# Patient Record
Sex: MALE | Race: WHITE | NOT HISPANIC OR LATINO | Employment: OTHER | URBAN - NONMETROPOLITAN AREA
[De-identification: names, ages, dates, MRNs, and addresses within clinical notes are randomized per-mention and may not be internally consistent; named-entity substitution may affect disease eponyms.]

---

## 2017-01-05 ENCOUNTER — OFFICE VISIT (OUTPATIENT)
Dept: GASTROENTEROLOGY | Facility: CLINIC | Age: 62
End: 2017-01-05

## 2017-01-05 VITALS
WEIGHT: 153.6 LBS | BODY MASS INDEX: 23.28 KG/M2 | HEART RATE: 71 BPM | HEIGHT: 68 IN | DIASTOLIC BLOOD PRESSURE: 74 MMHG | SYSTOLIC BLOOD PRESSURE: 111 MMHG

## 2017-01-05 DIAGNOSIS — K74.00 HEPATIC FIBROSIS: ICD-10-CM

## 2017-01-05 DIAGNOSIS — B18.2 CHRONIC HEPATITIS C WITHOUT HEPATIC COMA (HCC): Primary | ICD-10-CM

## 2017-01-05 DIAGNOSIS — K21.00 GASTROESOPHAGEAL REFLUX DISEASE WITH ESOPHAGITIS: ICD-10-CM

## 2017-01-05 PROCEDURE — 99213 OFFICE O/P EST LOW 20 MIN: CPT | Performed by: PHYSICIAN ASSISTANT

## 2017-01-05 RX ORDER — LEDIPASVIR AND SOFOSBUVIR 90; 400 MG/1; MG/1
1 TABLET, FILM COATED ORAL DAILY
Qty: 28 TABLET | Refills: 1 | OUTPATIENT
Start: 2017-01-05 | End: 2017-03-24

## 2017-01-05 NOTE — PROGRESS NOTES
Chief Complaint   Patient presents with   • Hepatitis C   • Heartburn   • Abdominal Pain   • Diarrhea       Subjective    Favio Casillas is a 61 y.o. male. he is here today for follow-up.    History of Present Illness    Patient seen on a recheck of his GERD, chronic hepatitis C, diarrhea, abdominal pain.  Last seen 9/29/16.  Genotype 1A.  F0-F1.  He is treatment naïve.  GERD is well-controlled on Prilosec as long as he takes it twice a day.  Otherwise he states he gets burning in his stomach.  No nausea vomiting, no dysphagia.  Bowels are moving without blood or mucus.  He's been eating a lot better and his weight is up 20.6 pounds since last visit.  Last colonoscopy showed hemorrhoids on 7/24/15.    Laboratory on 1/3/17 showed normal LFTs, INR, TSH, PSA.  HCV genotype is pending.  HCV RNA by PCR quantitative was 2,320,000 international units per mL, equivalent to 6.365 log IUs.  A1c was 7.7%.    A/P: Chronic active hepatitis C.  Recommend 8 weeks treatment on Harvoni.  He will be due for hepatoma screening in August.  We'll continue on the Prilosec but discussed with him the need to decrease that if we're able to start him on Harvoni.  We'll plan follow-up in one month, further pending clinical course and the results of the above.     The following portions of the patient's history were reviewed and updated as appropriate:   Past Medical History   Diagnosis Date   • Abnormal weight loss    • Acute bronchiolitis    • Acute bronchitis    • Acute exacerbation of chronic obstructive airways disease    • Adenomatous polyp of colon    • Aptyalism    • Artificial lens present    • Artificial lens present      Artificial lens in position     • Astigmatism    • Backache      chronic, rt flank likely not gallbladder   • Borderline glaucoma    • Chest discomfort    • Chest pain    • Chronic hepatitis C      1a. Fibrosure .40/F1-F2, necroinflam .12/A0. repeat .29/F0, .09/A0      • Chronic hepatitis C      1a. Fibrosure  .40/F1-F2, necroinflam .12/A0. repeat .29/F0, .09/A0   • Chronic obstructive lung disease    • Common cold    • Constipation    • Coronary arteriosclerosis    • Diarrhea    • Disorder of duodenum      abnormal on CT   • Disorder of duodenum      abnormal on CT    • Disorder of gallbladder      s/p lap radhames and normal ioc for wound check    • Diverticula of colon    • Diverticular disease of colon    • Drug abuse      used [facility]     • Elevated levels of transaminase & lactic acid dehydrogenase    • Esophagitis      Grade II    • Essential hypertension    • Fatigue    • Gastritis    • Gastroesophageal reflux disease    • Generalized abdominal pain    • Hand pain, right    • Headache    • Heel pain      Plantar   • Hemorrhoids    • History of colon polyps    • History of colonoscopy 08/19/2013     Colon endoscopy 17848 (4) - Diverticulum in sigmoid colon. 1 polyp in ascending colon; removed by cold biopsy polyepctomy. Internal & external hemorrhoids found   • History of esophagogastroduodenoscopy 07/24/2015     (1) - Normal esophagus.Gastritis found in the stomach. Biopsy taken. Normal duodenum. Biopsy taken.       • History of neoplasm of bladder    • History of pneumococcal vaccination    • History of seizure    • Left lower quadrant pain    • Male erectile disorder    • Malignant tumor of prostate    • Myopia    • Nausea and vomiting    • Need for immunization against influenza    • Need for prophylactic vaccination and inoculation against influenza    • Pain      Plantar heel pain     • Pain in right hand    • Patient's noncompliance with other medical treatment and regimen    • Periumbilical pain    • Primary malignant neoplasm of bladder      T1,Grade 3, transitional cell cancer   • Rash    • Rash      C/O: a rash   • Rheumatoid arthritis    • Right upper quadrant pain    • Sebaceous cyst    • Seizure    • Transient cerebral ischemia    • Type 2 diabetes mellitus    • Viral hepatitis C      Past Surgical  History   Procedure Laterality Date   • Bladder tumor excision       transurethral resection of the tumor & then undergone intravesica BCG.He had this done elsewhere   • Bladder surgery  01/27/2005   • Cholecystectomy  08/25/2011     Laparoscopic   • Endoscopy  07/24/2015     With biopsy   • Endoscopy  02/23/2009     with tube   • Other surgical history  03/22/2012     EXC TR-EXT Benign+Brittany 1.1-2 CM    • Injection of medication  05/23/2016     Kenalog   • Wound closure  03/22/2012     Layer Closure of Wound TR-EXT 2.5 < CM   • Back surgery  01/01/2000     Low back disc surgery   • Cataract extraction w/  intraocular lens implant Right 05/21/2009   • Bladder surgery  01/27/2005     (2) - Radical cystoprostatectomy, orthopic continent urinary diversion, placement of a double lumen right subclavian catheter. Recurrent T1, grade 3 transitional cell cancer of the bladder plus diffuse carcinoma in situ   • Cholecystectomy  08/25/2011     (1) - with operative cholangiogram. Cholecystitis   • Cystoscopy  12/17/2004     (1) - transurethral resection of bladder tumor medium & random bladder biopsies x 5. History of T1 Grade3 transitional cancer of the bladder   • Lumbar disc surgery  2000     Low back disk surgery (1)   • Cataract extraction Right 05/21/2009     Remove cataract, insert lens (2) - right   • Other surgical history       Anesth, bladder tumor surg (1) - transurethral resection of the tumor & then undergone intravesica BCG.He had this done elsewhere   • Injection of medication  05/12/2016     Kenalog (2)  - SEAN Robert   • Other surgical history  3/22/3012     Layer Closure of Wound TR-EXT 2.5 < CM 52281 (1) Thad Villanueva   • Other surgical history  03/22/2012     EXC TR-EXT Benign+Brittany 1.1-2 CM 96758 (1)   Thad Villanueva   • Colonoscopy  08/19/2013   • Colonoscopy  07/24/2015   • Upper gastrointestinal endoscopy  07/24/2015     Family History   Problem Relation Age of Onset   • Diabetes Mother    • Liver cancer  Father    • Coronary artery disease Other    • Hypertension Other    • Tuberculosis Other    • Cholelithiasis Other    • Gallbladder disease Other      Gallstones   • Hepatitis Other      Hep C       No Known Allergies  Social History     Social History   • Marital status:      Spouse name: N/A   • Number of children: N/A   • Years of education: N/A     Social History Main Topics   • Smoking status: Former Smoker     Quit date: 10/27/2016   • Smokeless tobacco: Never Used      Comment: Quit 2 months ago   • Alcohol use No   • Drug use: No   • Sexual activity: Not Asked     Other Topics Concern   • None     Social History Narrative    ** Merged History Encounter **            Current Outpatient Prescriptions:   •  albuterol (PROVENTIL HFA;VENTOLIN HFA) 108 (90 BASE) MCG/ACT inhaler, Inhale 2 puffs Every 4 (Four) Hours As Needed., Disp: , Rfl:   •  aspirin 81 MG chewable tablet, Chew 81 mg daily., Disp: , Rfl:   •  carvedilol (COREG) 3.125 MG tablet, Take 3.125 mg by mouth 2 (Two) Times a Day With Meals., Disp: , Rfl:   •  diclofenac (VOLTAREN) 50 MG EC tablet, Take 1 tablet by mouth 2 (Two) Times a Day., Disp: 60 tablet, Rfl: 1  •  insulin detemir (LEVEMIR) 100 UNIT/ML injection, Inject 30 Units under the skin every night., Disp: , Rfl:   •  isosorbide mononitrate (IMDUR) 30 MG 24 hr tablet, Take 45 mg by mouth 2 (two) times a day., Disp: , Rfl:   •  metFORMIN (GLUCOPHAGE) 1000 MG tablet, Take 1,000 mg by mouth 2 (Two) Times a Day., Disp: , Rfl:   •  mirtazapine (REMERON) 15 MG tablet, Take 1 tablet by mouth Every Night., Disp: 30 tablet, Rfl: 11  •  NITROSTAT 0.4 MG SL tablet, PLACE 1 TABLET UNDER THE TONGUE AS NEEDED FOR CHEST PAIN. MAY REPEAT EVERY 5 MINUTES UP TO 3 DOSES, THEN GO TO EMERGENCY ROOM, Disp: 25 tablet, Rfl: 11  •  omeprazole (priLOSEC) 40 MG capsule, Take 1 capsule by mouth 2 (Two) Times a Day., Disp: 60 capsule, Rfl: 3  •  pravastatin (PRAVACHOL) 40 MG tablet, Take 40 mg by mouth Every  "Night., Disp: , Rfl:   •  tiZANidine (ZANAFLEX) 4 MG tablet, Take 1-2 tablets by mouth At Night As Needed for muscle spasms., Disp: 60 tablet, Rfl: 11  •  Ledipasvir-Sofosbuvir  MG tablet, Take 1 tablet by mouth Daily., Disp: 28 tablet, Rfl: 1  Review of Systems  Review of Systems       Objective      Visit Vitals   • /74 (BP Location: Left arm)   • Pulse 71   • Ht 68\" (172.7 cm)   • Wt 153 lb 9.6 oz (69.7 kg)   • BMI 23.35 kg/m2     Physical Exam   Constitutional: He is oriented to person, place, and time. He appears well-developed and well-nourished. No distress.   Chronically ill   HENT:   Head: Normocephalic and atraumatic.   Eyes: EOM are normal. Pupils are equal, round, and reactive to light.   Neck: Normal range of motion.   Cardiovascular: Normal rate, regular rhythm and normal heart sounds.    Pulmonary/Chest: Effort normal and breath sounds normal.   Abdominal: Soft. Bowel sounds are normal. He exhibits no shifting dullness, no distension, no abdominal bruit, no ascites and no mass. There is no hepatosplenomegaly. There is no tenderness. There is no rigidity, no rebound, no guarding and no CVA tenderness. No hernia. Hernia confirmed negative in the ventral area.   Musculoskeletal: Normal range of motion.   Neurological: He is alert and oriented to person, place, and time.   Skin: Skin is warm and dry.   Psychiatric: He has a normal mood and affect. His behavior is normal. Judgment and thought content normal.   Nursing note and vitals reviewed.    Hospital Outpatient Visit on 01/03/2017   Component Date Value Ref Range Status   • Total Protein 01/03/2017 7.3  6.3 - 8.6 gm/dl Final   • Albumin 01/03/2017 4.4  3.4 - 4.8 gm/dl Final   • Bilirubin, Direct 01/03/2017 0.0  0.0 - 0.3 mg/dl Final   • Total Bilirubin 01/03/2017 0.7  0.2 - 1.3 mg/dl Final   • Alkaline Phosphatase 01/03/2017 50  38 - 126 U/L Final   • AST (SGOT) 01/03/2017 32  17 - 59 U/L Final   • ALT (SGPT) 01/03/2017 48  21 - 72 U/L " Final   • Protime 01/03/2017 12.8  11.1 - 15.3 seconds Final   • INR 01/03/2017 1.0  0.8 - 1.2 Final    Comment: Therapeutic range for most indications is 2.0 - 3.0 INR or 2.5 - 3.5 for  mechanical   heart valves.     • Hepatitis C Quantitation 01/03/2017 [id number]  IU/mL Final   • HCV log10 01/03/2017 6.365  log10 IU/mL Final   • Test Information 01/03/2017 Comment   Final    Comment: The quantitative range of this assay is 15 IU/mL to 100 million IU/mL.  Performed at:  [facility]  [id number]  : Favio Fuentes MD, Phone:  [phone]       Assessment/Plan      1. Chronic hepatitis C without hepatic coma    2. Gastroesophageal reflux disease with esophagitis    3. Hepatic fibrosis    .   Favio was seen today for hepatitis c, heartburn, abdominal pain and diarrhea.    Diagnoses and all orders for this visit:    Chronic hepatitis C without hepatic coma  -     Ledipasvir-Sofosbuvir  MG tablet; Take 1 tablet by mouth Daily.    Gastroesophageal reflux disease with esophagitis    Hepatic fibrosis        Orders placed during this encounter include:  No orders of the defined types were placed in this encounter.      Medications prescribed:  New Medications Ordered This Visit   Medications   • Ledipasvir-Sofosbuvir  MG tablet     Sig: Take 1 tablet by mouth Daily.     Dispense:  28 tablet     Refill:  1       Requested Prescriptions     Signed Prescriptions Disp Refills   • Ledipasvir-Sofosbuvir  MG tablet 28 tablet 1     Sig: Take 1 tablet by mouth Daily.       Review and/or summary of lab tests, radiology, procedures, medications. Review and summary of old records and obtaining of history. The risks and benefits of my recommendations, as well as other treatment options were discussed with the patient today. Questions were answered.    Follow-up: Return in about 4 weeks (around 2/2/2017), or if symptoms worsen or fail to improve.           This  document has been electronically signed by Kevin Robbins PA-C on January 5, 2017 5:55 PM      Results for orders placed or performed during the hospital encounter of 01/03/17   Hepatitis C RNA, Quantitative, PCR (graph)   Result Value Ref Range    Hepatitis C Quantitation [id number] IU/mL    HCV log10 6.365 log10 IU/mL    Test Information Comment    Protime-INR   Result Value Ref Range    Protime 12.8 11.1 - 15.3 seconds    INR 1.0 0.8 - 1.2   Hepatic Function Panel   Result Value Ref Range    Total Protein 7.3 6.3 - 8.6 gm/dl    Albumin 4.4 3.4 - 4.8 gm/dl    Bilirubin, Direct 0.0 0.0 - 0.3 mg/dl    Total Bilirubin 0.7 0.2 - 1.3 mg/dl    Alkaline Phosphatase 50 38 - 126 U/L    AST (SGOT) 32 17 - 59 U/L    ALT (SGPT) 48 21 - 72 U/L   Results for orders placed or performed during the hospital encounter of 12/27/16   PSA   Result Value Ref Range    PSA <0.06 0.00 - 4.00 ng/ml   Results for orders placed or performed in visit on 12/27/16   TSH   Result Value Ref Range    TSH 1.34 0.46 - 4.68 uIU/ml   Hemoglobin A1c   Result Value Ref Range    Hemoglobin A1C 7.7 (H) 4.0 - 5.6 %TotHgb   Results for orders placed or performed in visit on 10/25/16    COLONOSCOPY   Result Value Ref Range     Colonoscopy Complete.  Referring Physician - Kevin Robbins    Results for orders placed or performed during the hospital encounter of 08/04/16   HCV FibroSURE   Result Value Ref Range    Fibrosis Score 0.26 (H) 0.00 - 0.21    Fibrosis Stage F0-F1     Necroinflammat Activity Score 0.06 0.00 - 0.17    Necroinflammat Activity Grade A0-No activity     Alpha 2-Macroglobulins, Qn 265 110 - 276 mg/dL    Haptoglobin 152 34 - 200 mg/dL    Apolipoprotein A-1 123 101 - 178 mg/dL    Total Bilirubin 0.2 0.0 - 1.2 mg/dL    GGT 23 0 - 65 IU/L    ALT (SGPT) 18 0 - 55 IU/L    HCV Qual Interp Comment     Fibrosis Scoring: Comment     Necroinflamm Activity Scoring: Comment     Limitations: Comment     Comment Comment    AFP tumor marker   Result  Value Ref Range    AFP Tumor Marker 2.5 0.0 - 8.3 ng/mL   Protime-INR   Result Value Ref Range    Protime 12.2 11.1 - 15.3 seconds    INR 0.9 0.0 - 3.5   Results for orders placed or performed during the hospital encounter of 06/10/16   CBC and Differential   Result Value Ref Range    WBC 7.3 3.2 - 9.8 x1000/uL    RBC 4.65 4.37 - 5.74 alanna/mm3    Hemoglobin 14.3 13.7 - 17.3 gm/dl    Hematocrit 42.9 39.0 - 49.0 %    MCV 92.3 80.0 - 98.0 fl    MCH 30.8 26.0 - 34.0 pg    MCHC 33.3 31.5 - 36.3 gm/dl    RDW 13.6 11.5 - 14.5 %    Platelets 154 150 - 450 x1000/mm3    MPV 10.4 8.0 - 12.0 fl    Neutrophil Rel % 63.1 37.0 - 80.0 %    Lymphocyte Rel % 27.6 10.0 - 50.0 %    Monocyte Rel % 7.3 0.0 - 12.0 %    Eosinophil Rel % 1.4 0.0 - 7.0 %    Basophil Rel % 0.3 0.0 - 2.0 %    Immature Granulocyte Rel % 0.30 0.00 - 0.50 %    Neutrophils Absolute 4.60 2.00 - 8.60 x1000/uL    Lymphocytes Absolute 2.01 0.60 - 4.20 x1000/uL    Monocytes Absolute 0.53 0.00 - 0.90 x1000/uL    Eosinophils Absolute 0.10 0.00 - 0.70 x1000/uL    Basophils Absolute 0.02 0.00 - 0.20 x1000/uL    Immature Granulocytes Absolute 0.020 0.005 - 0.022 x1000/uL    nRBC 0.1 0.0 - 0.2 %    nRBC 0.050 x1000/uL   Uric acid   Result Value Ref Range    Uric Acid 2.6 2.5 - 8.5 mg/dl   Comprehensive metabolic panel   Result Value Ref Range    Sodium 143 137 - 145 mmol/L    Potassium 4.6 3.5 - 5.1 mmol/L    Chloride 104 95 - 110 mmol/L    CO2 24 22 - 31 mmol/L    Anion Gap 15.0 5.0 - 15.0 mmol/L    Glucose 181 (H) 60 - 100 mg/dl    BUN 21 7 - 21 mg/dl    Creatinine 0.8 0.7 - 1.3 mg/dl    GFR MDRD Non  99 56 - 130 mL/min/1.73 sq.M    GFR MDRD  120 56 - 130 mL/min/1.73 sq.M    Calcium 9.9 8.4 - 10.2 mg/dl    Total Protein 6.1 (L) 6.3 - 8.6 gm/dl    Albumin 3.7 3.4 - 4.8 gm/dl    Total Bilirubin 0.3 0.2 - 1.3 mg/dl    Alkaline Phosphatase 59 38 - 126 U/L    AST (SGOT) 13 (L) 17 - 59 U/L    ALT (SGPT) 30 21 - 72 U/L   Results for orders placed  or performed during the hospital encounter of 03/29/16   Hemoglobin A1c   Result Value Ref Range    Hemoglobin A1C 6.6 (H) 4.0 - 5.6 %TotHgb   Results for orders placed or performed during the hospital encounter of 02/10/16   Protime-INR   Result Value Ref Range    Protime 13.6 11.1 - 15.3 seconds    INR 1.0 0.0 - 3.5   Gamma GT   Result Value Ref Range    GGT 23 15 - 73 U/L   Hepatic function panel   Result Value Ref Range    Total Protein 7.1 6.3 - 8.6 gm/dl    Albumin 4.5 3.4 - 4.8 gm/dl    Bilirubin, Direct 0.0 0.0 - 0.3 mg/dl    Total Bilirubin 0.8 0.2 - 1.3 mg/dl    Alkaline Phosphatase 45 38 - 126 U/L    AST (SGOT) 15 (L) 17 - 59 U/L    ALT (SGPT) 22 21 - 72 U/L   Results for orders placed or performed during the hospital encounter of 11/30/15   Uric acid   Result Value Ref Range    Uric Acid 2.8 2.5 - 8.5 mg/dl   Results for orders placed or performed during the hospital encounter of 11/04/15   HCV FibroSURE   Result Value Ref Range    Fibrosis Score 0.29 (H) 0.00 - 0.21    Fibrosis Stage Comment     Necroinflammat Activity Score 0.09 0.00 - 0.17    Necroinflammat Activity Grade A0-No activity     Alpha 2-Macroglobulins, Qn 259 110 - 276 mg/dL    Haptoglobin 142 34 - 200 mg/dL    Apolipoprotein A-1 137 110 - 180 mg/dL    Total Bilirubin 0.3 0.0 - 1.2 mg/dL    GGT 25 0 - 65 IU/L    ALT (SGPT) 22 0 - 55 IU/L    HCV Qual Interp Comment     Fibrosis Scoring: Comment     Necroinflamm Activity Scoring: Comment     Limitations: Comment     Comment Comment      *Note: Due to a large number of results and/or encounters for the requested time period, some results have not been displayed. A complete set of results can be found in Results Review.

## 2017-01-05 NOTE — MR AVS SNAPSHOT
Favio Wing Vinny   1/5/2017 10:30 AM   Office Visit    Dept Phone:  [phone]   Encounter #:  [id number]    Provider:  Kevin Robbins PA-C   Department:  [facility] GASTROENTEROLOGY                Your Full Care Plan              Today's Medication Changes          These changes are accurate as of: 1/5/17 11:04 AM.  If you have any questions, ask your nurse or doctor.               New Medication(s)Ordered:     Ledipasvir-Sofosbuvir  MG tablet   Take 1 tablet by mouth Daily.   Started by:  Kevin Robbins PA-C            Where to Get Your Medications      These medications were sent to [facility] - [place], [place] - [address] - [phone]  - [phone] FX  [address] [facility] [address]     Phone:  [phone]     Ledipasvir-Sofosbuvir  MG tablet                  Your Updated Medication List          This list is accurate as of: 1/5/17 11:04 AM.  Always use your most recent med list.                albuterol 108 (90 BASE) MCG/ACT inhaler   Commonly known as:  PROVENTIL HFA;VENTOLIN HFA       aspirin 81 MG chewable tablet       carvedilol 3.125 MG tablet   Commonly known as:  COREG       diclofenac 50 MG EC tablet   Commonly known as:  VOLTAREN   Take 1 tablet by mouth 2 (Two) Times a Day.       insulin detemir 100 UNIT/ML injection   Commonly known as:  LEVEMIR       isosorbide mononitrate 30 MG 24 hr tablet   Commonly known as:  IMDUR       Ledipasvir-Sofosbuvir  MG tablet   Take 1 tablet by mouth Daily.       metFORMIN 1000 MG tablet   Commonly known as:  GLUCOPHAGE       mirtazapine 15 MG tablet   Commonly known as:  REMERON   Take 1 tablet by mouth Every Night.       NITROSTAT 0.4 MG SL tablet   Generic drug:  nitroglycerin   PLACE 1 TABLET UNDER THE TONGUE AS NEEDED FOR CHEST PAIN. MAY REPEAT EVERY 5 MINUTES UP TO 3 DOSES, THEN GO TO EMERGENCY ROOM       omeprazole 40 MG capsule   Commonly known  "as:  priLOSEC   Take 1 capsule by mouth 2 (Two) Times a Day.       pravastatin 40 MG tablet   Commonly known as:  PRAVACHOL       tiZANidine 4 MG tablet   Commonly known as:  ZANAFLEX   Take 1-2 tablets by mouth At Night As Needed for muscle spasms.               You Were Diagnosed With        Codes Comments    Chronic hepatitis C without hepatic coma    -  Primary ICD-10-CM: B18.2  ICD-9-CM: 070.54     Gastroesophageal reflux disease with esophagitis     ICD-10-CM: K21.0  ICD-9-CM: 530.11     Hepatic fibrosis     ICD-10-CM: K74.0  ICD-9-CM: 571.5       Instructions     None    Patient Instructions History      Upcoming Appointments     Visit Type Date Time Department    OFFICE VISIT 1/5/2017 10:30 AM [facility]    OFFICE VISIT 2/2/2017 10:45 AM [facility]  [facility]    OFFICE VISIT 3/28/2017  8:30 AM [facility]      [employer] Signup     Our records indicate that you have declined [employer] signup. If you would like to sign up for [employer], please email [email or web address] or call [phone] to obtain an activation code.             Other Info from Your Visit           Your Appointments     Feb 02, 2017 10:45 AM CST   Office Visit with Kevin Robbins PA-C   [facility] GASTROENTEROLOGY (--)    [address] [place] [address] [address]  [facility] [address] [place] [address] [phone]           Arrive 15 minutes prior to appointment.            Mar 28, 2017  8:30 AM CDT   Office Visit with Harvinder Robert MD   [facility] FAMILY MEDICINE (--)    [address] [facility] [address] [phone]           Arrive 15 minutes prior to appointment.              Allergies     No Known Allergies      Reason for Visit     Hepatitis C     Heartburn     Abdominal Pain     Diarrhea           Vital Signs     Blood Pressure Pulse Height Weight Body Mass Index Smoking Status    111/74 (BP Location: Left arm) 71 68\" (172.7 cm) 153 lb 9.6 oz (69.7 kg) 23.35 " kg/m2 Former Smoker      Problems and Diagnoses Noted     Chronic hepatitis C without hepatic coma    -  Primary    Inflammation of the esophagus        Hepatic fibrosis

## 2017-01-13 RX ORDER — ALBUTEROL SULFATE 90 UG/1
2 AEROSOL, METERED RESPIRATORY (INHALATION) EVERY 4 HOURS PRN
Qty: 1 INHALER | Refills: 0 | Status: SHIPPED | OUTPATIENT
Start: 2017-01-13 | End: 2017-03-24 | Stop reason: SDUPTHER

## 2017-01-23 ENCOUNTER — OFFICE VISIT (OUTPATIENT)
Dept: GASTROENTEROLOGY | Facility: CLINIC | Age: 62
End: 2017-01-23

## 2017-01-23 VITALS
DIASTOLIC BLOOD PRESSURE: 79 MMHG | HEART RATE: 71 BPM | BODY MASS INDEX: 23.01 KG/M2 | HEIGHT: 68 IN | SYSTOLIC BLOOD PRESSURE: 125 MMHG | WEIGHT: 151.8 LBS

## 2017-01-23 DIAGNOSIS — K74.00 HEPATIC FIBROSIS: ICD-10-CM

## 2017-01-23 DIAGNOSIS — K21.00 GASTROESOPHAGEAL REFLUX DISEASE WITH ESOPHAGITIS: ICD-10-CM

## 2017-01-23 DIAGNOSIS — R10.13 EPIGASTRIC PAIN: Primary | ICD-10-CM

## 2017-01-23 DIAGNOSIS — B18.2 CHRONIC HEPATITIS C WITHOUT HEPATIC COMA (HCC): ICD-10-CM

## 2017-01-23 DIAGNOSIS — K59.09 OTHER CONSTIPATION: ICD-10-CM

## 2017-01-23 PROCEDURE — 99213 OFFICE O/P EST LOW 20 MIN: CPT | Performed by: PHYSICIAN ASSISTANT

## 2017-01-23 RX ORDER — OMEPRAZOLE 20 MG/1
20 CAPSULE, DELAYED RELEASE ORAL DAILY
Qty: 30 CAPSULE | Refills: 1 | Status: SHIPPED | OUTPATIENT
Start: 2017-01-23 | End: 2017-03-24 | Stop reason: DRUGHIGH

## 2017-02-07 ENCOUNTER — LAB (OUTPATIENT)
Dept: LAB | Facility: CLINIC | Age: 62
End: 2017-02-07

## 2017-02-07 DIAGNOSIS — B18.2 CHRONIC HEPATITIS C WITHOUT HEPATIC COMA (HCC): ICD-10-CM

## 2017-02-07 PROCEDURE — 85025 COMPLETE CBC W/AUTO DIFF WBC: CPT | Performed by: PHYSICIAN ASSISTANT

## 2017-02-07 PROCEDURE — 80053 COMPREHEN METABOLIC PANEL: CPT | Performed by: PHYSICIAN ASSISTANT

## 2017-02-08 LAB
ALBUMIN SERPL-MCNC: 4.3 G/DL (ref 3.4–4.8)
ALBUMIN/GLOB SERPL: 1.7 G/DL (ref 1.1–1.8)
ALP SERPL-CCNC: 58 U/L (ref 38–126)
ALT SERPL W P-5'-P-CCNC: 25 U/L (ref 21–72)
ANION GAP SERPL CALCULATED.3IONS-SCNC: 10 MMOL/L (ref 5–15)
AST SERPL-CCNC: 11 U/L (ref 17–59)
BASOPHILS # BLD AUTO: 0.03 10*3/MM3 (ref 0–0.2)
BASOPHILS NFR BLD AUTO: 0.5 % (ref 0–2)
BILIRUB SERPL-MCNC: 0.4 MG/DL (ref 0.2–1.3)
BUN BLD-MCNC: 22 MG/DL (ref 7–21)
BUN/CREAT SERPL: 19.3 (ref 7–25)
CALCIUM SPEC-SCNC: 10.1 MG/DL (ref 8.4–10.2)
CHLORIDE SERPL-SCNC: 107 MMOL/L (ref 95–110)
CO2 SERPL-SCNC: 26 MMOL/L (ref 22–31)
CREAT BLD-MCNC: 1.14 MG/DL (ref 0.7–1.3)
DEPRECATED RDW RBC AUTO: 43.3 FL (ref 35.1–43.9)
EOSINOPHIL # BLD AUTO: 0.13 10*3/MM3 (ref 0–0.7)
EOSINOPHIL NFR BLD AUTO: 2 % (ref 0–7)
ERYTHROCYTE [DISTWIDTH] IN BLOOD BY AUTOMATED COUNT: 12.9 % (ref 11.5–14.5)
GFR SERPL CREATININE-BSD FRML MDRD: 66 ML/MIN/1.73 (ref 49–113)
GLOBULIN UR ELPH-MCNC: 2.5 GM/DL (ref 2.3–3.5)
GLUCOSE BLD-MCNC: 125 MG/DL (ref 60–100)
HCT VFR BLD AUTO: 40.7 % (ref 39–49)
HGB BLD-MCNC: 13.6 G/DL (ref 13.7–17.3)
IMM GRANULOCYTES # BLD: 0 10*3/MM3 (ref 0–0.02)
IMM GRANULOCYTES NFR BLD: 0 % (ref 0–0.5)
LYMPHOCYTES # BLD AUTO: 1.76 10*3/MM3 (ref 0.6–4.2)
LYMPHOCYTES NFR BLD AUTO: 27.6 % (ref 10–50)
MCH RBC QN AUTO: 30.8 PG (ref 26.5–34)
MCHC RBC AUTO-ENTMCNC: 33.4 G/DL (ref 31.5–36.3)
MCV RBC AUTO: 92.1 FL (ref 80–98)
MONOCYTES # BLD AUTO: 0.53 10*3/MM3 (ref 0–0.9)
MONOCYTES NFR BLD AUTO: 8.3 % (ref 0–12)
NEUTROPHILS # BLD AUTO: 3.93 10*3/MM3 (ref 2–8.6)
NEUTROPHILS NFR BLD AUTO: 61.6 % (ref 37–80)
PLATELET # BLD AUTO: 156 10*3/MM3 (ref 150–450)
PMV BLD AUTO: 11.5 FL (ref 8–12)
POTASSIUM BLD-SCNC: 4.9 MMOL/L (ref 3.5–5.1)
PROT SERPL-MCNC: 6.8 G/DL (ref 6.3–8.6)
RBC # BLD AUTO: 4.42 10*6/MM3 (ref 4.37–5.74)
SODIUM BLD-SCNC: 143 MMOL/L (ref 137–145)
WBC NRBC COR # BLD: 6.38 10*3/MM3 (ref 3.2–9.8)

## 2017-02-21 ENCOUNTER — LAB (OUTPATIENT)
Dept: LAB | Facility: CLINIC | Age: 62
End: 2017-02-21

## 2017-02-21 DIAGNOSIS — K59.09 OTHER CONSTIPATION: ICD-10-CM

## 2017-02-21 DIAGNOSIS — R10.13 EPIGASTRIC PAIN: ICD-10-CM

## 2017-02-21 DIAGNOSIS — B18.2 CHRONIC HEPATITIS C WITHOUT HEPATIC COMA (HCC): ICD-10-CM

## 2017-02-21 DIAGNOSIS — K74.00 HEPATIC FIBROSIS: ICD-10-CM

## 2017-02-21 LAB
ALBUMIN SERPL-MCNC: 3.9 G/DL (ref 3.4–4.8)
ALBUMIN/GLOB SERPL: 1.3 G/DL (ref 1.1–1.8)
ALP SERPL-CCNC: 58 U/L (ref 38–126)
ALT SERPL W P-5'-P-CCNC: 22 U/L (ref 21–72)
ANION GAP SERPL CALCULATED.3IONS-SCNC: 13 MMOL/L (ref 5–15)
AST SERPL-CCNC: 10 U/L (ref 17–59)
BASOPHILS # BLD AUTO: 0.03 10*3/MM3 (ref 0–0.2)
BASOPHILS NFR BLD AUTO: 0.5 % (ref 0–2)
BILIRUB SERPL-MCNC: 0.3 MG/DL (ref 0.2–1.3)
BUN BLD-MCNC: 19 MG/DL (ref 7–21)
BUN/CREAT SERPL: 17.6 (ref 7–25)
CALCIUM SPEC-SCNC: 10 MG/DL (ref 8.4–10.2)
CHLORIDE SERPL-SCNC: 104 MMOL/L (ref 95–110)
CO2 SERPL-SCNC: 26 MMOL/L (ref 22–31)
CREAT BLD-MCNC: 1.08 MG/DL (ref 0.7–1.3)
DEPRECATED RDW RBC AUTO: 42.9 FL (ref 35.1–43.9)
EOSINOPHIL # BLD AUTO: 0.13 10*3/MM3 (ref 0–0.7)
EOSINOPHIL NFR BLD AUTO: 2.1 % (ref 0–7)
ERYTHROCYTE [DISTWIDTH] IN BLOOD BY AUTOMATED COUNT: 12.8 % (ref 11.5–14.5)
GFR SERPL CREATININE-BSD FRML MDRD: 70 ML/MIN/1.73 (ref 60–113)
GLOBULIN UR ELPH-MCNC: 3 GM/DL (ref 2.3–3.5)
GLUCOSE BLD-MCNC: 180 MG/DL (ref 60–100)
HCT VFR BLD AUTO: 38.2 % (ref 39–49)
HGB BLD-MCNC: 12.8 G/DL (ref 13.7–17.3)
IMM GRANULOCYTES # BLD: 0.02 10*3/MM3 (ref 0–0.02)
IMM GRANULOCYTES NFR BLD: 0.3 % (ref 0–0.5)
LYMPHOCYTES # BLD AUTO: 1.57 10*3/MM3 (ref 0.6–4.2)
LYMPHOCYTES NFR BLD AUTO: 25.9 % (ref 10–50)
MCH RBC QN AUTO: 30.5 PG (ref 26.5–34)
MCHC RBC AUTO-ENTMCNC: 33.5 G/DL (ref 31.5–36.3)
MCV RBC AUTO: 91 FL (ref 80–98)
MONOCYTES # BLD AUTO: 0.54 10*3/MM3 (ref 0–0.9)
MONOCYTES NFR BLD AUTO: 8.9 % (ref 0–12)
NEUTROPHILS # BLD AUTO: 3.78 10*3/MM3 (ref 2–8.6)
NEUTROPHILS NFR BLD AUTO: 62.3 % (ref 37–80)
PLATELET # BLD AUTO: 257 10*3/MM3 (ref 150–450)
PMV BLD AUTO: 9.9 FL (ref 8–12)
POTASSIUM BLD-SCNC: 4.8 MMOL/L (ref 3.5–5.1)
PROT SERPL-MCNC: 6.9 G/DL (ref 6.3–8.6)
RBC # BLD AUTO: 4.2 10*6/MM3 (ref 4.37–5.74)
SODIUM BLD-SCNC: 143 MMOL/L (ref 137–145)
TSH SERPL DL<=0.05 MIU/L-ACNC: 0.92 MIU/ML (ref 0.46–4.68)
WBC NRBC COR # BLD: 6.07 10*3/MM3 (ref 3.2–9.8)

## 2017-02-21 PROCEDURE — 80053 COMPREHEN METABOLIC PANEL: CPT | Performed by: PHYSICIAN ASSISTANT

## 2017-02-21 PROCEDURE — 85025 COMPLETE CBC W/AUTO DIFF WBC: CPT | Performed by: PHYSICIAN ASSISTANT

## 2017-02-21 PROCEDURE — 84443 ASSAY THYROID STIM HORMONE: CPT | Performed by: PHYSICIAN ASSISTANT

## 2017-02-21 PROCEDURE — 87522 HEPATITIS C REVRS TRNSCRPJ: CPT | Performed by: PHYSICIAN ASSISTANT

## 2017-02-23 LAB
HCV RNA SERPL NAA+PROBE-ACNC: NORMAL IU/ML
TEST INFORMATION: NORMAL

## 2017-03-01 ENCOUNTER — OFFICE VISIT (OUTPATIENT)
Dept: GASTROENTEROLOGY | Facility: CLINIC | Age: 62
End: 2017-03-01

## 2017-03-01 VITALS
DIASTOLIC BLOOD PRESSURE: 70 MMHG | WEIGHT: 152.5 LBS | HEIGHT: 68 IN | SYSTOLIC BLOOD PRESSURE: 120 MMHG | HEART RATE: 70 BPM | BODY MASS INDEX: 23.11 KG/M2

## 2017-03-01 DIAGNOSIS — K59.09 OTHER CONSTIPATION: ICD-10-CM

## 2017-03-01 DIAGNOSIS — B18.2 CHRONIC HEPATITIS C WITHOUT HEPATIC COMA (HCC): ICD-10-CM

## 2017-03-01 DIAGNOSIS — D50.0 ANEMIA, BLOOD LOSS: ICD-10-CM

## 2017-03-01 DIAGNOSIS — L29.9 PRURITUS: ICD-10-CM

## 2017-03-01 DIAGNOSIS — R10.13 EPIGASTRIC PAIN: Primary | ICD-10-CM

## 2017-03-01 DIAGNOSIS — K74.00 HEPATIC FIBROSIS: ICD-10-CM

## 2017-03-01 DIAGNOSIS — K21.00 GASTROESOPHAGEAL REFLUX DISEASE WITH ESOPHAGITIS: ICD-10-CM

## 2017-03-01 PROCEDURE — 99214 OFFICE O/P EST MOD 30 MIN: CPT | Performed by: PHYSICIAN ASSISTANT

## 2017-03-01 RX ORDER — DEXTROSE AND SODIUM CHLORIDE 5; .45 G/100ML; G/100ML
30 INJECTION, SOLUTION INTRAVENOUS CONTINUOUS PRN
Status: CANCELLED | OUTPATIENT
Start: 2017-03-03

## 2017-03-01 RX ORDER — SODIUM CHLORIDE 0.9 % (FLUSH) 0.9 %
1-10 SYRINGE (ML) INJECTION AS NEEDED
Status: CANCELLED | OUTPATIENT
Start: 2017-03-03

## 2017-03-01 NOTE — PROGRESS NOTES
Chief Complaint   Patient presents with   • Hepatitis C     Started 8 weeks of Harvoni on 1-24-17   • Constipation   • Hepatic Fibrosis       ENDO PROCEDURE ORDERED: EGD poss UGI bleed!!!    Subjective    Favio Casillas is a 62 y.o. male. he is here today for follow-up.    History of Present Illness    Patient seen on a recheck of his chronic hepatitis C, hepatic fibrosis, constipation, GERD.  Last seen 1/23/17.  Genotype 1A.  F1, treatment naïve.  Laboratory on 2/7/17 CBC showed hemoglobin 13.6, otherwise normal.  CMP showed glucose 125, BUN 22, otherwise normal.  Repeat laboratory on 2/21/17 CMP showed glucose 180, otherwise normal, normal TSH, HCV RNA by PCR quantitative was not detected.  CBC showed hemoglobin 12.8, hematocrit 38.2.  Patient had a previous EGD on 7/24/15 showed gastritis.  He presents today with 8 out of 10 mid-upper abdominal pain.  He is on Prilosec 20 mg daily.  He admits he drinks a lot of coffee and sodas.  His bowel movements are loose, he does not look at them and does not know if they have been black.  Weight is up 1 pound since last visit.    A/P: Chronic active hepatitis C, patient will continue current regimen for his hepatitis C.  We'll continue get laboratories as ordered.  Given his abdominal pain, elevated BUN, drop in hemoglobin, I'm concerned about possible upper GI bleeding, he has a previous history of peptic ulcer disease, I suspect he has upper GI blood loss in the recommended urgent EGD to evaluate.  I'm concerned about giving him other medications and interfering with his Harvoni.  Consider hepatoma screening in August.  He will follow-up after the above, further pending clinical course and the results of the above.     The following portions of the patient's history were reviewed and updated as appropriate:   Past Medical History   Diagnosis Date   • Abnormal weight loss    • Acute bronchiolitis    • Acute bronchitis    • Acute exacerbation of chronic obstructive  airways disease    • Adenomatous polyp of colon    • Aptyalism    • Artificial lens present    • Artificial lens present      Artificial lens in position     • Astigmatism    • Backache      chronic, rt flank likely not gallbladder   • Borderline glaucoma    • Chest discomfort    • Chest pain    • Chronic hepatitis C      1a. Fibrosure .40/F1-F2, necroinflam .12/A0. repeat .29/F0, .09/A0      • Chronic hepatitis C      1a. Fibrosure .40/F1-F2, necroinflam .12/A0. repeat .29/F0, .09/A0   • Chronic obstructive lung disease    • Common cold    • Constipation    • Coronary arteriosclerosis    • Diarrhea    • Disorder of duodenum      abnormal on CT   • Disorder of duodenum      abnormal on CT    • Disorder of gallbladder      s/p lap radhames and normal ioc for wound check    • Diverticula of colon    • Diverticular disease of colon    • Drug abuse      used [facility]     • Elevated levels of transaminase & lactic acid dehydrogenase    • Esophagitis      Grade II    • Essential hypertension    • Fatigue    • Gastritis    • Gastroesophageal reflux disease    • Generalized abdominal pain    • Hand pain, right    • Headache    • Heel pain      Plantar   • Hemorrhoids    • History of colon polyps    • History of colonoscopy 08/19/2013     Colon endoscopy 25083 (4) - Diverticulum in sigmoid colon. 1 polyp in ascending colon; removed by cold biopsy polyepctomy. Internal & external hemorrhoids found   • History of esophagogastroduodenoscopy 07/24/2015     (1) - Normal esophagus.Gastritis found in the stomach. Biopsy taken. Normal duodenum. Biopsy taken.       • History of neoplasm of bladder    • History of pneumococcal vaccination    • History of seizure    • Left lower quadrant pain    • Male erectile disorder    • Malignant tumor of prostate    • Myopia    • Nausea and vomiting    • Need for immunization against influenza    • Need for prophylactic vaccination and inoculation against influenza    • Pain      Plantar heel pain      • Pain in right hand    • Patient's noncompliance with other medical treatment and regimen    • Periumbilical pain    • Primary malignant neoplasm of bladder      T1,Grade 3, transitional cell cancer   • Rash    • Rash      C/O: a rash   • Rheumatoid arthritis    • Right upper quadrant pain    • Sebaceous cyst    • Seizure    • Transient cerebral ischemia    • Type 2 diabetes mellitus    • Viral hepatitis C      Past Surgical History   Procedure Laterality Date   • Bladder tumor excision       transurethral resection of the tumor & then undergone intravesica BCG.He had this done elsewhere   • Bladder surgery  01/27/2005   • Cholecystectomy  08/25/2011     Laparoscopic   • Endoscopy  07/24/2015     With biopsy   • Endoscopy  02/23/2009     with tube   • Other surgical history  03/22/2012     EXC TR-EXT Benign+Brittany 1.1-2 CM    • Injection of medication  05/23/2016     Kenalog   • Wound closure  03/22/2012     Layer Closure of Wound TR-EXT 2.5 < CM   • Back surgery  01/01/2000     Low back disc surgery   • Cataract extraction w/  intraocular lens implant Right 05/21/2009   • Bladder surgery  01/27/2005     (2) - Radical cystoprostatectomy, orthopic continent urinary diversion, placement of a double lumen right subclavian catheter. Recurrent T1, grade 3 transitional cell cancer of the bladder plus diffuse carcinoma in situ   • Cholecystectomy  08/25/2011     (1) - with operative cholangiogram. Cholecystitis   • Cystoscopy  12/17/2004     (1) - transurethral resection of bladder tumor medium & random bladder biopsies x 5. History of T1 Grade3 transitional cancer of the bladder   • Lumbar disc surgery  2000     Low back disk surgery (1)   • Cataract extraction Right 05/21/2009     Remove cataract, insert lens (2) - right   • Other surgical history       Anesth, bladder tumor surg (1) - transurethral resection of the tumor & then undergone intravesica BCG.He had this done elsewhere   • Injection of medication   05/12/2016     Artemio (2)  - SEAN Robert   • Other surgical history  3/22/3012     Layer Closure of Wound TR-EXT 2.5 < CM 13059 (1) - LAVERN Villanueva   • Other surgical history  03/22/2012     EXC TR-EXT Benign+Brittany 1.1-2 CM 89448 (1)   -  LAVERN Villanueva   • Colonoscopy  08/19/2013   • Colonoscopy  07/24/2015   • Upper gastrointestinal endoscopy  07/24/2015     Family History   Problem Relation Age of Onset   • Diabetes Mother    • Liver cancer Father    • Coronary artery disease Other    • Hypertension Other    • Tuberculosis Other    • Cholelithiasis Other    • Gallbladder disease Other      Gallstones   • Hepatitis Other      Hep C       No Known Allergies  Social History     Social History   • Marital status:      Spouse name: N/A   • Number of children: N/A   • Years of education: N/A     Social History Main Topics   • Smoking status: Former Smoker     Quit date: 10/27/2016   • Smokeless tobacco: Never Used      Comment: Quit 2 months ago   • Alcohol use No   • Drug use: No   • Sexual activity: Not Asked     Other Topics Concern   • None     Social History Narrative    ** Merged History Encounter **            Current Outpatient Prescriptions:   •  albuterol (PROVENTIL HFA;VENTOLIN HFA) 108 (90 BASE) MCG/ACT inhaler, Inhale 2 puffs Every 4 (Four) Hours As Needed (1)., Disp: 1 inhaler, Rfl: 0  •  aspirin 81 MG chewable tablet, Chew 81 mg daily., Disp: , Rfl:   •  carvedilol (COREG) 3.125 MG tablet, Take 3.125 mg by mouth 2 (Two) Times a Day With Meals., Disp: , Rfl:   •  diclofenac (VOLTAREN) 50 MG EC tablet, Take 1 tablet by mouth 2 (Two) Times a Day., Disp: 60 tablet, Rfl: 1  •  insulin detemir (LEVEMIR) 100 UNIT/ML injection, Inject 30 Units under the skin every night., Disp: , Rfl:   •  isosorbide mononitrate (IMDUR) 30 MG 24 hr tablet, Take 45 mg by mouth 2 (two) times a day., Disp: , Rfl:   •  Ledipasvir-Sofosbuvir  MG tablet, Take 1 tablet by mouth Daily., Disp: 28 tablet, Rfl: 1  •  metFORMIN  "(GLUCOPHAGE) 1000 MG tablet, Take 1,000 mg by mouth 2 (Two) Times a Day., Disp: , Rfl:   •  mirtazapine (REMERON) 15 MG tablet, Take 1 tablet by mouth Every Night., Disp: 30 tablet, Rfl: 11  •  NITROSTAT 0.4 MG SL tablet, PLACE 1 TABLET UNDER THE TONGUE AS NEEDED FOR CHEST PAIN. MAY REPEAT EVERY 5 MINUTES UP TO 3 DOSES, THEN GO TO EMERGENCY ROOM, Disp: 25 tablet, Rfl: 11  •  omeprazole (priLOSEC) 20 MG capsule, Take 1 capsule by mouth Daily. Take with Harvoni, Disp: 30 capsule, Rfl: 1  •  pravastatin (PRAVACHOL) 40 MG tablet, Take 40 mg by mouth Every Night., Disp: , Rfl:   •  tiZANidine (ZANAFLEX) 4 MG tablet, Take 1-2 tablets by mouth At Night As Needed for muscle spasms., Disp: 60 tablet, Rfl: 11  Review of Systems  Review of Systems       Objective      Visit Vitals   • /70 (BP Location: Left arm)   • Pulse 70   • Ht 68\" (172.7 cm)   • Wt 152 lb 8 oz (69.2 kg)   • BMI 23.19 kg/m2     Physical Exam   Constitutional: He is oriented to person, place, and time. He appears well-developed and well-nourished. No distress.   Chronically ill   HENT:   Head: Normocephalic and atraumatic.   Eyes: EOM are normal. Pupils are equal, round, and reactive to light.   Neck: Normal range of motion.   Cardiovascular: Normal rate, regular rhythm and normal heart sounds.    Pulmonary/Chest: Effort normal and breath sounds normal.   Abdominal: Soft. Bowel sounds are normal. He exhibits no shifting dullness, no distension, no abdominal bruit, no ascites and no mass. There is no hepatosplenomegaly. There is tenderness. There is no rigidity, no rebound, no guarding and no CVA tenderness. No hernia. Hernia confirmed negative in the ventral area.   epigastric   Musculoskeletal: Normal range of motion.   Neurological: He is alert and oriented to person, place, and time.   Skin: Skin is warm and dry.   Psychiatric: He has a normal mood and affect. His behavior is normal. Judgment and thought content normal.   Nursing note and vitals " reviewed.    Lab on 02/21/2017   Component Date Value Ref Range Status   • Glucose 02/21/2017 180* 60 - 100 mg/dL Final   • BUN 02/21/2017 19  7 - 21 mg/dL Final   • Creatinine 02/21/2017 1.08  0.70 - 1.30 mg/dL Final   • Sodium 02/21/2017 143  137 - 145 mmol/L Final   • Potassium 02/21/2017 4.8  3.5 - 5.1 mmol/L Final   • Chloride 02/21/2017 104  95 - 110 mmol/L Final   • CO2 02/21/2017 26.0  22.0 - 31.0 mmol/L Final   • Calcium 02/21/2017 10.0  8.4 - 10.2 mg/dL Final   • Total Protein 02/21/2017 6.9  6.3 - 8.6 g/dL Final   • Albumin 02/21/2017 3.90  3.40 - 4.80 g/dL Final   • ALT (SGPT) 02/21/2017 22  21 - 72 U/L Final   • AST (SGOT) 02/21/2017 10* 17 - 59 U/L Final   • Alkaline Phosphatase 02/21/2017 58  38 - 126 U/L Final   • Total Bilirubin 02/21/2017 0.3  0.2 - 1.3 mg/dL Final   • eGFR Non African Amer 02/21/2017 70  >60 mL/min/1.73 Final   • Globulin 02/21/2017 3.0  2.3 - 3.5 gm/dL Final   • A/G Ratio 02/21/2017 1.3  1.1 - 1.8 g/dL Final   • BUN/Creatinine Ratio 02/21/2017 17.6  7.0 - 25.0 Final   • Anion Gap 02/21/2017 13.0  5.0 - 15.0 mmol/L Final   • WBC 02/21/2017 6.07  3.20 - 9.80 10*3/mm3 Final   • RBC 02/21/2017 4.20* 4.37 - 5.74 10*6/mm3 Final   • Hemoglobin 02/21/2017 12.8* 13.7 - 17.3 g/dL Final   • Hematocrit 02/21/2017 38.2* 39.0 - 49.0 % Final   • MCV 02/21/2017 91.0  80.0 - 98.0 fL Final   • MCH 02/21/2017 30.5  26.5 - 34.0 pg Final   • MCHC 02/21/2017 33.5  31.5 - 36.3 g/dL Final   • RDW 02/21/2017 12.8  11.5 - 14.5 % Final   • RDW-SD 02/21/2017 42.9  35.1 - 43.9 fl Final   • MPV 02/21/2017 9.9  8.0 - 12.0 fL Final   • Platelets 02/21/2017 257  150 - 450 10*3/mm3 Final   • Neutrophil % 02/21/2017 62.3  37.0 - 80.0 % Final   • Lymphocyte % 02/21/2017 25.9  10.0 - 50.0 % Final   • Monocyte % 02/21/2017 8.9  0.0 - 12.0 % Final   • Eosinophil % 02/21/2017 2.1  0.0 - 7.0 % Final   • Basophil % 02/21/2017 0.5  0.0 - 2.0 % Final   • Immature Grans % 02/21/2017 0.3  0.0 - 0.5 % Final   • Neutrophils,  Absolute 02/21/2017 3.78  2.00 - 8.60 10*3/mm3 Final   • Lymphocytes, Absolute 02/21/2017 1.57  0.60 - 4.20 10*3/mm3 Final   • Monocytes, Absolute 02/21/2017 0.54  0.00 - 0.90 10*3/mm3 Final   • Eosinophils, Absolute 02/21/2017 0.13  0.00 - 0.70 10*3/mm3 Final   • Basophils, Absolute 02/21/2017 0.03  0.00 - 0.20 10*3/mm3 Final   • Immature Grans, Absolute 02/21/2017 0.02  0.00 - 0.02 10*3/mm3 Final   • TSH 02/21/2017 0.920  0.460 - 4.680 mIU/mL Final   • Hepatitis C Quantitation 02/21/2017 HCV Not Detected  IU/mL Final   • Test Information 02/21/2017 Comment   Final    The quantitative range of this assay is 15 IU/mL to 100 million IU/mL.     Assessment/Plan      1. Epigastric pain    2. Gastroesophageal reflux disease with esophagitis    3. Hepatic fibrosis    4. Chronic hepatitis C without hepatic coma    5. Other constipation     6. Anemia, blood loss    7. Pruritus     .   Favio was seen today for hepatitis c, constipation and hepatic fibrosis.    Diagnoses and all orders for this visit:    Epigastric pain  -     Comprehensive Metabolic Panel; Future  -     Case Request; Standing  -     sodium chloride 0.9 % flush 1-10 mL; Infuse 1-10 mL into a venous catheter As Needed for line care.  -     dextrose 5 % and sodium chloride 0.45 % infusion; Infuse 30 mL/hr into a venous catheter Continuous As Needed (Start Prior to Procedure).  -     Case Request    Gastroesophageal reflux disease with esophagitis    Hepatic fibrosis  -     Cancel: Comprehensive Metabolic Panel  -     Comprehensive Metabolic Panel; Future  -     Protime-INR; Future  -     Comprehensive Metabolic Panel; Future  -     Case Request; Standing  -     sodium chloride 0.9 % flush 1-10 mL; Infuse 1-10 mL into a venous catheter As Needed for line care.  -     dextrose 5 % and sodium chloride 0.45 % infusion; Infuse 30 mL/hr into a venous catheter Continuous As Needed (Start Prior to Procedure).  -     Case Request    Chronic hepatitis C without hepatic  coma  -     Cancel: CBC Auto Differential  -     Cancel: Comprehensive Metabolic Panel  -     Cancel: Iron and TIBC  -     Cancel: Ferritin  -     Comprehensive Metabolic Panel; Future  -     CBC Auto Differential; Future  -     TSH; Future  -     HCV RT-PCR,Quant(Non-Graph); Future  -     Protime-INR; Future  -     CBC Auto Differential; Future  -     Comprehensive Metabolic Panel; Future  -     Iron and TIBC; Future  -     Ferritin; Future    Other constipation     Anemia, blood loss  -     Cancel: CBC Auto Differential  -     Cancel: Comprehensive Metabolic Panel  -     Cancel: Iron and TIBC  -     Cancel: Ferritin  -     CBC Auto Differential; Future  -     CBC Auto Differential; Future  -     Iron and TIBC; Future  -     Ferritin; Future  -     Case Request; Standing  -     sodium chloride 0.9 % flush 1-10 mL; Infuse 1-10 mL into a venous catheter As Needed for line care.  -     dextrose 5 % and sodium chloride 0.45 % infusion; Infuse 30 mL/hr into a venous catheter Continuous As Needed (Start Prior to Procedure).  -     Case Request    Pruritus   -     TSH; Future    Other orders  -     Obtain Informed Consent; Standing  -     Verify Informed Consent; Standing  -     POC Glucose Fingerstick; Standing  -     Insert Peripheral IV; Standing  -     Saline Lock & Maintain IV Access; Standing        Orders placed during this encounter include:  Orders Placed This Encounter   Procedures   • Comprehensive Metabolic Panel     Due 3/28     Standing Status:   Future     Standing Expiration Date:   4/3/2017   • CBC Auto Differential     Due 3/28     Standing Status:   Future     Standing Expiration Date:   4/3/2017   • TSH     Due 3/28     Standing Status:   Future     Standing Expiration Date:   4/3/2017   • Protime-INR     Due 3/28     Standing Status:   Future     Standing Expiration Date:   4/3/2017   • CBC Auto Differential     Due 3/7/17     Standing Status:   Future     Standing Expiration Date:   3/10/2017   •  Comprehensive Metabolic Panel     Due 3/7/17     Standing Status:   Future     Standing Expiration Date:   3/10/2017   • Iron and TIBC     Due 3/7/17     Standing Status:   Future     Standing Expiration Date:   3/10/2017   • Ferritin     Due 3/7/17     Standing Status:   Future     Standing Expiration Date:   3/10/2017       Medications prescribed:  No orders of the defined types were placed in this encounter.      Requested Prescriptions      No prescriptions requested or ordered in this encounter       Review and/or summary of lab tests, radiology, procedures, medications. Review and summary of old records and obtaining of history. The risks and benefits of my recommendations, as well as other treatment options were discussed with the patient today. Questions were answered.    Follow-up: Return in about 5 weeks (around 4/5/2017), or if symptoms worsen or fail to improve.     ESOPHAGOGASTRODUODENOSCOPY (N/A)      This document has been electronically signed by Kevin Robbins PA-C on March 1, 2017 5:15 PM      Results for orders placed or performed in visit on 02/21/17   CBC Auto Differential   Result Value Ref Range    WBC 6.07 3.20 - 9.80 10*3/mm3    RBC 4.20 (L) 4.37 - 5.74 10*6/mm3    Hemoglobin 12.8 (L) 13.7 - 17.3 g/dL    Hematocrit 38.2 (L) 39.0 - 49.0 %    MCV 91.0 80.0 - 98.0 fL    MCH 30.5 26.5 - 34.0 pg    MCHC 33.5 31.5 - 36.3 g/dL    RDW 12.8 11.5 - 14.5 %    RDW-SD 42.9 35.1 - 43.9 fl    MPV 9.9 8.0 - 12.0 fL    Platelets 257 150 - 450 10*3/mm3    Neutrophil % 62.3 37.0 - 80.0 %    Lymphocyte % 25.9 10.0 - 50.0 %    Monocyte % 8.9 0.0 - 12.0 %    Eosinophil % 2.1 0.0 - 7.0 %    Basophil % 0.5 0.0 - 2.0 %    Immature Grans % 0.3 0.0 - 0.5 %    Neutrophils, Absolute 3.78 2.00 - 8.60 10*3/mm3    Lymphocytes, Absolute 1.57 0.60 - 4.20 10*3/mm3    Monocytes, Absolute 0.54 0.00 - 0.90 10*3/mm3    Eosinophils, Absolute 0.13 0.00 - 0.70 10*3/mm3    Basophils, Absolute 0.03 0.00 - 0.20 10*3/mm3    Immature  Grans, Absolute 0.02 0.00 - 0.02 10*3/mm3   Hepatitis C RNA, Quantitative, PCR (graph)   Result Value Ref Range    Hepatitis C Quantitation HCV Not Detected IU/mL    Test Information Comment    TSH   Result Value Ref Range    TSH 0.920 0.460 - 4.680 mIU/mL   Comprehensive Metabolic Panel   Result Value Ref Range    Glucose 180 (H) 60 - 100 mg/dL    BUN 19 7 - 21 mg/dL    Creatinine 1.08 0.70 - 1.30 mg/dL    Sodium 143 137 - 145 mmol/L    Potassium 4.8 3.5 - 5.1 mmol/L    Chloride 104 95 - 110 mmol/L    CO2 26.0 22.0 - 31.0 mmol/L    Calcium 10.0 8.4 - 10.2 mg/dL    Total Protein 6.9 6.3 - 8.6 g/dL    Albumin 3.90 3.40 - 4.80 g/dL    ALT (SGPT) 22 21 - 72 U/L    AST (SGOT) 10 (L) 17 - 59 U/L    Alkaline Phosphatase 58 38 - 126 U/L    Total Bilirubin 0.3 0.2 - 1.3 mg/dL    eGFR Non African Amer 70 >60 mL/min/1.73    Globulin 3.0 2.3 - 3.5 gm/dL    A/G Ratio 1.3 1.1 - 1.8 g/dL    BUN/Creatinine Ratio 17.6 7.0 - 25.0    Anion Gap 13.0 5.0 - 15.0 mmol/L   Results for orders placed or performed in visit on 02/07/17   CBC Auto Differential   Result Value Ref Range    WBC 6.38 3.20 - 9.80 10*3/mm3    RBC 4.42 4.37 - 5.74 10*6/mm3    Hemoglobin 13.6 (L) 13.7 - 17.3 g/dL    Hematocrit 40.7 39.0 - 49.0 %    MCV 92.1 80.0 - 98.0 fL    MCH 30.8 26.5 - 34.0 pg    MCHC 33.4 31.5 - 36.3 g/dL    RDW 12.9 11.5 - 14.5 %    RDW-SD 43.3 35.1 - 43.9 fl    MPV 11.5 8.0 - 12.0 fL    Platelets 156 150 - 450 10*3/mm3    Neutrophil % 61.6 37.0 - 80.0 %    Lymphocyte % 27.6 10.0 - 50.0 %    Monocyte % 8.3 0.0 - 12.0 %    Eosinophil % 2.0 0.0 - 7.0 %    Basophil % 0.5 0.0 - 2.0 %    Immature Grans % 0.0 0.0 - 0.5 %    Neutrophils, Absolute 3.93 2.00 - 8.60 10*3/mm3    Lymphocytes, Absolute 1.76 0.60 - 4.20 10*3/mm3    Monocytes, Absolute 0.53 0.00 - 0.90 10*3/mm3    Eosinophils, Absolute 0.13 0.00 - 0.70 10*3/mm3    Basophils, Absolute 0.03 0.00 - 0.20 10*3/mm3    Immature Grans, Absolute 0.00 0.00 - 0.02 10*3/mm3   Comprehensive Metabolic  Panel   Result Value Ref Range    Glucose 125 (H) 60 - 100 mg/dL    BUN 22 (H) 7 - 21 mg/dL    Creatinine 1.14 0.70 - 1.30 mg/dL    Sodium 143 137 - 145 mmol/L    Potassium 4.9 3.5 - 5.1 mmol/L    Chloride 107 95 - 110 mmol/L    CO2 26.0 22.0 - 31.0 mmol/L    Calcium 10.1 8.4 - 10.2 mg/dL    Total Protein 6.8 6.3 - 8.6 g/dL    Albumin 4.30 3.40 - 4.80 g/dL    ALT (SGPT) 25 21 - 72 U/L    AST (SGOT) 11 (L) 17 - 59 U/L    Alkaline Phosphatase 58 38 - 126 U/L    Total Bilirubin 0.4 0.2 - 1.3 mg/dL    eGFR Non  Amer 66 49 - 113 mL/min/1.73    Globulin 2.5 2.3 - 3.5 gm/dL    A/G Ratio 1.7 1.1 - 1.8 g/dL    BUN/Creatinine Ratio 19.3 7.0 - 25.0    Anion Gap 10.0 5.0 - 15.0 mmol/L   Results for orders placed or performed during the hospital encounter of 01/03/17   Hepatitis C Genotype   Result Value Ref Range    Note: Comment     Hepatitis C Genotype 1a    Hepatitis C RNA, Quantitative, PCR (graph)   Result Value Ref Range    Hepatitis C Quantitation [id number] IU/mL    HCV log10 6.365 log10 IU/mL    Test Information Comment    Protime-INR   Result Value Ref Range    Protime 12.8 11.1 - 15.3 seconds    INR 1.0 0.8 - 1.2   Hepatic Function Panel   Result Value Ref Range    Total Protein 7.3 6.3 - 8.6 gm/dl    Albumin 4.4 3.4 - 4.8 gm/dl    Bilirubin, Direct 0.0 0.0 - 0.3 mg/dl    Total Bilirubin 0.7 0.2 - 1.3 mg/dl    Alkaline Phosphatase 50 38 - 126 U/L    AST (SGOT) 32 17 - 59 U/L    ALT (SGPT) 48 21 - 72 U/L     *Note: Due to a large number of results and/or encounters for the requested time period, some results have not been displayed. A complete set of results can be found in Results Review.       Some portions of this note have been dictated using voice recognition software and may contain errors and/or omissions.

## 2017-03-03 ENCOUNTER — ANESTHESIA (OUTPATIENT)
Dept: GASTROENTEROLOGY | Facility: HOSPITAL | Age: 62
End: 2017-03-03

## 2017-03-03 ENCOUNTER — HOSPITAL ENCOUNTER (OUTPATIENT)
Facility: HOSPITAL | Age: 62
Setting detail: HOSPITAL OUTPATIENT SURGERY
Discharge: HOME | End: 2017-03-03
Attending: INTERNAL MEDICINE | Admitting: INTERNAL MEDICINE

## 2017-03-03 ENCOUNTER — ANESTHESIA EVENT (OUTPATIENT)
Dept: GASTROENTEROLOGY | Facility: HOSPITAL | Age: 62
End: 2017-03-03

## 2017-03-03 VITALS
TEMPERATURE: 97.9 F | BODY MASS INDEX: 22.58 KG/M2 | HEIGHT: 68 IN | SYSTOLIC BLOOD PRESSURE: 113 MMHG | WEIGHT: 149 LBS | HEART RATE: 89 BPM | OXYGEN SATURATION: 94 % | RESPIRATION RATE: 18 BRPM | DIASTOLIC BLOOD PRESSURE: 80 MMHG

## 2017-03-03 DIAGNOSIS — D50.0 ANEMIA, BLOOD LOSS: ICD-10-CM

## 2017-03-03 DIAGNOSIS — K74.00 HEPATIC FIBROSIS: ICD-10-CM

## 2017-03-03 DIAGNOSIS — R10.13 EPIGASTRIC PAIN: ICD-10-CM

## 2017-03-03 LAB — GLUCOSE BLDC GLUCOMTR-MCNC: 120 MG/DL (ref 70–130)

## 2017-03-03 PROCEDURE — 25010000002 PROPOFOL 10 MG/ML EMULSION: Performed by: NURSE ANESTHETIST, CERTIFIED REGISTERED

## 2017-03-03 PROCEDURE — 43239 EGD BIOPSY SINGLE/MULTIPLE: CPT | Performed by: INTERNAL MEDICINE

## 2017-03-03 PROCEDURE — 88305 TISSUE EXAM BY PATHOLOGIST: CPT | Performed by: PATHOLOGY

## 2017-03-03 PROCEDURE — 88305 TISSUE EXAM BY PATHOLOGIST: CPT | Performed by: INTERNAL MEDICINE

## 2017-03-03 PROCEDURE — 25010000002 MIDAZOLAM PER 1 MG: Performed by: NURSE ANESTHETIST, CERTIFIED REGISTERED

## 2017-03-03 PROCEDURE — 82962 GLUCOSE BLOOD TEST: CPT

## 2017-03-03 PROCEDURE — 88342 IMHCHEM/IMCYTCHM 1ST ANTB: CPT | Performed by: INTERNAL MEDICINE

## 2017-03-03 PROCEDURE — 88342 IMHCHEM/IMCYTCHM 1ST ANTB: CPT | Performed by: PATHOLOGY

## 2017-03-03 RX ORDER — MIDAZOLAM HYDROCHLORIDE 1 MG/ML
INJECTION INTRAMUSCULAR; INTRAVENOUS AS NEEDED
Status: DISCONTINUED | OUTPATIENT
Start: 2017-03-03 | End: 2017-03-03 | Stop reason: SURG

## 2017-03-03 RX ORDER — DEXTROSE AND SODIUM CHLORIDE 5; .45 G/100ML; G/100ML
30 INJECTION, SOLUTION INTRAVENOUS CONTINUOUS PRN
Status: DISCONTINUED | OUTPATIENT
Start: 2017-03-03 | End: 2017-03-03 | Stop reason: HOSPADM

## 2017-03-03 RX ORDER — SODIUM CHLORIDE 0.9 % (FLUSH) 0.9 %
1-10 SYRINGE (ML) INJECTION AS NEEDED
Status: DISCONTINUED | OUTPATIENT
Start: 2017-03-03 | End: 2017-03-03 | Stop reason: HOSPADM

## 2017-03-03 RX ORDER — PROPOFOL 10 MG/ML
VIAL (ML) INTRAVENOUS AS NEEDED
Status: DISCONTINUED | OUTPATIENT
Start: 2017-03-03 | End: 2017-03-03 | Stop reason: SURG

## 2017-03-03 RX ADMIN — MIDAZOLAM 2 MG: 1 INJECTION INTRAMUSCULAR; INTRAVENOUS at 11:27

## 2017-03-03 RX ADMIN — PROPOFOL 50 MG: 10 INJECTION, EMULSION INTRAVENOUS at 11:26

## 2017-03-03 RX ADMIN — DEXTROSE AND SODIUM CHLORIDE 30 ML/HR: 5; 450 INJECTION, SOLUTION INTRAVENOUS at 11:00

## 2017-03-03 NOTE — ANESTHESIA PREPROCEDURE EVALUATION
Anesthesia Evaluation     NPO Status: > 4 hours   Airway   Mallampati: II  Dental      Pulmonary - normal exam   (+) COPD,   Cardiovascular - normal exam    (+) hypertension, CAD,       Neuro/Psych  (+) seizures, TIA,    GI/Hepatic/Renal/Endo    (+)  GERD, hepatitis, liver disease, diabetes mellitus,     Musculoskeletal     Abdominal  - normal exam   Substance History      OB/GYN          Other                                Anesthesia Plan    ASA 3     MAC     intravenous induction   Anesthetic plan and risks discussed with patient.

## 2017-03-03 NOTE — PLAN OF CARE
Problem: Patient Care Overview (Adult)  Goal: Plan of Care Review    03/03/17 1132   Coping/Psychosocial Response Interventions   Plan Of Care Reviewed With patient   Patient Care Overview   Progress no change   Outcome Evaluation   Outcome Summary/Follow up Plan vitals stable         Problem: GI Endoscopy (Adult)  Goal: Signs and Symptoms of Listed Potential Problems Will be Absent or Manageable (GI Endoscopy)    03/03/17 1132   GI Endoscopy   Problems Assessed (GI Endoscopy) all   Problems Present (GI Endoscopy) none

## 2017-03-03 NOTE — ANESTHESIA POSTPROCEDURE EVALUATION
Patient: Favio Casillas    Procedure Summary     Date Anesthesia Start Anesthesia Stop Room / Location    03/03/17 1125 1134 [facility] ENDOSCOPY 1 / [facility] ENDOSCOPY       Procedure Diagnosis Surgeon Provider    ESOPHAGOGASTRODUODENOSCOPY (N/A Esophagus) Hepatic fibrosis; Epigastric pain; Anemia, blood loss  (Hepatic fibrosis [K74.0]; Epigastric pain [R10.13]; Anemia, blood loss [D50.0]) MD Keila Mcadams CRNA          Anesthesia Type: MAC  Last vitals  /80 (03/03/17 1055)    Temp 96.9 °F (36.1 °C) (03/03/17 1055)    Pulse 80 (03/03/17 1055)   Resp 20 (03/03/17 1055)    SpO2 98 % (03/03/17 1055)      Post Anesthesia Care and Evaluation    Patient location during evaluation: bedside  Patient participation: complete - patient participated  Level of consciousness: awake and awake and alert  Pain management: adequate  Airway patency: patent  Anesthetic complications: No anesthetic complications    Cardiovascular status: acceptable  Respiratory status: acceptable  Hydration status: acceptable

## 2017-03-03 NOTE — H&P (VIEW-ONLY)
Chief Complaint   Patient presents with   • Hepatitis C     Started 8 weeks of Harvoni on 1-24-17   • Constipation   • Hepatic Fibrosis       ENDO PROCEDURE ORDERED: EGD poss UGI bleed!!!    Subjective    Favio Casillas is a 62 y.o. male. he is here today for follow-up.    History of Present Illness    Patient seen on a recheck of his chronic hepatitis C, hepatic fibrosis, constipation, GERD.  Last seen 1/23/17.  Genotype 1A.  F1, treatment naïve.  Laboratory on 2/7/17 CBC showed hemoglobin 13.6, otherwise normal.  CMP showed glucose 125, BUN 22, otherwise normal.  Repeat laboratory on 2/21/17 CMP showed glucose 180, otherwise normal, normal TSH, HCV RNA by PCR quantitative was not detected.  CBC showed hemoglobin 12.8, hematocrit 38.2.  Patient had a previous EGD on 7/24/15 showed gastritis.  He presents today with 8 out of 10 mid-upper abdominal pain.  He is on Prilosec 20 mg daily.  He admits he drinks a lot of coffee and sodas.  His bowel movements are loose, he does not look at them and does not know if they have been black.  Weight is up 1 pound since last visit.    A/P: Chronic active hepatitis C, patient will continue current regimen for his hepatitis C.  We'll continue get laboratories as ordered.  Given his abdominal pain, elevated BUN, drop in hemoglobin, I'm concerned about possible upper GI bleeding, he has a previous history of peptic ulcer disease, I suspect he has upper GI blood loss in the recommended urgent EGD to evaluate.  I'm concerned about giving him other medications and interfering with his Harvoni.  Consider hepatoma screening in August.  He will follow-up after the above, further pending clinical course and the results of the above.     The following portions of the patient's history were reviewed and updated as appropriate:   Past Medical History   Diagnosis Date   • Abnormal weight loss    • Acute bronchiolitis    • Acute bronchitis    • Acute exacerbation of chronic obstructive  airways disease    • Adenomatous polyp of colon    • Aptyalism    • Artificial lens present    • Artificial lens present      Artificial lens in position     • Astigmatism    • Backache      chronic, rt flank likely not gallbladder   • Borderline glaucoma    • Chest discomfort    • Chest pain    • Chronic hepatitis C      1a. Fibrosure .40/F1-F2, necroinflam .12/A0. repeat .29/F0, .09/A0      • Chronic hepatitis C      1a. Fibrosure .40/F1-F2, necroinflam .12/A0. repeat .29/F0, .09/A0   • Chronic obstructive lung disease    • Common cold    • Constipation    • Coronary arteriosclerosis    • Diarrhea    • Disorder of duodenum      abnormal on CT   • Disorder of duodenum      abnormal on CT    • Disorder of gallbladder      s/p lap radhames and normal ioc for wound check    • Diverticula of colon    • Diverticular disease of colon    • Drug abuse      used [facility]     • Elevated levels of transaminase & lactic acid dehydrogenase    • Esophagitis      Grade II    • Essential hypertension    • Fatigue    • Gastritis    • Gastroesophageal reflux disease    • Generalized abdominal pain    • Hand pain, right    • Headache    • Heel pain      Plantar   • Hemorrhoids    • History of colon polyps    • History of colonoscopy 08/19/2013     Colon endoscopy 62591 (4) - Diverticulum in sigmoid colon. 1 polyp in ascending colon; removed by cold biopsy polyepctomy. Internal & external hemorrhoids found   • History of esophagogastroduodenoscopy 07/24/2015     (1) - Normal esophagus.Gastritis found in the stomach. Biopsy taken. Normal duodenum. Biopsy taken.       • History of neoplasm of bladder    • History of pneumococcal vaccination    • History of seizure    • Left lower quadrant pain    • Male erectile disorder    • Malignant tumor of prostate    • Myopia    • Nausea and vomiting    • Need for immunization against influenza    • Need for prophylactic vaccination and inoculation against influenza    • Pain      Plantar heel pain      • Pain in right hand    • Patient's noncompliance with other medical treatment and regimen    • Periumbilical pain    • Primary malignant neoplasm of bladder      T1,Grade 3, transitional cell cancer   • Rash    • Rash      C/O: a rash   • Rheumatoid arthritis    • Right upper quadrant pain    • Sebaceous cyst    • Seizure    • Transient cerebral ischemia    • Type 2 diabetes mellitus    • Viral hepatitis C      Past Surgical History   Procedure Laterality Date   • Bladder tumor excision       transurethral resection of the tumor & then undergone intravesica BCG.He had this done elsewhere   • Bladder surgery  01/27/2005   • Cholecystectomy  08/25/2011     Laparoscopic   • Endoscopy  07/24/2015     With biopsy   • Endoscopy  02/23/2009     with tube   • Other surgical history  03/22/2012     EXC TR-EXT Benign+Brittany 1.1-2 CM    • Injection of medication  05/23/2016     Kenalog   • Wound closure  03/22/2012     Layer Closure of Wound TR-EXT 2.5 < CM   • Back surgery  01/01/2000     Low back disc surgery   • Cataract extraction w/  intraocular lens implant Right 05/21/2009   • Bladder surgery  01/27/2005     (2) - Radical cystoprostatectomy, orthopic continent urinary diversion, placement of a double lumen right subclavian catheter. Recurrent T1, grade 3 transitional cell cancer of the bladder plus diffuse carcinoma in situ   • Cholecystectomy  08/25/2011     (1) - with operative cholangiogram. Cholecystitis   • Cystoscopy  12/17/2004     (1) - transurethral resection of bladder tumor medium & random bladder biopsies x 5. History of T1 Grade3 transitional cancer of the bladder   • Lumbar disc surgery  2000     Low back disk surgery (1)   • Cataract extraction Right 05/21/2009     Remove cataract, insert lens (2) - right   • Other surgical history       Anesth, bladder tumor surg (1) - transurethral resection of the tumor & then undergone intravesica BCG.He had this done elsewhere   • Injection of medication   05/12/2016     Artemio (2)  - SEAN Robert   • Other surgical history  3/22/3012     Layer Closure of Wound TR-EXT 2.5 < CM 51515 (1) - LAVERN Villanueva   • Other surgical history  03/22/2012     EXC TR-EXT Benign+Brittany 1.1-2 CM 60123 (1)   -  LAVERN Villanueva   • Colonoscopy  08/19/2013   • Colonoscopy  07/24/2015   • Upper gastrointestinal endoscopy  07/24/2015     Family History   Problem Relation Age of Onset   • Diabetes Mother    • Liver cancer Father    • Coronary artery disease Other    • Hypertension Other    • Tuberculosis Other    • Cholelithiasis Other    • Gallbladder disease Other      Gallstones   • Hepatitis Other      Hep C       No Known Allergies  Social History     Social History   • Marital status:      Spouse name: N/A   • Number of children: N/A   • Years of education: N/A     Social History Main Topics   • Smoking status: Former Smoker     Quit date: 10/27/2016   • Smokeless tobacco: Never Used      Comment: Quit 2 months ago   • Alcohol use No   • Drug use: No   • Sexual activity: Not Asked     Other Topics Concern   • None     Social History Narrative    ** Merged History Encounter **            Current Outpatient Prescriptions:   •  albuterol (PROVENTIL HFA;VENTOLIN HFA) 108 (90 BASE) MCG/ACT inhaler, Inhale 2 puffs Every 4 (Four) Hours As Needed (1)., Disp: 1 inhaler, Rfl: 0  •  aspirin 81 MG chewable tablet, Chew 81 mg daily., Disp: , Rfl:   •  carvedilol (COREG) 3.125 MG tablet, Take 3.125 mg by mouth 2 (Two) Times a Day With Meals., Disp: , Rfl:   •  diclofenac (VOLTAREN) 50 MG EC tablet, Take 1 tablet by mouth 2 (Two) Times a Day., Disp: 60 tablet, Rfl: 1  •  insulin detemir (LEVEMIR) 100 UNIT/ML injection, Inject 30 Units under the skin every night., Disp: , Rfl:   •  isosorbide mononitrate (IMDUR) 30 MG 24 hr tablet, Take 45 mg by mouth 2 (two) times a day., Disp: , Rfl:   •  Ledipasvir-Sofosbuvir  MG tablet, Take 1 tablet by mouth Daily., Disp: 28 tablet, Rfl: 1  •  metFORMIN  "(GLUCOPHAGE) 1000 MG tablet, Take 1,000 mg by mouth 2 (Two) Times a Day., Disp: , Rfl:   •  mirtazapine (REMERON) 15 MG tablet, Take 1 tablet by mouth Every Night., Disp: 30 tablet, Rfl: 11  •  NITROSTAT 0.4 MG SL tablet, PLACE 1 TABLET UNDER THE TONGUE AS NEEDED FOR CHEST PAIN. MAY REPEAT EVERY 5 MINUTES UP TO 3 DOSES, THEN GO TO EMERGENCY ROOM, Disp: 25 tablet, Rfl: 11  •  omeprazole (priLOSEC) 20 MG capsule, Take 1 capsule by mouth Daily. Take with Harvoni, Disp: 30 capsule, Rfl: 1  •  pravastatin (PRAVACHOL) 40 MG tablet, Take 40 mg by mouth Every Night., Disp: , Rfl:   •  tiZANidine (ZANAFLEX) 4 MG tablet, Take 1-2 tablets by mouth At Night As Needed for muscle spasms., Disp: 60 tablet, Rfl: 11  Review of Systems  Review of Systems       Objective      Visit Vitals   • /70 (BP Location: Left arm)   • Pulse 70   • Ht 68\" (172.7 cm)   • Wt 152 lb 8 oz (69.2 kg)   • BMI 23.19 kg/m2     Physical Exam   Constitutional: He is oriented to person, place, and time. He appears well-developed and well-nourished. No distress.   Chronically ill   HENT:   Head: Normocephalic and atraumatic.   Eyes: EOM are normal. Pupils are equal, round, and reactive to light.   Neck: Normal range of motion.   Cardiovascular: Normal rate, regular rhythm and normal heart sounds.    Pulmonary/Chest: Effort normal and breath sounds normal.   Abdominal: Soft. Bowel sounds are normal. He exhibits no shifting dullness, no distension, no abdominal bruit, no ascites and no mass. There is no hepatosplenomegaly. There is tenderness. There is no rigidity, no rebound, no guarding and no CVA tenderness. No hernia. Hernia confirmed negative in the ventral area.   epigastric   Musculoskeletal: Normal range of motion.   Neurological: He is alert and oriented to person, place, and time.   Skin: Skin is warm and dry.   Psychiatric: He has a normal mood and affect. His behavior is normal. Judgment and thought content normal.   Nursing note and vitals " reviewed.    Lab on 02/21/2017   Component Date Value Ref Range Status   • Glucose 02/21/2017 180* 60 - 100 mg/dL Final   • BUN 02/21/2017 19  7 - 21 mg/dL Final   • Creatinine 02/21/2017 1.08  0.70 - 1.30 mg/dL Final   • Sodium 02/21/2017 143  137 - 145 mmol/L Final   • Potassium 02/21/2017 4.8  3.5 - 5.1 mmol/L Final   • Chloride 02/21/2017 104  95 - 110 mmol/L Final   • CO2 02/21/2017 26.0  22.0 - 31.0 mmol/L Final   • Calcium 02/21/2017 10.0  8.4 - 10.2 mg/dL Final   • Total Protein 02/21/2017 6.9  6.3 - 8.6 g/dL Final   • Albumin 02/21/2017 3.90  3.40 - 4.80 g/dL Final   • ALT (SGPT) 02/21/2017 22  21 - 72 U/L Final   • AST (SGOT) 02/21/2017 10* 17 - 59 U/L Final   • Alkaline Phosphatase 02/21/2017 58  38 - 126 U/L Final   • Total Bilirubin 02/21/2017 0.3  0.2 - 1.3 mg/dL Final   • eGFR Non African Amer 02/21/2017 70  >60 mL/min/1.73 Final   • Globulin 02/21/2017 3.0  2.3 - 3.5 gm/dL Final   • A/G Ratio 02/21/2017 1.3  1.1 - 1.8 g/dL Final   • BUN/Creatinine Ratio 02/21/2017 17.6  7.0 - 25.0 Final   • Anion Gap 02/21/2017 13.0  5.0 - 15.0 mmol/L Final   • WBC 02/21/2017 6.07  3.20 - 9.80 10*3/mm3 Final   • RBC 02/21/2017 4.20* 4.37 - 5.74 10*6/mm3 Final   • Hemoglobin 02/21/2017 12.8* 13.7 - 17.3 g/dL Final   • Hematocrit 02/21/2017 38.2* 39.0 - 49.0 % Final   • MCV 02/21/2017 91.0  80.0 - 98.0 fL Final   • MCH 02/21/2017 30.5  26.5 - 34.0 pg Final   • MCHC 02/21/2017 33.5  31.5 - 36.3 g/dL Final   • RDW 02/21/2017 12.8  11.5 - 14.5 % Final   • RDW-SD 02/21/2017 42.9  35.1 - 43.9 fl Final   • MPV 02/21/2017 9.9  8.0 - 12.0 fL Final   • Platelets 02/21/2017 257  150 - 450 10*3/mm3 Final   • Neutrophil % 02/21/2017 62.3  37.0 - 80.0 % Final   • Lymphocyte % 02/21/2017 25.9  10.0 - 50.0 % Final   • Monocyte % 02/21/2017 8.9  0.0 - 12.0 % Final   • Eosinophil % 02/21/2017 2.1  0.0 - 7.0 % Final   • Basophil % 02/21/2017 0.5  0.0 - 2.0 % Final   • Immature Grans % 02/21/2017 0.3  0.0 - 0.5 % Final   • Neutrophils,  Absolute 02/21/2017 3.78  2.00 - 8.60 10*3/mm3 Final   • Lymphocytes, Absolute 02/21/2017 1.57  0.60 - 4.20 10*3/mm3 Final   • Monocytes, Absolute 02/21/2017 0.54  0.00 - 0.90 10*3/mm3 Final   • Eosinophils, Absolute 02/21/2017 0.13  0.00 - 0.70 10*3/mm3 Final   • Basophils, Absolute 02/21/2017 0.03  0.00 - 0.20 10*3/mm3 Final   • Immature Grans, Absolute 02/21/2017 0.02  0.00 - 0.02 10*3/mm3 Final   • TSH 02/21/2017 0.920  0.460 - 4.680 mIU/mL Final   • Hepatitis C Quantitation 02/21/2017 HCV Not Detected  IU/mL Final   • Test Information 02/21/2017 Comment   Final    The quantitative range of this assay is 15 IU/mL to 100 million IU/mL.     Assessment/Plan      1. Epigastric pain    2. Gastroesophageal reflux disease with esophagitis    3. Hepatic fibrosis    4. Chronic hepatitis C without hepatic coma    5. Other constipation     6. Anemia, blood loss    7. Pruritus     .   Favio was seen today for hepatitis c, constipation and hepatic fibrosis.    Diagnoses and all orders for this visit:    Epigastric pain  -     Comprehensive Metabolic Panel; Future  -     Case Request; Standing  -     sodium chloride 0.9 % flush 1-10 mL; Infuse 1-10 mL into a venous catheter As Needed for line care.  -     dextrose 5 % and sodium chloride 0.45 % infusion; Infuse 30 mL/hr into a venous catheter Continuous As Needed (Start Prior to Procedure).  -     Case Request    Gastroesophageal reflux disease with esophagitis    Hepatic fibrosis  -     Cancel: Comprehensive Metabolic Panel  -     Comprehensive Metabolic Panel; Future  -     Protime-INR; Future  -     Comprehensive Metabolic Panel; Future  -     Case Request; Standing  -     sodium chloride 0.9 % flush 1-10 mL; Infuse 1-10 mL into a venous catheter As Needed for line care.  -     dextrose 5 % and sodium chloride 0.45 % infusion; Infuse 30 mL/hr into a venous catheter Continuous As Needed (Start Prior to Procedure).  -     Case Request    Chronic hepatitis C without hepatic  coma  -     Cancel: CBC Auto Differential  -     Cancel: Comprehensive Metabolic Panel  -     Cancel: Iron and TIBC  -     Cancel: Ferritin  -     Comprehensive Metabolic Panel; Future  -     CBC Auto Differential; Future  -     TSH; Future  -     HCV RT-PCR,Quant(Non-Graph); Future  -     Protime-INR; Future  -     CBC Auto Differential; Future  -     Comprehensive Metabolic Panel; Future  -     Iron and TIBC; Future  -     Ferritin; Future    Other constipation     Anemia, blood loss  -     Cancel: CBC Auto Differential  -     Cancel: Comprehensive Metabolic Panel  -     Cancel: Iron and TIBC  -     Cancel: Ferritin  -     CBC Auto Differential; Future  -     CBC Auto Differential; Future  -     Iron and TIBC; Future  -     Ferritin; Future  -     Case Request; Standing  -     sodium chloride 0.9 % flush 1-10 mL; Infuse 1-10 mL into a venous catheter As Needed for line care.  -     dextrose 5 % and sodium chloride 0.45 % infusion; Infuse 30 mL/hr into a venous catheter Continuous As Needed (Start Prior to Procedure).  -     Case Request    Pruritus   -     TSH; Future    Other orders  -     Obtain Informed Consent; Standing  -     Verify Informed Consent; Standing  -     POC Glucose Fingerstick; Standing  -     Insert Peripheral IV; Standing  -     Saline Lock & Maintain IV Access; Standing        Orders placed during this encounter include:  Orders Placed This Encounter   Procedures   • Comprehensive Metabolic Panel     Due 3/28     Standing Status:   Future     Standing Expiration Date:   4/3/2017   • CBC Auto Differential     Due 3/28     Standing Status:   Future     Standing Expiration Date:   4/3/2017   • TSH     Due 3/28     Standing Status:   Future     Standing Expiration Date:   4/3/2017   • Protime-INR     Due 3/28     Standing Status:   Future     Standing Expiration Date:   4/3/2017   • CBC Auto Differential     Due 3/7/17     Standing Status:   Future     Standing Expiration Date:   3/10/2017   •  Comprehensive Metabolic Panel     Due 3/7/17     Standing Status:   Future     Standing Expiration Date:   3/10/2017   • Iron and TIBC     Due 3/7/17     Standing Status:   Future     Standing Expiration Date:   3/10/2017   • Ferritin     Due 3/7/17     Standing Status:   Future     Standing Expiration Date:   3/10/2017       Medications prescribed:  No orders of the defined types were placed in this encounter.      Requested Prescriptions      No prescriptions requested or ordered in this encounter       Review and/or summary of lab tests, radiology, procedures, medications. Review and summary of old records and obtaining of history. The risks and benefits of my recommendations, as well as other treatment options were discussed with the patient today. Questions were answered.    Follow-up: Return in about 5 weeks (around 4/5/2017), or if symptoms worsen or fail to improve.     ESOPHAGOGASTRODUODENOSCOPY (N/A)      This document has been electronically signed by Kevin Robbins PA-C on March 1, 2017 5:15 PM      Results for orders placed or performed in visit on 02/21/17   CBC Auto Differential   Result Value Ref Range    WBC 6.07 3.20 - 9.80 10*3/mm3    RBC 4.20 (L) 4.37 - 5.74 10*6/mm3    Hemoglobin 12.8 (L) 13.7 - 17.3 g/dL    Hematocrit 38.2 (L) 39.0 - 49.0 %    MCV 91.0 80.0 - 98.0 fL    MCH 30.5 26.5 - 34.0 pg    MCHC 33.5 31.5 - 36.3 g/dL    RDW 12.8 11.5 - 14.5 %    RDW-SD 42.9 35.1 - 43.9 fl    MPV 9.9 8.0 - 12.0 fL    Platelets 257 150 - 450 10*3/mm3    Neutrophil % 62.3 37.0 - 80.0 %    Lymphocyte % 25.9 10.0 - 50.0 %    Monocyte % 8.9 0.0 - 12.0 %    Eosinophil % 2.1 0.0 - 7.0 %    Basophil % 0.5 0.0 - 2.0 %    Immature Grans % 0.3 0.0 - 0.5 %    Neutrophils, Absolute 3.78 2.00 - 8.60 10*3/mm3    Lymphocytes, Absolute 1.57 0.60 - 4.20 10*3/mm3    Monocytes, Absolute 0.54 0.00 - 0.90 10*3/mm3    Eosinophils, Absolute 0.13 0.00 - 0.70 10*3/mm3    Basophils, Absolute 0.03 0.00 - 0.20 10*3/mm3    Immature  Grans, Absolute 0.02 0.00 - 0.02 10*3/mm3   Hepatitis C RNA, Quantitative, PCR (graph)   Result Value Ref Range    Hepatitis C Quantitation HCV Not Detected IU/mL    Test Information Comment    TSH   Result Value Ref Range    TSH 0.920 0.460 - 4.680 mIU/mL   Comprehensive Metabolic Panel   Result Value Ref Range    Glucose 180 (H) 60 - 100 mg/dL    BUN 19 7 - 21 mg/dL    Creatinine 1.08 0.70 - 1.30 mg/dL    Sodium 143 137 - 145 mmol/L    Potassium 4.8 3.5 - 5.1 mmol/L    Chloride 104 95 - 110 mmol/L    CO2 26.0 22.0 - 31.0 mmol/L    Calcium 10.0 8.4 - 10.2 mg/dL    Total Protein 6.9 6.3 - 8.6 g/dL    Albumin 3.90 3.40 - 4.80 g/dL    ALT (SGPT) 22 21 - 72 U/L    AST (SGOT) 10 (L) 17 - 59 U/L    Alkaline Phosphatase 58 38 - 126 U/L    Total Bilirubin 0.3 0.2 - 1.3 mg/dL    eGFR Non African Amer 70 >60 mL/min/1.73    Globulin 3.0 2.3 - 3.5 gm/dL    A/G Ratio 1.3 1.1 - 1.8 g/dL    BUN/Creatinine Ratio 17.6 7.0 - 25.0    Anion Gap 13.0 5.0 - 15.0 mmol/L   Results for orders placed or performed in visit on 02/07/17   CBC Auto Differential   Result Value Ref Range    WBC 6.38 3.20 - 9.80 10*3/mm3    RBC 4.42 4.37 - 5.74 10*6/mm3    Hemoglobin 13.6 (L) 13.7 - 17.3 g/dL    Hematocrit 40.7 39.0 - 49.0 %    MCV 92.1 80.0 - 98.0 fL    MCH 30.8 26.5 - 34.0 pg    MCHC 33.4 31.5 - 36.3 g/dL    RDW 12.9 11.5 - 14.5 %    RDW-SD 43.3 35.1 - 43.9 fl    MPV 11.5 8.0 - 12.0 fL    Platelets 156 150 - 450 10*3/mm3    Neutrophil % 61.6 37.0 - 80.0 %    Lymphocyte % 27.6 10.0 - 50.0 %    Monocyte % 8.3 0.0 - 12.0 %    Eosinophil % 2.0 0.0 - 7.0 %    Basophil % 0.5 0.0 - 2.0 %    Immature Grans % 0.0 0.0 - 0.5 %    Neutrophils, Absolute 3.93 2.00 - 8.60 10*3/mm3    Lymphocytes, Absolute 1.76 0.60 - 4.20 10*3/mm3    Monocytes, Absolute 0.53 0.00 - 0.90 10*3/mm3    Eosinophils, Absolute 0.13 0.00 - 0.70 10*3/mm3    Basophils, Absolute 0.03 0.00 - 0.20 10*3/mm3    Immature Grans, Absolute 0.00 0.00 - 0.02 10*3/mm3   Comprehensive Metabolic  Panel   Result Value Ref Range    Glucose 125 (H) 60 - 100 mg/dL    BUN 22 (H) 7 - 21 mg/dL    Creatinine 1.14 0.70 - 1.30 mg/dL    Sodium 143 137 - 145 mmol/L    Potassium 4.9 3.5 - 5.1 mmol/L    Chloride 107 95 - 110 mmol/L    CO2 26.0 22.0 - 31.0 mmol/L    Calcium 10.1 8.4 - 10.2 mg/dL    Total Protein 6.8 6.3 - 8.6 g/dL    Albumin 4.30 3.40 - 4.80 g/dL    ALT (SGPT) 25 21 - 72 U/L    AST (SGOT) 11 (L) 17 - 59 U/L    Alkaline Phosphatase 58 38 - 126 U/L    Total Bilirubin 0.4 0.2 - 1.3 mg/dL    eGFR Non  Amer 66 49 - 113 mL/min/1.73    Globulin 2.5 2.3 - 3.5 gm/dL    A/G Ratio 1.7 1.1 - 1.8 g/dL    BUN/Creatinine Ratio 19.3 7.0 - 25.0    Anion Gap 10.0 5.0 - 15.0 mmol/L   Results for orders placed or performed during the hospital encounter of 01/03/17   Hepatitis C Genotype   Result Value Ref Range    Note: Comment     Hepatitis C Genotype 1a    Hepatitis C RNA, Quantitative, PCR (graph)   Result Value Ref Range    Hepatitis C Quantitation [id number] IU/mL    HCV log10 6.365 log10 IU/mL    Test Information Comment    Protime-INR   Result Value Ref Range    Protime 12.8 11.1 - 15.3 seconds    INR 1.0 0.8 - 1.2   Hepatic Function Panel   Result Value Ref Range    Total Protein 7.3 6.3 - 8.6 gm/dl    Albumin 4.4 3.4 - 4.8 gm/dl    Bilirubin, Direct 0.0 0.0 - 0.3 mg/dl    Total Bilirubin 0.7 0.2 - 1.3 mg/dl    Alkaline Phosphatase 50 38 - 126 U/L    AST (SGOT) 32 17 - 59 U/L    ALT (SGPT) 48 21 - 72 U/L     *Note: Due to a large number of results and/or encounters for the requested time period, some results have not been displayed. A complete set of results can be found in Results Review.       Some portions of this note have been dictated using voice recognition software and may contain errors and/or omissions.

## 2017-03-07 LAB
LAB AP CASE REPORT: NORMAL
LAB AP DIAGNOSIS COMMENT: NORMAL
Lab: NORMAL
PATH REPORT.FINAL DX SPEC: NORMAL
PATH REPORT.GROSS SPEC: NORMAL

## 2017-03-23 ENCOUNTER — LAB (OUTPATIENT)
Dept: LAB | Facility: CLINIC | Age: 62
End: 2017-03-23

## 2017-03-23 DIAGNOSIS — E11.8 TYPE 2 DIABETES MELLITUS WITH COMPLICATION, WITHOUT LONG-TERM CURRENT USE OF INSULIN (HCC): Primary | ICD-10-CM

## 2017-03-23 DIAGNOSIS — D50.0 ANEMIA, BLOOD LOSS: ICD-10-CM

## 2017-03-23 DIAGNOSIS — K74.00 HEPATIC FIBROSIS: ICD-10-CM

## 2017-03-23 DIAGNOSIS — L29.9 PRURITUS: ICD-10-CM

## 2017-03-23 DIAGNOSIS — B18.2 CHRONIC HEPATITIS C WITHOUT HEPATIC COMA (HCC): ICD-10-CM

## 2017-03-23 LAB
ALBUMIN SERPL-MCNC: 4.5 G/DL (ref 3.4–4.8)
ALBUMIN/GLOB SERPL: 1.7 G/DL (ref 1.1–1.8)
ALP SERPL-CCNC: 66 U/L (ref 38–126)
ALT SERPL W P-5'-P-CCNC: 29 U/L (ref 21–72)
ANION GAP SERPL CALCULATED.3IONS-SCNC: 13 MMOL/L (ref 5–15)
AST SERPL-CCNC: 16 U/L (ref 17–59)
BASOPHILS # BLD AUTO: 0.02 10*3/MM3 (ref 0–0.2)
BASOPHILS NFR BLD AUTO: 0.3 % (ref 0–2)
BILIRUB SERPL-MCNC: 0.3 MG/DL (ref 0.2–1.3)
BUN BLD-MCNC: 20 MG/DL (ref 7–21)
BUN/CREAT SERPL: 15.3 (ref 7–25)
CALCIUM SPEC-SCNC: 9.8 MG/DL (ref 8.4–10.2)
CHLORIDE SERPL-SCNC: 106 MMOL/L (ref 95–110)
CO2 SERPL-SCNC: 22 MMOL/L (ref 22–31)
CREAT BLD-MCNC: 1.31 MG/DL (ref 0.7–1.3)
DEPRECATED RDW RBC AUTO: 43.2 FL (ref 35.1–43.9)
EOSINOPHIL # BLD AUTO: 0.09 10*3/MM3 (ref 0–0.7)
EOSINOPHIL NFR BLD AUTO: 1.5 % (ref 0–7)
ERYTHROCYTE [DISTWIDTH] IN BLOOD BY AUTOMATED COUNT: 13.1 % (ref 11.5–14.5)
GFR SERPL CREATININE-BSD FRML MDRD: 56 ML/MIN/1.73 (ref 49–113)
GLOBULIN UR ELPH-MCNC: 2.6 GM/DL (ref 2.3–3.5)
GLUCOSE BLD-MCNC: 149 MG/DL (ref 60–100)
HBA1C MFR BLD: 7.26 % (ref 4–5.6)
HCT VFR BLD AUTO: 43 % (ref 39–49)
HGB BLD-MCNC: 14.2 G/DL (ref 13.7–17.3)
IMM GRANULOCYTES # BLD: 0.01 10*3/MM3 (ref 0–0.02)
IMM GRANULOCYTES NFR BLD: 0.2 % (ref 0–0.5)
INR PPP: 0.94 (ref 0.8–1.2)
LYMPHOCYTES # BLD AUTO: 1.34 10*3/MM3 (ref 0.6–4.2)
LYMPHOCYTES NFR BLD AUTO: 22.9 % (ref 10–50)
MCH RBC QN AUTO: 29.8 PG (ref 26.5–34)
MCHC RBC AUTO-ENTMCNC: 33 G/DL (ref 31.5–36.3)
MCV RBC AUTO: 90.3 FL (ref 80–98)
MONOCYTES # BLD AUTO: 0.45 10*3/MM3 (ref 0–0.9)
MONOCYTES NFR BLD AUTO: 7.7 % (ref 0–12)
NEUTROPHILS # BLD AUTO: 3.94 10*3/MM3 (ref 2–8.6)
NEUTROPHILS NFR BLD AUTO: 67.4 % (ref 37–80)
PLATELET # BLD AUTO: 223 10*3/MM3 (ref 150–450)
PMV BLD AUTO: 10.2 FL (ref 8–12)
POTASSIUM BLD-SCNC: 4.9 MMOL/L (ref 3.5–5.1)
PROT SERPL-MCNC: 7.1 G/DL (ref 6.3–8.6)
PROTHROMBIN TIME: 12.5 SECONDS (ref 11.1–15.3)
RBC # BLD AUTO: 4.76 10*6/MM3 (ref 4.37–5.74)
SODIUM BLD-SCNC: 141 MMOL/L (ref 137–145)
TSH SERPL DL<=0.05 MIU/L-ACNC: 1.78 MIU/ML (ref 0.46–4.68)
WBC NRBC COR # BLD: 5.85 10*3/MM3 (ref 3.2–9.8)

## 2017-03-23 PROCEDURE — 85610 PROTHROMBIN TIME: CPT | Performed by: PHYSICIAN ASSISTANT

## 2017-03-23 PROCEDURE — 85025 COMPLETE CBC W/AUTO DIFF WBC: CPT | Performed by: PHYSICIAN ASSISTANT

## 2017-03-23 PROCEDURE — 84443 ASSAY THYROID STIM HORMONE: CPT | Performed by: PHYSICIAN ASSISTANT

## 2017-03-23 PROCEDURE — 80053 COMPREHEN METABOLIC PANEL: CPT | Performed by: PHYSICIAN ASSISTANT

## 2017-03-23 PROCEDURE — 87522 HEPATITIS C REVRS TRNSCRPJ: CPT | Performed by: PHYSICIAN ASSISTANT

## 2017-03-23 PROCEDURE — 83036 HEMOGLOBIN GLYCOSYLATED A1C: CPT | Performed by: FAMILY MEDICINE

## 2017-03-24 LAB
HCV RNA SERPL NAA+PROBE-ACNC: NORMAL IU/ML
TEST INFORMATION: NORMAL

## 2017-03-24 RX ORDER — OMEPRAZOLE 40 MG/1
40 CAPSULE, DELAYED RELEASE ORAL 2 TIMES DAILY
Qty: 60 CAPSULE | Refills: 0 | Status: SHIPPED | OUTPATIENT
Start: 2017-03-24 | End: 2017-04-24 | Stop reason: SDUPTHER

## 2017-04-05 ENCOUNTER — OFFICE VISIT (OUTPATIENT)
Dept: FAMILY MEDICINE CLINIC | Facility: CLINIC | Age: 62
End: 2017-04-05

## 2017-04-05 VITALS
HEIGHT: 68 IN | DIASTOLIC BLOOD PRESSURE: 90 MMHG | TEMPERATURE: 98.1 F | HEART RATE: 90 BPM | OXYGEN SATURATION: 96 % | BODY MASS INDEX: 23.28 KG/M2 | WEIGHT: 153.6 LBS | SYSTOLIC BLOOD PRESSURE: 120 MMHG

## 2017-04-05 DIAGNOSIS — Z79.4 TYPE 2 DIABETES MELLITUS WITH COMPLICATION, WITH LONG-TERM CURRENT USE OF INSULIN (HCC): Primary | ICD-10-CM

## 2017-04-05 DIAGNOSIS — K59.00 CONSTIPATION, UNSPECIFIED CONSTIPATION TYPE: ICD-10-CM

## 2017-04-05 DIAGNOSIS — E11.8 TYPE 2 DIABETES MELLITUS WITH COMPLICATION, WITH LONG-TERM CURRENT USE OF INSULIN (HCC): Primary | ICD-10-CM

## 2017-04-05 DIAGNOSIS — M62.838 MUSCLE SPASM: ICD-10-CM

## 2017-04-05 PROCEDURE — 99214 OFFICE O/P EST MOD 30 MIN: CPT | Performed by: FAMILY MEDICINE

## 2017-04-05 RX ORDER — TIZANIDINE 4 MG/1
4-8 TABLET ORAL NIGHTLY PRN
Qty: 120 TABLET | Refills: 11 | Status: SHIPPED | OUTPATIENT
Start: 2017-04-05 | End: 2018-05-22 | Stop reason: SDUPTHER

## 2017-04-05 RX ORDER — POLYETHYLENE GLYCOL 3350 17 G/17G
17 POWDER, FOR SOLUTION ORAL 2 TIMES DAILY
Qty: 1 EACH | Refills: 11 | Status: SHIPPED | OUTPATIENT
Start: 2017-04-05 | End: 2018-05-10 | Stop reason: SDDI

## 2017-04-05 NOTE — PROGRESS NOTES
Subjective   Favio Casillas is a 62 y.o. male.     History of Present Illness     hga1c little over 7.  Little better than last time.  He says he is nolonger taking insulin but some drink someone told him to drink.    He is taking 2 zanaflex 4mg for his neck.  Needs refill  Also having problems with constipation.  It causes hemorrhoid to flare.   He quit smoking with the lords help he says.    Review of Systems   Constitutional: Negative for chills, fatigue and fever.   HENT: Negative for congestion, ear discharge, ear pain, facial swelling, hearing loss, postnasal drip, rhinorrhea, sinus pressure, sore throat, trouble swallowing and voice change.    Eyes: Negative for discharge, redness and visual disturbance.   Respiratory: Negative for cough, chest tightness, shortness of breath and wheezing.    Cardiovascular: Negative for chest pain and palpitations.   Gastrointestinal: Negative for abdominal pain, blood in stool, constipation, diarrhea, nausea and vomiting.   Endocrine: Negative for polydipsia and polyuria.   Genitourinary: Negative for dysuria, flank pain, hematuria and urgency.   Musculoskeletal: Positive for neck pain. Negative for arthralgias, back pain, joint swelling and myalgias.   Skin: Negative for rash.   Neurological: Negative for dizziness, weakness, numbness and headaches.   Hematological: Negative for adenopathy.   Psychiatric/Behavioral: Negative for confusion and sleep disturbance. The patient is not nervous/anxious.        Objective   Physical Exam   Constitutional: He is oriented to person, place, and time. He appears well-developed and well-nourished.   HENT:   Head: Normocephalic and atraumatic.   Right Ear: External ear normal.   Left Ear: External ear normal.   Nose: Nose normal.   Eyes: Conjunctivae and EOM are normal. Pupils are equal, round, and reactive to light.   Neck: Normal range of motion.   Pulmonary/Chest: Effort normal.   Musculoskeletal: Normal range of motion.    Neurological: He is alert and oriented to person, place, and time.   Psychiatric: He has a normal mood and affect. His behavior is normal. Judgment and thought content normal.   Nursing note and vitals reviewed.      Assessment/Plan   Favio was seen today for follow-up.    Diagnoses and all orders for this visit:    Type 2 diabetes mellitus with complication, with long-term current use of insulin    Muscle spasm  -     tiZANidine (ZANAFLEX) 4 MG tablet; Take 1-2 tablets by mouth At Night As Needed for Muscle Spasms.    Constipation, unspecified constipation type    Other orders  -     SITagliptin (JANUVIA) 100 MG tablet; Take 1 tablet by mouth Daily.  -     metFORMIN (GLUCOPHAGE) 1000 MG tablet; Take 1 tablet by mouth 2 (Two) Times a Day.  -     polyethylene glycol (MIRALAX) powder; Take 17 g by mouth 2 (Two) Times a Day.        Stop insulin.  Added januvia.  Return 6 months  Miralax, went over how to do this for constipation.

## 2017-04-17 ENCOUNTER — TELEPHONE (OUTPATIENT)
Dept: FAMILY MEDICINE CLINIC | Facility: CLINIC | Age: 62
End: 2017-04-17

## 2017-04-17 RX ORDER — GUAIFENESIN/DEXTROMETHORPHAN 100-10MG/5
10 SYRUP ORAL 4 TIMES DAILY PRN
Qty: 240 ML | Refills: 1 | Status: SHIPPED | OUTPATIENT
Start: 2017-04-17 | End: 2017-12-14

## 2017-04-17 RX ORDER — ALBUTEROL SULFATE 2.5 MG/3ML
2.5 SOLUTION RESPIRATORY (INHALATION) EVERY 4 HOURS PRN
Qty: 120 VIAL | Refills: 11 | Status: SHIPPED | OUTPATIENT
Start: 2017-04-17

## 2017-04-17 NOTE — TELEPHONE ENCOUNTER
----- Message from Luann Washington sent at 4/17/2017  1:21 PM CDT -----  Regarding: Refills  Contact: [phone]  Refill of robitussin cough med? Also albuterol for nebulizer qid prn

## 2017-04-24 ENCOUNTER — OFFICE VISIT (OUTPATIENT)
Dept: GASTROENTEROLOGY | Facility: CLINIC | Age: 62
End: 2017-04-24

## 2017-04-24 VITALS
HEART RATE: 80 BPM | BODY MASS INDEX: 22.34 KG/M2 | HEIGHT: 68 IN | SYSTOLIC BLOOD PRESSURE: 113 MMHG | DIASTOLIC BLOOD PRESSURE: 72 MMHG | WEIGHT: 147.4 LBS

## 2017-04-24 DIAGNOSIS — B18.2 CHRONIC HEPATITIS C WITHOUT HEPATIC COMA (HCC): Primary | ICD-10-CM

## 2017-04-24 DIAGNOSIS — K74.00 HEPATIC FIBROSIS: ICD-10-CM

## 2017-04-24 DIAGNOSIS — R10.13 EPIGASTRIC PAIN: ICD-10-CM

## 2017-04-24 DIAGNOSIS — D50.0 ANEMIA, BLOOD LOSS: ICD-10-CM

## 2017-04-24 DIAGNOSIS — K29.30 SUPERFICIAL GASTRITIS WITHOUT HEMORRHAGE, UNSPECIFIED CHRONICITY: ICD-10-CM

## 2017-04-24 DIAGNOSIS — K21.9 GASTROESOPHAGEAL REFLUX DISEASE WITHOUT ESOPHAGITIS: ICD-10-CM

## 2017-04-24 PROCEDURE — 99214 OFFICE O/P EST MOD 30 MIN: CPT | Performed by: PHYSICIAN ASSISTANT

## 2017-04-24 RX ORDER — OMEPRAZOLE 40 MG/1
40 CAPSULE, DELAYED RELEASE ORAL 2 TIMES DAILY
Qty: 60 CAPSULE | Refills: 3 | Status: SHIPPED | OUTPATIENT
Start: 2017-04-24 | End: 2017-08-15 | Stop reason: SDUPTHER

## 2017-06-08 RX ORDER — PRAVASTATIN SODIUM 40 MG
TABLET ORAL
Qty: 90 TABLET | Refills: 3 | Status: SHIPPED | OUTPATIENT
Start: 2017-06-08 | End: 2018-05-02 | Stop reason: SDUPTHER

## 2017-06-26 RX ORDER — ALBUTEROL SULFATE 90 UG/1
AEROSOL, METERED RESPIRATORY (INHALATION)
Qty: 18 G | Refills: 11 | Status: SHIPPED | OUTPATIENT
Start: 2017-06-26 | End: 2018-08-22 | Stop reason: SDUPTHER

## 2017-06-29 ENCOUNTER — OFFICE VISIT (OUTPATIENT)
Dept: GASTROENTEROLOGY | Facility: CLINIC | Age: 62
End: 2017-06-29

## 2017-06-29 ENCOUNTER — LAB (OUTPATIENT)
Dept: LAB | Facility: HOSPITAL | Age: 62
End: 2017-06-29

## 2017-06-29 VITALS
WEIGHT: 142.7 LBS | DIASTOLIC BLOOD PRESSURE: 64 MMHG | SYSTOLIC BLOOD PRESSURE: 95 MMHG | HEART RATE: 93 BPM | HEIGHT: 68 IN | BODY MASS INDEX: 21.63 KG/M2

## 2017-06-29 DIAGNOSIS — D50.0 ANEMIA, BLOOD LOSS: ICD-10-CM

## 2017-06-29 DIAGNOSIS — K74.00 HEPATIC FIBROSIS: ICD-10-CM

## 2017-06-29 DIAGNOSIS — K21.00 GASTROESOPHAGEAL REFLUX DISEASE WITH ESOPHAGITIS: ICD-10-CM

## 2017-06-29 DIAGNOSIS — B18.2 CHRONIC HEPATITIS C WITHOUT HEPATIC COMA (HCC): ICD-10-CM

## 2017-06-29 DIAGNOSIS — B18.2 CHRONIC HEPATITIS C WITHOUT HEPATIC COMA (HCC): Primary | ICD-10-CM

## 2017-06-29 DIAGNOSIS — K29.30 SUPERFICIAL GASTRITIS WITHOUT HEMORRHAGE, UNSPECIFIED CHRONICITY: ICD-10-CM

## 2017-06-29 LAB
ALBUMIN SERPL-MCNC: 4.7 G/DL (ref 3.4–4.8)
ALBUMIN/GLOB SERPL: 1.4 G/DL (ref 1.1–1.8)
ALP SERPL-CCNC: 53 U/L (ref 38–126)
ALT SERPL W P-5'-P-CCNC: 25 U/L (ref 21–72)
ANION GAP SERPL CALCULATED.3IONS-SCNC: 16 MMOL/L (ref 5–15)
AST SERPL-CCNC: 15 U/L (ref 17–59)
BASOPHILS # BLD AUTO: 0.02 10*3/MM3 (ref 0–0.2)
BASOPHILS NFR BLD AUTO: 0.3 % (ref 0–2)
BILIRUB SERPL-MCNC: 0.4 MG/DL (ref 0.2–1.3)
BUN BLD-MCNC: 27 MG/DL (ref 7–21)
BUN/CREAT SERPL: 18.8 (ref 7–25)
CALCIUM SPEC-SCNC: 9.3 MG/DL (ref 8.4–10.2)
CHLORIDE SERPL-SCNC: 106 MMOL/L (ref 95–110)
CO2 SERPL-SCNC: 20 MMOL/L (ref 22–31)
CREAT BLD-MCNC: 1.44 MG/DL (ref 0.7–1.3)
DEPRECATED RDW RBC AUTO: 44.7 FL (ref 35.1–43.9)
EOSINOPHIL # BLD AUTO: 0.1 10*3/MM3 (ref 0–0.7)
EOSINOPHIL NFR BLD AUTO: 1.4 % (ref 0–7)
ERYTHROCYTE [DISTWIDTH] IN BLOOD BY AUTOMATED COUNT: 13.6 % (ref 11.5–14.5)
GFR SERPL CREATININE-BSD FRML MDRD: 50 ML/MIN/1.73 (ref 49–113)
GLOBULIN UR ELPH-MCNC: 3.3 GM/DL (ref 2.3–3.5)
GLUCOSE BLD-MCNC: 112 MG/DL (ref 60–100)
HCT VFR BLD AUTO: 46.4 % (ref 39–49)
HGB BLD-MCNC: 15.3 G/DL (ref 13.7–17.3)
IMM GRANULOCYTES # BLD: 0.01 10*3/MM3 (ref 0–0.02)
IMM GRANULOCYTES NFR BLD: 0.1 % (ref 0–0.5)
INR PPP: 0.93 (ref 0.8–1.2)
LYMPHOCYTES # BLD AUTO: 1.8 10*3/MM3 (ref 0.6–4.2)
LYMPHOCYTES NFR BLD AUTO: 24.6 % (ref 10–50)
MCH RBC QN AUTO: 29.6 PG (ref 26.5–34)
MCHC RBC AUTO-ENTMCNC: 33 G/DL (ref 31.5–36.3)
MCV RBC AUTO: 89.7 FL (ref 80–98)
MONOCYTES # BLD AUTO: 0.68 10*3/MM3 (ref 0–0.9)
MONOCYTES NFR BLD AUTO: 9.3 % (ref 0–12)
NEUTROPHILS # BLD AUTO: 4.71 10*3/MM3 (ref 2–8.6)
NEUTROPHILS NFR BLD AUTO: 64.3 % (ref 37–80)
PLATELET # BLD AUTO: 197 10*3/MM3 (ref 150–450)
PMV BLD AUTO: 10.8 FL (ref 8–12)
POTASSIUM BLD-SCNC: 4.4 MMOL/L (ref 3.5–5.1)
PROT SERPL-MCNC: 8 G/DL (ref 6.3–8.6)
PROTHROMBIN TIME: 12.4 SECONDS (ref 11.1–15.3)
RBC # BLD AUTO: 5.17 10*6/MM3 (ref 4.37–5.74)
SODIUM BLD-SCNC: 142 MMOL/L (ref 137–145)
WBC NRBC COR # BLD: 7.32 10*3/MM3 (ref 3.2–9.8)

## 2017-06-29 PROCEDURE — 36415 COLL VENOUS BLD VENIPUNCTURE: CPT

## 2017-06-29 PROCEDURE — 85025 COMPLETE CBC W/AUTO DIFF WBC: CPT | Performed by: PHYSICIAN ASSISTANT

## 2017-06-29 PROCEDURE — 87522 HEPATITIS C REVRS TRNSCRPJ: CPT | Performed by: PHYSICIAN ASSISTANT

## 2017-06-29 PROCEDURE — 99213 OFFICE O/P EST LOW 20 MIN: CPT | Performed by: PHYSICIAN ASSISTANT

## 2017-06-29 PROCEDURE — 85610 PROTHROMBIN TIME: CPT | Performed by: PHYSICIAN ASSISTANT

## 2017-06-29 PROCEDURE — 80053 COMPREHEN METABOLIC PANEL: CPT | Performed by: PHYSICIAN ASSISTANT

## 2017-06-30 LAB
HCV RNA SERPL NAA+PROBE-ACNC: NORMAL IU/ML
TEST INFORMATION: NORMAL

## 2017-08-01 ENCOUNTER — HOSPITAL ENCOUNTER (OUTPATIENT)
Dept: ULTRASOUND IMAGING | Facility: HOSPITAL | Age: 62
Discharge: HOME | End: 2017-08-01
Admitting: PHYSICIAN ASSISTANT

## 2017-08-01 DIAGNOSIS — K74.00 HEPATIC FIBROSIS: ICD-10-CM

## 2017-08-01 DIAGNOSIS — B18.2 CHRONIC HEPATITIS C WITHOUT HEPATIC COMA (HCC): ICD-10-CM

## 2017-08-01 DIAGNOSIS — K21.00 GASTROESOPHAGEAL REFLUX DISEASE WITH ESOPHAGITIS: ICD-10-CM

## 2017-08-01 PROCEDURE — 76705 ECHO EXAM OF ABDOMEN: CPT

## 2017-08-02 DIAGNOSIS — R74.8 ELEVATED CREATINE KINASE: Primary | ICD-10-CM

## 2017-08-15 RX ORDER — OMEPRAZOLE 40 MG/1
CAPSULE, DELAYED RELEASE ORAL
Qty: 60 CAPSULE | Refills: 0 | Status: SHIPPED | OUTPATIENT
Start: 2017-08-15 | End: 2017-09-08 | Stop reason: SDUPTHER

## 2017-08-17 ENCOUNTER — OFFICE VISIT (OUTPATIENT)
Dept: GASTROENTEROLOGY | Facility: CLINIC | Age: 62
End: 2017-08-17

## 2017-08-17 ENCOUNTER — LAB (OUTPATIENT)
Dept: LAB | Facility: HOSPITAL | Age: 62
End: 2017-08-17

## 2017-08-17 VITALS
SYSTOLIC BLOOD PRESSURE: 107 MMHG | BODY MASS INDEX: 20.95 KG/M2 | DIASTOLIC BLOOD PRESSURE: 71 MMHG | HEIGHT: 68 IN | HEART RATE: 75 BPM | WEIGHT: 138.2 LBS

## 2017-08-17 DIAGNOSIS — B18.2 CHRONIC HEPATITIS C WITHOUT HEPATIC COMA (HCC): Primary | ICD-10-CM

## 2017-08-17 DIAGNOSIS — K21.00 GASTROESOPHAGEAL REFLUX DISEASE WITH ESOPHAGITIS: ICD-10-CM

## 2017-08-17 DIAGNOSIS — R74.8 ELEVATED CREATINE KINASE: ICD-10-CM

## 2017-08-17 DIAGNOSIS — K74.00 HEPATIC FIBROSIS: ICD-10-CM

## 2017-08-17 DIAGNOSIS — E71.30 DISORDER OF FATTY-ACID METABOLISM: ICD-10-CM

## 2017-08-17 LAB
ALBUMIN SERPL-MCNC: 4.8 G/DL (ref 3.4–4.8)
ALP SERPL-CCNC: 56 U/L (ref 38–126)
ALT SERPL W P-5'-P-CCNC: 25 U/L (ref 21–72)
ANION GAP SERPL CALCULATED.3IONS-SCNC: 10 MMOL/L (ref 5–15)
AST SERPL-CCNC: 17 U/L (ref 17–59)
BILIRUB CONJ SERPL-MCNC: 0 MG/DL (ref 0–0.3)
BILIRUB INDIRECT SERPL-MCNC: 0.1 MG/DL (ref 0–1.1)
BILIRUB SERPL-MCNC: 0.5 MG/DL (ref 0.2–1.3)
BUN BLD-MCNC: 25 MG/DL (ref 7–21)
BUN/CREAT SERPL: 17 (ref 7–25)
CALCIUM SPEC-SCNC: 10 MG/DL (ref 8.4–10.2)
CHLORIDE SERPL-SCNC: 109 MMOL/L (ref 95–110)
CO2 SERPL-SCNC: 20 MMOL/L (ref 22–31)
CREAT BLD-MCNC: 1.47 MG/DL (ref 0.7–1.3)
GFR SERPL CREATININE-BSD FRML MDRD: 49 ML/MIN/1.73 (ref 49–113)
GLUCOSE BLD-MCNC: 140 MG/DL (ref 60–100)
POTASSIUM BLD-SCNC: 5 MMOL/L (ref 3.5–5.1)
PROT SERPL-MCNC: 7.7 G/DL (ref 6.3–8.6)
SODIUM BLD-SCNC: 139 MMOL/L (ref 137–145)

## 2017-08-17 PROCEDURE — 99213 OFFICE O/P EST LOW 20 MIN: CPT | Performed by: PHYSICIAN ASSISTANT

## 2017-08-17 PROCEDURE — 36415 COLL VENOUS BLD VENIPUNCTURE: CPT

## 2017-08-17 PROCEDURE — 80048 BASIC METABOLIC PNL TOTAL CA: CPT

## 2017-08-17 PROCEDURE — 80076 HEPATIC FUNCTION PANEL: CPT

## 2017-08-17 PROCEDURE — 82105 ALPHA-FETOPROTEIN SERUM: CPT

## 2017-08-18 LAB — AFP-TM SERPL-MCNC: 2.9 NG/ML (ref 0–8.3)

## 2017-09-08 RX ORDER — OMEPRAZOLE 40 MG/1
CAPSULE, DELAYED RELEASE ORAL
Qty: 60 CAPSULE | Refills: 3 | Status: SHIPPED | OUTPATIENT
Start: 2017-09-08 | End: 2018-01-08 | Stop reason: SDUPTHER

## 2017-10-05 RX ORDER — SITAGLIPTIN 100 MG/1
TABLET, FILM COATED ORAL
Qty: 30 TABLET | Refills: 5 | Status: ON HOLD | OUTPATIENT
Start: 2017-10-05 | End: 2017-12-15

## 2017-10-11 ENCOUNTER — OFFICE VISIT (OUTPATIENT)
Dept: FAMILY MEDICINE CLINIC | Facility: CLINIC | Age: 62
End: 2017-10-11

## 2017-10-11 VITALS
BODY MASS INDEX: 21.53 KG/M2 | HEART RATE: 77 BPM | TEMPERATURE: 98.2 F | SYSTOLIC BLOOD PRESSURE: 110 MMHG | HEIGHT: 67 IN | DIASTOLIC BLOOD PRESSURE: 74 MMHG | OXYGEN SATURATION: 98 % | WEIGHT: 137.2 LBS

## 2017-10-11 DIAGNOSIS — M25.511 RIGHT SHOULDER PAIN, UNSPECIFIED CHRONICITY: Primary | ICD-10-CM

## 2017-10-11 DIAGNOSIS — E11.8 TYPE 2 DIABETES MELLITUS WITH COMPLICATION, WITHOUT LONG-TERM CURRENT USE OF INSULIN (HCC): ICD-10-CM

## 2017-10-11 LAB — HBA1C MFR BLD: 6.6 % (ref 4–5.6)

## 2017-10-11 PROCEDURE — 83036 HEMOGLOBIN GLYCOSYLATED A1C: CPT | Performed by: FAMILY MEDICINE

## 2017-10-11 PROCEDURE — G0008 ADMIN INFLUENZA VIRUS VAC: HCPCS | Performed by: FAMILY MEDICINE

## 2017-10-11 PROCEDURE — 99213 OFFICE O/P EST LOW 20 MIN: CPT | Performed by: FAMILY MEDICINE

## 2017-10-11 PROCEDURE — 90686 IIV4 VACC NO PRSV 0.5 ML IM: CPT | Performed by: FAMILY MEDICINE

## 2017-10-11 PROCEDURE — 36415 COLL VENOUS BLD VENIPUNCTURE: CPT | Performed by: FAMILY MEDICINE

## 2017-10-11 NOTE — PROGRESS NOTES
Subjective   Favio Casillas is a 62 y.o. male.     History of Present Illness     harvoni worked, no hep c but to see katarina salinas in December to make sure  Blood sugars doing great he says.  Still smoking, trying to quit, reading his bible and asking help to quit  Chronic right shoulder pain and chronic neck pain.   Remembers no specific trauma, but in past has had some pretty bad falls.  He wants to see dr ramon    Review of Systems   Constitutional: Negative for chills, fatigue and fever.   HENT: Negative for congestion, ear discharge, ear pain, facial swelling, hearing loss, postnasal drip, rhinorrhea, sinus pressure, sore throat, trouble swallowing and voice change.    Eyes: Negative for discharge, redness and visual disturbance.   Respiratory: Negative for cough, chest tightness, shortness of breath and wheezing.    Cardiovascular: Negative for chest pain and palpitations.   Gastrointestinal: Negative for abdominal pain, blood in stool, constipation, diarrhea, nausea and vomiting.   Endocrine: Negative for polydipsia and polyuria.   Genitourinary: Negative for dysuria, flank pain, hematuria and urgency.   Musculoskeletal: Negative for arthralgias, back pain, joint swelling and myalgias.   Skin: Negative for rash.   Neurological: Negative for dizziness, weakness, numbness and headaches.   Hematological: Negative for adenopathy.   Psychiatric/Behavioral: Negative for confusion and sleep disturbance. The patient is not nervous/anxious.        Objective   Physical Exam   Constitutional: He is oriented to person, place, and time. He appears well-developed and well-nourished.   HENT:   Head: Normocephalic and atraumatic.   Right Ear: External ear normal.   Left Ear: External ear normal.   Nose: Nose normal.   Eyes: Conjunctivae and EOM are normal. Pupils are equal, round, and reactive to light.   Neck: Normal range of motion. Neck supple.   Cardiovascular: Normal rate, regular rhythm and normal heart sounds.   Exam reveals no gallop and no friction rub.    No murmur heard.  Pulmonary/Chest: Effort normal and breath sounds normal.   Abdominal: Soft. Bowel sounds are normal. He exhibits no distension. There is no tenderness. There is no rebound and no guarding.   Musculoskeletal: Normal range of motion. He exhibits no edema or deformity.   Neurological: He is alert and oriented to person, place, and time. No cranial nerve deficit.   Skin: Skin is warm and dry. No rash noted. No erythema.   Psychiatric: He has a normal mood and affect. His behavior is normal. Judgment and thought content normal.   Nursing note and vitals reviewed.      Assessment/Plan   Favio was seen today for diabetes.    Diagnoses and all orders for this visit:    Right shoulder pain, unspecified chronicity  -     XR Shoulder 2+ View Right  -     XR Spine Cervical Complete 4 or 5 View  -     Ambulatory Referral to Orthopedic Surgery    Type 2 diabetes mellitus with complication, without long-term current use of insulin  -     Hemoglobin A1c    Other orders  -     Flu Vaccine Quad PF 3YR+      Referral above  Return 6 months.

## 2017-10-20 ENCOUNTER — OFFICE VISIT (OUTPATIENT)
Dept: ORTHOPEDIC SURGERY | Facility: CLINIC | Age: 62
End: 2017-10-20

## 2017-10-20 VITALS — BODY MASS INDEX: 20.57 KG/M2 | WEIGHT: 135.7 LBS | HEIGHT: 68 IN

## 2017-10-20 DIAGNOSIS — M25.511 ACUTE PAIN OF RIGHT SHOULDER: ICD-10-CM

## 2017-10-20 DIAGNOSIS — M75.41 SHOULDER IMPINGEMENT SYNDROME, RIGHT: Primary | ICD-10-CM

## 2017-10-20 PROCEDURE — 99213 OFFICE O/P EST LOW 20 MIN: CPT | Performed by: NURSE PRACTITIONER
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
## 2017-11-01 ENCOUNTER — HOSPITAL ENCOUNTER (OUTPATIENT)
Dept: MRI IMAGING | Facility: HOSPITAL | Age: 62
Discharge: HOME | End: 2017-11-01
Admitting: NURSE PRACTITIONER

## 2017-11-01 DIAGNOSIS — M75.41 SHOULDER IMPINGEMENT SYNDROME, RIGHT: ICD-10-CM

## 2017-11-01 DIAGNOSIS — M25.511 ACUTE PAIN OF RIGHT SHOULDER: ICD-10-CM

## 2017-11-01 PROCEDURE — 73221 MRI JOINT UPR EXTREM W/O DYE: CPT

## 2017-11-03 ENCOUNTER — OFFICE VISIT (OUTPATIENT)
Dept: ORTHOPEDIC SURGERY | Facility: CLINIC | Age: 62
End: 2017-11-03

## 2017-11-03 VITALS — HEIGHT: 68 IN | WEIGHT: 135 LBS | BODY MASS INDEX: 20.46 KG/M2

## 2017-11-03 DIAGNOSIS — M75.41 SHOULDER IMPINGEMENT SYNDROME, RIGHT: ICD-10-CM

## 2017-11-03 DIAGNOSIS — M25.511 ACUTE PAIN OF RIGHT SHOULDER: Primary | ICD-10-CM

## 2017-11-03 PROCEDURE — 20610 DRAIN/INJ JOINT/BURSA W/O US: CPT | Performed by: NURSE PRACTITIONER

## 2017-11-03 PROCEDURE — 99214 OFFICE O/P EST MOD 30 MIN: CPT | Performed by: NURSE PRACTITIONER

## 2017-11-03 RX ORDER — TRIAMCINOLONE ACETONIDE 40 MG/ML
40 INJECTION, SUSPENSION INTRA-ARTICULAR; INTRAMUSCULAR
Status: COMPLETED | OUTPATIENT
Start: 2017-11-03 | End: 2017-11-03

## 2017-11-03 RX ORDER — LIDOCAINE HYDROCHLORIDE 10 MG/ML
2 INJECTION, SOLUTION INFILTRATION; PERINEURAL
Status: COMPLETED | OUTPATIENT
Start: 2017-11-03 | End: 2017-11-03

## 2017-11-03 RX ADMIN — TRIAMCINOLONE ACETONIDE 40 MG: 40 INJECTION, SUSPENSION INTRA-ARTICULAR; INTRAMUSCULAR at 10:31

## 2017-11-03 RX ADMIN — LIDOCAINE HYDROCHLORIDE 2 ML: 10 INJECTION, SOLUTION INFILTRATION; PERINEURAL at 10:31
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
## 2017-11-10 ENCOUNTER — HOSPITAL ENCOUNTER (OUTPATIENT)
Dept: PHYSICAL THERAPY | Facility: HOSPITAL | Age: 62
Setting detail: THERAPIES SERIES
Discharge: HOME | End: 2017-11-10

## 2017-11-10 DIAGNOSIS — M25.511 ACUTE PAIN OF RIGHT SHOULDER: Primary | ICD-10-CM

## 2017-11-10 PROCEDURE — G8984 CARRY CURRENT STATUS: HCPCS

## 2017-11-10 PROCEDURE — 97140 MANUAL THERAPY 1/> REGIONS: CPT

## 2017-11-10 PROCEDURE — G8985 CARRY GOAL STATUS: HCPCS

## 2017-11-10 PROCEDURE — 97161 PT EVAL LOW COMPLEX 20 MIN: CPT

## 2017-11-10 PROCEDURE — 97012 MECHANICAL TRACTION THERAPY: CPT
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
## 2017-12-12 ENCOUNTER — PREP FOR SURGERY (OUTPATIENT)
Dept: OTHER | Facility: HOSPITAL | Age: 62
End: 2017-12-12

## 2017-12-12 DIAGNOSIS — R07.9 CHEST PAIN, UNSPECIFIED TYPE: Primary | ICD-10-CM

## 2017-12-12 RX ORDER — SODIUM CHLORIDE 0.9 % (FLUSH) 0.9 %
1-10 SYRINGE (ML) INJECTION AS NEEDED
Status: CANCELLED | OUTPATIENT
Start: 2017-12-15
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
## 2017-12-13 PROBLEM — R07.9 CHEST PAIN: Status: ACTIVE | Noted: 2017-12-13

## 2017-12-14 ENCOUNTER — APPOINTMENT (OUTPATIENT)
Dept: LAB | Facility: HOSPITAL | Age: 62
End: 2017-12-14

## 2017-12-14 ENCOUNTER — OFFICE VISIT (OUTPATIENT)
Dept: GASTROENTEROLOGY | Facility: CLINIC | Age: 62
End: 2017-12-14

## 2017-12-14 VITALS
HEIGHT: 68 IN | BODY MASS INDEX: 19.94 KG/M2 | WEIGHT: 131.6 LBS | SYSTOLIC BLOOD PRESSURE: 106 MMHG | DIASTOLIC BLOOD PRESSURE: 80 MMHG | HEART RATE: 91 BPM

## 2017-12-14 DIAGNOSIS — B18.2 CHRONIC HEPATITIS C WITHOUT HEPATIC COMA (HCC): Primary | ICD-10-CM

## 2017-12-14 DIAGNOSIS — K74.00 HEPATIC FIBROSIS: ICD-10-CM

## 2017-12-14 DIAGNOSIS — K21.00 GASTROESOPHAGEAL REFLUX DISEASE WITH ESOPHAGITIS: ICD-10-CM

## 2017-12-14 DIAGNOSIS — E71.30 DISORDER OF FATTY-ACID METABOLISM: ICD-10-CM

## 2017-12-14 LAB
ALBUMIN SERPL-MCNC: 5 G/DL (ref 3.4–4.8)
ALBUMIN/GLOB SERPL: 1.4 G/DL (ref 1.1–1.8)
ALP SERPL-CCNC: 53 U/L (ref 38–126)
ALT SERPL W P-5'-P-CCNC: 19 U/L (ref 21–72)
ANION GAP SERPL CALCULATED.3IONS-SCNC: 13 MMOL/L (ref 5–15)
AST SERPL-CCNC: 18 U/L (ref 17–59)
BASOPHILS # BLD AUTO: 0.03 10*3/MM3 (ref 0–0.2)
BASOPHILS NFR BLD AUTO: 0.4 % (ref 0–2)
BILIRUB SERPL-MCNC: 0.6 MG/DL (ref 0.2–1.3)
BUN BLD-MCNC: 23 MG/DL (ref 7–21)
BUN/CREAT SERPL: 15.5 (ref 7–25)
CALCIUM SPEC-SCNC: 10 MG/DL (ref 8.4–10.2)
CHLORIDE SERPL-SCNC: 105 MMOL/L (ref 95–110)
CO2 SERPL-SCNC: 22 MMOL/L (ref 22–31)
CREAT BLD-MCNC: 1.48 MG/DL (ref 0.7–1.3)
DEPRECATED RDW RBC AUTO: 43.9 FL (ref 35.1–43.9)
EOSINOPHIL # BLD AUTO: 0.04 10*3/MM3 (ref 0–0.7)
EOSINOPHIL NFR BLD AUTO: 0.5 % (ref 0–7)
ERYTHROCYTE [DISTWIDTH] IN BLOOD BY AUTOMATED COUNT: 13.3 % (ref 11.5–14.5)
GFR SERPL CREATININE-BSD FRML MDRD: 48 ML/MIN/1.73 (ref 60–113)
GLOBULIN UR ELPH-MCNC: 3.6 GM/DL (ref 2.3–3.5)
GLUCOSE BLD-MCNC: 114 MG/DL (ref 60–100)
HCT VFR BLD AUTO: 49.5 % (ref 39–49)
HGB BLD-MCNC: 16.8 G/DL (ref 13.7–17.3)
IMM GRANULOCYTES # BLD: 0.02 10*3/MM3 (ref 0–0.02)
IMM GRANULOCYTES NFR BLD: 0.3 % (ref 0–0.5)
INR PPP: 0.96 (ref 0.8–1.2)
LYMPHOCYTES # BLD AUTO: 1.93 10*3/MM3 (ref 0.6–4.2)
LYMPHOCYTES NFR BLD AUTO: 25.8 % (ref 10–50)
MCH RBC QN AUTO: 30.4 PG (ref 26.5–34)
MCHC RBC AUTO-ENTMCNC: 33.9 G/DL (ref 31.5–36.3)
MCV RBC AUTO: 89.7 FL (ref 80–98)
MONOCYTES # BLD AUTO: 0.67 10*3/MM3 (ref 0–0.9)
MONOCYTES NFR BLD AUTO: 9 % (ref 0–12)
NEUTROPHILS # BLD AUTO: 4.79 10*3/MM3 (ref 2–8.6)
NEUTROPHILS NFR BLD AUTO: 64 % (ref 37–80)
PLATELET # BLD AUTO: 169 10*3/MM3 (ref 150–450)
PMV BLD AUTO: 10.5 FL (ref 8–12)
POTASSIUM BLD-SCNC: 4.5 MMOL/L (ref 3.5–5.1)
PROT SERPL-MCNC: 8.6 G/DL (ref 6.3–8.6)
PROTHROMBIN TIME: 12.7 SECONDS (ref 11.1–15.3)
RBC # BLD AUTO: 5.52 10*6/MM3 (ref 4.37–5.74)
SODIUM BLD-SCNC: 140 MMOL/L (ref 137–145)
WBC NRBC COR # BLD: 7.48 10*3/MM3 (ref 3.2–9.8)

## 2017-12-14 PROCEDURE — 85025 COMPLETE CBC W/AUTO DIFF WBC: CPT | Performed by: PHYSICIAN ASSISTANT

## 2017-12-14 PROCEDURE — 85610 PROTHROMBIN TIME: CPT | Performed by: PHYSICIAN ASSISTANT

## 2017-12-14 PROCEDURE — 84460 ALANINE AMINO (ALT) (SGPT): CPT | Performed by: PHYSICIAN ASSISTANT

## 2017-12-14 PROCEDURE — 82247 BILIRUBIN TOTAL: CPT | Performed by: PHYSICIAN ASSISTANT

## 2017-12-14 PROCEDURE — 83883 ASSAY NEPHELOMETRY NOT SPEC: CPT | Performed by: PHYSICIAN ASSISTANT

## 2017-12-14 PROCEDURE — 82977 ASSAY OF GGT: CPT | Performed by: PHYSICIAN ASSISTANT

## 2017-12-14 PROCEDURE — 99213 OFFICE O/P EST LOW 20 MIN: CPT | Performed by: PHYSICIAN ASSISTANT

## 2017-12-14 PROCEDURE — 36415 COLL VENOUS BLD VENIPUNCTURE: CPT | Performed by: PHYSICIAN ASSISTANT

## 2017-12-14 PROCEDURE — 80053 COMPREHEN METABOLIC PANEL: CPT | Performed by: PHYSICIAN ASSISTANT

## 2017-12-14 PROCEDURE — 83010 ASSAY OF HAPTOGLOBIN QUANT: CPT | Performed by: PHYSICIAN ASSISTANT

## 2017-12-14 PROCEDURE — 87522 HEPATITIS C REVRS TRNSCRPJ: CPT | Performed by: PHYSICIAN ASSISTANT
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
## 2017-12-15 ENCOUNTER — HOSPITAL ENCOUNTER (OUTPATIENT)
Facility: HOSPITAL | Age: 62
Discharge: HOME | End: 2017-12-16
Attending: INTERNAL MEDICINE | Admitting: INTERNAL MEDICINE

## 2017-12-15 DIAGNOSIS — R07.9 CHEST PAIN, UNSPECIFIED TYPE: ICD-10-CM

## 2017-12-15 LAB
ANION GAP SERPL CALCULATED.3IONS-SCNC: 12 MMOL/L (ref 5–15)
BASOPHILS # BLD AUTO: 0.02 10*3/MM3 (ref 0–0.2)
BASOPHILS NFR BLD AUTO: 0.3 % (ref 0–2)
BUN BLD-MCNC: 27 MG/DL (ref 7–21)
BUN/CREAT SERPL: 18.4 (ref 7–25)
CALCIUM SPEC-SCNC: 9.3 MG/DL (ref 8.4–10.2)
CHLORIDE SERPL-SCNC: 104 MMOL/L (ref 95–110)
CO2 SERPL-SCNC: 19 MMOL/L (ref 22–31)
CREAT BLD-MCNC: 1.47 MG/DL (ref 0.7–1.3)
DEPRECATED RDW RBC AUTO: 44.2 FL (ref 35.1–43.9)
EOSINOPHIL # BLD AUTO: 0.06 10*3/MM3 (ref 0–0.7)
EOSINOPHIL NFR BLD AUTO: 0.8 % (ref 0–7)
ERYTHROCYTE [DISTWIDTH] IN BLOOD BY AUTOMATED COUNT: 13.5 % (ref 11.5–14.5)
GFR SERPL CREATININE-BSD FRML MDRD: 49 ML/MIN/1.73 (ref 60–113)
GLUCOSE BLD-MCNC: 236 MG/DL (ref 60–100)
GLUCOSE BLDC GLUCOMTR-MCNC: 201 MG/DL (ref 70–130)
HCT VFR BLD AUTO: 42.9 % (ref 39–49)
HGB BLD-MCNC: 14.3 G/DL (ref 13.7–17.3)
HOLD SPECIMEN: NORMAL
IMM GRANULOCYTES # BLD: 0.02 10*3/MM3 (ref 0–0.02)
IMM GRANULOCYTES NFR BLD: 0.3 % (ref 0–0.5)
LYMPHOCYTES # BLD AUTO: 1.41 10*3/MM3 (ref 0.6–4.2)
LYMPHOCYTES NFR BLD AUTO: 19.3 % (ref 10–50)
MCH RBC QN AUTO: 29.7 PG (ref 26.5–34)
MCHC RBC AUTO-ENTMCNC: 33.3 G/DL (ref 31.5–36.3)
MCV RBC AUTO: 89 FL (ref 80–98)
MONOCYTES # BLD AUTO: 0.61 10*3/MM3 (ref 0–0.9)
MONOCYTES NFR BLD AUTO: 8.3 % (ref 0–12)
NEUTROPHILS # BLD AUTO: 5.2 10*3/MM3 (ref 2–8.6)
NEUTROPHILS NFR BLD AUTO: 71 % (ref 37–80)
PLATELET # BLD AUTO: 153 10*3/MM3 (ref 150–450)
PMV BLD AUTO: 10.3 FL (ref 8–12)
POTASSIUM BLD-SCNC: 4.1 MMOL/L (ref 3.5–5.1)
RBC # BLD AUTO: 4.82 10*6/MM3 (ref 4.37–5.74)
SODIUM BLD-SCNC: 135 MMOL/L (ref 137–145)
WBC NRBC COR # BLD: 7.32 10*3/MM3 (ref 3.2–9.8)
WHOLE BLOOD HOLD SPECIMEN: NORMAL

## 2017-12-15 PROCEDURE — C1769 GUIDE WIRE: HCPCS | Performed by: INTERNAL MEDICINE

## 2017-12-15 PROCEDURE — 0 IOPAMIDOL PER 1 ML: Performed by: INTERNAL MEDICINE

## 2017-12-15 PROCEDURE — 93005 ELECTROCARDIOGRAM TRACING: CPT | Performed by: INTERNAL MEDICINE

## 2017-12-15 PROCEDURE — C1887 CATHETER, GUIDING: HCPCS | Performed by: INTERNAL MEDICINE

## 2017-12-15 PROCEDURE — G0378 HOSPITAL OBSERVATION PER HR: HCPCS

## 2017-12-15 PROCEDURE — 80048 BASIC METABOLIC PNL TOTAL CA: CPT | Performed by: FAMILY MEDICINE

## 2017-12-15 PROCEDURE — 85025 COMPLETE CBC W/AUTO DIFF WBC: CPT | Performed by: FAMILY MEDICINE

## 2017-12-15 PROCEDURE — 63710000001 INSULIN ASPART PER 5 UNITS: Performed by: FAMILY MEDICINE

## 2017-12-15 PROCEDURE — C1874 STENT, COATED/COV W/DEL SYS: HCPCS | Performed by: INTERNAL MEDICINE

## 2017-12-15 PROCEDURE — C1894 INTRO/SHEATH, NON-LASER: HCPCS | Performed by: INTERNAL MEDICINE

## 2017-12-15 PROCEDURE — 82962 GLUCOSE BLOOD TEST: CPT

## 2017-12-15 PROCEDURE — 25010000002 HYDROMORPHONE PER 4 MG: Performed by: INTERNAL MEDICINE

## 2017-12-15 PROCEDURE — 25010000002 MIDAZOLAM PER 1 MG: Performed by: INTERNAL MEDICINE

## 2017-12-15 PROCEDURE — 93454 CORONARY ARTERY ANGIO S&I: CPT | Performed by: INTERNAL MEDICINE

## 2017-12-15 PROCEDURE — 25010000002 BIVALIRUDIN PER 1 MG: Performed by: INTERNAL MEDICINE

## 2017-12-15 PROCEDURE — C9600 PERC DRUG-EL COR STENT SING: HCPCS | Performed by: INTERNAL MEDICINE

## 2017-12-15 DEVICE — XIENCE ALPINE EVEROLIMUS ELUTING CORONARY STENT SYSTEM 2.75 MM X 08 MM / RAPID-EXCHANGE
Type: IMPLANTABLE DEVICE | Status: FUNCTIONAL
Brand: XIENCE ALPINE

## 2017-12-15 RX ORDER — ONDANSETRON 2 MG/ML
4 INJECTION INTRAMUSCULAR; INTRAVENOUS EVERY 6 HOURS PRN
Status: DISCONTINUED | OUTPATIENT
Start: 2017-12-15 | End: 2017-12-16 | Stop reason: HOSPADM

## 2017-12-15 RX ORDER — SODIUM CHLORIDE 9 MG/ML
50 INJECTION, SOLUTION INTRAVENOUS CONTINUOUS
Status: DISCONTINUED | OUTPATIENT
Start: 2017-12-15 | End: 2017-12-15

## 2017-12-15 RX ORDER — ATORVASTATIN CALCIUM 20 MG/1
20 TABLET, FILM COATED ORAL DAILY
Status: DISCONTINUED | OUTPATIENT
Start: 2017-12-15 | End: 2017-12-16 | Stop reason: HOSPADM

## 2017-12-15 RX ORDER — ACETAMINOPHEN 325 MG/1
650 TABLET ORAL EVERY 6 HOURS PRN
Status: DISCONTINUED | OUTPATIENT
Start: 2017-12-15 | End: 2017-12-16 | Stop reason: HOSPADM

## 2017-12-15 RX ORDER — NICOTINE POLACRILEX 4 MG
15 LOZENGE BUCCAL
Status: DISCONTINUED | OUTPATIENT
Start: 2017-12-15 | End: 2017-12-16 | Stop reason: HOSPADM

## 2017-12-15 RX ORDER — DEXTROSE MONOHYDRATE 25 G/50ML
25 INJECTION, SOLUTION INTRAVENOUS
Status: DISCONTINUED | OUTPATIENT
Start: 2017-12-15 | End: 2017-12-16 | Stop reason: HOSPADM

## 2017-12-15 RX ORDER — IPRATROPIUM BROMIDE AND ALBUTEROL SULFATE 2.5; .5 MG/3ML; MG/3ML
3 SOLUTION RESPIRATORY (INHALATION)
Status: DISCONTINUED | OUTPATIENT
Start: 2017-12-15 | End: 2017-12-16 | Stop reason: HOSPADM

## 2017-12-15 RX ORDER — SODIUM CHLORIDE 450 MG/100ML
75 INJECTION, SOLUTION INTRAVENOUS CONTINUOUS
Status: DISCONTINUED | OUTPATIENT
Start: 2017-12-15 | End: 2017-12-16 | Stop reason: HOSPADM

## 2017-12-15 RX ORDER — SODIUM CHLORIDE 0.9 % (FLUSH) 0.9 %
1-10 SYRINGE (ML) INJECTION AS NEEDED
Status: DISCONTINUED | OUTPATIENT
Start: 2017-12-15 | End: 2017-12-16 | Stop reason: HOSPADM

## 2017-12-15 RX ORDER — ACETAMINOPHEN 325 MG/1
650 TABLET ORAL EVERY 4 HOURS PRN
Status: DISCONTINUED | OUTPATIENT
Start: 2017-12-15 | End: 2017-12-16 | Stop reason: HOSPADM

## 2017-12-15 RX ORDER — CARVEDILOL 3.12 MG/1
3.12 TABLET ORAL 2 TIMES DAILY WITH MEALS
Status: DISCONTINUED | OUTPATIENT
Start: 2017-12-15 | End: 2017-12-16 | Stop reason: HOSPADM

## 2017-12-15 RX ORDER — PANTOPRAZOLE SODIUM 40 MG/1
40 TABLET, DELAYED RELEASE ORAL EVERY MORNING
Status: DISCONTINUED | OUTPATIENT
Start: 2017-12-16 | End: 2017-12-16 | Stop reason: HOSPADM

## 2017-12-15 RX ORDER — MIRTAZAPINE 15 MG/1
15 TABLET, FILM COATED ORAL NIGHTLY
Status: DISCONTINUED | OUTPATIENT
Start: 2017-12-15 | End: 2017-12-16 | Stop reason: HOSPADM

## 2017-12-15 RX ORDER — ASPIRIN 81 MG/1
81 TABLET, CHEWABLE ORAL DAILY
Status: DISCONTINUED | OUTPATIENT
Start: 2017-12-15 | End: 2017-12-16 | Stop reason: HOSPADM

## 2017-12-15 RX ORDER — ISOSORBIDE MONONITRATE 30 MG/1
30 TABLET, EXTENDED RELEASE ORAL
Status: DISCONTINUED | OUTPATIENT
Start: 2017-12-15 | End: 2017-12-16 | Stop reason: HOSPADM

## 2017-12-15 RX ORDER — HYDROCODONE BITARTRATE AND ACETAMINOPHEN 7.5; 325 MG/1; MG/1
1 TABLET ORAL EVERY 6 HOURS PRN
Status: DISCONTINUED | OUTPATIENT
Start: 2017-12-15 | End: 2017-12-16 | Stop reason: HOSPADM

## 2017-12-15 RX ORDER — LIDOCAINE HYDROCHLORIDE 20 MG/ML
INJECTION, SOLUTION INFILTRATION; PERINEURAL AS NEEDED
Status: DISCONTINUED | OUTPATIENT
Start: 2017-12-15 | End: 2017-12-15 | Stop reason: HOSPADM

## 2017-12-15 RX ORDER — MIDAZOLAM HYDROCHLORIDE 1 MG/ML
INJECTION INTRAMUSCULAR; INTRAVENOUS AS NEEDED
Status: DISCONTINUED | OUTPATIENT
Start: 2017-12-15 | End: 2017-12-15 | Stop reason: HOSPADM

## 2017-12-15 RX ORDER — NITROGLYCERIN 0.4 MG/1
0.4 TABLET SUBLINGUAL
Status: DISCONTINUED | OUTPATIENT
Start: 2017-12-15 | End: 2017-12-16 | Stop reason: HOSPADM

## 2017-12-15 RX ORDER — SODIUM CHLORIDE 0.9 % (FLUSH) 0.9 %
1-10 SYRINGE (ML) INJECTION AS NEEDED
Status: DISCONTINUED | OUTPATIENT
Start: 2017-12-15 | End: 2017-12-15 | Stop reason: HOSPADM

## 2017-12-15 RX ADMIN — HYDROCODONE BITARTRATE AND ACETAMINOPHEN 1 TABLET: 7.5; 325 TABLET ORAL at 17:36

## 2017-12-15 RX ADMIN — MIRTAZAPINE 15 MG: 15 TABLET, FILM COATED ORAL at 20:32

## 2017-12-15 RX ADMIN — ATORVASTATIN CALCIUM 20 MG: 20 TABLET, FILM COATED ORAL at 20:32

## 2017-12-15 RX ADMIN — TICAGRELOR 90 MG: 90 TABLET ORAL at 17:36

## 2017-12-15 RX ADMIN — SODIUM CHLORIDE 75 ML/HR: 4.5 INJECTION, SOLUTION INTRAVENOUS at 17:35

## 2017-12-15 RX ADMIN — INSULIN ASPART 3 UNITS: 100 INJECTION, SOLUTION INTRAVENOUS; SUBCUTANEOUS at 20:32

## 2017-12-15 RX ADMIN — CARVEDILOL 3.12 MG: 3.12 TABLET, FILM COATED ORAL at 20:32

## 2017-12-15 RX ADMIN — ACETAMINOPHEN 650 MG: 325 TABLET ORAL at 13:11

## 2017-12-16 VITALS
WEIGHT: 137.13 LBS | SYSTOLIC BLOOD PRESSURE: 113 MMHG | HEIGHT: 68 IN | DIASTOLIC BLOOD PRESSURE: 78 MMHG | OXYGEN SATURATION: 100 % | HEART RATE: 74 BPM | TEMPERATURE: 97.6 F | RESPIRATION RATE: 19 BRPM | BODY MASS INDEX: 20.78 KG/M2

## 2017-12-16 LAB
ANION GAP SERPL CALCULATED.3IONS-SCNC: 10 MMOL/L (ref 5–15)
BASOPHILS # BLD AUTO: 0.02 10*3/MM3 (ref 0–0.2)
BASOPHILS NFR BLD AUTO: 0.3 % (ref 0–2)
BUN BLD-MCNC: 26 MG/DL (ref 7–21)
BUN/CREAT SERPL: 19.1 (ref 7–25)
CALCIUM SPEC-SCNC: 9.4 MG/DL (ref 8.4–10.2)
CHLORIDE SERPL-SCNC: 108 MMOL/L (ref 95–110)
CO2 SERPL-SCNC: 21 MMOL/L (ref 22–31)
CREAT BLD-MCNC: 1.36 MG/DL (ref 0.7–1.3)
DEPRECATED RDW RBC AUTO: 44.2 FL (ref 35.1–43.9)
EOSINOPHIL # BLD AUTO: 0.05 10*3/MM3 (ref 0–0.7)
EOSINOPHIL NFR BLD AUTO: 0.7 % (ref 0–7)
ERYTHROCYTE [DISTWIDTH] IN BLOOD BY AUTOMATED COUNT: 13.5 % (ref 11.5–14.5)
GFR SERPL CREATININE-BSD FRML MDRD: 53 ML/MIN/1.73 (ref 60–113)
GLUCOSE BLD-MCNC: 125 MG/DL (ref 60–100)
GLUCOSE BLDC GLUCOMTR-MCNC: 127 MG/DL (ref 70–130)
HCT VFR BLD AUTO: 45.1 % (ref 39–49)
HCV RNA SERPL NAA+PROBE-ACNC: NORMAL IU/ML
HGB BLD-MCNC: 15 G/DL (ref 13.7–17.3)
IMM GRANULOCYTES # BLD: 0.01 10*3/MM3 (ref 0–0.02)
IMM GRANULOCYTES NFR BLD: 0.1 % (ref 0–0.5)
LYMPHOCYTES # BLD AUTO: 1.57 10*3/MM3 (ref 0.6–4.2)
LYMPHOCYTES NFR BLD AUTO: 23.1 % (ref 10–50)
MCH RBC QN AUTO: 29.8 PG (ref 26.5–34)
MCHC RBC AUTO-ENTMCNC: 33.3 G/DL (ref 31.5–36.3)
MCV RBC AUTO: 89.5 FL (ref 80–98)
MONOCYTES # BLD AUTO: 0.69 10*3/MM3 (ref 0–0.9)
MONOCYTES NFR BLD AUTO: 10.2 % (ref 0–12)
NEUTROPHILS # BLD AUTO: 4.45 10*3/MM3 (ref 2–8.6)
NEUTROPHILS NFR BLD AUTO: 65.6 % (ref 37–80)
PLATELET # BLD AUTO: 150 10*3/MM3 (ref 150–450)
PMV BLD AUTO: 10.6 FL (ref 8–12)
POTASSIUM BLD-SCNC: 4.1 MMOL/L (ref 3.5–5.1)
RBC # BLD AUTO: 5.04 10*6/MM3 (ref 4.37–5.74)
SODIUM BLD-SCNC: 139 MMOL/L (ref 137–145)
TEST INFORMATION: NORMAL
WBC NRBC COR # BLD: 6.79 10*3/MM3 (ref 3.2–9.8)

## 2017-12-16 PROCEDURE — 94640 AIRWAY INHALATION TREATMENT: CPT

## 2017-12-16 PROCEDURE — 82962 GLUCOSE BLOOD TEST: CPT

## 2017-12-16 PROCEDURE — 94799 UNLISTED PULMONARY SVC/PX: CPT

## 2017-12-16 PROCEDURE — 94760 N-INVAS EAR/PLS OXIMETRY 1: CPT

## 2017-12-16 PROCEDURE — 80048 BASIC METABOLIC PNL TOTAL CA: CPT | Performed by: FAMILY MEDICINE

## 2017-12-16 PROCEDURE — 85025 COMPLETE CBC W/AUTO DIFF WBC: CPT | Performed by: FAMILY MEDICINE

## 2017-12-16 PROCEDURE — G0378 HOSPITAL OBSERVATION PER HR: HCPCS

## 2017-12-16 RX ADMIN — IPRATROPIUM BROMIDE AND ALBUTEROL SULFATE 3 ML: .5; 3 SOLUTION RESPIRATORY (INHALATION) at 10:49

## 2017-12-16 RX ADMIN — IPRATROPIUM BROMIDE AND ALBUTEROL SULFATE 3 ML: .5; 3 SOLUTION RESPIRATORY (INHALATION) at 07:43

## 2017-12-16 RX ADMIN — CARVEDILOL 3.12 MG: 3.12 TABLET, FILM COATED ORAL at 08:07

## 2017-12-16 RX ADMIN — SODIUM CHLORIDE 75 ML/HR: 4.5 INJECTION, SOLUTION INTRAVENOUS at 04:07

## 2017-12-16 RX ADMIN — TICAGRELOR 90 MG: 90 TABLET ORAL at 08:07

## 2017-12-16 RX ADMIN — ASPIRIN 81 MG 81 MG: 81 TABLET ORAL at 08:07

## 2017-12-16 RX ADMIN — ATORVASTATIN CALCIUM 20 MG: 20 TABLET, FILM COATED ORAL at 08:07

## 2017-12-16 RX ADMIN — ISOSORBIDE MONONITRATE 30 MG: 30 TABLET, EXTENDED RELEASE ORAL at 08:07

## 2017-12-16 NOTE — DISCHARGE SUMMARY
[facility] Medicine Services  DISCHARGE SUMMARY       Date of Admission: 12/15/2017  Date of Discharge:  12/16/2017  Primary Care Physician: Harvinder Robert MD    Presenting Problem/History of Present Illness:  Chest pain, unspecified type [R07.9]  Chest pain, unspecified type [R07.9]     Final Discharge Diagnoses:  Hospital Problem List     * (Principal)Chest pain    Overview Signed 12/13/2017 11:47 AM by Maxx Garcia MD     Added automatically from request for surgery [id number]               Consults:   Consults     Date and Time Order Name Status Description    12/15/2017 1157 Inpatient Consult to Hospitalist Completed           Procedures Performed: Procedure(s):  Left Heart Cath Please schedule patient for 12/15/2017 @ 11:00 AM                 Pertinent Test Results:    Laboratory Data:     Results from last 7 days  Lab Units 12/16/17  0345 12/15/17  1931 12/14/17  1351   SODIUM mmol/L 139 135* 140   POTASSIUM mmol/L 4.1 4.1 4.5   CHLORIDE mmol/L 108 104 105   CO2 mmol/L 21.0* 19.0* 22.0   BUN mg/dL 26* 27* 23*   CREATININE mg/dL 1.36* 1.47* 1.48*   GLUCOSE mg/dL 125* 236* 114*   CALCIUM mg/dL 9.4 9.3 10.0   BILIRUBIN mg/dL  --   --  0.6   ALK PHOS U/L  --   --  53   ALT (SGPT) U/L  --   --  19*   AST (SGOT) U/L  --   --  18   ANION GAP mmol/L 10.0 12.0 13.0     Estimated Creatinine Clearance: 50.8 mL/min (by C-G formula based on Cr of 1.36).            Results from last 7 days  Lab Units 12/16/17  0345 12/15/17  1931 12/14/17  1351   WBC 10*3/mm3 6.79 7.32 7.48   HEMOGLOBIN g/dL 15.0 14.3 16.8   HEMATOCRIT % 45.1 42.9 49.5*   PLATELETS 10*3/mm3 150 153 169       Results from last 7 days  Lab Units 12/14/17  1351   INR  0.96       Culture Data:   No results found for: BLOODCX  No results found for: URINECX  No results found for: RESPCX  No results found for: WOUNDCX  No results found for: STOOLCX  No components found for: BODYFLD    Micobiology                             Radiology Data:   Imaging Results (all)     None          Chief Complaint on Day of Discharge: none    HPI:Patient is a 62 male was admitted for chest pain , patient had cardiac cath and a stent by Dr Garcia earlier today , patient is currently asymptomatic and has no complains .  Patient denies fever or chills, headache , dizziness, visual changes, change in appetite , chest pain or palpitations, difficulty in breathing or cough, abdominal pain , N/V/D , blood in the stool or urine or urinary symptoms, weakness numbness or tingling in the extremities.  Hospital Course: Patient had LHC :Ostial left anterior descending artery and proximal left anterior descending artery with 90% stenosis, had Successful PTCA and stenting of the proximal and ostial left anterior descending artery was done by Dr Garcia , patient tolerated well. Patient ambulated well. At time of discharge , patient asymptomatic , physical exam unremarkable, patient discharged with new script for brilinta. Patient to continue home meds, and hold metformin for 48 hours with frequent accuchecks at home.    Condition on Discharge:  Improved and Stable    Physical Exam on Discharge:  Temp:  [97.2 °F (36.2 °C)-97.8 °F (36.6 °C)] 97.6 °F (36.4 °C)  Heart Rate:  [66-88] 74  Resp:  [13-21] 19  BP: (113-146)/(77-87) 113/78  Arterial Line BP: (5-147)/(4-86) 5/4    Constitutional: Patient is AAO x 3. Patient appears well-developed and well-nourished.   Cardiovascular: Normal rate, regular rhythm, normal heart sounds and intact distal pulses.  Exam reveals no gallop and no friction rub.  No murmur heard.  Pulmonary/Chest: Effort normal and breath sounds normal. No respiratory distress. No wheezes, rales or ronchi.  Abdominal: Soft. Bowel sounds are normal. No distension, no tenderness. There is no rebound and no guarding. No hernia.   Musculoskeletal: No Cyanosis clubbing or edema.    Discharge Disposition:  Home or Self Care    Discharge Medications:    Vinny Favio Wing   Home Medication Instructions ALEK:[id number]    Printed on:12/16/17 7306   Medication Information                      albuterol (PROVENTIL) (2.5 MG/3ML) 0.083% nebulizer solution  Take 2.5 mg by nebulization Every 4 (Four) Hours As Needed for Wheezing or Shortness of Air.             aspirin 81 MG chewable tablet  Chew 81 mg daily.             carvedilol (COREG) 3.125 MG tablet  Take 3.125 mg by mouth 2 (Two) Times a Day With Meals.             diclofenac (VOLTAREN) 50 MG EC tablet  Take 1 tablet by mouth 2 (Two) Times a Day.             isosorbide mononitrate (IMDUR) 30 MG 24 hr tablet  Take 45 mg by mouth 2 (two) times a day.             metFORMIN (GLUCOPHAGE) 1000 MG tablet  TAKE 1 TABLET BY MOUTH TWO (2) TIMES A DAY             mirtazapine (REMERON) 15 MG tablet  Take 1 tablet by mouth Every Night.             NITROSTAT 0.4 MG SL tablet  PLACE 1 TABLET UNDER THE TONGUE AS NEEDED FOR CHEST PAIN. MAY REPEAT EVERY 5 MINUTES UP TO 3 DOSES, THEN GO TO EMERGENCY ROOM             omeprazole (priLOSEC) 40 MG capsule  TAKE 1 CAPSULE BY MOUTH TWO (2) TIMES A DAY             polyethylene glycol (MIRALAX) powder  Take 17 g by mouth 2 (Two) Times a Day.             pravastatin (PRAVACHOL) 40 MG tablet  TAKE 1 TABLET BY MOUTH EVERY NIGHT AT BEDTIME             ticagrelor (BRILINTA) 90 MG tablet tablet  Take 1 tablet by mouth 2 (Two) Times a Day.             tiZANidine (ZANAFLEX) 4 MG tablet  Take 1-2 tablets by mouth At Night As Needed for Muscle Spasms.             VENTOLIN  (90 BASE) MCG/ACT inhaler  INHALE 2 PUFFS EVERY FOUR HOURS AS NEEDED                 Discharge Diet:   Diet Instructions     Diet: Consistent Carbohydrate, Cardiac       Discharge Diet:   Consistent Carbohydrate  Cardiac                    Activity at Discharge:   Activity Instructions     Activity as Tolerated                     All the abnormal findings were discussed with the patient in details and the patient  verbalized understanding of needing to follow up with PCP and and other specialities of  outpatient follow up for further evaluation and management.    Discharge Care Plan/Instructions: follow up with PCP in 1 week and Dr Thakkar in 4-6 weeks.    Follow-up Appointments:   Future Appointments  Date Time Provider Department Center   1/31/2018 1:30 PM JAGUAR Roberson [facility] None   4/11/2018 9:30 AM MD SAIGE Tillman [facility] None       Test Results Pending at Discharge:      This document has been electronically signed by Stevie Cleveland MD on December 16, 2017 11:07 AM
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
## 2017-12-16 NOTE — PLAN OF CARE
Problem: Patient Care Overview (Adult)  Goal: Plan of Care Review  Outcome: Outcome(s) achieved Date Met:  12/16/17 12/16/17 1135   Coping/Psychosocial Response Interventions   Plan Of Care Reviewed With patient   Patient Care Overview   Progress improving   Outcome Evaluation   Outcome Summary/Follow up Plan Pt educated on the importance of taking medications, Brilinta card given to pt to take home.       Goal: Adult Individualization and Mutuality  Outcome: Outcome(s) achieved Date Met:  12/16/17  Goal: Discharge Needs Assessment  Outcome: Outcome(s) achieved Date Met:  12/16/17    Problem: Perioperative Period (Adult)  Goal: Signs and Symptoms of Listed Potential Problems Will be Absent or Manageable (Perioperative Period)  Outcome: Outcome(s) achieved Date Met:  12/16/17

## 2017-12-17 PROCEDURE — 93005 ELECTROCARDIOGRAM TRACING: CPT | Performed by: INTERNAL MEDICINE

## 2017-12-17 PROCEDURE — 93010 ELECTROCARDIOGRAM REPORT: CPT | Performed by: INTERNAL MEDICINE

## 2017-12-19 LAB
A2 MACROGLOB SERPL-MCNC: 297 MG/DL (ref 110–276)
ALT SERPL W P-5'-P-CCNC: 13 IU/L (ref 0–55)
APO A-I SERPL-MCNC: 121 MG/DL (ref 101–178)
BILIRUB SERPL-MCNC: 0.4 MG/DL (ref 0–1.2)
FIBROSIS SCORING:: ABNORMAL
FIBROSIS STAGE SERPL QL: ABNORMAL
GGT SERPL-CCNC: 22 IU/L (ref 0–65)
HAPTOGLOB SERPL-MCNC: 181 MG/DL (ref 34–200)
HCV AB SER QL: ABNORMAL
LABORATORY COMMENT REPORT: ABNORMAL
LIMITATIONS:: ABNORMAL
LIVER FIBR SCORE SERPL CALC.FIBROSURE: 0.41 (ref 0–0.21)
NECROINFLAMM ACTIVITY SCORING:: ABNORMAL
NECROINFLAMMATORY ACT GRADE SERPL QL: ABNORMAL
NECROINFLAMMATORY ACT SCORE SERPL: 0.05 (ref 0–0.17)
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
## 2018-01-08 RX ORDER — TICAGRELOR 90 MG/1
TABLET ORAL
Qty: 60 TABLET | Refills: 11 | Status: SHIPPED | OUTPATIENT
Start: 2018-01-08 | End: 2019-03-29 | Stop reason: HOSPADM

## 2018-01-08 RX ORDER — OMEPRAZOLE 40 MG/1
CAPSULE, DELAYED RELEASE ORAL
Qty: 60 CAPSULE | Refills: 0 | Status: SHIPPED | OUTPATIENT
Start: 2018-01-08 | End: 2018-02-06 | Stop reason: SDUPTHER

## 2018-01-31 ENCOUNTER — OFFICE VISIT (OUTPATIENT)
Dept: GASTROENTEROLOGY | Facility: CLINIC | Age: 63
End: 2018-01-31

## 2018-01-31 VITALS
OXYGEN SATURATION: 98 % | BODY MASS INDEX: 19.88 KG/M2 | HEIGHT: 68 IN | DIASTOLIC BLOOD PRESSURE: 80 MMHG | SYSTOLIC BLOOD PRESSURE: 120 MMHG | HEART RATE: 86 BPM | WEIGHT: 131.2 LBS

## 2018-01-31 DIAGNOSIS — K21.00 GASTROESOPHAGEAL REFLUX DISEASE WITH ESOPHAGITIS: ICD-10-CM

## 2018-01-31 DIAGNOSIS — Z86.19 HISTORY OF HEPATITIS C: ICD-10-CM

## 2018-01-31 DIAGNOSIS — K74.00 HEPATIC FIBROSIS: Primary | ICD-10-CM

## 2018-01-31 PROCEDURE — 99213 OFFICE O/P EST LOW 20 MIN: CPT | Performed by: PHYSICIAN ASSISTANT

## 2018-02-06 RX ORDER — OMEPRAZOLE 40 MG/1
CAPSULE, DELAYED RELEASE ORAL
Qty: 60 CAPSULE | Refills: 5 | Status: SHIPPED | OUTPATIENT
Start: 2018-02-06 | End: 2018-08-22 | Stop reason: DRUGHIGH

## 2018-04-23 RX ORDER — SITAGLIPTIN 100 MG/1
TABLET, FILM COATED ORAL
Qty: 30 TABLET | Refills: 0 | Status: SHIPPED | OUTPATIENT
Start: 2018-04-23 | End: 2018-05-22 | Stop reason: DRUGHIGH

## 2018-04-27 ENCOUNTER — APPOINTMENT (OUTPATIENT)
Dept: GENERAL RADIOLOGY | Facility: HOSPITAL | Age: 63
End: 2018-04-27

## 2018-04-27 ENCOUNTER — APPOINTMENT (OUTPATIENT)
Dept: CT IMAGING | Facility: HOSPITAL | Age: 63
End: 2018-04-27

## 2018-04-27 ENCOUNTER — HOSPITAL ENCOUNTER (INPATIENT)
Facility: HOSPITAL | Age: 63
LOS: 4 days | Discharge: HOME | End: 2018-05-01
Attending: EMERGENCY MEDICINE | Admitting: FAMILY MEDICINE

## 2018-04-27 DIAGNOSIS — R11.2 NON-INTRACTABLE VOMITING WITH NAUSEA, UNSPECIFIED VOMITING TYPE: ICD-10-CM

## 2018-04-27 DIAGNOSIS — N28.9 ACUTE RENAL INSUFFICIENCY: Primary | ICD-10-CM

## 2018-04-27 PROBLEM — K56.609 SBO (SMALL BOWEL OBSTRUCTION) (HCC): Status: ACTIVE | Noted: 2018-04-27

## 2018-04-27 PROBLEM — I25.10 CAD (CORONARY ARTERY DISEASE): Status: ACTIVE | Noted: 2018-04-27

## 2018-04-27 PROBLEM — K29.70 GASTRITIS: Status: ACTIVE | Noted: 2018-04-27

## 2018-04-27 PROBLEM — B18.2 CHRONIC HEPATITIS C VIRUS INFECTION (HCC): Status: ACTIVE | Noted: 2018-04-27

## 2018-04-27 LAB
ACETONE BLD QL: NEGATIVE
ALBUMIN SERPL-MCNC: 3.4 G/DL (ref 3.4–4.8)
ALBUMIN/GLOB SERPL: 1.2 G/DL (ref 1.1–1.8)
ALP SERPL-CCNC: 74 U/L (ref 38–126)
ALT SERPL W P-5'-P-CCNC: 26 U/L (ref 21–72)
AMMONIA BLD-SCNC: <9 UMOL/L (ref 9–30)
AMYLASE SERPL-CCNC: 101 U/L (ref 50–130)
ANION GAP SERPL CALCULATED.3IONS-SCNC: 15 MMOL/L (ref 5–15)
APTT PPP: 35.4 SECONDS (ref 20–40.3)
AST SERPL-CCNC: 15 U/L (ref 17–59)
BACTERIA UR QL AUTO: ABNORMAL /HPF
BASOPHILS # BLD AUTO: 0.01 10*3/MM3 (ref 0–0.2)
BASOPHILS NFR BLD AUTO: 0.1 % (ref 0–2)
BILIRUB SERPL-MCNC: 0.2 MG/DL (ref 0.2–1.3)
BILIRUB UR QL STRIP: NEGATIVE
BUN BLD-MCNC: 84 MG/DL (ref 7–21)
BUN/CREAT SERPL: 19.7 (ref 7–25)
CALCIUM SPEC-SCNC: 7.7 MG/DL (ref 8.4–10.2)
CHLORIDE SERPL-SCNC: 114 MMOL/L (ref 95–110)
CLARITY UR: ABNORMAL
CO2 SERPL-SCNC: 6 MMOL/L (ref 22–31)
COLOR UR: YELLOW
CREAT BLD-MCNC: 4.26 MG/DL (ref 0.7–1.3)
CREAT UR-MCNC: 57.8 MG/DL
D-LACTATE SERPL-SCNC: <0.5 MMOL/L (ref 0.5–2)
DEPRECATED RDW RBC AUTO: 44.8 FL (ref 35.1–43.9)
EOSINOPHIL # BLD AUTO: 0.06 10*3/MM3 (ref 0–0.7)
EOSINOPHIL NFR BLD AUTO: 0.9 % (ref 0–7)
ERYTHROCYTE [DISTWIDTH] IN BLOOD BY AUTOMATED COUNT: 14.1 % (ref 11.5–14.5)
GFR SERPL CREATININE-BSD FRML MDRD: 14 ML/MIN/1.73 (ref 49–113)
GLOBULIN UR ELPH-MCNC: 2.9 GM/DL (ref 2.3–3.5)
GLUCOSE BLD-MCNC: 125 MG/DL (ref 60–100)
GLUCOSE BLDC GLUCOMTR-MCNC: 125 MG/DL (ref 70–130)
GLUCOSE BLDC GLUCOMTR-MCNC: 127 MG/DL (ref 70–130)
GLUCOSE UR STRIP-MCNC: NEGATIVE MG/DL
HCT VFR BLD AUTO: 34.7 % (ref 39–49)
HGB BLD-MCNC: 12.3 G/DL (ref 13.7–17.3)
HGB UR QL STRIP.AUTO: ABNORMAL
HOLD SPECIMEN: NORMAL
HYALINE CASTS UR QL AUTO: ABNORMAL /LPF
IMM GRANULOCYTES # BLD: 0.02 10*3/MM3 (ref 0–0.02)
IMM GRANULOCYTES NFR BLD: 0.3 % (ref 0–0.5)
INR PPP: 1.14 (ref 0.8–1.2)
KETONES UR QL STRIP: NEGATIVE
LEUKOCYTE ESTERASE UR QL STRIP.AUTO: NEGATIVE
LIPASE SERPL-CCNC: 414 U/L (ref 23–300)
LYMPHOCYTES # BLD AUTO: 0.79 10*3/MM3 (ref 0.6–4.2)
LYMPHOCYTES NFR BLD AUTO: 11.4 % (ref 10–50)
MAGNESIUM SERPL-MCNC: 1.8 MG/DL (ref 1.6–2.3)
MCH RBC QN AUTO: 30.8 PG (ref 26.5–34)
MCHC RBC AUTO-ENTMCNC: 35.4 G/DL (ref 31.5–36.3)
MCV RBC AUTO: 87 FL (ref 80–98)
MONOCYTES # BLD AUTO: 0.48 10*3/MM3 (ref 0–0.9)
MONOCYTES NFR BLD AUTO: 6.9 % (ref 0–12)
NEUTROPHILS # BLD AUTO: 5.56 10*3/MM3 (ref 2–8.6)
NEUTROPHILS NFR BLD AUTO: 80.4 % (ref 37–80)
NITRITE UR QL STRIP: NEGATIVE
NT-PROBNP SERPL-MCNC: 1830 PG/ML (ref 0–900)
PH UR STRIP.AUTO: 6.5 [PH] (ref 5–9)
PHOSPHATE SERPL-MCNC: 4.8 MG/DL (ref 2.4–4.4)
PLATELET # BLD AUTO: 188 10*3/MM3 (ref 150–450)
PMV BLD AUTO: 9.9 FL (ref 8–12)
POTASSIUM BLD-SCNC: 3.6 MMOL/L (ref 3.5–5.1)
PROT SERPL-MCNC: 6.3 G/DL (ref 6.3–8.6)
PROT UR QL STRIP: ABNORMAL
PROTHROMBIN TIME: 14.3 SECONDS (ref 11.1–15.3)
RBC # BLD AUTO: 3.99 10*6/MM3 (ref 4.37–5.74)
RBC # UR: ABNORMAL /HPF
REF LAB TEST METHOD: ABNORMAL
SODIUM BLD-SCNC: 135 MMOL/L (ref 137–145)
SP GR UR STRIP: 1.01 (ref 1–1.03)
SQUAMOUS #/AREA URNS HPF: ABNORMAL /HPF
UROBILINOGEN UR QL STRIP: ABNORMAL
UUN 24H UR-MCNC: 325 MG/DL
WBC NRBC COR # BLD: 6.92 10*3/MM3 (ref 3.2–9.8)
WBC UR QL AUTO: ABNORMAL /HPF
WHOLE BLOOD HOLD SPECIMEN: NORMAL

## 2018-04-27 PROCEDURE — 25010000002 MORPHINE PER 10 MG: Performed by: EMERGENCY MEDICINE

## 2018-04-27 PROCEDURE — 82962 GLUCOSE BLOOD TEST: CPT

## 2018-04-27 PROCEDURE — 83880 ASSAY OF NATRIURETIC PEPTIDE: CPT | Performed by: PHYSICIAN ASSISTANT

## 2018-04-27 PROCEDURE — 85025 COMPLETE CBC W/AUTO DIFF WBC: CPT | Performed by: PHYSICIAN ASSISTANT

## 2018-04-27 PROCEDURE — 84540 ASSAY OF URINE/UREA-N: CPT | Performed by: NURSE PRACTITIONER

## 2018-04-27 PROCEDURE — 85730 THROMBOPLASTIN TIME PARTIAL: CPT | Performed by: FAMILY MEDICINE

## 2018-04-27 PROCEDURE — 74176 CT ABD & PELVIS W/O CONTRAST: CPT

## 2018-04-27 PROCEDURE — 83605 ASSAY OF LACTIC ACID: CPT | Performed by: NURSE PRACTITIONER

## 2018-04-27 PROCEDURE — 82009 KETONE BODYS QUAL: CPT | Performed by: PHYSICIAN ASSISTANT

## 2018-04-27 PROCEDURE — 84100 ASSAY OF PHOSPHORUS: CPT | Performed by: PHYSICIAN ASSISTANT

## 2018-04-27 PROCEDURE — 82140 ASSAY OF AMMONIA: CPT | Performed by: PHYSICIAN ASSISTANT

## 2018-04-27 PROCEDURE — 99284 EMERGENCY DEPT VISIT MOD MDM: CPT

## 2018-04-27 PROCEDURE — 71045 X-RAY EXAM CHEST 1 VIEW: CPT

## 2018-04-27 PROCEDURE — 81001 URINALYSIS AUTO W/SCOPE: CPT | Performed by: PHYSICIAN ASSISTANT

## 2018-04-27 PROCEDURE — 80053 COMPREHEN METABOLIC PANEL: CPT | Performed by: PHYSICIAN ASSISTANT

## 2018-04-27 PROCEDURE — 82570 ASSAY OF URINE CREATININE: CPT | Performed by: NURSE PRACTITIONER

## 2018-04-27 PROCEDURE — 36415 COLL VENOUS BLD VENIPUNCTURE: CPT

## 2018-04-27 PROCEDURE — 83735 ASSAY OF MAGNESIUM: CPT | Performed by: PHYSICIAN ASSISTANT

## 2018-04-27 PROCEDURE — 25010000002 ONDANSETRON PER 1 MG: Performed by: PHYSICIAN ASSISTANT

## 2018-04-27 PROCEDURE — 25010000002 MORPHINE PER 10 MG: Performed by: FAMILY MEDICINE

## 2018-04-27 PROCEDURE — 82150 ASSAY OF AMYLASE: CPT | Performed by: PHYSICIAN ASSISTANT

## 2018-04-27 PROCEDURE — 85610 PROTHROMBIN TIME: CPT | Performed by: FAMILY MEDICINE

## 2018-04-27 PROCEDURE — 83690 ASSAY OF LIPASE: CPT | Performed by: PHYSICIAN ASSISTANT

## 2018-04-27 RX ORDER — MIRTAZAPINE 15 MG/1
15 TABLET, FILM COATED ORAL NIGHTLY
Status: DISCONTINUED | OUTPATIENT
Start: 2018-04-27 | End: 2018-04-27

## 2018-04-27 RX ORDER — ATORVASTATIN CALCIUM 10 MG/1
10 TABLET, FILM COATED ORAL NIGHTLY
Status: DISCONTINUED | OUTPATIENT
Start: 2018-04-27 | End: 2018-04-27

## 2018-04-27 RX ORDER — MORPHINE SULFATE 2 MG/ML
2 INJECTION, SOLUTION INTRAMUSCULAR; INTRAVENOUS ONCE
Status: COMPLETED | OUTPATIENT
Start: 2018-04-27 | End: 2018-04-27

## 2018-04-27 RX ORDER — ALBUTEROL SULFATE 2.5 MG/3ML
2.5 SOLUTION RESPIRATORY (INHALATION)
Status: DISCONTINUED | OUTPATIENT
Start: 2018-04-27 | End: 2018-05-01 | Stop reason: HOSPADM

## 2018-04-27 RX ORDER — ALBUTEROL SULFATE 90 UG/1
2 AEROSOL, METERED RESPIRATORY (INHALATION)
Status: DISCONTINUED | OUTPATIENT
Start: 2018-04-27 | End: 2018-04-27 | Stop reason: ALTCHOICE

## 2018-04-27 RX ORDER — SODIUM CHLORIDE 0.9 % (FLUSH) 0.9 %
1-10 SYRINGE (ML) INJECTION AS NEEDED
Status: DISCONTINUED | OUTPATIENT
Start: 2018-04-27 | End: 2018-05-01 | Stop reason: HOSPADM

## 2018-04-27 RX ORDER — PROMETHAZINE HYDROCHLORIDE 25 MG/ML
12.5 INJECTION, SOLUTION INTRAMUSCULAR; INTRAVENOUS ONCE
Status: DISCONTINUED | OUTPATIENT
Start: 2018-04-27 | End: 2018-04-27

## 2018-04-27 RX ORDER — MORPHINE SULFATE 2 MG/ML
0.5 INJECTION, SOLUTION INTRAMUSCULAR; INTRAVENOUS EVERY 4 HOURS PRN
Status: DISCONTINUED | OUTPATIENT
Start: 2018-04-27 | End: 2018-04-28

## 2018-04-27 RX ORDER — ASPIRIN 81 MG/1
81 TABLET, CHEWABLE ORAL DAILY
Status: DISCONTINUED | OUTPATIENT
Start: 2018-04-28 | End: 2018-04-27

## 2018-04-27 RX ORDER — ONDANSETRON 2 MG/ML
4 INJECTION INTRAMUSCULAR; INTRAVENOUS ONCE
Status: COMPLETED | OUTPATIENT
Start: 2018-04-27 | End: 2018-04-27

## 2018-04-27 RX ORDER — HYDRALAZINE HYDROCHLORIDE 20 MG/ML
10 INJECTION INTRAMUSCULAR; INTRAVENOUS EVERY 6 HOURS PRN
Status: DISCONTINUED | OUTPATIENT
Start: 2018-04-27 | End: 2018-05-01 | Stop reason: HOSPADM

## 2018-04-27 RX ORDER — CARVEDILOL 3.12 MG/1
3.12 TABLET ORAL 2 TIMES DAILY WITH MEALS
Status: DISCONTINUED | OUTPATIENT
Start: 2018-04-27 | End: 2018-04-27

## 2018-04-27 RX ORDER — DEXTROSE MONOHYDRATE 25 G/50ML
25 INJECTION, SOLUTION INTRAVENOUS
Status: DISCONTINUED | OUTPATIENT
Start: 2018-04-27 | End: 2018-05-01 | Stop reason: HOSPADM

## 2018-04-27 RX ORDER — NALOXONE HCL 0.4 MG/ML
0.4 VIAL (ML) INJECTION
Status: DISCONTINUED | OUTPATIENT
Start: 2018-04-27 | End: 2018-04-28

## 2018-04-27 RX ORDER — NITROGLYCERIN 0.4 MG/1
0.4 TABLET SUBLINGUAL
Status: DISCONTINUED | OUTPATIENT
Start: 2018-04-27 | End: 2018-05-01 | Stop reason: HOSPADM

## 2018-04-27 RX ORDER — NICOTINE POLACRILEX 4 MG
15 LOZENGE BUCCAL
Status: DISCONTINUED | OUTPATIENT
Start: 2018-04-27 | End: 2018-05-01 | Stop reason: HOSPADM

## 2018-04-27 RX ORDER — TIZANIDINE 4 MG/1
4 TABLET ORAL NIGHTLY PRN
Status: DISCONTINUED | OUTPATIENT
Start: 2018-04-27 | End: 2018-05-01 | Stop reason: HOSPADM

## 2018-04-27 RX ORDER — ALBUTEROL SULFATE 2.5 MG/3ML
2.5 SOLUTION RESPIRATORY (INHALATION) EVERY 4 HOURS PRN
Status: DISCONTINUED | OUTPATIENT
Start: 2018-04-27 | End: 2018-05-01 | Stop reason: HOSPADM

## 2018-04-27 RX ORDER — SODIUM CHLORIDE 9 MG/ML
100 INJECTION, SOLUTION INTRAVENOUS CONTINUOUS
Status: DISCONTINUED | OUTPATIENT
Start: 2018-04-27 | End: 2018-04-27

## 2018-04-27 RX ADMIN — SODIUM BICARBONATE 150 ML/HR: 84 INJECTION, SOLUTION INTRAVENOUS at 21:12

## 2018-04-27 RX ADMIN — SODIUM CHLORIDE 100 ML/HR: 900 INJECTION, SOLUTION INTRAVENOUS at 17:03

## 2018-04-27 RX ADMIN — CARVEDILOL 3.12 MG: 3.12 TABLET, FILM COATED ORAL at 17:03

## 2018-04-27 RX ADMIN — MORPHINE SULFATE 2 MG: 2 INJECTION, SOLUTION INTRAMUSCULAR; INTRAVENOUS at 11:28

## 2018-04-27 RX ADMIN — ONDANSETRON 4 MG: 2 INJECTION INTRAMUSCULAR; INTRAVENOUS at 11:28

## 2018-04-27 RX ADMIN — MORPHINE SULFATE 0.5 MG: 2 INJECTION, SOLUTION INTRAMUSCULAR; INTRAVENOUS at 19:14

## 2018-04-27 RX ADMIN — SODIUM CHLORIDE 1000 ML: 900 INJECTION, SOLUTION INTRAVENOUS at 19:30

## 2018-04-27 RX ADMIN — SODIUM CHLORIDE 100 ML/HR: 900 INJECTION, SOLUTION INTRAVENOUS at 16:15

## 2018-04-28 ENCOUNTER — APPOINTMENT (OUTPATIENT)
Dept: GENERAL RADIOLOGY | Facility: HOSPITAL | Age: 63
End: 2018-04-28

## 2018-04-28 LAB
ALBUMIN SERPL-MCNC: 3 G/DL (ref 3.4–4.8)
ALBUMIN/GLOB SERPL: 1.1 G/DL (ref 1.1–1.8)
ALP SERPL-CCNC: 64 U/L (ref 38–126)
ALT SERPL W P-5'-P-CCNC: 27 U/L (ref 21–72)
ANION GAP SERPL CALCULATED.3IONS-SCNC: 14 MMOL/L (ref 5–15)
ANION GAP SERPL CALCULATED.3IONS-SCNC: 16 MMOL/L (ref 5–15)
AST SERPL-CCNC: 13 U/L (ref 17–59)
BASOPHILS # BLD AUTO: 0.03 10*3/MM3 (ref 0–0.2)
BASOPHILS NFR BLD AUTO: 0.5 % (ref 0–2)
BILIRUB SERPL-MCNC: 0.1 MG/DL (ref 0.2–1.3)
BUN BLD-MCNC: 81 MG/DL (ref 7–21)
BUN BLD-MCNC: 83 MG/DL (ref 7–21)
BUN/CREAT SERPL: 20.5 (ref 7–25)
BUN/CREAT SERPL: 20.9 (ref 7–25)
CALCIUM SPEC-SCNC: 7.7 MG/DL (ref 8.4–10.2)
CALCIUM SPEC-SCNC: 8 MG/DL (ref 8.4–10.2)
CHLORIDE SERPL-SCNC: 112 MMOL/L (ref 95–110)
CHLORIDE SERPL-SCNC: 113 MMOL/L (ref 95–110)
CO2 SERPL-SCNC: 10 MMOL/L (ref 22–31)
CO2 SERPL-SCNC: 13 MMOL/L (ref 22–31)
CREAT BLD-MCNC: 3.87 MG/DL (ref 0.7–1.3)
CREAT BLD-MCNC: 4.05 MG/DL (ref 0.7–1.3)
DEPRECATED RDW RBC AUTO: 44.1 FL (ref 35.1–43.9)
EOSINOPHIL # BLD AUTO: 0.1 10*3/MM3 (ref 0–0.7)
EOSINOPHIL NFR BLD AUTO: 1.7 % (ref 0–7)
ERYTHROCYTE [DISTWIDTH] IN BLOOD BY AUTOMATED COUNT: 13.8 % (ref 11.5–14.5)
GFR SERPL CREATININE-BSD FRML MDRD: 15 ML/MIN/1.73 (ref 49–113)
GFR SERPL CREATININE-BSD FRML MDRD: 16 ML/MIN/1.73 (ref 60–113)
GLOBULIN UR ELPH-MCNC: 2.7 GM/DL (ref 2.3–3.5)
GLUCOSE BLD-MCNC: 131 MG/DL (ref 60–100)
GLUCOSE BLD-MCNC: 132 MG/DL (ref 60–100)
GLUCOSE BLDC GLUCOMTR-MCNC: 120 MG/DL (ref 70–130)
GLUCOSE BLDC GLUCOMTR-MCNC: 129 MG/DL (ref 70–130)
GLUCOSE BLDC GLUCOMTR-MCNC: 159 MG/DL (ref 70–130)
GLUCOSE BLDC GLUCOMTR-MCNC: 160 MG/DL (ref 70–130)
GLUCOSE BLDC GLUCOMTR-MCNC: 164 MG/DL (ref 70–130)
HCT VFR BLD AUTO: 34.2 % (ref 39–49)
HGB BLD-MCNC: 11.9 G/DL (ref 13.7–17.3)
HOLD SPECIMEN: NORMAL
IMM GRANULOCYTES # BLD: 0.01 10*3/MM3 (ref 0–0.02)
IMM GRANULOCYTES NFR BLD: 0.2 % (ref 0–0.5)
LYMPHOCYTES # BLD AUTO: 1.15 10*3/MM3 (ref 0.6–4.2)
LYMPHOCYTES NFR BLD AUTO: 20 % (ref 10–50)
MCH RBC QN AUTO: 30.1 PG (ref 26.5–34)
MCHC RBC AUTO-ENTMCNC: 34.8 G/DL (ref 31.5–36.3)
MCV RBC AUTO: 86.6 FL (ref 80–98)
MONOCYTES # BLD AUTO: 0.54 10*3/MM3 (ref 0–0.9)
MONOCYTES NFR BLD AUTO: 9.4 % (ref 0–12)
NEUTROPHILS # BLD AUTO: 3.92 10*3/MM3 (ref 2–8.6)
NEUTROPHILS NFR BLD AUTO: 68.2 % (ref 37–80)
PLATELET # BLD AUTO: 199 10*3/MM3 (ref 150–450)
PMV BLD AUTO: 10.4 FL (ref 8–12)
POTASSIUM BLD-SCNC: 3 MMOL/L (ref 3.5–5.1)
POTASSIUM BLD-SCNC: 3.1 MMOL/L (ref 3.5–5.1)
PROT SERPL-MCNC: 5.7 G/DL (ref 6.3–8.6)
RBC # BLD AUTO: 3.95 10*6/MM3 (ref 4.37–5.74)
SODIUM BLD-SCNC: 137 MMOL/L (ref 137–145)
SODIUM BLD-SCNC: 141 MMOL/L (ref 137–145)
WBC NRBC COR # BLD: 5.75 10*3/MM3 (ref 3.2–9.8)
WHOLE BLOOD HOLD SPECIMEN: NORMAL

## 2018-04-28 PROCEDURE — 99222 1ST HOSP IP/OBS MODERATE 55: CPT | Performed by: SURGERY

## 2018-04-28 PROCEDURE — 80053 COMPREHEN METABOLIC PANEL: CPT | Performed by: NURSE PRACTITIONER

## 2018-04-28 PROCEDURE — 94799 UNLISTED PULMONARY SVC/PX: CPT

## 2018-04-28 PROCEDURE — 85025 COMPLETE CBC W/AUTO DIFF WBC: CPT | Performed by: FAMILY MEDICINE

## 2018-04-28 PROCEDURE — 94640 AIRWAY INHALATION TREATMENT: CPT

## 2018-04-28 PROCEDURE — 94760 N-INVAS EAR/PLS OXIMETRY 1: CPT

## 2018-04-28 PROCEDURE — 71046 X-RAY EXAM CHEST 2 VIEWS: CPT

## 2018-04-28 PROCEDURE — 82962 GLUCOSE BLOOD TEST: CPT

## 2018-04-28 PROCEDURE — 25010000002 POTASSIUM CHLORIDE PER 2 MEQ: Performed by: NURSE PRACTITIONER

## 2018-04-28 PROCEDURE — 25010000002 MORPHINE PER 10 MG: Performed by: FAMILY MEDICINE

## 2018-04-28 RX ORDER — POTASSIUM CHLORIDE 14.9 MG/ML
20 INJECTION INTRAVENOUS
Status: COMPLETED | OUTPATIENT
Start: 2018-04-28 | End: 2018-04-28

## 2018-04-28 RX ORDER — NALOXONE HCL 0.4 MG/ML
0.4 VIAL (ML) INJECTION
Status: DISCONTINUED | OUTPATIENT
Start: 2018-04-28 | End: 2018-05-01 | Stop reason: HOSPADM

## 2018-04-28 RX ADMIN — ALBUTEROL SULFATE 2.5 MG: 2.5 SOLUTION RESPIRATORY (INHALATION) at 15:11

## 2018-04-28 RX ADMIN — ALBUTEROL SULFATE 2.5 MG: 2.5 SOLUTION RESPIRATORY (INHALATION) at 08:17

## 2018-04-28 RX ADMIN — MORPHINE SULFATE 0.52 MG: 4 INJECTION, SOLUTION INTRAMUSCULAR; INTRAVENOUS at 21:27

## 2018-04-28 RX ADMIN — SODIUM BICARBONATE 150 ML/HR: 84 INJECTION INTRAVENOUS at 19:10

## 2018-04-28 RX ADMIN — ALBUTEROL SULFATE 2.5 MG: 2.5 SOLUTION RESPIRATORY (INHALATION) at 20:22

## 2018-04-28 RX ADMIN — POTASSIUM CHLORIDE 20 MEQ: 200 INJECTION, SOLUTION INTRAVENOUS at 13:00

## 2018-04-28 RX ADMIN — POTASSIUM CHLORIDE 20 MEQ: 200 INJECTION, SOLUTION INTRAVENOUS at 10:55

## 2018-04-28 RX ADMIN — MORPHINE SULFATE 0.52 MG: 4 INJECTION, SOLUTION INTRAMUSCULAR; INTRAVENOUS at 09:10

## 2018-04-28 RX ADMIN — ALBUTEROL SULFATE 2.5 MG: 2.5 SOLUTION RESPIRATORY (INHALATION) at 11:03

## 2018-04-28 RX ADMIN — POTASSIUM CHLORIDE 20 MEQ: 200 INJECTION, SOLUTION INTRAVENOUS at 14:03

## 2018-04-28 RX ADMIN — SODIUM BICARBONATE 150 ML/HR: 84 INJECTION, SOLUTION INTRAVENOUS at 09:03

## 2018-04-28 RX ADMIN — SODIUM BICARBONATE 100 ML/HR: 84 INJECTION INTRAVENOUS at 10:45

## 2018-04-29 LAB
ALBUMIN SERPL-MCNC: 2.7 G/DL (ref 3.4–4.8)
ALBUMIN/GLOB SERPL: 1.1 G/DL (ref 1.1–1.8)
ALP SERPL-CCNC: 57 U/L (ref 38–126)
ALT SERPL W P-5'-P-CCNC: 25 U/L (ref 21–72)
ANION GAP SERPL CALCULATED.3IONS-SCNC: 14 MMOL/L (ref 5–15)
AST SERPL-CCNC: 31 U/L (ref 17–59)
BILIRUB SERPL-MCNC: 0.2 MG/DL (ref 0.2–1.3)
BUN BLD-MCNC: 78 MG/DL (ref 7–21)
BUN/CREAT SERPL: 22.7 (ref 7–25)
CALCIUM SPEC-SCNC: 7.7 MG/DL (ref 8.4–10.2)
CHLORIDE SERPL-SCNC: 110 MMOL/L (ref 95–110)
CO2 SERPL-SCNC: 20 MMOL/L (ref 22–31)
CREAT BLD-MCNC: 3.44 MG/DL (ref 0.7–1.3)
DEPRECATED RDW RBC AUTO: 43.4 FL (ref 35.1–43.9)
ERYTHROCYTE [DISTWIDTH] IN BLOOD BY AUTOMATED COUNT: 13.9 % (ref 11.5–14.5)
GFR SERPL CREATININE-BSD FRML MDRD: 18 ML/MIN/1.73
GLOBULIN UR ELPH-MCNC: 2.5 GM/DL (ref 2.3–3.5)
GLUCOSE BLD-MCNC: 164 MG/DL (ref 60–100)
GLUCOSE BLDC GLUCOMTR-MCNC: 128 MG/DL (ref 70–130)
GLUCOSE BLDC GLUCOMTR-MCNC: 182 MG/DL (ref 70–130)
GLUCOSE BLDC GLUCOMTR-MCNC: 195 MG/DL (ref 70–130)
GLUCOSE BLDC GLUCOMTR-MCNC: 248 MG/DL (ref 70–130)
HCT VFR BLD AUTO: 31.8 % (ref 39–49)
HGB BLD-MCNC: 11.3 G/DL (ref 13.7–17.3)
MAGNESIUM SERPL-MCNC: 1.8 MG/DL (ref 1.6–2.3)
MCH RBC QN AUTO: 30.4 PG (ref 26.5–34)
MCHC RBC AUTO-ENTMCNC: 35.5 G/DL (ref 31.5–36.3)
MCV RBC AUTO: 85.5 FL (ref 80–98)
PLATELET # BLD AUTO: 192 10*3/MM3 (ref 150–450)
PMV BLD AUTO: 10 FL (ref 8–12)
POTASSIUM BLD-SCNC: 2.8 MMOL/L (ref 3.5–5.1)
PROT SERPL-MCNC: 5.2 G/DL (ref 6.3–8.6)
RBC # BLD AUTO: 3.72 10*6/MM3 (ref 4.37–5.74)
SODIUM BLD-SCNC: 144 MMOL/L (ref 137–145)
WBC NRBC COR # BLD: 5.9 10*3/MM3 (ref 3.2–9.8)

## 2018-04-29 PROCEDURE — 63710000001 INSULIN ASPART PER 5 UNITS: Performed by: FAMILY MEDICINE

## 2018-04-29 PROCEDURE — 80053 COMPREHEN METABOLIC PANEL: CPT | Performed by: NURSE PRACTITIONER

## 2018-04-29 PROCEDURE — 25010000002 MORPHINE PER 10 MG: Performed by: FAMILY MEDICINE

## 2018-04-29 PROCEDURE — 25010000002 POTASSIUM CHLORIDE PER 2 MEQ: Performed by: NURSE PRACTITIONER

## 2018-04-29 PROCEDURE — 83735 ASSAY OF MAGNESIUM: CPT | Performed by: INTERNAL MEDICINE

## 2018-04-29 PROCEDURE — 99231 SBSQ HOSP IP/OBS SF/LOW 25: CPT | Performed by: SURGERY

## 2018-04-29 PROCEDURE — 94760 N-INVAS EAR/PLS OXIMETRY 1: CPT

## 2018-04-29 PROCEDURE — 85027 COMPLETE CBC AUTOMATED: CPT | Performed by: NURSE PRACTITIONER

## 2018-04-29 PROCEDURE — 82962 GLUCOSE BLOOD TEST: CPT

## 2018-04-29 PROCEDURE — 94799 UNLISTED PULMONARY SVC/PX: CPT

## 2018-04-29 RX ORDER — SODIUM CHLORIDE 9 MG/ML
150 INJECTION, SOLUTION INTRAVENOUS CONTINUOUS
Status: CANCELLED | OUTPATIENT
Start: 2018-04-29

## 2018-04-29 RX ORDER — POTASSIUM CHLORIDE 14.9 MG/ML
20 INJECTION INTRAVENOUS
Status: COMPLETED | OUTPATIENT
Start: 2018-04-29 | End: 2018-04-29

## 2018-04-29 RX ORDER — SODIUM CHLORIDE 9 MG/ML
100 INJECTION, SOLUTION INTRAVENOUS CONTINUOUS
Status: DISCONTINUED | OUTPATIENT
Start: 2018-04-29 | End: 2018-04-30

## 2018-04-29 RX ADMIN — SODIUM BICARBONATE 150 ML/HR: 84 INJECTION INTRAVENOUS at 05:53

## 2018-04-29 RX ADMIN — SODIUM CHLORIDE 125 ML/HR: 9 INJECTION, SOLUTION INTRAVENOUS at 13:13

## 2018-04-29 RX ADMIN — ALBUTEROL SULFATE 2.5 MG: 2.5 SOLUTION RESPIRATORY (INHALATION) at 15:43

## 2018-04-29 RX ADMIN — POTASSIUM CHLORIDE 20 MEQ: 200 INJECTION, SOLUTION INTRAVENOUS at 15:27

## 2018-04-29 RX ADMIN — SODIUM CHLORIDE 100 ML/HR: 9 INJECTION, SOLUTION INTRAVENOUS at 21:06

## 2018-04-29 RX ADMIN — ALBUTEROL SULFATE 2.5 MG: 2.5 SOLUTION RESPIRATORY (INHALATION) at 08:24

## 2018-04-29 RX ADMIN — POTASSIUM CHLORIDE 20 MEQ: 200 INJECTION, SOLUTION INTRAVENOUS at 13:13

## 2018-04-29 RX ADMIN — INSULIN ASPART 3 UNITS: 100 INJECTION, SOLUTION INTRAVENOUS; SUBCUTANEOUS at 20:17

## 2018-04-29 RX ADMIN — POTASSIUM CHLORIDE 20 MEQ: 200 INJECTION, SOLUTION INTRAVENOUS at 09:03

## 2018-04-29 RX ADMIN — ALBUTEROL SULFATE 2.5 MG: 2.5 SOLUTION RESPIRATORY (INHALATION) at 12:28

## 2018-04-29 RX ADMIN — POTASSIUM CHLORIDE 20 MEQ: 200 INJECTION, SOLUTION INTRAVENOUS at 11:07

## 2018-04-29 RX ADMIN — MORPHINE SULFATE 0.52 MG: 4 INJECTION, SOLUTION INTRAMUSCULAR; INTRAVENOUS at 13:13

## 2018-04-29 RX ADMIN — ALBUTEROL SULFATE 2.5 MG: 2.5 SOLUTION RESPIRATORY (INHALATION) at 20:21

## 2018-04-29 RX ADMIN — MORPHINE SULFATE 0.52 MG: 4 INJECTION, SOLUTION INTRAMUSCULAR; INTRAVENOUS at 01:36

## 2018-04-30 LAB
ALBUMIN SERPL-MCNC: 2.9 G/DL (ref 3.4–4.8)
ALBUMIN/GLOB SERPL: 1.2 G/DL (ref 1.1–1.8)
ALP SERPL-CCNC: 56 U/L (ref 38–126)
ALT SERPL W P-5'-P-CCNC: 25 U/L (ref 21–72)
ANION GAP SERPL CALCULATED.3IONS-SCNC: 12 MMOL/L (ref 5–15)
AST SERPL-CCNC: 24 U/L (ref 17–59)
BILIRUB SERPL-MCNC: 0.3 MG/DL (ref 0.2–1.3)
BUN BLD-MCNC: 61 MG/DL (ref 7–21)
BUN/CREAT SERPL: 22.2 (ref 7–25)
CALCIUM SPEC-SCNC: 7.6 MG/DL (ref 8.4–10.2)
CHLORIDE SERPL-SCNC: 108 MMOL/L (ref 95–110)
CO2 SERPL-SCNC: 23 MMOL/L (ref 22–31)
CREAT BLD-MCNC: 2.75 MG/DL (ref 0.7–1.3)
DEPRECATED RDW RBC AUTO: 44.7 FL (ref 35.1–43.9)
ERYTHROCYTE [DISTWIDTH] IN BLOOD BY AUTOMATED COUNT: 14.2 % (ref 11.5–14.5)
GFR SERPL CREATININE-BSD FRML MDRD: 24 ML/MIN/1.73 (ref 60–113)
GLOBULIN UR ELPH-MCNC: 2.5 GM/DL (ref 2.3–3.5)
GLUCOSE BLD-MCNC: 124 MG/DL (ref 60–100)
GLUCOSE BLDC GLUCOMTR-MCNC: 108 MG/DL (ref 70–130)
GLUCOSE BLDC GLUCOMTR-MCNC: 147 MG/DL (ref 70–130)
GLUCOSE BLDC GLUCOMTR-MCNC: 150 MG/DL (ref 70–130)
GLUCOSE BLDC GLUCOMTR-MCNC: 186 MG/DL (ref 70–130)
HCT VFR BLD AUTO: 32.5 % (ref 39–49)
HGB BLD-MCNC: 11.3 G/DL (ref 13.7–17.3)
MCH RBC QN AUTO: 30.1 PG (ref 26.5–34)
MCHC RBC AUTO-ENTMCNC: 34.8 G/DL (ref 31.5–36.3)
MCV RBC AUTO: 86.7 FL (ref 80–98)
PLATELET # BLD AUTO: 204 10*3/MM3 (ref 150–450)
PMV BLD AUTO: 10.3 FL (ref 8–12)
POTASSIUM BLD-SCNC: 2.9 MMOL/L (ref 3.5–5.1)
PROT SERPL-MCNC: 5.4 G/DL (ref 6.3–8.6)
RBC # BLD AUTO: 3.75 10*6/MM3 (ref 4.37–5.74)
SODIUM BLD-SCNC: 143 MMOL/L (ref 137–145)
WBC NRBC COR # BLD: 5.82 10*3/MM3 (ref 3.2–9.8)

## 2018-04-30 PROCEDURE — 63710000001 INSULIN ASPART PER 5 UNITS: Performed by: FAMILY MEDICINE

## 2018-04-30 PROCEDURE — 85027 COMPLETE CBC AUTOMATED: CPT | Performed by: NURSE PRACTITIONER

## 2018-04-30 PROCEDURE — 94799 UNLISTED PULMONARY SVC/PX: CPT

## 2018-04-30 PROCEDURE — 25010000002 MORPHINE PER 10 MG: Performed by: FAMILY MEDICINE

## 2018-04-30 PROCEDURE — 94760 N-INVAS EAR/PLS OXIMETRY 1: CPT

## 2018-04-30 PROCEDURE — 99231 SBSQ HOSP IP/OBS SF/LOW 25: CPT | Performed by: SURGERY

## 2018-04-30 PROCEDURE — 82962 GLUCOSE BLOOD TEST: CPT

## 2018-04-30 PROCEDURE — 80053 COMPREHEN METABOLIC PANEL: CPT | Performed by: NURSE PRACTITIONER

## 2018-04-30 PROCEDURE — 25810000003 SODIUM CHLORIDE 0.9 % WITH KCL 40 MEQ/L 40-0.9 MEQ/L-% SOLUTION: Performed by: NURSE PRACTITIONER

## 2018-04-30 RX ORDER — POTASSIUM CHLORIDE 750 MG/1
40 CAPSULE, EXTENDED RELEASE ORAL EVERY 4 HOURS
Status: COMPLETED | OUTPATIENT
Start: 2018-04-30 | End: 2018-04-30

## 2018-04-30 RX ORDER — PANTOPRAZOLE SODIUM 40 MG/1
40 TABLET, DELAYED RELEASE ORAL
Status: DISCONTINUED | OUTPATIENT
Start: 2018-04-30 | End: 2018-05-01 | Stop reason: HOSPADM

## 2018-04-30 RX ORDER — SODIUM CHLORIDE AND POTASSIUM CHLORIDE 300; 900 MG/100ML; MG/100ML
100 INJECTION, SOLUTION INTRAVENOUS CONTINUOUS
Status: DISCONTINUED | OUTPATIENT
Start: 2018-04-30 | End: 2018-05-01 | Stop reason: HOSPADM

## 2018-04-30 RX ADMIN — ALBUTEROL SULFATE 2.5 MG: 2.5 SOLUTION RESPIRATORY (INHALATION) at 10:58

## 2018-04-30 RX ADMIN — POTASSIUM CHLORIDE 40 MEQ: 750 CAPSULE, EXTENDED RELEASE ORAL at 09:19

## 2018-04-30 RX ADMIN — Medication 10 ML: at 09:19

## 2018-04-30 RX ADMIN — INSULIN ASPART 2 UNITS: 100 INJECTION, SOLUTION INTRAVENOUS; SUBCUTANEOUS at 20:25

## 2018-04-30 RX ADMIN — PANTOPRAZOLE SODIUM 40 MG: 40 TABLET, DELAYED RELEASE ORAL at 20:03

## 2018-04-30 RX ADMIN — MAGNESIUM HYDROXIDE 10 ML: 2400 SUSPENSION ORAL at 12:19

## 2018-04-30 RX ADMIN — MORPHINE SULFATE 0.52 MG: 4 INJECTION, SOLUTION INTRAMUSCULAR; INTRAVENOUS at 15:42

## 2018-04-30 RX ADMIN — POTASSIUM CHLORIDE 40 MEQ: 750 CAPSULE, EXTENDED RELEASE ORAL at 12:19

## 2018-04-30 RX ADMIN — MORPHINE SULFATE 0.52 MG: 4 INJECTION, SOLUTION INTRAMUSCULAR; INTRAVENOUS at 22:03

## 2018-04-30 RX ADMIN — ALBUTEROL SULFATE 2.5 MG: 2.5 SOLUTION RESPIRATORY (INHALATION) at 07:41

## 2018-04-30 RX ADMIN — MORPHINE SULFATE 0.52 MG: 4 INJECTION, SOLUTION INTRAMUSCULAR; INTRAVENOUS at 09:18

## 2018-04-30 RX ADMIN — POTASSIUM CHLORIDE AND SODIUM CHLORIDE 100 ML/HR: 900; 300 INJECTION, SOLUTION INTRAVENOUS at 13:35

## 2018-04-30 RX ADMIN — ALBUTEROL SULFATE 2.5 MG: 2.5 SOLUTION RESPIRATORY (INHALATION) at 19:19

## 2018-04-30 RX ADMIN — ALBUTEROL SULFATE 2.5 MG: 2.5 SOLUTION RESPIRATORY (INHALATION) at 14:44

## 2018-04-30 RX ADMIN — Medication 10 ML: at 15:42

## 2018-05-01 VITALS
BODY MASS INDEX: 21.07 KG/M2 | SYSTOLIC BLOOD PRESSURE: 120 MMHG | WEIGHT: 139 LBS | OXYGEN SATURATION: 95 % | DIASTOLIC BLOOD PRESSURE: 72 MMHG | TEMPERATURE: 97.2 F | HEART RATE: 70 BPM | RESPIRATION RATE: 18 BRPM | HEIGHT: 68 IN

## 2018-05-01 LAB
ALBUMIN SERPL-MCNC: 2.9 G/DL (ref 3.4–4.8)
ALBUMIN/GLOB SERPL: 1.1 G/DL (ref 1.1–1.8)
ALP SERPL-CCNC: 57 U/L (ref 38–126)
ALT SERPL W P-5'-P-CCNC: 31 U/L (ref 21–72)
ANION GAP SERPL CALCULATED.3IONS-SCNC: 13 MMOL/L (ref 5–15)
AST SERPL-CCNC: 22 U/L (ref 17–59)
BILIRUB SERPL-MCNC: 0.1 MG/DL (ref 0.2–1.3)
BUN BLD-MCNC: 50 MG/DL (ref 7–21)
BUN/CREAT SERPL: 22.5 (ref 7–25)
CALCIUM SPEC-SCNC: 7.6 MG/DL (ref 8.4–10.2)
CHLORIDE SERPL-SCNC: 113 MMOL/L (ref 95–110)
CO2 SERPL-SCNC: 18 MMOL/L (ref 22–31)
CREAT BLD-MCNC: 2.22 MG/DL (ref 0.7–1.3)
DEPRECATED RDW RBC AUTO: 45.6 FL (ref 35.1–43.9)
ERYTHROCYTE [DISTWIDTH] IN BLOOD BY AUTOMATED COUNT: 14.1 % (ref 11.5–14.5)
GFR SERPL CREATININE-BSD FRML MDRD: 30 ML/MIN/1.73 (ref 49–113)
GLOBULIN UR ELPH-MCNC: 2.6 GM/DL (ref 2.3–3.5)
GLUCOSE BLD-MCNC: 122 MG/DL (ref 60–100)
GLUCOSE BLDC GLUCOMTR-MCNC: 131 MG/DL (ref 70–130)
GLUCOSE BLDC GLUCOMTR-MCNC: 204 MG/DL (ref 70–130)
HCT VFR BLD AUTO: 33 % (ref 39–49)
HGB BLD-MCNC: 11.2 G/DL (ref 13.7–17.3)
MCH RBC QN AUTO: 29.6 PG (ref 26.5–34)
MCHC RBC AUTO-ENTMCNC: 33.9 G/DL (ref 31.5–36.3)
MCV RBC AUTO: 87.3 FL (ref 80–98)
PLATELET # BLD AUTO: 192 10*3/MM3 (ref 150–450)
PMV BLD AUTO: 10.3 FL (ref 8–12)
POTASSIUM BLD-SCNC: 3.8 MMOL/L (ref 3.5–5.1)
PROT SERPL-MCNC: 5.5 G/DL (ref 6.3–8.6)
RBC # BLD AUTO: 3.78 10*6/MM3 (ref 4.37–5.74)
SODIUM BLD-SCNC: 144 MMOL/L (ref 137–145)
WBC NRBC COR # BLD: 5.62 10*3/MM3 (ref 3.2–9.8)

## 2018-05-01 PROCEDURE — 63710000001 INSULIN ASPART PER 5 UNITS: Performed by: FAMILY MEDICINE

## 2018-05-01 PROCEDURE — 99231 SBSQ HOSP IP/OBS SF/LOW 25: CPT | Performed by: SURGERY

## 2018-05-01 PROCEDURE — 85027 COMPLETE CBC AUTOMATED: CPT | Performed by: NURSE PRACTITIONER

## 2018-05-01 PROCEDURE — 82962 GLUCOSE BLOOD TEST: CPT

## 2018-05-01 PROCEDURE — 80053 COMPREHEN METABOLIC PANEL: CPT | Performed by: NURSE PRACTITIONER

## 2018-05-01 PROCEDURE — 94799 UNLISTED PULMONARY SVC/PX: CPT

## 2018-05-01 PROCEDURE — 25810000003 SODIUM CHLORIDE 0.9 % WITH KCL 40 MEQ/L 40-0.9 MEQ/L-% SOLUTION: Performed by: NURSE PRACTITIONER

## 2018-05-01 PROCEDURE — 25010000002 MORPHINE PER 10 MG: Performed by: FAMILY MEDICINE

## 2018-05-01 PROCEDURE — 94760 N-INVAS EAR/PLS OXIMETRY 1: CPT

## 2018-05-01 RX ORDER — POTASSIUM CHLORIDE 750 MG/1
10 TABLET, EXTENDED RELEASE ORAL DAILY
Qty: 14 TABLET | Refills: 0 | Status: SHIPPED | OUTPATIENT
Start: 2018-05-01 | End: 2019-06-03 | Stop reason: HOSPADM

## 2018-05-01 RX ORDER — SODIUM BICARBONATE 650 MG/1
650 TABLET ORAL 2 TIMES DAILY
Qty: 60 TABLET | Refills: 0 | Status: SHIPPED | OUTPATIENT
Start: 2018-05-01 | End: 2018-05-22

## 2018-05-01 RX ORDER — SODIUM BICARBONATE 650 MG/1
650 TABLET ORAL 2 TIMES DAILY
Status: DISCONTINUED | OUTPATIENT
Start: 2018-05-01 | End: 2018-05-01 | Stop reason: HOSPADM

## 2018-05-01 RX ADMIN — POTASSIUM CHLORIDE AND SODIUM CHLORIDE 100 ML/HR: 900; 300 INJECTION, SOLUTION INTRAVENOUS at 05:55

## 2018-05-01 RX ADMIN — Medication 10 ML: at 08:21

## 2018-05-01 RX ADMIN — ALBUTEROL SULFATE 2.5 MG: 2.5 SOLUTION RESPIRATORY (INHALATION) at 14:41

## 2018-05-01 RX ADMIN — INSULIN ASPART 3 UNITS: 100 INJECTION, SOLUTION INTRAVENOUS; SUBCUTANEOUS at 11:28

## 2018-05-01 RX ADMIN — MAGNESIUM HYDROXIDE 10 ML: 2400 SUSPENSION ORAL at 10:18

## 2018-05-01 RX ADMIN — MORPHINE SULFATE 0.52 MG: 4 INJECTION, SOLUTION INTRAMUSCULAR; INTRAVENOUS at 08:20

## 2018-05-01 RX ADMIN — SODIUM BICARBONATE 650 MG TABLET 650 MG: at 08:30

## 2018-05-01 RX ADMIN — ALBUTEROL SULFATE 2.5 MG: 2.5 SOLUTION RESPIRATORY (INHALATION) at 11:01

## 2018-05-01 RX ADMIN — ALBUTEROL SULFATE 2.5 MG: 2.5 SOLUTION RESPIRATORY (INHALATION) at 07:18

## 2018-05-01 RX ADMIN — PANTOPRAZOLE SODIUM 40 MG: 40 TABLET, DELAYED RELEASE ORAL at 05:55

## 2018-05-01 NOTE — CONSULTS
"Discharge Planning Assessment  [facility]     Patient Name: Favio Casillas  MRN: [id number]  Today's Date: 5/1/2018    Admit Date: 4/27/2018          Discharge Needs Assessment     Row Name 05/01/18 0937       Living Environment    Lives With other relative(s);sibling(s)    Current Living Arrangements home/apartment/condo    Primary Care Provided by self    Provides Primary Care For no one    Caregiving Concerns Patient states that he has about 7 siblings but does not want to \"bother\" them or \"ask\" them for help.    Family Caregiver if Needed none    Quality of Family Relationships stressful    Able to Return to Prior Arrangements no    Living Arrangement Comments Patient states that he does not want to return to his mother's home who just recently passed away. His sister, brother-in-law, and nephews are currently residing there. He plans to move out Thursday.       Resource/Environmental Concerns    Transportation Concerns car, none       Transition Planning    Patient/Family Anticipates Transition to shelter    Patient/Family Anticipated Services at Transition community agency    Transportation Anticipated family or friend will provide;public transportation       Discharge Needs Assessment    Readmission Within the Last 30 Days no previous admission in last 30 days    Concerns to be Addressed adjustment to diagnosis/illness;discharge planning;financial/insurance;homelessness    Equipment Currently Used at Home none    Anticipated Changes Related to Illness none    Equipment Needed After Discharge none    Outpatient/Agency/Support Group Needs clinic(s)    Current Discharge Risk lack of support system/caregiver;homeless            Discharge Plan     Row Name 05/01/18 0943       Plan    Plan D/C with homeless shelter list/community resources.    Patient/Family in Agreement with Plan yes    Plan Comments SW spoke with pt today re: nursing concerns with patient stating he will be homeless on Thurday, May 3rd. " "SW assessed current living situation. Pt currently lives with sister, brother-in-law, and nephews. Has 6 other siblings. Patient reports that they are living in the house where he mother just recently passed. He is unsure if house is still in his mother's name or whom it now belongs. Pt reports that he just \"no longer can handle who he lives with\". SW asked if his other siblings are willing to take him in and let him reside with them until he fount a place to live. Pt reports that he does not want to bother them in any way and can take care of himself. Pt reports that he has a plan to buy his self a van and reside out of the van as well as stay at local motels when he needs to bathe. Pt reports that he make $770/Month, has the applications for housing authorities and section 8. SW reinforced importance of completing applications and getting on the section 8 waiting list ASAP. He voiced understanding. Pt is very knowledagle on his community resources. Pt currently utilizes all food corley and assistance centers. [facility], [facility], and [facility]. Pt reports that he does not need anything from  at this time. SW informed him that  will still provide him a list of community resources as well as homeless shelters in the area. Pt receptive. Pt has Medicare/Medicaid, and prx coverage. States he can afford all of his medication at this time.  will provide pt with community resources/homeless shelter list. Pt wants to d/c home by Thursday in order to pack his things, get a storage unit, and purchase his van from a local car lot....[facility]        Destination     No service coordination in this encounter.      Durable Medical Equipment     No service coordination in this encounter.      Dialysis/Infusion     No service coordination in this encounter.      Home Medical Care     No service coordination in this encounter.      Social Care     No service coordination in this encounter.    "     Expected Discharge Date and Time     Expected Discharge Date Expected Discharge Time    May 2, 2018               Demographic Summary     Row Name 05/01/18 0936       General Information    Admission Type inpatient    Arrived From home    Required Notices Provided Important Message from Medicare    Referral Source nursing;high risk screening    Reason for Consult discharge planning;community resources;psychosocial concerns    Preferred Language English       Contact Information    Permission Granted to Share Info With             Functional Status     Row Name 05/01/18 0936       Functional Status    Usual Activity Tolerance excellent    Current Activity Tolerance excellent       Functional Status, IADL    Medications independent    Meal Preparation independent    Housekeeping independent    Laundry independent    Shopping independent       Mental Status    General Appearance WDL WDL       Mental Status Summary    Recent Changes in Mental Status/Cognitive Functioning no changes       Employment/    Employment Status disabled            Psychosocial    No documentation.           Abuse/Neglect    No documentation.           Legal    No documentation.           Substance Abuse    No documentation.           Patient Forms    No documentation.         [facility]

## 2018-05-01 NOTE — DISCHARGE SUMMARY
DISCHARGE SUMMARY    NAME: Favio Owens   PHYSICIAN: Jose G Voss MD  : 1955  MRN: [id number]    ADMITTED: 2018   DISCHARGED: 18    ADMISSION DIAGNOSES:  Present on Admission:  • Acute renal insufficiency  • Type 2 diabetes mellitus  • Chronic hepatitis C virus infection  • SBO (small bowel obstruction)  • CAD (coronary artery disease)    DISCHARGE DIAGNOSES:   Principal Problem:    SBO (small bowel obstruction)  Active Problems:    Type 2 diabetes mellitus    Acute renal insufficiency    Chronic hepatitis C virus infection    Gastritis    CAD (coronary artery disease)    SERVICE: Medicine. Attending Jose G Voss MD    CONSULTS:   Consult Orders (all)     Start     Ordered    18 1735  Inpatient General Surgery Consult  Once     Specialty:  General Surgery  Provider:  Christofer Earl MD    18 1735    18 1538  Inpatient Nephrology Consult  Once     Specialty:  Nephrology  Provider:  Karma Johnson MD    18 1537          PROCEDURES:   Imaging Results (last 7 days)     Procedure Component Value Units Date/Time    XR Chest PA & Lateral [[id number]] Collected:  18 0944     Updated:  18 1153    Narrative:         [facility] [place]    Patient Name: MR. FAVIO OWENS    ORDERING: NAN WIGGINS     ATTENDING: KIMO TALLEY     REFERRING: NAN WIGGINS    -----------------------    PROCEDURE: Two-view chest    COMPARISON: 2018    HISTORY: ? Fluid overload, N28.9 Disorder of kidney and ureter,  unspecified R11.2 Nausea with vomiting, unspecified    FINDINGS: Frontal and lateral views of the chest are obtained.     Devices: None    Lungs/Pleura: The lungs appear hyperinflated and there are coarse  interstitial markings likely due to COPD. No overt pulmonary  vascular congestion, focal pulmonary parenchymal opacity, pleural  effusion or pneumothorax.    Cardiomediastinal structures: Calcified right hilar lymph node  likely due to  old granulomatous disease. Cardiac silhouette  normal in size.         Impression:       CONCLUSION:    No acute cardiopulmonary disease    Electronically signed by:  Favio Landeros MD  4/28/2018 11:52 AM  CDT Workstation: [id number]    CT Abdomen Pelvis Without Contrast [[id number]] Collected:  04/27/18 1143     Updated:  04/27/18 1254    Narrative:         PROCEDURE: Ct abdomen and pelvis without contrast    REASON FOR EXAM: left upper quadrant pain    FINDINGS: Comparison study dated  June 5, 2015. Axial computer  tomography sequential imaging was performed from the diaphragms  through the symphysis pubis without IV contrast administration.  Sagittal and coronal reformates was performed. This exam was  performed according to our departmental dose optimization  program, which includes automated exposure control, adjustment of  the mA and/or KV according to patient size and/or use of  iterative reconstruction technique.     Left lung base small calcified lung parenchymal granuloma  consistent with old granulomatous disease. Otherwise lung bases  unremarkable.    The liver is normal. The gallbladder is surgically absent. The  biliary system appears within normal limits status post  cholecystectomy. The pancreas is normal. The spleen is normal.  Bilateral adrenal glands are normal.. Right kidney and ureter are  normal. Surgical clip is seen adjacent to the mid right ureter  not appreciably changed since prior exam. Left kidney and ureter  are normal. 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 The appearance is not appreciably  changed since prior study. Postsurgical changes consistent with  prior bowel resection. In the region of prior small bowel  resection, there is mild fluid and air distention of the small  bowel with focal caliber change in the region of the distal  ileum. Otherwise hollow viscera is unremarkable. Left para-aortic  enlarged lymph node is seen which measures 1.95 x  1.2 cm.  Adjacent smaller left para-aortic shotty lymph node with short  axis diameter less than 1 cm. No acute osseous abnormality.      Impression:       1. 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  2. Single enlarged left para-aortic lymph node which measures  1.95 x 1.2 cm. This lymph node by strict size criteria would be  of uncertain etiology. This may represent inflammatory  lymphadenopathy versus neoplastic involvement. Consider follow-up  CT in 3-6 months to further evaluate.  3. Postsurgical changes consistent with prior small bowel  resection. In the region of small bowel resection in the pelvis  there is associated small bowel fluid and air distention with  focal caliber change in the region of the distal ileum. These  changes may represent ileus versus gastroenteritis changes versus  early small bowel obstruction possibly secondary to adhesion in  this region. Recommend clinical correlation.  4. Otherwise unremarkable unenhanced CT abdomen pelvis study..    Electronically signed by:  Tyron Cheek MD  4/27/2018 12:53 PM CDT  Workstation: [id number]    XR Chest 1 View [[id number]] Collected:  04/27/18 1008     Updated:  04/27/18 1029    Narrative:         PORTABLE CHEST    HISTORY: Cough. Shortness of air. Hypertension.    Portable AP upright film of the chest was obtained at 10:06 AM.  COMPARISON: Dina 10, 2016    Chronic obstructive pulmonary disease.  No acute infiltrate.  Old granulomatous disease is present.  The heart is not enlarged.  The pulmonary vasculature is not increased.  No pleural effusion.  No pneumothorax.  No acute osseous abnormality.      Impression:       CONCLUSION:  Chronic obstructive pulmonary disease.  No acute infiltrate.    [id number]    Electronically signed by:  Aaron Constantino MD  4/27/2018 10:28 AM  CDT Workstation: [email or web address]          HISTORY OF PRESENT ILLNESS: *Copied from Ricardo Thayer MD's H&P*  Patient is 63 y.o. male presents with Past medical  history of type 2 diabetes mellitus, hepatitis C, COPD, back pain, is presented today with complain of having  elevated blood sugar.  Patient stated that his blood sugar will has been running in 200 to 300s at home.  However state that for past 2 weeks he has been sick to her stomach and has been vomiting.  Patient has not been able to keep anything down.  Tried to reach his primary care physician however was unable to get an appointment.  He stated that he had an appointment but that day he had to attend his mom's  who had  recently. He is complaining of abdominal pain.     DIAGNOSTIC DATA:   Lab Results (last 7 days)     Procedure Component Value Units Date/Time    POC Glucose Once [[id number]]  (Abnormal) Collected:  18 1026    Specimen:  Blood Updated:  18 1040     Glucose 204 (H) mg/dL      Comment: RN NotifiedMeter: [id number]: [id number] [facility]       POC Glucose Once [[id number]]  (Abnormal) Collected:  1834    Specimen:  Blood Updated:  18 0748     Glucose 131 (H) mg/dL      Comment: RN NotifiedMeter: [id number]: [id number] BROWN ARIEL       Comprehensive Metabolic Panel [[id number]]  (Abnormal) Collected:  18    Specimen:  Blood Updated:  18     Glucose 122 (H) mg/dL      BUN 50 (H) mg/dL      Creatinine 2.22 (H) mg/dL      Sodium 144 mmol/L      Potassium 3.8 mmol/L      Chloride 113 (H) mmol/L      CO2 18.0 (L) mmol/L      Calcium 7.6 (L) mg/dL      Total Protein 5.5 (L) g/dL      Albumin 2.90 (L) g/dL      ALT (SGPT) 31 U/L      AST (SGOT) 22 U/L      Alkaline Phosphatase 57 U/L      Total Bilirubin 0.1 (L) mg/dL      eGFR Non African Amer 30 (L) mL/min/1.73      Globulin 2.6 gm/dL      A/G Ratio 1.1 g/dL      BUN/Creatinine Ratio 22.5     Anion Gap 13.0 mmol/L     CBC (No Diff) [[id number]]  (Abnormal) Collected:  18    Specimen:  Blood Updated:  05/01/18 0717     WBC 5.62 10*3/mm3      RBC 3.78 (L)  10*6/mm3      Hemoglobin 11.2 (L) g/dL      Hematocrit 33.0 (L) %      MCV 87.3 fL      MCH 29.6 pg      MCHC 33.9 g/dL      RDW 14.1 %      RDW-SD 45.6 (H) fl      MPV 10.3 fL      Platelets 192 10*3/mm3     POC Glucose Once [[id number]]  (Abnormal) Collected:  04/30/18 1951    Specimen:  Blood Updated:  04/30/18 2016     Glucose 186 (H) mg/dL      Comment: RN NotifiedMeter: [id number]: [id number] JULIÁN MONTALVO       POC Glucose Once [[id number]]  (Normal) Collected:  04/30/18 1639    Specimen:  Blood Updated:  04/30/18 1654     Glucose 108 mg/dL      Comment: RN NotifiedMeter: [id number]: [id number] BROWN ARIEL       POC Glucose Once [[id number]]  (Abnormal) Collected:  04/30/18 1028    Specimen:  Blood Updated:  04/30/18 1045     Glucose 150 (H) mg/dL      Comment: RN NotifiedMeter: [id number]: [id number] BROWN ARIEL       POC Glucose Once [[id number]]  (Abnormal) Collected:  04/30/18 0751    Specimen:  Blood Updated:  04/30/18 0816     Glucose 147 (H) mg/dL      Comment: RN NotifiedMeter: [id number]: [id number] BROWN ARIEL       Comprehensive Metabolic Panel [[id number]]  (Abnormal) Collected:  04/30/18 0533    Specimen:  Blood Updated:  04/30/18 0612     Glucose 124 (H) mg/dL      BUN 61 (H) mg/dL      Creatinine 2.75 (H) mg/dL      Sodium 143 mmol/L      Potassium 2.9 (L) mmol/L      Chloride 108 mmol/L      CO2 23.0 mmol/L      Calcium 7.6 (L) mg/dL      Total Protein 5.4 (L) g/dL      Albumin 2.90 (L) g/dL      ALT (SGPT) 25 U/L      AST (SGOT) 24 U/L      Alkaline Phosphatase 56 U/L      Total Bilirubin 0.3 mg/dL      eGFR Non African Amer 24 (L) mL/min/1.73      Globulin 2.5 gm/dL      A/G Ratio 1.2 g/dL      BUN/Creatinine Ratio 22.2     Anion Gap 12.0 mmol/L     CBC (No Diff) [[id number]]  (Abnormal) Collected:  04/30/18 0533    Specimen:  Blood Updated:  04/30/18 0554     WBC 5.82 10*3/mm3      RBC 3.75 (L) 10*6/mm3      Hemoglobin 11.3 (L) g/dL      Hematocrit  32.5 (L) %      MCV 86.7 fL      MCH 30.1 pg      MCHC 34.8 g/dL      RDW 14.2 %      RDW-SD 44.7 (H) fl      MPV 10.3 fL      Platelets 204 10*3/mm3     POC Glucose Once [[id number]]  (Abnormal) Collected:  04/29/18 1950    Specimen:  Blood Updated:  04/29/18 2012     Glucose 248 (H) mg/dL      Comment: RN NotifiedMeter: [id number]: [id number] JULIÁN MONTALVO       POC Glucose Once [[id number]]  (Abnormal) Collected:  04/29/18 1048    Specimen:  Blood Updated:  04/29/18 1720     Glucose 195 (H) mg/dL      Comment: RN NotifiedMeter: [id number]: [id number] MARS GOSS       POC Glucose Once [[id number]]  (Normal) Collected:  04/29/18 1607    Specimen:  Blood Updated:  04/29/18 1627     Glucose 128 mg/dL      Comment: RN NotifiedMeter: [id number]: [id number] MARS WOLFGANG       POC Glucose Once [[id number]]  (Abnormal) Collected:  04/29/18 0741    Specimen:  Blood Updated:  04/29/18 0815     Glucose 182 (H) mg/dL      Comment: RN NotifiedMeter: [id number]: [id number] OLY KABA       Comprehensive Metabolic Panel [[id number]]  (Abnormal) Collected:  04/29/18 0545    Specimen:  Blood Updated:  04/29/18 0631     Glucose 164 (H) mg/dL      BUN 78 (H) mg/dL      Creatinine 3.44 (H) mg/dL      Sodium 144 mmol/L      Potassium 2.8 (L) mmol/L      Chloride 110 mmol/L      CO2 20.0 (L) mmol/L      Calcium 7.7 (L) mg/dL      Total Protein 5.2 (L) g/dL      Albumin 2.70 (L) g/dL      ALT (SGPT) 25 U/L      AST (SGOT) 31 U/L      Alkaline Phosphatase 57 U/L      Total Bilirubin 0.2 mg/dL      eGFR Non African Amer 18 (L) mL/min/1.73      Globulin 2.5 gm/dL      A/G Ratio 1.1 g/dL      BUN/Creatinine Ratio 22.7     Anion Gap 14.0 mmol/L     Magnesium [[id number]]  (Normal) Collected:  04/29/18 0545    Specimen:  Blood Updated:  04/29/18 0626     Magnesium 1.8 mg/dL     CBC (No Diff) [[id number]]  (Abnormal) Collected:  04/29/18 0545    Specimen:  Blood Updated:  04/29/18 0610     WBC 5.90 10*3/mm3       RBC 3.72 (L) 10*6/mm3      Hemoglobin 11.3 (L) g/dL      Hematocrit 31.8 (L) %      MCV 85.5 fL      MCH 30.4 pg      MCHC 35.5 g/dL      RDW 13.9 %      RDW-SD 43.4 fl      MPV 10.0 fL      Platelets 192 10*3/mm3     POC Glucose Once [[id number]]  (Abnormal) Collected:  04/28/18 1953    Specimen:  Blood Updated:  04/28/18 2032     Glucose 159 (H) mg/dL      Comment: RN NotifiedMeter: [id number]: [id number] MEREDITH ONEILL       Extra Tubes [[id number]] Collected:  04/28/18 1630    Specimen:  Blood from Blood, Venous Line Updated:  04/28/18 1730    Narrative:       The following orders were created for panel order Extra Tubes.  Procedure                               Abnormality         Status                     ---------                               -----------         ------                     Lavender Top[[id number]]                                     Final result               Gold Top - SST[[id number]]                                   Final result                 Please view results for these tests on the individual orders.    Lavender Top [[id number]] Collected:  04/28/18 1630    Specimen:  Blood Updated:  04/28/18 1730     Extra Tube hold for add-on     Comment: Auto resulted       Gold Top - SST [[id number]] Collected:  04/28/18 1630    Specimen:  Blood Updated:  04/28/18 1730     Extra Tube Hold for add-ons.     Comment: Auto resulted.       Basic Metabolic Panel [[id number]]  (Abnormal) Collected:  04/28/18 1630    Specimen:  Blood Updated:  04/28/18 1659     Glucose 131 (H) mg/dL      BUN 81 (H) mg/dL      Creatinine 3.87 (H) mg/dL      Sodium 141 mmol/L      Potassium 3.1 (L) mmol/L      Chloride 112 (H) mmol/L      CO2 13.0 (L) mmol/L      Calcium 8.0 (L) mg/dL      eGFR Non African Amer 16 (L) mL/min/1.73      BUN/Creatinine Ratio 20.9     Anion Gap 16.0 (H) mmol/L     POC Glucose Once [[id number]]  (Normal) Collected:  04/28/18 1623    Specimen:  Blood Updated:  04/28/18 1641     Glucose 120  mg/dL      Comment: RN NotifiedMeter: [id number]: [id number] MARS GOSS       POC Glucose Once [[id number]]  (Abnormal) Collected:  04/28/18 1043    Specimen:  Blood Updated:  04/28/18 1058     Glucose 164 (H) mg/dL      Comment: RN NotifiedMeter: [id number]: [id number] MITESHBELLO KABA       POC Glucose Once [[id number]]  (Normal) Collected:  04/28/18 0731    Specimen:  Blood Updated:  04/28/18 0803     Glucose 129 mg/dL      Comment: RN NotifiedMeter: [id number]: [id number] MITESHBELLO KABA       POC Glucose Once [[id number]]  (Abnormal) Collected:  04/27/18 1934    Specimen:  Blood Updated:  04/28/18 0737     Glucose 160 (H) mg/dL      Comment: RN NotifiedMeter: [id number]: [id number] MEREDITH ONEILL       Comprehensive Metabolic Panel [[id number]]  (Abnormal) Collected:  04/28/18 0650    Specimen:  Blood Updated:  04/28/18 0733     Glucose 132 (H) mg/dL      BUN 83 (H) mg/dL      Creatinine 4.05 (H) mg/dL      Sodium 137 mmol/L      Potassium 3.0 (L) mmol/L      Chloride 113 (H) mmol/L      CO2 10.0 (L) mmol/L      Calcium 7.7 (L) mg/dL      Total Protein 5.7 (L) g/dL      Albumin 3.00 (L) g/dL      ALT (SGPT) 27 U/L      AST (SGOT) 13 (L) U/L      Alkaline Phosphatase 64 U/L      Total Bilirubin 0.1 (L) mg/dL      eGFR Non African Amer 15 (L) mL/min/1.73      Globulin 2.7 gm/dL      A/G Ratio 1.1 g/dL      BUN/Creatinine Ratio 20.5     Anion Gap 14.0 mmol/L     CBC Auto Differential [[id number]]  (Abnormal) Collected:  04/28/18 0650    Specimen:  Blood Updated:  04/28/18 0728     WBC 5.75 10*3/mm3      RBC 3.95 (L) 10*6/mm3      Hemoglobin 11.9 (L) g/dL      Hematocrit 34.2 (L) %      MCV 86.6 fL      MCH 30.1 pg      MCHC 34.8 g/dL      RDW 13.8 %      RDW-SD 44.1 (H) fl      MPV 10.4 fL      Platelets 199 10*3/mm3      Neutrophil % 68.2 %      Lymphocyte % 20.0 %      Monocyte % 9.4 %      Eosinophil % 1.7 %      Basophil % 0.5 %      Immature Grans % 0.2 %      Neutrophils,  Absolute 3.92 10*3/mm3      Lymphocytes, Absolute 1.15 10*3/mm3      Monocytes, Absolute 0.54 10*3/mm3      Eosinophils, Absolute 0.10 10*3/mm3      Basophils, Absolute 0.03 10*3/mm3      Immature Grans, Absolute 0.01 10*3/mm3     Extra Tubes [[id number]] Collected:  04/27/18 1938    Specimen:  Blood from Blood, Venous Line Updated:  04/27/18 2045    Narrative:       The following orders were created for panel order Extra Tubes.  Procedure                               Abnormality         Status                     ---------                               -----------         ------                     Lavender Top[[id number]]                                     Final result               Lavender Top[[id number]]                                     Final result                 Please view results for these tests on the individual orders.    Lavender Top [[id number]] Collected:  04/27/18 1938    Specimen:  Blood Updated:  04/27/18 2045     Extra Tube hold for add-on     Comment: Auto resulted       Lavender Top [[id number]] Collected:  04/27/18 1938    Specimen:  Blood Updated:  04/27/18 2045     Extra Tube hold for add-on     Comment: Auto resulted       Lactic Acid, Plasma [[id number]]  (Abnormal) Collected:  04/27/18 1938    Specimen:  Blood Updated:  04/27/18 2011     Lactate <0.5 (L) mmol/L     Creatinine, Urine, Random - Urine, Clean Catch [[id number]] Collected:  04/27/18 1230    Specimen:  Urine from Urine, Clean Catch Updated:  04/27/18 2009     Creatinine, Urine 57.8 mg/dL     Urea Nitrogen, Urine - Urine, Clean Catch [[id number]] Collected:  04/27/18 1230    Specimen:  Urine from Urine, Clean Catch Updated:  04/27/18 2009     Urea Nitrogen, Urine 325 mg/dL     aPTT [[id number]]  (Normal) Collected:  04/27/18 1541    Specimen:  Blood Updated:  04/27/18 1852     PTT 35.4 seconds     Narrative:       The recommended Heparin therapeutic range is 68-97 seconds.    Protime-INR [[id number]]  (Normal) Collected:  04/27/18  1541    Specimen:  Blood Updated:  04/27/18 1852     Protime 14.3 Seconds      INR 1.14    Narrative:       Therapeutic range for most indications is 2.0-3.0 INR,  or 2.5-3.5 for mechanical heart valves.    POC Glucose Once [[id number]]  (Normal) Collected:  04/27/18 1638    Specimen:  Blood Updated:  04/27/18 1654     Glucose 127 mg/dL      Comment: RN NotifiedMeter: [id number]: [id number] KAELA CRYSTAL       Extra Tubes [[id number]] Collected:  04/27/18 1541    Specimen:  Blood from Blood, Venous Line Updated:  04/27/18 1645    Narrative:       The following orders were created for panel order Extra Tubes.  Procedure                               Abnormality         Status                     ---------                               -----------         ------                     Light Blue Top[[id number]]                                   Final result               Lavender Top[[id number]]                                     Final result               Gold Top - SST[[id number]]                                   Final result                 Please view results for these tests on the individual orders.    Light Blue Top [[id number]] Collected:  04/27/18 1541    Specimen:  Blood Updated:  04/27/18 1645     Extra Tube hold for add-on     Comment: Auto resulted       Lavender Top [[id number]] Collected:  04/27/18 1541    Specimen:  Blood Updated:  04/27/18 1645     Extra Tube hold for add-on     Comment: Auto resulted       Gold Top - SST [[id number]] Collected:  04/27/18 1541    Specimen:  Blood Updated:  04/27/18 1645     Extra Tube Hold for add-ons.     Comment: Auto resulted.       Ammonia [[id number]]  (Abnormal) Collected:  04/27/18 1541    Specimen:  Blood Updated:  04/27/18 1610     Ammonia <9 (L) umol/L     Urinalysis, Microscopic Only - Urine, Clean Catch [[id number]]  (Abnormal) Collected:  04/27/18 1230    Specimen:  Urine from Urine, Clean Catch Updated:  04/27/18 1342     RBC, UA 13-20 (A) /HPF      WBC,  UA None Seen /HPF      Bacteria, UA None Seen /HPF      Squamous Epithelial Cells, UA 3-5 (A) /HPF      Hyaline Casts, UA None Seen /LPF      Methodology Automated Microscopy    Urinalysis With / Microscopic If Indicated - Urine, Clean Catch [[id number]]  (Abnormal) Collected:  04/27/18 1230    Specimen:  Urine from Urine, Clean Catch Updated:  04/27/18 1319     Color, UA Yellow     Appearance, UA Cloudy (A)     pH, UA 6.5     Specific Gravity, UA 1.008     Glucose, UA Negative     Ketones, UA Negative     Bilirubin, UA Negative     Blood, UA Moderate (2+) (A)     Protein,  mg/dL (2+) (A)     Leuk Esterase, UA Negative     Nitrite, UA Negative     Urobilinogen, UA 0.2 E.U./dL    Extra Tubes [[id number]] Collected:  04/27/18 1024    Specimen:  Blood from Blood, Venous Line Updated:  04/27/18 1130    Narrative:       The following orders were created for panel order Extra Tubes.  Procedure                               Abnormality         Status                     ---------                               -----------         ------                     Light Blue Top[[id number]]                                   Final result                 Please view results for these tests on the individual orders.    Light Blue Top [[id number]] Collected:  04/27/18 1024    Specimen:  Blood Updated:  04/27/18 1130     Extra Tube hold for add-on     Comment: Auto resulted       Amylase [[id number]]  (Normal) Collected:  04/27/18 1024    Specimen:  Blood Updated:  04/27/18 1122     Amylase 101 U/L     Ketone Bodies, Serum (Not performed at [place]) [[id number]] Collected:  04/27/18 1024    Specimen:  Blood Updated:  04/27/18 1110    Narrative:       The following orders were created for panel order Ketone Bodies, Serum (Not performed at [facility]).  Procedure                               Abnormality         Status                     ---------                               -----------         ------                      Acetone[[id number]]                      Normal              Final result                 Please view results for these tests on the individual orders.    Acetone [[id number]]  (Normal) Collected:  04/27/18 1024    Specimen:  Blood Updated:  04/27/18 1110     Acetone Negative    BNP [[id number]]  (Abnormal) Collected:  04/27/18 1024    Specimen:  Blood Updated:  04/27/18 1103     proBNP 1,830.0 (H) pg/mL     Comprehensive Metabolic Panel [[id number]]  (Abnormal) Collected:  04/27/18 1024    Specimen:  Blood Updated:  04/27/18 1051     Glucose 125 (H) mg/dL      BUN 84 (H) mg/dL      Creatinine 4.26 (H) mg/dL      Sodium 135 (L) mmol/L      Potassium 3.6 mmol/L      Chloride 114 (H) mmol/L      CO2 6.0 (L) mmol/L      Calcium 7.7 (L) mg/dL      Total Protein 6.3 g/dL      Albumin 3.40 g/dL      ALT (SGPT) 26 U/L      AST (SGOT) 15 (L) U/L      Alkaline Phosphatase 74 U/L      Total Bilirubin 0.2 mg/dL      eGFR Non African Amer 14 (L) mL/min/1.73      Globulin 2.9 gm/dL      A/G Ratio 1.2 g/dL      BUN/Creatinine Ratio 19.7     Anion Gap 15.0 mmol/L     Lipase [[id number]]  (Abnormal) Collected:  04/27/18 1024    Specimen:  Blood Updated:  04/27/18 1051     Lipase 414 (H) U/L     Magnesium [[id number]]  (Normal) Collected:  04/27/18 1024    Specimen:  Blood Updated:  04/27/18 1051     Magnesium 1.8 mg/dL     Phosphorus [[id number]]  (Abnormal) Collected:  04/27/18 1024    Specimen:  Blood Updated:  04/27/18 1051     Phosphorus 4.8 (H) mg/dL     CBC & Differential [[id number]] Collected:  04/27/18 1024    Specimen:  Blood Updated:  04/27/18 1034    Narrative:       The following orders were created for panel order CBC & Differential.  Procedure                               Abnormality         Status                     ---------                               -----------         ------                     CBC Auto Differential[[id number]]        Abnormal            Final result                 Please view results for these  tests on the individual orders.    CBC Auto Differential [[id number]]  (Abnormal) Collected:  04/27/18 1024    Specimen:  Blood Updated:  04/27/18 1034     WBC 6.92 10*3/mm3      RBC 3.99 (L) 10*6/mm3      Hemoglobin 12.3 (L) g/dL      Hematocrit 34.7 (L) %      MCV 87.0 fL      MCH 30.8 pg      MCHC 35.4 g/dL      RDW 14.1 %      RDW-SD 44.8 (H) fl      MPV 9.9 fL      Platelets 188 10*3/mm3      Neutrophil % 80.4 (H) %      Lymphocyte % 11.4 %      Monocyte % 6.9 %      Eosinophil % 0.9 %      Basophil % 0.1 %      Immature Grans % 0.3 %      Neutrophils, Absolute 5.56 10*3/mm3      Lymphocytes, Absolute 0.79 10*3/mm3      Monocytes, Absolute 0.48 10*3/mm3      Eosinophils, Absolute 0.06 10*3/mm3      Basophils, Absolute 0.01 10*3/mm3      Immature Grans, Absolute 0.02 10*3/mm3     POC Glucose Once [[id number]]  (Normal) Collected:  04/27/18 0937    Specimen:  Blood Updated:  04/27/18 1002     Glucose 125 mg/dL      Comment: RN NotifiedMeter: [id number]: [id number] [facility] COURSE:  Active Hospital Problems (** Indicates Principal Problem)    Diagnosis Date Noted   • **SBO (small bowel obstruction) [K56.609] 04/27/2018   • Acute renal insufficiency [N28.9] 04/27/2018   • Chronic hepatitis C virus infection [B18.2] 04/27/2018   • Gastritis [K29.70] 04/27/2018   • CAD (coronary artery disease) [I25.10] 04/27/2018   • Type 2 diabetes mellitus [E11.9] 12/27/2016      Resolved Hospital Problems    Diagnosis Date Noted Date Resolved   No resolved problems to display.     1. SBO.   5/1. Seems to be resolved. On regular diet. Monitor. Having BM's. Ok for discharge per Surgery.  4/30. Resolving. Large BM today. Surgery following. No NGT. Diet advanced to fulls from clears with large BM, flatus, no abdominal pain, and no nausea.  2. ARF. Discussed with nephrology. Okay for discharge on bicarb with close follow up.     Results from last 7 days  Lab Units 05/01/18  0628 04/30/18  0508  04/29/18  0545 04/28/18  1630 04/28/18  0650 04/27/18  1024   CREATININE mg/dL 2.22* 2.75* 3.44* 3.87* 4.05* 4.26*   5/1. IVF's till tomorrow per nephrology. Sodium bicarb added.   4/30. Nephrology following.    3. Hypokalemia. Discharged on 10mEQ daily. Recheck with PCP on 5/8.  Results from last 7 days  Lab Units 05/01/18  0628 04/30/18  0533 04/29/18  0545 04/28/18  1630 04/28/18  0650 04/27/18  1024   POTASSIUM mmol/L 3.8 2.9* 2.8* 3.1* 3.0* 3.6   MAGNESIUM mg/dL  --   --  1.8  --   --  1.8   5/1. At goal. Monitor.  4/30. Replace. Recheck.  4. DM. At goal. SSI.  5. Chronic hepatitis C. Monitor.  6. HTN. Hydralazine PRN.  7. Single enlarged left para-aortic lymphadenopathy. Surveillance with 3-6 month repeat CT recommendation per radiologist.    DISCHARGE CONDITION:   Stable    DISPOSITION:  Home or Self Care    DISCHARGE MEDICATIONS   Favio Casillas   Home Medication Instructions ALEK:[id number]    Printed on:05/01/18 1612   Medication Information                      albuterol (PROVENTIL) (2.5 MG/3ML) 0.083% nebulizer solution  Take 2.5 mg by nebulization Every 4 (Four) Hours As Needed for Wheezing or Shortness of Air.             aspirin 81 MG chewable tablet  Chew 81 mg daily.             BRILINTA 90 MG tablet tablet  TAKE 1 TABLET BY MOUTH 2 (TWO) TIMES A DAY.             carvedilol (COREG) 3.125 MG tablet  Take 3.125 mg by mouth 2 (Two) Times a Day With Meals.             isosorbide mononitrate (IMDUR) 30 MG 24 hr tablet  Take 45 mg by mouth 2 (two) times a day.             JANUVIA 100 MG tablet  TAKE 1 TABLET BY MOUTH DAILY.             mirtazapine (REMERON) 15 MG tablet  Take 1 tablet by mouth Every Night.             NITROSTAT 0.4 MG SL tablet  PLACE 1 TABLET UNDER THE TONGUE AS NEEDED FOR CHEST PAIN. MAY REPEAT EVERY 5 MINUTES UP TO 3 DOSES, THEN GO TO EMERGENCY ROOM             omeprazole (priLOSEC) 40 MG capsule  TAKE 1 CAPSULE BY MOUTH TWO (2) TIMES A DAY             polyethylene glycol  (MIRALAX) powder  Take 17 g by mouth 2 (Two) Times a Day.             pravastatin (PRAVACHOL) 40 MG tablet  TAKE 1 TABLET BY MOUTH EVERY NIGHT AT BEDTIME             sodium bicarbonate 650 MG tablet  Take 1 tablet by mouth 2 (Two) Times a Day.             tiZANidine (ZANAFLEX) 4 MG tablet  Take 1-2 tablets by mouth At Night As Needed for Muscle Spasms.             VENTOLIN  (90 BASE) MCG/ACT inhaler  INHALE 2 PUFFS EVERY FOUR HOURS AS NEEDED                 INSTRUCTIONS:  Activity:   Activity Instructions     Activity as Tolerated             Diet:   Diet Instructions     Diet: Consistent Carbohydrate, Regular, Cardiac       Discharge Diet:   Consistent Carbohydrate  Regular  Cardiac             Special instructions: Patient instructed to call MD or return to ED with worsening shortness of breath, chest pain, fever greater than 100.4 degrees F or any other medical concerns..    FOLLOW UP:   Follow-up Information     Harvinder Robert MD. Go on 5/8/2018.    Specialties:  Family Medicine, Emergency Medicine  Contact information:  [address] [id number] [phone]             Kevin Robbins PA-C. Schedule an appointment as soon as possible for a visit in 2 week(s).    Specialty:  Gastroenterology  Contact information:  [address] [place] [address] [address]  [facility] [address] [phone]             Rodríguez Brody MD. Schedule an appointment as soon as possible for a visit in 2 week(s).    Specialty:  Nephrology  Contact information:  [facility] [id number] [phone]                   PENDING TEST RESULTS AT DISCHARGE      Time: Discharge 35 min          This document has been electronically signed by Jose G Voss MD on May 1, 2018 4:12 PM

## 2018-05-01 NOTE — PROGRESS NOTES
MEDICINE DAILY PROGRESS NOTE  NAME: Favio Casillas  : 1955  MRN: [id number]     LOS: 4 days     PROVIDER OF SERVICE: Jose G Voss MD    Chief Complaint: SBO (small bowel obstruction)    Subjective:     Interval History:  History taken from: patient chart RN  . Patient tolerating a diet. Doing well. BM yesterday x2. Flatus. Active bowel sounds.  . Patient doing well today. No nausea or vomiting.  Abdominal pain resolved. Patient with large BM prior to exam.     Review of Systems:   Review of Systems   Constitutional: Positive for fatigue. Negative for activity change, appetite change and fever.   HENT: Negative for ear pain and sore throat.    Eyes: Negative for pain and visual disturbance.   Respiratory: Negative for cough and shortness of breath.    Cardiovascular: Negative for chest pain and palpitations.   Gastrointestinal: Negative for abdominal pain and nausea.   Endocrine: Negative for cold intolerance and heat intolerance.   Genitourinary: Negative for difficulty urinating and dysuria.   Musculoskeletal: Negative for arthralgias and gait problem.   Skin: Negative for color change and rash.   Neurological: Negative for dizziness, weakness and headaches.   Hematological: Negative for adenopathy. Does not bruise/bleed easily.   Psychiatric/Behavioral: Negative for agitation, confusion and sleep disturbance.       Objective:     Vital Signs  Vitals:    18 0719 18 0725 18 0739 18 0816   BP:    120/80   BP Location:    Left arm   Patient Position:    Lying   Pulse: 76 78 70 72   Resp: 18 18     Temp:    98.6 °F (37 °C)   TempSrc:    Oral   SpO2: 97%   96%   Weight:       Height:           Physical Exam  Physical Exam   Constitutional: He is oriented to person, place, and time. He appears well-developed and well-nourished. No distress.   HENT:   Head: Normocephalic and atraumatic.   Right Ear: External ear normal.   Left Ear: External ear normal.   Nose: Nose normal.    Eyes: Conjunctivae and EOM are normal. Pupils are equal, round, and reactive to light.   Neck: Normal range of motion. Neck supple.   Cardiovascular: Normal rate, regular rhythm, normal heart sounds and intact distal pulses.  Exam reveals no gallop and no friction rub.    No murmur heard.  Pulmonary/Chest: Effort normal and breath sounds normal. No respiratory distress. He has no wheezes. He has no rales. He exhibits no tenderness.   Abdominal: He exhibits no distension and no mass. There is no tenderness. There is no rebound and no guarding.   Soft abdomen. Active bowel sounds.   Musculoskeletal: Normal range of motion.   Neurological: He is alert and oriented to person, place, and time.   Skin: Skin is warm and dry. No rash noted. He is not diaphoretic. No erythema. No pallor.   Psychiatric: He has a normal mood and affect. His behavior is normal.   Nursing note and vitals reviewed.      Medication Review    Current Facility-Administered Medications:   •  albuterol (PROVENTIL) nebulizer solution 0.083% 2.5 mg/3mL, 2.5 mg, Nebulization, Q4H PRN, Ricardo Thayer MD  •  albuterol (PROVENTIL) nebulizer solution 0.083% 2.5 mg/3mL, 2.5 mg, Nebulization, 4x Daily - RT, Ricardo Thayer MD, 2.5 mg at 05/01/18 0718  •  dextrose (D50W) solution 25 g, 25 g, Intravenous, Q15 Min PRN, Ricardo Thayer MD  •  dextrose (GLUTOSE) oral gel 15 g, 15 g, Oral, Q15 Min PRN, Ricardo Thayer MD  •  glucagon (human recombinant) (GLUCAGEN DIAGNOSTIC) injection 1 mg, 1 mg, Subcutaneous, PRN, Ricardo Thayer MD  •  hydrALAZINE (APRESOLINE) injection 10 mg, 10 mg, Intravenous, Q6H PRN, Ricardo Thayer MD  •  insulin aspart (novoLOG) injection 0-7 Units, 0-7 Units, Subcutaneous, 4x Daily AC & at Bedtime, Ricardo Thayer MD, 2 Units at 04/30/18 2025  •  magnesium hydroxide (MILK OF MAGNESIA) suspension 2400 mg/10mL 10 mL, 10 mL, Oral, Daily PRN, Jose G Voss MD, 10 mL at 04/30/18 1219  •  morphine injection 0.52 mg, 0.52 mg, Intravenous, Q4H PRN, 0.52 mg  at 05/01/18 0820 **AND** naloxone (NARCAN) injection 0.4 mg, 0.4 mg, Intravenous, Q5 Min PRN, Ricardo Thayer MD  •  nitroglycerin (NITROSTAT) SL tablet 0.4 mg, 0.4 mg, Sublingual, Q5 Min PRN, Ricardo Thayer MD  •  pantoprazole (PROTONIX) EC tablet 40 mg, 40 mg, Oral, Q AM, Jose G Voss MD, 40 mg at 05/01/18 0555  •  sodium bicarbonate tablet 650 mg, 650 mg, Oral, BID, TIFFANIE Franco, 650 mg at 05/01/18 0830  •  sodium chloride 0.9 % flush 1-10 mL, 1-10 mL, Intravenous, PRN, Ricardo Thayer MD, 10 mL at 05/01/18 0821  •  sodium chloride 0.9 % with KCl 40 mEq/L infusion, 100 mL/hr, Intravenous, Continuous, TIFFANIE Franco, Last Rate: 100 mL/hr at 05/01/18 0555, 100 mL/hr at 05/01/18 0555  •  tiZANidine (ZANAFLEX) tablet 4 mg, 4 mg, Oral, Nightly PRN, Ricardo Thayer MD     Diagnostic Data    Lab Results (last 24 hours)     Procedure Component Value Units Date/Time    POC Glucose Once [[id number]]  (Abnormal) Collected:  05/01/18 0734    Specimen:  Blood Updated:  05/01/18 0748     Glucose 131 (H) mg/dL      Comment: RN NotifiedMeter: [id number]: [id number] BROWN ARIEL       Comprehensive Metabolic Panel [[id number]]  (Abnormal) Collected:  05/01/18 0628    Specimen:  Blood Updated:  05/01/18 0741     Glucose 122 (H) mg/dL      BUN 50 (H) mg/dL      Creatinine 2.22 (H) mg/dL      Sodium 144 mmol/L      Potassium 3.8 mmol/L      Chloride 113 (H) mmol/L      CO2 18.0 (L) mmol/L      Calcium 7.6 (L) mg/dL      Total Protein 5.5 (L) g/dL      Albumin 2.90 (L) g/dL      ALT (SGPT) 31 U/L      AST (SGOT) 22 U/L      Alkaline Phosphatase 57 U/L      Total Bilirubin 0.1 (L) mg/dL      eGFR Non African Amer 30 (L) mL/min/1.73      Globulin 2.6 gm/dL      A/G Ratio 1.1 g/dL      BUN/Creatinine Ratio 22.5     Anion Gap 13.0 mmol/L     CBC (No Diff) [[id number]]  (Abnormal) Collected:  05/01/18 0628    Specimen:  Blood Updated:  05/01/18 0717     WBC 5.62 10*3/mm3      RBC 3.78 (L) 10*6/mm3      Hemoglobin 11.2 (L)  g/dL      Hematocrit 33.0 (L) %      MCV 87.3 fL      MCH 29.6 pg      MCHC 33.9 g/dL      RDW 14.1 %      RDW-SD 45.6 (H) fl      MPV 10.3 fL      Platelets 192 10*3/mm3     POC Glucose Once [[id number]]  (Abnormal) Collected:  04/30/18 1951    Specimen:  Blood Updated:  04/30/18 2016     Glucose 186 (H) mg/dL      Comment: RN NotifiedMeter: [id number]: [id number] JULIÁN MONTALVO       POC Glucose Once [[id number]]  (Normal) Collected:  04/30/18 1639    Specimen:  Blood Updated:  04/30/18 1654     Glucose 108 mg/dL      Comment: RN NotifiedMeter: [id number]: [id number] BROWN ARIEL       POC Glucose Once [[id number]]  (Abnormal) Collected:  04/30/18 1028    Specimen:  Blood Updated:  04/30/18 1045     Glucose 150 (H) mg/dL      Comment: RN NotifiedMeter: [id number]: [id number] DALY GEORGE             I reviewed the patient's new clinical results.    Assessment/Plan:     Active Hospital Problems (** Indicates Principal Problem)    Diagnosis Date Noted   • **SBO (small bowel obstruction) [K56.609] 04/27/2018   • Acute renal insufficiency [N28.9] 04/27/2018   • Chronic hepatitis C virus infection [B18.2] 04/27/2018   • Gastritis [K29.70] 04/27/2018   • CAD (coronary artery disease) [I25.10] 04/27/2018   • Type 2 diabetes mellitus [E11.9] 12/27/2016      Resolved Hospital Problems    Diagnosis Date Noted Date Resolved   No resolved problems to display.     1. SBO.   5/1. Seems to be resolved. On regular diet. Monitor.  4/30. Resolving. Large BM today. Surgery following. No NGT. Diet advanced to fulls from clears with large BM, flatus, no abdominal pain, and no nausea.  2. ARF.     Results from last 7 days  Lab Units 05/01/18  0628 04/30/18  0533 04/29/18  0545 04/28/18  1630 04/28/18  0650 04/27/18  1024   CREATININE mg/dL 2.22* 2.75* 3.44* 3.87* 4.05* 4.26*   5/1. IVF's till tomorrow per nephrology. Sodium bicarb added.   4/30. Nephrology following.    3. Hypokalemia.     Results from last 7  days  Lab Units 05/01/18  0628 04/30/18  0533 04/29/18  0545 04/28/18  1630 04/28/18  0650 04/27/18  1024   POTASSIUM mmol/L 3.8 2.9* 2.8* 3.1* 3.0* 3.6   MAGNESIUM mg/dL  --   --  1.8  --   --  1.8   5/1. At goal. Monitor.  4/30. Replace. Recheck.  4. DM. At goal. SSI.  5. Chronic hepatitis C. Monitor.  6. HTN. Hydralazine PRN.    Will monitor patient's hospital course and adjust treatment as hospital course dictates.    DVT prophylaxis: SCD/TEDs  Code status is Full Code    Plan for disposition:Where: home and home health and When:  tomorrow      Time:           This document has been electronically signed by Jose G Voss MD on May 1, 2018 8:55 AM

## 2018-05-01 NOTE — PROGRESS NOTES
GENERAL SURGERY PROGRESS NOTE  Chief Complaint:  Surgery Follow up   LOS: 4 days       Subjective     Interval History:     Tolerated regular diet. Having BMs    Objective     Vital Signs  Temp:  [98.6 °F (37 °C)-99.3 °F (37.4 °C)] 98.6 °F (37 °C)  Heart Rate:  [70-90] 74  Resp:  [18] 18  BP: (120-134)/(76-83) 120/80    Physical Exam:   Abdomen soft, nontender  Labs:  Lab Results (last 24 hours)     Procedure Component Value Units Date/Time    POC Glucose Once [[id number]]  (Abnormal) Collected:  05/01/18 1026    Specimen:  Blood Updated:  05/01/18 1040     Glucose 204 (H) mg/dL      Comment: RN NotifiedMeter: [id number]: [id number] BROWN ARIEL       POC Glucose Once [[id number]]  (Abnormal) Collected:  05/01/18 0734    Specimen:  Blood Updated:  05/01/18 0748     Glucose 131 (H) mg/dL      Comment: RN NotifiedMeter: [id number]: [id number] BROWN ARIEL       Comprehensive Metabolic Panel [[id number]]  (Abnormal) Collected:  05/01/18 0628    Specimen:  Blood Updated:  05/01/18 0741     Glucose 122 (H) mg/dL      BUN 50 (H) mg/dL      Creatinine 2.22 (H) mg/dL      Sodium 144 mmol/L      Potassium 3.8 mmol/L      Chloride 113 (H) mmol/L      CO2 18.0 (L) mmol/L      Calcium 7.6 (L) mg/dL      Total Protein 5.5 (L) g/dL      Albumin 2.90 (L) g/dL      ALT (SGPT) 31 U/L      AST (SGOT) 22 U/L      Alkaline Phosphatase 57 U/L      Total Bilirubin 0.1 (L) mg/dL      eGFR Non African Amer 30 (L) mL/min/1.73      Globulin 2.6 gm/dL      A/G Ratio 1.1 g/dL      BUN/Creatinine Ratio 22.5     Anion Gap 13.0 mmol/L     CBC (No Diff) [[id number]]  (Abnormal) Collected:  05/01/18 0628    Specimen:  Blood Updated:  05/01/18 0717     WBC 5.62 10*3/mm3      RBC 3.78 (L) 10*6/mm3      Hemoglobin 11.2 (L) g/dL      Hematocrit 33.0 (L) %      MCV 87.3 fL      MCH 29.6 pg      MCHC 33.9 g/dL      RDW 14.1 %      RDW-SD 45.6 (H) fl      MPV 10.3 fL      Platelets 192 10*3/mm3     POC Glucose Once [[id number]]   (Abnormal) Collected:  04/30/18 1951    Specimen:  Blood Updated:  04/30/18 2016     Glucose 186 (H) mg/dL      Comment: RN NotifiedMeter: [id number]: [id number] JULIÁN MONTALVO       POC Glucose Once [[id number]]  (Normal) Collected:  04/30/18 1639    Specimen:  Blood Updated:  04/30/18 1654     Glucose 108 mg/dL      Comment: RN NotifiedMeter: [id number]: [id number] DALY GEORGE              Results Review:     Labs and imaging for today were reviewed.    Assessment/Plan     Favio Casillas is a 63 y.o. male who has a resolved SBO.      Overall well from my standpoint. Ok for DC home once medically ok.          This document has been electronically signed by Christofer Earl MD on May 1, 2018 12:44 PM        Christofer Earl MD  05/01/18  12:44 PM

## 2018-05-01 NOTE — PLAN OF CARE
Problem: Patient Care Overview  Goal: Plan of Care Review  Outcome: Ongoing (interventions implemented as appropriate)   05/01/18 0108   Coping/Psychosocial   Plan of Care Reviewed With patient   Plan of Care Review   Progress no change   OTHER   Outcome Summary Patient tolerating regular diet well. Patient currently resting with no complaints. Will continue to monitor.      Goal: Individualization and Mutuality  Outcome: Ongoing (interventions implemented as appropriate)    Goal: Discharge Needs Assessment  Outcome: Ongoing (interventions implemented as appropriate)    Goal: Interprofessional Rounds/Family Conf  Outcome: Ongoing (interventions implemented as appropriate)      Problem: Skin Injury Risk (Adult)  Goal: Identify Related Risk Factors and Signs and Symptoms  Outcome: Ongoing (interventions implemented as appropriate)    Goal: Skin Health and Integrity  Outcome: Ongoing (interventions implemented as appropriate)      Problem: Pain, Acute (Adult)  Goal: Identify Related Risk Factors and Signs and Symptoms  Outcome: Ongoing (interventions implemented as appropriate)    Goal: Acceptable Pain Control/Comfort Level  Outcome: Ongoing (interventions implemented as appropriate)      Problem: Fluid Volume Deficit (Adult)  Goal: Identify Related Risk Factors and Signs and Symptoms  Outcome: Ongoing (interventions implemented as appropriate)    Goal: Optimal Fluid Balance  Outcome: Ongoing (interventions implemented as appropriate)      Problem: Activity Intolerance (Adult)  Goal: Identify Related Risk Factors and Signs and Symptoms  Outcome: Ongoing (interventions implemented as appropriate)    Goal: Activity Tolerance  Outcome: Ongoing (interventions implemented as appropriate)    Goal: Effective Energy Conservation Techniques  Outcome: Ongoing (interventions implemented as appropriate)

## 2018-05-01 NOTE — PROGRESS NOTES
"[facility]  [address] [place] [address]. [address]  T - [phone]  F - [phone]     Progress Note          PATIENT  DEMOGRAPHICS   PATIENT NAME: Favio Casillas                      PHYSICIAN: Anastacoi ReillyTIFFANIE  : 1955  MRN: [id number]   LOS: 4 days    Patient Care Team:  Harvinder Robert MD as PCP - General  Kevin Robbins PA-C as PCP - Claims Attributed  Harvinder Robert MD  Subjective   SUBJECTIVE   Feeling somewhat better today.         Objective   OBJECTIVE   Vital Signs  Temp:  [98.4 °F (36.9 °C)-99.3 °F (37.4 °C)] 99.3 °F (37.4 °C)  Heart Rate:  [70-90] 70  Resp:  [18] 18  BP: (120-134)/(76-83) 123/76    Flowsheet Rows    Flowsheet Row First Filed Value   Admission Height 170.2 cm (67\") Documented at 2018 0951   Admission Weight 59 kg (130 lb) Documented at 2018 0951           I/O last 3 completed shifts:  In: 1790 [P.O.:840; I.V.:950]  Out: -     PHYSICAL EXAM    Physical Exam   Constitutional: He is oriented to person, place, and time. He appears well-developed.   Cachectic   HENT:   Head: Normocephalic and atraumatic.   Eyes: Conjunctivae and EOM are normal. Pupils are equal, round, and reactive to light.   Cardiovascular: Normal rate and regular rhythm.    Pulmonary/Chest: Effort normal. No respiratory distress. He has no wheezes. He has rales.   Abdominal: He exhibits distension. There is tenderness.   Musculoskeletal: He exhibits no edema.   Neurological: He is alert and oriented to person, place, and time.   Skin: Skin is warm and dry. Capillary refill takes less than 2 seconds.   Psychiatric: He has a normal mood and affect. His behavior is normal. Judgment and thought content normal.       RESULTS   Results Review:      Results from last 7 days  Lab Units 18  0628 18  0533 18  0545   SODIUM mmol/L 144 143 144   POTASSIUM mmol/L 3.8 2.9* 2.8*   CHLORIDE mmol/L 113* 108 110   CO2 mmol/L 18.0* 23.0 20.0*   BUN mg/dL 50* 61* 78* "   CREATININE mg/dL 2.22* 2.75* 3.44*   CALCIUM mg/dL 7.6* 7.6* 7.7*   BILIRUBIN mg/dL 0.1* 0.3 0.2   ALK PHOS U/L 57 56 57   ALT (SGPT) U/L 31 25 25   AST (SGOT) U/L 22 24 31   GLUCOSE mg/dL 122* 124* 164*       Estimated Creatinine Clearance: 31.2 mL/min (by C-G formula based on SCr of 2.22 mg/dL (H)).      Results from last 7 days  Lab Units 04/29/18  0545 04/27/18  1024   MAGNESIUM mg/dL 1.8 1.8   PHOSPHORUS mg/dL  --  4.8*               Results from last 7 days  Lab Units 05/01/18  0628 04/30/18  0533 04/29/18  0545 04/28/18  0650 04/27/18  1024   WBC 10*3/mm3 5.62 5.82 5.90 5.75 6.92   HEMOGLOBIN g/dL 11.2* 11.3* 11.3* 11.9* 12.3*   PLATELETS 10*3/mm3 192 204 192 199 188         Results from last 7 days  Lab Units 04/27/18  1541   INR  1.14         Imaging Results (last 24 hours)     ** No results found for the last 24 hours. **           MEDICATIONS      albuterol 2.5 mg Nebulization 4x Daily - RT   insulin aspart 0-7 Units Subcutaneous 4x Daily AC & at Bedtime   pantoprazole 40 mg Oral Q AM       sodium chloride 0.9 % with KCl 40 mEq/L 100 mL/hr Last Rate: 100 mL/hr (05/01/18 0555)       Assessment/Plan   ASSESSMENT / PLAN    Principal Problem:    SBO (small bowel obstruction)  Active Problems:    Type 2 diabetes mellitus    Acute renal insufficiency    Chronic hepatitis C virus infection    Gastritis    CAD (coronary artery disease)    1.  Acute kidney injury- This is likely prerenal on the setting of nausea and vomiting, hyperglycemia, and decreased oral intake. Creatinine now 2.2. Baseline creatinine 1.3-1.4.     -Cr improving more quickly now.    -No indication for renal ultrasound as the patient has already had CT abdomen and pelvis     -FEUrea 28.5%, suggestive of prerenal SUJATHA    -Continue fluids     2.  Severe metabolic acidosis- Patient's bicarbonate is noted to be 6, this is of unclear etiology, now improved to 23      -Lactic acid negative     -Agree with holding metformin and Januvia for  now    -CXR clear    -Will add PO bicarb     3.  Small bowel obstruction- Possible small bowel obstruction versus ileus versus gastroenteritis     -Patient is on regular diet     -Fluids as above     -NG attempts unsuccessful     -Dr. Earl following    -Appears resolved     4.  Diabetes mellitus type 2- Patient's home oral medications are held due to nothing by mouth status, acidosis, and acute kidney injury. Currently controlled on sliding scale insulin.      5.  Chronic hepatitis C- The patient states that this has been treated with medication and he is no longer viral carrier     6.  CAD- The patient's oral medications are currently on hold with nothing by mouth status     7.  Hypertension- We will use when necessary hydralazine to control blood pressure for now.    8. Hypokalemia-  Resolved, continue current fluids         This document has been electronically signed by TIFFANEI Franco on May 1, 2018 7:52 AM

## 2018-05-02 ENCOUNTER — EPISODE CHANGES (OUTPATIENT)
Dept: CASE MANAGEMENT | Facility: OTHER | Age: 63
End: 2018-05-02

## 2018-05-03 RX ORDER — PRAVASTATIN SODIUM 40 MG
TABLET ORAL
Qty: 90 TABLET | Refills: 3 | Status: SHIPPED | OUTPATIENT
Start: 2018-05-03 | End: 2019-04-17

## 2018-05-04 ENCOUNTER — EPISODE CHANGES (OUTPATIENT)
Dept: CASE MANAGEMENT | Facility: OTHER | Age: 63
End: 2018-05-04

## 2018-05-07 ENCOUNTER — OFFICE VISIT (OUTPATIENT)
Dept: FAMILY MEDICINE CLINIC | Facility: CLINIC | Age: 63
End: 2018-05-07

## 2018-05-07 VITALS
SYSTOLIC BLOOD PRESSURE: 122 MMHG | WEIGHT: 129.4 LBS | HEIGHT: 68 IN | TEMPERATURE: 98.5 F | OXYGEN SATURATION: 98 % | BODY MASS INDEX: 19.61 KG/M2 | DIASTOLIC BLOOD PRESSURE: 82 MMHG | HEART RATE: 76 BPM

## 2018-05-07 DIAGNOSIS — D64.9 ANEMIA, UNSPECIFIED TYPE: ICD-10-CM

## 2018-05-07 DIAGNOSIS — N28.9 ACUTE RENAL INSUFFICIENCY: Primary | ICD-10-CM

## 2018-05-07 PROCEDURE — 99213 OFFICE O/P EST LOW 20 MIN: CPT | Performed by: FAMILY MEDICINE

## 2018-05-07 PROCEDURE — 82607 VITAMIN B-12: CPT | Performed by: FAMILY MEDICINE

## 2018-05-07 PROCEDURE — 85027 COMPLETE CBC AUTOMATED: CPT | Performed by: FAMILY MEDICINE

## 2018-05-07 PROCEDURE — 83036 HEMOGLOBIN GLYCOSYLATED A1C: CPT | Performed by: FAMILY MEDICINE

## 2018-05-07 PROCEDURE — 82728 ASSAY OF FERRITIN: CPT | Performed by: FAMILY MEDICINE

## 2018-05-07 PROCEDURE — 80048 BASIC METABOLIC PNL TOTAL CA: CPT | Performed by: FAMILY MEDICINE

## 2018-05-07 NOTE — PROGRESS NOTES
" Subjective   Favio Casillas is a 63 y.o. male.     History of Present Illness   /82 (BP Location: Left arm, Patient Position: Sitting, Cuff Size: Adult)   Pulse 76   Temp 98.5 °F (36.9 °C) (Temporal Artery )   Ht 171.5 cm (67.52\")   Wt 58.7 kg (129 lb 6.4 oz)   SpO2 98%   BMI 19.96 kg/m²     Recent hospitalization acute renal failure, type 2 diabetes, chronic hep c, small bowel obstruction, cad.  He reports he was given morphine for his legs while in the hospital and the doctor told him to see his regular doctor to give him something that begins with a \"g.\"    He is anemic  He is sleeping at the [facility], his mom recently .    He says due to his legs he is not sleeping    Review of Systems   Constitutional: Negative for chills, fatigue and fever.   HENT: Negative for congestion, ear discharge, ear pain, facial swelling, hearing loss, postnasal drip, rhinorrhea, sinus pressure, sore throat, trouble swallowing and voice change.    Eyes: Negative for discharge, redness and visual disturbance.   Respiratory: Negative for cough, chest tightness, shortness of breath and wheezing.    Cardiovascular: Negative for chest pain and palpitations.   Gastrointestinal: Negative for abdominal pain, blood in stool, constipation, diarrhea, nausea and vomiting.   Endocrine: Negative for polydipsia and polyuria.   Genitourinary: Negative for dysuria, flank pain, hematuria and urgency.   Musculoskeletal: Negative for arthralgias, back pain, joint swelling and myalgias.   Skin: Negative for rash.   Neurological: Negative for dizziness, weakness, numbness and headaches.   Hematological: Negative for adenopathy.   Psychiatric/Behavioral: Negative for confusion and sleep disturbance. The patient is not nervous/anxious.        Objective   Physical Exam   Constitutional: He is oriented to person, place, and time. He appears well-developed and well-nourished.   HENT:   Head: Normocephalic and atraumatic.   Right Ear: " External ear normal.   Left Ear: External ear normal.   Nose: Nose normal.   Eyes: Conjunctivae and EOM are normal. Pupils are equal, round, and reactive to light.   Neck: Normal range of motion. Neck supple.   Cardiovascular: Normal rate, regular rhythm and normal heart sounds.  Exam reveals no gallop and no friction rub.    No murmur heard.  Pulmonary/Chest: Effort normal and breath sounds normal.   Abdominal: Soft. Bowel sounds are normal. He exhibits no distension. There is no tenderness. There is no rebound and no guarding.   Musculoskeletal: Normal range of motion. He exhibits no edema or deformity.   Neurological: He is alert and oriented to person, place, and time. No cranial nerve deficit.   Skin: Skin is warm and dry. No rash noted. No erythema.   Psychiatric: He has a normal mood and affect. His behavior is normal. Judgment and thought content normal.   Nursing note and vitals reviewed.      Assessment/Plan   Favio was seen today for follow-up.    Diagnoses and all orders for this visit:    Acute renal insufficiency  -     Basic Metabolic Panel    Anemia, unspecified type  -     CBC (No Diff)  -     Ferritin  -     Vitamin B12    will check ferritin, if less than 100 may be contributing to his legs.    Recommended he see resident in [place] to establish since moving to [place].    He is to see nephrologist.

## 2018-05-08 LAB
ANION GAP SERPL CALCULATED.3IONS-SCNC: 13 MMOL/L (ref 5–15)
BUN BLD-MCNC: 39 MG/DL (ref 7–21)
BUN/CREAT SERPL: 21.3 (ref 7–25)
CALCIUM SPEC-SCNC: 8.3 MG/DL (ref 8.4–10.2)
CHLORIDE SERPL-SCNC: 110 MMOL/L (ref 95–110)
CO2 SERPL-SCNC: 15 MMOL/L (ref 22–31)
CREAT BLD-MCNC: 1.83 MG/DL (ref 0.7–1.3)
DEPRECATED RDW RBC AUTO: 48.3 FL (ref 35.1–43.9)
ERYTHROCYTE [DISTWIDTH] IN BLOOD BY AUTOMATED COUNT: 14.1 % (ref 11.5–14.5)
FERRITIN SERPL-MCNC: 155 NG/ML (ref 17.9–464)
GFR SERPL CREATININE-BSD FRML MDRD: 38 ML/MIN/1.73 (ref 49–113)
GLUCOSE BLD-MCNC: 171 MG/DL (ref 60–100)
HCT VFR BLD AUTO: 39.8 % (ref 39–49)
HGB BLD-MCNC: 12.8 G/DL (ref 13.7–17.3)
MCH RBC QN AUTO: 29.9 PG (ref 26.5–34)
MCHC RBC AUTO-ENTMCNC: 32.2 G/DL (ref 31.5–36.3)
MCV RBC AUTO: 93 FL (ref 80–98)
PLATELET # BLD AUTO: 233 10*3/MM3 (ref 150–450)
PMV BLD AUTO: 10.6 FL (ref 8–12)
POTASSIUM BLD-SCNC: 4.7 MMOL/L (ref 3.5–5.1)
RBC # BLD AUTO: 4.28 10*6/MM3 (ref 4.37–5.74)
SODIUM BLD-SCNC: 138 MMOL/L (ref 137–145)
VIT B12 BLD-MCNC: 286 PG/ML (ref 239–931)
WBC NRBC COR # BLD: 5.51 10*3/MM3 (ref 3.2–9.8)

## 2018-05-10 ENCOUNTER — OFFICE VISIT (OUTPATIENT)
Dept: GASTROENTEROLOGY | Facility: CLINIC | Age: 63
End: 2018-05-10

## 2018-05-10 VITALS
SYSTOLIC BLOOD PRESSURE: 120 MMHG | WEIGHT: 128.4 LBS | HEART RATE: 94 BPM | HEIGHT: 67 IN | BODY MASS INDEX: 20.15 KG/M2 | DIASTOLIC BLOOD PRESSURE: 74 MMHG

## 2018-05-10 DIAGNOSIS — Z86.19 HISTORY OF HEPATITIS C: ICD-10-CM

## 2018-05-10 DIAGNOSIS — K21.00 GASTROESOPHAGEAL REFLUX DISEASE WITH ESOPHAGITIS: ICD-10-CM

## 2018-05-10 DIAGNOSIS — R10.13 EPIGASTRIC PAIN: Primary | ICD-10-CM

## 2018-05-10 PROCEDURE — 99213 OFFICE O/P EST LOW 20 MIN: CPT | Performed by: PHYSICIAN ASSISTANT

## 2018-05-10 RX ORDER — ISOSORBIDE MONONITRATE 30 MG/1
60 TABLET, EXTENDED RELEASE ORAL 2 TIMES DAILY
COMMUNITY
End: 2019-03-29 | Stop reason: HOSPADM
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
## 2018-05-10 NOTE — PATIENT INSTRUCTIONS
MyPlate from [employer]  The general, healthful diet is based on the 2010 Dietary Guidelines for Americans. The amount of food you need to eat from each food group depends on your age, sex, and level of physical activity and can be individualized by a dietitian. Go to ChooseMyPlate.gov for more information.  What do I need to know about the MyPlate plan?  · Enjoy your food, but eat less.  · Avoid oversized portions.  ¨ ½ of your plate should include fruits and vegetables.  ¨ ¼ of your plate should be grains.  ¨ ¼ of your plate should be protein.  Grains   · Make at least half of your grains whole grains.  · For a 2,000 calorie daily food plan, eat 6 oz every day.  · 1 oz is about 1 slice bread, 1 cup cereal, or ½ cup cooked rice, cereal, or pasta.  Vegetables   · Make half your plate fruits and vegetables.  · For a 2,000 calorie daily food plan, eat 2½ cups every day.  · 1 cup is about 1 cup raw or cooked vegetables or vegetable juice or 2 cups raw leafy greens.  Fruits   · Make half your plate fruits and vegetables.  · For a 2,000 calorie daily food plan, eat 2 cups every day.  · 1 cup is about 1 cup fruit or 100% fruit juice or ½ cup dried fruit.  Protein   · For a 2,000 calorie daily food plan, eat 5½ oz every day.  · 1 oz is about 1 oz meat, poultry, or fish, ¼ cup cooked beans, 1 egg, 1 Tbsp peanut butter, or ½ oz nuts or seeds.  Dairy   · Switch to fat-free or low-fat (1%) milk.  · For a 2,000 calorie daily food plan, eat 3 cups every day.  · 1 cup is about 1 cup milk or yogurt or soy milk (soy beverage), 1½ oz natural cheese, or 2 oz processed cheese.  Fats, Oils, and Empty Calories   · Only small amounts of oils are recommended.  · Empty calories are calories from solid fats or added sugars.  · Compare sodium in foods like soup, bread, and frozen meals. Choose the foods with lower numbers.  · Drink water instead of sugary drinks.  What foods can I eat?  Grains   Whole grains such as whole wheat, quinoa, millet,  and bulgur. Bread, rolls, and pasta made from whole grains. Brown or wild rice. Hot or cold cereals made from whole grains and without added sugar.  Vegetables   All fresh vegetables, especially fresh red, dark green, or orange vegetables. Peas and beans. Low-sodium frozen or canned vegetables prepared without added salt. Low-sodium vegetable juices.  Fruits   All fresh, frozen, and dried fruits. Canned fruit packed in water or fruit juice without added sugar. Fruit juices without added sugar.  Meats and Other Protein Sources   Boiled, baked, or grilled lean meat trimmed of fat. Skinless poultry. Fresh seafood and shellfish. Canned seafood packed in water. Unsalted nuts and unsalted nut butters. Tofu. Dried beans and pea. Eggs.  Dairy   Low-fat or fat-free milk, yogurt, and cheeses.  Sweets and Desserts   Frozen desserts made from low-fat milk.  Fats and Oils   Olive, peanut, and canola oils and margarine. Salad dressing and mayonnaise made from these oils.  Other   Soups and casseroles made from allowed ingredients and without added fat or salt.  The items listed above may not be a complete list of recommended foods or beverages. Contact your dietitian for more options.   What foods are not recommended?  Grains   Sweetened, low-fiber cereals. Packaged baked goods. Snack crackers and chips. Cheese crackers, butter crackers, and biscuits. Frozen waffles, sweet breads, doughnuts, pastries, packaged baking mixes, pancakes, cakes, and cookies.  Vegetables   Regular canned or frozen vegetables or vegetables prepared with salt. Canned tomatoes. Canned tomato sauce. Fried vegetables. Vegetables in cream sauce or cheese sauce.  Fruits   Fruits packed in syrup or made with added sugar.  Meats and Other Protein Sources   Marbled or fatty meats such as ribs. Poultry with skin. Fried meats, poultry, eggs, or fish. Sausages, hot dogs, and deli meats such as pastrami, bologna, or salami.  Dairy   Whole milk, cream, cheeses made  from whole milk, sour cream. Ice cream or yogurt made from whole milk or with added sugar.  Beverages   For adults, no more than one alcoholic drink per day. Regular soft drinks or other sugary beverages. Juice drinks.  Sweets and Desserts   Sugary or fatty desserts, candy, and other sweets.  Fats and Oils   Solid shortening or partially hydrogenated oils. Solid margarine. Margarine that contains trans fats. Butter.  The items listed above may not be a complete list of foods and beverages to avoid. Contact your dietitian for more information.   This information is not intended to replace advice given to you by your health care provider. Make sure you discuss any questions you have with your health care provider.  Document Released: 01/06/2009 Document Revised: 05/25/2017 Document Reviewed: 11/26/2014  [employer] Interactive Patient Education © 2017 [employer]  BMI for Adults  Body mass index (BMI) is a number that is calculated from a person's weight and height. In most adults, the number is used to find how much of an adult's weight is made up of fat. BMI is not as accurate as a direct measure of body fat.  How is BMI calculated?  BMI is calculated by dividing weight in kilograms by height in meters squared. It can also be calculated by dividing weight in pounds by height in inches squared, then multiplying the resulting number by 703. Charts are available to help you find your BMI quickly and easily without doing this calculation.  How is BMI interpreted?  Health care professionals use BMI charts to identify whether an adult is underweight, at a normal weight, or overweight based on the following guidelines:  · Underweight: BMI less than 18.5.  · Normal weight: BMI between 18.5 and 24.9.  · Overweight: BMI between 25 and 29.9.  · Obese: BMI of 30 and above.  BMI is usually interpreted the same for males and females.  Weight includes both fat and muscle, so someone with a muscular build, such as an athlete, may  have a BMI that is higher than 24.9. In cases like these, BMI may not accurately depict body fat. To determine if excess body fat is the cause of a BMI of 25 or higher, further assessments may need to be done by a health care provider.  Why is BMI a useful tool?  BMI is used to identify a possible weight problem that may be related to a medical problem or may increase the risk for medical problems. BMI can also be used to promote changes to reach a healthy weight.  This information is not intended to replace advice given to you by your health care provider. Make sure you discuss any questions you have with your health care provider.  Document Released: 08/29/2005 Document Revised: 04/27/2017 Document Reviewed: 05/15/2015  [employer] Interactive Patient Education © 2017 [employer]

## 2018-05-14 ENCOUNTER — TELEPHONE (OUTPATIENT)
Dept: FAMILY MEDICINE CLINIC | Facility: CLINIC | Age: 63
End: 2018-05-14

## 2018-05-14 DIAGNOSIS — N28.9 ACUTE RENAL INSUFFICIENCY: Primary | ICD-10-CM

## 2018-05-14 NOTE — TELEPHONE ENCOUNTER
HECTOR FROM DR. LINDSEY'S OFFICE CALLED BECAUSE PATIENT HAD CALLED THEM.  HE SAID HE IS SUPPOSED TO HAVE A REFERRAL TO HIM.  DO YOU WANT TO PUT ONE IN?

## 2018-05-22 ENCOUNTER — OFFICE VISIT (OUTPATIENT)
Dept: FAMILY MEDICINE CLINIC | Facility: CLINIC | Age: 63
End: 2018-05-22

## 2018-05-22 VITALS
HEIGHT: 67 IN | HEART RATE: 91 BPM | TEMPERATURE: 99.2 F | WEIGHT: 124.8 LBS | DIASTOLIC BLOOD PRESSURE: 72 MMHG | OXYGEN SATURATION: 98 % | BODY MASS INDEX: 19.59 KG/M2 | SYSTOLIC BLOOD PRESSURE: 100 MMHG

## 2018-05-22 DIAGNOSIS — J30.2 ACUTE SEASONAL ALLERGIC RHINITIS, UNSPECIFIED TRIGGER: ICD-10-CM

## 2018-05-22 DIAGNOSIS — N28.9 RENAL INSUFFICIENCY: Primary | ICD-10-CM

## 2018-05-22 DIAGNOSIS — M62.838 MUSCLE SPASM: ICD-10-CM

## 2018-05-22 DIAGNOSIS — R25.2 LEG CRAMPS: ICD-10-CM

## 2018-05-22 DIAGNOSIS — J44.9 CHRONIC OBSTRUCTIVE PULMONARY DISEASE, UNSPECIFIED COPD TYPE (HCC): ICD-10-CM

## 2018-05-22 DIAGNOSIS — E11.8 TYPE 2 DIABETES MELLITUS WITH COMPLICATION, WITH LONG-TERM CURRENT USE OF INSULIN (HCC): ICD-10-CM

## 2018-05-22 DIAGNOSIS — Z79.4 TYPE 2 DIABETES MELLITUS WITH COMPLICATION, WITH LONG-TERM CURRENT USE OF INSULIN (HCC): ICD-10-CM

## 2018-05-22 PROCEDURE — 99214 OFFICE O/P EST MOD 30 MIN: CPT | Performed by: FAMILY MEDICINE

## 2018-05-22 PROCEDURE — 36415 COLL VENOUS BLD VENIPUNCTURE: CPT | Performed by: FAMILY MEDICINE

## 2018-05-22 PROCEDURE — 96372 THER/PROPH/DIAG INJ SC/IM: CPT | Performed by: FAMILY MEDICINE

## 2018-05-22 PROCEDURE — 80048 BASIC METABOLIC PNL TOTAL CA: CPT | Performed by: FAMILY MEDICINE

## 2018-05-22 RX ORDER — PIOGLITAZONEHYDROCHLORIDE 15 MG/1
15 TABLET ORAL DAILY
Qty: 30 TABLET | Refills: 2 | Status: SHIPPED | OUTPATIENT
Start: 2018-05-22 | End: 2018-08-23 | Stop reason: SDUPTHER

## 2018-05-22 RX ORDER — GUAIFENESIN/DEXTROMETHORPHAN 100-10MG/5
10 SYRUP ORAL 4 TIMES DAILY PRN
Qty: 240 ML | Refills: 1 | Status: SHIPPED | OUTPATIENT
Start: 2018-05-22 | End: 2019-02-28

## 2018-05-22 RX ORDER — GLIMEPIRIDE 1 MG/1
1 TABLET ORAL
Qty: 30 TABLET | Refills: 2 | Status: SHIPPED | OUTPATIENT
Start: 2018-05-22 | End: 2018-08-23 | Stop reason: SDUPTHER

## 2018-05-22 RX ORDER — TRIAMCINOLONE ACETONIDE 40 MG/ML
80 INJECTION, SUSPENSION INTRA-ARTICULAR; INTRAMUSCULAR ONCE
Status: COMPLETED | OUTPATIENT
Start: 2018-05-22 | End: 2018-05-22

## 2018-05-22 RX ORDER — FLUTICASONE PROPIONATE 50 MCG
2 SPRAY, SUSPENSION (ML) NASAL DAILY
Qty: 1 BOTTLE | Refills: 11 | Status: SHIPPED | OUTPATIENT
Start: 2018-05-22 | End: 2021-08-04

## 2018-05-22 RX ORDER — LORATADINE 10 MG/1
10 TABLET ORAL DAILY
Qty: 30 TABLET | Refills: 11 | Status: SHIPPED | OUTPATIENT
Start: 2018-05-22 | End: 2019-05-02

## 2018-05-22 RX ORDER — TIZANIDINE 4 MG/1
4-8 TABLET ORAL NIGHTLY PRN
Qty: 120 TABLET | Refills: 11 | Status: SHIPPED | OUTPATIENT
Start: 2018-05-22 | End: 2019-02-28

## 2018-05-22 RX ADMIN — TRIAMCINOLONE ACETONIDE 80 MG: 40 INJECTION, SUSPENSION INTRA-ARTICULAR; INTRAMUSCULAR at 10:34

## 2018-05-22 NOTE — PROGRESS NOTES
" Subjective   Favio Casillas is a 63 y.o. male.     History of Present Illness   /72 (BP Location: Left arm, Patient Position: Sitting, Cuff Size: Adult)   Pulse 91   Temp 99.2 °F (37.3 °C) (Temporal Artery )   Ht 170.2 cm (67.01\")   Wt 56.6 kg (124 lb 12.8 oz)   SpO2 98%   BMI 19.54 kg/m²     hga1c 9.3, here for follow up.  Legs still cramping.  Taking otc potassium qod  Has renal insufficiency, cant find nephrologist to see.  He thought fellows was nephrologist.   Depressed,  Did not find place to live [place].  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  Also having cough  Denies suicidal    Review of Systems   Constitutional: Negative for chills, fatigue and fever.   HENT: Negative for congestion, ear discharge, ear pain, facial swelling, hearing loss, postnasal drip, rhinorrhea, sinus pressure, sore throat, trouble swallowing and voice change.    Eyes: Negative for discharge, redness and visual disturbance.   Respiratory: Positive for cough. Negative for chest tightness, shortness of breath and wheezing.    Cardiovascular: Negative for chest pain and palpitations.   Gastrointestinal: Negative for abdominal pain, blood in stool, constipation, diarrhea, nausea and vomiting.   Endocrine: Negative for polydipsia and polyuria.   Genitourinary: Negative for dysuria, flank pain, hematuria and urgency.   Musculoskeletal: Negative for arthralgias, back pain, joint swelling and myalgias.   Skin: Negative for rash.   Neurological: Negative for dizziness, weakness, numbness and headaches.   Hematological: Negative for adenopathy.   Psychiatric/Behavioral: Positive for dysphoric mood. Negative for confusion and sleep disturbance. The patient is not nervous/anxious.        Objective   Physical Exam   Constitutional: He is oriented to person, place, and time. He appears well-developed and well-nourished.   HENT:   Head: Normocephalic and atraumatic.   Right Ear: External ear normal. "   Left Ear: External ear normal.   Nose: Nose normal.   Mouth/Throat: Oropharynx is clear and moist.   Eyes: Conjunctivae and EOM are normal. Pupils are equal, round, and reactive to light.   Neck: Normal range of motion. Neck supple.   Cardiovascular: Normal rate, regular rhythm and normal heart sounds.  Exam reveals no gallop and no friction rub.    No murmur heard.  Pulmonary/Chest: Effort normal. He has wheezes.   Abdominal: Soft. Bowel sounds are normal. He exhibits no distension. There is no tenderness. There is no rebound and no guarding.   Musculoskeletal: Normal range of motion. He exhibits no edema or deformity.   Neurological: He is alert and oriented to person, place, and time. No cranial nerve deficit.   Skin: Skin is warm and dry. No rash noted. No erythema.   Psychiatric: He has a normal mood and affect. His behavior is normal. Judgment and thought content normal.   Nursing note and vitals reviewed.      Assessment/Plan   Favio was seen today for diabetes.    Diagnoses and all orders for this visit:    Renal insufficiency  -     Basic Metabolic Panel    Type 2 diabetes mellitus with complication, with long-term current use of insulin    Leg cramps    Muscle spasm  -     tiZANidine (ZANAFLEX) 4 MG tablet; Take 1-2 tablets by mouth At Night As Needed for Muscle Spasms.    Chronic obstructive pulmonary disease, unspecified COPD type  -     triamcinolone acetonide (KENALOG-40) injection 80 mg; Inject 2 mL into the shoulder, thigh, or buttocks 1 (One) Time.    Acute seasonal allergic rhinitis, unspecified trigger    Other orders  -     pioglitazone (ACTOS) 15 MG tablet; Take 1 tablet by mouth Daily.  -     sertraline (ZOLOFT) 50 MG tablet; 1/2 tablet hs 1 week then 1 tablet . (depression)  -     glimepiride (AMARYL) 1 MG tablet; Take 1 tablet by mouth Every Morning Before Breakfast.  -     loratadine (CLARITIN) 10 MG tablet; Take 1 tablet by mouth Daily.  -     fluticasone (FLONASE) 50 MCG/ACT nasal  spray; 2 sprays into each nostril Daily.  -     guaifenesin-dextromethorphan (ROBITUSSIN DM) 100-10 MG/5ML syrup; Take 10 mL by mouth 4 (Four) Times a Day As Needed for Cough.  -     SITagliptin (JANUVIA) 50 MG tablet; Take 1 tablet by mouth Daily.      Due to kidneys, cut januvia to 50mg qd, added actos and amaryl  Return 3 months  zoloft for depression.  Any problems or suicidal thinking stop  If renal function not improving will ref to urologist

## 2018-05-23 LAB
ANION GAP SERPL CALCULATED.3IONS-SCNC: 12 MMOL/L (ref 5–15)
BUN BLD-MCNC: 32 MG/DL (ref 7–21)
BUN/CREAT SERPL: 17.8 (ref 7–25)
CALCIUM SPEC-SCNC: 8.9 MG/DL (ref 8.4–10.2)
CHLORIDE SERPL-SCNC: 113 MMOL/L (ref 95–110)
CO2 SERPL-SCNC: 15 MMOL/L (ref 22–31)
CREAT BLD-MCNC: 1.8 MG/DL (ref 0.7–1.3)
GFR SERPL CREATININE-BSD FRML MDRD: 39 ML/MIN/1.73 (ref 49–113)
GLUCOSE BLD-MCNC: 133 MG/DL (ref 60–100)
POTASSIUM BLD-SCNC: 4.1 MMOL/L (ref 3.5–5.1)
SODIUM BLD-SCNC: 140 MMOL/L (ref 137–145)

## 2018-05-30 RX ORDER — SITAGLIPTIN 100 MG/1
TABLET, FILM COATED ORAL
Qty: 30 TABLET | Refills: 5 | Status: SHIPPED | OUTPATIENT
Start: 2018-05-30 | End: 2018-06-15 | Stop reason: HOSPADM

## 2018-06-11 ENCOUNTER — APPOINTMENT (OUTPATIENT)
Dept: CT IMAGING | Facility: HOSPITAL | Age: 63
End: 2018-06-11

## 2018-06-11 ENCOUNTER — HOSPITAL ENCOUNTER (INPATIENT)
Facility: HOSPITAL | Age: 63
LOS: 4 days | Discharge: HOME | End: 2018-06-15
Attending: EMERGENCY MEDICINE | Admitting: HOSPITALIST

## 2018-06-11 DIAGNOSIS — N28.9 ACUTE RENAL INSUFFICIENCY: ICD-10-CM

## 2018-06-11 DIAGNOSIS — N17.9 ACUTE KIDNEY INJURY (NONTRAUMATIC) (HCC): Primary | ICD-10-CM

## 2018-06-11 DIAGNOSIS — E11.8 TYPE 2 DIABETES MELLITUS WITH COMPLICATION, WITH LONG-TERM CURRENT USE OF INSULIN (HCC): ICD-10-CM

## 2018-06-11 DIAGNOSIS — R11.2 INTRACTABLE VOMITING WITH NAUSEA, UNSPECIFIED VOMITING TYPE: ICD-10-CM

## 2018-06-11 DIAGNOSIS — Z79.4 TYPE 2 DIABETES MELLITUS WITH COMPLICATION, WITH LONG-TERM CURRENT USE OF INSULIN (HCC): ICD-10-CM

## 2018-06-11 DIAGNOSIS — Z74.09 IMPAIRED FUNCTIONAL MOBILITY, BALANCE, GAIT, AND ENDURANCE: ICD-10-CM

## 2018-06-11 DIAGNOSIS — K56.7 ILEUS (HCC): ICD-10-CM

## 2018-06-11 LAB
ALBUMIN SERPL-MCNC: 4.2 G/DL (ref 3.4–4.8)
ALBUMIN/GLOB SERPL: 1.3 G/DL (ref 1.1–1.8)
ALP SERPL-CCNC: 88 U/L (ref 38–126)
ALT SERPL W P-5'-P-CCNC: 36 U/L (ref 21–72)
ANION GAP SERPL CALCULATED.3IONS-SCNC: 18 MMOL/L (ref 5–15)
AST SERPL-CCNC: 26 U/L (ref 17–59)
BACTERIA UR QL AUTO: ABNORMAL /HPF
BASOPHILS # BLD AUTO: 0.01 10*3/MM3 (ref 0–0.2)
BASOPHILS NFR BLD AUTO: 0.1 % (ref 0–2)
BILIRUB SERPL-MCNC: 0.3 MG/DL (ref 0.2–1.3)
BILIRUB UR QL STRIP: NEGATIVE
BUN BLD-MCNC: 77 MG/DL (ref 7–21)
BUN/CREAT SERPL: 19.6 (ref 7–25)
CALCIUM SPEC-SCNC: 8.6 MG/DL (ref 8.4–10.2)
CHLORIDE SERPL-SCNC: 114 MMOL/L (ref 95–110)
CLARITY UR: CLEAR
CO2 SERPL-SCNC: 7 MMOL/L (ref 22–31)
COLOR UR: YELLOW
CREAT BLD-MCNC: 3.93 MG/DL (ref 0.7–1.3)
D-LACTATE SERPL-SCNC: <0.5 MMOL/L (ref 0.5–2)
DEPRECATED RDW RBC AUTO: 47.9 FL (ref 35.1–43.9)
EOSINOPHIL # BLD AUTO: 0.04 10*3/MM3 (ref 0–0.7)
EOSINOPHIL NFR BLD AUTO: 0.4 % (ref 0–7)
ERYTHROCYTE [DISTWIDTH] IN BLOOD BY AUTOMATED COUNT: 14.8 % (ref 11.5–14.5)
GFR SERPL CREATININE-BSD FRML MDRD: 16 ML/MIN/1.73 (ref 49–113)
GLOBULIN UR ELPH-MCNC: 3.2 GM/DL (ref 2.3–3.5)
GLUCOSE BLD-MCNC: 116 MG/DL (ref 60–100)
GLUCOSE BLDC GLUCOMTR-MCNC: 109 MG/DL (ref 70–130)
GLUCOSE BLDC GLUCOMTR-MCNC: 111 MG/DL (ref 70–130)
GLUCOSE BLDC GLUCOMTR-MCNC: 127 MG/DL (ref 70–130)
GLUCOSE BLDC GLUCOMTR-MCNC: 275 MG/DL (ref 70–130)
GLUCOSE UR STRIP-MCNC: NEGATIVE MG/DL
HCT VFR BLD AUTO: 40 % (ref 39–49)
HGB BLD-MCNC: 13.5 G/DL (ref 13.7–17.3)
HGB UR QL STRIP.AUTO: ABNORMAL
HOLD SPECIMEN: NORMAL
HOLD SPECIMEN: NORMAL
HYALINE CASTS UR QL AUTO: ABNORMAL /LPF
IMM GRANULOCYTES # BLD: 0.03 10*3/MM3 (ref 0–0.02)
IMM GRANULOCYTES NFR BLD: 0.3 % (ref 0–0.5)
KETONES UR QL STRIP: NEGATIVE
LEUKOCYTE ESTERASE UR QL STRIP.AUTO: NEGATIVE
LIPASE SERPL-CCNC: 314 U/L (ref 23–300)
LYMPHOCYTES # BLD AUTO: 1 10*3/MM3 (ref 0.6–4.2)
LYMPHOCYTES NFR BLD AUTO: 10.1 % (ref 10–50)
MCH RBC QN AUTO: 30.1 PG (ref 26.5–34)
MCHC RBC AUTO-ENTMCNC: 33.8 G/DL (ref 31.5–36.3)
MCV RBC AUTO: 89.1 FL (ref 80–98)
MONOCYTES # BLD AUTO: 0.57 10*3/MM3 (ref 0–0.9)
MONOCYTES NFR BLD AUTO: 5.7 % (ref 0–12)
MUCOUS THREADS URNS QL MICRO: ABNORMAL /HPF
NEUTROPHILS # BLD AUTO: 8.27 10*3/MM3 (ref 2–8.6)
NEUTROPHILS NFR BLD AUTO: 83.4 % (ref 37–80)
NITRITE UR QL STRIP: NEGATIVE
PH UR STRIP.AUTO: 7 [PH] (ref 5–9)
PLATELET # BLD AUTO: 203 10*3/MM3 (ref 150–450)
PMV BLD AUTO: 9.8 FL (ref 8–12)
POTASSIUM BLD-SCNC: 4 MMOL/L (ref 3.5–5.1)
PROT SERPL-MCNC: 7.4 G/DL (ref 6.3–8.6)
PROT UR QL STRIP: ABNORMAL
RBC # BLD AUTO: 4.49 10*6/MM3 (ref 4.37–5.74)
RBC # UR: ABNORMAL /HPF
REF LAB TEST METHOD: ABNORMAL
SODIUM BLD-SCNC: 139 MMOL/L (ref 137–145)
SP GR UR STRIP: 1.01 (ref 1–1.03)
SQUAMOUS #/AREA URNS HPF: ABNORMAL /HPF
UROBILINOGEN UR QL STRIP: ABNORMAL
WBC NRBC COR # BLD: 9.92 10*3/MM3 (ref 3.2–9.8)
WBC UR QL AUTO: ABNORMAL /HPF
WHOLE BLOOD HOLD SPECIMEN: NORMAL
WHOLE BLOOD HOLD SPECIMEN: NORMAL

## 2018-06-11 PROCEDURE — 0 DIATRIZOATE MEGLUMINE & SODIUM PER 1 ML: Performed by: EMERGENCY MEDICINE

## 2018-06-11 PROCEDURE — 81001 URINALYSIS AUTO W/SCOPE: CPT | Performed by: EMERGENCY MEDICINE

## 2018-06-11 PROCEDURE — 74176 CT ABD & PELVIS W/O CONTRAST: CPT

## 2018-06-11 PROCEDURE — 25010000002 HYDROMORPHONE PER 4 MG: Performed by: EMERGENCY MEDICINE

## 2018-06-11 PROCEDURE — 85025 COMPLETE CBC W/AUTO DIFF WBC: CPT | Performed by: EMERGENCY MEDICINE

## 2018-06-11 PROCEDURE — 25010000002 ONDANSETRON PER 1 MG

## 2018-06-11 PROCEDURE — 25010000002 ONDANSETRON PER 1 MG: Performed by: EMERGENCY MEDICINE

## 2018-06-11 PROCEDURE — 63710000001 INSULIN ASPART PER 5 UNITS: Performed by: NURSE PRACTITIONER

## 2018-06-11 PROCEDURE — 82962 GLUCOSE BLOOD TEST: CPT

## 2018-06-11 PROCEDURE — 80053 COMPREHEN METABOLIC PANEL: CPT | Performed by: EMERGENCY MEDICINE

## 2018-06-11 PROCEDURE — 83690 ASSAY OF LIPASE: CPT | Performed by: EMERGENCY MEDICINE

## 2018-06-11 PROCEDURE — 99284 EMERGENCY DEPT VISIT MOD MDM: CPT

## 2018-06-11 PROCEDURE — 94799 UNLISTED PULMONARY SVC/PX: CPT

## 2018-06-11 PROCEDURE — 83605 ASSAY OF LACTIC ACID: CPT | Performed by: EMERGENCY MEDICINE

## 2018-06-11 RX ORDER — CETIRIZINE HYDROCHLORIDE 5 MG/1
5 TABLET ORAL DAILY
Status: DISCONTINUED | OUTPATIENT
Start: 2018-06-11 | End: 2018-06-15 | Stop reason: HOSPADM

## 2018-06-11 RX ORDER — PANTOPRAZOLE SODIUM 40 MG/1
40 TABLET, DELAYED RELEASE ORAL EVERY MORNING
Status: DISCONTINUED | OUTPATIENT
Start: 2018-06-12 | End: 2018-06-15 | Stop reason: HOSPADM

## 2018-06-11 RX ORDER — CARVEDILOL 3.12 MG/1
3.12 TABLET ORAL 2 TIMES DAILY WITH MEALS
Status: DISCONTINUED | OUTPATIENT
Start: 2018-06-11 | End: 2018-06-15 | Stop reason: HOSPADM

## 2018-06-11 RX ORDER — DEXTROSE MONOHYDRATE 25 G/50ML
25 INJECTION, SOLUTION INTRAVENOUS
Status: DISCONTINUED | OUTPATIENT
Start: 2018-06-11 | End: 2018-06-15 | Stop reason: HOSPADM

## 2018-06-11 RX ORDER — DOCUSATE SODIUM 100 MG/1
100 CAPSULE, LIQUID FILLED ORAL 2 TIMES DAILY
Status: DISCONTINUED | OUTPATIENT
Start: 2018-06-11 | End: 2018-06-15 | Stop reason: HOSPADM

## 2018-06-11 RX ORDER — BISACODYL 5 MG/1
10 TABLET, DELAYED RELEASE ORAL DAILY PRN
Status: DISCONTINUED | OUTPATIENT
Start: 2018-06-11 | End: 2018-06-15 | Stop reason: HOSPADM

## 2018-06-11 RX ORDER — POTASSIUM CHLORIDE 750 MG/1
10 CAPSULE, EXTENDED RELEASE ORAL DAILY
Status: DISCONTINUED | OUTPATIENT
Start: 2018-06-11 | End: 2018-06-14

## 2018-06-11 RX ORDER — ONDANSETRON 2 MG/ML
4 INJECTION INTRAMUSCULAR; INTRAVENOUS ONCE
Status: COMPLETED | OUTPATIENT
Start: 2018-06-11 | End: 2018-06-11

## 2018-06-11 RX ORDER — PIOGLITAZONEHYDROCHLORIDE 15 MG/1
15 TABLET ORAL DAILY
Status: DISCONTINUED | OUTPATIENT
Start: 2018-06-11 | End: 2018-06-15 | Stop reason: HOSPADM

## 2018-06-11 RX ORDER — SODIUM CHLORIDE 0.9 % (FLUSH) 0.9 %
10 SYRINGE (ML) INJECTION AS NEEDED
Status: DISCONTINUED | OUTPATIENT
Start: 2018-06-11 | End: 2018-06-15 | Stop reason: HOSPADM

## 2018-06-11 RX ORDER — GLIPIZIDE 5 MG/1
2.5 TABLET ORAL
Status: DISCONTINUED | OUTPATIENT
Start: 2018-06-12 | End: 2018-06-15 | Stop reason: HOSPADM

## 2018-06-11 RX ORDER — NICOTINE POLACRILEX 4 MG
15 LOZENGE BUCCAL
Status: DISCONTINUED | OUTPATIENT
Start: 2018-06-11 | End: 2018-06-15 | Stop reason: HOSPADM

## 2018-06-11 RX ORDER — ATORVASTATIN CALCIUM 10 MG/1
10 TABLET, FILM COATED ORAL DAILY
Status: DISCONTINUED | OUTPATIENT
Start: 2018-06-11 | End: 2018-06-15 | Stop reason: HOSPADM

## 2018-06-11 RX ORDER — ONDANSETRON 2 MG/ML
INJECTION INTRAMUSCULAR; INTRAVENOUS
Status: COMPLETED
Start: 2018-06-11 | End: 2018-06-11

## 2018-06-11 RX ORDER — ONDANSETRON 2 MG/ML
4 INJECTION INTRAMUSCULAR; INTRAVENOUS EVERY 6 HOURS PRN
Status: DISCONTINUED | OUTPATIENT
Start: 2018-06-11 | End: 2018-06-15 | Stop reason: HOSPADM

## 2018-06-11 RX ORDER — METOCLOPRAMIDE 5 MG/1
5 TABLET ORAL
Status: DISCONTINUED | OUTPATIENT
Start: 2018-06-11 | End: 2018-06-15 | Stop reason: HOSPADM

## 2018-06-11 RX ORDER — SODIUM CHLORIDE 0.9 % (FLUSH) 0.9 %
1-10 SYRINGE (ML) INJECTION AS NEEDED
Status: DISCONTINUED | OUTPATIENT
Start: 2018-06-11 | End: 2018-06-15 | Stop reason: HOSPADM

## 2018-06-11 RX ORDER — SODIUM CHLORIDE 9 MG/ML
125 INJECTION, SOLUTION INTRAVENOUS CONTINUOUS
Status: DISCONTINUED | OUTPATIENT
Start: 2018-06-11 | End: 2018-06-11

## 2018-06-11 RX ORDER — ALUMINA, MAGNESIA, AND SIMETHICONE 2400; 2400; 240 MG/30ML; MG/30ML; MG/30ML
15 SUSPENSION ORAL ONCE
Status: COMPLETED | OUTPATIENT
Start: 2018-06-11 | End: 2018-06-11

## 2018-06-11 RX ORDER — ISOSORBIDE MONONITRATE 60 MG/1
60 TABLET, EXTENDED RELEASE ORAL 2 TIMES DAILY
Status: DISCONTINUED | OUTPATIENT
Start: 2018-06-11 | End: 2018-06-15 | Stop reason: HOSPADM

## 2018-06-11 RX ORDER — ACETAMINOPHEN 325 MG/1
650 TABLET ORAL EVERY 4 HOURS PRN
Status: DISCONTINUED | OUTPATIENT
Start: 2018-06-11 | End: 2018-06-15 | Stop reason: HOSPADM

## 2018-06-11 RX ORDER — ASPIRIN 81 MG/1
81 TABLET, CHEWABLE ORAL DAILY
Status: DISCONTINUED | OUTPATIENT
Start: 2018-06-11 | End: 2018-06-15 | Stop reason: HOSPADM

## 2018-06-11 RX ADMIN — ASPIRIN 81 MG 81 MG: 81 TABLET ORAL at 15:22

## 2018-06-11 RX ADMIN — TICAGRELOR 90 MG: 90 TABLET ORAL at 21:03

## 2018-06-11 RX ADMIN — POLYETHYLENE GLYCOL (3350) 17 G: 17 POWDER, FOR SOLUTION ORAL at 15:21

## 2018-06-11 RX ADMIN — ACETAMINOPHEN 650 MG: 325 TABLET ORAL at 15:27

## 2018-06-11 RX ADMIN — HYDROMORPHONE HYDROCHLORIDE 1 MG: 1 INJECTION, SOLUTION INTRAMUSCULAR; INTRAVENOUS; SUBCUTANEOUS at 08:20

## 2018-06-11 RX ADMIN — METOCLOPRAMIDE HYDROCHLORIDE 5 MG: 5 TABLET ORAL at 17:17

## 2018-06-11 RX ADMIN — ONDANSETRON HYDROCHLORIDE: 2 INJECTION, SOLUTION INTRAMUSCULAR; INTRAVENOUS at 11:22

## 2018-06-11 RX ADMIN — CARVEDILOL 3.12 MG: 3.12 TABLET, FILM COATED ORAL at 17:17

## 2018-06-11 RX ADMIN — LINAGLIPTIN 5 MG: 5 TABLET, FILM COATED ORAL at 15:21

## 2018-06-11 RX ADMIN — ALUMINUM HYDROXIDE, MAGNESIUM HYDROXIDE, AND DIMETHICONE 15 ML: 400; 400; 40 SUSPENSION ORAL at 11:03

## 2018-06-11 RX ADMIN — SODIUM CHLORIDE 1000 ML: 900 INJECTION, SOLUTION INTRAVENOUS at 08:06

## 2018-06-11 RX ADMIN — SODIUM BICARBONATE 150 ML/HR: 84 INJECTION, SOLUTION INTRAVENOUS at 23:30

## 2018-06-11 RX ADMIN — PIOGLITAZONE 15 MG: 15 TABLET ORAL at 15:22

## 2018-06-11 RX ADMIN — POTASSIUM CHLORIDE 10 MEQ: 750 CAPSULE, EXTENDED RELEASE ORAL at 15:22

## 2018-06-11 RX ADMIN — CETIRIZINE HYDROCHLORIDE 5 MG: 5 TABLET, FILM COATED ORAL at 15:21

## 2018-06-11 RX ADMIN — SODIUM BICARBONATE 50 MEQ: 84 INJECTION INTRAVENOUS at 12:59

## 2018-06-11 RX ADMIN — SERTRALINE HYDROCHLORIDE 50 MG: 50 TABLET ORAL at 15:22

## 2018-06-11 RX ADMIN — ATORVASTATIN CALCIUM 10 MG: 10 TABLET, FILM COATED ORAL at 15:21

## 2018-06-11 RX ADMIN — SODIUM BICARBONATE 150 ML/HR: 84 INJECTION, SOLUTION INTRAVENOUS at 14:42

## 2018-06-11 RX ADMIN — SODIUM CHLORIDE 125 ML/HR: 900 INJECTION, SOLUTION INTRAVENOUS at 11:03

## 2018-06-11 RX ADMIN — SODIUM CHLORIDE 125 ML/HR: 900 INJECTION, SOLUTION INTRAVENOUS at 07:48

## 2018-06-11 RX ADMIN — LIDOCAINE HYDROCHLORIDE 15 ML: 20 SOLUTION ORAL; TOPICAL at 11:03

## 2018-06-11 RX ADMIN — ONDANSETRON 4 MG: 2 INJECTION INTRAMUSCULAR; INTRAVENOUS at 08:20

## 2018-06-11 RX ADMIN — DIATRIZOATE MEGLUMINE AND DIATRIZOATE SODIUM 30 ML: 660; 100 LIQUID ORAL; RECTAL at 08:15

## 2018-06-11 RX ADMIN — INSULIN ASPART 6 UNITS: 100 INJECTION, SOLUTION INTRAVENOUS; SUBCUTANEOUS at 21:05

## 2018-06-11 RX ADMIN — ISOSORBIDE MONONITRATE 60 MG: 60 TABLET, EXTENDED RELEASE ORAL at 21:03

## 2018-06-11 RX ADMIN — DOCUSATE SODIUM 100 MG: 100 CAPSULE, LIQUID FILLED ORAL at 21:04

## 2018-06-11 RX ADMIN — ACETAMINOPHEN 650 MG: 325 TABLET ORAL at 21:14

## 2018-06-11 NOTE — ED TRIAGE NOTES
"Patient presents to the ED with complaints of abdominal pain and generalized weakness. Patient states, \"I think my metabolism is messing up again.\" When this RN asked patient to explain what he means by this he states he has been able to have a bowel movement in a few days, has not been able to keep any liquids down and that his sugar has been all over the place. Patient also states that his legs are hurting.     "
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
## 2018-06-11 NOTE — ED NOTES
Called Karen in [place] to notify her that patient has already finished drinking oral contrast. She states that is fine and that we will wait a little longer before scanning him.        Svetlana Schumacher RN  06/11/18 1706

## 2018-06-11 NOTE — PLAN OF CARE
Problem: Patient Care Overview  Goal: Plan of Care Review  Outcome: Ongoing (interventions implemented as appropriate)   06/11/18 1152   Coping/Psychosocial   Plan of Care Reviewed With patient   Plan of Care Review   Progress no change   OTHER   Outcome Summary Patient just arrived to unit from ER. Patient states pain in his stomach and legs is better after medication. Will continue to monitor.       Problem: Diabetes, Type 2 (Adult)  Goal: Signs and Symptoms of Listed Potential Problems Will be Absent, Minimized or Managed (Diabetes, Type 2)  Outcome: Ongoing (interventions implemented as appropriate)      Problem: Renal Failure/Kidney Injury, Acute (Adult)  Goal: Signs and Symptoms of Listed Potential Problems Will be Absent, Minimized or Managed (Renal Failure/Kidney Injury, Acute)  Outcome: Ongoing (interventions implemented as appropriate)      Problem: Pain, Acute (Adult)  Goal: Identify Related Risk Factors and Signs and Symptoms  Outcome: Outcome(s) achieved Date Met: 06/11/18    Goal: Acceptable Pain Control/Comfort Level  Outcome: Ongoing (interventions implemented as appropriate)
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
## 2018-06-11 NOTE — H&P
[facility] Medicine Admission      Date of Admission: 6/11/2018      Primary Care Physician: Harvinder Robert MD      Chief Complaint: Abdominal pain.     HPI: This is a 63-year-old male with past medical medical history of type 2 diabetes, hepatitis C, COPD, and CKD 3 who presents to [facility] with complaints of abdominal discomfort and uncontrolled blood sugars for the past 24-48 hours with some nausea and vomiting.  The patient was recently admitted last month for a small bowel obstruction that resolved without intervention.    Concurrent Medical History:  has a past medical history of Abnormal weight loss; Acute bronchiolitis; Acute bronchitis; Acute exacerbation of chronic obstructive airways disease; Adenomatous polyp of colon; Aptyalism; Artificial lens present; Artificial lens present; Astigmatism; Backache; Borderline glaucoma; Chest discomfort; Chest pain; Chronic hepatitis C; Chronic hepatitis C; Chronic obstructive lung disease; Common cold; Constipation; Coronary arteriosclerosis; Diarrhea; Disorder of duodenum; Disorder of duodenum; Disorder of gallbladder; Diverticula of colon; Diverticular disease of colon; Drug abuse; Elevated levels of transaminase & lactic acid dehydrogenase; Esophagitis; Essential hypertension; Fatigue; Gastritis; Gastroesophageal reflux disease; Generalized abdominal pain; Hand pain, right; Headache; Heel pain; Hemorrhoids; History of colon polyps; History of colonoscopy (08/19/2013); History of esophagogastroduodenoscopy (07/24/2015); History of neoplasm of bladder; History of pneumococcal vaccination; History of seizure; Left lower quadrant pain; Male erectile disorder; Malignant tumor of prostate; Myopia; Nausea and vomiting; Need for immunization against influenza; Need for prophylactic vaccination and inoculation against influenza; Pain; Pain in right hand; Patient's noncompliance with other medical treatment and regimen;  Periumbilical pain; Primary malignant neoplasm of bladder; Rash; Rash; Rheumatoid arthritis; Right upper quadrant pain; Sebaceous cyst; Seizure; Transient cerebral ischemia; Type 2 diabetes mellitus; and Viral hepatitis C.    Past Surgical History:  has a past surgical history that includes Bladder tumor excision; Bladder surgery (01/27/2005); Cholecystectomy (08/25/2011); Esophagogastroduodenoscopy (07/24/2015); Esophagogastroduodenoscopy (02/23/2009); Other surgical history (03/22/2012); Injection of Medication (05/23/2016); Wound Closure (03/22/2012); Back surgery (01/01/2000); Cataract extraction w/  intraocular lens implant (Right, 05/21/2009); Bladder surgery (01/27/2005); Cholecystectomy (08/25/2011); Cystoscopy (12/17/2004); Lumbar disc surgery (2000); Cataract Extraction (Right, 05/21/2009); Other surgical history; Injection of Medication (05/12/2016); Other surgical history (3/22/3012); Other surgical history (03/22/2012); Colonoscopy (08/19/2013); Colonoscopy (07/24/2015); Fracture surgery; Esophagogastroduodenoscopy (N/A, 3/3/2017); Upper gastrointestinal endoscopy (07/24/2015); Upper gastrointestinal endoscopy (03/03/2017); Wrist surgery (Left); and Cardiac catheterization (N/A, 12/15/2017).    Family History: family history includes Cholelithiasis in his other; Coronary artery disease in his other; Diabetes in his mother; Gallbladder disease in his other; Hepatitis in his other; Hypertension in his other; Liver cancer in his father; Tuberculosis in his other.    Social History:  reports that he has quit smoking. His smoking use included Cigarettes. He has a 159.00 pack-year smoking history. He quit smokeless tobacco use about 38 years ago. His smokeless tobacco use included Chew. He reports that he does not drink alcohol or use drugs.    Allergies: No Known Allergies    Medications:   Prior to Admission medications    Medication Sig Start Date End Date Taking? Authorizing Provider   albuterol  (PROVENTIL) (2.5 MG/3ML) 0.083% nebulizer solution Take 2.5 mg by nebulization Every 4 (Four) Hours As Needed for Wheezing or Shortness of Air. 4/17/17  Yes Harvinder Robert MD   aspirin 81 MG chewable tablet Chew 81 mg daily.   Yes Historical Provider, MD   BRILINTA 90 MG tablet tablet TAKE 1 TABLET BY MOUTH 2 (TWO) TIMES A DAY. 1/8/18  Yes Harvinder Robert MD   carvedilol (COREG) 3.125 MG tablet Take 3.125 mg by mouth 2 (Two) Times a Day With Meals.   Yes Historical Provider, MD   fluticasone (FLONASE) 50 MCG/ACT nasal spray 2 sprays into each nostril Daily. 5/22/18  Yes Harvinder Robert MD   glimepiride (AMARYL) 1 MG tablet Take 1 tablet by mouth Every Morning Before Breakfast. 5/22/18  Yes Harvinder Robert MD   guaifenesin-dextromethorphan (ROBITUSSIN DM) 100-10 MG/5ML syrup Take 10 mL by mouth 4 (Four) Times a Day As Needed for Cough. 5/22/18  Yes Harvinder Robert MD   isosorbide mononitrate (IMDUR) 30 MG 24 hr tablet Take 60 mg by mouth 2 (Two) Times a Day. 2 tablets by mouth bid   Yes Historical Provider, MD   JANUVIA 100 MG tablet TAKE 1 TABLET BY MOUTH DAILY. 5/30/18  Yes Harvinder Robert MD   loratadine (CLARITIN) 10 MG tablet Take 1 tablet by mouth Daily. 5/22/18  Yes Harvinder Robert MD   omeprazole (priLOSEC) 40 MG capsule TAKE 1 CAPSULE BY MOUTH TWO (2) TIMES A DAY 2/6/18  Yes Kevin Robbins PA-C   pioglitazone (ACTOS) 15 MG tablet Take 1 tablet by mouth Daily. 5/22/18  Yes Harvinder Robert MD   potassium chloride (K-DUR,KLOR-CON) 10 MEQ CR tablet Take 1 tablet by mouth Daily. 5/1/18  Yes Jose G Voss MD   pravastatin (PRAVACHOL) 40 MG tablet TAKE 1 TABLET BY MOUTH EVERY NIGHT AT BEDTIME 5/3/18  Yes Harvinder Robert MD   sertraline (ZOLOFT) 50 MG tablet 1/2 tablet hs 1 week then 1 tablet . (depression) 5/22/18  Yes Harvinder Robert MD   SITagliptin (JANUVIA) 50 MG tablet Take 1 tablet by mouth Daily. 5/22/18  Yes Harvinder Robert MD   SODIUM BICARBONATE PO Take 10 mg by mouth 2 (Two) Times a Day.   Yes  Historical Provider, MD   NITROSTAT 0.4 MG SL tablet PLACE 1 TABLET UNDER THE TONGUE AS NEEDED FOR CHEST PAIN. MAY REPEAT EVERY 5 MINUTES UP TO 3 DOSES, THEN GO TO EMERGENCY ROOM 12/22/16   Harvinder Robert MD   tiZANidine (ZANAFLEX) 4 MG tablet Take 1-2 tablets by mouth At Night As Needed for Muscle Spasms. 5/22/18   Harvinder Robert MD   VENTOLIN  (90 BASE) MCG/ACT inhaler INHALE 2 PUFFS EVERY FOUR HOURS AS NEEDED 6/26/17   Harvinder Robert MD       Review of Systems:  Review of Systems   Constitutional: Positive for appetite change and fatigue.   Gastrointestinal: Positive for abdominal pain, constipation, nausea and vomiting.      Otherwise complete ROS is negative except as mentioned above.    Physical Exam:   Temp:  [98.3 °F (36.8 °C)-98.5 °F (36.9 °C)] 98.5 °F (36.9 °C)  Heart Rate:  [77-96] 90  Resp:  [18] 18  BP: (125-148)/(67-85) 141/67  Physical Exam   Constitutional: He is oriented to person, place, and time. He appears well-developed and well-nourished.   HENT:   Head: Normocephalic and atraumatic.   Eyes: EOM are normal. Pupils are equal, round, and reactive to light.   Neck: Normal range of motion. Neck supple.   Cardiovascular: Normal rate and regular rhythm.    Pulmonary/Chest: Effort normal and breath sounds normal.   Abdominal: Soft. Bowel sounds are normal.   Musculoskeletal: Normal range of motion.   Neurological: He is alert and oriented to person, place, and time.   Skin: Skin is warm and dry.   Psychiatric: He has a normal mood and affect.       Results Reviewed:  I have personally reviewed current lab, radiology, and data and agree with results.  Lab Results (last 24 hours)     Procedure Component Value Units Date/Time    POC Glucose Once [[id number]]  (Normal) Collected:  06/11/18 1231    Specimen:  Blood Updated:  06/11/18 1246     Glucose 109 mg/dL      Comment: RN NotifiedMeter: [id number]: [id number] AIDA KABA       [place] Draw [[id number]] Collected:  06/11/18 0804     Specimen:  Blood Updated:  06/11/18 0915    Narrative:       The following orders were created for panel order [place] Draw.  Procedure                               Abnormality         Status                     ---------                               -----------         ------                     Light Blue Top[[id number]]                                   Final result               Green Top (Gel)[[id number]]                                  Final result               Lavender Top[[id number]]                                     Final result               Gold Top - SST[[id number]]                                   Final result                 Please view results for these tests on the individual orders.    Light Blue Top [[id number]] Collected:  06/11/18 0804    Specimen:  Blood Updated:  06/11/18 0915     Extra Tube hold for add-on     Comment: Auto resulted       Green Top (Gel) [[id number]] Collected:  06/11/18 0804    Specimen:  Blood Updated:  06/11/18 0915     Extra Tube Hold for add-ons.     Comment: Auto resulted.       Lavender Top [[id number]] Collected:  06/11/18 0804    Specimen:  Blood Updated:  06/11/18 0915     Extra Tube hold for add-on     Comment: Auto resulted       Gold Top - SST [[id number]] Collected:  06/11/18 0804    Specimen:  Blood Updated:  06/11/18 0915     Extra Tube Hold for add-ons.     Comment: Auto resulted.       Urinalysis, Microscopic Only - Urine, Clean Catch [[id number]]  (Abnormal) Collected:  06/11/18 0800    Specimen:  Urine from Urine, Clean Catch Updated:  06/11/18 0835     RBC, UA 3-5 (A) /HPF      WBC, UA 13-20 (A) /HPF      Bacteria, UA Trace (A) /HPF      Squamous Epithelial Cells, UA 3-5 (A) /HPF      Hyaline Casts, UA None Seen /LPF      Mucus, UA Moderate/2+ (A) /HPF      Methodology Manual Light Microscopy    Lactic Acid, Plasma [[id number]]  (Abnormal) Collected:  06/11/18 0804    Specimen:  Blood Updated:  06/11/18 0829     Lactate <0.5 (L) mmol/L     Comprehensive  Metabolic Panel [[id number]]  (Abnormal) Collected:  06/11/18 0804    Specimen:  Blood Updated:  06/11/18 0829     Glucose 116 (H) mg/dL      BUN 77 (H) mg/dL      Creatinine 3.93 (H) mg/dL      Sodium 139 mmol/L      Potassium 4.0 mmol/L      Chloride 114 (H) mmol/L      CO2 7.0 (L) mmol/L      Calcium 8.6 mg/dL      Total Protein 7.4 g/dL      Albumin 4.20 g/dL      ALT (SGPT) 36 U/L      AST (SGOT) 26 U/L      Alkaline Phosphatase 88 U/L      Total Bilirubin 0.3 mg/dL      eGFR Non African Amer 16 (L) mL/min/1.73      Globulin 3.2 gm/dL      A/G Ratio 1.3 g/dL      BUN/Creatinine Ratio 19.6     Anion Gap 18.0 (H) mmol/L     Lipase [[id number]]  (Abnormal) Collected:  06/11/18 0804    Specimen:  Blood Updated:  06/11/18 0828     Lipase 314 (H) U/L     POC Glucose Once [[id number]]  (Normal) Collected:  06/11/18 0815    Specimen:  Blood Updated:  06/11/18 0826     Glucose 111 mg/dL      Comment: RN NotifiedMeter: [id number]: [id number] [facility]       Urinalysis With / Microscopic If Indicated (No Culture) - Urine, Clean Catch [[id number]]  (Abnormal) Collected:  06/11/18 0800    Specimen:  Urine from Urine, Clean Catch Updated:  06/11/18 0821     Color, UA Yellow     Appearance, UA Clear     pH, UA 7.0     Specific Gravity, UA 1.015     Glucose, UA Negative     Ketones, UA Negative     Bilirubin, UA Negative     Blood, UA Small (1+) (A)     Protein,  mg/dL (2+) (A)     Leuk Esterase, UA Negative     Nitrite, UA Negative     Urobilinogen, UA 0.2 E.U./dL    CBC & Differential [[id number]] Collected:  06/11/18 0804    Specimen:  Blood Updated:  06/11/18 0816    Narrative:       The following orders were created for panel order CBC & Differential.  Procedure                               Abnormality         Status                     ---------                               -----------         ------                     CBC Auto Differential[[id number]]        Abnormal            Final result                  Please view results for these tests on the individual orders.    CBC Auto Differential [[id number]]  (Abnormal) Collected:  06/11/18 0804    Specimen:  Blood Updated:  06/11/18 0816     WBC 9.92 (H) 10*3/mm3      RBC 4.49 10*6/mm3      Hemoglobin 13.5 (L) g/dL      Hematocrit 40.0 %      MCV 89.1 fL      MCH 30.1 pg      MCHC 33.8 g/dL      RDW 14.8 (H) %      RDW-SD 47.9 (H) fl      MPV 9.8 fL      Platelets 203 10*3/mm3      Neutrophil % 83.4 (H) %      Lymphocyte % 10.1 %      Monocyte % 5.7 %      Eosinophil % 0.4 %      Basophil % 0.1 %      Immature Grans % 0.3 %      Neutrophils, Absolute 8.27 10*3/mm3      Lymphocytes, Absolute 1.00 10*3/mm3      Monocytes, Absolute 0.57 10*3/mm3      Eosinophils, Absolute 0.04 10*3/mm3      Basophils, Absolute 0.01 10*3/mm3      Immature Grans, Absolute 0.03 (H) 10*3/mm3         Imaging Results (last 24 hours)     Procedure Component Value Units Date/Time    CT Abdomen Pelvis Without Contrast [[id number]] Collected:  06/11/18 0935     Updated:  06/11/18 1021    Narrative:       PROCEDURE: CT abdomen and pelvis without contrast.    INDICATION: Left lower quadrant pain. History of bladder cancer  and prostate cancer.    COMPARISON: CT abdomen and pelvis 4/27/2018 and 6/5/2015.     Total .5 milliGray-cm.    TECHNIQUE: This exam was performed according to our departmental  dose-optimization program, which includes automated exposure  control, adjustment of the mA and/or kV according to patient size  and/or use of iterative reconstruction technique. CT abdomen and  pelvis performed with oral contrast only with axial and  reconstructed sagittal and coronal images from above the  diaphragms to below the lesser trochanters.    Lung bases are clear with no pleural effusions.    No acute osseous abnormalities in the lumbar spine or bony  pelvis.    The liver, pancreas and spleen normal. Gallbladder surgically  absent.    The abdominal aorta is normal in caliber and the  IVC normal  caliber. There is a stable 1.9 x 1.2 cm left periaortic lymph  node at the level of left renal vein unchanged from prior study.  There are smaller subcentimeter lymph nodes along the left  periaortic region also unchanged. The adrenal glands are normal.  Kidneys are normal in size. There are no renal or ureteral  calculi or hydronephrosis. No perirenal fat stranding.    Surgical changes again noted in the pelvis consistent with a  neobladder midline and to the right of the midline. Unchanged in  appearance . The prostate gland about the 3.2 cm diameter.    Most of the oral contrast is still within the stomach with  moderate gastric distention with oral contrast even though there  is no oral contrast in the small bowel there is no significant  small bowel distention or air-fluid levels or obstruction. The  retained oral contrast in the stomach could indicate gastric  outlet obstruction, however no apparent etiology noted on CT.  Other possibility week decreased gastric motility with delayed  gastric emptying.    There is a area of prior  small bowel resection in the right  lower mid abdomen with surgical clips with a mildly localized  area of distention and fluid decreased from prior study. May be  related to the surgery rather than obstructive change.    There is a moderate amount of fecal material present throughout  the colon. No pericolonic inflammatory changes or diverticular  disease. Appendix not definitely visualized on current or prior  exam.    There is no free fluid or air in the abdomen or pelvis.    No ventral or inguinal hernia.      Impression:       Postsurgical changes in the pelvis consistent with  neobladder unchanged from prior study.    Postsurgical changes apparently from partial small bowel  resection right lower mid abdomen with localized mild area of  small bowel distention and fluid decreased from prior study. No  small bowel obstruction or evident.    Moderate fecal material  throughout the colon. No diverticular  disease or pericolonic inflammatory change.    Stable 1.9 x 1.2 cm left periaortic lymph node with smaller left  periaortic lymph nodes a centimeter last not changed.    Oral contrast in the stomach with moderate gastric distention. No  evidence of small bowel obstruction. The delayed emptying of the  contrast from the stomach could indicate gastric outlet  obstruction with no apparent etiology versus a gastric atony with  decreased gastric emptying.    [id number]    Electronically signed by:  Robin Carrasco MD  6/11/2018 10:20  AM CDT Workstation: [employer]        Active Hospital Problems (** Indicates Principal Problem)    Diagnosis Date Noted   • Acute kidney injury (nontraumatic) [N17.9] 06/11/2018      Resolved Hospital Problems    Diagnosis Date Noted Date Resolved   No resolved problems to display.       Plan:  1.  Acute on chronic kidney injury stage III:  Patient has a baseline of 1.3-1.4.  Creatinine today is at 3.7.  Dr. Brody following.  Aggressive IV fluid resuscitation.  CT of the abdomen and pelvis shows no hydronephrosis.   2.  Chronic nausea and vomiting:  Gastric emptying study ordered.  Most likely gastroparesis related to diabetes.  Reglan started.  3.  Diabetes mellitus, type II:  SSI and oral home meds ordered.   4.  Constipation: Colace, MiraLAX and Dulcolax as needed.  5.  Hepatitis C:  Follows with Kevin GALINDO.  Treatment is currently completed.    I discussed the patients findings and my recommendations with: Patient          This document has been electronically signed by TIFFANIE Wolf on June 11, 2018 2:42 PM

## 2018-06-12 ENCOUNTER — APPOINTMENT (OUTPATIENT)
Dept: NUCLEAR MEDICINE | Facility: HOSPITAL | Age: 63
End: 2018-06-12

## 2018-06-12 PROBLEM — R11.2 INTRACTABLE VOMITING WITH NAUSEA: Status: ACTIVE | Noted: 2018-06-12

## 2018-06-12 LAB
ANION GAP SERPL CALCULATED.3IONS-SCNC: 8 MMOL/L (ref 5–15)
BASOPHILS # BLD AUTO: 0.01 10*3/MM3 (ref 0–0.2)
BASOPHILS NFR BLD AUTO: 0.2 % (ref 0–2)
BUN BLD-MCNC: 81 MG/DL (ref 7–21)
BUN/CREAT SERPL: 24.7 (ref 7–25)
CALCIUM SPEC-SCNC: 6.7 MG/DL (ref 8.4–10.2)
CHLORIDE SERPL-SCNC: 111 MMOL/L (ref 95–110)
CO2 SERPL-SCNC: 16 MMOL/L (ref 22–31)
CREAT BLD-MCNC: 3.28 MG/DL (ref 0.7–1.3)
DEPRECATED RDW RBC AUTO: 48.3 FL (ref 35.1–43.9)
EOSINOPHIL # BLD AUTO: 0.05 10*3/MM3 (ref 0–0.7)
EOSINOPHIL NFR BLD AUTO: 1 % (ref 0–7)
ERYTHROCYTE [DISTWIDTH] IN BLOOD BY AUTOMATED COUNT: 15 % (ref 11.5–14.5)
GFR SERPL CREATININE-BSD FRML MDRD: 19 ML/MIN/1.73 (ref 49–113)
GLUCOSE BLD-MCNC: 115 MG/DL (ref 60–100)
GLUCOSE BLDC GLUCOMTR-MCNC: 201 MG/DL (ref 70–130)
GLUCOSE BLDC GLUCOMTR-MCNC: 205 MG/DL (ref 70–130)
GLUCOSE BLDC GLUCOMTR-MCNC: 216 MG/DL (ref 70–130)
GLUCOSE BLDC GLUCOMTR-MCNC: 73 MG/DL (ref 70–130)
HCT VFR BLD AUTO: 30 % (ref 39–49)
HGB BLD-MCNC: 10.4 G/DL (ref 13.7–17.3)
IMM GRANULOCYTES # BLD: 0.04 10*3/MM3 (ref 0–0.02)
IMM GRANULOCYTES NFR BLD: 0.8 % (ref 0–0.5)
LYMPHOCYTES # BLD AUTO: 0.66 10*3/MM3 (ref 0.6–4.2)
LYMPHOCYTES NFR BLD AUTO: 13 % (ref 10–50)
MCH RBC QN AUTO: 30.5 PG (ref 26.5–34)
MCHC RBC AUTO-ENTMCNC: 34.7 G/DL (ref 31.5–36.3)
MCV RBC AUTO: 88 FL (ref 80–98)
MONOCYTES # BLD AUTO: 0.57 10*3/MM3 (ref 0–0.9)
MONOCYTES NFR BLD AUTO: 11.3 % (ref 0–12)
NEUTROPHILS # BLD AUTO: 3.73 10*3/MM3 (ref 2–8.6)
NEUTROPHILS NFR BLD AUTO: 73.7 % (ref 37–80)
PLATELET # BLD AUTO: 171 10*3/MM3 (ref 150–450)
PMV BLD AUTO: 9.5 FL (ref 8–12)
POTASSIUM BLD-SCNC: 2.8 MMOL/L (ref 3.5–5.1)
RBC # BLD AUTO: 3.41 10*6/MM3 (ref 4.37–5.74)
SODIUM BLD-SCNC: 135 MMOL/L (ref 137–145)
WBC NRBC COR # BLD: 5.06 10*3/MM3 (ref 3.2–9.8)

## 2018-06-12 PROCEDURE — 85025 COMPLETE CBC W/AUTO DIFF WBC: CPT | Performed by: NURSE PRACTITIONER

## 2018-06-12 PROCEDURE — 78264 GASTRIC EMPTYING IMG STUDY: CPT

## 2018-06-12 PROCEDURE — 82962 GLUCOSE BLOOD TEST: CPT

## 2018-06-12 PROCEDURE — 63710000001 INSULIN ASPART PER 5 UNITS: Performed by: NURSE PRACTITIONER

## 2018-06-12 PROCEDURE — 80048 BASIC METABOLIC PNL TOTAL CA: CPT | Performed by: NURSE PRACTITIONER

## 2018-06-12 PROCEDURE — 25010000002 POTASSIUM CHLORIDE PER 2 MEQ OF POTASSIUM: Performed by: INTERNAL MEDICINE

## 2018-06-12 PROCEDURE — A9541 TC99M SULFUR COLLOID: HCPCS | Performed by: HOSPITALIST

## 2018-06-12 PROCEDURE — 0 TECHNETIUM SULFUR COLLOID: Performed by: HOSPITALIST

## 2018-06-12 RX ORDER — POTASSIUM CHLORIDE 750 MG/1
20 CAPSULE, EXTENDED RELEASE ORAL ONCE
Status: DISCONTINUED | OUTPATIENT
Start: 2018-06-12 | End: 2018-06-12

## 2018-06-12 RX ORDER — POTASSIUM CHLORIDE 750 MG/1
40 CAPSULE, EXTENDED RELEASE ORAL ONCE
Status: COMPLETED | OUTPATIENT
Start: 2018-06-12 | End: 2018-06-12

## 2018-06-12 RX ADMIN — TECHNETIUM TC 99M SULFUR COLLOID 1 DOSE: KIT at 07:03

## 2018-06-12 RX ADMIN — INSULIN ASPART 4 UNITS: 100 INJECTION, SOLUTION INTRAVENOUS; SUBCUTANEOUS at 12:26

## 2018-06-12 RX ADMIN — DOCUSATE SODIUM 100 MG: 100 CAPSULE, LIQUID FILLED ORAL at 20:41

## 2018-06-12 RX ADMIN — PIOGLITAZONE 15 MG: 15 TABLET ORAL at 10:13

## 2018-06-12 RX ADMIN — PANTOPRAZOLE SODIUM 40 MG: 40 TABLET, DELAYED RELEASE ORAL at 10:12

## 2018-06-12 RX ADMIN — CARVEDILOL 3.12 MG: 3.12 TABLET, FILM COATED ORAL at 10:13

## 2018-06-12 RX ADMIN — POTASSIUM CHLORIDE 10 MEQ: 750 CAPSULE, EXTENDED RELEASE ORAL at 10:12

## 2018-06-12 RX ADMIN — CARVEDILOL 3.12 MG: 3.12 TABLET, FILM COATED ORAL at 17:09

## 2018-06-12 RX ADMIN — DOCUSATE SODIUM 100 MG: 100 CAPSULE, LIQUID FILLED ORAL at 10:13

## 2018-06-12 RX ADMIN — INSULIN ASPART 4 UNITS: 100 INJECTION, SOLUTION INTRAVENOUS; SUBCUTANEOUS at 20:42

## 2018-06-12 RX ADMIN — TICAGRELOR 90 MG: 90 TABLET ORAL at 10:13

## 2018-06-12 RX ADMIN — LINAGLIPTIN 5 MG: 5 TABLET, FILM COATED ORAL at 10:12

## 2018-06-12 RX ADMIN — ACETAMINOPHEN 650 MG: 325 TABLET ORAL at 20:42

## 2018-06-12 RX ADMIN — METOCLOPRAMIDE HYDROCHLORIDE 5 MG: 5 TABLET ORAL at 10:13

## 2018-06-12 RX ADMIN — ISOSORBIDE MONONITRATE 60 MG: 60 TABLET, EXTENDED RELEASE ORAL at 10:13

## 2018-06-12 RX ADMIN — SERTRALINE HYDROCHLORIDE 50 MG: 50 TABLET ORAL at 10:12

## 2018-06-12 RX ADMIN — TICAGRELOR 90 MG: 90 TABLET ORAL at 20:42

## 2018-06-12 RX ADMIN — SODIUM BICARBONATE 100 ML/HR: 84 INJECTION INTRAVENOUS at 12:26

## 2018-06-12 RX ADMIN — ASPIRIN 81 MG 81 MG: 81 TABLET ORAL at 10:13

## 2018-06-12 RX ADMIN — POTASSIUM CHLORIDE 40 MEQ: 750 CAPSULE, EXTENDED RELEASE ORAL at 10:12

## 2018-06-12 RX ADMIN — METOCLOPRAMIDE HYDROCHLORIDE 5 MG: 5 TABLET ORAL at 17:09

## 2018-06-12 RX ADMIN — POLYETHYLENE GLYCOL (3350) 17 G: 17 POWDER, FOR SOLUTION ORAL at 10:11

## 2018-06-12 RX ADMIN — ATORVASTATIN CALCIUM 10 MG: 10 TABLET, FILM COATED ORAL at 10:13

## 2018-06-12 RX ADMIN — CETIRIZINE HYDROCHLORIDE 5 MG: 5 TABLET, FILM COATED ORAL at 10:13

## 2018-06-12 RX ADMIN — Medication 2.5 MG: at 10:13

## 2018-06-12 RX ADMIN — INSULIN ASPART 4 UNITS: 100 INJECTION, SOLUTION INTRAVENOUS; SUBCUTANEOUS at 10:06

## 2018-06-12 RX ADMIN — ISOSORBIDE MONONITRATE 60 MG: 60 TABLET, EXTENDED RELEASE ORAL at 20:42

## 2018-06-12 NOTE — CONSULTS
Adult Nutrition  Assessment    Patient Name:  Favio Casillas  YOB: 1955  MRN: [id number]  Admit Date:  6/11/2018    Assessment Date:  6/12/2018    Comments:  Pt is a 63 year old male admitted for intractable N/V who was hospitalized in past for SBO which did not require surgical intervention.  Pt states he was eating normally at home until N/V began a few days ago, and has not had BM in several days. Wt is down 7-8lb per pt and is down 10lb over past 6 months. Gastric emptying study normal and symptoms have now improved.  Reglan ordered for suspected gastroparesis, as well as Colace and Miralax.  Pt ate 100% of breakfast provided after gastric emptying study and reports he drank Ensure when he had stomach cancer.  RD will send glucerna on trays for pt to try.  Will monitor.          Reason for Assessment     Row Name 06/12/18 1128          Reason for Assessment    Reason For Assessment identified at risk by screening criteria     Diagnosis gastrointestinal disease     Identified At Risk by Screening Criteria reduced oral intake over the last month               Nutrition/Diet History     Row Name 06/12/18 1129          Nutrition/Diet History    Typical Food/Fluid Intake Pt was able to eat all of his breakfast after gastric emptying study.  Reports he was eating normally at home prior to N/V which began several days ago.             Anthropometrics     Row Name 06/12/18 0500          Anthropometrics    Weight 55.3 kg (121 lb 14.6 oz)             Labs/Tests/Procedures/Meds     Row Name 06/12/18 1130          Labs/Procedures/Meds    Lab Results Reviewed reviewed, pertinent        Medications    Pertinent Medications Reviewed reviewed, pertinent     Pertinent Medications Comments colace/reglan               Estimated/Assessed Needs     Row Name 06/12/18 1130          Calculation Measurements    Weight Used For Calculations 55.3 kg (121 lb 14.6 oz)        Estimated/Assessed Needs    Additional  Documentation KCAL/KG (Group);Fluid Requirements (Group);Protein Requirements (Group)        KCAL/KG    14 Kcal/Kg (kcal) 774.2     15 Kcal/Kg (kcal) 829.5     18 Kcal/Kg (kcal) 995.4     20 Kcal/Kg (kcal) 1106     25 Kcal/Kg (kcal) 1382.5     30 Kcal/Kg (kcal) 1659     35 Kcal/Kg (kcal) 1935.5     40 Kcal/Kg (kcal) 2212     45 Kcal/Kg (kcal) 2488.5     50 Kcal/Kg (kcal) 2765     kcal/kg (Specify) 35        Cambria-St. Jeor Equation    RMR (Cambria-St. Jeor Equation) 1316.63        Protein Requirements    Est Protein Requirement Amount (gms/kg) 1.2 gm protein     Estimated Protein Requirements (gms/day) 66.36        Fluid Requirements    Estimated Fluid Requirements (mL/day) 1935     Estimated Fluid Requirement Method RDA Method     RDA Method (mL) 1935     Urban-Kendy Method (over 20 kg) 2606             Nutrition Prescription Ordered     Row Name 06/12/18 1131          Nutrition Prescription PO    Current PO Diet Regular     Common Modifiers Consistent Carbohydrate             Evaluation of Received Nutrient/Fluid Intake     Row Name 06/12/18 1131 06/12/18 1130       Calculation Measurements    Weight Used For Calculations  -- 55.3 kg (121 lb 14.6 oz)       PO Evaluation    Number of Meals 1  --    % PO Intake 100  --            Evaluation of Prescribed Nutrient/Fluid Intake     Row Name 06/12/18 1130          Calculation Measurements    Weight Used For Calculations 55.3 kg (121 lb 14.6 oz)           Electronically signed by:  Tania Young RD  06/12/18 11:31 AM

## 2018-06-12 NOTE — PLAN OF CARE
Problem: Patient Care Overview  Goal: Plan of Care Review  Outcome: Ongoing (interventions implemented as appropriate)    Goal: Individualization and Mutuality  Outcome: Ongoing (interventions implemented as appropriate)    Goal: Discharge Needs Assessment  Outcome: Ongoing (interventions implemented as appropriate)    Goal: Interprofessional Rounds/Family Conf  Outcome: Ongoing (interventions implemented as appropriate)      Problem: Diabetes, Type 2 (Adult)  Goal: Signs and Symptoms of Listed Potential Problems Will be Absent, Minimized or Managed (Diabetes, Type 2)  Outcome: Ongoing (interventions implemented as appropriate)      Problem: Renal Failure/Kidney Injury, Acute (Adult)  Goal: Signs and Symptoms of Listed Potential Problems Will be Absent, Minimized or Managed (Renal Failure/Kidney Injury, Acute)  Outcome: Ongoing (interventions implemented as appropriate)      Problem: Pain, Acute (Adult)  Goal: Acceptable Pain Control/Comfort Level  Outcome: Outcome(s) achieved Date Met: 06/12/18

## 2018-06-12 NOTE — PROGRESS NOTES
"DAILY PROGRESS NOTE  [facility]    Patient Identification:  Name: Favio Casillas  Age: 63 y.o.  Sex: male  :  1955  MRN: [id number]         Primary Care Physician: Harvinder Robert MD    Subjective:  Interval History:He feels better today. Had normal gastric emptying study    Objective:    Scheduled Meds:  aspirin 81 mg Oral Daily   atorvastatin 10 mg Oral Daily   carvedilol 3.125 mg Oral BID With Meals   cetirizine 5 mg Oral Daily   docusate sodium 100 mg Oral BID   glipiZIDE 2.5 mg Oral QAM AC   insulin aspart 0-9 Units Subcutaneous 4x Daily AC & at Bedtime   isosorbide mononitrate 60 mg Oral BID   linagliptin 5 mg Oral Daily   metoclopramide 5 mg Oral TID AC   pantoprazole 40 mg Oral QAM   pioglitazone 15 mg Oral Daily   polyethylene glycol 17 g Oral Daily   potassium chloride 10 mEq Oral Daily   sertraline 50 mg Oral Daily   ticagrelor 90 mg Oral BID     Continuous Infusions:  sodium bicarbonate drip (greater than 75 mEq/bag) 150 mL/hr Last Rate: 150 mL/hr (18 2330)       Vital signs in last 24 hours:  Temp:  [97.8 °F (36.6 °C)-99.9 °F (37.7 °C)] 99.9 °F (37.7 °C)  Heart Rate:  [75-96] 79  Resp:  [18] 18  BP: ()/(61-83) 97/61    Intake/Output:    Intake/Output Summary (Last 24 hours) at 18 0933  Last data filed at 18 2330   Gross per 24 hour   Intake             1430 ml   Output                0 ml   Net             1430 ml       Exam:  BP 97/61 (BP Location: Right arm, Patient Position: Lying)   Pulse 79   Temp 99.9 °F (37.7 °C) (Temporal Artery )   Resp 18   Ht 170.2 cm (67\")   Wt 55.3 kg (121 lb 14.6 oz)   SpO2 97%   BMI 19.09 kg/m²     General Appearance:    Alert, cooperative, no distress   Head:    Normocephalic, without obvious abnormality, atraumatic   Eyes:       Throat:   Lips, tongue, gums normal   Neck:   Supple, symmetrical, trachea midline, no JVD   Lungs:     Clear to auscultation bilaterally, respirations unlabored   Chest Wall:    No " tenderness or deformity    Heart:    Regular rate and rhythm, S1 and S2 normal, no murmur,no  Rub or gallop   Abdomen:     Soft, non-tender, bowel sounds active, no masses, no organomegaly    Extremities:   Extremities normal, atraumatic, no cyanosis or edema   Pulses:      Skin:   Skin is warm and dry,  no rashes or palpable lesions   Neurologic:   no focal deficits noted      [unfilled]  Data Review:    Results from last 7 days  Lab Units 06/12/18  0600 06/11/18  0804   SODIUM mmol/L 135* 139   POTASSIUM mmol/L 2.8* 4.0   CHLORIDE mmol/L 111* 114*   CO2 mmol/L 16.0* 7.0*   BUN mg/dL 81* 77*   CREATININE mg/dL 3.28* 3.93*   GLUCOSE mg/dL 115* 116*   CALCIUM mg/dL 6.7* 8.6       Results from last 7 days  Lab Units 06/12/18  0644 06/11/18  0804   WBC 10*3/mm3 5.06 9.92*   HEMOGLOBIN g/dL 10.4* 13.5*   HEMATOCRIT % 30.0* 40.0   PLATELETS 10*3/mm3 171 203             No results found for: TROPONINT        Results from last 7 days  Lab Units 06/11/18  0804   ALK PHOS U/L 88   BILIRUBIN mg/dL 0.3   ALT (SGPT) U/L 36   AST (SGOT) U/L 26             Glucose   Date/Time Value Ref Range Status   06/12/2018 0915 205 (H) 70 - 130 mg/dL Final     Comment:     RN NotifiedMeter: [id number]: [id number] WANDER FREEMAN   06/11/2018 2010 275 (H) 70 - 130 mg/dL Final     Comment:     RN NotifiedMeter: [id number]: [id number] DIXON CORBIN   06/11/2018 1645 127 70 - 130 mg/dL Final     Comment:     RN NotifiedMeter: [id number]: [id number] AIDA KABA   06/11/2018 1231 109 70 - 130 mg/dL Final     Comment:     RN NotifiedMeter: [id number]: [id number] AIDA KABA   06/11/2018 0815 111 70 - 130 mg/dL Final     Comment:     RN NotifiedMeter: [id number]: [id number] [place] LEXY           Patient Active Problem List   Diagnosis Code   • Type 2 diabetes mellitus E11.9   • Acute pain of right shoulder M25.511   • Shoulder impingement syndrome, right M75.41   • Chest pain R07.9    • Acute renal insufficiency N28.9   • Chronic hepatitis C virus infection B18.2   • Gastritis K29.70   • SBO (small bowel obstruction) K56.609   • CAD (coronary artery disease) I25.10   • Acute kidney injury (nontraumatic) N17.9   • Intractable vomiting with nausea R11.2       Assessment:  Active Hospital Problems (** Indicates Principal Problem)    Diagnosis Date Noted   • **Acute kidney injury (nontraumatic) [N17.9] 06/11/2018   • Intractable vomiting with nausea [R11.2] 06/12/2018   • Chronic hepatitis C virus infection [B18.2] 04/27/2018   • CAD (coronary artery disease) [I25.10] 04/27/2018   • Type 2 diabetes mellitus [E11.9] 12/27/2016      Resolved Hospital Problems    Diagnosis Date Noted Date Resolved   No resolved problems to display.       Plan:  Continue IV fluids, try diet and follow labs. Replace EMMA Simeon MD  6/12/2018  9:33 AM

## 2018-06-12 NOTE — PLAN OF CARE
Problem: Patient Care Overview  Goal: Plan of Care Review  Outcome: Ongoing (interventions implemented as appropriate)   06/12/18 1131   Coping/Psychosocial   Plan of Care Reviewed With patient   Plan of Care Review   Progress no change   OTHER   Outcome Summary Pt ate 100% of breakfast after gastric emptying study and reports N/V improved. Reglan ordered for suspected gastroparesis. Will add glucerna with meals to optimize nutrition.

## 2018-06-12 NOTE — PLAN OF CARE
Problem: Patient Care Overview  Goal: Plan of Care Review  Outcome: Ongoing (interventions implemented as appropriate)   06/12/18 3531   Coping/Psychosocial   Plan of Care Reviewed With patient   Plan of Care Review   Progress no change   OTHER   Outcome Summary Patient has been tolerating food better today. Some heartburn, Protonix given. Vitals stable. Will continue to monitor.       Problem: Diabetes, Type 2 (Adult)  Goal: Signs and Symptoms of Listed Potential Problems Will be Absent, Minimized or Managed (Diabetes, Type 2)  Outcome: Ongoing (interventions implemented as appropriate)      Problem: Renal Failure/Kidney Injury, Acute (Adult)  Goal: Signs and Symptoms of Listed Potential Problems Will be Absent, Minimized or Managed (Renal Failure/Kidney Injury, Acute)  Outcome: Ongoing (interventions implemented as appropriate)

## 2018-06-12 NOTE — PROGRESS NOTES
"[facility]  [address] [place] [address]. [address]  T - [phone]  F - [phone]     Progress Note          PATIENT  DEMOGRAPHICS   PATIENT NAME: Favio Casillas                      PHYSICIAN: Rodríguez Brody MD  : 1955  MRN: [id number]   LOS: 1 day    Patient Care Team:  Harvinder Robert MD as PCP - General  Kevin Robbins PA-C as PCP - Claims Attributed  MD Pati Tillman, RN as Care Coordinator (Population Health)  Subjective   SUBJECTIVE   Has few BM and feeling better able to keep food down         Objective   OBJECTIVE   Vital Signs  Temp:  [97.8 °F (36.6 °C)-99.9 °F (37.7 °C)] 98.6 °F (37 °C)  Heart Rate:  [75-96] 80  Resp:  [18-20] 20  BP: ()/(61-83) 103/69    Flowsheet Rows      First Filed Value   Admission Height  170.2 cm (67\") Documented at 2018 0739   Admission Weight  55.3 kg (121 lb 14.4 oz) Documented at 2018 1205           I/O last 3 completed shifts:  In: 2420 [P.O.:480; I.V.:950; IV Piggyback:990]  Out: -     PHYSICAL EXAM    Physical Exam   Constitutional: He is oriented to person, place, and time. He appears well-developed.   HENT:   Head: Normocephalic.   Eyes: Pupils are equal, round, and reactive to light.   Cardiovascular: Normal rate, regular rhythm and normal heart sounds.    Pulmonary/Chest: Effort normal and breath sounds normal.   Abdominal: Soft. Bowel sounds are normal. There is tenderness.   Musculoskeletal: He exhibits no edema.   Neurological: He is alert and oriented to person, place, and time.       RESULTS   Results Review:      Results from last 7 days  Lab Units 18  0600 18  0804   SODIUM mmol/L 135* 139   POTASSIUM mmol/L 2.8* 4.0   CHLORIDE mmol/L 111* 114*   CO2 mmol/L 16.0* 7.0*   BUN mg/dL 81* 77*   CREATININE mg/dL 3.28* 3.93*   CALCIUM mg/dL 6.7* 8.6   BILIRUBIN mg/dL  --  0.3   ALK PHOS U/L  --  88   ALT (SGPT) U/L  --  36   AST (SGOT) U/L  --  26   GLUCOSE mg/dL 115* 116* "       Estimated Creatinine Clearance: 18.5 mL/min (A) (by C-G formula based on SCr of 3.28 mg/dL (H)).                  Results from last 7 days  Lab Units 06/12/18  0644 06/11/18  0804   WBC 10*3/mm3 5.06 9.92*   HEMOGLOBIN g/dL 10.4* 13.5*   PLATELETS 10*3/mm3 171 203               Imaging Results (last 24 hours)     Procedure Component Value Units Date/Time    NM Gastric Emptying [[id number]] Collected:  06/12/18 0658     Updated:  06/12/18 0919    Narrative:         Nuclear medicine gastric emptying study    History: Nausea and vomiting. Abdominal pain.    Correlation: CT abdomen 6/11/2018.    Following the oral administration of 1.0 millicuries of  technetium 99m sulfur colloid in oatmeal, a gastric emptying  study was performed.    Normal gastric emptying time of 31 minutes.  No gastroesophageal reflux demonstrated.      Impression:       Conclusion:  Normal gastric emptying time of 31 minutes.    [id number]    Electronically signed by:  Aaron Constantino MD  6/12/2018 9:18 AM [email or web address]  Workstation: [email or web address] [id number]           MEDICATIONS      aspirin 81 mg Oral Daily   atorvastatin 10 mg Oral Daily   carvedilol 3.125 mg Oral BID With Meals   cetirizine 5 mg Oral Daily   docusate sodium 100 mg Oral BID   glipiZIDE 2.5 mg Oral QAM AC   insulin aspart 0-9 Units Subcutaneous 4x Daily AC & at Bedtime   isosorbide mononitrate 60 mg Oral BID   linagliptin 5 mg Oral Daily   metoclopramide 5 mg Oral TID AC   pantoprazole 40 mg Oral QAM   pioglitazone 15 mg Oral Daily   polyethylene glycol 17 g Oral Daily   potassium chloride 10 mEq Oral Daily   sertraline 50 mg Oral Daily   ticagrelor 90 mg Oral BID       sodium bicarbonate drip (greater than 75 mEq/bag) 150 mL/hr Last Rate: 150 mL/hr (06/11/18 2330)       Assessment/Plan   ASSESSMENT / PLAN    Principal Problem:    Acute kidney injury (nontraumatic)  Active Problems:    Type 2 diabetes mellitus    Chronic hepatitis C virus infection    CAD (coronary artery disease)    Intractable  vomiting with nausea    1.acute kidney injury.  Patient has chronic kidney disease stage III with baseline creatinine of 1.3-1.4.  He has a recent acute kidney injury back in April.  And now again has acute kidney injury with creatinine up to 3.7 with bicarbonate of only 7.  Possible pre renal azotemia. Cr is better with ivf. Keep bicarb drip and add kcl with it. Give 20 meq po as well      His CT scan of the abdomen and pelvis is reviewed and there is no hydronephrosis     2.history of small bowel obstruction now again with nausea vomiting and abdominal pain along with hiccups.  He has fecal material throughout the colon without any diverticular disease.  he has a gastric distention with delayed emptying of the food. Good bowel movements with laxative. Gastric emptying study is normal     3.history of hypertension patient will remain on home antihypertensive     4.high anion gap metabolic acidosis.  patient lactic acid is normal.  he is off of metformin now. Bicarb is better                This document has been electronically signed by Rodríguez Brody MD on June 12, 2018 10:48 AM

## 2018-06-13 LAB
ANION GAP SERPL CALCULATED.3IONS-SCNC: 8 MMOL/L (ref 5–15)
BASOPHILS # BLD AUTO: 0.01 10*3/MM3 (ref 0–0.2)
BASOPHILS NFR BLD AUTO: 0.2 % (ref 0–2)
BUN BLD-MCNC: 75 MG/DL (ref 7–21)
BUN/CREAT SERPL: 26.2 (ref 7–25)
CALCIUM SPEC-SCNC: 6.9 MG/DL (ref 8.4–10.2)
CHLORIDE SERPL-SCNC: 114 MMOL/L (ref 95–110)
CO2 SERPL-SCNC: 17 MMOL/L (ref 22–31)
CREAT BLD-MCNC: 2.86 MG/DL (ref 0.7–1.3)
DEPRECATED RDW RBC AUTO: 47.6 FL (ref 35.1–43.9)
EOSINOPHIL # BLD AUTO: 0.08 10*3/MM3 (ref 0–0.7)
EOSINOPHIL NFR BLD AUTO: 1.2 % (ref 0–7)
ERYTHROCYTE [DISTWIDTH] IN BLOOD BY AUTOMATED COUNT: 15.1 % (ref 11.5–14.5)
GFR SERPL CREATININE-BSD FRML MDRD: 23 ML/MIN/1.73 (ref 49–113)
GLUCOSE BLD-MCNC: 98 MG/DL (ref 60–100)
GLUCOSE BLDC GLUCOMTR-MCNC: 131 MG/DL (ref 70–130)
GLUCOSE BLDC GLUCOMTR-MCNC: 164 MG/DL (ref 70–130)
GLUCOSE BLDC GLUCOMTR-MCNC: 167 MG/DL (ref 70–130)
GLUCOSE BLDC GLUCOMTR-MCNC: 210 MG/DL (ref 70–130)
HCT VFR BLD AUTO: 27 % (ref 39–49)
HGB BLD-MCNC: 9.1 G/DL (ref 13.7–17.3)
IMM GRANULOCYTES # BLD: 0.02 10*3/MM3 (ref 0–0.02)
IMM GRANULOCYTES NFR BLD: 0.3 % (ref 0–0.5)
LYMPHOCYTES # BLD AUTO: 1.24 10*3/MM3 (ref 0.6–4.2)
LYMPHOCYTES NFR BLD AUTO: 18.9 % (ref 10–50)
MCH RBC QN AUTO: 29.4 PG (ref 26.5–34)
MCHC RBC AUTO-ENTMCNC: 33.7 G/DL (ref 31.5–36.3)
MCV RBC AUTO: 87.1 FL (ref 80–98)
MONOCYTES # BLD AUTO: 0.61 10*3/MM3 (ref 0–0.9)
MONOCYTES NFR BLD AUTO: 9.3 % (ref 0–12)
NEUTROPHILS # BLD AUTO: 4.61 10*3/MM3 (ref 2–8.6)
NEUTROPHILS NFR BLD AUTO: 70.1 % (ref 37–80)
NRBC BLD MANUAL-RTO: 0 /100 WBC (ref 0–0)
PLATELET # BLD AUTO: 168 10*3/MM3 (ref 150–450)
PMV BLD AUTO: 10.4 FL (ref 8–12)
POTASSIUM BLD-SCNC: 3.4 MMOL/L (ref 3.5–5.1)
RBC # BLD AUTO: 3.1 10*6/MM3 (ref 4.37–5.74)
SODIUM BLD-SCNC: 139 MMOL/L (ref 137–145)
WBC NRBC COR # BLD: 6.57 10*3/MM3 (ref 3.2–9.8)

## 2018-06-13 PROCEDURE — 63710000001 INSULIN ASPART PER 5 UNITS: Performed by: NURSE PRACTITIONER

## 2018-06-13 PROCEDURE — 85025 COMPLETE CBC W/AUTO DIFF WBC: CPT | Performed by: NURSE PRACTITIONER

## 2018-06-13 PROCEDURE — 97161 PT EVAL LOW COMPLEX 20 MIN: CPT

## 2018-06-13 PROCEDURE — 82962 GLUCOSE BLOOD TEST: CPT

## 2018-06-13 PROCEDURE — G8978 MOBILITY CURRENT STATUS: HCPCS

## 2018-06-13 PROCEDURE — 25010000002 POTASSIUM CHLORIDE PER 2 MEQ OF POTASSIUM: Performed by: INTERNAL MEDICINE

## 2018-06-13 PROCEDURE — 94799 UNLISTED PULMONARY SVC/PX: CPT

## 2018-06-13 PROCEDURE — G8979 MOBILITY GOAL STATUS: HCPCS

## 2018-06-13 PROCEDURE — 80048 BASIC METABOLIC PNL TOTAL CA: CPT | Performed by: NURSE PRACTITIONER

## 2018-06-13 RX ORDER — POTASSIUM CHLORIDE 750 MG/1
20 CAPSULE, EXTENDED RELEASE ORAL ONCE
Status: COMPLETED | OUTPATIENT
Start: 2018-06-13 | End: 2018-06-13

## 2018-06-13 RX ORDER — HYDROCODONE BITARTRATE AND ACETAMINOPHEN 5; 325 MG/1; MG/1
1 TABLET ORAL EVERY 6 HOURS PRN
Status: DISCONTINUED | OUTPATIENT
Start: 2018-06-13 | End: 2018-06-15 | Stop reason: HOSPADM

## 2018-06-13 RX ADMIN — PANTOPRAZOLE SODIUM 40 MG: 40 TABLET, DELAYED RELEASE ORAL at 06:26

## 2018-06-13 RX ADMIN — ISOSORBIDE MONONITRATE 60 MG: 60 TABLET, EXTENDED RELEASE ORAL at 20:30

## 2018-06-13 RX ADMIN — METOCLOPRAMIDE HYDROCHLORIDE 5 MG: 5 TABLET ORAL at 08:28

## 2018-06-13 RX ADMIN — ACETAMINOPHEN 650 MG: 325 TABLET ORAL at 09:00

## 2018-06-13 RX ADMIN — METOCLOPRAMIDE HYDROCHLORIDE 5 MG: 5 TABLET ORAL at 16:33

## 2018-06-13 RX ADMIN — INSULIN ASPART 2 UNITS: 100 INJECTION, SOLUTION INTRAVENOUS; SUBCUTANEOUS at 16:33

## 2018-06-13 RX ADMIN — DOCUSATE SODIUM 100 MG: 100 CAPSULE, LIQUID FILLED ORAL at 09:01

## 2018-06-13 RX ADMIN — POTASSIUM CHLORIDE 20 MEQ: 750 CAPSULE, EXTENDED RELEASE ORAL at 10:57

## 2018-06-13 RX ADMIN — CARVEDILOL 3.12 MG: 3.12 TABLET, FILM COATED ORAL at 08:28

## 2018-06-13 RX ADMIN — DOCUSATE SODIUM 100 MG: 100 CAPSULE, LIQUID FILLED ORAL at 20:30

## 2018-06-13 RX ADMIN — SERTRALINE HYDROCHLORIDE 50 MG: 50 TABLET ORAL at 08:34

## 2018-06-13 RX ADMIN — INSULIN ASPART 4 UNITS: 100 INJECTION, SOLUTION INTRAVENOUS; SUBCUTANEOUS at 20:30

## 2018-06-13 RX ADMIN — POLYETHYLENE GLYCOL (3350) 17 G: 17 POWDER, FOR SOLUTION ORAL at 08:29

## 2018-06-13 RX ADMIN — ISOSORBIDE MONONITRATE 60 MG: 60 TABLET, EXTENDED RELEASE ORAL at 08:28

## 2018-06-13 RX ADMIN — METOCLOPRAMIDE HYDROCHLORIDE 5 MG: 5 TABLET ORAL at 10:58

## 2018-06-13 RX ADMIN — LINAGLIPTIN 5 MG: 5 TABLET, FILM COATED ORAL at 08:28

## 2018-06-13 RX ADMIN — POTASSIUM CHLORIDE 10 MEQ: 750 CAPSULE, EXTENDED RELEASE ORAL at 08:28

## 2018-06-13 RX ADMIN — TICAGRELOR 90 MG: 90 TABLET ORAL at 20:30

## 2018-06-13 RX ADMIN — ATORVASTATIN CALCIUM 10 MG: 10 TABLET, FILM COATED ORAL at 08:28

## 2018-06-13 RX ADMIN — INSULIN ASPART 2 UNITS: 100 INJECTION, SOLUTION INTRAVENOUS; SUBCUTANEOUS at 11:34

## 2018-06-13 RX ADMIN — PIOGLITAZONE 15 MG: 15 TABLET ORAL at 08:28

## 2018-06-13 RX ADMIN — SODIUM BICARBONATE 100 ML/HR: 84 INJECTION INTRAVENOUS at 01:23

## 2018-06-13 RX ADMIN — Medication 2.5 MG: at 08:28

## 2018-06-13 RX ADMIN — TICAGRELOR 90 MG: 90 TABLET ORAL at 08:28

## 2018-06-13 RX ADMIN — CETIRIZINE HYDROCHLORIDE 5 MG: 5 TABLET, FILM COATED ORAL at 08:33

## 2018-06-13 RX ADMIN — ASPIRIN 81 MG 81 MG: 81 TABLET ORAL at 08:28

## 2018-06-13 NOTE — PLAN OF CARE
Problem: Pain, Acute (Adult)  Intervention: Support/Optimize Psychosocial Response to Acute Pain   06/13/18 1206   Coping/Psychosocial Interventions   Supportive Measures active listening utilized   Psychosocial Support   Diversional Activities television   Interventions   Trust Relationship/Rapport care explained

## 2018-06-13 NOTE — SIGNIFICANT NOTE
06/13/18 1453   Rehab Time/Intention   Evaluation Not Performed patient/family declined evaluation  (Pt reports independence with ADLs, no concerns for return home. Pt does not feel he needs OT services at this time. Encouraged pt to notify MD if concerns arise prior to D/C & notified RN. Will sign off at this time.)   Rehab Treatment   Discipline occupational therapist

## 2018-06-13 NOTE — PROGRESS NOTES
"DAILY PROGRESS NOTE  [facility]    Patient Identification:  Name: Favio Casillas  Age: 63 y.o.  Sex: male  :  1955  MRN: [id number]         Primary Care Physician: Harvinder Robert MD    Subjective:  Interval History:He feels better today. No more vomiting.    Objective:    Scheduled Meds:    aspirin 81 mg Oral Daily   atorvastatin 10 mg Oral Daily   carvedilol 3.125 mg Oral BID With Meals   cetirizine 5 mg Oral Daily   docusate sodium 100 mg Oral BID   glipiZIDE 2.5 mg Oral QAM AC   insulin aspart 0-9 Units Subcutaneous 4x Daily AC & at Bedtime   isosorbide mononitrate 60 mg Oral BID   linagliptin 5 mg Oral Daily   metoclopramide 5 mg Oral TID AC   pantoprazole 40 mg Oral QAM   pioglitazone 15 mg Oral Daily   polyethylene glycol 17 g Oral Daily   potassium chloride 10 mEq Oral Daily   potassium chloride 20 mEq Oral Once   sertraline 50 mg Oral Daily   ticagrelor 90 mg Oral BID     Continuous Infusions:    IV Fluids 1000 mL + additives 100 mL/hr Last Rate: 100 mL/hr (18 0123)       Vital signs in last 24 hours:  Temp:  [98.4 °F (36.9 °C)-99.6 °F (37.6 °C)] 99 °F (37.2 °C)  Heart Rate:  [72-88] 82  Resp:  [18-20] 18  BP: ()/(50-69) 100/60    Intake/Output:    Intake/Output Summary (Last 24 hours) at 18 0923  Last data filed at 18 0609   Gross per 24 hour   Intake             2320 ml   Output                0 ml   Net             2320 ml       Exam:  /60 (BP Location: Right arm, Patient Position: Lying)   Pulse 82   Temp 99 °F (37.2 °C) (Temporal Artery )   Resp 18   Ht 170.2 cm (67\")   Wt 58.1 kg (128 lb)   SpO2 96%   BMI 20.05 kg/m²     General Appearance:    Alert, cooperative, no distress   Head:    Normocephalic, without obvious abnormality, atraumatic   Eyes:       Throat:   Lips, tongue, gums normal   Neck:   Supple, symmetrical, trachea midline, no JVD   Lungs:     Clear to auscultation bilaterally, respirations unlabored   Chest Wall:    No " tenderness or deformity    Heart:    Regular rate and rhythm, S1 and S2 normal, no murmur,no  Rub or gallop   Abdomen:     Soft, non-tender, bowel sounds active, no masses, no organomegaly    Extremities:   Extremities normal, atraumatic, no cyanosis or edema   Pulses:      Skin:   Skin is warm and dry,  no rashes or palpable lesions   Neurologic:   no focal deficits noted      [unfilled]  Data Review:    Results from last 7 days  Lab Units 06/13/18  0512 06/12/18  0600 06/11/18  0804   SODIUM mmol/L 139 135* 139   POTASSIUM mmol/L 3.4* 2.8* 4.0   CHLORIDE mmol/L 114* 111* 114*   CO2 mmol/L 17.0* 16.0* 7.0*   BUN mg/dL 75* 81* 77*   CREATININE mg/dL 2.86* 3.28* 3.93*   GLUCOSE mg/dL 98 115* 116*   CALCIUM mg/dL 6.9* 6.7* 8.6       Results from last 7 days  Lab Units 06/13/18  0512 06/12/18  0644 06/11/18  0804   WBC 10*3/mm3 6.57 5.06 9.92*   HEMOGLOBIN g/dL 9.1* 10.4* 13.5*   HEMATOCRIT % 27.0* 30.0* 40.0   PLATELETS 10*3/mm3 168 171 203             No results found for: TROPONINT        Results from last 7 days  Lab Units 06/11/18  0804   ALK PHOS U/L 88   BILIRUBIN mg/dL 0.3   ALT (SGPT) U/L 36   AST (SGOT) U/L 26             Glucose   Date/Time Value Ref Range Status   06/13/2018 0719 131 (H) 70 - 130 mg/dL Final     Comment:     RN NotifiedMeter: [id number]: [id number] BALDOMERO CALLEN   06/12/2018 2012 201 (H) 70 - 130 mg/dL Final     Comment:     RN NotifiedMeter: [id number]: [id number] COOPER DOUGLAS   06/12/2018 1632 73 70 - 130 mg/dL Final     Comment:     Meter: [id number]: [id number] WANDER FREEMAN   06/12/2018 1117 216 (H) 70 - 130 mg/dL Final     Comment:     RN NotifiedMeter: [id number]: [id number] WANDER MARIEIRIS   06/12/2018 0915 205 (H) 70 - 130 mg/dL Final     Comment:     RN NotifiedMeter: [id number]: [id number] WANDER TAMJA   06/11/2018 2010 275 (H) 70 - 130 mg/dL Final     Comment:     RN NotifiedMeter: [id number]: [id number]  DIXON CORBIN   06/11/2018 1645 127 70 - 130 mg/dL Final     Comment:     RN NotifiedMeter: [id number]: [id number] AIDA KABA   06/11/2018 1231 109 70 - 130 mg/dL Final     Comment:     RN NotifiedMeter: [id number]: [id number] AIDA KABA           Patient Active Problem List   Diagnosis Code   • Type 2 diabetes mellitus E11.9   • Acute pain of right shoulder M25.511   • Shoulder impingement syndrome, right M75.41   • Chest pain R07.9   • Acute renal insufficiency N28.9   • Chronic hepatitis C virus infection B18.2   • Gastritis K29.70   • SBO (small bowel obstruction) K56.609   • CAD (coronary artery disease) I25.10   • Acute kidney injury (nontraumatic) N17.9   • Intractable vomiting with nausea R11.2       Assessment:  Active Hospital Problems (** Indicates Principal Problem)    Diagnosis Date Noted   • **Acute kidney injury (nontraumatic) [N17.9] 06/11/2018   • Intractable vomiting with nausea [R11.2] 06/12/2018   • Chronic hepatitis C virus infection [B18.2] 04/27/2018   • CAD (coronary artery disease) [I25.10] 04/27/2018   • Type 2 diabetes mellitus [E11.9] 12/27/2016      Resolved Hospital Problems    Diagnosis Date Noted Date Resolved   No resolved problems to display.       Plan:  Continue IV fluids, continue diet and follow labs. Replace K. Get PT and OT eval.    Ben Simeon MD  6/13/2018  9:23 AM
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
## 2018-06-13 NOTE — PLAN OF CARE
Problem: Diabetes, Type 2 (Adult)  Intervention: Optimize Glycemic Control   06/13/18 1208   Nutrition Interventions   Glycemic Management blood glucose monitoring;oral hydration promoted         Problem: Renal Failure/Kidney Injury, Acute (Adult)  Intervention: Evaluate/Maintain Nutrition Support   06/13/18 1208   Coping/Psychosocial Interventions   Environmental Support calm environment promoted     Intervention: Provide Oxygenation/Ventilation/Perfusion Support   06/13/18 1208   Activity   Activity Management activity adjusted per tolerance

## 2018-06-13 NOTE — PLAN OF CARE
Problem: Patient Care Overview  Goal: Plan of Care Review  Outcome: Ongoing (interventions implemented as appropriate)   06/13/18 0609   Coping/Psychosocial   Plan of Care Reviewed With patient   Plan of Care Review   Progress no change       Problem: Diabetes, Type 2 (Adult)  Goal: Signs and Symptoms of Listed Potential Problems Will be Absent, Minimized or Managed (Diabetes, Type 2)  Outcome: Ongoing (interventions implemented as appropriate)   06/13/18 0609   Goal/Outcome Evaluation   Problems Assessed (Type 2 Diabetes) all   Problems Present (Type 2 Diabetes) hyperglycemia       Problem: Renal Failure/Kidney Injury, Acute (Adult)  Goal: Signs and Symptoms of Listed Potential Problems Will be Absent, Minimized or Managed (Renal Failure/Kidney Injury, Acute)  Outcome: Ongoing (interventions implemented as appropriate)   06/13/18 0609   Goal/Outcome Evaluation   Problems Assessed (Acute Renal Failure/Kidney Injury) all   Problems Present (ARF/Kidney Injury) situational response

## 2018-06-13 NOTE — PROGRESS NOTES
"[facility]  [address] [place] [address]. [address]  T - [phone]   - [phone]     Progress Note          PATIENT  DEMOGRAPHICS   PATIENT NAME: Favio Casillas                      PHYSICIAN: Anastacio RiellyTIFFANIE  : 1955  MRN: [id number]   LOS: 2 days    Patient Care Team:  Harvinder Robert MD as PCP - General  Kevin Robbins PA-C as PCP - Claims Attributed  MD Pati Tillman, RN as Care Coordinator (Population Health)  Subjective   SUBJECTIVE   Able to tolerate PO intake, positive BM, had some dizziness this morning         Objective   OBJECTIVE   Vital Signs  Temp:  [98.4 °F (36.9 °C)-99.6 °F (37.6 °C)] 99 °F (37.2 °C)  Heart Rate:  [72-88] 82  Resp:  [18-20] 18  BP: ()/(50-69) 100/60    Flowsheet Rows      First Filed Value   Admission Height  170.2 cm (67\") Documented at 2018 0739   Admission Weight  55.3 kg (121 lb 14.4 oz) Documented at 2018 1205           I/O last 3 completed shifts:  In: 3750 [P.O.:1800; I.V.:1950]  Out: -     PHYSICAL EXAM    Physical Exam   Constitutional: He is oriented to person, place, and time. He appears well-developed.   HENT:   Head: Normocephalic.   Eyes: Pupils are equal, round, and reactive to light.   Cardiovascular: Normal rate, regular rhythm and normal heart sounds.    Pulmonary/Chest: Effort normal and breath sounds normal.   Abdominal: Soft. Bowel sounds are normal. There is tenderness.   Musculoskeletal: He exhibits no edema.   Neurological: He is alert and oriented to person, place, and time.       RESULTS   Results Review:      Results from last 7 days  Lab Units 18  0512 18  0600 18  0804   SODIUM mmol/L 139 135* 139   POTASSIUM mmol/L 3.4* 2.8* 4.0   CHLORIDE mmol/L 114* 111* 114*   CO2 mmol/L 17.0* 16.0* 7.0*   BUN mg/dL 75* 81* 77*   CREATININE mg/dL 2.86* 3.28* 3.93*   CALCIUM mg/dL 6.9* 6.7* 8.6   BILIRUBIN mg/dL  --   --  0.3   ALK PHOS U/L  --   --  88   ALT (SGPT) " U/L  --   --  36   AST (SGOT) U/L  --   --  26   GLUCOSE mg/dL 98 115* 116*       Estimated Creatinine Clearance: 22.3 mL/min (A) (by C-G formula based on SCr of 2.86 mg/dL (H)).                  Results from last 7 days  Lab Units 06/13/18  0512 06/12/18  0644 06/11/18  0804   WBC 10*3/mm3 6.57 5.06 9.92*   HEMOGLOBIN g/dL 9.1* 10.4* 13.5*   PLATELETS 10*3/mm3 168 171 203               Imaging Results (last 24 hours)     Procedure Component Value Units Date/Time    NM Gastric Emptying [[id number]] Collected:  06/12/18 0658     Updated:  06/12/18 0919    Narrative:         Nuclear medicine gastric emptying study    History: Nausea and vomiting. Abdominal pain.    Correlation: CT abdomen 6/11/2018.    Following the oral administration of 1.0 millicuries of  technetium 99m sulfur colloid in oatmeal, a gastric emptying  study was performed.    Normal gastric emptying time of 31 minutes.  No gastroesophageal reflux demonstrated.      Impression:       Conclusion:  Normal gastric emptying time of 31 minutes.    [id number]    Electronically signed by:  Aaron Constantino MD  6/12/2018 9:18 AM CDT  Workstation: [id number]           MEDICATIONS      aspirin 81 mg Oral Daily   atorvastatin 10 mg Oral Daily   carvedilol 3.125 mg Oral BID With Meals   cetirizine 5 mg Oral Daily   docusate sodium 100 mg Oral BID   glipiZIDE 2.5 mg Oral QAM AC   insulin aspart 0-9 Units Subcutaneous 4x Daily AC & at Bedtime   isosorbide mononitrate 60 mg Oral BID   linagliptin 5 mg Oral Daily   metoclopramide 5 mg Oral TID AC   pantoprazole 40 mg Oral QAM   pioglitazone 15 mg Oral Daily   polyethylene glycol 17 g Oral Daily   potassium chloride 10 mEq Oral Daily   sertraline 50 mg Oral Daily   ticagrelor 90 mg Oral BID       IV Fluids 1000 mL + additives 100 mL/hr Last Rate: 100 mL/hr (06/13/18 0123)       Assessment/Plan   ASSESSMENT / PLAN    Principal Problem:    Acute kidney injury (nontraumatic)  Active Problems:    Type 2 diabetes mellitus     Chronic hepatitis C virus infection    CAD (coronary artery disease)    Intractable vomiting with nausea    1. SUJATHA- Patient has chronic kidney disease stage III with baseline creatinine of 1.3-1.4.  He has a recent acute kidney injury back in April.  And now again has acute kidney injury with creatinine up to 3.7 with bicarbonate of only 7.  Possible pre renal azotemia.     -Cr continuing to improve, continue bicarb gtt w/ KCl    -Potassium is improved but still low, will replace.    -His CT scan of the abdomen and pelvis is reviewed and there is no hydronephrosis     2. Hx of SBO- now again with nausea vomiting and abdominal pain along with hiccups.  He has fecal material throughout the colon without any diverticular disease.  He has a gastric distention with delayed emptying of the food. Good bowel movements with laxative. Gastric emptying study is normal     3. HTN- patient will remain on home antihypertensive     4. High anion gap metabolic acidosis-  patient lactic acid is normal.  he is off of metformin now. Bicarb is better           This document has been electronically signed by TIFFANIE Franco on June 13, 2018 8:29 AM

## 2018-06-14 LAB
ANION GAP SERPL CALCULATED.3IONS-SCNC: 10 MMOL/L (ref 5–15)
BASOPHILS # BLD AUTO: 0.01 10*3/MM3 (ref 0–0.2)
BASOPHILS NFR BLD AUTO: 0.2 % (ref 0–2)
BUN BLD-MCNC: 64 MG/DL (ref 7–21)
BUN/CREAT SERPL: 31.5 (ref 7–25)
CALCIUM SPEC-SCNC: 7 MG/DL (ref 8.4–10.2)
CHLORIDE SERPL-SCNC: 114 MMOL/L (ref 95–110)
CO2 SERPL-SCNC: 17 MMOL/L (ref 22–31)
CREAT BLD-MCNC: 2.03 MG/DL (ref 0.7–1.3)
DEPRECATED RDW RBC AUTO: 48.4 FL (ref 35.1–43.9)
EOSINOPHIL # BLD AUTO: 0.08 10*3/MM3 (ref 0–0.7)
EOSINOPHIL NFR BLD AUTO: 1.2 % (ref 0–7)
ERYTHROCYTE [DISTWIDTH] IN BLOOD BY AUTOMATED COUNT: 15.1 % (ref 11.5–14.5)
FERRITIN SERPL-MCNC: 173 NG/ML (ref 17.9–464)
FOLATE SERPL-MCNC: 8.37 NG/ML (ref 2.76–21)
GFR SERPL CREATININE-BSD FRML MDRD: 34 ML/MIN/1.73 (ref 49–113)
GLUCOSE BLD-MCNC: 106 MG/DL (ref 60–100)
GLUCOSE BLDC GLUCOMTR-MCNC: 113 MG/DL (ref 70–130)
GLUCOSE BLDC GLUCOMTR-MCNC: 126 MG/DL (ref 70–130)
GLUCOSE BLDC GLUCOMTR-MCNC: 164 MG/DL (ref 70–130)
GLUCOSE BLDC GLUCOMTR-MCNC: 179 MG/DL (ref 70–130)
HCT VFR BLD AUTO: 28.5 % (ref 39–49)
HGB BLD-MCNC: 9.7 G/DL (ref 13.7–17.3)
IMM GRANULOCYTES # BLD: 0.03 10*3/MM3 (ref 0–0.02)
IMM GRANULOCYTES NFR BLD: 0.5 % (ref 0–0.5)
IRON 24H UR-MRATE: 84 MCG/DL (ref 49–181)
IRON SATN MFR SERPL: 35 % (ref 20–55)
LYMPHOCYTES # BLD AUTO: 1.27 10*3/MM3 (ref 0.6–4.2)
LYMPHOCYTES NFR BLD AUTO: 19.2 % (ref 10–50)
MCH RBC QN AUTO: 29.8 PG (ref 26.5–34)
MCHC RBC AUTO-ENTMCNC: 34 G/DL (ref 31.5–36.3)
MCV RBC AUTO: 87.4 FL (ref 80–98)
MONOCYTES # BLD AUTO: 0.62 10*3/MM3 (ref 0–0.9)
MONOCYTES NFR BLD AUTO: 9.4 % (ref 0–12)
NEUTROPHILS # BLD AUTO: 4.62 10*3/MM3 (ref 2–8.6)
NEUTROPHILS NFR BLD AUTO: 69.5 % (ref 37–80)
PLATELET # BLD AUTO: 179 10*3/MM3 (ref 150–450)
PMV BLD AUTO: 10.3 FL (ref 8–12)
POTASSIUM BLD-SCNC: 3.8 MMOL/L (ref 3.5–5.1)
RBC # BLD AUTO: 3.26 10*6/MM3 (ref 4.37–5.74)
SODIUM BLD-SCNC: 141 MMOL/L (ref 137–145)
TIBC SERPL-MCNC: 240 MCG/DL (ref 261–462)
VIT B12 BLD-MCNC: 273 PG/ML (ref 239–931)
WBC NRBC COR # BLD: 6.63 10*3/MM3 (ref 3.2–9.8)

## 2018-06-14 PROCEDURE — 63710000001 INSULIN ASPART PER 5 UNITS: Performed by: NURSE PRACTITIONER

## 2018-06-14 PROCEDURE — 83550 IRON BINDING TEST: CPT | Performed by: INTERNAL MEDICINE

## 2018-06-14 PROCEDURE — 83540 ASSAY OF IRON: CPT | Performed by: INTERNAL MEDICINE

## 2018-06-14 PROCEDURE — 82962 GLUCOSE BLOOD TEST: CPT

## 2018-06-14 PROCEDURE — 82746 ASSAY OF FOLIC ACID SERUM: CPT | Performed by: INTERNAL MEDICINE

## 2018-06-14 PROCEDURE — 25010000002 POTASSIUM CHLORIDE PER 2 MEQ OF POTASSIUM: Performed by: INTERNAL MEDICINE

## 2018-06-14 PROCEDURE — 80048 BASIC METABOLIC PNL TOTAL CA: CPT | Performed by: NURSE PRACTITIONER

## 2018-06-14 PROCEDURE — 85025 COMPLETE CBC W/AUTO DIFF WBC: CPT | Performed by: NURSE PRACTITIONER

## 2018-06-14 PROCEDURE — 82607 VITAMIN B-12: CPT | Performed by: INTERNAL MEDICINE

## 2018-06-14 PROCEDURE — 82728 ASSAY OF FERRITIN: CPT | Performed by: INTERNAL MEDICINE

## 2018-06-14 RX ORDER — POTASSIUM CHLORIDE 750 MG/1
20 CAPSULE, EXTENDED RELEASE ORAL DAILY
Status: DISCONTINUED | OUTPATIENT
Start: 2018-06-15 | End: 2018-06-15 | Stop reason: HOSPADM

## 2018-06-14 RX ORDER — SODIUM BICARBONATE 650 MG/1
650 TABLET ORAL 4 TIMES DAILY
Status: DISCONTINUED | OUTPATIENT
Start: 2018-06-14 | End: 2018-06-15 | Stop reason: HOSPADM

## 2018-06-14 RX ADMIN — CARVEDILOL 3.12 MG: 3.12 TABLET, FILM COATED ORAL at 17:51

## 2018-06-14 RX ADMIN — METOCLOPRAMIDE HYDROCHLORIDE 5 MG: 5 TABLET ORAL at 17:51

## 2018-06-14 RX ADMIN — SODIUM BICARBONATE 650 MG TABLET 650 MG: at 17:51

## 2018-06-14 RX ADMIN — TICAGRELOR 90 MG: 90 TABLET ORAL at 20:14

## 2018-06-14 RX ADMIN — Medication 10 ML: at 16:33

## 2018-06-14 RX ADMIN — HYDROCODONE BITARTRATE AND ACETAMINOPHEN 1 TABLET: 5; 325 TABLET ORAL at 22:31

## 2018-06-14 RX ADMIN — POTASSIUM CHLORIDE 10 MEQ: 750 CAPSULE, EXTENDED RELEASE ORAL at 08:53

## 2018-06-14 RX ADMIN — ATORVASTATIN CALCIUM 10 MG: 10 TABLET, FILM COATED ORAL at 08:53

## 2018-06-14 RX ADMIN — METOCLOPRAMIDE HYDROCHLORIDE 5 MG: 5 TABLET ORAL at 13:57

## 2018-06-14 RX ADMIN — LINAGLIPTIN 5 MG: 5 TABLET, FILM COATED ORAL at 08:53

## 2018-06-14 RX ADMIN — DOCUSATE SODIUM 100 MG: 100 CAPSULE, LIQUID FILLED ORAL at 20:14

## 2018-06-14 RX ADMIN — CARVEDILOL 3.12 MG: 3.12 TABLET, FILM COATED ORAL at 08:53

## 2018-06-14 RX ADMIN — ISOSORBIDE MONONITRATE 60 MG: 60 TABLET, EXTENDED RELEASE ORAL at 20:14

## 2018-06-14 RX ADMIN — SODIUM BICARBONATE 75 ML/HR: 84 INJECTION INTRAVENOUS at 05:43

## 2018-06-14 RX ADMIN — TICAGRELOR 90 MG: 90 TABLET ORAL at 08:53

## 2018-06-14 RX ADMIN — ASPIRIN 81 MG 81 MG: 81 TABLET ORAL at 08:53

## 2018-06-14 RX ADMIN — INSULIN ASPART 2 UNITS: 100 INJECTION, SOLUTION INTRAVENOUS; SUBCUTANEOUS at 13:57

## 2018-06-14 RX ADMIN — Medication 2.5 MG: at 08:53

## 2018-06-14 RX ADMIN — HYDROCODONE BITARTRATE AND ACETAMINOPHEN 1 TABLET: 5; 325 TABLET ORAL at 03:42

## 2018-06-14 RX ADMIN — CETIRIZINE HYDROCHLORIDE 5 MG: 5 TABLET, FILM COATED ORAL at 08:53

## 2018-06-14 RX ADMIN — SODIUM BICARBONATE 650 MG TABLET 650 MG: at 20:13

## 2018-06-14 RX ADMIN — PANTOPRAZOLE SODIUM 40 MG: 40 TABLET, DELAYED RELEASE ORAL at 06:05

## 2018-06-14 RX ADMIN — SERTRALINE HYDROCHLORIDE 50 MG: 50 TABLET ORAL at 08:53

## 2018-06-14 RX ADMIN — DOCUSATE SODIUM 100 MG: 100 CAPSULE, LIQUID FILLED ORAL at 08:54

## 2018-06-14 RX ADMIN — INSULIN ASPART 2 UNITS: 100 INJECTION, SOLUTION INTRAVENOUS; SUBCUTANEOUS at 20:14

## 2018-06-14 RX ADMIN — PIOGLITAZONE 15 MG: 15 TABLET ORAL at 08:54

## 2018-06-14 RX ADMIN — METOCLOPRAMIDE HYDROCHLORIDE 5 MG: 5 TABLET ORAL at 08:54

## 2018-06-14 RX ADMIN — ISOSORBIDE MONONITRATE 60 MG: 60 TABLET, EXTENDED RELEASE ORAL at 08:53

## 2018-06-14 RX ADMIN — HYDROCODONE BITARTRATE AND ACETAMINOPHEN 1 TABLET: 5; 325 TABLET ORAL at 10:22

## 2018-06-14 RX ADMIN — HYDROCODONE BITARTRATE AND ACETAMINOPHEN 1 TABLET: 5; 325 TABLET ORAL at 16:32

## 2018-06-14 RX ADMIN — POLYETHYLENE GLYCOL (3350) 17 G: 17 POWDER, FOR SOLUTION ORAL at 08:54

## 2018-06-14 NOTE — PLAN OF CARE
Problem: Patient Care Overview  Goal: Plan of Care Review  Outcome: Ongoing (interventions implemented as appropriate)   06/14/18 0308   Coping/Psychosocial   Plan of Care Reviewed With patient   Plan of Care Review   Progress no change   OTHER   Outcome Summary Pt has rested most of this shift. VSS. No new concerns.

## 2018-06-14 NOTE — PROGRESS NOTES
"[facility]  [address] [place] [address]. [address]  T - [phone]  F - [phone]     Progress Note          PATIENT  DEMOGRAPHICS   PATIENT NAME: Favio Casillas                      PHYSICIAN: Rodríguez Brody MD  : 1955  MRN: [id number]   LOS: 3 days    Patient Care Team:  Harvinder Robert MD as PCP - General  Kevin Robbins PA-C as PCP - Claims Attributed  MD Pati Tillman, DORIE as Care Coordinator (Population Health)  Subjective   SUBJECTIVE   BM x 2 walked to the elevator and no dizziness noted         Objective   OBJECTIVE   Vital Signs  Temp:  [97.6 °F (36.4 °C)-98 °F (36.7 °C)] 97.6 °F (36.4 °C)  Heart Rate:  [72-86] 72  Resp:  [18] 18  BP: (108-130)/(77-88) 123/88    Flowsheet Rows      First Filed Value   Admission Height  170.2 cm (67\") Documented at 2018 0739   Admission Weight  55.3 kg (121 lb 14.4 oz) Documented at 2018 1205           I/O last 3 completed shifts:  In: 1480 [P.O.:480; I.V.:1000]  Out: -     PHYSICAL EXAM    Physical Exam   Constitutional: He is oriented to person, place, and time. He appears well-developed.   HENT:   Head: Normocephalic.   Eyes: Pupils are equal, round, and reactive to light.   Cardiovascular: Normal rate, regular rhythm and normal heart sounds.    Pulmonary/Chest: Effort normal and breath sounds normal.   Abdominal: Soft. Bowel sounds are normal. There is tenderness.   Musculoskeletal: He exhibits no edema.   Neurological: He is alert and oriented to person, place, and time.       RESULTS   Results Review:      Results from last 7 days  Lab Units 18  0531 18  0512 18  0600 18  0804   SODIUM mmol/L 141 139 135* 139   POTASSIUM mmol/L 3.8 3.4* 2.8* 4.0   CHLORIDE mmol/L 114* 114* 111* 114*   CO2 mmol/L 17.0* 17.0* 16.0* 7.0*   BUN mg/dL 64* 75* 81* 77*   CREATININE mg/dL 2.03* 2.86* 3.28* 3.93*   CALCIUM mg/dL 7.0* 6.9* 6.7* 8.6   BILIRUBIN mg/dL  --   --   --  0.3   ALK PHOS U/L "  --   --   --  88   ALT (SGPT) U/L  --   --   --  36   AST (SGOT) U/L  --   --   --  26   GLUCOSE mg/dL 106* 98 115* 116*       Estimated Creatinine Clearance: 31.6 mL/min (A) (by C-G formula based on SCr of 2.03 mg/dL (H)).                  Results from last 7 days  Lab Units 06/14/18  0531 06/13/18  0512 06/12/18  0644 06/11/18  0804   WBC 10*3/mm3 6.63 6.57 5.06 9.92*   HEMOGLOBIN g/dL 9.7* 9.1* 10.4* 13.5*   PLATELETS 10*3/mm3 179 168 171 203               Imaging Results (last 24 hours)     ** No results found for the last 24 hours. **           MEDICATIONS      aspirin 81 mg Oral Daily   atorvastatin 10 mg Oral Daily   carvedilol 3.125 mg Oral BID With Meals   cetirizine 5 mg Oral Daily   docusate sodium 100 mg Oral BID   glipiZIDE 2.5 mg Oral QAM AC   insulin aspart 0-9 Units Subcutaneous 4x Daily AC & at Bedtime   isosorbide mononitrate 60 mg Oral BID   linagliptin 5 mg Oral Daily   metoclopramide 5 mg Oral TID AC   pantoprazole 40 mg Oral QAM   pioglitazone 15 mg Oral Daily   polyethylene glycol 17 g Oral Daily   potassium chloride 10 mEq Oral Daily   sertraline 50 mg Oral Daily   ticagrelor 90 mg Oral BID       IV Fluids 1000 mL + additives 75 mL/hr Last Rate: 75 mL/hr (06/14/18 0543)       Assessment/Plan   ASSESSMENT / PLAN    Principal Problem:    Acute kidney injury (nontraumatic)  Active Problems:    Type 2 diabetes mellitus    Chronic hepatitis C virus infection    CAD (coronary artery disease)    Intractable vomiting with nausea    1. SUJATHA- Patient has chronic kidney disease stage III with baseline creatinine of 1.3-1.4.  He has a recent acute kidney injury back in April.  And now again has acute kidney injury with creatinine up to 3.7 with bicarbonate of only 7.  Possible pre renal azotemia.     -Cr improved significantly. Stop ivf. Add po sodium bicarbonate. Keep kcl 20meq daily. Will sign off here and f/u in office in 2-3 weeks    -His CT scan of the abdomen and pelvis is reviewed and there is no  hydronephrosis     2. Hx of SBO- now again with nausea vomiting and abdominal pain along with hiccups.  He has fecal material throughout the colon without any diverticular disease.  He has a gastric distention with delayed emptying of the food. Good bowel movements with laxative. Gastric emptying study is normal. Tolerating regular diet     3. HTN- patient will remain on home antihypertensive     4. High anion gap metabolic acidosis-  patient lactic acid is normal.  he is off of metformin now. Bicarb is better           This document has been electronically signed by Rodríguez Brody MD on June 14, 2018 2:32 PM

## 2018-06-14 NOTE — PLAN OF CARE
Problem: Patient Care Overview  Goal: Plan of Care Review  Outcome: Ongoing (interventions implemented as appropriate)   06/14/18 1702   Coping/Psychosocial   Plan of Care Reviewed With patient   Plan of Care Review   Progress improving   OTHER   Outcome Summary pt is feeling much better and has been ambulating in halls and eating well. will continue to monitor.      Goal: Individualization and Mutuality  Outcome: Ongoing (interventions implemented as appropriate)    Goal: Discharge Needs Assessment  Outcome: Ongoing (interventions implemented as appropriate)    Goal: Interprofessional Rounds/Family Conf  Outcome: Ongoing (interventions implemented as appropriate)      Problem: Diabetes, Type 2 (Adult)  Goal: Signs and Symptoms of Listed Potential Problems Will be Absent, Minimized or Managed (Diabetes, Type 2)  Outcome: Ongoing (interventions implemented as appropriate)      Problem: Renal Failure/Kidney Injury, Acute (Adult)  Goal: Signs and Symptoms of Listed Potential Problems Will be Absent, Minimized or Managed (Renal Failure/Kidney Injury, Acute)  Outcome: Ongoing (interventions implemented as appropriate)      Problem: Pain, Acute (Adult)  Goal: Acceptable Pain Control/Comfort Level  Outcome: Ongoing (interventions implemented as appropriate)

## 2018-06-14 NOTE — CONSULTS
Adult Nutrition  Assessment    Patient Name:  Favio Casillas  YOB: 1955  MRN: [id number]  Admit Date:  6/11/2018    Assessment Date:  6/14/2018    Comments:  Pt has good appetite with PO intakes %.  GI symptoms have improved and pt reports he will likely be discharged tomorrow.  Likes glucerna.  Pt requests sandwich for snack, which RD provided.  Will monitor as needed.          Reason for Assessment     Row Name 06/14/18 1554          Reason for Assessment    Reason For Assessment follow-up protocol               Nutrition/Diet History     Row Name 06/14/18 155          Nutrition/Diet History    Typical Food/Fluid Intake Pt continues to have good appetite with no GI complaints.  Likes glucerna.               Labs/Tests/Procedures/Meds     Row Name 06/14/18 1550          Labs/Procedures/Meds    Lab Results Reviewed reviewed, pertinent        Medications    Pertinent Medications Reviewed reviewed, pertinent                 Nutrition Prescription Ordered     Row Name 06/14/18 1557          Nutrition Prescription PO    Current PO Diet Regular     Common Modifiers Consistent Carbohydrate             Evaluation of Received Nutrient/Fluid Intake     Row Name 06/14/18 1555          PO Evaluation    Number of Days PO Intake Evaluated 2 days     % PO Intake              Electronically signed by:  Tania Young RD  06/14/18 3:58 PM

## 2018-06-14 NOTE — PLAN OF CARE
Problem: Patient Care Overview  Goal: Plan of Care Review  Outcome: Ongoing (interventions implemented as appropriate)   06/14/18 1605   Coping/Psychosocial   Plan of Care Reviewed With patient   Plan of Care Review   Progress improving   OTHER   Outcome Summary Pt has good appetite. GI symptoms improved. PO intake %. Likes glucerna.

## 2018-06-14 NOTE — PROGRESS NOTES
"DAILY PROGRESS NOTE  [facility]    Patient Identification:  Name: Favio Casillas  Age: 63 y.o.  Sex: male  :  1955  MRN: [id number]         Primary Care Physician: Harvinder Robert MD    Subjective:  Interval History:He feels better today. No more vomiting. Tolerating diet.    Objective:    Scheduled Meds:    aspirin 81 mg Oral Daily   atorvastatin 10 mg Oral Daily   carvedilol 3.125 mg Oral BID With Meals   cetirizine 5 mg Oral Daily   docusate sodium 100 mg Oral BID   glipiZIDE 2.5 mg Oral QAM AC   insulin aspart 0-9 Units Subcutaneous 4x Daily AC & at Bedtime   isosorbide mononitrate 60 mg Oral BID   linagliptin 5 mg Oral Daily   metoclopramide 5 mg Oral TID AC   pantoprazole 40 mg Oral QAM   pioglitazone 15 mg Oral Daily   polyethylene glycol 17 g Oral Daily   potassium chloride 10 mEq Oral Daily   sertraline 50 mg Oral Daily   ticagrelor 90 mg Oral BID     Continuous Infusions:    IV Fluids 1000 mL + additives 75 mL/hr Last Rate: 75 mL/hr (18 0543)       Vital signs in last 24 hours:  Temp:  [97.6 °F (36.4 °C)-98 °F (36.7 °C)] 97.6 °F (36.4 °C)  Heart Rate:  [72-86] 72  Resp:  [18] 18  BP: (108-130)/(77-88) 123/88    Intake/Output:  No intake or output data in the 24 hours ending 18 0933    Exam:  /88 (BP Location: Right arm, Patient Position: Sitting)   Pulse 72   Temp 97.6 °F (36.4 °C) (Oral)   Resp 18   Ht 170.2 cm (67\")   Wt 58.5 kg (128 lb 14.4 oz)   SpO2 99%   BMI 20.19 kg/m²     General Appearance:    Alert, cooperative, no distress   Head:    Normocephalic, without obvious abnormality, atraumatic   Eyes:       Throat:   Lips, tongue, gums normal   Neck:   Supple, symmetrical, trachea midline, no JVD   Lungs:     Clear to auscultation bilaterally, respirations unlabored   Chest Wall:    No tenderness or deformity    Heart:    Regular rate and rhythm, S1 and S2 normal, no murmur,no  Rub or gallop   Abdomen:     Soft, non-tender, bowel sounds active, " no masses, no organomegaly    Extremities:   Extremities normal, atraumatic, no cyanosis or edema   Pulses:      Skin:   Skin is warm and dry,  no rashes or palpable lesions   Neurologic:   no focal deficits noted      [unfilled]  Data Review:    Results from last 7 days  Lab Units 06/14/18  0531 06/13/18  0512 06/12/18  0600   SODIUM mmol/L 141 139 135*   POTASSIUM mmol/L 3.8 3.4* 2.8*   CHLORIDE mmol/L 114* 114* 111*   CO2 mmol/L 17.0* 17.0* 16.0*   BUN mg/dL 64* 75* 81*   CREATININE mg/dL 2.03* 2.86* 3.28*   GLUCOSE mg/dL 106* 98 115*   CALCIUM mg/dL 7.0* 6.9* 6.7*       Results from last 7 days  Lab Units 06/14/18  0531 06/13/18  0512 06/12/18  0644   WBC 10*3/mm3 6.63 6.57 5.06   HEMOGLOBIN g/dL 9.7* 9.1* 10.4*   HEMATOCRIT % 28.5* 27.0* 30.0*   PLATELETS 10*3/mm3 179 168 171             No results found for: TROPONINT        Results from last 7 days  Lab Units 06/11/18  0804   ALK PHOS U/L 88   BILIRUBIN mg/dL 0.3   ALT (SGPT) U/L 36   AST (SGOT) U/L 26             Glucose   Date/Time Value Ref Range Status   06/14/2018 0721 126 70 - 130 mg/dL Final     Comment:     RN NotifiedMeter: [id number]: [id number] AIDA KABA   06/13/2018 2013 210 (H) 70 - 130 mg/dL Final     Comment:     RN NotifiedMeter: [id number]: [id number] COOPER STELLA   06/13/2018 1632 164 (H) 70 - 130 mg/dL Final     Comment:     RN NotifiedMeter: [id number]: [id number] MARIAH MOE   06/13/2018 1105 167 (H) 70 - 130 mg/dL Final     Comment:     RN NotifiedMeter: [id number]: [id number] BALDOMERO RUELAS   06/13/2018 0719 131 (H) 70 - 130 mg/dL Final     Comment:     RN NotifiedMeter: [id number]: [id number] BALDOMERO BAINHON   06/12/2018 2012 201 (H) 70 - 130 mg/dL Final     Comment:     RN NotifiedMeter: [id number]: [id number] COOPER DOUGLAS   06/12/2018 1632 73 70 - 130 mg/dL Final     Comment:     Meter: [id number]: [id number] WANDER FREEMAN   06/12/2018 1117 216 (H)  70 - 130 mg/dL Final     Comment:     RN NotifiedMeter: [id number]: [id number] WANDER FREEMAN           Patient Active Problem List   Diagnosis Code   • Type 2 diabetes mellitus E11.9   • Acute pain of right shoulder M25.511   • Shoulder impingement syndrome, right M75.41   • Chest pain R07.9   • Acute renal insufficiency N28.9   • Chronic hepatitis C virus infection B18.2   • Gastritis K29.70   • SBO (small bowel obstruction) K56.609   • CAD (coronary artery disease) I25.10   • Acute kidney injury (nontraumatic) N17.9   • Intractable vomiting with nausea R11.2       Assessment:  Active Hospital Problems (** Indicates Principal Problem)    Diagnosis Date Noted   • **Acute kidney injury (nontraumatic) [N17.9] 06/11/2018   • Intractable vomiting with nausea [R11.2] 06/12/2018   • Chronic hepatitis C virus infection [B18.2] 04/27/2018   • CAD (coronary artery disease) [I25.10] 04/27/2018   • Type 2 diabetes mellitus [E11.9] 12/27/2016      Resolved Hospital Problems    Diagnosis Date Noted Date Resolved   No resolved problems to display.       Plan:  Continue IV fluids, continue diet and follow labs. Replace K. Get PT and OT eval.  Follow labs.    Ben Simeon MD  6/14/2018  9:33 AM

## 2018-06-14 NOTE — SIGNIFICANT NOTE
06/14/18 1447   Rehab Treatment   Discipline physical therapy assistant   Reason Treatment Not Performed patient/family declined treatment  (pt declined stating he has already walked 4x today and didnt want to do anymore PT )

## 2018-06-15 VITALS
BODY MASS INDEX: 19.62 KG/M2 | RESPIRATION RATE: 18 BRPM | WEIGHT: 125 LBS | HEART RATE: 71 BPM | HEIGHT: 67 IN | TEMPERATURE: 98.3 F | DIASTOLIC BLOOD PRESSURE: 77 MMHG | OXYGEN SATURATION: 97 % | SYSTOLIC BLOOD PRESSURE: 111 MMHG

## 2018-06-15 LAB
ANION GAP SERPL CALCULATED.3IONS-SCNC: 8 MMOL/L (ref 5–15)
BASOPHILS # BLD AUTO: 0.02 10*3/MM3 (ref 0–0.2)
BASOPHILS NFR BLD AUTO: 0.3 % (ref 0–2)
BUN BLD-MCNC: 55 MG/DL (ref 7–21)
BUN/CREAT SERPL: 28.5 (ref 7–25)
CALCIUM SPEC-SCNC: 6.9 MG/DL (ref 8.4–10.2)
CHLORIDE SERPL-SCNC: 110 MMOL/L (ref 95–110)
CO2 SERPL-SCNC: 20 MMOL/L (ref 22–31)
CREAT BLD-MCNC: 1.93 MG/DL (ref 0.7–1.3)
DEPRECATED RDW RBC AUTO: 49.2 FL (ref 35.1–43.9)
EOSINOPHIL # BLD AUTO: 0.08 10*3/MM3 (ref 0–0.7)
EOSINOPHIL NFR BLD AUTO: 1.3 % (ref 0–7)
ERYTHROCYTE [DISTWIDTH] IN BLOOD BY AUTOMATED COUNT: 15.2 % (ref 11.5–14.5)
GFR SERPL CREATININE-BSD FRML MDRD: 36 ML/MIN/1.73 (ref 49–113)
GLUCOSE BLD-MCNC: 172 MG/DL (ref 60–100)
GLUCOSE BLDC GLUCOMTR-MCNC: 107 MG/DL (ref 70–130)
GLUCOSE BLDC GLUCOMTR-MCNC: 124 MG/DL (ref 70–130)
HCT VFR BLD AUTO: 27.5 % (ref 39–49)
HGB BLD-MCNC: 9.3 G/DL (ref 13.7–17.3)
IMM GRANULOCYTES # BLD: 0.02 10*3/MM3 (ref 0–0.02)
IMM GRANULOCYTES NFR BLD: 0.3 % (ref 0–0.5)
LYMPHOCYTES # BLD AUTO: 1.36 10*3/MM3 (ref 0.6–4.2)
LYMPHOCYTES NFR BLD AUTO: 21.5 % (ref 10–50)
MCH RBC QN AUTO: 29.9 PG (ref 26.5–34)
MCHC RBC AUTO-ENTMCNC: 33.8 G/DL (ref 31.5–36.3)
MCV RBC AUTO: 88.4 FL (ref 80–98)
MONOCYTES # BLD AUTO: 0.57 10*3/MM3 (ref 0–0.9)
MONOCYTES NFR BLD AUTO: 9 % (ref 0–12)
NEUTROPHILS # BLD AUTO: 4.29 10*3/MM3 (ref 2–8.6)
NEUTROPHILS NFR BLD AUTO: 67.6 % (ref 37–80)
PLATELET # BLD AUTO: 170 10*3/MM3 (ref 150–450)
PMV BLD AUTO: 10.3 FL (ref 8–12)
POTASSIUM BLD-SCNC: 3.7 MMOL/L (ref 3.5–5.1)
RBC # BLD AUTO: 3.11 10*6/MM3 (ref 4.37–5.74)
SODIUM BLD-SCNC: 138 MMOL/L (ref 137–145)
WBC NRBC COR # BLD: 6.34 10*3/MM3 (ref 3.2–9.8)

## 2018-06-15 PROCEDURE — 97116 GAIT TRAINING THERAPY: CPT

## 2018-06-15 PROCEDURE — 85025 COMPLETE CBC W/AUTO DIFF WBC: CPT | Performed by: NURSE PRACTITIONER

## 2018-06-15 PROCEDURE — 82962 GLUCOSE BLOOD TEST: CPT

## 2018-06-15 PROCEDURE — 80048 BASIC METABOLIC PNL TOTAL CA: CPT | Performed by: NURSE PRACTITIONER

## 2018-06-15 RX ORDER — SODIUM BICARBONATE 650 MG/1
650 TABLET ORAL 4 TIMES DAILY
Qty: 120 TABLET | Refills: 0 | Status: SHIPPED | OUTPATIENT
Start: 2018-06-15 | End: 2018-07-15

## 2018-06-15 RX ADMIN — CARVEDILOL 3.12 MG: 3.12 TABLET, FILM COATED ORAL at 08:54

## 2018-06-15 RX ADMIN — TICAGRELOR 90 MG: 90 TABLET ORAL at 08:55

## 2018-06-15 RX ADMIN — POTASSIUM CHLORIDE 20 MEQ: 750 CAPSULE, EXTENDED RELEASE ORAL at 08:54

## 2018-06-15 RX ADMIN — LINAGLIPTIN 5 MG: 5 TABLET, FILM COATED ORAL at 08:55

## 2018-06-15 RX ADMIN — ASPIRIN 81 MG 81 MG: 81 TABLET ORAL at 08:54

## 2018-06-15 RX ADMIN — HYDROCODONE BITARTRATE AND ACETAMINOPHEN 1 TABLET: 5; 325 TABLET ORAL at 04:40

## 2018-06-15 RX ADMIN — ISOSORBIDE MONONITRATE 60 MG: 60 TABLET, EXTENDED RELEASE ORAL at 08:54

## 2018-06-15 RX ADMIN — CETIRIZINE HYDROCHLORIDE 5 MG: 5 TABLET, FILM COATED ORAL at 08:54

## 2018-06-15 RX ADMIN — POLYETHYLENE GLYCOL (3350) 17 G: 17 POWDER, FOR SOLUTION ORAL at 08:53

## 2018-06-15 RX ADMIN — PIOGLITAZONE 15 MG: 15 TABLET ORAL at 08:54

## 2018-06-15 RX ADMIN — Medication 2.5 MG: at 08:54

## 2018-06-15 RX ADMIN — METOCLOPRAMIDE HYDROCHLORIDE 5 MG: 5 TABLET ORAL at 08:54

## 2018-06-15 RX ADMIN — DOCUSATE SODIUM 100 MG: 100 CAPSULE, LIQUID FILLED ORAL at 08:54

## 2018-06-15 RX ADMIN — SERTRALINE HYDROCHLORIDE 50 MG: 50 TABLET ORAL at 08:54

## 2018-06-15 RX ADMIN — SODIUM BICARBONATE 650 MG TABLET 650 MG: at 08:54

## 2018-06-15 RX ADMIN — ATORVASTATIN CALCIUM 10 MG: 10 TABLET, FILM COATED ORAL at 08:54

## 2018-06-15 RX ADMIN — PANTOPRAZOLE SODIUM 40 MG: 40 TABLET, DELAYED RELEASE ORAL at 06:00

## 2018-06-15 NOTE — PLAN OF CARE
Problem: Patient Care Overview  Goal: Plan of Care Review  Outcome: Ongoing (interventions implemented as appropriate)   06/14/18 3396   Coping/Psychosocial   Plan of Care Reviewed With patient   Plan of Care Review   Progress improving   OTHER   Outcome Summary Pt resting well. C/o pain, received prn pain meds as ordered. POC glucose checks, received coverage as ordered. Continue to monitor.      Goal: Individualization and Mutuality  Outcome: Ongoing (interventions implemented as appropriate)    Goal: Discharge Needs Assessment  Outcome: Ongoing (interventions implemented as appropriate)      Problem: Diabetes, Type 2 (Adult)  Goal: Signs and Symptoms of Listed Potential Problems Will be Absent, Minimized or Managed (Diabetes, Type 2)  Outcome: Ongoing (interventions implemented as appropriate)      Problem: Renal Failure/Kidney Injury, Acute (Adult)  Goal: Signs and Symptoms of Listed Potential Problems Will be Absent, Minimized or Managed (Renal Failure/Kidney Injury, Acute)  Outcome: Ongoing (interventions implemented as appropriate)      Problem: Pain, Acute (Adult)  Goal: Acceptable Pain Control/Comfort Level  Outcome: Ongoing (interventions implemented as appropriate)

## 2018-06-15 NOTE — THERAPY DISCHARGE NOTE
"Acute Care - Physical Therapy Discharge Summary  [facility]       Patient Name: Favio Casillas  : 1955  MRN: [id number]    Today's Date: 6/15/2018  Onset of Illness/Injury or Date of Surgery: 18    Date of Referral to PT: 18  Referring Physician: Dr. Simeon      Admit Date: 2018      PT Recommendation and Plan    Visit Dx:    ICD-10-CM ICD-9-CM   1. Acute kidney injury (nontraumatic) N17.9 584.9   2. Intractable vomiting with nausea, unspecified vomiting type R11.2 536.2   3. Ileus K56.7 560.1   4. Impaired functional mobility, balance, gait, and endurance Z74.09 V49.89   5. Type 2 diabetes mellitus with complication, with long-term current use of insulin E11.8 250.90    Z79.4 V58.67   6. Acute renal insufficiency N28.9 593.9             Outcome Measures     Row Name 06/15/18 0848 18 1006          How much help from another person do you currently need...    Turning from your back to your side while in flat bed without using bedrails? 4  -JONATHAN 4  -MN     Moving from lying on back to sitting on the side of a flat bed without bedrails? 4  -JONATHAN 4  -MN     Moving to and from a bed to a chair (including a wheelchair)? 4  -JONATHAN 4  -MN     Standing up from a chair using your arms (e.g., wheelchair, bedside chair)? 4  -JONATHAN 4  -MN     Climbing 3-5 steps with a railing? 4  -JONATHAN 4  -MN     To walk in hospital room? 4  -JONATHAN 4  -MN     AM-PAC 6 Clicks Score 24  -JONATHAN 24  -MN        Tinetti Assessment    Sitting Balance  -- 1  -MN     Arises  -- 2  -MN     Attempts to Rise  -- 2  -MN     Immediate Standing Balance (first 5 sec)  -- 2  -MN     Standing Balance  -- 2  -MN     Sternal Nudge (feet close together)  -- 2  -MN     Eyes Closed (feet close together)  -- 1  -MN     Turning 360 Degrees- Steps  -- 1  -MN     Turning 360 Degrees- Steadiness  -- 1  -MN     Sitting Down  -- 2  -MN     Tinetti Balance Score  -- 16  -MN     Gait Initiation (immediate after told \"go\")  -- 1  -MN     Step Length- Right " Swing  -- 1  -MN     Step Length- Left Swing  -- 1  -MN     Foot Clearance- Right Foot  -- 1  -MN     Foot Clearance- Left Foot  -- 1  -MN     Step Symmetry  -- 1  -MN     Step Continuity  -- 1  -MN     Path (excursion)  -- 1  -MN     Trunk  -- 2  -MN     Base of Support  -- 0  -MN     Gait Score  -- 10  -MN     Tinetti Total Score  -- 26  -MN        Functional Assessment    Outcome Measure Options  -- AM-PAC 6 Clicks Basic Mobility (PT);Tinetti  -MN       User Key  (r) = Recorded By, (t) = Taken By, (c) = Cosigned By    Initials Name Provider Type    [place] Farideh Bullock, PT Physical Therapist    JONATHAN Calzada PTA Physical Therapy Assistant                PT Charges     Row Name 06/15/18 0939             Time Calculation    Start Time 0848  -JONATHAN      Stop Time 0912  -JONATHAN      Time Calculation (min) 24 min  -JONATHAN         Time Calculation- PT    Total Timed Code Minutes- PT 24 minute(s)  -JONATHAN        User Key  (r) = Recorded By, (t) = Taken By, (c) = Cosigned By    Initials Name Provider Type    JONATHAN Calzada PTA Physical Therapy Assistant        Therapy Suggested Charges     Code   Minutes Charges    None                   PT Rehab Goals     Row Name 06/15/18 0848             Gait Training Goal 1 (PT)    Activity/Assistive Device (Gait Training Goal 1, PT) gait (walking locomotion)  -JONATHAN      [place] Level (Gait Training Goal 1, PT) independent  -JONATHAN      Distance (Gait Goal 1, PT) 1000 ft with vert/horiz head turns without LOB/path deviation  -JONATHAN      Time Frame (Gait Training Goal 1, PT) 2 days  -JONATHAN      Progress/Outcome (Gait Training Goal 1, PT) goal not met  -JONATHAN         Stairs Goal 1 (PT)    Activity/Assistive Device (Stairs Goal 1, PT) ascending stairs;descending stairs  -JONATHAN      [place] Level/Cues Needed (Stairs Goal 1, PT) independent  -JONATHAN      Number of Stairs (Stairs Goal 1, PT) 2  -JONATHAN      Time Frame (Stairs Goal 1, PT) 2 days  -JONATHAN      Progress/Outcome (Stairs Goal 1, PT) goal not met  -JONATHAN          Problem Specific Goal 1 (PT)    Problem Specific Goal 1 (PT) Pt will score >/=45/56 on Núñez Balance Scale  -JONATHAN      Time Frame (Problem Specific Goal 1, PT) 4 days  -JONATHAN      Progress/Outcome (Problem Specific Goal 1, PT) goal not met  -JONATHAN        User Key  (r) = Recorded By, (t) = Taken By, (c) = Cosigned By    Initials Name Provider Type Discipline    JONATHAN Calzada, PTA Physical Therapy Assistant PT              PT Discharge Summary  Anticipated Discharge Disposition (PT): home  Reason for Discharge: Discharge from facility, Per MD order  Outcomes Achieved: Unable to make functional progress toward goals at this time  Discharge Destination: Home      Farideh Bullock, PT   6/15/2018

## 2018-06-15 NOTE — PLAN OF CARE
Problem: Patient Care Overview  Goal: Plan of Care Review  Outcome: Ongoing (interventions implemented as appropriate)   06/15/18 1881   Coping/Psychosocial   Plan of Care Reviewed With patient   Plan of Care Review   Progress improving   OTHER   Outcome Summary pt required encouargement to participate due to him feeling like he doesnt need pt . Pt has been observed ambulating in halls independently throughout day. Pt did agree to ambulate 400' no AD and demonstrated good safety awareness and balance. Pt will be returning home at time of D/C and may benefit from HH.

## 2018-06-15 NOTE — THERAPY TREATMENT NOTE
Acute Care - Physical Therapy Treatment Note  [facility]     Patient Name: Favio Casillas  : 1955  MRN: [id number]  Today's Date: 6/15/2018  Onset of Illness/Injury or Date of Surgery: 18  Date of Referral to PT: 18  Referring Physician: Dr. Simeon    Admit Date: 2018    Visit Dx:    ICD-10-CM ICD-9-CM   1. Acute kidney injury (nontraumatic) N17.9 584.9   2. Intractable vomiting with nausea, unspecified vomiting type R11.2 536.2   3. Ileus K56.7 560.1   4. Impaired functional mobility, balance, gait, and endurance Z74.09 V49.89   5. Type 2 diabetes mellitus with complication, with long-term current use of insulin E11.8 250.90    Z79.4 V58.67   6. Acute renal insufficiency N28.9 593.9     Patient Active Problem List   Diagnosis   • Type 2 diabetes mellitus   • Acute pain of right shoulder   • Shoulder impingement syndrome, right   • Chest pain   • Acute renal insufficiency   • Chronic hepatitis C virus infection   • Gastritis   • SBO (small bowel obstruction)   • CAD (coronary artery disease)   • Acute kidney injury (nontraumatic)   • Intractable vomiting with nausea       Therapy Treatment          Rehabilitation Treatment Summary     Row Name 06/15/18 0848             Treatment Time/Intention    Discipline physical therapy assistant  -JONATHAN      Document Type therapy note (daily note)  -JONATHAN      Subjective Information no complaints  -JONATHAN      Mode of Treatment physical therapy  -JONATHAN      Patient Effort good  -JONATHAN      Comment pt was reluctant to PT stating he walks on his own and doesnt need PT.  With encouargement pt agreed to walk  -KC2      Existing Precautions/Restrictions no known precautions/restrictions  -JONATHAN      Recorded by [JONATHAN] Rula Calzada, PTA 06/15/18 0933  [KC2] Rula Calzada, PTA 06/15/18 0936      Row Name 06/15/18 0848             Vital Signs    Pre Systolic BP Rehab 118  -JONATHAN      Pre Treatment Diastolic BP 85  -JONATHAN      Pretreatment Heart Rate (beats/min) 87  -JONATHAN      Pre  SpO2 (%) 98  -JONATHAN      O2 Delivery Pre Treatment room air  -JONATHAN      Post SpO2 (%) 97  -JONATHAN      O2 Delivery Post Treatment room air  -JONATHAN      Recorded by [JONATHAN] Rula Calzada, [facility] 06/15/18 0933      Row Name 06/15/18 0848             Cognitive Assessment/Intervention- PT/OT    Affect/Mental Status (Cognitive) WNL;WFL  -JONATHAN      Orientation Status (Cognition) oriented x 3  -JONATHAN      Follows Commands (Cognition) WNL;WFL  -JONATHAN      Personal Safety Interventions supervised activity;nonskid shoes/slippers when out of bed  -JONATHAN      Recorded by [JONATHAN] Rula Calzada, [facility] 06/15/18 0933      Row Name 06/15/18 0848             Bed Mobility Assessment/Treatment    Bed Mobility Assessment/Treatment scooting/bridging;supine-sit-supine  -JONATHAN      Scooting/Bridging [place] (Bed Mobility) independent  -JONATHAN      Supine-Sit-Supine [place] (Bed Mobility) independent  -JONATHAN      Bed Mobility, Safety Issues cognitive deficits limit understanding  -JONATHAN      Recorded by [JONATHAN] Rula Calzada, [facility] 06/15/18 0933      Row Name 06/15/18 0848             Functional Mobility    Functional Mobility- Ind. Level independent  -JONATHAN      Recorded by [JONATHAN] Rula Calzada, [facility] 06/15/18 0933      Row Name 06/15/18 0848             Transfer Assessment/Treatment    Transfer Assessment/Treatment sit-stand transfer;stand-sit transfer  -JONATHAN      Recorded by [JONATHAN] Rula Calzada, [facility] 06/15/18 0933      Row Name 06/15/18 0848             Gait/Stairs Assessment/Training    Gait/Stairs Assessment/Training gait/ambulation independence  -JONATHAN      [place] Level (Gait) independent  -JONATHAN      Distance in Feet (Gait) 400'  -JONATHAN      Recorded by [JONATHAN] Rula Calzada, [facility] 06/15/18 0933      Row Name 06/15/18 0848             Positioning and Restraints    Pre-Treatment Position in bed  -JONATHAN      Post Treatment Position bed  -JONATHAN      In Bed sitting EOB  -JONATHAN      Recorded by [JONATHAN] Rula Calzada, [facility] 06/15/18 0933      Row Name 06/15/18 0848             Pain Scale: Numbers  Pre/Post-Treatment    Pain Scale: Numbers, Pretreatment 0/10 - no pain  -JONATHAN      Pain Scale: Numbers, Post-Treatment 0/10 - no pain  -JONATHAN      Recorded by [JONATHAN] Rula ALEXANDRE Elsi, \[facility]\"" 06/15/18 0933      Row Name 06/15/18 0848             Outcome Summary/Treatment Plan (PT)    Daily Summary of Progress (PT) progress toward functional goals as expected  -JONATHAN      Recorded by [JONATHAN] Rula Dominiquejanine, PTA 06/15/18 0933        User Key  (r) = Recorded By, (t) = Taken By, (c) = Cosigned By    Initials Name Effective Dates Discipline    JONATHAN ALEXANDRE Elsi, PTA 03/07/18 -  PT                       PT Rehab Goals     Row Name 06/15/18 0848             Gait Training Goal 1 (PT)    Activity/Assistive Device (Gait Training Goal 1, PT) gait (walking locomotion)  -JONATHAN      [place] Level (Gait Training Goal 1, PT) independent  -JONATHAN      Distance (Gait Goal 1, PT) 1000 ft with vert/horiz head turns without LOB/path deviation  -JONATHAN      Time Frame (Gait Training Goal 1, PT) 2 days  -JONATHAN      Progress/Outcome (Gait Training Goal 1, PT) goal not met  -JONATHAN         Stairs Goal 1 (PT)    Activity/Assistive Device (Stairs Goal 1, PT) ascending stairs;descending stairs  -JONATHAN      [place] Level/Cues Needed (Stairs Goal 1, PT) independent  -JONATHAN      Number of Stairs (Stairs Goal 1, PT) 2  -JONATHAN      Time Frame (Stairs Goal 1, PT) 2 days  -JONATHAN      Progress/Outcome (Stairs Goal 1, PT) goal not met  -JONATHAN         Problem Specific Goal 1 (PT)    Problem Specific Goal 1 (PT) Pt will score >/=45/56 on Núñez Balance Scale  -JONATHAN      Time Frame (Problem Specific Goal 1, PT) 4 days  -JONATHAN      Progress/Outcome (Problem Specific Goal 1, PT) goal not met  -JONATHAN        User Key  (r) = Recorded By, (t) = Taken By, (c) = Cosigned By    Initials Name Provider Type Discipline    JONATHAN ALEXANDRE Elsi, \[facility]\"" Physical Therapy Assistant PT          Physical Therapy Education     Title: PT OT SLP Therapies (Active)     Topic: Physical Therapy (Active)     Point: Mobility training  (Done)    Learning Progress Summary     Learner Status Readiness Method Response Comment Documented by    Patient Done Acceptance E VU role of PT in hospital MN 06/13/18 2179                      User Key     Initials Effective Dates Name Provider Type Discipline    MN 04/03/18 -  Farideh Bullock, PT Physical Therapist PT                    PT Recommendation and Plan     Outcome Summary/Treatment Plan (PT)  Daily Summary of Progress (PT): progress toward functional goals as expected  Plan of Care Reviewed With: patient  Progress: improving  Outcome Summary: pt required encouargement to participate due to him feeling like he doesnt need pt .  Pt has been observed ambulating in halls independently throughout PTAs shift.  Pt did ambulate 400' no AD and demonstrated good safety awareness and balance.   Pt will be returning home at time of D/C and may benefit from .            Outcome Measures     Row Name 06/15/18 0848 06/13/18 1006          How much help from another person do you currently need...    Turning from your back to your side while in flat bed without using bedrails? 4  -JONATHAN 4  -MN     Moving from lying on back to sitting on the side of a flat bed without bedrails? 4  -JONATHAN 4  -MN     Moving to and from a bed to a chair (including a wheelchair)? 4  -JONATHAN 4  -MN     Standing up from a chair using your arms (e.g., wheelchair, bedside chair)? 4  -JONATHAN 4  -MN     Climbing 3-5 steps with a railing? 4  -JONATHAN 4  -MN     To walk in hospital room? 4  -JONATHAN 4  -MN     AM-PAC 6 Clicks Score 24  -JONATHAN 24  -MN        Tinetti Assessment    Sitting Balance  -- 1  -MN     Arises  -- 2  -MN     Attempts to Rise  -- 2  -MN     Immediate Standing Balance (first 5 sec)  -- 2  -MN     Standing Balance  -- 2  -MN     Sternal Nudge (feet close together)  -- 2  -MN     Eyes Closed (feet close together)  -- 1  -MN     Turning 360 Degrees- Steps  -- 1  -MN     Turning 360 Degrees- Steadiness  -- 1  -MN     Sitting Down  -- 2  -MN     [place]  "Balance Score  -- 16  -MN     Gait Initiation (immediate after told \"go\")  -- 1  -MN     Step Length- Right Swing  -- 1  -MN     Step Length- Left Swing  -- 1  -MN     Foot Clearance- Right Foot  -- 1  -MN     Foot Clearance- Left Foot  -- 1  -MN     Step Symmetry  -- 1  -MN     Step Continuity  -- 1  -MN     Path (excursion)  -- 1  -MN     Trunk  -- 2  -MN     Base of Support  -- 0  -MN     Gait Score  -- 10  -MN     Tinetti Total Score  -- 26  -MN        Functional Assessment    Outcome Measure Options  -- AM-PAC 6 Clicks Basic Mobility (PT);Tinetti  -MN       User Key  (r) = Recorded By, (t) = Taken By, (c) = Cosigned By    Initials Name Provider Type    CONI Bullock, PT Physical Therapist    JONATHAN Calzada PTA Physical Therapy Assistant           Time Calculation:         PT Charges     Row Name 06/15/18 0939             Time Calculation    Start Time 0848  -JONATHAN      Stop Time 0912  -JONATHAN      Time Calculation (min) 24 min  -JONATHAN         Time Calculation- PT    Total Timed Code Minutes- PT 24 minute(s)  -JONATHAN        User Key  (r) = Recorded By, (t) = Taken By, (c) = Cosigned By    Initials Name Provider Type    JONATHAN Calzada PTA Physical Therapy Assistant        Therapy Suggested Charges     Code   Minutes Charges    None           Therapy Charges for Today     Code Description Service Date Service Provider Modifiers Qty    [id number]  PT THER SUPP EA 15 MIN 6/14/2018 Rula Calzada PTA GP 1    [id number] HC GAIT TRAINING EA 15 MIN 6/15/2018 Rula Calzada PTA GP 2          PT G-Codes  PT Professional Judgement Used?: Yes  Outcome Measure Options: AM-PAC 6 Clicks Basic Mobility (PT), Tinetti  Score: 24, 26  Functional Limitation: Mobility: Walking and moving around  Mobility: Walking and Moving Around Current Status (): At least 1 percent but less than 20 percent impaired, limited or restricted  Mobility: Walking and Moving Around Goal Status (): 0 percent impaired, limited or " restricted    Rula Calzada, PTA  6/15/2018

## 2018-06-15 NOTE — DISCHARGE PLACEMENT REQUEST
"Favio Owens (63 y.o. Male)     Date of Birth Social Security Number Address Home Phone MRN    1955  [address]  [facility] [address] [phone] [id number]    Adventism Marital Status          Episcopalian        Admission Date Admission Type Admitting Provider Attending Provider Department, Room/Bed    6/11/18 Emergency Art Colin MD Williams, Kevin L, MD [address], 419/1    Discharge Date Discharge Disposition Discharge Destination                       Attending Provider:  Art Colin MD    Allergies:  No Known Allergies    Isolation:  None   Infection:  None   Code Status:  FULL    Ht:  170.2 cm (67\")   Wt:  56.7 kg (125 lb)    Admission Cmt:  None   Principal Problem:  Acute kidney injury (nontraumatic) [N17.9]                 Active Insurance as of 6/11/2018     Primary Coverage     Payor Plan Insurance Group Employer/Plan Group    MEDICARE MEDICARE A & B      Payor Plan Address Payor Plan Phone Number Effective From Effective To    [address] [phone] 3/1/2007     [facility] [id number]       Subscriber Name Subscriber Birth Date Member ID       FAVIO OWENS 1955 [id number]           Secondary Coverage     Payor Plan Insurance Group Employer/Plan Group    [place]      Payor Plan Address Payor Plan Phone Number Effective From Effective To    [address] [phone] 8/1/2017     [facility] [id number]       Subscriber Name Subscriber Birth Date Member ID       FAVIO OWENS 1955 [id number]                 Emergency Contacts      (Rel.) Home Phone Work Phone Mobile Phone    Yadira Hackett (Sister) [phone] -- [phone]    Luann Camargo (Relative) -- -- [phone]               History & Physical      TIFFANIE Wolf at 6/11/2018  2:42 PM     Attestation signed by Art Colin MD at 6/11/2018  9:27 PM    I personally evaluated and examined the patient in " conjunction with TIFFANIE Taylor and agree with the assessment, treatment plan, and disposition of the patient as recorded.  Lungs clear                        [facility] Medicine Admission      Date of Admission: 6/11/2018      Primary Care Physician: Harvinder Robert MD      Chief Complaint: Abdominal pain.     HPI: This is a 63-year-old male with past medical medical history of type 2 diabetes, hepatitis C, COPD, and CKD 3 who presents to [facility] with complaints of abdominal discomfort and uncontrolled blood sugars for the past 24-48 hours with some nausea and vomiting.  The patient was recently admitted last month for a small bowel obstruction that resolved without intervention.    Concurrent Medical History:  has a past medical history of Abnormal weight loss; Acute bronchiolitis; Acute bronchitis; Acute exacerbation of chronic obstructive airways disease; Adenomatous polyp of colon; Aptyalism; Artificial lens present; Artificial lens present; Astigmatism; Backache; Borderline glaucoma; Chest discomfort; Chest pain; Chronic hepatitis C; Chronic hepatitis C; Chronic obstructive lung disease; Common cold; Constipation; Coronary arteriosclerosis; Diarrhea; Disorder of duodenum; Disorder of duodenum; Disorder of gallbladder; Diverticula of colon; Diverticular disease of colon; Drug abuse; Elevated levels of transaminase & lactic acid dehydrogenase; Esophagitis; Essential hypertension; Fatigue; Gastritis; Gastroesophageal reflux disease; Generalized abdominal pain; Hand pain, right; Headache; Heel pain; Hemorrhoids; History of colon polyps; History of colonoscopy (08/19/2013); History of esophagogastroduodenoscopy (07/24/2015); History of neoplasm of bladder; History of pneumococcal vaccination; History of seizure; Left lower quadrant pain; Male erectile disorder; Malignant tumor of prostate; Myopia; Nausea and vomiting; Need for immunization against influenza; Need  for prophylactic vaccination and inoculation against influenza; Pain; Pain in right hand; Patient's noncompliance with other medical treatment and regimen; Periumbilical pain; Primary malignant neoplasm of bladder; Rash; Rash; Rheumatoid arthritis; Right upper quadrant pain; Sebaceous cyst; Seizure; Transient cerebral ischemia; Type 2 diabetes mellitus; and Viral hepatitis C.    Past Surgical History:  has a past surgical history that includes Bladder tumor excision; Bladder surgery (01/27/2005); Cholecystectomy (08/25/2011); Esophagogastroduodenoscopy (07/24/2015); Esophagogastroduodenoscopy (02/23/2009); Other surgical history (03/22/2012); Injection of Medication (05/23/2016); Wound Closure (03/22/2012); Back surgery (01/01/2000); Cataract extraction w/  intraocular lens implant (Right, 05/21/2009); Bladder surgery (01/27/2005); Cholecystectomy (08/25/2011); Cystoscopy (12/17/2004); Lumbar disc surgery (2000); Cataract Extraction (Right, 05/21/2009); Other surgical history; Injection of Medication (05/12/2016); Other surgical history (3/22/3012); Other surgical history (03/22/2012); Colonoscopy (08/19/2013); Colonoscopy (07/24/2015); Fracture surgery; Esophagogastroduodenoscopy (N/A, 3/3/2017); Upper gastrointestinal endoscopy (07/24/2015); Upper gastrointestinal endoscopy (03/03/2017); Wrist surgery (Left); and Cardiac catheterization (N/A, 12/15/2017).    Family History: family history includes Cholelithiasis in his other; Coronary artery disease in his other; Diabetes in his mother; Gallbladder disease in his other; Hepatitis in his other; Hypertension in his other; Liver cancer in his father; Tuberculosis in his other.    Social History:  reports that he has quit smoking. His smoking use included Cigarettes. He has a 159.00 pack-year smoking history. He quit smokeless tobacco use about 38 years ago. His smokeless tobacco use included Chew. He reports that he does not drink alcohol or use drugs.    Allergies:  No Known Allergies    Medications:   Prior to Admission medications    Medication Sig Start Date End Date Taking? Authorizing Provider   albuterol (PROVENTIL) (2.5 MG/3ML) 0.083% nebulizer solution Take 2.5 mg by nebulization Every 4 (Four) Hours As Needed for Wheezing or Shortness of Air. 4/17/17  Yes Harvinder Robert MD   aspirin 81 MG chewable tablet Chew 81 mg daily.   Yes Historical Provider, MD   BRILINTA 90 MG tablet tablet TAKE 1 TABLET BY MOUTH 2 (TWO) TIMES A DAY. 1/8/18  Yes Harvinder Robert MD   carvedilol (COREG) 3.125 MG tablet Take 3.125 mg by mouth 2 (Two) Times a Day With Meals.   Yes Historical Provider, MD   fluticasone (FLONASE) 50 MCG/ACT nasal spray 2 sprays into each nostril Daily. 5/22/18  Yes Harvinder Robert MD   glimepiride (AMARYL) 1 MG tablet Take 1 tablet by mouth Every Morning Before Breakfast. 5/22/18  Yes Harvinder Robert MD   guaifenesin-dextromethorphan (ROBITUSSIN DM) 100-10 MG/5ML syrup Take 10 mL by mouth 4 (Four) Times a Day As Needed for Cough. 5/22/18  Yes Harvinder Robert MD   isosorbide mononitrate (IMDUR) 30 MG 24 hr tablet Take 60 mg by mouth 2 (Two) Times a Day. 2 tablets by mouth bid   Yes Historical Provider, MD   JANUVIA 100 MG tablet TAKE 1 TABLET BY MOUTH DAILY. 5/30/18  Yes Harvinder Robert MD   loratadine (CLARITIN) 10 MG tablet Take 1 tablet by mouth Daily. 5/22/18  Yes Harvinder Robert MD   omeprazole (priLOSEC) 40 MG capsule TAKE 1 CAPSULE BY MOUTH TWO (2) TIMES A DAY 2/6/18  Yes Kevin Robbins PA-C   pioglitazone (ACTOS) 15 MG tablet Take 1 tablet by mouth Daily. 5/22/18  Yes Harvinder Robert MD   potassium chloride (K-DUR,KLOR-CON) 10 MEQ CR tablet Take 1 tablet by mouth Daily. 5/1/18  Yes Jose G Voss MD   pravastatin (PRAVACHOL) 40 MG tablet TAKE 1 TABLET BY MOUTH EVERY NIGHT AT BEDTIME 5/3/18  Yes Harvinder Robert MD   sertraline (ZOLOFT) 50 MG tablet 1/2 tablet hs 1 week then 1 tablet . (depression) 5/22/18  Yes Harvinder Robert MD   SITagliptin (JANUVIA) 50 MG  tablet Take 1 tablet by mouth Daily. 5/22/18  Yes Harvinder Robert MD   SODIUM BICARBONATE PO Take 10 mg by mouth 2 (Two) Times a Day.   Yes Historical Provider, MD   NITROSTAT 0.4 MG SL tablet PLACE 1 TABLET UNDER THE TONGUE AS NEEDED FOR CHEST PAIN. MAY REPEAT EVERY 5 MINUTES UP TO 3 DOSES, THEN GO TO EMERGENCY ROOM 12/22/16   Harvinder Robert MD   tiZANidine (ZANAFLEX) 4 MG tablet Take 1-2 tablets by mouth At Night As Needed for Muscle Spasms. 5/22/18   Harvinder Robert MD   VENTOLIN  (90 BASE) MCG/ACT inhaler INHALE 2 PUFFS EVERY FOUR HOURS AS NEEDED 6/26/17   Harvinder Robert MD       Review of Systems:  Review of Systems   Constitutional: Positive for appetite change and fatigue.   Gastrointestinal: Positive for abdominal pain, constipation, nausea and vomiting.      Otherwise complete ROS is negative except as mentioned above.    Physical Exam:   Temp:  [98.3 °F (36.8 °C)-98.5 °F (36.9 °C)] 98.5 °F (36.9 °C)  Heart Rate:  [77-96] 90  Resp:  [18] 18  BP: (125-148)/(67-85) 141/67  Physical Exam   Constitutional: He is oriented to person, place, and time. He appears well-developed and well-nourished.   HENT:   Head: Normocephalic and atraumatic.   Eyes: EOM are normal. Pupils are equal, round, and reactive to light.   Neck: Normal range of motion. Neck supple.   Cardiovascular: Normal rate and regular rhythm.    Pulmonary/Chest: Effort normal and breath sounds normal.   Abdominal: Soft. Bowel sounds are normal.   Musculoskeletal: Normal range of motion.   Neurological: He is alert and oriented to person, place, and time.   Skin: Skin is warm and dry.   Psychiatric: He has a normal mood and affect.       Results Reviewed:  I have personally reviewed current lab, radiology, and data and agree with results.  Lab Results (last 24 hours)     Procedure Component Value Units Date/Time    POC Glucose Once [[id number]]  (Normal) Collected:  06/11/18 1231    Specimen:  Blood Updated:  06/11/18 1246     Glucose 109 mg/dL       Comment: RN NotifiedMeter: [id number]: [id number] AIDA KABA       [place] Draw [[id number]] Collected:  06/11/18 0804    Specimen:  Blood Updated:  06/11/18 0915    Narrative:       The following orders were created for panel order [place] Draw.  Procedure                               Abnormality         Status                     ---------                               -----------         ------                     Light Blue Top[[id number]]                                   Final result               Green Top (Gel)[[id number]]                                  Final result               Lavender Top[[id number]]                                     Final result               Gold Top - SST[[id number]]                                   Final result                 Please view results for these tests on the individual orders.    Light Blue Top [[id number]] Collected:  06/11/18 0804    Specimen:  Blood Updated:  06/11/18 0915     Extra Tube hold for add-on     Comment: Auto resulted       Green Top (Gel) [[id number]] Collected:  06/11/18 0804    Specimen:  Blood Updated:  06/11/18 0915     Extra Tube Hold for add-ons.     Comment: Auto resulted.       Lavender Top [[id number]] Collected:  06/11/18 0804    Specimen:  Blood Updated:  06/11/18 0915     Extra Tube hold for add-on     Comment: Auto resulted       Gold Top - SST [[id number]] Collected:  06/11/18 0804    Specimen:  Blood Updated:  06/11/18 0915     Extra Tube Hold for add-ons.     Comment: Auto resulted.       Urinalysis, Microscopic Only - Urine, Clean Catch [[id number]]  (Abnormal) Collected:  06/11/18 0800    Specimen:  Urine from Urine, Clean Catch Updated:  06/11/18 0835     RBC, UA 3-5 (A) /HPF      WBC, UA 13-20 (A) /HPF      Bacteria, UA Trace (A) /HPF      Squamous Epithelial Cells, UA 3-5 (A) /HPF      Hyaline Casts, UA None Seen /LPF      Mucus, UA Moderate/2+ (A) /HPF      Methodology Manual Light Microscopy    Lactic Acid, Plasma  [[id number]]  (Abnormal) Collected:  06/11/18 0804    Specimen:  Blood Updated:  06/11/18 0829     Lactate <0.5 (L) mmol/L     Comprehensive Metabolic Panel [[id number]]  (Abnormal) Collected:  06/11/18 0804    Specimen:  Blood Updated:  06/11/18 0829     Glucose 116 (H) mg/dL      BUN 77 (H) mg/dL      Creatinine 3.93 (H) mg/dL      Sodium 139 mmol/L      Potassium 4.0 mmol/L      Chloride 114 (H) mmol/L      CO2 7.0 (L) mmol/L      Calcium 8.6 mg/dL      Total Protein 7.4 g/dL      Albumin 4.20 g/dL      ALT (SGPT) 36 U/L      AST (SGOT) 26 U/L      Alkaline Phosphatase 88 U/L      Total Bilirubin 0.3 mg/dL      eGFR Non African Amer 16 (L) mL/min/1.73      Globulin 3.2 gm/dL      A/G Ratio 1.3 g/dL      BUN/Creatinine Ratio 19.6     Anion Gap 18.0 (H) mmol/L     Lipase [[id number]]  (Abnormal) Collected:  06/11/18 0804    Specimen:  Blood Updated:  06/11/18 0828     Lipase 314 (H) U/L     POC Glucose Once [[id number]]  (Normal) Collected:  06/11/18 0815    Specimen:  Blood Updated:  06/11/18 0826     Glucose 111 mg/dL      Comment: RN NotifiedMeter: [id number]: [id number] [facility]       Urinalysis With / Microscopic If Indicated (No Culture) - Urine, Clean Catch [[id number]]  (Abnormal) Collected:  06/11/18 0800    Specimen:  Urine from Urine, Clean Catch Updated:  06/11/18 0821     Color, UA Yellow     Appearance, UA Clear     pH, UA 7.0     Specific Gravity, UA 1.015     Glucose, UA Negative     Ketones, UA Negative     Bilirubin, UA Negative     Blood, UA Small (1+) (A)     Protein,  mg/dL (2+) (A)     Leuk Esterase, UA Negative     Nitrite, UA Negative     Urobilinogen, UA 0.2 E.U./dL    CBC & Differential [[id number]] Collected:  06/11/18 0804    Specimen:  Blood Updated:  06/11/18 0816    Narrative:       The following orders were created for panel order CBC & Differential.  Procedure                               Abnormality         Status                     ---------                                -----------         ------                     CBC Auto Differential[[id number]]        Abnormal            Final result                 Please view results for these tests on the individual orders.    CBC Auto Differential [[id number]]  (Abnormal) Collected:  06/11/18 0804    Specimen:  Blood Updated:  06/11/18 0816     WBC 9.92 (H) 10*3/mm3      RBC 4.49 10*6/mm3      Hemoglobin 13.5 (L) g/dL      Hematocrit 40.0 %      MCV 89.1 fL      MCH 30.1 pg      MCHC 33.8 g/dL      RDW 14.8 (H) %      RDW-SD 47.9 (H) fl      MPV 9.8 fL      Platelets 203 10*3/mm3      Neutrophil % 83.4 (H) %      Lymphocyte % 10.1 %      Monocyte % 5.7 %      Eosinophil % 0.4 %      Basophil % 0.1 %      Immature Grans % 0.3 %      Neutrophils, Absolute 8.27 10*3/mm3      Lymphocytes, Absolute 1.00 10*3/mm3      Monocytes, Absolute 0.57 10*3/mm3      Eosinophils, Absolute 0.04 10*3/mm3      Basophils, Absolute 0.01 10*3/mm3      Immature Grans, Absolute 0.03 (H) 10*3/mm3         Imaging Results (last 24 hours)     Procedure Component Value Units Date/Time    CT Abdomen Pelvis Without Contrast [[id number]] Collected:  06/11/18 0935     Updated:  06/11/18 1021    Narrative:       PROCEDURE: CT abdomen and pelvis without contrast.    INDICATION: Left lower quadrant pain. History of bladder cancer  and prostate cancer.    COMPARISON: CT abdomen and pelvis 4/27/2018 and 6/5/2015.     Total .5 milliGray-cm.    TECHNIQUE: This exam was performed according to our departmental  dose-optimization program, which includes automated exposure  control, adjustment of the mA and/or kV according to patient size  and/or use of iterative reconstruction technique. CT abdomen and  pelvis performed with oral contrast only with axial and  reconstructed sagittal and coronal images from above the  diaphragms to below the lesser trochanters.    Lung bases are clear with no pleural effusions.    No acute osseous abnormalities in the lumbar spine or  bony  pelvis.    The liver, pancreas and spleen normal. Gallbladder surgically  absent.    The abdominal aorta is normal in caliber and the IVC normal  caliber. There is a stable 1.9 x 1.2 cm left periaortic lymph  node at the level of left renal vein unchanged from prior study.  There are smaller subcentimeter lymph nodes along the left  periaortic region also unchanged. The adrenal glands are normal.  Kidneys are normal in size. There are no renal or ureteral  calculi or hydronephrosis. No perirenal fat stranding.    Surgical changes again noted in the pelvis consistent with a  neobladder midline and to the right of the midline. Unchanged in  appearance . The prostate gland about the 3.2 cm diameter.    Most of the oral contrast is still within the stomach with  moderate gastric distention with oral contrast even though there  is no oral contrast in the small bowel there is no significant  small bowel distention or air-fluid levels or obstruction. The  retained oral contrast in the stomach could indicate gastric  outlet obstruction, however no apparent etiology noted on CT.  Other possibility week decreased gastric motility with delayed  gastric emptying.    There is a area of prior  small bowel resection in the right  lower mid abdomen with surgical clips with a mildly localized  area of distention and fluid decreased from prior study. May be  related to the surgery rather than obstructive change.    There is a moderate amount of fecal material present throughout  the colon. No pericolonic inflammatory changes or diverticular  disease. Appendix not definitely visualized on current or prior  exam.    There is no free fluid or air in the abdomen or pelvis.    No ventral or inguinal hernia.      Impression:       Postsurgical changes in the pelvis consistent with  neobladder unchanged from prior study.    Postsurgical changes apparently from partial small bowel  resection right lower mid abdomen with localized mild  area of  small bowel distention and fluid decreased from prior study. No  small bowel obstruction or evident.    Moderate fecal material throughout the colon. No diverticular  disease or pericolonic inflammatory change.    Stable 1.9 x 1.2 cm left periaortic lymph node with smaller left  periaortic lymph nodes a centimeter last not changed.    Oral contrast in the stomach with moderate gastric distention. No  evidence of small bowel obstruction. The delayed emptying of the  contrast from the stomach could indicate gastric outlet  obstruction with no apparent etiology versus a gastric atony with  decreased gastric emptying.    [id number]    Electronically signed by:  Robin Carrasco MD  6/11/2018 10:20  AM CDT Workstation: Find That File        Active Hospital Problems (** Indicates Principal Problem)    Diagnosis Date Noted   • Acute kidney injury (nontraumatic) [N17.9] 06/11/2018      Resolved Hospital Problems    Diagnosis Date Noted Date Resolved   No resolved problems to display.       Plan:  1.  Acute on chronic kidney injury stage III:  Patient has a baseline of 1.3-1.4.  Creatinine today is at 3.7.  Dr. Brody following.  Aggressive IV fluid resuscitation.  CT of the abdomen and pelvis shows no hydronephrosis.   2.  Chronic nausea and vomiting:  Gastric emptying study ordered.  Most likely gastroparesis related to diabetes.  Reglan started.  3.  Diabetes mellitus, type II:  SSI and oral home meds ordered.   4.  Constipation: Colace, MiraLAX and Dulcolax as needed.  5.  Hepatitis C:  Follows with Kevin GALINDO.  Treatment is currently completed.    I discussed the patients findings and my recommendations with: Patient          This document has been electronically signed by TIFFANIE Wolf on June 11, 2018 2:42 PM                      Electronically signed by Art Colin MD at 6/11/2018  9:27 PM

## 2018-06-21 ENCOUNTER — OFFICE VISIT (OUTPATIENT)
Dept: FAMILY MEDICINE CLINIC | Facility: CLINIC | Age: 63
End: 2018-06-21

## 2018-06-21 VITALS
HEIGHT: 67 IN | TEMPERATURE: 99.1 F | WEIGHT: 130.6 LBS | DIASTOLIC BLOOD PRESSURE: 72 MMHG | SYSTOLIC BLOOD PRESSURE: 110 MMHG | HEART RATE: 79 BPM | OXYGEN SATURATION: 97 % | BODY MASS INDEX: 20.5 KG/M2

## 2018-06-21 DIAGNOSIS — N17.9 ACUTE KIDNEY INJURY (NONTRAUMATIC) (HCC): Primary | ICD-10-CM

## 2018-06-21 DIAGNOSIS — D64.9 ANEMIA, UNSPECIFIED TYPE: ICD-10-CM

## 2018-06-21 LAB
ANION GAP SERPL CALCULATED.3IONS-SCNC: 8 MMOL/L (ref 5–15)
BUN BLD-MCNC: 34 MG/DL (ref 7–21)
BUN/CREAT SERPL: 24.5 (ref 7–25)
CALCIUM SPEC-SCNC: 8.2 MG/DL (ref 8.4–10.2)
CHLORIDE SERPL-SCNC: 109 MMOL/L (ref 95–110)
CO2 SERPL-SCNC: 27 MMOL/L (ref 22–31)
CREAT BLD-MCNC: 1.39 MG/DL (ref 0.7–1.3)
DEPRECATED RDW RBC AUTO: 52.6 FL (ref 35.1–43.9)
ERYTHROCYTE [DISTWIDTH] IN BLOOD BY AUTOMATED COUNT: 15.1 % (ref 11.5–14.5)
GFR SERPL CREATININE-BSD FRML MDRD: 52 ML/MIN/1.73 (ref 49–113)
GLUCOSE BLD-MCNC: 104 MG/DL (ref 60–100)
HCT VFR BLD AUTO: 32.3 % (ref 39–49)
HGB BLD-MCNC: 10.1 G/DL (ref 13.7–17.3)
MCH RBC QN AUTO: 29.9 PG (ref 26.5–34)
MCHC RBC AUTO-ENTMCNC: 31.3 G/DL (ref 31.5–36.3)
MCV RBC AUTO: 95.6 FL (ref 80–98)
PLATELET # BLD AUTO: 216 10*3/MM3 (ref 150–450)
PMV BLD AUTO: 10.6 FL (ref 8–12)
POTASSIUM BLD-SCNC: 4.8 MMOL/L (ref 3.5–5.1)
RBC # BLD AUTO: 3.38 10*6/MM3 (ref 4.37–5.74)
SODIUM BLD-SCNC: 144 MMOL/L (ref 137–145)
WBC NRBC COR # BLD: 4.63 10*3/MM3 (ref 3.2–9.8)

## 2018-06-21 PROCEDURE — 36415 COLL VENOUS BLD VENIPUNCTURE: CPT | Performed by: FAMILY MEDICINE

## 2018-06-21 PROCEDURE — 80048 BASIC METABOLIC PNL TOTAL CA: CPT | Performed by: FAMILY MEDICINE

## 2018-06-21 PROCEDURE — 85027 COMPLETE CBC AUTOMATED: CPT | Performed by: FAMILY MEDICINE

## 2018-06-21 PROCEDURE — 99213 OFFICE O/P EST LOW 20 MIN: CPT | Performed by: FAMILY MEDICINE

## 2018-06-21 RX ORDER — ASPIRIN 81 MG/1
81 TABLET, CHEWABLE ORAL DAILY
Qty: 30 TABLET | Refills: 11 | Status: SHIPPED | OUTPATIENT
Start: 2018-06-21 | End: 2019-02-28
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
## 2018-06-28 ENCOUNTER — LAB (OUTPATIENT)
Dept: LAB | Facility: CLINIC | Age: 63
End: 2018-06-28

## 2018-06-28 ENCOUNTER — TELEPHONE (OUTPATIENT)
Dept: FAMILY MEDICINE CLINIC | Facility: CLINIC | Age: 63
End: 2018-06-28

## 2018-06-28 DIAGNOSIS — N28.9 ACUTE RENAL INSUFFICIENCY: ICD-10-CM

## 2018-06-28 DIAGNOSIS — N28.9 ACUTE RENAL INSUFFICIENCY: Primary | ICD-10-CM

## 2018-06-28 PROCEDURE — 80069 RENAL FUNCTION PANEL: CPT | Performed by: FAMILY MEDICINE

## 2018-06-28 PROCEDURE — 36415 COLL VENOUS BLD VENIPUNCTURE: CPT | Performed by: FAMILY MEDICINE

## 2018-06-28 NOTE — TELEPHONE ENCOUNTER
PATIENT HAS APPOINTMENT WITH NEPHROLOGY ON Tuesday.  THEY NEED A CURRENT RENAL PANEL BEFORE THE VISIT.

## 2018-06-29 LAB
ALBUMIN SERPL-MCNC: 4.3 G/DL (ref 3.4–4.8)
ANION GAP SERPL CALCULATED.3IONS-SCNC: 12 MMOL/L (ref 5–15)
BUN BLD-MCNC: 30 MG/DL (ref 7–21)
BUN/CREAT SERPL: 21.6 (ref 7–25)
CALCIUM SPEC-SCNC: 8.2 MG/DL (ref 8.4–10.2)
CHLORIDE SERPL-SCNC: 107 MMOL/L (ref 95–110)
CO2 SERPL-SCNC: 24 MMOL/L (ref 22–31)
CREAT BLD-MCNC: 1.39 MG/DL (ref 0.7–1.3)
GFR SERPL CREATININE-BSD FRML MDRD: 52 ML/MIN/1.73 (ref 49–113)
GLUCOSE BLD-MCNC: 67 MG/DL (ref 60–100)
PHOSPHATE SERPL-MCNC: 3.9 MG/DL (ref 2.4–4.4)
POTASSIUM BLD-SCNC: 4.5 MMOL/L (ref 3.5–5.1)
SODIUM BLD-SCNC: 143 MMOL/L (ref 137–145)

## 2018-07-24 ENCOUNTER — HOSPITAL ENCOUNTER (EMERGENCY)
Facility: HOSPITAL | Age: 63
Discharge: HOME | End: 2018-07-24
Attending: EMERGENCY MEDICINE | Admitting: EMERGENCY MEDICINE

## 2018-07-24 ENCOUNTER — APPOINTMENT (OUTPATIENT)
Dept: GENERAL RADIOLOGY | Facility: HOSPITAL | Age: 63
End: 2018-07-24

## 2018-07-24 VITALS
WEIGHT: 149 LBS | OXYGEN SATURATION: 98 % | HEIGHT: 67 IN | BODY MASS INDEX: 23.39 KG/M2 | DIASTOLIC BLOOD PRESSURE: 86 MMHG | HEART RATE: 88 BPM | RESPIRATION RATE: 16 BRPM | SYSTOLIC BLOOD PRESSURE: 142 MMHG | TEMPERATURE: 98.2 F

## 2018-07-24 DIAGNOSIS — R55 NEAR SYNCOPE: Primary | ICD-10-CM

## 2018-07-24 DIAGNOSIS — R42 DIZZINESS: ICD-10-CM

## 2018-07-24 LAB
ALBUMIN SERPL-MCNC: 4.7 G/DL (ref 3.4–4.8)
ALBUMIN/GLOB SERPL: 1.5 G/DL (ref 1.1–1.8)
ALP SERPL-CCNC: 56 U/L (ref 38–126)
ALT SERPL W P-5'-P-CCNC: 23 U/L (ref 21–72)
ANION GAP SERPL CALCULATED.3IONS-SCNC: 9 MMOL/L (ref 5–15)
AST SERPL-CCNC: 18 U/L (ref 17–59)
BACTERIA UR QL AUTO: ABNORMAL /HPF
BASOPHILS # BLD AUTO: 0.01 10*3/MM3 (ref 0–0.2)
BASOPHILS NFR BLD AUTO: 0.2 % (ref 0–2)
BILIRUB SERPL-MCNC: 0.4 MG/DL (ref 0.2–1.3)
BILIRUB UR QL STRIP: NEGATIVE
BUN BLD-MCNC: 34 MG/DL (ref 7–21)
BUN/CREAT SERPL: 25.6 (ref 7–25)
CALCIUM SPEC-SCNC: 9.2 MG/DL (ref 8.4–10.2)
CHLORIDE SERPL-SCNC: 109 MMOL/L (ref 95–110)
CLARITY UR: CLEAR
CO2 SERPL-SCNC: 21 MMOL/L (ref 22–31)
COLOR UR: YELLOW
CREAT BLD-MCNC: 1.33 MG/DL (ref 0.7–1.3)
DEPRECATED RDW RBC AUTO: 49.6 FL (ref 35.1–43.9)
EOSINOPHIL # BLD AUTO: 0.05 10*3/MM3 (ref 0–0.7)
EOSINOPHIL NFR BLD AUTO: 0.8 % (ref 0–7)
ERYTHROCYTE [DISTWIDTH] IN BLOOD BY AUTOMATED COUNT: 14.2 % (ref 11.5–14.5)
GFR SERPL CREATININE-BSD FRML MDRD: 55 ML/MIN/1.73 (ref 49–113)
GLOBULIN UR ELPH-MCNC: 3.1 GM/DL (ref 2.3–3.5)
GLUCOSE BLD-MCNC: 147 MG/DL (ref 60–100)
GLUCOSE BLDC GLUCOMTR-MCNC: 150 MG/DL (ref 70–130)
GLUCOSE UR STRIP-MCNC: NEGATIVE MG/DL
HCT VFR BLD AUTO: 38.5 % (ref 39–49)
HGB BLD-MCNC: 12.5 G/DL (ref 13.7–17.3)
HGB UR QL STRIP.AUTO: ABNORMAL
HOLD SPECIMEN: NORMAL
HOLD SPECIMEN: NORMAL
HYALINE CASTS UR QL AUTO: ABNORMAL /LPF
IMM GRANULOCYTES # BLD: 0.02 10*3/MM3 (ref 0–0.02)
IMM GRANULOCYTES NFR BLD: 0.3 % (ref 0–0.5)
INR PPP: 0.98 (ref 0.8–1.2)
KETONES UR QL STRIP: NEGATIVE
LEUKOCYTE ESTERASE UR QL STRIP.AUTO: NEGATIVE
LYMPHOCYTES # BLD AUTO: 0.92 10*3/MM3 (ref 0.6–4.2)
LYMPHOCYTES NFR BLD AUTO: 15.5 % (ref 10–50)
MCH RBC QN AUTO: 31 PG (ref 26.5–34)
MCHC RBC AUTO-ENTMCNC: 32.5 G/DL (ref 31.5–36.3)
MCV RBC AUTO: 95.5 FL (ref 80–98)
MONOCYTES # BLD AUTO: 0.46 10*3/MM3 (ref 0–0.9)
MONOCYTES NFR BLD AUTO: 7.7 % (ref 0–12)
NEUTROPHILS # BLD AUTO: 4.49 10*3/MM3 (ref 2–8.6)
NEUTROPHILS NFR BLD AUTO: 75.5 % (ref 37–80)
NITRITE UR QL STRIP: NEGATIVE
NT-PROBNP SERPL-MCNC: 348 PG/ML (ref 0–900)
PH UR STRIP.AUTO: 7 [PH] (ref 5–9)
PLATELET # BLD AUTO: 188 10*3/MM3 (ref 150–450)
PMV BLD AUTO: 10.2 FL (ref 8–12)
POTASSIUM BLD-SCNC: 4 MMOL/L (ref 3.5–5.1)
PROT SERPL-MCNC: 7.8 G/DL (ref 6.3–8.6)
PROT UR QL STRIP: NEGATIVE
PROTHROMBIN TIME: 12.8 SECONDS (ref 11.1–15.3)
RBC # BLD AUTO: 4.03 10*6/MM3 (ref 4.37–5.74)
RBC # UR: ABNORMAL /HPF
REF LAB TEST METHOD: ABNORMAL
SODIUM BLD-SCNC: 139 MMOL/L (ref 137–145)
SP GR UR STRIP: 1.01 (ref 1–1.03)
SQUAMOUS #/AREA URNS HPF: ABNORMAL /HPF
TROPONIN I SERPL-MCNC: <0.012 NG/ML
UROBILINOGEN UR QL STRIP: ABNORMAL
WBC NRBC COR # BLD: 5.95 10*3/MM3 (ref 3.2–9.8)
WBC UR QL AUTO: ABNORMAL /HPF
WHOLE BLOOD HOLD SPECIMEN: NORMAL
WHOLE BLOOD HOLD SPECIMEN: NORMAL

## 2018-07-24 PROCEDURE — 80053 COMPREHEN METABOLIC PANEL: CPT | Performed by: EMERGENCY MEDICINE

## 2018-07-24 PROCEDURE — 81001 URINALYSIS AUTO W/SCOPE: CPT | Performed by: EMERGENCY MEDICINE

## 2018-07-24 PROCEDURE — 99284 EMERGENCY DEPT VISIT MOD MDM: CPT

## 2018-07-24 PROCEDURE — 93005 ELECTROCARDIOGRAM TRACING: CPT | Performed by: EMERGENCY MEDICINE

## 2018-07-24 PROCEDURE — 85610 PROTHROMBIN TIME: CPT | Performed by: EMERGENCY MEDICINE

## 2018-07-24 PROCEDURE — 83880 ASSAY OF NATRIURETIC PEPTIDE: CPT | Performed by: EMERGENCY MEDICINE

## 2018-07-24 PROCEDURE — 84484 ASSAY OF TROPONIN QUANT: CPT | Performed by: EMERGENCY MEDICINE

## 2018-07-24 PROCEDURE — 85025 COMPLETE CBC W/AUTO DIFF WBC: CPT | Performed by: EMERGENCY MEDICINE

## 2018-07-24 PROCEDURE — 82962 GLUCOSE BLOOD TEST: CPT

## 2018-07-24 PROCEDURE — 71045 X-RAY EXAM CHEST 1 VIEW: CPT

## 2018-07-24 PROCEDURE — 93010 ELECTROCARDIOGRAM REPORT: CPT | Performed by: INTERNAL MEDICINE

## 2018-07-24 RX ORDER — SODIUM CHLORIDE 0.9 % (FLUSH) 0.9 %
10 SYRINGE (ML) INJECTION AS NEEDED
Status: DISCONTINUED | OUTPATIENT
Start: 2018-07-24 | End: 2018-07-24 | Stop reason: HOSPADM

## 2018-07-24 RX ORDER — SODIUM CHLORIDE 9 MG/ML
125 INJECTION, SOLUTION INTRAVENOUS CONTINUOUS
Status: DISCONTINUED | OUTPATIENT
Start: 2018-07-24 | End: 2018-07-24 | Stop reason: HOSPADM

## 2018-07-24 RX ADMIN — Medication 10 ML: at 16:44

## 2018-07-24 RX ADMIN — SODIUM CHLORIDE 500 ML: 9 INJECTION, SOLUTION INTRAVENOUS at 16:50

## 2018-07-26 ENCOUNTER — OFFICE VISIT (OUTPATIENT)
Dept: FAMILY MEDICINE CLINIC | Facility: CLINIC | Age: 63
End: 2018-07-26

## 2018-07-26 VITALS
SYSTOLIC BLOOD PRESSURE: 110 MMHG | OXYGEN SATURATION: 96 % | TEMPERATURE: 99.2 F | DIASTOLIC BLOOD PRESSURE: 80 MMHG | BODY MASS INDEX: 21.94 KG/M2 | HEART RATE: 89 BPM | WEIGHT: 139.8 LBS | HEIGHT: 67 IN

## 2018-07-26 DIAGNOSIS — R42 POSTURAL DIZZINESS WITH PRESYNCOPE: Primary | ICD-10-CM

## 2018-07-26 DIAGNOSIS — R55 POSTURAL DIZZINESS WITH PRESYNCOPE: Primary | ICD-10-CM

## 2018-07-26 PROCEDURE — 99213 OFFICE O/P EST LOW 20 MIN: CPT | Performed by: FAMILY MEDICINE
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
## 2018-08-22 ENCOUNTER — OFFICE VISIT (OUTPATIENT)
Dept: FAMILY MEDICINE CLINIC | Facility: CLINIC | Age: 63
End: 2018-08-22

## 2018-08-22 VITALS
BODY MASS INDEX: 21.88 KG/M2 | WEIGHT: 139.4 LBS | HEART RATE: 79 BPM | OXYGEN SATURATION: 97 % | HEIGHT: 67 IN | DIASTOLIC BLOOD PRESSURE: 80 MMHG | SYSTOLIC BLOOD PRESSURE: 118 MMHG | TEMPERATURE: 99 F

## 2018-08-22 DIAGNOSIS — Z79.4 TYPE 2 DIABETES MELLITUS WITH COMPLICATION, WITH LONG-TERM CURRENT USE OF INSULIN (HCC): Primary | ICD-10-CM

## 2018-08-22 DIAGNOSIS — K29.70 GASTRITIS, PRESENCE OF BLEEDING UNSPECIFIED, UNSPECIFIED CHRONICITY, UNSPECIFIED GASTRITIS TYPE: ICD-10-CM

## 2018-08-22 DIAGNOSIS — E11.8 TYPE 2 DIABETES MELLITUS WITH COMPLICATION, WITH LONG-TERM CURRENT USE OF INSULIN (HCC): Primary | ICD-10-CM

## 2018-08-22 DIAGNOSIS — R63.0 ANOREXIA: ICD-10-CM

## 2018-08-22 LAB — HBA1C MFR BLD: 6.1 % (ref 4–5.6)

## 2018-08-22 PROCEDURE — 36415 COLL VENOUS BLD VENIPUNCTURE: CPT | Performed by: FAMILY MEDICINE

## 2018-08-22 PROCEDURE — 83036 HEMOGLOBIN GLYCOSYLATED A1C: CPT | Performed by: FAMILY MEDICINE

## 2018-08-22 PROCEDURE — 99214 OFFICE O/P EST MOD 30 MIN: CPT | Performed by: FAMILY MEDICINE

## 2018-08-22 RX ORDER — OMEPRAZOLE 20 MG/1
20 CAPSULE, DELAYED RELEASE ORAL 2 TIMES DAILY
Qty: 60 CAPSULE | Refills: 11 | Status: SHIPPED | OUTPATIENT
Start: 2018-08-22 | End: 2018-08-22 | Stop reason: DRUGHIGH

## 2018-08-22 RX ORDER — OMEPRAZOLE 20 MG/1
20 CAPSULE, DELAYED RELEASE ORAL DAILY
Qty: 30 CAPSULE | Refills: 11 | Status: SHIPPED | OUTPATIENT
Start: 2018-08-22 | End: 2021-01-11

## 2018-08-22 RX ORDER — ALBUTEROL SULFATE 90 UG/1
2 AEROSOL, METERED RESPIRATORY (INHALATION) EVERY 6 HOURS PRN
Qty: 18 G | Refills: 11 | Status: ON HOLD | OUTPATIENT
Start: 2018-08-22 | End: 2021-01-17 | Stop reason: SDUPTHER

## 2018-08-22 RX ORDER — MIRTAZAPINE 15 MG/1
15 TABLET, FILM COATED ORAL NIGHTLY
Qty: 30 TABLET | Refills: 11 | Status: SHIPPED | OUTPATIENT
Start: 2018-08-22 | End: 2018-10-17 | Stop reason: SINTOL
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
## 2018-08-23 ENCOUNTER — TELEPHONE (OUTPATIENT)
Dept: FAMILY MEDICINE CLINIC | Facility: CLINIC | Age: 63
End: 2018-08-23

## 2018-08-23 RX ORDER — PIOGLITAZONEHYDROCHLORIDE 15 MG/1
15 TABLET ORAL DAILY
Qty: 90 TABLET | Refills: 1 | Status: SHIPPED | OUTPATIENT
Start: 2018-08-23 | End: 2019-03-20

## 2018-08-23 RX ORDER — GLIMEPIRIDE 1 MG/1
1 TABLET ORAL
Qty: 90 TABLET | Refills: 1 | Status: SHIPPED | OUTPATIENT
Start: 2018-08-23 | End: 2019-03-29 | Stop reason: HOSPADM

## 2018-08-23 NOTE — TELEPHONE ENCOUNTER
----- Message from Gerard Jackson sent at 8/23/2018  3:58 PM CDT -----  PATIENT NEEDS NEW METER AND TEST STRIPS SENT TO [facility].

## 2018-09-10 ENCOUNTER — TELEPHONE (OUTPATIENT)
Dept: FAMILY MEDICINE CLINIC | Facility: CLINIC | Age: 63
End: 2018-09-10

## 2018-10-11 ENCOUNTER — EPISODE CHANGES (OUTPATIENT)
Dept: CASE MANAGEMENT | Facility: OTHER | Age: 63
End: 2018-10-11

## 2018-10-17 ENCOUNTER — OFFICE VISIT (OUTPATIENT)
Dept: FAMILY MEDICINE CLINIC | Facility: CLINIC | Age: 63
End: 2018-10-17

## 2018-10-17 VITALS
DIASTOLIC BLOOD PRESSURE: 80 MMHG | BODY MASS INDEX: 22.29 KG/M2 | OXYGEN SATURATION: 98 % | SYSTOLIC BLOOD PRESSURE: 110 MMHG | HEIGHT: 67 IN | HEART RATE: 89 BPM | TEMPERATURE: 98.4 F | WEIGHT: 142 LBS

## 2018-10-17 DIAGNOSIS — G89.29 CHRONIC LOW BACK PAIN, UNSPECIFIED BACK PAIN LATERALITY, WITH SCIATICA PRESENCE UNSPECIFIED: Primary | ICD-10-CM

## 2018-10-17 DIAGNOSIS — E11.42 DIABETIC POLYNEUROPATHY ASSOCIATED WITH TYPE 2 DIABETES MELLITUS (HCC): ICD-10-CM

## 2018-10-17 DIAGNOSIS — N52.9 ERECTILE DYSFUNCTION, UNSPECIFIED ERECTILE DYSFUNCTION TYPE: ICD-10-CM

## 2018-10-17 DIAGNOSIS — G25.81 RESTLESS LEGS: ICD-10-CM

## 2018-10-17 DIAGNOSIS — M54.5 CHRONIC LOW BACK PAIN, UNSPECIFIED BACK PAIN LATERALITY, WITH SCIATICA PRESENCE UNSPECIFIED: Primary | ICD-10-CM

## 2018-10-17 PROCEDURE — 99214 OFFICE O/P EST MOD 30 MIN: CPT | Performed by: FAMILY MEDICINE

## 2018-10-17 PROCEDURE — 96372 THER/PROPH/DIAG INJ SC/IM: CPT | Performed by: FAMILY MEDICINE

## 2018-10-17 RX ORDER — GABAPENTIN 100 MG/1
100-300 CAPSULE ORAL NIGHTLY
Qty: 90 CAPSULE | Refills: 0 | Status: SHIPPED | OUTPATIENT
Start: 2018-10-17 | End: 2018-11-20 | Stop reason: DRUGHIGH

## 2018-10-17 RX ORDER — TRIAMCINOLONE ACETONIDE 40 MG/ML
80 INJECTION, SUSPENSION INTRA-ARTICULAR; INTRAMUSCULAR ONCE
Status: COMPLETED | OUTPATIENT
Start: 2018-10-17 | End: 2018-10-17

## 2018-10-17 RX ORDER — PROMETHAZINE HYDROCHLORIDE AND CODEINE PHOSPHATE 6.25; 1 MG/5ML; MG/5ML
5 SYRUP ORAL EVERY 6 HOURS PRN
Qty: 240 ML | Refills: 0 | Status: SHIPPED | OUTPATIENT
Start: 2018-10-17 | End: 2019-02-28

## 2018-10-17 RX ADMIN — TRIAMCINOLONE ACETONIDE 80 MG: 40 INJECTION, SUSPENSION INTRA-ARTICULAR; INTRAMUSCULAR at 14:30
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
## 2018-11-13 ENCOUNTER — HOSPITAL ENCOUNTER (EMERGENCY)
Facility: HOSPITAL | Age: 63
Discharge: HOME | End: 2018-11-13
Attending: EMERGENCY MEDICINE | Admitting: EMERGENCY MEDICINE

## 2018-11-13 ENCOUNTER — APPOINTMENT (OUTPATIENT)
Dept: CT IMAGING | Facility: HOSPITAL | Age: 63
End: 2018-11-13

## 2018-11-13 VITALS
HEART RATE: 90 BPM | BODY MASS INDEX: 21.96 KG/M2 | DIASTOLIC BLOOD PRESSURE: 80 MMHG | HEIGHT: 67 IN | OXYGEN SATURATION: 94 % | SYSTOLIC BLOOD PRESSURE: 135 MMHG | RESPIRATION RATE: 18 BRPM | WEIGHT: 139.9 LBS | TEMPERATURE: 99 F

## 2018-11-13 DIAGNOSIS — K52.9 COLITIS: Primary | ICD-10-CM

## 2018-11-13 LAB
ALBUMIN SERPL-MCNC: 4.4 G/DL (ref 3.4–4.8)
ALBUMIN/GLOB SERPL: 1.6 G/DL (ref 1.1–1.8)
ALP SERPL-CCNC: 53 U/L (ref 38–126)
ALT SERPL W P-5'-P-CCNC: 16 U/L (ref 21–72)
ANION GAP SERPL CALCULATED.3IONS-SCNC: 13 MMOL/L (ref 5–15)
AST SERPL-CCNC: 17 U/L (ref 17–59)
BACTERIA UR QL AUTO: ABNORMAL /HPF
BASOPHILS # BLD AUTO: 0.01 10*3/MM3 (ref 0–0.2)
BASOPHILS NFR BLD AUTO: 0.2 % (ref 0–2)
BILIRUB SERPL-MCNC: 0.6 MG/DL (ref 0.2–1.3)
BILIRUB UR QL STRIP: NEGATIVE
BUN BLD-MCNC: 35 MG/DL (ref 7–21)
BUN/CREAT SERPL: 25.2 (ref 7–25)
CALCIUM SPEC-SCNC: 9.6 MG/DL (ref 8.4–10.2)
CHLORIDE SERPL-SCNC: 107 MMOL/L (ref 95–110)
CLARITY UR: ABNORMAL
CO2 SERPL-SCNC: 18 MMOL/L (ref 22–31)
COLOR UR: YELLOW
CREAT BLD-MCNC: 1.39 MG/DL (ref 0.7–1.3)
DEPRECATED RDW RBC AUTO: 46.3 FL (ref 35.1–43.9)
EOSINOPHIL # BLD AUTO: 0.04 10*3/MM3 (ref 0–0.7)
EOSINOPHIL NFR BLD AUTO: 0.6 % (ref 0–7)
ERYTHROCYTE [DISTWIDTH] IN BLOOD BY AUTOMATED COUNT: 13.6 % (ref 11.5–14.5)
GFR SERPL CREATININE-BSD FRML MDRD: 52 ML/MIN/1.73 (ref 49–113)
GLOBULIN UR ELPH-MCNC: 2.8 GM/DL (ref 2.3–3.5)
GLUCOSE BLD-MCNC: 171 MG/DL (ref 60–100)
GLUCOSE UR STRIP-MCNC: NEGATIVE MG/DL
GRAN CASTS URNS QL MICRO: ABNORMAL /LPF
HCT VFR BLD AUTO: 46.2 % (ref 39–49)
HGB BLD-MCNC: 15.8 G/DL (ref 13.7–17.3)
HGB UR QL STRIP.AUTO: ABNORMAL
HOLD SPECIMEN: NORMAL
HOLD SPECIMEN: NORMAL
HYALINE CASTS UR QL AUTO: ABNORMAL /LPF
IMM GRANULOCYTES # BLD: 0.01 10*3/MM3 (ref 0–0.02)
IMM GRANULOCYTES NFR BLD: 0.2 % (ref 0–0.5)
KETONES UR QL STRIP: NEGATIVE
LEUKOCYTE ESTERASE UR QL STRIP.AUTO: NEGATIVE
LIPASE SERPL-CCNC: 73 U/L (ref 23–300)
LYMPHOCYTES # BLD AUTO: 1.33 10*3/MM3 (ref 0.6–4.2)
LYMPHOCYTES NFR BLD AUTO: 20.2 % (ref 10–50)
MCH RBC QN AUTO: 31.7 PG (ref 26.5–34)
MCHC RBC AUTO-ENTMCNC: 34.2 G/DL (ref 31.5–36.3)
MCV RBC AUTO: 92.8 FL (ref 80–98)
MONOCYTES # BLD AUTO: 0.59 10*3/MM3 (ref 0–0.9)
MONOCYTES NFR BLD AUTO: 9 % (ref 0–12)
NEUTROPHILS # BLD AUTO: 4.61 10*3/MM3 (ref 2–8.6)
NEUTROPHILS NFR BLD AUTO: 69.8 % (ref 37–80)
NITRITE UR QL STRIP: NEGATIVE
PH UR STRIP.AUTO: 7 [PH] (ref 5–9)
PLATELET # BLD AUTO: 153 10*3/MM3 (ref 150–450)
PMV BLD AUTO: 9.9 FL (ref 8–12)
POTASSIUM BLD-SCNC: 3.6 MMOL/L (ref 3.5–5.1)
PROT SERPL-MCNC: 7.2 G/DL (ref 6.3–8.6)
PROT UR QL STRIP: ABNORMAL
RBC # BLD AUTO: 4.98 10*6/MM3 (ref 4.37–5.74)
RBC # UR: ABNORMAL /HPF
REF LAB TEST METHOD: ABNORMAL
SODIUM BLD-SCNC: 138 MMOL/L (ref 137–145)
SP GR UR STRIP: 1.01 (ref 1–1.03)
SQUAMOUS #/AREA URNS HPF: ABNORMAL /HPF
UROBILINOGEN UR QL STRIP: ABNORMAL
WBC NRBC COR # BLD: 6.59 10*3/MM3 (ref 3.2–9.8)
WBC UR QL AUTO: ABNORMAL /HPF
WHOLE BLOOD HOLD SPECIMEN: NORMAL
WHOLE BLOOD HOLD SPECIMEN: NORMAL

## 2018-11-13 PROCEDURE — 87086 URINE CULTURE/COLONY COUNT: CPT | Performed by: PHYSICIAN ASSISTANT

## 2018-11-13 PROCEDURE — 83690 ASSAY OF LIPASE: CPT | Performed by: PHYSICIAN ASSISTANT

## 2018-11-13 PROCEDURE — 81001 URINALYSIS AUTO W/SCOPE: CPT | Performed by: PHYSICIAN ASSISTANT

## 2018-11-13 PROCEDURE — 85025 COMPLETE CBC W/AUTO DIFF WBC: CPT | Performed by: PHYSICIAN ASSISTANT

## 2018-11-13 PROCEDURE — 74176 CT ABD & PELVIS W/O CONTRAST: CPT

## 2018-11-13 PROCEDURE — 99284 EMERGENCY DEPT VISIT MOD MDM: CPT

## 2018-11-13 PROCEDURE — 0 DIATRIZOATE MEGLUMINE & SODIUM PER 1 ML: Performed by: EMERGENCY MEDICINE

## 2018-11-13 PROCEDURE — 80053 COMPREHEN METABOLIC PANEL: CPT | Performed by: PHYSICIAN ASSISTANT

## 2018-11-13 RX ORDER — ACETAMINOPHEN 325 MG/1
325 TABLET ORAL ONCE
Status: COMPLETED | OUTPATIENT
Start: 2018-11-13 | End: 2018-11-13

## 2018-11-13 RX ORDER — SODIUM CHLORIDE 0.9 % (FLUSH) 0.9 %
10 SYRINGE (ML) INJECTION AS NEEDED
Status: DISCONTINUED | OUTPATIENT
Start: 2018-11-13 | End: 2018-11-13 | Stop reason: HOSPADM

## 2018-11-13 RX ORDER — METRONIDAZOLE 500 MG/1
500 TABLET ORAL 3 TIMES DAILY
Qty: 21 TABLET | Refills: 0 | Status: SHIPPED | OUTPATIENT
Start: 2018-11-13 | End: 2018-11-20

## 2018-11-13 RX ORDER — ACETAMINOPHEN 325 MG/1
650 TABLET ORAL ONCE
Status: COMPLETED | OUTPATIENT
Start: 2018-11-13 | End: 2018-11-13

## 2018-11-13 RX ADMIN — ACETAMINOPHEN 325 MG: 325 TABLET ORAL at 14:58

## 2018-11-13 RX ADMIN — ACETAMINOPHEN 650 MG: 325 TABLET ORAL at 12:28

## 2018-11-13 RX ADMIN — DIATRIZOATE MEGLUMINE AND DIATRIZOATE SODIUM 30 ML: 660; 100 LIQUID ORAL; RECTAL at 13:36

## 2018-11-13 NOTE — ED TRIAGE NOTES
Pt c/o LLQ pain for 3-4 days along with a headache. Pt states he thinks his kidneys are shutting down again.
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
## 2018-11-14 ENCOUNTER — EPISODE CHANGES (OUTPATIENT)
Dept: SOCIAL WORK | Facility: HOSPITAL | Age: 63
End: 2018-11-14

## 2018-11-15 LAB — BACTERIA SPEC AEROBE CULT: NORMAL

## 2018-11-20 ENCOUNTER — APPOINTMENT (OUTPATIENT)
Dept: LAB | Facility: HOSPITAL | Age: 63
End: 2018-11-20

## 2018-11-20 ENCOUNTER — OFFICE VISIT (OUTPATIENT)
Dept: FAMILY MEDICINE CLINIC | Facility: CLINIC | Age: 63
End: 2018-11-20

## 2018-11-20 VITALS
HEART RATE: 76 BPM | OXYGEN SATURATION: 96 % | DIASTOLIC BLOOD PRESSURE: 62 MMHG | WEIGHT: 140.2 LBS | TEMPERATURE: 99.3 F | HEIGHT: 67 IN | BODY MASS INDEX: 22 KG/M2 | SYSTOLIC BLOOD PRESSURE: 98 MMHG

## 2018-11-20 DIAGNOSIS — H53.9 VISION CHANGES: ICD-10-CM

## 2018-11-20 DIAGNOSIS — E11.8 TYPE 2 DIABETES MELLITUS WITH COMPLICATION, WITHOUT LONG-TERM CURRENT USE OF INSULIN (HCC): ICD-10-CM

## 2018-11-20 DIAGNOSIS — I25.119 CORONARY ARTERY DISEASE INVOLVING NATIVE HEART WITH ANGINA PECTORIS, UNSPECIFIED VESSEL OR LESION TYPE (HCC): ICD-10-CM

## 2018-11-20 DIAGNOSIS — M54.5 CHRONIC LOW BACK PAIN, UNSPECIFIED BACK PAIN LATERALITY, WITH SCIATICA PRESENCE UNSPECIFIED: Primary | ICD-10-CM

## 2018-11-20 DIAGNOSIS — M79.605 PAIN IN BOTH LOWER EXTREMITIES: ICD-10-CM

## 2018-11-20 DIAGNOSIS — G89.29 CHRONIC LOW BACK PAIN, UNSPECIFIED BACK PAIN LATERALITY, WITH SCIATICA PRESENCE UNSPECIFIED: Primary | ICD-10-CM

## 2018-11-20 DIAGNOSIS — M79.604 PAIN IN BOTH LOWER EXTREMITIES: ICD-10-CM

## 2018-11-20 DIAGNOSIS — I95.2 HYPOTENSION DUE TO DRUGS: ICD-10-CM

## 2018-11-20 PROCEDURE — G0008 ADMIN INFLUENZA VIRUS VAC: HCPCS | Performed by: FAMILY MEDICINE

## 2018-11-20 PROCEDURE — 99214 OFFICE O/P EST MOD 30 MIN: CPT | Performed by: FAMILY MEDICINE

## 2018-11-20 PROCEDURE — 90674 CCIIV4 VAC NO PRSV 0.5 ML IM: CPT | Performed by: FAMILY MEDICINE

## 2018-11-20 PROCEDURE — 80171 DRUG SCREEN QUANT GABAPENTIN: CPT | Performed by: FAMILY MEDICINE

## 2018-11-20 RX ORDER — GABAPENTIN 600 MG/1
600 TABLET ORAL 3 TIMES DAILY
Qty: 90 TABLET | Refills: 0 | Status: SHIPPED | OUTPATIENT
Start: 2018-11-20 | End: 2019-02-28 | Stop reason: ALTCHOICE
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
## 2018-11-21 LAB — GABAPENTIN SERPLBLD-MCNC: 1 UG/ML (ref 4–16)

## 2018-11-26 ENCOUNTER — APPOINTMENT (OUTPATIENT)
Dept: ULTRASOUND IMAGING | Facility: HOSPITAL | Age: 63
End: 2018-11-26

## 2018-11-26 ENCOUNTER — HOSPITAL ENCOUNTER (EMERGENCY)
Facility: HOSPITAL | Age: 63
Discharge: HOME | End: 2018-11-26
Attending: FAMILY MEDICINE | Admitting: FAMILY MEDICINE

## 2018-11-26 ENCOUNTER — APPOINTMENT (OUTPATIENT)
Dept: GENERAL RADIOLOGY | Facility: HOSPITAL | Age: 63
End: 2018-11-26

## 2018-11-26 VITALS
HEIGHT: 67 IN | OXYGEN SATURATION: 98 % | TEMPERATURE: 98.5 F | WEIGHT: 137 LBS | RESPIRATION RATE: 18 BRPM | HEART RATE: 74 BPM | SYSTOLIC BLOOD PRESSURE: 135 MMHG | BODY MASS INDEX: 21.5 KG/M2 | DIASTOLIC BLOOD PRESSURE: 81 MMHG

## 2018-11-26 DIAGNOSIS — I80.3 THROMBOPHLEBITIS LEG: Primary | ICD-10-CM

## 2018-11-26 DIAGNOSIS — R07.89 OTHER CHEST PAIN: ICD-10-CM

## 2018-11-26 LAB
ALBUMIN SERPL-MCNC: 4.4 G/DL (ref 3.4–4.8)
ALBUMIN/GLOB SERPL: 1.6 G/DL (ref 1.1–1.8)
ALP SERPL-CCNC: 54 U/L (ref 38–126)
ALT SERPL W P-5'-P-CCNC: 20 U/L (ref 21–72)
ANION GAP SERPL CALCULATED.3IONS-SCNC: 12 MMOL/L (ref 5–15)
AST SERPL-CCNC: 18 U/L (ref 17–59)
BASOPHILS # BLD AUTO: 0.01 10*3/MM3 (ref 0–0.2)
BASOPHILS NFR BLD AUTO: 0.2 % (ref 0–2)
BILIRUB SERPL-MCNC: 0.5 MG/DL (ref 0.2–1.3)
BUN BLD-MCNC: 37 MG/DL (ref 7–21)
BUN/CREAT SERPL: 26.8 (ref 7–25)
CALCIUM SPEC-SCNC: 9.1 MG/DL (ref 8.4–10.2)
CHLORIDE SERPL-SCNC: 107 MMOL/L (ref 95–110)
CO2 SERPL-SCNC: 20 MMOL/L (ref 22–31)
CREAT BLD-MCNC: 1.38 MG/DL (ref 0.7–1.3)
D-DIMER, QUANTITATIVE (MAD,POW, STR): 547 NG/ML (FEU) (ref 0–470)
DEPRECATED RDW RBC AUTO: 46.1 FL (ref 35.1–43.9)
EOSINOPHIL # BLD AUTO: 0.05 10*3/MM3 (ref 0–0.7)
EOSINOPHIL NFR BLD AUTO: 0.8 % (ref 0–7)
ERYTHROCYTE [DISTWIDTH] IN BLOOD BY AUTOMATED COUNT: 13.4 % (ref 11.5–14.5)
GFR SERPL CREATININE-BSD FRML MDRD: 52 ML/MIN/1.73 (ref 49–113)
GLOBULIN UR ELPH-MCNC: 2.8 GM/DL (ref 2.3–3.5)
GLUCOSE BLD-MCNC: 93 MG/DL (ref 60–100)
HCT VFR BLD AUTO: 47.9 % (ref 39–49)
HGB BLD-MCNC: 15.9 G/DL (ref 13.7–17.3)
IMM GRANULOCYTES # BLD: 0.02 10*3/MM3 (ref 0–0.02)
IMM GRANULOCYTES NFR BLD: 0.3 % (ref 0–0.5)
LIPASE SERPL-CCNC: 88 U/L (ref 23–300)
LYMPHOCYTES # BLD AUTO: 1.56 10*3/MM3 (ref 0.6–4.2)
LYMPHOCYTES NFR BLD AUTO: 25.4 % (ref 10–50)
MAGNESIUM SERPL-MCNC: 2 MG/DL (ref 1.6–2.3)
MCH RBC QN AUTO: 31.4 PG (ref 26.5–34)
MCHC RBC AUTO-ENTMCNC: 33.2 G/DL (ref 31.5–36.3)
MCV RBC AUTO: 94.5 FL (ref 80–98)
MONOCYTES # BLD AUTO: 0.55 10*3/MM3 (ref 0–0.9)
MONOCYTES NFR BLD AUTO: 9 % (ref 0–12)
NEUTROPHILS # BLD AUTO: 3.94 10*3/MM3 (ref 2–8.6)
NEUTROPHILS NFR BLD AUTO: 64.3 % (ref 37–80)
NT-PROBNP SERPL-MCNC: 171 PG/ML (ref 0–900)
PLATELET # BLD AUTO: 181 10*3/MM3 (ref 150–450)
PMV BLD AUTO: 10.3 FL (ref 8–12)
POTASSIUM BLD-SCNC: 4.5 MMOL/L (ref 3.5–5.1)
PROT SERPL-MCNC: 7.2 G/DL (ref 6.3–8.6)
RBC # BLD AUTO: 5.07 10*6/MM3 (ref 4.37–5.74)
SODIUM BLD-SCNC: 139 MMOL/L (ref 137–145)
TROPONIN I SERPL-MCNC: <0.012 NG/ML
TROPONIN I SERPL-MCNC: <0.012 NG/ML
WBC NRBC COR # BLD: 6.13 10*3/MM3 (ref 3.2–9.8)

## 2018-11-26 PROCEDURE — 93005 ELECTROCARDIOGRAM TRACING: CPT | Performed by: FAMILY MEDICINE

## 2018-11-26 PROCEDURE — 83735 ASSAY OF MAGNESIUM: CPT | Performed by: FAMILY MEDICINE

## 2018-11-26 PROCEDURE — 84484 ASSAY OF TROPONIN QUANT: CPT | Performed by: FAMILY MEDICINE

## 2018-11-26 PROCEDURE — 93010 ELECTROCARDIOGRAM REPORT: CPT | Performed by: INTERNAL MEDICINE

## 2018-11-26 PROCEDURE — 71045 X-RAY EXAM CHEST 1 VIEW: CPT

## 2018-11-26 PROCEDURE — 85025 COMPLETE CBC W/AUTO DIFF WBC: CPT | Performed by: FAMILY MEDICINE

## 2018-11-26 PROCEDURE — 85379 FIBRIN DEGRADATION QUANT: CPT | Performed by: FAMILY MEDICINE

## 2018-11-26 PROCEDURE — 80053 COMPREHEN METABOLIC PANEL: CPT | Performed by: FAMILY MEDICINE

## 2018-11-26 PROCEDURE — 83690 ASSAY OF LIPASE: CPT | Performed by: FAMILY MEDICINE

## 2018-11-26 PROCEDURE — 83880 ASSAY OF NATRIURETIC PEPTIDE: CPT | Performed by: FAMILY MEDICINE

## 2018-11-26 PROCEDURE — 99284 EMERGENCY DEPT VISIT MOD MDM: CPT

## 2018-11-26 PROCEDURE — 93971 EXTREMITY STUDY: CPT

## 2018-11-26 PROCEDURE — 36415 COLL VENOUS BLD VENIPUNCTURE: CPT | Performed by: FAMILY MEDICINE

## 2018-11-26 RX ORDER — NITROGLYCERIN 0.4 MG/1
0.4 TABLET SUBLINGUAL
Status: DISCONTINUED | OUTPATIENT
Start: 2018-11-26 | End: 2018-11-26 | Stop reason: HOSPADM

## 2018-11-26 RX ORDER — ASPIRIN 81 MG/1
324 TABLET, CHEWABLE ORAL ONCE
Status: COMPLETED | OUTPATIENT
Start: 2018-11-26 | End: 2018-11-26

## 2018-11-26 RX ORDER — SODIUM CHLORIDE 0.9 % (FLUSH) 0.9 %
10 SYRINGE (ML) INJECTION AS NEEDED
Status: DISCONTINUED | OUTPATIENT
Start: 2018-11-26 | End: 2018-11-26 | Stop reason: HOSPADM

## 2018-11-26 RX ORDER — MELOXICAM 15 MG/1
15 TABLET ORAL DAILY
Qty: 14 TABLET | Refills: 0 | Status: SHIPPED | OUTPATIENT
Start: 2018-11-26 | End: 2019-03-29 | Stop reason: HOSPADM

## 2018-11-26 RX ORDER — SITAGLIPTIN 100 MG/1
TABLET, FILM COATED ORAL
Qty: 90 TABLET | Refills: 1 | Status: SHIPPED | OUTPATIENT
Start: 2018-11-26 | End: 2019-03-29 | Stop reason: HOSPADM

## 2018-11-26 RX ADMIN — NITROGLYCERIN 0.4 MG: 0.4 TABLET SUBLINGUAL at 11:03

## 2018-11-26 RX ADMIN — ASPIRIN 81 MG CHEWABLE TABLET 324 MG: 81 TABLET CHEWABLE at 10:42

## 2018-11-26 RX ADMIN — NITROGLYCERIN 0.4 MG: 0.4 TABLET SUBLINGUAL at 10:45

## 2018-11-26 NOTE — ED NOTES
Pt asked for help paying for medication.  Medication prescribed is meloxicam.  I Told him it should be covered completely with [employer] but if not it is only $4.00.  Verbalized understanding .

## 2018-11-26 NOTE — ED NOTES
Pt requested to speak with case management. Pt states he can not afford medication. Tammi notified and is at bedside at this time     Rebecca Mustafa RN  11/26/18 [id number]
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
## 2018-11-27 ENCOUNTER — EPISODE CHANGES (OUTPATIENT)
Dept: SOCIAL WORK | Facility: HOSPITAL | Age: 63
End: 2018-11-27

## 2018-12-03 ENCOUNTER — TELEPHONE (OUTPATIENT)
Dept: FAMILY MEDICINE CLINIC | Facility: CLINIC | Age: 63
End: 2018-12-03

## 2018-12-15 ENCOUNTER — EPISODE CHANGES (OUTPATIENT)
Dept: CASE MANAGEMENT | Facility: OTHER | Age: 63
End: 2018-12-15

## 2019-01-22 ENCOUNTER — PATIENT OUTREACH (OUTPATIENT)
Dept: CASE MANAGEMENT | Facility: OTHER | Age: 64
End: 2019-01-22

## 2019-01-22 NOTE — OUTREACH NOTE
Documenting multiple UTR attempts by RN-CA.  Number not accepting calls at this time returned each attempt.  Notes indicate Dr. Robert's staff have not been able to contact patient either with attempts starting in November 2018.  Patient was no show for Speciality appointment in January.  No further outreach scheduled, RN-CA will monitor for future acute activity.

## 2019-01-29 ENCOUNTER — EPISODE CHANGES (OUTPATIENT)
Dept: CASE MANAGEMENT | Facility: OTHER | Age: 64
End: 2019-01-29

## 2019-02-28 ENCOUNTER — OFFICE VISIT (OUTPATIENT)
Dept: GASTROENTEROLOGY | Facility: CLINIC | Age: 64
End: 2019-02-28

## 2019-02-28 VITALS
DIASTOLIC BLOOD PRESSURE: 68 MMHG | WEIGHT: 147.6 LBS | HEART RATE: 79 BPM | SYSTOLIC BLOOD PRESSURE: 106 MMHG | BODY MASS INDEX: 23.17 KG/M2 | HEIGHT: 67 IN

## 2019-02-28 DIAGNOSIS — K21.00 GASTROESOPHAGEAL REFLUX DISEASE WITH ESOPHAGITIS: Primary | ICD-10-CM

## 2019-02-28 DIAGNOSIS — R19.7 DIARRHEA, UNSPECIFIED TYPE: ICD-10-CM

## 2019-02-28 DIAGNOSIS — Z87.19 HISTORY OF COLITIS: ICD-10-CM

## 2019-02-28 DIAGNOSIS — R10.84 GENERALIZED ABDOMINAL PAIN: ICD-10-CM

## 2019-02-28 DIAGNOSIS — Z86.010 HISTORY OF COLON POLYPS: ICD-10-CM

## 2019-02-28 PROCEDURE — 99214 OFFICE O/P EST MOD 30 MIN: CPT | Performed by: PHYSICIAN ASSISTANT

## 2019-02-28 RX ORDER — PEG-3350, SODIUM SULFATE, SODIUM CHLORIDE, POTASSIUM CHLORIDE, SODIUM ASCORBATE AND ASCORBIC ACID 7.5-2.691G
1000 KIT ORAL ONCE
Qty: 1 EACH | Refills: 0 | Status: SHIPPED | OUTPATIENT
Start: 2019-02-28 | End: 2019-02-28

## 2019-02-28 RX ORDER — DEXTROSE AND SODIUM CHLORIDE 5; .45 G/100ML; G/100ML
30 INJECTION, SOLUTION INTRAVENOUS CONTINUOUS PRN
Status: CANCELLED | OUTPATIENT
Start: 2019-04-05

## 2019-02-28 RX ORDER — CLOPIDOGREL BISULFATE 75 MG/1
75 TABLET ORAL DAILY
COMMUNITY

## 2019-02-28 RX ORDER — NORTRIPTYLINE HYDROCHLORIDE 25 MG/1
25 CAPSULE ORAL NIGHTLY
COMMUNITY
End: 2019-03-29 | Stop reason: HOSPADM

## 2019-03-20 ENCOUNTER — APPOINTMENT (OUTPATIENT)
Dept: GENERAL RADIOLOGY | Facility: HOSPITAL | Age: 64
End: 2019-03-20

## 2019-03-20 ENCOUNTER — HOSPITAL ENCOUNTER (INPATIENT)
Facility: HOSPITAL | Age: 64
LOS: 9 days | Discharge: HOME HEALTH | End: 2019-03-29
Attending: EMERGENCY MEDICINE | Admitting: FAMILY MEDICINE

## 2019-03-20 ENCOUNTER — APPOINTMENT (OUTPATIENT)
Dept: CT IMAGING | Facility: HOSPITAL | Age: 64
End: 2019-03-20

## 2019-03-20 DIAGNOSIS — E11.8 TYPE 2 DIABETES MELLITUS WITH COMPLICATION, WITH LONG-TERM CURRENT USE OF INSULIN (HCC): ICD-10-CM

## 2019-03-20 DIAGNOSIS — N17.9 ACUTE KIDNEY INJURY (NONTRAUMATIC) (HCC): ICD-10-CM

## 2019-03-20 DIAGNOSIS — Z79.4 TYPE 2 DIABETES MELLITUS WITH COMPLICATION, WITH LONG-TERM CURRENT USE OF INSULIN (HCC): ICD-10-CM

## 2019-03-20 DIAGNOSIS — R33.9 URINARY RETENTION: ICD-10-CM

## 2019-03-20 DIAGNOSIS — E87.20 METABOLIC ACIDOSIS: ICD-10-CM

## 2019-03-20 DIAGNOSIS — Z74.09 IMPAIRED PHYSICAL MOBILITY: ICD-10-CM

## 2019-03-20 DIAGNOSIS — K29.70 GASTRITIS, PRESENCE OF BLEEDING UNSPECIFIED, UNSPECIFIED CHRONICITY, UNSPECIFIED GASTRITIS TYPE: ICD-10-CM

## 2019-03-20 DIAGNOSIS — I21.4 NSTEMI (NON-ST ELEVATED MYOCARDIAL INFARCTION) (HCC): ICD-10-CM

## 2019-03-20 DIAGNOSIS — N28.9 ACUTE RENAL INSUFFICIENCY: ICD-10-CM

## 2019-03-20 DIAGNOSIS — N17.9 AKI (ACUTE KIDNEY INJURY) (HCC): ICD-10-CM

## 2019-03-20 DIAGNOSIS — N32.0 BLADDER OUTLET OBSTRUCTION: ICD-10-CM

## 2019-03-20 DIAGNOSIS — G93.41 ACUTE METABOLIC ENCEPHALOPATHY: Primary | ICD-10-CM

## 2019-03-20 LAB
ACETONE BLD QL: NEGATIVE
ALBUMIN SERPL-MCNC: 4.7 G/DL (ref 3.4–4.8)
ALBUMIN/GLOB SERPL: 1.5 G/DL (ref 1.1–1.8)
ALP SERPL-CCNC: 71 U/L (ref 38–126)
ALT SERPL W P-5'-P-CCNC: 16 U/L (ref 21–72)
AMMONIA BLD-SCNC: <9 UMOL/L (ref 9–30)
AMPHET+METHAMPHET UR QL: NEGATIVE
ANION GAP SERPL CALCULATED.3IONS-SCNC: 18 MMOL/L (ref 5–15)
APTT PPP: 28.6 SECONDS (ref 20–40.3)
ARTERIAL PATENCY WRIST A: ABNORMAL
AST SERPL-CCNC: 17 U/L (ref 17–59)
ATMOSPHERIC PRESS: 749 MMHG
BACTERIA UR QL AUTO: ABNORMAL /HPF
BACTERIA UR QL AUTO: ABNORMAL /HPF
BARBITURATES UR QL SCN: NEGATIVE
BASE EXCESS BLDA CALC-SCNC: -25.2 MMOL/L (ref 0–2)
BASOPHILS # BLD AUTO: 0.03 10*3/MM3 (ref 0–0.2)
BASOPHILS NFR BLD AUTO: 0.2 % (ref 0–1.5)
BDY SITE: ABNORMAL
BENZODIAZ UR QL SCN: NEGATIVE
BILIRUB SERPL-MCNC: 0.4 MG/DL (ref 0.2–1.3)
BILIRUB UR QL STRIP: NEGATIVE
BILIRUB UR QL STRIP: NEGATIVE
BUN BLD-MCNC: 141 MG/DL (ref 7–21)
BUN/CREAT SERPL: 16.7 (ref 7–25)
CALCIUM SPEC-SCNC: 8.7 MG/DL (ref 8.4–10.2)
CANNABINOIDS SERPL QL: NEGATIVE
CHLORIDE SERPL-SCNC: 111 MMOL/L (ref 95–110)
CLARITY UR: ABNORMAL
CLARITY UR: CLEAR
CO2 SERPL-SCNC: 6 MMOL/L (ref 22–31)
COCAINE UR QL: NEGATIVE
COLOR UR: YELLOW
COLOR UR: YELLOW
CREAT BLD-MCNC: 8.43 MG/DL (ref 0.7–1.3)
D-LACTATE SERPL-SCNC: 0.7 MMOL/L (ref 0.5–2)
DEPRECATED RDW RBC AUTO: 51 FL (ref 37–54)
EOSINOPHIL # BLD AUTO: 0 10*3/MM3 (ref 0–0.4)
EOSINOPHIL NFR BLD AUTO: 0 % (ref 0.3–6.2)
ERYTHROCYTE [DISTWIDTH] IN BLOOD BY AUTOMATED COUNT: 14.6 % (ref 12.3–15.4)
ETHANOL BLD-MCNC: <10 MG/DL (ref 0–10)
ETHANOL UR QL: <0.01 %
GFR SERPL CREATININE-BSD FRML MDRD: 6 ML/MIN/1.73 (ref 49–113)
GFR SERPL CREATININE-BSD FRML MDRD: ABNORMAL ML/MIN/1.73 (ref 49–113)
GLOBULIN UR ELPH-MCNC: 3.2 GM/DL (ref 2.3–3.5)
GLUCOSE BLD-MCNC: 159 MG/DL (ref 60–100)
GLUCOSE BLDC GLUCOMTR-MCNC: 117 MG/DL (ref 70–130)
GLUCOSE UR STRIP-MCNC: NEGATIVE MG/DL
GLUCOSE UR STRIP-MCNC: NEGATIVE MG/DL
HCO3 BLDA-SCNC: 5.3 MMOL/L (ref 20–26)
HCT VFR BLD AUTO: 40.5 % (ref 37.5–51)
HGB BLD-MCNC: 13.1 G/DL (ref 13–17.7)
HGB UR QL STRIP.AUTO: ABNORMAL
HGB UR QL STRIP.AUTO: ABNORMAL
HOLD SPECIMEN: NORMAL
HOLD SPECIMEN: NORMAL
HYALINE CASTS UR QL AUTO: ABNORMAL /LPF
HYALINE CASTS UR QL AUTO: ABNORMAL /LPF
IMM GRANULOCYTES # BLD AUTO: 0.13 10*3/MM3 (ref 0–0.05)
IMM GRANULOCYTES NFR BLD AUTO: 0.9 % (ref 0–0.5)
INR PPP: 1.2 (ref 0.8–1.2)
KETONES UR QL STRIP: NEGATIVE
KETONES UR QL STRIP: NEGATIVE
LEUKOCYTE ESTERASE UR QL STRIP.AUTO: NEGATIVE
LEUKOCYTE ESTERASE UR QL STRIP.AUTO: NEGATIVE
LYMPHOCYTES # BLD AUTO: 0.45 10*3/MM3 (ref 0.7–3.1)
LYMPHOCYTES NFR BLD AUTO: 3.2 % (ref 19.6–45.3)
Lab: ABNORMAL
MAGNESIUM SERPL-MCNC: 1.5 MG/DL (ref 1.6–2.3)
MCH RBC QN AUTO: 30.4 PG (ref 26.6–33)
MCHC RBC AUTO-ENTMCNC: 32.3 G/DL (ref 31.5–35.7)
MCV RBC AUTO: 94 FL (ref 79–97)
METHADONE UR QL SCN: NEGATIVE
MODALITY: ABNORMAL
MONOCYTES # BLD AUTO: 0.7 10*3/MM3 (ref 0.1–0.9)
MONOCYTES NFR BLD AUTO: 5 % (ref 5–12)
MUCOUS THREADS URNS QL MICRO: ABNORMAL /HPF
NEUTROPHILS # BLD AUTO: 12.83 10*3/MM3 (ref 1.4–7)
NEUTROPHILS NFR BLD AUTO: 90.7 % (ref 42.7–76)
NITRITE UR QL STRIP: NEGATIVE
NITRITE UR QL STRIP: NEGATIVE
NRBC BLD AUTO-RTO: 0.1 /100 WBC (ref 0–0)
NT-PROBNP SERPL-MCNC: ABNORMAL PG/ML (ref 0–900)
OPIATES UR QL: NEGATIVE
OXYCODONE UR QL SCN: NEGATIVE
PCO2 BLDA: 23 MM HG (ref 35–45)
PH BLDA: 6.97 PH UNITS (ref 7.35–7.45)
PH UR STRIP.AUTO: 6.5 [PH] (ref 5–9)
PH UR STRIP.AUTO: 7 [PH] (ref 5–9)
PLATELET # BLD AUTO: 207 10*3/MM3 (ref 140–450)
PMV BLD AUTO: 10.7 FL (ref 6–12)
PO2 BLDA: 108 MM HG (ref 83–108)
POTASSIUM BLD-SCNC: 5.3 MMOL/L (ref 3.5–5.1)
PROT SERPL-MCNC: 7.9 G/DL (ref 6.3–8.6)
PROT UR QL STRIP: ABNORMAL
PROT UR QL STRIP: ABNORMAL
PROTHROMBIN TIME: 14.9 SECONDS (ref 11.1–15.3)
RBC # BLD AUTO: 4.31 10*6/MM3 (ref 4.14–5.8)
RBC # UR: ABNORMAL /HPF
RBC # UR: ABNORMAL /HPF
REF LAB TEST METHOD: ABNORMAL
REF LAB TEST METHOD: ABNORMAL
SAO2 % BLDCOA: 97.6 % (ref 94–99)
SODIUM BLD-SCNC: 135 MMOL/L (ref 137–145)
SP GR UR STRIP: 1.01 (ref 1–1.03)
SP GR UR STRIP: 1.01 (ref 1–1.03)
SQUAMOUS #/AREA URNS HPF: ABNORMAL /HPF
SQUAMOUS #/AREA URNS HPF: ABNORMAL /HPF
TROPONIN I SERPL-MCNC: 1.4 NG/ML
TROPONIN I SERPL-MCNC: 1.61 NG/ML
UROBILINOGEN UR QL STRIP: ABNORMAL
UROBILINOGEN UR QL STRIP: ABNORMAL
VENTILATOR MODE: ABNORMAL
WBC NRBC COR # BLD: 14.14 10*3/MM3 (ref 3.4–10.8)
WBC UR QL AUTO: ABNORMAL /HPF
WBC UR QL AUTO: ABNORMAL /HPF
WHOLE BLOOD HOLD SPECIMEN: NORMAL
WHOLE BLOOD HOLD SPECIMEN: NORMAL

## 2019-03-20 PROCEDURE — 87086 URINE CULTURE/COLONY COUNT: CPT | Performed by: NURSE PRACTITIONER

## 2019-03-20 PROCEDURE — 80307 DRUG TEST PRSMV CHEM ANLYZR: CPT | Performed by: EMERGENCY MEDICINE

## 2019-03-20 PROCEDURE — 81001 URINALYSIS AUTO W/SCOPE: CPT | Performed by: NURSE PRACTITIONER

## 2019-03-20 PROCEDURE — 25010000002 CEFEPIME PER 500 MG: Performed by: HOSPITALIST

## 2019-03-20 PROCEDURE — 87040 BLOOD CULTURE FOR BACTERIA: CPT | Performed by: EMERGENCY MEDICINE

## 2019-03-20 PROCEDURE — 36600 WITHDRAWAL OF ARTERIAL BLOOD: CPT

## 2019-03-20 PROCEDURE — 82009 KETONE BODYS QUAL: CPT | Performed by: EMERGENCY MEDICINE

## 2019-03-20 PROCEDURE — 70450 CT HEAD/BRAIN W/O DYE: CPT

## 2019-03-20 PROCEDURE — 36415 COLL VENOUS BLD VENIPUNCTURE: CPT | Performed by: EMERGENCY MEDICINE

## 2019-03-20 PROCEDURE — 25010000002 HEPARIN (PORCINE) PER 1000 UNITS: Performed by: EMERGENCY MEDICINE

## 2019-03-20 PROCEDURE — 85610 PROTHROMBIN TIME: CPT | Performed by: EMERGENCY MEDICINE

## 2019-03-20 PROCEDURE — 81001 URINALYSIS AUTO W/SCOPE: CPT | Performed by: EMERGENCY MEDICINE

## 2019-03-20 PROCEDURE — 93005 ELECTROCARDIOGRAM TRACING: CPT | Performed by: EMERGENCY MEDICINE

## 2019-03-20 PROCEDURE — 82140 ASSAY OF AMMONIA: CPT | Performed by: EMERGENCY MEDICINE

## 2019-03-20 PROCEDURE — 84484 ASSAY OF TROPONIN QUANT: CPT | Performed by: EMERGENCY MEDICINE

## 2019-03-20 PROCEDURE — 85025 COMPLETE CBC W/AUTO DIFF WBC: CPT

## 2019-03-20 PROCEDURE — 84484 ASSAY OF TROPONIN QUANT: CPT | Performed by: HOSPITALIST

## 2019-03-20 PROCEDURE — 83880 ASSAY OF NATRIURETIC PEPTIDE: CPT | Performed by: EMERGENCY MEDICINE

## 2019-03-20 PROCEDURE — 82962 GLUCOSE BLOOD TEST: CPT

## 2019-03-20 PROCEDURE — 80053 COMPREHEN METABOLIC PANEL: CPT | Performed by: EMERGENCY MEDICINE

## 2019-03-20 PROCEDURE — 74176 CT ABD & PELVIS W/O CONTRAST: CPT

## 2019-03-20 PROCEDURE — 83735 ASSAY OF MAGNESIUM: CPT | Performed by: EMERGENCY MEDICINE

## 2019-03-20 PROCEDURE — 83605 ASSAY OF LACTIC ACID: CPT | Performed by: EMERGENCY MEDICINE

## 2019-03-20 PROCEDURE — 93010 ELECTROCARDIOGRAM REPORT: CPT | Performed by: INTERNAL MEDICINE

## 2019-03-20 PROCEDURE — 87040 BLOOD CULTURE FOR BACTERIA: CPT

## 2019-03-20 PROCEDURE — 82803 BLOOD GASES ANY COMBINATION: CPT

## 2019-03-20 PROCEDURE — 85730 THROMBOPLASTIN TIME PARTIAL: CPT | Performed by: EMERGENCY MEDICINE

## 2019-03-20 PROCEDURE — 99285 EMERGENCY DEPT VISIT HI MDM: CPT

## 2019-03-20 PROCEDURE — 71046 X-RAY EXAM CHEST 2 VIEWS: CPT

## 2019-03-20 RX ORDER — SODIUM CHLORIDE 9 MG/ML
125 INJECTION, SOLUTION INTRAVENOUS CONTINUOUS
Status: DISCONTINUED | OUTPATIENT
Start: 2019-03-20 | End: 2019-03-20

## 2019-03-20 RX ORDER — SODIUM CHLORIDE 0.9 % (FLUSH) 0.9 %
3-10 SYRINGE (ML) INJECTION AS NEEDED
Status: DISCONTINUED | OUTPATIENT
Start: 2019-03-20 | End: 2019-03-29 | Stop reason: HOSPADM

## 2019-03-20 RX ORDER — SODIUM CHLORIDE 0.9 % (FLUSH) 0.9 %
10 SYRINGE (ML) INJECTION AS NEEDED
Status: CANCELLED | OUTPATIENT
Start: 2019-03-20

## 2019-03-20 RX ORDER — IPRATROPIUM BROMIDE AND ALBUTEROL SULFATE 2.5; .5 MG/3ML; MG/3ML
3 SOLUTION RESPIRATORY (INHALATION)
Status: DISCONTINUED | OUTPATIENT
Start: 2019-03-20 | End: 2019-03-29 | Stop reason: HOSPADM

## 2019-03-20 RX ORDER — HEPARIN SODIUM 5000 [USP'U]/ML
80 INJECTION, SOLUTION INTRAVENOUS; SUBCUTANEOUS AS NEEDED
Status: DISCONTINUED | OUTPATIENT
Start: 2019-03-20 | End: 2019-03-20

## 2019-03-20 RX ORDER — ACETAMINOPHEN 325 MG/1
650 TABLET ORAL EVERY 4 HOURS PRN
Status: DISCONTINUED | OUTPATIENT
Start: 2019-03-20 | End: 2019-03-29 | Stop reason: HOSPADM

## 2019-03-20 RX ORDER — ONDANSETRON 2 MG/ML
4 INJECTION INTRAMUSCULAR; INTRAVENOUS EVERY 6 HOURS PRN
Status: DISCONTINUED | OUTPATIENT
Start: 2019-03-20 | End: 2019-03-23

## 2019-03-20 RX ORDER — DEXTROSE MONOHYDRATE 25 G/50ML
25 INJECTION, SOLUTION INTRAVENOUS
Status: DISCONTINUED | OUTPATIENT
Start: 2019-03-20 | End: 2019-03-27 | Stop reason: SDUPTHER

## 2019-03-20 RX ORDER — CARVEDILOL 3.12 MG/1
3.12 TABLET ORAL EVERY 12 HOURS SCHEDULED
Status: DISCONTINUED | OUTPATIENT
Start: 2019-03-20 | End: 2019-03-26

## 2019-03-20 RX ORDER — SODIUM CHLORIDE 0.9 % (FLUSH) 0.9 %
3 SYRINGE (ML) INJECTION EVERY 12 HOURS SCHEDULED
Status: DISCONTINUED | OUTPATIENT
Start: 2019-03-20 | End: 2019-03-29 | Stop reason: HOSPADM

## 2019-03-20 RX ORDER — HEPARIN SODIUM 10000 [USP'U]/100ML
22 INJECTION, SOLUTION INTRAVENOUS
Status: DISCONTINUED | OUTPATIENT
Start: 2019-03-20 | End: 2019-03-25

## 2019-03-20 RX ORDER — ATORVASTATIN CALCIUM 10 MG/1
10 TABLET, FILM COATED ORAL DAILY
Status: DISCONTINUED | OUTPATIENT
Start: 2019-03-20 | End: 2019-03-29 | Stop reason: HOSPADM

## 2019-03-20 RX ORDER — HEPARIN SODIUM 5000 [USP'U]/ML
60 INJECTION, SOLUTION INTRAVENOUS; SUBCUTANEOUS ONCE
Status: DISCONTINUED | OUTPATIENT
Start: 2019-03-20 | End: 2019-03-20

## 2019-03-20 RX ORDER — HEPARIN SODIUM 5000 [USP'U]/ML
40 INJECTION, SOLUTION INTRAVENOUS; SUBCUTANEOUS AS NEEDED
Status: DISCONTINUED | OUTPATIENT
Start: 2019-03-20 | End: 2019-03-20

## 2019-03-20 RX ORDER — HEPARIN SODIUM 10000 [USP'U]/100ML
18 INJECTION, SOLUTION INTRAVENOUS
Status: DISCONTINUED | OUTPATIENT
Start: 2019-03-20 | End: 2019-03-20

## 2019-03-20 RX ORDER — HEPARIN SODIUM 5000 [USP'U]/ML
60 INJECTION, SOLUTION INTRAVENOUS; SUBCUTANEOUS ONCE
Status: COMPLETED | OUTPATIENT
Start: 2019-03-20 | End: 2019-03-20

## 2019-03-20 RX ORDER — SODIUM CHLORIDE 0.9 % (FLUSH) 0.9 %
10 SYRINGE (ML) INJECTION AS NEEDED
Status: DISCONTINUED | OUTPATIENT
Start: 2019-03-20 | End: 2019-03-29 | Stop reason: HOSPADM

## 2019-03-20 RX ORDER — PANTOPRAZOLE SODIUM 40 MG/1
40 TABLET, DELAYED RELEASE ORAL EVERY MORNING
Status: DISCONTINUED | OUTPATIENT
Start: 2019-03-21 | End: 2019-03-23 | Stop reason: ALTCHOICE

## 2019-03-20 RX ORDER — HEPARIN SODIUM 5000 [USP'U]/ML
80 INJECTION, SOLUTION INTRAVENOUS; SUBCUTANEOUS AS NEEDED
Status: DISCONTINUED | OUTPATIENT
Start: 2019-03-20 | End: 2019-03-25

## 2019-03-20 RX ORDER — HEPARIN SODIUM 5000 [USP'U]/ML
40 INJECTION, SOLUTION INTRAVENOUS; SUBCUTANEOUS AS NEEDED
Status: DISCONTINUED | OUTPATIENT
Start: 2019-03-20 | End: 2019-03-25

## 2019-03-20 RX ORDER — NICOTINE POLACRILEX 4 MG
15 LOZENGE BUCCAL
Status: DISCONTINUED | OUTPATIENT
Start: 2019-03-20 | End: 2019-03-27 | Stop reason: SDUPTHER

## 2019-03-20 RX ADMIN — ATORVASTATIN CALCIUM 10 MG: 10 TABLET, FILM COATED ORAL at 20:36

## 2019-03-20 RX ADMIN — CARVEDILOL 3.12 MG: 3.12 TABLET, FILM COATED ORAL at 20:37

## 2019-03-20 RX ADMIN — SODIUM CHLORIDE 1000 ML: 9 INJECTION, SOLUTION INTRAVENOUS at 15:44

## 2019-03-20 RX ADMIN — SODIUM BICARBONATE 100 ML/HR: 84 INJECTION, SOLUTION INTRAVENOUS at 20:24

## 2019-03-20 RX ADMIN — CEFEPIME HYDROCHLORIDE 2 G: 2 INJECTION, POWDER, FOR SOLUTION INTRAVENOUS at 20:24

## 2019-03-20 RX ADMIN — SODIUM CHLORIDE 125 ML/HR: 900 INJECTION, SOLUTION INTRAVENOUS at 15:44

## 2019-03-20 RX ADMIN — HEPARIN SODIUM 18 UNITS/KG/HR: 10000 INJECTION, SOLUTION INTRAVENOUS at 17:53

## 2019-03-20 RX ADMIN — HEPARIN SODIUM 3700 UNITS: 5000 INJECTION INTRAVENOUS; SUBCUTANEOUS at 17:52

## 2019-03-20 RX ADMIN — SODIUM BICARBONATE 50 MEQ: 84 INJECTION, SOLUTION INTRAVENOUS at 15:44

## 2019-03-21 ENCOUNTER — APPOINTMENT (OUTPATIENT)
Dept: CT IMAGING | Facility: HOSPITAL | Age: 64
End: 2019-03-21

## 2019-03-21 LAB
ALBUMIN SERPL-MCNC: 3.5 G/DL (ref 3.4–4.8)
ALBUMIN SERPL-MCNC: 3.6 G/DL (ref 3.4–4.8)
ALBUMIN SERPL-MCNC: 3.8 G/DL (ref 3.4–4.8)
ALBUMIN/GLOB SERPL: 1.1 G/DL (ref 1.1–1.8)
ALBUMIN/GLOB SERPL: 1.3 G/DL (ref 1.1–1.8)
ALBUMIN/GLOB SERPL: 1.3 G/DL (ref 1.1–1.8)
ALP SERPL-CCNC: 47 U/L (ref 38–126)
ALP SERPL-CCNC: 53 U/L (ref 38–126)
ALP SERPL-CCNC: 55 U/L (ref 38–126)
ALT SERPL W P-5'-P-CCNC: 16 U/L (ref 21–72)
ALT SERPL W P-5'-P-CCNC: 20 U/L (ref 21–72)
ALT SERPL W P-5'-P-CCNC: <6 U/L (ref 21–72)
ANION GAP SERPL CALCULATED.3IONS-SCNC: 13 MMOL/L (ref 5–15)
ANION GAP SERPL CALCULATED.3IONS-SCNC: 14 MMOL/L (ref 5–15)
ANION GAP SERPL CALCULATED.3IONS-SCNC: 17 MMOL/L (ref 5–15)
APTT PPP: 37.5 SECONDS (ref 20–40.3)
APTT PPP: 50.9 SECONDS (ref 20–40.3)
APTT PPP: 51.4 SECONDS (ref 20–40.3)
APTT PPP: 51.7 SECONDS (ref 20–40.3)
AST SERPL-CCNC: 17 U/L (ref 17–59)
AST SERPL-CCNC: 35 U/L (ref 17–59)
AST SERPL-CCNC: 55 U/L (ref 17–59)
BASOPHILS # BLD AUTO: 0.02 10*3/MM3 (ref 0–0.2)
BASOPHILS # BLD AUTO: 0.02 10*3/MM3 (ref 0–0.2)
BASOPHILS NFR BLD AUTO: 0.2 % (ref 0–1.5)
BASOPHILS NFR BLD AUTO: 0.2 % (ref 0–1.5)
BILIRUB SERPL-MCNC: 0.3 MG/DL (ref 0.2–1.3)
BILIRUB SERPL-MCNC: 0.4 MG/DL (ref 0.2–1.3)
BILIRUB SERPL-MCNC: 0.5 MG/DL (ref 0.2–1.3)
BUN BLD-MCNC: 120 MG/DL (ref 7–21)
BUN BLD-MCNC: 140 MG/DL (ref 7–21)
BUN BLD-MCNC: 142 MG/DL (ref 7–21)
BUN/CREAT SERPL: 14 (ref 7–25)
BUN/CREAT SERPL: 16.7 (ref 7–25)
BUN/CREAT SERPL: 18.6 (ref 7–25)
CALCIUM SPEC-SCNC: 7.6 MG/DL (ref 8.4–10.2)
CALCIUM SPEC-SCNC: 7.7 MG/DL (ref 8.4–10.2)
CALCIUM SPEC-SCNC: 7.8 MG/DL (ref 8.4–10.2)
CHLORIDE SERPL-SCNC: 118 MMOL/L (ref 95–110)
CO2 SERPL-SCNC: 12 MMOL/L (ref 22–31)
CO2 SERPL-SCNC: 7 MMOL/L (ref 22–31)
CO2 SERPL-SCNC: 8 MMOL/L (ref 22–31)
CREAT BLD-MCNC: 7.62 MG/DL (ref 0.7–1.3)
CREAT BLD-MCNC: 8.38 MG/DL (ref 0.7–1.3)
CREAT BLD-MCNC: 8.6 MG/DL (ref 0.7–1.3)
CREAT UR-MCNC: 41.3 MG/DL
DEPRECATED RDW RBC AUTO: 46.9 FL (ref 37–54)
DEPRECATED RDW RBC AUTO: 49.5 FL (ref 37–54)
EOSINOPHIL # BLD AUTO: 0.01 10*3/MM3 (ref 0–0.4)
EOSINOPHIL # BLD AUTO: 0.01 10*3/MM3 (ref 0–0.4)
EOSINOPHIL NFR BLD AUTO: 0.1 % (ref 0.3–6.2)
EOSINOPHIL NFR BLD AUTO: 0.1 % (ref 0.3–6.2)
ERYTHROCYTE [DISTWIDTH] IN BLOOD BY AUTOMATED COUNT: 13.9 % (ref 12.3–15.4)
ERYTHROCYTE [DISTWIDTH] IN BLOOD BY AUTOMATED COUNT: 14.5 % (ref 12.3–15.4)
GFR SERPL CREATININE-BSD FRML MDRD: 6 ML/MIN/1.73 (ref 49–113)
GFR SERPL CREATININE-BSD FRML MDRD: 7 ML/MIN/1.73 (ref 49–113)
GFR SERPL CREATININE-BSD FRML MDRD: 7 ML/MIN/1.73 (ref 49–113)
GFR SERPL CREATININE-BSD FRML MDRD: ABNORMAL ML/MIN/1.73 (ref 49–113)
GLOBULIN UR ELPH-MCNC: 2.7 GM/DL (ref 2.3–3.5)
GLOBULIN UR ELPH-MCNC: 2.9 GM/DL (ref 2.3–3.5)
GLOBULIN UR ELPH-MCNC: 3.2 GM/DL (ref 2.3–3.5)
GLUCOSE BLD-MCNC: 103 MG/DL (ref 60–100)
GLUCOSE BLD-MCNC: 116 MG/DL (ref 60–100)
GLUCOSE BLD-MCNC: 124 MG/DL (ref 60–100)
GLUCOSE BLDC GLUCOMTR-MCNC: 106 MG/DL (ref 70–130)
GLUCOSE BLDC GLUCOMTR-MCNC: 114 MG/DL (ref 70–130)
GLUCOSE BLDC GLUCOMTR-MCNC: 121 MG/DL (ref 70–130)
GLUCOSE BLDC GLUCOMTR-MCNC: 124 MG/DL (ref 70–130)
HCT VFR BLD AUTO: 34.7 % (ref 37.5–51)
HCT VFR BLD AUTO: 36.7 % (ref 37.5–51)
HGB BLD-MCNC: 11.6 G/DL (ref 13–17.7)
HGB BLD-MCNC: 12.2 G/DL (ref 13–17.7)
HOLD SPECIMEN: NORMAL
HOLD SPECIMEN: NORMAL
IMM GRANULOCYTES # BLD AUTO: 0.15 10*3/MM3 (ref 0–0.05)
IMM GRANULOCYTES # BLD AUTO: 0.19 10*3/MM3 (ref 0–0.05)
IMM GRANULOCYTES NFR BLD AUTO: 1.6 % (ref 0–0.5)
IMM GRANULOCYTES NFR BLD AUTO: 1.6 % (ref 0–0.5)
LYMPHOCYTES # BLD AUTO: 0.66 10*3/MM3 (ref 0.7–3.1)
LYMPHOCYTES # BLD AUTO: 0.8 10*3/MM3 (ref 0.7–3.1)
LYMPHOCYTES NFR BLD AUTO: 5.7 % (ref 19.6–45.3)
LYMPHOCYTES NFR BLD AUTO: 8.7 % (ref 19.6–45.3)
MCH RBC QN AUTO: 30.3 PG (ref 26.6–33)
MCH RBC QN AUTO: 31 PG (ref 26.6–33)
MCHC RBC AUTO-ENTMCNC: 33.2 G/DL (ref 31.5–35.7)
MCHC RBC AUTO-ENTMCNC: 33.4 G/DL (ref 31.5–35.7)
MCV RBC AUTO: 91.3 FL (ref 79–97)
MCV RBC AUTO: 92.8 FL (ref 79–97)
MONOCYTES # BLD AUTO: 0.78 10*3/MM3 (ref 0.1–0.9)
MONOCYTES # BLD AUTO: 0.81 10*3/MM3 (ref 0.1–0.9)
MONOCYTES NFR BLD AUTO: 6.7 % (ref 5–12)
MONOCYTES NFR BLD AUTO: 8.8 % (ref 5–12)
NEUTROPHILS # BLD AUTO: 7.38 10*3/MM3 (ref 1.4–7)
NEUTROPHILS # BLD AUTO: 9.97 10*3/MM3 (ref 1.4–7)
NEUTROPHILS NFR BLD AUTO: 80.6 % (ref 42.7–76)
NEUTROPHILS NFR BLD AUTO: 85.7 % (ref 42.7–76)
NRBC BLD AUTO-RTO: 0.5 /100 WBC (ref 0–0)
NRBC BLD AUTO-RTO: 0.5 /100 WBC (ref 0–0)
PLATELET # BLD AUTO: 177 10*3/MM3 (ref 140–450)
PLATELET # BLD AUTO: 181 10*3/MM3 (ref 140–450)
PMV BLD AUTO: 10.3 FL (ref 6–12)
PMV BLD AUTO: 10.4 FL (ref 6–12)
POTASSIUM BLD-SCNC: 3.8 MMOL/L (ref 3.5–5.1)
POTASSIUM BLD-SCNC: 4.5 MMOL/L (ref 3.5–5.1)
POTASSIUM BLD-SCNC: 4.6 MMOL/L (ref 3.5–5.1)
PROT SERPL-MCNC: 6.2 G/DL (ref 6.3–8.6)
PROT SERPL-MCNC: 6.7 G/DL (ref 6.3–8.6)
PROT SERPL-MCNC: 6.8 G/DL (ref 6.3–8.6)
RBC # BLD AUTO: 3.74 10*6/MM3 (ref 4.14–5.8)
RBC # BLD AUTO: 4.02 10*6/MM3 (ref 4.14–5.8)
SODIUM BLD-SCNC: 139 MMOL/L (ref 137–145)
SODIUM BLD-SCNC: 139 MMOL/L (ref 137–145)
SODIUM BLD-SCNC: 147 MMOL/L (ref 137–145)
SODIUM UR-SCNC: 78 MMOL/L (ref 30–90)
TROPONIN I SERPL-MCNC: 0.75 NG/ML
TROPONIN I SERPL-MCNC: 3.21 NG/ML
WBC NRBC COR # BLD: 11.63 10*3/MM3 (ref 3.4–10.8)
WBC NRBC COR # BLD: 9.17 10*3/MM3 (ref 3.4–10.8)
WHOLE BLOOD HOLD SPECIMEN: NORMAL

## 2019-03-21 PROCEDURE — 80053 COMPREHEN METABOLIC PANEL: CPT | Performed by: INTERNAL MEDICINE

## 2019-03-21 PROCEDURE — 94799 UNLISTED PULMONARY SVC/PX: CPT

## 2019-03-21 PROCEDURE — 85730 THROMBOPLASTIN TIME PARTIAL: CPT | Performed by: FAMILY MEDICINE

## 2019-03-21 PROCEDURE — 94640 AIRWAY INHALATION TREATMENT: CPT

## 2019-03-21 PROCEDURE — 82570 ASSAY OF URINE CREATININE: CPT | Performed by: INTERNAL MEDICINE

## 2019-03-21 PROCEDURE — 85025 COMPLETE CBC W/AUTO DIFF WBC: CPT | Performed by: EMERGENCY MEDICINE

## 2019-03-21 PROCEDURE — 82962 GLUCOSE BLOOD TEST: CPT

## 2019-03-21 PROCEDURE — 84484 ASSAY OF TROPONIN QUANT: CPT | Performed by: HOSPITALIST

## 2019-03-21 PROCEDURE — 25010000002 HEPARIN (PORCINE) PER 1000 UNITS: Performed by: EMERGENCY MEDICINE

## 2019-03-21 PROCEDURE — 85730 THROMBOPLASTIN TIME PARTIAL: CPT | Performed by: HOSPITALIST

## 2019-03-21 PROCEDURE — 80053 COMPREHEN METABOLIC PANEL: CPT | Performed by: HOSPITALIST

## 2019-03-21 PROCEDURE — 85025 COMPLETE CBC W/AUTO DIFF WBC: CPT | Performed by: HOSPITALIST

## 2019-03-21 PROCEDURE — 71250 CT THORAX DX C-: CPT

## 2019-03-21 PROCEDURE — 25010000002 CEFEPIME PER 500 MG: Performed by: HOSPITALIST

## 2019-03-21 PROCEDURE — 84300 ASSAY OF URINE SODIUM: CPT | Performed by: INTERNAL MEDICINE

## 2019-03-21 RX ADMIN — ATORVASTATIN CALCIUM 10 MG: 10 TABLET, FILM COATED ORAL at 09:01

## 2019-03-21 RX ADMIN — IPRATROPIUM BROMIDE AND ALBUTEROL SULFATE 3 ML: 2.5; .5 SOLUTION RESPIRATORY (INHALATION) at 11:10

## 2019-03-21 RX ADMIN — IPRATROPIUM BROMIDE AND ALBUTEROL SULFATE 3 ML: 2.5; .5 SOLUTION RESPIRATORY (INHALATION) at 14:55

## 2019-03-21 RX ADMIN — SODIUM BICARBONATE 100 MEQ: 84 INJECTION INTRAVENOUS at 11:43

## 2019-03-21 RX ADMIN — CEFEPIME HYDROCHLORIDE 1 G: 1 INJECTION, POWDER, FOR SOLUTION INTRAMUSCULAR; INTRAVENOUS at 19:39

## 2019-03-21 RX ADMIN — CARVEDILOL 3.12 MG: 3.12 TABLET, FILM COATED ORAL at 09:01

## 2019-03-21 RX ADMIN — IPRATROPIUM BROMIDE AND ALBUTEROL SULFATE 3 ML: 2.5; .5 SOLUTION RESPIRATORY (INHALATION) at 20:44

## 2019-03-21 RX ADMIN — IPRATROPIUM BROMIDE AND ALBUTEROL SULFATE 3 ML: 2.5; .5 SOLUTION RESPIRATORY (INHALATION) at 07:52

## 2019-03-21 RX ADMIN — HEPARIN SODIUM 2500 UNITS: 5000 INJECTION INTRAVENOUS; SUBCUTANEOUS at 19:58

## 2019-03-21 RX ADMIN — SODIUM BICARBONATE 100 ML/HR: 84 INJECTION, SOLUTION INTRAVENOUS at 06:40

## 2019-03-21 RX ADMIN — CARVEDILOL 3.12 MG: 3.12 TABLET, FILM COATED ORAL at 20:41

## 2019-03-21 RX ADMIN — PANTOPRAZOLE SODIUM 40 MG: 40 TABLET, DELAYED RELEASE ORAL at 06:20

## 2019-03-21 RX ADMIN — SODIUM CHLORIDE, PRESERVATIVE FREE 3 ML: 5 INJECTION INTRAVENOUS at 09:05

## 2019-03-21 RX ADMIN — SODIUM BICARBONATE: 84 INJECTION, SOLUTION INTRAVENOUS at 12:50

## 2019-03-22 LAB
ALBUMIN SERPL-MCNC: 3.2 G/DL (ref 3.4–4.8)
ALBUMIN/GLOB SERPL: 1.3 G/DL (ref 1.1–1.8)
ALP SERPL-CCNC: 47 U/L (ref 38–126)
ALT SERPL W P-5'-P-CCNC: 23 U/L (ref 21–72)
ANION GAP SERPL CALCULATED.3IONS-SCNC: 13 MMOL/L (ref 5–15)
APTT PPP: 56.4 SECONDS (ref 20–40.3)
APTT PPP: 56.7 SECONDS (ref 20–40.3)
APTT PPP: 69.5 SECONDS (ref 20–40.3)
AST SERPL-CCNC: 49 U/L (ref 17–59)
BACTERIA SPEC AEROBE CULT: NORMAL
BASOPHILS # BLD AUTO: 0.02 10*3/MM3 (ref 0–0.2)
BASOPHILS NFR BLD AUTO: 0.3 % (ref 0–1.5)
BILIRUB SERPL-MCNC: 0.5 MG/DL (ref 0.2–1.3)
BUN BLD-MCNC: 134 MG/DL (ref 7–21)
BUN/CREAT SERPL: 19.7 (ref 7–25)
CALCIUM SPEC-SCNC: 7 MG/DL (ref 8.4–10.2)
CHLORIDE SERPL-SCNC: 113 MMOL/L (ref 95–110)
CO2 SERPL-SCNC: 16 MMOL/L (ref 22–31)
CREAT BLD-MCNC: 6.81 MG/DL (ref 0.7–1.3)
DEPRECATED RDW RBC AUTO: 45.1 FL (ref 37–54)
EOSINOPHIL # BLD AUTO: 0.02 10*3/MM3 (ref 0–0.4)
EOSINOPHIL NFR BLD AUTO: 0.3 % (ref 0.3–6.2)
ERYTHROCYTE [DISTWIDTH] IN BLOOD BY AUTOMATED COUNT: 13.9 % (ref 12.3–15.4)
GFR SERPL CREATININE-BSD FRML MDRD: 8 ML/MIN/1.73 (ref 49–113)
GFR SERPL CREATININE-BSD FRML MDRD: ABNORMAL ML/MIN/1.73 (ref 49–113)
GLOBULIN UR ELPH-MCNC: 2.5 GM/DL (ref 2.3–3.5)
GLUCOSE BLD-MCNC: 104 MG/DL (ref 60–100)
GLUCOSE BLDC GLUCOMTR-MCNC: 182 MG/DL (ref 70–130)
GLUCOSE BLDC GLUCOMTR-MCNC: 187 MG/DL (ref 70–130)
GLUCOSE BLDC GLUCOMTR-MCNC: 213 MG/DL (ref 70–130)
GLUCOSE BLDC GLUCOMTR-MCNC: 93 MG/DL (ref 70–130)
HCT VFR BLD AUTO: 29.3 % (ref 37.5–51)
HGB BLD-MCNC: 10.2 G/DL (ref 13–17.7)
IMM GRANULOCYTES # BLD AUTO: 0.04 10*3/MM3 (ref 0–0.05)
IMM GRANULOCYTES NFR BLD AUTO: 0.6 % (ref 0–0.5)
LYMPHOCYTES # BLD AUTO: 0.62 10*3/MM3 (ref 0.7–3.1)
LYMPHOCYTES NFR BLD AUTO: 10 % (ref 19.6–45.3)
MCH RBC QN AUTO: 30.8 PG (ref 26.6–33)
MCHC RBC AUTO-ENTMCNC: 34.8 G/DL (ref 31.5–35.7)
MCV RBC AUTO: 88.5 FL (ref 79–97)
MONOCYTES # BLD AUTO: 0.65 10*3/MM3 (ref 0.1–0.9)
MONOCYTES NFR BLD AUTO: 10.5 % (ref 5–12)
NEUTROPHILS # BLD AUTO: 4.87 10*3/MM3 (ref 1.4–7)
NEUTROPHILS NFR BLD AUTO: 78.3 % (ref 42.7–76)
NRBC BLD AUTO-RTO: 0.3 /100 WBC (ref 0–0)
PLATELET # BLD AUTO: 179 10*3/MM3 (ref 140–450)
PMV BLD AUTO: 10.4 FL (ref 6–12)
POTASSIUM BLD-SCNC: 2.8 MMOL/L (ref 3.5–5.1)
POTASSIUM BLD-SCNC: 2.8 MMOL/L (ref 3.5–5.1)
PROT SERPL-MCNC: 5.7 G/DL (ref 6.3–8.6)
RBC # BLD AUTO: 3.31 10*6/MM3 (ref 4.14–5.8)
SODIUM BLD-SCNC: 142 MMOL/L (ref 137–145)
TROPONIN I SERPL-MCNC: 14.6 NG/ML
TROPONIN I SERPL-MCNC: 7.57 NG/ML
WBC NRBC COR # BLD: 6.22 10*3/MM3 (ref 3.4–10.8)

## 2019-03-22 PROCEDURE — 25010000002 ONDANSETRON PER 1 MG: Performed by: HOSPITALIST

## 2019-03-22 PROCEDURE — 85025 COMPLETE CBC W/AUTO DIFF WBC: CPT | Performed by: HOSPITALIST

## 2019-03-22 PROCEDURE — 93010 ELECTROCARDIOGRAM REPORT: CPT | Performed by: INTERNAL MEDICINE

## 2019-03-22 PROCEDURE — 94799 UNLISTED PULMONARY SVC/PX: CPT

## 2019-03-22 PROCEDURE — 63710000001 INSULIN ASPART PER 5 UNITS: Performed by: HOSPITALIST

## 2019-03-22 PROCEDURE — 84484 ASSAY OF TROPONIN QUANT: CPT | Performed by: INTERNAL MEDICINE

## 2019-03-22 PROCEDURE — 85730 THROMBOPLASTIN TIME PARTIAL: CPT | Performed by: INTERNAL MEDICINE

## 2019-03-22 PROCEDURE — 93005 ELECTROCARDIOGRAM TRACING: CPT | Performed by: FAMILY MEDICINE

## 2019-03-22 PROCEDURE — 25010000002 CEFEPIME PER 500 MG: Performed by: HOSPITALIST

## 2019-03-22 PROCEDURE — 85730 THROMBOPLASTIN TIME PARTIAL: CPT | Performed by: HOSPITALIST

## 2019-03-22 PROCEDURE — 82962 GLUCOSE BLOOD TEST: CPT

## 2019-03-22 PROCEDURE — 80053 COMPREHEN METABOLIC PANEL: CPT | Performed by: HOSPITALIST

## 2019-03-22 PROCEDURE — 25010000002 HEPARIN (PORCINE) PER 1000 UNITS: Performed by: EMERGENCY MEDICINE

## 2019-03-22 PROCEDURE — 84132 ASSAY OF SERUM POTASSIUM: CPT | Performed by: HOSPITALIST

## 2019-03-22 RX ORDER — POTASSIUM CHLORIDE 1.5 G/1.77G
40 POWDER, FOR SOLUTION ORAL AS NEEDED
Status: DISCONTINUED | OUTPATIENT
Start: 2019-03-22 | End: 2019-03-29 | Stop reason: HOSPADM

## 2019-03-22 RX ORDER — POTASSIUM CHLORIDE 7.45 MG/ML
10 INJECTION INTRAVENOUS
Status: DISCONTINUED | OUTPATIENT
Start: 2019-03-22 | End: 2019-03-29 | Stop reason: HOSPADM

## 2019-03-22 RX ORDER — POTASSIUM CHLORIDE 750 MG/1
40 CAPSULE, EXTENDED RELEASE ORAL AS NEEDED
Status: DISCONTINUED | OUTPATIENT
Start: 2019-03-22 | End: 2019-03-29 | Stop reason: HOSPADM

## 2019-03-22 RX ORDER — GABAPENTIN 100 MG/1
100 CAPSULE ORAL 3 TIMES DAILY
Status: DISCONTINUED | OUTPATIENT
Start: 2019-03-22 | End: 2019-03-29 | Stop reason: HOSPADM

## 2019-03-22 RX ADMIN — IPRATROPIUM BROMIDE AND ALBUTEROL SULFATE 3 ML: 2.5; .5 SOLUTION RESPIRATORY (INHALATION) at 14:06

## 2019-03-22 RX ADMIN — ATORVASTATIN CALCIUM 10 MG: 10 TABLET, FILM COATED ORAL at 08:52

## 2019-03-22 RX ADMIN — POTASSIUM CHLORIDE 40 MEQ: 750 CAPSULE, EXTENDED RELEASE ORAL at 21:16

## 2019-03-22 RX ADMIN — INSULIN ASPART 3 UNITS: 100 INJECTION, SOLUTION INTRAVENOUS; SUBCUTANEOUS at 20:23

## 2019-03-22 RX ADMIN — CARVEDILOL 3.12 MG: 3.12 TABLET, FILM COATED ORAL at 20:14

## 2019-03-22 RX ADMIN — SODIUM BICARBONATE: 84 INJECTION, SOLUTION INTRAVENOUS at 09:10

## 2019-03-22 RX ADMIN — INSULIN ASPART 2 UNITS: 100 INJECTION, SOLUTION INTRAVENOUS; SUBCUTANEOUS at 17:24

## 2019-03-22 RX ADMIN — SODIUM BICARBONATE: 84 INJECTION, SOLUTION INTRAVENOUS at 00:38

## 2019-03-22 RX ADMIN — SODIUM CHLORIDE, PRESERVATIVE FREE 3 ML: 5 INJECTION INTRAVENOUS at 20:15

## 2019-03-22 RX ADMIN — CARVEDILOL 3.12 MG: 3.12 TABLET, FILM COATED ORAL at 08:52

## 2019-03-22 RX ADMIN — GABAPENTIN 100 MG: 100 CAPSULE ORAL at 20:14

## 2019-03-22 RX ADMIN — POTASSIUM CHLORIDE 40 MEQ: 750 CAPSULE, EXTENDED RELEASE ORAL at 15:20

## 2019-03-22 RX ADMIN — PANTOPRAZOLE SODIUM 40 MG: 40 TABLET, DELAYED RELEASE ORAL at 06:06

## 2019-03-22 RX ADMIN — SODIUM CHLORIDE, PRESERVATIVE FREE 3 ML: 5 INJECTION INTRAVENOUS at 08:53

## 2019-03-22 RX ADMIN — HEPARIN SODIUM 19.97 UNITS/KG/HR: 10000 INJECTION, SOLUTION INTRAVENOUS at 14:19

## 2019-03-22 RX ADMIN — CEFEPIME HYDROCHLORIDE 1 G: 1 INJECTION, POWDER, FOR SOLUTION INTRAMUSCULAR; INTRAVENOUS at 20:10

## 2019-03-22 RX ADMIN — INSULIN ASPART 2 UNITS: 100 INJECTION, SOLUTION INTRAVENOUS; SUBCUTANEOUS at 11:06

## 2019-03-22 RX ADMIN — POTASSIUM CHLORIDE 40 MEQ: 750 CAPSULE, EXTENDED RELEASE ORAL at 04:56

## 2019-03-22 RX ADMIN — POTASSIUM CHLORIDE 40 MEQ: 750 CAPSULE, EXTENDED RELEASE ORAL at 08:51

## 2019-03-22 RX ADMIN — GABAPENTIN 100 MG: 100 CAPSULE ORAL at 15:20

## 2019-03-22 RX ADMIN — ONDANSETRON 4 MG: 2 INJECTION INTRAMUSCULAR; INTRAVENOUS at 18:02

## 2019-03-22 RX ADMIN — ONDANSETRON 4 MG: 2 INJECTION INTRAMUSCULAR; INTRAVENOUS at 08:51

## 2019-03-22 RX ADMIN — IPRATROPIUM BROMIDE AND ALBUTEROL SULFATE 3 ML: 2.5; .5 SOLUTION RESPIRATORY (INHALATION) at 09:34

## 2019-03-22 RX ADMIN — SODIUM BICARBONATE: 84 INJECTION, SOLUTION INTRAVENOUS at 17:15

## 2019-03-23 LAB
ALBUMIN SERPL-MCNC: 2.9 G/DL (ref 3.4–4.8)
ALBUMIN/GLOB SERPL: 1.2 G/DL (ref 1.1–1.8)
ALP SERPL-CCNC: 38 U/L (ref 38–126)
ALT SERPL W P-5'-P-CCNC: 23 U/L (ref 21–72)
ANION GAP SERPL CALCULATED.3IONS-SCNC: 10 MMOL/L (ref 5–15)
APTT PPP: 54.4 SECONDS (ref 20–40.3)
APTT PPP: 55.3 SECONDS (ref 20–40.3)
AST SERPL-CCNC: 29 U/L (ref 17–59)
BASOPHILS # BLD AUTO: 0.01 10*3/MM3 (ref 0–0.2)
BASOPHILS # BLD AUTO: 0.03 10*3/MM3 (ref 0–0.2)
BASOPHILS NFR BLD AUTO: 0.1 % (ref 0–1.5)
BASOPHILS NFR BLD AUTO: 0.4 % (ref 0–1.5)
BILIRUB SERPL-MCNC: 0.4 MG/DL (ref 0.2–1.3)
BUN BLD-MCNC: 116 MG/DL (ref 7–21)
BUN/CREAT SERPL: 23.6 (ref 7–25)
CALCIUM SPEC-SCNC: 6 MG/DL (ref 8.4–10.2)
CHLORIDE SERPL-SCNC: 102 MMOL/L (ref 95–110)
CO2 SERPL-SCNC: 27 MMOL/L (ref 22–31)
CREAT BLD-MCNC: 4.92 MG/DL (ref 0.7–1.3)
DEPRECATED RDW RBC AUTO: 42.4 FL (ref 37–54)
DEPRECATED RDW RBC AUTO: 42.7 FL (ref 37–54)
EOSINOPHIL # BLD AUTO: 0 10*3/MM3 (ref 0–0.4)
EOSINOPHIL # BLD AUTO: 0.11 10*3/MM3 (ref 0–0.4)
EOSINOPHIL NFR BLD AUTO: 0 % (ref 0.3–6.2)
EOSINOPHIL NFR BLD AUTO: 1.6 % (ref 0.3–6.2)
ERYTHROCYTE [DISTWIDTH] IN BLOOD BY AUTOMATED COUNT: 13.2 % (ref 12.3–15.4)
ERYTHROCYTE [DISTWIDTH] IN BLOOD BY AUTOMATED COUNT: 13.5 % (ref 12.3–15.4)
GFR SERPL CREATININE-BSD FRML MDRD: 12 ML/MIN/1.73 (ref 49–113)
GFR SERPL CREATININE-BSD FRML MDRD: ABNORMAL ML/MIN/1.73 (ref 49–113)
GLOBULIN UR ELPH-MCNC: 2.4 GM/DL (ref 2.3–3.5)
GLUCOSE BLD-MCNC: 121 MG/DL (ref 60–100)
GLUCOSE BLDC GLUCOMTR-MCNC: 151 MG/DL (ref 70–130)
GLUCOSE BLDC GLUCOMTR-MCNC: 154 MG/DL (ref 70–130)
GLUCOSE BLDC GLUCOMTR-MCNC: 184 MG/DL (ref 70–130)
GLUCOSE BLDC GLUCOMTR-MCNC: 196 MG/DL (ref 70–130)
HCT VFR BLD AUTO: 24.5 % (ref 37.5–51)
HCT VFR BLD AUTO: 26.8 % (ref 37.5–51)
HGB BLD-MCNC: 8.7 G/DL (ref 13–17.7)
HGB BLD-MCNC: 9.4 G/DL (ref 13–17.7)
IMM GRANULOCYTES # BLD AUTO: 0.03 10*3/MM3 (ref 0–0.05)
IMM GRANULOCYTES # BLD AUTO: 0.03 10*3/MM3 (ref 0–0.05)
IMM GRANULOCYTES NFR BLD AUTO: 0.4 % (ref 0–0.5)
IMM GRANULOCYTES NFR BLD AUTO: 0.4 % (ref 0–0.5)
LYMPHOCYTES # BLD AUTO: 0.46 10*3/MM3 (ref 0.7–3.1)
LYMPHOCYTES # BLD AUTO: 1.16 10*3/MM3 (ref 0.7–3.1)
LYMPHOCYTES NFR BLD AUTO: 17.2 % (ref 19.6–45.3)
LYMPHOCYTES NFR BLD AUTO: 6.8 % (ref 19.6–45.3)
MCH RBC QN AUTO: 30.6 PG (ref 26.6–33)
MCH RBC QN AUTO: 31.1 PG (ref 26.6–33)
MCHC RBC AUTO-ENTMCNC: 35.1 G/DL (ref 31.5–35.7)
MCHC RBC AUTO-ENTMCNC: 35.5 G/DL (ref 31.5–35.7)
MCV RBC AUTO: 87.3 FL (ref 79–97)
MCV RBC AUTO: 87.5 FL (ref 79–97)
MONOCYTES # BLD AUTO: 0.54 10*3/MM3 (ref 0.1–0.9)
MONOCYTES # BLD AUTO: 0.8 10*3/MM3 (ref 0.1–0.9)
MONOCYTES NFR BLD AUTO: 11.9 % (ref 5–12)
MONOCYTES NFR BLD AUTO: 8 % (ref 5–12)
NEUTROPHILS # BLD AUTO: 4.6 10*3/MM3 (ref 1.4–7)
NEUTROPHILS # BLD AUTO: 5.69 10*3/MM3 (ref 1.4–7)
NEUTROPHILS NFR BLD AUTO: 68.5 % (ref 42.7–76)
NEUTROPHILS NFR BLD AUTO: 84.7 % (ref 42.7–76)
NRBC BLD AUTO-RTO: 0 /100 WBC (ref 0–0)
NRBC BLD AUTO-RTO: 0 /100 WBC (ref 0–0)
PLATELET # BLD AUTO: 163 10*3/MM3 (ref 140–450)
PLATELET # BLD AUTO: 163 10*3/MM3 (ref 140–450)
PMV BLD AUTO: 10.2 FL (ref 6–12)
PMV BLD AUTO: 10.5 FL (ref 6–12)
POTASSIUM BLD-SCNC: 3.1 MMOL/L (ref 3.5–5.1)
POTASSIUM BLD-SCNC: 3.3 MMOL/L (ref 3.5–5.1)
PROT SERPL-MCNC: 5.3 G/DL (ref 6.3–8.6)
RBC # BLD AUTO: 2.8 10*6/MM3 (ref 4.14–5.8)
RBC # BLD AUTO: 3.07 10*6/MM3 (ref 4.14–5.8)
SODIUM BLD-SCNC: 139 MMOL/L (ref 137–145)
TROPONIN I SERPL-MCNC: 7.8 NG/ML
WBC NRBC COR # BLD: 6.73 10*3/MM3 (ref 3.4–10.8)
WBC NRBC COR # BLD: 6.73 10*3/MM3 (ref 3.4–10.8)

## 2019-03-23 PROCEDURE — 85025 COMPLETE CBC W/AUTO DIFF WBC: CPT | Performed by: HOSPITALIST

## 2019-03-23 PROCEDURE — 25010000002 HEPARIN (PORCINE) PER 1000 UNITS: Performed by: EMERGENCY MEDICINE

## 2019-03-23 PROCEDURE — 25010000003 POTASSIUM CHLORIDE 10 MEQ/100ML SOLUTION: Performed by: FAMILY MEDICINE

## 2019-03-23 PROCEDURE — 25010000002 ONDANSETRON PER 1 MG: Performed by: HOSPITALIST

## 2019-03-23 PROCEDURE — 85025 COMPLETE CBC W/AUTO DIFF WBC: CPT | Performed by: EMERGENCY MEDICINE

## 2019-03-23 PROCEDURE — 82962 GLUCOSE BLOOD TEST: CPT

## 2019-03-23 PROCEDURE — 63710000001 INSULIN ASPART PER 5 UNITS: Performed by: HOSPITALIST

## 2019-03-23 PROCEDURE — 85730 THROMBOPLASTIN TIME PARTIAL: CPT | Performed by: FAMILY MEDICINE

## 2019-03-23 PROCEDURE — 25010000002 CALCIUM GLUCONATE PER 10 ML: Performed by: FAMILY MEDICINE

## 2019-03-23 PROCEDURE — 25010000002 CEFEPIME PER 500 MG: Performed by: HOSPITALIST

## 2019-03-23 PROCEDURE — 25010000002 ONDANSETRON PER 1 MG: Performed by: FAMILY MEDICINE

## 2019-03-23 PROCEDURE — 94799 UNLISTED PULMONARY SVC/PX: CPT

## 2019-03-23 PROCEDURE — 84484 ASSAY OF TROPONIN QUANT: CPT | Performed by: INTERNAL MEDICINE

## 2019-03-23 PROCEDURE — 80053 COMPREHEN METABOLIC PANEL: CPT | Performed by: HOSPITALIST

## 2019-03-23 PROCEDURE — 94760 N-INVAS EAR/PLS OXIMETRY 1: CPT

## 2019-03-23 PROCEDURE — 85730 THROMBOPLASTIN TIME PARTIAL: CPT | Performed by: INTERNAL MEDICINE

## 2019-03-23 PROCEDURE — 84132 ASSAY OF SERUM POTASSIUM: CPT | Performed by: FAMILY MEDICINE

## 2019-03-23 RX ORDER — PANTOPRAZOLE SODIUM 40 MG/10ML
40 INJECTION, POWDER, LYOPHILIZED, FOR SOLUTION INTRAVENOUS EVERY 12 HOURS SCHEDULED
Status: DISCONTINUED | OUTPATIENT
Start: 2019-03-23 | End: 2019-03-29 | Stop reason: HOSPADM

## 2019-03-23 RX ORDER — ONDANSETRON 2 MG/ML
4 INJECTION INTRAMUSCULAR; INTRAVENOUS EVERY 4 HOURS
Status: DISCONTINUED | OUTPATIENT
Start: 2019-03-23 | End: 2019-03-23

## 2019-03-23 RX ORDER — CALCIUM CHLORIDE 100 MG/ML
1 INJECTION INTRAVENOUS; INTRAVENTRICULAR ONCE
Status: COMPLETED | OUTPATIENT
Start: 2019-03-23 | End: 2019-03-23

## 2019-03-23 RX ORDER — ONDANSETRON 2 MG/ML
4 INJECTION INTRAMUSCULAR; INTRAVENOUS EVERY 4 HOURS PRN
Status: DISCONTINUED | OUTPATIENT
Start: 2019-03-23 | End: 2019-03-29 | Stop reason: HOSPADM

## 2019-03-23 RX ADMIN — IPRATROPIUM BROMIDE AND ALBUTEROL SULFATE 3 ML: 2.5; .5 SOLUTION RESPIRATORY (INHALATION) at 06:24

## 2019-03-23 RX ADMIN — INSULIN ASPART 2 UNITS: 100 INJECTION, SOLUTION INTRAVENOUS; SUBCUTANEOUS at 18:05

## 2019-03-23 RX ADMIN — CALCIUM GLUCONATE 1 G: 98 INJECTION, SOLUTION INTRAVENOUS at 23:14

## 2019-03-23 RX ADMIN — ATORVASTATIN CALCIUM 10 MG: 10 TABLET, FILM COATED ORAL at 08:30

## 2019-03-23 RX ADMIN — ONDANSETRON 4 MG: 2 INJECTION INTRAMUSCULAR; INTRAVENOUS at 10:37

## 2019-03-23 RX ADMIN — SODIUM BICARBONATE: 84 INJECTION, SOLUTION INTRAVENOUS at 04:47

## 2019-03-23 RX ADMIN — POTASSIUM CHLORIDE 40 MEQ: 750 CAPSULE, EXTENDED RELEASE ORAL at 06:52

## 2019-03-23 RX ADMIN — CARVEDILOL 3.12 MG: 3.12 TABLET, FILM COATED ORAL at 08:30

## 2019-03-23 RX ADMIN — ONDANSETRON 4 MG: 2 INJECTION INTRAMUSCULAR; INTRAVENOUS at 22:44

## 2019-03-23 RX ADMIN — IPRATROPIUM BROMIDE AND ALBUTEROL SULFATE 3 ML: 2.5; .5 SOLUTION RESPIRATORY (INHALATION) at 10:49

## 2019-03-23 RX ADMIN — ONDANSETRON 4 MG: 2 INJECTION INTRAMUSCULAR; INTRAVENOUS at 18:09

## 2019-03-23 RX ADMIN — SODIUM CHLORIDE, PRESERVATIVE FREE 3 ML: 5 INJECTION INTRAVENOUS at 08:31

## 2019-03-23 RX ADMIN — IPRATROPIUM BROMIDE AND ALBUTEROL SULFATE 3 ML: 2.5; .5 SOLUTION RESPIRATORY (INHALATION) at 14:44

## 2019-03-23 RX ADMIN — ONDANSETRON 4 MG: 2 INJECTION INTRAMUSCULAR; INTRAVENOUS at 05:26

## 2019-03-23 RX ADMIN — PANTOPRAZOLE SODIUM 40 MG: 40 TABLET, DELAYED RELEASE ORAL at 06:52

## 2019-03-23 RX ADMIN — POTASSIUM CHLORIDE 10 MEQ: 7.46 INJECTION, SOLUTION INTRAVENOUS at 13:50

## 2019-03-23 RX ADMIN — ONDANSETRON 4 MG: 2 INJECTION INTRAMUSCULAR; INTRAVENOUS at 14:30

## 2019-03-23 RX ADMIN — PANTOPRAZOLE SODIUM 40 MG: 40 INJECTION, POWDER, FOR SOLUTION INTRAVENOUS at 20:13

## 2019-03-23 RX ADMIN — POTASSIUM CHLORIDE 10 MEQ: 7.46 INJECTION, SOLUTION INTRAVENOUS at 15:10

## 2019-03-23 RX ADMIN — GABAPENTIN 100 MG: 100 CAPSULE ORAL at 08:30

## 2019-03-23 RX ADMIN — POTASSIUM CHLORIDE 10 MEQ: 7.46 INJECTION, SOLUTION INTRAVENOUS at 16:12

## 2019-03-23 RX ADMIN — CEFEPIME HYDROCHLORIDE 1 G: 1 INJECTION, POWDER, FOR SOLUTION INTRAMUSCULAR; INTRAVENOUS at 19:51

## 2019-03-23 RX ADMIN — INSULIN ASPART 2 UNITS: 100 INJECTION, SOLUTION INTRAVENOUS; SUBCUTANEOUS at 08:34

## 2019-03-23 RX ADMIN — SODIUM CHLORIDE, PRESERVATIVE FREE 3 ML: 5 INJECTION INTRAVENOUS at 20:11

## 2019-03-23 RX ADMIN — CARVEDILOL 3.12 MG: 3.12 TABLET, FILM COATED ORAL at 20:10

## 2019-03-23 RX ADMIN — SODIUM BICARBONATE: 84 INJECTION, SOLUTION INTRAVENOUS at 10:41

## 2019-03-23 RX ADMIN — POTASSIUM CHLORIDE 10 MEQ: 7.46 INJECTION, SOLUTION INTRAVENOUS at 12:52

## 2019-03-23 RX ADMIN — HEPARIN SODIUM 19.97 UNITS/KG/HR: 10000 INJECTION, SOLUTION INTRAVENOUS at 10:39

## 2019-03-23 RX ADMIN — GABAPENTIN 100 MG: 100 CAPSULE ORAL at 20:10

## 2019-03-23 RX ADMIN — POTASSIUM CHLORIDE 40 MEQ: 750 CAPSULE, EXTENDED RELEASE ORAL at 01:13

## 2019-03-23 RX ADMIN — CALCIUM CHLORIDE 1 G: 100 INJECTION INTRAVENOUS; INTRAVENTRICULAR at 08:30

## 2019-03-24 LAB
ALBUMIN SERPL-MCNC: 3.5 G/DL (ref 3.4–4.8)
ALBUMIN/GLOB SERPL: 1.3 G/DL (ref 1.1–1.8)
ALP SERPL-CCNC: 48 U/L (ref 38–126)
ALT SERPL W P-5'-P-CCNC: 23 U/L (ref 21–72)
ANION GAP SERPL CALCULATED.3IONS-SCNC: 11 MMOL/L (ref 5–15)
APTT PPP: 47.9 SECONDS (ref 20–40.3)
APTT PPP: 54.3 SECONDS (ref 20–40.3)
AST SERPL-CCNC: 27 U/L (ref 17–59)
BASOPHILS # BLD AUTO: 0.02 10*3/MM3 (ref 0–0.2)
BASOPHILS NFR BLD AUTO: 0.3 % (ref 0–1.5)
BILIRUB SERPL-MCNC: 0.4 MG/DL (ref 0.2–1.3)
BUN BLD-MCNC: 93 MG/DL (ref 7–21)
BUN/CREAT SERPL: 24.3 (ref 7–25)
CALCIUM SPEC-SCNC: 8 MG/DL (ref 8.4–10.2)
CHLORIDE SERPL-SCNC: 101 MMOL/L (ref 95–110)
CO2 SERPL-SCNC: 28 MMOL/L (ref 22–31)
CREAT BLD-MCNC: 3.83 MG/DL (ref 0.7–1.3)
DEPRECATED RDW RBC AUTO: 47 FL (ref 37–54)
EOSINOPHIL # BLD AUTO: 0.01 10*3/MM3 (ref 0–0.4)
EOSINOPHIL NFR BLD AUTO: 0.1 % (ref 0.3–6.2)
ERYTHROCYTE [DISTWIDTH] IN BLOOD BY AUTOMATED COUNT: 13.9 % (ref 12.3–15.4)
GFR SERPL CREATININE-BSD FRML MDRD: 16 ML/MIN/1.73 (ref 49–113)
GLOBULIN UR ELPH-MCNC: 2.7 GM/DL (ref 2.3–3.5)
GLUCOSE BLD-MCNC: 135 MG/DL (ref 60–100)
GLUCOSE BLDC GLUCOMTR-MCNC: 144 MG/DL (ref 70–130)
GLUCOSE BLDC GLUCOMTR-MCNC: 167 MG/DL (ref 70–130)
GLUCOSE BLDC GLUCOMTR-MCNC: 180 MG/DL (ref 70–130)
HCT VFR BLD AUTO: 28.9 % (ref 37.5–51)
HGB BLD-MCNC: 9.7 G/DL (ref 13–17.7)
HOLD SPECIMEN: NORMAL
HOLD SPECIMEN: NORMAL
IMM GRANULOCYTES # BLD AUTO: 0.02 10*3/MM3 (ref 0–0.05)
IMM GRANULOCYTES NFR BLD AUTO: 0.3 % (ref 0–0.5)
LYMPHOCYTES # BLD AUTO: 0.52 10*3/MM3 (ref 0.7–3.1)
LYMPHOCYTES NFR BLD AUTO: 6.6 % (ref 19.6–45.3)
MCH RBC QN AUTO: 30.7 PG (ref 26.6–33)
MCHC RBC AUTO-ENTMCNC: 33.6 G/DL (ref 31.5–35.7)
MCV RBC AUTO: 91.5 FL (ref 79–97)
MONOCYTES # BLD AUTO: 0.74 10*3/MM3 (ref 0.1–0.9)
MONOCYTES NFR BLD AUTO: 9.4 % (ref 5–12)
NEUTROPHILS # BLD AUTO: 6.57 10*3/MM3 (ref 1.4–7)
NEUTROPHILS NFR BLD AUTO: 83.3 % (ref 42.7–76)
NRBC BLD AUTO-RTO: 0 /100 WBC (ref 0–0)
PLATELET # BLD AUTO: 152 10*3/MM3 (ref 140–450)
PMV BLD AUTO: 11.5 FL (ref 6–12)
POTASSIUM BLD-SCNC: 3.3 MMOL/L (ref 3.5–5.1)
POTASSIUM BLD-SCNC: 3.4 MMOL/L (ref 3.5–5.1)
PROT SERPL-MCNC: 6.2 G/DL (ref 6.3–8.6)
RBC # BLD AUTO: 3.16 10*6/MM3 (ref 4.14–5.8)
SODIUM BLD-SCNC: 140 MMOL/L (ref 137–145)
WBC NRBC COR # BLD: 7.88 10*3/MM3 (ref 3.4–10.8)

## 2019-03-24 PROCEDURE — 25010000002 HEPARIN (PORCINE) PER 1000 UNITS: Performed by: EMERGENCY MEDICINE

## 2019-03-24 PROCEDURE — 82962 GLUCOSE BLOOD TEST: CPT

## 2019-03-24 PROCEDURE — 85025 COMPLETE CBC W/AUTO DIFF WBC: CPT | Performed by: HOSPITALIST

## 2019-03-24 PROCEDURE — 85730 THROMBOPLASTIN TIME PARTIAL: CPT | Performed by: INTERNAL MEDICINE

## 2019-03-24 PROCEDURE — 25010000003 POTASSIUM CHLORIDE 10 MEQ/100ML SOLUTION: Performed by: FAMILY MEDICINE

## 2019-03-24 PROCEDURE — 84132 ASSAY OF SERUM POTASSIUM: CPT | Performed by: FAMILY MEDICINE

## 2019-03-24 PROCEDURE — 25010000002 ONDANSETRON PER 1 MG: Performed by: FAMILY MEDICINE

## 2019-03-24 PROCEDURE — 94799 UNLISTED PULMONARY SVC/PX: CPT

## 2019-03-24 PROCEDURE — 94760 N-INVAS EAR/PLS OXIMETRY 1: CPT

## 2019-03-24 PROCEDURE — 25010000002 CALCIUM GLUCONATE PER 10 ML: Performed by: FAMILY MEDICINE

## 2019-03-24 PROCEDURE — 63710000001 INSULIN ASPART PER 5 UNITS: Performed by: HOSPITALIST

## 2019-03-24 PROCEDURE — 25010000002 CEFEPIME PER 500 MG: Performed by: HOSPITALIST

## 2019-03-24 PROCEDURE — 80053 COMPREHEN METABOLIC PANEL: CPT | Performed by: HOSPITALIST

## 2019-03-24 RX ORDER — PROMETHAZINE HYDROCHLORIDE 12.5 MG/1
12.5 TABLET ORAL EVERY 6 HOURS PRN
Status: DISCONTINUED | OUTPATIENT
Start: 2019-03-24 | End: 2019-03-29 | Stop reason: HOSPADM

## 2019-03-24 RX ADMIN — POTASSIUM CHLORIDE 10 MEQ: 7.46 INJECTION, SOLUTION INTRAVENOUS at 11:55

## 2019-03-24 RX ADMIN — PANTOPRAZOLE SODIUM 40 MG: 40 INJECTION, POWDER, FOR SOLUTION INTRAVENOUS at 08:38

## 2019-03-24 RX ADMIN — HEPARIN SODIUM 12.4 UNITS/KG/HR: 10000 INJECTION, SOLUTION INTRAVENOUS at 06:16

## 2019-03-24 RX ADMIN — ATORVASTATIN CALCIUM 10 MG: 10 TABLET, FILM COATED ORAL at 08:41

## 2019-03-24 RX ADMIN — INSULIN ASPART 2 UNITS: 100 INJECTION, SOLUTION INTRAVENOUS; SUBCUTANEOUS at 22:14

## 2019-03-24 RX ADMIN — IPRATROPIUM BROMIDE AND ALBUTEROL SULFATE 3 ML: 2.5; .5 SOLUTION RESPIRATORY (INHALATION) at 07:27

## 2019-03-24 RX ADMIN — CEFEPIME HYDROCHLORIDE 1 G: 1 INJECTION, POWDER, FOR SOLUTION INTRAMUSCULAR; INTRAVENOUS at 22:09

## 2019-03-24 RX ADMIN — POTASSIUM CHLORIDE 10 MEQ: 7.46 INJECTION, SOLUTION INTRAVENOUS at 06:50

## 2019-03-24 RX ADMIN — IPRATROPIUM BROMIDE AND ALBUTEROL SULFATE 3 ML: 2.5; .5 SOLUTION RESPIRATORY (INHALATION) at 12:02

## 2019-03-24 RX ADMIN — SODIUM CHLORIDE, PRESERVATIVE FREE 3 ML: 5 INJECTION INTRAVENOUS at 08:29

## 2019-03-24 RX ADMIN — CARVEDILOL 3.12 MG: 3.12 TABLET, FILM COATED ORAL at 08:41

## 2019-03-24 RX ADMIN — ONDANSETRON 4 MG: 2 INJECTION INTRAMUSCULAR; INTRAVENOUS at 02:40

## 2019-03-24 RX ADMIN — GABAPENTIN 100 MG: 100 CAPSULE ORAL at 17:08

## 2019-03-24 RX ADMIN — INSULIN ASPART 2 UNITS: 100 INJECTION, SOLUTION INTRAVENOUS; SUBCUTANEOUS at 11:54

## 2019-03-24 RX ADMIN — PANTOPRAZOLE SODIUM 40 MG: 40 INJECTION, POWDER, FOR SOLUTION INTRAVENOUS at 22:08

## 2019-03-24 RX ADMIN — GABAPENTIN 100 MG: 100 CAPSULE ORAL at 08:42

## 2019-03-24 RX ADMIN — GABAPENTIN 100 MG: 100 CAPSULE ORAL at 22:06

## 2019-03-24 RX ADMIN — CARVEDILOL 3.12 MG: 3.12 TABLET, FILM COATED ORAL at 22:06

## 2019-03-24 RX ADMIN — POTASSIUM CHLORIDE 10 MEQ: 7.46 INJECTION, SOLUTION INTRAVENOUS at 10:01

## 2019-03-24 RX ADMIN — IPRATROPIUM BROMIDE AND ALBUTEROL SULFATE 3 ML: 2.5; .5 SOLUTION RESPIRATORY (INHALATION) at 21:00

## 2019-03-24 RX ADMIN — CALCIUM GLUCONATE 6 G: 98 INJECTION, SOLUTION INTRAVENOUS at 00:22

## 2019-03-24 RX ADMIN — POTASSIUM CHLORIDE 10 MEQ: 7.46 INJECTION, SOLUTION INTRAVENOUS at 08:28

## 2019-03-25 ENCOUNTER — APPOINTMENT (OUTPATIENT)
Dept: CT IMAGING | Facility: HOSPITAL | Age: 64
End: 2019-03-25

## 2019-03-25 LAB
ALBUMIN SERPL-MCNC: 3.5 G/DL (ref 3.4–4.8)
ALBUMIN/GLOB SERPL: 1.4 G/DL (ref 1.1–1.8)
ALP SERPL-CCNC: 44 U/L (ref 38–126)
ALT SERPL W P-5'-P-CCNC: 23 U/L (ref 21–72)
ANION GAP SERPL CALCULATED.3IONS-SCNC: 10 MMOL/L (ref 5–15)
APTT PPP: 33.7 SECONDS (ref 20–40.3)
APTT PPP: 42.7 SECONDS (ref 20–40.3)
AST SERPL-CCNC: 35 U/L (ref 17–59)
BACTERIA SPEC AEROBE CULT: NORMAL
BACTERIA SPEC AEROBE CULT: NORMAL
BASOPHILS # BLD AUTO: 0.02 10*3/MM3 (ref 0–0.2)
BASOPHILS NFR BLD AUTO: 0.3 % (ref 0–1.5)
BILIRUB SERPL-MCNC: 0.3 MG/DL (ref 0.2–1.3)
BUN BLD-MCNC: 66 MG/DL (ref 7–21)
BUN/CREAT SERPL: 19.9 (ref 7–25)
CALCIUM SPEC-SCNC: 7.3 MG/DL (ref 8.4–10.2)
CHLORIDE SERPL-SCNC: 97 MMOL/L (ref 95–110)
CO2 SERPL-SCNC: 26 MMOL/L (ref 22–31)
CREAT BLD-MCNC: 3.32 MG/DL (ref 0.7–1.3)
DEPRECATED RDW RBC AUTO: 44.3 FL (ref 37–54)
EOSINOPHIL # BLD AUTO: 0.04 10*3/MM3 (ref 0–0.4)
EOSINOPHIL NFR BLD AUTO: 0.5 % (ref 0.3–6.2)
ERYTHROCYTE [DISTWIDTH] IN BLOOD BY AUTOMATED COUNT: 13.4 % (ref 12.3–15.4)
GFR SERPL CREATININE-BSD FRML MDRD: 19 ML/MIN/1.73 (ref 49–113)
GLOBULIN UR ELPH-MCNC: 2.5 GM/DL (ref 2.3–3.5)
GLUCOSE BLD-MCNC: 126 MG/DL (ref 60–100)
GLUCOSE BLDC GLUCOMTR-MCNC: 127 MG/DL (ref 70–130)
GLUCOSE BLDC GLUCOMTR-MCNC: 129 MG/DL (ref 70–130)
GLUCOSE BLDC GLUCOMTR-MCNC: 194 MG/DL (ref 70–130)
GLUCOSE BLDC GLUCOMTR-MCNC: 199 MG/DL (ref 70–130)
GLUCOSE BLDC GLUCOMTR-MCNC: 291 MG/DL (ref 70–130)
HCT VFR BLD AUTO: 26.3 % (ref 37.5–51)
HGB BLD-MCNC: 8.9 G/DL (ref 13–17.7)
IMM GRANULOCYTES # BLD AUTO: 0.04 10*3/MM3 (ref 0–0.05)
IMM GRANULOCYTES NFR BLD AUTO: 0.5 % (ref 0–0.5)
LYMPHOCYTES # BLD AUTO: 0.51 10*3/MM3 (ref 0.7–3.1)
LYMPHOCYTES NFR BLD AUTO: 6.9 % (ref 19.6–45.3)
MCH RBC QN AUTO: 30.5 PG (ref 26.6–33)
MCHC RBC AUTO-ENTMCNC: 33.8 G/DL (ref 31.5–35.7)
MCV RBC AUTO: 90.1 FL (ref 79–97)
MONOCYTES # BLD AUTO: 0.57 10*3/MM3 (ref 0.1–0.9)
MONOCYTES NFR BLD AUTO: 7.7 % (ref 5–12)
NEUTROPHILS # BLD AUTO: 6.22 10*3/MM3 (ref 1.4–7)
NEUTROPHILS NFR BLD AUTO: 84.1 % (ref 42.7–76)
NRBC BLD AUTO-RTO: 0 /100 WBC (ref 0–0)
PLATELET # BLD AUTO: 150 10*3/MM3 (ref 140–450)
PMV BLD AUTO: 11.3 FL (ref 6–12)
POTASSIUM BLD-SCNC: 3.1 MMOL/L (ref 3.5–5.1)
POTASSIUM BLD-SCNC: 4.5 MMOL/L (ref 3.5–5.2)
PROT SERPL-MCNC: 6 G/DL (ref 6.3–8.6)
RBC # BLD AUTO: 2.92 10*6/MM3 (ref 4.14–5.8)
SODIUM BLD-SCNC: 133 MMOL/L (ref 137–145)
TROPONIN T SERPL-MCNC: 2.41 NG/ML (ref 0–0.03)
WBC NRBC COR # BLD: 7.4 10*3/MM3 (ref 3.4–10.8)

## 2019-03-25 PROCEDURE — 25010000002 HEPARIN (PORCINE) PER 1000 UNITS: Performed by: EMERGENCY MEDICINE

## 2019-03-25 PROCEDURE — 97162 PT EVAL MOD COMPLEX 30 MIN: CPT

## 2019-03-25 PROCEDURE — 25010000002 HEPARIN (PORCINE) PER 1000 UNITS: Performed by: FAMILY MEDICINE

## 2019-03-25 PROCEDURE — 25010000002 CEFEPIME PER 500 MG: Performed by: HOSPITALIST

## 2019-03-25 PROCEDURE — 84484 ASSAY OF TROPONIN QUANT: CPT | Performed by: INTERNAL MEDICINE

## 2019-03-25 PROCEDURE — 25010000002 METHYLPREDNISOLONE PER 40 MG: Performed by: INTERNAL MEDICINE

## 2019-03-25 PROCEDURE — 85730 THROMBOPLASTIN TIME PARTIAL: CPT | Performed by: FAMILY MEDICINE

## 2019-03-25 PROCEDURE — 63710000001 INSULIN ASPART PER 5 UNITS: Performed by: HOSPITALIST

## 2019-03-25 PROCEDURE — 82962 GLUCOSE BLOOD TEST: CPT

## 2019-03-25 PROCEDURE — 80053 COMPREHEN METABOLIC PANEL: CPT | Performed by: HOSPITALIST

## 2019-03-25 PROCEDURE — 94799 UNLISTED PULMONARY SVC/PX: CPT

## 2019-03-25 PROCEDURE — 85025 COMPLETE CBC W/AUTO DIFF WBC: CPT | Performed by: HOSPITALIST

## 2019-03-25 PROCEDURE — 94760 N-INVAS EAR/PLS OXIMETRY 1: CPT

## 2019-03-25 PROCEDURE — 84132 ASSAY OF SERUM POTASSIUM: CPT | Performed by: FAMILY MEDICINE

## 2019-03-25 RX ORDER — METHYLPREDNISOLONE SODIUM SUCCINATE 40 MG/ML
40 INJECTION, POWDER, LYOPHILIZED, FOR SOLUTION INTRAMUSCULAR; INTRAVENOUS EVERY 8 HOURS
Status: DISCONTINUED | OUTPATIENT
Start: 2019-03-25 | End: 2019-03-28

## 2019-03-25 RX ADMIN — POTASSIUM CHLORIDE 40 MEQ: 750 CAPSULE, EXTENDED RELEASE ORAL at 08:41

## 2019-03-25 RX ADMIN — ATORVASTATIN CALCIUM 10 MG: 10 TABLET, FILM COATED ORAL at 08:42

## 2019-03-25 RX ADMIN — CALCIUM CHLORIDE 1 G: 100 INJECTION INTRAVENOUS; INTRAVENTRICULAR at 14:28

## 2019-03-25 RX ADMIN — METHYLPREDNISOLONE SODIUM SUCCINATE 40 MG: 40 INJECTION, POWDER, FOR SOLUTION INTRAMUSCULAR; INTRAVENOUS at 11:59

## 2019-03-25 RX ADMIN — POTASSIUM CHLORIDE 40 MEQ: 750 CAPSULE, EXTENDED RELEASE ORAL at 11:58

## 2019-03-25 RX ADMIN — HEPARIN SODIUM 18 UNITS/KG/HR: 10000 INJECTION, SOLUTION INTRAVENOUS at 01:44

## 2019-03-25 RX ADMIN — INSULIN ASPART 2 UNITS: 100 INJECTION, SOLUTION INTRAVENOUS; SUBCUTANEOUS at 10:37

## 2019-03-25 RX ADMIN — PANTOPRAZOLE SODIUM 40 MG: 40 INJECTION, POWDER, FOR SOLUTION INTRAVENOUS at 20:32

## 2019-03-25 RX ADMIN — SODIUM CHLORIDE, PRESERVATIVE FREE 3 ML: 5 INJECTION INTRAVENOUS at 08:42

## 2019-03-25 RX ADMIN — METHYLPREDNISOLONE SODIUM SUCCINATE 40 MG: 40 INJECTION, POWDER, FOR SOLUTION INTRAMUSCULAR; INTRAVENOUS at 20:32

## 2019-03-25 RX ADMIN — ACETAMINOPHEN 650 MG: 325 TABLET, FILM COATED ORAL at 15:55

## 2019-03-25 RX ADMIN — POTASSIUM CHLORIDE 40 MEQ: 750 CAPSULE, EXTENDED RELEASE ORAL at 16:41

## 2019-03-25 RX ADMIN — IPRATROPIUM BROMIDE AND ALBUTEROL SULFATE 3 ML: 2.5; .5 SOLUTION RESPIRATORY (INHALATION) at 18:36

## 2019-03-25 RX ADMIN — GABAPENTIN 100 MG: 100 CAPSULE ORAL at 15:05

## 2019-03-25 RX ADMIN — SODIUM CHLORIDE, PRESERVATIVE FREE 3 ML: 5 INJECTION INTRAVENOUS at 20:32

## 2019-03-25 RX ADMIN — GABAPENTIN 100 MG: 100 CAPSULE ORAL at 20:32

## 2019-03-25 RX ADMIN — HEPARIN SODIUM 5000 UNITS: 5000 INJECTION INTRAVENOUS; SUBCUTANEOUS at 11:53

## 2019-03-25 RX ADMIN — CARVEDILOL 3.12 MG: 3.12 TABLET, FILM COATED ORAL at 08:42

## 2019-03-25 RX ADMIN — INSULIN ASPART 4 UNITS: 100 INJECTION, SOLUTION INTRAVENOUS; SUBCUTANEOUS at 20:32

## 2019-03-25 RX ADMIN — IPRATROPIUM BROMIDE AND ALBUTEROL SULFATE 3 ML: 2.5; .5 SOLUTION RESPIRATORY (INHALATION) at 07:39

## 2019-03-25 RX ADMIN — IPRATROPIUM BROMIDE AND ALBUTEROL SULFATE 3 ML: 2.5; .5 SOLUTION RESPIRATORY (INHALATION) at 15:08

## 2019-03-25 RX ADMIN — ACETAMINOPHEN 650 MG: 325 TABLET, FILM COATED ORAL at 03:12

## 2019-03-25 RX ADMIN — GABAPENTIN 100 MG: 100 CAPSULE ORAL at 08:42

## 2019-03-25 RX ADMIN — CEFEPIME HYDROCHLORIDE 1 G: 1 INJECTION, POWDER, FOR SOLUTION INTRAMUSCULAR; INTRAVENOUS at 20:32

## 2019-03-25 RX ADMIN — IPRATROPIUM BROMIDE AND ALBUTEROL SULFATE 3 ML: 2.5; .5 SOLUTION RESPIRATORY (INHALATION) at 11:50

## 2019-03-25 RX ADMIN — INSULIN ASPART 2 UNITS: 100 INJECTION, SOLUTION INTRAVENOUS; SUBCUTANEOUS at 16:41

## 2019-03-25 RX ADMIN — CARVEDILOL 3.12 MG: 3.12 TABLET, FILM COATED ORAL at 20:32

## 2019-03-25 RX ADMIN — PANTOPRAZOLE SODIUM 40 MG: 40 INJECTION, POWDER, FOR SOLUTION INTRAVENOUS at 08:42

## 2019-03-26 ENCOUNTER — APPOINTMENT (OUTPATIENT)
Dept: ULTRASOUND IMAGING | Facility: HOSPITAL | Age: 64
End: 2019-03-26

## 2019-03-26 LAB
ALBUMIN SERPL-MCNC: 3.3 G/DL (ref 3.5–5.2)
ALBUMIN/GLOB SERPL: 0.9 G/DL
ALP SERPL-CCNC: 39 U/L (ref 39–117)
ALT SERPL W P-5'-P-CCNC: 20 U/L (ref 1–41)
ANION GAP SERPL CALCULATED.3IONS-SCNC: 19 MMOL/L
AST SERPL-CCNC: 27 U/L (ref 1–40)
B PERT DNA SPEC QL NAA+PROBE: NOT DETECTED
BACTERIA SPEC RESP CULT: NORMAL
BASOPHILS # BLD AUTO: 0.01 10*3/MM3 (ref 0–0.2)
BASOPHILS NFR BLD AUTO: 0.1 % (ref 0–1.5)
BILIRUB SERPL-MCNC: 0.2 MG/DL (ref 0.2–1.2)
BUN BLD-MCNC: 59 MG/DL (ref 8–23)
BUN/CREAT SERPL: 19.3 (ref 7–25)
C PNEUM DNA NPH QL NAA+NON-PROBE: NOT DETECTED
CALCIUM SPEC-SCNC: 7.5 MG/DL (ref 8.6–10.5)
CHLORIDE SERPL-SCNC: 102 MMOL/L (ref 98–107)
CO2 SERPL-SCNC: 17 MMOL/L (ref 22–29)
CREAT BLD-MCNC: 3.06 MG/DL (ref 0.76–1.27)
DEPRECATED RDW RBC AUTO: 46.1 FL (ref 37–54)
EOSINOPHIL # BLD AUTO: 0 10*3/MM3 (ref 0–0.4)
EOSINOPHIL NFR BLD AUTO: 0 % (ref 0.3–6.2)
ERYTHROCYTE [DISTWIDTH] IN BLOOD BY AUTOMATED COUNT: 13.5 % (ref 12.3–15.4)
FLUAV H1 2009 PAND RNA NPH QL NAA+PROBE: DETECTED
FLUAV H1 HA GENE NPH QL NAA+PROBE: NOT DETECTED
FLUAV H3 RNA NPH QL NAA+PROBE: NOT DETECTED
FLUAV SUBTYP SPEC NAA+PROBE: NOT DETECTED
FLUBV RNA ISLT QL NAA+PROBE: NOT DETECTED
GFR SERPL CREATININE-BSD FRML MDRD: 21 ML/MIN/1.73
GLOBULIN UR ELPH-MCNC: 3.6 GM/DL
GLUCOSE BLD-MCNC: 282 MG/DL (ref 65–99)
GLUCOSE BLDC GLUCOMTR-MCNC: 276 MG/DL (ref 70–130)
GLUCOSE BLDC GLUCOMTR-MCNC: 342 MG/DL (ref 70–130)
GLUCOSE BLDC GLUCOMTR-MCNC: 360 MG/DL (ref 70–130)
GLUCOSE BLDC GLUCOMTR-MCNC: 467 MG/DL (ref 70–130)
GLUCOSE BLDC GLUCOMTR-MCNC: 480 MG/DL (ref 70–130)
GRAM STN SPEC: NORMAL
GRAM STN SPEC: NORMAL
HADV DNA SPEC NAA+PROBE: NOT DETECTED
HCOV 229E RNA SPEC QL NAA+PROBE: NOT DETECTED
HCOV HKU1 RNA SPEC QL NAA+PROBE: NOT DETECTED
HCOV NL63 RNA SPEC QL NAA+PROBE: NOT DETECTED
HCOV OC43 RNA SPEC QL NAA+PROBE: NOT DETECTED
HCT VFR BLD AUTO: 28.7 % (ref 37.5–51)
HGB BLD-MCNC: 9.3 G/DL (ref 13–17.7)
HMPV RNA NPH QL NAA+NON-PROBE: NOT DETECTED
HPIV1 RNA SPEC QL NAA+PROBE: NOT DETECTED
HPIV2 RNA SPEC QL NAA+PROBE: NOT DETECTED
HPIV3 RNA NPH QL NAA+PROBE: NOT DETECTED
HPIV4 P GENE NPH QL NAA+PROBE: NOT DETECTED
IMM GRANULOCYTES # BLD AUTO: 0.04 10*3/MM3 (ref 0–0.05)
IMM GRANULOCYTES NFR BLD AUTO: 0.5 % (ref 0–0.5)
LYMPHOCYTES # BLD AUTO: 0.38 10*3/MM3 (ref 0.7–3.1)
LYMPHOCYTES NFR BLD AUTO: 4.6 % (ref 19.6–45.3)
M PNEUMO IGG SER IA-ACNC: NOT DETECTED
MAGNESIUM SERPL-MCNC: 1 MG/DL (ref 1.6–2.4)
MCH RBC QN AUTO: 30.3 PG (ref 26.6–33)
MCHC RBC AUTO-ENTMCNC: 32.4 G/DL (ref 31.5–35.7)
MCV RBC AUTO: 93.5 FL (ref 79–97)
MONOCYTES # BLD AUTO: 0.47 10*3/MM3 (ref 0.1–0.9)
MONOCYTES NFR BLD AUTO: 5.7 % (ref 5–12)
NEUTROPHILS # BLD AUTO: 7.36 10*3/MM3 (ref 1.4–7)
NEUTROPHILS NFR BLD AUTO: 89.1 % (ref 42.7–76)
NRBC BLD AUTO-RTO: 0 /100 WBC (ref 0–0)
PLATELET # BLD AUTO: 161 10*3/MM3 (ref 140–450)
PMV BLD AUTO: 11.3 FL (ref 6–12)
POTASSIUM BLD-SCNC: 4.5 MMOL/L (ref 3.5–5.2)
PROT SERPL-MCNC: 6.9 G/DL (ref 6–8.5)
RBC # BLD AUTO: 3.07 10*6/MM3 (ref 4.14–5.8)
RHINOVIRUS RNA SPEC NAA+PROBE: NOT DETECTED
RSV RNA NPH QL NAA+NON-PROBE: NOT DETECTED
SODIUM BLD-SCNC: 138 MMOL/L (ref 136–145)
WBC NRBC COR # BLD: 8.26 10*3/MM3 (ref 3.4–10.8)

## 2019-03-26 PROCEDURE — 80053 COMPREHEN METABOLIC PANEL: CPT | Performed by: HOSPITALIST

## 2019-03-26 PROCEDURE — 87205 SMEAR GRAM STAIN: CPT | Performed by: INTERNAL MEDICINE

## 2019-03-26 PROCEDURE — 76775 US EXAM ABDO BACK WALL LIM: CPT

## 2019-03-26 PROCEDURE — 87798 DETECT AGENT NOS DNA AMP: CPT | Performed by: INTERNAL MEDICINE

## 2019-03-26 PROCEDURE — 25010000002 MAGNESIUM SULFATE 2 GM/50ML SOLUTION: Performed by: NURSE PRACTITIONER

## 2019-03-26 PROCEDURE — 25010000002 PIPERACILLIN SOD-TAZOBACTAM PER 1 G: Performed by: INTERNAL MEDICINE

## 2019-03-26 PROCEDURE — 87040 BLOOD CULTURE FOR BACTERIA: CPT | Performed by: INTERNAL MEDICINE

## 2019-03-26 PROCEDURE — 63710000001 INSULIN ASPART PER 5 UNITS: Performed by: INTERNAL MEDICINE

## 2019-03-26 PROCEDURE — 85025 COMPLETE CBC W/AUTO DIFF WBC: CPT | Performed by: HOSPITALIST

## 2019-03-26 PROCEDURE — 63710000001 INSULIN DETEMIR PER 5 UNITS: Performed by: INTERNAL MEDICINE

## 2019-03-26 PROCEDURE — 82962 GLUCOSE BLOOD TEST: CPT

## 2019-03-26 PROCEDURE — 94799 UNLISTED PULMONARY SVC/PX: CPT

## 2019-03-26 PROCEDURE — 94760 N-INVAS EAR/PLS OXIMETRY 1: CPT

## 2019-03-26 PROCEDURE — 83735 ASSAY OF MAGNESIUM: CPT | Performed by: INTERNAL MEDICINE

## 2019-03-26 PROCEDURE — 87486 CHLMYD PNEUM DNA AMP PROBE: CPT | Performed by: INTERNAL MEDICINE

## 2019-03-26 PROCEDURE — 97110 THERAPEUTIC EXERCISES: CPT

## 2019-03-26 PROCEDURE — 63710000001 INSULIN ASPART PER 5 UNITS: Performed by: HOSPITALIST

## 2019-03-26 PROCEDURE — 87631 RESP VIRUS 3-5 TARGETS: CPT | Performed by: INTERNAL MEDICINE

## 2019-03-26 PROCEDURE — 97116 GAIT TRAINING THERAPY: CPT

## 2019-03-26 PROCEDURE — 99232 SBSQ HOSP IP/OBS MODERATE 35: CPT | Performed by: INTERNAL MEDICINE

## 2019-03-26 PROCEDURE — 25010000002 METHYLPREDNISOLONE PER 40 MG: Performed by: INTERNAL MEDICINE

## 2019-03-26 PROCEDURE — 87581 M.PNEUMON DNA AMP PROBE: CPT | Performed by: INTERNAL MEDICINE

## 2019-03-26 RX ORDER — MAGNESIUM SULFATE HEPTAHYDRATE 40 MG/ML
2 INJECTION, SOLUTION INTRAVENOUS ONCE
Status: COMPLETED | OUTPATIENT
Start: 2019-03-26 | End: 2019-03-26

## 2019-03-26 RX ORDER — SODIUM BICARBONATE 650 MG/1
650 TABLET ORAL 3 TIMES DAILY
Status: DISCONTINUED | OUTPATIENT
Start: 2019-03-26 | End: 2019-03-28

## 2019-03-26 RX ORDER — CARVEDILOL 6.25 MG/1
6.25 TABLET ORAL EVERY 12 HOURS SCHEDULED
Status: DISCONTINUED | OUTPATIENT
Start: 2019-03-26 | End: 2019-03-29 | Stop reason: HOSPADM

## 2019-03-26 RX ORDER — OSELTAMIVIR PHOSPHATE 30 MG/1
30 CAPSULE ORAL
Status: DISCONTINUED | OUTPATIENT
Start: 2019-03-26 | End: 2019-03-29 | Stop reason: HOSPADM

## 2019-03-26 RX ORDER — DEXTROSE AND SODIUM CHLORIDE 5; .45 G/100ML; G/100ML
30 INJECTION, SOLUTION INTRAVENOUS CONTINUOUS PRN
Status: DISCONTINUED | OUTPATIENT
Start: 2019-03-26 | End: 2019-03-29 | Stop reason: HOSPADM

## 2019-03-26 RX ORDER — IPRATROPIUM BROMIDE AND ALBUTEROL SULFATE 2.5; .5 MG/3ML; MG/3ML
3 SOLUTION RESPIRATORY (INHALATION) EVERY 4 HOURS PRN
Status: DISCONTINUED | OUTPATIENT
Start: 2019-03-26 | End: 2019-03-29 | Stop reason: HOSPADM

## 2019-03-26 RX ADMIN — METHYLPREDNISOLONE SODIUM SUCCINATE 40 MG: 40 INJECTION, POWDER, FOR SOLUTION INTRAMUSCULAR; INTRAVENOUS at 04:52

## 2019-03-26 RX ADMIN — INSULIN ASPART 6 UNITS: 100 INJECTION, SOLUTION INTRAVENOUS; SUBCUTANEOUS at 17:16

## 2019-03-26 RX ADMIN — IPRATROPIUM BROMIDE AND ALBUTEROL SULFATE 3 ML: 2.5; .5 SOLUTION RESPIRATORY (INHALATION) at 08:15

## 2019-03-26 RX ADMIN — SODIUM CHLORIDE, PRESERVATIVE FREE 3 ML: 5 INJECTION INTRAVENOUS at 21:37

## 2019-03-26 RX ADMIN — PIPERACILLIN SODIUM,TAZOBACTAM SODIUM 3.38 G: 3; .375 INJECTION, POWDER, FOR SOLUTION INTRAVENOUS at 12:26

## 2019-03-26 RX ADMIN — GABAPENTIN 100 MG: 100 CAPSULE ORAL at 08:43

## 2019-03-26 RX ADMIN — SODIUM BICARBONATE 650 MG TABLET 650 MG: at 18:39

## 2019-03-26 RX ADMIN — SODIUM BICARBONATE 650 MG TABLET 650 MG: at 21:35

## 2019-03-26 RX ADMIN — GABAPENTIN 100 MG: 100 CAPSULE ORAL at 15:45

## 2019-03-26 RX ADMIN — IPRATROPIUM BROMIDE AND ALBUTEROL SULFATE 3 ML: 2.5; .5 SOLUTION RESPIRATORY (INHALATION) at 15:29

## 2019-03-26 RX ADMIN — CARVEDILOL 3.12 MG: 3.12 TABLET, FILM COATED ORAL at 08:43

## 2019-03-26 RX ADMIN — MAGNESIUM SULFATE HEPTAHYDRATE 2 G: 40 INJECTION, SOLUTION INTRAVENOUS at 17:15

## 2019-03-26 RX ADMIN — DOXYCYCLINE 100 MG: 100 INJECTION, POWDER, LYOPHILIZED, FOR SOLUTION INTRAVENOUS at 12:25

## 2019-03-26 RX ADMIN — INSULIN ASPART 4 UNITS: 100 INJECTION, SOLUTION INTRAVENOUS; SUBCUTANEOUS at 07:21

## 2019-03-26 RX ADMIN — PANTOPRAZOLE SODIUM 40 MG: 40 INJECTION, POWDER, FOR SOLUTION INTRAVENOUS at 08:43

## 2019-03-26 RX ADMIN — OSELTAMIVIR PHOSPHATE 30 MG: 30 CAPSULE ORAL at 17:46

## 2019-03-26 RX ADMIN — PIPERACILLIN SODIUM,TAZOBACTAM SODIUM 3.38 G: 3; .375 INJECTION, POWDER, FOR SOLUTION INTRAVENOUS at 17:46

## 2019-03-26 RX ADMIN — ATORVASTATIN CALCIUM 10 MG: 10 TABLET, FILM COATED ORAL at 08:43

## 2019-03-26 RX ADMIN — GABAPENTIN 100 MG: 100 CAPSULE ORAL at 21:35

## 2019-03-26 RX ADMIN — IPRATROPIUM BROMIDE AND ALBUTEROL SULFATE 3 ML: 2.5; .5 SOLUTION RESPIRATORY (INHALATION) at 04:20

## 2019-03-26 RX ADMIN — GUAIFENESIN 200 MG: 200 SOLUTION ORAL at 17:46

## 2019-03-26 RX ADMIN — INSULIN DETEMIR 15 UNITS: 100 INJECTION, SOLUTION SUBCUTANEOUS at 21:35

## 2019-03-26 RX ADMIN — DOXYCYCLINE 100 MG: 100 INJECTION, POWDER, LYOPHILIZED, FOR SOLUTION INTRAVENOUS at 23:31

## 2019-03-26 RX ADMIN — IPRATROPIUM BROMIDE AND ALBUTEROL SULFATE 3 ML: 2.5; .5 SOLUTION RESPIRATORY (INHALATION) at 20:28

## 2019-03-26 RX ADMIN — INSULIN ASPART 7 UNITS: 100 INJECTION, SOLUTION INTRAVENOUS; SUBCUTANEOUS at 11:51

## 2019-03-26 RX ADMIN — CARVEDILOL 6.25 MG: 6.25 TABLET, FILM COATED ORAL at 21:36

## 2019-03-26 RX ADMIN — SODIUM CHLORIDE, PRESERVATIVE FREE 3 ML: 5 INJECTION INTRAVENOUS at 08:44

## 2019-03-26 RX ADMIN — PANTOPRAZOLE SODIUM 40 MG: 40 INJECTION, POWDER, FOR SOLUTION INTRAVENOUS at 21:35

## 2019-03-26 RX ADMIN — IPRATROPIUM BROMIDE AND ALBUTEROL SULFATE 3 ML: 2.5; .5 SOLUTION RESPIRATORY (INHALATION) at 10:59

## 2019-03-26 RX ADMIN — METHYLPREDNISOLONE SODIUM SUCCINATE 40 MG: 40 INJECTION, POWDER, FOR SOLUTION INTRAMUSCULAR; INTRAVENOUS at 11:57

## 2019-03-26 RX ADMIN — INSULIN DETEMIR 10 UNITS: 100 INJECTION, SOLUTION SUBCUTANEOUS at 09:18

## 2019-03-26 RX ADMIN — INSULIN ASPART 10 UNITS: 100 INJECTION, SOLUTION INTRAVENOUS; SUBCUTANEOUS at 11:51

## 2019-03-26 RX ADMIN — METHYLPREDNISOLONE SODIUM SUCCINATE 40 MG: 40 INJECTION, POWDER, FOR SOLUTION INTRAMUSCULAR; INTRAVENOUS at 21:36

## 2019-03-26 RX ADMIN — INSULIN ASPART 5 UNITS: 100 INJECTION, SOLUTION INTRAVENOUS; SUBCUTANEOUS at 21:36

## 2019-03-27 LAB
25(OH)D3 SERPL-MCNC: 14.4 NG/ML (ref 30–100)
ALBUMIN SERPL-MCNC: 3.4 G/DL (ref 3.5–5.2)
ALBUMIN/GLOB SERPL: 1.1 G/DL
ALP SERPL-CCNC: 36 U/L (ref 39–117)
ALT SERPL W P-5'-P-CCNC: 22 U/L (ref 1–41)
ANION GAP SERPL CALCULATED.3IONS-SCNC: 17 MMOL/L
AST SERPL-CCNC: 24 U/L (ref 1–40)
BASOPHILS # BLD AUTO: 0 10*3/MM3 (ref 0–0.2)
BASOPHILS NFR BLD AUTO: 0 % (ref 0–1.5)
BILIRUB SERPL-MCNC: 0.2 MG/DL (ref 0.2–1.2)
BUN BLD-MCNC: 60 MG/DL (ref 8–23)
BUN/CREAT SERPL: 22.6 (ref 7–25)
CA-I BLD-MCNC: 3.77 MG/DL (ref 4.6–5.6)
CALCIUM SPEC-SCNC: 6.8 MG/DL (ref 8.6–10.5)
CHLORIDE SERPL-SCNC: 104 MMOL/L (ref 98–107)
CO2 SERPL-SCNC: 18 MMOL/L (ref 22–29)
CREAT BLD-MCNC: 2.65 MG/DL (ref 0.76–1.27)
DEPRECATED RDW RBC AUTO: 46.9 FL (ref 37–54)
EOSINOPHIL # BLD AUTO: 0 10*3/MM3 (ref 0–0.4)
EOSINOPHIL NFR BLD AUTO: 0 % (ref 0.3–6.2)
ERYTHROCYTE [DISTWIDTH] IN BLOOD BY AUTOMATED COUNT: 13.6 % (ref 12.3–15.4)
GFR SERPL CREATININE-BSD FRML MDRD: 25 ML/MIN/1.73
GLOBULIN UR ELPH-MCNC: 3.1 GM/DL
GLUCOSE BLD-MCNC: 248 MG/DL (ref 65–99)
GLUCOSE BLD-MCNC: 472 MG/DL (ref 65–99)
GLUCOSE BLDC GLUCOMTR-MCNC: 249 MG/DL (ref 70–130)
GLUCOSE BLDC GLUCOMTR-MCNC: 251 MG/DL (ref 70–130)
GLUCOSE BLDC GLUCOMTR-MCNC: 278 MG/DL (ref 70–130)
GLUCOSE BLDC GLUCOMTR-MCNC: 364 MG/DL (ref 70–130)
HCT VFR BLD AUTO: 27.1 % (ref 37.5–51)
HGB BLD-MCNC: 8.7 G/DL (ref 13–17.7)
IMM GRANULOCYTES # BLD AUTO: 0.04 10*3/MM3 (ref 0–0.05)
IMM GRANULOCYTES NFR BLD AUTO: 0.3 % (ref 0–0.5)
LYMPHOCYTES # BLD AUTO: 0.54 10*3/MM3 (ref 0.7–3.1)
LYMPHOCYTES NFR BLD AUTO: 4.6 % (ref 19.6–45.3)
MCH RBC QN AUTO: 30.3 PG (ref 26.6–33)
MCHC RBC AUTO-ENTMCNC: 32.1 G/DL (ref 31.5–35.7)
MCV RBC AUTO: 94.4 FL (ref 79–97)
MONOCYTES # BLD AUTO: 0.57 10*3/MM3 (ref 0.1–0.9)
MONOCYTES NFR BLD AUTO: 4.8 % (ref 5–12)
NEUTROPHILS # BLD AUTO: 10.7 10*3/MM3 (ref 1.4–7)
NEUTROPHILS NFR BLD AUTO: 90.3 % (ref 42.7–76)
NRBC BLD AUTO-RTO: 0.2 /100 WBC (ref 0–0)
PLATELET # BLD AUTO: 174 10*3/MM3 (ref 140–450)
PMV BLD AUTO: 11.4 FL (ref 6–12)
POTASSIUM BLD-SCNC: 4.3 MMOL/L (ref 3.5–5.2)
PROT SERPL-MCNC: 6.5 G/DL (ref 6–8.5)
RBC # BLD AUTO: 2.87 10*6/MM3 (ref 4.14–5.8)
SODIUM BLD-SCNC: 139 MMOL/L (ref 136–145)
WBC NRBC COR # BLD: 11.85 10*3/MM3 (ref 3.4–10.8)

## 2019-03-27 PROCEDURE — 63710000001 INSULIN DETEMIR PER 5 UNITS: Performed by: INTERNAL MEDICINE

## 2019-03-27 PROCEDURE — 63710000001 INSULIN ASPART PER 5 UNITS: Performed by: INTERNAL MEDICINE

## 2019-03-27 PROCEDURE — 25010000002 CALCIUM GLUCONATE PER 10 ML: Performed by: INTERNAL MEDICINE

## 2019-03-27 PROCEDURE — 97116 GAIT TRAINING THERAPY: CPT

## 2019-03-27 PROCEDURE — 63710000001 INSULIN ASPART PER 5 UNITS: Performed by: HOSPITALIST

## 2019-03-27 PROCEDURE — 94760 N-INVAS EAR/PLS OXIMETRY 1: CPT

## 2019-03-27 PROCEDURE — 82962 GLUCOSE BLOOD TEST: CPT

## 2019-03-27 PROCEDURE — 97110 THERAPEUTIC EXERCISES: CPT

## 2019-03-27 PROCEDURE — 94799 UNLISTED PULMONARY SVC/PX: CPT

## 2019-03-27 PROCEDURE — 82330 ASSAY OF CALCIUM: CPT | Performed by: INTERNAL MEDICINE

## 2019-03-27 PROCEDURE — 25010000002 PIPERACILLIN SOD-TAZOBACTAM PER 1 G: Performed by: INTERNAL MEDICINE

## 2019-03-27 PROCEDURE — 85025 COMPLETE CBC W/AUTO DIFF WBC: CPT | Performed by: HOSPITALIST

## 2019-03-27 PROCEDURE — 80053 COMPREHEN METABOLIC PANEL: CPT | Performed by: HOSPITALIST

## 2019-03-27 PROCEDURE — 82306 VITAMIN D 25 HYDROXY: CPT | Performed by: INTERNAL MEDICINE

## 2019-03-27 PROCEDURE — 25010000002 METHYLPREDNISOLONE PER 40 MG: Performed by: INTERNAL MEDICINE

## 2019-03-27 PROCEDURE — 82947 ASSAY GLUCOSE BLOOD QUANT: CPT | Performed by: FAMILY MEDICINE

## 2019-03-27 PROCEDURE — 99231 SBSQ HOSP IP/OBS SF/LOW 25: CPT | Performed by: INTERNAL MEDICINE

## 2019-03-27 RX ORDER — NICOTINE POLACRILEX 4 MG
15 LOZENGE BUCCAL
Status: DISCONTINUED | OUTPATIENT
Start: 2019-03-27 | End: 2019-03-29 | Stop reason: HOSPADM

## 2019-03-27 RX ORDER — CALCIUM GLUCONATE 94 MG/ML
2 INJECTION, SOLUTION INTRAVENOUS ONCE
Status: DISCONTINUED | OUTPATIENT
Start: 2019-03-27 | End: 2019-03-27

## 2019-03-27 RX ORDER — DEXTROSE MONOHYDRATE 25 G/50ML
25 INJECTION, SOLUTION INTRAVENOUS
Status: DISCONTINUED | OUTPATIENT
Start: 2019-03-27 | End: 2019-03-29 | Stop reason: HOSPADM

## 2019-03-27 RX ORDER — GUAIFENESIN 600 MG/1
600 TABLET, EXTENDED RELEASE ORAL EVERY 12 HOURS SCHEDULED
Status: DISCONTINUED | OUTPATIENT
Start: 2019-03-27 | End: 2019-03-29 | Stop reason: HOSPADM

## 2019-03-27 RX ADMIN — SODIUM BICARBONATE 650 MG TABLET 650 MG: at 08:55

## 2019-03-27 RX ADMIN — CALCIUM GLUCONATE 2 G: 98 INJECTION, SOLUTION INTRAVENOUS at 15:29

## 2019-03-27 RX ADMIN — METHYLPREDNISOLONE SODIUM SUCCINATE 40 MG: 40 INJECTION, POWDER, FOR SOLUTION INTRAMUSCULAR; INTRAVENOUS at 21:47

## 2019-03-27 RX ADMIN — PANTOPRAZOLE SODIUM 40 MG: 40 INJECTION, POWDER, FOR SOLUTION INTRAVENOUS at 08:54

## 2019-03-27 RX ADMIN — IPRATROPIUM BROMIDE AND ALBUTEROL SULFATE 3 ML: 2.5; .5 SOLUTION RESPIRATORY (INHALATION) at 03:08

## 2019-03-27 RX ADMIN — PANTOPRAZOLE SODIUM 40 MG: 40 INJECTION, POWDER, FOR SOLUTION INTRAVENOUS at 21:47

## 2019-03-27 RX ADMIN — INSULIN ASPART 9 UNITS: 100 INJECTION, SOLUTION INTRAVENOUS; SUBCUTANEOUS at 21:47

## 2019-03-27 RX ADMIN — SODIUM CHLORIDE, PRESERVATIVE FREE 3 ML: 5 INJECTION INTRAVENOUS at 08:55

## 2019-03-27 RX ADMIN — INSULIN DETEMIR 25 UNITS: 100 INJECTION, SOLUTION SUBCUTANEOUS at 11:24

## 2019-03-27 RX ADMIN — SODIUM CHLORIDE, PRESERVATIVE FREE 3 ML: 5 INJECTION INTRAVENOUS at 21:48

## 2019-03-27 RX ADMIN — SODIUM BICARBONATE 650 MG TABLET 650 MG: at 21:47

## 2019-03-27 RX ADMIN — SODIUM BICARBONATE 650 MG TABLET 650 MG: at 15:29

## 2019-03-27 RX ADMIN — CARVEDILOL 6.25 MG: 6.25 TABLET, FILM COATED ORAL at 08:55

## 2019-03-27 RX ADMIN — OSELTAMIVIR PHOSPHATE 30 MG: 30 CAPSULE ORAL at 08:55

## 2019-03-27 RX ADMIN — GABAPENTIN 100 MG: 100 CAPSULE ORAL at 21:47

## 2019-03-27 RX ADMIN — IPRATROPIUM BROMIDE AND ALBUTEROL SULFATE 3 ML: 2.5; .5 SOLUTION RESPIRATORY (INHALATION) at 15:54

## 2019-03-27 RX ADMIN — INSULIN ASPART 7 UNITS: 100 INJECTION, SOLUTION INTRAVENOUS; SUBCUTANEOUS at 17:39

## 2019-03-27 RX ADMIN — INSULIN ASPART 6 UNITS: 100 INJECTION, SOLUTION INTRAVENOUS; SUBCUTANEOUS at 11:22

## 2019-03-27 RX ADMIN — METHYLPREDNISOLONE SODIUM SUCCINATE 40 MG: 40 INJECTION, POWDER, FOR SOLUTION INTRAMUSCULAR; INTRAVENOUS at 11:23

## 2019-03-27 RX ADMIN — PIPERACILLIN SODIUM,TAZOBACTAM SODIUM 3.38 G: 3; .375 INJECTION, POWDER, FOR SOLUTION INTRAVENOUS at 01:36

## 2019-03-27 RX ADMIN — CARVEDILOL 6.25 MG: 6.25 TABLET, FILM COATED ORAL at 21:47

## 2019-03-27 RX ADMIN — GUAIFENESIN 600 MG: 600 TABLET, EXTENDED RELEASE ORAL at 11:22

## 2019-03-27 RX ADMIN — ATORVASTATIN CALCIUM 10 MG: 10 TABLET, FILM COATED ORAL at 08:54

## 2019-03-27 RX ADMIN — INSULIN ASPART 10 UNITS: 100 INJECTION, SOLUTION INTRAVENOUS; SUBCUTANEOUS at 17:39

## 2019-03-27 RX ADMIN — GABAPENTIN 100 MG: 100 CAPSULE ORAL at 08:54

## 2019-03-27 RX ADMIN — GUAIFENESIN 600 MG: 600 TABLET, EXTENDED RELEASE ORAL at 21:47

## 2019-03-27 RX ADMIN — METHYLPREDNISOLONE SODIUM SUCCINATE 40 MG: 40 INJECTION, POWDER, FOR SOLUTION INTRAMUSCULAR; INTRAVENOUS at 04:56

## 2019-03-27 RX ADMIN — IPRATROPIUM BROMIDE AND ALBUTEROL SULFATE 3 ML: 2.5; .5 SOLUTION RESPIRATORY (INHALATION) at 19:34

## 2019-03-27 RX ADMIN — INSULIN DETEMIR 25 UNITS: 100 INJECTION, SOLUTION SUBCUTANEOUS at 21:47

## 2019-03-27 RX ADMIN — GABAPENTIN 100 MG: 100 CAPSULE ORAL at 15:29

## 2019-03-27 RX ADMIN — IPRATROPIUM BROMIDE AND ALBUTEROL SULFATE 3 ML: 2.5; .5 SOLUTION RESPIRATORY (INHALATION) at 12:02

## 2019-03-27 RX ADMIN — ACETAMINOPHEN 650 MG: 325 TABLET, FILM COATED ORAL at 06:38

## 2019-03-27 RX ADMIN — INSULIN ASPART 4 UNITS: 100 INJECTION, SOLUTION INTRAVENOUS; SUBCUTANEOUS at 08:53

## 2019-03-28 LAB
ALBUMIN SERPL-MCNC: 3.7 G/DL (ref 3.5–5.2)
ALBUMIN/GLOB SERPL: 1.1 G/DL
ALP SERPL-CCNC: 41 U/L (ref 39–117)
ALT SERPL W P-5'-P-CCNC: 31 U/L (ref 1–41)
ANION GAP SERPL CALCULATED.3IONS-SCNC: 16 MMOL/L
AST SERPL-CCNC: 22 U/L (ref 1–40)
BASOPHILS # BLD AUTO: 0 10*3/MM3 (ref 0–0.2)
BASOPHILS NFR BLD AUTO: 0 % (ref 0–1.5)
BILIRUB SERPL-MCNC: 0.2 MG/DL (ref 0.2–1.2)
BUN BLD-MCNC: 66 MG/DL (ref 8–23)
BUN/CREAT SERPL: 26.9 (ref 7–25)
CALCIUM SPEC-SCNC: 6.9 MG/DL (ref 8.6–10.5)
CHLORIDE SERPL-SCNC: 102 MMOL/L (ref 98–107)
CO2 SERPL-SCNC: 18 MMOL/L (ref 22–29)
CREAT BLD-MCNC: 2.45 MG/DL (ref 0.76–1.27)
DEPRECATED RDW RBC AUTO: 48.8 FL (ref 37–54)
EOSINOPHIL # BLD AUTO: 0 10*3/MM3 (ref 0–0.4)
EOSINOPHIL NFR BLD AUTO: 0 % (ref 0.3–6.2)
ERYTHROCYTE [DISTWIDTH] IN BLOOD BY AUTOMATED COUNT: 13.7 % (ref 12.3–15.4)
GFR SERPL CREATININE-BSD FRML MDRD: 27 ML/MIN/1.73
GLOBULIN UR ELPH-MCNC: 3.5 GM/DL
GLUCOSE BLD-MCNC: 300 MG/DL (ref 65–99)
GLUCOSE BLD-MCNC: 494 MG/DL (ref 65–99)
GLUCOSE BLDC GLUCOMTR-MCNC: 343 MG/DL (ref 70–130)
GLUCOSE BLDC GLUCOMTR-MCNC: 361 MG/DL (ref 70–130)
GLUCOSE BLDC GLUCOMTR-MCNC: 394 MG/DL (ref 70–130)
GLUCOSE BLDC GLUCOMTR-MCNC: 406 MG/DL (ref 70–130)
GLUCOSE BLDC GLUCOMTR-MCNC: 466 MG/DL (ref 70–130)
HCT VFR BLD AUTO: 30.9 % (ref 37.5–51)
HGB BLD-MCNC: 9.7 G/DL (ref 13–17.7)
IMM GRANULOCYTES # BLD AUTO: 0.06 10*3/MM3 (ref 0–0.05)
IMM GRANULOCYTES NFR BLD AUTO: 0.6 % (ref 0–0.5)
LYMPHOCYTES # BLD AUTO: 0.61 10*3/MM3 (ref 0.7–3.1)
LYMPHOCYTES NFR BLD AUTO: 5.6 % (ref 19.6–45.3)
MCH RBC QN AUTO: 30.1 PG (ref 26.6–33)
MCHC RBC AUTO-ENTMCNC: 31.4 G/DL (ref 31.5–35.7)
MCV RBC AUTO: 96 FL (ref 79–97)
MONOCYTES # BLD AUTO: 0.5 10*3/MM3 (ref 0.1–0.9)
MONOCYTES NFR BLD AUTO: 4.6 % (ref 5–12)
NEUTROPHILS # BLD AUTO: 9.63 10*3/MM3 (ref 1.4–7)
NEUTROPHILS NFR BLD AUTO: 89.2 % (ref 42.7–76)
NRBC BLD AUTO-RTO: 0 /100 WBC (ref 0–0)
PLATELET # BLD AUTO: 212 10*3/MM3 (ref 140–450)
PMV BLD AUTO: 12 FL (ref 6–12)
POTASSIUM BLD-SCNC: 4.7 MMOL/L (ref 3.5–5.2)
PROT SERPL-MCNC: 7.2 G/DL (ref 6–8.5)
RBC # BLD AUTO: 3.22 10*6/MM3 (ref 4.14–5.8)
SODIUM BLD-SCNC: 136 MMOL/L (ref 136–145)
WBC NRBC COR # BLD: 10.8 10*3/MM3 (ref 3.4–10.8)

## 2019-03-28 PROCEDURE — 25010000002 METHYLPREDNISOLONE PER 40 MG: Performed by: INTERNAL MEDICINE

## 2019-03-28 PROCEDURE — 63710000001 INSULIN ASPART PER 5 UNITS: Performed by: INTERNAL MEDICINE

## 2019-03-28 PROCEDURE — 97110 THERAPEUTIC EXERCISES: CPT

## 2019-03-28 PROCEDURE — 25010000002 CALCIUM GLUCONATE PER 10 ML: Performed by: INTERNAL MEDICINE

## 2019-03-28 PROCEDURE — 94799 UNLISTED PULMONARY SVC/PX: CPT

## 2019-03-28 PROCEDURE — 63710000001 INSULIN DETEMIR PER 5 UNITS: Performed by: INTERNAL MEDICINE

## 2019-03-28 PROCEDURE — 94760 N-INVAS EAR/PLS OXIMETRY 1: CPT

## 2019-03-28 PROCEDURE — 97116 GAIT TRAINING THERAPY: CPT

## 2019-03-28 PROCEDURE — 85025 COMPLETE CBC W/AUTO DIFF WBC: CPT | Performed by: HOSPITALIST

## 2019-03-28 PROCEDURE — 82962 GLUCOSE BLOOD TEST: CPT

## 2019-03-28 PROCEDURE — 63710000001 PREDNISONE PER 1 MG: Performed by: INTERNAL MEDICINE

## 2019-03-28 PROCEDURE — 80053 COMPREHEN METABOLIC PANEL: CPT | Performed by: HOSPITALIST

## 2019-03-28 PROCEDURE — 82947 ASSAY GLUCOSE BLOOD QUANT: CPT | Performed by: FAMILY MEDICINE

## 2019-03-28 RX ORDER — CALCIUM GLUCONATE 94 MG/ML
2 INJECTION, SOLUTION INTRAVENOUS ONCE
Status: DISCONTINUED | OUTPATIENT
Start: 2019-03-28 | End: 2019-03-28

## 2019-03-28 RX ORDER — PREDNISONE 1 MG/1
5 TABLET ORAL DAILY
Status: DISCONTINUED | OUTPATIENT
Start: 2019-04-06 | End: 2019-03-29 | Stop reason: HOSPADM

## 2019-03-28 RX ORDER — PREDNISONE 10 MG/1
10 TABLET ORAL DAILY
Status: DISCONTINUED | OUTPATIENT
Start: 2019-04-03 | End: 2019-03-29 | Stop reason: HOSPADM

## 2019-03-28 RX ORDER — PREDNISONE 20 MG/1
20 TABLET ORAL DAILY
Status: DISCONTINUED | OUTPATIENT
Start: 2019-03-28 | End: 2019-03-29 | Stop reason: HOSPADM

## 2019-03-28 RX ORDER — SODIUM BICARBONATE 650 MG/1
1300 TABLET ORAL 2 TIMES DAILY
Status: DISCONTINUED | OUTPATIENT
Start: 2019-03-28 | End: 2019-03-29 | Stop reason: HOSPADM

## 2019-03-28 RX ADMIN — ATORVASTATIN CALCIUM 10 MG: 10 TABLET, FILM COATED ORAL at 08:09

## 2019-03-28 RX ADMIN — INSULIN ASPART 10 UNITS: 100 INJECTION, SOLUTION INTRAVENOUS; SUBCUTANEOUS at 17:40

## 2019-03-28 RX ADMIN — IPRATROPIUM BROMIDE AND ALBUTEROL SULFATE 3 ML: 2.5; .5 SOLUTION RESPIRATORY (INHALATION) at 05:09

## 2019-03-28 RX ADMIN — IPRATROPIUM BROMIDE AND ALBUTEROL SULFATE 3 ML: 2.5; .5 SOLUTION RESPIRATORY (INHALATION) at 19:56

## 2019-03-28 RX ADMIN — CARVEDILOL 6.25 MG: 6.25 TABLET, FILM COATED ORAL at 21:39

## 2019-03-28 RX ADMIN — GUAIFENESIN 600 MG: 600 TABLET, EXTENDED RELEASE ORAL at 21:39

## 2019-03-28 RX ADMIN — IPRATROPIUM BROMIDE AND ALBUTEROL SULFATE 3 ML: 2.5; .5 SOLUTION RESPIRATORY (INHALATION) at 11:50

## 2019-03-28 RX ADMIN — CARVEDILOL 6.25 MG: 6.25 TABLET, FILM COATED ORAL at 08:09

## 2019-03-28 RX ADMIN — PANTOPRAZOLE SODIUM 40 MG: 40 INJECTION, POWDER, FOR SOLUTION INTRAVENOUS at 21:39

## 2019-03-28 RX ADMIN — SODIUM CHLORIDE, PRESERVATIVE FREE 3 ML: 5 INJECTION INTRAVENOUS at 21:38

## 2019-03-28 RX ADMIN — INSULIN DETEMIR 35 UNITS: 100 INJECTION, SOLUTION SUBCUTANEOUS at 21:38

## 2019-03-28 RX ADMIN — PREDNISONE 20 MG: 20 TABLET ORAL at 11:18

## 2019-03-28 RX ADMIN — METHYLPREDNISOLONE SODIUM SUCCINATE 40 MG: 40 INJECTION, POWDER, FOR SOLUTION INTRAMUSCULAR; INTRAVENOUS at 04:26

## 2019-03-28 RX ADMIN — INSULIN ASPART 8 UNITS: 100 INJECTION, SOLUTION INTRAVENOUS; SUBCUTANEOUS at 12:13

## 2019-03-28 RX ADMIN — INSULIN DETEMIR 25 UNITS: 100 INJECTION, SOLUTION SUBCUTANEOUS at 08:09

## 2019-03-28 RX ADMIN — SODIUM CHLORIDE, PRESERVATIVE FREE 3 ML: 5 INJECTION INTRAVENOUS at 08:08

## 2019-03-28 RX ADMIN — SODIUM BICARBONATE 650 MG TABLET 650 MG: at 08:09

## 2019-03-28 RX ADMIN — IPRATROPIUM BROMIDE AND ALBUTEROL SULFATE 3 ML: 2.5; .5 SOLUTION RESPIRATORY (INHALATION) at 16:01

## 2019-03-28 RX ADMIN — INSULIN ASPART 10 UNITS: 100 INJECTION, SOLUTION INTRAVENOUS; SUBCUTANEOUS at 12:12

## 2019-03-28 RX ADMIN — GABAPENTIN 100 MG: 100 CAPSULE ORAL at 15:39

## 2019-03-28 RX ADMIN — GUAIFENESIN 600 MG: 600 TABLET, EXTENDED RELEASE ORAL at 08:09

## 2019-03-28 RX ADMIN — GABAPENTIN 100 MG: 100 CAPSULE ORAL at 08:09

## 2019-03-28 RX ADMIN — PANTOPRAZOLE SODIUM 40 MG: 40 INJECTION, POWDER, FOR SOLUTION INTRAVENOUS at 08:08

## 2019-03-28 RX ADMIN — INSULIN ASPART 10 UNITS: 100 INJECTION, SOLUTION INTRAVENOUS; SUBCUTANEOUS at 09:24

## 2019-03-28 RX ADMIN — GABAPENTIN 100 MG: 100 CAPSULE ORAL at 21:39

## 2019-03-28 RX ADMIN — INSULIN ASPART 8 UNITS: 100 INJECTION, SOLUTION INTRAVENOUS; SUBCUTANEOUS at 08:08

## 2019-03-28 RX ADMIN — OSELTAMIVIR PHOSPHATE 30 MG: 30 CAPSULE ORAL at 08:09

## 2019-03-28 RX ADMIN — INSULIN ASPART 9 UNITS: 100 INJECTION, SOLUTION INTRAVENOUS; SUBCUTANEOUS at 21:39

## 2019-03-28 RX ADMIN — CALCIUM GLUCONATE 2 G: 98 INJECTION, SOLUTION INTRAVENOUS at 11:17

## 2019-03-28 RX ADMIN — SODIUM BICARBONATE 650 MG TABLET 1300 MG: at 21:39

## 2019-03-28 RX ADMIN — ACETAMINOPHEN 650 MG: 325 TABLET, FILM COATED ORAL at 04:26

## 2019-03-28 RX ADMIN — INSULIN ASPART 7 UNITS: 100 INJECTION, SOLUTION INTRAVENOUS; SUBCUTANEOUS at 17:39

## 2019-03-29 VITALS
RESPIRATION RATE: 16 BRPM | WEIGHT: 131.06 LBS | DIASTOLIC BLOOD PRESSURE: 77 MMHG | OXYGEN SATURATION: 95 % | HEART RATE: 84 BPM | BODY MASS INDEX: 20.57 KG/M2 | TEMPERATURE: 98.4 F | SYSTOLIC BLOOD PRESSURE: 110 MMHG | HEIGHT: 67 IN

## 2019-03-29 PROBLEM — N17.9 ACUTE RENAL FAILURE SUPERIMPOSED ON STAGE 3 CHRONIC KIDNEY DISEASE: Status: ACTIVE | Noted: 2019-03-29

## 2019-03-29 PROBLEM — I21.4 NSTEMI (NON-ST ELEVATED MYOCARDIAL INFARCTION): Status: ACTIVE | Noted: 2019-03-29

## 2019-03-29 PROBLEM — N32.0 BLADDER OUTFLOW OBSTRUCTION: Status: ACTIVE | Noted: 2019-03-29

## 2019-03-29 PROBLEM — D63.8 ANEMIA, CHRONIC DISEASE: Status: ACTIVE | Noted: 2019-03-29

## 2019-03-29 PROBLEM — J10.1 INFLUENZA A: Status: ACTIVE | Noted: 2019-03-29

## 2019-03-29 PROBLEM — N18.30 ACUTE RENAL FAILURE SUPERIMPOSED ON STAGE 3 CHRONIC KIDNEY DISEASE: Status: ACTIVE | Noted: 2019-03-29

## 2019-03-29 LAB
ALBUMIN SERPL-MCNC: 3.6 G/DL (ref 3.5–5.2)
ALBUMIN/GLOB SERPL: 1.1 G/DL
ALP SERPL-CCNC: 38 U/L (ref 39–117)
ALT SERPL W P-5'-P-CCNC: 45 U/L (ref 1–41)
ANION GAP SERPL CALCULATED.3IONS-SCNC: 15 MMOL/L
AST SERPL-CCNC: 23 U/L (ref 1–40)
BASOPHILS # BLD AUTO: 0 10*3/MM3 (ref 0–0.2)
BASOPHILS NFR BLD AUTO: 0 % (ref 0–1.5)
BILIRUB SERPL-MCNC: 0.2 MG/DL (ref 0.2–1.2)
BUN BLD-MCNC: 71 MG/DL (ref 8–23)
BUN/CREAT SERPL: 34.1 (ref 7–25)
CALCIUM SPEC-SCNC: 6.8 MG/DL (ref 8.6–10.5)
CHLORIDE SERPL-SCNC: 110 MMOL/L (ref 98–107)
CO2 SERPL-SCNC: 15 MMOL/L (ref 22–29)
CREAT BLD-MCNC: 2.08 MG/DL (ref 0.76–1.27)
DEPRECATED RDW RBC AUTO: 46.8 FL (ref 37–54)
EOSINOPHIL # BLD AUTO: 0.02 10*3/MM3 (ref 0–0.4)
EOSINOPHIL NFR BLD AUTO: 0.2 % (ref 0.3–6.2)
ERYTHROCYTE [DISTWIDTH] IN BLOOD BY AUTOMATED COUNT: 13.6 % (ref 12.3–15.4)
GFR SERPL CREATININE-BSD FRML MDRD: 33 ML/MIN/1.73
GLOBULIN UR ELPH-MCNC: 3.2 GM/DL
GLUCOSE BLD-MCNC: 178 MG/DL (ref 65–99)
GLUCOSE BLDC GLUCOMTR-MCNC: 153 MG/DL (ref 70–130)
GLUCOSE BLDC GLUCOMTR-MCNC: 224 MG/DL (ref 70–130)
GLUCOSE BLDC GLUCOMTR-MCNC: 503 MG/DL (ref 70–130)
HCT VFR BLD AUTO: 30.5 % (ref 37.5–51)
HGB BLD-MCNC: 9.9 G/DL (ref 13–17.7)
IMM GRANULOCYTES # BLD AUTO: 0.04 10*3/MM3 (ref 0–0.05)
IMM GRANULOCYTES NFR BLD AUTO: 0.4 % (ref 0–0.5)
LYMPHOCYTES # BLD AUTO: 1.59 10*3/MM3 (ref 0.7–3.1)
LYMPHOCYTES NFR BLD AUTO: 17.7 % (ref 19.6–45.3)
MCH RBC QN AUTO: 30.3 PG (ref 26.6–33)
MCHC RBC AUTO-ENTMCNC: 32.5 G/DL (ref 31.5–35.7)
MCV RBC AUTO: 93.3 FL (ref 79–97)
MONOCYTES # BLD AUTO: 0.74 10*3/MM3 (ref 0.1–0.9)
MONOCYTES NFR BLD AUTO: 8.3 % (ref 5–12)
NEUTROPHILS # BLD AUTO: 6.57 10*3/MM3 (ref 1.4–7)
NEUTROPHILS NFR BLD AUTO: 73.4 % (ref 42.7–76)
NRBC BLD AUTO-RTO: 0 /100 WBC (ref 0–0)
PLATELET # BLD AUTO: 220 10*3/MM3 (ref 140–450)
PMV BLD AUTO: 11 FL (ref 6–12)
POTASSIUM BLD-SCNC: 3.9 MMOL/L (ref 3.5–5.2)
PROT SERPL-MCNC: 6.8 G/DL (ref 6–8.5)
RBC # BLD AUTO: 3.27 10*6/MM3 (ref 4.14–5.8)
SODIUM BLD-SCNC: 140 MMOL/L (ref 136–145)
WBC NRBC COR # BLD: 8.96 10*3/MM3 (ref 3.4–10.8)

## 2019-03-29 PROCEDURE — 63710000001 INSULIN DETEMIR PER 5 UNITS: Performed by: INTERNAL MEDICINE

## 2019-03-29 PROCEDURE — 85025 COMPLETE CBC W/AUTO DIFF WBC: CPT | Performed by: HOSPITALIST

## 2019-03-29 PROCEDURE — 82962 GLUCOSE BLOOD TEST: CPT

## 2019-03-29 PROCEDURE — 63710000001 INSULIN ASPART PER 5 UNITS: Performed by: INTERNAL MEDICINE

## 2019-03-29 PROCEDURE — 80053 COMPREHEN METABOLIC PANEL: CPT | Performed by: HOSPITALIST

## 2019-03-29 PROCEDURE — 63710000001 PREDNISONE PER 1 MG: Performed by: INTERNAL MEDICINE

## 2019-03-29 PROCEDURE — 94799 UNLISTED PULMONARY SVC/PX: CPT

## 2019-03-29 RX ORDER — PEN NEEDLE, DIABETIC 31 GX5/16"
1 NEEDLE, DISPOSABLE MISCELLANEOUS
Qty: 150 EACH | Refills: 1 | Status: SHIPPED | OUTPATIENT
Start: 2019-03-29

## 2019-03-29 RX ORDER — PREDNISONE 1 MG/1
5 TABLET ORAL DAILY
Qty: 63 TABLET | Refills: 0 | Status: SHIPPED | OUTPATIENT
Start: 2019-03-29 | End: 2019-06-03 | Stop reason: HOSPADM

## 2019-03-29 RX ORDER — CARVEDILOL 6.25 MG/1
6.25 TABLET ORAL EVERY 12 HOURS SCHEDULED
Qty: 60 TABLET | Refills: 0 | Status: SHIPPED | OUTPATIENT
Start: 2019-03-29 | End: 2022-12-25 | Stop reason: HOSPADM

## 2019-03-29 RX ORDER — OSELTAMIVIR PHOSPHATE 30 MG/1
30 CAPSULE ORAL
Qty: 1 CAPSULE | Refills: 0 | Status: SHIPPED | OUTPATIENT
Start: 2019-03-30 | End: 2019-03-31

## 2019-03-29 RX ORDER — GABAPENTIN 100 MG/1
100 CAPSULE ORAL 3 TIMES DAILY
Qty: 90 CAPSULE | Refills: 0 | Status: SHIPPED | OUTPATIENT
Start: 2019-03-29 | End: 2019-04-28

## 2019-03-29 RX ORDER — GUAIFENESIN 600 MG/1
600 TABLET, EXTENDED RELEASE ORAL EVERY 12 HOURS SCHEDULED
Qty: 60 TABLET | Refills: 0 | Status: SHIPPED | OUTPATIENT
Start: 2019-03-29 | End: 2019-04-28

## 2019-03-29 RX ORDER — LANCETS 28 GAUGE
EACH MISCELLANEOUS
Qty: 90 EACH | Refills: 1 | Status: SHIPPED | OUTPATIENT
Start: 2019-03-29 | End: 2021-12-17 | Stop reason: SDUPTHER

## 2019-03-29 RX ORDER — BLOOD-GLUCOSE METER
1 KIT MISCELLANEOUS 3 TIMES DAILY
Qty: 1 EACH | Refills: 1 | Status: SHIPPED | OUTPATIENT
Start: 2019-03-29 | End: 2021-12-17 | Stop reason: SDUPTHER

## 2019-03-29 RX ORDER — SODIUM BICARBONATE 650 MG/1
1300 TABLET ORAL 2 TIMES DAILY
Qty: 120 TABLET | Refills: 0 | Status: SHIPPED | OUTPATIENT
Start: 2019-03-29 | End: 2019-04-28

## 2019-03-29 RX ADMIN — PREDNISONE 20 MG: 20 TABLET ORAL at 09:11

## 2019-03-29 RX ADMIN — GUAIFENESIN 600 MG: 600 TABLET, EXTENDED RELEASE ORAL at 09:11

## 2019-03-29 RX ADMIN — ATORVASTATIN CALCIUM 10 MG: 10 TABLET, FILM COATED ORAL at 09:11

## 2019-03-29 RX ADMIN — OSELTAMIVIR PHOSPHATE 30 MG: 30 CAPSULE ORAL at 09:11

## 2019-03-29 RX ADMIN — INSULIN DETEMIR 35 UNITS: 100 INJECTION, SOLUTION SUBCUTANEOUS at 09:10

## 2019-03-29 RX ADMIN — GABAPENTIN 100 MG: 100 CAPSULE ORAL at 09:10

## 2019-03-29 RX ADMIN — INSULIN ASPART 4 UNITS: 100 INJECTION, SOLUTION INTRAVENOUS; SUBCUTANEOUS at 09:10

## 2019-03-29 RX ADMIN — PANTOPRAZOLE SODIUM 40 MG: 40 INJECTION, POWDER, FOR SOLUTION INTRAVENOUS at 09:10

## 2019-03-29 RX ADMIN — SODIUM BICARBONATE 650 MG TABLET 1300 MG: at 09:11

## 2019-03-29 RX ADMIN — CARVEDILOL 6.25 MG: 6.25 TABLET, FILM COATED ORAL at 09:11

## 2019-03-29 RX ADMIN — IPRATROPIUM BROMIDE AND ALBUTEROL SULFATE 3 ML: 2.5; .5 SOLUTION RESPIRATORY (INHALATION) at 07:24

## 2019-03-29 RX ADMIN — IPRATROPIUM BROMIDE AND ALBUTEROL SULFATE 3 ML: 2.5; .5 SOLUTION RESPIRATORY (INHALATION) at 11:10

## 2019-03-30 ENCOUNTER — READMISSION MANAGEMENT (OUTPATIENT)
Dept: CALL CENTER | Facility: HOSPITAL | Age: 64
End: 2019-03-30

## 2019-03-31 LAB
BACTERIA SPEC AEROBE CULT: NORMAL
BACTERIA SPEC AEROBE CULT: NORMAL

## 2019-03-31 NOTE — OUTREACH NOTE
Prep Survey      Responses   Facility patient discharged from?  [facility]   Is patient eligible?  Yes   Discharge diagnosis  Acute metabolic encephalopathy, NSTEMI, ARF superimposed on stage 3 CKD, anemia, bladder outflow obstruction   Does the patient have one of the following disease processes/diagnoses(primary or secondary)?  Other   Does the patient have Home health ordered?  Yes   What is the Home health agency?   [facility]   Is there a DME ordered?  No   Comments regarding appointments  See AVS   Prep survey completed?  Yes          Mayelin Humphreys RN

## 2019-04-01 ENCOUNTER — READMISSION MANAGEMENT (OUTPATIENT)
Dept: CALL CENTER | Facility: HOSPITAL | Age: 64
End: 2019-04-01

## 2019-04-01 ENCOUNTER — EPISODE CHANGES (OUTPATIENT)
Dept: CASE MANAGEMENT | Facility: OTHER | Age: 64
End: 2019-04-01

## 2019-04-01 ENCOUNTER — PATIENT OUTREACH (OUTPATIENT)
Dept: CASE MANAGEMENT | Facility: OTHER | Age: 64
End: 2019-04-01

## 2019-04-01 NOTE — OUTREACH NOTE
Medical Week 1 Survey      Responses   Facility patient discharged from?  [place]   Does the patient have one of the following disease processes/diagnoses(primary or secondary)?  Other   Is there a successful TCM telephone encounter documented?  No   Week 1 attempt successful?  No   Unsuccessful attempts  Attempt 1          Felicity Al RN

## 2019-04-03 ENCOUNTER — READMISSION MANAGEMENT (OUTPATIENT)
Dept: CALL CENTER | Facility: HOSPITAL | Age: 64
End: 2019-04-03

## 2019-04-03 NOTE — OUTREACH NOTE
Medical Week 1 Survey      Responses   Facility patient discharged from?  [place]   Does the patient have one of the following disease processes/diagnoses(primary or secondary)?  Other   Is there a successful TCM telephone encounter documented?  No   Week 1 attempt successful?  No   Unsuccessful attempts  Attempt 2          Yadira Oro RN

## 2019-04-08 ENCOUNTER — READMISSION MANAGEMENT (OUTPATIENT)
Dept: CALL CENTER | Facility: HOSPITAL | Age: 64
End: 2019-04-08

## 2019-04-08 NOTE — OUTREACH NOTE
Medical Week 2 Survey      Responses   Facility patient discharged from?  [place]   Does the patient have one of the following disease processes/diagnoses(primary or secondary)?  Other   Week 2 attempt successful?  No   Unsuccessful attempts  Attempt 1          Dagmar Whiting RN

## 2019-04-09 ENCOUNTER — READMISSION MANAGEMENT (OUTPATIENT)
Dept: CALL CENTER | Facility: HOSPITAL | Age: 64
End: 2019-04-09

## 2019-04-09 NOTE — OUTREACH NOTE
Medical Week 2 Survey      Responses   Facility patient discharged from?  [place]   Does the patient have one of the following disease processes/diagnoses(primary or secondary)?  Other   Week 2 attempt successful?  No   Unsuccessful attempts  Attempt 2          Rebecca Mustafa RN

## 2019-04-10 ENCOUNTER — EPISODE CHANGES (OUTPATIENT)
Dept: CASE MANAGEMENT | Facility: OTHER | Age: 64
End: 2019-04-10

## 2019-04-11 ENCOUNTER — PATIENT OUTREACH (OUTPATIENT)
Dept: CASE MANAGEMENT | Facility: OTHER | Age: 64
End: 2019-04-11

## 2019-04-12 ENCOUNTER — LAB (OUTPATIENT)
Dept: LAB | Facility: HOSPITAL | Age: 64
End: 2019-04-12

## 2019-04-12 ENCOUNTER — TRANSCRIBE ORDERS (OUTPATIENT)
Dept: LAB | Facility: HOSPITAL | Age: 64
End: 2019-04-12

## 2019-04-12 DIAGNOSIS — N18.30 CHRONIC RENAL DISEASE, STAGE III (HCC): ICD-10-CM

## 2019-04-12 DIAGNOSIS — N17.9 ACUTE NONTRAUMATIC KIDNEY INJURY (HCC): ICD-10-CM

## 2019-04-12 DIAGNOSIS — N17.9 ACUTE NONTRAUMATIC KIDNEY INJURY (HCC): Primary | ICD-10-CM

## 2019-04-12 LAB
25(OH)D3 SERPL-MCNC: 16.4 NG/ML (ref 30–100)
ALBUMIN SERPL-MCNC: 4.2 G/DL (ref 3.5–5.2)
ANION GAP SERPL CALCULATED.3IONS-SCNC: 10.5 MMOL/L
BILIRUB UR QL STRIP: NEGATIVE
BUN BLD-MCNC: 24 MG/DL (ref 8–23)
BUN/CREAT SERPL: 16.2 (ref 7–25)
CALCIUM SPEC-SCNC: 8.6 MG/DL (ref 8.6–10.5)
CHLORIDE SERPL-SCNC: 106 MMOL/L (ref 98–107)
CLARITY UR: CLEAR
CO2 SERPL-SCNC: 27.5 MMOL/L (ref 22–29)
COLOR UR: YELLOW
CREAT BLD-MCNC: 1.48 MG/DL (ref 0.76–1.27)
CREAT UR-MCNC: 38.4 MG/DL
GFR SERPL CREATININE-BSD FRML MDRD: 48 ML/MIN/1.73
GLUCOSE BLD-MCNC: 146 MG/DL (ref 65–99)
GLUCOSE UR STRIP-MCNC: NEGATIVE MG/DL
HCT VFR BLD AUTO: 34.8 % (ref 37.5–51)
HGB BLD-MCNC: 11 G/DL (ref 13–17.7)
HGB UR QL STRIP.AUTO: ABNORMAL
KETONES UR QL STRIP: NEGATIVE
LEUKOCYTE ESTERASE UR QL STRIP.AUTO: ABNORMAL
NITRITE UR QL STRIP: NEGATIVE
PH UR STRIP.AUTO: 7 [PH] (ref 5–8)
PHOSPHATE SERPL-MCNC: 3.8 MG/DL (ref 2.5–4.5)
POTASSIUM BLD-SCNC: 5.1 MMOL/L (ref 3.5–5.2)
PROT UR QL STRIP: ABNORMAL
PROT UR-MCNC: 22 MG/DL
PROT/CREAT UR: 572.9 MG/G CREA (ref 0–200)
PTH-INTACT SERPL-MCNC: 140 PG/ML (ref 15–65)
SODIUM BLD-SCNC: 144 MMOL/L (ref 136–145)
SP GR UR STRIP: 1.01 (ref 1–1.03)
UROBILINOGEN UR QL STRIP: ABNORMAL

## 2019-04-12 PROCEDURE — 82570 ASSAY OF URINE CREATININE: CPT

## 2019-04-12 PROCEDURE — 83970 ASSAY OF PARATHORMONE: CPT

## 2019-04-12 PROCEDURE — 81003 URINALYSIS AUTO W/O SCOPE: CPT

## 2019-04-12 PROCEDURE — 82306 VITAMIN D 25 HYDROXY: CPT

## 2019-04-12 PROCEDURE — 85018 HEMOGLOBIN: CPT

## 2019-04-12 PROCEDURE — 80069 RENAL FUNCTION PANEL: CPT

## 2019-04-12 PROCEDURE — 36415 COLL VENOUS BLD VENIPUNCTURE: CPT

## 2019-04-12 PROCEDURE — 84156 ASSAY OF PROTEIN URINE: CPT

## 2019-04-12 PROCEDURE — 85014 HEMATOCRIT: CPT

## 2019-04-17 RX ORDER — PRAVASTATIN SODIUM 40 MG
40 TABLET ORAL NIGHTLY
COMMUNITY
End: 2021-01-11

## 2019-04-19 ENCOUNTER — HOSPITAL ENCOUNTER (EMERGENCY)
Facility: HOSPITAL | Age: 64
Discharge: HOME | End: 2019-04-20
Attending: EMERGENCY MEDICINE | Admitting: EMERGENCY MEDICINE

## 2019-04-19 ENCOUNTER — APPOINTMENT (OUTPATIENT)
Dept: CT IMAGING | Facility: HOSPITAL | Age: 64
End: 2019-04-19

## 2019-04-19 VITALS
HEIGHT: 67 IN | BODY MASS INDEX: 20.88 KG/M2 | SYSTOLIC BLOOD PRESSURE: 125 MMHG | WEIGHT: 133 LBS | DIASTOLIC BLOOD PRESSURE: 75 MMHG | HEART RATE: 92 BPM | TEMPERATURE: 98 F | RESPIRATION RATE: 20 BRPM | OXYGEN SATURATION: 94 %

## 2019-04-19 DIAGNOSIS — R10.32 LEFT LOWER QUADRANT PAIN: Primary | ICD-10-CM

## 2019-04-19 PROCEDURE — 74176 CT ABD & PELVIS W/O CONTRAST: CPT

## 2019-04-19 PROCEDURE — 81003 URINALYSIS AUTO W/O SCOPE: CPT | Performed by: EMERGENCY MEDICINE

## 2019-04-19 PROCEDURE — 85025 COMPLETE CBC W/AUTO DIFF WBC: CPT | Performed by: EMERGENCY MEDICINE

## 2019-04-19 PROCEDURE — 25010000002 ONDANSETRON PER 1 MG: Performed by: EMERGENCY MEDICINE

## 2019-04-19 PROCEDURE — 80053 COMPREHEN METABOLIC PANEL: CPT | Performed by: EMERGENCY MEDICINE

## 2019-04-19 PROCEDURE — 96375 TX/PRO/DX INJ NEW DRUG ADDON: CPT

## 2019-04-19 PROCEDURE — 96374 THER/PROPH/DIAG INJ IV PUSH: CPT

## 2019-04-19 PROCEDURE — 83690 ASSAY OF LIPASE: CPT | Performed by: EMERGENCY MEDICINE

## 2019-04-19 PROCEDURE — 25010000002 MORPHINE PER 10 MG: Performed by: EMERGENCY MEDICINE

## 2019-04-19 PROCEDURE — 99283 EMERGENCY DEPT VISIT LOW MDM: CPT

## 2019-04-19 RX ORDER — ONDANSETRON 2 MG/ML
4 INJECTION INTRAMUSCULAR; INTRAVENOUS ONCE
Status: COMPLETED | OUTPATIENT
Start: 2019-04-19 | End: 2019-04-19

## 2019-04-19 RX ORDER — SODIUM CHLORIDE 0.9 % (FLUSH) 0.9 %
10 SYRINGE (ML) INJECTION AS NEEDED
Status: DISCONTINUED | OUTPATIENT
Start: 2019-04-19 | End: 2019-04-20 | Stop reason: HOSPADM

## 2019-04-19 RX ADMIN — SODIUM CHLORIDE 1000 ML: 9 INJECTION, SOLUTION INTRAVENOUS at 23:48

## 2019-04-19 RX ADMIN — ONDANSETRON 4 MG: 2 INJECTION INTRAMUSCULAR; INTRAVENOUS at 23:47

## 2019-04-19 RX ADMIN — MORPHINE SULFATE 4 MG: 4 INJECTION INTRAVENOUS at 23:47

## 2019-04-20 LAB
ALBUMIN SERPL-MCNC: 3.8 G/DL (ref 3.5–5.2)
ALBUMIN/GLOB SERPL: 1.3 G/DL
ALP SERPL-CCNC: 51 U/L (ref 39–117)
ALT SERPL W P-5'-P-CCNC: 16 U/L (ref 1–41)
ANION GAP SERPL CALCULATED.3IONS-SCNC: 8 MMOL/L
AST SERPL-CCNC: 13 U/L (ref 1–40)
BASOPHILS # BLD AUTO: 0.02 10*3/MM3 (ref 0–0.2)
BASOPHILS NFR BLD AUTO: 0.3 % (ref 0–1.5)
BILIRUB SERPL-MCNC: 0.2 MG/DL (ref 0.2–1.2)
BILIRUB UR QL STRIP: NEGATIVE
BUN BLD-MCNC: 33 MG/DL (ref 8–23)
BUN/CREAT SERPL: 19.9 (ref 7–25)
CALCIUM SPEC-SCNC: 8.9 MG/DL (ref 8.6–10.5)
CHLORIDE SERPL-SCNC: 103 MMOL/L (ref 98–107)
CLARITY UR: CLEAR
CO2 SERPL-SCNC: 24 MMOL/L (ref 22–29)
COLOR UR: YELLOW
CREAT BLD-MCNC: 1.66 MG/DL (ref 0.76–1.27)
DEPRECATED RDW RBC AUTO: 51.4 FL (ref 37–54)
EOSINOPHIL # BLD AUTO: 0.1 10*3/MM3 (ref 0–0.4)
EOSINOPHIL NFR BLD AUTO: 1.6 % (ref 0.3–6.2)
ERYTHROCYTE [DISTWIDTH] IN BLOOD BY AUTOMATED COUNT: 14.1 % (ref 12.3–15.4)
GFR SERPL CREATININE-BSD FRML MDRD: 42 ML/MIN/1.73
GLOBULIN UR ELPH-MCNC: 2.9 GM/DL
GLUCOSE BLD-MCNC: 282 MG/DL (ref 65–99)
GLUCOSE UR STRIP-MCNC: ABNORMAL MG/DL
HCT VFR BLD AUTO: 33.4 % (ref 37.5–51)
HGB BLD-MCNC: 10.4 G/DL (ref 13–17.7)
HGB UR QL STRIP.AUTO: NEGATIVE
IMM GRANULOCYTES # BLD AUTO: 0.02 10*3/MM3 (ref 0–0.05)
IMM GRANULOCYTES NFR BLD AUTO: 0.3 % (ref 0–0.5)
KETONES UR QL STRIP: NEGATIVE
LEUKOCYTE ESTERASE UR QL STRIP.AUTO: NEGATIVE
LIPASE SERPL-CCNC: 31 U/L (ref 13–60)
LYMPHOCYTES # BLD AUTO: 1.63 10*3/MM3 (ref 0.7–3.1)
LYMPHOCYTES NFR BLD AUTO: 25.8 % (ref 19.6–45.3)
MCH RBC QN AUTO: 30.9 PG (ref 26.6–33)
MCHC RBC AUTO-ENTMCNC: 31.1 G/DL (ref 31.5–35.7)
MCV RBC AUTO: 99.1 FL (ref 79–97)
MONOCYTES # BLD AUTO: 0.59 10*3/MM3 (ref 0.1–0.9)
MONOCYTES NFR BLD AUTO: 9.3 % (ref 5–12)
NEUTROPHILS # BLD AUTO: 3.97 10*3/MM3 (ref 1.7–7)
NEUTROPHILS NFR BLD AUTO: 62.7 % (ref 42.7–76)
NITRITE UR QL STRIP: NEGATIVE
NRBC BLD AUTO-RTO: 0 /100 WBC (ref 0–0.2)
PH UR STRIP.AUTO: 7 [PH] (ref 5–9)
PLATELET # BLD AUTO: 185 10*3/MM3 (ref 140–450)
PMV BLD AUTO: 9.6 FL (ref 6–12)
POTASSIUM BLD-SCNC: 4.3 MMOL/L (ref 3.5–5.2)
PROT SERPL-MCNC: 6.7 G/DL (ref 6–8.5)
PROT UR QL STRIP: NEGATIVE
RBC # BLD AUTO: 3.37 10*6/MM3 (ref 4.14–5.8)
SODIUM BLD-SCNC: 135 MMOL/L (ref 136–145)
SP GR UR STRIP: 1.01 (ref 1–1.03)
UROBILINOGEN UR QL STRIP: ABNORMAL
WBC NRBC COR # BLD: 6.33 10*3/MM3 (ref 3.4–10.8)

## 2019-04-22 ENCOUNTER — ANESTHESIA EVENT (OUTPATIENT)
Dept: GASTROENTEROLOGY | Facility: HOSPITAL | Age: 64
End: 2019-04-22

## 2019-04-22 ENCOUNTER — HOSPITAL ENCOUNTER (OUTPATIENT)
Facility: HOSPITAL | Age: 64
Setting detail: HOSPITAL OUTPATIENT SURGERY
Discharge: HOME | End: 2019-04-22
Attending: INTERNAL MEDICINE | Admitting: INTERNAL MEDICINE

## 2019-04-22 ENCOUNTER — ANESTHESIA (OUTPATIENT)
Dept: GASTROENTEROLOGY | Facility: HOSPITAL | Age: 64
End: 2019-04-22

## 2019-04-22 VITALS
RESPIRATION RATE: 16 BRPM | DIASTOLIC BLOOD PRESSURE: 76 MMHG | HEIGHT: 67 IN | TEMPERATURE: 97 F | HEART RATE: 86 BPM | WEIGHT: 136.47 LBS | BODY MASS INDEX: 21.42 KG/M2 | OXYGEN SATURATION: 96 % | SYSTOLIC BLOOD PRESSURE: 110 MMHG

## 2019-04-22 DIAGNOSIS — R10.84 GENERALIZED ABDOMINAL PAIN: ICD-10-CM

## 2019-04-22 DIAGNOSIS — Z87.19 HISTORY OF COLITIS: ICD-10-CM

## 2019-04-22 DIAGNOSIS — R19.7 DIARRHEA, UNSPECIFIED TYPE: ICD-10-CM

## 2019-04-22 DIAGNOSIS — K21.00 GASTROESOPHAGEAL REFLUX DISEASE WITH ESOPHAGITIS: ICD-10-CM

## 2019-04-22 DIAGNOSIS — Z86.010 HISTORY OF COLON POLYPS: ICD-10-CM

## 2019-04-22 LAB — GLUCOSE BLDC GLUCOMTR-MCNC: 134 MG/DL (ref 70–130)

## 2019-04-22 PROCEDURE — 82962 GLUCOSE BLOOD TEST: CPT

## 2019-04-22 PROCEDURE — 43239 EGD BIOPSY SINGLE/MULTIPLE: CPT | Performed by: INTERNAL MEDICINE

## 2019-04-22 PROCEDURE — 25010000002 PROPOFOL 10 MG/ML EMULSION: Performed by: NURSE ANESTHETIST, CERTIFIED REGISTERED

## 2019-04-22 PROCEDURE — 88305 TISSUE EXAM BY PATHOLOGIST: CPT | Performed by: PATHOLOGY

## 2019-04-22 PROCEDURE — 45378 DIAGNOSTIC COLONOSCOPY: CPT | Performed by: INTERNAL MEDICINE

## 2019-04-22 PROCEDURE — 88305 TISSUE EXAM BY PATHOLOGIST: CPT | Performed by: INTERNAL MEDICINE

## 2019-04-22 PROCEDURE — 25010000002 GLUCAGON (HUMAN RECOMBINANT) 1 MG RECONSTITUTED SOLUTION: Performed by: NURSE ANESTHETIST, CERTIFIED REGISTERED

## 2019-04-22 RX ORDER — DEXTROSE AND SODIUM CHLORIDE 5; .45 G/100ML; G/100ML
30 INJECTION, SOLUTION INTRAVENOUS CONTINUOUS PRN
Status: DISCONTINUED | OUTPATIENT
Start: 2019-04-22 | End: 2019-04-22 | Stop reason: HOSPADM

## 2019-04-22 RX ORDER — LIDOCAINE HYDROCHLORIDE 10 MG/ML
INJECTION, SOLUTION INFILTRATION; PERINEURAL AS NEEDED
Status: DISCONTINUED | OUTPATIENT
Start: 2019-04-22 | End: 2019-04-22 | Stop reason: SURG

## 2019-04-22 RX ORDER — PROPOFOL 10 MG/ML
VIAL (ML) INTRAVENOUS AS NEEDED
Status: DISCONTINUED | OUTPATIENT
Start: 2019-04-22 | End: 2019-04-22 | Stop reason: SURG

## 2019-04-22 RX ADMIN — PROPOFOL 90 MG: 10 INJECTION, EMULSION INTRAVENOUS at 09:46

## 2019-04-22 RX ADMIN — GLUCAGON HYDROCHLORIDE 0.5 MG: KIT at 09:58

## 2019-04-22 RX ADMIN — PROPOFOL 20 MG: 10 INJECTION, EMULSION INTRAVENOUS at 09:52

## 2019-04-22 RX ADMIN — PROPOFOL 10 MG: 10 INJECTION, EMULSION INTRAVENOUS at 09:57

## 2019-04-22 RX ADMIN — LIDOCAINE HYDROCHLORIDE 100 MG: 10 INJECTION, SOLUTION INFILTRATION; PERINEURAL at 09:46

## 2019-04-22 RX ADMIN — PROPOFOL 20 MG: 10 INJECTION, EMULSION INTRAVENOUS at 09:56

## 2019-04-22 RX ADMIN — DEXTROSE AND SODIUM CHLORIDE 30 ML/HR: 5; 450 INJECTION, SOLUTION INTRAVENOUS at 09:25

## 2019-04-22 RX ADMIN — PROPOFOL 20 MG: 10 INJECTION, EMULSION INTRAVENOUS at 09:54

## 2019-04-22 RX ADMIN — PROPOFOL 20 MG: 10 INJECTION, EMULSION INTRAVENOUS at 09:50

## 2019-04-22 RX ADMIN — PROPOFOL 20 MG: 10 INJECTION, EMULSION INTRAVENOUS at 09:48

## 2019-04-22 NOTE — ANESTHESIA POSTPROCEDURE EVALUATION
Patient: Favio Casillas    Procedure Summary     Date:  04/22/19 Room / Location:  [facility] ENDOSCOPY 3 / [facility] ENDOSCOPY    Anesthesia Start:  0935 Anesthesia Stop:  1007    Procedures:       ESOPHAGOGASTRODUODENOSCOPY (N/A )      COLONOSCOPY (N/A ) Diagnosis:       Gastroesophageal reflux disease with esophagitis      Diarrhea, unspecified type      Generalized abdominal pain      History of colon polyps      History of colitis      (Gastroesophageal reflux disease with esophagitis [K21.0])      (Diarrhea, unspecified type [R19.7])      (Generalized abdominal pain [R10.84])      (History of colon polyps [Z86.010])      (History of colitis [Z87.19])    Surgeon:  Alfnoso Torres MD Provider:  Ines Moreno CRNA    Anesthesia Type:  MAC ASA Status:  4          Anesthesia Type: MAC  Last vitals  BP   150/78 (04/22/19 0914)   Temp   98.5 °F (36.9 °C) (04/22/19 0914)   Pulse   78 (04/22/19 0914)   Resp   16 (04/22/19 0914)     SpO2   98 % (04/22/19 0914)     Post Anesthesia Care and Evaluation    Patient location during evaluation: bedside  Patient participation: waiting for patient participation  Level of consciousness: sleepy but conscious  Pain score: 0  Pain management: adequate  Airway patency: patent  Anesthetic complications: No anesthetic complications  PONV Status: none  Cardiovascular status: acceptable  Respiratory status: acceptable  Hydration status: acceptable

## 2019-04-22 NOTE — ANESTHESIA PREPROCEDURE EVALUATION
Anesthesia Evaluation     Patient summary reviewed and Nursing notes reviewed   NPO Solid Status: > 8 hours  NPO Liquid Status: > 2 hours           Airway   Mallampati: I  TM distance: >3 FB  Neck ROM: full  No difficulty expected  Dental    (+) edentulous    Pulmonary - normal exam   (+) a smoker Current Smoked day of surgery, COPD moderate, recent URI resolved,   Cardiovascular - normal exam    PT is on anticoagulation therapy  Patient on routine beta blocker    (+) hypertension well controlled less than 2 medications, past MI  >12 months, CAD, cardiac stents more than 12 months ago       Neuro/Psych  (+) seizures well controlled, TIA, headaches, psychiatric history Anxiety and Depression,     GI/Hepatic/Renal/Endo    (+)  GERD poorly controlled,  hepatitis C, liver disease, renal disease CRI, diabetes mellitus type 2 poorly controlled using insulin,     Musculoskeletal     Abdominal  - normal exam   Substance History - negative use     OB/GYN negative ob/gyn ROS         Other   (+) arthritis   history of cancer                    Anesthesia Plan    ASA 4     MAC     intravenous induction   Anesthetic plan, all risks, benefits, and alternatives have been provided, discussed and informed consent has been obtained with: patient.

## 2019-04-23 ENCOUNTER — PATIENT OUTREACH (OUTPATIENT)
Dept: CASE MANAGEMENT | Facility: OTHER | Age: 64
End: 2019-04-23

## 2019-04-23 LAB
LAB AP CASE REPORT: NORMAL
PATH REPORT.FINAL DX SPEC: NORMAL
PATH REPORT.GROSS SPEC: NORMAL

## 2019-05-02 ENCOUNTER — OFFICE VISIT (OUTPATIENT)
Dept: GASTROENTEROLOGY | Facility: CLINIC | Age: 64
End: 2019-05-02

## 2019-05-02 VITALS
HEIGHT: 67 IN | BODY MASS INDEX: 21.53 KG/M2 | HEART RATE: 79 BPM | SYSTOLIC BLOOD PRESSURE: 128 MMHG | DIASTOLIC BLOOD PRESSURE: 86 MMHG | WEIGHT: 137.2 LBS

## 2019-05-02 DIAGNOSIS — R10.84 GENERALIZED ABDOMINAL PAIN: ICD-10-CM

## 2019-05-02 DIAGNOSIS — K57.30 DIVERTICULOSIS OF LARGE INTESTINE WITHOUT HEMORRHAGE: ICD-10-CM

## 2019-05-02 DIAGNOSIS — K21.00 GASTROESOPHAGEAL REFLUX DISEASE WITH ESOPHAGITIS: Primary | ICD-10-CM

## 2019-05-02 PROCEDURE — 99213 OFFICE O/P EST LOW 20 MIN: CPT | Performed by: PHYSICIAN ASSISTANT

## 2019-05-02 RX ORDER — BUDESONIDE AND FORMOTEROL FUMARATE DIHYDRATE 160; 4.5 UG/1; UG/1
2 AEROSOL RESPIRATORY (INHALATION)
Status: ON HOLD | COMMUNITY
End: 2021-07-21

## 2019-05-02 RX ORDER — ASPIRIN 81 MG/1
81 TABLET ORAL DAILY
Status: ON HOLD | COMMUNITY
End: 2021-07-21

## 2019-05-02 RX ORDER — SODIUM BICARBONATE 650 MG/1
650 TABLET ORAL 4 TIMES DAILY
COMMUNITY
End: 2019-06-03 | Stop reason: HOSPADM

## 2019-05-02 RX ORDER — NITROGLYCERIN 0.4 MG/1
0.4 TABLET SUBLINGUAL
COMMUNITY
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
## 2019-05-02 NOTE — PATIENT INSTRUCTIONS

## 2019-05-07 ENCOUNTER — PATIENT OUTREACH (OUTPATIENT)
Dept: CASE MANAGEMENT | Facility: OTHER | Age: 64
End: 2019-05-07

## 2019-05-07 NOTE — OUTREACH NOTE
"Care Plan Note    Care Management Plan 5/7/2019   Lifestyle Goals Routine follow-up with doctor(s)   Self Management Medication Adherence;Home Glucose Monitoring;Home BP Monitoring   Care Gaps Addressed Colonoscopy;Diabetic A1C;Diabetic Eye Exam;Flu Shot;Pneumonia Vaccine   Specific Disease Process Teaching Diabetes;Hypertension   Does patient have depression diagnosis? No   Advanced Directives: Not Interested At This Time   Ed Visits past 12 months: 3 or more   Hospitalizations past 12 months 3 or more   Medication Adherence Medications understood   Health Literacy Moderate     The main concerns and/or symptoms the patient would like to address are: Seen in ED at [facility] 4-19-19 with LLQ pain. He has since had an endoscopy and colonoscopy that according to him \"were all normal\".    Education/instruction provided by Care Coordinator: Patient very pleasant and had just returned from his walk. He exercises regularly and checks his blood sugar routinely. He is running low on test strips and will let me know if he needs assistance with refills. His Hgb A1C on 3-19-19 was 5.7 and patient follows his diabetic diet. Encouraged him to keep up the good work at home. He also self monitors his BP and indicated \"it runs normal\". He has no difficulty obtaining medications or transportation issues. He declines MyChart and Advance Directive. He has a cane but does not use it routinely and reports no falls. He needs his diabetic eye and foot exams. He has had the flu vaccine 11-20-18, colonoscopy 4-22-19 at [facility],  and pneumonia vaccine 3-21-19. Provided patient my contact information for future needs.    Follow Up Outreach Due: None at this time.    Kisha Agudelo RN    5/7/2019, 11:31 AM    "

## 2019-05-07 NOTE — OUTREACH NOTE
Care Coordination Note    Spoke with Soraya at Dr. Merritt's office and she will add his AWV to the already scheduled appointment on 5-16-19. They will call the patient the day before and inform him to come in at 9:00 AM.    Kisha Agudelo RN    5/7/2019, 11:47 AM

## 2019-05-14 ENCOUNTER — EPISODE CHANGES (OUTPATIENT)
Dept: CASE MANAGEMENT | Facility: OTHER | Age: 64
End: 2019-05-14

## 2019-05-29 ENCOUNTER — EPISODE CHANGES (OUTPATIENT)
Dept: CASE MANAGEMENT | Facility: OTHER | Age: 64
End: 2019-05-29

## 2019-05-29 ENCOUNTER — HOSPITAL ENCOUNTER (INPATIENT)
Facility: HOSPITAL | Age: 64
LOS: 5 days | Discharge: HOME | End: 2019-06-03
Attending: EMERGENCY MEDICINE | Admitting: INTERNAL MEDICINE

## 2019-05-29 DIAGNOSIS — N39.0 ACUTE URINARY TRACT INFECTION: ICD-10-CM

## 2019-05-29 DIAGNOSIS — N17.9 ACUTE KIDNEY INJURY (HCC): Primary | ICD-10-CM

## 2019-05-29 DIAGNOSIS — Z74.09 IMPAIRED MOBILITY AND ADLS: ICD-10-CM

## 2019-05-29 DIAGNOSIS — Z78.9 IMPAIRED MOBILITY AND ADLS: ICD-10-CM

## 2019-05-29 DIAGNOSIS — Z74.09 IMPAIRED FUNCTIONAL MOBILITY, BALANCE, GAIT, AND ENDURANCE: ICD-10-CM

## 2019-05-29 LAB
ACETONE BLD QL: NEGATIVE
ALBUMIN SERPL-MCNC: 4.3 G/DL (ref 3.5–5.2)
ALBUMIN/GLOB SERPL: 1.4 G/DL
ALP SERPL-CCNC: 89 U/L (ref 39–117)
ALT SERPL W P-5'-P-CCNC: 74 U/L (ref 1–41)
ANION GAP SERPL CALCULATED.3IONS-SCNC: 16 MMOL/L
AST SERPL-CCNC: 27 U/L (ref 1–40)
BACTERIA UR QL AUTO: ABNORMAL /HPF
BASOPHILS # BLD AUTO: 0.02 10*3/MM3 (ref 0–0.2)
BASOPHILS NFR BLD AUTO: 0.2 % (ref 0–1.5)
BILIRUB SERPL-MCNC: 0.3 MG/DL (ref 0.2–1.2)
BILIRUB UR QL STRIP: NEGATIVE
BUN BLD-MCNC: 94 MG/DL (ref 8–23)
BUN/CREAT SERPL: 23.3 (ref 7–25)
CALCIUM SPEC-SCNC: 9.7 MG/DL (ref 8.6–10.5)
CHLORIDE SERPL-SCNC: 102 MMOL/L (ref 98–107)
CLARITY UR: ABNORMAL
CO2 SERPL-SCNC: 10 MMOL/L (ref 22–29)
COLOR UR: YELLOW
CREAT BLD-MCNC: 4.04 MG/DL (ref 0.76–1.27)
DEPRECATED RDW RBC AUTO: 44.9 FL (ref 37–54)
EOSINOPHIL # BLD AUTO: 0.02 10*3/MM3 (ref 0–0.4)
EOSINOPHIL NFR BLD AUTO: 0.2 % (ref 0.3–6.2)
ERYTHROCYTE [DISTWIDTH] IN BLOOD BY AUTOMATED COUNT: 13.2 % (ref 12.3–15.4)
GFR SERPL CREATININE-BSD FRML MDRD: 15 ML/MIN/1.73
GLOBULIN UR ELPH-MCNC: 3 GM/DL
GLUCOSE BLD-MCNC: 231 MG/DL (ref 65–99)
GLUCOSE BLDC GLUCOMTR-MCNC: 105 MG/DL (ref 70–130)
GLUCOSE BLDC GLUCOMTR-MCNC: 246 MG/DL (ref 70–130)
GLUCOSE UR STRIP-MCNC: NEGATIVE MG/DL
HCT VFR BLD AUTO: 44.8 % (ref 37.5–51)
HGB BLD-MCNC: 15.5 G/DL (ref 13–17.7)
HGB UR QL STRIP.AUTO: ABNORMAL
HYALINE CASTS UR QL AUTO: ABNORMAL /LPF
IMM GRANULOCYTES # BLD AUTO: 0.08 10*3/MM3 (ref 0–0.05)
IMM GRANULOCYTES NFR BLD AUTO: 0.7 % (ref 0–0.5)
KETONES UR QL STRIP: NEGATIVE
LEUKOCYTE ESTERASE UR QL STRIP.AUTO: ABNORMAL
LYMPHOCYTES # BLD AUTO: 0.79 10*3/MM3 (ref 0.7–3.1)
LYMPHOCYTES NFR BLD AUTO: 7 % (ref 19.6–45.3)
MAGNESIUM SERPL-MCNC: 2.2 MG/DL (ref 1.6–2.4)
MCH RBC QN AUTO: 32 PG (ref 26.6–33)
MCHC RBC AUTO-ENTMCNC: 34.6 G/DL (ref 31.5–35.7)
MCV RBC AUTO: 92.4 FL (ref 79–97)
MONOCYTES # BLD AUTO: 0.72 10*3/MM3 (ref 0.1–0.9)
MONOCYTES NFR BLD AUTO: 6.4 % (ref 5–12)
MUCOUS THREADS URNS QL MICRO: ABNORMAL /HPF
NEUTROPHILS # BLD AUTO: 9.58 10*3/MM3 (ref 1.7–7)
NEUTROPHILS NFR BLD AUTO: 85.5 % (ref 42.7–76)
NITRITE UR QL STRIP: NEGATIVE
NRBC BLD AUTO-RTO: 0 /100 WBC (ref 0–0.2)
PH UR STRIP.AUTO: 7 [PH] (ref 5–9)
PLATELET # BLD AUTO: 155 10*3/MM3 (ref 140–450)
PMV BLD AUTO: 10.3 FL (ref 6–12)
POTASSIUM BLD-SCNC: 4.4 MMOL/L (ref 3.5–5.2)
PROT SERPL-MCNC: 7.3 G/DL (ref 6–8.5)
PROT UR QL STRIP: ABNORMAL
RBC # BLD AUTO: 4.85 10*6/MM3 (ref 4.14–5.8)
RBC # UR: ABNORMAL /HPF
REF LAB TEST METHOD: ABNORMAL
SODIUM BLD-SCNC: 128 MMOL/L (ref 136–145)
SP GR UR STRIP: 1.02 (ref 1–1.03)
SQUAMOUS #/AREA URNS HPF: ABNORMAL /HPF
UROBILINOGEN UR QL STRIP: ABNORMAL
WBC NRBC COR # BLD: 11.21 10*3/MM3 (ref 3.4–10.8)
WBC UR QL AUTO: ABNORMAL /HPF

## 2019-05-29 PROCEDURE — 94760 N-INVAS EAR/PLS OXIMETRY 1: CPT

## 2019-05-29 PROCEDURE — 94799 UNLISTED PULMONARY SVC/PX: CPT

## 2019-05-29 PROCEDURE — 82009 KETONE BODYS QUAL: CPT | Performed by: PHYSICIAN ASSISTANT

## 2019-05-29 PROCEDURE — 87086 URINE CULTURE/COLONY COUNT: CPT | Performed by: PHYSICIAN ASSISTANT

## 2019-05-29 PROCEDURE — 99284 EMERGENCY DEPT VISIT MOD MDM: CPT

## 2019-05-29 PROCEDURE — 25010000002 HEPARIN (PORCINE) PER 1000 UNITS: Performed by: INTERNAL MEDICINE

## 2019-05-29 PROCEDURE — G0378 HOSPITAL OBSERVATION PER HR: HCPCS

## 2019-05-29 PROCEDURE — 87077 CULTURE AEROBIC IDENTIFY: CPT | Performed by: PHYSICIAN ASSISTANT

## 2019-05-29 PROCEDURE — 97162 PT EVAL MOD COMPLEX 30 MIN: CPT

## 2019-05-29 PROCEDURE — 80053 COMPREHEN METABOLIC PANEL: CPT | Performed by: PHYSICIAN ASSISTANT

## 2019-05-29 PROCEDURE — 83735 ASSAY OF MAGNESIUM: CPT | Performed by: PHYSICIAN ASSISTANT

## 2019-05-29 PROCEDURE — 82962 GLUCOSE BLOOD TEST: CPT

## 2019-05-29 PROCEDURE — 87186 SC STD MICRODIL/AGAR DIL: CPT | Performed by: PHYSICIAN ASSISTANT

## 2019-05-29 PROCEDURE — 63710000001 INSULIN ASPART PER 5 UNITS: Performed by: INTERNAL MEDICINE

## 2019-05-29 PROCEDURE — 97166 OT EVAL MOD COMPLEX 45 MIN: CPT

## 2019-05-29 PROCEDURE — 85025 COMPLETE CBC W/AUTO DIFF WBC: CPT | Performed by: PHYSICIAN ASSISTANT

## 2019-05-29 PROCEDURE — 81001 URINALYSIS AUTO W/SCOPE: CPT | Performed by: PHYSICIAN ASSISTANT

## 2019-05-29 RX ORDER — HEPARIN SODIUM 5000 [USP'U]/ML
5000 INJECTION, SOLUTION INTRAVENOUS; SUBCUTANEOUS EVERY 12 HOURS SCHEDULED
Status: DISCONTINUED | OUTPATIENT
Start: 2019-05-29 | End: 2019-06-03 | Stop reason: HOSPADM

## 2019-05-29 RX ORDER — DEXTROSE MONOHYDRATE 25 G/50ML
25 INJECTION, SOLUTION INTRAVENOUS
Status: DISCONTINUED | OUTPATIENT
Start: 2019-05-29 | End: 2019-06-03 | Stop reason: HOSPADM

## 2019-05-29 RX ORDER — SODIUM CHLORIDE 0.9 % (FLUSH) 0.9 %
3 SYRINGE (ML) INJECTION EVERY 12 HOURS SCHEDULED
Status: DISCONTINUED | OUTPATIENT
Start: 2019-05-29 | End: 2019-06-03 | Stop reason: HOSPADM

## 2019-05-29 RX ORDER — NICOTINE POLACRILEX 4 MG
15 LOZENGE BUCCAL
Status: DISCONTINUED | OUTPATIENT
Start: 2019-05-29 | End: 2019-06-03 | Stop reason: HOSPADM

## 2019-05-29 RX ORDER — SODIUM CHLORIDE 0.9 % (FLUSH) 0.9 %
10 SYRINGE (ML) INJECTION AS NEEDED
Status: DISCONTINUED | OUTPATIENT
Start: 2019-05-29 | End: 2019-06-03 | Stop reason: HOSPADM

## 2019-05-29 RX ORDER — SODIUM CHLORIDE 9 MG/ML
50 INJECTION, SOLUTION INTRAVENOUS CONTINUOUS
Status: DISCONTINUED | OUTPATIENT
Start: 2019-05-29 | End: 2019-05-29 | Stop reason: SDUPTHER

## 2019-05-29 RX ORDER — PANTOPRAZOLE SODIUM 40 MG/1
40 TABLET, DELAYED RELEASE ORAL
Status: DISCONTINUED | OUTPATIENT
Start: 2019-05-30 | End: 2019-06-03 | Stop reason: HOSPADM

## 2019-05-29 RX ORDER — SODIUM CHLORIDE 9 MG/ML
100 INJECTION, SOLUTION INTRAVENOUS CONTINUOUS
Status: DISCONTINUED | OUTPATIENT
Start: 2019-05-29 | End: 2019-05-30

## 2019-05-29 RX ORDER — NICOTINE POLACRILEX 4 MG
15 LOZENGE BUCCAL
Status: DISCONTINUED | OUTPATIENT
Start: 2019-05-29 | End: 2019-05-29 | Stop reason: SDUPTHER

## 2019-05-29 RX ORDER — SODIUM CHLORIDE 0.9 % (FLUSH) 0.9 %
3-10 SYRINGE (ML) INJECTION AS NEEDED
Status: DISCONTINUED | OUTPATIENT
Start: 2019-05-29 | End: 2019-06-03 | Stop reason: HOSPADM

## 2019-05-29 RX ORDER — DEXTROSE MONOHYDRATE 25 G/50ML
25 INJECTION, SOLUTION INTRAVENOUS
Status: DISCONTINUED | OUTPATIENT
Start: 2019-05-29 | End: 2019-05-29 | Stop reason: SDUPTHER

## 2019-05-29 RX ADMIN — HEPARIN SODIUM 5000 UNITS: 5000 INJECTION INTRAVENOUS; SUBCUTANEOUS at 21:12

## 2019-05-29 RX ADMIN — SODIUM CHLORIDE, PRESERVATIVE FREE 3 ML: 5 INJECTION INTRAVENOUS at 21:13

## 2019-05-29 RX ADMIN — SODIUM CHLORIDE 50 ML/HR: 9 INJECTION, SOLUTION INTRAVENOUS at 13:31

## 2019-05-29 RX ADMIN — HEPARIN SODIUM 5000 UNITS: 5000 INJECTION INTRAVENOUS; SUBCUTANEOUS at 17:04

## 2019-05-29 RX ADMIN — SODIUM CHLORIDE 1000 ML: 900 INJECTION, SOLUTION INTRAVENOUS at 10:23

## 2019-05-29 RX ADMIN — INSULIN ASPART 2 UNITS: 100 INJECTION, SOLUTION INTRAVENOUS; SUBCUTANEOUS at 21:13

## 2019-05-30 ENCOUNTER — APPOINTMENT (OUTPATIENT)
Dept: CT IMAGING | Facility: HOSPITAL | Age: 64
End: 2019-05-30

## 2019-05-30 PROBLEM — E87.20 METABOLIC ACIDOSIS: Status: ACTIVE | Noted: 2019-05-30

## 2019-05-30 LAB
ANION GAP SERPL CALCULATED.3IONS-SCNC: 17 MMOL/L
BASOPHILS # BLD AUTO: 0.03 10*3/MM3 (ref 0–0.2)
BASOPHILS NFR BLD AUTO: 0.3 % (ref 0–1.5)
BUN BLD-MCNC: 101 MG/DL (ref 8–23)
BUN/CREAT SERPL: 24.1 (ref 7–25)
CALCIUM SPEC-SCNC: 8.8 MG/DL (ref 8.6–10.5)
CHLORIDE SERPL-SCNC: 107 MMOL/L (ref 98–107)
CO2 SERPL-SCNC: 7 MMOL/L (ref 22–29)
CREAT BLD-MCNC: 4.19 MG/DL (ref 0.76–1.27)
DEPRECATED RDW RBC AUTO: 45.1 FL (ref 37–54)
EOSINOPHIL # BLD AUTO: 0.02 10*3/MM3 (ref 0–0.4)
EOSINOPHIL NFR BLD AUTO: 0.2 % (ref 0.3–6.2)
ERYTHROCYTE [DISTWIDTH] IN BLOOD BY AUTOMATED COUNT: 13.2 % (ref 12.3–15.4)
GFR SERPL CREATININE-BSD FRML MDRD: 14 ML/MIN/1.73
GFR SERPL CREATININE-BSD FRML MDRD: ABNORMAL ML/MIN/1.73
GLUCOSE BLD-MCNC: 220 MG/DL (ref 65–99)
GLUCOSE BLDC GLUCOMTR-MCNC: 171 MG/DL (ref 70–130)
GLUCOSE BLDC GLUCOMTR-MCNC: 213 MG/DL (ref 70–130)
GLUCOSE BLDC GLUCOMTR-MCNC: 220 MG/DL (ref 70–130)
GLUCOSE BLDC GLUCOMTR-MCNC: 334 MG/DL (ref 70–130)
GLUCOSE BLDC GLUCOMTR-MCNC: 388 MG/DL (ref 70–130)
HCT VFR BLD AUTO: 41.4 % (ref 37.5–51)
HGB BLD-MCNC: 13.9 G/DL (ref 13–17.7)
IMM GRANULOCYTES # BLD AUTO: 0.08 10*3/MM3 (ref 0–0.05)
IMM GRANULOCYTES NFR BLD AUTO: 0.8 % (ref 0–0.5)
LYMPHOCYTES # BLD AUTO: 0.96 10*3/MM3 (ref 0.7–3.1)
LYMPHOCYTES NFR BLD AUTO: 10 % (ref 19.6–45.3)
MCH RBC QN AUTO: 31.2 PG (ref 26.6–33)
MCHC RBC AUTO-ENTMCNC: 33.6 G/DL (ref 31.5–35.7)
MCV RBC AUTO: 93 FL (ref 79–97)
MONOCYTES # BLD AUTO: 0.75 10*3/MM3 (ref 0.1–0.9)
MONOCYTES NFR BLD AUTO: 7.8 % (ref 5–12)
NEUTROPHILS # BLD AUTO: 7.72 10*3/MM3 (ref 1.7–7)
NEUTROPHILS NFR BLD AUTO: 80.9 % (ref 42.7–76)
NRBC BLD AUTO-RTO: 0 /100 WBC (ref 0–0.2)
PLATELET # BLD AUTO: 150 10*3/MM3 (ref 140–450)
PMV BLD AUTO: 10.6 FL (ref 6–12)
POTASSIUM BLD-SCNC: 4.5 MMOL/L (ref 3.5–5.2)
RBC # BLD AUTO: 4.45 10*6/MM3 (ref 4.14–5.8)
SODIUM BLD-SCNC: 131 MMOL/L (ref 136–145)
WBC NRBC COR # BLD: 9.56 10*3/MM3 (ref 3.4–10.8)

## 2019-05-30 PROCEDURE — 80048 BASIC METABOLIC PNL TOTAL CA: CPT | Performed by: INTERNAL MEDICINE

## 2019-05-30 PROCEDURE — 25010000002 PROMETHAZINE PER 50 MG: Performed by: NURSE PRACTITIONER

## 2019-05-30 PROCEDURE — 25010000002 HEPARIN (PORCINE) PER 1000 UNITS: Performed by: INTERNAL MEDICINE

## 2019-05-30 PROCEDURE — 25010000002 ONDANSETRON PER 1 MG: Performed by: FAMILY MEDICINE

## 2019-05-30 PROCEDURE — 82962 GLUCOSE BLOOD TEST: CPT

## 2019-05-30 PROCEDURE — 85025 COMPLETE CBC W/AUTO DIFF WBC: CPT | Performed by: INTERNAL MEDICINE

## 2019-05-30 PROCEDURE — 74176 CT ABD & PELVIS W/O CONTRAST: CPT

## 2019-05-30 PROCEDURE — 25010000002 CEFTRIAXONE PER 250 MG: Performed by: NURSE PRACTITIONER

## 2019-05-30 PROCEDURE — 63710000001 INSULIN ASPART PER 5 UNITS: Performed by: INTERNAL MEDICINE

## 2019-05-30 RX ORDER — PROMETHAZINE HYDROCHLORIDE 25 MG/ML
12.5 INJECTION, SOLUTION INTRAMUSCULAR; INTRAVENOUS EVERY 6 HOURS PRN
Status: DISCONTINUED | OUTPATIENT
Start: 2019-05-30 | End: 2019-06-03 | Stop reason: HOSPADM

## 2019-05-30 RX ORDER — ACETAMINOPHEN 325 MG/1
650 TABLET ORAL EVERY 6 HOURS PRN
Status: DISCONTINUED | OUTPATIENT
Start: 2019-05-30 | End: 2019-06-03 | Stop reason: HOSPADM

## 2019-05-30 RX ORDER — ONDANSETRON 2 MG/ML
4 INJECTION INTRAMUSCULAR; INTRAVENOUS EVERY 6 HOURS PRN
Status: DISCONTINUED | OUTPATIENT
Start: 2019-05-30 | End: 2019-06-03 | Stop reason: HOSPADM

## 2019-05-30 RX ADMIN — INSULIN ASPART 7 UNITS: 100 INJECTION, SOLUTION INTRAVENOUS; SUBCUTANEOUS at 20:38

## 2019-05-30 RX ADMIN — SODIUM CHLORIDE, PRESERVATIVE FREE 10 ML: 5 INJECTION INTRAVENOUS at 13:48

## 2019-05-30 RX ADMIN — SODIUM CHLORIDE, PRESERVATIVE FREE 10 ML: 5 INJECTION INTRAVENOUS at 15:25

## 2019-05-30 RX ADMIN — SODIUM CHLORIDE, PRESERVATIVE FREE 10 ML: 5 INJECTION INTRAVENOUS at 11:55

## 2019-05-30 RX ADMIN — PANTOPRAZOLE SODIUM 40 MG: 40 TABLET, DELAYED RELEASE ORAL at 05:26

## 2019-05-30 RX ADMIN — SODIUM CHLORIDE, PRESERVATIVE FREE 3 ML: 5 INJECTION INTRAVENOUS at 08:10

## 2019-05-30 RX ADMIN — CEFTRIAXONE SODIUM 1 G: 1 INJECTION, POWDER, FOR SOLUTION INTRAMUSCULAR; INTRAVENOUS at 10:25

## 2019-05-30 RX ADMIN — INSULIN ASPART 4 UNITS: 100 INJECTION, SOLUTION INTRAVENOUS; SUBCUTANEOUS at 11:55

## 2019-05-30 RX ADMIN — ONDANSETRON 4 MG: 2 INJECTION INTRAMUSCULAR; INTRAVENOUS at 08:10

## 2019-05-30 RX ADMIN — SODIUM CHLORIDE 100 ML/HR: 9 INJECTION, SOLUTION INTRAVENOUS at 03:46

## 2019-05-30 RX ADMIN — INSULIN ASPART 8 UNITS: 100 INJECTION, SOLUTION INTRAVENOUS; SUBCUTANEOUS at 17:38

## 2019-05-30 RX ADMIN — SODIUM BICARBONATE 150 ML/HR: 84 INJECTION, SOLUTION INTRAVENOUS at 20:38

## 2019-05-30 RX ADMIN — PROMETHAZINE HYDROCHLORIDE 12.5 MG: 25 INJECTION INTRAMUSCULAR; INTRAVENOUS at 11:54

## 2019-05-30 RX ADMIN — HEPARIN SODIUM 5000 UNITS: 5000 INJECTION INTRAVENOUS; SUBCUTANEOUS at 08:10

## 2019-05-30 RX ADMIN — SODIUM BICARBONATE 150 ML/HR: 84 INJECTION, SOLUTION INTRAVENOUS at 11:54

## 2019-05-30 RX ADMIN — INSULIN ASPART 4 UNITS: 100 INJECTION, SOLUTION INTRAVENOUS; SUBCUTANEOUS at 08:09

## 2019-05-30 RX ADMIN — SODIUM BICARBONATE 50 MEQ: 84 INJECTION, SOLUTION INTRAVENOUS at 15:24

## 2019-05-30 RX ADMIN — SODIUM CHLORIDE, PRESERVATIVE FREE 3 ML: 5 INJECTION INTRAVENOUS at 20:41

## 2019-05-30 RX ADMIN — SODIUM BICARBONATE 50 MEQ: 84 INJECTION, SOLUTION INTRAVENOUS at 13:48

## 2019-05-30 RX ADMIN — SODIUM BICARBONATE 50 MEQ: 84 INJECTION, SOLUTION INTRAVENOUS at 11:54

## 2019-05-30 RX ADMIN — HEPARIN SODIUM 5000 UNITS: 5000 INJECTION INTRAVENOUS; SUBCUTANEOUS at 20:38

## 2019-05-31 PROBLEM — E87.6 HYPOKALEMIA: Status: ACTIVE | Noted: 2019-05-31

## 2019-05-31 LAB
ANION GAP SERPL CALCULATED.3IONS-SCNC: 13 MMOL/L
BACTERIA SPEC AEROBE CULT: ABNORMAL
BASOPHILS # BLD AUTO: 0.03 10*3/MM3 (ref 0–0.2)
BASOPHILS NFR BLD AUTO: 0.5 % (ref 0–1.5)
BUN BLD-MCNC: 94 MG/DL (ref 8–23)
BUN/CREAT SERPL: 26 (ref 7–25)
CALCIUM SPEC-SCNC: 8.1 MG/DL (ref 8.6–10.5)
CHLORIDE SERPL-SCNC: 98 MMOL/L (ref 98–107)
CO2 SERPL-SCNC: 23 MMOL/L (ref 22–29)
CREAT BLD-MCNC: 3.62 MG/DL (ref 0.76–1.27)
DEPRECATED RDW RBC AUTO: 42.4 FL (ref 37–54)
EOSINOPHIL # BLD AUTO: 0.06 10*3/MM3 (ref 0–0.4)
EOSINOPHIL NFR BLD AUTO: 1 % (ref 0.3–6.2)
ERYTHROCYTE [DISTWIDTH] IN BLOOD BY AUTOMATED COUNT: 12.7 % (ref 12.3–15.4)
GFR SERPL CREATININE-BSD FRML MDRD: 17 ML/MIN/1.73
GLUCOSE BLD-MCNC: 339 MG/DL (ref 65–99)
GLUCOSE BLDC GLUCOMTR-MCNC: 276 MG/DL (ref 70–130)
GLUCOSE BLDC GLUCOMTR-MCNC: 313 MG/DL (ref 70–130)
GLUCOSE BLDC GLUCOMTR-MCNC: 380 MG/DL (ref 70–130)
HCT VFR BLD AUTO: 32.6 % (ref 37.5–51)
HGB BLD-MCNC: 11.3 G/DL (ref 13–17.7)
IMM GRANULOCYTES # BLD AUTO: 0.07 10*3/MM3 (ref 0–0.05)
IMM GRANULOCYTES NFR BLD AUTO: 1.1 % (ref 0–0.5)
LYMPHOCYTES # BLD AUTO: 1.27 10*3/MM3 (ref 0.7–3.1)
LYMPHOCYTES NFR BLD AUTO: 20.5 % (ref 19.6–45.3)
MAGNESIUM SERPL-MCNC: 1.6 MG/DL (ref 1.6–2.4)
MCH RBC QN AUTO: 31.2 PG (ref 26.6–33)
MCHC RBC AUTO-ENTMCNC: 34.7 G/DL (ref 31.5–35.7)
MCV RBC AUTO: 90.1 FL (ref 79–97)
MONOCYTES # BLD AUTO: 0.75 10*3/MM3 (ref 0.1–0.9)
MONOCYTES NFR BLD AUTO: 12.1 % (ref 5–12)
NEUTROPHILS # BLD AUTO: 4.02 10*3/MM3 (ref 1.7–7)
NEUTROPHILS NFR BLD AUTO: 64.8 % (ref 42.7–76)
NRBC BLD AUTO-RTO: 0.3 /100 WBC (ref 0–0.2)
PLATELET # BLD AUTO: 131 10*3/MM3 (ref 140–450)
PMV BLD AUTO: 10.6 FL (ref 6–12)
POTASSIUM BLD-SCNC: 2.9 MMOL/L (ref 3.5–5.2)
POTASSIUM BLD-SCNC: 3.6 MMOL/L (ref 3.5–5.2)
RBC # BLD AUTO: 3.62 10*6/MM3 (ref 4.14–5.8)
SODIUM BLD-SCNC: 134 MMOL/L (ref 136–145)
SODIUM UR-SCNC: 89 MMOL/L
WBC NRBC COR # BLD: 6.2 10*3/MM3 (ref 3.4–10.8)

## 2019-05-31 PROCEDURE — 97530 THERAPEUTIC ACTIVITIES: CPT

## 2019-05-31 PROCEDURE — 97110 THERAPEUTIC EXERCISES: CPT

## 2019-05-31 PROCEDURE — 82962 GLUCOSE BLOOD TEST: CPT

## 2019-05-31 PROCEDURE — 94760 N-INVAS EAR/PLS OXIMETRY 1: CPT

## 2019-05-31 PROCEDURE — 97535 SELF CARE MNGMENT TRAINING: CPT

## 2019-05-31 PROCEDURE — 85025 COMPLETE CBC W/AUTO DIFF WBC: CPT | Performed by: INTERNAL MEDICINE

## 2019-05-31 PROCEDURE — 94799 UNLISTED PULMONARY SVC/PX: CPT

## 2019-05-31 PROCEDURE — 84132 ASSAY OF SERUM POTASSIUM: CPT | Performed by: NURSE PRACTITIONER

## 2019-05-31 PROCEDURE — 25010000002 HEPARIN (PORCINE) PER 1000 UNITS: Performed by: INTERNAL MEDICINE

## 2019-05-31 PROCEDURE — 63710000001 INSULIN DETEMIR PER 5 UNITS: Performed by: NURSE PRACTITIONER

## 2019-05-31 PROCEDURE — 83735 ASSAY OF MAGNESIUM: CPT | Performed by: NURSE PRACTITIONER

## 2019-05-31 PROCEDURE — 97116 GAIT TRAINING THERAPY: CPT

## 2019-05-31 PROCEDURE — 84300 ASSAY OF URINE SODIUM: CPT | Performed by: INTERNAL MEDICINE

## 2019-05-31 PROCEDURE — 63710000001 INSULIN ASPART PER 5 UNITS: Performed by: INTERNAL MEDICINE

## 2019-05-31 PROCEDURE — 82570 ASSAY OF URINE CREATININE: CPT | Performed by: INTERNAL MEDICINE

## 2019-05-31 PROCEDURE — 80048 BASIC METABOLIC PNL TOTAL CA: CPT | Performed by: INTERNAL MEDICINE

## 2019-05-31 PROCEDURE — 94640 AIRWAY INHALATION TREATMENT: CPT

## 2019-05-31 PROCEDURE — 63710000001 INSULIN ASPART PER 5 UNITS: Performed by: NURSE PRACTITIONER

## 2019-05-31 PROCEDURE — 25010000002 CEFTRIAXONE PER 250 MG: Performed by: NURSE PRACTITIONER

## 2019-05-31 RX ORDER — BUDESONIDE AND FORMOTEROL FUMARATE DIHYDRATE 160; 4.5 UG/1; UG/1
2 AEROSOL RESPIRATORY (INHALATION)
Status: DISCONTINUED | OUTPATIENT
Start: 2019-05-31 | End: 2019-06-03 | Stop reason: HOSPADM

## 2019-05-31 RX ORDER — MAGNESIUM SULFATE 1 G/100ML
1 INJECTION INTRAVENOUS AS NEEDED
Status: DISCONTINUED | OUTPATIENT
Start: 2019-05-31 | End: 2019-06-03 | Stop reason: HOSPADM

## 2019-05-31 RX ORDER — MAGNESIUM SULFATE HEPTAHYDRATE 40 MG/ML
2 INJECTION, SOLUTION INTRAVENOUS AS NEEDED
Status: DISCONTINUED | OUTPATIENT
Start: 2019-05-31 | End: 2019-06-03 | Stop reason: HOSPADM

## 2019-05-31 RX ORDER — POTASSIUM CHLORIDE 750 MG/1
40 CAPSULE, EXTENDED RELEASE ORAL AS NEEDED
Status: DISCONTINUED | OUTPATIENT
Start: 2019-05-31 | End: 2019-06-03 | Stop reason: HOSPADM

## 2019-05-31 RX ORDER — MAGNESIUM SULFATE HEPTAHYDRATE 40 MG/ML
4 INJECTION, SOLUTION INTRAVENOUS AS NEEDED
Status: DISCONTINUED | OUTPATIENT
Start: 2019-05-31 | End: 2019-06-03 | Stop reason: HOSPADM

## 2019-05-31 RX ORDER — POTASSIUM CHLORIDE 1.5 G/1.77G
40 POWDER, FOR SOLUTION ORAL AS NEEDED
Status: DISCONTINUED | OUTPATIENT
Start: 2019-05-31 | End: 2019-06-03 | Stop reason: HOSPADM

## 2019-05-31 RX ORDER — POTASSIUM CHLORIDE 7.45 MG/ML
10 INJECTION INTRAVENOUS
Status: DISCONTINUED | OUTPATIENT
Start: 2019-05-31 | End: 2019-06-03 | Stop reason: HOSPADM

## 2019-05-31 RX ORDER — CLOPIDOGREL BISULFATE 75 MG/1
75 TABLET ORAL DAILY
Status: DISCONTINUED | OUTPATIENT
Start: 2019-05-31 | End: 2019-06-03 | Stop reason: HOSPADM

## 2019-05-31 RX ORDER — SODIUM CHLORIDE, SODIUM LACTATE, POTASSIUM CHLORIDE, CALCIUM CHLORIDE 600; 310; 30; 20 MG/100ML; MG/100ML; MG/100ML; MG/100ML
125 INJECTION, SOLUTION INTRAVENOUS CONTINUOUS
Status: DISCONTINUED | OUTPATIENT
Start: 2019-05-31 | End: 2019-06-02

## 2019-05-31 RX ORDER — ASPIRIN 81 MG/1
81 TABLET ORAL DAILY
Status: DISCONTINUED | OUTPATIENT
Start: 2019-05-31 | End: 2019-06-03 | Stop reason: HOSPADM

## 2019-05-31 RX ADMIN — ASPIRIN 81 MG: 81 TABLET, COATED ORAL at 11:51

## 2019-05-31 RX ADMIN — POTASSIUM CHLORIDE 40 MEQ: 750 CAPSULE, EXTENDED RELEASE ORAL at 18:07

## 2019-05-31 RX ADMIN — ACETAMINOPHEN 650 MG: 325 TABLET, FILM COATED ORAL at 08:37

## 2019-05-31 RX ADMIN — HEPARIN SODIUM 5000 UNITS: 5000 INJECTION INTRAVENOUS; SUBCUTANEOUS at 20:43

## 2019-05-31 RX ADMIN — INSULIN ASPART 6 UNITS: 100 INJECTION, SOLUTION INTRAVENOUS; SUBCUTANEOUS at 11:52

## 2019-05-31 RX ADMIN — POTASSIUM CHLORIDE 40 MEQ: 750 CAPSULE, EXTENDED RELEASE ORAL at 22:52

## 2019-05-31 RX ADMIN — INSULIN ASPART 6 UNITS: 100 INJECTION, SOLUTION INTRAVENOUS; SUBCUTANEOUS at 17:35

## 2019-05-31 RX ADMIN — SODIUM CHLORIDE, PRESERVATIVE FREE 3 ML: 5 INJECTION INTRAVENOUS at 20:43

## 2019-05-31 RX ADMIN — INSULIN ASPART 7 UNITS: 100 INJECTION, SOLUTION INTRAVENOUS; SUBCUTANEOUS at 08:37

## 2019-05-31 RX ADMIN — SODIUM CHLORIDE, PRESERVATIVE FREE 3 ML: 5 INJECTION INTRAVENOUS at 08:37

## 2019-05-31 RX ADMIN — CEFTRIAXONE SODIUM 1 G: 1 INJECTION, POWDER, FOR SOLUTION INTRAMUSCULAR; INTRAVENOUS at 08:37

## 2019-05-31 RX ADMIN — SODIUM BICARBONATE 150 ML/HR: 84 INJECTION, SOLUTION INTRAVENOUS at 02:45

## 2019-05-31 RX ADMIN — SODIUM CHLORIDE, POTASSIUM CHLORIDE, SODIUM LACTATE AND CALCIUM CHLORIDE 125 ML/HR: 600; 310; 30; 20 INJECTION, SOLUTION INTRAVENOUS at 10:43

## 2019-05-31 RX ADMIN — INSULIN DETEMIR 25 UNITS: 100 INJECTION, SOLUTION SUBCUTANEOUS at 12:48

## 2019-05-31 RX ADMIN — PANTOPRAZOLE SODIUM 40 MG: 40 TABLET, DELAYED RELEASE ORAL at 06:08

## 2019-05-31 RX ADMIN — CLOPIDOGREL BISULFATE 75 MG: 75 TABLET ORAL at 11:51

## 2019-05-31 RX ADMIN — BUDESONIDE AND FORMOTEROL FUMARATE DIHYDRATE 2 PUFF: 160; 4.5 AEROSOL RESPIRATORY (INHALATION) at 19:56

## 2019-05-31 RX ADMIN — POTASSIUM CHLORIDE 40 MEQ: 750 CAPSULE, EXTENDED RELEASE ORAL at 09:17

## 2019-05-31 RX ADMIN — INSULIN ASPART 8 UNITS: 100 INJECTION, SOLUTION INTRAVENOUS; SUBCUTANEOUS at 20:43

## 2019-05-31 RX ADMIN — HEPARIN SODIUM 5000 UNITS: 5000 INJECTION INTRAVENOUS; SUBCUTANEOUS at 08:37

## 2019-05-31 RX ADMIN — INSULIN ASPART 9 UNITS: 100 INJECTION, SOLUTION INTRAVENOUS; SUBCUTANEOUS at 10:48

## 2019-05-31 RX ADMIN — POTASSIUM CHLORIDE 40 MEQ: 750 CAPSULE, EXTENDED RELEASE ORAL at 14:14

## 2019-06-01 LAB
ANION GAP SERPL CALCULATED.3IONS-SCNC: 10 MMOL/L
BASOPHILS # BLD AUTO: 0.02 10*3/MM3 (ref 0–0.2)
BASOPHILS NFR BLD AUTO: 0.4 % (ref 0–1.5)
BUN BLD-MCNC: 79 MG/DL (ref 8–23)
BUN/CREAT SERPL: 30.2 (ref 7–25)
CALCIUM SPEC-SCNC: 9.1 MG/DL (ref 8.6–10.5)
CHLORIDE SERPL-SCNC: 106 MMOL/L (ref 98–107)
CO2 SERPL-SCNC: 21 MMOL/L (ref 22–29)
CREAT BLD-MCNC: 2.62 MG/DL (ref 0.76–1.27)
CREAT UR-MCNC: 29.3 MG/DL
DEPRECATED RDW RBC AUTO: 42.9 FL (ref 37–54)
EOSINOPHIL # BLD AUTO: 0.06 10*3/MM3 (ref 0–0.4)
EOSINOPHIL NFR BLD AUTO: 1.1 % (ref 0.3–6.2)
ERYTHROCYTE [DISTWIDTH] IN BLOOD BY AUTOMATED COUNT: 12.7 % (ref 12.3–15.4)
GFR SERPL CREATININE-BSD FRML MDRD: 25 ML/MIN/1.73
GLUCOSE BLD-MCNC: 218 MG/DL (ref 65–99)
GLUCOSE BLDC GLUCOMTR-MCNC: 130 MG/DL (ref 70–130)
GLUCOSE BLDC GLUCOMTR-MCNC: 283 MG/DL (ref 70–130)
HCT VFR BLD AUTO: 32.4 % (ref 37.5–51)
HGB BLD-MCNC: 11 G/DL (ref 13–17.7)
IMM GRANULOCYTES # BLD AUTO: 0.02 10*3/MM3 (ref 0–0.05)
IMM GRANULOCYTES NFR BLD AUTO: 0.4 % (ref 0–0.5)
LYMPHOCYTES # BLD AUTO: 1.38 10*3/MM3 (ref 0.7–3.1)
LYMPHOCYTES NFR BLD AUTO: 25.8 % (ref 19.6–45.3)
MCH RBC QN AUTO: 31.1 PG (ref 26.6–33)
MCHC RBC AUTO-ENTMCNC: 34 G/DL (ref 31.5–35.7)
MCV RBC AUTO: 91.5 FL (ref 79–97)
MONOCYTES # BLD AUTO: 0.69 10*3/MM3 (ref 0.1–0.9)
MONOCYTES NFR BLD AUTO: 12.9 % (ref 5–12)
NEUTROPHILS # BLD AUTO: 3.17 10*3/MM3 (ref 1.7–7)
NEUTROPHILS NFR BLD AUTO: 59.4 % (ref 42.7–76)
NRBC BLD AUTO-RTO: 0 /100 WBC (ref 0–0.2)
PLATELET # BLD AUTO: 150 10*3/MM3 (ref 140–450)
PMV BLD AUTO: 10.8 FL (ref 6–12)
POTASSIUM BLD-SCNC: 4.6 MMOL/L (ref 3.5–5.2)
RBC # BLD AUTO: 3.54 10*6/MM3 (ref 4.14–5.8)
SODIUM BLD-SCNC: 137 MMOL/L (ref 136–145)
WBC NRBC COR # BLD: 5.34 10*3/MM3 (ref 3.4–10.8)

## 2019-06-01 PROCEDURE — 25010000002 HEPARIN (PORCINE) PER 1000 UNITS: Performed by: INTERNAL MEDICINE

## 2019-06-01 PROCEDURE — 25010000002 PROMETHAZINE PER 50 MG: Performed by: NURSE PRACTITIONER

## 2019-06-01 PROCEDURE — 63710000001 INSULIN ASPART PER 5 UNITS: Performed by: INTERNAL MEDICINE

## 2019-06-01 PROCEDURE — 63710000001 INSULIN DETEMIR PER 5 UNITS: Performed by: NURSE PRACTITIONER

## 2019-06-01 PROCEDURE — 25010000002 ONDANSETRON PER 1 MG: Performed by: FAMILY MEDICINE

## 2019-06-01 PROCEDURE — 25010000002 CEFTRIAXONE PER 250 MG: Performed by: NURSE PRACTITIONER

## 2019-06-01 PROCEDURE — 25010000002 MAGNESIUM SULFATE 2 GM/50ML SOLUTION: Performed by: NURSE PRACTITIONER

## 2019-06-01 PROCEDURE — 94799 UNLISTED PULMONARY SVC/PX: CPT

## 2019-06-01 PROCEDURE — 82962 GLUCOSE BLOOD TEST: CPT

## 2019-06-01 PROCEDURE — 85025 COMPLETE CBC W/AUTO DIFF WBC: CPT | Performed by: INTERNAL MEDICINE

## 2019-06-01 PROCEDURE — 94760 N-INVAS EAR/PLS OXIMETRY 1: CPT

## 2019-06-01 PROCEDURE — 80048 BASIC METABOLIC PNL TOTAL CA: CPT | Performed by: INTERNAL MEDICINE

## 2019-06-01 PROCEDURE — 63710000001 INSULIN ASPART PER 5 UNITS: Performed by: NURSE PRACTITIONER

## 2019-06-01 RX ADMIN — ASPIRIN 81 MG: 81 TABLET, COATED ORAL at 08:42

## 2019-06-01 RX ADMIN — PROMETHAZINE HYDROCHLORIDE 12.5 MG: 25 INJECTION INTRAMUSCULAR; INTRAVENOUS at 12:03

## 2019-06-01 RX ADMIN — ONDANSETRON 4 MG: 2 INJECTION INTRAMUSCULAR; INTRAVENOUS at 17:23

## 2019-06-01 RX ADMIN — CEFTRIAXONE SODIUM 1 G: 1 INJECTION, POWDER, FOR SOLUTION INTRAMUSCULAR; INTRAVENOUS at 08:41

## 2019-06-01 RX ADMIN — INSULIN ASPART 7 UNITS: 100 INJECTION, SOLUTION INTRAVENOUS; SUBCUTANEOUS at 11:59

## 2019-06-01 RX ADMIN — INSULIN ASPART 6 UNITS: 100 INJECTION, SOLUTION INTRAVENOUS; SUBCUTANEOUS at 08:43

## 2019-06-01 RX ADMIN — SODIUM CHLORIDE, POTASSIUM CHLORIDE, SODIUM LACTATE AND CALCIUM CHLORIDE 125 ML/HR: 600; 310; 30; 20 INJECTION, SOLUTION INTRAVENOUS at 17:23

## 2019-06-01 RX ADMIN — MAGNESIUM SULFATE HEPTAHYDRATE 2 G: 40 INJECTION, SOLUTION INTRAVENOUS at 00:06

## 2019-06-01 RX ADMIN — INSULIN DETEMIR 25 UNITS: 100 INJECTION, SOLUTION SUBCUTANEOUS at 08:42

## 2019-06-01 RX ADMIN — SODIUM CHLORIDE, PRESERVATIVE FREE 3 ML: 5 INJECTION INTRAVENOUS at 21:34

## 2019-06-01 RX ADMIN — INSULIN ASPART 6 UNITS: 100 INJECTION, SOLUTION INTRAVENOUS; SUBCUTANEOUS at 08:42

## 2019-06-01 RX ADMIN — INSULIN ASPART 2 UNITS: 100 INJECTION, SOLUTION INTRAVENOUS; SUBCUTANEOUS at 21:33

## 2019-06-01 RX ADMIN — SODIUM CHLORIDE, PRESERVATIVE FREE 3 ML: 5 INJECTION INTRAVENOUS at 08:43

## 2019-06-01 RX ADMIN — ONDANSETRON 4 MG: 2 INJECTION INTRAMUSCULAR; INTRAVENOUS at 10:48

## 2019-06-01 RX ADMIN — PROMETHAZINE HYDROCHLORIDE 12.5 MG: 25 INJECTION INTRAMUSCULAR; INTRAVENOUS at 21:32

## 2019-06-01 RX ADMIN — SODIUM CHLORIDE, POTASSIUM CHLORIDE, SODIUM LACTATE AND CALCIUM CHLORIDE 125 ML/HR: 600; 310; 30; 20 INJECTION, SOLUTION INTRAVENOUS at 07:05

## 2019-06-01 RX ADMIN — ACETAMINOPHEN 650 MG: 325 TABLET, FILM COATED ORAL at 01:08

## 2019-06-01 RX ADMIN — HEPARIN SODIUM 5000 UNITS: 5000 INJECTION INTRAVENOUS; SUBCUTANEOUS at 08:42

## 2019-06-01 RX ADMIN — BUDESONIDE AND FORMOTEROL FUMARATE DIHYDRATE 2 PUFF: 160; 4.5 AEROSOL RESPIRATORY (INHALATION) at 20:12

## 2019-06-01 RX ADMIN — CLOPIDOGREL BISULFATE 75 MG: 75 TABLET ORAL at 08:42

## 2019-06-01 RX ADMIN — INSULIN ASPART 6 UNITS: 100 INJECTION, SOLUTION INTRAVENOUS; SUBCUTANEOUS at 11:59

## 2019-06-01 RX ADMIN — HEPARIN SODIUM 5000 UNITS: 5000 INJECTION INTRAVENOUS; SUBCUTANEOUS at 21:33

## 2019-06-01 RX ADMIN — POTASSIUM CHLORIDE 40 MEQ: 750 CAPSULE, EXTENDED RELEASE ORAL at 03:14

## 2019-06-01 RX ADMIN — BUDESONIDE AND FORMOTEROL FUMARATE DIHYDRATE 2 PUFF: 160; 4.5 AEROSOL RESPIRATORY (INHALATION) at 07:43

## 2019-06-01 NOTE — SIGNIFICANT NOTE
06/01/19 1659   Rehab Treatment   Discipline occupational therapy assistant   Reason Treatment Not Performed patient/family declined treatment, not feeling well   pt stated been vomiting all day

## 2019-06-01 NOTE — PROGRESS NOTES
[facility]  INPATIENT PROGRESS NOTE    Length of Stay: 2  Date of Admission: 5/29/2019  Primary Care Physician: Shayne Merritt MD    Subjective   Chief Complaint: Weakness  HPI:  64 year old male with a history of COPD, CKD, DMII, HTN, bladder cancer, CAD, and Hepatitis C who presented to the hospital with a complaint of generalized weakness, nausea, vomiting, and uncontrolled blood sugar.  Creatinine was found to be elevated to 4.0.  He had a similar problem in the past secondary to obstruction.  He is acidotic with a bicarb of 7.  He received IV fluid overnight and did not improve.  Nephrology was consulted and he was initiated on sodium bicarbonate in D5W infusion.  Acidosis has resolved and he transitioned to lactated ringers.  Creatinine is improving.  Urine cultures are growing Pantoea agglomerans which is sensitive to Rocephin.  Nausea has greatly improved.     Review of Systems   Constitutional: Negative for chills, fatigue and fever.   Respiratory: Negative for shortness of breath.    Cardiovascular: Negative for chest pain.   Gastrointestinal: Negative for abdominal pain, nausea and vomiting.   Genitourinary: Negative for difficulty urinating and flank pain.      All pertinent negatives and positives are as above. All other systems have been reviewed and are negative unless otherwise stated.     Objective    Temp:  [96.8 °F (36 °C)-99.1 °F (37.3 °C)] 97.6 °F (36.4 °C)  Heart Rate:  [65-77] 71  Resp:  [16-20] 18  BP: (115-149)/(76-80) 149/76    Physical Exam   Constitutional: He is oriented to person, place, and time. He appears well-developed and well-nourished. No distress.   HENT:   Head: Normocephalic.   Eyes: Conjunctivae are normal.   Cardiovascular: Normal rate, regular rhythm, normal heart sounds and intact distal pulses.   Pulmonary/Chest: Effort normal and breath sounds normal. No respiratory distress.   Abdominal: Soft. Bowel  sounds are normal. He exhibits no distension. There is no tenderness.   Musculoskeletal: Normal range of motion. He exhibits no edema.   Neurological: He is alert and oriented to person, place, and time.   Skin: Skin is warm and dry. He is not diaphoretic. No erythema.   Vitals reviewed.      Results Review:  I have reviewed the labs, radiology results, and diagnostic studies.    Laboratory Data:   Results from last 7 days   Lab Units 06/01/19 0700 05/31/19 2201 05/31/19 0532 05/30/19  0550 05/29/19  1012   SODIUM mmol/L 137  --  134* 131* 128*   POTASSIUM mmol/L 4.6 3.6 2.9* 4.5 4.4   CHLORIDE mmol/L 106  --  98 107 102   CO2 mmol/L 21.0*  --  23.0 7.0* 10.0*   BUN mg/dL 79*  --  94* 101* 94*   CREATININE mg/dL 2.62*  --  3.62* 4.19* 4.04*   GLUCOSE mg/dL 218*  --  339* 220* 231*   CALCIUM mg/dL 9.1  --  8.1* 8.8 9.7   BILIRUBIN mg/dL  --   --   --   --  0.3   ALK PHOS U/L  --   --   --   --  89   ALT (SGPT) U/L  --   --   --   --  74*   AST (SGOT) U/L  --   --   --   --  27   ANION GAP mmol/L 10.0  --  13.0 17.0 16.0     Estimated Creatinine Clearance: 26.3 mL/min (A) (by C-G formula based on SCr of 2.62 mg/dL (H)).  Results from last 7 days   Lab Units 05/31/19 2201 05/29/19  1012   MAGNESIUM mg/dL 1.6 2.2         Results from last 7 days   Lab Units 06/01/19 0700 05/31/19 0532 05/30/19  0550 05/29/19  1012   WBC 10*3/mm3 5.34 6.20 9.56 11.21*   HEMOGLOBIN g/dL 11.0* 11.3* 13.9 15.5   HEMATOCRIT % 32.4* 32.6* 41.4 44.8   PLATELETS 10*3/mm3 150 131* 150 155           Culture Data:   No results found for: BLOODCX  No results found for: URINECX  No results found for: RESPCX  No results found for: WOUNDCX  No results found for: STOOLCX  No components found for: BODYFLD    Radiology Data:   Imaging Results (last 24 hours)     ** No results found for the last 24 hours. **          I have reviewed the patient's current medications.     Assessment/Plan     Active Hospital Problems    Diagnosis   • **Acute kidney  injury (CMS/HCC)   • Hypokalemia   • Metabolic acidosis   • Acute urinary tract infection   • Acute renal failure superimposed on stage 3 chronic kidney disease (CMS/HCC)   • Chronic hepatitis C virus infection (CMS/HCC)   • Type 2 diabetes mellitus (CMS/HCC)       Plan:   Rocephin day 3/10  Cultures growing Pantoea agglomerans  IV Fluid: LR @ 125 ml/hr  Follow creatinine, improving  Acidosis resolved  Nephrology consultation appreciated  CT reveals similar neobladder dilation from previus exams with resolution of hydronephrosis  Urology consultation appreciated  John in place with good urine output, anticipate can remove tomorrow        This document has been electronically signed by TIFFANIE Silva on June 1, 2019 11:38 AM

## 2019-06-01 NOTE — PROGRESS NOTES
"[facility]  [address] [place] [address]. [address]  T - [phone]  F - [phone]     Progress Note          PATIENT  DEMOGRAPHICS   PATIENT NAME: Favio Casillas                      PHYSICIAN: Gianna Meza MD  : 1955  MRN: [id number]   LOS: 2 days    Patient Care Team:  Shayne Merritt MD as PCP - General (Family Medicine)  Harvinder Robert MD as PCP - Claims Attributed  Harvinder Robert MD Sexton, Tammy S, RN as Care Coordinator (Population Health)  Subjective   SUBJECTIVE   Had some nausea and vomiting this morning. Denied pain, SOB.          Objective   OBJECTIVE   Vital Signs  Temp:  [96.8 °F (36 °C)-99.1 °F (37.3 °C)] 97.6 °F (36.4 °C)  Heart Rate:  [65-77] 71  Resp:  [16-20] 18  BP: (115-149)/(76-80) 149/76    Flowsheet Rows      First Filed Value   Admission Height  167.6 cm (66\") Documented at 2019 0922   Admission Weight  58.5 kg (129 lb) Documented at 2019 0922           I/O last 3 completed shifts:  In: 236 [P.O.:236]  Out: 4100 [Urine:4100]    PHYSICAL EXAM    Physical Exam   Constitutional: He is oriented to person, place, and time. He appears well-developed.   HENT:   Head: Normocephalic.   Eyes: Pupils are equal, round, and reactive to light.   Cardiovascular: Normal rate, regular rhythm and normal heart sounds. Exam reveals no gallop and no friction rub.   No murmur heard.  Pulmonary/Chest: Effort normal and breath sounds normal. No stridor. No respiratory distress. He has no wheezes. He has no rales.   Abdominal: Soft. Bowel sounds are normal. He exhibits no distension and no mass. There is no tenderness. There is no guarding.   Musculoskeletal: He exhibits no edema or tenderness.   Neurological: He is alert and oriented to person, place, and time.       RESULTS   Results Review:    Results from last 7 days   Lab Units 19  0700 19  2201 19  0532 19  0550 19  1012   SODIUM mmol/L 137  --  134* 131* " 128*   POTASSIUM mmol/L 4.6 3.6 2.9* 4.5 4.4   CHLORIDE mmol/L 106  --  98 107 102   CO2 mmol/L 21.0*  --  23.0 7.0* 10.0*   BUN mg/dL 79*  --  94* 101* 94*   CREATININE mg/dL 2.62*  --  3.62* 4.19* 4.04*   CALCIUM mg/dL 9.1  --  8.1* 8.8 9.7   BILIRUBIN mg/dL  --   --   --   --  0.3   ALK PHOS U/L  --   --   --   --  89   ALT (SGPT) U/L  --   --   --   --  74*   AST (SGOT) U/L  --   --   --   --  27   GLUCOSE mg/dL 218*  --  339* 220* 231*       Estimated Creatinine Clearance: 26.3 mL/min (A) (by C-G formula based on SCr of 2.62 mg/dL (H)).    Results from last 7 days   Lab Units 05/31/19  2201 05/29/19  1012   MAGNESIUM mg/dL 1.6 2.2             Results from last 7 days   Lab Units 06/01/19  0700 05/31/19  0532 05/30/19  0550 05/29/19  1012   WBC 10*3/mm3 5.34 6.20 9.56 11.21*   HEMOGLOBIN g/dL 11.0* 11.3* 13.9 15.5   PLATELETS 10*3/mm3 150 131* 150 155               Imaging Results (last 24 hours)     ** No results found for the last 24 hours. **           MEDICATIONS      aspirin 81 mg Oral Daily   budesonide-formoterol 2 puff Inhalation BID - RT   ceftriaxone 1 g Intravenous Q24H   clopidogrel 75 mg Oral Daily   heparin (porcine) 5,000 Units Subcutaneous Q12H   insulin aspart 0-9 Units Subcutaneous 4x Daily AC & at Bedtime   insulin aspart 6 Units Subcutaneous TID With Meals   insulin detemir 25 Units Subcutaneous Daily   pantoprazole 40 mg Oral Q AM   sodium chloride 3 mL Intravenous Q12H       lactated ringers 125 mL/hr Last Rate: 125 mL/hr (06/01/19 0705)       Assessment/Plan   ASSESSMENT / PLAN      Acute kidney injury (CMS/HCC)    Type 2 diabetes mellitus (CMS/HCC)    Chronic hepatitis C virus infection (CMS/HCC)    Acute renal failure superimposed on stage 3 chronic kidney disease (CMS/HCC)    Acute urinary tract infection    Metabolic acidosis    Hypokalemia    1. Acute kidney injury on CKD 3:   - Creatinine in December last year was 1.4.  it recently came down to 1.66.  Now another episode of acute  kidney injury with creatinine up to 4. Could be pre renal due to n/v but also ct showed again neobladder markedly dilated therefore suggesting chronic outlet obstruction.  s/p liu and good UO with cr improving. Hydro is now resolved  - Creatinine improving. Good urine output. Continue IVFs. Decrease rate to 100 ml/hr.      2. Gram-negative rods UTI (Pantoea):  - On ceftriaxone     3. History of bladder cancer:  - With neobladder formation 10 years ago.  s/p liu     4. Diabetes mellitus type 2           This document has been electronically signed by Gianna Meza MD on June 1, 2019 1:57 PM

## 2019-06-01 NOTE — PLAN OF CARE
Problem: Patient Care Overview  Goal: Plan of Care Review  Outcome: Ongoing (interventions implemented as appropriate)   05/31/19 6982   Coping/Psychosocial   Plan of Care Reviewed With patient   Plan of Care Review   Progress no change       Problem: Urinary Tract Infection (Adult)  Goal: Signs and Symptoms of Listed Potential Problems Will be Absent, Minimized or Managed (Urinary Tract Infection)  Outcome: Ongoing (interventions implemented as appropriate)      Problem: Renal Failure/Kidney Injury, Acute (Adult)  Goal: Signs and Symptoms of Listed Potential Problems Will be Absent, Minimized or Managed (Renal Failure/Kidney Injury, Acute)  Outcome: Ongoing (interventions implemented as appropriate)      Problem: Diabetes, Type 2 (Adult)  Goal: Signs and Symptoms of Listed Potential Problems Will be Absent, Minimized or Managed (Diabetes, Type 2)  Outcome: Ongoing (interventions implemented as appropriate)      Problem: Urine Elimination Impaired (Adult)  Goal: Effective or Improved Urinary Elimination  Outcome: Ongoing (interventions implemented as appropriate)    Goal: Effective Containment of Urine  Outcome: Ongoing (interventions implemented as appropriate)    Goal: Reduced Incontinence Episodes  Outcome: Ongoing (interventions implemented as appropriate)      Problem: Skin Injury Risk (Adult)  Goal: Identify Related Risk Factors and Signs and Symptoms  Outcome: Ongoing (interventions implemented as appropriate)    Goal: Skin Health and Integrity  Outcome: Ongoing (interventions implemented as appropriate)

## 2019-06-02 LAB
ANION GAP SERPL CALCULATED.3IONS-SCNC: 10 MMOL/L
BASOPHILS # BLD AUTO: 0.02 10*3/MM3 (ref 0–0.2)
BASOPHILS NFR BLD AUTO: 0.3 % (ref 0–1.5)
BUN BLD-MCNC: 59 MG/DL (ref 8–23)
BUN/CREAT SERPL: 26.3 (ref 7–25)
CALCIUM SPEC-SCNC: 8.8 MG/DL (ref 8.6–10.5)
CHLORIDE SERPL-SCNC: 108 MMOL/L (ref 98–107)
CO2 SERPL-SCNC: 21 MMOL/L (ref 22–29)
CREAT BLD-MCNC: 2.24 MG/DL (ref 0.76–1.27)
DEPRECATED RDW RBC AUTO: 43.1 FL (ref 37–54)
EOSINOPHIL # BLD AUTO: 0.08 10*3/MM3 (ref 0–0.4)
EOSINOPHIL NFR BLD AUTO: 1.3 % (ref 0.3–6.2)
ERYTHROCYTE [DISTWIDTH] IN BLOOD BY AUTOMATED COUNT: 12.8 % (ref 12.3–15.4)
GFR SERPL CREATININE-BSD FRML MDRD: 30 ML/MIN/1.73
GLUCOSE BLD-MCNC: 200 MG/DL (ref 65–99)
GLUCOSE BLDC GLUCOMTR-MCNC: 157 MG/DL (ref 70–130)
GLUCOSE BLDC GLUCOMTR-MCNC: 169 MG/DL (ref 70–130)
GLUCOSE BLDC GLUCOMTR-MCNC: 200 MG/DL (ref 70–130)
GLUCOSE BLDC GLUCOMTR-MCNC: 288 MG/DL (ref 70–130)
GLUCOSE BLDC GLUCOMTR-MCNC: 302 MG/DL (ref 70–130)
GLUCOSE BLDC GLUCOMTR-MCNC: 344 MG/DL (ref 70–130)
HCT VFR BLD AUTO: 31.6 % (ref 37.5–51)
HGB BLD-MCNC: 10.8 G/DL (ref 13–17.7)
IMM GRANULOCYTES # BLD AUTO: 0.03 10*3/MM3 (ref 0–0.05)
IMM GRANULOCYTES NFR BLD AUTO: 0.5 % (ref 0–0.5)
LYMPHOCYTES # BLD AUTO: 1.43 10*3/MM3 (ref 0.7–3.1)
LYMPHOCYTES NFR BLD AUTO: 22.6 % (ref 19.6–45.3)
MCH RBC QN AUTO: 31.6 PG (ref 26.6–33)
MCHC RBC AUTO-ENTMCNC: 34.2 G/DL (ref 31.5–35.7)
MCV RBC AUTO: 92.4 FL (ref 79–97)
MONOCYTES # BLD AUTO: 0.63 10*3/MM3 (ref 0.1–0.9)
MONOCYTES NFR BLD AUTO: 10 % (ref 5–12)
NEUTROPHILS # BLD AUTO: 4.14 10*3/MM3 (ref 1.7–7)
NEUTROPHILS NFR BLD AUTO: 65.3 % (ref 42.7–76)
NRBC BLD AUTO-RTO: 0 /100 WBC (ref 0–0.2)
PLATELET # BLD AUTO: 159 10*3/MM3 (ref 140–450)
PMV BLD AUTO: 10.4 FL (ref 6–12)
POTASSIUM BLD-SCNC: 4.3 MMOL/L (ref 3.5–5.2)
RBC # BLD AUTO: 3.42 10*6/MM3 (ref 4.14–5.8)
SODIUM BLD-SCNC: 139 MMOL/L (ref 136–145)
WBC NRBC COR # BLD: 6.33 10*3/MM3 (ref 3.4–10.8)

## 2019-06-02 PROCEDURE — 63710000001 INSULIN ASPART PER 5 UNITS: Performed by: NURSE PRACTITIONER

## 2019-06-02 PROCEDURE — 63710000001 INSULIN ASPART PER 5 UNITS: Performed by: INTERNAL MEDICINE

## 2019-06-02 PROCEDURE — 25010000002 CEFTRIAXONE PER 250 MG: Performed by: NURSE PRACTITIONER

## 2019-06-02 PROCEDURE — 97530 THERAPEUTIC ACTIVITIES: CPT

## 2019-06-02 PROCEDURE — 80048 BASIC METABOLIC PNL TOTAL CA: CPT | Performed by: INTERNAL MEDICINE

## 2019-06-02 PROCEDURE — 82962 GLUCOSE BLOOD TEST: CPT

## 2019-06-02 PROCEDURE — 94799 UNLISTED PULMONARY SVC/PX: CPT

## 2019-06-02 PROCEDURE — 63710000001 INSULIN DETEMIR PER 5 UNITS: Performed by: NURSE PRACTITIONER

## 2019-06-02 PROCEDURE — 85025 COMPLETE CBC W/AUTO DIFF WBC: CPT | Performed by: INTERNAL MEDICINE

## 2019-06-02 PROCEDURE — 94760 N-INVAS EAR/PLS OXIMETRY 1: CPT

## 2019-06-02 PROCEDURE — 25010000002 ONDANSETRON PER 1 MG: Performed by: FAMILY MEDICINE

## 2019-06-02 PROCEDURE — 25010000002 HEPARIN (PORCINE) PER 1000 UNITS: Performed by: INTERNAL MEDICINE

## 2019-06-02 PROCEDURE — 97110 THERAPEUTIC EXERCISES: CPT

## 2019-06-02 RX ADMIN — BUDESONIDE AND FORMOTEROL FUMARATE DIHYDRATE 2 PUFF: 160; 4.5 AEROSOL RESPIRATORY (INHALATION) at 07:01

## 2019-06-02 RX ADMIN — ASPIRIN 81 MG: 81 TABLET, COATED ORAL at 08:33

## 2019-06-02 RX ADMIN — SODIUM CHLORIDE, POTASSIUM CHLORIDE, SODIUM LACTATE AND CALCIUM CHLORIDE 125 ML/HR: 600; 310; 30; 20 INJECTION, SOLUTION INTRAVENOUS at 08:46

## 2019-06-02 RX ADMIN — INSULIN ASPART 6 UNITS: 100 INJECTION, SOLUTION INTRAVENOUS; SUBCUTANEOUS at 17:21

## 2019-06-02 RX ADMIN — HEPARIN SODIUM 5000 UNITS: 5000 INJECTION INTRAVENOUS; SUBCUTANEOUS at 21:47

## 2019-06-02 RX ADMIN — INSULIN ASPART 2 UNITS: 100 INJECTION, SOLUTION INTRAVENOUS; SUBCUTANEOUS at 21:47

## 2019-06-02 RX ADMIN — INSULIN ASPART 6 UNITS: 100 INJECTION, SOLUTION INTRAVENOUS; SUBCUTANEOUS at 11:37

## 2019-06-02 RX ADMIN — HEPARIN SODIUM 5000 UNITS: 5000 INJECTION INTRAVENOUS; SUBCUTANEOUS at 08:32

## 2019-06-02 RX ADMIN — SODIUM CHLORIDE, PRESERVATIVE FREE 3 ML: 5 INJECTION INTRAVENOUS at 08:34

## 2019-06-02 RX ADMIN — CLOPIDOGREL BISULFATE 75 MG: 75 TABLET ORAL at 08:33

## 2019-06-02 RX ADMIN — BUDESONIDE AND FORMOTEROL FUMARATE DIHYDRATE 2 PUFF: 160; 4.5 AEROSOL RESPIRATORY (INHALATION) at 20:26

## 2019-06-02 RX ADMIN — INSULIN DETEMIR 25 UNITS: 100 INJECTION, SOLUTION SUBCUTANEOUS at 08:33

## 2019-06-02 RX ADMIN — INSULIN ASPART 6 UNITS: 100 INJECTION, SOLUTION INTRAVENOUS; SUBCUTANEOUS at 08:34

## 2019-06-02 RX ADMIN — INSULIN ASPART 7 UNITS: 100 INJECTION, SOLUTION INTRAVENOUS; SUBCUTANEOUS at 17:21

## 2019-06-02 RX ADMIN — ONDANSETRON 4 MG: 2 INJECTION INTRAMUSCULAR; INTRAVENOUS at 08:32

## 2019-06-02 RX ADMIN — PANTOPRAZOLE SODIUM 40 MG: 40 TABLET, DELAYED RELEASE ORAL at 05:04

## 2019-06-02 RX ADMIN — INSULIN ASPART 4 UNITS: 100 INJECTION, SOLUTION INTRAVENOUS; SUBCUTANEOUS at 08:33

## 2019-06-02 RX ADMIN — SODIUM CHLORIDE, PRESERVATIVE FREE 3 ML: 5 INJECTION INTRAVENOUS at 21:48

## 2019-06-02 RX ADMIN — CEFTRIAXONE SODIUM 1 G: 1 INJECTION, POWDER, FOR SOLUTION INTRAMUSCULAR; INTRAVENOUS at 08:32

## 2019-06-02 NOTE — PLAN OF CARE
Problem: Patient Care Overview  Goal: Plan of Care Review  Outcome: Ongoing (interventions implemented as appropriate)   06/02/19 2736   OTHER   Outcome Summary pt reported some nausea at the beginning of the shift; relieved with IV antiemetic; no complaints at this time; vital signs stable; will continue to monitor   Coping/Psychosocial   Plan of Care Reviewed With patient   Plan of Care Review   Progress improving

## 2019-06-02 NOTE — PLAN OF CARE
Problem: Patient Care Overview  Goal: Plan of Care Review  Outcome: Ongoing (interventions implemented as appropriate)   06/02/19 0053 06/02/19 1108   OTHER   Outcome Summary --  pt sup<>sit with independence, sit<>stand with SBA, pt ambulated 250` & 350` with SBA, pt up/down 2 & 3 steps with SBA. pt would benefit from HHPT @ D/C   Coping/Psychosocial   Plan of Care Reviewed With patient --    Plan of Care Review   Progress improving --

## 2019-06-02 NOTE — PROGRESS NOTES
[facility] Medicine Services  INPATIENT PROGRESS NOTE    Length of Stay: 3  Date of Admission: 5/29/2019  Primary Care Physician: Shayne Merritt MD    Subjective   Chief Complaint: Weakness  HPI:  64 year old male with a history of COPD, CKD, DMII, HTN, bladder cancer, CAD, and Hepatitis C who presented to the hospital with a complaint of generalized weakness, nausea, vomiting, and uncontrolled blood sugar.  Creatinine was found to be elevated to 4.0.  He had a similar problem in the past secondary to obstruction.  He is acidotic with a bicarb of 7.  He received IV fluid overnight and did not improve.  Nephrology was consulted and he was initiated on sodium bicarbonate in D5W infusion.  Acidosis has resolved and he transitioned to lactated ringers.  Creatinine is improving.  Urine cultures are growing Pantoea agglomerans which is sensitive to Rocephin.  He had multiple episodes of nausea and vomiting yesterday.  He is feeling better this morning and has kept down breakfast.     Review of Systems   Constitutional: Negative for chills, fatigue and fever.   Respiratory: Negative for shortness of breath.    Cardiovascular: Negative for chest pain.   Gastrointestinal: Positive for nausea and vomiting. Negative for abdominal pain.   Genitourinary: Negative for difficulty urinating and flank pain.      All pertinent negatives and positives are as above. All other systems have been reviewed and are negative unless otherwise stated.     Objective    Temp:  [96.7 °F (35.9 °C)-98.6 °F (37 °C)] 98.2 °F (36.8 °C)  Heart Rate:  [60-70] 65  Resp:  [16-18] 16  BP: (131-160)/(72-85) 148/85    Physical Exam   Constitutional: He is oriented to person, place, and time. He appears well-developed and well-nourished. No distress.   HENT:   Head: Normocephalic.   Eyes: Conjunctivae are normal.   Cardiovascular: Normal rate, regular rhythm, normal heart sounds and intact distal pulses.    Pulmonary/Chest: Effort normal and breath sounds normal. No respiratory distress.   Abdominal: Soft. Bowel sounds are normal. He exhibits no distension. There is no tenderness.   Musculoskeletal: Normal range of motion. He exhibits no edema.   Neurological: He is alert and oriented to person, place, and time.   Skin: Skin is warm and dry. He is not diaphoretic. No erythema.   Vitals reviewed.      Results Review:  I have reviewed the labs, radiology results, and diagnostic studies.    Laboratory Data:   Results from last 7 days   Lab Units 06/02/19 0640 06/01/19 0700 05/31/19 2201 05/31/19  0532  05/29/19  1012   SODIUM mmol/L 139 137  --  134*   < > 128*   POTASSIUM mmol/L 4.3 4.6 3.6 2.9*   < > 4.4   CHLORIDE mmol/L 108* 106  --  98   < > 102   CO2 mmol/L 21.0* 21.0*  --  23.0   < > 10.0*   BUN mg/dL 59* 79*  --  94*   < > 94*   CREATININE mg/dL 2.24* 2.62*  --  3.62*   < > 4.04*   GLUCOSE mg/dL 200* 218*  --  339*   < > 231*   CALCIUM mg/dL 8.8 9.1  --  8.1*   < > 9.7   BILIRUBIN mg/dL  --   --   --   --   --  0.3   ALK PHOS U/L  --   --   --   --   --  89   ALT (SGPT) U/L  --   --   --   --   --  74*   AST (SGOT) U/L  --   --   --   --   --  27   ANION GAP mmol/L 10.0 10.0  --  13.0   < > 16.0    < > = values in this interval not displayed.     Estimated Creatinine Clearance: 31 mL/min (A) (by C-G formula based on SCr of 2.24 mg/dL (H)).  Results from last 7 days   Lab Units 05/31/19 2201 05/29/19  1012   MAGNESIUM mg/dL 1.6 2.2         Results from last 7 days   Lab Units 06/02/19 0640 06/01/19  0700 05/31/19  0532 05/30/19  0550 05/29/19  1012   WBC 10*3/mm3 6.33 5.34 6.20 9.56 11.21*   HEMOGLOBIN g/dL 10.8* 11.0* 11.3* 13.9 15.5   HEMATOCRIT % 31.6* 32.4* 32.6* 41.4 44.8   PLATELETS 10*3/mm3 159 150 131* 150 155           Culture Data:   No results found for: BLOODCX  No results found for: URINECX  No results found for: RESPCX  No results found for: WOUNDCX  No results found for: STOOLCX  No components  found for: BODYFLD    Radiology Data:   Imaging Results (last 24 hours)     ** No results found for the last 24 hours. **          I have reviewed the patient's current medications.     Assessment/Plan     Active Hospital Problems    Diagnosis   • **Acute kidney injury (CMS/HCC)   • Hypokalemia   • Metabolic acidosis   • Acute urinary tract infection   • Acute renal failure superimposed on stage 3 chronic kidney disease (CMS/HCC)   • Chronic hepatitis C virus infection (CMS/HCC)   • Type 2 diabetes mellitus (CMS/HCC)       Plan:   Rocephin day 4/10  Cultures growing Pantoea agglomerans  IV Fluid: LR @ 125 ml/hr  Follow creatinine, improving  Acidosis resolved  Nephrology consultation appreciated  CT reveals similar neobladder dilation from previus exams with resolution of hydronephrosis  Urology consultation appreciated  D/C Alvaro  PT/OT        This document has been electronically signed by TIFFANIE Silva on June 2, 2019 10:05 AM

## 2019-06-02 NOTE — THERAPY TREATMENT NOTE
Acute Care - Physical Therapy Treatment Note  [facility]     Patient Name: Favio Casillas  : 1955  MRN: [id number]  Today's Date: 2019  Onset of Illness/Injury or Date of Surgery: 19  Date of Referral to PT: 19  Referring Physician: Yunier Yoder DO    Admit Date: 2019    Visit Dx:    ICD-10-CM ICD-9-CM   1. Acute kidney injury (CMS/HCC) N17.9 584.9   2. Acute urinary tract infection N39.0 599.0   3. Impaired functional mobility, balance, gait, and endurance Z74.09 V49.89   4. Impaired mobility and ADLs Z74.09 799.89     Patient Active Problem List   Diagnosis   • Type 2 diabetes mellitus (CMS/HCC)   • Acute pain of right shoulder   • Shoulder impingement syndrome, right   • Chest pain   • Acute renal insufficiency   • Chronic hepatitis C virus infection (CMS/HCC)   • Gastritis   • SBO (small bowel obstruction) (CMS/HCC)   • CAD (coronary artery disease)   • Acute kidney injury (nontraumatic) (CMS/HCC)   • Intractable vomiting with nausea   • Gastroesophageal reflux disease with esophagitis   • Diarrhea   • Generalized abdominal pain   • History of colon polyps   • History of colitis   • Acute metabolic encephalopathy   • NSTEMI (non-ST elevated myocardial infarction) (CMS/HCC)   • Acute renal failure superimposed on stage 3 chronic kidney disease (CMS/HCC)   • Influenza A   • Anemia, chronic disease   • Bladder outflow obstruction   • Acute kidney injury (CMS/HCC)   • Acute urinary tract infection   • Metabolic acidosis   • Hypokalemia       Therapy Treatment    Rehabilitation Treatment Summary     Row Name 19 1108             Treatment Time/Intention    Discipline  occupational therapy assistant  -TA      Document Type  therapy note (daily note)  -TA      Subjective Information  no complaints  -TA      Mode of Treatment  physical therapy;individual therapy  -TA      Therapy Frequency (PT Clinical Impression)  other (see comments) 6x/wk  -TA      Patient Effort  good  -TA       Existing Precautions/Restrictions  fall  -TA      Recorded by [TA] Renae Peres, PTA 06/02/19 1312      Row Name 06/02/19 1108             Vital Signs    Pre Systolic BP Rehab  124  -TA      Pre Treatment Diastolic BP  58  -TA      Post Systolic BP Rehab  130  -TA      Post Treatment Diastolic BP  70  -TA      Pretreatment Heart Rate (beats/min)  62  -TA      Intratreatment Heart Rate (beats/min)  64  -TA      Posttreatment Heart Rate (beats/min)  61  -TA      Pre SpO2 (%)  96  -TA      O2 Delivery Pre Treatment  room air  -TA      Intra SpO2 (%)  98  -TA      O2 Delivery Intra Treatment  room air  -TA      Post SpO2 (%)  98  -TA      O2 Delivery Post Treatment  room air  -TA      Pre Patient Position  Supine  -TA      Post Patient Position  Supine  -TA      Recorded by [TA] Renae Peres, PTA 06/02/19 1312      Row Name 06/02/19 1108             Cognitive Assessment/Intervention- PT/OT    Orientation Status (Cognition)  oriented to;person;place;situation  -TA      Follows Commands (Cognition)  follows one step commands;over 90% accuracy  -TA      Personal Safety Interventions  fall prevention program maintained;gait belt;nonskid shoes/slippers when out of bed;supervised activity  -TA      Recorded by [TA] Renae Peres, PTA 06/02/19 1312      Row Name 06/02/19 1108             Safety Issues, Functional Mobility    Impairments Affecting Function (Mobility)  balance;coordination;endurance/activity tolerance  -TA      Recorded by [TA] Renae Peres, PTA 06/02/19 1312      Row Name 06/02/19 1108             Bed Mobility Assessment/Treatment    Bed Mobility Assessment/Treatment  supine-sit  -TA      Supine-Sit [place] (Bed Mobility)  independent  -TA      Sit-Supine [place] (Bed Mobility)  independent  -TA      Assistive Device (Bed Mobility)  --  -TA      Recorded by [TA] Renae Peres, PTA 06/02/19 1312      Row Name 06/02/19 1108             Transfer Assessment/Treatment    Transfer  Assessment/Treatment  sit-stand transfer;stand-sit transfer;bed-chair transfer  -TA      Comment (Transfers)  pt performed sit<>stand x 5  -TA      Recorded by [TA] Renae Peres, PTA 06/02/19 1312      Row Name 06/02/19 1108             Bed-Chair Transfer    Bed-Chair [place] (Transfers)  --  -TA      Assistive Device (Bed-Chair Transfers)  --  -TA      Recorded by [TA] Renae Peres, PTA 06/02/19 1312      Row Name 06/02/19 1108             Sit-Stand Transfer    Sit-Stand [place] (Transfers)  supervision  -TA      Assistive Device (Sit-Stand Transfers)  walker, front-wheeled  -TA      Recorded by [TA] Renae Peres, PTA 06/02/19 1312      Row Name 06/02/19 1108             Stand-Sit Transfer    Stand-Sit [place] (Transfers)  supervision  -TA      Assistive Device (Stand-Sit Transfers)  walker, front-wheeled  -TA      Recorded by [TA] Renae Peres, PTA 06/02/19 1312      Row Name 06/02/19 1108             Gait/Stairs Assessment/Training    Gait/Stairs Assessment/Training  gait/ambulation independence;gait/ambulation assistive device;distance ambulated;gait pattern;gait deviations;maintains weight-bearing status  -TA      [place] Level (Gait)  supervision  -TA      Assistive Device (Gait)  walker, front-wheeled  -TA      Distance in Feet (Gait)  250` & 350`  -TA2      Deviations/Abnormal Patterns (Gait)  --  -TA      Negotiation (Stairs)  stairs independence  -TA      [place] Level (Stairs)  supervision  -TA      Handrail Location (Stairs)  both sides  -TA      Number of Steps (Stairs)  2 & 3  -TA      Ascending Technique (Stairs)  step-over-step  -TA      Descending Technique (Stairs)  step-over-step  -TA      Recorded by [TA] Renae Peres, PTA 06/02/19 1312  [TA2] Renae Peres, PTA 06/02/19 1316      Row Name 06/02/19 1108             Therapeutic Exercise    Lower Extremity (Therapeutic Exercise)  gluteal sets;LAQ (long arc quad), bilateral;marching while seated  -TA       Lower Extremity Range of Motion (Therapeutic Exercise)  ankle dorsiflexion/plantar flexion, bilateral;hip abduction/adduction, bilateral  -TA      Exercise Type (Therapeutic Exercise)  AROM (active range of motion)  -TA      Position (Therapeutic Exercise)  seated  -TA      Sets/Reps (Therapeutic Exercise)  20  -TA      Expected Outcome (Therapeutic Exercise)  facilitate normal movement patterns;improve functional stability;improve functional tolerance, self-care activity;improve motor control;improve performance, gait skills;improve performance, transfer skills;increase active range of motion  -TA      Recorded by [TA] Renae Peres, [facility] 06/02/19 1312      Row Name 06/02/19 1108             Static Sitting Balance    Level of [place] (Unsupported Sitting, Static Balance)  independent  -TA      Sitting Position (Unsupported Sitting, Static Balance)  sitting on edge of bed  -TA      Time Able to Maintain Position (Unsupported Sitting, Static Balance)  more than 5 minutes  -TA      Recorded by [TA] Renae Peres, [facility] 06/02/19 1312      Row Name 06/02/19 1108             Positioning and Restraints    Pre-Treatment Position  in bed  -TA      Post Treatment Position  bed  -TA      In Bed  supine;call light within reach;exit alarm on  -TA      Recorded by [TA] Renae Peres, PTA 06/02/19 1312      Row Name 06/02/19 1108             Pain Scale: Numbers Pre/Post-Treatment    Pain Scale: Numbers, Pretreatment  0/10 - no pain  -TA      Pain Scale: Numbers, Post-Treatment  0/10 - no pain  -TA      Pain Location  --  -TA      Recorded by [TA] Renae Peres, [facility] 06/02/19 1312      Row Name 06/02/19 1108             Outcome Summary/Treatment Plan (PT)    Daily Summary of Progress (PT)  progress toward functional goals is good  -TA      Plan for Continued Treatment (PT)  continue  -TA      Anticipated Equipment Needs at Discharge (PT)  front wheeled walker possible need  -TA      Anticipated Discharge Disposition (PT)   home with assist;home with home health  -TA      Recorded by [TA] Renae Peres, PTA 06/02/19 1312        User Key  (r) = Recorded By, (t) = Taken By, (c) = Cosigned By    Initials Name Effective Dates Discipline    MICAH Renae Peres, PTA 03/07/18 -  PT               Rehab Goal Summary     Row Name 06/02/19 1108             Physical Therapy Goals    Bed Mobility Goal Selection (PT)  bed mobility, PT goal 1  -TA      Transfer Goal Selection (PT)  transfer, PT goal 1  -TA      Gait Training Goal Selection (PT)  gait training, PT goal 1  -TA      Strength Goal Selection (PT)  strength, PT goal 1  -TA      Stairs Goal Selection (PT)  stairs, PT goal 1  -TA         Bed Mobility Goal 1 (PT)    Activity/Assistive Device (Bed Mobility Goal 1, PT)  bed mobility activities, all  -TA      [place] Level/Cues Needed (Bed Mobility Goal 1, PT)  conditional independence  -TA      Time Frame (Bed Mobility Goal 1, PT)  5 days  -TA      Barriers (Bed Mobility Goal 1, PT)  lethargy, confusion  -TA      Progress/Outcomes (Bed Mobility Goal 1, PT)  goal met  (Significant)   -TA         Transfer Goal 1 (PT)    Activity/Assistive Device (Transfer Goal 1, PT)  sit-to-stand/stand-to-sit;bed-to-chair/chair-to-bed;walker, rolling  -TA      [place] Level/Cues Needed (Transfer Goal 1, PT)  supervision required  -TA      Time Frame (Transfer Goal 1, PT)  5 days  -TA      Barriers (Transfers Goal 1, PT)  lethargy, confusion  -TA      Progress/Outcome (Transfer Goal 1, PT)  goal met  (Significant)   -TA         Gait Training Goal 1 (PT)    Activity/Assistive Device (Gait Training Goal 1, PT)  walker, rolling;cane, straight;decrease fall risk;improve balance and speed;increase endurance/gait distance  -TA      [place] Level (Gait Training Goal 1, PT)  standby assist  -TA      Distance (Gait Goal 1, PT)  150 feet  -TA      Time Frame (Gait Training Goal 1, PT)  1 week  -TA      Barriers (Gait Training Goal 1, PT)  lethargy,  confusion  -TA      Progress/Outcome (Gait Training Goal 1, PT)  goal met  (Significant)   -TA         Strength Goal 1 (PT)    Strength Goal 1 (PT)  pt will be able to complete 20 reps of at least 4 seated LE exercises in order to improve tolerance for transfers and ambulation  -TA      Time Frame (Strength Goal 1, PT)  4 days  -TA      Barriers (Strength Goal 1, PT)  lethargy, confusion  -TA      Progress/Outcome (Strength Goal 1, PT)  goal met  (Significant)   -TA         Stairs Goal 1 (PT)    Activity/Assistive Device (Stairs Goal 1, PT)  ascending stairs;descending stairs;using handrail, right;using handrail, left  -TA      [place] Level/Cues Needed (Stairs Goal 1, PT)  contact guard assist  -TA      Number of Stairs (Stairs Goal 1, PT)  5 stairs  -TA      Time Frame (Stairs Goal 1, PT)  1 week  -TA      Barriers (Stairs Goal 1, PT)  lethargy, confusion  -TA      Progress/Outcome (Stairs Goal 1, PT)  goal not met;goal partially met  -TA        User Key  (r) = Recorded By, (t) = Taken By, (c) = Cosigned By    Initials Name Provider Type Discipline    Renae Padron PTA Physical Therapy Assistant PT          Physical Therapy Education     Title: PT OT SLP Therapies (In Progress)     Topic: Physical Therapy (In Progress)     Point: Mobility training (In Progress)     Learning Progress Summary           Patient Acceptance, E, NR by  at 5/31/2019  3:32 PM    Acceptance, E, NR by  at 5/29/2019  3:21 PM    Comment:  Role of PT, PT POC, safe transfer technique                   Point: Precautions (In Progress)     Learning Progress Summary           Patient Acceptance, E, NR by  at 5/29/2019  3:22 PM    Comment:  gait belt, call light, staff assistance with mobility                               User Key     Initials Effective Dates Name Provider Type Discipline    ELIJAH 03/07/18 -  Ahsan Bell PTA Physical Therapy Assistant PT     07/23/18 -  Olive Genao PT Physical Therapist PT                PT  Recommendation and Plan  Anticipated Discharge Disposition (PT): home with assist, home with home health  Therapy Frequency (PT Clinical Impression): other (see comments)(6x/wk)  Outcome Summary/Treatment Plan (PT)  Daily Summary of Progress (PT): progress toward functional goals is good  Plan for Continued Treatment (PT): continue  Anticipated Equipment Needs at Discharge (PT): front wheeled walker(possible need)  Anticipated Discharge Disposition (PT): home with assist, home with home health  Outcome Summary: pt sup<>sit with independence, sit<>stand with SBA, pt ambulated 250` & 350` with SBA, pt up/down 2 & 3 steps with SBA. pt would benefit from HHPT @ D/C  Outcome Measures     Row Name 06/02/19 1300 05/31/19 1445 05/31/19 1035       How much help from another person do you currently need...    Turning from your back to your side while in flat bed without using bedrails?  4  -TA  4  -ELIJAH  --    Moving from lying on back to sitting on the side of a flat bed without bedrails?  4  -TA  3  -ELIJAH  --    Moving to and from a bed to a chair (including a wheelchair)?  3  -TA  3  -ELIJAH  --    Standing up from a chair using your arms (e.g., wheelchair, bedside chair)?  3  -TA  3  -ELIJAH  --    Climbing 3-5 steps with a railing?  3  -TA  3  -ELIJAH  --    To walk in hospital room?  3  -TA  3  -ELIJAH  --    AM-PAC 6 Clicks Score  20  -TA  19  -ELIJAH  --       How much help from another is currently needed...    Putting on and taking off regular lower body clothing?  --  --  2  -LW    Bathing (including washing, rinsing, and drying)  --  --  2  -LW    Toileting (which includes using toilet bed pan or urinal)  --  --  2  -LW    Putting on and taking off regular upper body clothing  --  --  2  -LW    Taking care of personal grooming (such as brushing teeth)  --  --  3  -LW    Eating meals  --  --  3  -LW    Score  --  --  14  -LW       Functional Assessment    Outcome Measure Options  AM-PAC 6 Clicks Basic Mobility (PT)  -TA  AM-PAC 6 Clicks  Basic Mobility (PT)  -ELIJAH  --      User Key  (r) = Recorded By, (t) = Taken By, (c) = Cosigned By    Initials Name Provider Type    Renae Padron PTA Physical Therapy Assistant    Ahsan Nixon, CAMRON Physical Therapy Assistant    Karen Vuong, ANTHONY/L Occupational Therapy Assistant         Time Calculation:   PT Charges     Row Name 06/02/19 1316             Time Calculation    Start Time  1108  -TA      Stop Time  1137  -TA      Time Calculation (min)  29 min  -TA         Time Calculation- PT    Total Timed Code Minutes- PT  29 minute(s)  -TA        User Key  (r) = Recorded By, (t) = Taken By, (c) = Cosigned By    Initials Name Provider Type    Renae Padron PTA Physical Therapy Assistant        Therapy Charges for Today     Code Description Service Date Service Provider Modifiers Qty    21360401193 HC PT THERAPEUTIC ACT EA 15 MIN 6/2/2019 Renae Peres PTA GP 1    24643464634 HC PT THER PROC EA 15 MIN 6/2/2019 Renae Peres PTA GP 1          PT G-Codes  Outcome Measure Options: AM-PAC 6 Clicks Basic Mobility (PT)  AM-PAC 6 Clicks Score: 20  Score: 14    Renae Peres PTA  6/2/2019

## 2019-06-02 NOTE — PROGRESS NOTES
"[facility]  [address] [place] [address]. [address]  T - [phone]  F - [phone]     Progress Note          PATIENT  DEMOGRAPHICS   PATIENT NAME: Favio Casillas                      PHYSICIAN: Gianna Meza MD  : 1955  MRN: [id number]   LOS: 3 days    Patient Care Team:  Shayne Merritt MD as PCP - General (Family Medicine)  Harvinder Robert MD as PCP - Claims Attributed  Harvinder Robert MD Sexton, Tammy S, RN as Care Coordinator (Population Health)  Subjective   SUBJECTIVE   Nausea is better today and is able to keep food down. Denied pain, SOB.          Objective   OBJECTIVE   Vital Signs  Temp:  [96.7 °F (35.9 °C)-98.6 °F (37 °C)] 98.2 °F (36.8 °C)  Heart Rate:  [60-70] 65  Resp:  [16-18] 16  BP: (131-160)/(72-85) 148/85    Flowsheet Rows      First Filed Value   Admission Height  167.6 cm (66\") Documented at 2019 09   Admission Weight  58.5 kg (129 lb) Documented at 2019 0922           I/O last 3 completed shifts:  In: 472 [P.O.:472]  Out: 5000 [Urine:5000]    PHYSICAL EXAM    Physical Exam   Constitutional: He is oriented to person, place, and time. He appears well-developed.   HENT:   Head: Normocephalic.   Eyes: Pupils are equal, round, and reactive to light.   Cardiovascular: Normal rate, regular rhythm and normal heart sounds. Exam reveals no gallop and no friction rub.   No murmur heard.  Pulmonary/Chest: Effort normal and breath sounds normal. No stridor. No respiratory distress. He has no wheezes. He has no rales.   Abdominal: Soft. Bowel sounds are normal. He exhibits no distension and no mass. There is no tenderness. There is no guarding.   Musculoskeletal: He exhibits no edema or tenderness.   Neurological: He is alert and oriented to person, place, and time.       RESULTS   Results Review:    Results from last 7 days   Lab Units 19  0640 19  0700 19  2201 19  0532  19  1012   SODIUM mmol/L 139 137 "  --  134*   < > 128*   POTASSIUM mmol/L 4.3 4.6 3.6 2.9*   < > 4.4   CHLORIDE mmol/L 108* 106  --  98   < > 102   CO2 mmol/L 21.0* 21.0*  --  23.0   < > 10.0*   BUN mg/dL 59* 79*  --  94*   < > 94*   CREATININE mg/dL 2.24* 2.62*  --  3.62*   < > 4.04*   CALCIUM mg/dL 8.8 9.1  --  8.1*   < > 9.7   BILIRUBIN mg/dL  --   --   --   --   --  0.3   ALK PHOS U/L  --   --   --   --   --  89   ALT (SGPT) U/L  --   --   --   --   --  74*   AST (SGOT) U/L  --   --   --   --   --  27   GLUCOSE mg/dL 200* 218*  --  339*   < > 231*    < > = values in this interval not displayed.       Estimated Creatinine Clearance: 31 mL/min (A) (by C-G formula based on SCr of 2.24 mg/dL (H)).    Results from last 7 days   Lab Units 05/31/19  2201 05/29/19  1012   MAGNESIUM mg/dL 1.6 2.2             Results from last 7 days   Lab Units 06/02/19  0640 06/01/19  0700 05/31/19  0532 05/30/19  0550 05/29/19  1012   WBC 10*3/mm3 6.33 5.34 6.20 9.56 11.21*   HEMOGLOBIN g/dL 10.8* 11.0* 11.3* 13.9 15.5   PLATELETS 10*3/mm3 159 150 131* 150 155               Imaging Results (last 24 hours)     ** No results found for the last 24 hours. **           MEDICATIONS      aspirin 81 mg Oral Daily   budesonide-formoterol 2 puff Inhalation BID - RT   ceftriaxone 1 g Intravenous Q24H   clopidogrel 75 mg Oral Daily   heparin (porcine) 5,000 Units Subcutaneous Q12H   insulin aspart 0-9 Units Subcutaneous 4x Daily AC & at Bedtime   insulin aspart 6 Units Subcutaneous TID With Meals   insulin detemir 25 Units Subcutaneous Daily   pantoprazole 40 mg Oral Q AM   sodium chloride 3 mL Intravenous Q12H       lactated ringers 125 mL/hr Last Rate: 125 mL/hr (06/02/19 8838)       Assessment/Plan   ASSESSMENT / PLAN      Acute kidney injury (CMS/HCC)    Type 2 diabetes mellitus (CMS/HCC)    Chronic hepatitis C virus infection (CMS/HCC)    Acute renal failure superimposed on stage 3 chronic kidney disease (CMS/HCC)    Acute urinary tract infection    Metabolic acidosis     Hypokalemia    1. Acute kidney injury on CKD 3:   - Creatinine in December last year was 1.4.  it recently came down to 1.66.  Now another episode of acute kidney injury with creatinine up to 4. Could be pre renal due to n/v but also ct showed again neobladder markedly dilated therefore suggesting chronic outlet obstruction.  s/p liu and good UO with cr improving. Hydro is now resolved  - Creatinine improving. Good urine output. Discontinue IVFs.      2. Gram-negative rods UTI ([employer]):  - On ceftriaxone     3. History of bladder cancer:  - With neobladder formation 10 years ago.  s/p liu     4. Diabetes mellitus type 2           This document has been electronically signed by Gianna Meza MD on June 2, 2019 10:32 AM

## 2019-06-03 VITALS
DIASTOLIC BLOOD PRESSURE: 96 MMHG | BODY MASS INDEX: 22.7 KG/M2 | RESPIRATION RATE: 16 BRPM | OXYGEN SATURATION: 100 % | HEART RATE: 73 BPM | WEIGHT: 144.6 LBS | SYSTOLIC BLOOD PRESSURE: 157 MMHG | TEMPERATURE: 96.9 F | HEIGHT: 67 IN

## 2019-06-03 LAB
ANION GAP SERPL CALCULATED.3IONS-SCNC: 11 MMOL/L
BASOPHILS # BLD AUTO: 0.03 10*3/MM3 (ref 0–0.2)
BASOPHILS NFR BLD AUTO: 0.4 % (ref 0–1.5)
BUN BLD-MCNC: 49 MG/DL (ref 8–23)
BUN/CREAT SERPL: 22.9 (ref 7–25)
CALCIUM SPEC-SCNC: 9.2 MG/DL (ref 8.6–10.5)
CHLORIDE SERPL-SCNC: 103 MMOL/L (ref 98–107)
CO2 SERPL-SCNC: 21 MMOL/L (ref 22–29)
CREAT BLD-MCNC: 2.14 MG/DL (ref 0.76–1.27)
DEPRECATED RDW RBC AUTO: 44.3 FL (ref 37–54)
EOSINOPHIL # BLD AUTO: 0.09 10*3/MM3 (ref 0–0.4)
EOSINOPHIL NFR BLD AUTO: 1.3 % (ref 0.3–6.2)
ERYTHROCYTE [DISTWIDTH] IN BLOOD BY AUTOMATED COUNT: 12.8 % (ref 12.3–15.4)
GFR SERPL CREATININE-BSD FRML MDRD: 31 ML/MIN/1.73
GLUCOSE BLD-MCNC: 206 MG/DL (ref 65–99)
GLUCOSE BLDC GLUCOMTR-MCNC: 193 MG/DL (ref 70–130)
GLUCOSE BLDC GLUCOMTR-MCNC: 414 MG/DL (ref 70–130)
HCT VFR BLD AUTO: 33.3 % (ref 37.5–51)
HGB BLD-MCNC: 11.1 G/DL (ref 13–17.7)
IMM GRANULOCYTES # BLD AUTO: 0.03 10*3/MM3 (ref 0–0.05)
IMM GRANULOCYTES NFR BLD AUTO: 0.4 % (ref 0–0.5)
LYMPHOCYTES # BLD AUTO: 1.76 10*3/MM3 (ref 0.7–3.1)
LYMPHOCYTES NFR BLD AUTO: 25.8 % (ref 19.6–45.3)
MCH RBC QN AUTO: 31.4 PG (ref 26.6–33)
MCHC RBC AUTO-ENTMCNC: 33.3 G/DL (ref 31.5–35.7)
MCV RBC AUTO: 94.1 FL (ref 79–97)
MONOCYTES # BLD AUTO: 0.62 10*3/MM3 (ref 0.1–0.9)
MONOCYTES NFR BLD AUTO: 9.1 % (ref 5–12)
NEUTROPHILS # BLD AUTO: 4.28 10*3/MM3 (ref 1.7–7)
NEUTROPHILS NFR BLD AUTO: 63 % (ref 42.7–76)
NRBC BLD AUTO-RTO: 0 /100 WBC (ref 0–0.2)
PLATELET # BLD AUTO: 162 10*3/MM3 (ref 140–450)
PMV BLD AUTO: 10.9 FL (ref 6–12)
POTASSIUM BLD-SCNC: 4.8 MMOL/L (ref 3.5–5.2)
RBC # BLD AUTO: 3.54 10*6/MM3 (ref 4.14–5.8)
SODIUM BLD-SCNC: 135 MMOL/L (ref 136–145)
WBC NRBC COR # BLD: 6.81 10*3/MM3 (ref 3.4–10.8)

## 2019-06-03 PROCEDURE — 80048 BASIC METABOLIC PNL TOTAL CA: CPT | Performed by: INTERNAL MEDICINE

## 2019-06-03 PROCEDURE — 25010000002 HEPARIN (PORCINE) PER 1000 UNITS: Performed by: INTERNAL MEDICINE

## 2019-06-03 PROCEDURE — 85025 COMPLETE CBC W/AUTO DIFF WBC: CPT | Performed by: INTERNAL MEDICINE

## 2019-06-03 PROCEDURE — 82962 GLUCOSE BLOOD TEST: CPT

## 2019-06-03 PROCEDURE — 63710000001 INSULIN ASPART PER 5 UNITS: Performed by: NURSE PRACTITIONER

## 2019-06-03 PROCEDURE — 93010 ELECTROCARDIOGRAM REPORT: CPT | Performed by: INTERNAL MEDICINE

## 2019-06-03 PROCEDURE — 63710000001 INSULIN DETEMIR PER 5 UNITS: Performed by: NURSE PRACTITIONER

## 2019-06-03 PROCEDURE — 25010000002 CEFTRIAXONE PER 250 MG: Performed by: NURSE PRACTITIONER

## 2019-06-03 PROCEDURE — 63710000001 INSULIN ASPART PER 5 UNITS: Performed by: INTERNAL MEDICINE

## 2019-06-03 PROCEDURE — 93005 ELECTROCARDIOGRAM TRACING: CPT | Performed by: FAMILY MEDICINE

## 2019-06-03 PROCEDURE — 97535 SELF CARE MNGMENT TRAINING: CPT

## 2019-06-03 RX ORDER — CEPHALEXIN 500 MG/1
500 CAPSULE ORAL 2 TIMES DAILY
Qty: 10 CAPSULE | Refills: 0 | Status: SHIPPED | OUTPATIENT
Start: 2019-06-03 | End: 2019-06-08

## 2019-06-03 RX ADMIN — INSULIN ASPART 6 UNITS: 100 INJECTION, SOLUTION INTRAVENOUS; SUBCUTANEOUS at 08:32

## 2019-06-03 RX ADMIN — HEPARIN SODIUM 5000 UNITS: 5000 INJECTION INTRAVENOUS; SUBCUTANEOUS at 08:29

## 2019-06-03 RX ADMIN — PANTOPRAZOLE SODIUM 40 MG: 40 TABLET, DELAYED RELEASE ORAL at 05:02

## 2019-06-03 RX ADMIN — INSULIN DETEMIR 25 UNITS: 100 INJECTION, SOLUTION SUBCUTANEOUS at 08:30

## 2019-06-03 RX ADMIN — INSULIN ASPART 2 UNITS: 100 INJECTION, SOLUTION INTRAVENOUS; SUBCUTANEOUS at 08:32

## 2019-06-03 RX ADMIN — SODIUM CHLORIDE, PRESERVATIVE FREE 10 ML: 5 INJECTION INTRAVENOUS at 08:29

## 2019-06-03 RX ADMIN — CLOPIDOGREL BISULFATE 75 MG: 75 TABLET ORAL at 08:29

## 2019-06-03 RX ADMIN — ASPIRIN 81 MG: 81 TABLET, COATED ORAL at 08:29

## 2019-06-03 RX ADMIN — CEFTRIAXONE SODIUM 1 G: 1 INJECTION, POWDER, FOR SOLUTION INTRAMUSCULAR; INTRAVENOUS at 08:30

## 2019-06-03 NOTE — PLAN OF CARE
Problem: Patient Care Overview  Goal: Plan of Care Review  Outcome: Ongoing (interventions implemented as appropriate)   06/03/19 9414   OTHER   Outcome Summary pt c/o CP, EKG showed sinus bradycardia; CP resolved without medications; no other complaints reported; vital signs stable; will continue to monitor   Coping/Psychosocial   Plan of Care Reviewed With patient   Plan of Care Review   Progress no change
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
## 2019-06-03 NOTE — DISCHARGE SUMMARY
[facility] Medicine Services  DISCHARGE SUMMARY       Date of Admission: 5/29/2019  Date of Discharge:  6/3/2019  Primary Care Physician: Shayne Merritt MD    Presenting Problem/History of Present Illness:  Acute urinary tract infection [N39.0]  Acute kidney injury (CMS/HCC) [N17.9]  Acute kidney injury (CMS/HCC) [N17.9]     Final Discharge Diagnoses:    Acute kidney injury (CMS/HCC)    Type 2 diabetes mellitus (CMS/HCC)    Chronic hepatitis C virus infection (CMS/HCC)    Acute renal failure superimposed on stage 3 chronic kidney disease (CMS/HCC)    Acute urinary tract infection    Metabolic acidosis    Hypokalemia      Consults:   Consults     Date and Time Order Name Status Description    5/30/2019 1338 Inpatient Urology Consult Completed     5/30/2019 1047 Inpatient Nephrology Consult Completed     5/29/2019 1137 Hospitalist (on-call MD unless specified)            Pertinent Test Results:   Ct Abdomen Pelvis Without Contrast    Result Date: 5/30/2019  CT abdomen, pelvis without contrast. CLINICAL INDICATION: Acute renal failure, hydronephrosis. Urinary tract infection. COMPARISON: CT April 19, 2019. TECHNIQUE: Noncontrast helical scanning with axial and coronal reformations. Soft tissue, lung, and bone windows reviewed. This exam was performed according to our departmental dose-optimization program, which includes automated exposure control, adjustment of the mA and/or kV according to patient size and/or use of iterative reconstruction technique. CT FINDINGS: There is been cystectomy and prostatectomy. 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 This neobladder is markedly dilated. Similar appearance however on prior exams. There are dilated fluid-filled loops of distal small bowel probably indicating reflux of urine into portions of distal small bowel. This is also unchanged since prior exam. The kidneys are normal. Complete resolution of  "hydronephrotic changes seen on prior exams. The gallbladder surgically absent. Normal liver. Normal spleen. Normal pancreas.     CONCLUSION: Evidence of prior complete cystectomy and prostatectomy. 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 This neobladder is markedly dilated therefore suggesting chronic outlet obstruction. This however is similar to prior exams. There are dilated loops of fluid-filled small bowel also similar to prior examinations probably representing some reflux of urine into the distal small bowel. Normal kidneys. Hydronephrotic changes seen on prior exams are no longer present on today's study. Electronically signed by:  Matt Packer MD  5/30/2019 12:25 PM CDT Workstation: [id number]      HPI/Hospital Course:  The patient is a 64 y.o. male with a history of COPD, CKD, DMII, HTN, bladder cancer, CAD, and Hepatitis C who presented to [facility] with generalized weakness, nausea, vomiting, and uncontrolled blood sugar.  Creatinine was found to be elevated to 4.0.  He had a similar problem in the past secondary to obstruction.  He is acidotic with a bicarb of 7.  He received IV fluid overnight and did not improve.  Nephrology was consulted and he was initiated on sodium bicarbonate in D5W infusion.  Acidosis resolved and he was transitioned to LR.  Creatinine has consistently trended down and is now 2.1. UA was consistent with UTI.  He was initiated on Rocephin.  Urine cultures are growing Pantoea agglomerans which is sensitive to cephalosporins.  He will transition to PO Keflex.  He is cleared by nephrology for discharge. He is stable and wishes to be discharged to home with home health services.        Condition on Discharge:  Stable    Physical Exam on Discharge:  /96   Pulse 73   Temp 96.9 °F (36.1 °C) (Oral)   Resp 16   Ht 170.2 cm (67\")   Wt 65.6 kg (144 lb 9.6 oz)   SpO2 100%   BMI 22.65 kg/m²   Physical Exam   Constitutional: He is oriented to person, " place, and time. He appears well-developed and well-nourished. No distress.   HENT:   Head: Normocephalic.   Eyes: Conjunctivae are normal.   Cardiovascular: Normal rate, regular rhythm, normal heart sounds and intact distal pulses.   Pulmonary/Chest: Effort normal and breath sounds normal. No respiratory distress.   Abdominal: Soft. Bowel sounds are normal. He exhibits no distension. There is no tenderness.   Musculoskeletal: Normal range of motion. He exhibits no edema.   Neurological: He is alert and oriented to person, place, and time.   Skin: Skin is warm and dry. He is not diaphoretic. No erythema.   Vitals reviewed.        Discharge Dis[place] [facility]charge Medications:     Discharge Medications      New Medications      Instructions Start Date   cephalexin 500 MG capsule  Commonly known as:  KEFLEX  Notes to patient:  06/03/2019   500 mg, Oral, 2 Times Daily         Changes to Medications      Instructions Start Date   glucose blood test strip  What changed:  Another medication with the same name was removed. Continue taking this medication, and follow the directions you see here.   Use as instructed      omeprazole 20 MG capsule  Commonly known as:  PRILOSEC  What changed:  when to take this  Notes to patient:  06/04/2019   20 mg, Oral, Daily      sertraline 50 MG tablet  Commonly known as:  ZOLOFT  What changed:    · how much to take  · how to take this  · when to take this  · additional instructions  Notes to patient:  06/04/2019   1/2 tablet hs 1 week then 1 tablet . (depression)         Continue These Medications      Instructions Start Date   ACURA BLOOD GLUCOSE METER w/Device kit  Notes to patient:  06/03/2019    1 each, Does not apply, 4 Times Daily PRN      albuterol (2.5 MG/3ML) 0.083% nebulizer solution  Commonly known as:  PROVENTIL  Notes to patient:  As needed   2.5 mg, Nebulization, Every 4 Hours PRN      albuterol sulfate  (90 Base) MCG/ACT inhaler  Commonly  known as:  VENTOLIN HFA  Notes to patient:  As needed   2 puffs, Inhalation, Every 6 Hours PRN      Alcohol Prep pads  Notes to patient:  As needed with glucose meter 5 times a day   1 pad, Does not apply, 5 Times Daily      aspirin 81 MG EC tablet  Notes to patient:  06/04/2019   81 mg, Oral, Daily      budesonide-formoterol 160-4.5 MCG/ACT inhaler  Commonly known as:  SYMBICORT  Notes to patient:  06/03/2019   2 puffs, Inhalation, 2 Times Daily - RT      clopidogrel 75 MG tablet  Commonly known as:  PLAVIX  Notes to patient:  06/04/2019   75 mg, Oral, Daily      fluticasone 50 MCG/ACT nasal spray  Commonly known as:  FLONASE  Notes to patient:  06/04/2019   2 sprays, Nasal, Daily      freestyle lancets  Notes to patient:  06/03/2019   Tid      glucose monitor monitoring kit   1 each, Does not apply, 3 Times Daily      insulin aspart 100 UNIT/ML solution pen-injector sc pen  Commonly known as:  novoLOG FLEXPEN  Notes to patient:  06/03/2019   Subcutaneous, 3 Times Daily With Meals      LEVEMIR FLEXTOUCH 100 UNIT/ML injection  Generic drug:  insulin detemir  Notes to patient:  06/03/2019   25 Units, Subcutaneous, 2 Times Daily      mupirocin 2 % ointment  Commonly known as:  BACTROBAN  Notes to patient:  06/03/2019   Topical, 3 Times Daily      nitroglycerin 0.4 MG SL tablet  Commonly known as:  NITROSTAT  Notes to patient:  As needed   0.4 mg, Sublingual, Every 5 Minutes PRN, Take no more than 3 doses in 15 minutes.       pravastatin 40 MG tablet  Commonly known as:  PRAVACHOL  Notes to patient:  06/03/2019   40 mg, Oral, Nightly         Stop These Medications    potassium chloride 10 MEQ CR tablet  Commonly known as:  K-DURKLOR-CON     predniSONE 5 MG tablet  Commonly known as:  DELTASONE     sodium bicarbonate 650 MG tablet            Discharge Diet:   Diet Instructions     Diet: Regular, Renal; Thin      Discharge Diet:   Regular  Renal       Fluid Consistency:  Thin          Activity at Discharge:   Activity  Instructions     Activity as Tolerated          Follow-up Appointments:   1. PCP 1 week  2. Dr. Brody 2-3 weeks  Future Appointments   Date Time Provider Department Center   11/4/2019  9:30 AM Kevin Robbins PA-C [facility] None       TIFFANIE Silva  06/03/19  10:56 AM    Time: Discharge planning and teaching took greater than 30 minutes.

## 2019-06-04 ENCOUNTER — EPISODE CHANGES (OUTPATIENT)
Dept: CASE MANAGEMENT | Facility: OTHER | Age: 64
End: 2019-06-04

## 2019-06-04 ENCOUNTER — READMISSION MANAGEMENT (OUTPATIENT)
Dept: CALL CENTER | Facility: HOSPITAL | Age: 64
End: 2019-06-04

## 2019-06-04 NOTE — OUTREACH NOTE
Prep Survey      Responses   Facility patient discharged from?  [place]   Is patient eligible?  Yes   Discharge diagnosis  Acute kidney injury    Does the patient have one of the following disease processes/diagnoses(primary or secondary)?  Other   Does the patient have Home health ordered?  Yes   What is the Home health agency?   [facility]    Is there a DME ordered?  No   Prep survey completed?  Yes          Deedee Araiza RN

## 2019-06-05 ENCOUNTER — READMISSION MANAGEMENT (OUTPATIENT)
Dept: CALL CENTER | Facility: HOSPITAL | Age: 64
End: 2019-06-05

## 2019-06-05 NOTE — OUTREACH NOTE
Medical Week 1 Survey      Responses   Facility patient discharged from?  [place]   Does the patient have one of the following disease processes/diagnoses(primary or secondary)?  Other   Is there a successful TCM telephone encounter documented?  No   Week 1 attempt successful?  Yes   Call start time  1730   Call end time  1737   Discharge diagnosis  Acute kidney injury    Is patient permission given to speak with other caregiver?  Yes   List who call center can speak with  Sofi Pritchettham-friend   Meds reviewed with patient/caregiver?  Yes   Is the patient having any side effects they believe may be caused by any medication additions or changes?  No   Does the patient have all medications ordered at discharge?  Yes   Is the patient taking all medications as directed (includes completed medication regime)?  No   What is preventing the patient from taking all medications as directed?  Other [Pt was unaware of the medications he was to stop taking]   Nursing Interventions  Nurse provided patient education, Advised patient to call provider   Comments regarding appointments  pt is aware of upcoming appointments   Does the patient have a primary care provider?   Yes   Does the patient have an appointment with their PCP within 7 days of discharge?  Yes   Comments regarding PCP  Dr. Merritt   Has the patient kept scheduled appointments due by today?  N/A   What is the Home health agency?   [facility]    Has home health visited the patient within 72 hours of discharge?  Yes   Home health comments  pt reports he cancelled HH today    Psychosocial issues?  No   Did the patient receive a copy of their discharge instructions?  Yes   Nursing interventions  Reviewed instructions with patient   What is the patient's perception of their health status since discharge?  Improving   Is the patient/caregiver able to teach back signs and symptoms related to disease process for when to call PCP?  Yes   Is the patient/caregiver able to teach  back signs and symptoms related to disease process for when to call 911?  Yes   Is the patient/caregiver able to teach back the hierarchy of who to call/visit for symptoms/problems? PCP, Specialist, Home health nurse, Urgent Care, ED, 911  Yes   Week 1 call completed?  Yes          Kathe Graham RN

## 2019-06-13 ENCOUNTER — READMISSION MANAGEMENT (OUTPATIENT)
Dept: CALL CENTER | Facility: HOSPITAL | Age: 64
End: 2019-06-13

## 2019-06-13 NOTE — OUTREACH NOTE
Medical Week 2 Survey      Responses   Facility patient discharged from?  [place]   Does the patient have one of the following disease processes/diagnoses(primary or secondary)?  Other   Week 2 attempt successful?  No   Unsuccessful attempts  Attempt 1          Mathew Potts RN

## 2019-06-14 ENCOUNTER — READMISSION MANAGEMENT (OUTPATIENT)
Dept: CALL CENTER | Facility: HOSPITAL | Age: 64
End: 2019-06-14

## 2019-06-14 NOTE — OUTREACH NOTE
Medical Week 2 Survey      Responses   Facility patient discharged from?  [place]   Does the patient have one of the following disease processes/diagnoses(primary or secondary)?  Other   Week 2 attempt successful?  No   Unsuccessful attempts  Attempt 2          Dagmar Whiting RN

## 2019-06-17 ENCOUNTER — READMISSION MANAGEMENT (OUTPATIENT)
Dept: CALL CENTER | Facility: HOSPITAL | Age: 64
End: 2019-06-17

## 2019-06-17 ENCOUNTER — OFFICE VISIT (OUTPATIENT)
Dept: GASTROENTEROLOGY | Facility: CLINIC | Age: 64
End: 2019-06-17

## 2019-06-17 VITALS
HEIGHT: 67 IN | OXYGEN SATURATION: 96 % | SYSTOLIC BLOOD PRESSURE: 120 MMHG | DIASTOLIC BLOOD PRESSURE: 72 MMHG | HEART RATE: 64 BPM | WEIGHT: 136.2 LBS | BODY MASS INDEX: 21.38 KG/M2

## 2019-06-17 DIAGNOSIS — R10.84 GENERALIZED ABDOMINAL PAIN: ICD-10-CM

## 2019-06-17 DIAGNOSIS — K57.30 DIVERTICULOSIS OF LARGE INTESTINE WITHOUT HEMORRHAGE: ICD-10-CM

## 2019-06-17 DIAGNOSIS — K21.00 GASTROESOPHAGEAL REFLUX DISEASE WITH ESOPHAGITIS: Primary | ICD-10-CM

## 2019-06-17 PROCEDURE — 99213 OFFICE O/P EST LOW 20 MIN: CPT | Performed by: PHYSICIAN ASSISTANT

## 2019-06-17 RX ORDER — CEPHALEXIN 500 MG/1
500 CAPSULE ORAL
COMMUNITY
End: 2020-07-10 | Stop reason: HOSPADM

## 2019-06-17 NOTE — OUTREACH NOTE
Medical Week 2 Survey      Responses   Facility patient discharged from?  [place]   Does the patient have one of the following disease processes/diagnoses(primary or secondary)?  Other   Week 2 attempt successful?  No   Unsuccessful attempts  Attempt 3          Haley Chakraborty RN
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
## 2019-06-25 ENCOUNTER — READMISSION MANAGEMENT (OUTPATIENT)
Dept: CALL CENTER | Facility: HOSPITAL | Age: 64
End: 2019-06-25

## 2019-06-25 NOTE — OUTREACH NOTE
Medical Week 3 Survey      Responses   Facility patient discharged from?  [place]   Does the patient have one of the following disease processes/diagnoses(primary or secondary)?  Other   Week 3 attempt successful?  No   Unsuccessful attempts  Attempt 1          Emelia Silva RN

## 2019-06-26 ENCOUNTER — EPISODE CHANGES (OUTPATIENT)
Dept: CASE MANAGEMENT | Facility: OTHER | Age: 64
End: 2019-06-26

## 2019-06-26 ENCOUNTER — READMISSION MANAGEMENT (OUTPATIENT)
Dept: CALL CENTER | Facility: HOSPITAL | Age: 64
End: 2019-06-26

## 2019-06-26 NOTE — OUTREACH NOTE
Medical Week 3 Survey      Responses   Facility patient discharged from?  [place]   Does the patient have one of the following disease processes/diagnoses(primary or secondary)?  Other   Week 3 attempt successful?  No   Unsuccessful attempts  Attempt 2          Lorena Moura RN

## 2019-07-03 ENCOUNTER — PATIENT OUTREACH (OUTPATIENT)
Dept: CASE MANAGEMENT | Facility: OTHER | Age: 64
End: 2019-07-03

## 2019-07-03 NOTE — OUTREACH NOTE
"Care Plan Note    Care Management Plan 7/3/2019   Lifestyle Goals Routine follow-up with doctor(s)   Barriers Disease education   Self Management Home Glucose Monitoring;Medication Adherence   Annual Wellness Visit:  Patient Will Schedule   Care Gaps Addressed Colonoscopy;Diabetic A1C;Diabetic Eye Exam;Flu Shot;Pneumonia Vaccine   Specific Disease Process Teaching Diabetes;Hypertension   Does patient have depression diagnosis? No   Advanced Directives: Not Interested At This Time   Ed Visits past 12 months: 3 or more   Hospitalizations past 12 months 2 or 3   Medication Adherence Medications understood   Health Literacy Moderate     The main concerns and/or symptoms the patient would like to address are: DC from [facility] with SUJATHA, UTI, and DM Type 2. He had outreach from the call center.    Education/instruction provided by Care Coordinator: Today he reports he is doing much better and has completed his antibiotics. He stated \"I went and had one of them test done yesterday to recheck and it was OK\".  He has no burning or with urination and no flank/abdominal pain. He monitors his blood sugar and today was 188. He just received a new glucometer from [employer]. He has a BP cuff at home and stated his BP is always normal.  He is following a no added salt diet and will purchase a scale to monitor his weight. He does get exercise outside the home usually at the park.  He has no reported falls and has a cane if needed. He has a colonoscopy on 4-22-19. Flu vaccine was done in 2018 and pneumonia vaccine done in 2019. He declines Advance Directive and MyChart. Provided my contact information for needs.     Follow Up Outreach Due: 2 weeks.     Kisha Agudelo RN    7/3/2019, 9:26 AM    "

## 2019-08-06 ENCOUNTER — EPISODE CHANGES (OUTPATIENT)
Dept: CASE MANAGEMENT | Facility: OTHER | Age: 64
End: 2019-08-06

## 2019-11-06 NOTE — PLAN OF CARE
Problem: Patient Care Overview  Goal: Plan of Care Review  Outcome: Ongoing (interventions implemented as appropriate)   06/13/18 3895   Coping/Psychosocial   Plan of Care Reviewed With patient   OTHER   Outcome Summary PT evaluation completed today. Patient admitted with persistent n/v and a/c kidney injury. Patient ambulated 300 ft with SBA and intermittent use of wall railing. Patient presents with mild balance deficits with gait and would benefit from high level balance/coordination/endurance exercises in the hospital to achieve highest level of function prior to d/c home.           ACP

## 2019-11-16 ENCOUNTER — HOSPITAL ENCOUNTER (INPATIENT)
Facility: HOSPITAL | Age: 64
LOS: 5 days | Discharge: LEFT BEFORE DISCHARGE | End: 2019-11-21
Attending: EMERGENCY MEDICINE | Admitting: INTERNAL MEDICINE

## 2019-11-16 ENCOUNTER — APPOINTMENT (OUTPATIENT)
Dept: CT IMAGING | Facility: HOSPITAL | Age: 64
End: 2019-11-16

## 2019-11-16 ENCOUNTER — APPOINTMENT (OUTPATIENT)
Dept: GENERAL RADIOLOGY | Facility: HOSPITAL | Age: 64
End: 2019-11-16

## 2019-11-16 DIAGNOSIS — N18.30 ACUTE RENAL FAILURE WITH ACUTE TUBULAR NECROSIS SUPERIMPOSED ON STAGE 3 CHRONIC KIDNEY DISEASE (HCC): ICD-10-CM

## 2019-11-16 DIAGNOSIS — E87.6 HYPOKALEMIA: ICD-10-CM

## 2019-11-16 DIAGNOSIS — N17.9 ACUTE RENAL FAILURE SUPERIMPOSED ON CHRONIC KIDNEY DISEASE, UNSPECIFIED CKD STAGE, UNSPECIFIED ACUTE RENAL FAILURE TYPE (HCC): Primary | ICD-10-CM

## 2019-11-16 DIAGNOSIS — Z78.9 IMPAIRED MOBILITY AND ACTIVITIES OF DAILY LIVING: ICD-10-CM

## 2019-11-16 DIAGNOSIS — R53.1 GENERAL WEAKNESS: ICD-10-CM

## 2019-11-16 DIAGNOSIS — Z74.09 IMPAIRED MOBILITY AND ACTIVITIES OF DAILY LIVING: ICD-10-CM

## 2019-11-16 DIAGNOSIS — G93.49 UREMIC ENCEPHALOPATHY: ICD-10-CM

## 2019-11-16 DIAGNOSIS — N17.0 ACUTE RENAL FAILURE WITH ACUTE TUBULAR NECROSIS SUPERIMPOSED ON STAGE 3 CHRONIC KIDNEY DISEASE (HCC): ICD-10-CM

## 2019-11-16 DIAGNOSIS — N19 UREMIC ENCEPHALOPATHY: ICD-10-CM

## 2019-11-16 DIAGNOSIS — N18.9 ACUTE RENAL FAILURE SUPERIMPOSED ON CHRONIC KIDNEY DISEASE, UNSPECIFIED CKD STAGE, UNSPECIFIED ACUTE RENAL FAILURE TYPE (HCC): Primary | ICD-10-CM

## 2019-11-16 LAB
ACETONE BLD QL: NEGATIVE
ALBUMIN SERPL-MCNC: 3.9 G/DL (ref 3.5–5.2)
ALBUMIN/GLOB SERPL: 1.3 G/DL
ALP SERPL-CCNC: 103 U/L (ref 39–117)
ALT SERPL W P-5'-P-CCNC: 13 U/L (ref 1–41)
AMPHET+METHAMPHET UR QL: NEGATIVE
AMPHETAMINES UR QL: POSITIVE
ANION GAP SERPL CALCULATED.3IONS-SCNC: 15 MMOL/L (ref 5–15)
ANION GAP SERPL CALCULATED.3IONS-SCNC: 16 MMOL/L (ref 5–15)
ARTERIAL PATENCY WRIST A: POSITIVE
AST SERPL-CCNC: 9 U/L (ref 1–40)
ATMOSPHERIC PRESS: 752 MMHG
BACTERIA UR QL AUTO: ABNORMAL /HPF
BARBITURATES UR QL SCN: NEGATIVE
BASE EXCESS BLDA CALC-SCNC: -25.2 MMOL/L (ref 0–2)
BASOPHILS # BLD AUTO: 0.03 10*3/MM3 (ref 0–0.2)
BASOPHILS NFR BLD AUTO: 0.3 % (ref 0–1.5)
BDY SITE: ABNORMAL
BENZODIAZ UR QL SCN: NEGATIVE
BILIRUB SERPL-MCNC: 0.3 MG/DL (ref 0.2–1.2)
BILIRUB UR QL STRIP: ABNORMAL
BUN BLD-MCNC: 86 MG/DL (ref 8–23)
BUN BLD-MCNC: 86 MG/DL (ref 8–23)
BUN/CREAT SERPL: 16 (ref 7–25)
BUN/CREAT SERPL: 16.1 (ref 7–25)
BUPRENORPHINE SERPL-MCNC: NEGATIVE NG/ML
CALCIUM SPEC-SCNC: 8.5 MG/DL (ref 8.6–10.5)
CALCIUM SPEC-SCNC: 8.7 MG/DL (ref 8.6–10.5)
CANNABINOIDS SERPL QL: NEGATIVE
CHLORIDE SERPL-SCNC: 106 MMOL/L (ref 98–107)
CHLORIDE SERPL-SCNC: 108 MMOL/L (ref 98–107)
CLARITY UR: ABNORMAL
CO2 SERPL-SCNC: 6 MMOL/L (ref 22–29)
CO2 SERPL-SCNC: 7 MMOL/L (ref 22–29)
COCAINE UR QL: NEGATIVE
COLOR UR: YELLOW
CREAT BLD-MCNC: 5.34 MG/DL (ref 0.76–1.27)
CREAT BLD-MCNC: 5.38 MG/DL (ref 0.76–1.27)
D-LACTATE SERPL-SCNC: 0.5 MMOL/L (ref 0.5–2)
DEPRECATED RDW RBC AUTO: 52.2 FL (ref 37–54)
EOSINOPHIL # BLD AUTO: 0.03 10*3/MM3 (ref 0–0.4)
EOSINOPHIL NFR BLD AUTO: 0.3 % (ref 0.3–6.2)
ERYTHROCYTE [DISTWIDTH] IN BLOOD BY AUTOMATED COUNT: 15 % (ref 12.3–15.4)
ETHANOL BLD-MCNC: <10 MG/DL (ref 0–10)
ETHANOL UR QL: <0.01 %
GFR SERPL CREATININE-BSD FRML MDRD: 11 ML/MIN/1.73
GFR SERPL CREATININE-BSD FRML MDRD: 11 ML/MIN/1.73
GFR SERPL CREATININE-BSD FRML MDRD: ABNORMAL ML/MIN/{1.73_M2}
GFR SERPL CREATININE-BSD FRML MDRD: ABNORMAL ML/MIN/{1.73_M2}
GLOBULIN UR ELPH-MCNC: 3.1 GM/DL
GLUCOSE BLD-MCNC: 238 MG/DL (ref 65–99)
GLUCOSE BLD-MCNC: 273 MG/DL (ref 65–99)
GLUCOSE BLDC GLUCOMTR-MCNC: 198 MG/DL (ref 70–130)
GLUCOSE UR STRIP-MCNC: NEGATIVE MG/DL
HCO3 BLDA-SCNC: 5.5 MMOL/L (ref 20–26)
HCT VFR BLD AUTO: 45.4 % (ref 37.5–51)
HGB BLD-MCNC: 14.6 G/DL (ref 13–17.7)
HGB UR QL STRIP.AUTO: ABNORMAL
HOLD SPECIMEN: NORMAL
HYALINE CASTS UR QL AUTO: ABNORMAL /LPF
IMM GRANULOCYTES # BLD AUTO: 0.04 10*3/MM3 (ref 0–0.05)
IMM GRANULOCYTES NFR BLD AUTO: 0.4 % (ref 0–0.5)
KETONES UR QL STRIP: NEGATIVE
LEUKOCYTE ESTERASE UR QL STRIP.AUTO: NEGATIVE
LYMPHOCYTES # BLD AUTO: 0.93 10*3/MM3 (ref 0.7–3.1)
LYMPHOCYTES NFR BLD AUTO: 8.6 % (ref 19.6–45.3)
Lab: ABNORMAL
MAGNESIUM SERPL-MCNC: 1.9 MG/DL (ref 1.6–2.4)
MCH RBC QN AUTO: 30 PG (ref 26.6–33)
MCHC RBC AUTO-ENTMCNC: 32.2 G/DL (ref 31.5–35.7)
MCV RBC AUTO: 93.4 FL (ref 79–97)
METHADONE UR QL SCN: NEGATIVE
MODALITY: ABNORMAL
MONOCYTES # BLD AUTO: 0.84 10*3/MM3 (ref 0.1–0.9)
MONOCYTES NFR BLD AUTO: 7.7 % (ref 5–12)
NEUTROPHILS # BLD AUTO: 8.97 10*3/MM3 (ref 1.7–7)
NEUTROPHILS NFR BLD AUTO: 82.7 % (ref 42.7–76)
NITRITE UR QL STRIP: NEGATIVE
NRBC BLD AUTO-RTO: 0.4 /100 WBC (ref 0–0.2)
NT-PROBNP SERPL-MCNC: 1047 PG/ML (ref 5–900)
OPIATES UR QL: NEGATIVE
OXYCODONE UR QL SCN: NEGATIVE
PCO2 BLDA: 23.9 MM HG (ref 35–45)
PCP UR QL SCN: NEGATIVE
PH BLDA: 6.97 PH UNITS (ref 7.35–7.45)
PH UR STRIP.AUTO: 7 [PH] (ref 5–9)
PLATELET # BLD AUTO: 145 10*3/MM3 (ref 140–450)
PMV BLD AUTO: 10.7 FL (ref 6–12)
PO2 BLDA: 110 MM HG (ref 83–108)
POTASSIUM BLD-SCNC: 4.9 MMOL/L (ref 3.5–5.2)
POTASSIUM BLD-SCNC: 5.5 MMOL/L (ref 3.5–5.2)
PROPOXYPH UR QL: NEGATIVE
PROT SERPL-MCNC: 7 G/DL (ref 6–8.5)
PROT UR QL STRIP: ABNORMAL
RBC # BLD AUTO: 4.86 10*6/MM3 (ref 4.14–5.8)
RBC # UR: ABNORMAL /HPF
REF LAB TEST METHOD: ABNORMAL
SAO2 % BLDCOA: 98.2 % (ref 94–99)
SODIUM BLD-SCNC: 128 MMOL/L (ref 136–145)
SODIUM BLD-SCNC: 130 MMOL/L (ref 136–145)
SP GR UR STRIP: 1.02 (ref 1–1.03)
SQUAMOUS #/AREA URNS HPF: ABNORMAL /HPF
TRICYCLICS UR QL SCN: POSITIVE
TROPONIN T SERPL-MCNC: 0.02 NG/ML (ref 0–0.03)
TSH SERPL DL<=0.05 MIU/L-ACNC: 1.25 UIU/ML (ref 0.27–4.2)
UROBILINOGEN UR QL STRIP: ABNORMAL
VENTILATOR MODE: ABNORMAL
WBC NRBC COR # BLD: 10.84 10*3/MM3 (ref 3.4–10.8)
WBC UR QL AUTO: ABNORMAL /HPF
WHOLE BLOOD HOLD SPECIMEN: NORMAL

## 2019-11-16 PROCEDURE — 70450 CT HEAD/BRAIN W/O DYE: CPT

## 2019-11-16 PROCEDURE — 83605 ASSAY OF LACTIC ACID: CPT | Performed by: EMERGENCY MEDICINE

## 2019-11-16 PROCEDURE — 80307 DRUG TEST PRSMV CHEM ANLYZR: CPT | Performed by: EMERGENCY MEDICINE

## 2019-11-16 PROCEDURE — 63710000001 INSULIN REGULAR HUMAN PER 5 UNITS: Performed by: FAMILY MEDICINE

## 2019-11-16 PROCEDURE — 25010000002 CEFTRIAXONE PER 250 MG: Performed by: FAMILY MEDICINE

## 2019-11-16 PROCEDURE — 99284 EMERGENCY DEPT VISIT MOD MDM: CPT

## 2019-11-16 PROCEDURE — 84443 ASSAY THYROID STIM HORMONE: CPT | Performed by: EMERGENCY MEDICINE

## 2019-11-16 PROCEDURE — 80053 COMPREHEN METABOLIC PANEL: CPT | Performed by: EMERGENCY MEDICINE

## 2019-11-16 PROCEDURE — 25010000002 HEPARIN (PORCINE) PER 1000 UNITS: Performed by: FAMILY MEDICINE

## 2019-11-16 PROCEDURE — 81001 URINALYSIS AUTO W/SCOPE: CPT | Performed by: EMERGENCY MEDICINE

## 2019-11-16 PROCEDURE — 83735 ASSAY OF MAGNESIUM: CPT | Performed by: EMERGENCY MEDICINE

## 2019-11-16 PROCEDURE — 82962 GLUCOSE BLOOD TEST: CPT

## 2019-11-16 PROCEDURE — 83880 ASSAY OF NATRIURETIC PEPTIDE: CPT | Performed by: EMERGENCY MEDICINE

## 2019-11-16 PROCEDURE — 93010 ELECTROCARDIOGRAM REPORT: CPT | Performed by: INTERNAL MEDICINE

## 2019-11-16 PROCEDURE — 63710000001 INSULIN ASPART PER 5 UNITS: Performed by: FAMILY MEDICINE

## 2019-11-16 PROCEDURE — 36415 COLL VENOUS BLD VENIPUNCTURE: CPT | Performed by: FAMILY MEDICINE

## 2019-11-16 PROCEDURE — 82803 BLOOD GASES ANY COMBINATION: CPT

## 2019-11-16 PROCEDURE — 82009 KETONE BODYS QUAL: CPT | Performed by: FAMILY MEDICINE

## 2019-11-16 PROCEDURE — 36600 WITHDRAWAL OF ARTERIAL BLOOD: CPT

## 2019-11-16 PROCEDURE — 85025 COMPLETE CBC W/AUTO DIFF WBC: CPT | Performed by: EMERGENCY MEDICINE

## 2019-11-16 PROCEDURE — 84484 ASSAY OF TROPONIN QUANT: CPT | Performed by: EMERGENCY MEDICINE

## 2019-11-16 PROCEDURE — 74176 CT ABD & PELVIS W/O CONTRAST: CPT

## 2019-11-16 PROCEDURE — 93005 ELECTROCARDIOGRAM TRACING: CPT | Performed by: EMERGENCY MEDICINE

## 2019-11-16 PROCEDURE — 71045 X-RAY EXAM CHEST 1 VIEW: CPT

## 2019-11-16 RX ORDER — BUDESONIDE AND FORMOTEROL FUMARATE DIHYDRATE 160; 4.5 UG/1; UG/1
2 AEROSOL RESPIRATORY (INHALATION)
Status: DISCONTINUED | OUTPATIENT
Start: 2019-11-16 | End: 2019-11-21 | Stop reason: HOSPADM

## 2019-11-16 RX ORDER — ALBUTEROL SULFATE 2.5 MG/3ML
2.5 SOLUTION RESPIRATORY (INHALATION) EVERY 6 HOURS PRN
Status: DISCONTINUED | OUTPATIENT
Start: 2019-11-16 | End: 2019-11-21 | Stop reason: HOSPADM

## 2019-11-16 RX ORDER — FAMOTIDINE 10 MG/ML
20 INJECTION, SOLUTION INTRAVENOUS DAILY
Status: DISCONTINUED | OUTPATIENT
Start: 2019-11-17 | End: 2019-11-21 | Stop reason: HOSPADM

## 2019-11-16 RX ORDER — TAMSULOSIN HYDROCHLORIDE 0.4 MG/1
0.8 CAPSULE ORAL DAILY
Status: DISCONTINUED | OUTPATIENT
Start: 2019-11-16 | End: 2019-11-21 | Stop reason: HOSPADM

## 2019-11-16 RX ORDER — CLOPIDOGREL BISULFATE 75 MG/1
75 TABLET ORAL DAILY
Status: DISCONTINUED | OUTPATIENT
Start: 2019-11-17 | End: 2019-11-21 | Stop reason: HOSPADM

## 2019-11-16 RX ORDER — HYDROXYZINE HYDROCHLORIDE 25 MG/1
25 TABLET, FILM COATED ORAL EVERY 4 HOURS PRN
Refills: 9 | COMMUNITY
Start: 2019-09-27

## 2019-11-16 RX ORDER — HEPARIN SODIUM 5000 [USP'U]/ML
5000 INJECTION, SOLUTION INTRAVENOUS; SUBCUTANEOUS EVERY 12 HOURS SCHEDULED
Status: DISCONTINUED | OUTPATIENT
Start: 2019-11-16 | End: 2019-11-21 | Stop reason: HOSPADM

## 2019-11-16 RX ORDER — NICOTINE POLACRILEX 4 MG
15 LOZENGE BUCCAL
Status: DISCONTINUED | OUTPATIENT
Start: 2019-11-16 | End: 2019-11-21 | Stop reason: HOSPADM

## 2019-11-16 RX ORDER — ONDANSETRON 2 MG/ML
4 INJECTION INTRAMUSCULAR; INTRAVENOUS EVERY 6 HOURS PRN
Status: DISCONTINUED | OUTPATIENT
Start: 2019-11-16 | End: 2019-11-21 | Stop reason: HOSPADM

## 2019-11-16 RX ORDER — SODIUM CHLORIDE 0.9 % (FLUSH) 0.9 %
10 SYRINGE (ML) INJECTION AS NEEDED
Status: DISCONTINUED | OUTPATIENT
Start: 2019-11-16 | End: 2019-11-21 | Stop reason: HOSPADM

## 2019-11-16 RX ORDER — CYCLOBENZAPRINE HCL 10 MG
10 TABLET ORAL 3 TIMES DAILY PRN
COMMUNITY
Start: 2019-07-10

## 2019-11-16 RX ORDER — ASPIRIN 81 MG/1
81 TABLET ORAL DAILY
Status: DISCONTINUED | OUTPATIENT
Start: 2019-11-17 | End: 2019-11-21 | Stop reason: HOSPADM

## 2019-11-16 RX ORDER — SODIUM CHLORIDE 9 MG/ML
125 INJECTION, SOLUTION INTRAVENOUS CONTINUOUS
Status: DISCONTINUED | OUTPATIENT
Start: 2019-11-16 | End: 2019-11-17

## 2019-11-16 RX ORDER — DEXTROSE MONOHYDRATE 25 G/50ML
25 INJECTION, SOLUTION INTRAVENOUS
Status: DISCONTINUED | OUTPATIENT
Start: 2019-11-16 | End: 2019-11-21 | Stop reason: HOSPADM

## 2019-11-16 RX ORDER — CARVEDILOL 3.12 MG/1
3.12 TABLET ORAL 2 TIMES DAILY WITH MEALS
Refills: 11 | COMMUNITY
Start: 2019-10-15 | End: 2022-12-25 | Stop reason: HOSPADM

## 2019-11-16 RX ORDER — SODIUM CHLORIDE 0.9 % (FLUSH) 0.9 %
10 SYRINGE (ML) INJECTION EVERY 12 HOURS SCHEDULED
Status: DISCONTINUED | OUTPATIENT
Start: 2019-11-16 | End: 2019-11-21 | Stop reason: HOSPADM

## 2019-11-16 RX ADMIN — HEPARIN SODIUM 5000 UNITS: 5000 INJECTION INTRAVENOUS; SUBCUTANEOUS at 22:06

## 2019-11-16 RX ADMIN — SODIUM BICARBONATE 125 ML/HR: 84 INJECTION, SOLUTION INTRAVENOUS at 19:46

## 2019-11-16 RX ADMIN — TAMSULOSIN HYDROCHLORIDE 0.8 MG: 0.4 CAPSULE ORAL at 22:06

## 2019-11-16 RX ADMIN — HUMAN INSULIN 3 UNITS: 100 INJECTION, SOLUTION SUBCUTANEOUS at 21:52

## 2019-11-16 RX ADMIN — INSULIN ASPART 2 UNITS: 100 INJECTION, SOLUTION INTRAVENOUS; SUBCUTANEOUS at 22:07

## 2019-11-16 RX ADMIN — CEFTRIAXONE SODIUM 1 G: 1 INJECTION, POWDER, FOR SOLUTION INTRAMUSCULAR; INTRAVENOUS at 22:06

## 2019-11-16 RX ADMIN — SODIUM CHLORIDE 500 ML: 9 INJECTION, SOLUTION INTRAVENOUS at 18:44

## 2019-11-16 RX ADMIN — SODIUM BICARBONATE 50 MEQ: 84 INJECTION INTRAVENOUS at 20:02

## 2019-11-16 RX ADMIN — SODIUM CHLORIDE 125 ML/HR: 900 INJECTION, SOLUTION INTRAVENOUS at 19:47

## 2019-11-17 ENCOUNTER — APPOINTMENT (OUTPATIENT)
Dept: ULTRASOUND IMAGING | Facility: HOSPITAL | Age: 64
End: 2019-11-17

## 2019-11-17 LAB
ANION GAP SERPL CALCULATED.3IONS-SCNC: 13 MMOL/L (ref 5–15)
ANION GAP SERPL CALCULATED.3IONS-SCNC: 14 MMOL/L (ref 5–15)
ARTERIAL PATENCY WRIST A: POSITIVE
ATMOSPHERIC PRESS: 749 MMHG
BACTERIA UR QL AUTO: ABNORMAL /HPF
BASE EXCESS BLDA CALC-SCNC: -15.9 MMOL/L (ref 0–2)
BASOPHILS # BLD AUTO: 0.02 10*3/MM3 (ref 0–0.2)
BASOPHILS NFR BLD AUTO: 0.2 % (ref 0–1.5)
BDY SITE: ABNORMAL
BILIRUB UR QL STRIP: ABNORMAL
BUN BLD-MCNC: 87 MG/DL (ref 8–23)
BUN BLD-MCNC: 89 MG/DL (ref 8–23)
BUN/CREAT SERPL: 15.5 (ref 7–25)
BUN/CREAT SERPL: 16.2 (ref 7–25)
CALCIUM SPEC-SCNC: 7.9 MG/DL (ref 8.6–10.5)
CALCIUM SPEC-SCNC: 8.1 MG/DL (ref 8.6–10.5)
CHLORIDE SERPL-SCNC: 112 MMOL/L (ref 98–107)
CHLORIDE SERPL-SCNC: 112 MMOL/L (ref 98–107)
CLARITY UR: ABNORMAL
CO2 SERPL-SCNC: 7 MMOL/L (ref 22–29)
CO2 SERPL-SCNC: 8 MMOL/L (ref 22–29)
COLOR UR: YELLOW
CREAT BLD-MCNC: 5.51 MG/DL (ref 0.76–1.27)
CREAT BLD-MCNC: 5.61 MG/DL (ref 0.76–1.27)
CREAT UR-MCNC: 123 MG/DL
DEPRECATED RDW RBC AUTO: 50 FL (ref 37–54)
EOSINOPHIL # BLD AUTO: 0.08 10*3/MM3 (ref 0–0.4)
EOSINOPHIL NFR BLD AUTO: 1 % (ref 0.3–6.2)
ERYTHROCYTE [DISTWIDTH] IN BLOOD BY AUTOMATED COUNT: 14.7 % (ref 12.3–15.4)
GFR SERPL CREATININE-BSD FRML MDRD: 10 ML/MIN/1.73
GFR SERPL CREATININE-BSD FRML MDRD: 11 ML/MIN/1.73
GFR SERPL CREATININE-BSD FRML MDRD: ABNORMAL ML/MIN/{1.73_M2}
GFR SERPL CREATININE-BSD FRML MDRD: ABNORMAL ML/MIN/{1.73_M2}
GLUCOSE BLD-MCNC: 141 MG/DL (ref 65–99)
GLUCOSE BLD-MCNC: 170 MG/DL (ref 65–99)
GLUCOSE BLDC GLUCOMTR-MCNC: 230 MG/DL (ref 70–130)
GLUCOSE UR STRIP-MCNC: NEGATIVE MG/DL
HCO3 BLDA-SCNC: 11.3 MMOL/L (ref 20–26)
HCT VFR BLD AUTO: 40.7 % (ref 37.5–51)
HGB BLD-MCNC: 13.3 G/DL (ref 13–17.7)
HGB UR QL STRIP.AUTO: ABNORMAL
HOROWITZ INDEX BLD+IHG-RTO: 21 %
HYALINE CASTS UR QL AUTO: ABNORMAL /LPF
IMM GRANULOCYTES # BLD AUTO: 0.03 10*3/MM3 (ref 0–0.05)
IMM GRANULOCYTES NFR BLD AUTO: 0.4 % (ref 0–0.5)
KETONES UR QL STRIP: NEGATIVE
LEUKOCYTE ESTERASE UR QL STRIP.AUTO: NEGATIVE
LYMPHOCYTES # BLD AUTO: 1.3 10*3/MM3 (ref 0.7–3.1)
LYMPHOCYTES NFR BLD AUTO: 16.1 % (ref 19.6–45.3)
Lab: ABNORMAL
MCH RBC QN AUTO: 30.2 PG (ref 26.6–33)
MCHC RBC AUTO-ENTMCNC: 32.7 G/DL (ref 31.5–35.7)
MCV RBC AUTO: 92.3 FL (ref 79–97)
MODALITY: ABNORMAL
MONOCYTES # BLD AUTO: 0.8 10*3/MM3 (ref 0.1–0.9)
MONOCYTES NFR BLD AUTO: 9.9 % (ref 5–12)
MUCOUS THREADS URNS QL MICRO: ABNORMAL /HPF
NEUTROPHILS # BLD AUTO: 5.83 10*3/MM3 (ref 1.7–7)
NEUTROPHILS NFR BLD AUTO: 72.4 % (ref 42.7–76)
NITRITE UR QL STRIP: NEGATIVE
NRBC BLD AUTO-RTO: 0.4 /100 WBC (ref 0–0.2)
PCO2 BLDA: 31 MM HG (ref 35–45)
PH BLDA: 7.17 PH UNITS (ref 7.35–7.45)
PH UR STRIP.AUTO: 7 [PH] (ref 5–9)
PLATELET # BLD AUTO: 128 10*3/MM3 (ref 140–450)
PMV BLD AUTO: 10.3 FL (ref 6–12)
PO2 BLDA: 118 MM HG (ref 83–108)
POTASSIUM BLD-SCNC: 4.6 MMOL/L (ref 3.5–5.2)
POTASSIUM BLD-SCNC: 4.7 MMOL/L (ref 3.5–5.2)
PROT UR QL STRIP: ABNORMAL
RBC # BLD AUTO: 4.41 10*6/MM3 (ref 4.14–5.8)
RBC # UR: ABNORMAL /HPF
RBC MORPH BLD: NORMAL
REF LAB TEST METHOD: ABNORMAL
SAO2 % BLDCOA: 99.2 % (ref 94–99)
SMALL PLATELETS BLD QL SMEAR: ADEQUATE
SODIUM BLD-SCNC: 132 MMOL/L (ref 136–145)
SODIUM BLD-SCNC: 134 MMOL/L (ref 136–145)
SODIUM UR-SCNC: 96 MMOL/L
SP GR UR STRIP: 1.02 (ref 1–1.03)
SQUAMOUS #/AREA URNS HPF: ABNORMAL /HPF
UROBILINOGEN UR QL STRIP: ABNORMAL
VENTILATOR MODE: ABNORMAL
WBC MORPH BLD: NORMAL
WBC NRBC COR # BLD: 8.06 10*3/MM3 (ref 3.4–10.8)
WBC UR QL AUTO: ABNORMAL /HPF

## 2019-11-17 PROCEDURE — 84300 ASSAY OF URINE SODIUM: CPT | Performed by: NURSE PRACTITIONER

## 2019-11-17 PROCEDURE — 36600 WITHDRAWAL OF ARTERIAL BLOOD: CPT

## 2019-11-17 PROCEDURE — 63710000001 INSULIN ASPART PER 5 UNITS: Performed by: FAMILY MEDICINE

## 2019-11-17 PROCEDURE — 85025 COMPLETE CBC W/AUTO DIFF WBC: CPT | Performed by: FAMILY MEDICINE

## 2019-11-17 PROCEDURE — 94799 UNLISTED PULMONARY SVC/PX: CPT

## 2019-11-17 PROCEDURE — 82570 ASSAY OF URINE CREATININE: CPT | Performed by: NURSE PRACTITIONER

## 2019-11-17 PROCEDURE — 87205 SMEAR GRAM STAIN: CPT | Performed by: NURSE PRACTITIONER

## 2019-11-17 PROCEDURE — 85007 BL SMEAR W/DIFF WBC COUNT: CPT | Performed by: FAMILY MEDICINE

## 2019-11-17 PROCEDURE — 84156 ASSAY OF PROTEIN URINE: CPT | Performed by: NURSE PRACTITIONER

## 2019-11-17 PROCEDURE — 80048 BASIC METABOLIC PNL TOTAL CA: CPT | Performed by: FAMILY MEDICINE

## 2019-11-17 PROCEDURE — 76775 US EXAM ABDO BACK WALL LIM: CPT

## 2019-11-17 PROCEDURE — 82962 GLUCOSE BLOOD TEST: CPT

## 2019-11-17 PROCEDURE — 82803 BLOOD GASES ANY COMBINATION: CPT

## 2019-11-17 PROCEDURE — 25010000002 HEPARIN (PORCINE) PER 1000 UNITS: Performed by: FAMILY MEDICINE

## 2019-11-17 PROCEDURE — 87086 URINE CULTURE/COLONY COUNT: CPT | Performed by: NURSE PRACTITIONER

## 2019-11-17 PROCEDURE — 81001 URINALYSIS AUTO W/SCOPE: CPT | Performed by: NURSE PRACTITIONER

## 2019-11-17 PROCEDURE — 25010000002 CEFTRIAXONE PER 250 MG: Performed by: FAMILY MEDICINE

## 2019-11-17 RX ORDER — LIDOCAINE HYDROCHLORIDE 20 MG/ML
JELLY TOPICAL ONCE
Status: COMPLETED | OUTPATIENT
Start: 2019-11-17 | End: 2019-11-17

## 2019-11-17 RX ORDER — ACETYLCYSTEINE 100 MG/ML
4 SOLUTION ORAL; RESPIRATORY (INHALATION)
Status: DISCONTINUED | OUTPATIENT
Start: 2019-11-17 | End: 2019-11-18

## 2019-11-17 RX ADMIN — SODIUM BICARBONATE 50 MEQ: 84 INJECTION, SOLUTION INTRAVENOUS at 10:17

## 2019-11-17 RX ADMIN — INSULIN ASPART 4 UNITS: 100 INJECTION, SOLUTION INTRAVENOUS; SUBCUTANEOUS at 20:07

## 2019-11-17 RX ADMIN — HEPARIN SODIUM 5000 UNITS: 5000 INJECTION INTRAVENOUS; SUBCUTANEOUS at 08:28

## 2019-11-17 RX ADMIN — SODIUM CHLORIDE 125 ML/HR: 900 INJECTION, SOLUTION INTRAVENOUS at 08:29

## 2019-11-17 RX ADMIN — ACETYLCYSTEINE 4 ML: 100 INHALANT RESPIRATORY (INHALATION) at 17:18

## 2019-11-17 RX ADMIN — ACETYLCYSTEINE 4 ML: 100 INHALANT RESPIRATORY (INHALATION) at 21:54

## 2019-11-17 RX ADMIN — SODIUM CHLORIDE, PRESERVATIVE FREE 10 ML: 5 INJECTION INTRAVENOUS at 20:07

## 2019-11-17 RX ADMIN — SODIUM CHLORIDE, PRESERVATIVE FREE 10 ML: 5 INJECTION INTRAVENOUS at 08:29

## 2019-11-17 RX ADMIN — SODIUM BICARBONATE 150 MEQ: 84 INJECTION, SOLUTION INTRAVENOUS at 17:21

## 2019-11-17 RX ADMIN — BUDESONIDE AND FORMOTEROL FUMARATE DIHYDRATE 2 PUFF: 160; 4.5 AEROSOL RESPIRATORY (INHALATION) at 09:59

## 2019-11-17 RX ADMIN — SODIUM BICARBONATE 150 MEQ: 84 INJECTION, SOLUTION INTRAVENOUS at 10:17

## 2019-11-17 RX ADMIN — ACETYLCYSTEINE 4 ML: 100 INHALANT RESPIRATORY (INHALATION) at 18:53

## 2019-11-17 RX ADMIN — CEFTRIAXONE SODIUM 1 G: 1 INJECTION, POWDER, FOR SOLUTION INTRAMUSCULAR; INTRAVENOUS at 21:54

## 2019-11-17 RX ADMIN — CLOPIDOGREL BISULFATE 75 MG: 75 TABLET ORAL at 08:28

## 2019-11-17 RX ADMIN — ASPIRIN 81 MG: 81 TABLET, COATED ORAL at 08:28

## 2019-11-17 RX ADMIN — FAMOTIDINE 20 MG: 10 INJECTION INTRAVENOUS at 08:29

## 2019-11-17 RX ADMIN — LIDOCAINE HYDROCHLORIDE: 20 JELLY TOPICAL at 11:30

## 2019-11-17 RX ADMIN — HEPARIN SODIUM 5000 UNITS: 5000 INJECTION INTRAVENOUS; SUBCUTANEOUS at 20:07
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
## 2019-11-17 NOTE — PROGRESS NOTES
MEDICINE DAILY PROGRESS NOTE  NAME: Favio Casillas  : 1955  MRN: [id number]     LOS: 1 day     PROVIDER OF SERVICE: Jose G Voss MD    Chief Complaint: Fatigue    Subjective:   HPI: Favio Casillas is a 64 y.o. male with medical history significant for CAD, DM, CKD, HTN, COPD, and bladder ca  presents with fatigue, nausea, and vomiting.      Patient states that he was his fit and normal self 5 days ago when he started getting fatigued and sick. Patient states that he has had some nausea, vomiting, and now diarrhea over the last 2 days. Patient has had reduced PO intake. He is still urinating, but less frequently.      At time of exam patient is resting in the ED in no acute distress. He is on RA and saturating at %. He endorses no pains or complaints aside from nausea.      Patient was found to be in SUJATHA, with elevated K+, and acidosis. He will be going to the ICU for Bicarb and very close monitoring.    Interval History:  History taken from: patient chart RN  .     F - Clear liquid  A - N/A  S - N/A  T - DAPT  H - Yes  U - Pepcid  G - SSI      Intake/Output       19 - 19      [id number] 9511-6865 Total       Intake    I.V.  --  415 415    IV Piggyback  500  500 1000    Total Intake        Output    Total Output -- -- --        Intake/Output       19 - 19      9325-4247 9190-3830 Total       Intake    I.V.  --  415 415    IV Piggyback  500  500 1000    Total Intake        Output    Total Output -- -- --          Intake/Output       19 - 19 07      9757-2705 2886-0027 Total       Intake    I.V.  --  415 415    IV Piggyback  500  500 1000    Total Intake        Output    Total Output -- -- --          Review of Systems:   Review of Systems   Constitutional: Positive for activity change and fatigue. Negative for appetite change and fever.   Cardiovascular: Negative for chest pain and palpitations.    Gastrointestinal: Positive for nausea. Negative for abdominal pain.   Genitourinary: Positive for decreased urine volume and dysuria.   Skin: Negative for color change and rash.       Objective:     Vital Signs  Vitals:    11/17/19 0400 11/17/19 0500 11/17/19 0533 11/17/19 0600   BP: 118/63 119/65  128/65   Pulse: 87 93  93   Resp:       Temp: 97.2 °F (36.2 °C)      TempSrc: Axillary      SpO2: 99% 100%  98%   Weight:   58.5 kg (128 lb 15.5 oz)    Height:           Physical Exam  Physical Exam   Constitutional: He is oriented to person, place, and time. He appears well-developed and well-nourished. No distress.   HENT:   Head: Normocephalic and atraumatic.   Right Ear: External ear normal.   Left Ear: External ear normal.   Nose: Nose normal.   Eyes: Conjunctivae and EOM are normal. Pupils are equal, round, and reactive to light.   Neck: Neck supple. No thyromegaly present.   Cardiovascular: Normal rate, regular rhythm and normal heart sounds.   Pulmonary/Chest: Effort normal. He has no wheezes. He has no rales. He exhibits no tenderness.   Abdominal: Soft. Bowel sounds are normal. He exhibits no distension. There is no tenderness.   Neurological: He is alert and oriented to person, place, and time.   Skin: Skin is warm and dry. No rash noted. He is not diaphoretic. No erythema. No pallor.   Psychiatric: He has a normal mood and affect. His behavior is normal.   Nursing note and vitals reviewed.      Medication Review    Current Facility-Administered Medications:   •  albuterol (PROVENTIL) nebulizer solution 0.083% 2.5 mg/3mL, 2.5 mg, Nebulization, Q6H PRN, Jose G Voss MD  •  aspirin EC tablet 81 mg, 81 mg, Oral, Daily, Jose G Voss MD, 81 mg at 11/17/19 0828  •  budesonide-formoterol (SYMBICORT) 160-4.5 MCG/ACT inhaler 2 puff, 2 puff, Inhalation, BID - RT, Jose G Voss MD  •  cefTRIAXone (ROCEPHIN) 1 g/100 mL 0.9% NS (MBP), 1 g, Intravenous, Q24H, Jose G Voss MD, 1 g at 11/16/19 6642  •  clopidogrel  (PLAVIX) tablet 75 mg, 75 mg, Oral, Daily, Jose G Voss MD, 75 mg at 11/17/19 0828  •  dextrose (D50W) 25 g/ 50mL Intravenous Solution 25 g, 25 g, Intravenous, Q15 Min PRN, Joes G Voss MD  •  dextrose (GLUTOSE) oral gel 15 g, 15 g, Oral, Q15 Min PRN, Jose G Voss MD  •  famotidine (PEPCID) injection 20 mg, 20 mg, Intravenous, Daily, Jose G Voss MD, 20 mg at 11/17/19 0829  •  glucagon (human recombinant) (GLUCAGEN DIAGNOSTIC) injection 1 mg, 1 mg, Subcutaneous, Q15 Min PRN, Jose G Voss MD  •  heparin (porcine) 5000 UNIT/ML injection 5,000 Units, 5,000 Units, Subcutaneous, Q12H, Jose G Voss MD, 5,000 Units at 11/17/19 0828  •  insulin aspart (novoLOG) injection 0-9 Units, 0-9 Units, Subcutaneous, 4x Daily AC & at Bedtime, Jose G Voss MD, 2 Units at 11/16/19 2207  •  ondansetron (ZOFRAN) injection 4 mg, 4 mg, Intravenous, Q6H PRN, Jose G Voss MD  •  sodium bicarbonate 8.4 % 150 mEq in dextrose (D5W) 5 % 1,000 mL infusion (greater than 75 mEq), 150 mEq, Intravenous, Continuous, Jose G Voss MD  •  sodium chloride 0.9 % bolus 500 mL, 500 mL, Intravenous, Once, Jose Eduardo Levy MD, Stopped at 11/16/19 1844  •  sodium chloride 0.9 % flush 10 mL, 10 mL, Intravenous, Q12H, Jose G Voss MD, 10 mL at 11/17/19 0829  •  sodium chloride 0.9 % flush 10 mL, 10 mL, Intravenous, PRN, Jose G Voss MD  •  sodium chloride 0.9 % infusion, 125 mL/hr, Intravenous, Continuous, Jose Eduardo Levy MD, Last Rate: 125 mL/hr at 11/17/19 0829, 125 mL/hr at 11/17/19 0829  •  tamsulosin (FLOMAX) 24 hr capsule 0.8 mg, 0.8 mg, Oral, Daily, Hu Charles MD, 0.8 mg at 11/16/19 6129     Diagnostic Data    Lab Results (last 24 hours)     Procedure Component Value Units Date/Time    CBC & Differential [[id number]] Collected:  11/17/19 0224    Specimen:  Blood Updated:  11/17/19 0317    Narrative:       The following orders were created for panel order CBC & Differential.  Procedure                               Abnormality          Status                     ---------                               -----------         ------                     CBC Auto Differential[[id number]]        Abnormal            Final result                 Please view results for these tests on the individual orders.    CBC Auto Differential [[id number]]  (Abnormal) Collected:  11/17/19 0224    Specimen:  Blood Updated:  11/17/19 0317     WBC 8.06 10*3/mm3      RBC 4.41 10*6/mm3      Hemoglobin 13.3 g/dL      Hematocrit 40.7 %      MCV 92.3 fL      MCH 30.2 pg      MCHC 32.7 g/dL      RDW 14.7 %      RDW-SD 50.0 fl      MPV 10.3 fL      Platelets 128 10*3/mm3      Neutrophil % 72.4 %      Lymphocyte % 16.1 %      Monocyte % 9.9 %      Eosinophil % 1.0 %      Basophil % 0.2 %      Immature Grans % 0.4 %      Neutrophils, Absolute 5.83 10*3/mm3      Lymphocytes, Absolute 1.30 10*3/mm3      Monocytes, Absolute 0.80 10*3/mm3      Eosinophils, Absolute 0.08 10*3/mm3      Basophils, Absolute 0.02 10*3/mm3      Immature Grans, Absolute 0.03 10*3/mm3      nRBC 0.4 /100 WBC     Scan Slide [[id number]] Collected:  11/17/19 0224    Specimen:  Blood Updated:  11/17/19 0317     RBC Morphology Normal     WBC Morphology Normal     Platelet Estimate Adequate    Basic Metabolic Panel [[id number]]  (Abnormal) Collected:  11/17/19 0224    Specimen:  Blood Updated:  11/17/19 0309     Glucose 141 mg/dL      BUN 87 mg/dL      Creatinine 5.61 mg/dL      Sodium 134 mmol/L      Potassium 4.7 mmol/L      Chloride 112 mmol/L      CO2 8.0 mmol/L      Calcium 8.1 mg/dL      eGFR   Amer --     Comment: <15 Indicative of kidney failure.        eGFR Non African Amer 10 mL/min/1.73      Comment: <15 Indicative of kidney failure.        BUN/Creatinine Ratio 15.5     Anion Gap 14.0 mmol/L     Narrative:       GFR Normal >60  Chronic Kidney Disease <60  Kidney Failure <15    Basic Metabolic Panel [[id number]]  (Abnormal) Collected:  11/17/19 0002    Specimen:  Blood Updated:  11/17/19 0033     Glucose  170 mg/dL      BUN 89 mg/dL      Creatinine 5.51 mg/dL      Sodium 132 mmol/L      Potassium 4.6 mmol/L      Chloride 112 mmol/L      CO2 7.0 mmol/L      Calcium 7.9 mg/dL      eGFR   Amer --     Comment: <15 Indicative of kidney failure.        eGFR Non African Amer 11 mL/min/1.73      Comment: <15 Indicative of kidney failure.        BUN/Creatinine Ratio 16.2     Anion Gap 13.0 mmol/L     Narrative:       GFR Normal >60  Chronic Kidney Disease <60  Kidney Failure <15    POC Glucose Once [[id number]]  (Abnormal) Collected:  11/16/19 2207    Specimen:  Blood Updated:  11/16/19 2221     Glucose 198 mg/dL      Comment: : [id number] ROOPA SERRANOAMeter ID: [id number]       Basic Metabolic Panel [[id number]]  (Abnormal) Collected:  11/16/19 2039    Specimen:  Blood Updated:  11/16/19 2126     Glucose 238 mg/dL      BUN 86 mg/dL      Creatinine 5.34 mg/dL      Sodium 130 mmol/L      Potassium 4.9 mmol/L      Chloride 108 mmol/L      CO2 7.0 mmol/L      Calcium 8.5 mg/dL      eGFR   Amer --     Comment: <15 Indicative of kidney failure.        eGFR Non African Amer 11 mL/min/1.73      Comment: <15 Indicative of kidney failure.        BUN/Creatinine Ratio 16.1     Anion Gap 15.0 mmol/L     Narrative:       GFR Normal >60  Chronic Kidney Disease <60  Kidney Failure <15    Acetone [[id number]]  (Normal) Collected:  11/16/19 1809    Specimen:  Blood Updated:  11/16/19 2030     Acetone Negative    Blood Gas, Arterial [[id number]]  (Abnormal) Collected:  11/16/19 1922    Specimen:  Arterial Blood Updated:  11/16/19 1929     Site Right Radial     Wilian's Test Positive     pH, Arterial 6.968 pH units      Comment: 85 Value below critical limit        pCO2, Arterial 23.9 mm Hg      Comment: 84 Value below reference range        pO2, Arterial 110.0 mm Hg      Comment: 83 Value above reference range        HCO3, Arterial 5.5 mmol/L      Comment: 84 Value below reference range        Base Excess, Arterial -25.2  mmol/L      Comment: 84 Value below reference range        O2 Saturation, Arterial 98.2 %      Barometric Pressure for Blood Gas 752 mmHg      Modality Room Air     Ventilator Mode NA     Collected by JENSEN. RRT     Comment: Meter: [id number]     :  [id number]       Extra Tubes [[id number]] Collected:  11/16/19 1809    Specimen:  Blood, Venous Line Updated:  11/16/19 1916    Narrative:       The following orders were created for panel order Extra Tubes.  Procedure                               Abnormality         Status                     ---------                               -----------         ------                     Light Blue Top[[id number]]                                   Final result               Gold Top - SST[[id number]]                                   Final result                 Please view results for these tests on the individual orders.    Light Blue Top [[id number]] Collected:  11/16/19 1809    Specimen:  Blood Updated:  11/16/19 1916     Extra Tube hold for add-on     Comment: Auto resulted       Gold Top - SST [[id number]] Collected:  11/16/19 1809    Specimen:  Blood Updated:  11/16/19 1916     Extra Tube Hold for add-ons.     Comment: Auto resulted.       Urinalysis, Microscopic Only - Urine, Clean Catch [[id number]]  (Abnormal) Collected:  11/16/19 1828    Specimen:  Urine, Clean Catch Updated:  11/16/19 1909     RBC, UA 0-2 /HPF      WBC, UA 6-12 /HPF      Bacteria, UA 1+ /HPF      Squamous Epithelial Cells, UA 0-2 /HPF      Hyaline Casts, UA None Seen /LPF      Methodology Manual Light Microscopy    Urine Drug Screen - Urine, Clean Catch [[id number]]  (Abnormal) Collected:  11/16/19 1828    Specimen:  Urine, Clean Catch Updated:  11/16/19 1857     THC, Screen, Urine Negative     Phencyclidine (PCP), Urine Negative     Cocaine Screen, Urine Negative     Methamphetamine, Ur Positive     Opiate Screen Negative     Amphetamine Screen, Urine Negative     Benzodiazepine Screen,  Urine Negative     Tricyclic Antidepressants Screen Positive     Methadone Screen, Urine Negative     Barbiturates Screen, Urine Negative     Oxycodone Screen, Urine Negative     Propoxyphene Screen Negative     Buprenorphine, Screen, Urine Negative    Narrative:       Cutoff For Drugs Screened:    Amphetamines               500 ng/ml  Barbiturates               200 ng/ml  Benzodiazepines            150 ng/ml  Cocaine                    150 ng/ml  Methadone                  200 ng/ml  Opiates                    100 ng/ml  Phencyclidine               25 ng/ml  THC                            50 ng/ml  Methamphetamine            500 ng/ml  Tricyclic Antidepressants  300 ng/ml  Oxycodone                  100 ng/ml  Propoxyphene               300 ng/ml  Buprenorphine               10 ng/ml    The normal value for all drugs tested is negative. This report includes unconfirmed screening results, with the cutoff values listed, to be used for medical treatment purposes only.  Unconfirmed results must not be used for non-medical purposes such as employment or legal testing.  Clinical consideration should be applied to any drug of abuse test, particularly when unconfirmed results are used.      Comprehensive Metabolic Panel [[id number]]  (Abnormal) Collected:  11/16/19 1809    Specimen:  Blood Updated:  11/16/19 1843     Glucose 273 mg/dL      BUN 86 mg/dL      Creatinine 5.38 mg/dL      Sodium 128 mmol/L      Potassium 5.5 mmol/L      Chloride 106 mmol/L      CO2 6.0 mmol/L      Calcium 8.7 mg/dL      Total Protein 7.0 g/dL      Albumin 3.90 g/dL      ALT (SGPT) 13 U/L      AST (SGOT) 9 U/L      Alkaline Phosphatase 103 U/L      Total Bilirubin 0.3 mg/dL      eGFR Non African Amer 11 mL/min/1.73      Comment: <15 Indicative of kidney failure.        eGFR   Amer --     Comment: <15 Indicative of kidney failure.        Globulin 3.1 gm/dL      A/G Ratio 1.3 g/dL      BUN/Creatinine Ratio 16.0     Anion Gap 16.0 mmol/L      Narrative:       GFR Normal >60  Chronic Kidney Disease <60  Kidney Failure <15    BNP [[id number]]  (Abnormal) Collected:  11/16/19 1809    Specimen:  Blood Updated:  11/16/19 1843     proBNP 1,047.0 pg/mL     Narrative:       Among patients with dyspnea, NT-proBNP is highly sensitive for the detection of acute congestive heart failure. In addition NT-proBNP of <300 pg/ml effectively rules out acute congestive heart failure with 99% negative predictive value.    Troponin [[id number]]  (Normal) Collected:  11/16/19 1809    Specimen:  Blood Updated:  11/16/19 1843     Troponin T 0.017 ng/mL     Narrative:       Troponin T Reference Range:  <= 0.03 ng/mL-   Negative for AMI  >0.03 ng/mL-     Abnormal for myocardial necrosis.  Clinicians would have to utilize clinical acumen, EKG, Troponin and serial changes to determine if it is an Acute Myocardial Infarction or myocardial injury due to an underlying chronic condition.     TSH [[id number]]  (Normal) Collected:  11/16/19 1809    Specimen:  Blood Updated:  11/16/19 1843     TSH 1.250 uIU/mL     Urinalysis With Culture If Indicated - Urine, Clean Catch [[id number]]  (Abnormal) Collected:  11/16/19 1828    Specimen:  Urine, Clean Catch Updated:  11/16/19 1839     Color, UA Yellow     Appearance, UA Cloudy     pH, UA 7.0     Specific Gravity, UA 1.020     Glucose, UA Negative     Ketones, UA Negative     Bilirubin, UA Small (1+)     Blood, UA Small (1+)     Protein, UA >=300 mg/dL (3+)     Leuk Esterase, UA Negative     Nitrite, UA Negative     Urobilinogen, UA 0.2 E.U./dL    Ethanol [[id number]] Collected:  11/16/19 1809    Specimen:  Blood Updated:  11/16/19 1838     Ethanol <10 mg/dL      Ethanol % <0.010 %     Magnesium [[id number]]  (Normal) Collected:  11/16/19 1809    Specimen:  Blood Updated:  11/16/19 1838     Magnesium 1.9 mg/dL     Lactic Acid, Plasma [[id number]]  (Normal) Collected:  11/16/19 1809    Specimen:  Blood Updated:  11/16/19 1836     Lactate 0.5 mmol/L      CBC & Differential [[id number]] Collected:  11/16/19 1809    Specimen:  Blood Updated:  11/16/19 1819    Narrative:       The following orders were created for panel order CBC & Differential.  Procedure                               Abnormality         Status                     ---------                               -----------         ------                     CBC Auto Differential[[id number]]        Abnormal            Final result                 Please view results for these tests on the individual orders.    CBC Auto Differential [[id number]]  (Abnormal) Collected:  11/16/19 1809    Specimen:  Blood Updated:  11/16/19 1819     WBC 10.84 10*3/mm3      RBC 4.86 10*6/mm3      Hemoglobin 14.6 g/dL      Hematocrit 45.4 %      MCV 93.4 fL      MCH 30.0 pg      MCHC 32.2 g/dL      RDW 15.0 %      RDW-SD 52.2 fl      MPV 10.7 fL      Platelets 145 10*3/mm3      Neutrophil % 82.7 %      Lymphocyte % 8.6 %      Monocyte % 7.7 %      Eosinophil % 0.3 %      Basophil % 0.3 %      Immature Grans % 0.4 %      Neutrophils, Absolute 8.97 10*3/mm3      Lymphocytes, Absolute 0.93 10*3/mm3      Monocytes, Absolute 0.84 10*3/mm3      Eosinophils, Absolute 0.03 10*3/mm3      Basophils, Absolute 0.03 10*3/mm3      Immature Grans, Absolute 0.04 10*3/mm3      nRBC 0.4 /100 WBC           I reviewed the patient's new clinical results.    Assessment/Plan:     Active Hospital Problems    Diagnosis POA   • Acute renal failure superimposed on chronic kidney disease (CMS/[facility]) [N17.9, N18.9] Yes       #. SUJATHA on CKD III.   11/17. Liu attempted per nursing last night, but unable to secure liu. Patient with cystectomy and neobladder. UOP decreased overnight. IVF's. Bicarb. Nephrology consulted. Renal US today.   11/16. Aggressive hydration with strict I&O's. Liu ordered. Nephrology consulted.   Results from last 7 days   Lab Units 11/17/19  0224 11/17/19  0002 11/16/19 2039 11/16/19 1809   CREATININE mg/dL 5.61* 5.51* 5.34* 5.38*   #.  Severe metabolic acidosis.   11/17. ABG pending. CO2 small improvement. Bicarb gtt. Nephrology consulted.  11/16. Bicarb. IVF's. Nephrology consulted. Antiemetics. Low threshold for HD with decompensation. Patient currently awake, alert, conversive, and oriented x3. Foely ordered. Low threshold for intubation and HD. Closely monitor.  #. COPD. Not in AECOPD Symbicort.  #. CAD. ASA. Plavix.  #. DM.   11/17. SSI.  11/16. IV insulin and IVFs with acute hyperglycemia. SSI.  #. Hyperkalemia.   11/17. WNL. Monitor.  11/16. IVF's. Bicarb. Recheck pending. EKG. Monitor I&O's. Bicarb.  Results from last 7 days   Lab Units 11/17/19  0224 11/17/19  0002 11/16/19  2039 11/16/19  1809   POTASSIUM mmol/L 4.7 4.6 4.9 5.5*   #. Substance abuse. Patient smokes marijuana whenever he can. He used meth a month ago, but UDS positive for meth.      Will monitor patient's hospital course and adjust treatment as hospital course dictates.    DVT prophylaxis: Heparin SQ  Code status is   Code Status and Medical Interventions:   Ordered at: 11/16/19 1951     Code Status:    CPR     Medical Interventions (Level of Support Prior to Arrest):    Full       Plan for disposition:Planning ongoing.      Time:           This document has been electronically signed by Jose G Voss MD on November 17, 2019 8:46 AM

## 2019-11-17 NOTE — CONSULTS
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 mmol/L     CBC & Differential [[id number]] Collected:  11/16/19 1809    Specimen:  Blood Updated:  11/16/19 1819    Narrative:       The following orders were created for panel order CBC & Differential.  Procedure                               Abnormality         Status                     ---------                               -----------         ------                     CBC Auto Differential[[id number]]        Abnormal            Final result                 Please view results for these tests on the individual orders.    CBC Auto Differential [[id number]]  (Abnormal) Collected:  11/16/19 1809    Specimen:  Blood Updated:  11/16/19 1819     WBC 10.84 10*3/mm3      RBC 4.86 10*6/mm3      Hemoglobin 14.6 g/dL      Hematocrit 45.4 %      MCV 93.4 fL      MCH 30.0 pg      MCHC 32.2 g/dL      RDW 15.0 %      RDW-SD 52.2 fl      MPV 10.7 fL      Platelets 145 10*3/mm3      Neutrophil % 82.7 %      Lymphocyte % 8.6 %      Monocyte % 7.7 %      Eosinophil % 0.3 %      Basophil % 0.3 %      Immature Grans % 0.4 %      Neutrophils, Absolute 8.97 10*3/mm3      Lymphocytes, Absolute 0.93 10*3/mm3      Monocytes, Absolute 0.84 10*3/mm3      Eosinophils, Absolute 0.03 10*3/mm3      Basophils, Absolute 0.03 10*3/mm3      Immature Grans, Absolute 0.04 10*3/mm3      nRBC 0.4 /100  WBC          Imaging Results (Last 24 Hours)     Procedure Component Value Units Date/Time    US Renal Bilateral [[id number]] Collected:  11/17/19 1057     Updated:  11/17/19 1319    Narrative:       Ultrasound renal complete    HISTORY:  Acute renal failure. Chronic kidney disease.    Ultrasound examination of the kidneys and urinary bladder was  performed.    COMPARISON: March 26, 2019. Correlation CT November 16, 2019.     FINDINGS:  The right kidney measures 13.1 cm in length by 6.00 cm x 5.18 cm  transverse.  The left kidney measures 12.2 cm in length by 5.65 cm x 4.04 cm  transverse.  Increased echogenicity of the kidneys, compatible with medical  renal disease.  No hydronephrosis.  No solid or cystic renal mass.    Cystectomy with neobladder present.      Impression:       CONCLUSION:  Increased echogenicity of the kidneys, compatible with medical  renal disease.  No hydronephrosis.  Cystectomy with neobladder present.    [id number]    Electronically signed by:  Aaron Constantino MD  11/17/2019 1:18 PM  CST Workstation: [email or web address]    CT Abdomen Pelvis Without Contrast [[id number]] Collected:  11/16/19 2221     Updated:  11/16/19 2331    Narrative:       PROCEDURE:  CT ABDOMEN PELVIS WO CONTRAST    CLINICAL HISTORY:  64 years  Male  Hydronephrosis, N17.9 Acute  kidney failure, unspecified N18.9 Chronic kidney disease,  unspecified G93.41 Metabolic encephalopathy R53.1 Weakness      TECHNIQUE: Contiguous axial images obtained through the abdomen  and pelvis without IV contrast.  Coronal and sagittal reformatted  images provided.      This CT exam was performed according to our departmental  dose-optimization program, which includes one or more of the  following dose reduction techniques: automated exposure control,  adjustment of the mA and/or kV according to patient size, and/or  use of iterative reconstruction technique.    COMPARISON:  No prior exams provided for comparison.     FINDINGS:    Patient again seen to  be status post prior cystectomy and  prostatectomy with formation of a neobladder.    No urinary calculi. No hydronephrosis or visualized focal renal  lesion on either side.    No visualized bowel inflammation, obstruction, free  intraperitoneal air, or ascites.    Minimal bibasilar atelectasis.    Prior cholecystectomy without biliary dilatation.     Pancreatic atrophy without focal lesion on this noncontrast  study.    Calcified granulomata in the spleen.    The unenhanced liver and adrenal glands are normal.    No abdominal aortic aneurysm or retroperitoneal hemorrhage.    No acute osseous abnormality.       Impression:         Chronic postsurgical changes as described. No urinary or bowel  obstruction.    Electronically signed by:  Charlene Becker MD  11/16/2019 11:29 PM  CST Workstation: [id number]    CT Head Without Contrast [[id number]] Collected:  11/16/19 1819     Updated:  11/16/19 1841    Narrative:       .      EXAMINATION:  Computed Tomography      REGION:  Head             INDICATION:  gen weakness    HISTORY:  CORRELATIVE IMAGING:    3/20/19    TECHNIQUE:  iv contrast:  no    This exam was performed according to the departmental  dose-optimization program which includes automated exposure  control, adjustment of the mA and/or kV according to patient size  and/or use of iterative reconstruction technique.              COMMENTS:                - atrophy:              wnl for age    - cortex:                wnl for age    - deep white mat:  wnl for age    - hemorrhage:       none       - fluid collection: no intra/extra axial fluid collection     - mass / lesion:     no focal parenchymal lesion(s)       - gray/white jxn:   borders preserved         - brain stem:         wnl       - cerebellum:        wnl       - globes / retro:     wnl       - ventricles:          normal size / configuration       - midline shift:      no       - sinuses: Small mucoid retention cyst in the left maxillary  sinus.    - mastoids:            wnl        - osseous:             wnl       - misc.:  .       Impression:       CONCLUSION:    1.  Negative examination for acute intracranial pathology.           If signs or symptoms persist beyond reasonable expectations, a  MRI examination is suggested as is deemed clinically appropriate.    Electronically signed by:  BHARTI Travis MD  11/16/2019 6:39  PM CST Workstation: [id number]    XR Chest 1 View [[id number]] Collected:  11/16/19 1813     Updated:  11/16/19 1835    Narrative:         EXAM:         Radiograph(s), Chest   VIEWS:   Frontal  ; 1       DATE/TIME:  11/16/2019 6:33 PM CST                INDICATION:   gen weakness    COMPARISON:  CXR: 3/20/19             FINDINGS:             - lines/tubes:     none     - cardiac:         size within normal limits         - mediastinum:  contour within normal limits         - lungs:         no focal air space process, pulmonary  interstitial edema, nodule(s)/mass             - pleura:         no evidence of  fluid                  - osseous:         unremarkable for age                  - misc.:         Impression:       CONCLUSION:        1. No evidence of an active cardiopulmonary process.                                                              Electronically signed by:  BHARTI Travis MD  11/16/2019 6:34  PM CST Workstation: [id number]           I reviewed the patient's new clinical results.  I reviewed the patient's new imaging results and agree with the interpretation.  I reviewed the patient's other test results and agree with the interpretation        Assessment/Plan       Acute renal failure superimposed on chronic kidney disease (CMS/HCC)      Assessment & Plan    Consulted to Urology for retention of urine, history of cystectomy and has neobladder, reports increase in mucus and sediment with decreased urine output and nausea/vomiting/weakness/fatigue. Nephrology is managing acute kidney injury on chronic kidney disease.   -WBC  8.06  -Estimated Creatinine Clearance: 11.3 mL/min (A) (by C-G formula based on SCr of 5.61 mg/dL (H)).  -Cr 5.38-->5.34-->5.51-->5.61  -Baseline Cr per Nephrology 1.6-2.0  -Bladder scan > 300 mL  -Renal ultrasound showed medical renal disease, no hydronephrosis and cystectomy with neobladder.     Plan:  Urine culture, continue Rocephin.   John anchored at this  Monitor strict I&O and notify Urology for s/s of retention of urine after cath placement.     PRE DIAGNOSIS:  Retention of urine     POST DIAGNOSIS:  Retention of urine     PROCEDURE DETAILS:  I have reviewed the diagnosis and treatment options with patient. Risks and benefits explained and patient wishes to proceed with catheter placement. Site prepped in sterile technique. Urojet given for local pain control. 16 F catheter inserted with 10 mL sterile water in balloon. Placement was checked. Catheter connected to gravity bag, closed system.      COMPLICATIONS:  No Complications.     FINDINGS: Cloudy yellow urine.      INSTRUCTIONS: Appropriate post procedure instructions given. Verbalized understanding    I discussed the patient's findings and my recommendations with patient, nursing staff, consulting provider and TIFFANIE Sarabia  11/17/19  1:27 PM

## 2019-11-18 LAB
% EOS HANSEL STAIN: 1 %
ANION GAP SERPL CALCULATED.3IONS-SCNC: 14 MMOL/L (ref 5–15)
BASOPHILS # BLD AUTO: 0.03 10*3/MM3 (ref 0–0.2)
BASOPHILS NFR BLD AUTO: 0.5 % (ref 0–1.5)
BUN BLD-MCNC: 93 MG/DL (ref 8–23)
BUN/CREAT SERPL: 14.4 (ref 7–25)
CALCIUM SPEC-SCNC: 6.9 MG/DL (ref 8.6–10.5)
CHLORIDE SERPL-SCNC: 103 MMOL/L (ref 98–107)
CO2 SERPL-SCNC: 18 MMOL/L (ref 22–29)
CREAT BLD-MCNC: 6.44 MG/DL (ref 0.76–1.27)
CREAT UR-MCNC: 123.9 MG/DL
DEPRECATED RDW RBC AUTO: 47.9 FL (ref 37–54)
EOSINOPHIL # BLD AUTO: 0.11 10*3/MM3 (ref 0–0.4)
EOSINOPHIL NFR BLD AUTO: 1.7 % (ref 0.3–6.2)
ERYTHROCYTE [DISTWIDTH] IN BLOOD BY AUTOMATED COUNT: 14.9 % (ref 12.3–15.4)
GFR SERPL CREATININE-BSD FRML MDRD: 9 ML/MIN/1.73
GFR SERPL CREATININE-BSD FRML MDRD: ABNORMAL ML/MIN/{1.73_M2}
GLUCOSE BLD-MCNC: 188 MG/DL (ref 65–99)
GLUCOSE BLDC GLUCOMTR-MCNC: 179 MG/DL (ref 70–130)
GLUCOSE BLDC GLUCOMTR-MCNC: 193 MG/DL (ref 70–130)
GLUCOSE BLDC GLUCOMTR-MCNC: 201 MG/DL (ref 70–130)
GLUCOSE BLDC GLUCOMTR-MCNC: 236 MG/DL (ref 70–130)
HANSEL STAIN: POSITIVE
HCT VFR BLD AUTO: 33.9 % (ref 37.5–51)
HGB BLD-MCNC: 11.5 G/DL (ref 13–17.7)
IMM GRANULOCYTES # BLD AUTO: 0.02 10*3/MM3 (ref 0–0.05)
IMM GRANULOCYTES NFR BLD AUTO: 0.3 % (ref 0–0.5)
LYMPHOCYTES # BLD AUTO: 1.02 10*3/MM3 (ref 0.7–3.1)
LYMPHOCYTES NFR BLD AUTO: 15.4 % (ref 19.6–45.3)
MCH RBC QN AUTO: 30 PG (ref 26.6–33)
MCHC RBC AUTO-ENTMCNC: 33.9 G/DL (ref 31.5–35.7)
MCV RBC AUTO: 88.5 FL (ref 79–97)
MONOCYTES # BLD AUTO: 0.55 10*3/MM3 (ref 0.1–0.9)
MONOCYTES NFR BLD AUTO: 8.3 % (ref 5–12)
NEUTROPHILS # BLD AUTO: 4.89 10*3/MM3 (ref 1.7–7)
NEUTROPHILS NFR BLD AUTO: 73.8 % (ref 42.7–76)
NRBC BLD AUTO-RTO: 0.6 /100 WBC (ref 0–0.2)
PLATELET # BLD AUTO: 140 10*3/MM3 (ref 140–450)
PMV BLD AUTO: 10.4 FL (ref 6–12)
POTASSIUM BLD-SCNC: 2.8 MMOL/L (ref 3.5–5.2)
POTASSIUM BLD-SCNC: 3.1 MMOL/L (ref 3.5–5.2)
PROT UR-MCNC: 590 MG/DL
PROT/CREAT UR: 4761.9 MG/G CREA (ref 0–200)
RBC # BLD AUTO: 3.83 10*6/MM3 (ref 4.14–5.8)
SODIUM BLD-SCNC: 135 MMOL/L (ref 136–145)
WBC NRBC COR # BLD: 6.62 10*3/MM3 (ref 3.4–10.8)

## 2019-11-18 PROCEDURE — 63710000001 INSULIN ASPART PER 5 UNITS: Performed by: FAMILY MEDICINE

## 2019-11-18 PROCEDURE — 80048 BASIC METABOLIC PNL TOTAL CA: CPT | Performed by: FAMILY MEDICINE

## 2019-11-18 PROCEDURE — 25010000002 ONDANSETRON PER 1 MG: Performed by: FAMILY MEDICINE

## 2019-11-18 PROCEDURE — 94799 UNLISTED PULMONARY SVC/PX: CPT

## 2019-11-18 PROCEDURE — 25010000002 HEPARIN (PORCINE) PER 1000 UNITS: Performed by: FAMILY MEDICINE

## 2019-11-18 PROCEDURE — 85025 COMPLETE CBC W/AUTO DIFF WBC: CPT | Performed by: FAMILY MEDICINE

## 2019-11-18 PROCEDURE — 94760 N-INVAS EAR/PLS OXIMETRY 1: CPT

## 2019-11-18 PROCEDURE — 94640 AIRWAY INHALATION TREATMENT: CPT

## 2019-11-18 PROCEDURE — 84132 ASSAY OF SERUM POTASSIUM: CPT | Performed by: FAMILY MEDICINE

## 2019-11-18 PROCEDURE — 82962 GLUCOSE BLOOD TEST: CPT

## 2019-11-18 PROCEDURE — 25010000002 CEFTRIAXONE PER 250 MG: Performed by: FAMILY MEDICINE

## 2019-11-18 RX ORDER — POTASSIUM CHLORIDE 750 MG/1
40 CAPSULE, EXTENDED RELEASE ORAL AS NEEDED
Status: DISCONTINUED | OUTPATIENT
Start: 2019-11-18 | End: 2019-11-21 | Stop reason: HOSPADM

## 2019-11-18 RX ORDER — POTASSIUM CHLORIDE 1.5 G/1.77G
40 POWDER, FOR SOLUTION ORAL AS NEEDED
Status: DISCONTINUED | OUTPATIENT
Start: 2019-11-18 | End: 2019-11-21 | Stop reason: HOSPADM

## 2019-11-18 RX ORDER — POTASSIUM CHLORIDE 7.45 MG/ML
10 INJECTION INTRAVENOUS
Status: DISCONTINUED | OUTPATIENT
Start: 2019-11-18 | End: 2019-11-21 | Stop reason: HOSPADM

## 2019-11-18 RX ORDER — SODIUM CHLORIDE 9 MG/ML
INJECTION, SOLUTION INTRAVENOUS
Status: COMPLETED
Start: 2019-11-18 | End: 2019-11-18

## 2019-11-18 RX ORDER — ACETYLCYSTEINE 100 MG/ML
4 SOLUTION ORAL; RESPIRATORY (INHALATION) DAILY
Status: DISCONTINUED | OUTPATIENT
Start: 2019-11-19 | End: 2019-11-21 | Stop reason: HOSPADM

## 2019-11-18 RX ADMIN — SODIUM BICARBONATE 150 MEQ: 84 INJECTION, SOLUTION INTRAVENOUS at 01:24

## 2019-11-18 RX ADMIN — ACETYLCYSTEINE 4 ML: 100 INHALANT RESPIRATORY (INHALATION) at 10:33

## 2019-11-18 RX ADMIN — BUDESONIDE AND FORMOTEROL FUMARATE DIHYDRATE 2 PUFF: 160; 4.5 AEROSOL RESPIRATORY (INHALATION) at 07:36

## 2019-11-18 RX ADMIN — SODIUM CHLORIDE 1000 ML: 9 INJECTION, SOLUTION INTRAVENOUS at 10:34

## 2019-11-18 RX ADMIN — SODIUM BICARBONATE 75 MEQ: 84 INJECTION, SOLUTION INTRAVENOUS at 23:53

## 2019-11-18 RX ADMIN — HEPARIN SODIUM 5000 UNITS: 5000 INJECTION INTRAVENOUS; SUBCUTANEOUS at 08:03

## 2019-11-18 RX ADMIN — CLOPIDOGREL BISULFATE 75 MG: 75 TABLET ORAL at 08:03

## 2019-11-18 RX ADMIN — SODIUM BICARBONATE 75 MEQ: 84 INJECTION, SOLUTION INTRAVENOUS at 15:50

## 2019-11-18 RX ADMIN — INSULIN ASPART 4 UNITS: 100 INJECTION, SOLUTION INTRAVENOUS; SUBCUTANEOUS at 22:01

## 2019-11-18 RX ADMIN — ACETYLCYSTEINE 4 ML: 100 INHALANT RESPIRATORY (INHALATION) at 04:52

## 2019-11-18 RX ADMIN — INSULIN ASPART 4 UNITS: 100 INJECTION, SOLUTION INTRAVENOUS; SUBCUTANEOUS at 18:00

## 2019-11-18 RX ADMIN — POTASSIUM CHLORIDE 40 MEQ: 750 CAPSULE, EXTENDED RELEASE ORAL at 22:02

## 2019-11-18 RX ADMIN — POTASSIUM CHLORIDE 40 MEQ: 750 CAPSULE, EXTENDED RELEASE ORAL at 06:41

## 2019-11-18 RX ADMIN — BUDESONIDE AND FORMOTEROL FUMARATE DIHYDRATE 2 PUFF: 160; 4.5 AEROSOL RESPIRATORY (INHALATION) at 19:04

## 2019-11-18 RX ADMIN — HEPARIN SODIUM 5000 UNITS: 5000 INJECTION INTRAVENOUS; SUBCUTANEOUS at 22:00

## 2019-11-18 RX ADMIN — ONDANSETRON 4 MG: 2 INJECTION INTRAMUSCULAR; INTRAVENOUS at 19:17

## 2019-11-18 RX ADMIN — ACETYLCYSTEINE 4 ML: 100 INHALANT RESPIRATORY (INHALATION) at 03:03

## 2019-11-18 RX ADMIN — TAMSULOSIN HYDROCHLORIDE 0.8 MG: 0.4 CAPSULE ORAL at 08:02

## 2019-11-18 RX ADMIN — SODIUM CHLORIDE, PRESERVATIVE FREE 10 ML: 5 INJECTION INTRAVENOUS at 22:01

## 2019-11-18 RX ADMIN — CEFTRIAXONE SODIUM 1 G: 1 INJECTION, POWDER, FOR SOLUTION INTRAMUSCULAR; INTRAVENOUS at 22:01

## 2019-11-18 RX ADMIN — POTASSIUM CHLORIDE 40 MEQ: 750 CAPSULE, EXTENDED RELEASE ORAL at 15:51

## 2019-11-18 RX ADMIN — ACETYLCYSTEINE 4 ML: 100 INHALANT RESPIRATORY (INHALATION) at 06:41

## 2019-11-18 RX ADMIN — POTASSIUM CHLORIDE 40 MEQ: 750 CAPSULE, EXTENDED RELEASE ORAL at 10:34

## 2019-11-18 RX ADMIN — ASPIRIN 81 MG: 81 TABLET, COATED ORAL at 08:03

## 2019-11-18 RX ADMIN — ACETYLCYSTEINE 4 ML: 100 INHALANT RESPIRATORY (INHALATION) at 08:29

## 2019-11-18 RX ADMIN — INSULIN ASPART 2 UNITS: 100 INJECTION, SOLUTION INTRAVENOUS; SUBCUTANEOUS at 08:02

## 2019-11-18 RX ADMIN — SODIUM BICARBONATE 150 MEQ: 84 INJECTION, SOLUTION INTRAVENOUS at 08:04

## 2019-11-18 RX ADMIN — ACETYLCYSTEINE 4 ML: 100 INHALANT RESPIRATORY (INHALATION) at 00:55

## 2019-11-18 RX ADMIN — FAMOTIDINE 20 MG: 10 INJECTION INTRAVENOUS at 08:03

## 2019-11-18 RX ADMIN — INSULIN ASPART 2 UNITS: 100 INJECTION, SOLUTION INTRAVENOUS; SUBCUTANEOUS at 12:42

## 2019-11-18 NOTE — PROGRESS NOTES
[facility] Medicine Services  INPATIENT PROGRESS NOTE    Length of Stay: 2  Date of Admission: 11/16/2019  Primary Care Physician: Shayne Merritt MD    Subjective   Chief Complaints: Weakness.    HPI: Patient reports that he feels weak.  He denies chest pain, shortness of breath.  His nausea and vomiting have improved.  He would like to try a regular diet.  He denies abdominal pain but states that he has some abdominal distention and has not had a bowel movement in about 5 days.  His brother is at bedside.  He also reports bilateral leg pain and states that he takes Neurontin at home.    Review of Systems   Constitutional: Negative for fatigue.   HENT: Negative for congestion and sore throat.    Respiratory: Negative for cough, shortness of breath and wheezing.    Cardiovascular: Negative for chest pain and palpitations.   Gastrointestinal: Negative for abdominal distention, nausea and vomiting.   Genitourinary: Positive for difficulty urinating. Negative for dysuria and frequency.   Musculoskeletal: Positive for myalgias (bilateral LE).   Neurological: Positive for weakness. Negative for dizziness, light-headedness and headaches.            Objective    Temp:  [97.3 °F (36.3 °C)-98.7 °F (37.1 °C)] 98.7 °F (37.1 °C)  Heart Rate:  [81-90] 86  Resp:  [16] 16  BP: (109-143)/(67-83) 123/75    Physical Exam   Constitutional: He is oriented to person, place, and time. He appears well-developed.   HENT:   Head: Normocephalic and atraumatic.   Eyes: EOM are normal. Pupils are equal, round, and reactive to light.   Neck: Neck supple. No tracheal deviation present.   Cardiovascular: Normal rate and regular rhythm.   Pulmonary/Chest: Effort normal and breath sounds normal. He has no wheezes. He has no rales.   Abdominal: Soft. Bowel sounds are normal. There is no tenderness.   Musculoskeletal: He exhibits no edema or tenderness.   Lymphadenopathy:     He has no cervical  adenopathy.   Neurological: He is alert and oriented to person, place, and time. No cranial nerve deficit.           Results Review:  I have reviewed the labs, radiology results, and diagnostic studies.    Laboratory Data:   Results from last 7 days   Lab Units 11/18/19 0443 11/17/19 0224 11/17/19  0002  11/16/19  1809   SODIUM mmol/L 135* 134* 132*   < > 128*   POTASSIUM mmol/L 2.8* 4.7 4.6   < > 5.5*   CHLORIDE mmol/L 103 112* 112*   < > 106   CO2 mmol/L 18.0* 8.0* 7.0*   < > 6.0*   BUN mg/dL 93* 87* 89*   < > 86*   CREATININE mg/dL 6.44* 5.61* 5.51*   < > 5.38*   GLUCOSE mg/dL 188* 141* 170*   < > 273*   CALCIUM mg/dL 6.9* 8.1* 7.9*   < > 8.7   BILIRUBIN mg/dL  --   --   --   --  0.3   ALK PHOS U/L  --   --   --   --  103   ALT (SGPT) U/L  --   --   --   --  13   AST (SGOT) U/L  --   --   --   --  9   ANION GAP mmol/L 14.0 14.0 13.0   < > 16.0*    < > = values in this interval not displayed.     Estimated Creatinine Clearance: 9.9 mL/min (A) (by C-G formula based on SCr of 6.44 mg/dL (H)).  Results from last 7 days   Lab Units 11/16/19  1809   MAGNESIUM mg/dL 1.9         Results from last 7 days   Lab Units 11/18/19 0443 11/17/19 0224 11/16/19  1809   WBC 10*3/mm3 6.62 8.06 10.84*   HEMOGLOBIN g/dL 11.5* 13.3 14.6   HEMATOCRIT % 33.9* 40.7 45.4   PLATELETS 10*3/mm3 140 128* 145           Culture Data:   No results found for: BLOODCX  No results found for: URINECX  No results found for: RESPCX  No results found for: WOUNDCX  No results found for: STOOLCX  No components found for: BODYFLD    Radiology Data:   Imaging Results (Last 24 Hours)     Procedure Component Value Units Date/Time    US Renal Bilateral [[id number]] Collected:  11/17/19 1057     Updated:  11/17/19 1319    Narrative:       Ultrasound renal complete    HISTORY:  Acute renal failure. Chronic kidney disease.    Ultrasound examination of the kidneys and urinary bladder was  performed.    COMPARISON: March 26, 2019. Correlation CT November 16,  2019.     FINDINGS:  The right kidney measures 13.1 cm in length by 6.00 cm x 5.18 cm  transverse.  The left kidney measures 12.2 cm in length by 5.65 cm x 4.04 cm  transverse.  Increased echogenicity of the kidneys, compatible with medical  renal disease.  No hydronephrosis.  No solid or cystic renal mass.    Cystectomy with neobladder present.      Impression:       CONCLUSION:  Increased echogenicity of the kidneys, compatible with medical  renal disease.  No hydronephrosis.  Cystectomy with neobladder present.    [id number]    Electronically signed by:  Aaron oCnstantino MD  11/17/2019 1:18 PM  [employer] Workstation: [id number]          I have reviewed the patient's current medications.     Assessment/Plan     Active Hospital Problems    Diagnosis   • Acute renal failure superimposed on chronic kidney disease (CMS/HCC)       Plan:      1.  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  Creatinine continues to rise.  Appreciate nephrology.  Continue hydration and Mucomyst per John.    2.  Metabolic acidosis -continue bicarb drip.  Appreciate nephrology.    3.  Nausea/vomiting/diarrhea -seems to be improving.  Advance diet.    4.  Hyperkalemia -this has resolved.  Replace potassium cautiously.    5.  DM2 - SSI    6.  COPD - no acute issues.  Cont symbicort.      7.  CAD - ASA/Plavix    8.  UTI -Rocephin.  Follow culture.        :Zachary Campoverde MD

## 2019-11-18 NOTE — PLAN OF CARE
Problem: Fall Risk (Adult)  Goal: Identify Related Risk Factors and Signs and Symptoms  Outcome: Ongoing (interventions implemented as appropriate)      Problem: Patient Care Overview  Goal: Plan of Care Review  Outcome: Ongoing (interventions implemented as appropriate)   11/18/19 9172   Coping/Psychosocial   Plan of Care Reviewed With patient   Plan of Care Review   Progress improving   OTHER   Outcome Summary urine output adequate this shift. per dr chawla decision for dialysis will be made in the morning based on labs. pt advanced to renal diet. will continue to monitor closely. VSS     Goal: Individualization and Mutuality  Outcome: Ongoing (interventions implemented as appropriate)      Problem: Skin Injury Risk (Adult)  Goal: Identify Related Risk Factors and Signs and Symptoms  Outcome: Ongoing (interventions implemented as appropriate)      Problem: Urine Elimination Impaired (Adult)  Goal: Identify Related Risk Factors and Signs and Symptoms  Outcome: Ongoing (interventions implemented as appropriate)      Problem: Renal Failure/Kidney Injury, Acute (Adult)  Goal: Signs and Symptoms of Listed Potential Problems Will be Absent, Minimized or Managed (Renal Failure/Kidney Injury, Acute)  Outcome: Ongoing (interventions implemented as appropriate)

## 2019-11-18 NOTE — PROGRESS NOTES
"   LOS: 2 days   Patient Care Team:  Shayne Merritt MD as PCP - General (Family Medicine)  Harvinder Robert MD as PCP - Claims Attributed  Kevin Robbins PA-C as Physician Assistant (Gastroenterology)  Harvinder Robert MD    Subjective     Subjective:  Symptoms:  (Urine flowing well and bladder scan now is 0 mL. He complains of some lower quadrant and suprapubic pain.).    Diet:  No nausea or vomiting.    Activity level: Impaired due to weakness.    Pain:  Pain is requiring pain medication.        History taken from: patient chart RN    Objective     Vital Signs  Temp:  [97.3 °F (36.3 °C)-98.7 °F (37.1 °C)] 98.7 °F (37.1 °C)  Heart Rate:  [81-90] 86  Resp:  [16] 16  BP: (109-143)/(67-81) 123/75    Objective:  General Appearance:  In no acute distress.    Vital signs: (most recent): Blood pressure 123/75, pulse 86, temperature 98.7 °F (37.1 °C), temperature source Oral, resp. rate 16, height 170.2 cm (67\"), weight 58.7 kg (129 lb 6.6 oz), SpO2 100 %.  Vital signs are normal.  No fever.    Output: Producing urine (John clear yellow urine) and no stool output.    HEENT: Normal HEENT exam.    Lungs:  Normal effort and normal respiratory rate.  Breath sounds clear to auscultation.    Heart: Normal rate.  S1 normal and S2 normal.    Chest: Symmetric chest wall expansion.   Abdomen: Abdomen is soft and non-distended.  Bowel sounds are normal.   There is suprapubic tenderness.    Extremities: There is no deformity or dependent edema.    Pulses: Distal pulses are intact.    Neurological: Patient is alert and oriented to person, place and time.  GCS score is 15.    Pupils:  Pupils are equal, round, and reactive to light.    Skin:  Warm and dry.  No rash or cyanosis.             Results Review:    Lab Results (last 24 hours)     Procedure Component Value Units Date/Time    Urine Culture - Urine, Urine, Clean Catch [[id number]]  (Normal) Collected:  11/17/19 1139    Specimen:  Urine, Clean Catch Updated:  11/18/19 " 1256     Urine Culture No growth    POC Glucose Once [[id number]]  (Abnormal) Collected:  11/18/19 1238    Specimen:  Blood Updated:  11/18/19 1250     Glucose 193 mg/dL      Comment: : [phone] RODRIGUEZ STACIEMeter ID: [id number]       POC Glucose Once [[id number]]  (Abnormal) Collected:  11/18/19 0802    Specimen:  Blood Updated:  11/18/19 0839     Glucose 179 mg/dL      Comment: : [phone] RODRIGUEZ STACIEMeter ID: [id number]       Urine Eosinophils, Mitchel's Stain - Urine, Clean Catch [[id number]]  (Abnormal) Collected:  11/17/19 1139    Specimen:  Urine, Clean Catch Updated:  11/18/19 0614     Mitchel Stain Positive     % EOS Mitchel Stain 1 %     Basic Metabolic Panel [[id number]]  (Abnormal) Collected:  11/18/19 0443    Specimen:  Blood Updated:  11/18/19 0535     Glucose 188 mg/dL      BUN 93 mg/dL      Creatinine 6.44 mg/dL      Sodium 135 mmol/L      Potassium 2.8 mmol/L      Comment: Results confirmed by repeat analysis        Chloride 103 mmol/L      CO2 18.0 mmol/L      Calcium 6.9 mg/dL      eGFR   Amer --     Comment: <15 Indicative of kidney failure.        eGFR Non African Amer 9 mL/min/1.73      Comment: <15 Indicative of kidney failure.        BUN/Creatinine Ratio 14.4     Anion Gap 14.0 mmol/L     Narrative:       GFR Normal >60  Chronic Kidney Disease <60  Kidney Failure <15    CBC & Differential [[id number]] Collected:  11/18/19 0443    Specimen:  Blood Updated:  11/18/19 0458    Narrative:       The following orders were created for panel order CBC & Differential.  Procedure                               Abnormality         Status                     ---------                               -----------         ------                     CBC Auto Differential[[id number]]        Abnormal            Final result                 Please view results for these tests on the individual orders.    CBC Auto Differential [[id number]]  (Abnormal) Collected:  11/18/19 0443    Specimen:  Blood  Updated:  11/18/19 0458     WBC 6.62 10*3/mm3      RBC 3.83 10*6/mm3      Hemoglobin 11.5 g/dL      Hematocrit 33.9 %      MCV 88.5 fL      MCH 30.0 pg      MCHC 33.9 g/dL      RDW 14.9 %      RDW-SD 47.9 fl      MPV 10.4 fL      Platelets 140 10*3/mm3      Neutrophil % 73.8 %      Lymphocyte % 15.4 %      Monocyte % 8.3 %      Eosinophil % 1.7 %      Basophil % 0.5 %      Immature Grans % 0.3 %      Neutrophils, Absolute 4.89 10*3/mm3      Lymphocytes, Absolute 1.02 10*3/mm3      Monocytes, Absolute 0.55 10*3/mm3      Eosinophils, Absolute 0.11 10*3/mm3      Basophils, Absolute 0.03 10*3/mm3      Immature Grans, Absolute 0.02 10*3/mm3      nRBC 0.6 /100 WBC     Protein / Creatinine Ratio, Urine - Urine, Clean Catch [[id number]]  (Abnormal) Collected:  11/17/19 1139    Specimen:  Urine, Clean Catch Updated:  11/18/19 0050     Protein/Creatinine Ratio, Urine 4,761.9 mg/G Crea      Creatinine, Urine 123.9 mg/dL      Total Protein, Urine 590.0 mg/dL     Creatinine, Urine, Random - Urine, Clean Catch [[id number]] Collected:  11/17/19 1139    Specimen:  Urine, Clean Catch Updated:  11/17/19 2224     Creatinine, Urine 123.0 mg/dL     Narrative:       Reference intervals for random urine have not been established.  Clinical usage is dependent upon physician's interpretation in combination with other laboratory tests.     POC Glucose Once [[id number]]  (Abnormal) Collected:  11/17/19 2006    Specimen:  Blood Updated:  11/17/19 2018     Glucose 230 mg/dL      Comment: : [phone] ROOP[facility] AR[facility]ADSierra [facility] Health Centerter ID: [id number]       Urinalysis, Microscopic Only - Urine, Clean Catch [[id number]]  (Abnormal) Collected:  11/17/19 1139    Specimen:  Urine, Clean Catch Updated:  11/17/19 1557     RBC, UA 0-2 /HPF      WBC, UA 13-20 /HPF      Bacteria, UA 1+ /HPF      Squamous Epithelial Cells, UA 0-2 /HPF      Hyaline Casts, UA None Seen /LPF      Mucus, UA Trace /HPF      Methodology Manual Light Microscopy    Urinalysis With Culture  If Indicated - Urine, Clean Catch [[id number]]  (Abnormal) Collected:  11/17/19 1139    Specimen:  Urine, Clean Catch Updated:  11/17/19 1539     Color, UA Yellow     Appearance, UA Slightly Cloudy     pH, UA 7.0     Specific Carver, UA 1.021     Comment: Result obtained by Refractometer        Glucose, UA Negative     Ketones, UA Negative     Bilirubin, UA Small (1+)     Blood, UA Small (1+)     Protein, UA >=300 mg/dL (3+)     Leuk Esterase, UA Negative     Nitrite, UA Negative     Urobilinogen, UA 0.2 E.U./dL         Imaging Results (Last 24 Hours)     Procedure Component Value Units Date/Time    US Renal Bilateral [[id number]] Collected:  11/17/19 1057     Updated:  11/17/19 1319    Narrative:       Ultrasound renal complete    HISTORY:  Acute renal failure. Chronic kidney disease.    Ultrasound examination of the kidneys and urinary bladder was  performed.    COMPARISON: March 26, 2019. Correlation CT November 16, 2019.     FINDINGS:  The right kidney measures 13.1 cm in length by 6.00 cm x 5.18 cm  transverse.  The left kidney measures 12.2 cm in length by 5.65 cm x 4.04 cm  transverse.  Increased echogenicity of the kidneys, compatible with medical  renal disease.  No hydronephrosis.  No solid or cystic renal mass.    Cystectomy with neobladder present.      Impression:       CONCLUSION:  Increased echogenicity of the kidneys, compatible with medical  renal disease.  No hydronephrosis.  Cystectomy with neobladder present.    [id number]    Electronically signed by:  Aaron Constantino MD  11/17/2019 1:18 PM  [email or web address] Workstation: [email or web address]           I reviewed the patient's new clinical results.  I reviewed the patient's new imaging results and agree with the interpretation.  I reviewed the patient's other test results and agree with the interpretation      Assessment/Plan       Acute renal failure superimposed on chronic kidney disease (CMS/HCC)      Assessment & Plan    Consulted to Urology for retention of urine,  history of cystectomy and has neobladder, admitted for SUJATHA, complained of increase in mucus and sediment with decreased urine output and nausea/vomiting/weakness/fatigue. Nephrology is managing acute kidney injury on chronic kidney disease.   -WBC 8.06-->6.62  -Estimated Creatinine Clearance: 9.9 mL/min (A) (by C-G formula based on SCr of 6.44 mg/dL (H)).  -Cr 5.38-->5.34-->5.51-->5.61-->6.44  -Baseline Cr per Nephrology 1.6-2.0  -Bladder scan > 300 mL 11/17/19 prior to John being anchored; John anchored and Mucomyst administered intravesical and bladder scan now is 0 mL.   -Renal ultrasound 11/17/19 showed medical renal disease, no hydronephrosis and cystectomy with neobladder.   -Urine culture IP, Rocephin day #3 (dose pending)     Plan:  Continue intravesical Mucomyst and change frequency to daily.   John in place.     TIFFANIE Michele  11/18/19  1:02 PM

## 2019-11-18 NOTE — PROGRESS NOTES
"[facility]  [address] [place] [address]. [address]  T - [phone]  F - [phone]     Progress Note          PATIENT  DEMOGRAPHICS   PATIENT NAME: Favio Casillas                      PHYSICIAN: Rodríguez Brody MD  : 1955  MRN: [id number]   LOS: 2 days    Patient Care Team:  Shayne Merritt MD as PCP - General (Family Medicine)  Harvinder Robert MD as PCP - Claims Attributed  Kevin Robbins PA-C as Physician Assistant (Gastroenterology)  Harvinder Robert MD  Subjective   SUBJECTIVE    cc overnight , neobladder draining well        Objective   OBJECTIVE   Vital Signs  Temp:  [97.3 °F (36.3 °C)-98.7 °F (37.1 °C)] 98.7 °F (37.1 °C)  Heart Rate:  [81-90] 90  Resp:  [16] 16  BP: (109-143)/(67-83) 134/76    Flowsheet Rows      First Filed Value   Admission Height  170.2 cm (67\") Documented at 2019 1748   Admission Weight  63.1 kg (139 lb 1.6 oz) Documented at 2019 1748           I/O last 3 completed shifts:  In: 4607 [I.V.:4007; Other:100; IV Piggyback:500]  Out: 990 [Urine:990]    PHYSICAL EXAM    Physical Exam   Constitutional: He is oriented to person, place, and time. He appears well-developed.   HENT:   Head: Normocephalic.   Eyes: Pupils are equal, round, and reactive to light.   Cardiovascular: Normal rate, regular rhythm and normal heart sounds.   Pulmonary/Chest: Effort normal and breath sounds normal.   Abdominal: Soft. Bowel sounds are normal.   Musculoskeletal: He exhibits no edema.   Neurological: He is alert and oriented to person, place, and time.       RESULTS   Results Review:    Results from last 7 days   Lab Units 19  0443 19  0224 19  0002  19  1809   SODIUM mmol/L 135* 134* 132*   < > 128*   POTASSIUM mmol/L 2.8* 4.7 4.6   < > 5.5*   CHLORIDE mmol/L 103 112* 112*   < > 106   CO2 mmol/L 18.0* 8.0* 7.0*   < > 6.0*   BUN mg/dL 93* 87* 89*   < > 86*   CREATININE mg/dL 6.44* 5.61* 5.51*   < > 5.38*   CALCIUM " mg/dL 6.9* 8.1* 7.9*   < > 8.7   BILIRUBIN mg/dL  --   --   --   --  0.3   ALK PHOS U/L  --   --   --   --  103   ALT (SGPT) U/L  --   --   --   --  13   AST (SGOT) U/L  --   --   --   --  9   GLUCOSE mg/dL 188* 141* 170*   < > 273*    < > = values in this interval not displayed.       Estimated Creatinine Clearance: 9.9 mL/min (A) (by C-G formula based on SCr of 6.44 mg/dL (H)).    Results from last 7 days   Lab Units 11/16/19  1809   MAGNESIUM mg/dL 1.9             Results from last 7 days   Lab Units 11/18/19  0443 11/17/19  0224 11/16/19  1809   WBC 10*3/mm3 6.62 8.06 10.84*   HEMOGLOBIN g/dL 11.5* 13.3 14.6   PLATELETS 10*3/mm3 140 128* 145               Imaging Results (Last 24 Hours)     Procedure Component Value Units Date/Time    US Renal Bilateral [[id number]] Collected:  11/17/19 1057     Updated:  11/17/19 1319    Narrative:       Ultrasound renal complete    HISTORY:  Acute renal failure. Chronic kidney disease.    Ultrasound examination of the kidneys and urinary bladder was  performed.    COMPARISON: March 26, 2019. Correlation CT November 16, 2019.     FINDINGS:  The right kidney measures 13.1 cm in length by 6.00 cm x 5.18 cm  transverse.  The left kidney measures 12.2 cm in length by 5.65 cm x 4.04 cm  transverse.  Increased echogenicity of the kidneys, compatible with medical  renal disease.  No hydronephrosis.  No solid or cystic renal mass.    Cystectomy with neobladder present.      Impression:       CONCLUSION:  Increased echogenicity of the kidneys, compatible with medical  renal disease.  No hydronephrosis.  Cystectomy with neobladder present.    [id number]    Electronically signed by:  Aaron Constantino MD  11/17/2019 1:18 PM  [employer] Workstation: [id number]           MEDICATIONS      acetylcysteine 4 mL Oral Q2H   aspirin 81 mg Oral Daily   budesonide-formoterol 2 puff Inhalation BID - RT   cefTRIAXone 1 g Intravenous Q24H   clopidogrel 75 mg Oral Daily   famotidine 20 mg Intravenous Daily   heparin  (porcine) 5,000 Units Subcutaneous Q12H   insulin aspart 0-9 Units Subcutaneous 4x Daily AC & at Bedtime   sodium chloride 500 mL Intravenous Once   sodium chloride 10 mL Intravenous Q12H   tamsulosin 0.8 mg Oral Daily       sodium bicarbonate drip (greater than 75 mEq/bag) 150 mEq Last Rate: 150 mEq (11/18/19 0804)       Assessment/Plan   ASSESSMENT / PLAN      Acute renal failure superimposed on chronic kidney disease (CMS/HCC)    1.  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  Creatinine greater than 6 now.  Creatinine is continued to worsen since admission.  UO has picked up some. Bicarb is better. keep sodium bicarbonate drip to 150 M EQ's at 150 mL/h. Normal saline bolus 1 L x 1.   CT of the abdomen was negative for any bowel or bladder obstruction.  has liu placed now. Getting mucomyst.      -No urgent need for renal replacement therapy at this time, he may need it if his renal function continues to worsen. dw him in detail about need of RRT in 24-48hrs if worsening uremia     2.  Severe metabolic acidosis- related to #1 and #3, bicarb drip as above,      3.  Nausea/vomiting/diarrhea- worsening over the past 4 to 5 days.  Management per primary team.     4.  COPD     5.  CAD      6.  Diabetes mellitus type 2     7.  Hyperkalemia-resolved now low k getting 120meq today     8.  Substance abuse- patient admits to smoking marijuana reports meth use 1 month ago, UDS positive for meth at this time.      9 ?UTI on ceftriaxone                This document has been electronically signed by Rodríguez Brody MD on November 18, 2019 10:22 AM

## 2019-11-18 NOTE — PLAN OF CARE
Problem: Fall Risk (Adult)  Goal: Identify Related Risk Factors and Signs and Symptoms  Outcome: Ongoing (interventions implemented as appropriate)      Problem: Patient Care Overview  Goal: Plan of Care Review   11/17/19 1810   Coping/Psychosocial   Plan of Care Reviewed With patient   Plan of Care Review   Progress improving   OTHER   Outcome Summary Patient has done well this shift. John catheter placed by Adryan. Irrigation required for catheter with 50cc sterlie saline and Mucomyst was also added by Dr Calderon. After Mucomyst irrigation 500cc or urine returned. VSS.        Problem: Skin Injury Risk (Adult)  Goal: Identify Related Risk Factors and Signs and Symptoms  Outcome: Ongoing (interventions implemented as appropriate)      Problem: Urine Elimination Impaired (Adult)  Goal: Identify Related Risk Factors and Signs and Symptoms  Outcome: Ongoing (interventions implemented as appropriate)      Problem: Renal Failure/Kidney Injury, Acute (Adult)  Goal: Signs and Symptoms of Listed Potential Problems Will be Absent, Minimized or Managed (Renal Failure/Kidney Injury, Acute)  Outcome: Ongoing (interventions implemented as appropriate)

## 2019-11-19 ENCOUNTER — APPOINTMENT (OUTPATIENT)
Dept: GENERAL RADIOLOGY | Facility: HOSPITAL | Age: 64
End: 2019-11-19

## 2019-11-19 LAB
ANION GAP SERPL CALCULATED.3IONS-SCNC: 17 MMOL/L (ref 5–15)
BACTERIA SPEC AEROBE CULT: NO GROWTH
BASOPHILS # BLD AUTO: 0.04 10*3/MM3 (ref 0–0.2)
BASOPHILS NFR BLD AUTO: 0.6 % (ref 0–1.5)
BUN BLD-MCNC: 88 MG/DL (ref 8–23)
BUN/CREAT SERPL: 15 (ref 7–25)
CALCIUM SPEC-SCNC: 6.6 MG/DL (ref 8.6–10.5)
CHLORIDE SERPL-SCNC: 100 MMOL/L (ref 98–107)
CO2 SERPL-SCNC: 20 MMOL/L (ref 22–29)
CREAT BLD-MCNC: 5.85 MG/DL (ref 0.76–1.27)
DEPRECATED RDW RBC AUTO: 46.8 FL (ref 37–54)
EOSINOPHIL # BLD AUTO: 0.02 10*3/MM3 (ref 0–0.4)
EOSINOPHIL NFR BLD AUTO: 0.3 % (ref 0.3–6.2)
ERYTHROCYTE [DISTWIDTH] IN BLOOD BY AUTOMATED COUNT: 14.6 % (ref 12.3–15.4)
GFR SERPL CREATININE-BSD FRML MDRD: 10 ML/MIN/1.73
GFR SERPL CREATININE-BSD FRML MDRD: ABNORMAL ML/MIN/{1.73_M2}
GLUCOSE BLD-MCNC: 167 MG/DL (ref 65–99)
GLUCOSE BLDC GLUCOMTR-MCNC: 139 MG/DL (ref 70–130)
GLUCOSE BLDC GLUCOMTR-MCNC: 183 MG/DL (ref 70–130)
GLUCOSE BLDC GLUCOMTR-MCNC: 190 MG/DL (ref 70–130)
HCT VFR BLD AUTO: 31 % (ref 37.5–51)
HGB BLD-MCNC: 10.6 G/DL (ref 13–17.7)
IMM GRANULOCYTES # BLD AUTO: 0.03 10*3/MM3 (ref 0–0.05)
IMM GRANULOCYTES NFR BLD AUTO: 0.4 % (ref 0–0.5)
LYMPHOCYTES # BLD AUTO: 0.46 10*3/MM3 (ref 0.7–3.1)
LYMPHOCYTES NFR BLD AUTO: 6.5 % (ref 19.6–45.3)
MAGNESIUM SERPL-MCNC: 1.3 MG/DL (ref 1.6–2.4)
MCH RBC QN AUTO: 30.2 PG (ref 26.6–33)
MCHC RBC AUTO-ENTMCNC: 34.2 G/DL (ref 31.5–35.7)
MCV RBC AUTO: 88.3 FL (ref 79–97)
MONOCYTES # BLD AUTO: 0.46 10*3/MM3 (ref 0.1–0.9)
MONOCYTES NFR BLD AUTO: 6.5 % (ref 5–12)
NEUTROPHILS # BLD AUTO: 6.12 10*3/MM3 (ref 1.7–7)
NEUTROPHILS NFR BLD AUTO: 85.7 % (ref 42.7–76)
NRBC BLD AUTO-RTO: 0 /100 WBC (ref 0–0.2)
PLATELET # BLD AUTO: 119 10*3/MM3 (ref 140–450)
PMV BLD AUTO: 11.1 FL (ref 6–12)
POTASSIUM BLD-SCNC: 3.2 MMOL/L (ref 3.5–5.2)
POTASSIUM BLD-SCNC: 3.3 MMOL/L (ref 3.5–5.2)
RBC # BLD AUTO: 3.51 10*6/MM3 (ref 4.14–5.8)
SODIUM BLD-SCNC: 137 MMOL/L (ref 136–145)
WBC NRBC COR # BLD: 7.13 10*3/MM3 (ref 3.4–10.8)

## 2019-11-19 PROCEDURE — 25010000002 ONDANSETRON PER 1 MG: Performed by: FAMILY MEDICINE

## 2019-11-19 PROCEDURE — 74018 RADEX ABDOMEN 1 VIEW: CPT

## 2019-11-19 PROCEDURE — 82962 GLUCOSE BLOOD TEST: CPT

## 2019-11-19 PROCEDURE — 85025 COMPLETE CBC W/AUTO DIFF WBC: CPT | Performed by: FAMILY MEDICINE

## 2019-11-19 PROCEDURE — 94760 N-INVAS EAR/PLS OXIMETRY 1: CPT

## 2019-11-19 PROCEDURE — 80048 BASIC METABOLIC PNL TOTAL CA: CPT | Performed by: FAMILY MEDICINE

## 2019-11-19 PROCEDURE — 25010000002 MAGNESIUM SULFATE IN D5W 1G/100ML (PREMIX) 1-5 GM/100ML-% SOLUTION: Performed by: FAMILY MEDICINE

## 2019-11-19 PROCEDURE — 94799 UNLISTED PULMONARY SVC/PX: CPT

## 2019-11-19 PROCEDURE — 84132 ASSAY OF SERUM POTASSIUM: CPT | Performed by: INTERNAL MEDICINE

## 2019-11-19 PROCEDURE — 83735 ASSAY OF MAGNESIUM: CPT | Performed by: FAMILY MEDICINE

## 2019-11-19 PROCEDURE — 63710000001 INSULIN ASPART PER 5 UNITS: Performed by: FAMILY MEDICINE

## 2019-11-19 PROCEDURE — 25010000002 PROMETHAZINE PER 50 MG: Performed by: INTERNAL MEDICINE

## 2019-11-19 PROCEDURE — 25010000003 POTASSIUM CHLORIDE 10 MEQ/100ML SOLUTION: Performed by: INTERNAL MEDICINE

## 2019-11-19 PROCEDURE — 25010000002 HEPARIN (PORCINE) PER 1000 UNITS: Performed by: FAMILY MEDICINE

## 2019-11-19 RX ORDER — MAGNESIUM SULFATE HEPTAHYDRATE 40 MG/ML
2 INJECTION, SOLUTION INTRAVENOUS AS NEEDED
Status: DISCONTINUED | OUTPATIENT
Start: 2019-11-19 | End: 2019-11-21 | Stop reason: HOSPADM

## 2019-11-19 RX ORDER — MAGNESIUM SULFATE 1 G/100ML
1 INJECTION INTRAVENOUS AS NEEDED
Status: DISCONTINUED | OUTPATIENT
Start: 2019-11-19 | End: 2019-11-21 | Stop reason: HOSPADM

## 2019-11-19 RX ORDER — MAGNESIUM SULFATE HEPTAHYDRATE 40 MG/ML
4 INJECTION, SOLUTION INTRAVENOUS AS NEEDED
Status: DISCONTINUED | OUTPATIENT
Start: 2019-11-19 | End: 2019-11-21 | Stop reason: HOSPADM

## 2019-11-19 RX ORDER — PROMETHAZINE HYDROCHLORIDE 25 MG/ML
12.5 INJECTION, SOLUTION INTRAMUSCULAR; INTRAVENOUS EVERY 6 HOURS PRN
Status: DISCONTINUED | OUTPATIENT
Start: 2019-11-19 | End: 2019-11-21 | Stop reason: HOSPADM

## 2019-11-19 RX ADMIN — CLOPIDOGREL BISULFATE 75 MG: 75 TABLET ORAL at 12:00

## 2019-11-19 RX ADMIN — POTASSIUM CHLORIDE 10 MEQ: 7.46 INJECTION, SOLUTION INTRAVENOUS at 17:16

## 2019-11-19 RX ADMIN — ACETYLCYSTEINE 4 ML: 100 INHALANT RESPIRATORY (INHALATION) at 08:34

## 2019-11-19 RX ADMIN — MAGNESIUM SULFATE HEPTAHYDRATE 1 G: 1 INJECTION, SOLUTION INTRAVENOUS at 17:50

## 2019-11-19 RX ADMIN — BUDESONIDE AND FORMOTEROL FUMARATE DIHYDRATE 2 PUFF: 160; 4.5 AEROSOL RESPIRATORY (INHALATION) at 20:12

## 2019-11-19 RX ADMIN — MAGNESIUM SULFATE HEPTAHYDRATE 1 G: 1 INJECTION, SOLUTION INTRAVENOUS at 19:26

## 2019-11-19 RX ADMIN — PROMETHAZINE HYDROCHLORIDE 12.5 MG: 25 INJECTION INTRAMUSCULAR; INTRAVENOUS at 08:58

## 2019-11-19 RX ADMIN — TAMSULOSIN HYDROCHLORIDE 0.8 MG: 0.4 CAPSULE ORAL at 12:00

## 2019-11-19 RX ADMIN — BUDESONIDE AND FORMOTEROL FUMARATE DIHYDRATE 2 PUFF: 160; 4.5 AEROSOL RESPIRATORY (INHALATION) at 07:46

## 2019-11-19 RX ADMIN — ONDANSETRON 4 MG: 2 INJECTION INTRAMUSCULAR; INTRAVENOUS at 20:17

## 2019-11-19 RX ADMIN — POTASSIUM CHLORIDE 10 MEQ: 7.46 INJECTION, SOLUTION INTRAVENOUS at 19:25

## 2019-11-19 RX ADMIN — POTASSIUM CHLORIDE 40 MEQ: 750 CAPSULE, EXTENDED RELEASE ORAL at 12:02

## 2019-11-19 RX ADMIN — SODIUM BICARBONATE 75 MEQ: 84 INJECTION, SOLUTION INTRAVENOUS at 08:33

## 2019-11-19 RX ADMIN — HEPARIN SODIUM 5000 UNITS: 5000 INJECTION INTRAVENOUS; SUBCUTANEOUS at 20:17

## 2019-11-19 RX ADMIN — PROMETHAZINE HYDROCHLORIDE 12.5 MG: 25 INJECTION INTRAMUSCULAR; INTRAVENOUS at 02:03

## 2019-11-19 RX ADMIN — PROMETHAZINE HYDROCHLORIDE 12.5 MG: 25 INJECTION INTRAMUSCULAR; INTRAVENOUS at 17:15

## 2019-11-19 RX ADMIN — POTASSIUM CHLORIDE 10 MEQ: 7.46 INJECTION, SOLUTION INTRAVENOUS at 18:17

## 2019-11-19 RX ADMIN — FAMOTIDINE 20 MG: 10 INJECTION INTRAVENOUS at 08:34

## 2019-11-19 RX ADMIN — SODIUM CHLORIDE, PRESERVATIVE FREE 10 ML: 5 INJECTION INTRAVENOUS at 08:35

## 2019-11-19 RX ADMIN — POTASSIUM CHLORIDE 10 MEQ: 7.46 INJECTION, SOLUTION INTRAVENOUS at 16:00

## 2019-11-19 RX ADMIN — INSULIN ASPART 2 UNITS: 100 INJECTION, SOLUTION INTRAVENOUS; SUBCUTANEOUS at 06:45

## 2019-11-19 RX ADMIN — INSULIN ASPART 2 UNITS: 100 INJECTION, SOLUTION INTRAVENOUS; SUBCUTANEOUS at 20:17

## 2019-11-19 RX ADMIN — SODIUM BICARBONATE 75 MEQ: 84 INJECTION, SOLUTION INTRAVENOUS at 15:53

## 2019-11-19 RX ADMIN — ONDANSETRON 4 MG: 2 INJECTION INTRAMUSCULAR; INTRAVENOUS at 00:36

## 2019-11-19 RX ADMIN — ASPIRIN 81 MG: 81 TABLET, COATED ORAL at 12:00

## 2019-11-19 RX ADMIN — ONDANSETRON 4 MG: 2 INJECTION INTRAMUSCULAR; INTRAVENOUS at 12:07

## 2019-11-19 RX ADMIN — HEPARIN SODIUM 5000 UNITS: 5000 INJECTION INTRAVENOUS; SUBCUTANEOUS at 08:35

## 2019-11-19 NOTE — PROGRESS NOTES
"[facility]  [address] [place] [address]. [address]  T - [phone]  F - [phone]     Progress Note          PATIENT  DEMOGRAPHICS   PATIENT NAME: Favio Casillas                      PHYSICIAN: Rodríguez Brody MD  : 1955  MRN: [id number]   LOS: 3 days    Patient Care Team:  Shayne Merritt MD as PCP - General (Family Medicine)  Harvinder Robert MD as PCP - Claims Attributed  Kevin Robbins PA-C as Physician Assistant (Gastroenterology)  Harvinder Robert MD  Subjective   SUBJECTIVE   UO upto 2.5L /24 h, neobladder draining well        Objective   OBJECTIVE   Vital Signs  Temp:  [97.2 °F (36.2 °C)-98.4 °F (36.9 °C)] 97.2 °F (36.2 °C)  Heart Rate:  [63-97] 66  Resp:  [16-20] 16  BP: (107-167)/(64-87) 167/70    Flowsheet Rows      First Filed Value   Admission Height  170.2 cm (67\") Documented at 2019 1748   Admission Weight  63.1 kg (139 lb 1.6 oz) Documented at 2019 1748           I/O last 3 completed shifts:  In: 3769.5 [I.V.:3659.5; Other:110]  Out: 2800 [Urine:2500; Emesis/NG output:300]    PHYSICAL EXAM    Physical Exam   Constitutional: He is oriented to person, place, and time. He appears well-developed.   HENT:   Head: Normocephalic.   Eyes: Pupils are equal, round, and reactive to light.   Cardiovascular: Normal rate, regular rhythm and normal heart sounds.   Pulmonary/Chest: Effort normal and breath sounds normal.   Abdominal: Soft. Bowel sounds are normal.   Musculoskeletal: He exhibits no edema.   Neurological: He is alert and oriented to person, place, and time.       RESULTS   Results Review:    Results from last 7 days   Lab Units 19  0408 19  0042 19  1836 19  0443 19  0224  19  1809   SODIUM mmol/L 137  --   --  135* 134*   < > 128*   POTASSIUM mmol/L 3.2* 3.3* 3.1* 2.8* 4.7   < > 5.5*   CHLORIDE mmol/L 100  --   --  103 112*   < > 106   CO2 mmol/L 20.0*  --   --  18.0* 8.0*   < > 6.0*   BUN mg/dL 88* "  --   --  93* 87*   < > 86*   CREATININE mg/dL 5.85*  --   --  6.44* 5.61*   < > 5.38*   CALCIUM mg/dL 6.6*  --   --  6.9* 8.1*   < > 8.7   BILIRUBIN mg/dL  --   --   --   --   --   --  0.3   ALK PHOS U/L  --   --   --   --   --   --  103   ALT (SGPT) U/L  --   --   --   --   --   --  13   AST (SGOT) U/L  --   --   --   --   --   --  9   GLUCOSE mg/dL 167*  --   --  188* 141*   < > 273*    < > = values in this interval not displayed.       Estimated Creatinine Clearance: 11.2 mL/min (A) (by C-G formula based on SCr of 5.85 mg/dL (H)).    Results from last 7 days   Lab Units 11/16/19  1809   MAGNESIUM mg/dL 1.9             Results from last 7 days   Lab Units 11/19/19  0408 11/18/19  0443 11/17/19  0224 11/16/19  1809   WBC 10*3/mm3 7.13 6.62 8.06 10.84*   HEMOGLOBIN g/dL 10.6* 11.5* 13.3 14.6   PLATELETS 10*3/mm3 119* 140 128* 145               Imaging Results (Last 24 Hours)     Procedure Component Value Units Date/Time    XR Abdomen KUB [[id number]] Collected:  11/19/19 0838     Updated:  11/19/19 0901    Narrative:       Procedure: KUB    Reason for exam: Vomiting    FINDINGS: No acute osseous abnormality. Soft tissue structures of  the abdomen are normal. Small 4.8 mm circular calcification  overlies the inferior medial liver and superior right peripheral  kidney. This may represent calcified granuloma versus renal  calculi. Surgical clips in the region of gallbladder fossa which  suggests cholecystectomy. Recommend clinical correlation.  Surgical clips in the pelvis consistent with prior surgery in  this region. Nonobstructive bowel gas pattern.      Impression:       1.  Small 4.8 mm circular calcification overlies the inferior  medial liver and superior right peripheral kidney. This may  represent calcified granuloma versus renal calculi.   2.  Surgical clips in the region of gallbladder fossa which  suggests cholecystectomy. Recommend clinical correlation.   3.  Otherwise unremarkable  abdomen.    Electronically signed by:  Tyron Cheek MD  11/19/2019 9:00 AM CST  Workstation: [id number]           MEDICATIONS      acetylcysteine 4 mL Oral Daily   aspirin 81 mg Oral Daily   budesonide-formoterol 2 puff Inhalation BID - RT   clopidogrel 75 mg Oral Daily   famotidine 20 mg Intravenous Daily   heparin (porcine) 5,000 Units Subcutaneous Q12H   insulin aspart 0-9 Units Subcutaneous 4x Daily AC & at Bedtime   sodium chloride 500 mL Intravenous Once   sodium chloride 10 mL Intravenous Q12H   tamsulosin 0.8 mg Oral Daily       sodium bicarbonate drip less than 75 mEq/bag 75 mEq Last Rate: 75 mEq (11/19/19 0890)       Assessment/Plan   ASSESSMENT / PLAN      Acute renal failure superimposed on chronic kidney disease (CMS/HCC)    1.  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  Creatinine peak at 6.4, now better with good UO. Bicarb is better. keep sodium bicarbonate drip. Replace kcl. CT of the abdomen was negative for any bowel or bladder obstruction.  has liu placed now. Getting mucomyst.      2.  Severe metabolic acidosis- related to #1 and #3, bicarb drip as above,      3.  Nausea/vomiting/diarrhea- worsening over the past 4 to 5 days.  Management per primary team.     4.  COPD     5.  CAD      6.  Diabetes mellitus type 2     7.  Hyperkalemia-resolved now low k getting replacement     8.  Substance abuse- patient admits to smoking marijuana reports meth use 1 month ago, UDS positive for meth at this time.      9 ?UTI on ceftriaxone, culture pending                This document has been electronically signed by Rodríguez Brody MD on November 19, 2019 10:31 AM

## 2019-11-19 NOTE — PROGRESS NOTES
Discharge Planning Assessment  [facility]     Patient Name: Favio Casillas  MRN: [id number]  Today's Date: 11/19/2019    Admit Date: 11/16/2019    Discharge Needs Assessment     Row Name 11/19/19 1124       Living Environment    Lives With  alone    Current Living Arrangements  home/apartment/condo    Primary Care Provided by  self    Provides Primary Care For  no one    Family Caregiver if Needed  other relative(s)    Quality of Family Relationships  involved    Able to Return to Prior Arrangements  yes    Living Arrangement Comments  Pt resides at home alone. He repots his nephew resides close and offers assistance.        Transition Planning    Patient/Family Anticipates Transition to  home    Patient/Family Anticipated Services at Transition  none    Transportation Anticipated  family or friend will provide       Discharge Needs Assessment    Concerns to be Addressed  adjustment to diagnosis/illness;substance/tobacco abuse/use;compliance issue    Equipment Currently Used at Home  cane, straight    Equipment Needed After Discharge  -- Awaiting PT/OT recomendations.     Outpatient/Agency/Support Group Needs  outpatient substance abuse treatment    Discharge Facility/Level of Care Needs  -- Awaiitng PT/OT recomendations. Pt voiced no needs.     Current Discharge Risk  chronically ill;lives alone;substance use/abuse        Discharge Plan     Row Name 11/19/19 1129       Plan    Plan Comments  LSW assessment complete. Pt resides at home alone. He voiced limited support system. Pt reports being independent prior to hospitalization. Pt uses cane to assist with mobility. Pt reported to RN a history of meth and THC use. Pt's plans is to return home at d/c. He was not enterested in resources. LSW/case mgt will follow up as consulted and complete arrangements as ordered. Hernan Matson LSW        Destination      No service coordination in this encounter.      Durable Medical Equipment      No service coordination in  this encounter.      Dialysis/Infusion      No service coordination in this encounter.      Home Medical Care      No service coordination in this encounter.      Therapy      No service coordination in this encounter.      Community Resources      No service coordination in this encounter.          Demographic Summary     Row Name 11/19/19 1121       General Information    Admission Type  inpatient    Referral Source  high risk screening    Reason for Consult  discharge planning    Preferred Language  English     Used During This Interaction  no       Contact Information    Contact Information Obtained for          Functional Status     Row Name 11/19/19 1122       Functional Status    Usual Activity Tolerance  moderate    Current Activity Tolerance  fair    Functional Status Comments  Pt uses cane to assist with mobility.        Functional Status, IADL    Medications  independent Pt uses [facility]    Meal Preparation  independent    Housekeeping  independent    Laundry  independent    Shopping  independent       Mental Status Summary    Recent Changes in Mental Status/Cognitive Functioning  no changes        Psychosocial    No documentation.       Abuse/Neglect    No documentation.       Legal    No documentation.       Substance Abuse    No documentation.       Patient Forms    No documentation.           JOSHUA Mosley

## 2019-11-19 NOTE — PROGRESS NOTES
"   LOS: 3 days   Patient Care Team:  Shayne Merritt MD as PCP - General (Family Medicine)  Harvinder Robert MD as PCP - Claims Attributed  Kevin Robbins PA-C as Physician Assistant (Gastroenterology)  Harvinder Robert MD    Subjective     Subjective:  Symptoms:  (Nursing did flush catheter over night, patient has worsening nausea/vomiting.).    Diet:  He reports  nausea and vomiting.    Activity level: Impaired due to weakness.        History taken from: patient chart RN    Objective     Vital Signs  Temp:  [97.2 °F (36.2 °C)-98.4 °F (36.9 °C)] 97.2 °F (36.2 °C)  Heart Rate:  [63-97] 66  Resp:  [16-20] 16  BP: (107-167)/(64-90) 165/90    Objective:  General Appearance:  In no acute distress.    Vital signs: (most recent): Blood pressure 165/90, pulse 66, temperature 97.2 °F (36.2 °C), temperature source Temporal, resp. rate 16, height 170.2 cm (67\"), weight 60.5 kg (133 lb 6.1 oz), SpO2 98 %.  Vital signs are normal.  No fever.    Output: Producing urine (John clear yellow urine) and no stool output.    HEENT: Normal HEENT exam.    Lungs:  Normal effort and normal respiratory rate.  Breath sounds clear to auscultation.    Heart: Normal rate.  S1 normal and S2 normal.    Chest: Symmetric chest wall expansion.   Abdomen: Abdomen is soft and non-distended.  Hypoactive bowel sounds.   There is no abdominal tenderness.     Extremities: There is no deformity or dependent edema.    Pulses: Distal pulses are intact.    Neurological: Patient is alert and oriented to person, place and time.  GCS score is 15.    Pupils:  Pupils are equal, round, and reactive to light.    Skin:  Warm and dry.  No rash or cyanosis.             Results Review:    Lab Results (last 24 hours)     Procedure Component Value Units Date/Time    POC Glucose Once [[id number]]  (Abnormal) Collected:  11/19/19 0633    Specimen:  Blood Updated:  11/19/19 0655     Glucose 190 mg/dL      Comment: : [id number] MADDY Servin ID: " [id number]       Basic Metabolic Panel [[id number]]  (Abnormal) Collected:  11/19/19 0408    Specimen:  Blood Updated:  11/19/19 0509     Glucose 167 mg/dL      BUN 88 mg/dL      Creatinine 5.85 mg/dL      Sodium 137 mmol/L      Potassium 3.2 mmol/L      Chloride 100 mmol/L      CO2 20.0 mmol/L      Calcium 6.6 mg/dL      eGFR   Amer --     Comment: <15 Indicative of kidney failure.        eGFR Non African Amer 10 mL/min/1.73      Comment: <15 Indicative of kidney failure.        BUN/Creatinine Ratio 15.0     Anion Gap 17.0 mmol/L     Narrative:       GFR Normal >60  Chronic Kidney Disease <60  Kidney Failure <15    CBC & Differential [[id number]] Collected:  11/19/19 0408    Specimen:  Blood Updated:  11/19/19 0442    Narrative:       The following orders were created for panel order CBC & Differential.  Procedure                               Abnormality         Status                     ---------                               -----------         ------                     CBC Auto Differential[[id number]]        Abnormal            Final result                 Please view results for these tests on the individual orders.    CBC Auto Differential [[id number]]  (Abnormal) Collected:  11/19/19 0408    Specimen:  Blood Updated:  11/19/19 0442     WBC 7.13 10*3/mm3      RBC 3.51 10*6/mm3      Hemoglobin 10.6 g/dL      Hematocrit 31.0 %      MCV 88.3 fL      MCH 30.2 pg      MCHC 34.2 g/dL      RDW 14.6 %      RDW-SD 46.8 fl      MPV 11.1 fL      Platelets 119 10*3/mm3      Neutrophil % 85.7 %      Lymphocyte % 6.5 %      Monocyte % 6.5 %      Eosinophil % 0.3 %      Basophil % 0.6 %      Immature Grans % 0.4 %      Neutrophils, Absolute 6.12 10*3/mm3      Lymphocytes, Absolute 0.46 10*3/mm3      Monocytes, Absolute 0.46 10*3/mm3      Eosinophils, Absolute 0.02 10*3/mm3      Basophils, Absolute 0.04 10*3/mm3      Immature Grans, Absolute 0.03 10*3/mm3      nRBC 0.0 /100 WBC     Potassium [[id number]]  (Abnormal)  Collected:  11/19/19 0042    Specimen:  Blood Updated:  11/19/19 0058     Potassium 3.3 mmol/L     POC Glucose Once [[id number]]  (Abnormal) Collected:  11/18/19 1944    Specimen:  Blood Updated:  11/18/19 2006     Glucose 201 mg/dL      Comment: : [phone] MADDY AVILESEMeter ID: [id number]       Potassium [[id number]]  (Abnormal) Collected:  11/18/19 1836    Specimen:  Blood Updated:  11/18/19 1902     Potassium 3.1 mmol/L     POC Glucose Once [[id number]]  (Abnormal) Collected:  11/18/19 1800    Specimen:  Blood Updated:  11/18/19 1813     Glucose 236 mg/dL      Comment: : [id number] RODRIGUEZ STACIEMeter ID: [id number]       Urine Culture - Urine, Urine, Clean Catch [[id number]]  (Normal) Collected:  11/17/19 1139    Specimen:  Urine, Clean Catch Updated:  11/18/19 1256     Urine Culture No growth    POC Glucose Once [[id number]]  (Abnormal) Collected:  11/18/19 1238    Specimen:  Blood Updated:  11/18/19 1250     Glucose 193 mg/dL      Comment: : [id number] RODRIGUEZ STACIEMeter ID: [id number]              I reviewed the patient's new clinical results.  I reviewed the patient's new imaging results and agree with the interpretation.  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 Nephrology is managing acute kidney injury on chronic kidney disease. Imaging ordered per primary for worsening nausea/vomiting.  -WBC 8.06-->6.62-->7.13  -Estimated Creatinine Clearance: 11.2 mL/min (A) (by C-G formula based on SCr of 5.85 mg/dL (H)).  -Cr 5.38-->5.34-->5.51-->5.61-->6.44-->5.85  -Baseline Cr per Nephrology 1.6-2.0  -Bladder scan > 300 mL  11/17/19 prior to John being anchored; John anchored 11/17/19and Mucomyst administered intravesical.   -Renal ultrasound 11/17/19 showed medical renal disease, no hydronephrosis and cystectomy with neobladder.   -Urine culture IP, Rocephin day #4 (dose pending)     Plan:  Continue intravesical Mucomyst and change frequency to daily, may irrigate catheter with sterile normal saline PRN as ordered.   John in place.   Imaging IP.     TIFFANIE Michele  11/19/19  10:59 AM

## 2019-11-19 NOTE — NURSING NOTE
Patient has potassium replacement protocol however his creatinine is 6.4 and called esperanza to make sure it was ok to give full dosage and he said give the one and redraw. Redraw at 1 am was 3.3 and called , esperanza declined to have me replace more and to wait till nephrology can see him. I also told him the calcium was 6.9 and declined to replace due to creatinine levels. Patient has had intractable nausea and zofram has given no relief. I called and got promethazine ordered prn for the extreme nausea. Patient also had a very large bowel movement at 1 am . Bicarb still running no other pain reported. VS are stable and urine output has been good.

## 2019-11-19 NOTE — PLAN OF CARE
Problem: Fall Risk (Adult)  Goal: Identify Related Risk Factors and Signs and Symptoms  Outcome: Ongoing (interventions implemented as appropriate)    Goal: Absence of Fall  Outcome: Ongoing (interventions implemented as appropriate)      Problem: Patient Care Overview  Goal: Plan of Care Review  Outcome: Ongoing (interventions implemented as appropriate)   11/18/19 1754 11/18/19 2000 11/19/19 0433   Coping/Psychosocial   Plan of Care Reviewed With --  patient --    Plan of Care Review   Progress improving --  --    OTHER   Outcome Summary --  --  urine output is adequate, VS are stable, patient has alot of nausea with vomiting addressed with antinausea meds. Labs pulled for AM for Dosani , Creatinine still elevated. Electrolytes need to be replaced but per night shift hospitalist would feel more comfortable having day shift specialty address the issues for safety .      Goal: Individualization and Mutuality  Outcome: Ongoing (interventions implemented as appropriate)    Goal: Discharge Needs Assessment  Outcome: Ongoing (interventions implemented as appropriate)      Problem: Skin Injury Risk (Adult)  Goal: Identify Related Risk Factors and Signs and Symptoms  Outcome: Ongoing (interventions implemented as appropriate)    Goal: Skin Health and Integrity  Outcome: Ongoing (interventions implemented as appropriate)      Problem: Urine Elimination Impaired (Adult)  Goal: Identify Related Risk Factors and Signs and Symptoms  Outcome: Ongoing (interventions implemented as appropriate)    Goal: Effective or Improved Urinary Elimination  Outcome: Ongoing (interventions implemented as appropriate)    Goal: Reduced Incontinence Episodes  Outcome: Ongoing (interventions implemented as appropriate)      Problem: Renal Failure/Kidney Injury, Acute (Adult)  Goal: Signs and Symptoms of Listed Potential Problems Will be Absent, Minimized or Managed (Renal Failure/Kidney Injury, Acute)  Outcome: Ongoing (interventions implemented  as appropriate)      Problem: Nausea/Vomiting (Adult)  Goal: Identify Related Risk Factors and Signs and Symptoms  Outcome: Ongoing (interventions implemented as appropriate)    Goal: Symptom Relief  Outcome: Ongoing (interventions implemented as appropriate)    Goal: Adequate Hydration  Outcome: Ongoing (interventions implemented as appropriate)

## 2019-11-19 NOTE — PROGRESS NOTES
[facility] Medicine Services  INPATIENT PROGRESS NOTE    Length of Stay: 3  Date of Admission: 11/16/2019  Primary Care Physician: Shayne Merritt MD    Subjective   Chief Complaints: n/v    HPI:  Patient has had recurrent episodes of nausea, vomiting, diarrhea overnight.  He reports some suprapubic abdominal discomfort but no severe pain.  His family is at bedside.    Review of Systems   Constitutional: Negative for fatigue.   HENT: Negative for congestion and sore throat.    Respiratory: Negative for cough, shortness of breath and wheezing.    Cardiovascular: Negative for chest pain and palpitations.   Gastrointestinal: Negative for abdominal distention, nausea and vomiting.   Genitourinary: Positive for difficulty urinating. Negative for dysuria and frequency.   Musculoskeletal: Positive for myalgias (bilateral LE).   Neurological: Positive for weakness. Negative for dizziness, light-headedness and headaches.            Objective    Temp:  [97.2 °F (36.2 °C)-98.6 °F (37 °C)] 98.6 °F (37 °C)  Heart Rate:  [62-84] 65  Resp:  [16-20] 16  BP: (107-167)/(64-90) 155/85    Physical Exam   Constitutional: He is oriented to person, place, and time. He appears well-developed.   HENT:   Head: Normocephalic and atraumatic.   Eyes: EOM are normal. Pupils are equal, round, and reactive to light.   Neck: Neck supple. No tracheal deviation present.   Cardiovascular: Normal rate and regular rhythm.   Pulmonary/Chest: Effort normal and breath sounds normal. He has no wheezes. He has no rales.   Abdominal: Soft. Bowel sounds are normal. There is tenderness (mild suprapubic).   Musculoskeletal: He exhibits no edema or tenderness.   Lymphadenopathy:     He has no cervical adenopathy.   Neurological: He is alert and oriented to person, place, and time. No cranial nerve deficit.           Results Review:  I have reviewed the labs, radiology results, and diagnostic  studies.    Laboratory Data:   Results from last 7 days   Lab Units 11/19/19 0408 11/19/19 0042 11/18/19 1836 11/18/19 0443 11/17/19 0224 11/16/19  1809   SODIUM mmol/L 137  --   --  135* 134*   < > 128*   POTASSIUM mmol/L 3.2* 3.3* 3.1* 2.8* 4.7   < > 5.5*   CHLORIDE mmol/L 100  --   --  103 112*   < > 106   CO2 mmol/L 20.0*  --   --  18.0* 8.0*   < > 6.0*   BUN mg/dL 88*  --   --  93* 87*   < > 86*   CREATININE mg/dL 5.85*  --   --  6.44* 5.61*   < > 5.38*   GLUCOSE mg/dL 167*  --   --  188* 141*   < > 273*   CALCIUM mg/dL 6.6*  --   --  6.9* 8.1*   < > 8.7   BILIRUBIN mg/dL  --   --   --   --   --   --  0.3   ALK PHOS U/L  --   --   --   --   --   --  103   ALT (SGPT) U/L  --   --   --   --   --   --  13   AST (SGOT) U/L  --   --   --   --   --   --  9   ANION GAP mmol/L 17.0*  --   --  14.0 14.0   < > 16.0*    < > = values in this interval not displayed.     Estimated Creatinine Clearance: 11.2 mL/min (A) (by C-G formula based on SCr of 5.85 mg/dL (H)).  Results from last 7 days   Lab Units 11/16/19  1809   MAGNESIUM mg/dL 1.9         Results from last 7 days   Lab Units 11/19/19 0408 11/18/19 0443 11/17/19 0224 11/16/19  1809   WBC 10*3/mm3 7.13 6.62 8.06 10.84*   HEMOGLOBIN g/dL 10.6* 11.5* 13.3 14.6   HEMATOCRIT % 31.0* 33.9* 40.7 45.4   PLATELETS 10*3/mm3 119* 140 128* 145           Culture Data:   No results found for: BLOODCX  Urine Culture   Date Value Ref Range Status   11/17/2019 No growth  Final     No results found for: RESPCX  No results found for: WOUNDCX  No results found for: STOOLCX  No components found for: BODYFLD    Radiology Data:   Imaging Results (Last 24 Hours)     Procedure Component Value Units Date/Time    XR Abdomen KUB [[id number]] Collected:  11/19/19 0838     Updated:  11/19/19 0901    Narrative:       Procedure: KUB    Reason for exam: Vomiting    FINDINGS: No acute osseous abnormality. Soft tissue structures of  the abdomen are normal. Small 4.8 mm circular  calcification  overlies the inferior medial liver and superior right peripheral  kidney. This may represent calcified granuloma versus renal  calculi. Surgical clips in the region of gallbladder fossa which  suggests cholecystectomy. Recommend clinical correlation.  Surgical clips in the pelvis consistent with prior surgery in  this region. Nonobstructive bowel gas pattern.      Impression:       1.  Small 4.8 mm circular calcification overlies the inferior  medial liver and superior right peripheral kidney. This may  represent calcified granuloma versus renal calculi.   2.  Surgical clips in the region of gallbladder fossa which  suggests cholecystectomy. Recommend clinical correlation.   3.  Otherwise unremarkable abdomen.    Electronically signed by:  Tyron Cheek MD  11/19/2019 9:00 AM CST  Workstation: [id number]          I have reviewed the patient's current medications.     Assessment/Plan     Active Hospital Problems    Diagnosis   • Acute renal failure superimposed on chronic kidney disease (CMS/HCC)       Plan:      1.  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  Creatinine improved today.  Appreciate nephrology.  Continue hydration and Mucomyst per John.    2.  Metabolic acidosis -continue bicarb drip.  Appreciate nephrology.    3.  Nausea/vomiting/diarrhea -did not tolerate advancement in diet.  KUB does not show any acute changes or obstructions.  Placed back on clear liquids.  If persists, consider GI consult for evaluation for possible gastroparesis given the patient's history of diabetes.  Antiemetics as needed.    4.  Hyperkalemia -this has resolved.  Replace potassium cautiously.    5.  DM2 - SSI    6.  COPD - no acute issues.  Cont symbicort.      7.  CAD - ASA/Plavix    8.  UTI -Rocephin.  Culture negative to date.      :Zachary Campoverde MD

## 2019-11-20 LAB
ANION GAP SERPL CALCULATED.3IONS-SCNC: 12 MMOL/L (ref 5–15)
BASOPHILS # BLD AUTO: 0.03 10*3/MM3 (ref 0–0.2)
BASOPHILS NFR BLD AUTO: 0.7 % (ref 0–1.5)
BUN BLD-MCNC: 74 MG/DL (ref 8–23)
BUN/CREAT SERPL: 16.2 (ref 7–25)
CALCIUM SPEC-SCNC: 6.7 MG/DL (ref 8.6–10.5)
CHLORIDE SERPL-SCNC: 104 MMOL/L (ref 98–107)
CO2 SERPL-SCNC: 23 MMOL/L (ref 22–29)
CREAT BLD-MCNC: 4.56 MG/DL (ref 0.76–1.27)
DEPRECATED RDW RBC AUTO: 46.3 FL (ref 37–54)
EOSINOPHIL # BLD AUTO: 0.07 10*3/MM3 (ref 0–0.4)
EOSINOPHIL NFR BLD AUTO: 1.6 % (ref 0.3–6.2)
ERYTHROCYTE [DISTWIDTH] IN BLOOD BY AUTOMATED COUNT: 14.6 % (ref 12.3–15.4)
GFR SERPL CREATININE-BSD FRML MDRD: 13 ML/MIN/1.73
GFR SERPL CREATININE-BSD FRML MDRD: ABNORMAL ML/MIN/{1.73_M2}
GLUCOSE BLD-MCNC: 113 MG/DL (ref 65–99)
GLUCOSE BLDC GLUCOMTR-MCNC: 100 MG/DL (ref 70–130)
GLUCOSE BLDC GLUCOMTR-MCNC: 121 MG/DL (ref 70–130)
GLUCOSE BLDC GLUCOMTR-MCNC: 128 MG/DL (ref 70–130)
HCT VFR BLD AUTO: 28.1 % (ref 37.5–51)
HGB BLD-MCNC: 9.6 G/DL (ref 13–17.7)
HOLD SPECIMEN: NORMAL
IMM GRANULOCYTES # BLD AUTO: 0.01 10*3/MM3 (ref 0–0.05)
IMM GRANULOCYTES NFR BLD AUTO: 0.2 % (ref 0–0.5)
LYMPHOCYTES # BLD AUTO: 1.04 10*3/MM3 (ref 0.7–3.1)
LYMPHOCYTES NFR BLD AUTO: 23.5 % (ref 19.6–45.3)
MAGNESIUM SERPL-MCNC: 1.8 MG/DL (ref 1.6–2.4)
MCH RBC QN AUTO: 29.6 PG (ref 26.6–33)
MCHC RBC AUTO-ENTMCNC: 34.2 G/DL (ref 31.5–35.7)
MCV RBC AUTO: 86.7 FL (ref 79–97)
MONOCYTES # BLD AUTO: 0.45 10*3/MM3 (ref 0.1–0.9)
MONOCYTES NFR BLD AUTO: 10.2 % (ref 5–12)
NEUTROPHILS # BLD AUTO: 2.82 10*3/MM3 (ref 1.7–7)
NEUTROPHILS NFR BLD AUTO: 63.8 % (ref 42.7–76)
NRBC BLD AUTO-RTO: 0 /100 WBC (ref 0–0.2)
PHOSPHATE SERPL-MCNC: 2.6 MG/DL (ref 2.5–4.5)
PLATELET # BLD AUTO: 120 10*3/MM3 (ref 140–450)
PMV BLD AUTO: 10.1 FL (ref 6–12)
POTASSIUM BLD-SCNC: 3 MMOL/L (ref 3.5–5.2)
POTASSIUM BLD-SCNC: 3.1 MMOL/L (ref 3.5–5.2)
POTASSIUM BLD-SCNC: 3.9 MMOL/L (ref 3.5–5.2)
RBC # BLD AUTO: 3.24 10*6/MM3 (ref 4.14–5.8)
SODIUM BLD-SCNC: 139 MMOL/L (ref 136–145)
WBC NRBC COR # BLD: 4.42 10*3/MM3 (ref 3.4–10.8)

## 2019-11-20 PROCEDURE — 84100 ASSAY OF PHOSPHORUS: CPT | Performed by: INTERNAL MEDICINE

## 2019-11-20 PROCEDURE — 25010000002 PROMETHAZINE PER 50 MG: Performed by: INTERNAL MEDICINE

## 2019-11-20 PROCEDURE — 84132 ASSAY OF SERUM POTASSIUM: CPT | Performed by: FAMILY MEDICINE

## 2019-11-20 PROCEDURE — 25010000002 ONDANSETRON PER 1 MG: Performed by: FAMILY MEDICINE

## 2019-11-20 PROCEDURE — 25010000002 MAGNESIUM SULFATE 2 GM/50ML SOLUTION: Performed by: FAMILY MEDICINE

## 2019-11-20 PROCEDURE — 25010000002 HEPARIN (PORCINE) PER 1000 UNITS: Performed by: FAMILY MEDICINE

## 2019-11-20 PROCEDURE — 82962 GLUCOSE BLOOD TEST: CPT

## 2019-11-20 PROCEDURE — 80048 BASIC METABOLIC PNL TOTAL CA: CPT | Performed by: FAMILY MEDICINE

## 2019-11-20 PROCEDURE — 85025 COMPLETE CBC W/AUTO DIFF WBC: CPT | Performed by: FAMILY MEDICINE

## 2019-11-20 PROCEDURE — 83735 ASSAY OF MAGNESIUM: CPT | Performed by: FAMILY MEDICINE

## 2019-11-20 PROCEDURE — 94799 UNLISTED PULMONARY SVC/PX: CPT

## 2019-11-20 RX ORDER — SODIUM CHLORIDE, SODIUM LACTATE, POTASSIUM CHLORIDE, CALCIUM CHLORIDE 600; 310; 30; 20 MG/100ML; MG/100ML; MG/100ML; MG/100ML
100 INJECTION, SOLUTION INTRAVENOUS CONTINUOUS
Status: DISCONTINUED | OUTPATIENT
Start: 2019-11-20 | End: 2019-11-21 | Stop reason: HOSPADM

## 2019-11-20 RX ORDER — SODIUM CHLORIDE 9 MG/ML
10 INJECTION INTRAVENOUS EVERY 12 HOURS SCHEDULED
Status: DISCONTINUED | OUTPATIENT
Start: 2019-11-20 | End: 2019-11-21 | Stop reason: HOSPADM

## 2019-11-20 RX ORDER — SODIUM CHLORIDE, SODIUM LACTATE, POTASSIUM CHLORIDE, CALCIUM CHLORIDE 600; 310; 30; 20 MG/100ML; MG/100ML; MG/100ML; MG/100ML
INJECTION, SOLUTION INTRAVENOUS
Status: COMPLETED
Start: 2019-11-20 | End: 2019-11-20

## 2019-11-20 RX ADMIN — SODIUM CHLORIDE, POTASSIUM CHLORIDE, SODIUM LACTATE AND CALCIUM CHLORIDE 100 ML/HR: 600; 310; 30; 20 INJECTION, SOLUTION INTRAVENOUS at 20:25

## 2019-11-20 RX ADMIN — SODIUM CHLORIDE, POTASSIUM CHLORIDE, SODIUM LACTATE AND CALCIUM CHLORIDE 1000 ML: 600; 310; 30; 20 INJECTION, SOLUTION INTRAVENOUS at 10:26

## 2019-11-20 RX ADMIN — ACETYLCYSTEINE 4 ML: 100 INHALANT RESPIRATORY (INHALATION) at 08:11

## 2019-11-20 RX ADMIN — HEPARIN SODIUM 5000 UNITS: 5000 INJECTION INTRAVENOUS; SUBCUTANEOUS at 20:25

## 2019-11-20 RX ADMIN — FAMOTIDINE 20 MG: 10 INJECTION INTRAVENOUS at 08:11

## 2019-11-20 RX ADMIN — CLOPIDOGREL BISULFATE 75 MG: 75 TABLET ORAL at 08:11

## 2019-11-20 RX ADMIN — BUDESONIDE AND FORMOTEROL FUMARATE DIHYDRATE 2 PUFF: 160; 4.5 AEROSOL RESPIRATORY (INHALATION) at 07:36

## 2019-11-20 RX ADMIN — BUDESONIDE AND FORMOTEROL FUMARATE DIHYDRATE 2 PUFF: 160; 4.5 AEROSOL RESPIRATORY (INHALATION) at 20:34

## 2019-11-20 RX ADMIN — ASPIRIN 81 MG: 81 TABLET, COATED ORAL at 08:11

## 2019-11-20 RX ADMIN — PROMETHAZINE HYDROCHLORIDE 12.5 MG: 25 INJECTION INTRAMUSCULAR; INTRAVENOUS at 03:39

## 2019-11-20 RX ADMIN — SODIUM BICARBONATE 75 MEQ: 84 INJECTION, SOLUTION INTRAVENOUS at 00:28

## 2019-11-20 RX ADMIN — POTASSIUM CHLORIDE 40 MEQ: 750 CAPSULE, EXTENDED RELEASE ORAL at 08:11

## 2019-11-20 RX ADMIN — POTASSIUM CHLORIDE 40 MEQ: 1.5 POWDER, FOR SOLUTION ORAL at 03:38

## 2019-11-20 RX ADMIN — ONDANSETRON 4 MG: 2 INJECTION INTRAMUSCULAR; INTRAVENOUS at 08:11

## 2019-11-20 RX ADMIN — SODIUM CHLORIDE, PRESERVATIVE FREE 10 ML: 5 INJECTION INTRAVENOUS at 20:25

## 2019-11-20 RX ADMIN — MAGNESIUM SULFATE HEPTAHYDRATE 2 G: 40 INJECTION, SOLUTION INTRAVENOUS at 08:22

## 2019-11-20 RX ADMIN — SODIUM CHLORIDE 10 ML: 9 INJECTION, SOLUTION INTRAMUSCULAR; INTRAVENOUS; SUBCUTANEOUS at 22:46

## 2019-11-20 RX ADMIN — HEPARIN SODIUM 5000 UNITS: 5000 INJECTION INTRAVENOUS; SUBCUTANEOUS at 08:11

## 2019-11-20 RX ADMIN — TAMSULOSIN HYDROCHLORIDE 0.8 MG: 0.4 CAPSULE ORAL at 08:11

## 2019-11-20 RX ADMIN — POTASSIUM CHLORIDE 40 MEQ: 750 CAPSULE, EXTENDED RELEASE ORAL at 11:37

## 2019-11-20 NOTE — CONSULTS
Adult Nutrition  Assessment    Patient Name:  Favio Casillas  YOB: 1955  MRN: [id number]  Admit Date:  11/16/2019    Assessment Date:  11/20/2019    Comments: Pt admitted with  SUJATHA on CKD with significantly elevated bun and creat.  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  He reports eating well prior to his illness.  Intermittent N/V/D, but improved at this time.  Hydration, Bicarb drip and Mucomyst for treatment.  Rd will monitor diet advancment and tolerance.     Reason for Assessment     Row Name 11/20/19 1412          Reason for Assessment    Reason For Assessment  nurse/nurse practitioner consult     Diagnosis  renal disease     Identified At Risk by Screening Criteria  other (see comments) decreased intake         Nutrition/Diet History     Row Name 11/20/19 1414          Nutrition/Diet History    Typical Food/Fluid Intake  Pt claims that he was eating well pta until he got sick.  He was feeling better and once again started having N/V but none now.  No hx of wt loss but he has probably gained a few pounds.            Labs/Tests/Procedures/Meds     Row Name 11/20/19 1420          Labs/Procedures/Meds    Lab Results Reviewed  reviewed, pertinent     Lab Results Comments  K+ 3.1; Bun 74; creat 4.56; Platelets 120        Diagnostic Tests/Procedures    Diagnostic Test/Procedure Reviewed  reviewed, pertinent     Diagnostic Test/Procedures Comments  Nephrology following        Medications    Pertinent Medications Reviewed  reviewed, pertinent     Pertinent Medications Comments  SSI; NaBicarb         Physical Findings     Row Name 11/20/19 1421          Physical Findings    Overall Physical Appearance  on oxygen therapy     Gastrointestinal  -- Previous N/V but now improved         Estimated/Assessed Needs     Row Name 11/20/19 1422          Calculation Measurements    Weight Used For Calculations  62.6 kg (138 lb)         Estimated/Assessed Needs    Additional Documentation  Additional Requirements (Group);Fluid Requirements (Group);[place]-St. Jeor Equation (Group);Protein Requirements (Group);Calorie Requirements (Group);KCAL/KG (Group)        Calorie Requirements    Estimated Calorie Requirement (kcal/day)  1800     Estimated Calorie Need Method  [place]-St Jeor        [place]-St. Jeor Equation    RMR ([place]-St. Jeor Equation)  1384.585        Protein Requirements    Weight Used For Protein Calculations  62.6 kg (138 lb)     Est Protein Requirement Amount (gms/kg)  1.0 gm protein     Estimated Protein Requirements (gms/day)  62.6        Fluid Requirements    Estimated Fluid Requirements (mL/day)  1800     Estimated Fluid Requirement Method  RDA Method     RDA Method (mL)  1800     Emily Method (over 20 kg)  2751.92         Nutrition Prescription Ordered     Row Name 11/20/19 1424          Nutrition Prescription PO    Current PO Diet  Clear Liquid     Fluid Consistency  Thin                 Electronically signed by:  Holly Son RD  11/20/19 2:35 PM

## 2019-11-20 NOTE — PLAN OF CARE
Problem: Fall Risk (Adult)  Goal: Identify Related Risk Factors and Signs and Symptoms  Outcome: Ongoing (interventions implemented as appropriate)    Goal: Absence of Fall  Outcome: Ongoing (interventions implemented as appropriate)      Problem: Patient Care Overview  Goal: Plan of Care Review  Outcome: Ongoing (interventions implemented as appropriate)   11/20/19 0415   Coping/Psychosocial   Plan of Care Reviewed With family   Plan of Care Review   Progress no change   OTHER   Outcome Summary VS stable, urine output is more than adequate, clear diet, less nausea symptoms, better able to rest. Electrolyte protocols done.        Problem: Skin Injury Risk (Adult)  Goal: Identify Related Risk Factors and Signs and Symptoms  Outcome: Ongoing (interventions implemented as appropriate)    Goal: Skin Health and Integrity  Outcome: Ongoing (interventions implemented as appropriate)      Problem: Urine Elimination Impaired (Adult)  Goal: Identify Related Risk Factors and Signs and Symptoms  Outcome: Ongoing (interventions implemented as appropriate)    Goal: Reduced Incontinence Episodes  Outcome: Ongoing (interventions implemented as appropriate)      Problem: Renal Failure/Kidney Injury, Acute (Adult)  Goal: Signs and Symptoms of Listed Potential Problems Will be Absent, Minimized or Managed (Renal Failure/Kidney Injury, Acute)  Outcome: Ongoing (interventions implemented as appropriate)      Problem: Nausea/Vomiting (Adult)  Goal: Identify Related Risk Factors and Signs and Symptoms  Outcome: Ongoing (interventions implemented as appropriate)

## 2019-11-20 NOTE — PROGRESS NOTES
"[facility]  [address] [place] [address]. [address]  T - [phone]  F - [phone]     Progress Note          PATIENT  DEMOGRAPHICS   PATIENT NAME: Favio Casillas                      PHYSICIAN: Rodríguez Brody MD  : 1955  MRN: [id number]   LOS: 4 days    Patient Care Team:  Shayne Merritt MD as PCP - General (Family Medicine)  Harvinder Robert MD as PCP - Claims Attributed  Kevin Robbins PA-C as Physician Assistant (Gastroenterology)  Harvinder Robert MD  Subjective   SUBJECTIVE   UO upto 4.4L /24 h, feels well no soa, diarrhea decreasing       Objective   OBJECTIVE   Vital Signs  Temp:  [97.9 °F (36.6 °C)-98.6 °F (37 °C)] 98 °F (36.7 °C)  Heart Rate:  [62-83] 78  Resp:  [16-18] 16  BP: (120-158)/(67-93) 146/90    Flowsheet Rows      First Filed Value   Admission Height  170.2 cm (67\") Documented at 2019 1748   Admission Weight  63.1 kg (139 lb 1.6 oz) Documented at 2019 1748           I/O last 3 completed shifts:  In: 5729.5 [I.V.:5109.5; Other:20; IV Piggyback:600]  Out: 4700 [Urine:4400; Emesis/NG output:300]    PHYSICAL EXAM    Physical Exam   Constitutional: He is oriented to person, place, and time. He appears well-developed.   HENT:   Head: Normocephalic.   Eyes: Pupils are equal, round, and reactive to light.   Cardiovascular: Normal rate, regular rhythm and normal heart sounds.   Pulmonary/Chest: Effort normal and breath sounds normal.   Abdominal: Soft. Bowel sounds are normal.   Musculoskeletal: He exhibits no edema.   Neurological: He is alert and oriented to person, place, and time.       RESULTS   Results Review:    Results from last 7 days   Lab Units 19  0341 19  0030 19  0408  19  0443  19  1809   SODIUM mmol/L 139  --  137  --  135*   < > 128*   POTASSIUM mmol/L 3.1* 3.0* 3.2*   < > 2.8*   < > 5.5*   CHLORIDE mmol/L 104  --  100  --  103   < > 106   CO2 mmol/L 23.0  --  20.0*  --  18.0*   < > 6.0* "   BUN mg/dL 74*  --  88*  --  93*   < > 86*   CREATININE mg/dL 4.56*  --  5.85*  --  6.44*   < > 5.38*   CALCIUM mg/dL 6.7*  --  6.6*  --  6.9*   < > 8.7   BILIRUBIN mg/dL  --   --   --   --   --   --  0.3   ALK PHOS U/L  --   --   --   --   --   --  103   ALT (SGPT) U/L  --   --   --   --   --   --  13   AST (SGOT) U/L  --   --   --   --   --   --  9   GLUCOSE mg/dL 113*  --  167*  --  188*   < > 273*    < > = values in this interval not displayed.       Estimated Creatinine Clearance: 15 mL/min (A) (by C-G formula based on SCr of 4.56 mg/dL (H)).    Results from last 7 days   Lab Units 11/20/19  0341 11/19/19  0408 11/16/19  1809   MAGNESIUM mg/dL 1.8 1.3* 1.9   PHOSPHORUS mg/dL 2.6  --   --              Results from last 7 days   Lab Units 11/20/19  0341 11/19/19  0408 11/18/19  0443 11/17/19  0224 11/16/19  1809   WBC 10*3/mm3 4.42 7.13 6.62 8.06 10.84*   HEMOGLOBIN g/dL 9.6* 10.6* 11.5* 13.3 14.6   PLATELETS 10*3/mm3 120* 119* 140 128* 145               Imaging Results (Last 24 Hours)     ** No results found for the last 24 hours. **           MEDICATIONS      acetylcysteine 4 mL Oral Daily   aspirin 81 mg Oral Daily   budesonide-formoterol 2 puff Inhalation BID - RT   clopidogrel 75 mg Oral Daily   famotidine 20 mg Intravenous Daily   heparin (porcine) 5,000 Units Subcutaneous Q12H   insulin aspart 0-9 Units Subcutaneous 4x Daily AC & at Bedtime   Sodium Chloride (PF) 10 mL Intracatheter Q12H   sodium chloride 500 mL Intravenous Once   sodium chloride 10 mL Intravenous Q12H   tamsulosin 0.8 mg Oral Daily       sodium bicarbonate drip less than 75 mEq/bag 75 mEq Last Rate: 75 mEq (11/20/19 0028)       Assessment/Plan   ASSESSMENT / PLAN      Acute renal failure superimposed on chronic kidney disease (CMS/HCC)    1.  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  Creatinine peak at 6.4, now better with good UO. Bicarb is better. We can stop bicarb drip. Switch to LR.  Replace kcl and mg  per protocol. CT of the abdomen was negative for any bowel or bladder obstruction.  has liu placed now. Getting mucomyst.      2.  Severe metabolic acidosis- related to #1 and #3, bicarb is better     3.  Nausea/vomiting/diarrhea- worsening over the past 4 to 5 days.  Management per primary team. Now better     4.  COPD     5.  CAD      6.  Diabetes mellitus type 2     7.  Hyperkalemia-resolved now low k getting replacement     8.  Substance abuse- patient admits to smoking marijuana reports meth use 1 month ago, UDS positive for meth at this time.      9 ?UTI on ceftriaxone, culture pending                This document has been electronically signed by Rodríguez Brody MD on November 20, 2019 10:08 AM

## 2019-11-20 NOTE — PLAN OF CARE
Problem: Fall Risk (Adult)  Goal: Identify Related Risk Factors and Signs and Symptoms  Outcome: Ongoing (interventions implemented as appropriate)      Problem: Patient Care Overview  Goal: Plan of Care Review  Outcome: Ongoing (interventions implemented as appropriate)   11/19/19 5193   Coping/Psychosocial   Plan of Care Reviewed With patient   Plan of Care Review   Progress no change   OTHER   Outcome Summary urine output increased thorughout the day. n & v controlled see MAR. pt back to clear liq diet. KUB negative. Electrolyte replacment per protocol. will continue to monitor. MD notified of 10 beat run of SVT.        Problem: Skin Injury Risk (Adult)  Goal: Identify Related Risk Factors and Signs and Symptoms  Outcome: Ongoing (interventions implemented as appropriate)      Problem: Urine Elimination Impaired (Adult)  Goal: Identify Related Risk Factors and Signs and Symptoms  Outcome: Ongoing (interventions implemented as appropriate)      Problem: Renal Failure/Kidney Injury, Acute (Adult)  Goal: Signs and Symptoms of Listed Potential Problems Will be Absent, Minimized or Managed (Renal Failure/Kidney Injury, Acute)  Outcome: Ongoing (interventions implemented as appropriate)      Problem: Nausea/Vomiting (Adult)  Goal: Identify Related Risk Factors and Signs and Symptoms  Outcome: Ongoing (interventions implemented as appropriate)

## 2019-11-20 NOTE — PROGRESS NOTES
[facility] Medicine Services  INPATIENT PROGRESS NOTE    Length of Stay: 4  Date of Admission: 11/16/2019  Primary Care Physician: Shayne Merritt MD    Subjective   Chief Complaints: none       HPI:  Pt denies any further n/v overnight and this morning.  He has had one soft BM but no diarrhea.  He is tolerating clear liquids.        Review of Systems   Constitutional: Negative for fatigue.   HENT: Negative for congestion and sore throat.    Respiratory: Negative for cough, shortness of breath and wheezing.    Cardiovascular: Negative for chest pain and palpitations.   Gastrointestinal: Negative for abdominal distention, nausea and vomiting.   Genitourinary: Positive for difficulty urinating. Negative for dysuria and frequency.   Musculoskeletal: Positive for myalgias (bilateral LE).   Neurological: Positive for weakness. Negative for dizziness, light-headedness and headaches.            Objective    Temp:  [97.9 °F (36.6 °C)-98.5 °F (36.9 °C)] 98 °F (36.7 °C)  Heart Rate:  [62-83] 77  Resp:  [16-18] 16  BP: (120-158)/(67-93) 148/87    Physical Exam   Constitutional: He is oriented to person, place, and time. He appears well-developed.   HENT:   Head: Normocephalic and atraumatic.   Eyes: EOM are normal. Pupils are equal, round, and reactive to light.   Neck: Neck supple. No tracheal deviation present.   Cardiovascular: Normal rate and regular rhythm.   Pulmonary/Chest: Effort normal and breath sounds normal. He has no wheezes. He has no rales.   Abdominal: Soft. Bowel sounds are normal. There is no tenderness (mild suprapubic).   Musculoskeletal: He exhibits no edema or tenderness.   Lymphadenopathy:     He has no cervical adenopathy.   Neurological: He is alert and oriented to person, place, and time. No cranial nerve deficit.           Results Review:  I have reviewed the labs, radiology results, and diagnostic studies.    Laboratory Data:   Results from last 7  days   Lab Units 11/20/19 0341 11/20/19  0030 11/19/19 0408 11/18/19 0443 11/16/19  1809   SODIUM mmol/L 139  --  137  --  135*   < > 128*   POTASSIUM mmol/L 3.1* 3.0* 3.2*   < > 2.8*   < > 5.5*   CHLORIDE mmol/L 104  --  100  --  103   < > 106   CO2 mmol/L 23.0  --  20.0*  --  18.0*   < > 6.0*   BUN mg/dL 74*  --  88*  --  93*   < > 86*   CREATININE mg/dL 4.56*  --  5.85*  --  6.44*   < > 5.38*   GLUCOSE mg/dL 113*  --  167*  --  188*   < > 273*   CALCIUM mg/dL 6.7*  --  6.6*  --  6.9*   < > 8.7   BILIRUBIN mg/dL  --   --   --   --   --   --  0.3   ALK PHOS U/L  --   --   --   --   --   --  103   ALT (SGPT) U/L  --   --   --   --   --   --  13   AST (SGOT) U/L  --   --   --   --   --   --  9   ANION GAP mmol/L 12.0  --  17.0*  --  14.0   < > 16.0*    < > = values in this interval not displayed.     Estimated Creatinine Clearance: 15 mL/min (A) (by C-G formula based on SCr of 4.56 mg/dL (H)).  Results from last 7 days   Lab Units 11/20/19 0341 11/19/19 0408 11/16/19  1809   MAGNESIUM mg/dL 1.8 1.3* 1.9   PHOSPHORUS mg/dL 2.6  --   --          Results from last 7 days   Lab Units 11/20/19 0341 11/19/19 0408 11/18/19 0443 11/17/19 0224 11/16/19  1809   WBC 10*3/mm3 4.42 7.13 6.62 8.06 10.84*   HEMOGLOBIN g/dL 9.6* 10.6* 11.5* 13.3 14.6   HEMATOCRIT % 28.1* 31.0* 33.9* 40.7 45.4   PLATELETS 10*3/mm3 120* 119* 140 128* 145           Culture Data:   No results found for: BLOODCX  No results found for: URINECX  No results found for: RESPCX  No results found for: WOUNDCX  No results found for: STOOLCX  No components found for: BODYFLD    Radiology Data:   Imaging Results (Last 24 Hours)     ** No results found for the last 24 hours. **          I have reviewed the patient's current medications.     Assessment/Plan     Active Hospital Problems    Diagnosis   • Acute renal failure superimposed on chronic kidney disease (CMS/HCC)       Plan:      1.  A/CKD -related to obstructive uropathy secondary to excessive  mucus production from neobladder.  Creatinine continuing to improve.  Appreciate nephrology.  Continue hydration and Mucomyst per John.    2.  Metabolic acidosis -improving as his renal function improves and n/v/d improves. Bicarb gtt per Dr. Brody.  Appreciate nephrology.    3.  Nausea/vomiting/diarrhea -Seems to be improving.  Tolerating clears.  Likely viral GE given N/V/D but if persists or returns, consider roman for gastroparesis given his DM.      4.  Hyperkalemia -this has resolved.  Replace potassium cautiously.    5.  DM2 - SSI    6.  COPD - no acute issues.  Cont symbicort.      7.  CAD - ASA/Plavix    8.  UTI -Rocephin.  Culture negative to date.      :Zachary Campoverde MD

## 2019-11-20 NOTE — PROGRESS NOTES
"   LOS: 4 days   Patient Care Team:  Shayne Merritt MD as PCP - General (Family Medicine)  Harvinder Robert MD as PCP - Claims Kevin Jurado PA-C as Physician Assistant (Gastroenterology)  Harvinder Robert MD    Subjective     Subjective:  Symptoms:  Improved.  (Nursing did flush catheter over night, patient has worsening nausea/vomiting.).    Diet:  No nausea or vomiting.    Activity level: Impaired due to weakness.        History taken from: patient chart RN    Objective     Vital Signs  Temp:  [97.9 °F (36.6 °C)-98.5 °F (36.9 °C)] 98.5 °F (36.9 °C)  Heart Rate:  [67-83] 70  Resp:  [16-18] 18  BP: (125-163)/(75-99) 163/84    Objective:  General Appearance:  In no acute distress.    Vital signs: (most recent): Blood pressure 163/84, pulse 70, temperature 98.5 °F (36.9 °C), temperature source Oral, resp. rate 18, height 170.2 cm (67\"), weight 63 kg (138 lb 14.2 oz), SpO2 99 %.  Vital signs are normal.  No fever.    Output: Producing urine (John clear yellow urine) and producing stool.    HEENT: Normal HEENT exam.    Lungs:  Normal effort and normal respiratory rate.  Breath sounds clear to auscultation.    Heart: Normal rate.  S1 normal and S2 normal.    Chest: Symmetric chest wall expansion.   Abdomen: Abdomen is soft and non-distended.  Hypoactive bowel sounds.   There is no abdominal tenderness.     Extremities: There is no deformity or dependent edema.    Pulses: Distal pulses are intact.    Neurological: Patient is alert and oriented to person, place and time.  GCS score is 15.    Pupils:  Pupils are equal, round, and reactive to light.    Skin:  Warm and dry.  No rash or cyanosis.             Results Review:    Lab Results (last 24 hours)     Procedure Component Value Units Date/Time    Potassium [[id number]]  (Normal) Collected:  11/20/19 1530    Specimen:  Blood Updated:  11/20/19 1606     Potassium 3.9 mmol/L     Extra Tubes [[id number]] Collected:  11/20/19 1545    Specimen:  " Blood, Venous Line Updated:  11/20/19 1545    Narrative:       The following orders were created for panel order Extra Tubes.  Procedure                               Abnormality         Status                     ---------                               -----------         ------                     Gold Top - SST[[id number]]                                   In process                   Please view results for these tests on the individual orders.    Gold Top - SST [[id number]] Collected:  11/20/19 1545    Specimen:  Blood Updated:  11/20/19 1545    POC Glucose Once [[id number]]  (Normal) Collected:  11/20/19 1137    Specimen:  Blood Updated:  11/20/19 1200     Glucose 128 mg/dL      Comment: : [id number] RODRIGUEZ STACIEMeter ID: [id number]       POC Glucose Once [[id number]]  (Normal) Collected:  11/20/19 0602    Specimen:  Blood Updated:  11/20/19 0619     Glucose 121 mg/dL      Comment: : [id number] MCCOLL CARRIEMeter ID: [id number]       Magnesium [[id number]]  (Normal) Collected:  11/20/19 0341    Specimen:  Blood Updated:  11/20/19 0423     Magnesium 1.8 mg/dL     Basic Metabolic Panel [[id number]]  (Abnormal) Collected:  11/20/19 0341    Specimen:  Blood Updated:  11/20/19 0421     Glucose 113 mg/dL      BUN 74 mg/dL      Creatinine 4.56 mg/dL      Sodium 139 mmol/L      Potassium 3.1 mmol/L      Chloride 104 mmol/L      CO2 23.0 mmol/L      Calcium 6.7 mg/dL      eGFR   Amer --     Comment: <15 Indicative of kidney failure.        eGFR Non African Amer 13 mL/min/1.73      Comment: <15 Indicative of kidney failure.        BUN/Creatinine Ratio 16.2     Anion Gap 12.0 mmol/L     Narrative:       GFR Normal >60  Chronic Kidney Disease <60  Kidney Failure <15    Phosphorus [[id number]]  (Normal) Collected:  11/20/19 0341    Specimen:  Blood Updated:  11/20/19 0421     Phosphorus 2.6 mg/dL     CBC & Differential [[id number]] Collected:  11/20/19 0341    Specimen:  Blood Updated:  11/20/19 0347     Narrative:       The following orders were created for panel order CBC & Differential.  Procedure                               Abnormality         Status                     ---------                               -----------         ------                     CBC Auto Differential[[id number]]        Abnormal            Final result                 Please view results for these tests on the individual orders.    CBC Auto Differential [[id number]]  (Abnormal) Collected:  11/20/19 0341    Specimen:  Blood Updated:  11/20/19 0347     WBC 4.42 10*3/mm3      RBC 3.24 10*6/mm3      Hemoglobin 9.6 g/dL      Hematocrit 28.1 %      MCV 86.7 fL      MCH 29.6 pg      MCHC 34.2 g/dL      RDW 14.6 %      RDW-SD 46.3 fl      MPV 10.1 fL      Platelets 120 10*3/mm3      Neutrophil % 63.8 %      Lymphocyte % 23.5 %      Monocyte % 10.2 %      Eosinophil % 1.6 %      Basophil % 0.7 %      Immature Grans % 0.2 %      Neutrophils, Absolute 2.82 10*3/mm3      Lymphocytes, Absolute 1.04 10*3/mm3      Monocytes, Absolute 0.45 10*3/mm3      Eosinophils, Absolute 0.07 10*3/mm3      Basophils, Absolute 0.03 10*3/mm3      Immature Grans, Absolute 0.01 10*3/mm3      nRBC 0.0 /100 WBC     Potassium [[id number]]  (Abnormal) Collected:  11/20/19 0030    Specimen:  Blood Updated:  11/20/19 0054     Potassium 3.0 mmol/L     POC Glucose Once [[id number]]  (Abnormal) Collected:  11/19/19 2016    Specimen:  Blood Updated:  11/19/19 2039     Glucose 183 mg/dL      Comment: : [id number] MADDY Servin ID: [id number]              I reviewed the patient's new clinical results.  I reviewed the patient's new imaging results and agree with the interpretation.  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 Nephrology is managing acute kidney injury on chronic kidney disease. Imaging ordered per primary for worsening nausea/vomiting.  -WBC 8.06-->6.62-->7.13-->4.42  -Estimated Creatinine Clearance: 15 mL/min (A) (by C-G formula based on SCr of 4.56 mg/dL (H)).  -Cr 5.38-->5.34-->5.51-->5.61-->6.44-->5.85-->4.56  -Baseline Cr per Nephrology 1.6-2.0  -Bladder scan > 300 mL 11/17/19 prior to John being anchored; John anchored 11/17/19and Mucomyst and normal saline administered intravesical.   -Renal ultrasound 11/17/19 showed medical renal disease, no hydronephrosis and cystectomy with neobladder.   -Urine culture IP, Rocephin day #5 (dose pending)     Plan:  Continue intravesical Mucomyst with saline flushes   John in place.   Plan to remove John when renal function improves    TIFFANIE Michele  11/20/19  4:25 PM

## 2019-11-20 NOTE — PLAN OF CARE
Problem: Patient Care Overview  Goal: Plan of Care Review  Outcome: Ongoing (interventions implemented as appropriate)   11/20/19 8947   Coping/Psychosocial   Plan of Care Reviewed With caregiver;patient   Plan of Care Review   Progress no change   OTHER   Outcome Summary New Assessment: SUJATHA on CKD. Clear liquid diet with Gi distress, improving. Will monitor.

## 2019-11-21 VITALS
RESPIRATION RATE: 18 BRPM | OXYGEN SATURATION: 92 % | BODY MASS INDEX: 21.97 KG/M2 | TEMPERATURE: 98.6 F | WEIGHT: 140 LBS | DIASTOLIC BLOOD PRESSURE: 85 MMHG | SYSTOLIC BLOOD PRESSURE: 150 MMHG | HEART RATE: 78 BPM | HEIGHT: 67 IN

## 2019-11-21 LAB
ANION GAP SERPL CALCULATED.3IONS-SCNC: 14 MMOL/L (ref 5–15)
BASOPHILS # BLD AUTO: 0.03 10*3/MM3 (ref 0–0.2)
BASOPHILS NFR BLD AUTO: 0.6 % (ref 0–1.5)
BUN BLD-MCNC: 53 MG/DL (ref 8–23)
BUN/CREAT SERPL: 16 (ref 7–25)
CALCIUM SPEC-SCNC: 7.8 MG/DL (ref 8.6–10.5)
CHLORIDE SERPL-SCNC: 102 MMOL/L (ref 98–107)
CO2 SERPL-SCNC: 21 MMOL/L (ref 22–29)
CREAT BLD-MCNC: 3.32 MG/DL (ref 0.76–1.27)
DEPRECATED RDW RBC AUTO: 49 FL (ref 37–54)
EOSINOPHIL # BLD AUTO: 0.11 10*3/MM3 (ref 0–0.4)
EOSINOPHIL NFR BLD AUTO: 2.2 % (ref 0.3–6.2)
ERYTHROCYTE [DISTWIDTH] IN BLOOD BY AUTOMATED COUNT: 14.9 % (ref 12.3–15.4)
GFR SERPL CREATININE-BSD FRML MDRD: 19 ML/MIN/1.73
GLUCOSE BLD-MCNC: 104 MG/DL (ref 65–99)
GLUCOSE BLDC GLUCOMTR-MCNC: 127 MG/DL (ref 70–130)
GLUCOSE BLDC GLUCOMTR-MCNC: 180 MG/DL (ref 70–130)
HCT VFR BLD AUTO: 32.6 % (ref 37.5–51)
HGB BLD-MCNC: 10.8 G/DL (ref 13–17.7)
IMM GRANULOCYTES # BLD AUTO: 0.02 10*3/MM3 (ref 0–0.05)
IMM GRANULOCYTES NFR BLD AUTO: 0.4 % (ref 0–0.5)
LYMPHOCYTES # BLD AUTO: 1 10*3/MM3 (ref 0.7–3.1)
LYMPHOCYTES NFR BLD AUTO: 20 % (ref 19.6–45.3)
MAGNESIUM SERPL-MCNC: 1.7 MG/DL (ref 1.6–2.4)
MCH RBC QN AUTO: 29.6 PG (ref 26.6–33)
MCHC RBC AUTO-ENTMCNC: 33.1 G/DL (ref 31.5–35.7)
MCV RBC AUTO: 89.3 FL (ref 79–97)
MONOCYTES # BLD AUTO: 0.53 10*3/MM3 (ref 0.1–0.9)
MONOCYTES NFR BLD AUTO: 10.6 % (ref 5–12)
NEUTROPHILS # BLD AUTO: 3.3 10*3/MM3 (ref 1.7–7)
NEUTROPHILS NFR BLD AUTO: 66.2 % (ref 42.7–76)
NRBC BLD AUTO-RTO: 0 /100 WBC (ref 0–0.2)
PLATELET # BLD AUTO: 137 10*3/MM3 (ref 140–450)
PMV BLD AUTO: 10.1 FL (ref 6–12)
POTASSIUM BLD-SCNC: 3.6 MMOL/L (ref 3.5–5.2)
RBC # BLD AUTO: 3.65 10*6/MM3 (ref 4.14–5.8)
SODIUM BLD-SCNC: 137 MMOL/L (ref 136–145)
WBC NRBC COR # BLD: 4.99 10*3/MM3 (ref 3.4–10.8)

## 2019-11-21 PROCEDURE — 25010000002 HEPARIN (PORCINE) PER 1000 UNITS: Performed by: FAMILY MEDICINE

## 2019-11-21 PROCEDURE — 85025 COMPLETE CBC W/AUTO DIFF WBC: CPT | Performed by: FAMILY MEDICINE

## 2019-11-21 PROCEDURE — 83735 ASSAY OF MAGNESIUM: CPT | Performed by: FAMILY MEDICINE

## 2019-11-21 PROCEDURE — 94799 UNLISTED PULMONARY SVC/PX: CPT

## 2019-11-21 PROCEDURE — 97166 OT EVAL MOD COMPLEX 45 MIN: CPT

## 2019-11-21 PROCEDURE — 82962 GLUCOSE BLOOD TEST: CPT

## 2019-11-21 PROCEDURE — 80048 BASIC METABOLIC PNL TOTAL CA: CPT | Performed by: FAMILY MEDICINE

## 2019-11-21 RX ADMIN — ACETYLCYSTEINE 4 ML: 100 INHALANT RESPIRATORY (INHALATION) at 10:03

## 2019-11-21 RX ADMIN — POTASSIUM CHLORIDE 40 MEQ: 750 CAPSULE, EXTENDED RELEASE ORAL at 09:08

## 2019-11-21 RX ADMIN — ASPIRIN 81 MG: 81 TABLET, COATED ORAL at 09:08

## 2019-11-21 RX ADMIN — HEPARIN SODIUM 5000 UNITS: 5000 INJECTION INTRAVENOUS; SUBCUTANEOUS at 09:11

## 2019-11-21 RX ADMIN — CLOPIDOGREL BISULFATE 75 MG: 75 TABLET ORAL at 09:09

## 2019-11-21 RX ADMIN — FAMOTIDINE 20 MG: 10 INJECTION INTRAVENOUS at 09:11

## 2019-11-21 RX ADMIN — TAMSULOSIN HYDROCHLORIDE 0.8 MG: 0.4 CAPSULE ORAL at 09:09

## 2019-11-21 RX ADMIN — SODIUM CHLORIDE, PRESERVATIVE FREE 10 ML: 5 INJECTION INTRAVENOUS at 09:10

## 2019-11-21 RX ADMIN — BUDESONIDE AND FORMOTEROL FUMARATE DIHYDRATE 2 PUFF: 160; 4.5 AEROSOL RESPIRATORY (INHALATION) at 08:33

## 2019-11-21 RX ADMIN — SODIUM CHLORIDE, POTASSIUM CHLORIDE, SODIUM LACTATE AND CALCIUM CHLORIDE 100 ML/HR: 600; 310; 30; 20 INJECTION, SOLUTION INTRAVENOUS at 06:54

## 2019-11-21 NOTE — NURSING NOTE
Family came to desk and spoke with charge nurse about patient wanting to leave.  This RN and charge nurse went to patient's room.  Patient getting dressed and ready to leave.  Tele box removed from patients chest and he removed his own IV.  Tried to educate patient on importance of staying until discharge but pt wasn't interested in staying or listening.  Charge nurse replaced liu bag with leg bag and I gave the patient and family their two appointments for follow up; one being tomorrow for catheter care.  Patient didn't want to wait for a wheelchair, he wanted to walk.  Pt signed AMA papers and left the floor.  APRN notified.

## 2019-11-21 NOTE — PLAN OF CARE
Problem: Fall Risk (Adult)  Goal: Identify Related Risk Factors and Signs and Symptoms  Outcome: Outcome(s) achieved Date Met: 11/21/19    Goal: Absence of Fall  Outcome: Ongoing (interventions implemented as appropriate)      Problem: Patient Care Overview  Goal: Plan of Care Review  Outcome: Ongoing (interventions implemented as appropriate)   11/21/19 0417   Coping/Psychosocial   Plan of Care Reviewed With patient   Plan of Care Review   Progress no change   OTHER   Outcome Summary no new complaints at this time; will continue to monitor.        Problem: Skin Injury Risk (Adult)  Goal: Identify Related Risk Factors and Signs and Symptoms  Outcome: Outcome(s) achieved Date Met: 11/21/19    Goal: Skin Health and Integrity  Outcome: Ongoing (interventions implemented as appropriate)      Problem: Urine Elimination Impaired (Adult)  Goal: Identify Related Risk Factors and Signs and Symptoms  Outcome: Outcome(s) achieved Date Met: 11/21/19    Goal: Effective or Improved Urinary Elimination  Outcome: Ongoing (interventions implemented as appropriate)    Goal: Effective Containment of Urine  Outcome: Ongoing (interventions implemented as appropriate)    Goal: Reduced Incontinence Episodes  Outcome: Ongoing (interventions implemented as appropriate)      Problem: Renal Failure/Kidney Injury, Acute (Adult)  Goal: Signs and Symptoms of Listed Potential Problems Will be Absent, Minimized or Managed (Renal Failure/Kidney Injury, Acute)  Outcome: Ongoing (interventions implemented as appropriate)      Problem: Nausea/Vomiting (Adult)  Goal: Identify Related Risk Factors and Signs and Symptoms  Outcome: Outcome(s) achieved Date Met: 11/21/19    Goal: Symptom Relief  Outcome: Ongoing (interventions implemented as appropriate)    Goal: Adequate Hydration  Outcome: Ongoing (interventions implemented as appropriate)

## 2019-11-21 NOTE — DISCHARGE SUMMARY
[facility] Medicine Services  DISCHARGE SUMMARY       Date of Admission: 11/16/2019  Date of Discharge:  11/21/2019  Primary Care Physician: Shayne Merritt MD    Presenting Problem/History of Present Illness:  General weakness [R53.1]  Uremic encephalopathy [G93.41, N19]  Acute renal failure superimposed on chronic kidney disease, unspecified CKD stage, unspecified acute renal failure type (CMS/HCC) [N17.9, N18.9]       Final Discharge Diagnoses:  Active Hospital Problems    Diagnosis   • Acute renal failure superimposed on chronic kidney disease (CMS/HCC)   • Chronic hepatitis C virus infection (CMS/HCC)   • CAD (coronary artery disease)   • Type 2 diabetes mellitus (CMS/HCC)       Consults:   Consults     Date and Time Order Name Status Description    11/16/2019 2139 Inpatient Urology Consult Completed     11/16/2019 1921 Inpatient Nephrology Consult Completed           Procedures Performed:                 Pertinent Test Results:   Lab Results (last 24 hours)     Procedure Component Value Units Date/Time    POC Glucose Once [[id number]]  (Abnormal) Collected:  11/21/19 1113    Specimen:  Blood Updated:  11/21/19 1140     Glucose 180 mg/dL      Comment: RN NotifiedOperator: [id number] AIDA MERIDANIFERMeter ID: [id number]       POC Glucose Once [[id number]]  (Normal) Collected:  11/21/19 0858    Specimen:  Blood Updated:  11/21/19 0911     Glucose 127 mg/dL      Comment: RN NotifiedOperator: [id number] AIDA MERIDANIFERMeter ID: [id number]       Basic Metabolic Panel [[id number]]  (Abnormal) Collected:  11/21/19 0716    Specimen:  Blood Updated:  11/21/19 0755     Glucose 104 mg/dL      BUN 53 mg/dL      Creatinine 3.32 mg/dL      Sodium 137 mmol/L      Potassium 3.6 mmol/L      Chloride 102 mmol/L      CO2 21.0 mmol/L      Calcium 7.8 mg/dL      eGFR Non African Amer 19 mL/min/1.73      BUN/Creatinine Ratio 16.0     Anion Gap 14.0 mmol/L     Narrative:       GFR  Normal >60  Chronic Kidney Disease <60  Kidney Failure <15    Magnesium [[id number]]  (Normal) Collected:  11/21/19 0716    Specimen:  Blood Updated:  11/21/19 0755     Magnesium 1.7 mg/dL     CBC & Differential [[id number]] Collected:  11/21/19 0716    Specimen:  Blood Updated:  11/21/19 0732    Narrative:       The following orders were created for panel order CBC & Differential.  Procedure                               Abnormality         Status                     ---------                               -----------         ------                     CBC Auto Differential[[id number]]        Abnormal            Final result                 Please view results for these tests on the individual orders.    CBC Auto Differential [[id number]]  (Abnormal) Collected:  11/21/19 0716    Specimen:  Blood Updated:  11/21/19 0732     WBC 4.99 10*3/mm3      RBC 3.65 10*6/mm3      Hemoglobin 10.8 g/dL      Hematocrit 32.6 %      MCV 89.3 fL      MCH 29.6 pg      MCHC 33.1 g/dL      RDW 14.9 %      RDW-SD 49.0 fl      MPV 10.1 fL      Platelets 137 10*3/mm3      Neutrophil % 66.2 %      Lymphocyte % 20.0 %      Monocyte % 10.6 %      Eosinophil % 2.2 %      Basophil % 0.6 %      Immature Grans % 0.4 %      Neutrophils, Absolute 3.30 10*3/mm3      Lymphocytes, Absolute 1.00 10*3/mm3      Monocytes, Absolute 0.53 10*3/mm3      Eosinophils, Absolute 0.11 10*3/mm3      Basophils, Absolute 0.03 10*3/mm3      Immature Grans, Absolute 0.02 10*3/mm3      nRBC 0.0 /100 WBC     POC Glucose Once [[id number]]  (Normal) Collected:  11/20/19 1739    Specimen:  Blood Updated:  11/20/19 1751     Glucose 100 mg/dL      Comment: : [phone] AIDA KABAMeter ID: [id number]       Extra Tubes [[id number]] Collected:  11/20/19 1545    Specimen:  Blood, Venous Line Updated:  11/20/19 1645    Narrative:       The following orders were created for panel order Extra Tubes.  Procedure                               Abnormality         Status                      ---------                               -----------         ------                     [facility][[id number]]                                   Final result                 Please view results for these tests on the individual orders.    [facility] [[id number]] Collected:  11/20/19 1545    Specimen:  Blood Updated:  11/20/19 1645     Extra Tube Hold for add-ons.     Comment: Auto resulted.           Imaging Results (All)     Procedure Component Value Units Date/Time    XR Abdomen KUB [[id number]] Collected:  11/19/19 0838     Updated:  11/19/19 0901    Narrative:       Procedure: KUB    Reason for exam: Vomiting    FINDINGS: No acute osseous abnormality. Soft tissue structures of  the abdomen are normal. Small 4.8 mm circular calcification  overlies the inferior medial liver and superior right peripheral  kidney. This may represent calcified granuloma versus renal  calculi. Surgical clips in the region of gallbladder fossa which  suggests cholecystectomy. Recommend clinical correlation.  Surgical clips in the pelvis consistent with prior surgery in  this region. Nonobstructive bowel gas pattern.      Impression:       1.  Small 4.8 mm circular calcification overlies the inferior  medial liver and superior right peripheral kidney. This may  represent calcified granuloma versus renal calculi.   2.  Surgical clips in the region of gallbladder fossa which  suggests cholecystectomy. Recommend clinical correlation.   3.  Otherwise unremarkable abdomen.    Electronically signed by:  Tyron Cheek MD  11/19/2019 9:00 AM [facility]  Workstation: [id number]    US Renal Bilateral [[id number]] Collected:  11/17/19 1057     Updated:  11/17/19 1319    Narrative:       Ultrasound renal complete    HISTORY:  Acute renal failure. Chronic kidney disease.    Ultrasound examination of the kidneys and urinary bladder was  performed.    COMPARISON: March 26, 2019. Correlation CT November 16, 2019.     FINDINGS:  The right kidney  measures 13.1 cm in length by 6.00 cm x 5.18 cm  transverse.  The left kidney measures 12.2 cm in length by 5.65 cm x 4.04 cm  transverse.  Increased echogenicity of the kidneys, compatible with medical  renal disease.  No hydronephrosis.  No solid or cystic renal mass.    Cystectomy with neobladder present.      Impression:       CONCLUSION:  Increased echogenicity of the kidneys, compatible with medical  renal disease.  No hydronephrosis.  Cystectomy with neobladder present.    [id number]    Electronically signed by:  Aaron Constantino MD  11/17/2019 1:18 PM  CST Workstation: [id number]    CT Abdomen Pelvis Without Contrast [[id number]] Collected:  11/16/19 2221     Updated:  11/16/19 2331    Narrative:       PROCEDURE:  CT ABDOMEN PELVIS WO CONTRAST    CLINICAL HISTORY:  64 years  Male  Hydronephrosis, N17.9 Acute  kidney failure, unspecified N18.9 Chronic kidney disease,  unspecified G93.41 Metabolic encephalopathy R53.1 Weakness      TECHNIQUE: Contiguous axial images obtained through the abdomen  and pelvis without IV contrast.  Coronal and sagittal reformatted  images provided.      This CT exam was performed according to our departmental  dose-optimization program, which includes one or more of the  following dose reduction techniques: automated exposure control,  adjustment of the mA and/or kV according to patient size, and/or  use of iterative reconstruction technique.    COMPARISON:  No prior exams provided for comparison.     FINDINGS:    Patient again seen to be status post prior cystectomy and  prostatectomy with formation of a neobladder.    No urinary calculi. No hydronephrosis or visualized focal renal  lesion on either side.    No visualized bowel inflammation, obstruction, free  intraperitoneal air, or ascites.    Minimal bibasilar atelectasis.    Prior cholecystectomy without biliary dilatation.     Pancreatic atrophy without focal lesion on this noncontrast  study.    Calcified granulomata in the  spleen.    The unenhanced liver and adrenal glands are normal.    No abdominal aortic aneurysm or retroperitoneal hemorrhage.    No acute osseous abnormality.       Impression:         Chronic postsurgical changes as described. No urinary or bowel  obstruction.    Electronically signed by:  Charlene Becker MD  11/16/2019 11:29 PM  CST Workstation: [id number]    CT Head Without Contrast [[id number]] Collected:  11/16/19 1819     Updated:  11/16/19 1841    Narrative:       .      EXAMINATION:  Computed Tomography      REGION:  Head             INDICATION:  gen weakness    HISTORY:  CORRELATIVE IMAGING:    3/20/19    TECHNIQUE:  iv contrast:  no    This exam was performed according to the departmental  dose-optimization program which includes automated exposure  control, adjustment of the mA and/or kV according to patient size  and/or use of iterative reconstruction technique.              COMMENTS:                - atrophy:              wnl for age    - cortex:                wnl for age    - deep white mat:  wnl for age    - hemorrhage:       none       - fluid collection: no intra/extra axial fluid collection     - mass / lesion:     no focal parenchymal lesion(s)       - gray/white jxn:   borders preserved         - brain stem:         wnl       - cerebellum:        wnl       - globes / retro:     wnl       - ventricles:          normal size / configuration       - midline shift:      no       - sinuses: Small mucoid retention cyst in the left maxillary  sinus.    - mastoids:           wnl        - osseous:             wnl       - misc.:  .       Impression:       CONCLUSION:    1.  Negative examination for acute intracranial pathology.           If signs or symptoms persist beyond reasonable expectations, a  MRI examination is suggested as is deemed clinically appropriate.    Electronically signed by:  BHARTI Travis MD  11/16/2019 6:39  PM CST Workstation: [id number]    XR Chest 1 View [[id number]] Collected:  11/16/19  1813     Updated:  11/16/19 1835    Narrative:         EXAM:         Radiograph(s), Chest   VIEWS:   Frontal  ; 1       DATE/TIME:  11/16/2019 6:33 PM CST                INDICATION:   gen weakness    COMPARISON:  CXR: 3/20/19             FINDINGS:             - lines/tubes:     none     - cardiac:         size within normal limits         - mediastinum:  contour within normal limits         - lungs:         no focal air space process, pulmonary  interstitial edema, nodule(s)/mass             - pleura:         no evidence of  fluid                  - osseous:         unremarkable for age                  - misc.:         Impression:       CONCLUSION:        1. No evidence of an active cardiopulmonary process.                                                              Electronically signed by:  BHARTI Travis MD  11/16/2019 6:34  PM CST Workstation: [id number]            Chief Complaint on Day of Discharge: left AMA    Hospital Course:  64-year-old  male past mental history of coronary artery disease, type 2 diabetes, CKD, hypertension, COPD, bladder cancer who presented on 11/16/2019 with nausea, vomiting, and fatigue.  He was admitted to the hospital due to acute renal failure related to obstructive uropathy.  He was followed by urology and nephrology during the hospital stay and creatinine level trended downward from 5.85 to 3.32.  Patient was being followed by urology for intravesical Mucomyst due to overproduction of mucus in the urinary bladder.  The patient was still being followed by urology and hospitalist service when he opted to leave AGAINST MEDICAL ADVICE on 11/21/2019 before completion of his treatment.  Per discussion with urology, patient is to leave John catheter in place and follow-up in the morning with urology for further care.    Condition on Discharge:  guarded    Physical Exam on Discharge:  /85 (BP Location: Left arm, Patient Position: Lying)   Pulse 78   Temp 98.6 °F (37  "°C) (Temporal)   Resp 18   Ht 170.2 cm (67\")   Wt 63.5 kg (140 lb)   SpO2 92%   BMI 21.93 kg/m²   Physical Exam      Discharge Disposition:  Left Against Medical Advice    Discharge Medications:     Discharge Medications      Continue These Medications      Instructions Start Date   ACURA BLOOD GLUCOSE METER w/Device kit   1 each, Does not apply, 4 Times Daily PRN      Alcohol Prep pads   1 pad, Does not apply, 5 Times Daily      freestyle lancets   Tid      glucose monitor monitoring kit   1 each, Does not apply, 3 Times Daily         ASK your doctor about these medications      Instructions Start Date   albuterol (2.5 MG/3ML) 0.083% nebulizer solution  Commonly known as:  PROVENTIL   2.5 mg, Nebulization, Every 4 Hours PRN      albuterol sulfate  (90 Base) MCG/ACT inhaler  Commonly known as:  VENTOLIN HFA   2 puffs, Inhalation, Every 6 Hours PRN      aspirin 81 MG EC tablet   81 mg, Oral, Daily      budesonide-formoterol 160-4.5 MCG/ACT inhaler  Commonly known as:  SYMBICORT   2 puffs, Inhalation, 2 Times Daily - RT      carvedilol 3.125 MG tablet  Commonly known as:  COREG   TK 1 T PO BID WC      cephalexin 500 MG capsule  Commonly known as:  KEFLEX   500 mg, Oral      clopidogrel 75 MG tablet  Commonly known as:  PLAVIX   75 mg, Oral, Daily      cyclobenzaprine 10 MG tablet  Commonly known as:  FLEXERIL   10 mg, Oral      fluticasone 50 MCG/ACT nasal spray  Commonly known as:  FLONASE   2 sprays, Nasal, Daily      glucose blood test strip   Use as instructed      hydrOXYzine 25 MG tablet  Commonly known as:  ATARAX   TK 1 T PO TID PRF ITCHING      insulin aspart 100 UNIT/ML solution pen-injector sc pen  Commonly known as:  novoLOG FLEXPEN   Subcutaneous, 3 Times Daily With Meals      LEVEMIR FLEXTOUCH 100 UNIT/ML injection  Generic drug:  insulin detemir   25 Units, Subcutaneous, 2 Times Daily      nitroglycerin 0.4 MG SL tablet  Commonly known as:  NITROSTAT   0.4 mg, Sublingual, Every 5 Minutes " PRN, Take no more than 3 doses in 15 minutes.       omeprazole 20 MG capsule  Commonly known as:  PrilOSEC   20 mg, Oral, Daily      POTASSIMIN PO   Oral      pravastatin 40 MG tablet  Commonly known as:  PRAVACHOL   40 mg, Oral, Nightly      sertraline 50 MG tablet  Commonly known as:  ZOLOFT   1/2 tablet hs 1 week then 1 tablet . (depression)             Discharge Diet: heart healthy    Activity at Discharge: as tolerated    Discharge Care Plan/Instructions: Left AMA    Follow-up Appointments:   Additional Instructions for the Follow-ups that You Need to Schedule     Basic Metabolic Panel    Nov 26, 2019 (Approximate)      Magnesium    Nov 26, 2019 (Approximate)        Follow-up Information     Anastacio Reilly APRN Follow up in 1 week(s).    Specialty:  Nephrology  Contact information:  [address] [id number] [phone]             Shayne Merritt MD Follow up on 11/26/2019.    Specialty:  Family Medicine  Why:  Tuesday 2:15 pm  Contact information:  [address] [address]  [phone]             Adryan Prater APRN Follow up on 11/22/2019.    Specialty:  Nurse Practitioner  Why:  Friday 11:50 am  Contact information:  [address]  [place] [address]  [phone]                   Test Results Pending at Discharge:           This document has been electronically signed by TIFFANIE Guzman on November 21, 2019 4:21 PM

## 2019-11-21 NOTE — PROGRESS NOTES
[facility] Medicine Services  INPATIENT PROGRESS NOTE    Length of Stay: 5  Date of Admission: 11/16/2019  Primary Care Physician: Shayne Merritt MD    Subjective   Chief Complaint: weakness, nausea, vomiting  HPI:   64 year old  male with past medical history of CAD, DM, CKD, HTN, COPD, bladder cancer who presented on 11/16/19 with complaints of nausea, vomiting, fatigue.  He is currently admitted due to acute renal failure related to obstructive uropathy.  During today's visit, the patient denies any new complaints and wishes for diet to be advanced to solid foods.     Review of Systems   Constitutional: Negative for chills and fever.   Respiratory: Negative for cough, chest tightness and shortness of breath.    Cardiovascular: Negative for chest pain and leg swelling.   Gastrointestinal: Negative for abdominal pain, diarrhea, nausea and vomiting.   Genitourinary: Negative for flank pain and hematuria.   Musculoskeletal: Negative for back pain.   Skin: Negative for pallor.   Neurological: Negative for dizziness and weakness.   Psychiatric/Behavioral: Negative for confusion. The patient is not nervous/anxious.         All pertinent negatives and positives are as above. All other systems have been reviewed and are negative unless otherwise stated.     Objective    Temp:  [97.4 °F (36.3 °C)-99 °F (37.2 °C)] 98.6 °F (37 °C)  Heart Rate:  [69-82] 78  Resp:  [18] 18  BP: (131-155)/() 150/85    Physical Exam   Constitutional: He is oriented to person, place, and time. Vital signs are normal. He appears well-developed and well-nourished. He is cooperative. No distress.   HENT:   Head: Normocephalic and atraumatic.   Right Ear: External ear normal.   Left Ear: External ear normal.   Nose: Nose normal.   Eyes: Conjunctivae are normal. Right eye exhibits no discharge. Left eye exhibits no discharge.   Neck: Normal range of motion. Neck supple. No JVD  present.   Cardiovascular: Normal rate, regular rhythm, normal heart sounds and intact distal pulses. Exam reveals no gallop and no friction rub.   No murmur heard.  Pulmonary/Chest: Effort normal and breath sounds normal. No stridor. No respiratory distress. He has no wheezes. He has no rales.   Abdominal: Soft. Bowel sounds are normal. He exhibits no distension. There is no tenderness.   Musculoskeletal: Normal range of motion. He exhibits no edema.   Neurological: He is alert and oriented to person, place, and time.   Skin: Skin is warm and dry. He is not diaphoretic.   Psychiatric: He has a normal mood and affect. His behavior is normal.   Nursing note and vitals reviewed.          Results Review:  I have reviewed the labs, radiology results, and diagnostic studies.    Laboratory Data:   Results from last 7 days   Lab Units 11/21/19  0716 11/20/19  1530 11/20/19  0341  11/19/19  0408  11/16/19  1809   SODIUM mmol/L 137  --  139  --  137   < > 128*   POTASSIUM mmol/L 3.6 3.9 3.1*   < > 3.2*   < > 5.5*   CHLORIDE mmol/L 102  --  104  --  100   < > 106   CO2 mmol/L 21.0*  --  23.0  --  20.0*   < > 6.0*   BUN mg/dL 53*  --  74*  --  88*   < > 86*   CREATININE mg/dL 3.32*  --  4.56*  --  5.85*   < > 5.38*   GLUCOSE mg/dL 104*  --  113*  --  167*   < > 273*   CALCIUM mg/dL 7.8*  --  6.7*  --  6.6*   < > 8.7   BILIRUBIN mg/dL  --   --   --   --   --   --  0.3   ALK PHOS U/L  --   --   --   --   --   --  103   ALT (SGPT) U/L  --   --   --   --   --   --  13   AST (SGOT) U/L  --   --   --   --   --   --  9   ANION GAP mmol/L 14.0  --  12.0  --  17.0*   < > 16.0*    < > = values in this interval not displayed.     Estimated Creatinine Clearance: 20.7 mL/min (A) (by C-G formula based on SCr of 3.32 mg/dL (H)).  Results from last 7 days   Lab Units 11/21/19  0716 11/20/19  0341 11/19/19  0408   MAGNESIUM mg/dL 1.7 1.8 1.3*   PHOSPHORUS mg/dL  --  2.6  --          Results from last 7 days   Lab Units 11/21/19 0716  11/20/19  0341 11/19/19  0408 11/18/19  0443 11/17/19  0224   WBC 10*3/mm3 4.99 4.42 7.13 6.62 8.06   HEMOGLOBIN g/dL 10.8* 9.6* 10.6* 11.5* 13.3   HEMATOCRIT % 32.6* 28.1* 31.0* 33.9* 40.7   PLATELETS 10*3/mm3 137* 120* 119* 140 128*           Culture Data:   No results found for: BLOODCX  No results found for: URINECX  No results found for: RESPCX  No results found for: WOUNDCX  No results found for: STOOLCX  No components found for: BODYFLD    Radiology Data:   Imaging Results (Last 24 Hours)     ** No results found for the last 24 hours. **          I have reviewed the patient's current medications.     Assessment/Plan     Active Hospital Problems    Diagnosis   • Acute renal failure superimposed on chronic kidney disease (CMS/HCC)   • Chronic hepatitis C virus infection (CMS/HCC)   • CAD (coronary artery disease)   • Type 2 diabetes mellitus (CMS/HCC)       Plan:    1. Acute renal failure on CKD: r/o obstructive uropathy due to mucus and sediment production in bladder.  Urology and nephrology following.  Liu in place.  Per urology plan for removal of liu and teaching on self catheterization in next 24-48 hours.  Creatinine trending downward (5.85-4.56-3.32).  Mucomyst continued.   2. Chronic hepatitis C:  3. Type 2 DM: FSBS AC and HS with SSI.   4. CAD: continue ASA, Plavix.     Discharge Planning: I expect patient to be discharged to home in 2 days.          This document has been electronically signed by TIFFANIE Guzman on November 21, 2019 4:15 PM

## 2019-11-21 NOTE — PLAN OF CARE
Problem: Patient Care Overview  Goal: Plan of Care Review  Outcome: Ongoing (interventions implemented as appropriate)   11/21/19 2116   Coping/Psychosocial   Plan of Care Reviewed With patient   Plan of Care Review   Progress improving   OTHER   Outcome Summary OT eval completed on this date. Pt pleasant and agreeable to therapy. Bed Mobility: Supervision Transfers: CGA Functional Mobility: CGA-Min A for LOB x 3 during ambulation in hallway. Dressing: UB and LB with CGA-Min A. Pt is unrealistic about outcomes. Pt reports he does not need skileld therapy when he returns home as strength will come back within 30 minutes of being home. OT provided pt max education on safety concerns with returning home alone and not being able to complete ADL's. Pt demos functional deficits due to decreased safety awareness, decreased balance, decreased strength, and increased pain in abdomen. Pt would benefit from continued skilled OT during hospital stay to address functional deficits. Pt would also benefit from HHOT to address EC/fall prevention strategies within home to maximize independence with the completion of ADL's.
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
## 2019-11-21 NOTE — PROGRESS NOTES
"   LOS: 5 days   Patient Care Team:  Shayne Merritt MD as PCP - General (Family Medicine)  Harvinder Robert MD as PCP - [facility]  Kevin Robbins PA-C as Physician Assistant (Gastroenterology)  Harvinder Robert MD    Subjective     Subjective:  Symptoms:  Improved.  (Nursing staff in room, patient says he's hungry. Clear yellow urine in John's gravity bag. ).    Diet:  No nausea or vomiting.    Activity level: Returning to normal.        History taken from: patient chart RN    Objective     Vital Signs  Temp:  [97.5 °F (36.4 °C)-99 °F (37.2 °C)] 97.5 °F (36.4 °C)  Heart Rate:  [67-77] 76  Resp:  [18] 18  BP: (144-163)/() 147/86    Objective:  General Appearance:  In no acute distress.    Vital signs: (most recent): Blood pressure 147/86, pulse 76, temperature 97.5 °F (36.4 °C), temperature source Oral, resp. rate 18, height 170.2 cm (67\"), weight 63.5 kg (140 lb), SpO2 93 %.  Vital signs are normal.  No fever.    Output: Producing urine (John clear yellow urine) and producing stool.    Lungs:  Normal effort and normal respiratory rate.  He is not in respiratory distress.    Chest: Symmetric chest wall expansion.   Abdomen: Abdomen is soft and non-distended.  There is no abdominal tenderness.     Extremities: There is no deformity or dependent edema.    Neurological: Patient is alert and oriented to person, place and time.  GCS score is 15.    Pupils:  Pupils are equal, round, and reactive to light.    Skin:  Warm and dry.  No rash or cyanosis.             Results Review:    Lab Results (last 24 hours)     Procedure Component Value Units Date/Time    POC Glucose Once [[id number]]  (Normal) Collected:  11/21/19 0858    Specimen:  Blood Updated:  11/21/19 0911     Glucose 127 mg/dL      Comment: RN NotifiedOperator: [id number] AIDA JENNIFERMeter ID: [id number]       Basic Metabolic Panel [[id number]]  (Abnormal) Collected:  11/21/19 0716    Specimen:  Blood Updated:  11/21/19 0755     " Glucose 104 mg/dL      BUN 53 mg/dL      Creatinine 3.32 mg/dL      Sodium 137 mmol/L      Potassium 3.6 mmol/L      Chloride 102 mmol/L      CO2 21.0 mmol/L      Calcium 7.8 mg/dL      eGFR Non African Amer 19 mL/min/1.73      BUN/Creatinine Ratio 16.0     Anion Gap 14.0 mmol/L     Narrative:       GFR Normal >60  Chronic Kidney Disease <60  Kidney Failure <15    Magnesium [[id number]]  (Normal) Collected:  11/21/19 0716    Specimen:  Blood Updated:  11/21/19 0755     Magnesium 1.7 mg/dL     CBC & Differential [[id number]] Collected:  11/21/19 0716    Specimen:  Blood Updated:  11/21/19 0732    Narrative:       The following orders were created for panel order CBC & Differential.  Procedure                               Abnormality         Status                     ---------                               -----------         ------                     CBC Auto Differential[[id number]]        Abnormal            Final result                 Please view results for these tests on the individual orders.    CBC Auto Differential [[id number]]  (Abnormal) Collected:  11/21/19 0716    Specimen:  Blood Updated:  11/21/19 0732     WBC 4.99 10*3/mm3      RBC 3.65 10*6/mm3      Hemoglobin 10.8 g/dL      Hematocrit 32.6 %      MCV 89.3 fL      MCH 29.6 pg      MCHC 33.1 g/dL      RDW 14.9 %      RDW-SD 49.0 fl      MPV 10.1 fL      Platelets 137 10*3/mm3      Neutrophil % 66.2 %      Lymphocyte % 20.0 %      Monocyte % 10.6 %      Eosinophil % 2.2 %      Basophil % 0.6 %      Immature Grans % 0.4 %      Neutrophils, Absolute 3.30 10*3/mm3      Lymphocytes, Absolute 1.00 10*3/mm3      Monocytes, Absolute 0.53 10*3/mm3      Eosinophils, Absolute 0.11 10*3/mm3      Basophils, Absolute 0.03 10*3/mm3      Immature Grans, Absolute 0.02 10*3/mm3      nRBC 0.0 /100 WBC     POC Glucose Once [[id number]]  (Normal) Collected:  11/20/19 1739    Specimen:  Blood Updated:  11/20/19 1751     Glucose 100 mg/dL      Comment: : [id number]  AIDA Tam ID: [id number]       Extra Tubes [[id number]] Collected:  11/20/19 1545    Specimen:  Blood, Venous Line Updated:  11/20/19 1645    Narrative:       The following orders were created for panel order Extra Tubes.  Procedure                               Abnormality         Status                     ---------                               -----------         ------                     Gold Top - SST[[id number]]                                   Final result                 Please view results for these tests on the individual orders.    Gold Top - SST [[id number]] Collected:  11/20/19 1545    Specimen:  Blood Updated:  11/20/19 1645     Extra Tube Hold for add-ons.     Comment: Auto resulted.       Potassium [[id number]]  (Normal) Collected:  11/20/19 1530    Specimen:  Blood Updated:  11/20/19 1606     Potassium 3.9 mmol/L     POC Glucose Once [[id number]]  (Normal) Collected:  11/20/19 1137    Specimen:  Blood Updated:  11/20/19 1200     Glucose 128 mg/dL      Comment: : [id number] MICHAEL STALUIZEMeter ID: [id number]              I reviewed the patient's new clinical results.  I reviewed the patient's new imaging results and agree with the interpretation.  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 Nephrology is managing acute kidney injury on chronic kidney disease. Imaging ordered per primary for worsening nausea/vomiting.  -WBC 8.06-->6.62-->7.13-->4.42-->4.99  -Estimated Creatinine Clearance: 20.7 mL/min (A) (by C-G formula based on SCr of 3.32 mg/dL (H)).  -Cr  5.38-->5.34-->5.51-->5.61-->6.44-->5.85-->4.56-->3.32  -Baseline Cr per Nephrology 1.6-2.0  -Bladder scan > 300 mL 11/17/19 prior to John being anchored; John anchored 11/17/19and Mucomyst and normal saline administered intravesical.   -Renal ultrasound 11/17/19 showed medical renal disease, no hydronephrosis and cystectomy with neobladder.   -Urine culture 11/17/19 no growth, Rocephin x 3 days given.      Plan:  Continue intravesical Mucomyst with saline flushes   John in place.   Plan to remove John when renal function improves closer to baseline and will have Mr. Casillas begin self-intermittent catheterization prior to discharge.    Adryan Prater, TIFFANIE  11/21/19  10:35 AM

## 2019-11-21 NOTE — PROGRESS NOTES
"[facility]  [address] [place] [address]. [address]  T - [phone]  F - [phone]     Progress Note          PATIENT  DEMOGRAPHICS   PATIENT NAME: Favio Casillas                      PHYSICIAN: Rodríguez Brody MD  : 1955  MRN: [id number]   LOS: 5 days    Patient Care Team:  Shayne Merritt MD as PCP - General (Family Medicine)  Harvinder Robert MD as PCP - Claims Attributed  Kevin Robbins PA-C as Physician Assistant (Gastroenterology)  Harvinder Robert MD  Subjective   SUBJECTIVE   UO upto 5 l in 24 hr, doing well        Objective   OBJECTIVE   Vital Signs  Temp:  [97.5 °F (36.4 °C)-99 °F (37.2 °C)] 97.5 °F (36.4 °C)  Heart Rate:  [67-77] 76  Resp:  [18] 18  BP: (144-163)/() 147/86    Flowsheet Rows      First Filed Value   Admission Height  170.2 cm (67\") Documented at 2019 1748   Admission Weight  63.1 kg (139 lb 1.6 oz) Documented at 2019 1748           I/O last 3 completed shifts:  In: 4560 [P.O.:350; I.V.:3700; Other:10; IV Piggyback:500]  Out: 6950 [Urine:6950]    PHYSICAL EXAM    Physical Exam   Constitutional: He is oriented to person, place, and time. He appears well-developed.   HENT:   Head: Normocephalic.   Eyes: Pupils are equal, round, and reactive to light.   Cardiovascular: Normal rate, regular rhythm and normal heart sounds.   Pulmonary/Chest: Effort normal and breath sounds normal.   Abdominal: Soft. Bowel sounds are normal.   Musculoskeletal: He exhibits no edema.   Neurological: He is alert and oriented to person, place, and time.       RESULTS   Results Review:    Results from last 7 days   Lab Units 19  0716 19  1530 19  0341  19  0408  19  1809   SODIUM mmol/L 137  --  139  --  137   < > 128*   POTASSIUM mmol/L 3.6 3.9 3.1*   < > 3.2*   < > 5.5*   CHLORIDE mmol/L 102  --  104  --  100   < > 106   CO2 mmol/L 21.0*  --  23.0  --  20.0*   < > 6.0*   BUN mg/dL 53*  --  74*  --  88*   < > 86* "   CREATININE mg/dL 3.32*  --  4.56*  --  5.85*   < > 5.38*   CALCIUM mg/dL 7.8*  --  6.7*  --  6.6*   < > 8.7   BILIRUBIN mg/dL  --   --   --   --   --   --  0.3   ALK PHOS U/L  --   --   --   --   --   --  103   ALT (SGPT) U/L  --   --   --   --   --   --  13   AST (SGOT) U/L  --   --   --   --   --   --  9   GLUCOSE mg/dL 104*  --  113*  --  167*   < > 273*    < > = values in this interval not displayed.       Estimated Creatinine Clearance: 20.7 mL/min (A) (by C-G formula based on SCr of 3.32 mg/dL (H)).    Results from last 7 days   Lab Units 11/21/19  0716 11/20/19  0341 11/19/19  0408   MAGNESIUM mg/dL 1.7 1.8 1.3*   PHOSPHORUS mg/dL  --  2.6  --              Results from last 7 days   Lab Units 11/21/19  0716 11/20/19  0341 11/19/19  0408 11/18/19  0443 11/17/19  0224   WBC 10*3/mm3 4.99 4.42 7.13 6.62 8.06   HEMOGLOBIN g/dL 10.8* 9.6* 10.6* 11.5* 13.3   PLATELETS 10*3/mm3 137* 120* 119* 140 128*               Imaging Results (Last 24 Hours)     ** No results found for the last 24 hours. **           MEDICATIONS      acetylcysteine 4 mL Oral Daily   aspirin 81 mg Oral Daily   budesonide-formoterol 2 puff Inhalation BID - RT   clopidogrel 75 mg Oral Daily   famotidine 20 mg Intravenous Daily   heparin (porcine) 5,000 Units Subcutaneous Q12H   insulin aspart 0-9 Units Subcutaneous 4x Daily AC & at Bedtime   Sodium Chloride (PF) 10 mL Intracatheter Q12H   sodium chloride 500 mL Intravenous Once   sodium chloride 10 mL Intravenous Q12H   tamsulosin 0.8 mg Oral Daily       lactated ringers 100 mL/hr Last Rate: 100 mL/hr (11/21/19 0654)       Assessment/Plan   ASSESSMENT / PLAN      Acute renal failure superimposed on chronic kidney disease (CMS/HCC)    1.  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  Creatinine peak at 6.4, now better with good UO. Bicarb is better. Mg and kcl are stable.  CT of the abdomen was negative for any bowel or bladder obstruction.  plan for self catheterization  at home. John removal per Urology . Ok to discharge from renal standpoint. Keep good Po intake at home follow up in a week     2.  Severe metabolic acidosis- related to #1 and #3, bicarb is better     3.  Nausea/vomiting/diarrhea- worsening over the past 4 to 5 days.  Management per primary team. Now better     4.  COPD     5.  CAD      6.  Diabetes mellitus type 2     7.  Hyperkalemia-resolved now low k getting replacement     8.  Substance abuse- patient admits to smoking marijuana reports meth use 1 month ago, UDS positive for meth at this time.      9 ?UTI on ceftriaxone,               This document has been electronically signed by Rodríguez Brody MD on November 21, 2019 10:57 AM

## 2019-11-22 ENCOUNTER — EPISODE CHANGES (OUTPATIENT)
Dept: CASE MANAGEMENT | Facility: OTHER | Age: 64
End: 2019-11-22

## 2019-11-22 NOTE — PAYOR COMM NOTE
"Zulay Baumann  [facility]  (P)[phone]  (F)[phone]    auth#[id number]          Favio Owens (64 y.o. Male)     Date of Birth Social Security Number Address Home Phone MRN    1955  [address]  [place] [address] [phone] [id number]    Muslim Marital Status          Samaritan Single       Admission Date Admission Type Admitting Provider Attending Provider Department, Room/Bed    11/16/19 Emergency Poppy Méndez MD  [address], 405/1    Discharge Date Discharge Disposition Discharge Destination        11/21/2019 Left Against Medical Advice              Attending Provider:  (none)   Allergies:  No Known Allergies    Isolation:  None   Infection:  None   Code Status:  Prior    Ht:  170.2 cm (67\")   Wt:  63.5 kg (140 lb)    Admission Cmt:  None   Principal Problem:  None                Active Insurance as of 11/16/2019     Primary Coverage     Payor Plan Insurance Group Employer/Plan Group    HUMANA MEDICARE REPLACEMENT HUMANA MEDICARE REPLACEMENT [id number]     Payor Plan Address Payor Plan Phone Number Payor Plan Fax Number Effective Dates    [address] [address] [phone]  5/1/2019 - None Entered    [facility] [address]       Subscriber Name Subscriber Birth Date Member ID       FAVIO OWENS 1955 [id number]           Secondary Coverage     Payor Plan Insurance Group Employer/Plan Group    [place]      Payor Plan Address Payor Plan Phone Number Payor Plan Fax Number Effective Dates    [address] [phone]  8/1/2017 - None Entered    [facility] [id number]       Subscriber Name Subscriber Birth Date Member ID       FAVIO OWENS 1955 [id number]                 Emergency Contacts      (Rel.) Home Phone Work Phone Mobile Phone    Sofi Mcdonald (Friend) [phone] -- [phone]    Vinny Tong (Brother) -- -- [phone]               Discharge Summary    "   Mildred Mensah APRN at 11/21/19 1534     Attestation signed by Osmin Cisneros MD at 11/21/19 9422    I attest that I have reviewed the discharge summary for Mr.William Casillas as documented by the nurse practitioner TIFFANIE New.  I agree with the documentation.                      [facility] Medicine Services  DISCHARGE SUMMARY       Date of Admission: 11/16/2019  Date of Discharge:  11/21/2019  Primary Care Physician: Shayne Merritt MD    Presenting Problem/History of Present Illness:  General weakness [R53.1]  Uremic encephalopathy [G93.41, N19]  Acute renal failure superimposed on chronic kidney disease, unspecified CKD stage, unspecified acute renal failure type (CMS/HCC) [N17.9, N18.9]       Final Discharge Diagnoses:  Active Hospital Problems    Diagnosis   • Acute renal failure superimposed on chronic kidney disease (CMS/HCC)   • Chronic hepatitis C virus infection (CMS/HCC)   • CAD (coronary artery disease)   • Type 2 diabetes mellitus (CMS/HCC)       Consults:   Consults     Date and Time Order Name Status Description    11/16/2019 2139 Inpatient Urology Consult Completed     11/16/2019 1921 Inpatient Nephrology Consult Completed           Procedures Performed:                 Pertinent Test Results:   Lab Results (last 24 hours)     Procedure Component Value Units Date/Time    POC Glucose Once [[id number]]  (Abnormal) Collected:  11/21/19 1113    Specimen:  Blood Updated:  11/21/19 1140     Glucose 180 mg/dL      Comment: RN NotifiedOperator: [id number] AIDA ZARATErevoPTMeter ID: [id number]       POC Glucose Once [[id number]]  (Normal) Collected:  11/21/19 0858    Specimen:  Blood Updated:  11/21/19 0911     Glucose 127 mg/dL      Comment: RN NotifiedOperator: [id number] AIDA ZARATEFERMeter ID: [id number]       Basic Metabolic Panel [[id number]]  (Abnormal) Collected:  11/21/19 0716    Specimen:  Blood Updated:  11/21/19 0755      Glucose 104 mg/dL      BUN 53 mg/dL      Creatinine 3.32 mg/dL      Sodium 137 mmol/L      Potassium 3.6 mmol/L      Chloride 102 mmol/L      CO2 21.0 mmol/L      Calcium 7.8 mg/dL      eGFR Non African Amer 19 mL/min/1.73      BUN/Creatinine Ratio 16.0     Anion Gap 14.0 mmol/L     Narrative:       GFR Normal >60  Chronic Kidney Disease <60  Kidney Failure <15    Magnesium [[id number]]  (Normal) Collected:  11/21/19 0716    Specimen:  Blood Updated:  11/21/19 0755     Magnesium 1.7 mg/dL     CBC & Differential [[id number]] Collected:  11/21/19 0716    Specimen:  Blood Updated:  11/21/19 0732    Narrative:       The following orders were created for panel order CBC & Differential.  Procedure                               Abnormality         Status                     ---------                               -----------         ------                     CBC Auto Differential[[id number]]        Abnormal            Final result                 Please view results for these tests on the individual orders.    CBC Auto Differential [[id number]]  (Abnormal) Collected:  11/21/19 0716    Specimen:  Blood Updated:  11/21/19 0732     WBC 4.99 10*3/mm3      RBC 3.65 10*6/mm3      Hemoglobin 10.8 g/dL      Hematocrit 32.6 %      MCV 89.3 fL      MCH 29.6 pg      MCHC 33.1 g/dL      RDW 14.9 %      RDW-SD 49.0 fl      MPV 10.1 fL      Platelets 137 10*3/mm3      Neutrophil % 66.2 %      Lymphocyte % 20.0 %      Monocyte % 10.6 %      Eosinophil % 2.2 %      Basophil % 0.6 %      Immature Grans % 0.4 %      Neutrophils, Absolute 3.30 10*3/mm3      Lymphocytes, Absolute 1.00 10*3/mm3      Monocytes, Absolute 0.53 10*3/mm3      Eosinophils, Absolute 0.11 10*3/mm3      Basophils, Absolute 0.03 10*3/mm3      Immature Grans, Absolute 0.02 10*3/mm3      nRBC 0.0 /100 WBC     POC Glucose Once [[id number]]  (Normal) Collected:  11/20/19 1739    Specimen:  Blood Updated:  11/20/19 1751     Glucose 100 mg/dL      Comment: : [id number]  AIDA KABAMeter ID: [id number]       Extra Tubes [[id number]] Collected:  11/20/19 1545    Specimen:  Blood, Venous Line Updated:  11/20/19 1645    Narrative:       The following orders were created for panel order Extra Tubes.  Procedure                               Abnormality         Status                     ---------                               -----------         ------                     Gold Top - SST[[id number]]                                   Final result                 Please view results for these tests on the individual orders.    Gold Top - SST [[id number]] Collected:  11/20/19 1545    Specimen:  Blood Updated:  11/20/19 1645     Extra Tube Hold for add-ons.     Comment: Auto resulted.           Imaging Results (All)     Procedure Component Value Units Date/Time    XR Abdomen KUB [[id number]] Collected:  11/19/19 0838     Updated:  11/19/19 0901    Narrative:       Procedure: KUB    Reason for exam: Vomiting    FINDINGS: No acute osseous abnormality. Soft tissue structures of  the abdomen are normal. Small 4.8 mm circular calcification  overlies the inferior medial liver and superior right peripheral  kidney. This may represent calcified granuloma versus renal  calculi. Surgical clips in the region of gallbladder fossa which  suggests cholecystectomy. Recommend clinical correlation.  Surgical clips in the pelvis consistent with prior surgery in  this region. Nonobstructive bowel gas pattern.      Impression:       1.  Small 4.8 mm circular calcification overlies the inferior  medial liver and superior right peripheral kidney. This may  represent calcified granuloma versus renal calculi.   2.  Surgical clips in the region of gallbladder fossa which  suggests cholecystectomy. Recommend clinical correlation.   3.  Otherwise unremarkable abdomen.    Electronically signed by:  Tyron Cheek MD  11/19/2019 9:00 AM [facility] [[email or web address]]  Workstation: [id number]     Renal Bilateral [[id number]] Collected:  11/17/19 1050      Updated:  11/17/19 1319    Narrative:       Ultrasound renal complete    HISTORY:  Acute renal failure. Chronic kidney disease.    Ultrasound examination of the kidneys and urinary bladder was  performed.    COMPARISON: March 26, 2019. Correlation CT November 16, 2019.     FINDINGS:  The right kidney measures 13.1 cm in length by 6.00 cm x 5.18 cm  transverse.  The left kidney measures 12.2 cm in length by 5.65 cm x 4.04 cm  transverse.  Increased echogenicity of the kidneys, compatible with medical  renal disease.  No hydronephrosis.  No solid or cystic renal mass.    Cystectomy with neobladder present.      Impression:       CONCLUSION:  Increased echogenicity of the kidneys, compatible with medical  renal disease.  No hydronephrosis.  Cystectomy with neobladder present.    [id number]    Electronically signed by:  Aaron Constantino MD  11/17/2019 1:18 PM  CST Workstation: [id number]    CT Abdomen Pelvis Without Contrast [[id number]] Collected:  11/16/19 2221     Updated:  11/16/19 2331    Narrative:       PROCEDURE:  CT ABDOMEN PELVIS WO CONTRAST    CLINICAL HISTORY:  64 years  Male  Hydronephrosis, N17.9 Acute  kidney failure, unspecified N18.9 Chronic kidney disease,  unspecified G93.41 Metabolic encephalopathy R53.1 Weakness      TECHNIQUE: Contiguous axial images obtained through the abdomen  and pelvis without IV contrast.  Coronal and sagittal reformatted  images provided.      This CT exam was performed according to our departmental  dose-optimization program, which includes one or more of the  following dose reduction techniques: automated exposure control,  adjustment of the mA and/or kV according to patient size, and/or  use of iterative reconstruction technique.    COMPARISON:  No prior exams provided for comparison.     FINDINGS:    Patient again seen to be status post prior cystectomy and  prostatectomy with formation of a neobladder.    No urinary calculi. No hydronephrosis or visualized focal  renal  lesion on either side.    No visualized bowel inflammation, obstruction, free  intraperitoneal air, or ascites.    Minimal bibasilar atelectasis.    Prior cholecystectomy without biliary dilatation.     Pancreatic atrophy without focal lesion on this noncontrast  study.    Calcified granulomata in the spleen.    The unenhanced liver and adrenal glands are normal.    No abdominal aortic aneurysm or retroperitoneal hemorrhage.    No acute osseous abnormality.       Impression:         Chronic postsurgical changes as described. No urinary or bowel  obstruction.    Electronically signed by:  Charlene Becker MD  11/16/2019 11:29 PM  CST Workstation: [id number]    CT Head Without Contrast [[id number]] Collected:  11/16/19 1819     Updated:  11/16/19 1841    Narrative:       .      EXAMINATION:  Computed Tomography      REGION:  Head             INDICATION:  gen weakness    HISTORY:  CORRELATIVE IMAGING:    3/20/19    TECHNIQUE:  iv contrast:  no    This exam was performed according to the departmental  dose-optimization program which includes automated exposure  control, adjustment of the mA and/or kV according to patient size  and/or use of iterative reconstruction technique.              COMMENTS:                - atrophy:              wnl for age    - cortex:                wnl for age    - deep white mat:  wnl for age    - hemorrhage:       none       - fluid collection: no intra/extra axial fluid collection     - mass / lesion:     no focal parenchymal lesion(s)       - gray/white jxn:   borders preserved         - brain stem:         wnl       - cerebellum:        wnl       - globes / retro:     wnl       - ventricles:          normal size / configuration       - midline shift:      no       - sinuses: Small mucoid retention cyst in the left maxillary  sinus.    - mastoids:           wnl        - osseous:             wnl       - misc.:  .       Impression:       CONCLUSION:    1.  Negative examination for acute  intracranial pathology.           If signs or symptoms persist beyond reasonable expectations, a  MRI examination is suggested as is deemed clinically appropriate.    Electronically signed by:  BHARTI Travis MD  11/16/2019 6:39  PM CST Workstation: [id number]    XR Chest 1 View [[id number]] Collected:  11/16/19 1813     Updated:  11/16/19 1835    Narrative:         EXAM:         Radiograph(s), Chest   VIEWS:   Frontal  ; 1       DATE/TIME:  11/16/2019 6:33 PM CST                INDICATION:   gen weakness    COMPARISON:  CXR: 3/20/19             FINDINGS:             - lines/tubes:     none     - cardiac:         size within normal limits         - mediastinum:  contour within normal limits         - lungs:         no focal air space process, pulmonary  interstitial edema, nodule(s)/mass             - pleura:         no evidence of  fluid                  - osseous:         unremarkable for age                  - misc.:         Impression:       CONCLUSION:        1. No evidence of an active cardiopulmonary process.                                                              Electronically signed by:  BHARTI Travis MD  11/16/2019 6:34  PM CST Workstation: [id number]            Chief Complaint on Day of Discharge: left AMA    Hospital Course:  64-year-old  male past mental history of coronary artery disease, type 2 diabetes, CKD, hypertension, COPD, bladder cancer who presented on 11/16/2019 with nausea, vomiting, and fatigue.  He was admitted to the hospital due to acute renal failure related to obstructive uropathy.  He was followed by urology and nephrology during the hospital stay and creatinine level trended downward from 5.85 to 3.32.  Patient was being followed by urology for intravesical Mucomyst due to overproduction of mucus in the urinary bladder.  The patient was still being followed by urology and hospitalist service when he opted to leave AGAINST MEDICAL ADVICE on 11/21/2019 before  "completion of his treatment.  Per discussion with urology, patient is to leave John catheter in place and follow-up in the morning with urology for further care.    Condition on Discharge:  guarded    Physical Exam on Discharge:  /85 (BP Location: Left arm, Patient Position: Lying)   Pulse 78   Temp 98.6 °F (37 °C) (Temporal)   Resp 18   Ht 170.2 cm (67\")   Wt 63.5 kg (140 lb)   SpO2 92%   BMI 21.93 kg/m²    Physical Exam      Discharge Disposition:  Left Against Medical Advice    Discharge Medications:     Discharge Medications      Continue These Medications      Instructions Start Date   ACURA BLOOD GLUCOSE METER w/Device kit   1 each, Does not apply, 4 Times Daily PRN      Alcohol Prep pads   1 pad, Does not apply, 5 Times Daily      freestyle lancets   Tid      glucose monitor monitoring kit   1 each, Does not apply, 3 Times Daily         ASK your doctor about these medications      Instructions Start Date   albuterol (2.5 MG/3ML) 0.083% nebulizer solution  Commonly known as:  PROVENTIL   2.5 mg, Nebulization, Every 4 Hours PRN      albuterol sulfate  (90 Base) MCG/ACT inhaler  Commonly known as:  VENTOLIN HFA   2 puffs, Inhalation, Every 6 Hours PRN      aspirin 81 MG EC tablet   81 mg, Oral, Daily      budesonide-formoterol 160-4.5 MCG/ACT inhaler  Commonly known as:  SYMBICORT   2 puffs, Inhalation, 2 Times Daily - RT      carvedilol 3.125 MG tablet  Commonly known as:  COREG   TK 1 T PO BID WC      cephalexin 500 MG capsule  Commonly known as:  KEFLEX   500 mg, Oral      clopidogrel 75 MG tablet  Commonly known as:  PLAVIX   75 mg, Oral, Daily      cyclobenzaprine 10 MG tablet  Commonly known as:  FLEXERIL   10 mg, Oral      fluticasone 50 MCG/ACT nasal spray  Commonly known as:  FLONASE   2 sprays, Nasal, Daily      glucose blood test strip   Use as instructed      hydrOXYzine 25 MG tablet  Commonly known as:  ATARAX   TK 1 T PO TID PRF ITCHING      insulin aspart 100 UNIT/ML solution " pen-injector sc pen  Commonly known as:  novoLOG FLEXPEN   Subcutaneous, 3 Times Daily With Meals      LEVEMIR FLEXTOUCH 100 UNIT/ML injection  Generic drug:  insulin detemir   25 Units, Subcutaneous, 2 Times Daily      nitroglycerin 0.4 MG SL tablet  Commonly known as:  NITROSTAT   0.4 mg, Sublingual, Every 5 Minutes PRN, Take no more than 3 doses in 15 minutes.       omeprazole 20 MG capsule  Commonly known as:  PrilOSEC   20 mg, Oral, Daily      POTASSIMIN PO   Oral      pravastatin 40 MG tablet  Commonly known as:  PRAVACHOL   40 mg, Oral, Nightly      sertraline 50 MG tablet  Commonly known as:  ZOLOFT   1/2 tablet hs 1 week then 1 tablet . (depression)             Discharge Diet: heart healthy    Activity at Discharge: as tolerated    Discharge Care Plan/Instructions: Left AMA    Follow-up Appointments:   Additional Instructions for the Follow-ups that You Need to Schedule     Basic Metabolic Panel    Nov 26, 2019 (Approximate)      Magnesium    Nov 26, 2019 (Approximate)        Follow-up Information     Anastacio Reilly APRN Follow up in 1 week(s).    Specialty:  Nephrology  Contact information:  [address] [address]  [phone]             Shayne Merritt MD Follow up on 11/26/2019.    Specialty:  Family Medicine  Why:  Tuesday 2:15 pm  Contact information:  [address] [address]  [phone]             Adryan Prater APRN Follow up on 11/22/2019.    Specialty:  Nurse Practitioner  Why:  Friday 11:50 am  Contact information:  [address]  [place] [address]  [phone]                   Test Results Pending at Discharge:           This document has been electronically signed by TIFFANIE Guzman on November 21, 2019 4:21 PM                      Electronically signed by Osmin Cisneros MD at 11/21/19 1742       Discharge Order (From admission, onward)    None

## 2019-11-23 LAB — GLUCOSE BLDC GLUCOMTR-MCNC: 116 MG/DL (ref 70–130)

## 2020-01-14 ENCOUNTER — EPISODE CHANGES (OUTPATIENT)
Dept: CASE MANAGEMENT | Facility: OTHER | Age: 65
End: 2020-01-14

## 2020-07-01 ENCOUNTER — HOSPITAL ENCOUNTER (EMERGENCY)
Facility: HOSPITAL | Age: 65
Discharge: HOME | End: 2020-07-01
Attending: FAMILY MEDICINE | Admitting: FAMILY MEDICINE

## 2020-07-01 VITALS
SYSTOLIC BLOOD PRESSURE: 157 MMHG | HEART RATE: 99 BPM | BODY MASS INDEX: 21.97 KG/M2 | HEIGHT: 67 IN | TEMPERATURE: 97.9 F | OXYGEN SATURATION: 100 % | DIASTOLIC BLOOD PRESSURE: 81 MMHG | WEIGHT: 140 LBS | RESPIRATION RATE: 20 BRPM

## 2020-07-01 DIAGNOSIS — M25.571 BILATERAL ANKLE PAIN, UNSPECIFIED CHRONICITY: ICD-10-CM

## 2020-07-01 DIAGNOSIS — M25.562 PAIN IN BOTH KNEES, UNSPECIFIED CHRONICITY: Primary | ICD-10-CM

## 2020-07-01 DIAGNOSIS — M25.572 BILATERAL ANKLE PAIN, UNSPECIFIED CHRONICITY: ICD-10-CM

## 2020-07-01 DIAGNOSIS — M25.561 PAIN IN BOTH KNEES, UNSPECIFIED CHRONICITY: Primary | ICD-10-CM

## 2020-07-01 LAB — GLUCOSE BLDC GLUCOMTR-MCNC: 260 MG/DL (ref 70–130)

## 2020-07-01 PROCEDURE — 99283 EMERGENCY DEPT VISIT LOW MDM: CPT

## 2020-07-01 PROCEDURE — 82962 GLUCOSE BLOOD TEST: CPT

## 2020-07-01 RX ORDER — TRAMADOL HYDROCHLORIDE 50 MG/1
50 TABLET ORAL EVERY 6 HOURS PRN
Qty: 12 TABLET | Refills: 0 | Status: SHIPPED | OUTPATIENT
Start: 2020-07-01 | End: 2021-01-17 | Stop reason: HOSPADM

## 2020-07-01 RX ORDER — MELOXICAM 15 MG/1
15 TABLET ORAL ONCE
Status: DISCONTINUED | OUTPATIENT
Start: 2020-07-01 | End: 2020-07-01
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
## 2020-07-02 ENCOUNTER — APPOINTMENT (OUTPATIENT)
Dept: GENERAL RADIOLOGY | Facility: HOSPITAL | Age: 65
End: 2020-07-02

## 2020-07-02 ENCOUNTER — HOSPITAL ENCOUNTER (INPATIENT)
Facility: HOSPITAL | Age: 65
LOS: 8 days | Discharge: HOME | End: 2020-07-10
Attending: EMERGENCY MEDICINE | Admitting: INTERNAL MEDICINE

## 2020-07-02 DIAGNOSIS — E87.20 METABOLIC ACIDOSIS: ICD-10-CM

## 2020-07-02 DIAGNOSIS — E11.10 DIABETIC KETOACIDOSIS WITHOUT COMA ASSOCIATED WITH TYPE 2 DIABETES MELLITUS (HCC): Primary | ICD-10-CM

## 2020-07-02 DIAGNOSIS — N32.0 BLADDER OUTFLOW OBSTRUCTION: ICD-10-CM

## 2020-07-02 DIAGNOSIS — R26.89 DECREASED FUNCTIONAL MOBILITY: ICD-10-CM

## 2020-07-02 DIAGNOSIS — Z74.09 IMPAIRED MOBILITY AND ADLS: ICD-10-CM

## 2020-07-02 DIAGNOSIS — N28.9 ACUTE RENAL INSUFFICIENCY: ICD-10-CM

## 2020-07-02 DIAGNOSIS — Z78.9 IMPAIRED MOBILITY AND ADLS: ICD-10-CM

## 2020-07-02 DIAGNOSIS — N18.9 ACUTE RENAL FAILURE SUPERIMPOSED ON CHRONIC KIDNEY DISEASE, UNSPECIFIED CKD STAGE, UNSPECIFIED ACUTE RENAL FAILURE TYPE (HCC): ICD-10-CM

## 2020-07-02 DIAGNOSIS — G93.40 ENCEPHALOPATHY ACUTE: ICD-10-CM

## 2020-07-02 DIAGNOSIS — E87.6 HYPOKALEMIA: ICD-10-CM

## 2020-07-02 DIAGNOSIS — N17.9 ACUTE RENAL FAILURE SUPERIMPOSED ON CHRONIC KIDNEY DISEASE, UNSPECIFIED CKD STAGE, UNSPECIFIED ACUTE RENAL FAILURE TYPE (HCC): ICD-10-CM

## 2020-07-02 DIAGNOSIS — R13.10 DYSPHAGIA, UNSPECIFIED TYPE: ICD-10-CM

## 2020-07-02 LAB
ACETONE BLD QL: NEGATIVE
ALBUMIN SERPL-MCNC: 4 G/DL (ref 3.5–5.2)
ALBUMIN/GLOB SERPL: 1.7 G/DL
ALP SERPL-CCNC: 113 U/L (ref 39–117)
ALT SERPL W P-5'-P-CCNC: 8 U/L (ref 1–41)
ANION GAP SERPL CALCULATED.3IONS-SCNC: 11 MMOL/L (ref 5–15)
ARTERIAL PATENCY WRIST A: POSITIVE
AST SERPL-CCNC: 6 U/L (ref 1–40)
ATMOSPHERIC PRESS: 746 MMHG
BASE EXCESS BLDA CALC-SCNC: -23.6 MMOL/L (ref 0–2)
BASOPHILS # BLD AUTO: 0.01 10*3/MM3 (ref 0–0.2)
BASOPHILS NFR BLD AUTO: 0.1 % (ref 0–1.5)
BDY SITE: ABNORMAL
BILIRUB SERPL-MCNC: 0.5 MG/DL (ref 0.2–1.2)
BUN SERPL-MCNC: 127 MG/DL (ref 8–23)
BUN/CREAT SERPL: 35.6 (ref 7–25)
CALCIUM SPEC-SCNC: 8.7 MG/DL (ref 8.6–10.5)
CHLORIDE SERPL-SCNC: 103 MMOL/L (ref 98–107)
CO2 SERPL-SCNC: 6 MMOL/L (ref 22–29)
CREAT SERPL-MCNC: 3.57 MG/DL (ref 0.76–1.27)
D-LACTATE SERPL-SCNC: 0.8 MMOL/L (ref 0.5–2)
DEPRECATED RDW RBC AUTO: 47.4 FL (ref 37–54)
EOSINOPHIL # BLD AUTO: 0 10*3/MM3 (ref 0–0.4)
EOSINOPHIL NFR BLD AUTO: 0 % (ref 0.3–6.2)
ERYTHROCYTE [DISTWIDTH] IN BLOOD BY AUTOMATED COUNT: 14.5 % (ref 12.3–15.4)
GFR SERPL CREATININE-BSD FRML MDRD: 17 ML/MIN/1.73
GLOBULIN UR ELPH-MCNC: 2.4 GM/DL
GLUCOSE SERPL-MCNC: 648 MG/DL (ref 65–99)
HCO3 BLDA-SCNC: 5.4 MMOL/L (ref 20–26)
HCT VFR BLD AUTO: 42 % (ref 37.5–51)
HGB BLD-MCNC: 13.7 G/DL (ref 13–17.7)
HOLD SPECIMEN: NORMAL
HOLD SPECIMEN: NORMAL
IMM GRANULOCYTES # BLD AUTO: 0.03 10*3/MM3 (ref 0–0.05)
IMM GRANULOCYTES NFR BLD AUTO: 0.3 % (ref 0–0.5)
LIPASE SERPL-CCNC: 39 U/L (ref 13–60)
LYMPHOCYTES # BLD AUTO: 0.49 10*3/MM3 (ref 0.7–3.1)
LYMPHOCYTES NFR BLD AUTO: 5.1 % (ref 19.6–45.3)
Lab: ABNORMAL
MAGNESIUM SERPL-MCNC: 2.2 MG/DL (ref 1.6–2.4)
MCH RBC QN AUTO: 29.2 PG (ref 26.6–33)
MCHC RBC AUTO-ENTMCNC: 32.6 G/DL (ref 31.5–35.7)
MCV RBC AUTO: 89.6 FL (ref 79–97)
MODALITY: ABNORMAL
MONOCYTES # BLD AUTO: 0.68 10*3/MM3 (ref 0.1–0.9)
MONOCYTES NFR BLD AUTO: 7.1 % (ref 5–12)
NEUTROPHILS NFR BLD AUTO: 8.37 10*3/MM3 (ref 1.7–7)
NEUTROPHILS NFR BLD AUTO: 87.4 % (ref 42.7–76)
NRBC BLD AUTO-RTO: 1.5 /100 WBC (ref 0–0.2)
PCO2 BLDA: 20 MM HG (ref 35–45)
PH BLDA: 7.04 PH UNITS (ref 7.35–7.45)
PLATELET # BLD AUTO: 174 10*3/MM3 (ref 140–450)
PMV BLD AUTO: 10.7 FL (ref 6–12)
PO2 BLDA: 98.9 MM HG (ref 83–108)
POTASSIUM SERPL-SCNC: 4.4 MMOL/L (ref 3.5–5.2)
PROT SERPL-MCNC: 6.4 G/DL (ref 6–8.5)
RBC # BLD AUTO: 4.69 10*6/MM3 (ref 4.14–5.8)
SAO2 % BLDCOA: 98 % (ref 94–99)
SODIUM SERPL-SCNC: 120 MMOL/L (ref 136–145)
TROPONIN T SERPL-MCNC: <0.01 NG/ML (ref 0–0.03)
VENTILATOR MODE: ABNORMAL
WBC # BLD AUTO: 9.58 10*3/MM3 (ref 3.4–10.8)
WHOLE BLOOD HOLD SPECIMEN: NORMAL
WHOLE BLOOD HOLD SPECIMEN: NORMAL

## 2020-07-02 PROCEDURE — 80053 COMPREHEN METABOLIC PANEL: CPT | Performed by: EMERGENCY MEDICINE

## 2020-07-02 PROCEDURE — 84484 ASSAY OF TROPONIN QUANT: CPT | Performed by: EMERGENCY MEDICINE

## 2020-07-02 PROCEDURE — 82962 GLUCOSE BLOOD TEST: CPT

## 2020-07-02 PROCEDURE — 93010 ELECTROCARDIOGRAM REPORT: CPT | Performed by: INTERNAL MEDICINE

## 2020-07-02 PROCEDURE — 99285 EMERGENCY DEPT VISIT HI MDM: CPT

## 2020-07-02 PROCEDURE — 82803 BLOOD GASES ANY COMBINATION: CPT

## 2020-07-02 PROCEDURE — 87040 BLOOD CULTURE FOR BACTERIA: CPT | Performed by: EMERGENCY MEDICINE

## 2020-07-02 PROCEDURE — 93005 ELECTROCARDIOGRAM TRACING: CPT | Performed by: EMERGENCY MEDICINE

## 2020-07-02 PROCEDURE — 25010000003 INSULIN REGULAR HUMAN PER 5 UNITS: Performed by: EMERGENCY MEDICINE

## 2020-07-02 PROCEDURE — 83735 ASSAY OF MAGNESIUM: CPT | Performed by: EMERGENCY MEDICINE

## 2020-07-02 PROCEDURE — 71045 X-RAY EXAM CHEST 1 VIEW: CPT

## 2020-07-02 PROCEDURE — 83690 ASSAY OF LIPASE: CPT | Performed by: EMERGENCY MEDICINE

## 2020-07-02 PROCEDURE — 82009 KETONE BODYS QUAL: CPT | Performed by: EMERGENCY MEDICINE

## 2020-07-02 PROCEDURE — 83605 ASSAY OF LACTIC ACID: CPT | Performed by: EMERGENCY MEDICINE

## 2020-07-02 PROCEDURE — 36600 WITHDRAWAL OF ARTERIAL BLOOD: CPT

## 2020-07-02 PROCEDURE — 85025 COMPLETE CBC W/AUTO DIFF WBC: CPT | Performed by: EMERGENCY MEDICINE

## 2020-07-02 RX ORDER — SODIUM CHLORIDE 9 MG/ML
125 INJECTION, SOLUTION INTRAVENOUS CONTINUOUS
Status: DISCONTINUED | OUTPATIENT
Start: 2020-07-02 | End: 2020-07-03

## 2020-07-02 RX ORDER — SODIUM CHLORIDE 9 MG/ML
30 INJECTION, SOLUTION INTRAVENOUS CONTINUOUS PRN
Status: DISCONTINUED | OUTPATIENT
Start: 2020-07-02 | End: 2020-07-10 | Stop reason: HOSPADM

## 2020-07-02 RX ORDER — DEXTROSE MONOHYDRATE 25 G/50ML
25-50 INJECTION, SOLUTION INTRAVENOUS
Status: DISCONTINUED | OUTPATIENT
Start: 2020-07-02 | End: 2020-07-10 | Stop reason: HOSPADM

## 2020-07-02 RX ORDER — SODIUM CHLORIDE 0.9 % (FLUSH) 0.9 %
30 SYRINGE (ML) INJECTION ONCE AS NEEDED
Status: DISCONTINUED | OUTPATIENT
Start: 2020-07-02 | End: 2020-07-03

## 2020-07-02 RX ADMIN — SODIUM CHLORIDE 6 UNITS/HR: 9 INJECTION, SOLUTION INTRAVENOUS at 23:12

## 2020-07-02 RX ADMIN — SODIUM CHLORIDE 1000 ML: 900 INJECTION, SOLUTION INTRAVENOUS at 22:42

## 2020-07-03 ENCOUNTER — APPOINTMENT (OUTPATIENT)
Dept: CT IMAGING | Facility: HOSPITAL | Age: 65
End: 2020-07-03

## 2020-07-03 LAB
AMPHET+METHAMPHET UR QL: NEGATIVE
AMPHETAMINES UR QL: NEGATIVE
ANION GAP SERPL CALCULATED.3IONS-SCNC: 11 MMOL/L (ref 5–15)
ANION GAP SERPL CALCULATED.3IONS-SCNC: 12 MMOL/L (ref 5–15)
BACTERIA UR QL AUTO: ABNORMAL /HPF
BARBITURATES UR QL SCN: NEGATIVE
BASOPHILS # BLD AUTO: 0.02 10*3/MM3 (ref 0–0.2)
BASOPHILS NFR BLD AUTO: 0.1 % (ref 0–1.5)
BENZODIAZ UR QL SCN: NEGATIVE
BILIRUB UR QL STRIP: NEGATIVE
BUN SERPL-MCNC: 115 MG/DL (ref 8–23)
BUN SERPL-MCNC: 125 MG/DL (ref 8–23)
BUN/CREAT SERPL: 36.3 (ref 7–25)
BUN/CREAT SERPL: 36.7 (ref 7–25)
BUPRENORPHINE SERPL-MCNC: NEGATIVE NG/ML
CALCIUM SPEC-SCNC: 7.9 MG/DL (ref 8.6–10.5)
CALCIUM SPEC-SCNC: 8.5 MG/DL (ref 8.6–10.5)
CANNABINOIDS SERPL QL: NEGATIVE
CHLORIDE SERPL-SCNC: 118 MMOL/L (ref 98–107)
CHLORIDE SERPL-SCNC: 122 MMOL/L (ref 98–107)
CLARITY UR: CLEAR
CO2 SERPL-SCNC: 5 MMOL/L (ref 22–29)
CO2 SERPL-SCNC: 6 MMOL/L (ref 22–29)
COCAINE UR QL: NEGATIVE
COLOR UR: YELLOW
CREAT SERPL-MCNC: 3.17 MG/DL (ref 0.76–1.27)
CREAT SERPL-MCNC: 3.41 MG/DL (ref 0.76–1.27)
DEPRECATED RDW RBC AUTO: 47.1 FL (ref 37–54)
EOSINOPHIL # BLD AUTO: 0 10*3/MM3 (ref 0–0.4)
EOSINOPHIL NFR BLD AUTO: 0 % (ref 0.3–6.2)
ERYTHROCYTE [DISTWIDTH] IN BLOOD BY AUTOMATED COUNT: 14.5 % (ref 12.3–15.4)
GFR SERPL CREATININE-BSD FRML MDRD: 18 ML/MIN/1.73
GFR SERPL CREATININE-BSD FRML MDRD: 20 ML/MIN/1.73
GLUCOSE BLDC GLUCOMTR-MCNC: 116 MG/DL (ref 70–130)
GLUCOSE BLDC GLUCOMTR-MCNC: 141 MG/DL (ref 70–130)
GLUCOSE BLDC GLUCOMTR-MCNC: 185 MG/DL (ref 70–130)
GLUCOSE BLDC GLUCOMTR-MCNC: 189 MG/DL (ref 70–130)
GLUCOSE BLDC GLUCOMTR-MCNC: 204 MG/DL (ref 70–130)
GLUCOSE BLDC GLUCOMTR-MCNC: 236 MG/DL (ref 70–130)
GLUCOSE BLDC GLUCOMTR-MCNC: 305 MG/DL (ref 70–130)
GLUCOSE BLDC GLUCOMTR-MCNC: 340 MG/DL (ref 70–130)
GLUCOSE BLDC GLUCOMTR-MCNC: 398 MG/DL (ref 70–130)
GLUCOSE BLDC GLUCOMTR-MCNC: 407 MG/DL (ref 70–130)
GLUCOSE BLDC GLUCOMTR-MCNC: 484 MG/DL (ref 70–130)
GLUCOSE BLDC GLUCOMTR-MCNC: 522 MG/DL (ref 70–130)
GLUCOSE BLDC GLUCOMTR-MCNC: 92 MG/DL (ref 70–130)
GLUCOSE SERPL-MCNC: 108 MG/DL (ref 65–99)
GLUCOSE SERPL-MCNC: 313 MG/DL (ref 65–99)
GLUCOSE UR STRIP-MCNC: NEGATIVE MG/DL
HCT VFR BLD AUTO: 42.4 % (ref 37.5–51)
HGB BLD-MCNC: 13.9 G/DL (ref 13–17.7)
HGB UR QL STRIP.AUTO: ABNORMAL
HOLD SPECIMEN: NORMAL
HYALINE CASTS UR QL AUTO: ABNORMAL /LPF
IMM GRANULOCYTES # BLD AUTO: 0.06 10*3/MM3 (ref 0–0.05)
IMM GRANULOCYTES NFR BLD AUTO: 0.4 % (ref 0–0.5)
KETONES UR QL STRIP: NEGATIVE
LEUKOCYTE ESTERASE UR QL STRIP.AUTO: ABNORMAL
LYMPHOCYTES # BLD AUTO: 0.34 10*3/MM3 (ref 0.7–3.1)
LYMPHOCYTES NFR BLD AUTO: 2.4 % (ref 19.6–45.3)
MAGNESIUM SERPL-MCNC: 2 MG/DL (ref 1.6–2.4)
MCH RBC QN AUTO: 29.3 PG (ref 26.6–33)
MCHC RBC AUTO-ENTMCNC: 32.8 G/DL (ref 31.5–35.7)
MCV RBC AUTO: 89.3 FL (ref 79–97)
METHADONE UR QL SCN: NEGATIVE
MONOCYTES # BLD AUTO: 1.56 10*3/MM3 (ref 0.1–0.9)
MONOCYTES NFR BLD AUTO: 11 % (ref 5–12)
NEUTROPHILS NFR BLD AUTO: 12.14 10*3/MM3 (ref 1.7–7)
NEUTROPHILS NFR BLD AUTO: 86.1 % (ref 42.7–76)
NITRITE UR QL STRIP: NEGATIVE
NRBC BLD AUTO-RTO: 2.5 /100 WBC (ref 0–0.2)
OPIATES UR QL: NEGATIVE
OXYCODONE UR QL SCN: NEGATIVE
PCP UR QL SCN: NEGATIVE
PH UR STRIP.AUTO: 7.5 [PH] (ref 5–9)
PHOSPHATE SERPL-MCNC: 1.5 MG/DL (ref 2.5–4.5)
PHOSPHATE SERPL-MCNC: 4.1 MG/DL (ref 2.5–4.5)
PLATELET # BLD AUTO: 177 10*3/MM3 (ref 140–450)
PMV BLD AUTO: 10.4 FL (ref 6–12)
POTASSIUM SERPL-SCNC: 4 MMOL/L (ref 3.5–5.2)
POTASSIUM SERPL-SCNC: 4.2 MMOL/L (ref 3.5–5.2)
PROPOXYPH UR QL: NEGATIVE
PROT UR QL STRIP: ABNORMAL
RBC # BLD AUTO: 4.75 10*6/MM3 (ref 4.14–5.8)
RBC # UR: ABNORMAL /HPF
REF LAB TEST METHOD: ABNORMAL
SODIUM SERPL-SCNC: 135 MMOL/L (ref 136–145)
SODIUM SERPL-SCNC: 139 MMOL/L (ref 136–145)
SP GR UR STRIP: 1.01 (ref 1–1.03)
SQUAMOUS #/AREA URNS HPF: ABNORMAL /HPF
TRICYCLICS UR QL SCN: POSITIVE
UROBILINOGEN UR QL STRIP: ABNORMAL
WBC # BLD AUTO: 14.12 10*3/MM3 (ref 3.4–10.8)
WBC UR QL AUTO: ABNORMAL /HPF
WHOLE BLOOD HOLD SPECIMEN: NORMAL

## 2020-07-03 PROCEDURE — 82962 GLUCOSE BLOOD TEST: CPT

## 2020-07-03 PROCEDURE — 25010000002 HEPARIN (PORCINE) PER 1000 UNITS: Performed by: INTERNAL MEDICINE

## 2020-07-03 PROCEDURE — 84100 ASSAY OF PHOSPHORUS: CPT | Performed by: INTERNAL MEDICINE

## 2020-07-03 PROCEDURE — 25010000002 HEPARIN (PORCINE) PER 1000 UNITS: Performed by: FAMILY MEDICINE

## 2020-07-03 PROCEDURE — 81001 URINALYSIS AUTO W/SCOPE: CPT | Performed by: FAMILY MEDICINE

## 2020-07-03 PROCEDURE — 87086 URINE CULTURE/COLONY COUNT: CPT | Performed by: FAMILY MEDICINE

## 2020-07-03 PROCEDURE — 94640 AIRWAY INHALATION TREATMENT: CPT

## 2020-07-03 PROCEDURE — 83735 ASSAY OF MAGNESIUM: CPT | Performed by: INTERNAL MEDICINE

## 2020-07-03 PROCEDURE — 80048 BASIC METABOLIC PNL TOTAL CA: CPT | Performed by: INTERNAL MEDICINE

## 2020-07-03 PROCEDURE — 25010000002 CEFTRIAXONE PER 250 MG: Performed by: FAMILY MEDICINE

## 2020-07-03 PROCEDURE — 63710000001 INSULIN REGULAR HUMAN PER 5 UNITS: Performed by: INTERNAL MEDICINE

## 2020-07-03 PROCEDURE — 80048 BASIC METABOLIC PNL TOTAL CA: CPT | Performed by: FAMILY MEDICINE

## 2020-07-03 PROCEDURE — 63710000001 INSULIN ASPART PER 5 UNITS: Performed by: FAMILY MEDICINE

## 2020-07-03 PROCEDURE — 70450 CT HEAD/BRAIN W/O DYE: CPT

## 2020-07-03 PROCEDURE — 87040 BLOOD CULTURE FOR BACTERIA: CPT | Performed by: EMERGENCY MEDICINE

## 2020-07-03 PROCEDURE — 36415 COLL VENOUS BLD VENIPUNCTURE: CPT | Performed by: EMERGENCY MEDICINE

## 2020-07-03 PROCEDURE — 80306 DRUG TEST PRSMV INSTRMNT: CPT | Performed by: FAMILY MEDICINE

## 2020-07-03 PROCEDURE — 94799 UNLISTED PULMONARY SVC/PX: CPT

## 2020-07-03 PROCEDURE — 85025 COMPLETE CBC W/AUTO DIFF WBC: CPT | Performed by: INTERNAL MEDICINE

## 2020-07-03 RX ORDER — BUDESONIDE AND FORMOTEROL FUMARATE DIHYDRATE 160; 4.5 UG/1; UG/1
2 AEROSOL RESPIRATORY (INHALATION)
Status: DISCONTINUED | OUTPATIENT
Start: 2020-07-03 | End: 2020-07-10 | Stop reason: HOSPADM

## 2020-07-03 RX ORDER — CARVEDILOL 3.12 MG/1
3.12 TABLET ORAL EVERY 12 HOURS SCHEDULED
Status: DISCONTINUED | OUTPATIENT
Start: 2020-07-03 | End: 2020-07-10 | Stop reason: HOSPADM

## 2020-07-03 RX ORDER — ASPIRIN 81 MG/1
81 TABLET ORAL DAILY
Status: DISCONTINUED | OUTPATIENT
Start: 2020-07-03 | End: 2020-07-10 | Stop reason: HOSPADM

## 2020-07-03 RX ORDER — SODIUM CHLORIDE 0.9 % (FLUSH) 0.9 %
10 SYRINGE (ML) INJECTION EVERY 12 HOURS SCHEDULED
Status: DISCONTINUED | OUTPATIENT
Start: 2020-07-03 | End: 2020-07-10 | Stop reason: HOSPADM

## 2020-07-03 RX ORDER — DEXTROSE MONOHYDRATE 25 G/50ML
25 INJECTION, SOLUTION INTRAVENOUS
Status: DISCONTINUED | OUTPATIENT
Start: 2020-07-03 | End: 2020-07-10 | Stop reason: HOSPADM

## 2020-07-03 RX ORDER — HYDROXYZINE HYDROCHLORIDE 25 MG/1
25 TABLET, FILM COATED ORAL 3 TIMES DAILY PRN
Status: DISCONTINUED | OUTPATIENT
Start: 2020-07-03 | End: 2020-07-10 | Stop reason: HOSPADM

## 2020-07-03 RX ORDER — SODIUM CHLORIDE 0.9 % (FLUSH) 0.9 %
10 SYRINGE (ML) INJECTION AS NEEDED
Status: DISCONTINUED | OUTPATIENT
Start: 2020-07-03 | End: 2020-07-10 | Stop reason: HOSPADM

## 2020-07-03 RX ORDER — PANTOPRAZOLE SODIUM 40 MG/1
40 TABLET, DELAYED RELEASE ORAL EVERY MORNING
Status: DISCONTINUED | OUTPATIENT
Start: 2020-07-03 | End: 2020-07-10 | Stop reason: HOSPADM

## 2020-07-03 RX ORDER — ALBUTEROL SULFATE 90 UG/1
2 AEROSOL, METERED RESPIRATORY (INHALATION) EVERY 6 HOURS PRN
Status: DISCONTINUED | OUTPATIENT
Start: 2020-07-03 | End: 2020-07-03

## 2020-07-03 RX ORDER — FLUTICASONE PROPIONATE 50 MCG
2 SPRAY, SUSPENSION (ML) NASAL DAILY
Status: DISCONTINUED | OUTPATIENT
Start: 2020-07-03 | End: 2020-07-10 | Stop reason: HOSPADM

## 2020-07-03 RX ORDER — TRAMADOL HYDROCHLORIDE 50 MG/1
50 TABLET ORAL EVERY 4 HOURS PRN
Status: DISCONTINUED | OUTPATIENT
Start: 2020-07-03 | End: 2020-07-10 | Stop reason: HOSPADM

## 2020-07-03 RX ORDER — CYCLOBENZAPRINE HCL 10 MG
10 TABLET ORAL 3 TIMES DAILY PRN
Status: DISCONTINUED | OUTPATIENT
Start: 2020-07-03 | End: 2020-07-10 | Stop reason: HOSPADM

## 2020-07-03 RX ORDER — ALBUTEROL SULFATE 2.5 MG/3ML
2.5 SOLUTION RESPIRATORY (INHALATION) EVERY 4 HOURS PRN
Status: DISCONTINUED | OUTPATIENT
Start: 2020-07-03 | End: 2020-07-10 | Stop reason: HOSPADM

## 2020-07-03 RX ORDER — NITROGLYCERIN 0.4 MG/1
0.4 TABLET SUBLINGUAL
Status: DISCONTINUED | OUTPATIENT
Start: 2020-07-03 | End: 2020-07-10 | Stop reason: HOSPADM

## 2020-07-03 RX ORDER — CLOPIDOGREL BISULFATE 75 MG/1
75 TABLET ORAL DAILY
Status: DISCONTINUED | OUTPATIENT
Start: 2020-07-03 | End: 2020-07-10 | Stop reason: HOSPADM

## 2020-07-03 RX ORDER — PRAVASTATIN SODIUM 40 MG
40 TABLET ORAL NIGHTLY
Status: DISCONTINUED | OUTPATIENT
Start: 2020-07-03 | End: 2020-07-10 | Stop reason: HOSPADM

## 2020-07-03 RX ORDER — HEPARIN SODIUM 5000 [USP'U]/ML
5000 INJECTION, SOLUTION INTRAVENOUS; SUBCUTANEOUS EVERY 8 HOURS SCHEDULED
Status: DISCONTINUED | OUTPATIENT
Start: 2020-07-03 | End: 2020-07-10 | Stop reason: HOSPADM

## 2020-07-03 RX ORDER — NICOTINE POLACRILEX 4 MG
15 LOZENGE BUCCAL
Status: DISCONTINUED | OUTPATIENT
Start: 2020-07-03 | End: 2020-07-10 | Stop reason: HOSPADM

## 2020-07-03 RX ADMIN — HEPARIN SODIUM 5000 UNITS: 5000 INJECTION INTRAVENOUS; SUBCUTANEOUS at 16:54

## 2020-07-03 RX ADMIN — SODIUM CHLORIDE 125 ML/HR: 900 INJECTION, SOLUTION INTRAVENOUS at 08:16

## 2020-07-03 RX ADMIN — SODIUM PHOSPHATE, MONOBASIC, MONOHYDRATE 30 MMOL: 276; 142 INJECTION, SOLUTION INTRAVENOUS at 10:52

## 2020-07-03 RX ADMIN — SODIUM CHLORIDE 125 ML/HR: 900 INJECTION, SOLUTION INTRAVENOUS at 00:30

## 2020-07-03 RX ADMIN — BUDESONIDE AND FORMOTEROL FUMARATE DIHYDRATE 2 PUFF: 160; 4.5 AEROSOL RESPIRATORY (INHALATION) at 20:04

## 2020-07-03 RX ADMIN — HEPARIN SODIUM 5000 UNITS: 5000 INJECTION INTRAVENOUS; SUBCUTANEOUS at 10:53

## 2020-07-03 RX ADMIN — SODIUM BICARBONATE 75 MEQ: 84 INJECTION, SOLUTION INTRAVENOUS at 10:52

## 2020-07-03 RX ADMIN — CEFTRIAXONE SODIUM 1 G: 1 INJECTION, POWDER, FOR SOLUTION INTRAMUSCULAR; INTRAVENOUS at 10:53

## 2020-07-03 RX ADMIN — BUDESONIDE AND FORMOTEROL FUMARATE DIHYDRATE 2 PUFF: 160; 4.5 AEROSOL RESPIRATORY (INHALATION) at 12:02

## 2020-07-03 RX ADMIN — HUMAN INSULIN 4 UNITS: 100 INJECTION, SOLUTION SUBCUTANEOUS at 11:31

## 2020-07-03 RX ADMIN — INSULIN ASPART 16 UNITS: 100 INJECTION, SOLUTION INTRAVENOUS; SUBCUTANEOUS at 18:08

## 2020-07-03 RX ADMIN — SODIUM BICARBONATE 75 MEQ: 84 INJECTION, SOLUTION INTRAVENOUS at 22:17

## 2020-07-03 RX ADMIN — HEPARIN SODIUM 5000 UNITS: 5000 INJECTION INTRAVENOUS; SUBCUTANEOUS at 22:17

## 2020-07-03 NOTE — ED NOTES
This tech attempted to obtain urine and blood cultures. Both unsuccessful.     Hampton, Mack Lombardi  07/02/20 [id number]
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
## 2020-07-03 NOTE — NURSING NOTE
Pt girlfriend called and states that he had an episode of this once before, did have meth in his system at that time. Spoke with Dr Sanchez. Orders for urine drug screen in/out cath. Urine obtained and sent to lab.

## 2020-07-03 NOTE — PROGRESS NOTES
[facility] Medicine Services  INPATIENT PROGRESS NOTE    Length of Stay: 1  Date of Admission: 7/2/2020  Primary Care Physician: Shayne Merritt MD    Subjective   Chief Complaint: Hyperglycemia  HPI:    Patient has been admitted for hyperglycemia and DKA.  Currently on insulin drip.  Patient is awake but is confused right now.    Review of Systems   Unable to perform ROS: Mental status change        All pertinent negatives and positives are as above. All other systems have been reviewed and are negative unless otherwise stated.     Objective    Temp:  [98.2 °F (36.8 °C)] 98.2 °F (36.8 °C)  Heart Rate:  [] 100  Resp:  [16-20] 18  BP: (123-168)/(63-88) 132/66  Physical Exam   Constitutional: He appears well-developed and well-nourished. No distress.   HENT:   Head: Normocephalic and atraumatic.   Cardiovascular: Normal rate.   Pulmonary/Chest: Effort normal. No respiratory distress. He has no wheezes.   Abdominal: Soft. He exhibits no distension.   Musculoskeletal: Normal range of motion. He exhibits no edema.   Neurological: He is alert. No cranial nerve deficit.   Skin: Skin is warm and dry. He is not diaphoretic.   Psychiatric: He has a normal mood and affect.   Vitals reviewed.          Results Review:  I have reviewed the labs, radiology results, and diagnostic studies.    Laboratory Data:   Lab Results (last 24 hours)     Procedure Component Value Units Date/Time    POC Glucose Once [[id number]]  (Normal) Collected:  07/03/20 0900    Specimen:  Blood Updated:  07/03/20 0944     Glucose 92 mg/dL      Comment: : [id number] TOÑA ANNIEMeter ID: [id number]       Basic Metabolic Panel [[id number]]  (Abnormal) Collected:  07/03/20 0844    Specimen:  Blood Updated:  07/03/20 0920     Glucose 108 mg/dL       mg/dL      Creatinine 3.17 mg/dL      Sodium 139 mmol/L      Potassium 4.2 mmol/L      Chloride 122 mmol/L      CO2 6.0 mmol/L      Calcium  8.5 mg/dL      eGFR Non African Amer 20 mL/min/1.73      BUN/Creatinine Ratio 36.3     Anion Gap 11.0 mmol/L     Narrative:       GFR Normal >60  Chronic Kidney Disease <60  Kidney Failure <15      Phosphorus [[id number]]  (Abnormal) Collected:  07/03/20 0844    Specimen:  Blood Updated:  07/03/20 0914     Phosphorus 1.5 mg/dL     Magnesium [[id number]]  (Normal) Collected:  07/03/20 0844    Specimen:  Blood Updated:  07/03/20 0908     Magnesium 2.0 mg/dL     POC Glucose Once [[id number]]  (Abnormal) Collected:  07/03/20 0007    Specimen:  Blood Updated:  07/03/20 0851     Glucose 407 mg/dL      Comment: RN NotifiedOperator: [id number] JOHNSON AMANDAMeter ID: [id number]       CBC Auto Differential [[id number]]  (Abnormal) Collected:  07/03/20 0844    Specimen:  Blood Updated:  07/03/20 0849     WBC 14.12 10*3/mm3      RBC 4.75 10*6/mm3      Hemoglobin 13.9 g/dL      Hematocrit 42.4 %      MCV 89.3 fL      MCH 29.3 pg      MCHC 32.8 g/dL      RDW 14.5 %      RDW-SD 47.1 fl      MPV 10.4 fL      Platelets 177 10*3/mm3      Neutrophil % 86.1 %      Lymphocyte % 2.4 %      Monocyte % 11.0 %      Eosinophil % 0.0 %      Basophil % 0.1 %      Immature Grans % 0.4 %      Neutrophils, Absolute 12.14 10*3/mm3      Lymphocytes, Absolute 0.34 10*3/mm3      Monocytes, Absolute 1.56 10*3/mm3      Eosinophils, Absolute 0.00 10*3/mm3      Basophils, Absolute 0.02 10*3/mm3      Immature Grans, Absolute 0.06 10*3/mm3      nRBC 2.5 /100 WBC     POC Glucose Once [[id number]]  (Abnormal) Collected:  07/02/20 2311    Specimen:  Blood Updated:  07/03/20 0837     Glucose 484 mg/dL      Comment: Result Not ConfirmedOperator: [id number] JOHNSON AMANDAMeter ID: [id number]       POC Glucose Once [[id number]]  (Abnormal) Collected:  07/02/20 2124    Specimen:  Blood Updated:  07/03/20 0837     Glucose 522 mg/dL      Comment: RN NotifiedOperator: [id number] BERNARDO Casarez ID: [id number]       POC Glucose Once [[id number]]  (Normal) Collected:   07/03/20 0738    Specimen:  Blood Updated:  07/03/20 0755     Glucose 116 mg/dL      Comment: RN NotifiedOperator: [id number] TOÑA ANNIEMeter ID: [id number]       POC Glucose Once [[id number]]  (Abnormal) Collected:  07/03/20 0634    Specimen:  Blood Updated:  07/03/20 0647     Glucose 141 mg/dL      Comment: RN NotifiedOperator: [id number] JOHNSON AMANDAMeter ID: [id number]       POC Glucose Once [[id number]]  (Abnormal) Collected:  07/03/20 0529    Specimen:  Blood Updated:  07/03/20 0541     Glucose 189 mg/dL      Comment: RN NotifiedOperator: [id number] JOHNSON AMANDAMeter ID: [id number]       Extra Tubes [[id number]] Collected:  07/03/20 0339    Specimen:  Blood, Venous Line Updated:  07/03/20 0445    Narrative:       The following orders were created for panel order Extra Tubes.  Procedure                               Abnormality         Status                     ---------                               -----------         ------                     Lavender Top[[id number]]                                     Final result               Green Top (Gel)[[id number]]                                  Final result                 Please view results for these tests on the individual orders.    Lavender Top [[id number]] Collected:  07/03/20 0339    Specimen:  Blood Updated:  07/03/20 0445     Extra Tube hold for add-on     Comment: Auto resulted       Green Top (Gel) [[id number]] Collected:  07/03/20 0339    Specimen:  Blood Updated:  07/03/20 0445     Extra Tube Hold for add-ons.     Comment: Auto resulted.       POC Glucose Once [[id number]]  (Abnormal) Collected:  07/03/20 0430    Specimen:  Blood Updated:  07/03/20 0442     Glucose 204 mg/dL      Comment: RN NotifiedOperator: [id number] JOHNSON AMANDAMeter ID: [id number]       POC Glucose Once [[id number]]  (Abnormal) Collected:  07/03/20 0313    Specimen:  Blood Updated:  07/03/20 0338     Glucose 236 mg/dL      Comment: : [id number] JOHNSON  AMANDAMeter ID: [id number]       Blood Culture - Blood, Hand, Left [[id number]] Collected:  07/03/20 0312    Specimen:  Blood from Hand, Left Updated:  07/03/20 0335    POC Glucose Once [[id number]]  (Abnormal) Collected:  07/03/20 0206    Specimen:  Blood Updated:  07/03/20 0218     Glucose 305 mg/dL      Comment: RN NotifiedOperator: [id number] JOHNSON AMANDAMeter ID: [id number]       POC Glucose Once [[id number]]  (Abnormal) Collected:  07/03/20 0107    Specimen:  Blood Updated:  07/03/20 0119     Glucose 398 mg/dL      Comment: RN NotifiedOperator: [id number] JEFFERY LAURENMeter ID: [id number]       [place] Draw [[id number]] Collected:  07/02/20 2122    Specimen:  Blood Updated:  07/02/20 2230    Narrative:       The following orders were created for panel order [place] Draw.  Procedure                               Abnormality         Status                     ---------                               -----------         ------                     Light Blue Top[[id number]]                                   Final result               Green Top (Gel)[[id number]]                                  Final result               Lavender Top[[id number]]                                     Final result               Gold Top - SST[[id number]]                                   Final result                 Please view results for these tests on the individual orders.    Light Blue Top [[id number]] Collected:  07/02/20 2122    Specimen:  Blood Updated:  07/02/20 2230     Extra Tube hold for add-on     Comment: Auto resulted       Green Top (Gel) [[id number]] Collected:  07/02/20 2122    Specimen:  Blood Updated:  07/02/20 2230     Extra Tube Hold for add-ons.     Comment: Auto resulted.       Lavender Top [[id number]] Collected:  07/02/20 2122    Specimen:  Blood Updated:  07/02/20 2230     Extra Tube hold for add-on     Comment: Auto resulted       Gold Top - SST [[id number]] Collected:  07/02/20 2122    Specimen:  Blood Updated:   07/02/20 2230     Extra Tube Hold for add-ons.     Comment: Auto resulted.       Lactic Acid, Plasma [[id number]]  (Normal) Collected:  07/02/20 2158    Specimen:  Blood from Arm, Left Updated:  07/02/20 2228     Lactate 0.8 mmol/L     Blood Gas, Arterial [[id number]]  (Abnormal) Collected:  07/02/20 2221    Specimen:  Arterial Blood Updated:  07/02/20 2227     Site Right Radial     Wilian's Test Positive     pH, Arterial 7.039 pH units      Comment: 85 Value below critical limit        pCO2, Arterial 20.0 mm Hg      Comment: 84 Value below reference range        pO2, Arterial 98.9 mm Hg      HCO3, Arterial 5.4 mmol/L      Comment: 84 Value below reference range        Base Excess, Arterial -23.6 mmol/L      Comment: 84 Value below reference range        O2 Saturation, Arterial 98.0 %      Barometric Pressure for Blood Gas 746 mmHg      Modality Room Air     Ventilator Mode NA     Collected by RT     Comment: Meter: [id number]     :  [id number]       Blood Culture - Blood, Arm, Right [[id number]] Collected:  07/02/20 2158    Specimen:  Blood from Arm, Right Updated:  07/02/20 2202    Ketone Bodies, Serum (Not performed at [facility]) [[id number]] Collected:  07/02/20 2153    Specimen:  Blood Updated:  07/02/20 2159    Narrative:       The following orders were created for panel order Ketone Bodies, Serum (Not performed at [facility]).  Procedure                               Abnormality         Status                     ---------                               -----------         ------                     Acetone[[id number]]                      Normal              Final result                 Please view results for these tests on the individual orders.    Acetone [[id number]]  (Normal) Collected:  07/02/20 2153    Specimen:  Blood Updated:  07/02/20 2159     Acetone Negative    Comprehensive Metabolic Panel [[id number]]  (Abnormal) Collected:  07/02/20 2122    Specimen:  Blood Updated:  07/02/20 2154     Glucose  648 mg/dL       mg/dL      Creatinine 3.57 mg/dL      Sodium 120 mmol/L      Potassium 4.4 mmol/L      Chloride 103 mmol/L      CO2 6.0 mmol/L      Calcium 8.7 mg/dL      Total Protein 6.4 g/dL      Albumin 4.00 g/dL      ALT (SGPT) 8 U/L      AST (SGOT) 6 U/L      Alkaline Phosphatase 113 U/L      Total Bilirubin 0.5 mg/dL      eGFR Non African Amer 17 mL/min/1.73      Globulin 2.4 gm/dL      A/G Ratio 1.7 g/dL      BUN/Creatinine Ratio 35.6     Anion Gap 11.0 mmol/L     Narrative:       GFR Normal >60  Chronic Kidney Disease <60  Kidney Failure <15      Troponin [[id number]]  (Normal) Collected:  07/02/20 2122    Specimen:  Blood Updated:  07/02/20 2153     Troponin T <0.010 ng/mL     Narrative:       Troponin T Reference Range:  <= 0.03 ng/mL-   Negative for AMI  >0.03 ng/mL-     Abnormal for myocardial necrosis.  Clinicians would have to utilize clinical acumen, EKG, Troponin and serial changes to determine if it is an Acute Myocardial Infarction or myocardial injury due to an underlying chronic condition.       Results may be falsely decreased if patient taking Biotin.      Lipase [[id number]]  (Normal) Collected:  07/02/20 2122    Specimen:  Blood Updated:  07/02/20 2149     Lipase 39 U/L     Magnesium [[id number]]  (Normal) Collected:  07/02/20 2122    Specimen:  Blood Updated:  07/02/20 2149     Magnesium 2.2 mg/dL     CBC & Differential [[id number]] Collected:  07/02/20 2122    Specimen:  Blood Updated:  07/02/20 2130    Narrative:       The following orders were created for panel order CBC & Differential.  Procedure                               Abnormality         Status                     ---------                               -----------         ------                     CBC Auto Differential[[id number]]        Abnormal            Final result                 Please view results for these tests on the individual orders.    CBC Auto Differential [[id number]]  (Abnormal) Collected:  07/02/20 2122     Specimen:  Blood Updated:  07/02/20 2130     WBC 9.58 10*3/mm3      RBC 4.69 10*6/mm3      Hemoglobin 13.7 g/dL      Hematocrit 42.0 %      MCV 89.6 fL      MCH 29.2 pg      MCHC 32.6 g/dL      RDW 14.5 %      RDW-SD 47.4 fl      MPV 10.7 fL      Platelets 174 10*3/mm3      Neutrophil % 87.4 %      Lymphocyte % 5.1 %      Monocyte % 7.1 %      Eosinophil % 0.0 %      Basophil % 0.1 %      Immature Grans % 0.3 %      Neutrophils, Absolute 8.37 10*3/mm3      Lymphocytes, Absolute 0.49 10*3/mm3      Monocytes, Absolute 0.68 10*3/mm3      Eosinophils, Absolute 0.00 10*3/mm3      Basophils, Absolute 0.01 10*3/mm3      Immature Grans, Absolute 0.03 10*3/mm3      nRBC 1.5 /100 WBC           Culture Data:   No results found for: BLOODCX  No results found for: URINECX  No results found for: RESPCX  No results found for: WOUNDCX  No results found for: STOOLCX  No components found for: BODYFLD    Radiology Data:   Imaging Results (Last 24 Hours)     Procedure Component Value Units Date/Time    XR Chest 1 View [[id number]] Collected:  07/02/20 2141     Updated:  07/02/20 2209    Narrative:       EXAM DESCRIPTION:  XR CHEST 1 VW      CLINICAL HISTORY:  GEN WEAKNESS    TECHNIQUE:   Single frontal view of the chest is submitted.    COMPARISON:  11/16/2019    FINDINGS:  Heart: The cardiothoracic silhouette is within normal limits.  Lungs: No focal consolidation.  Mediastinum: Thoracic aortic atherosclerosis.  Pleura: No appreciable effusion. No pneumothorax.  Bones: Intact  Upper abdomen: Unremarkable      Impression:       No acute disease.    Electronically signed by:  Jennie Pedraza MD  7/2/2020 10:08 PM CDT  Workstation: [id number]          I have reviewed the patient's current medications.     Assessment/Plan     Active Hospital Problems    Diagnosis   • Diabetic ketoacidosis without coma associated with type 2 diabetes mellitus (CMS/HCC)       DKA-continue with insulin drip.  Continue to monitor labs and glucose    Hyponatremia-  continue with IV fluid, has resolved    Possible sepsis-we will start on IV antibiotic and await cultures    Metabolic acidosis-will initiate bicarbonate fluids    Hypophosphatemia-we will order replacement protocol    Encephalopathy-likely metabolic-we will continue to monitor, CT of the head ordered    CKD stage IV- continue with IV fluids.  We will continue to monitor    Acute on chronic renal failure-we will continue to monitor creatinine level    Hypertension-continue to monitor and continue current medications    DVT prophylaxis-SCD                Curt Sanchez MD   07/03/20   09:50

## 2020-07-03 NOTE — H&P
[facility] Medicine Admission      Date of Admission: 7/2/2020      Primary Care Physician: Shayne Merritt MD      Chief Complaint: Patient not able to give history    HPI: Briefly this is a 85-year-old gentleman who presents the ED carrying the following problem list    1.  Diabetes mellitus with hypertension dyslipidemia  2.  Coronary disease S/P stenting to LAD in December 2017  3.  Chronic hep C  4.  CKD stage IV  -With nephrotic range proteinuria around 5 g consistent with diabetic nephropathy although cannot rule out hep C associated  5.  Polysubstance abuse including marijuana  6.  Tobacco abuse    Patient was brought in with mental status changes wife is concerned that he may be using drugs again regardless he presented with high blood sugar over 600 and DKA possible transition over from previous adult onset now with DKA.  Patient without fever chills Reiger's or leukocytosis.  Of note his A1c in January was 7.6.  Patient not able to give history secondary to mental status change had a negative head CT has a white count normal at 10,000 although somewhat of a left shift.    Concurrent Medical History:  has a past medical history of Abnormal weight loss, Acute bronchiolitis, Acute bronchitis, Acute exacerbation of chronic obstructive airways disease (CMS/HCC), Adenomatous polyp of colon, Aptyalism, Artificial lens present, Artificial lens present, Astigmatism, Backache, Borderline glaucoma, Chest discomfort, Chest pain, Chronic hepatitis C (CMS/HCC), Chronic hepatitis C (CMS/HCC), Chronic obstructive lung disease (CMS/HCC), Common cold, Constipation, Coronary arteriosclerosis, Diarrhea, Disorder of duodenum, Disorder of duodenum, Disorder of gallbladder, Diverticula of colon, Diverticular disease of colon, Drug abuse (CMS/HCC), Elevated levels of transaminase & lactic acid dehydrogenase, Esophagitis, Essential hypertension, Fatigue, Gastritis,  Gastroesophageal reflux disease, Generalized abdominal pain, Hand pain, right, Headache, Heel pain, Hemorrhoids, History of colon polyps, History of colonoscopy (08/19/2013), History of esophagogastroduodenoscopy (07/24/2015), History of neoplasm of bladder, History of pneumococcal vaccination, History of seizure, Left lower quadrant pain, Male erectile disorder, Malignant tumor of prostate (CMS/HCC), Myopia, Nausea and vomiting, Need for immunization against influenza, Need for prophylactic vaccination and inoculation against influenza, Pain, Pain in right hand, Patient's noncompliance with other medical treatment and regimen, Periumbilical pain, Primary malignant neoplasm of bladder (CMS/HCC), Rash, Rash, Rheumatoid arthritis (CMS/HCC), Right upper quadrant pain, Sebaceous cyst, Seizure (CMS/HCC), Transient cerebral ischemia, Type 2 diabetes mellitus (CMS/HCC), and Viral hepatitis C.    Past Surgical History:  has a past surgical history that includes Bladder tumor excision; Bladder surgery (01/27/2005); Cholecystectomy (08/25/2011); Esophagogastroduodenoscopy (07/24/2015); Esophagogastroduodenoscopy (02/23/2009); Other surgical history (03/22/2012); Injection of Medication (05/23/2016); Wound Closure (03/22/2012); Back surgery (01/01/2000); Cataract extraction w/  intraocular lens implant (Right, 05/21/2009); Bladder surgery (01/27/2005); Cholecystectomy (08/25/2011); Cystoscopy (12/17/2004); Lumbar disc surgery (2000); Cataract Extraction (Right, 05/21/2009); Other surgical history; Injection of Medication (05/12/2016); Other surgical history (3/22/3012); Other surgical history (03/22/2012); Colonoscopy (08/19/2013); Colonoscopy (07/24/2015); Fracture surgery; Esophagogastroduodenoscopy (N/A, 3/3/2017); Upper gastrointestinal endoscopy (07/24/2015); Upper gastrointestinal endoscopy (03/03/2017); Wrist surgery (Left); Cardiac catheterization (N/A, 12/15/2017); Esophagogastroduodenoscopy (N/A, 4/22/2019); and  Colonoscopy (N/A, 4/22/2019).    Family History: family history includes Cholelithiasis in an other family member; Coronary artery disease in an other family member; Diabetes in his mother; Gallbladder disease in an other family member; Hepatitis in an other family member; Hypertension in an other family member; Liver cancer in his father; Tuberculosis in an other family member.     Social History:  reports that he has been smoking cigarettes. He has a 159.00 pack-year smoking history. He quit smokeless tobacco use about 40 years ago.  His smokeless tobacco use included chew. He reports that he drinks alcohol. He reports that he has current or past drug history. Drug: Marijuana. Frequency: 2.00 times per week.    Allergies: No Known Allergies    Medications:   Prior to Admission medications    Medication Sig Start Date End Date Taking? Authorizing Provider   albuterol (PROVENTIL) (2.5 MG/3ML) 0.083% nebulizer solution Take 2.5 mg by nebulization Every 4 (Four) Hours As Needed for Wheezing or Shortness of Air. 4/17/17   Harvinder Robert MD   albuterol (VENTOLIN HFA) 108 (90 Base) MCG/ACT inhaler Inhale 2 puffs Every 6 (Six) Hours As Needed for Wheezing. 8/22/18   Harvinder Robert MD   Alcohol Swabs (ALCOHOL PREP) pads 1 pad 5 (Five) Times a Day. 3/29/19   Radha Gómez MD   aspirin 81 MG EC tablet Take 81 mg by mouth Daily.    ProviderJorge MD   Blood Glucose Monitoring Suppl (ACURA BLOOD GLUCOSE METER) w/Device kit 1 each 4 (Four) Times a Day As Needed (SUGARR). 8/23/18   Harvinder Robert MD   budesonide-formoterol (SYMBICORT) 160-4.5 MCG/ACT inhaler Inhale 2 puffs 2 (Two) Times a Day.    Jorge Cook MD   carvedilol (COREG) 3.125 MG tablet TK 1 T PO BID WC 10/15/19   Jorge Cook MD   cephalexin (KEFLEX) 500 MG capsule Take 500 mg by mouth.    Jorge Cook MD   clopidogrel (PLAVIX) 75 MG tablet Take 75 mg by mouth Daily.    Jorge Cook MD   cyclobenzaprine  (FLEXERIL) 10 MG tablet Take 10 mg by mouth. 7/10/19   Jorge Cook MD   fluticasone (FLONASE) 50 MCG/ACT nasal spray 2 sprays into each nostril Daily. 5/22/18   Harvinder Robert MD   glucose blood test strip Use as instructed 3/29/19   Radha Gómez MD   glucose monitor monitoring kit 1 each 3 (Three) Times a Day. 3/29/19   Radha Gómez MD   hydrOXYzine (ATARAX) 25 MG tablet TK 1 T PO TID PRF ITCHING 9/27/19   Jorge Cook MD   insulin aspart (novoLOG FLEXPEN) 100 UNIT/ML solution pen-injector sc pen Inject  under the skin into the appropriate area as directed 3 (Three) Times a Day With Meals.    Jorge Cook MD   insulin detemir (LEVEMIR FLEXTOUCH) 100 UNIT/ML injection Inject 25 Units under the skin into the appropriate area as directed 2 (Two) Times a Day.    Jorge Cook MD   Lancets (FREESTYLE) lancets Tid 3/29/19   Radha Gómez MD   nitroglycerin (NITROSTAT) 0.4 MG SL tablet Place 0.4 mg under the tongue Every 5 (Five) Minutes As Needed for Chest Pain. Take no more than 3 doses in 15 minutes.    Jorge Cook MD   omeprazole (PRILOSEC) 20 MG capsule Take 1 capsule by mouth Daily.  Patient taking differently: Take 20 mg by mouth 2 (Two) Times a Day. 8/22/18   Harvinder Robert MD   Potassium (POTASSIMIN PO) Take  by mouth.    Jorge Cook MD   pravastatin (PRAVACHOL) 40 MG tablet Take 40 mg by mouth Every Night.    Jorge Cook MD   sertraline (ZOLOFT) 50 MG tablet 1/2 tablet hs 1 week then 1 tablet . (depression)  Patient taking differently: Take 50 mg by mouth Daily. 5/22/18   Harvinder Robert MD   traMADol (ULTRAM) 50 MG tablet Take 1 tablet by mouth Every 6 (Six) Hours As Needed for Moderate Pain . 7/1/20   Darian Vaughn MD       Review of Systems:  Review of Systems   Unable to perform ROS: Mental status change      Otherwise complete ROS is negative except as mentioned above.    Physical Exam:   Temp:   [98.2 °F (36.8 °C)] 98.2 °F (36.8 °C)  Heart Rate:  [] 98  Resp:  [20] 20  BP: (144-151)/(76-83) 151/79  Physical Exam   Constitutional:   Appearing much older than his stated age   HENT:   Head: Normocephalic and atraumatic.   Eyes: Pupils are equal, round, and reactive to light. EOM are normal.   Neck: Normal range of motion. Neck supple.   Cardiovascular: Normal rate and regular rhythm.   Pulmonary/Chest: Breath sounds normal.   Abdominal: Soft. Bowel sounds are normal.   Musculoskeletal: Normal range of motion.   Decreased muscle mass   Neurological:   Arouses to sternal rub   Skin: Skin is warm and dry. Capillary refill takes less than 2 seconds.         Results Reviewed:  I have personally reviewed current lab, radiology, and data and agree with results.  Lab Results (last 24 hours)     Procedure Component Value Units Date/Time    [place] Draw [[id number]] Collected:  07/02/20 2122    Specimen:  Blood Updated:  07/02/20 2230    Narrative:       The following orders were created for panel order [place] Draw.  Procedure                               Abnormality         Status                     ---------                               -----------         ------                     Light Blue Top[[id number]]                                   Final result               Green Top (Gel)[[id number]]                                  Final result               Lavender Top[[id number]]                                     Final result               Gold Top - SST[[id number]]                                   Final result                 Please view results for these tests on the individual orders.    Light Blue Top [[id number]] Collected:  07/02/20 2122    Specimen:  Blood Updated:  07/02/20 2230     Extra Tube hold for add-on     Comment: Auto resulted       Green Top (Gel) [[id number]] Collected:  07/02/20 2122    Specimen:  Blood Updated:  07/02/20 2230     Extra Tube Hold for add-ons.     Comment: Auto resulted.        Lavender Top [[id number]] Collected:  07/02/20 2122    Specimen:  Blood Updated:  07/02/20 2230     Extra Tube hold for add-on     Comment: Auto resulted       Gold Top - SST [[id number]] Collected:  07/02/20 2122    Specimen:  Blood Updated:  07/02/20 2230     Extra Tube Hold for add-ons.     Comment: Auto resulted.       Lactic Acid, Plasma [[id number]]  (Normal) Collected:  07/02/20 2158    Specimen:  Blood from Arm, Left Updated:  07/02/20 2228     Lactate 0.8 mmol/L     Blood Gas, Arterial [[id number]]  (Abnormal) Collected:  07/02/20 2221    Specimen:  Arterial Blood Updated:  07/02/20 2227     Site Right Radial     Wilian's Test Positive     pH, Arterial 7.039 pH units      Comment: 85 Value below critical limit        pCO2, Arterial 20.0 mm Hg      Comment: 84 Value below reference range        pO2, Arterial 98.9 mm Hg      HCO3, Arterial 5.4 mmol/L      Comment: 84 Value below reference range        Base Excess, Arterial -23.6 mmol/L      Comment: 84 Value below reference range        O2 Saturation, Arterial 98.0 %      Barometric Pressure for Blood Gas 746 mmHg      Modality Room Air     Ventilator Mode NA     Collected by RT     Comment: Meter: [id number]     :  [id number]       Blood Culture - Blood, Arm, Right [[id number]] Collected:  07/02/20 2158    Specimen:  Blood from Arm, Right Updated:  07/02/20 2202    Ketone Bodies, Serum (Not performed at [place]) [[id number]] Collected:  07/02/20 2153    Specimen:  Blood Updated:  07/02/20 2159    Narrative:       The following orders were created for panel order Ketone Bodies, Serum (Not performed at [facility]).  Procedure                               Abnormality         Status                     ---------                               -----------         ------                     Acetone[[id number]]                      Normal              Final result                 Please view results for these tests on the individual orders.    Acetone  [[id number]]  (Normal) Collected:  07/02/20 2153    Specimen:  Blood Updated:  07/02/20 2159     Acetone Negative    Comprehensive Metabolic Panel [[id number]]  (Abnormal) Collected:  07/02/20 2122    Specimen:  Blood Updated:  07/02/20 2154     Glucose 648 mg/dL       mg/dL      Creatinine 3.57 mg/dL      Sodium 120 mmol/L      Potassium 4.4 mmol/L      Chloride 103 mmol/L      CO2 6.0 mmol/L      Calcium 8.7 mg/dL      Total Protein 6.4 g/dL      Albumin 4.00 g/dL      ALT (SGPT) 8 U/L      AST (SGOT) 6 U/L      Alkaline Phosphatase 113 U/L      Total Bilirubin 0.5 mg/dL      eGFR Non African Amer 17 mL/min/1.73      Globulin 2.4 gm/dL      A/G Ratio 1.7 g/dL      BUN/Creatinine Ratio 35.6     Anion Gap 11.0 mmol/L     Narrative:       GFR Normal >60  Chronic Kidney Disease <60  Kidney Failure <15      Troponin [[id number]]  (Normal) Collected:  07/02/20 2122    Specimen:  Blood Updated:  07/02/20 2153     Troponin T <0.010 ng/mL     Narrative:       Troponin T Reference Range:  <= 0.03 ng/mL-   Negative for AMI  >0.03 ng/mL-     Abnormal for myocardial necrosis.  Clinicians would have to utilize clinical acumen, EKG, Troponin and serial changes to determine if it is an Acute Myocardial Infarction or myocardial injury due to an underlying chronic condition.       Results may be falsely decreased if patient taking Biotin.      Lipase [[id number]]  (Normal) Collected:  07/02/20 2122    Specimen:  Blood Updated:  07/02/20 2149     Lipase 39 U/L     Magnesium [[id number]]  (Normal) Collected:  07/02/20 2122    Specimen:  Blood Updated:  07/02/20 2149     Magnesium 2.2 mg/dL     CBC & Differential [[id number]] Collected:  07/02/20 2122    Specimen:  Blood Updated:  07/02/20 2130    Narrative:       The following orders were created for panel order CBC & Differential.  Procedure                               Abnormality         Status                     ---------                               -----------          ------                     CBC Auto Differential[[id number]]        Abnormal            Final result                 Please view results for these tests on the individual orders.    CBC Auto Differential [[id number]]  (Abnormal) Collected:  07/02/20 2122    Specimen:  Blood Updated:  07/02/20 2130     WBC 9.58 10*3/mm3      RBC 4.69 10*6/mm3      Hemoglobin 13.7 g/dL      Hematocrit 42.0 %      MCV 89.6 fL      MCH 29.2 pg      MCHC 32.6 g/dL      RDW 14.5 %      RDW-SD 47.4 fl      MPV 10.7 fL      Platelets 174 10*3/mm3      Neutrophil % 87.4 %      Lymphocyte % 5.1 %      Monocyte % 7.1 %      Eosinophil % 0.0 %      Basophil % 0.1 %      Immature Grans % 0.3 %      Neutrophils, Absolute 8.37 10*3/mm3      Lymphocytes, Absolute 0.49 10*3/mm3      Monocytes, Absolute 0.68 10*3/mm3      Eosinophils, Absolute 0.00 10*3/mm3      Basophils, Absolute 0.01 10*3/mm3      Immature Grans, Absolute 0.03 10*3/mm3      nRBC 1.5 /100 WBC         Imaging Results (Last 24 Hours)     Procedure Component Value Units Date/Time    XR Chest 1 View [[id number]] Collected:  07/02/20 2141     Updated:  07/02/20 2209    Narrative:       EXAM DESCRIPTION:  XR CHEST 1 VW      CLINICAL HISTORY:  GEN WEAKNESS    TECHNIQUE:   Single frontal view of the chest is submitted.    COMPARISON:  11/16/2019    FINDINGS:  Heart: The cardiothoracic silhouette is within normal limits.  Lungs: No focal consolidation.  Mediastinum: Thoracic aortic atherosclerosis.  Pleura: No appreciable effusion. No pneumothorax.  Bones: Intact  Upper abdomen: Unremarkable      Impression:       No acute disease.    Electronically signed by:  Jennie Pedraza MD  7/2/2020 10:08 PM CDT  Workstation: [id number]            Assessment:    Active Hospital Problems    Diagnosis   • Diabetic ketoacidosis without coma associated with type 2 diabetes mellitus (CMS/[facility])         Impression    1.  DKA  - Patient currently on protocol  -Follow-up serial labs    2.  Mental status  -Wife's  concern  -Await toxicology screen  -Note afebrile/normal white blood cell count    3.  CKD stage IV  -Likely some degree of acute kidney injury with DKA.  Volume status certainly not volume overload.  Electrolytes okay.  Acid-base status DKA as above metabolic bone status follow-up phosphorus in the a.m. and hematocrit adequate.    4.  Hypertension continue current medications    5.  Dyslipidemia continue current medication    6.  Known history of coronary disease  -Appears to have lost to follow-up    CODE STATUS patient is a full code  Prophylaxis  -VT with subcu heparin with renal insufficiency    Overall patient and with DKA although with mental status changes.  Negative head CT ID work-up negative this point in time await drug talk screen especially with wife's concern.  Patient appears to been lost to follow-up to urology and nephrology needs to get back in to be seen.    Discussed with ED physician as well as ED nursing patient admitted will be here likely greater than 48 hours please see orders          Patrick Edouard MD

## 2020-07-03 NOTE — ED NOTES
This tech attempted to obtain urine, pt cannot urinate at this moment. Urinal provided to pt.     Mack Hampton  07/02/20 1844

## 2020-07-04 LAB
AMPHET+METHAMPHET UR QL: NEGATIVE
AMPHETAMINES UR QL: NEGATIVE
ANION GAP SERPL CALCULATED.3IONS-SCNC: 13 MMOL/L (ref 5–15)
ANION GAP SERPL CALCULATED.3IONS-SCNC: 16 MMOL/L (ref 5–15)
ARTERIAL PATENCY WRIST A: ABNORMAL
ATMOSPHERIC PRESS: 747 MMHG
BACTERIA SPEC AEROBE CULT: ABNORMAL
BARBITURATES UR QL SCN: NEGATIVE
BASE EXCESS BLDA CALC-SCNC: -22.4 MMOL/L (ref 0–2)
BASOPHILS # BLD AUTO: 0 10*3/MM3 (ref 0–0.2)
BASOPHILS NFR BLD AUTO: 0 % (ref 0–1.5)
BDY SITE: ABNORMAL
BENZODIAZ UR QL SCN: NEGATIVE
BUN SERPL-MCNC: 111 MG/DL (ref 8–23)
BUN SERPL-MCNC: 115 MG/DL (ref 8–23)
BUN/CREAT SERPL: 33.5 (ref 7–25)
BUN/CREAT SERPL: 34.7 (ref 7–25)
BUPRENORPHINE SERPL-MCNC: NEGATIVE NG/ML
CALCIUM SPEC-SCNC: 8 MG/DL (ref 8.6–10.5)
CALCIUM SPEC-SCNC: 8.2 MG/DL (ref 8.6–10.5)
CANNABINOIDS SERPL QL: NEGATIVE
CHLORIDE SERPL-SCNC: 118 MMOL/L (ref 98–107)
CHLORIDE SERPL-SCNC: 120 MMOL/L (ref 98–107)
CO2 SERPL-SCNC: 5 MMOL/L (ref 22–29)
CO2 SERPL-SCNC: 6 MMOL/L (ref 22–29)
COCAINE UR QL: NEGATIVE
CREAT SERPL-MCNC: 3.31 MG/DL (ref 0.76–1.27)
CREAT SERPL-MCNC: 3.31 MG/DL (ref 0.76–1.27)
DEPRECATED RDW RBC AUTO: 45.9 FL (ref 37–54)
EOSINOPHIL # BLD AUTO: 0 10*3/MM3 (ref 0–0.4)
EOSINOPHIL NFR BLD AUTO: 0 % (ref 0.3–6.2)
ERYTHROCYTE [DISTWIDTH] IN BLOOD BY AUTOMATED COUNT: 14.4 % (ref 12.3–15.4)
ETHANOL BLD-MCNC: <10 MG/DL (ref 0–10)
ETHANOL UR QL: <0.01 %
GFR SERPL CREATININE-BSD FRML MDRD: 19 ML/MIN/1.73
GFR SERPL CREATININE-BSD FRML MDRD: 19 ML/MIN/1.73
GLUCOSE BLDC GLUCOMTR-MCNC: 173 MG/DL (ref 70–130)
GLUCOSE BLDC GLUCOMTR-MCNC: 176 MG/DL (ref 70–130)
GLUCOSE BLDC GLUCOMTR-MCNC: 221 MG/DL (ref 70–130)
GLUCOSE BLDC GLUCOMTR-MCNC: 282 MG/DL (ref 70–130)
GLUCOSE BLDC GLUCOMTR-MCNC: 291 MG/DL (ref 70–130)
GLUCOSE SERPL-MCNC: 310 MG/DL (ref 65–99)
GLUCOSE SERPL-MCNC: 339 MG/DL (ref 65–99)
HCO3 BLDA-SCNC: 5 MMOL/L (ref 20–26)
HCT VFR BLD AUTO: 38.2 % (ref 37.5–51)
HGB BLD-MCNC: 13.1 G/DL (ref 13–17.7)
IMM GRANULOCYTES # BLD AUTO: 0.04 10*3/MM3 (ref 0–0.05)
IMM GRANULOCYTES NFR BLD AUTO: 0.4 % (ref 0–0.5)
INHALED O2 CONCENTRATION: 21 %
LYMPHOCYTES # BLD AUTO: 0.44 10*3/MM3 (ref 0.7–3.1)
LYMPHOCYTES NFR BLD AUTO: 4.9 % (ref 19.6–45.3)
Lab: ABNORMAL
MCH RBC QN AUTO: 29.8 PG (ref 26.6–33)
MCHC RBC AUTO-ENTMCNC: 34.3 G/DL (ref 31.5–35.7)
MCV RBC AUTO: 86.8 FL (ref 79–97)
METHADONE UR QL SCN: NEGATIVE
MODALITY: ABNORMAL
MONOCYTES # BLD AUTO: 0.57 10*3/MM3 (ref 0.1–0.9)
MONOCYTES NFR BLD AUTO: 6.4 % (ref 5–12)
NEUTROPHILS NFR BLD AUTO: 7.88 10*3/MM3 (ref 1.7–7)
NEUTROPHILS NFR BLD AUTO: 88.3 % (ref 42.7–76)
NRBC BLD AUTO-RTO: 0.7 /100 WBC (ref 0–0.2)
OPIATES UR QL: NEGATIVE
OXYCODONE UR QL SCN: NEGATIVE
PCO2 BLDA: 15.7 MM HG (ref 35–45)
PCP UR QL SCN: NEGATIVE
PH BLDA: 7.11 PH UNITS (ref 7.35–7.45)
PLATELET # BLD AUTO: 148 10*3/MM3 (ref 140–450)
PMV BLD AUTO: 10.1 FL (ref 6–12)
PO2 BLDA: 108 MM HG (ref 83–108)
POTASSIUM SERPL-SCNC: 3.5 MMOL/L (ref 3.5–5.2)
POTASSIUM SERPL-SCNC: 3.7 MMOL/L (ref 3.5–5.2)
PROPOXYPH UR QL: NEGATIVE
RBC # BLD AUTO: 4.4 10*6/MM3 (ref 4.14–5.8)
SALICYLATES SERPL-MCNC: <0.3 MG/DL
SAO2 % BLDCOA: 98.5 % (ref 94–99)
SODIUM SERPL-SCNC: 138 MMOL/L (ref 136–145)
SODIUM SERPL-SCNC: 140 MMOL/L (ref 136–145)
TRICYCLICS UR QL SCN: POSITIVE
VENTILATOR MODE: ABNORMAL
WBC # BLD AUTO: 8.93 10*3/MM3 (ref 3.4–10.8)

## 2020-07-04 PROCEDURE — 92610 EVALUATE SWALLOWING FUNCTION: CPT | Performed by: SPEECH-LANGUAGE PATHOLOGIST

## 2020-07-04 PROCEDURE — 25010000002 HEPARIN (PORCINE) PER 1000 UNITS: Performed by: FAMILY MEDICINE

## 2020-07-04 PROCEDURE — 85025 COMPLETE CBC W/AUTO DIFF WBC: CPT | Performed by: FAMILY MEDICINE

## 2020-07-04 PROCEDURE — 36600 WITHDRAWAL OF ARTERIAL BLOOD: CPT

## 2020-07-04 PROCEDURE — 82962 GLUCOSE BLOOD TEST: CPT

## 2020-07-04 PROCEDURE — 94799 UNLISTED PULMONARY SVC/PX: CPT

## 2020-07-04 PROCEDURE — 36415 COLL VENOUS BLD VENIPUNCTURE: CPT | Performed by: FAMILY MEDICINE

## 2020-07-04 PROCEDURE — 80307 DRUG TEST PRSMV CHEM ANLYZR: CPT | Performed by: FAMILY MEDICINE

## 2020-07-04 PROCEDURE — 82803 BLOOD GASES ANY COMBINATION: CPT

## 2020-07-04 PROCEDURE — 80048 BASIC METABOLIC PNL TOTAL CA: CPT | Performed by: INTERNAL MEDICINE

## 2020-07-04 PROCEDURE — 63710000001 INSULIN ASPART PER 5 UNITS: Performed by: FAMILY MEDICINE

## 2020-07-04 PROCEDURE — 97162 PT EVAL MOD COMPLEX 30 MIN: CPT

## 2020-07-04 PROCEDURE — 25010000002 CEFTRIAXONE PER 250 MG: Performed by: FAMILY MEDICINE

## 2020-07-04 PROCEDURE — 80048 BASIC METABOLIC PNL TOTAL CA: CPT | Performed by: FAMILY MEDICINE

## 2020-07-04 RX ADMIN — INSULIN ASPART 12 UNITS: 100 INJECTION, SOLUTION INTRAVENOUS; SUBCUTANEOUS at 09:00

## 2020-07-04 RX ADMIN — CEFTRIAXONE SODIUM 1 G: 1 INJECTION, POWDER, FOR SOLUTION INTRAMUSCULAR; INTRAVENOUS at 11:55

## 2020-07-04 RX ADMIN — SODIUM BICARBONATE: 84 INJECTION, SOLUTION INTRAVENOUS at 22:25

## 2020-07-04 RX ADMIN — INSULIN ASPART 4 UNITS: 100 INJECTION, SOLUTION INTRAVENOUS; SUBCUTANEOUS at 17:59

## 2020-07-04 RX ADMIN — SODIUM BICARBONATE 100 MEQ: 84 INJECTION INTRAVENOUS at 11:14

## 2020-07-04 RX ADMIN — HEPARIN SODIUM 5000 UNITS: 5000 INJECTION INTRAVENOUS; SUBCUTANEOUS at 14:20

## 2020-07-04 RX ADMIN — BUDESONIDE AND FORMOTEROL FUMARATE DIHYDRATE 2 PUFF: 160; 4.5 AEROSOL RESPIRATORY (INHALATION) at 20:03

## 2020-07-04 RX ADMIN — CARVEDILOL 3.12 MG: 3.12 TABLET, FILM COATED ORAL at 21:47

## 2020-07-04 RX ADMIN — SODIUM BICARBONATE: 84 INJECTION, SOLUTION INTRAVENOUS at 12:26

## 2020-07-04 RX ADMIN — INSULIN ASPART 12 UNITS: 100 INJECTION, SOLUTION INTRAVENOUS; SUBCUTANEOUS at 11:56

## 2020-07-04 RX ADMIN — HEPARIN SODIUM 5000 UNITS: 5000 INJECTION INTRAVENOUS; SUBCUTANEOUS at 04:22

## 2020-07-04 RX ADMIN — PRAVASTATIN SODIUM 40 MG: 40 TABLET ORAL at 21:47

## 2020-07-04 RX ADMIN — SODIUM CHLORIDE, PRESERVATIVE FREE 10 ML: 5 INJECTION INTRAVENOUS at 21:48

## 2020-07-04 RX ADMIN — HEPARIN SODIUM 5000 UNITS: 5000 INJECTION INTRAVENOUS; SUBCUTANEOUS at 22:25

## 2020-07-04 RX ADMIN — BUDESONIDE AND FORMOTEROL FUMARATE DIHYDRATE 2 PUFF: 160; 4.5 AEROSOL RESPIRATORY (INHALATION) at 07:48
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
## 2020-07-04 NOTE — PROGRESS NOTES
[facility]  INPATIENT PROGRESS NOTE    Length of Stay: 2  Date of Admission: 7/2/2020  Primary Care Physician: Shayne Merritt MD    Subjective   Chief Complaint: Hyperglycemia  HPI:    Patient was admitted for hyperglycemia and DKA.  Insulin drip has been turned off.  Patient remains alert but he is confused.    Review of Systems   Unable to perform ROS: Mental status change          All pertinent negatives and positives are as above. All other systems have been reviewed and are negative unless otherwise stated.     Objective    Temp:  [96.6 °F (35.9 °C)-97.7 °F (36.5 °C)] 96.6 °F (35.9 °C)  Heart Rate:  [100-112] 112  Resp:  [20-26] 22  BP: (115-156)/(63-88) 134/80  Physical Exam   Constitutional: He appears well-developed and well-nourished. No distress.   HENT:   Head: Normocephalic and atraumatic.   Cardiovascular: Normal rate.   Pulmonary/Chest: Effort normal. No respiratory distress. He has no wheezes.   Abdominal: Soft. He exhibits no distension.   Musculoskeletal: Normal range of motion. He exhibits no edema.   Neurological: He is alert. No cranial nerve deficit.   Skin: Skin is warm and dry. He is not diaphoretic.   Psychiatric: He has a normal mood and affect. His behavior is normal.   Vitals reviewed.          Results Review:  I have reviewed the labs, radiology results, and diagnostic studies.    Laboratory Data:   Lab Results (last 24 hours)     Procedure Component Value Units Date/Time    Salicylate Level [[id number]] Collected:  07/04/20 0805    Specimen:  Blood Updated:  07/04/20 0950    Basic Metabolic Panel [[id number]]  (Abnormal) Collected:  07/04/20 0805    Specimen:  Blood Updated:  07/04/20 0847     Glucose 310 mg/dL       mg/dL      Creatinine 3.31 mg/dL      Sodium 138 mmol/L      Potassium 3.7 mmol/L      Chloride 120 mmol/L      CO2 5.0 mmol/L      Calcium 8.0 mg/dL      eGFR Non African Amer 19 mL/min/1.73       BUN/Creatinine Ratio 34.7     Anion Gap 13.0 mmol/L     Narrative:       GFR Normal >60  Chronic Kidney Disease <60  Kidney Failure <15      CBC & Differential [[id number]] Collected:  07/04/20 0805    Specimen:  Blood Updated:  07/04/20 0810    Narrative:       The following orders were created for panel order CBC & Differential.  Procedure                               Abnormality         Status                     ---------                               -----------         ------                     CBC Auto Differential[[id number]]        Abnormal            Final result                 Please view results for these tests on the individual orders.    CBC Auto Differential [[id number]]  (Abnormal) Collected:  07/04/20 0805    Specimen:  Blood Updated:  07/04/20 0810     WBC 8.93 10*3/mm3      RBC 4.40 10*6/mm3      Hemoglobin 13.1 g/dL      Hematocrit 38.2 %      MCV 86.8 fL      MCH 29.8 pg      MCHC 34.3 g/dL      RDW 14.4 %      RDW-SD 45.9 fl      MPV 10.1 fL      Platelets 148 10*3/mm3      Neutrophil % 88.3 %      Lymphocyte % 4.9 %      Monocyte % 6.4 %      Eosinophil % 0.0 %      Basophil % 0.0 %      Immature Grans % 0.4 %      Neutrophils, Absolute 7.88 10*3/mm3      Lymphocytes, Absolute 0.44 10*3/mm3      Monocytes, Absolute 0.57 10*3/mm3      Eosinophils, Absolute 0.00 10*3/mm3      Basophils, Absolute 0.00 10*3/mm3      Immature Grans, Absolute 0.04 10*3/mm3      nRBC 0.7 /100 WBC     POC Glucose Once [[id number]]  (Abnormal) Collected:  07/04/20 0723    Specimen:  Blood Updated:  07/04/20 0750     Glucose 291 mg/dL      Comment: RN NotifiedOperator: [id number] BAKER DENISAMeter ID: [id number]       Blood Culture - Blood, Hand, Left [[id number]] Collected:  07/03/20 0312    Specimen:  Blood from Hand, Left Updated:  07/04/20 0345     Blood Culture No growth at 24 hours    Blood Culture - Blood, Arm, Right [[id number]] Collected:  07/02/20 2158    Specimen:  Blood from Arm, Right Updated:  07/03/20 3828      Blood Culture No growth at 24 hours    Basic Metabolic Panel [[id number]]  (Abnormal) Collected:  07/03/20 2022    Specimen:  Blood Updated:  07/03/20 2052     Glucose 313 mg/dL       mg/dL      Creatinine 3.41 mg/dL      Sodium 135 mmol/L      Potassium 4.0 mmol/L      Chloride 118 mmol/L      CO2 5.0 mmol/L      Calcium 7.9 mg/dL      eGFR Non African Amer 18 mL/min/1.73      BUN/Creatinine Ratio 36.7     Anion Gap 12.0 mmol/L     Narrative:       GFR Normal >60  Chronic Kidney Disease <60  Kidney Failure <15      Phosphorus [[id number]]  (Normal) Collected:  07/03/20 2022    Specimen:  Blood Updated:  07/03/20 2043     Phosphorus 4.1 mg/dL     Urine Drug Screen - Urine, Catheter [[id number]]  (Abnormal) Collected:  07/03/20 1821    Specimen:  Urine, Catheter Updated:  07/03/20 1907     THC, Screen, Urine Negative     Phencyclidine (PCP), Urine Negative     Cocaine Screen, Urine Negative     Methamphetamine, Ur Negative     Opiate Screen Negative     Amphetamine Screen, Urine Negative     Benzodiazepine Screen, Urine Negative     Tricyclic Antidepressants Screen Positive     Methadone Screen, Urine Negative     Barbiturates Screen, Urine Negative     Oxycodone Screen, Urine Negative     Propoxyphene Screen Negative     Buprenorphine, Screen, Urine Negative    Narrative:       Cutoff For Drugs Screened:    Amphetamines               500 ng/ml  Barbiturates               200 ng/ml  Benzodiazepines            150 ng/ml  Cocaine                    150 ng/ml  Methadone                  200 ng/ml  Opiates                    100 ng/ml  Phencyclidine               25 ng/ml  THC                            50 ng/ml  Methamphetamine            500 ng/ml  Tricyclic Antidepressants  300 ng/ml  Oxycodone                  100 ng/ml  Propoxyphene               300 ng/ml  Buprenorphine               10 ng/ml    The normal value for all drugs tested is negative. This report includes unconfirmed screening results, with the  cutoff values listed, to be used for medical treatment purposes only.  Unconfirmed results must not be used for non-medical purposes such as employment or legal testing.  Clinical consideration should be applied to any drug of abuse test, particularly when unconfirmed results are used.      Urinalysis, Microscopic Only - Urine, Catheter [[id number]]  (Abnormal) Collected:  07/03/20 1821    Specimen:  Urine, Catheter Updated:  07/03/20 1856     RBC, UA 0-2 /HPF      WBC, UA 6-12 /HPF      Bacteria, UA None Seen /HPF      Squamous Epithelial Cells, UA None Seen /HPF      Hyaline Casts, UA 0-2 /LPF      Methodology Automated Microscopy    Urinalysis With Culture If Indicated - Urine, Catheter [[id number]]  (Abnormal) Collected:  07/03/20 1821    Specimen:  Urine, Catheter Updated:  07/03/20 1856     Color, UA Yellow     Appearance, UA Clear     pH, UA 7.5     Specific Gravity, UA 1.012     Glucose, UA Negative     Ketones, UA Negative     Bilirubin, UA Negative     Blood, UA Small (1+)     Protein,  mg/dL (2+)     Leuk Esterase, UA Trace     Nitrite, UA Negative     Urobilinogen, UA 0.2 E.U./dL    Urine Culture - Urine, Urine, Catheter [[id number]] Collected:  07/03/20 1821    Specimen:  Urine, Catheter Updated:  07/03/20 1856    POC Glucose Once [[id number]]  (Abnormal) Collected:  07/03/20 1725    Specimen:  Blood Updated:  07/03/20 1737     Glucose 340 mg/dL      Comment: RN NotifiedOperator: [id number] GERARDO GUILLORYLEYMeter ID: [id number]       POC Glucose Once [[id number]]  (Abnormal) Collected:  07/03/20 1108    Specimen:  Blood Updated:  07/03/20 1131     Glucose 185 mg/dL      Comment: RN NotifiedOperator: [id number] TOÑA BOWERSIEMeter ID: [id number]             Culture Data:   Blood Culture   Date Value Ref Range Status   07/03/2020 No growth at 24 hours  Preliminary   07/02/2020 No growth at 24 hours  Preliminary     No results found for: URINECX  No results found for: RESPCX  No results found for:  WOUNDCX  No results found for: STOOLCX  No components found for: BODYFLD    Radiology Data:   Imaging Results (Last 24 Hours)     Procedure Component Value Units Date/Time    CT Head Without Contrast [[id number]] Collected:  07/03/20 1112     Updated:  07/03/20 1136    Narrative:       Noncontrast CT examination of the brain.    INDICATION: Alteration mental status. Confusion, delirium.       Technique: Axial 5 mm contiguous images with brain parenchymal  and bone windows    This exam was performed according to our departmental  dose-optimization program, which includes automated exposure  control, adjustment of the mA and/or kV according to patient size  and/or use of iterative reconstruction technique.    Prior relevant examination: CT brain November 16, 2019.    Involutional, atrophic changes..    Brain parenchyma appears within normal limits. Ventricles are  within normal limits in size. No evidence of abnormal mass or  calcification is seen. No evidence of acute hemorrhage is noted.  Bony structures appear within normal limits and the mastoid air  cells and visualized paranasal sinuses are normally aerated.        Impression:       Involutional, atrophic changes. Otherwise unremarkable CT brain  without contrast. No change since prior exam.    Electronically signed by:  Matt Packer MD  7/3/2020 11:35 AM CDT  Workstation: [id number]          I have reviewed the patient's current medications.     Assessment/Plan     Active Hospital Problems    Diagnosis   • Diabetic ketoacidosis without coma associated with type 2 diabetes mellitus (CMS/[facility])       Diabetes mellitus/DKA- blood glucose level is better, insulin drip is off, will continue with sliding scale     Hyponatremia- continue with IV fluid, has resolved     Possible sepsis-we will start on IV antibiotic and await cultures     Metabolic acidosis- continue with bicarbonate fluids.  We will continue to monitor, will repeat ABG     Hypophosphatemia-we will order  replacement protocol, has improved     Encephalopathy-likely metabolic-we will continue to monitor, CT of the head is negative     CKD stage IV- continue with IV fluids.  We will continue to monitor     Acute on chronic renal failure-we will continue to monitor creatinine level, nephrology has been consulted     Hypertension-continue to monitor and continue current medications     DVT prophylaxis-SCD                Curt Sanchez MD   07/04/20   09:58

## 2020-07-04 NOTE — PLAN OF CARE
Pt very lethargic this AM; bicarb gtt changed and John placed, pt more alert this afternoon and able to answer some questions; v/s stable, will continue to monitor
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
## 2020-07-04 NOTE — PLAN OF CARE
Problem: Patient Care Overview  Goal: Plan of Care Review  Outcome: Ongoing (interventions implemented as appropriate)  Flowsheets  Taken 7/4/2020 1607  Plan of Care Reviewed With: patient  Taken 7/4/2020 5292  Outcome Summary: PT eval completed on this date. Difficult to obtain accurate home information. Patient drowsy but agreeable to getting EOB. Patient requires repeition of simple commands to perform task. Bed mobility: ModAx1 for supine>sit and MinAx1 for sit>supine transfer and bridging to scoot up in bed. Transfers: MaxAx1 for sit<>stand secondary to Bilateral knee buckling once standing Gait: Not tested secondary to knee buckling Balance: Patient required CGA to Mod/Maxax1 for static EOB sitting. Patient would benefit from skilled PT at this time to improve functional mobility and return to PLOF. Barriers: Activity tolerance, cognition, tremors, adequate care/caregiver at home. Recommend 24/7 care at this time and therapy at the next level

## 2020-07-04 NOTE — PLAN OF CARE
Problem: Patient Care Overview  Goal: Plan of Care Review  Flowsheets  Taken 7/4/2020 1400 by Mariama Ardon, CCC-SLP  Progress: improving  Outcome Summary: Swallow: Pt with no s/s of aspiration on clear liquids. SLP will attempt diet upgrade as tolerated as pt's mentation improves.  Taken 7/3/2020 2000 by Roma Casillas RN  Plan of Care Reviewed With: patient

## 2020-07-05 ENCOUNTER — APPOINTMENT (OUTPATIENT)
Dept: ULTRASOUND IMAGING | Facility: HOSPITAL | Age: 65
End: 2020-07-05

## 2020-07-05 LAB
ANION GAP SERPL CALCULATED.3IONS-SCNC: 12 MMOL/L (ref 5–15)
BASOPHILS # BLD AUTO: 0.01 10*3/MM3 (ref 0–0.2)
BASOPHILS NFR BLD AUTO: 0.1 % (ref 0–1.5)
BUN SERPL-MCNC: 89 MG/DL (ref 8–23)
BUN/CREAT SERPL: 34.4 (ref 7–25)
CALCIUM SPEC-SCNC: 7.3 MG/DL (ref 8.6–10.5)
CHLORIDE SERPL-SCNC: 113 MMOL/L (ref 98–107)
CO2 SERPL-SCNC: 18 MMOL/L (ref 22–29)
CREAT SERPL-MCNC: 2.59 MG/DL (ref 0.76–1.27)
DEPRECATED RDW RBC AUTO: 43.2 FL (ref 37–54)
EOSINOPHIL # BLD AUTO: 0.03 10*3/MM3 (ref 0–0.4)
EOSINOPHIL NFR BLD AUTO: 0.4 % (ref 0.3–6.2)
ERYTHROCYTE [DISTWIDTH] IN BLOOD BY AUTOMATED COUNT: 14.2 % (ref 12.3–15.4)
GFR SERPL CREATININE-BSD FRML MDRD: 25 ML/MIN/1.73
GLUCOSE BLDC GLUCOMTR-MCNC: 104 MG/DL (ref 70–130)
GLUCOSE BLDC GLUCOMTR-MCNC: 183 MG/DL (ref 70–130)
GLUCOSE BLDC GLUCOMTR-MCNC: 251 MG/DL (ref 70–130)
GLUCOSE SERPL-MCNC: 191 MG/DL (ref 65–99)
HCT VFR BLD AUTO: 29.2 % (ref 37.5–51)
HGB BLD-MCNC: 10.5 G/DL (ref 13–17.7)
IMM GRANULOCYTES # BLD AUTO: 0.03 10*3/MM3 (ref 0–0.05)
IMM GRANULOCYTES NFR BLD AUTO: 0.4 % (ref 0–0.5)
LYMPHOCYTES # BLD AUTO: 0.97 10*3/MM3 (ref 0.7–3.1)
LYMPHOCYTES NFR BLD AUTO: 14.1 % (ref 19.6–45.3)
MCH RBC QN AUTO: 30.2 PG (ref 26.6–33)
MCHC RBC AUTO-ENTMCNC: 36 G/DL (ref 31.5–35.7)
MCV RBC AUTO: 83.9 FL (ref 79–97)
MONOCYTES # BLD AUTO: 0.59 10*3/MM3 (ref 0.1–0.9)
MONOCYTES NFR BLD AUTO: 8.6 % (ref 5–12)
NEUTROPHILS NFR BLD AUTO: 5.25 10*3/MM3 (ref 1.7–7)
NEUTROPHILS NFR BLD AUTO: 76.4 % (ref 42.7–76)
NRBC BLD AUTO-RTO: 0.7 /100 WBC (ref 0–0.2)
PHOSPHATE SERPL-MCNC: 1.7 MG/DL (ref 2.5–4.5)
PLATELET # BLD AUTO: 146 10*3/MM3 (ref 140–450)
PMV BLD AUTO: 10.5 FL (ref 6–12)
POTASSIUM SERPL-SCNC: 2.1 MMOL/L (ref 3.5–5.2)
RBC # BLD AUTO: 3.48 10*6/MM3 (ref 4.14–5.8)
SODIUM SERPL-SCNC: 143 MMOL/L (ref 136–145)
WBC # BLD AUTO: 6.88 10*3/MM3 (ref 3.4–10.8)

## 2020-07-05 PROCEDURE — 80048 BASIC METABOLIC PNL TOTAL CA: CPT | Performed by: FAMILY MEDICINE

## 2020-07-05 PROCEDURE — 63710000001 INSULIN ASPART PER 5 UNITS: Performed by: FAMILY MEDICINE

## 2020-07-05 PROCEDURE — 94799 UNLISTED PULMONARY SVC/PX: CPT

## 2020-07-05 PROCEDURE — 82962 GLUCOSE BLOOD TEST: CPT

## 2020-07-05 PROCEDURE — 84100 ASSAY OF PHOSPHORUS: CPT | Performed by: INTERNAL MEDICINE

## 2020-07-05 PROCEDURE — 25010000002 HEPARIN (PORCINE) PER 1000 UNITS: Performed by: FAMILY MEDICINE

## 2020-07-05 PROCEDURE — 25010000003 POTASSIUM CHLORIDE 10 MEQ/100ML SOLUTION: Performed by: HOSPITALIST

## 2020-07-05 PROCEDURE — 76775 US EXAM ABDO BACK WALL LIM: CPT

## 2020-07-05 PROCEDURE — 25010000002 CEFTRIAXONE PER 250 MG: Performed by: FAMILY MEDICINE

## 2020-07-05 PROCEDURE — 85025 COMPLETE CBC W/AUTO DIFF WBC: CPT | Performed by: FAMILY MEDICINE

## 2020-07-05 PROCEDURE — 25010000002 ONDANSETRON PER 1 MG

## 2020-07-05 PROCEDURE — 97166 OT EVAL MOD COMPLEX 45 MIN: CPT

## 2020-07-05 RX ORDER — POTASSIUM CHLORIDE 750 MG/1
40 CAPSULE, EXTENDED RELEASE ORAL AS NEEDED
Status: DISCONTINUED | OUTPATIENT
Start: 2020-07-05 | End: 2020-07-10 | Stop reason: HOSPADM

## 2020-07-05 RX ORDER — ONDANSETRON 2 MG/ML
4 INJECTION INTRAMUSCULAR; INTRAVENOUS EVERY 6 HOURS PRN
Status: DISCONTINUED | OUTPATIENT
Start: 2020-07-05 | End: 2020-07-10 | Stop reason: HOSPADM

## 2020-07-05 RX ORDER — POTASSIUM CHLORIDE 1.5 G/1.77G
40 POWDER, FOR SOLUTION ORAL AS NEEDED
Status: DISCONTINUED | OUTPATIENT
Start: 2020-07-05 | End: 2020-07-10 | Stop reason: HOSPADM

## 2020-07-05 RX ORDER — ONDANSETRON 2 MG/ML
INJECTION INTRAMUSCULAR; INTRAVENOUS
Status: COMPLETED
Start: 2020-07-05 | End: 2020-07-05

## 2020-07-05 RX ORDER — POTASSIUM CHLORIDE 7.45 MG/ML
10 INJECTION INTRAVENOUS
Status: DISCONTINUED | OUTPATIENT
Start: 2020-07-05 | End: 2020-07-10 | Stop reason: HOSPADM

## 2020-07-05 RX ADMIN — HEPARIN SODIUM 5000 UNITS: 5000 INJECTION INTRAVENOUS; SUBCUTANEOUS at 21:01

## 2020-07-05 RX ADMIN — PRAVASTATIN SODIUM 40 MG: 40 TABLET ORAL at 20:52

## 2020-07-05 RX ADMIN — ONDANSETRON 4 MG: 2 INJECTION INTRAMUSCULAR; INTRAVENOUS at 10:20

## 2020-07-05 RX ADMIN — POTASSIUM CHLORIDE 10 MEQ: 7.46 INJECTION, SOLUTION INTRAVENOUS at 23:22

## 2020-07-05 RX ADMIN — SODIUM CHLORIDE, PRESERVATIVE FREE 10 ML: 5 INJECTION INTRAVENOUS at 20:52

## 2020-07-05 RX ADMIN — INSULIN ASPART 12 UNITS: 100 INJECTION, SOLUTION INTRAVENOUS; SUBCUTANEOUS at 12:20

## 2020-07-05 RX ADMIN — CEFTRIAXONE SODIUM 1 G: 1 INJECTION, POWDER, FOR SOLUTION INTRAMUSCULAR; INTRAVENOUS at 09:45

## 2020-07-05 RX ADMIN — POTASSIUM CHLORIDE 10 MEQ: 7.46 INJECTION, SOLUTION INTRAVENOUS at 19:14

## 2020-07-05 RX ADMIN — SODIUM BICARBONATE 75 MEQ: 84 INJECTION, SOLUTION INTRAVENOUS at 18:20

## 2020-07-05 RX ADMIN — POTASSIUM CHLORIDE 10 MEQ: 7.46 INJECTION, SOLUTION INTRAVENOUS at 20:47

## 2020-07-05 RX ADMIN — SODIUM BICARBONATE: 84 INJECTION, SOLUTION INTRAVENOUS at 05:27

## 2020-07-05 RX ADMIN — BUDESONIDE AND FORMOTEROL FUMARATE DIHYDRATE 2 PUFF: 160; 4.5 AEROSOL RESPIRATORY (INHALATION) at 07:39

## 2020-07-05 RX ADMIN — INSULIN ASPART 4 UNITS: 100 INJECTION, SOLUTION INTRAVENOUS; SUBCUTANEOUS at 09:45

## 2020-07-05 RX ADMIN — POTASSIUM CHLORIDE 10 MEQ: 7.46 INJECTION, SOLUTION INTRAVENOUS at 22:14

## 2020-07-05 RX ADMIN — BUDESONIDE AND FORMOTEROL FUMARATE DIHYDRATE 2 PUFF: 160; 4.5 AEROSOL RESPIRATORY (INHALATION) at 21:12

## 2020-07-05 RX ADMIN — CARVEDILOL 3.12 MG: 3.12 TABLET, FILM COATED ORAL at 20:49

## 2020-07-05 RX ADMIN — CLOPIDOGREL BISULFATE 75 MG: 75 TABLET ORAL at 09:46

## 2020-07-05 RX ADMIN — CARVEDILOL 3.12 MG: 3.12 TABLET, FILM COATED ORAL at 09:46

## 2020-07-05 RX ADMIN — HEPARIN SODIUM 5000 UNITS: 5000 INJECTION INTRAVENOUS; SUBCUTANEOUS at 06:05

## 2020-07-05 RX ADMIN — ASPIRIN 81 MG: 81 TABLET, COATED ORAL at 09:46

## 2020-07-05 RX ADMIN — ONDANSETRON 4 MG: 2 SOLUTION INTRAMUSCULAR; INTRAVENOUS at 10:20

## 2020-07-05 RX ADMIN — SERTRALINE HYDROCHLORIDE 50 MG: 50 TABLET ORAL at 09:46

## 2020-07-05 RX ADMIN — POTASSIUM PHOSPHATE, MONOBASIC AND POTASSIUM PHOSPHATE, DIBASIC 30 MMOL: 224; 236 INJECTION, SOLUTION, CONCENTRATE INTRAVENOUS at 10:49

## 2020-07-05 RX ADMIN — PANTOPRAZOLE SODIUM 40 MG: 40 TABLET, DELAYED RELEASE ORAL at 06:05

## 2020-07-05 RX ADMIN — SODIUM BICARBONATE 75 MEQ: 84 INJECTION, SOLUTION INTRAVENOUS at 10:19

## 2020-07-05 RX ADMIN — HEPARIN SODIUM 5000 UNITS: 5000 INJECTION INTRAVENOUS; SUBCUTANEOUS at 14:51

## 2020-07-05 RX ADMIN — SODIUM CHLORIDE, PRESERVATIVE FREE 10 ML: 5 INJECTION INTRAVENOUS at 09:46

## 2020-07-05 NOTE — PLAN OF CARE
Problem: Patient Care Overview  Goal: Plan of Care Review  Outcome: Ongoing (interventions implemented as appropriate)  Flowsheets  Taken 7/5/2020 1232  Plan of Care Reviewed With: patient  Taken 7/5/2020 1322  Outcome Summary: initial OT evaluation complete. pt was pleasant throughout, but is very nauseous and has been vomiting, so only willing to complete bed evaluation at this time. pt is oriented only to name and birthdate at this time. pt has full AROM of BUE, but is unable to follow commands to properly assess BUE and  strength. pt is dependent for donning/doffing of socks while long sitting in bed. deficits in the areas of safety awareness, strength, endurance, and cognitive function. further skilled OT to address these deficits. at discharge OT recommends home with 24/7 care. goals established.
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
## 2020-07-05 NOTE — PROGRESS NOTES
"[facility]  [address] [place] [address]. [address]  T - [phone]  F - [phone]     Progress Note          PATIENT  DEMOGRAPHICS   PATIENT NAME: Favio Casillas                      PHYSICIAN: Rodríguez Brody MD  : 1955  MRN: [id number]   LOS: 3 days    Patient Care Team:  Shayne Merritt MD as PCP - General (Family Medicine)  Kevin Robbins PA-C as PCP - Claims Attributed  Kevin Robbins PA-C as Physician Assistant (Gastroenterology)  Harvinder Robert MD  Subjective   SUBJECTIVE   More alert, feels better , good UO         Objective   OBJECTIVE   Vital Signs  Temp:  [97.2 °F (36.2 °C)-99 °F (37.2 °C)] 99 °F (37.2 °C)  Heart Rate:  [] 86  Resp:  [18-22] 22  BP: (120-151)/(68-94) 125/73    Flowsheet Rows      First Filed Value   Admission Height  170.2 cm (67.01\") Documented at 2020 1113   Admission Weight  75.4 kg (166 lb 3.2 oz) Documented at 2020 1112           I/O last 3 completed shifts:  In: 2977.9 [I.V.:2977.9]  Out: 3500 [Urine:3500]    PHYSICAL EXAM    Physical Exam   Constitutional: He is oriented to person, place, and time. He appears well-developed.   HENT:   Head: Normocephalic.   Eyes: Pupils are equal, round, and reactive to light.   Cardiovascular: Normal rate, regular rhythm and normal heart sounds.   Pulmonary/Chest: Effort normal and breath sounds normal.   Abdominal: Soft. Bowel sounds are normal.   Musculoskeletal: He exhibits no edema.   Neurological: He is alert and oriented to person, place, and time.       RESULTS   Results Review:    Results from last 7 days   Lab Units 20  0755 20  0959 20  0805  20  2122   SODIUM mmol/L 143 140 138   < > 120*   POTASSIUM mmol/L 2.1* 3.5 3.7   < > 4.4   CHLORIDE mmol/L 113* 118* 120*   < > 103   CO2 mmol/L 18.0* 6.0* 5.0*   < > 6.0*   BUN mg/dL 89* 111* 115*   < > 127*   CREATININE mg/dL 2.59* 3.31* 3.31*   < > 3.57*   CALCIUM mg/dL 7.3* 8.2* 8.0*   < > 8.7 "   BILIRUBIN mg/dL  --   --   --   --  0.5   ALK PHOS U/L  --   --   --   --  113   ALT (SGPT) U/L  --   --   --   --  8   AST (SGOT) U/L  --   --   --   --  6   GLUCOSE mg/dL 191* 339* 310*   < > 648*    < > = values in this interval not displayed.       Estimated Creatinine Clearance: 26.1 mL/min (A) (by C-G formula based on SCr of 2.59 mg/dL (H)).    Results from last 7 days   Lab Units 07/03/20 2022 07/03/20  0844 07/02/20  2122   MAGNESIUM mg/dL  --  2.0 2.2   PHOSPHORUS mg/dL 4.1 1.5*  --              Results from last 7 days   Lab Units 07/05/20 0755 07/04/20  0805 07/03/20  0844 07/02/20  2122   WBC 10*3/mm3 6.88 8.93 14.12* 9.58   HEMOGLOBIN g/dL 10.5* 13.1 13.9 13.7   PLATELETS 10*3/mm3 146 148 177 174               Imaging Results (Last 24 Hours)     Procedure Component Value Units Date/Time    US Renal Bilateral [[id number]] Collected:  07/05/20 0836     Updated:  07/05/20 0919    Narrative:       Ultrasound renal bilateral.    HISTORY: Renal insufficiency. Evaluate for obstruction.       Prior exam: Ultrasound kidneys November 17, 2019. CT November 16, 2019.    FINDINGS: The right kidney is normal size 10.6 x 5.0 x 5.0 cm.  Normal renal cortical echogenicity and cortical thickness. No  masses calculi or evidence of obstruction.    The left kidney measures 10.8 x 5.9 x 5.4 cm.    Mild hydronephrotic changes. Dilatation of calyces and left renal  pelvis. This demonstrates a change since prior ultrasound  examination.    Urinary bladder surgically absent.          Impression:       Interval development of mild hydronephrotic changes  left kidney.. In this patient with prior cystectomy and creation  of a neobladder, these hydronephrotic changes may be secondary  either to obstruction or reflux.    Electronically signed by:  Matt Packer MD  7/5/2020 9:18 AM CDT  Workstation: [id number]           MEDICATIONS      aspirin 81 mg Oral Daily   budesonide-formoterol 2 puff Inhalation BID - RT   carvedilol 3.125 mg  Oral Q12H   cefTRIAXone 1 g Intravenous Q24H   clopidogrel 75 mg Oral Daily   fluticasone 2 spray Nasal Daily   heparin (porcine) 5,000 Units Subcutaneous Q8H   insulin aspart 0-24 Units Subcutaneous TID AC   pantoprazole 40 mg Oral QAM   pravastatin 40 mg Oral Nightly   sertraline 50 mg Oral Daily   sodium chloride 1,000 mL Intravenous Once   sodium chloride 10 mL Intravenous Q12H       sodium chloride 30 mL/hr    custom IV infusion builder  Last Rate: 175 mL/hr at 07/05/20 0527       Assessment/Plan   ASSESSMENT / PLAN      Diabetic ketoacidosis without coma associated with type 2 diabetes mellitus (CMS/[facility])    1.acute kidney injury on CKD 3 with baseline creatinine close to 2.  He has now creatinine up to 3.3.  His bladder appears to be distended and he has suprapubic fullness.  Patient has prior history of bladder outlet obstruction.  He also has history of recurrent acute kidney injury in the last couple of years.    He has left hydro onu/s, good UO post liu. Cr is better. Will ask Urology to see. Keep bicarb drip. Lower the rate to 150cc/hr. Replace kcl aggressively. Patient does do self-catheterization at home in the past    2.severe normal anion gap metabolic acidosis possibly due to acute kidney injury/obstruction.  Now better.    3.altered mental status with severe hyperglycemia.  Patient urine culture showed mixed growth.  He Is currently on ceftriaxone    4.Bladder cancer requiring neobladder surgery almost 10 years ago.  Also has a history of prostate cancer.    5.history of non-ST elevation MI and stenting in the past.                This document has been electronically signed by Rodríguez Brody MD on July 5, 2020 09:22

## 2020-07-05 NOTE — PLAN OF CARE
Problem: Patient Care Overview  Goal: Plan of Care Review  Outcome: Ongoing (interventions implemented as appropriate)  Flowsheets (Taken 7/5/2020 0148)  Progress: improving  Plan of Care Reviewed With: patient  Outcome Summary: pt resting. no s/s of distress noted. pt alert and oriented to person. v/s stable. will continue to monitor pt
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
## 2020-07-05 NOTE — SIGNIFICANT NOTE
07/05/20 1213   Rehab Treatment   Reason Treatment Not Performed patient/family declined treatment  (Pt with nausea and vomiting this a.m.)

## 2020-07-05 NOTE — SIGNIFICANT NOTE
07/05/20 1300   Rehab Treatment   Discipline physical therapy assistant   Reason Treatment Not Performed other (see comments)  (OT reports just finishing OT eval & pt. very nauseated this p.m. and had episode of vomiting during OT eval. Will check back tomorrow for treatment)

## 2020-07-05 NOTE — PROGRESS NOTES
[facility] Medicine Services  INPATIENT PROGRESS NOTE    Length of Stay: 3  Date of Admission: 7/2/2020  Primary Care Physician: Shayne Merritt MD    Subjective   Chief Complaint: Confusion  HPI: Patient claims nausea, vomiting, and diarrhea.  Stated that they all started today.    Review of Systems   Constitutional: Positive for activity change. Negative for appetite change, chills, fatigue and fever.   Respiratory: Negative for chest tightness and shortness of breath.    Cardiovascular: Negative for chest pain, palpitations and leg swelling.   Gastrointestinal: Positive for diarrhea, nausea and vomiting. Negative for abdominal pain and constipation.   Skin: Negative for wound.   Neurological: Negative for dizziness, weakness, light-headedness, numbness and headaches.      All pertinent negatives and positives are as above. All other systems have been reviewed and are negative unless otherwise stated.     Objective    Temp:  [98 °F (36.7 °C)-99 °F (37.2 °C)] 99 °F (37.2 °C)  Heart Rate:  [] 88  Resp:  [18-22] 18  BP: (112-130)/(63-80) 125/80    Physical Exam   Constitutional: He appears well-developed and well-nourished.   HENT:   Head: Normocephalic and atraumatic.   Eyes: Pupils are equal, round, and reactive to light. EOM are normal.   Neck: Normal range of motion. Neck supple.   Cardiovascular: Normal rate, regular rhythm, normal heart sounds and intact distal pulses. Exam reveals no gallop and no friction rub.   No murmur heard.  Pulmonary/Chest: Effort normal and breath sounds normal. No respiratory distress. He has no wheezes. He has no rales. He exhibits no tenderness.   Abdominal: Soft. Bowel sounds are normal. He exhibits no distension. There is no tenderness.   Psychiatric: He has a normal mood and affect. His behavior is normal.   Vitals reviewed.    Results Review:  I have reviewed the labs, radiology results, and diagnostic  studies.    Laboratory Data:   Results from last 7 days   Lab Units 07/05/20 0755 07/04/20  0959 07/04/20  0805  07/02/20  2122   SODIUM mmol/L 143 140 138   < > 120*   POTASSIUM mmol/L 2.1* 3.5 3.7   < > 4.4   CHLORIDE mmol/L 113* 118* 120*   < > 103   CO2 mmol/L 18.0* 6.0* 5.0*   < > 6.0*   BUN mg/dL 89* 111* 115*   < > 127*   CREATININE mg/dL 2.59* 3.31* 3.31*   < > 3.57*   GLUCOSE mg/dL 191* 339* 310*   < > 648*   CALCIUM mg/dL 7.3* 8.2* 8.0*   < > 8.7   BILIRUBIN mg/dL  --   --   --   --  0.5   ALK PHOS U/L  --   --   --   --  113   ALT (SGPT) U/L  --   --   --   --  8   AST (SGOT) U/L  --   --   --   --  6   ANION GAP mmol/L 12.0 16.0* 13.0   < > 11.0    < > = values in this interval not displayed.     Estimated Creatinine Clearance: 26.1 mL/min (A) (by C-G formula based on SCr of 2.59 mg/dL (H)).  Results from last 7 days   Lab Units 07/05/20 0755 07/03/20 2022 07/03/20  0844 07/02/20  2122   MAGNESIUM mg/dL  --   --  2.0 2.2   PHOSPHORUS mg/dL 1.7* 4.1 1.5*  --          Results from last 7 days   Lab Units 07/05/20 0755 07/04/20  0805 07/03/20  0844 07/02/20 2122   WBC 10*3/mm3 6.88 8.93 14.12* 9.58   HEMOGLOBIN g/dL 10.5* 13.1 13.9 13.7   HEMATOCRIT % 29.2* 38.2 42.4 42.0   PLATELETS 10*3/mm3 146 148 177 174           Culture Data:   Blood Culture   Date Value Ref Range Status   07/03/2020 No growth at 2 days  Preliminary   07/02/2020 No growth at 2 days  Preliminary     Urine Culture   Date Value Ref Range Status   07/03/2020 >100,000 CFU/mL Mixed Gram Positive [place] (A)  Final     No results found for: RESPCX  No results found for: WOUNDCX  No results found for: STOOLCX  No components found for: BODYFLD    Radiology Data:   Imaging Results (Last 24 Hours)     Procedure Component Value Units Date/Time    US Renal Bilateral [[id number]] Collected:  07/05/20 0836     Updated:  07/05/20 0919    Narrative:       Ultrasound renal bilateral.    HISTORY: Renal insufficiency. Evaluate for obstruction.        Prior exam: Ultrasound kidneys November 17, 2019. CT November 16, 2019.    FINDINGS: The right kidney is normal size 10.6 x 5.0 x 5.0 cm.  Normal renal cortical echogenicity and cortical thickness. No  masses calculi or evidence of obstruction.    The left kidney measures 10.8 x 5.9 x 5.4 cm.    Mild hydronephrotic changes. Dilatation of calyces and left renal  pelvis. This demonstrates a change since prior ultrasound  examination.    Urinary bladder surgically absent.          Impression:       Interval development of mild hydronephrotic changes  left kidney.. In this patient with prior cystectomy and creation  of a neobladder, these hydronephrotic changes may be secondary  either to obstruction or reflux.    Electronically signed by:  Matt Packer MD  7/5/2020 9:18 AM CDT  Workstation: [id number]          I have reviewed the patient's current medications.     Assessment/Plan     Active Hospital Problems    Diagnosis   • Diabetic ketoacidosis without coma associated with type 2 diabetes mellitus (CMS/[facility])       Plan:    1.  DKA: Resolved.  2.  Acute kidney injury: Patient with baseline creatinine about 2, creatinine was greater than 3 yesterday, now better..  Nephrology is following.  Renal ultrasound showed hydronephrosis on the left.  Renal function improved with placement.  Acute kidney injury at least partially secondary to obstruction.  With history of bladder outlet obstruction.Dr Brody consulted urology.  3.  Metabolic encephalopathy: Likely multifactorial.  Patient with severe hyperglycemia on presentation.  Urine culture also showed mixed birgit.  Empirically on antibiotics.  Mentation is better.  4.  Coronary artery disease: Continue current medication.  5.  Nausea/vomiting/diarrhea: No fever or leukocytosis.  Etiology unclear at this point.  Continue to monitor.  6.  Non-anion gap metabolic acidosis: Improving.  Likely secondary to acute kidney injury.  7.  Severe hypokalemia: Replace and recheck per  protocol.  8.  DVT prophylaxis: Heparin.    The patient was evaluated during the global COVID-19 pandemic, and the diagnosis was suspected/considered upon their initial presentation.  Evaluation, treatment, and testing were consistent with current guidelines for patients who present with complaints or symptoms that may be related to COVID-19.    Discharge Planning: I expect patient to be discharged to home in 3-4 days.        This document has been electronically signed by Art Colin MD on July 5, 2020 13:32

## 2020-07-06 ENCOUNTER — APPOINTMENT (OUTPATIENT)
Dept: INTERVENTIONAL RADIOLOGY/VASCULAR | Facility: HOSPITAL | Age: 65
End: 2020-07-06

## 2020-07-06 ENCOUNTER — APPOINTMENT (OUTPATIENT)
Dept: ULTRASOUND IMAGING | Facility: HOSPITAL | Age: 65
End: 2020-07-06

## 2020-07-06 LAB
ALBUMIN SERPL-MCNC: 2.8 G/DL (ref 3.5–5.2)
ALBUMIN/GLOB SERPL: 1.5 G/DL
ALP SERPL-CCNC: 60 U/L (ref 39–117)
ALT SERPL W P-5'-P-CCNC: 10 U/L (ref 1–41)
ANION GAP SERPL CALCULATED.3IONS-SCNC: 11 MMOL/L (ref 5–15)
AST SERPL-CCNC: 13 U/L (ref 1–40)
BASOPHILS # BLD AUTO: 0.01 10*3/MM3 (ref 0–0.2)
BASOPHILS NFR BLD AUTO: 0.2 % (ref 0–1.5)
BILIRUB SERPL-MCNC: 0.4 MG/DL (ref 0.2–1.2)
BUN SERPL-MCNC: 65 MG/DL (ref 8–23)
BUN/CREAT SERPL: 31.6 (ref 7–25)
CALCIUM SPEC-SCNC: 6.4 MG/DL (ref 8.6–10.5)
CHLORIDE SERPL-SCNC: 108 MMOL/L (ref 98–107)
CO2 SERPL-SCNC: 23 MMOL/L (ref 22–29)
CREAT SERPL-MCNC: 2.06 MG/DL (ref 0.76–1.27)
DEPRECATED RDW RBC AUTO: 43.6 FL (ref 37–54)
EOSINOPHIL # BLD AUTO: 0.08 10*3/MM3 (ref 0–0.4)
EOSINOPHIL NFR BLD AUTO: 1.3 % (ref 0.3–6.2)
ERYTHROCYTE [DISTWIDTH] IN BLOOD BY AUTOMATED COUNT: 14 % (ref 12.3–15.4)
GFR SERPL CREATININE-BSD FRML MDRD: 33 ML/MIN/1.73
GLOBULIN UR ELPH-MCNC: 1.9 GM/DL
GLUCOSE BLDC GLUCOMTR-MCNC: 130 MG/DL (ref 70–130)
GLUCOSE BLDC GLUCOMTR-MCNC: 208 MG/DL (ref 70–130)
GLUCOSE BLDC GLUCOMTR-MCNC: 267 MG/DL (ref 70–130)
GLUCOSE BLDC GLUCOMTR-MCNC: 273 MG/DL (ref 70–130)
GLUCOSE SERPL-MCNC: 137 MG/DL (ref 65–99)
HCT VFR BLD AUTO: 27.9 % (ref 37.5–51)
HGB BLD-MCNC: 9.6 G/DL (ref 13–17.7)
IMM GRANULOCYTES # BLD AUTO: 0.03 10*3/MM3 (ref 0–0.05)
IMM GRANULOCYTES NFR BLD AUTO: 0.5 % (ref 0–0.5)
LYMPHOCYTES # BLD AUTO: 1.07 10*3/MM3 (ref 0.7–3.1)
LYMPHOCYTES NFR BLD AUTO: 17.1 % (ref 19.6–45.3)
MAGNESIUM SERPL-MCNC: 1.3 MG/DL (ref 1.6–2.4)
MCH RBC QN AUTO: 29.3 PG (ref 26.6–33)
MCHC RBC AUTO-ENTMCNC: 34.4 G/DL (ref 31.5–35.7)
MCV RBC AUTO: 85.1 FL (ref 79–97)
MONOCYTES # BLD AUTO: 0.55 10*3/MM3 (ref 0.1–0.9)
MONOCYTES NFR BLD AUTO: 8.8 % (ref 5–12)
NEUTROPHILS NFR BLD AUTO: 4.52 10*3/MM3 (ref 1.7–7)
NEUTROPHILS NFR BLD AUTO: 72.1 % (ref 42.7–76)
NRBC BLD AUTO-RTO: 0.5 /100 WBC (ref 0–0.2)
PHOSPHATE SERPL-MCNC: 2.1 MG/DL (ref 2.5–4.5)
PHOSPHATE SERPL-MCNC: 2.5 MG/DL (ref 2.5–4.5)
PLATELET # BLD AUTO: 154 10*3/MM3 (ref 140–450)
PMV BLD AUTO: 11 FL (ref 6–12)
POTASSIUM SERPL-SCNC: 2.2 MMOL/L (ref 3.5–5.2)
POTASSIUM SERPL-SCNC: 2.5 MMOL/L (ref 3.5–5.2)
PROT SERPL-MCNC: 4.7 G/DL (ref 6–8.5)
RBC # BLD AUTO: 3.28 10*6/MM3 (ref 4.14–5.8)
SODIUM SERPL-SCNC: 142 MMOL/L (ref 136–145)
SODIUM UR-SCNC: 60 MMOL/L
WBC # BLD AUTO: 6.26 10*3/MM3 (ref 3.4–10.8)

## 2020-07-06 PROCEDURE — 84100 ASSAY OF PHOSPHORUS: CPT | Performed by: INTERNAL MEDICINE

## 2020-07-06 PROCEDURE — 92526 ORAL FUNCTION THERAPY: CPT | Performed by: SPEECH-LANGUAGE PATHOLOGIST

## 2020-07-06 PROCEDURE — 85025 COMPLETE CBC W/AUTO DIFF WBC: CPT | Performed by: INTERNAL MEDICINE

## 2020-07-06 PROCEDURE — 25010000003 POTASSIUM CHLORIDE 10 MEQ/100ML SOLUTION: Performed by: HOSPITALIST

## 2020-07-06 PROCEDURE — 36410 VNPNXR 3YR/> PHY/QHP DX/THER: CPT

## 2020-07-06 PROCEDURE — 76937 US GUIDE VASCULAR ACCESS: CPT

## 2020-07-06 PROCEDURE — C1751 CATH, INF, PER/CENT/MIDLINE: HCPCS

## 2020-07-06 PROCEDURE — 94799 UNLISTED PULMONARY SVC/PX: CPT

## 2020-07-06 PROCEDURE — 25010000002 CEFTRIAXONE PER 250 MG: Performed by: FAMILY MEDICINE

## 2020-07-06 PROCEDURE — 84132 ASSAY OF SERUM POTASSIUM: CPT | Performed by: INTERNAL MEDICINE

## 2020-07-06 PROCEDURE — 25010000002 HEPARIN (PORCINE) PER 1000 UNITS: Performed by: FAMILY MEDICINE

## 2020-07-06 PROCEDURE — 97116 GAIT TRAINING THERAPY: CPT

## 2020-07-06 PROCEDURE — 63710000001 INSULIN ASPART PER 5 UNITS: Performed by: FAMILY MEDICINE

## 2020-07-06 PROCEDURE — 25010000002 MAGNESIUM SULFATE IN D5W 1G/100ML (PREMIX) 1-5 GM/100ML-% SOLUTION: Performed by: INTERNAL MEDICINE

## 2020-07-06 PROCEDURE — 82962 GLUCOSE BLOOD TEST: CPT

## 2020-07-06 PROCEDURE — 83735 ASSAY OF MAGNESIUM: CPT | Performed by: INTERNAL MEDICINE

## 2020-07-06 PROCEDURE — 05HB33Z INSERTION OF INFUSION DEVICE INTO RIGHT BASILIC VEIN, PERCUTANEOUS APPROACH: ICD-10-PCS | Performed by: HOSPITALIST

## 2020-07-06 PROCEDURE — 25010000002 ONDANSETRON PER 1 MG: Performed by: HOSPITALIST

## 2020-07-06 PROCEDURE — 80053 COMPREHEN METABOLIC PANEL: CPT | Performed by: INTERNAL MEDICINE

## 2020-07-06 PROCEDURE — 97530 THERAPEUTIC ACTIVITIES: CPT

## 2020-07-06 PROCEDURE — 84300 ASSAY OF URINE SODIUM: CPT | Performed by: INTERNAL MEDICINE

## 2020-07-06 RX ORDER — POTASSIUM CHLORIDE 750 MG/1
40 CAPSULE, EXTENDED RELEASE ORAL EVERY 4 HOURS
Status: COMPLETED | OUTPATIENT
Start: 2020-07-07 | End: 2020-07-07

## 2020-07-06 RX ORDER — MAGNESIUM SULFATE 1 G/100ML
1 INJECTION INTRAVENOUS EVERY 4 HOURS
Status: COMPLETED | OUTPATIENT
Start: 2020-07-06 | End: 2020-07-06

## 2020-07-06 RX ORDER — ACETYLCYSTEINE 100 MG/ML
4 SOLUTION ORAL; RESPIRATORY (INHALATION) EVERY 8 HOURS
Status: DISCONTINUED | OUTPATIENT
Start: 2020-07-06 | End: 2020-07-06

## 2020-07-06 RX ORDER — ACETYLCYSTEINE 100 MG/ML
4 SOLUTION ORAL; RESPIRATORY (INHALATION) EVERY 8 HOURS
Status: DISCONTINUED | OUTPATIENT
Start: 2020-07-06 | End: 2020-07-10 | Stop reason: HOSPADM

## 2020-07-06 RX ORDER — MAGNESIUM HYDROXIDE 1200 MG/15ML
50 LIQUID ORAL EVERY 8 HOURS
Status: DISCONTINUED | OUTPATIENT
Start: 2020-07-06 | End: 2020-07-06

## 2020-07-06 RX ORDER — MAGNESIUM HYDROXIDE 1200 MG/15ML
50 LIQUID ORAL EVERY 8 HOURS
Status: DISCONTINUED | OUTPATIENT
Start: 2020-07-06 | End: 2020-07-10 | Stop reason: HOSPADM

## 2020-07-06 RX ORDER — POTASSIUM CHLORIDE 7.45 MG/ML
10 INJECTION INTRAVENOUS
Status: DISPENSED | OUTPATIENT
Start: 2020-07-07 | End: 2020-07-07

## 2020-07-06 RX ORDER — SODIUM CHLORIDE, SODIUM LACTATE, POTASSIUM CHLORIDE, CALCIUM CHLORIDE 600; 310; 30; 20 MG/100ML; MG/100ML; MG/100ML; MG/100ML
75 INJECTION, SOLUTION INTRAVENOUS CONTINUOUS
Status: DISCONTINUED | OUTPATIENT
Start: 2020-07-06 | End: 2020-07-08

## 2020-07-06 RX ADMIN — SODIUM BICARBONATE 75 MEQ: 84 INJECTION, SOLUTION INTRAVENOUS at 01:12

## 2020-07-06 RX ADMIN — CEFTRIAXONE SODIUM 1 G: 1 INJECTION, POWDER, FOR SOLUTION INTRAMUSCULAR; INTRAVENOUS at 11:43

## 2020-07-06 RX ADMIN — MAGNESIUM SULFATE HEPTAHYDRATE 1 G: 1 INJECTION, SOLUTION INTRAVENOUS at 14:06

## 2020-07-06 RX ADMIN — HEPARIN SODIUM 5000 UNITS: 5000 INJECTION INTRAVENOUS; SUBCUTANEOUS at 22:49

## 2020-07-06 RX ADMIN — SODIUM CHLORIDE, PRESERVATIVE FREE 10 ML: 5 INJECTION INTRAVENOUS at 08:40

## 2020-07-06 RX ADMIN — SODIUM BICARBONATE 75 MEQ: 84 INJECTION, SOLUTION INTRAVENOUS at 10:56

## 2020-07-06 RX ADMIN — SODIUM CHLORIDE, PRESERVATIVE FREE 10 ML: 5 INJECTION INTRAVENOUS at 22:50

## 2020-07-06 RX ADMIN — SODIUM CHLORIDE 50 ML: 900 IRRIGANT IRRIGATION at 18:44

## 2020-07-06 RX ADMIN — MAGNESIUM SULFATE HEPTAHYDRATE 1 G: 1 INJECTION, SOLUTION INTRAVENOUS at 10:56

## 2020-07-06 RX ADMIN — POTASSIUM CHLORIDE 10 MEQ: 7.46 INJECTION, SOLUTION INTRAVENOUS at 12:02

## 2020-07-06 RX ADMIN — SERTRALINE HYDROCHLORIDE 50 MG: 50 TABLET ORAL at 08:40

## 2020-07-06 RX ADMIN — HEPARIN SODIUM 5000 UNITS: 5000 INJECTION INTRAVENOUS; SUBCUTANEOUS at 14:07

## 2020-07-06 RX ADMIN — ONDANSETRON 4 MG: 2 INJECTION INTRAMUSCULAR; INTRAVENOUS at 09:23

## 2020-07-06 RX ADMIN — POTASSIUM CHLORIDE 10 MEQ: 7.46 INJECTION, SOLUTION INTRAVENOUS at 02:07

## 2020-07-06 RX ADMIN — POTASSIUM CHLORIDE 10 MEQ: 7.46 INJECTION, SOLUTION INTRAVENOUS at 01:11

## 2020-07-06 RX ADMIN — ACETYLCYSTEINE 4 ML: 100 SOLUTION ORAL; RESPIRATORY (INHALATION) at 18:44

## 2020-07-06 RX ADMIN — POTASSIUM CHLORIDE 40 MEQ: 10 CAPSULE, COATED, EXTENDED RELEASE ORAL at 08:40

## 2020-07-06 RX ADMIN — INSULIN ASPART 12 UNITS: 100 INJECTION, SOLUTION INTRAVENOUS; SUBCUTANEOUS at 11:41

## 2020-07-06 RX ADMIN — INSULIN ASPART 8 UNITS: 100 INJECTION, SOLUTION INTRAVENOUS; SUBCUTANEOUS at 18:39

## 2020-07-06 RX ADMIN — PRAVASTATIN SODIUM 40 MG: 40 TABLET ORAL at 22:48

## 2020-07-06 RX ADMIN — FLUTICASONE PROPIONATE 2 SPRAY: 50 SPRAY, METERED NASAL at 08:43

## 2020-07-06 RX ADMIN — BUDESONIDE AND FORMOTEROL FUMARATE DIHYDRATE 2 PUFF: 160; 4.5 AEROSOL RESPIRATORY (INHALATION) at 08:31

## 2020-07-06 RX ADMIN — POTASSIUM CHLORIDE 10 MEQ: 7.46 INJECTION, SOLUTION INTRAVENOUS at 23:12

## 2020-07-06 RX ADMIN — POTASSIUM CHLORIDE 10 MEQ: 7.46 INJECTION, SOLUTION INTRAVENOUS at 14:10

## 2020-07-06 RX ADMIN — HEPARIN SODIUM 5000 UNITS: 5000 INJECTION INTRAVENOUS; SUBCUTANEOUS at 06:03

## 2020-07-06 RX ADMIN — POTASSIUM PHOSPHATE, MONOBASIC AND POTASSIUM PHOSPHATE, DIBASIC 15 MMOL: 224; 236 INJECTION, SOLUTION, CONCENTRATE INTRAVENOUS at 06:05

## 2020-07-06 RX ADMIN — ASPIRIN 81 MG: 81 TABLET, COATED ORAL at 08:39

## 2020-07-06 RX ADMIN — CARVEDILOL 3.12 MG: 3.12 TABLET, FILM COATED ORAL at 22:49

## 2020-07-06 RX ADMIN — SODIUM CHLORIDE 50 ML: 900 IRRIGANT IRRIGATION at 14:16

## 2020-07-06 RX ADMIN — CARVEDILOL 3.12 MG: 3.12 TABLET, FILM COATED ORAL at 08:40

## 2020-07-06 RX ADMIN — SODIUM CHLORIDE, POTASSIUM CHLORIDE, SODIUM LACTATE AND CALCIUM CHLORIDE 100 ML/HR: 600; 310; 30; 20 INJECTION, SOLUTION INTRAVENOUS at 14:05

## 2020-07-06 RX ADMIN — BUDESONIDE AND FORMOTEROL FUMARATE DIHYDRATE 2 PUFF: 160; 4.5 AEROSOL RESPIRATORY (INHALATION) at 20:31

## 2020-07-06 RX ADMIN — CLOPIDOGREL BISULFATE 75 MG: 75 TABLET ORAL at 08:40

## 2020-07-06 RX ADMIN — PANTOPRAZOLE SODIUM 40 MG: 40 TABLET, DELAYED RELEASE ORAL at 06:03

## 2020-07-06 NOTE — THERAPY TREATMENT NOTE
"Acute Care - Speech Language Pathology   Swallow Treatment Note [facility]     Patient Name: Favio Casillas  : 1955  MRN: [id number]  Today's Date: 2020  Onset of Illness/Injury or Date of Surgery: 20     Referring Physician: WILMAR Sanchez MD      Admit Date: 2020     Goal:  Patient will safely tolerate least restricted diet w/no overt s/s of aspiration for adequate nutrition and hydration:  Pt seen for dysphagia therapy this date.  Pt c/o of n/v and demonstrated dry heaving and expectorated saliva.  Pt stated that the \"thought of eating\" would elicit n/v.  Pt was able to begin to consume thin liquids, puree solids and mech soft solids slowly.  Pt safely tolerated all consistencies trialed this date w/no difficulty and no overt s/s of aspiration.  SLP recommends advancing diet to mech soft solids/thin liquids at this time.  SLP discussed recommendations and POC and pt was in agreement.    Visit Dx:      ICD-10-CM ICD-9-CM   1. Diabetic ketoacidosis without coma associated with type 2 diabetes mellitus (CMS/HCC) E11.10 250.12   2. Encephalopathy acute G93.40 348.30   3. Acute renal failure superimposed on chronic kidney disease, unspecified CKD stage, unspecified acute renal failure type (CMS/HCC) N17.9 584.9    N18.9 585.9   4. Dysphagia, unspecified type R13.10 787.20   5. Decreased functional mobility R26.89 781.99   6. Impaired mobility and ADLs Z74.09 V49.89    Z78.9      Patient Active Problem List   Diagnosis   • Type 2 diabetes mellitus (CMS/HCC)   • Acute pain of right shoulder   • Shoulder impingement syndrome, right   • Chest pain   • Acute renal insufficiency   • Chronic hepatitis C virus infection (CMS/HCC)   • Gastritis   • SBO (small bowel obstruction) (CMS/HCC)   • CAD (coronary artery disease)   • Acute kidney injury (nontraumatic) (CMS/HCC)   • Intractable vomiting with nausea   • Gastroesophageal reflux disease with esophagitis   • Diarrhea   • Generalized abdominal pain "   • History of colon polyps   • History of colitis   • Acute metabolic encephalopathy   • NSTEMI (non-ST elevated myocardial infarction) (CMS/[facility])   • Acute renal failure superimposed on stage 3 chronic kidney disease (CMS/HCC)   • Influenza A   • Anemia, chronic disease   • Bladder outflow obstruction   • Acute kidney injury (CMS/HCC)   • Acute urinary tract infection   • Metabolic acidosis   • Hypokalemia   • Acute renal failure superimposed on chronic kidney disease (CMS/HCC)   • Diabetic ketoacidosis without coma associated with type 2 diabetes mellitus (CMS/[facility])       Therapy Treatment  Rehabilitation Treatment Summary     Row Name 07/06/20 0753             Treatment Time/Intention    Discipline  speech language pathologist  -CK      Document Type  therapy note (daily note)  -CK      Subjective Information  complains of;nausea/vomiting  -CK      Mode of Treatment  speech-language pathology;individual therapy  -CK      Patient/Family Observations  Pt supine in bed; no family present  -CK      Care Plan Review  evaluation/treatment results reviewed;care plan/treatment goals reviewed;risks/benefits reviewed;current/potential barriers reviewed;patient/other agree to care plan  -CK2      Patient Effort  good  -CK      Recorded by [CK] Teresa Ardon MS CCC-SLP 07/06/20 0808  [CK2] Teresa Ardon MS CCC-SLP 07/06/20 0821      Row Name 07/06/20 0800 07/06/20 0753          Positioning and Restraints    Pre-Treatment Position  --  -CK  in bed  -CK     Post Treatment Position  --  bed  -CK     In Bed  --  sitting;call light within reach;encouraged to call for assist;exit alarm on  -CK     Recorded by [CK] Teresa Ardon MS CCC-SLP 07/06/20 0821 [CK] Teresa Ardon MS CCC-SLP 07/06/20 0821     Row Name 07/06/20 0800 07/06/20 0753          Pain Scale: Numbers Pre/Post-Treatment    Pain Scale: Numbers, Pretreatment  --  -CK  0/10 - no pain  -CK     Pain Scale: Numbers, Post-Treatment  --  0/10 - no pain   "-CK     Recorded by [CK] Teresa Ardon MS CCC-SLP 07/06/20 0821 [CK] Teresa Ardon MS CCC-SLP 07/06/20 0821     Row Name 07/06/20 0800 07/06/20 0753          Plan of Care Review    Plan of Care Reviewed With  --  -CK  patient  -CK     Progress  --  -CK  improving  -CK     Outcome Summary  --  -CK  Pt seen for dysphagia therapy this date.  Pt c/o of n/v and demonstrated dry heaving and expectorated saliva.  Pt stated that the \"thought of eating\" would elicit n/v.  Pt was able to begin to consume thin liquids, puree solids and mech soft solids slowly.  Pt safely tolerated all consistencies trialed this date w/no difficulty and no overt s/s of aspiration.  SLP recommends advancing diet to mech soft solids/thin liquids at this time.  SLP discussed recommendations and POC and pt was in agreement.  -CK     Recorded by [CK] Teresa Ardon MS CCC-SLP 07/06/20 0821 [CK] Teresa Ardon MS CCC-SLP 07/06/20 0821     Row Name 07/06/20 0800 07/06/20 0753          Outcome Summary/Treatment Plan (SLP)    Daily Summary of Progress (SLP)  --  -CK  progress toward functional goals is good  -CK     Barriers to Overall Progress (SLP)  --  -CK  n/v  -CK     Plan for Continued Treatment (SLP)  --  -CK  Advance diet to mech soft/thin; trial regular solids  -CK     Anticipated Dischage Disposition (SLP)  --  -CK  unknown  -CK     Recorded by [CK] Teresa Ardon MS CCC-SLP 07/06/20 0821 [CK] Teresa Ardon MS CCC-SLP 07/06/20 0821       User Key  (r) = Recorded By, (t) = Taken By, (c) = Cosigned By    Initials Name Effective Dates Discipline    CK Teresa Ardon MS CCC-SLP 02/11/20 -  SLP          Outcome Summary  Outcome Summary/Treatment Plan (SLP)  Daily Summary of Progress (SLP): progress toward functional goals is good (07/06/20 0753 : Teresa Ardon MS CCC-SLP)  Barriers to Overall Progress (SLP): n/v (07/06/20 0753 : Teresa Ardon, MS CCC-SLP)  Plan for Continued Treatment (SLP): Advance " diet to [facility] soft/thin; trial regular solids (07/06/20 0753 : Teresa Ardon, MS CCC-SLP)  Anticipated Dischage Disposition (SLP): unknown (07/06/20 0753 : Teresa Ardon, MS [facility]-SLP)      SLP GOALS     Row Name 07/06/20 0753 07/04/20 1252          Oral Nutrition/Hydration Goal 1 (SLP)    Oral Nutrition/Hydration Goal 1, SLP  Pt to tolerate least restrictive diet with no s/s of aspiration for adequate nutrition and hydration.   -CK  Pt to tolerate least restrictive diet with no s/s of aspiration for adequate nutrition and hydration.   -EK     Time Frame (Oral Nutrition/Hydration Goal 1, SLP)  by discharge  -CK  by discharge  -EK     Barriers (Oral Nutrition/Hydration Goal 1, SLP)  nausea/vomiting  -CK  --     Progress/Outcomes (Oral Nutrition/Hydration Goal 1, SLP)  good progress toward goal;goal ongoing  -CK  other (see comments) new goal   -EK       User Key  (r) = Recorded By, (t) = Taken By, (c) = Cosigned By    Initials Name Provider Type    EK Mariama Ardon, CCC-SLP Speech and Language Pathologist    CK Teresa Ardon, MS CCC-SLP Speech and Language Pathologist          EDUCATION  The patient has been educated in the following areas:   Dysphagia (Swallowing Impairment) Modified Diet Instruction.    SLP Recommendation and Plan  Daily Summary of Progress (SLP): progress toward functional goals is good  Barriers to Overall Progress (SLP): n/v  Plan for Continued Treatment (SLP): Advance diet to [facility] soft/thin; trial regular solids  Anticipated Dischage Disposition (SLP): unknown                    Time Calculation:   Time Calculation- SLP     Row Name 07/06/20 0822             Time Calculation- SLP    SLP Start Time  0753  -CK      SLP Stop Time  0824  -CK      SLP Time Calculation (min)  31 min  -CK      Total Timed Code Minutes- SLP  31 minute(s)  -CK      SLP Received On  07/06/20  -CK      SLP Goal Re-Cert Due Date  07/17/20  -CK        User Key  (r) = Recorded By, (t) = Taken By,  (c) = Cosigned By    Initials Name Provider Type    CK Teresa Ardon, MS CCC-SLP Speech and Language Pathologist          Therapy Charges for Today     Code Description Service Date Service Provider Modifiers Qty    [id number]  ST TREATMENT SWALLOW 2 7/6/2020 Teresa Ardon, MS VANGIE-SLP GN 1                 Teresa Ardon MS CCC-LUISA  7/6/2020

## 2020-07-06 NOTE — PROGRESS NOTES
"[facility]  [address] [place] [address]. [address]  T - [phone]  F - [phone]     Progress Note          PATIENT  DEMOGRAPHICS   PATIENT NAME: Favio Casillas                      PHYSICIAN: Rodríguez Brody MD  : 1955  MRN: [id number]   LOS: 4 days    Patient Care Team:  Shayne Merritt MD as PCP - General (Family Medicine)  Kevin Robbins PA-C as PCP - Claims Attributed  Kevin Robbins PA-C as Physician Assistant (Gastroenterology)  Harvinder Robert MD  Subjective   SUBJECTIVE   More alert, feels better , good UO         Objective   OBJECTIVE   Vital Signs  Temp:  [96.4 °F (35.8 °C)-99.5 °F (37.5 °C)] 98.8 °F (37.1 °C)  Heart Rate:  [66-88] 71  Resp:  [16-20] 16  BP: (103-159)/(61-87) 159/87    Flowsheet Rows      First Filed Value   Admission Height  170.2 cm (67.01\") Documented at 2020 1113   Admission Weight  75.4 kg (166 lb 3.2 oz) Documented at 2020 1112           I/O last 3 completed shifts:  In: 5570.4 [P.O.:360; I.V.:5210.4]  Out: 4825 [Urine:4825]    PHYSICAL EXAM    Physical Exam   Constitutional: He is oriented to person, place, and time. He appears well-developed.   HENT:   Head: Normocephalic.   Eyes: Pupils are equal, round, and reactive to light.   Cardiovascular: Normal rate, regular rhythm and normal heart sounds.   Pulmonary/Chest: Effort normal and breath sounds normal.   Abdominal: Soft. Bowel sounds are normal.   Musculoskeletal: He exhibits no edema.   Neurological: He is alert and oriented to person, place, and time.       RESULTS   Results Review:    Results from last 7 days   Lab Units 20  0551 20  0755 20  0959  20  2122   SODIUM mmol/L 142 143 140   < > 120*   POTASSIUM mmol/L 2.2* 2.1* 3.5   < > 4.4   CHLORIDE mmol/L 108* 113* 118*   < > 103   CO2 mmol/L 23.0 18.0* 6.0*   < > 6.0*   BUN mg/dL 65* 89* 111*   < > 127*   CREATININE mg/dL 2.06* 2.59* 3.31*   < > 3.57*   CALCIUM mg/dL 6.4* 7.3* " 8.2*   < > 8.7   BILIRUBIN mg/dL 0.4  --   --   --  0.5   ALK PHOS U/L 60  --   --   --  113   ALT (SGPT) U/L 10  --   --   --  8   AST (SGOT) U/L 13  --   --   --  6   GLUCOSE mg/dL 137* 191* 339*   < > 648*    < > = values in this interval not displayed.       Estimated Creatinine Clearance: 32.7 mL/min (A) (by C-G formula based on SCr of 2.06 mg/dL (H)).    Results from last 7 days   Lab Units 07/06/20  0551 07/06/20  0124 07/05/20 0755 07/03/20 2022 07/03/20  0844 07/02/20  2122   MAGNESIUM mg/dL 1.3*  --   --   --  2.0  --  2.2   PHOSPHORUS mg/dL  --  2.5 1.7* 4.1 1.5*   < >  --     < > = values in this interval not displayed.             Results from last 7 days   Lab Units 07/06/20  0551 07/05/20  0755 07/04/20  0805 07/03/20 0844 07/02/20  2122   WBC 10*3/mm3 6.26 6.88 8.93 14.12* 9.58   HEMOGLOBIN g/dL 9.6* 10.5* 13.1 13.9 13.7   PLATELETS 10*3/mm3 154 146 148 177 174               Imaging Results (Last 24 Hours)     ** No results found for the last 24 hours. **           MEDICATIONS      sodium chloride 50 mL Irrigation Q8H   Or      acetylcysteine 4 mL Oral Q8H   aspirin 81 mg Oral Daily   budesonide-formoterol 2 puff Inhalation BID - RT   carvedilol 3.125 mg Oral Q12H   cefTRIAXone 1 g Intravenous Q24H   clopidogrel 75 mg Oral Daily   fluticasone 2 spray Nasal Daily   heparin (porcine) 5,000 Units Subcutaneous Q8H   insulin aspart 0-24 Units Subcutaneous TID AC   magnesium sulfate 1 g Intravenous Q4H   pantoprazole 40 mg Oral QAM   pravastatin 40 mg Oral Nightly   sertraline 50 mg Oral Daily   sodium chloride 1,000 mL Intravenous Once   sodium chloride 10 mL Intravenous Q12H       sodium bicarbonate drip less than 75 mEq/bag 75 mEq Last Rate: 75 mEq (07/06/20 1056)   sodium chloride 30 mL/hr        Assessment/Plan   ASSESSMENT / PLAN      Diabetic ketoacidosis without coma associated with type 2 diabetes mellitus (CMS/HCC)    1.acute kidney injury on CKD 3 with baseline creatinine close to 2.  He has  now creatinine up to 3.3.  His bladder appears to be distended and he has suprapubic fullness.  Patient has prior history of bladder outlet obstruction.  He also has history of recurrent acute kidney injury in the last couple of years.    He has left hydro onu/s, good UO post liu. Cr is better and at baseline Appreciate Urology input and plan for ct cystogram. Change bicarb to LR. Replace kcl aggressively. Patient does do self-catheterization at home in the past. Check k later    2.severe normal anion gap metabolic acidosis possibly due to acute kidney injury/obstruction.  Now better.    3.altered mental status with severe hyperglycemia.  Patient urine culture showed mixed growth.  He Is currently on ceftriaxone    4.Bladder cancer requiring neobladder surgery almost 10 years ago.  Also has a history of prostate cancer.    5.history of non-ST elevation MI and stenting in the past.                This document has been electronically signed by Rodríguez Brody MD on July 6, 2020 11:45

## 2020-07-06 NOTE — PROGRESS NOTES
[facility] Medicine Services  INPATIENT PROGRESS NOTE    Length of Stay: 4  Date of Admission: 7/2/2020  Primary Care Physician: Shayne Merritt MD    Subjective   Chief Complaint: Confusion  HPI: Patient states he still having a little bit of diarrhea, but the vomiting is better.  Overall feeling some better.    Review of Systems   Constitutional: Positive for activity change. Negative for appetite change, chills, fatigue and fever.   Respiratory: Negative for chest tightness and shortness of breath.    Cardiovascular: Negative for chest pain, palpitations and leg swelling.   Gastrointestinal: Positive for diarrhea. Negative for abdominal pain, constipation, nausea and vomiting.   Skin: Negative for wound.   Neurological: Negative for dizziness, weakness, light-headedness, numbness and headaches.      All pertinent negatives and positives are as above. All other systems have been reviewed and are negative unless otherwise stated.     Objective    Temp:  [96.4 °F (35.8 °C)-99.5 °F (37.5 °C)] 98.8 °F (37.1 °C)  Heart Rate:  [66-82] 70  Resp:  [16-18] 18  BP: (103-159)/(61-87) 121/68    Physical Exam   Constitutional: He appears well-developed and well-nourished.   HENT:   Head: Normocephalic and atraumatic.   Eyes: Pupils are equal, round, and reactive to light. EOM are normal.   Neck: Normal range of motion. Neck supple.   Cardiovascular: Normal rate, regular rhythm, normal heart sounds and intact distal pulses. Exam reveals no gallop and no friction rub.   No murmur heard.  Pulmonary/Chest: Effort normal and breath sounds normal. No respiratory distress. He has no wheezes. He has no rales. He exhibits no tenderness.   Abdominal: Soft. Bowel sounds are normal. He exhibits no distension. There is no tenderness.   Psychiatric: He has a normal mood and affect. His behavior is normal.   Vitals reviewed.    Results Review:  I have reviewed the labs, radiology results,  and diagnostic studies.    Laboratory Data:   Results from last 7 days   Lab Units 07/06/20  0551 07/05/20  0755 07/04/20  0959  07/02/20  2122   SODIUM mmol/L 142 143 140   < > 120*   POTASSIUM mmol/L 2.2* 2.1* 3.5   < > 4.4   CHLORIDE mmol/L 108* 113* 118*   < > 103   CO2 mmol/L 23.0 18.0* 6.0*   < > 6.0*   BUN mg/dL 65* 89* 111*   < > 127*   CREATININE mg/dL 2.06* 2.59* 3.31*   < > 3.57*   GLUCOSE mg/dL 137* 191* 339*   < > 648*   CALCIUM mg/dL 6.4* 7.3* 8.2*   < > 8.7   BILIRUBIN mg/dL 0.4  --   --   --  0.5   ALK PHOS U/L 60  --   --   --  113   ALT (SGPT) U/L 10  --   --   --  8   AST (SGOT) U/L 13  --   --   --  6   ANION GAP mmol/L 11.0 12.0 16.0*   < > 11.0    < > = values in this interval not displayed.     Estimated Creatinine Clearance: 32.7 mL/min (A) (by C-G formula based on SCr of 2.06 mg/dL (H)).  Results from last 7 days   Lab Units 07/06/20  0551 07/06/20  0124 07/05/20 0755 07/03/20 2022 07/03/20  0844 07/02/20  2122   MAGNESIUM mg/dL 1.3*  --   --   --  2.0  --  2.2   PHOSPHORUS mg/dL  --  2.5 1.7* 4.1 1.5*   < >  --     < > = values in this interval not displayed.         Results from last 7 days   Lab Units 07/06/20  0551 07/05/20 0755 07/04/20  0805 07/03/20  0844 07/02/20 2122   WBC 10*3/mm3 6.26 6.88 8.93 14.12* 9.58   HEMOGLOBIN g/dL 9.6* 10.5* 13.1 13.9 13.7   HEMATOCRIT % 27.9* 29.2* 38.2 42.4 42.0   PLATELETS 10*3/mm3 154 146 148 177 174           Culture Data:   No results found for: BLOODCX  Urine Culture   Date Value Ref Range Status   07/03/2020 >100,000 CFU/mL Mixed Gram Positive [place] (A)  Final     No results found for: RESPCX  No results found for: WOUNDCX  No results found for: STOOLCX  No components found for: BODYFLD    Radiology Data:   Imaging Results (Last 24 Hours)     Procedure Component Value Units Date/Time    US Guided Vascular Access [[id number]] Resulted:  07/06/20 1348     Updated:  07/06/20 1348    Narrative:       This procedure was auto-finalized with no  dictation required.    IR Insert Midline Without Port Pump 5 Plus [[id number]] Resulted:  07/06/20 1344     Updated:  07/06/20 1344    Narrative:       This procedure was auto-finalized with no dictation required.          I have reviewed the patient's current medications.     Assessment/Plan     Active Hospital Problems    Diagnosis   • Diabetic ketoacidosis without coma associated with type 2 diabetes mellitus (CMS/[facility])       Plan:    1.  DKA: Resolved.  2.  Acute kidney injury: Patient with baseline creatinine about 2, creatinine was greater than 3 yesterday, back to baseline.  Nephrology is following.  Renal ultrasound showed hydronephrosis on the left.  Renal function improved with placement.  Acute kidney injury at least partially secondary to obstruction.   3.  Metabolic encephalopathy: Likely multifactorial.  Patient with severe hyperglycemia on presentation.  Urine culture also showed mixed birgit.  Empirically on antibiotics.  Mentation is better.  4.  Coronary artery disease: Continue current medication.  5.  Nausea/vomiting/diarrhea: No fever or leukocytosis.  Etiology unclear at this point.  Continue to monitor.  6.  Non-anion gap metabolic acidosis: Improving.  Likely secondary to acute kidney injury.  7.  Severe hypokalemia: Replace and recheck per protocol.  8.  Urinary retention: Most likely secondary to increased mucus and sediment in his neobladder per Urology.  Plan for intra vesicular Mucomyst flushes.  9.  DVT prophylaxis: Heparin.    The patient was evaluated during the global COVID-19 pandemic, and the diagnosis was suspected/considered upon their initial presentation.  Evaluation, treatment, and testing were consistent with current guidelines for patients who present with complaints or symptoms that may be related to COVID-19.    Discharge Planning: I expect patient to be discharged to home in 3-4 days.        This document has been electronically signed by Art Colin MD on July 6,  2020 16:38

## 2020-07-06 NOTE — NURSING NOTE
Spoke with pharmacy about potassium levels and doses, waiting for lab to result at 1730 before restarting protocol

## 2020-07-06 NOTE — PLAN OF CARE
Problem: Patient Care Overview  Goal: Plan of Care Review  Outcome: Ongoing (interventions implemented as appropriate)  Flowsheets (Taken 7/6/2020 0915)  Plan of Care Reviewed With: patient  Outcome Summary: PT tx completed on this date. Patient AOx2, pleasant and voices complaint of nausea. After sitting EOB for 5 minutes patient had an episode of emesis. Patient requires superversion with supine<>sit transfer. Transfer: PT facilitated x10 sit<>stand transfers with CGA for safety. Patient ambulatead 28'x1, 10'x2 while holding onto hand rail and funitures. Patient unsteady throughout gait cycle, increased stance time of LLE, wide base of support, and slightly antalgic gait. Patient with good progress towards goals. Spoke with patient about getting his SPC brought up by his significant other. Patient reports he might need to hire someone to help take care of him at d/c. Recommend 24/7 care at d/c and PT services.

## 2020-07-06 NOTE — PLAN OF CARE
"  Problem: Patient Care Overview  Goal: Plan of Care Review  Outcome: Ongoing (interventions implemented as appropriate)  Flowsheets  Taken 7/6/2020 0821  Progress: improving  Plan of Care Reviewed With: patient  Taken 7/6/2020 3785  Outcome Summary: Pt seen for dysphagia therapy this date.  Pt c/o of n/v and demonstrated dry heaving and expectorated saliva.  Pt stated that the \"thought of eating\" would elicit n/v.  Pt was able to begin to consume thin liquids, puree solids and mech soft solids slowly.  Pt safely tolerated all consistencies trialed this date w/no difficulty and no overt s/s of aspiration.  SLP recommends advancing diet to mech soft solids/thin liquids at this time.  SLP discussed recommendations and POC and pt was in agreement.     "

## 2020-07-06 NOTE — THERAPY TREATMENT NOTE
Acute Care - Physical Therapy Treatment Note  [facility]     Patient Name: Favio Casillas  : 1955  MRN: [id number]  Today's Date: 2020  Onset of Illness/Injury or Date of Surgery: 20     Referring Physician: WILMAR Sanchez MD    Admit Date: 2020    Visit Dx:    ICD-10-CM ICD-9-CM   1. Diabetic ketoacidosis without coma associated with type 2 diabetes mellitus (CMS/HCC) E11.10 250.12   2. Encephalopathy acute G93.40 348.30   3. Acute renal failure superimposed on chronic kidney disease, unspecified CKD stage, unspecified acute renal failure type (CMS/HCC) N17.9 584.9    N18.9 585.9   4. Dysphagia, unspecified type R13.10 787.20   5. Decreased functional mobility R26.89 781.99   6. Impaired mobility and ADLs Z74.09 V49.89    Z78.9      Patient Active Problem List   Diagnosis   • Type 2 diabetes mellitus (CMS/HCC)   • Acute pain of right shoulder   • Shoulder impingement syndrome, right   • Chest pain   • Acute renal insufficiency   • Chronic hepatitis C virus infection (CMS/HCC)   • Gastritis   • SBO (small bowel obstruction) (CMS/HCC)   • CAD (coronary artery disease)   • Acute kidney injury (nontraumatic) (CMS/HCC)   • Intractable vomiting with nausea   • Gastroesophageal reflux disease with esophagitis   • Diarrhea   • Generalized abdominal pain   • History of colon polyps   • History of colitis   • Acute metabolic encephalopathy   • NSTEMI (non-ST elevated myocardial infarction) (CMS/HCC)   • Acute renal failure superimposed on stage 3 chronic kidney disease (CMS/HCC)   • Influenza A   • Anemia, chronic disease   • Bladder outflow obstruction   • Acute kidney injury (CMS/HCC)   • Acute urinary tract infection   • Metabolic acidosis   • Hypokalemia   • Acute renal failure superimposed on chronic kidney disease (CMS/HCC)   • Diabetic ketoacidosis without coma associated with type 2 diabetes mellitus (CMS/HCC)       Therapy Treatment    Rehabilitation Treatment Summary     Row Name 20  0840 07/06/20 0753          Treatment Time/Intention    Discipline  physical therapist  -LR  speech language pathologist  -CK     Document Type  therapy note (daily note)  -LR  therapy note (daily note)  -CK     Subjective Information  complains of;nausea/vomiting  -LR  complains of;nausea/vomiting  -CK     Mode of Treatment  individual therapy;physical therapy  -LR  speech-language pathology;individual therapy  -CK     Patient/Family Observations  --  Pt supine in bed; no family present  -CK     Care Plan Review  --  evaluation/treatment results reviewed;care plan/treatment goals reviewed;risks/benefits reviewed;current/potential barriers reviewed;patient/other agree to care plan  -CK2     Patient Effort  --  good  -CK     Recorded by [LR] Akash Murray 07/06/20 0903 [CK] Teresa Ardon, MS CCC-SLP 07/06/20 0808  [CK2] Teresa Ardon, MS CCC-SLP 07/06/20 0821     Row Name 07/06/20 0840             Vital Signs    Pre Systolic BP Rehab  124  -LR      Pre Treatment Diastolic BP  75  -LR      Pretreatment Heart Rate (beats/min)  82  -LR      Pre SpO2 (%)  98  -LR      O2 Delivery Pre Treatment  room air  -LR      Pre Patient Position  Supine  -LR      Recorded by [LR] Akash Murray 07/06/20 0903      Row Name 07/06/20 0840             Cognitive Assessment/Intervention- PT/OT    Orientation Status (Cognition)  oriented to;person;place;disoriented to;situation;time  -LR      Follows Commands (Cognition)  does not follow one step commands;75-90% accuracy  -LR      Safety Deficit (Cognitive)  mild deficit  -LR      Recorded by [LR] Akash Murray 07/06/20 0903      Row Name 07/06/20 0840             Bed Mobility Assessment/Treatment    Supine-Sit [place] (Bed Mobility)  supervision  -LR      Recorded by [LR] Akash Murray 07/06/20 0903      Row Name 07/06/20 0840             Sit-Stand Transfer    Sit-Stand [place] (Transfers)  contact guard  -LR      Recorded by [LR] Akash Murray 07/06/20 0903    "   Row Name 07/06/20 0840             Gait/Stairs Assessment/Training    Gait/Stairs Assessment/Training  gait/ambulation independence;gait/ambulation assistive device  -LR      [place] Level (Gait)  minimum assist (75% patient effort)  -LR      Assistive Device (Gait)  -- Hand rail  -LR      Distance in Feet (Gait)  28'x1, 10'x2  -LR2      Pattern (Gait)  step-through  -LR      Deviations/Abnormal Patterns (Gait)  antalgic;gait speed decreased;stride length decreased;base of support, wide  -LR      Recorded by [LR] Akash Murray 07/06/20 0903  [LR2] Akash Murray 07/06/20 1316      Row Name 07/06/20 0800 07/06/20 0753          Positioning and Restraints    Pre-Treatment Position  --  -CK  in bed  -CK     Post Treatment Position  --  bed  -CK     In Bed  --  sitting;call light within reach;encouraged to call for assist;exit alarm on  -CK     Recorded by [CK] Teresa Ardon MS CCC-SLP 07/06/20 0821 [CK] Teresa Ardon MS CCC-SLP 07/06/20 0821     Row Name 07/06/20 0840 07/06/20 0800 07/06/20 0753       Pain Scale: Numbers Pre/Post-Treatment    Pain Scale: Numbers, Pretreatment  0/10 - no pain  -LR  --  -CK  0/10 - no pain  -CK    Pain Scale: Numbers, Post-Treatment  0/10 - no pain  -LR  --  0/10 - no pain  -CK    Recorded by [LR] Akash Murray 07/06/20 1316 [CK] Teresa Ardon MS CCC-SLP 07/06/20 0821 [CK] eTresa Ardon MS CCC-SLP 07/06/20 0821    Row Name 07/06/20 0800 07/06/20 0753          Plan of Care Review    Plan of Care Reviewed With  --  -CK  patient  -CK     Progress  --  -CK  improving  -CK     Outcome Summary  --  -CK  Pt seen for dysphagia therapy this date.  Pt c/o of n/v and demonstrated dry heaving and expectorated saliva.  Pt stated that the \"thought of eating\" would elicit n/v.  Pt was able to begin to consume thin liquids, puree solids and mech soft solids slowly.  Pt safely tolerated all consistencies trialed this date w/no difficulty and no overt s/s of aspiration. "  SLP recommends advancing diet to [facility] soft solids/thin liquids at this time.  SLP discussed recommendations and POC and pt was in agreement.  -CK     Recorded by [CK] Teresa Ardon, MS CCC-SLP 07/06/20 0821 [CK] Breann Ardone CELESTE, MS CCC-SLP 07/06/20 0821     Row Name 07/06/20 0840             Outcome Summary/Treatment Plan (PT)    Daily Summary of Progress (PT)  progress toward functional goals as expected  -LR      Plan for Continued Treatment (PT)  Continue to improve safety, balance and ambulation ability  -LR      Anticipated Discharge Disposition (PT)  anticipate therapy at next level of care;other (see comments) 24/7 care at this time  -LR      Recorded by [LR] Akash Murray 07/06/20 1316      Row Name 07/06/20 0800 07/06/20 0753          Outcome Summary/Treatment Plan (SLP)    Daily Summary of Progress (SLP)  --  -CK  progress toward functional goals is good  -CK     Barriers to Overall Progress (SLP)  --  -CK  n/v  -CK     Plan for Continued Treatment (SLP)  --  -CK  Advance diet to [facility] soft/thin; trial regular solids  -CK     Anticipated Dischage Disposition (SLP)  --  -CK  unknown  -CK     Recorded by [CK] Teresa Ardon MS CCC-SLP 07/06/20 0821 [CK] Teresa Ardon, MS CCC-SLP 07/06/20 0821       User Key  (r) = Recorded By, (t) = Taken By, (c) = Cosigned By    Initials Name Effective Dates Discipline    CK Teresa Ardon, MS CCC-SLP 02/11/20 -  SLP    LR Akash Murray 06/25/19 -  PT               Rehab Goal Summary     Row Name 07/06/20 0840 07/06/20 0753          Bed Mobility Goal 1 (PT)    Activity/Assistive Device (Bed Mobility Goal 1, PT)  sit to supine;supine to sit  -LR  --     [place] Level/Cues Needed (Bed Mobility Goal 1, PT)  independent  -LR  --     Time Frame (Bed Mobility Goal 1, PT)  by discharge  -LR  --     Progress/Outcomes (Bed Mobility Goal 1, PT)  goal not met  -LR  --        Transfer Goal 1 (PT)    Activity/Assistive Device (Transfer Goal 1, PT)   sit-to-stand/stand-to-sit;bed-to-chair/chair-to-bed  -LR  --     [place] Level/Cues Needed (Transfer Goal 1, PT)  conditional independence  -LR  --     Time Frame (Transfer Goal 1, PT)  by discharge  -LR  --     Progress/Outcome (Transfer Goal 1, PT)  goal not met  -LR  --        Gait Training Goal 1 (PT)    Activity/Assistive Device (Gait Training Goal 1, PT)  gait (walking locomotion);assistive device use;backward stepping  -LR  --     [place] Level (Gait Training Goal 1, PT)  conditional independence  -LR  --     Distance (Gait Goal 1, PT)  50'x2  -LR  --     Time Frame (Gait Training Goal 1, PT)  by discharge  -LR  --     Progress/Outcome (Gait Training Goal 1, PT)  goal not met  -LR  --        Stairs Goal 1 (PT)    Activity/Assistive Device (Stairs Goal 1, PT)  stairs, all skills;ascending stairs;descending stairs  -LR  --     [place] Level/Cues Needed (Stairs Goal 1, PT)  conditional independence  -LR  --     Number of Stairs (Stairs Goal 1, PT)  3  -LR  --     Time Frame (Stairs Goal 1, PT)  by discharge  -LR  --     Progress/Outcome (Stairs Goal 1, PT)  goal not met  -LR  --        Patient Education Goal (PT)    Activity (Patient Education Goal, PT)  Patient will score >24/28 on [place] balance and gait to indicate low fall risk,  -LR  --     [place]/Cues/Accuracy (Memory Goal 2, PT)  demonstrates adequately;independent  -LR  --     Time Frame (Patient Education Goal, PT)  by discharge  -LR  --     Progress/Outcome (Patient Education Goal, PT)  goal not met  -LR  --        [place] Goals (SLP)    Oral Nutrition/Hydration Goal Selection (SLP)  --  oral nutrition/hydration, SLP goal 1  -CK        Oral Nutrition/Hydration Goal 1 (SLP)    Oral Nutrition/Hydration Goal 1, SLP  --  Pt to tolerate least restrictive diet with no s/s of aspiration for adequate nutrition and hydration.   -CK     Time Frame (Oral Nutrition/Hydration Goal 1, SLP)  --  by discharge  -CK     Barriers (Oral  Nutrition/Hydration Goal 1, SLP)  --  nausea/vomiting  -CK     Progress/Outcomes (Oral Nutrition/Hydration Goal 1, SLP)  --  good progress toward goal;goal ongoing  -CK       User Key  (r) = Recorded By, (t) = Taken By, (c) = Cosigned By    Initials Name Provider Type Discipline    CK Teresa Ardon CELESTE MS CCC-SLP Speech and Language Pathologist SLP    Akash Cesar Physical Therapist PT          Physical Therapy Education                 Title: PT OT SLP Therapies (In Progress)     Topic: Physical Therapy (In Progress)     Point: Mobility training (Done)     Description:   Instruct learner(s) on safety and technique for assisting patient out of bed, chair or wheelchair.  Instruct in the proper use of assistive devices, such as walker, crutches, cane or brace.              Patient Friendly Description:   It's important to get you on your feet again, but we need to do so in a way that is safe for you. Falling has serious consequences, and your personal safety is the most important thing of all.        When it's time to get out of bed, one of us or a family member will sit next to you on the bed to give you support.     If your doctor or nurse tells you to use a walker, crutches, a cane, or a brace, be sure you use it every time you get out of bed, even if you think you don't need it.    Learning Progress Summary           Patient Acceptance, E,TB, VU by LR at 7/4/2020 9857    Comment:  Attempted to educate patient on PT POC and role of PT. Patient required repetition of one step commands for hand placement, sequencing, body mechanics, and safety.                   Point: Home exercise program (Not Started)     Description:   Instruct learner(s) on appropriate technique for monitoring, assisting and/or progressing patient with therapeutic exercises and activities.              Learner Progress:   Not documented in this visit.          Point: Body mechanics (Not Started)     Description:   Instruct learner(s) on  proper positioning and spine alignment for patient and/or caregiver during mobility tasks and/or exercises.              Learner Progress:   Not documented in this visit.          Point: Precautions (Not Started)     Description:   Instruct learner(s) on prescribed precautions during mobility and gait tasks              Learner Progress:   Not documented in this visit.                      User Key     Initials Effective Dates Name Provider Type Discipline    LR 06/25/19 -  Akash Murray Physical Therapist PT                PT Recommendation and Plan  Anticipated Discharge Disposition (PT): anticipate therapy at next level of care, other (see comments)(24/7 care at this time)  Planned Therapy Interventions (PT Eval): balance training, neuromuscular re-education, strengthening, stretching, orthotic fitting/training, bed mobility training, gait training, patient/family education, home exercise program, joint mobilization, postural re-education, transfer training, vestibular therapy, wheelchair management/propulsion training, prosthetic fitting/training, lumbar stabilization, manual therapy techniques, ROM (range of motion), stair training  Therapy Frequency (PT Clinical Impression): 5 times/wk  Outcome Summary/Treatment Plan (PT)  Daily Summary of Progress (PT): progress toward functional goals as expected  Plan for Continued Treatment (PT): Continue to improve safety, balance and ambulation ability  Anticipated Discharge Disposition (PT): anticipate therapy at next level of care, other (see comments)(24/7 care at this time)  Plan of Care Reviewed With: patient  Outcome Summary: PT tx completed on this date. Patient AOx2, pleasant and voices complaint of nausea. After sitting EOB for 5 minutes patient had an episode of emesis. Patient requires superversion with supine<>sit transfer. Transfer: PT facilitated x10 sit<>stand transfers with CGA for safety. Patient ambulatead 28'x1, 10'x2 while holding onto hand rail and  funitures. Patient unsteady throughout gait cycle, increased stance time of LLE, wide base of support, and slightly antalgic gait. Patient with good progress towards goals. Spoke with patient about getting his SPC brought up by his significant other. Patient reports he might need to hire someone to help take care of him at d/c. Recommend 24/7 care at d/c and PT services.       Outcome Measures     Row Name 07/06/20 0840 07/05/20 1232          How much help from another person do you currently need...    Turning from your back to your side while in flat bed without using bedrails?  4  -LR  --     Moving from lying on back to sitting on the side of a flat bed without bedrails?  3  -LR  --     Moving to and from a bed to a chair (including a wheelchair)?  3  -LR  --     Standing up from a chair using your arms (e.g., wheelchair, bedside chair)?  3  -LR  --     Climbing 3-5 steps with a railing?  3  -LR  --     To walk in hospital room?  3  -LR  --     AM-PAC 6 Clicks Score (PT)  19  -LR  --        How much help from another is currently needed...    Putting on and taking off regular lower body clothing?  --  2  -ME     Bathing (including washing, rinsing, and drying)  --  2  -ME     Toileting (which includes using toilet bed pan or urinal)  --  2  -ME     Putting on and taking off regular upper body clothing  --  2  -ME     Taking care of personal grooming (such as brushing teeth)  --  2  -ME     Eating meals  --  2  -ME     AM-PAC 6 Clicks Score (OT)  --  12  -ME        Functional Assessment    Outcome Measure Options  AM-PAC 6 Clicks Basic Mobility (PT)  -LR  AM-PAC 6 Clicks Daily Activity (OT)  -ME       User Key  (r) = Recorded By, (t) = Taken By, (c) = Cosigned By    Initials Name Provider Type    LR Akash Murray Physical Therapist    ME Radu Hinds OT Occupational Therapist         Time Calculation:   PT Charges     Row Name 07/06/20 1317             Time Calculation    Start Time  0840  -LR      Stop Time   0923  -LR      Time Calculation (min)  43 min  -LR      PT Received On  07/06/20  -LR         Time Calculation- PT    Total Timed Code Minutes- PT  43 minute(s)  -LR         Timed Charges    33502 - Gait Training Minutes   23  -LR      07301 - PT Therapeutic Activity Minutes  20  -LR        User Key  (r) = Recorded By, (t) = Taken By, (c) = Cosigned By    Initials Name Provider Type    LR Akash Murray Physical Therapist        Therapy Charges for Today     Code Description Service Date Service Provider Modifiers Qty    55471063868 HC GAIT TRAINING EA 15 MIN 7/6/2020 Akash Murray GP 2    27923857186 HC PT THERAPEUTIC ACT EA 15 MIN 7/6/2020 Akash Murray GP 1          PT G-Codes  Outcome Measure Options: AM-PAC 6 Clicks Basic Mobility (PT)  AM-PAC 6 Clicks Score (PT): 19  AM-PAC 6 Clicks Score (OT): 12    Akash Murray  7/6/2020

## 2020-07-06 NOTE — PROGRESS NOTES
TWO PATIENT IDENTIFIERS WERE USED. THE PATIENT WAS DRAPED WITH A FULL BODY DRAPE AND THE PATIENT'S RIGHT ARM WAS PREPPED WITH CHLORA PREP. ULTRASOUND WAS USED TO LOCALIZE THERIGHT BASILIC VEIN. SUBCUTANEOUS TISSUE AT THE CATHETER SITE WAS INFILTRATED WITH 2% LIDOCAINE. UNDER ULTRASOUND GUIDANCE, THE VEIN WAS ACCESSED WITH A 21 GAUGE  NEEDLE. AN 0.018 WIRE WAS THEN THREADED THROUGH THE NEEDLE. THE 21 GAUGE NEEDLE WAS REMOVED AND A 4 Nepali SHEATH WAS PLACED OVER THE WIRE INTO THE VEIN.THE MIDLINE CATHETER WAS TRIMMED TO 15 CM. THE MIDLINE CATHETER WAS THEN PLACED OVER THE WIRE INTO THE VEIN, THE SHEATH WAS PEELED AWAY, WIRE WAS REMOVED. CATHETER WAS FLUSHED WITH NORMAL SALINE AND CATHETER TIP APPLIED. BIOPATCH PLACED. CATHETER SECURED WITH STAT LOCK AND TEGADERM. PATIENT TOLERATED PROCEDURE WELL. THIS WAS DONE IN THE ANGIOSUITE      IMPRESSION:SUCCESSFUL PLACEMENT OF DUAL LUMEN MIDLINE.           Fransisca Glover  7/6/2020  1:41 PM

## 2020-07-06 NOTE — SIGNIFICANT NOTE
07/06/20 1505   Rehab Treatment   Discipline occupational therapy assistant   Reason Treatment Not Performed patient/family declined treatment, not feeling well

## 2020-07-06 NOTE — PLAN OF CARE
Problem: Patient Care Overview  Goal: Plan of Care Review  Outcome: Ongoing (interventions implemented as appropriate)  Flowsheets (Taken 7/6/2020 3561)  Progress: improving  Plan of Care Reviewed With: patient; spouse  Outcome Summary: Pt VSS, worked with PT, resting well between care, potassium and magnesium given, midline placed today, fluids changed to LR, will cont to monitor

## 2020-07-06 NOTE — PROGRESS NOTES
"   LOS: 4 days   Patient Care Team:  Shayne Merritt MD as PCP - General (Family Medicine)  Kevin Robbins PA-C as PCP - Claims Attributed  Kevin Robbins PA-C as Physician Assistant (Gastroenterology)  Harvinder Robert MD    Subjective     Subjective:  Symptoms:  (Mucoid urine in John's gravity bag. ).    Diet:  No vomiting.        History taken from: patient chart    Objective     Vital Signs  Temp:  [96.4 °F (35.8 °C)-99.5 °F (37.5 °C)] 98.8 °F (37.1 °C)  Heart Rate:  [66-88] 71  Resp:  [16-20] 16  BP: (103-159)/(61-87) 159/87    Objective:  General Appearance:  In no acute distress.    Vital signs: (most recent): Blood pressure 159/87, pulse 71, temperature 98.8 °F (37.1 °C), temperature source Oral, resp. rate 16, height 170.2 cm (67.01\"), weight 63 kg (138 lb 12.8 oz), SpO2 98 %.  Vital signs are normal.  No fever.    Output: Producing urine (John).    Lungs:  Normal effort.  He is not in respiratory distress.    Chest: Symmetric chest wall expansion.   Abdomen: Abdomen is soft and non-distended.  There is no abdominal tenderness.     Neurological: Patient is alert and oriented to person, place and time.    Pupils:  Pupils are equal, round, and reactive to light.    Skin:  Warm and dry.              Results Review:    Lab Results (last 24 hours)     Procedure Component Value Units Date/Time    POC Glucose Once [[id number]]  (Abnormal) Collected:  07/06/20 1057    Specimen:  Blood Updated:  07/06/20 1117     Glucose 267 mg/dL      Comment: RN NotifiedOperator: [id number] CIVILS DAISHAMeter ID: [id number]       POC Glucose Once [[id number]]  (Normal) Collected:  07/06/20 0720    Specimen:  Blood Updated:  07/06/20 1108     Glucose 130 mg/dL      Comment: RN NotifiedOperator: [id number] ZAYAS MATIAHMeter ID: [id number]       Comprehensive Metabolic Panel [[id number]]  (Abnormal) Collected:  07/06/20 0551    Specimen:  Blood Updated:  07/06/20 0711     Glucose 137 mg/dL      BUN 65 mg/dL      " Creatinine 2.06 mg/dL      Sodium 142 mmol/L      Potassium 2.2 mmol/L      Chloride 108 mmol/L      CO2 23.0 mmol/L      Calcium 6.4 mg/dL      Total Protein 4.7 g/dL      Albumin 2.80 g/dL      ALT (SGPT) 10 U/L      AST (SGOT) 13 U/L      Alkaline Phosphatase 60 U/L      Total Bilirubin 0.4 mg/dL      eGFR Non African Amer 33 mL/min/1.73      Globulin 1.9 gm/dL      A/G Ratio 1.5 g/dL      BUN/Creatinine Ratio 31.6     Anion Gap 11.0 mmol/L     Narrative:       GFR Normal >60  Chronic Kidney Disease <60  Kidney Failure <15      Magnesium [[id number]]  (Abnormal) Collected:  07/06/20 0551    Specimen:  Blood Updated:  07/06/20 0711     Magnesium 1.3 mg/dL     CBC & Differential [[id number]] Collected:  07/06/20 0551    Specimen:  Blood Updated:  07/06/20 0650    Narrative:       The following orders were created for panel order CBC & Differential.  Procedure                               Abnormality         Status                     ---------                               -----------         ------                     CBC Auto Differential[[id number]]        Abnormal            Final result                 Please view results for these tests on the individual orders.    CBC Auto Differential [[id number]]  (Abnormal) Collected:  07/06/20 0551    Specimen:  Blood Updated:  07/06/20 0650     WBC 6.26 10*3/mm3      RBC 3.28 10*6/mm3      Hemoglobin 9.6 g/dL      Hematocrit 27.9 %      MCV 85.1 fL      MCH 29.3 pg      MCHC 34.4 g/dL      RDW 14.0 %      RDW-SD 43.6 fl      MPV 11.0 fL      Platelets 154 10*3/mm3      Neutrophil % 72.1 %      Lymphocyte % 17.1 %      Monocyte % 8.8 %      Eosinophil % 1.3 %      Basophil % 0.2 %      Immature Grans % 0.5 %      Neutrophils, Absolute 4.52 10*3/mm3      Lymphocytes, Absolute 1.07 10*3/mm3      Monocytes, Absolute 0.55 10*3/mm3      Eosinophils, Absolute 0.08 10*3/mm3      Basophils, Absolute 0.01 10*3/mm3      Immature Grans, Absolute 0.03 10*3/mm3      nRBC 0.5 /100 WBC      Sodium, Urine, Random - Urine, Catheter [[id number]] Collected:  07/06/20 0334    Specimen:  Urine, Catheter Updated:  07/06/20 0405     Sodium, Urine 60 mmol/L     Narrative:       Reference intervals for random urine have not been established.  Clinical usage is dependent upon physician's interpretation in combination with other laboratory tests.       POC Glucose Once [[id number]]  (Abnormal) Collected:  07/05/20 1925    Specimen:  Blood Updated:  07/06/20 0405     Glucose 208 mg/dL      Comment: RN NotifiedOperator: [id number] CHANA Zamudio ID: [id number]       Blood Culture - Blood, Hand, Left [[id number]] Collected:  07/03/20 0312    Specimen:  Blood from Hand, Left Updated:  07/06/20 0345     Blood Culture No growth at 3 days    Phosphorus [[id number]]  (Normal) Collected:  07/06/20 0124    Specimen:  Blood Updated:  07/06/20 0156     Phosphorus 2.5 mg/dL     Blood Culture - Blood, Arm, Right [[id number]] Collected:  07/02/20 2158    Specimen:  Blood from Arm, Right Updated:  07/05/20 2215     Blood Culture No growth at 3 days    POC Glucose Once [[id number]]  (Normal) Collected:  07/05/20 1630    Specimen:  Blood Updated:  07/05/20 1643     Glucose 104 mg/dL      Comment: : [id number] DALE DENISAMeter ID: [id number]            Imaging Results (Last 24 Hours)     ** No results found for the last 24 hours. **           I reviewed the patient's new clinical results.  I reviewed the patient's new imaging results and agree with the interpretation.  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 His retention of urine is most likely related to increased urinary mucous and sediment which is a chronic problem for him  related to his neobladder. He normally performs self intermittent catheterization at home. Now he has John in place and sediment and mucous present. Renal ultrasound 7/5/20 showed mild left hydronephrosis (rule out obstruction vs reflux). As imaging in Nov. 2019 showed no hydronephrosis we will start Mucomyst irrigation.   -Estimated Creatinine Clearance: 32.7 mL/min (A) (by C-G formula based on SCr of 2.06 mg/dL (H)).  -Cr 3.57-->3.17-->3.41-->3.31-->2.59-->2.06  -Baseline Cr around 2.0  -Urine output 3,175 mL last 24 hours  -Urine culture 7/3/20 mixed gram positive birgit, Rocephin day #4    Plan:  Start intravesical Mucomyst flushes   Continue John in place.  Plan to repeat renal ultrasound in the next 2-3 days to see if hydronephrosis resolves after the intravesical Mucomyst.     TIFFANIE Michele  07/06/20  12:41

## 2020-07-06 NOTE — PLAN OF CARE
Problem: Patient Care Overview  Goal: Plan of Care Review  Outcome: Ongoing (interventions implemented as appropriate)  Flowsheets (Taken 7/6/2020 7337)  Progress: improving  Plan of Care Reviewed With: patient  Outcome Summary: Pt resting between care. still complains of nausea/vomiting. v/s stable. will continue to monitor pt

## 2020-07-07 LAB
ANION GAP SERPL CALCULATED.3IONS-SCNC: 9 MMOL/L (ref 5–15)
BACTERIA SPEC AEROBE CULT: NORMAL
BUN SERPL-MCNC: 36 MG/DL (ref 8–23)
BUN/CREAT SERPL: 21.3 (ref 7–25)
CALCIUM SPEC-SCNC: 6.5 MG/DL (ref 8.6–10.5)
CHLORIDE SERPL-SCNC: 112 MMOL/L (ref 98–107)
CO2 SERPL-SCNC: 18 MMOL/L (ref 22–29)
CREAT SERPL-MCNC: 1.69 MG/DL (ref 0.76–1.27)
GFR SERPL CREATININE-BSD FRML MDRD: 41 ML/MIN/1.73
GLUCOSE BLDC GLUCOMTR-MCNC: 115 MG/DL (ref 70–130)
GLUCOSE BLDC GLUCOMTR-MCNC: 182 MG/DL (ref 70–130)
GLUCOSE BLDC GLUCOMTR-MCNC: 229 MG/DL (ref 70–130)
GLUCOSE BLDC GLUCOMTR-MCNC: 266 MG/DL (ref 70–130)
GLUCOSE BLDC GLUCOMTR-MCNC: 331 MG/DL (ref 70–130)
GLUCOSE BLDC GLUCOMTR-MCNC: 80 MG/DL (ref 70–130)
GLUCOSE SERPL-MCNC: 337 MG/DL (ref 65–99)
MAGNESIUM SERPL-MCNC: 1.2 MG/DL (ref 1.6–2.4)
PHOSPHATE SERPL-MCNC: 1.9 MG/DL (ref 2.5–4.5)
POTASSIUM SERPL-SCNC: 3.4 MMOL/L (ref 3.5–5.2)
POTASSIUM SERPL-SCNC: 3.4 MMOL/L (ref 3.5–5.2)
SODIUM SERPL-SCNC: 139 MMOL/L (ref 136–145)

## 2020-07-07 PROCEDURE — 25010000003 POTASSIUM CHLORIDE 10 MEQ/100ML SOLUTION: Performed by: INTERNAL MEDICINE

## 2020-07-07 PROCEDURE — 84100 ASSAY OF PHOSPHORUS: CPT | Performed by: INTERNAL MEDICINE

## 2020-07-07 PROCEDURE — 92526 ORAL FUNCTION THERAPY: CPT | Performed by: SPEECH-LANGUAGE PATHOLOGIST

## 2020-07-07 PROCEDURE — 80048 BASIC METABOLIC PNL TOTAL CA: CPT | Performed by: INTERNAL MEDICINE

## 2020-07-07 PROCEDURE — 25010000002 HEPARIN (PORCINE) PER 1000 UNITS: Performed by: FAMILY MEDICINE

## 2020-07-07 PROCEDURE — 94799 UNLISTED PULMONARY SVC/PX: CPT

## 2020-07-07 PROCEDURE — 25010000002 CALCIUM GLUCONATE PER 10 ML: Performed by: INTERNAL MEDICINE

## 2020-07-07 PROCEDURE — 83735 ASSAY OF MAGNESIUM: CPT | Performed by: INTERNAL MEDICINE

## 2020-07-07 PROCEDURE — 94640 AIRWAY INHALATION TREATMENT: CPT

## 2020-07-07 PROCEDURE — 84132 ASSAY OF SERUM POTASSIUM: CPT | Performed by: INTERNAL MEDICINE

## 2020-07-07 PROCEDURE — 63710000001 INSULIN ASPART PER 5 UNITS: Performed by: FAMILY MEDICINE

## 2020-07-07 PROCEDURE — 97110 THERAPEUTIC EXERCISES: CPT

## 2020-07-07 PROCEDURE — 25010000002 CEFTRIAXONE PER 250 MG: Performed by: FAMILY MEDICINE

## 2020-07-07 PROCEDURE — 82962 GLUCOSE BLOOD TEST: CPT

## 2020-07-07 PROCEDURE — 25010000002 MAGNESIUM SULFATE 2 GM/50ML SOLUTION: Performed by: INTERNAL MEDICINE

## 2020-07-07 RX ORDER — MAGNESIUM HYDROXIDE 1200 MG/15ML
50 LIQUID ORAL EVERY 8 HOURS
Status: CANCELLED | OUTPATIENT
Start: 2020-07-07

## 2020-07-07 RX ORDER — POTASSIUM CHLORIDE 750 MG/1
40 CAPSULE, EXTENDED RELEASE ORAL ONCE
Status: COMPLETED | OUTPATIENT
Start: 2020-07-07 | End: 2020-07-07

## 2020-07-07 RX ORDER — SODIUM BICARBONATE 650 MG/1
650 TABLET ORAL 3 TIMES DAILY
Status: DISCONTINUED | OUTPATIENT
Start: 2020-07-07 | End: 2020-07-10 | Stop reason: HOSPADM

## 2020-07-07 RX ORDER — MAGNESIUM SULFATE HEPTAHYDRATE 40 MG/ML
2 INJECTION, SOLUTION INTRAVENOUS EVERY 4 HOURS
Status: COMPLETED | OUTPATIENT
Start: 2020-07-07 | End: 2020-07-07

## 2020-07-07 RX ORDER — ACETYLCYSTEINE 100 MG/ML
4 SOLUTION ORAL; RESPIRATORY (INHALATION) EVERY 8 HOURS
Status: CANCELLED | OUTPATIENT
Start: 2020-07-07

## 2020-07-07 RX ADMIN — HEPARIN SODIUM 5000 UNITS: 5000 INJECTION INTRAVENOUS; SUBCUTANEOUS at 13:38

## 2020-07-07 RX ADMIN — PRAVASTATIN SODIUM 40 MG: 40 TABLET ORAL at 21:27

## 2020-07-07 RX ADMIN — PANTOPRAZOLE SODIUM 40 MG: 40 TABLET, DELAYED RELEASE ORAL at 06:00

## 2020-07-07 RX ADMIN — INSULIN ASPART 4 UNITS: 100 INJECTION, SOLUTION INTRAVENOUS; SUBCUTANEOUS at 08:53

## 2020-07-07 RX ADMIN — HEPARIN SODIUM 5000 UNITS: 5000 INJECTION INTRAVENOUS; SUBCUTANEOUS at 05:56

## 2020-07-07 RX ADMIN — SODIUM BICARBONATE 650 MG: 650 TABLET ORAL at 21:27

## 2020-07-07 RX ADMIN — POTASSIUM PHOSPHATE, MONOBASIC AND POTASSIUM PHOSPHATE, DIBASIC 20 MMOL: 224; 236 INJECTION, SOLUTION, CONCENTRATE INTRAVENOUS at 01:35

## 2020-07-07 RX ADMIN — SODIUM CHLORIDE, PRESERVATIVE FREE 10 ML: 5 INJECTION INTRAVENOUS at 08:54

## 2020-07-07 RX ADMIN — SODIUM BICARBONATE 650 MG: 650 TABLET ORAL at 15:35

## 2020-07-07 RX ADMIN — POTASSIUM CHLORIDE 10 MEQ: 7.46 INJECTION, SOLUTION INTRAVENOUS at 04:41

## 2020-07-07 RX ADMIN — ASPIRIN 81 MG: 81 TABLET, COATED ORAL at 08:49

## 2020-07-07 RX ADMIN — MAGNESIUM SULFATE HEPTAHYDRATE 2 G: 40 INJECTION, SOLUTION INTRAVENOUS at 16:49

## 2020-07-07 RX ADMIN — POTASSIUM CHLORIDE 40 MEQ: 10 CAPSULE, COATED, EXTENDED RELEASE ORAL at 00:22

## 2020-07-07 RX ADMIN — CARVEDILOL 3.12 MG: 3.12 TABLET, FILM COATED ORAL at 08:49

## 2020-07-07 RX ADMIN — SODIUM CHLORIDE 50 ML: 900 IRRIGANT IRRIGATION at 12:02

## 2020-07-07 RX ADMIN — SODIUM CHLORIDE, PRESERVATIVE FREE 10 ML: 5 INJECTION INTRAVENOUS at 21:28

## 2020-07-07 RX ADMIN — SODIUM CHLORIDE 50 ML: 900 IRRIGANT IRRIGATION at 18:01

## 2020-07-07 RX ADMIN — ALBUTEROL SULFATE 2.5 MG: 2.5 SOLUTION RESPIRATORY (INHALATION) at 07:33

## 2020-07-07 RX ADMIN — CARVEDILOL 3.12 MG: 3.12 TABLET, FILM COATED ORAL at 21:27

## 2020-07-07 RX ADMIN — POTASSIUM CHLORIDE 10 MEQ: 7.46 INJECTION, SOLUTION INTRAVENOUS at 02:32

## 2020-07-07 RX ADMIN — ACETYLCYSTEINE 4 ML: 100 SOLUTION ORAL; RESPIRATORY (INHALATION) at 18:01

## 2020-07-07 RX ADMIN — SODIUM CHLORIDE, POTASSIUM CHLORIDE, SODIUM LACTATE AND CALCIUM CHLORIDE 75 ML/HR: 600; 310; 30; 20 INJECTION, SOLUTION INTRAVENOUS at 21:31

## 2020-07-07 RX ADMIN — SERTRALINE HYDROCHLORIDE 50 MG: 50 TABLET ORAL at 08:49

## 2020-07-07 RX ADMIN — SODIUM CHLORIDE, POTASSIUM CHLORIDE, SODIUM LACTATE AND CALCIUM CHLORIDE 75 ML/HR: 600; 310; 30; 20 INJECTION, SOLUTION INTRAVENOUS at 12:03

## 2020-07-07 RX ADMIN — CYCLOBENZAPRINE 10 MG: 10 TABLET, FILM COATED ORAL at 03:47

## 2020-07-07 RX ADMIN — MAGNESIUM SULFATE HEPTAHYDRATE 2 G: 40 INJECTION, SOLUTION INTRAVENOUS at 13:39

## 2020-07-07 RX ADMIN — CEFTRIAXONE SODIUM 1 G: 1 INJECTION, POWDER, FOR SOLUTION INTRAMUSCULAR; INTRAVENOUS at 11:49

## 2020-07-07 RX ADMIN — ACETYLCYSTEINE 4 ML: 100 SOLUTION ORAL; RESPIRATORY (INHALATION) at 02:22

## 2020-07-07 RX ADMIN — INSULIN ASPART 16 UNITS: 100 INJECTION, SOLUTION INTRAVENOUS; SUBCUTANEOUS at 13:37

## 2020-07-07 RX ADMIN — SODIUM CHLORIDE 50 ML: 900 IRRIGANT IRRIGATION at 02:23

## 2020-07-07 RX ADMIN — POTASSIUM CHLORIDE 40 MEQ: 10 CAPSULE, COATED, EXTENDED RELEASE ORAL at 05:55

## 2020-07-07 RX ADMIN — POTASSIUM CHLORIDE 10 MEQ: 7.46 INJECTION, SOLUTION INTRAVENOUS at 00:21

## 2020-07-07 RX ADMIN — POTASSIUM PHOSPHATE, MONOBASIC AND POTASSIUM PHOSPHATE, DIBASIC 30 MMOL: 224; 236 INJECTION, SOLUTION, CONCENTRATE INTRAVENOUS at 11:54

## 2020-07-07 RX ADMIN — POTASSIUM CHLORIDE 40 MEQ: 10 CAPSULE, COATED, EXTENDED RELEASE ORAL at 11:48

## 2020-07-07 RX ADMIN — SODIUM CHLORIDE, POTASSIUM CHLORIDE, SODIUM LACTATE AND CALCIUM CHLORIDE 100 ML/HR: 600; 310; 30; 20 INJECTION, SOLUTION INTRAVENOUS at 02:22

## 2020-07-07 RX ADMIN — BUDESONIDE AND FORMOTEROL FUMARATE DIHYDRATE 2 PUFF: 160; 4.5 AEROSOL RESPIRATORY (INHALATION) at 19:47

## 2020-07-07 RX ADMIN — POTASSIUM CHLORIDE 10 MEQ: 7.46 INJECTION, SOLUTION INTRAVENOUS at 01:33

## 2020-07-07 RX ADMIN — BUDESONIDE AND FORMOTEROL FUMARATE DIHYDRATE 2 PUFF: 160; 4.5 AEROSOL RESPIRATORY (INHALATION) at 07:30

## 2020-07-07 RX ADMIN — SODIUM BICARBONATE 650 MG: 650 TABLET ORAL at 11:48

## 2020-07-07 RX ADMIN — CALCIUM GLUCONATE 1 G: 98 INJECTION, SOLUTION INTRAVENOUS at 11:55

## 2020-07-07 RX ADMIN — FLUTICASONE PROPIONATE 2 SPRAY: 50 SPRAY, METERED NASAL at 08:50

## 2020-07-07 RX ADMIN — POTASSIUM CHLORIDE 10 MEQ: 7.46 INJECTION, SOLUTION INTRAVENOUS at 03:42

## 2020-07-07 RX ADMIN — ACETYLCYSTEINE 4 ML: 100 SOLUTION ORAL; RESPIRATORY (INHALATION) at 11:48

## 2020-07-07 RX ADMIN — CLOPIDOGREL BISULFATE 75 MG: 75 TABLET ORAL at 08:49

## 2020-07-07 RX ADMIN — HEPARIN SODIUM 5000 UNITS: 5000 INJECTION INTRAVENOUS; SUBCUTANEOUS at 21:27
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
## 2020-07-07 NOTE — PLAN OF CARE
Problem: Patient Care Overview  Goal: Plan of Care Review  Outcome: Ongoing (interventions implemented as appropriate)  Flowsheets (Taken 7/7/2020 1010)  Progress: improving  Plan of Care Reviewed With: patient  Outcome Summary: dysphagia therapy: pt tolerated turkey sandwich and 200cc water via straw.  there were no s/s of aspiration.  pt cognition was more appropriate. recommend upgrade diet to regular solids and d/c skilled ST

## 2020-07-07 NOTE — PROGRESS NOTES
[facility]  INPATIENT PROGRESS NOTE    Length of Stay: 5  Date of Admission: 7/2/2020  Primary Care Physician: Shayne Merritt MD    Subjective   Chief Complaint: Confusion  HPI: Feeling better overall.  Diarrhea is resolved per his report.    Review of Systems   Constitutional: Positive for activity change. Negative for appetite change, chills, fatigue and fever.   Respiratory: Negative for chest tightness and shortness of breath.    Cardiovascular: Negative for chest pain, palpitations and leg swelling.   Gastrointestinal: Positive for diarrhea. Negative for abdominal pain, constipation, nausea and vomiting.   Skin: Negative for wound.   Neurological: Negative for dizziness, weakness, light-headedness, numbness and headaches.      All pertinent negatives and positives are as above. All other systems have been reviewed and are negative unless otherwise stated.     Objective    Temp:  [98.5 °F (36.9 °C)-99.1 °F (37.3 °C)] 98.5 °F (36.9 °C)  Heart Rate:  [63-97] 76  Resp:  [16-22] 20  BP: (110-136)/(65-79) 130/77    Physical Exam   Constitutional: He appears well-developed and well-nourished.   HENT:   Head: Normocephalic and atraumatic.   Eyes: Pupils are equal, round, and reactive to light. EOM are normal.   Neck: Normal range of motion. Neck supple.   Cardiovascular: Normal rate, regular rhythm, normal heart sounds and intact distal pulses. Exam reveals no gallop and no friction rub.   No murmur heard.  Pulmonary/Chest: Effort normal and breath sounds normal. No respiratory distress. He has no wheezes. He has no rales. He exhibits no tenderness.   Abdominal: Soft. Bowel sounds are normal. He exhibits no distension. There is no tenderness.   Psychiatric: He has a normal mood and affect. His behavior is normal.   Vitals reviewed.    Results Review:  I have reviewed the labs, radiology results, and diagnostic studies.    Laboratory Data:   Results  from last 7 days   Lab Units 07/07/20  0943 07/06/20  1743 07/06/20  0551 07/05/20  0755  07/02/20  2122   SODIUM mmol/L 139  --  142 143   < > 120*   POTASSIUM mmol/L 3.4*  3.4* 2.5* 2.2* 2.1*   < > 4.4   CHLORIDE mmol/L 112*  --  108* 113*   < > 103   CO2 mmol/L 18.0*  --  23.0 18.0*   < > 6.0*   BUN mg/dL 36*  --  65* 89*   < > 127*   CREATININE mg/dL 1.69*  --  2.06* 2.59*   < > 3.57*   GLUCOSE mg/dL 337*  --  137* 191*   < > 648*   CALCIUM mg/dL 6.5*  --  6.4* 7.3*   < > 8.7   BILIRUBIN mg/dL  --   --  0.4  --   --  0.5   ALK PHOS U/L  --   --  60  --   --  113   ALT (SGPT) U/L  --   --  10  --   --  8   AST (SGOT) U/L  --   --  13  --   --  6   ANION GAP mmol/L 9.0  --  11.0 12.0   < > 11.0    < > = values in this interval not displayed.     Estimated Creatinine Clearance: 41.2 mL/min (A) (by C-G formula based on SCr of 1.69 mg/dL (H)).  Results from last 7 days   Lab Units 07/07/20  0943 07/06/20 1743 07/06/20 0551 07/06/20  0124  07/03/20  0844   MAGNESIUM mg/dL 1.2*  --  1.3*  --   --  2.0   PHOSPHORUS mg/dL 1.9* 2.1*  --  2.5   < > 1.5*    < > = values in this interval not displayed.         Results from last 7 days   Lab Units 07/06/20 0551 07/05/20 0755 07/04/20  0805 07/03/20  0844 07/02/20  2122   WBC 10*3/mm3 6.26 6.88 8.93 14.12* 9.58   HEMOGLOBIN g/dL 9.6* 10.5* 13.1 13.9 13.7   HEMATOCRIT % 27.9* 29.2* 38.2 42.4 42.0   PLATELETS 10*3/mm3 154 146 148 177 174           Culture Data:   No results found for: BLOODCX  No results found for: URINECX  No results found for: RESPCX  No results found for: WOUNDCX  No results found for: STOOLCX  No components found for: BODYFLD    Radiology Data:   Imaging Results (Last 24 Hours)     Procedure Component Value Units Date/Time    US Guided Vascular Access [[id number]] Resulted:  07/06/20 1348     Updated:  07/06/20 1348    Narrative:       This procedure was auto-finalized with no dictation required.          I have reviewed the patient's current  medications.     Assessment/Plan     Active Hospital Problems    Diagnosis   • Diabetic ketoacidosis without coma associated with type 2 diabetes mellitus (CMS/HCC)     Plan:    1.  DKA: Resolved.  2.  Acute kidney injury: Renal function has much improved.  Nephrology is following.  Renal ultrasound showed hydronephrosis on the left.  Renal function improved with placement.  Acute kidney injury at least partially secondary to obstruction.   3.  Metabolic encephalopathy: Likely multifactorial.  Patient with severe hyperglycemia on presentation.  Urine culture also showed mixed birgit.  Empirically on antibiotics.  Mentation is much better.  4.  Coronary artery disease: Continue current medication.  5.  Nausea/vomiting/diarrhea: No fever or leukocytosis.  Etiology unclear at this point.  Continue to monitor.  6.  Non-anion gap metabolic acidosis: Improving.  Likely secondary to acute kidney injury.  7.  Severe hypokalemia: Replace and recheck per protocol.  8.  Urinary retention: Most likely secondary to increased mucus and sediment in his neobladder per Urology.  Plan to continue intra vesicular Mucomyst flushes.  9.  DVT prophylaxis: Heparin.    The patient was evaluated during the global COVID-19 pandemic, and the diagnosis was suspected/considered upon their initial presentation.  Evaluation, treatment, and testing were consistent with current guidelines for patients who present with complaints or symptoms that may be related to COVID-19.    Discharge Planning: I expect patient to be discharged to home in 3-4 days.        This document has been electronically signed by Art Colin MD on July 7, 2020 13:46

## 2020-07-07 NOTE — SIGNIFICANT NOTE
07/07/20 1057   Rehab Treatment   Discipline physical therapy assistant   Reason Treatment Not Performed patient/family declined treatment

## 2020-07-07 NOTE — PLAN OF CARE
Problem: Patient Care Overview  Goal: Plan of Care Review  Flowsheets  Taken 7/7/2020 1510 by Teresa Dumont, ANTHONY/L  Progress: improving  Outcome Summary: Pt tolerated tx fair this date. Pt was SBA with bed mobility. Pt gave fair effort with UE ther ex. Continue OT POC.  Taken 7/7/2020 1010 by Mariama Webster CCC-SLP  Plan of Care Reviewed With: patient

## 2020-07-07 NOTE — PROGRESS NOTES
"   LOS: 5 days   Patient Care Team:  Shayne Merritt MD as PCP - General (Family Medicine)  Kevin Robbins PA-C as PCP - Claims Attributed  Kevin Robbins PA-C as Physician Assistant (Gastroenterology)  Harvinder Robert MD    Subjective     Subjective:  Symptoms:  Improved.  (Urine clearing of mucus and debris. Patient says he wants to get home ASAP to pay his bills. Has NOT been performing SIC at home, no supplies, has also had transportation issues for appointments. ).    Diet:  No vomiting.        History taken from: patient chart    Objective     Vital Signs  Temp:  [98.5 °F (36.9 °C)-99.1 °F (37.3 °C)] 98.9 °F (37.2 °C)  Heart Rate:  [63-97] 79  Resp:  [16-22] 20  BP: (110-159)/(65-87) 136/70    Objective:  General Appearance:  In no acute distress.    Vital signs: (most recent): Blood pressure 136/70, pulse 79, temperature 98.9 °F (37.2 °C), temperature source Oral, resp. rate 20, height 170.2 cm (67.01\"), weight 65.1 kg (143 lb 8 oz), SpO2 96 %.  Vital signs are normal.  No fever.    Output: Producing urine (John).    Lungs:  Normal effort.  He is not in respiratory distress.    Chest: Symmetric chest wall expansion.   Abdomen: Abdomen is soft and non-distended.  There is no abdominal tenderness.     Neurological: Patient is alert and oriented to person, place and time.    Pupils:  Pupils are equal, round, and reactive to light.    Skin:  Warm and dry.              Results Review:    Lab Results (last 24 hours)     Procedure Component Value Units Date/Time    Potassium [[id number]] Collected:  07/07/20 0943    Specimen:  Blood Updated:  07/07/20 0951    Basic Metabolic Panel [[id number]] Collected:  07/07/20 0943    Specimen:  Blood Updated:  07/07/20 0951    Magnesium [[id number]] Collected:  07/07/20 0943    Specimen:  Blood Updated:  07/07/20 0951    Phosphorus [[id number]] Collected:  07/07/20 0943    Specimen:  Blood Updated:  07/07/20 0951    POC Glucose Once [[id number]]  (Abnormal) " Collected:  07/07/20 0714    Specimen:  Blood Updated:  07/07/20 0737     Glucose 182 mg/dL      Comment: RN NotifiedOperator: [id number] ALLISON BRITTANIHANAMeter ID: [id number]       POC Glucose Once [[id number]]  (Abnormal) Collected:  07/06/20 1938    Specimen:  Blood Updated:  07/07/20 0428     Glucose 266 mg/dL      Comment: RN NotifiedOperator: [id number] ABIGAIL TINAMeter ID: [id number]       Blood Culture - Blood, Hand, Left [[id number]] Collected:  07/03/20 0312    Specimen:  Blood from Hand, Left Updated:  07/07/20 0345     Blood Culture No growth at 4 days    Blood Culture - Blood, Arm, Right [[id number]] Collected:  07/02/20 2158    Specimen:  Blood from Arm, Right Updated:  07/06/20 2215     Blood Culture No growth at 4 days    Potassium [[id number]]  (Abnormal) Collected:  07/06/20 1743    Specimen:  Blood Updated:  07/06/20 1829     Potassium 2.5 mmol/L     Phosphorus [[id number]]  (Abnormal) Collected:  07/06/20 1743    Specimen:  Blood Updated:  07/06/20 1827     Phosphorus 2.1 mg/dL     POC Glucose Once [[id number]]  (Abnormal) Collected:  07/06/20 1623    Specimen:  Blood Updated:  07/06/20 1649     Glucose 273 mg/dL      Comment: RN NotifiedOperator: [id number] ROOPA LEXIEMeter ID: [id number]       POC Glucose Once [[id number]]  (Abnormal) Collected:  07/06/20 1057    Specimen:  Blood Updated:  07/06/20 1117     Glucose 267 mg/dL      Comment: RN NotifiedOperator: [id number] JOE TANNERSHAMeter ID: [id number]       POC Glucose Once [[id number]]  (Normal) Collected:  07/06/20 0720    Specimen:  Blood Updated:  07/06/20 1108     Glucose 130 mg/dL      Comment: RN NotifiedOperator: [id number] ZAYAS AARONIAHMeter ID: [id number]            Imaging Results (Last 24 Hours)     Procedure Component Value Units Date/Time    US Guided Vascular Access [[id number]] Resulted:  07/06/20 1348     Updated:  07/06/20 1348    Narrative:       This procedure was auto-finalized with no dictation required.    IR  Insert Midline Without Port Pump 5 Plus [[id number]] Resulted:  07/06/20 1344     Updated:  07/06/20 1344    Narrative:       This procedure was auto-finalized with no dictation required.           I reviewed the patient's new clinical results.  I reviewed the patient's new imaging results and agree with the interpretation.  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 His retention of urine is most likely related to increased urinary mucous and sediment which is a chronic problem for him related to his neobladder. He normally performs self intermittent catheterization at home. Now he has John in place and sediment and mucous present. Renal ultrasound 7/5/20 showed mild left hydronephrosis (rule out obstruction vs reflux). As imaging in Nov. 2019 showed no hydronephrosis we will start Mucomyst irrigation-->less mucus this morning in John's gravity bag.  -Estimated Creatinine Clearance: 33.8 mL/min (A) (by C-G formula based on SCr of 2.06 mg/dL (H)).  -Cr 3.57-->3.17-->3.41-->3.31-->2.59-->2.06  -Baseline Cr around 2.0  -Urine output 3,175 mL last 24 hours  -Urine culture 7/3/20 mixed gram positive birgit, Rocephin day #5    Plan:  Continue intravesical Mucomyst flushes   Continue John in place.  Plan to repeat renal ultrasound in the next 1-2 days to see if hydronephrosis resolves after the intravesical Mucomyst.     TIFFANIE Mcihele  07/07/20  10:05

## 2020-07-07 NOTE — PLAN OF CARE
Problem: Patient Care Overview  Goal: Plan of Care Review  Outcome: Ongoing (interventions implemented as appropriate)  Flowsheets  Taken 7/6/2020 2300  Plan of Care Reviewed With: patient  Taken 7/7/2020 2574  Outcome Summary: vss, resting between care, adequate urine output, potassium given,no acute changes, will continue to monitor.  Goal: Individualization and Mutuality  Outcome: Ongoing (interventions implemented as appropriate)  Goal: Discharge Needs Assessment  Outcome: Ongoing (interventions implemented as appropriate)  Goal: Interprofessional Rounds/Family Conf  Outcome: Ongoing (interventions implemented as appropriate)     Problem: Hyperglycemia, Persistent (Adult)  Goal: Signs and Symptoms of Listed Potential Problems Will be Absent, Minimized or Managed (Hyperglycemia, Persistent)  Outcome: Ongoing (interventions implemented as appropriate)     Problem: Confusion, Acute (Adult)  Goal: Identify Related Risk Factors and Signs and Symptoms  Outcome: Ongoing (interventions implemented as appropriate)  Goal: Cognitive/Functional Impairments Minimized  Outcome: Ongoing (interventions implemented as appropriate)  Goal: Safety  Outcome: Ongoing (interventions implemented as appropriate)     Problem: Fall Risk (Adult)  Goal: Identify Related Risk Factors and Signs and Symptoms  Outcome: Ongoing (interventions implemented as appropriate)  Goal: Absence of Fall  Outcome: Ongoing (interventions implemented as appropriate)     Problem: Skin Injury Risk (Adult)  Goal: Identify Related Risk Factors and Signs and Symptoms  Outcome: Ongoing (interventions implemented as appropriate)  Goal: Skin Health and Integrity  Outcome: Ongoing (interventions implemented as appropriate)     Problem: Sepsis/Septic Shock (Adult)  Goal: Signs and Symptoms of Listed Potential Problems Will be Absent, Minimized or Managed (Sepsis/Septic Shock)  Outcome: Ongoing (interventions implemented as appropriate)     Problem: Kidney Disease,  Chronic/End Stage Renal Disease (Adult)  Goal: Signs and Symptoms of Listed Potential Problems Will be Absent, Minimized or Managed (Kidney Disease, Chronic/End Stage Renal Disease)  Outcome: Ongoing (interventions implemented as appropriate)

## 2020-07-07 NOTE — THERAPY DISCHARGE NOTE
Acute Care - Speech Language Pathology   Swallow Treatment Note/Discharge   [facility]     Patient Name: Favio Casillas  : 1955  MRN: [id number]  Today's Date: 2020  Onset of Illness/Injury or Date of Surgery: 20     Referring Physician: WILMAR Sanchez MD      Admit Date: 2020  There was no n/v noted or mentioned during session.  Recommend upgrad e diet and d/c skilled ST with goals met.  Mariama Webster, CCC-SLP 2020 10:13    Visit Dx:      ICD-10-CM ICD-9-CM   1. Diabetic ketoacidosis without coma associated with type 2 diabetes mellitus (CMS/HCC) E11.10 250.12   2. Encephalopathy acute G93.40 348.30   3. Acute renal failure superimposed on chronic kidney disease, unspecified CKD stage, unspecified acute renal failure type (CMS/HCC) N17.9 584.9    N18.9 585.9   4. Dysphagia, unspecified type R13.10 787.20   5. Decreased functional mobility R26.89 781.99   6. Impaired mobility and ADLs Z74.09 V49.89    Z78.9      Patient Active Problem List   Diagnosis   • Type 2 diabetes mellitus (CMS/HCC)   • Acute pain of right shoulder   • Shoulder impingement syndrome, right   • Chest pain   • Acute renal insufficiency   • Chronic hepatitis C virus infection (CMS/HCC)   • Gastritis   • SBO (small bowel obstruction) (CMS/HCC)   • CAD (coronary artery disease)   • Acute kidney injury (nontraumatic) (CMS/HCC)   • Intractable vomiting with nausea   • Gastroesophageal reflux disease with esophagitis   • Diarrhea   • Generalized abdominal pain   • History of colon polyps   • History of colitis   • Acute metabolic encephalopathy   • NSTEMI (non-ST elevated myocardial infarction) (CMS/HCC)   • Acute renal failure superimposed on stage 3 chronic kidney disease (CMS/HCC)   • Influenza A   • Anemia, chronic disease   • Bladder outflow obstruction   • Acute kidney injury (CMS/HCC)   • Acute urinary tract infection   • Metabolic acidosis   • Hypokalemia   • Acute renal failure superimposed on chronic kidney  disease (CMS/HCC)   • Diabetic ketoacidosis without coma associated with type 2 diabetes mellitus (CMS/HCC)       Therapy Treatment  Rehabilitation Treatment Summary     Row Name 07/07/20 1000             Treatment Time/Intention    Discipline  speech language pathologist  -EC      Document Type  discharge treatment  -EC      Subjective Information  no complaints  -EC      Mode of Treatment  individual therapy;speech-language pathology  -EC      Care Plan Review  care plan/treatment goals reviewed  -EC      Therapy Frequency (Swallow)  1 day per week;2 days per week  -EC      Patient Effort  good  -EC      Recorded by [EC] Mariama Webster CCC-SLP 07/07/20 1010      Row Name 07/07/20 1000             Positioning and Restraints    Pre-Treatment Position  in bed  -EC      Post Treatment Position  bed  -EC      In Bed  fowlers;call light within reach;encouraged to call for assist;exit alarm on  -EC      Recorded by [EC] Mariama Webster CCC-SLP 07/07/20 1010      Row Name 07/07/20 1000             Pain Scale: Numbers Pre/Post-Treatment    Pain Scale: Numbers, Pretreatment  0/10 - no pain  -EC      Pain Scale: Numbers, Post-Treatment  0/10 - no pain  -EC      Recorded by [EC] Mariama Webster CCC-SLP 07/07/20 1010        User Key  (r) = Recorded By, (t) = Taken By, (c) = Cosigned By    Initials Name Effective Dates Discipline    EC Mariama Webster CCC-SLP 02/11/20 -  SLP        Outcome Summary     SLP GOALS     Row Name 07/07/20 1000 07/06/20 0753 07/04/20 1252       Oral Nutrition/Hydration Goal 1 (SLP)    Oral Nutrition/Hydration Goal 1, SLP  Pt to tolerate least restrictive diet with no s/s of aspiration for adequate nutrition and hydration.   -EC  Pt to tolerate least restrictive diet with no s/s of aspiration for adequate nutrition and hydration.   -CK  Pt to tolerate least restrictive diet with no s/s of aspiration for adequate nutrition and hydration.   -EK    Time Frame (Oral Nutrition/Hydration  Goal 1, SLP)  by discharge  -EC  by discharge  -CK  by discharge  -EK    Barriers (Oral Nutrition/Hydration Goal 1, SLP)  none  -EC  nausea/vomiting  -CK  --    Progress/Outcomes (Oral Nutrition/Hydration Goal 1, SLP)  goal met  -EC  good progress toward goal;goal ongoing  -CK  other (see comments) new goal   -EK      User Key  (r) = Recorded By, (t) = Taken By, (c) = Cosigned By    Initials Name Provider Type    Mariama Gonzalez CCC-SLP Speech and Language Pathologist    Mariama Byrne CCC-SLP Speech and Language Pathologist    Teresa Gutierrez, MS CCC-SLP Speech and Language Pathologist          EDUCATION  The patient has been educated in the following areas:   Dysphagia (Swallowing Impairment).    SLP Recommendation and Plan                                Time Calculation:   Time Calculation- SLP     Row Name 07/07/20 1011             Time Calculation- SLP    SLP Start Time  1000  -EC      SLP Stop Time  1023  -EC      SLP Time Calculation (min)  23 min  -EC      Total Timed Code Minutes- SLP  23 minute(s)  -EC      SLP Received On  07/07/20  -EC        User Key  (r) = Recorded By, (t) = Taken By, (c) = Cosigned By    Initials Name Provider Type    Mariama Gonzalez CCC-SLP Speech and Language Pathologist          Therapy Charges for Today     Code Description Service Date Service Provider Modifiers Qty    [id number] [facility] TREATMENT SWALLOW 2 7/7/2020 Mariama Webster CCC-SLP GN 1                    Mariama Webster CCC-LUISA  7/7/2020

## 2020-07-07 NOTE — PROGRESS NOTES
Discharge Planning Assessment  [facility]     Patient Name: Favio Casillas  MRN: [id number]  Today's Date: 7/7/2020    Admit Date: 7/2/2020    Discharge Needs Assessment     Row Name 07/07/20 1258       Living Environment    Lives With  alone    Current Living Arrangements  home/apartment/condo    Primary Care Provided by  self    Provides Primary Care For  no one    Family Caregiver if Needed  other (see comments) Girlfriend Sofi.     Quality of Family Relationships  helpful;involved;supportive    Able to Return to Prior Arrangements  yes    Living Arrangement Comments  Pt resides at home alone. His girlfriend Sofi helps with any needs.        Resource/Environmental Concerns    Resource/Environmental Concerns  none    Transportation Concerns  -- Pt relies on friends for transportation needs.        Transition Planning    Patient/Family Anticipates Transition to  home    Patient/Family Anticipated Services at Transition  home health care    Transportation Anticipated  family or friend will provide       Discharge Needs Assessment    Concerns to be Addressed  adjustment to diagnosis/illness    Equipment Currently Used at Home  cane, straight;nebulizer    Equipment Needed After Discharge  glucometer    Discharge Facility/Level of Care Needs  home with home health    Provided Post Acute Provider List?  Yes    Post Acute Provider List  Home Health    Delivered To  Support Person    Method of Delivery  Telephone    Patient's Choice of Community Agency(s)  [facility]    Current Discharge Risk  chronically ill;lives alone        Discharge Plan     Row Name 07/07/20 1302       Plan    Plan Comments  LSW assessment complete. Pt resides at home alone. His girlfriend  reports good support system. She assist with any needs. She reports pt uses cane to assist with mobility. Pt also has nebulizer. She could not find his glucometer and request a prescription for a new one. She also requested home health, choose [facility].  TREYW awaiting additional recomendations from MD and therapy. LSW/case mgt will follow up as consulted and complete arrangements as ordered. Hernan LEWIS        Destination      Coordination has not been started for this encounter.      Durable Medical Equipment      Coordination has not been started for this encounter.      Dialysis/Infusion      Coordination has not been started for this encounter.      Home Medical Care      Coordination has not been started for this encounter.      Therapy      Coordination has not been started for this encounter.      Community Resources      Coordination has not been started for this encounter.          Demographic Summary     Row Name 07/07/20 1256       General Information    Admission Type  inpatient    Referral Source  nursing    Reason for Consult  discharge planning    Preferred Language  English     Used During This Interaction  yes Girlfriend Sofi.        Contact Information    Contact Information Obtained for          Functional Status     Row Name 07/07/20 1257       Functional Status    Usual Activity Tolerance  moderate    Current Activity Tolerance  fair    Functional Status Comments  Pt uses cane to assist with mobility.        Functional Status, IADL    Medications  independent Pt uses [employer].     Meal Preparation  independent    Housekeeping  assistive person    Laundry  independent    Shopping  assistive person       Mental Status Summary    Recent Changes in Mental Status/Cognitive Functioning  unable to assess        Psychosocial    No documentation.       Abuse/Neglect    No documentation.       Legal    No documentation.       Substance Abuse    No documentation.       Patient Forms    No documentation.           JOSHUA Mosley

## 2020-07-07 NOTE — PROGRESS NOTES
"[facility]  [address] [place] [address]. [address]  T - [phone]  F - [phone]     Progress Note          PATIENT  DEMOGRAPHICS   PATIENT NAME: Favio Casillas                      PHYSICIAN: Rodríguez Brody MD  : 1955  MRN: [id number]   LOS: 5 days    Patient Care Team:  Shayne Merritt MD as PCP - General (Family Medicine)  Kevin Robbins PA-C as PCP - Claims Attributed  Kevin Robbins PA-C as Physician Assistant (Gastroenterology)  Harvinder Robert MD  Subjective   SUBJECTIVE   More alert, feels better , good UO         Objective   OBJECTIVE   Vital Signs  Temp:  [98.5 °F (36.9 °C)-99.1 °F (37.3 °C)] 98.9 °F (37.2 °C)  Heart Rate:  [63-97] 79  Resp:  [16-22] 20  BP: (110-159)/(65-87) 136/70    Flowsheet Rows      First Filed Value   Admission Height  170.2 cm (67.01\") Documented at 2020 1113   Admission Weight  75.4 kg (166 lb 3.2 oz) Documented at 2020 1112           I/O last 3 completed shifts:  In: 4702.5 [P.O.:720; I.V.:3432.5; Other:50; IV Piggyback:500]  Out: 4325 [Urine:4325]    PHYSICAL EXAM    Physical Exam   Constitutional: He is oriented to person, place, and time. He appears well-developed.   HENT:   Head: Normocephalic.   Eyes: Pupils are equal, round, and reactive to light.   Cardiovascular: Normal rate, regular rhythm and normal heart sounds.   Pulmonary/Chest: Effort normal and breath sounds normal.   Abdominal: Soft. Bowel sounds are normal.   Musculoskeletal: He exhibits no edema.   Neurological: He is alert and oriented to person, place, and time.       RESULTS   Results Review:    Results from last 7 days   Lab Units 20  0943 20  1743 20  0551 20  0755  20  2122   SODIUM mmol/L 139  --  142 143   < > 120*   POTASSIUM mmol/L 3.4*  3.4* 2.5* 2.2* 2.1*   < > 4.4   CHLORIDE mmol/L 112*  --  108* 113*   < > 103   CO2 mmol/L 18.0*  --  23.0 18.0*   < > 6.0*   BUN mg/dL 36*  --  65* 89*   < > 127* "   CREATININE mg/dL 1.69*  --  2.06* 2.59*   < > 3.57*   CALCIUM mg/dL 6.5*  --  6.4* 7.3*   < > 8.7   BILIRUBIN mg/dL  --   --  0.4  --   --  0.5   ALK PHOS U/L  --   --  60  --   --  113   ALT (SGPT) U/L  --   --  10  --   --  8   AST (SGOT) U/L  --   --  13  --   --  6   GLUCOSE mg/dL 337*  --  137* 191*   < > 648*    < > = values in this interval not displayed.       Estimated Creatinine Clearance: 41.2 mL/min (A) (by C-G formula based on SCr of 1.69 mg/dL (H)).    Results from last 7 days   Lab Units 07/07/20  0943 07/06/20  1743 07/06/20  0551 07/06/20  0124  07/03/20  0844   MAGNESIUM mg/dL 1.2*  --  1.3*  --   --  2.0   PHOSPHORUS mg/dL 1.9* 2.1*  --  2.5   < > 1.5*    < > = values in this interval not displayed.             Results from last 7 days   Lab Units 07/06/20  0551 07/05/20  0755 07/04/20  0805 07/03/20  0844 07/02/20  2122   WBC 10*3/mm3 6.26 6.88 8.93 14.12* 9.58   HEMOGLOBIN g/dL 9.6* 10.5* 13.1 13.9 13.7   PLATELETS 10*3/mm3 154 146 148 177 174               Imaging Results (Last 24 Hours)     Procedure Component Value Units Date/Time    US Guided Vascular Access [[id number]] Resulted:  07/06/20 1348     Updated:  07/06/20 1348    Narrative:       This procedure was auto-finalized with no dictation required.    IR Insert Midline Without Port Pump 5 Plus [[id number]] Resulted:  07/06/20 1344     Updated:  07/06/20 1344    Narrative:       This procedure was auto-finalized with no dictation required.           MEDICATIONS      sodium chloride 50 mL Irrigation Q8H   And      acetylcysteine 4 mL Oral Q8H   aspirin 81 mg Oral Daily   budesonide-formoterol 2 puff Inhalation BID - RT   carvedilol 3.125 mg Oral Q12H   cefTRIAXone 1 g Intravenous Q24H   clopidogrel 75 mg Oral Daily   fluticasone 2 spray Nasal Daily   heparin (porcine) 5,000 Units Subcutaneous Q8H   insulin aspart 0-24 Units Subcutaneous TID AC   pantoprazole 40 mg Oral QAM   pravastatin 40 mg Oral Nightly   sertraline 50 mg Oral Daily    sodium chloride 1,000 mL Intravenous Once   sodium chloride 10 mL Intravenous Q12H       lactated ringers 100 mL/hr Last Rate: 100 mL/hr (07/07/20 0222)   sodium chloride 30 mL/hr        Assessment/Plan   ASSESSMENT / PLAN      Diabetic ketoacidosis without coma associated with type 2 diabetes mellitus (CMS/HCC)    1.acute kidney injury on CKD 3 with baseline creatinine close to 2.  He has now creatinine up to 3.3.  His bladder appears to be distended and he has suprapubic fullness.  Patient has prior history of bladder outlet obstruction. Bladder cancer requiring neobladder surgery / ileal conduit. He also has history of recurrent acute kidney injury in the last couple of years.    He has left hydro o nu/s, good UO post liu. Cr is better and better at baseline 1.6. Appreciate Urology input and plan for ct cystogram. Keep LR.     2.severe normal anion gap metabolic acidosis possibly due to acute kidney injury/obstruction.  Now better and will add po bicarb    3.altered mental status with severe hyperglycemia.  Patient urine culture showed mixed growth.  He Is currently on ceftriaxone    4.Bladder cancer requiring neobladder surgery almost 10 years ago.  Also has a history of prostate cancer.    5.history of non-ST elevation MI and stenting in the past.    6. Low mg / low phos better - replace again today                This document has been electronically signed by Rodríguez Brody MD on July 7, 2020 10:26

## 2020-07-08 ENCOUNTER — APPOINTMENT (OUTPATIENT)
Dept: ULTRASOUND IMAGING | Facility: HOSPITAL | Age: 65
End: 2020-07-08

## 2020-07-08 LAB
25(OH)D3 SERPL-MCNC: 12.3 NG/ML (ref 30–100)
ALBUMIN SERPL-MCNC: 3 G/DL (ref 3.5–5.2)
ALBUMIN/GLOB SERPL: 1.4 G/DL
ALP SERPL-CCNC: 53 U/L (ref 39–117)
ALT SERPL W P-5'-P-CCNC: 10 U/L (ref 1–41)
ANION GAP SERPL CALCULATED.3IONS-SCNC: 10 MMOL/L (ref 5–15)
AST SERPL-CCNC: 15 U/L (ref 1–40)
BACTERIA SPEC AEROBE CULT: NORMAL
BASOPHILS # BLD AUTO: 0.01 10*3/MM3 (ref 0–0.2)
BASOPHILS NFR BLD AUTO: 0.1 % (ref 0–1.5)
BILIRUB SERPL-MCNC: 0.3 MG/DL (ref 0–1.2)
BUN SERPL-MCNC: 23 MG/DL (ref 8–23)
BUN/CREAT SERPL: 14.6 (ref 7–25)
CALCIUM SPEC-SCNC: 7.2 MG/DL (ref 8.6–10.5)
CHLORIDE SERPL-SCNC: 110 MMOL/L (ref 98–107)
CO2 SERPL-SCNC: 21 MMOL/L (ref 22–29)
CREAT SERPL-MCNC: 1.57 MG/DL (ref 0.76–1.27)
DEPRECATED RDW RBC AUTO: 46.5 FL (ref 37–54)
EOSINOPHIL # BLD AUTO: 0.14 10*3/MM3 (ref 0–0.4)
EOSINOPHIL NFR BLD AUTO: 1.8 % (ref 0.3–6.2)
ERYTHROCYTE [DISTWIDTH] IN BLOOD BY AUTOMATED COUNT: 14.5 % (ref 12.3–15.4)
GFR SERPL CREATININE-BSD FRML MDRD: 45 ML/MIN/1.73
GLOBULIN UR ELPH-MCNC: 2.2 GM/DL
GLUCOSE BLDC GLUCOMTR-MCNC: 101 MG/DL (ref 70–130)
GLUCOSE BLDC GLUCOMTR-MCNC: 167 MG/DL (ref 70–130)
GLUCOSE BLDC GLUCOMTR-MCNC: 213 MG/DL (ref 70–130)
GLUCOSE BLDC GLUCOMTR-MCNC: 236 MG/DL (ref 70–130)
GLUCOSE SERPL-MCNC: 164 MG/DL (ref 65–99)
HCT VFR BLD AUTO: 29.4 % (ref 37.5–51)
HGB BLD-MCNC: 9.8 G/DL (ref 13–17.7)
IMM GRANULOCYTES # BLD AUTO: 0.07 10*3/MM3 (ref 0–0.05)
IMM GRANULOCYTES NFR BLD AUTO: 0.9 % (ref 0–0.5)
LYMPHOCYTES # BLD AUTO: 1.12 10*3/MM3 (ref 0.7–3.1)
LYMPHOCYTES NFR BLD AUTO: 14.7 % (ref 19.6–45.3)
MAGNESIUM SERPL-MCNC: 1.8 MG/DL (ref 1.6–2.4)
MCH RBC QN AUTO: 29.3 PG (ref 26.6–33)
MCHC RBC AUTO-ENTMCNC: 33.3 G/DL (ref 31.5–35.7)
MCV RBC AUTO: 87.8 FL (ref 79–97)
MONOCYTES # BLD AUTO: 0.77 10*3/MM3 (ref 0.1–0.9)
MONOCYTES NFR BLD AUTO: 10.1 % (ref 5–12)
NEUTROPHILS NFR BLD AUTO: 5.51 10*3/MM3 (ref 1.7–7)
NEUTROPHILS NFR BLD AUTO: 72.4 % (ref 42.7–76)
NRBC BLD AUTO-RTO: 0.3 /100 WBC (ref 0–0.2)
PLATELET # BLD AUTO: 173 10*3/MM3 (ref 140–450)
PMV BLD AUTO: 10.5 FL (ref 6–12)
POTASSIUM SERPL-SCNC: 3.7 MMOL/L (ref 3.5–5.2)
PROT SERPL-MCNC: 5.2 G/DL (ref 6–8.5)
RBC # BLD AUTO: 3.35 10*6/MM3 (ref 4.14–5.8)
SODIUM SERPL-SCNC: 141 MMOL/L (ref 136–145)
WBC # BLD AUTO: 7.62 10*3/MM3 (ref 3.4–10.8)

## 2020-07-08 PROCEDURE — 82962 GLUCOSE BLOOD TEST: CPT

## 2020-07-08 PROCEDURE — 97535 SELF CARE MNGMENT TRAINING: CPT

## 2020-07-08 PROCEDURE — 85025 COMPLETE CBC W/AUTO DIFF WBC: CPT | Performed by: INTERNAL MEDICINE

## 2020-07-08 PROCEDURE — 63710000001 INSULIN ASPART PER 5 UNITS: Performed by: FAMILY MEDICINE

## 2020-07-08 PROCEDURE — 25010000002 CEFTRIAXONE PER 250 MG: Performed by: FAMILY MEDICINE

## 2020-07-08 PROCEDURE — 83735 ASSAY OF MAGNESIUM: CPT | Performed by: INTERNAL MEDICINE

## 2020-07-08 PROCEDURE — 76775 US EXAM ABDO BACK WALL LIM: CPT

## 2020-07-08 PROCEDURE — 94799 UNLISTED PULMONARY SVC/PX: CPT

## 2020-07-08 PROCEDURE — 97110 THERAPEUTIC EXERCISES: CPT

## 2020-07-08 PROCEDURE — 25010000002 HEPARIN (PORCINE) PER 1000 UNITS: Performed by: FAMILY MEDICINE

## 2020-07-08 PROCEDURE — 82306 VITAMIN D 25 HYDROXY: CPT | Performed by: INTERNAL MEDICINE

## 2020-07-08 PROCEDURE — 80053 COMPREHEN METABOLIC PANEL: CPT | Performed by: INTERNAL MEDICINE

## 2020-07-08 RX ORDER — AMILORIDE HYDROCHLORIDE 5 MG/1
5 TABLET ORAL DAILY
Status: DISCONTINUED | OUTPATIENT
Start: 2020-07-08 | End: 2020-07-10 | Stop reason: HOSPADM

## 2020-07-08 RX ADMIN — SODIUM BICARBONATE 650 MG: 650 TABLET ORAL at 17:38

## 2020-07-08 RX ADMIN — FLUTICASONE PROPIONATE 2 SPRAY: 50 SPRAY, METERED NASAL at 09:05

## 2020-07-08 RX ADMIN — CLOPIDOGREL BISULFATE 75 MG: 75 TABLET ORAL at 09:04

## 2020-07-08 RX ADMIN — AMILORIDE HYDROCLORIDE 5 MG: 5 TABLET ORAL at 11:48

## 2020-07-08 RX ADMIN — CYCLOBENZAPRINE 10 MG: 10 TABLET, FILM COATED ORAL at 17:47

## 2020-07-08 RX ADMIN — ACETYLCYSTEINE 4 ML: 100 SOLUTION ORAL; RESPIRATORY (INHALATION) at 17:38

## 2020-07-08 RX ADMIN — SODIUM CHLORIDE, PRESERVATIVE FREE 10 ML: 5 INJECTION INTRAVENOUS at 20:49

## 2020-07-08 RX ADMIN — SODIUM BICARBONATE 650 MG: 650 TABLET ORAL at 20:49

## 2020-07-08 RX ADMIN — HYDROXYZINE HYDROCHLORIDE 25 MG: 25 TABLET, FILM COATED ORAL at 20:49

## 2020-07-08 RX ADMIN — SODIUM CHLORIDE 50 ML: 900 IRRIGANT IRRIGATION at 02:36

## 2020-07-08 RX ADMIN — CARVEDILOL 3.12 MG: 3.12 TABLET, FILM COATED ORAL at 09:05

## 2020-07-08 RX ADMIN — TRAMADOL HYDROCHLORIDE 50 MG: 50 TABLET, FILM COATED ORAL at 20:49

## 2020-07-08 RX ADMIN — PRAVASTATIN SODIUM 40 MG: 40 TABLET ORAL at 20:49

## 2020-07-08 RX ADMIN — CEFTRIAXONE SODIUM 1 G: 1 INJECTION, POWDER, FOR SOLUTION INTRAMUSCULAR; INTRAVENOUS at 11:49

## 2020-07-08 RX ADMIN — HEPARIN SODIUM 5000 UNITS: 5000 INJECTION INTRAVENOUS; SUBCUTANEOUS at 20:49

## 2020-07-08 RX ADMIN — HEPARIN SODIUM 5000 UNITS: 5000 INJECTION INTRAVENOUS; SUBCUTANEOUS at 06:08

## 2020-07-08 RX ADMIN — HEPARIN SODIUM 5000 UNITS: 5000 INJECTION INTRAVENOUS; SUBCUTANEOUS at 17:37

## 2020-07-08 RX ADMIN — SODIUM BICARBONATE 650 MG: 650 TABLET ORAL at 09:04

## 2020-07-08 RX ADMIN — SODIUM CHLORIDE, PRESERVATIVE FREE 10 ML: 5 INJECTION INTRAVENOUS at 09:07

## 2020-07-08 RX ADMIN — SERTRALINE HYDROCHLORIDE 50 MG: 50 TABLET ORAL at 09:04

## 2020-07-08 RX ADMIN — INSULIN ASPART 4 UNITS: 100 INJECTION, SOLUTION INTRAVENOUS; SUBCUTANEOUS at 09:06

## 2020-07-08 RX ADMIN — PANTOPRAZOLE SODIUM 40 MG: 40 TABLET, DELAYED RELEASE ORAL at 06:08

## 2020-07-08 RX ADMIN — SODIUM CHLORIDE 50 ML: 900 IRRIGANT IRRIGATION at 11:50

## 2020-07-08 RX ADMIN — ACETYLCYSTEINE 4 ML: 100 SOLUTION ORAL; RESPIRATORY (INHALATION) at 02:31

## 2020-07-08 RX ADMIN — ACETYLCYSTEINE 4 ML: 100 SOLUTION ORAL; RESPIRATORY (INHALATION) at 11:48

## 2020-07-08 RX ADMIN — ASPIRIN 81 MG: 81 TABLET, COATED ORAL at 09:04

## 2020-07-08 RX ADMIN — SODIUM CHLORIDE 50 ML: 900 IRRIGANT IRRIGATION at 17:38

## 2020-07-08 RX ADMIN — BUDESONIDE AND FORMOTEROL FUMARATE DIHYDRATE 2 PUFF: 160; 4.5 AEROSOL RESPIRATORY (INHALATION) at 07:20

## 2020-07-08 RX ADMIN — BUDESONIDE AND FORMOTEROL FUMARATE DIHYDRATE 2 PUFF: 160; 4.5 AEROSOL RESPIRATORY (INHALATION) at 18:03

## 2020-07-08 RX ADMIN — CARVEDILOL 3.12 MG: 3.12 TABLET, FILM COATED ORAL at 20:49

## 2020-07-08 RX ADMIN — INSULIN ASPART 8 UNITS: 100 INJECTION, SOLUTION INTRAVENOUS; SUBCUTANEOUS at 11:48

## 2020-07-08 NOTE — PROGRESS NOTES
"   LOS: 6 days   Patient Care Team:  Shayne Merritt MD as PCP - General (Family Medicine)  Kevin Robbins PA-C as PCP - Claims Attributed  Kevin Robbins PA-C as Physician Assistant (Gastroenterology)  Harvinder Robert MD    Subjective     Subjective:  Symptoms:  Improved.  (Urine clear and yellow in John's gravity bag.).    Diet:  No vomiting.        History taken from: patient chart    Objective     Vital Signs  Temp:  [97.6 °F (36.4 °C)-99.6 °F (37.6 °C)] 97.9 °F (36.6 °C)  Heart Rate:  [69-87] 84  Resp:  [16-20] 20  BP: (130-153)/(76-88) 148/76    Objective:  General Appearance:  In no acute distress.    Vital signs: (most recent): Blood pressure 148/76, pulse 84, temperature 97.9 °F (36.6 °C), temperature source Oral, resp. rate 20, height 170.2 cm (67.01\"), weight 65.4 kg (144 lb 3.2 oz), SpO2 98 %.  Vital signs are normal.  No fever.    Output: Producing urine (John).    Lungs:  Normal effort.  He is not in respiratory distress.    Chest: Symmetric chest wall expansion.   Abdomen: Abdomen is soft and non-distended.  There is no abdominal tenderness.     Neurological: Patient is alert and oriented to person, place and time.    Pupils:  Pupils are equal, round, and reactive to light.    Skin:  Warm and dry.              Results Review:    Lab Results (last 24 hours)     Procedure Component Value Units Date/Time    POC Glucose Once [[id number]]  (Abnormal) Collected:  07/08/20 0734    Specimen:  Blood Updated:  07/08/20 0750     Glucose 167 mg/dL      Comment: RN NotifiedOperator: [id number] [facility]: [id number]       Magnesium [[id number]]  (Normal) Collected:  07/08/20 0553    Specimen:  Blood Updated:  07/08/20 0646     Magnesium 1.8 mg/dL     Comprehensive Metabolic Panel [[id number]]  (Abnormal) Collected:  07/08/20 0553    Specimen:  Blood Updated:  07/08/20 0645     Glucose 164 mg/dL      BUN 23 mg/dL      Creatinine 1.57 mg/dL      Sodium 141 mmol/L      Potassium 3.7 " mmol/L      Chloride 110 mmol/L      CO2 21.0 mmol/L      Calcium 7.2 mg/dL      Total Protein 5.2 g/dL      Albumin 3.00 g/dL      ALT (SGPT) 10 U/L      AST (SGOT) 15 U/L      Alkaline Phosphatase 53 U/L      Total Bilirubin 0.3 mg/dL      eGFR Non African Amer 45 mL/min/1.73      Globulin 2.2 gm/dL      A/G Ratio 1.4 g/dL      BUN/Creatinine Ratio 14.6     Anion Gap 10.0 mmol/L     Narrative:       GFR Normal >60  Chronic Kidney Disease <60  Kidney Failure <15      CBC & Differential [[id number]] Collected:  07/08/20 0553    Specimen:  Blood Updated:  07/08/20 0624    Narrative:       The following orders were created for panel order CBC & Differential.  Procedure                               Abnormality         Status                     ---------                               -----------         ------                     CBC Auto Differential[[id number]]        Abnormal            Final result                 Please view results for these tests on the individual orders.    CBC Auto Differential [[id number]]  (Abnormal) Collected:  07/08/20 0553    Specimen:  Blood Updated:  07/08/20 0624     WBC 7.62 10*3/mm3      RBC 3.35 10*6/mm3      Hemoglobin 9.8 g/dL      Hematocrit 29.4 %      MCV 87.8 fL      MCH 29.3 pg      MCHC 33.3 g/dL      RDW 14.5 %      RDW-SD 46.5 fl      MPV 10.5 fL      Platelets 173 10*3/mm3      Neutrophil % 72.4 %      Lymphocyte % 14.7 %      Monocyte % 10.1 %      Eosinophil % 1.8 %      Basophil % 0.1 %      Immature Grans % 0.9 %      Neutrophils, Absolute 5.51 10*3/mm3      Lymphocytes, Absolute 1.12 10*3/mm3      Monocytes, Absolute 0.77 10*3/mm3      Eosinophils, Absolute 0.14 10*3/mm3      Basophils, Absolute 0.01 10*3/mm3      Immature Grans, Absolute 0.07 10*3/mm3      nRBC 0.3 /100 WBC     Vitamin D 25 Hydroxy [[id number]] Collected:  07/08/20 0553    Specimen:  Blood Updated:  07/08/20 0615    Blood Culture - Blood, Hand, Left [[id number]] Collected:  07/03/20 0312    Specimen:   Blood from Hand, Left Updated:  07/08/20 0345     Blood Culture No growth at 5 days    Blood Culture - Blood, Arm, Right [[id number]] Collected:  07/02/20 2158    Specimen:  Blood from Arm, Right Updated:  07/07/20 2215     Blood Culture No growth at 5 days    POC Glucose Once [[id number]]  (Abnormal) Collected:  07/1955    Specimen:  Blood Updated:  07/07/20 2026     Glucose 229 mg/dL      Comment: RN NotifiedOperator: [id number] DUTTEN TINAMeter ID: [id number]       POC Glucose Once [[id number]]  (Normal) Collected:  07/07/20 1754    Specimen:  Blood Updated:  07/07/20 1953     Glucose 115 mg/dL      Comment: RN NotifiedOperator: [id number] WINEBARGER CRISHANAMeter ID: [id number]       POC Glucose Once [[id number]]  (Normal) Collected:  07/07/20 1703    Specimen:  Blood Updated:  07/07/20 1953     Glucose 80 mg/dL      Comment: : [id number] NNEKABARGER CRISHANAMeter ID: [id number]       POC Glucose Once [[id number]]  (Abnormal) Collected:  07/07/20 1048    Specimen:  Blood Updated:  07/07/20 1110     Glucose 331 mg/dL      Comment: RN NotifiedOperator: [id number] WINEBARGER CRISHANAMeter ID: [id number]       Potassium [[id number]]  (Abnormal) Collected:  07/07/20 0943    Specimen:  Blood Updated:  07/07/20 1017     Potassium 3.4 mmol/L     Basic Metabolic Panel [[id number]]  (Abnormal) Collected:  07/07/20 0943    Specimen:  Blood Updated:  07/07/20 1017     Glucose 337 mg/dL      BUN 36 mg/dL      Creatinine 1.69 mg/dL      Sodium 139 mmol/L      Potassium 3.4 mmol/L      Chloride 112 mmol/L      CO2 18.0 mmol/L      Calcium 6.5 mg/dL      eGFR Non African Amer 41 mL/min/1.73      BUN/Creatinine Ratio 21.3     Anion Gap 9.0 mmol/L     Narrative:       GFR Normal >60  Chronic Kidney Disease <60  Kidney Failure <15      Phosphorus [[id number]]  (Abnormal) Collected:  07/07/20 0943    Specimen:  Blood Updated:  07/07/20 1017     Phosphorus 1.9 mg/dL     Magnesium [[id number]]  (Abnormal) Collected:   07/07/20 0943    Specimen:  Blood Updated:  07/07/20 1012     Magnesium 1.2 mg/dL          Imaging Results (Last 24 Hours)     ** No results found for the last 24 hours. **           I reviewed the patient's new clinical results.  I reviewed the patient's new imaging results and agree with the interpretation.  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 His retention of urine is most likely related to increased urinary mucous and sediment which is a chronic problem for him related to his neobladder. He normally performs self intermittent catheterization at home. Now he has John in place and sediment and mucous present. Renal ultrasound 7/5/20 showed mild left hydronephrosis (rule out obstruction vs reflux). As imaging in Nov. 2019 showed no hydronephrosis we will start Mucomyst irrigation-->urine clear and yellow, no sediment or mucus.  -Estimated Creatinine Clearance: 44.5 mL/min (A) (by C-G formula based on SCr of 1.57 mg/dL (H)).  -Cr 3.57-->3.17-->3.41-->3.31-->2.59-->2.06-->1.57  -Baseline Cr around 2.0  -Urine output 1,000 mL last 24 hours  -Urine culture 7/3/20 mixed gram positive birgit, Rocephin day #6    Plan:  Continue intravesical Mucomyst flushes   Continue John in place.  Send down for renal ultrasound today to see if hydronephrosis has reduced/resolved after the intravesical Mucomyst.     TIFFANIE Michele  07/08/20  09:38

## 2020-07-08 NOTE — PLAN OF CARE
Problem: Patient Care Overview  Goal: Plan of Care Review  Outcome: Ongoing (interventions implemented as appropriate)  Flowsheets (Taken 7/7/2020 1933)  Progress: no change  Plan of Care Reviewed With: patient  Outcome Summary: VSS, see orders, no complaints of pain during this shift, irrigated catheter, will cont to monitor

## 2020-07-08 NOTE — PLAN OF CARE
Problem: Patient Care Overview  Goal: Plan of Care Review  Outcome: Ongoing (interventions implemented as appropriate)  Flowsheets (Taken 7/8/2020 1235)  Plan of Care Reviewed With: patient  Outcome Summary: pt pleasant and cooperative for OT session this morning. pt was oriented x 4, but continues to be confused at times. redirection required as pt has difficulty staying on task. increased amounts of vc's for completion, as well as increased time. pt completed sup to sit and sit to sup bed mobility with HOB elevated, bed rails, and stand by assistance. 1 set of 10 reps green theraband while sitting EOB. some modification required secondary to right shoulder pain. face washing and combing of hair completed while long sitting in bed at end of session with set up and stand by assistance. no goals met at this time. continue per OT POC.

## 2020-07-08 NOTE — PROGRESS NOTES
"Adult Nutrition  Assessment    Patient Name:  Favio Casillas  YOB: 1955  MRN: [id number]  Admit Date:  7/2/2020    Assessment Date:  7/8/2020    Comments:  64yo male seen by RD for LOS. Pt admit with DKA w/ Glu >600, insulin drip until 7/4 w/ metabolic acidosis and encephalopathy w/ A/C renal failure and critically low phos levels. On 7/5 MD notes pt c/o N/V/D, ? Etiology. PMH includes DM, Hep C, CKD Stg 4, COPD, polysubstance abuse. ST gissel completed and recommend Reg textures. Intake 100% x3 meals.  Labs and meds noted, Phos remains very Low, Glu still elevated. Noted wt hx- approx 1 year ago 129#, currently 144# w/ BMI 22.5.Pt may be slightly confused, would answer RD ?s appropriately and then talk about going to the bank or asking me to set up a phone on his land line at home. He states he \"aint got no teeth\" and goes back and forth on if he needs chopped/ground meats, and that he \"aint having no problems\". Pt notes good appetite, no GI distress today and states he may d/c soon. RD attached information on basic ADA diet and decreasing salt d/t h/o CKD. RD also added ADA and cardiac restrictions to diet. RD to follow hospital course.    Reason for Assessment     Row Name 07/08/20 1454          Reason for Assessment    Reason For Assessment  per organizational policy     Diagnosis  diabetes diagnosis/complications;metabolic state;neurologic conditions;renal disease     Identified At Risk by Screening Criteria  -- LOS         Nutrition/Diet History     Row Name 07/08/20 1452          Nutrition/Diet History    Typical Food/Fluid Intake  Pt may be slightly confused, would answer RD ?s appropriately and then talk about going to the bank or asking me to set up a phone on his land line at home. He states he \"aint got no teeth\" and goes back and forth on if he needs chopped/ground meats, and that he \"aint having no problems\". Pt notes good appetite, no GI distress today.      "       Labs/Tests/Procedures/Meds     Row Name 07/08/20 1456          Labs/Procedures/Meds    Lab Results Reviewed  reviewed     Lab Results Comments  Glu 164-331, BUN 23/Cr 1.5H, Alb 3.0L        Diagnostic Tests/Procedures    Diagnostic Test/Procedure Reviewed  reviewed     Diagnostic Test/Procedures Comments  head CT, CXR        Medications    Pertinent Medications Reviewed  reviewed     Pertinent Medications Comments  Na bicarb TID, SSI, 7/7 IV phos x1           Estimated/Assessed Needs     Row Name 07/08/20 1457          Calculation Measurements    Weight Used For Calculations  65.3 kg (144 lb) 144#        Estimated/Assessed Needs    Additional Documentation  Fluid Requirements (Group);Protein Requirements (Group);Calorie Requirements (Group);KCAL/KG (Group)        Calorie Requirements    Estimated Calorie Requirement (kcal/day)  1600        KCAL/KG    KCAL/KG  25 Kcal/Kg (kcal)     25 Kcal/Kg (kcal)  1632.95        Protein Requirements    Weight Used For Protein Calculations  65.3 kg (144 lb)     Est Protein Requirement Amount (gms/kg)  1.0 gm protein     Estimated Protein Requirements (gms/day)  65.32        Fluid Requirements    Estimated Fluid Requirements (mL/day)  1600     RDA Method (mL)  1600         Nutrition Prescription Ordered     Row Name 07/08/20 1458          Nutrition Prescription PO    Current PO Diet  Regular     Fluid Consistency  Thin         Evaluation of Received Nutrient/Fluid Intake     Row Name 07/08/20 1458          PO Evaluation    Number of Days PO Intake Evaluated  2 days     Number of Meals  3     % PO Intake  100each               Electronically signed by:  Maribel Knapp RD  07/08/20 15:02

## 2020-07-08 NOTE — PLAN OF CARE
Problem: Patient Care Overview  Goal: Plan of Care Review  Outcome: Ongoing (interventions implemented as appropriate)  Flowsheets (Taken 7/8/2020 7442)  Progress: no change  Plan of Care Reviewed With: patient  Outcome Summary: Pt initially agreeable to participate with therapy and t/f to EOB with spv only. Pt performed some seated B LE ther ex but when requested to stand pt declined, t/f to sup and declined all other activity requests. Pt would cont to benefit from therapy upon DC.

## 2020-07-08 NOTE — THERAPY TREATMENT NOTE
Acute Care - Physical Therapy Treatment Note  [facility]     Patient Name: Favio Casillas  : 1955  MRN: [id number]  Today's Date: 2020  Onset of Illness/Injury or Date of Surgery: 20     Referring Physician: WILMAR Sanchez MD    Admit Date: 2020    Visit Dx:    ICD-10-CM ICD-9-CM   1. Diabetic ketoacidosis without coma associated with type 2 diabetes mellitus (CMS/HCC) E11.10 250.12   2. Encephalopathy acute G93.40 348.30   3. Acute renal failure superimposed on chronic kidney disease, unspecified CKD stage, unspecified acute renal failure type (CMS/HCC) N17.9 584.9    N18.9 585.9   4. Dysphagia, unspecified type R13.10 787.20   5. Decreased functional mobility R26.89 781.99   6. Impaired mobility and ADLs Z74.09 V49.89    Z78.9      Patient Active Problem List   Diagnosis   • Type 2 diabetes mellitus (CMS/HCC)   • Acute pain of right shoulder   • Shoulder impingement syndrome, right   • Chest pain   • Acute renal insufficiency   • Chronic hepatitis C virus infection (CMS/HCC)   • Gastritis   • SBO (small bowel obstruction) (CMS/HCC)   • CAD (coronary artery disease)   • Acute kidney injury (nontraumatic) (CMS/HCC)   • Intractable vomiting with nausea   • Gastroesophageal reflux disease with esophagitis   • Diarrhea   • Generalized abdominal pain   • History of colon polyps   • History of colitis   • Acute metabolic encephalopathy   • NSTEMI (non-ST elevated myocardial infarction) (CMS/HCC)   • Acute renal failure superimposed on stage 3 chronic kidney disease (CMS/HCC)   • Influenza A   • Anemia, chronic disease   • Bladder outflow obstruction   • Acute kidney injury (CMS/HCC)   • Acute urinary tract infection   • Metabolic acidosis   • Hypokalemia   • Acute renal failure superimposed on chronic kidney disease (CMS/HCC)   • Diabetic ketoacidosis without coma associated with type 2 diabetes mellitus (CMS/HCC)       Therapy Treatment    Rehabilitation Treatment Summary     Row Name 20  0942 07/08/20 0907          Treatment Time/Intention    Discipline  physical therapy assistant  -TW  occupational therapist  -ME     Document Type  therapy note (daily note)  -TW  therapy note (daily note)  -ME     Subjective Information  complains of;fatigue;weakness  -TW  no complaints  -ME     Mode of Treatment  physical therapy;individual therapy  -TW  individual therapy;occupational therapy  -ME     Patient/Family Observations  No visitors present.  -TW  no family present;on room air, tele, midline, tele, catheter   -ME2     Care Plan Review  --  evaluation/treatment results reviewed;care plan/treatment goals reviewed;risks/benefits reviewed;current/potential barriers reviewed  -ME2     Total Minutes, Occupational Therapy Treatment  --  34  -ME3     Therapy Frequency (OT Eval)  --  other (see comments) 5 days per week   -ME2     Patient Effort  fair  -TW  adequate  -ME3     Comment  Pt initially agreeable to therapy but when requested to stand from sitting pt t/f back to sup and declined all other activity.  -TW  pt still appears to be a little confused at times throughout session; requires redirection to stay on task as pt often gets distracted very easily   -ME3     Existing Precautions/Restrictions  fall  -TW  fall  -ME3     Recorded by [TW] Casimiro Colin, PTA 07/08/20 1455 [ME] Radu Hinds, OT 07/08/20 0910  [ME2] Radu Hinds, OT 07/08/20 0911  [ME3] Radu Hinds, OT 07/08/20 1201     Row Name 07/08/20 0942 07/08/20 0907          Vital Signs    Pre Systolic BP Rehab  127  -TW  136  -ME     Pre Treatment Diastolic BP  75  -TW  69  -ME     Post Systolic BP Rehab  --  127  -ME2     Post Treatment Diastolic BP  --  75  -ME2     Pretreatment Heart Rate (beats/min)  77  -TW  96  -ME     Posttreatment Heart Rate (beats/min)  82  -TW  77  -ME2     Pre SpO2 (%)  97  -TW  97  -ME     O2 Delivery Pre Treatment  room air  -TW  room air  -ME     Post SpO2 (%)  97  -TW  97  -ME2     O2 Delivery Post  Treatment  --  room air  -ME2     Pre Patient Position  Supine  -TW  Supine  -ME     Intra Patient Position  Sitting  -TW  --     Post Patient Position  Supine  -TW  Supine  -ME2     Recorded by [TW] Casimiro Colin, PTA 07/08/20 1455 [ME] GuzmanRadu, OT 07/08/20 0919  [ME2] Guzman Lovelace, OT 07/08/20 0937     Row Name 07/08/20 0942 07/08/20 0907          Cognitive Assessment/Intervention- PT/OT    Orientation Status (Cognition)  oriented x 4  -TW  oriented x 4  -ME     Follows Commands (Cognition)  follows one step commands;50-74% accuracy  -TW  follows one step commands;50-74% accuracy  -ME2     Safety Deficit (Cognitive)  --  mild deficit;ability to follow commands;awareness of need for assistance;insight into deficits/self awareness;problem solving;safety precautions awareness;safety precautions follow-through/compliance  -ME2     Recorded by [TW] Casimiro Colin PTA 07/08/20 1455 [ME] Radu Hinds, OT 07/08/20 0919  [ME2] Guzman Lovelace, OT 07/08/20 1201     Row Name 07/08/20 0907             Safety Issues, Functional Mobility    Safety Issues Affecting Function (Mobility)  awareness of need for assistance;insight into deficits/self awareness;problem solving;safety precaution awareness;safety precautions follow-through/compliance  -ME      Impairments Affecting Function (Mobility)  balance;cognition;coordination;endurance/activity tolerance;postural/trunk control;strength  -ME      Recorded by [ME] Radu Hinds, OT 07/08/20 1201      Row Name 07/08/20 0942 07/08/20 0907          Bed Mobility Assessment/Treatment    Bed Mobility Assessment/Treatment  --  supine-sit;sit-supine  -ME     Supine-Sit [place] (Bed Mobility)  supervision  -TW  supervision  -ME     Sit-Supine [place] (Bed Mobility)  supervision  -TW  supervision  -ME     Assistive Device (Bed Mobility)  bed rails;head of bed elevated  -TW  bed rails;head of bed elevated  -ME     Comment (Bed Mobility)  Pt sat EOB for 7  minutes but when asked to stand returned to supine and declined all other activity.  -TW  --     Recorded by [TW] Casimiro Colin, PTA 07/08/20 1455 [ME] Radu Hinds, OT 07/08/20 1201     Row Name 07/08/20 0907             Functional Mobility    Functional Mobility- Comment  not tested   -ME      Recorded by [ME] Radu Hinds, OT 07/08/20 1201      Row Name 07/08/20 0907             Transfer Assessment/Treatment    Comment (Transfers)  not tested this date   -ME      Recorded by [ME] Radu Hinds, OT 07/08/20 1201      Row Name 07/08/20 0907             ADL Assessment/Intervention    BADL Assessment/Intervention  grooming  -ME      Recorded by [ME] Radu Hinds, OT 07/08/20 0937      Row Name 07/08/20 0907             Grooming Assessment/Training    [place] Level (Grooming)  wash face, hands;hair care, combing/brushing;set up SBA  -ME      Grooming Position  long sitting  -ME      Comment (Grooming)  pt completed grooming this morning with set up and SBA; no physical assistance required; long sitting in bed; combed all tangles out of hair and washed face;increased time to complete as pt has difficulty staying on task   -ME      Recorded by [ME] Radu Hinds, OT 07/08/20 1201      Row Name 07/08/20 0907             BADL Safety/Performance    Impairments, BADL Safety/Performance  balance;cognition;endurance/activity tolerance;coordination;strength;trunk/postural control  -ME      Recorded by [ME] Radu Hinds, OT 07/08/20 1201      Row Name 07/08/20 0907             Motor Skills Assessment/Interventions    Additional Documentation  Balance (Group)  -ME      Recorded by [ME] Radu Hinds, OT 07/08/20 0937      Row Name 07/08/20 0907             Therapeutic Exercise    Therapeutic Exercise  seated, upper extremities  -ME      Recorded by [ME] Radu Hinds, OT 07/08/20 0937      Row Name 07/08/20 0907             Upper Extremity Seated Therapeutic Exercise    Performed, Seated Upper Extremity  (Therapeutic Exercise)  shoulder flexion/extension;elbow flexion/extension;scapular stabilization;scapular protraction/retraction  -ME      Device, Seated Upper Extremity (Therapeutic Exercise)  elastic bands/tubing green theraband   -ME      Exercise Type, Seated Upper Extremity (Therapeutic Exercise)  resistive exercise  -ME      Sets/Reps Detail, Seated Upper Extremity (Therapeutic Exercise)  1 set 10 reps   -ME2      Comment, Seated Upper Extremity (Therapeutic Exercise)  participated in green theraband exercise while sitting EOB this morning; requires increased amount of time and vc's to complete with proper body mechanics; pt complains of some R shoulder pain that can be limiting and modifications to exercise are needed at times. pt requires redirection with exercise as he does with all other tasks at this time.   -ME      Recorded by [ME] Radu Hinds, MENA 07/08/20 1201  [ME2] Radu Hinds, MENA 07/08/20 0937      Row Name 07/08/20 0942             Therapeutic Exercise    Lower Extremity (Therapeutic Exercise)  LAQ (long arc quad), bilateral;marching while seated  -TW      Lower Extremity Range of Motion (Therapeutic Exercise)  ankle dorsiflexion/plantar flexion, bilateral;hip abduction/adduction, bilateral  -TW      Exercise Type (Therapeutic Exercise)  AROM (active range of motion)  -TW      Position (Therapeutic Exercise)  seated  -TW      Sets/Reps (Therapeutic Exercise)  1/10  -TW      Recorded by [TW] Casimiro Colin PTA 07/08/20 1455      Row Name 07/08/20 0907             Balance    Balance  static sitting balance  -ME      Recorded by [ME] Radu Hinds OT 07/08/20 1201      Row Name 07/08/20 0942 07/08/20 0907          Static Sitting Balance    Level of [place] (Unsupported Sitting, Static Balance)  supervision  -TW  supervision  -ME     Sitting Position (Unsupported Sitting, Static Balance)  sitting on edge of bed  -TW  sitting on edge of bed  -ME     Time Able to Maintain Position  (Unsupported Sitting, Static Balance)  more than 5 minutes  -TW  more than 5 minutes  -ME     Recorded by [TW] Casimiro Colin, PTA 07/08/20 1455 [ME] Radu Hinds, OT 07/08/20 1201     Row Name 07/08/20 0942 07/08/20 0907          Positioning and Restraints    Pre-Treatment Position  in bed  -TW  in bed  -ME     Post Treatment Position  bed  -TW  bed  -ME     In Bed  supine;call light within reach;encouraged to call for assist;exit alarm on  -TW  fowlers;call light within reach;encouraged to call for assist;exit alarm on  -ME     Recorded by [TW] Casimiro Colin, PTA 07/08/20 1455 [ME] Radu Hinds, OT 07/08/20 1201     Row Name 07/08/20 0907             Pain Assessment    Additional Documentation  Pain Scale: Numbers Pre/Post-Treatment (Group)  -ME      Recorded by [ME] Radu Hinds OT 07/08/20 0919      Row Name 07/08/20 0907             Pain Scale: Numbers Pre/Post-Treatment    Pain Scale: Numbers, Pretreatment  0/10 - no pain  -ME      Pain Scale: Numbers, Post-Treatment  10/10  -ME2      Pain Location - Side  Bilateral  -ME2      Pain Location  other (see comments) legs  -ME2      Pain Intervention(s)  Repositioned;Ambulation/increased activity;Distraction  -ME2      Recorded by [ME] Radu Hinds, MENA 07/08/20 0919  [ME2] Radu Hinds, MENA 07/08/20 0937      Row Name 07/08/20 0907             Plan of Care Review    Plan of Care Reviewed With  patient  -ME      Recorded by [ME] Radu Hinds, OT 07/08/20 0919      Row Name 07/08/20 0907             Outcome Summary/Treatment Plan (OT)    Daily Summary of Progress (OT)  progress toward functional goals as expected  -ME      Barriers to Overall Progress (OT)  decreased strength and endurance. pt cognitive status   -ME      Plan for Continued Treatment (OT)  continue per OT POC   -ME2      Anticipated Discharge Disposition (OT)  home with 24/7 care;anticipate therapy at next level of care  -ME2      Recorded by [ME] Radu Hinds OT 07/08/20  1201  [ME2] Jono Hindsna, OT 07/08/20 0919      Row Name 07/08/20 0942             Outcome Summary/Treatment Plan (PT)    Daily Summary of Progress (PT)  progress toward functional goals is gradual  -TW      Barriers to Overall Progress (PT)  Participation.  -TW      Plan for Continued Treatment (PT)  Cont to attempt tx and mobility.  -TW      Anticipated Discharge Disposition (PT)  anticipate therapy at next level of care  -TW      Recorded by [TW] Casimiro Colin, [place] 07/08/20 1455        User Key  (r) = Recorded By, (t) = Taken By, (c) = Cosigned By    Initials Name Effective Dates Discipline    TW Casimiro Colin PTA 03/07/18 -  PT    ME Radu Hinds OT 09/10/19 -  OT               Rehab Goal Summary     Row Name 07/08/20 0942 07/08/20 0907          Bed Mobility Goal 1 (PT)    Activity/Assistive Device (Bed Mobility Goal 1, PT)  sit to supine;supine to sit  -TW  --     [place] Level/Cues Needed (Bed Mobility Goal 1, PT)  independent  -TW  --     Time Frame (Bed Mobility Goal 1, PT)  by discharge  -TW  --     Progress/Outcomes (Bed Mobility Goal 1, PT)  goal not met  -TW  --        Transfer Goal 1 (PT)    Activity/Assistive Device (Transfer Goal 1, PT)  sit-to-stand/stand-to-sit;bed-to-chair/chair-to-bed  -TW  --     [place] Level/Cues Needed (Transfer Goal 1, PT)  conditional independence  -TW  --     Time Frame (Transfer Goal 1, PT)  by discharge  -TW  --     Progress/Outcome (Transfer Goal 1, PT)  goal not met  -TW  --        Gait Training Goal 1 (PT)    Activity/Assistive Device (Gait Training Goal 1, PT)  gait (walking locomotion);assistive device use;backward stepping  -TW  --     [place] Level (Gait Training Goal 1, PT)  conditional independence  -TW  --     Distance (Gait Goal 1, PT)  50'x2  -TW  --     Time Frame (Gait Training Goal 1, PT)  by discharge  -TW  --     Progress/Outcome (Gait Training Goal 1, PT)  goal not met  -TW  --        Stairs Goal 1 (PT)     Activity/Assistive Device (Stairs Goal 1, PT)  stairs, all skills;ascending stairs;descending stairs  -TW  --     [place] Level/Cues Needed (Stairs Goal 1, PT)  conditional independence  -TW  --     Number of Stairs (Stairs Goal 1, PT)  3  -TW  --     Time Frame (Stairs Goal 1, PT)  by discharge  -TW  --     Progress/Outcome (Stairs Goal 1, PT)  goal not met  -TW  --        Patient Education Goal (PT)    Activity (Patient Education Goal, PT)  Patient will score >24/28 on Tinetti balance and gait to indicate low fall risk,  -TW  --     [place]/Cues/Accuracy (Memory Goal 2, PT)  demonstrates adequately;independent  -TW  --     Time Frame (Patient Education Goal, PT)  by discharge  -TW  --     Progress/Outcome (Patient Education Goal, PT)  goal not met  -TW  --        Occupational Therapy Goals    Transfer Goal Selection (OT)  --  transfer, OT goal 1  -ME     Bathing Goal Selection (OT)  --  bathing, OT goal 1  -ME     Dressing Goal Selection (OT)  --  dressing, OT goal 1  -ME     Toileting Goal Selection (OT)  --  toileting, OT goal 1  -ME     Endurance Goal Selection (OT)  --  endurance, OT goal 1  -ME     Safety Awareness Goal Selection (OT)  --  safety awareness, OT goal 1  -ME        Transfer Goal 1 (OT)    Activity/Assistive Device (Transfer Goal 1, OT)  --  toilet  -ME     [place] Level/Cues Needed (Transfer Goal 1, OT)  --  verbal cues required;contact guard assist  -ME     Time Frame (Transfer Goal 1, OT)  --  long term goal (LTG);by discharge  -ME     Progress/Outcome (Transfer Goal 1, OT)  --  goal not met  -ME        Bathing Goal 1 (OT)    Activity/Assistive Device (Bathing Goal 1, OT)  --  lower body bathing  -ME     [place] Level/Cues Needed (Bathing Goal 1, OT)  --  minimum assist (75% or more patient effort);verbal cues required  -ME     Time Frame (Bathing Goal 1, OT)  --  long term goal (LTG);by discharge  -ME     Progress/Outcomes (Bathing Goal 1, OT)  --  goal not met  -ME         Dressing Goal 1 (OT)    Activity/Assistive Device (Dressing Goal 1, OT)  --  lower body dressing  -ME     [place]/Cues Needed (Dressing Goal 1, OT)  --  minimum assist (75% or more patient effort);verbal cues required  -ME     Time Frame (Dressing Goal 1, OT)  --  long term goal (LTG);by discharge  -ME     Progress/Outcome (Dressing Goal 1, OT)  --  goal not met  -ME        Toileting Goal 1 (OT)    Activity/Device (Toileting Goal 1, OT)  --  toileting skills, all  -ME     [place] Level/Cues Needed (Toileting Goal 1, OT)  --  minimum assist (75% or more patient effort);verbal cues required  -ME     Time Frame (Toileting Goal 1, OT)  --  long term goal (LTG);by discharge  -ME     Progress/Outcome (Toileting Goal 1, OT)  --  goal not met  -ME         Endurance Goal 1 (OT)    Endurance Goal 1 (OT)  --  20 min functional activity with proper EC techniques   -ME     Time Frame (Endurance Goal 1, OT)  --  long term goal (LTG);by discharge  -ME     Progress/Outcome (Endurance Goal 1, OT)  --  goal not met  -ME        Safety Awareness Goal 1 (OT)    Activity (Safety Awareness Goal 1, OT)  --  insight into deficits/self awareness;judgment;safe use of assistive device/equipment;follow through of safety precautions;demonstrates compliance;demonstration of safe behaviors;demonstrates understanding  -ME     [place]/Cues/Accuracy (Safety Awareness Goal 1, OT)  --  with minimum;verbal cues/redirection;with 80% accuracy  -ME     Time Frame (Safety Awareness Goal 1, OT)  --  long term goal (LTG);by discharge  -ME     Progress/Outcome (Safety Awareness Goal 1, OT)  --  goal not met  -ME       User Key  (r) = Recorded By, (t) = Taken By, (c) = Cosigned By    Initials Name Provider Type Discipline    Casimiro Barajas, PTA Physical Therapy Assistant PT    aRdu Rosas OT Occupational Therapist OT          Physical Therapy Education                 Title: PT OT SLP Therapies (In Progress)     Topic: Physical  Therapy (In Progress)     Point: Mobility training (Done)     Description:   Instruct learner(s) on safety and technique for assisting patient out of bed, chair or wheelchair.  Instruct in the proper use of assistive devices, such as walker, crutches, cane or brace.              Patient Friendly Description:   It's important to get you on your feet again, but we need to do so in a way that is safe for you. Falling has serious consequences, and your personal safety is the most important thing of all.        When it's time to get out of bed, one of us or a family member will sit next to you on the bed to give you support.     If your doctor or nurse tells you to use a walker, crutches, a cane, or a brace, be sure you use it every time you get out of bed, even if you think you don't need it.    Learning Progress Summary           Patient Acceptance, E,TB, VU by LR at 7/4/2020 8587    Comment:  Attempted to educate patient on PT POC and role of PT. Patient required repetition of one step commands for hand placement, sequencing, body mechanics, and safety.                   Point: Home exercise program (Not Started)     Description:   Instruct learner(s) on appropriate technique for monitoring, assisting and/or progressing patient with therapeutic exercises and activities.              Learner Progress:   Not documented in this visit.          Point: Body mechanics (Not Started)     Description:   Instruct learner(s) on proper positioning and spine alignment for patient and/or caregiver during mobility tasks and/or exercises.              Learner Progress:   Not documented in this visit.          Point: Precautions (Not Started)     Description:   Instruct learner(s) on prescribed precautions during mobility and gait tasks              Learner Progress:   Not documented in this visit.                      User Key     Initials Effective Dates Name Provider Type Discipline    CHUY 06/25/19 -  Akash Murray Physical  Therapist PT                PT Recommendation and Plan  Anticipated Discharge Disposition (PT): anticipate therapy at next level of care  Outcome Summary/Treatment Plan (PT)  Daily Summary of Progress (PT): progress toward functional goals is gradual  Barriers to Overall Progress (PT): Participation.  Plan for Continued Treatment (PT): Cont to attempt tx and mobility.  Anticipated Discharge Disposition (PT): anticipate therapy at next level of care  Plan of Care Reviewed With: patient  Progress: no change  Outcome Summary: Pt initially agreeable to participate with therapy and t/f to EOB with spv only. Pt performed some seated B LE ther ex but when requested to stand pt declined, t/f to sup and declined all other activity requests. Pt would cont to benefit from therapy upon DC.  Outcome Measures     Row Name 07/08/20 0942 07/08/20 0907 07/07/20 1500       How much help from another person do you currently need...    Turning from your back to your side while in flat bed without using bedrails?  4  -TW  --  --    Moving from lying on back to sitting on the side of a flat bed without bedrails?  3  -TW  --  --    Moving to and from a bed to a chair (including a wheelchair)?  3  -TW  --  --    Standing up from a chair using your arms (e.g., wheelchair, bedside chair)?  3  -TW  --  --    Climbing 3-5 steps with a railing?  3  -TW  --  --    To walk in hospital room?  3  -TW  --  --    AM-PAC 6 Clicks Score (PT)  19  -TW  --  --       How much help from another is currently needed...    Putting on and taking off regular lower body clothing?  --  2  -ME  2  -CS    Bathing (including washing, rinsing, and drying)  --  2  -ME  2  -CS    Toileting (which includes using toilet bed pan or urinal)  --  2  -ME  2  -CS    Putting on and taking off regular upper body clothing  --  3  -ME  2  -CS    Taking care of personal grooming (such as brushing teeth)  --  3  -ME  2  -CS    Eating meals  --  3  -ME  2  -CS    AM-PAC 6 Clicks  Score (OT)  --  15  -ME  12  -CS       Functional Assessment    Outcome Measure Options  --  AM-PAC 6 Clicks Daily Activity (OT)  -ME  --    Row Name 07/06/20 0840             How much help from another person do you currently need...    Turning from your back to your side while in flat bed without using bedrails?  4  -LR      Moving from lying on back to sitting on the side of a flat bed without bedrails?  3  -LR      Moving to and from a bed to a chair (including a wheelchair)?  3  -LR      Standing up from a chair using your arms (e.g., wheelchair, bedside chair)?  3  -LR      Climbing 3-5 steps with a railing?  3  -LR      To walk in hospital room?  3  -LR      AM-PAC 6 Clicks Score (PT)  19  -LR         Functional Assessment    Outcome Measure Options  AM-PAC 6 Clicks Basic Mobility (PT)  -LR        User Key  (r) = Recorded By, (t) = Taken By, (c) = Cosigned By    Initials Name Provider Type    TW Casimiro Colin PTA Physical Therapy Assistant     Teresa Dumont COTA/L Occupational Therapy Assistant    LR Akash Murray Physical Therapist    ME Radu Hinds, OT Occupational Therapist         Time Calculation:   PT Charges     Row Name 07/08/20 1457             Time Calculation    Start Time  0942  -TW      Stop Time  1000  -TW      Time Calculation (min)  18 min  -TW      PT Received On  07/08/20  -TW      PT Goal Re-Cert Due Date  07/17/20  -TW         Time Calculation- PT    Total Timed Code Minutes- PT  18 minute(s)  -TW        User Key  (r) = Recorded By, (t) = Taken By, (c) = Cosigned By    Initials Name Provider Type    Casimiro Barajas PTA Physical Therapy Assistant        Therapy Charges for Today     Code Description Service Date Service Provider Modifiers Qty    [id number] HC PT THER PROC EA 15 MIN 7/8/2020 Casimiro Colin PTA GP 1          PT G-Codes  Outcome Measure Options: AM-PAC 6 Clicks Daily Activity (OT)  AM-PAC 6 Clicks Score (PT): 19  AM-PAC 6 Clicks Score (OT):  15    Casimiro Colin, PTA  7/8/2020

## 2020-07-08 NOTE — THERAPY TREATMENT NOTE
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 Physical Therapist    ME Radu Hinds OT Occupational Therapist           Time Calculation:   Time Calculation- OT     Row Name 07/08/20 1242             Time Calculation- OT    OT Start Time  0907  -ME      OT Stop Time  0941  -ME      OT Time Calculation (min)  34 min  -ME      OT Received On  07/08/20  -ME        User Key  (r) = Recorded By, (t) = Taken By, (c) = Cosigned By    Initials Name Provider Type    ME Radu Hinds OT Occupational Therapist        Therapy Charges for Today     Code Description Service Date Service Provider Modifiers Qty    [id number]  OT THER PROC EA 15 MIN 7/8/2020 Radu Hinds OT GO 1    [id number]  OT SELF CARE/MGMT/TRAIN EA 15 MIN 7/8/2020 Radu Hinds OT GO 1               Radu Hinds OT  7/8/2020

## 2020-07-08 NOTE — PLAN OF CARE
Problem: Patient Care Overview  Goal: Plan of Care Review  Outcome: Ongoing (interventions implemented as appropriate)  Flowsheets (Taken 7/8/2020 1512)  Plan of Care Reviewed With: patient  Note:   Reg textures per ST Intake 100% x3 meals.Wt 144# w/ BMI 22.5. RD added ADA and cardiac restrictions to diet. RD following.

## 2020-07-08 NOTE — PROGRESS NOTES
"[facility]  [address] [place] [address]. [address]  T - [phone]  F - [phone]     Progress Note          PATIENT  DEMOGRAPHICS   PATIENT NAME: Favio Casillas                      PHYSICIAN: Rodríguez Brody MD  : 1955  MRN: [id number]   LOS: 6 days    Patient Care Team:  Shayne Merritt MD as PCP - General (Family Medicine)  Kevin Robbins PA-C as PCP - Claims Attributed  Kevin Robbins PA-C as Physician Assistant (Gastroenterology)  Harvinder Robert MD  Subjective   SUBJECTIVE   More alert, feels better , good UO.  Going for u/s renal         Objective   OBJECTIVE   Vital Signs  Temp:  [97.6 °F (36.4 °C)-99.6 °F (37.6 °C)] 97.9 °F (36.6 °C)  Heart Rate:  [69-87] 84  Resp:  [16-20] 20  BP: (130-153)/(76-88) 148/76    Flowsheet Rows      First Filed Value   Admission Height  170.2 cm (67.01\") Documented at 2020 1113   Admission Weight  75.4 kg (166 lb 3.2 oz) Documented at 2020 1112           I/O last 3 completed shifts:  In: 2470 [P.O.:720; I.V.:1200; Other:50; IV Piggyback:500]  Out: 2825 [Urine:2825]    PHYSICAL EXAM    Physical Exam   Constitutional: He is oriented to person, place, and time. He appears well-developed.   HENT:   Head: Normocephalic.   Eyes: Pupils are equal, round, and reactive to light.   Cardiovascular: Normal rate, regular rhythm and normal heart sounds.   Pulmonary/Chest: Effort normal and breath sounds normal.   Abdominal: Soft. Bowel sounds are normal.   Musculoskeletal: He exhibits no edema.   Neurological: He is alert and oriented to person, place, and time.       RESULTS   Results Review:    Results from last 7 days   Lab Units 20  0553 20  0943 20  1743 20  0551  20  2122   SODIUM mmol/L 141 139  --  142   < > 120*   POTASSIUM mmol/L 3.7 3.4*  3.4* 2.5* 2.2*   < > 4.4   CHLORIDE mmol/L 110* 112*  --  108*   < > 103   CO2 mmol/L 21.0* 18.0*  --  23.0   < > 6.0*   BUN mg/dL 23 36*  --  " 65*   < > 127*   CREATININE mg/dL 1.57* 1.69*  --  2.06*   < > 3.57*   CALCIUM mg/dL 7.2* 6.5*  --  6.4*   < > 8.7   BILIRUBIN mg/dL 0.3  --   --  0.4  --  0.5   ALK PHOS U/L 53  --   --  60  --  113   ALT (SGPT) U/L 10  --   --  10  --  8   AST (SGOT) U/L 15  --   --  13  --  6   GLUCOSE mg/dL 164* 337*  --  137*   < > 648*    < > = values in this interval not displayed.       Estimated Creatinine Clearance: 44.5 mL/min (A) (by C-G formula based on SCr of 1.57 mg/dL (H)).    Results from last 7 days   Lab Units 07/08/20  0553 07/07/20  0943 07/06/20  1743 07/06/20  0551 07/06/20  0124   MAGNESIUM mg/dL 1.8 1.2*  --  1.3*  --    PHOSPHORUS mg/dL  --  1.9* 2.1*  --  2.5             Results from last 7 days   Lab Units 07/08/20  0553 07/06/20  0551 07/05/20  0755 07/04/20  0805 07/03/20  0844   WBC 10*3/mm3 7.62 6.26 6.88 8.93 14.12*   HEMOGLOBIN g/dL 9.8* 9.6* 10.5* 13.1 13.9   PLATELETS 10*3/mm3 173 154 146 148 177               Imaging Results (Last 24 Hours)     Procedure Component Value Units Date/Time    US Renal Bilateral [[id number]] Resulted:  07/08/20 1029     Updated:  07/08/20 1029           MEDICATIONS      sodium chloride 50 mL Irrigation Q8H   And      acetylcysteine 4 mL Oral Q8H   aspirin 81 mg Oral Daily   budesonide-formoterol 2 puff Inhalation BID - RT   carvedilol 3.125 mg Oral Q12H   cefTRIAXone 1 g Intravenous Q24H   clopidogrel 75 mg Oral Daily   fluticasone 2 spray Nasal Daily   heparin (porcine) 5,000 Units Subcutaneous Q8H   insulin aspart 0-24 Units Subcutaneous TID AC   pantoprazole 40 mg Oral QAM   pravastatin 40 mg Oral Nightly   sertraline 50 mg Oral Daily   sodium bicarbonate 650 mg Oral TID   sodium chloride 1,000 mL Intravenous Once   sodium chloride 10 mL Intravenous Q12H       lactated ringers 75 mL/hr Last Rate: 75 mL/hr (07/07/20 2131)   sodium chloride 30 mL/hr        Assessment/Plan   ASSESSMENT / PLAN      Diabetic ketoacidosis without coma associated with type 2 diabetes  mellitus (CMS/HCC)    1.acute kidney injury on CKD 3 with baseline creatinine close to 2.  He has now creatinine up to 3.3.  His bladder appears to be distended and he has suprapubic fullness.  Patient has prior history of bladder outlet obstruction. Bladder cancer requiring neobladder surgery / ileal conduit. He also has history of recurrent acute kidney injury in the last couple of years.    He has left hydro on u/s, good UO post liu. Cr is better and at 1.5 now. Dc ivf for now.    2.severe normal anion gap metabolic acidosis possibly due to acute kidney injury/obstruction.  Now better on po bicarb. Add amiloride to help with k and mg wasting    3.altered mental status with severe hyperglycemia.  Patient urine culture showed mixed growth.  He Is currently on ceftriaxone    4.Bladder cancer requiring neobladder surgery almost 10 years ago.  Also has a history of prostate cancer.    5.history of non-ST elevation MI and stenting in the past.    6. Low mg / low phos better - now better.                This document has been electronically signed by Rodríguez Brody MD on July 8, 2020 10:37

## 2020-07-09 LAB
ALBUMIN SERPL-MCNC: 3 G/DL (ref 3.5–5.2)
ALBUMIN/GLOB SERPL: 1.3 G/DL
ALP SERPL-CCNC: 52 U/L (ref 39–117)
ALT SERPL W P-5'-P-CCNC: 11 U/L (ref 1–41)
ANION GAP SERPL CALCULATED.3IONS-SCNC: 9 MMOL/L (ref 5–15)
AST SERPL-CCNC: 14 U/L (ref 1–40)
BASOPHILS # BLD AUTO: 0.02 10*3/MM3 (ref 0–0.2)
BASOPHILS NFR BLD AUTO: 0.2 % (ref 0–1.5)
BILIRUB SERPL-MCNC: 0.2 MG/DL (ref 0–1.2)
BUN SERPL-MCNC: 18 MG/DL (ref 8–23)
BUN/CREAT SERPL: 12.2 (ref 7–25)
CALCIUM SPEC-SCNC: 7.5 MG/DL (ref 8.6–10.5)
CHLORIDE SERPL-SCNC: 109 MMOL/L (ref 98–107)
CO2 SERPL-SCNC: 22 MMOL/L (ref 22–29)
CREAT SERPL-MCNC: 1.48 MG/DL (ref 0.76–1.27)
DEPRECATED RDW RBC AUTO: 46.4 FL (ref 37–54)
EOSINOPHIL # BLD AUTO: 0.13 10*3/MM3 (ref 0–0.4)
EOSINOPHIL NFR BLD AUTO: 1.6 % (ref 0.3–6.2)
ERYTHROCYTE [DISTWIDTH] IN BLOOD BY AUTOMATED COUNT: 14.4 % (ref 12.3–15.4)
GFR SERPL CREATININE-BSD FRML MDRD: 48 ML/MIN/1.73
GLOBULIN UR ELPH-MCNC: 2.4 GM/DL
GLUCOSE BLDC GLUCOMTR-MCNC: 169 MG/DL (ref 70–130)
GLUCOSE BLDC GLUCOMTR-MCNC: 331 MG/DL (ref 70–130)
GLUCOSE SERPL-MCNC: 173 MG/DL (ref 65–99)
HCT VFR BLD AUTO: 31.5 % (ref 37.5–51)
HGB BLD-MCNC: 10.5 G/DL (ref 13–17.7)
IMM GRANULOCYTES # BLD AUTO: 0.08 10*3/MM3 (ref 0–0.05)
IMM GRANULOCYTES NFR BLD AUTO: 1 % (ref 0–0.5)
LYMPHOCYTES # BLD AUTO: 1.48 10*3/MM3 (ref 0.7–3.1)
LYMPHOCYTES NFR BLD AUTO: 18 % (ref 19.6–45.3)
MAGNESIUM SERPL-MCNC: 1.4 MG/DL (ref 1.6–2.4)
MCH RBC QN AUTO: 29.7 PG (ref 26.6–33)
MCHC RBC AUTO-ENTMCNC: 33.3 G/DL (ref 31.5–35.7)
MCV RBC AUTO: 89.2 FL (ref 79–97)
MONOCYTES # BLD AUTO: 0.83 10*3/MM3 (ref 0.1–0.9)
MONOCYTES NFR BLD AUTO: 10.1 % (ref 5–12)
NEUTROPHILS NFR BLD AUTO: 5.68 10*3/MM3 (ref 1.7–7)
NEUTROPHILS NFR BLD AUTO: 69.1 % (ref 42.7–76)
NRBC BLD AUTO-RTO: 0 /100 WBC (ref 0–0.2)
PHOSPHATE SERPL-MCNC: 3 MG/DL (ref 2.5–4.5)
PLATELET # BLD AUTO: 202 10*3/MM3 (ref 140–450)
PMV BLD AUTO: 10.6 FL (ref 6–12)
POTASSIUM SERPL-SCNC: 3.5 MMOL/L (ref 3.5–5.2)
POTASSIUM SERPL-SCNC: 5.4 MMOL/L (ref 3.5–5.2)
PROT SERPL-MCNC: 5.4 G/DL (ref 6–8.5)
RBC # BLD AUTO: 3.53 10*6/MM3 (ref 4.14–5.8)
SODIUM SERPL-SCNC: 140 MMOL/L (ref 136–145)
WBC # BLD AUTO: 8.22 10*3/MM3 (ref 3.4–10.8)

## 2020-07-09 PROCEDURE — 94799 UNLISTED PULMONARY SVC/PX: CPT

## 2020-07-09 PROCEDURE — 82962 GLUCOSE BLOOD TEST: CPT

## 2020-07-09 PROCEDURE — 63710000001 INSULIN ASPART PER 5 UNITS: Performed by: FAMILY MEDICINE

## 2020-07-09 PROCEDURE — 25010000002 HEPARIN (PORCINE) PER 1000 UNITS: Performed by: FAMILY MEDICINE

## 2020-07-09 PROCEDURE — 85025 COMPLETE CBC W/AUTO DIFF WBC: CPT | Performed by: INTERNAL MEDICINE

## 2020-07-09 PROCEDURE — 25010000002 MAGNESIUM SULFATE IN D5W 1G/100ML (PREMIX) 1-5 GM/100ML-% SOLUTION: Performed by: INTERNAL MEDICINE

## 2020-07-09 PROCEDURE — 84132 ASSAY OF SERUM POTASSIUM: CPT | Performed by: INTERNAL MEDICINE

## 2020-07-09 PROCEDURE — 80053 COMPREHEN METABOLIC PANEL: CPT | Performed by: INTERNAL MEDICINE

## 2020-07-09 PROCEDURE — 94760 N-INVAS EAR/PLS OXIMETRY 1: CPT

## 2020-07-09 PROCEDURE — 25010000002 CEFTRIAXONE PER 250 MG: Performed by: FAMILY MEDICINE

## 2020-07-09 PROCEDURE — 84100 ASSAY OF PHOSPHORUS: CPT | Performed by: INTERNAL MEDICINE

## 2020-07-09 PROCEDURE — 83735 ASSAY OF MAGNESIUM: CPT | Performed by: INTERNAL MEDICINE

## 2020-07-09 RX ORDER — MAGNESIUM SULFATE 1 G/100ML
1 INJECTION INTRAVENOUS ONCE
Status: COMPLETED | OUTPATIENT
Start: 2020-07-09 | End: 2020-07-09

## 2020-07-09 RX ORDER — LANOLIN ALCOHOL/MO/W.PET/CERES
400 CREAM (GRAM) TOPICAL 2 TIMES DAILY
Status: DISCONTINUED | OUTPATIENT
Start: 2020-07-09 | End: 2020-07-10 | Stop reason: HOSPADM

## 2020-07-09 RX ADMIN — SERTRALINE HYDROCHLORIDE 50 MG: 50 TABLET ORAL at 08:49

## 2020-07-09 RX ADMIN — MAGNESIUM SULFATE HEPTAHYDRATE 1 G: 1 INJECTION, SOLUTION INTRAVENOUS at 12:25

## 2020-07-09 RX ADMIN — ACETYLCYSTEINE 4 ML: 100 SOLUTION ORAL; RESPIRATORY (INHALATION) at 09:49

## 2020-07-09 RX ADMIN — SODIUM CHLORIDE 50 ML: 900 IRRIGANT IRRIGATION at 03:00

## 2020-07-09 RX ADMIN — SODIUM BICARBONATE 650 MG: 650 TABLET ORAL at 08:49

## 2020-07-09 RX ADMIN — POTASSIUM CHLORIDE 40 MEQ: 10 CAPSULE, COATED, EXTENDED RELEASE ORAL at 15:11

## 2020-07-09 RX ADMIN — BUDESONIDE AND FORMOTEROL FUMARATE DIHYDRATE 2 PUFF: 160; 4.5 AEROSOL RESPIRATORY (INHALATION) at 21:17

## 2020-07-09 RX ADMIN — SODIUM BICARBONATE 650 MG: 650 TABLET ORAL at 21:27

## 2020-07-09 RX ADMIN — POTASSIUM CHLORIDE 40 MEQ: 10 CAPSULE, COATED, EXTENDED RELEASE ORAL at 18:38

## 2020-07-09 RX ADMIN — BUDESONIDE AND FORMOTEROL FUMARATE DIHYDRATE 2 PUFF: 160; 4.5 AEROSOL RESPIRATORY (INHALATION) at 08:11

## 2020-07-09 RX ADMIN — CEFTRIAXONE SODIUM 1 G: 1 INJECTION, POWDER, FOR SOLUTION INTRAMUSCULAR; INTRAVENOUS at 09:48

## 2020-07-09 RX ADMIN — ASPIRIN 81 MG: 81 TABLET, COATED ORAL at 08:49

## 2020-07-09 RX ADMIN — HEPARIN SODIUM 5000 UNITS: 5000 INJECTION INTRAVENOUS; SUBCUTANEOUS at 05:56

## 2020-07-09 RX ADMIN — ACETYLCYSTEINE 4 ML: 100 SOLUTION ORAL; RESPIRATORY (INHALATION) at 03:00

## 2020-07-09 RX ADMIN — CARVEDILOL 3.12 MG: 3.12 TABLET, FILM COATED ORAL at 21:27

## 2020-07-09 RX ADMIN — PANTOPRAZOLE SODIUM 40 MG: 40 TABLET, DELAYED RELEASE ORAL at 06:06

## 2020-07-09 RX ADMIN — CARVEDILOL 3.12 MG: 3.12 TABLET, FILM COATED ORAL at 09:48

## 2020-07-09 RX ADMIN — SODIUM CHLORIDE, PRESERVATIVE FREE 10 ML: 5 INJECTION INTRAVENOUS at 08:50

## 2020-07-09 RX ADMIN — AMILORIDE HYDROCLORIDE 5 MG: 5 TABLET ORAL at 08:49

## 2020-07-09 RX ADMIN — SODIUM BICARBONATE 650 MG: 650 TABLET ORAL at 15:32

## 2020-07-09 RX ADMIN — Medication 400 MG: at 12:24

## 2020-07-09 RX ADMIN — Medication 400 MG: at 21:27

## 2020-07-09 RX ADMIN — INSULIN ASPART 4 UNITS: 100 INJECTION, SOLUTION INTRAVENOUS; SUBCUTANEOUS at 16:59

## 2020-07-09 RX ADMIN — FLUTICASONE PROPIONATE 2 SPRAY: 50 SPRAY, METERED NASAL at 08:52

## 2020-07-09 RX ADMIN — PRAVASTATIN SODIUM 40 MG: 40 TABLET ORAL at 21:27

## 2020-07-09 RX ADMIN — HEPARIN SODIUM 5000 UNITS: 5000 INJECTION INTRAVENOUS; SUBCUTANEOUS at 14:07

## 2020-07-09 RX ADMIN — SODIUM CHLORIDE, PRESERVATIVE FREE 10 ML: 5 INJECTION INTRAVENOUS at 21:29

## 2020-07-09 RX ADMIN — SODIUM CHLORIDE 50 ML: 900 IRRIGANT IRRIGATION at 09:49

## 2020-07-09 RX ADMIN — HEPARIN SODIUM 5000 UNITS: 5000 INJECTION INTRAVENOUS; SUBCUTANEOUS at 21:28

## 2020-07-09 RX ADMIN — CLOPIDOGREL BISULFATE 75 MG: 75 TABLET ORAL at 08:49

## 2020-07-09 RX ADMIN — INSULIN ASPART 16 UNITS: 100 INJECTION, SOLUTION INTRAVENOUS; SUBCUTANEOUS at 11:12

## 2020-07-09 NOTE — PROGRESS NOTES
"[facility]  [address] [place] [address]. [address]  T - [phone]  F - [phone]     Progress Note          PATIENT  DEMOGRAPHICS   PATIENT NAME: Favio Casillas                      PHYSICIAN: Ciro Hargrove MD  : 1955  MRN: [id number]   LOS: 7 days    Patient Care Team:  Shayne Merritt MD as PCP - General (Family Medicine)  Kevin Robbins PA-C as PCP - Claims Attributed  Kevin Robbins PA-C as Physician Assistant (Gastroenterology)  Harvinder Robert MD  Subjective   SUBJECTIVE   Patient was found resting in bed, in no acute distress. AOX3. Stated feeling much better. Noted BM yesterday.   Denied chest pain, FABIOLA, fever, chills, NV or diarrhea.          Objective   OBJECTIVE   Vital Signs  Temp:  [97.9 °F (36.6 °C)-98.8 °F (37.1 °C)] 98.5 °F (36.9 °C)  Heart Rate:  [65-89] 87  Resp:  [16-20] 20  BP: (111-148)/(70-79) 127/79    Flowsheet Rows      First Filed Value   Admission Height  170.2 cm (67.01\") Documented at 2020 1113   Admission Weight  75.4 kg (166 lb 3.2 oz) Documented at 2020 1112           I/O last 3 completed shifts:  In: 720 [P.O.:720]  Out: 4500 [Urine:4500]    PHYSICAL EXAM    Physical Exam   Constitutional: He is oriented to person, place, and time. He appears well-developed.   HENT:   Head: Normocephalic.   Eyes: Pupils are equal, round, and reactive to light.   Cardiovascular: Normal rate, regular rhythm and normal heart sounds.   Pulmonary/Chest: Effort normal and breath sounds normal.   Abdominal: Soft. Bowel sounds are normal.   Musculoskeletal: He exhibits no edema.   Neurological: He is alert and oriented to person, place, and time.       RESULTS   Results Review:    Results from last 7 days   Lab Units 20  0540 20  0553 20  0943  20  0551   SODIUM mmol/L 140 141 139  --  142   POTASSIUM mmol/L 3.5 3.7 3.4*  3.4*   < > 2.2*   CHLORIDE mmol/L 109* 110* 112*  --  108*   CO2 mmol/L 22.0 21.0* 18.0*  " --  23.0   BUN mg/dL 18 23 36*  --  65*   CREATININE mg/dL 1.48* 1.57* 1.69*  --  2.06*   CALCIUM mg/dL 7.5* 7.2* 6.5*  --  6.4*   BILIRUBIN mg/dL 0.2 0.3  --   --  0.4   ALK PHOS U/L 52 53  --   --  60   ALT (SGPT) U/L 11 10  --   --  10   AST (SGOT) U/L 14 15  --   --  13   GLUCOSE mg/dL 173* 164* 337*  --  137*    < > = values in this interval not displayed.       Estimated Creatinine Clearance: 44.9 mL/min (A) (by C-G formula based on SCr of 1.48 mg/dL (H)).    Results from last 7 days   Lab Units 07/09/20  0540 07/08/20  0553 07/07/20  0943 07/06/20  1743   MAGNESIUM mg/dL 1.4* 1.8 1.2*  --    PHOSPHORUS mg/dL 3.0  --  1.9* 2.1*             Results from last 7 days   Lab Units 07/09/20  0540 07/08/20  0553 07/06/20  0551 07/05/20  0755 07/04/20  0805   WBC 10*3/mm3 8.22 7.62 6.26 6.88 8.93   HEMOGLOBIN g/dL 10.5* 9.8* 9.6* 10.5* 13.1   PLATELETS 10*3/mm3 202 173 154 146 148               Imaging Results (Last 24 Hours)     Procedure Component Value Units Date/Time    US Renal Bilateral [[id number]] Collected:  07/08/20 1029     Updated:  07/08/20 1544    Narrative:       EXAMINATION:  ultrasound, renal     CLINICAL INDICATION / HISTORY:  follow up hydronephrosis, E11.10  Type 2 diabetes mellitus with ketoacidosis without coma G93.40  Encephalopathy, unspecified N17.9 Acute kidney failure,  unspecified N18.9 Chronic kidney disease, unspecified R13.10  Dysphagia, unspecified R26.89 Other abnormalities of gait and  mobility Z74.09 Other reduced mobility Z78.9 Other specified  health status    COMPARISON:  7/5/2020    TECHNIQUE:  ultrasound, renal    FULL RESULTS / FINDINGS:    Kidney, right:      size:  normal, measuring 11 x 5 x 6.1 cm    echotexture:  normal    no nephrolithiasis, solid mass, or collecting system dilation    Kidney, left:      size:  normal, measuring 12.2 x 4.8 x 6.5 cm    echotexture:  normal    no nephrolithiasis, solid mass, or collecting system dilation    Urinary bladder:  Patient with  history of prior cystectomy and  creation of a neobladder. This region was not imaged.        Impression:       CONCLUSION:    1.  Patient with history of prior cystectomy and creation of a  neobladder. This region was not imaged.  2.  Otherwise normal renal ultrasound.    Electronically signed by:  Tyron Cheek MD  7/8/2020 11:02 AM CDT  Workstation: [id number]           MEDICATIONS      sodium chloride 50 mL Irrigation Q8H   And      acetylcysteine 4 mL Oral Q8H   aMILoride 5 mg Oral Daily   aspirin 81 mg Oral Daily   budesonide-formoterol 2 puff Inhalation BID - RT   carvedilol 3.125 mg Oral Q12H   cefTRIAXone 1 g Intravenous Q24H   clopidogrel 75 mg Oral Daily   fluticasone 2 spray Nasal Daily   heparin (porcine) 5,000 Units Subcutaneous Q8H   insulin aspart 0-24 Units Subcutaneous TID AC   pantoprazole 40 mg Oral QAM   pravastatin 40 mg Oral Nightly   sertraline 50 mg Oral Daily   sodium bicarbonate 650 mg Oral TID   sodium chloride 1,000 mL Intravenous Once   sodium chloride 10 mL Intravenous Q12H       sodium chloride 30 mL/hr       Assessment/Plan   ASSESSMENT / PLAN      Diabetic ketoacidosis without coma associated with type 2 diabetes mellitus (CMS/[facility])    1.acute kidney injury on CKD 3 with baseline creatinine close to 2.  He has now creatinine up to 3.3.  His bladder appears to be distended and he has suprapubic fullness.  Patient has prior history of bladder outlet obstruction. Bladder cancer requiring neobladder surgery / ileal conduit. He also has history of recurrent acute kidney injury in the last couple of years.    He has left hydro on u/s, good UO post liu. Cr is better and at 1.48 today    2.severe normal anion gap metabolic acidosis possibly due to acute kidney injury/obstruction.  Now better on po bicarb. Currently on  amiloride to help with k and mg wasting    3.altered mental status with severe hyperglycemia.  Patient urine culture showed mixed growth.  He Is currently on  ceftriaxone    4.Bladder cancer requiring neobladder surgery almost 10 years ago.  Also has a history of prostate cancer. Repeat u/s on 7/8/2020 endorsed  Improved left hydronephrosis . Plan to remove liu and do self cathetertization at home    5.history of non-ST elevation MI and stenting in the past.    6. Low mg / low phos better - now better.         Signed,   Ciro Hargrove MD  Family Medicine Resident PGY3  [facility]        This document has been electronically signed by Ciro Hargrove MD on July 9, 2020 08:21    I have reviewed the case with the resident. I have also examined and seen  the patient.  I agree with the assessment and plan as documented in the resident's note.         This document has been electronically signed by Rodríguez Brody MD on July 9, 2020 11:29

## 2020-07-09 NOTE — PLAN OF CARE
Problem: Patient Care Overview  Goal: Plan of Care Review  Flowsheets  Taken 7/9/2020 0848  Plan of Care Reviewed With: patient  Taken 7/9/2020 2043  Outcome Summary: vss, alexa d/c'd, straight cath every 6 hrs, refused pt/ot today, cont to monitor     Problem: Confusion, Acute (Adult)  Goal: Identify Related Risk Factors and Signs and Symptoms  Flowsheets (Taken 7/9/2020 1428)  Related Risk Factors (Acute Confusion): advanced age  Signs and Symptoms (Acute Confusion): disorientation     Problem: Fall Risk (Adult)  Goal: Identify Related Risk Factors and Signs and Symptoms  Flowsheets (Taken 7/9/2020 1428)  Related Risk Factors (Fall Risk): age-related changes  Signs and Symptoms (Fall Risk): presence of risk factors     Problem: Skin Injury Risk (Adult)  Goal: Identify Related Risk Factors and Signs and Symptoms  Flowsheets (Taken 7/9/2020 1428)  Related Risk Factors (Skin Injury Risk): advanced age     Problem: Sepsis/Septic Shock (Adult)  Goal: Signs and Symptoms of Listed Potential Problems Will be Absent, Minimized or Managed (Sepsis/Septic Shock)  Flowsheets (Taken 7/9/2020 1428)  Problems Assessed (Sepsis): all  Problems Present (Sepsis): situational response     Problem: Kidney Disease, Chronic/End Stage Renal Disease (Adult)  Goal: Signs and Symptoms of Listed Potential Problems Will be Absent, Minimized or Managed (Kidney Disease, Chronic/End Stage Renal Disease)  Flowsheets (Taken 7/9/2020 1428)  Problems Assessed (Chronic Kidney Disease/ESRD): all  Problems Present (Chronic Kidney/ESRD): situational response     Problem: Diabetes, Type 2 (Adult)  Goal: Signs and Symptoms of Listed Potential Problems Will be Absent, Minimized or Managed (Diabetes, Type 2)  Flowsheets (Taken 7/9/2020 1428)  Problems Assessed (Type 2 Diabetes): all  Problems Present (Type 2 Diabetes): hyperglycemia

## 2020-07-09 NOTE — SIGNIFICANT NOTE
07/09/20 1236   Rehab Treatment   Discipline occupational therapist   Reason Treatment Not Performed patient/family declined treatment   Recommendation   OT - Next Appointment 07/10/20   Pt refused OT treatment this morning. Pt states/believes that he is going home today. If pt does not discharge today, OT will check on him tomorrow.

## 2020-07-09 NOTE — PLAN OF CARE
Problem: Patient Care Overview  Goal: Plan of Care Review  Outcome: Ongoing (interventions implemented as appropriate)  Flowsheets (Taken 7/9/2020 0036)  Progress: no change  Plan of Care Reviewed With: patient  Goal: Individualization and Mutuality  Outcome: Ongoing (interventions implemented as appropriate)  Goal: Discharge Needs Assessment  Outcome: Ongoing (interventions implemented as appropriate)  Goal: Interprofessional Rounds/Family Conf  Outcome: Ongoing (interventions implemented as appropriate)     Problem: Confusion, Acute (Adult)  Goal: Identify Related Risk Factors and Signs and Symptoms  Outcome: Ongoing (interventions implemented as appropriate)  Goal: Cognitive/Functional Impairments Minimized  Outcome: Ongoing (interventions implemented as appropriate)  Goal: Safety  Outcome: Ongoing (interventions implemented as appropriate)     Problem: Fall Risk (Adult)  Goal: Identify Related Risk Factors and Signs and Symptoms  Outcome: Ongoing (interventions implemented as appropriate)  Goal: Absence of Fall  Outcome: Ongoing (interventions implemented as appropriate)     Problem: Skin Injury Risk (Adult)  Goal: Identify Related Risk Factors and Signs and Symptoms  Outcome: Ongoing (interventions implemented as appropriate)  Goal: Skin Health and Integrity  Outcome: Ongoing (interventions implemented as appropriate)     Problem: Sepsis/Septic Shock (Adult)  Goal: Signs and Symptoms of Listed Potential Problems Will be Absent, Minimized or Managed (Sepsis/Septic Shock)  Outcome: Ongoing (interventions implemented as appropriate)     Problem: Kidney Disease, Chronic/End Stage Renal Disease (Adult)  Goal: Signs and Symptoms of Listed Potential Problems Will be Absent, Minimized or Managed (Kidney Disease, Chronic/End Stage Renal Disease)  Outcome: Ongoing (interventions implemented as appropriate)     Problem: Diabetes, Type 2 (Adult)  Goal: Signs and Symptoms of Listed Potential Problems Will be Absent,  Minimized or Managed (Diabetes, Type 2)  Outcome: Ongoing (interventions implemented as appropriate)

## 2020-07-09 NOTE — PROGRESS NOTES
Faxed self intermittent cath supplies to [facility]. Spoke with Case Management, Jenny, who knows orders have been faxed. Should have supplies sent to his home by this weekend if patient answers the phone and confirms address/delivery.

## 2020-07-09 NOTE — PROGRESS NOTES
"   LOS: 7 days   Patient Care Team:  Shayne Merritt MD as PCP - General (Family Medicine)  Kevin Robbins PA-C as PCP - Claims Attributed  Kevin Robbins PA-C as Physician Assistant (Gastroenterology)  Harvinder Robert MD    Subjective     Subjective:  Symptoms:  Improved.  (Urine clear and yellow in John's gravity bag.).    Diet:  No vomiting.        History taken from: patient chart    Objective     Vital Signs  Temp:  [98.3 °F (36.8 °C)-98.8 °F (37.1 °C)] 98.3 °F (36.8 °C)  Heart Rate:  [65-89] 73  Resp:  [16-20] 18  BP: (112-142)/(60-79) 126/71    Objective:  General Appearance:  In no acute distress.    Vital signs: (most recent): Blood pressure 126/71, pulse 73, temperature 98.3 °F (36.8 °C), temperature source Oral, resp. rate 18, height 170.2 cm (67.01\"), weight 62.2 kg (137 lb 1.6 oz), SpO2 97 %.  Vital signs are normal.  No fever.    Output: Producing urine (John).    Lungs:  Normal effort.  He is not in respiratory distress.    Chest: Symmetric chest wall expansion.   Abdomen: Abdomen is soft and non-distended.  There is no abdominal tenderness.     Neurological: Patient is alert and oriented to person, place and time.    Pupils:  Pupils are equal, round, and reactive to light.    Skin:  Warm and dry.              Results Review:    Lab Results (last 24 hours)     Procedure Component Value Units Date/Time    Phosphorus [[id number]]  (Normal) Collected:  07/09/20 0540    Specimen:  Blood Updated:  07/09/20 0629     Phosphorus 3.0 mg/dL     Comprehensive Metabolic Panel [[id number]]  (Abnormal) Collected:  07/09/20 0540    Specimen:  Blood Updated:  07/09/20 0628     Glucose 173 mg/dL      BUN 18 mg/dL      Creatinine 1.48 mg/dL      Sodium 140 mmol/L      Potassium 3.5 mmol/L      Chloride 109 mmol/L      CO2 22.0 mmol/L      Calcium 7.5 mg/dL      Total Protein 5.4 g/dL      Albumin 3.00 g/dL      ALT (SGPT) 11 U/L      AST (SGOT) 14 U/L      Alkaline Phosphatase 52 U/L      Total " Bilirubin 0.2 mg/dL      eGFR Non African Amer 48 mL/min/1.73      Globulin 2.4 gm/dL      A/G Ratio 1.3 g/dL      BUN/Creatinine Ratio 12.2     Anion Gap 9.0 mmol/L     Narrative:       GFR Normal >60  Chronic Kidney Disease <60  Kidney Failure <15      Magnesium [[id number]]  (Abnormal) Collected:  07/09/20 0540    Specimen:  Blood Updated:  07/09/20 0628     Magnesium 1.4 mg/dL     CBC & Differential [[id number]] Collected:  07/09/20 0540    Specimen:  Blood Updated:  07/09/20 0606    Narrative:       The following orders were created for panel order CBC & Differential.  Procedure                               Abnormality         Status                     ---------                               -----------         ------                     CBC Auto Differential[[id number]]        Abnormal            Final result                 Please view results for these tests on the individual orders.    CBC Auto Differential [[id number]]  (Abnormal) Collected:  07/09/20 0540    Specimen:  Blood Updated:  07/09/20 0606     WBC 8.22 10*3/mm3      RBC 3.53 10*6/mm3      Hemoglobin 10.5 g/dL      Hematocrit 31.5 %      MCV 89.2 fL      MCH 29.7 pg      MCHC 33.3 g/dL      RDW 14.4 %      RDW-SD 46.4 fl      MPV 10.6 fL      Platelets 202 10*3/mm3      Neutrophil % 69.1 %      Lymphocyte % 18.0 %      Monocyte % 10.1 %      Eosinophil % 1.6 %      Basophil % 0.2 %      Immature Grans % 1.0 %      Neutrophils, Absolute 5.68 10*3/mm3      Lymphocytes, Absolute 1.48 10*3/mm3      Monocytes, Absolute 0.83 10*3/mm3      Eosinophils, Absolute 0.13 10*3/mm3      Basophils, Absolute 0.02 10*3/mm3      Immature Grans, Absolute 0.08 10*3/mm3      nRBC 0.0 /100 WBC     POC Glucose Once [[id number]]  (Abnormal) Collected:  07/08/20 2006    Specimen:  Blood Updated:  07/08/20 2020     Glucose 236 mg/dL      Comment: RN NotifiedOperator: [id number] MIREYA OPALMeter ID: [id number]       POC Glucose Once [[id number]]  (Normal) Collected:  07/08/20  1714    Specimen:  Blood Updated:  07/08/20 1735     Glucose 101 mg/dL      Comment: RN NotifiedOperator: [id number] APRYL BLUNTMeter ID: [id number]       POC Glucose Once [[id number]]  (Abnormal) Collected:  07/08/20 1111    Specimen:  Blood Updated:  07/08/20 1735     Glucose 213 mg/dL      Comment: RN NotifiedOperator: [id number] APRYL MOSSIANMeter ID: [id number]       Vitamin D 25 Hydroxy [[id number]]  (Abnormal) Collected:  07/08/20 0553    Specimen:  Blood Updated:  07/08/20 1230     25 Hydroxy, Vitamin D 12.3 ng/ml     Narrative:       Reference Range for Total Vitamin D 25(OH)     Deficiency <20.0 ng/mL   Insufficiency 21-29 ng/mL   Sufficiency  ng/mL  Toxicity >100 ng/ml    Results may be falsely increased if patient taking Biotin.           Imaging Results (Last 24 Hours)     Procedure Component Value Units Date/Time    US Renal Bilateral [[id number]] Collected:  07/08/20 1029     Updated:  07/08/20 1544    Narrative:       EXAMINATION:  ultrasound, renal     CLINICAL INDICATION / HISTORY:  follow up hydronephrosis, E11.10  Type 2 diabetes mellitus with ketoacidosis without coma G93.40  Encephalopathy, unspecified N17.9 Acute kidney failure,  unspecified N18.9 Chronic kidney disease, unspecified R13.10  Dysphagia, unspecified R26.89 Other abnormalities of gait and  mobility Z74.09 Other reduced mobility Z78.9 Other specified  health status    COMPARISON:  7/5/2020    TECHNIQUE:  ultrasound, renal    FULL RESULTS / FINDINGS:    Kidney, right:      size:  normal, measuring 11 x 5 x 6.1 cm    echotexture:  normal    no nephrolithiasis, solid mass, or collecting system dilation    Kidney, left:      size:  normal, measuring 12.2 x 4.8 x 6.5 cm    echotexture:  normal    no nephrolithiasis, solid mass, or collecting system dilation    Urinary bladder:  Patient with history of prior cystectomy and  creation of a neobladder. This region was not imaged.        Impression:       CONCLUSION:    1.   Patient with history of prior cystectomy and creation of a  neobladder. This region was not imaged.  2.  Otherwise normal renal ultrasound.    Electronically signed by:  Tyron Cheek MD  7/8/2020 11:02 AM CDT  Workstation: [id number]           I reviewed the patient's new clinical results.  I reviewed the patient's new imaging results and agree with the interpretation.  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 His retention of urine is most likely related to increased urinary mucous and sediment which is a chronic problem for him related to his neobladder. He normally performs self intermittent catheterization at home. Now he has John in place and sediment and mucous present. Renal ultrasound 7/5/20 showed mild left hydronephrosis (rule out obstruction vs reflux). As imaging in Nov. 2019 showed no hydronephrosis we will start Mucomyst irrigation-->urine clear and yellow, no sediment or mucus.  -Estimated Creatinine Clearance: 44.9 mL/min (A) (by C-G formula based on SCr of 1.48 mg/dL (H)).  -Cr 3.57-->3.17-->3.41-->3.31-->2.59-->2.06-->1.57-->1.48  -Baseline Cr around 2.0  -Urine output 3,500 mL last 24 hours  -Urine culture 7/3/20 mixed gram positive birgit, Rocephin day #6  -Renal ultrasound 7/8/20 showed     Plan:  Remove John and stop Mucomyst irrigation and start straight cath 4 times per day.   Will benefit from cystoscopy and CT cystogram both of which can be done outpatient.     TIFFANIE Michele  07/09/20  11:19

## 2020-07-09 NOTE — SIGNIFICANT NOTE
07/09/20 0935   Rehab Treatment   Discipline physical therapy assistant   Reason Treatment Not Performed patient/family declined treatment  (Pt declined tx stating he doesnt need it because he is leaving. Nsg was made aware.)

## 2020-07-09 NOTE — PROGRESS NOTES
[facility] Medicine Services  INPATIENT PROGRESS NOTE    Length of Stay: 7  Date of Admission: 7/2/2020  Primary Care Physician: Shayne Merritt MD    Subjective   Chief Complaint: Confusion  HPI: Patient states he is feeling okay today.  John catheter discontinued.  Patient is self cathing.    Review of Systems   Constitutional: Positive for activity change. Negative for appetite change, chills, fatigue and fever.   Respiratory: Negative for chest tightness and shortness of breath.    Cardiovascular: Negative for chest pain, palpitations and leg swelling.   Gastrointestinal: Negative for abdominal pain, constipation, diarrhea, nausea and vomiting.   Skin: Negative for wound.   Neurological: Negative for dizziness, weakness, light-headedness, numbness and headaches.      All pertinent negatives and positives are as above. All other systems have been reviewed and are negative unless otherwise stated.     Objective    Temp:  [98.3 °F (36.8 °C)-99.5 °F (37.5 °C)] 99.5 °F (37.5 °C)  Heart Rate:  [54-89] 54  Resp:  [16-20] 18  BP: (108-142)/(60-79) 108/62    Physical Exam   Constitutional: He appears well-developed and well-nourished.   HENT:   Head: Normocephalic and atraumatic.   Eyes: Pupils are equal, round, and reactive to light. EOM are normal.   Neck: Normal range of motion. Neck supple.   Cardiovascular: Normal rate, regular rhythm, normal heart sounds and intact distal pulses. Exam reveals no gallop and no friction rub.   No murmur heard.  Pulmonary/Chest: Effort normal and breath sounds normal. No respiratory distress. He has no wheezes. He has no rales. He exhibits no tenderness.   Abdominal: Soft. Bowel sounds are normal. He exhibits no distension. There is no tenderness.   Psychiatric: He has a normal mood and affect. His behavior is normal.   Vitals reviewed.    Results Review:  I have reviewed the labs, radiology results, and diagnostic  studies.    Laboratory Data:   Results from last 7 days   Lab Units 07/09/20  0540 07/08/20  0553 07/07/20  0943  07/06/20  0551   SODIUM mmol/L 140 141 139  --  142   POTASSIUM mmol/L 3.5 3.7 3.4*  3.4*   < > 2.2*   CHLORIDE mmol/L 109* 110* 112*  --  108*   CO2 mmol/L 22.0 21.0* 18.0*  --  23.0   BUN mg/dL 18 23 36*  --  65*   CREATININE mg/dL 1.48* 1.57* 1.69*  --  2.06*   GLUCOSE mg/dL 173* 164* 337*  --  137*   CALCIUM mg/dL 7.5* 7.2* 6.5*  --  6.4*   BILIRUBIN mg/dL 0.2 0.3  --   --  0.4   ALK PHOS U/L 52 53  --   --  60   ALT (SGPT) U/L 11 10  --   --  10   AST (SGOT) U/L 14 15  --   --  13   ANION GAP mmol/L 9.0 10.0 9.0  --  11.0    < > = values in this interval not displayed.     Estimated Creatinine Clearance: 44.9 mL/min (A) (by C-G formula based on SCr of 1.48 mg/dL (H)).  Results from last 7 days   Lab Units 07/09/20  0540 07/08/20  0553 07/07/20  0943 07/06/20  1743   MAGNESIUM mg/dL 1.4* 1.8 1.2*  --    PHOSPHORUS mg/dL 3.0  --  1.9* 2.1*         Results from last 7 days   Lab Units 07/09/20  0540 07/08/20  0553 07/06/20  0551 07/05/20  0755 07/04/20  0805   WBC 10*3/mm3 8.22 7.62 6.26 6.88 8.93   HEMOGLOBIN g/dL 10.5* 9.8* 9.6* 10.5* 13.1   HEMATOCRIT % 31.5* 29.4* 27.9* 29.2* 38.2   PLATELETS 10*3/mm3 202 173 154 146 148           Culture Data:   No results found for: BLOODCX  No results found for: URINECX  No results found for: RESPCX  No results found for: WOUNDCX  No results found for: STOOLCX  No components found for: BODYFLD    Radiology Data:   Imaging Results (Last 24 Hours)     ** No results found for the last 24 hours. **          I have reviewed the patient's current medications.     Assessment/Plan     Active Hospital Problems    Diagnosis   • Diabetic ketoacidosis without coma associated with type 2 diabetes mellitus (CMS/HCC)     Plan:    1.  DKA: Resolved.  2.  Acute kidney injury: Resolved.  Nephrology is following.    3.  Metabolic encephalopathy: Likely multifactorial.  Patient  with severe hyperglycemia on presentation.  Urine culture also showed mixed birgit.  Empirically on antibiotics.  Mentation is likely near baseline.  4.  Coronary artery disease: Continue current medication.  5.  Nausea/vomiting/diarrhea: Resolved per patient.  6.  Non-anion gap metabolic acidosis: Improving.  Likely secondary to acute kidney injury.  7.  Severe hypokalemia: Resolved.  8.  Urinary retention:  Most likely secondary to increased mucus and sediment in his neobladder per Urology.  Improving.  John discontinued.  Patient will do self catheterizations 4 times a day.  9.  DVT prophylaxis: Heparin.    The patient was evaluated during the global COVID-19 pandemic, and the diagnosis was suspected/considered upon their initial presentation.  Evaluation, treatment, and testing were consistent with current guidelines for patients who present with complaints or symptoms that may be related to COVID-19.    Discharge Planning: I expect patient to be discharged to home in 1-2 days.        This document has been electronically signed by Art Colin MD on July 9, 2020 18:27

## 2020-07-10 VITALS
BODY MASS INDEX: 21.71 KG/M2 | SYSTOLIC BLOOD PRESSURE: 126 MMHG | TEMPERATURE: 97.5 F | HEIGHT: 67 IN | DIASTOLIC BLOOD PRESSURE: 83 MMHG | RESPIRATION RATE: 18 BRPM | HEART RATE: 77 BPM | OXYGEN SATURATION: 98 % | WEIGHT: 138.3 LBS

## 2020-07-10 LAB
ALBUMIN SERPL-MCNC: 3.5 G/DL (ref 3.5–5.2)
ALBUMIN/GLOB SERPL: 1.3 G/DL
ALP SERPL-CCNC: 52 U/L (ref 39–117)
ALT SERPL W P-5'-P-CCNC: 14 U/L (ref 1–41)
ANION GAP SERPL CALCULATED.3IONS-SCNC: 9 MMOL/L (ref 5–15)
AST SERPL-CCNC: 15 U/L (ref 1–40)
BASOPHILS # BLD AUTO: 0.01 10*3/MM3 (ref 0–0.2)
BASOPHILS NFR BLD AUTO: 0.1 % (ref 0–1.5)
BILIRUB SERPL-MCNC: 0.2 MG/DL (ref 0–1.2)
BUN SERPL-MCNC: 22 MG/DL (ref 8–23)
BUN/CREAT SERPL: 13.8 (ref 7–25)
CALCIUM SPEC-SCNC: 7.9 MG/DL (ref 8.6–10.5)
CHLORIDE SERPL-SCNC: 107 MMOL/L (ref 98–107)
CO2 SERPL-SCNC: 20 MMOL/L (ref 22–29)
CREAT SERPL-MCNC: 1.59 MG/DL (ref 0.76–1.27)
DEPRECATED RDW RBC AUTO: 48.6 FL (ref 37–54)
EOSINOPHIL # BLD AUTO: 0.09 10*3/MM3 (ref 0–0.4)
EOSINOPHIL NFR BLD AUTO: 1.2 % (ref 0.3–6.2)
ERYTHROCYTE [DISTWIDTH] IN BLOOD BY AUTOMATED COUNT: 14.7 % (ref 12.3–15.4)
GFR SERPL CREATININE-BSD FRML MDRD: 44 ML/MIN/1.73
GLOBULIN UR ELPH-MCNC: 2.6 GM/DL
GLUCOSE BLDC GLUCOMTR-MCNC: 274 MG/DL (ref 70–130)
GLUCOSE SERPL-MCNC: 177 MG/DL (ref 65–99)
HCT VFR BLD AUTO: 33.5 % (ref 37.5–51)
HGB BLD-MCNC: 10.9 G/DL (ref 13–17.7)
IMM GRANULOCYTES # BLD AUTO: 0.07 10*3/MM3 (ref 0–0.05)
IMM GRANULOCYTES NFR BLD AUTO: 0.9 % (ref 0–0.5)
LYMPHOCYTES # BLD AUTO: 1.46 10*3/MM3 (ref 0.7–3.1)
LYMPHOCYTES NFR BLD AUTO: 18.9 % (ref 19.6–45.3)
MCH RBC QN AUTO: 29.5 PG (ref 26.6–33)
MCHC RBC AUTO-ENTMCNC: 32.5 G/DL (ref 31.5–35.7)
MCV RBC AUTO: 90.8 FL (ref 79–97)
MONOCYTES # BLD AUTO: 0.74 10*3/MM3 (ref 0.1–0.9)
MONOCYTES NFR BLD AUTO: 9.6 % (ref 5–12)
NEUTROPHILS NFR BLD AUTO: 5.37 10*3/MM3 (ref 1.7–7)
NEUTROPHILS NFR BLD AUTO: 69.3 % (ref 42.7–76)
NRBC BLD AUTO-RTO: 0 /100 WBC (ref 0–0.2)
PLATELET # BLD AUTO: 232 10*3/MM3 (ref 140–450)
PMV BLD AUTO: 10.8 FL (ref 6–12)
POTASSIUM SERPL-SCNC: 5.2 MMOL/L (ref 3.5–5.2)
PROT SERPL-MCNC: 6.1 G/DL (ref 6–8.5)
RBC # BLD AUTO: 3.69 10*6/MM3 (ref 4.14–5.8)
SODIUM SERPL-SCNC: 136 MMOL/L (ref 136–145)
WBC # BLD AUTO: 7.74 10*3/MM3 (ref 3.4–10.8)

## 2020-07-10 PROCEDURE — 85025 COMPLETE CBC W/AUTO DIFF WBC: CPT | Performed by: INTERNAL MEDICINE

## 2020-07-10 PROCEDURE — 82962 GLUCOSE BLOOD TEST: CPT

## 2020-07-10 PROCEDURE — 97535 SELF CARE MNGMENT TRAINING: CPT

## 2020-07-10 PROCEDURE — 97110 THERAPEUTIC EXERCISES: CPT

## 2020-07-10 PROCEDURE — 94799 UNLISTED PULMONARY SVC/PX: CPT

## 2020-07-10 PROCEDURE — 63710000001 INSULIN ASPART PER 5 UNITS: Performed by: FAMILY MEDICINE

## 2020-07-10 PROCEDURE — 25010000002 HEPARIN (PORCINE) PER 1000 UNITS: Performed by: FAMILY MEDICINE

## 2020-07-10 PROCEDURE — 97530 THERAPEUTIC ACTIVITIES: CPT

## 2020-07-10 PROCEDURE — 97116 GAIT TRAINING THERAPY: CPT

## 2020-07-10 PROCEDURE — 80053 COMPREHEN METABOLIC PANEL: CPT | Performed by: INTERNAL MEDICINE

## 2020-07-10 RX ORDER — LANCETS
1 EACH MISCELLANEOUS
Qty: 100 EACH | Refills: 12 | Status: SHIPPED | OUTPATIENT
Start: 2020-07-10 | End: 2021-07-10

## 2020-07-10 RX ORDER — AMILORIDE HYDROCHLORIDE 5 MG/1
5 TABLET ORAL DAILY
Qty: 30 TABLET | Refills: 0 | Status: SHIPPED | OUTPATIENT
Start: 2020-07-10 | End: 2020-09-10 | Stop reason: HOSPADM

## 2020-07-10 RX ADMIN — SODIUM BICARBONATE 650 MG: 650 TABLET ORAL at 09:11

## 2020-07-10 RX ADMIN — BUDESONIDE AND FORMOTEROL FUMARATE DIHYDRATE 2 PUFF: 160; 4.5 AEROSOL RESPIRATORY (INHALATION) at 06:57

## 2020-07-10 RX ADMIN — FLUTICASONE PROPIONATE 2 SPRAY: 50 SPRAY, METERED NASAL at 09:12

## 2020-07-10 RX ADMIN — SERTRALINE HYDROCHLORIDE 50 MG: 50 TABLET ORAL at 09:11

## 2020-07-10 RX ADMIN — AMILORIDE HYDROCLORIDE 5 MG: 5 TABLET ORAL at 09:11

## 2020-07-10 RX ADMIN — PANTOPRAZOLE SODIUM 40 MG: 40 TABLET, DELAYED RELEASE ORAL at 05:53

## 2020-07-10 RX ADMIN — INSULIN ASPART 8 UNITS: 100 INJECTION, SOLUTION INTRAVENOUS; SUBCUTANEOUS at 09:11

## 2020-07-10 RX ADMIN — Medication 400 MG: at 09:11

## 2020-07-10 RX ADMIN — CARVEDILOL 3.12 MG: 3.12 TABLET, FILM COATED ORAL at 11:13

## 2020-07-10 RX ADMIN — CLOPIDOGREL BISULFATE 75 MG: 75 TABLET ORAL at 11:13

## 2020-07-10 RX ADMIN — HEPARIN SODIUM 5000 UNITS: 5000 INJECTION INTRAVENOUS; SUBCUTANEOUS at 05:50

## 2020-07-10 RX ADMIN — SODIUM CHLORIDE, PRESERVATIVE FREE 10 ML: 5 INJECTION INTRAVENOUS at 10:35

## 2020-07-10 RX ADMIN — INSULIN ASPART 12 UNITS: 100 INJECTION, SOLUTION INTRAVENOUS; SUBCUTANEOUS at 11:13

## 2020-07-10 RX ADMIN — ASPIRIN 81 MG: 81 TABLET, COATED ORAL at 09:11

## 2020-07-10 NOTE — DISCHARGE INSTR - OTHER ORDERS
[facility]  Phone: [phone]  Call for any questions regarding catheter supplies

## 2020-07-10 NOTE — PLAN OF CARE
Problem: Patient Care Overview  Goal: Plan of Care Review  Outcome: Ongoing (interventions implemented as appropriate)  Flowsheets (Taken 7/10/2020 5578)  Progress: no change  Outcome Summary: VSS, pt has voided several times, In/out straight cath performed at midnight, 320 cc returned.Pt has voided 125 cc since. Will continue to monitor.  Goal: Individualization and Mutuality  Outcome: Ongoing (interventions implemented as appropriate)  Goal: Discharge Needs Assessment  Outcome: Ongoing (interventions implemented as appropriate)  Goal: Interprofessional Rounds/Family Conf  Outcome: Ongoing (interventions implemented as appropriate)     Problem: Confusion, Acute (Adult)  Goal: Identify Related Risk Factors and Signs and Symptoms  Outcome: Ongoing (interventions implemented as appropriate)  Goal: Cognitive/Functional Impairments Minimized  Outcome: Ongoing (interventions implemented as appropriate)  Goal: Safety  Outcome: Ongoing (interventions implemented as appropriate)     Problem: Fall Risk (Adult)  Goal: Identify Related Risk Factors and Signs and Symptoms  Outcome: Ongoing (interventions implemented as appropriate)  Goal: Absence of Fall  Outcome: Ongoing (interventions implemented as appropriate)     Problem: Skin Injury Risk (Adult)  Goal: Identify Related Risk Factors and Signs and Symptoms  Outcome: Ongoing (interventions implemented as appropriate)  Goal: Skin Health and Integrity  Outcome: Ongoing (interventions implemented as appropriate)     Problem: Sepsis/Septic Shock (Adult)  Goal: Signs and Symptoms of Listed Potential Problems Will be Absent, Minimized or Managed (Sepsis/Septic Shock)  Outcome: Ongoing (interventions implemented as appropriate)     Problem: Kidney Disease, Chronic/End Stage Renal Disease (Adult)  Goal: Signs and Symptoms of Listed Potential Problems Will be Absent, Minimized or Managed (Kidney Disease, Chronic/End Stage Renal Disease)  Outcome: Ongoing (interventions implemented  as appropriate)     Problem: Diabetes, Type 2 (Adult)  Goal: Signs and Symptoms of Listed Potential Problems Will be Absent, Minimized or Managed (Diabetes, Type 2)  Outcome: Ongoing (interventions implemented as appropriate)

## 2020-07-10 NOTE — THERAPY TREATMENT NOTE
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  3  -TW  --    Standing up from a chair using your arms (e.g., wheelchair, bedside chair)?  --  3  -TW  --    Climbing 3-5 steps with a railing?  --  3  -TW  --    To walk in hospital room?  --  3  -TW  --    AM-PAC 6 Clicks Score (PT)  --  19  -TW  --       How much help from another is currently needed...    Putting on and taking off regular lower body clothing?  2  -ME  --  2  -ME    Bathing (including washing, rinsing, and drying)  2  -ME  --  2  -ME    Toileting (which includes using toilet bed pan or urinal)  3  -ME  --  2  -ME    Putting on and taking off regular upper body clothing  4  -ME  --  3  -ME    Taking care of personal grooming (such as brushing teeth)  3  -ME  --  3  -ME    Eating meals  4  -ME  --  3  -ME    AM-PAC 6 Clicks Score (OT)  18  -ME  --  15  -ME       Functional Assessment    Outcome Measure Options  AM-PAC 6 Clicks Daily Activity (OT)  -ME  --  AM-PAC 6 Clicks Daily Activity (OT)  -ME    Row Name 07/07/20 1500             How much help from another is currently needed...    Putting on and taking off regular lower body clothing?  2  -CS      Bathing (including washing, rinsing, and drying)  2  -CS      Toileting (which includes  using toilet bed pan or urinal)  2  -CS      Putting on and taking off regular upper body clothing  2  -CS      Taking care of personal grooming (such as brushing teeth)  2  -CS      Eating meals  2  -CS      AM-PAC 6 Clicks Score (OT)  12  -CS        User Key  (r) = Recorded By, (t) = Taken By, (c) = Cosigned By    Initials Name Provider Type     Casimiro Colin, [facility] Physical Therapy Assistant    CS Teresa Dumont, ANTHONY/CELESTE Occupational Therapy Assistant    ME Radu Hinds OT Occupational Therapist           Time Calculation:   Time Calculation- OT     Row Name 07/10/20 1419             Time Calculation- OT    OT Start Time  0913  -ME      OT Stop Time  0942  -ME      OT Time Calculation (min)  29 min  -ME      OT Received On  07/10/20  -ME        User Key  (r) = Recorded By, (t) = Taken By, (c) = Cosigned By    Initials Name Provider Type    ME Radu Hinds OT Occupational Therapist        Therapy Charges for Today     Code Description Service Date Service Provider Modifiers Qty    [id number]  OT THER PROC EA 15 MIN 7/10/2020 Radu Hinds OT GO 1    [id number]  OT SELF CARE/MGMT/TRAIN EA 15 MIN 7/10/2020 Radu Hinds OT GO 1               Radu Hinds OT  7/10/2020

## 2020-07-10 NOTE — PLAN OF CARE
Problem: Patient Care Overview  Goal: Plan of Care Review  7/10/2020 1416 by Radu Hinds OT  Outcome: Ongoing (interventions implemented as appropriate)  Flowsheets (Taken 7/10/2020 1416)  Plan of Care Reviewed With: patient  Outcome Summary: pt agreeable to OT session this morning. pt is oriented x 4 but continues to be somewhat confused. sup to sit with supervision. once at EOB pt is able to sit with supervision. 1 set 10 reps UE theraband exercise while sitting at EOB with increased vc's to remain on task and for body mechanics. sit to stand and stand to sit with no AD and CGA. pt bent over attempting to get something off of the floor, and small LOB noted with min A to correct. stood with CGA and no AD for approx 1-2 minutes. oral care completed while sitting EOB with set up assistance. pt continues to have difficulty following commands and had a moderate to severe safety deficit. no goals met. continue per OT POC.

## 2020-07-10 NOTE — PLAN OF CARE
Problem: Patient Care Overview  Goal: Plan of Care Review  Outcome: Ongoing (interventions implemented as appropriate)  Flowsheets (Taken 7/10/2020 0383)  Progress: no change  Plan of Care Reviewed With: patient  Outcome Summary: Pt did demonstrate ability to t/f sup to sit with Spv of 1, and stand with SBA. Pt amb in room for 28ft with no AD but requires VCs for safety. Pt somewhat impulsive in part to aggravated of having to get back OOB this am for therapy. Pt declined ther ex stating he knew what to do and would do it when he got home. Pt would cont to benefit from therapy upon DC.

## 2020-07-10 NOTE — THERAPY TREATMENT NOTE
Acute Care - Physical Therapy Treatment Note  [facility]     Patient Name: Favio Casillas  : 1955  MRN: [id number]  Today's Date: 7/10/2020  Onset of Illness/Injury or Date of Surgery: 20     Referring Physician: WILMAR Sanchez MD    Admit Date: 2020    Visit Dx:    ICD-10-CM ICD-9-CM   1. Diabetic ketoacidosis without coma associated with type 2 diabetes mellitus (CMS/HCC) E11.10 250.12   2. Encephalopathy acute G93.40 348.30   3. Acute renal failure superimposed on chronic kidney disease, unspecified CKD stage, unspecified acute renal failure type (CMS/HCC) N17.9 584.9    N18.9 585.9   4. Dysphagia, unspecified type R13.10 787.20   5. Decreased functional mobility R26.89 781.99   6. Impaired mobility and ADLs Z74.09 V49.89    Z78.9    7. Acute renal insufficiency N28.9 593.9   8. Metabolic acidosis E87.2 276.2   9. Hypokalemia E87.6 276.8   10. Bladder outflow obstruction N32.0 596.0     Patient Active Problem List   Diagnosis   • Type 2 diabetes mellitus (CMS/HCC)   • Acute pain of right shoulder   • Shoulder impingement syndrome, right   • Chest pain   • Acute renal insufficiency   • Chronic hepatitis C virus infection (CMS/HCC)   • Gastritis   • SBO (small bowel obstruction) (CMS/HCC)   • CAD (coronary artery disease)   • Acute kidney injury (nontraumatic) (CMS/[facility])   • Intractable vomiting with nausea   • Gastroesophageal reflux disease with esophagitis   • Diarrhea   • Generalized abdominal pain   • History of colon polyps   • History of colitis   • Acute metabolic encephalopathy   • NSTEMI (non-ST elevated myocardial infarction) (CMS/HCC)   • Acute renal failure superimposed on stage 3 chronic kidney disease (CMS/HCC)   • Influenza A   • Anemia, chronic disease   • Bladder outflow obstruction   • Acute kidney injury (CMS/HCC)   • Acute urinary tract infection   • Metabolic acidosis   • Hypokalemia   • Acute renal failure superimposed on chronic kidney disease (CMS/HCC)   • Diabetic  ketoacidosis without coma associated with type 2 diabetes mellitus (CMS/[facility])       Therapy Treatment    Rehabilitation Treatment Summary     Row Name 07/10/20 0943 07/10/20 0913          Treatment Time/Intention    Discipline  physical therapy assistant  -TW  occupational therapist  -ME     Document Type  therapy note (daily note)  -TW  therapy note (daily note)  -ME     Subjective Information  no complaints  -TW  no complaints  -ME     Mode of Treatment  physical therapy;individual therapy  -TW  individual therapy;occupational therapy  -ME     Patient/Family Observations  Pt supin in bed with no visitors.  -TW  supine in bed, on room air, IV running,   -ME     Care Plan Review  --  evaluation/treatment results reviewed;care plan/treatment goals reviewed;risks/benefits reviewed;current/potential barriers reviewed  -ME     Total Minutes, Occupational Therapy Treatment  --  29  -ME2     Therapy Frequency (OT Eval)  --  other (see comments) 5 days per week   -ME     Patient Effort  adequate  -TW  adequate  -ME2     Comment  --  pt remains somewhat confused, attempted to hug OT, OT informed pt that this is not appropriate and pt was understanding   -ME2     Existing Precautions/Restrictions  fall  -TW  fall  -ME2     Recorded by [TW] Casimiro Colin, PTA 07/10/20 1455 [ME] Radu Hinds, OT 07/10/20 0926  [ME2] Radu Hinds, OT 07/10/20 1407     Row Name 07/10/20 0943 07/10/20 0913          Vital Signs    Pre Systolic BP Rehab  --  113  -ME     Pre Treatment Diastolic BP  --  61  -ME     Pretreatment Heart Rate (beats/min)  86  -TW  79  -ME     Posttreatment Heart Rate (beats/min)  88  -TW  88  -ME2     Pre SpO2 (%)  98  -TW  97  -ME     O2 Delivery Pre Treatment  room air  -TW  room air  -ME     Post SpO2 (%)  98  -TW  98  -ME2     O2 Delivery Post Treatment  --  room air  -ME2     Pre Patient Position  Supine  -TW  Supine  -ME     Intra Patient Position  Standing  -TW  --     Post Patient Position  Supine  -TW   Supine  -ME2     Recorded by [TW] Casimiro Colin, PTA 07/10/20 1455 [ME] Mikey HindsKenna, OT 07/10/20 0926  [ME2] Jono Hindsna, OT 07/10/20 1414     Row Name 07/10/20 0943 07/10/20 0913          Cognitive Assessment/Intervention- PT/OT    Orientation Status (Cognition)  oriented x 4  -TW  oriented x 4  -ME     Follows Commands (Cognition)  follows one step commands;75-90% accuracy  -TW  follows one step commands;50-74% accuracy  -ME2     Safety Deficit (Cognitive)  impulsivity  -TW  moderate deficit;ability to follow commands;at risk behavior observed;awareness of need for assistance;insight into deficits/self awareness;impulsivity;judgment;problem solving;safety precautions awareness;safety precautions follow-through/compliance  -ME2     Cognitive Assessment/Intervention Comment  --  pt is very scattered with his thinking;difficulty attending to task for any significant period of time; is impulsive and does not wait for commands before completing tasks   -ME2     Recorded by [TW] Casimiro Colin, PTA 07/10/20 1455 [ME] Jono Hindsna, OT 07/10/20 0926  [ME2] Radu Hinds, OT 07/10/20 1414     Row Name 07/10/20 0913             Safety Issues, Functional Mobility    Safety Issues Affecting Function (Mobility)  ability to follow commands;at risk behavior observed;awareness of need for assistance;impulsivity;insight into deficits/self awareness;problem solving;safety precaution awareness;safety precautions follow-through/compliance  -ME      Impairments Affecting Function (Mobility)  balance;cognition;coordination;endurance/activity tolerance;postural/trunk control;strength  -ME      Recorded by [ME] Guzman Lovelace, OT 07/10/20 1414      Row Name 07/10/20 0943 07/10/20 0913          Bed Mobility Assessment/Treatment    Bed Mobility Assessment/Treatment  --  supine-sit;sit-supine  -ME     Supine-Sit [place] (Bed Mobility)  supervision  -TW  supervision  -ME2     Sit-Supine [place] (Bed Mobility)   supervision  -TW  supervision  -ME2     Assistive Device (Bed Mobility)  head of bed elevated  -TW  head of bed elevated;bed rails  -ME2     Recorded by [TW] Casimiro Colin, PTA 07/10/20 1455 [ME] Radu Hinds, OT 07/10/20 0926  [ME2] Radu Hinds, OT 07/10/20 1414     Row Name 07/10/20 0913             Transfer Assessment/Treatment    Transfer Assessment/Treatment  sit-stand transfer;stand-sit transfer  -ME      Comment (Transfers)  while pt in standing, pt attempted to bend over and pick something up off of the floor, after OT said not to and informed pt that she would get it. pt had a LOB that required min A to correct   -ME      Recorded by [ME] Radu Hinds, OT 07/10/20 1414      Row Name 07/10/20 0943 07/10/20 0913          Sit-Stand Transfer    Sit-Stand [place] (Transfers)  contact guard  -TW  contact guard;verbal cues  -ME     Assistive Device (Sit-Stand Transfers)  other (see comments) no AD.  -TW  other (see comments) none  -ME     Recorded by [TW] Casimiro Colin, PTA 07/10/20 1455 [ME] Radu Hinds, OT 07/10/20 1414     Row Name 07/10/20 0943 07/10/20 0913          Stand-Sit Transfer    Stand-Sit [place] (Transfers)  contact guard  -TW  contact guard;verbal cues  -ME     Assistive Device (Stand-Sit Transfers)  -- No AD.  -TW  other (see comments) none  -ME     Recorded by [TW] Casimiro Colin, PTA 07/10/20 1455 [ME] Radu Hinds, OT 07/10/20 1414     Row Name 07/10/20 0943             Gait/Stairs Assessment/Training    Gait/Stairs Assessment/Training  gait/ambulation independence  -TW      [place] Level (Gait)  contact guard  -TW      Assistive Device (Gait)  other (see comments) No AD.  -TW      Distance in Feet (Gait)  24ft  -TW      Pattern (Gait)  step-through  -TW      Recorded by [TW] Casimiro Colin, PTA 07/10/20 1455      Row Name 07/10/20 0913             ADL Assessment/Intervention    BADL Assessment/Intervention  grooming  -ME      Recorded by [ME]  Radu Hinds, MENA 07/10/20 1414      Row Name 07/10/20 0913             Grooming Assessment/Training    [place] Level (Grooming)  oral care regimen;set up  -ME      Grooming Position  edge of bed sitting  -ME      Comment (Grooming)  mouthwash completed while sitting EOB   -ME      Recorded by [ME] Radu Hinds OT 07/10/20 1414      Row Name 07/10/20 0913             BADL Safety/Performance    Impairments, BADL Safety/Performance  balance;cognition;endurance/activity tolerance;pain;strength;trunk/postural control  -ME      Cognitive Impairments, BADL Safety/Performance  attention;awareness, need for assistance;impulsivity;insight into deficits/self awareness;problem solving/reasoning;safety precaution awareness;safety precaution follow-through  -ME      Recorded by [ME] Radu Hinds OT 07/10/20 1414      Row Name 07/10/20 0913             Motor Skills Assessment/Interventions    Additional Documentation  Balance (Group)  -ME      Recorded by [ME] Radu Hinds OT 07/10/20 1414      Row Name 07/10/20 0913             Therapeutic Exercise    Therapeutic Exercise  seated, upper extremities  -ME      Recorded by [ME] Radu Hinds OT 07/10/20 1414      Row Name 07/10/20 0913             Upper Extremity Seated Therapeutic Exercise    Performed, Seated Upper Extremity (Therapeutic Exercise)  shoulder flexion/extension;scapular stabilization;scapular protraction/retraction;elbow flexion/extension  -ME      Device, Seated Upper Extremity (Therapeutic Exercise)  elastic bands/tubing  -ME      Exercise Type, Seated Upper Extremity (Therapeutic Exercise)  resistive exercise  -ME      Sets/Reps Detail, Seated Upper Extremity (Therapeutic Exercise)  1 set of 10 reps   -ME      Comment, Seated Upper Extremity (Therapeutic Exercise)  difficulty attending to and remaining on tasks, vc's for body mechanics and redirection to task at hand   -ME      Recorded by [ME] Radu Hinds OT 07/10/20 1414      Row Name  07/10/20 0943             Therapeutic Exercise    Comment (Therapeutic Exercise)  Pt declined to perform stating he knew what to do.  -TW      Recorded by [TW] Casimiro Colin, PTA 07/10/20 1455      Row Name 07/10/20 0913             Balance    Balance  static sitting balance;static standing balance;dynamic standing balance  -ME      Recorded by [ME] Radu Hinds, OT 07/10/20 1414      Row Name 07/10/20 0943 07/10/20 0913          Static Sitting Balance    Level of [place] (Unsupported Sitting, Static Balance)  standby assist  -TW  standby assist  -ME     Sitting Position (Unsupported Sitting, Static Balance)  sitting on edge of bed  -TW  sitting on edge of bed  -ME     Time Able to Maintain Position (Unsupported Sitting, Static Balance)  more than 5 minutes  -TW  more than 5 minutes  -ME     Recorded by [TW] Casimiro Colin, [place] 07/10/20 1455 [ME] Radu Hinds, OT 07/10/20 1414     Row Name 07/10/20 0943 07/10/20 0913          Static Standing Balance    Level of [place] (Supported Standing, Static Balance)  contact guard assist  -TW  contact guard assist  -ME     Time Able to Maintain Position (Supported Standing, Static Balance)  2 to 3 minutes  -TW  1 to 2 minutes  -ME     Assistive Device Utilized (Supported Standing, Static Balance)  -- No AD.  -TW  other (see comments) none  -ME     Recorded by [TW] Casimiro Colin, PTA 07/10/20 1455 [ME] Radu Hinds, OT 07/10/20 1414     Row Name 07/10/20 0943 07/10/20 0913          Dynamic Standing Balance    Level of [place], Reaches Outside Midline (Standing, Dynamic Balance)  contact guard assist  -TW  contact guard assist;minimal assist, 75% patient effort  -ME     Time Able to Maintain Position, Reaches Outside Midline (Standing, Dynamic Balance)  2 to 3 minutes  -TW  30 to 45 seconds  -ME     Assistive Device Utilized (Supported Standing, Dynamic Balance)  -- No AD.  -TW  other (see comments) none  -ME     Comment, Reaches Outside  Midline (Standing, Dynamic Balance)  --  bending over to  an item off of the floor that required min A to correct LOB   -ME     Recorded by [TW] Casimiro Colin, PTA 07/10/20 1455 [ME] Radu Hinds, OT 07/10/20 1414     Row Name 07/10/20 0943             Positioning and Restraints    Pre-Treatment Position  in bed  -TW      Post Treatment Position  bed  -TW      In Bed  supine;call light within reach;encouraged to call for assist;exit alarm on  -TW      Recorded by [TW] Casimiro Colin, PTA 07/10/20 1455      Row Name 07/10/20 0913             Pain Assessment    Additional Documentation  Pain Scale: Numbers Pre/Post-Treatment (Group)  -ME      Recorded by [ME] Radu Hinds, OT 07/10/20 1414      Row Name 07/10/20 0943 07/10/20 0913          Pain Scale: Numbers Pre/Post-Treatment    Pain Scale: Numbers, Pretreatment  0/10 - no pain  -TW  0/10 - no pain  -ME     Pain Scale: Numbers, Post-Treatment  0/10 - no pain  -TW  0/10 - no pain  -ME     Recorded by [TW] Casimiro Colin, PTA 07/10/20 1455 [ME] Radu Hinds, OT 07/10/20 1414     Row Name 07/10/20 0913             Plan of Care Review    Plan of Care Reviewed With  patient  -ME      Recorded by [ME] Radu Hinds, OT 07/10/20 1414      Row Name 07/10/20 0913             Outcome Summary/Treatment Plan (OT)    Daily Summary of Progress (OT)  progress towards functional goals is fair  -ME      Plan for Continued Treatment (OT)  continue per OT POC   -ME      Anticipated Discharge Disposition (OT)  home with 24/7 care;anticipate therapy at next level of care  -ME      Recorded by [ME] Radu Hinds, OT 07/10/20 1414      Row Name 07/10/20 0943             Outcome Summary/Treatment Plan (PT)    Daily Summary of Progress (PT)  progress towards functional goals is fair  -TW      Plan for Continued Treatment (PT)  Pt d/c'd home with  today.  -TW2      Anticipated Discharge Disposition (PT)  anticipate therapy at next level of care  -TW2       Recorded by [TW] Casimiro Colin, PTA 07/10/20 1455  [TW2] Casimiro Colin, PTA 07/10/20 1456        User Key  (r) = Recorded By, (t) = Taken By, (c) = Cosigned By    Initials Name Effective Dates Discipline    TW Casimiro Colin, PTA 03/07/18 -  PT    ME Radu Hinds, OT 09/10/19 -  OT               Rehab Goal Summary     Row Name 07/10/20 0943 07/10/20 0913          Bed Mobility Goal 1 (PT)    Activity/Assistive Device (Bed Mobility Goal 1, PT)  sit to supine;supine to sit  -TW  --     [place] Level/Cues Needed (Bed Mobility Goal 1, PT)  independent  -TW  --     Time Frame (Bed Mobility Goal 1, PT)  by discharge  -TW  --     Progress/Outcomes (Bed Mobility Goal 1, PT)  goal not met  -TW  --        Transfer Goal 1 (PT)    Activity/Assistive Device (Transfer Goal 1, PT)  sit-to-stand/stand-to-sit;bed-to-chair/chair-to-bed  -TW  --     [place] Level/Cues Needed (Transfer Goal 1, PT)  conditional independence  -TW  --     Time Frame (Transfer Goal 1, PT)  by discharge  -TW  --     Progress/Outcome (Transfer Goal 1, PT)  goal not met  -TW  --        Gait Training Goal 1 (PT)    Activity/Assistive Device (Gait Training Goal 1, PT)  gait (walking locomotion);assistive device use;backward stepping  -TW  --     [place] Level (Gait Training Goal 1, PT)  conditional independence  -TW  --     Distance (Gait Goal 1, PT)  50'x2  -TW  --     Time Frame (Gait Training Goal 1, PT)  by discharge  -TW  --     Progress/Outcome (Gait Training Goal 1, PT)  goal not met  -TW  --        Stairs Goal 1 (PT)    Activity/Assistive Device (Stairs Goal 1, PT)  stairs, all skills;ascending stairs;descending stairs  -TW  --     [place] Level/Cues Needed (Stairs Goal 1, PT)  conditional independence  -TW  --     Number of Stairs (Stairs Goal 1, PT)  3  -TW  --     Time Frame (Stairs Goal 1, PT)  by discharge  -TW  --     Progress/Outcome (Stairs Goal 1, PT)  goal not met  -TW  --        Patient Education Goal  (PT)    Activity (Patient Education Goal, PT)  Patient will score >24/28 on Tinetti balance and gait to indicate low fall risk,  -TW  --     [place]/Cues/Accuracy (Memory Goal 2, PT)  demonstrates adequately;independent  -TW  --     Time Frame (Patient Education Goal, PT)  by discharge  -TW  --     Progress/Outcome (Patient Education Goal, PT)  goal not met  -TW  --        Occupational Therapy Goals    Transfer Goal Selection (OT)  --  transfer, OT goal 1  -ME     Bathing Goal Selection (OT)  --  bathing, OT goal 1  -ME     Dressing Goal Selection (OT)  --  dressing, OT goal 1  -ME     Toileting Goal Selection (OT)  --  toileting, OT goal 1  -ME     Endurance Goal Selection (OT)  --  endurance, OT goal 1  -ME     Safety Awareness Goal Selection (OT)  --  safety awareness, OT goal 1  -ME        Transfer Goal 1 (OT)    Activity/Assistive Device (Transfer Goal 1, OT)  --  toilet  -ME     [place] Level/Cues Needed (Transfer Goal 1, OT)  --  verbal cues required;contact guard assist  -ME     Time Frame (Transfer Goal 1, OT)  --  long term goal (LTG);by discharge  -ME     Progress/Outcome (Transfer Goal 1, OT)  --  goal not met  -ME        Bathing Goal 1 (OT)    Activity/Assistive Device (Bathing Goal 1, OT)  --  lower body bathing  -ME     [place] Level/Cues Needed (Bathing Goal 1, OT)  --  minimum assist (75% or more patient effort);verbal cues required  -ME     Time Frame (Bathing Goal 1, OT)  --  long term goal (LTG);by discharge  -ME     Progress/Outcomes (Bathing Goal 1, OT)  --  goal not met  -ME        Dressing Goal 1 (OT)    Activity/Assistive Device (Dressing Goal 1, OT)  --  lower body dressing  -ME     [place]/Cues Needed (Dressing Goal 1, OT)  --  minimum assist (75% or more patient effort);verbal cues required  -ME     Time Frame (Dressing Goal 1, OT)  --  long term goal (LTG);by discharge  -ME     Progress/Outcome (Dressing Goal 1, OT)  --  goal not met  -ME        Toileting Goal 1 (OT)     Activity/Device (Toileting Goal 1, OT)  --  toileting skills, all  -ME     [place] Level/Cues Needed (Toileting Goal 1, OT)  --  minimum assist (75% or more patient effort);verbal cues required  -ME     Time Frame (Toileting Goal 1, OT)  --  long term goal (LTG);by discharge  -ME     Progress/Outcome (Toileting Goal 1, OT)  --  goal not met  -ME         Endurance Goal 1 (OT)    Endurance Goal 1 (OT)  --  20 min functional activity with proper EC techniques   -ME     Time Frame (Endurance Goal 1, OT)  --  long term goal (LTG);by discharge  -ME     Progress/Outcome (Endurance Goal 1, OT)  --  goal not met  -ME        Safety Awareness Goal 1 (OT)    Activity (Safety Awareness Goal 1, OT)  --  insight into deficits/self awareness;judgment;safe use of assistive device/equipment;follow through of safety precautions;demonstrates compliance;demonstration of safe behaviors;demonstrates understanding  -ME     [place]/Cues/Accuracy (Safety Awareness Goal 1, OT)  --  with minimum;verbal cues/redirection;with 80% accuracy  -ME     Time Frame (Safety Awareness Goal 1, OT)  --  long term goal (LTG);by discharge  -ME     Progress/Outcome (Safety Awareness Goal 1, OT)  --  goal not met  -ME       User Key  (r) = Recorded By, (t) = Taken By, (c) = Cosigned By    Initials Name Provider Type Discipline    TW Casimiro Colin, PTA Physical Therapy Assistant PT    Radu Rosas, OT Occupational Therapist OT          Physical Therapy Education                 Title: PT OT SLP Therapies (Resolved)     Topic: Physical Therapy (Resolved)     Point: Mobility training (Resolved)     Description:   Instruct learner(s) on safety and technique for assisting patient out of bed, chair or wheelchair.  Instruct in the proper use of assistive devices, such as walker, crutches, cane or brace.              Patient Friendly Description:   It's important to get you on your feet again, but we need to do so in a way that is safe for you.  Falling has serious consequences, and your personal safety is the most important thing of all.        When it's time to get out of bed, one of us or a family member will sit next to you on the bed to give you support.     If your doctor or nurse tells you to use a walker, crutches, a cane, or a brace, be sure you use it every time you get out of bed, even if you think you don't need it.    Learning Progress Summary           Patient Acceptance, E,TB, VU by LR at 7/4/2020 6310    Comment:  Attempted to educate patient on PT POC and role of PT. Patient required repetition of one step commands for hand placement, sequencing, body mechanics, and safety.                   Point: Home exercise program (Resolved)     Description:   Instruct learner(s) on appropriate technique for monitoring, assisting and/or progressing patient with therapeutic exercises and activities.              Learner Progress:   Not documented in this visit.          Point: Body mechanics (Resolved)     Description:   Instruct learner(s) on proper positioning and spine alignment for patient and/or caregiver during mobility tasks and/or exercises.              Learner Progress:   Not documented in this visit.          Point: Precautions (Resolved)     Description:   Instruct learner(s) on prescribed precautions during mobility and gait tasks              Learner Progress:   Not documented in this visit.                      User Key     Initials Effective Dates Name Provider Type Discipline    CHUY 06/25/19 -  Akash Murray Physical Therapist PT                PT Recommendation and Plan  Anticipated Discharge Disposition (PT): anticipate therapy at next level of care  Outcome Summary/Treatment Plan (PT)  Daily Summary of Progress (PT): progress towards functional goals is fair  Barriers to Overall Progress (PT): Participation.  Plan for Continued Treatment (PT): Pt d/c'd home with  today.  Anticipated Discharge Disposition (PT): anticipate therapy  at next level of care  Plan of Care Reviewed With: patient  Progress: no change  Outcome Summary: Pt did demonstrate ability to t/f sup to sit with Spv of 1, and stand with SBA. Pt amb in room for 28ft with no AD but requires VCs for safety. Pt somewhat impulsive in part to aggravated of having to get back OOB this am for therapy. Pt declined ther ex stating he knew what to do and would do it when he got home. Pt would cont to benefit from therapy upon DC.  Outcome Measures     Row Name 07/10/20 0943 07/10/20 0913 07/08/20 0942       How much help from another person do you currently need...    Turning from your back to your side while in flat bed without using bedrails?  4  -TW  --  4  -TW    Moving from lying on back to sitting on the side of a flat bed without bedrails?  3  -TW  --  3  -TW    Moving to and from a bed to a chair (including a wheelchair)?  3  -TW  --  3  -TW    Standing up from a chair using your arms (e.g., wheelchair, bedside chair)?  3  -TW  --  3  -TW    Climbing 3-5 steps with a railing?  3  -TW  --  3  -TW    To walk in hospital room?  3  -TW  --  3  -TW    AM-PAC 6 Clicks Score (PT)  19  -TW  --  19  -TW       How much help from another is currently needed...    Putting on and taking off regular lower body clothing?  --  2  -ME  --    Bathing (including washing, rinsing, and drying)  --  2  -ME  --    Toileting (which includes using toilet bed pan or urinal)  --  3  -ME  --    Putting on and taking off regular upper body clothing  --  4  -ME  --    Taking care of personal grooming (such as brushing teeth)  --  3  -ME  --    Eating meals  --  4  -ME  --    AM-PAC 6 Clicks Score (OT)  --  18  -ME  --       Functional Assessment    Outcome Measure Options  --  AM-PAC 6 Clicks Daily Activity (OT)  -ME  --    Row Name 07/08/20 0907             How much help from another is currently needed...    Putting on and taking off regular lower body clothing?  2  -ME      Bathing (including washing,  rinsing, and drying)  2  -ME      Toileting (which includes using toilet bed pan or urinal)  2  -ME      Putting on and taking off regular upper body clothing  3  -ME      Taking care of personal grooming (such as brushing teeth)  3  -ME      Eating meals  3  -ME      AM-PAC 6 Clicks Score (OT)  15  -ME         Functional Assessment    Outcome Measure Options  AM-PAC 6 Clicks Daily Activity (OT)  -ME        User Key  (r) = Recorded By, (t) = Taken By, (c) = Cosigned By    Initials Name Provider Type    TW Casimiro Colin PTA Physical Therapy Assistant    ME Radu Hinds, OT Occupational Therapist         Time Calculation:   PT Charges     Row Name 07/10/20 1459             Time Calculation    Start Time  0943  -TW      Stop Time  1006  -TW      Time Calculation (min)  23 min  -TW      PT Received On  07/10/20  -TW      PT Goal Re-Cert Due Date  07/17/20  -TW         Time Calculation- PT    Total Timed Code Minutes- PT  23 minute(s)  -TW        User Key  (r) = Recorded By, (t) = Taken By, (c) = Cosigned By    Initials Name Provider Type    Casimiro Barajas PTA Physical Therapy Assistant        Therapy Charges for Today     Code Description Service Date Service Provider Modifiers Qty    08910841202 HC GAIT TRAINING EA 15 MIN 7/10/2020 Casimiro Colin PTA GP 1    10277384145 HC PT THERAPEUTIC ACT EA 15 MIN 7/10/2020 Casimiro Colin PTA GP 1          PT G-Codes  Outcome Measure Options: AM-PAC 6 Clicks Daily Activity (OT)  AM-PAC 6 Clicks Score (PT): 19  AM-PAC 6 Clicks Score (OT): 18    Casimiro Colin PTA  7/10/2020

## 2020-07-11 ENCOUNTER — READMISSION MANAGEMENT (OUTPATIENT)
Dept: CALL CENTER | Facility: HOSPITAL | Age: 65
End: 2020-07-11

## 2020-07-11 LAB
GLUCOSE BLDC GLUCOMTR-MCNC: 171 MG/DL (ref 70–130)
GLUCOSE BLDC GLUCOMTR-MCNC: 207 MG/DL (ref 70–130)

## 2020-07-11 NOTE — OUTREACH NOTE
Prep Survey      Responses   [facility] patient discharged from?  [facility]   Is LACE score < 7 ?  No   Eligibility  Readm Mgmt   Discharge diagnosis  Acute encephalopathy, DKA, ARF on CKD, bladder outflow obstruction   COVID-19 Test Status  Not tested   Does the patient have one of the following disease processes/diagnoses(primary or secondary)?  Other   Does the patient have Home health ordered?  Yes   What is the Home health agency?   [facility]   Is there a DME ordered?  No   Comments regarding appointments  see AVS   Medication alerts for this patient  see AVS   Prep survey completed?  Yes          Mayelin Humphreys RN

## 2020-07-13 NOTE — PAYOR COMM NOTE
"Dulce Gao   [facility]  phone [phone]  fax [phone]    Auth# [id number]    Favio Owens (65 y.o. Male)     Date of Birth Social Security Number Address Home Phone MRN    1955  [address]  [facility] [address] [phone] [id number]    Latter day Marital Status          Anglican Single       Admission Date Admission Type Admitting Provider Attending Provider Department, Room/Bed    7/2/20 Emergency Patrick Edouard MD  [address], 419/1    Discharge Date Discharge Disposition Discharge Destination        7/10/2020 Home or Self Care              Attending Provider:  (none)   Allergies:  No Known Allergies    Isolation:  None   Infection:  None   Code Status:  Prior    Ht:  170.2 cm (67.01\")   Wt:  62.7 kg (138 lb 4.8 oz)    Admission Cmt:  None   Principal Problem:  None                Active Insurance as of 7/2/2020     Primary Coverage     Payor Plan Insurance Group Employer/Plan Group    HUMANA MEDICARE REPLACEMENT HUMANA MEDICARE REPLACEMENT [id number]     Payor Plan Address Payor Plan Phone Number Payor Plan Fax Number Effective Dates    [address] [address] [phone]  5/1/2019 - None Entered    [facility] [address]       Subscriber Name Subscriber Birth Date Member ID       FAVIO OWENS 1955 [id number]           Secondary Coverage     Payor Plan Insurance Group Employer/Plan Group    [place]      Payor Plan Address Payor Plan Phone Number Payor Plan Fax Number Effective Dates    [address] [phone]  8/1/2017 - None Entered    [facility] [id number]       Subscriber Name Subscriber Birth Date Member ID       FAVIO OWENS 1955 [id number]                 Emergency Contacts      (Rel.) Home Phone Work Phone Mobile Phone    Sofi Mcdonald (Friend) [phone] -- [phone]    VinnyTong (Brother) -- -- [phone]               Discharge Summary      Cristi, " Art ALONSO MD at 07/10/20 0909              [facility] Medicine Services  DISCHARGE SUMMARY       Date of Admission: 7/2/2020  Date of Discharge:  7/10/2020  Primary Care Physician: Shayne Merritt MD    Presenting Problem/History of Present Illness:  Encephalopathy acute [G93.40]  Diabetic ketoacidosis without coma associated with type 2 diabetes mellitus (CMS/HCC) [E11.10]  Acute renal failure superimposed on chronic kidney disease, unspecified CKD stage, unspecified acute renal failure type (CMS/HCC) [N17.9, N18.9]       Final Discharge Diagnoses:  Active Hospital Problems    Diagnosis   • Diabetic ketoacidosis without coma associated with type 2 diabetes mellitus (CMS/HCC)   • Acute renal failure superimposed on stage 3 chronic kidney disease (CMS/HCC)   • Bladder outflow obstruction   • CAD (coronary artery disease)       Consults:   Consults     Date and Time Order Name Status Description    7/5/2020 0927 Inpatient Urology Consult Completed     7/3/2020 1611 Inpatient Nephrology Consult Completed                     Pertinent Test Results:   Lab Results (most recent)     Procedure Component Value Units Date/Time    Comprehensive Metabolic Panel [[id number]]  (Abnormal) Collected:  07/10/20 0539    Specimen:  Blood Updated:  07/10/20 0651     Glucose 177 mg/dL      BUN 22 mg/dL      Creatinine 1.59 mg/dL      Sodium 136 mmol/L      Potassium 5.2 mmol/L      Chloride 107 mmol/L      CO2 20.0 mmol/L      Calcium 7.9 mg/dL      Total Protein 6.1 g/dL      Albumin 3.50 g/dL      ALT (SGPT) 14 U/L      AST (SGOT) 15 U/L      Alkaline Phosphatase 52 U/L      Total Bilirubin 0.2 mg/dL      eGFR Non African Amer 44 mL/min/1.73      Globulin 2.6 gm/dL      A/G Ratio 1.3 g/dL      BUN/Creatinine Ratio 13.8     Anion Gap 9.0 mmol/L     Narrative:       GFR Normal >60  Chronic Kidney Disease <60  Kidney Failure <15      CBC & Differential [[id number]] Collected:  07/10/20 0540     Specimen:  Blood Updated:  07/10/20 0640    Narrative:       The following orders were created for panel order CBC & Differential.  Procedure                               Abnormality         Status                     ---------                               -----------         ------                     CBC Auto Differential[[id number]]        Abnormal            Final result                 Please view results for these tests on the individual orders.    CBC Auto Differential [[id number]]  (Abnormal) Collected:  07/10/20 0540    Specimen:  Blood Updated:  07/10/20 0640     WBC 7.74 10*3/mm3      RBC 3.69 10*6/mm3      Hemoglobin 10.9 g/dL      Hematocrit 33.5 %      MCV 90.8 fL      MCH 29.5 pg      MCHC 32.5 g/dL      RDW 14.7 %      RDW-SD 48.6 fl      MPV 10.8 fL      Platelets 232 10*3/mm3      Neutrophil % 69.3 %      Lymphocyte % 18.9 %      Monocyte % 9.6 %      Eosinophil % 1.2 %      Basophil % 0.1 %      Immature Grans % 0.9 %      Neutrophils, Absolute 5.37 10*3/mm3      Lymphocytes, Absolute 1.46 10*3/mm3      Monocytes, Absolute 0.74 10*3/mm3      Eosinophils, Absolute 0.09 10*3/mm3      Basophils, Absolute 0.01 10*3/mm3      Immature Grans, Absolute 0.07 10*3/mm3      nRBC 0.0 /100 WBC     Potassium [[id number]]  (Abnormal) Collected:  07/09/20 2237    Specimen:  Blood Updated:  07/09/20 2331     Potassium 5.4 mmol/L     POC Glucose Once [[id number]]  (Abnormal) Collected:  07/09/20 0716    Specimen:  Blood Updated:  07/09/20 1637     Glucose 169 mg/dL      Comment: RN NotifiedOperator: [id number] GERARDO Blue SaintLEYMeter ID: [id number]       POC Glucose Once [[id number]]  (Abnormal) Collected:  07/09/20 1030    Specimen:  Blood Updated:  07/09/20 1124     Glucose 331 mg/dL      Comment: RN NotifiedOperator: [id number] GERARDO Blue SaintLEYMeter ID: [id number]       Phosphorus [[id number]]  (Normal) Collected:  07/09/20 0540    Specimen:  Blood Updated:  07/09/20 0629     Phosphorus 3.0 mg/dL     Comprehensive  Metabolic Panel [[id number]]  (Abnormal) Collected:  07/09/20 0540    Specimen:  Blood Updated:  07/09/20 0628     Glucose 173 mg/dL      BUN 18 mg/dL      Creatinine 1.48 mg/dL      Sodium 140 mmol/L      Potassium 3.5 mmol/L      Chloride 109 mmol/L      CO2 22.0 mmol/L      Calcium 7.5 mg/dL      Total Protein 5.4 g/dL      Albumin 3.00 g/dL      ALT (SGPT) 11 U/L      AST (SGOT) 14 U/L      Alkaline Phosphatase 52 U/L      Total Bilirubin 0.2 mg/dL      eGFR Non African Amer 48 mL/min/1.73      Globulin 2.4 gm/dL      A/G Ratio 1.3 g/dL      BUN/Creatinine Ratio 12.2     Anion Gap 9.0 mmol/L     Narrative:       GFR Normal >60  Chronic Kidney Disease <60  Kidney Failure <15      Magnesium [[id number]]  (Abnormal) Collected:  07/09/20 0540    Specimen:  Blood Updated:  07/09/20 0628     Magnesium 1.4 mg/dL     CBC & Differential [[id number]] Collected:  07/09/20 0540    Specimen:  Blood Updated:  07/09/20 0606    Narrative:       The following orders were created for panel order CBC & Differential.  Procedure                               Abnormality         Status                     ---------                               -----------         ------                     CBC Auto Differential[[id number]]        Abnormal            Final result                 Please view results for these tests on the individual orders.    CBC Auto Differential [[id number]]  (Abnormal) Collected:  07/09/20 0540    Specimen:  Blood Updated:  07/09/20 0606     WBC 8.22 10*3/mm3      RBC 3.53 10*6/mm3      Hemoglobin 10.5 g/dL      Hematocrit 31.5 %      MCV 89.2 fL      MCH 29.7 pg      MCHC 33.3 g/dL      RDW 14.4 %      RDW-SD 46.4 fl      MPV 10.6 fL      Platelets 202 10*3/mm3      Neutrophil % 69.1 %      Lymphocyte % 18.0 %      Monocyte % 10.1 %      Eosinophil % 1.6 %      Basophil % 0.2 %      Immature Grans % 1.0 %      Neutrophils, Absolute 5.68 10*3/mm3      Lymphocytes, Absolute 1.48 10*3/mm3      Monocytes, Absolute 0.83  10*3/mm3      Eosinophils, Absolute 0.13 10*3/mm3      Basophils, Absolute 0.02 10*3/mm3      Immature Grans, Absolute 0.08 10*3/mm3      nRBC 0.0 /100 WBC     Vitamin D 25 Hydroxy [[id number]]  (Abnormal) Collected:  07/08/20 0553    Specimen:  Blood Updated:  07/08/20 1230     25 Hydroxy, Vitamin D 12.3 ng/ml     Narrative:       Reference Range for Total Vitamin D 25(OH)     Deficiency <20.0 ng/mL   Insufficiency 21-29 ng/mL   Sufficiency  ng/mL  Toxicity >100 ng/ml    Results may be falsely increased if patient taking Biotin.      Magnesium [[id number]]  (Normal) Collected:  07/08/20 0553    Specimen:  Blood Updated:  07/08/20 0646     Magnesium 1.8 mg/dL     Blood Culture - Blood, Hand, Left [[id number]] Collected:  07/03/20 0312    Specimen:  Blood from Hand, Left Updated:  07/08/20 0345     Blood Culture No growth at 5 days    Blood Culture - Blood, Arm, Right [[id number]] Collected:  07/02/20 2158    Specimen:  Blood from Arm, Right Updated:  07/07/20 2215     Blood Culture No growth at 5 days    Potassium [[id number]]  (Abnormal) Collected:  07/07/20 0943    Specimen:  Blood Updated:  07/07/20 1017     Potassium 3.4 mmol/L     Basic Metabolic Panel [[id number]]  (Abnormal) Collected:  07/07/20 0943    Specimen:  Blood Updated:  07/07/20 1017     Glucose 337 mg/dL      BUN 36 mg/dL      Creatinine 1.69 mg/dL      Sodium 139 mmol/L      Potassium 3.4 mmol/L      Chloride 112 mmol/L      CO2 18.0 mmol/L      Calcium 6.5 mg/dL      eGFR Non African Amer 41 mL/min/1.73      BUN/Creatinine Ratio 21.3     Anion Gap 9.0 mmol/L     Narrative:       GFR Normal >60  Chronic Kidney Disease <60  Kidney Failure <15      Phosphorus [[id number]]  (Abnormal) Collected:  07/07/20 0943    Specimen:  Blood Updated:  07/07/20 1017     Phosphorus 1.9 mg/dL     Sodium, Urine, Random - Urine, Catheter [[id number]] Collected:  07/06/20 0334    Specimen:  Urine, Catheter Updated:  07/06/20 0405     Sodium, Urine 60 mmol/L      Narrative:       Reference intervals for random urine have not been established.  Clinical usage is dependent upon physician's interpretation in combination with other laboratory tests.       Basic Metabolic Panel [[id number]]  (Abnormal) Collected:  07/05/20 0755    Specimen:  Blood Updated:  07/05/20 0901     Glucose 191 mg/dL      BUN 89 mg/dL      Creatinine 2.59 mg/dL      Sodium 143 mmol/L      Potassium 2.1 mmol/L      Chloride 113 mmol/L      CO2 18.0 mmol/L      Calcium 7.3 mg/dL      eGFR Non African Amer 25 mL/min/1.73      BUN/Creatinine Ratio 34.4     Anion Gap 12.0 mmol/L     Narrative:       GFR Normal >60  Chronic Kidney Disease <60  Kidney Failure <15      Urine Culture - Urine, Urine, Catheter [[id number]]  (Abnormal) Collected:  07/03/20 1821    Specimen:  Urine, Catheter Updated:  07/04/20 1236     Urine Culture >100,000 CFU/mL Mixed Gram Positive Birgit    Narrative:       Specimen contains mixed organisms of questionable pathogenicity which indicates contamination with commensal birgit.  Further identification is unlikely to provide clinically useful information.  Suggest recollection.    Urine Drug Screen - Urine, Clean Catch [[id number]]  (Abnormal) Collected:  07/04/20 1207    Specimen:  Urine, Clean Catch Updated:  07/04/20 1221     THC, Screen, Urine Negative     Phencyclidine (PCP), Urine Negative     Cocaine Screen, Urine Negative     Methamphetamine, Ur Negative     Opiate Screen Negative     Amphetamine Screen, Urine Negative     Benzodiazepine Screen, Urine Negative     Tricyclic Antidepressants Screen Positive     Methadone Screen, Urine Negative     Barbiturates Screen, Urine Negative     Oxycodone Screen, Urine Negative     Propoxyphene Screen Negative     Buprenorphine, Screen, Urine Negative    Narrative:       Cutoff For Drugs Screened:    Amphetamines               500 ng/ml  Barbiturates               200 ng/ml  Benzodiazepines            150 ng/ml  Cocaine                    150  ng/ml  Methadone                  200 ng/ml  Opiates                    100 ng/ml  Phencyclidine               25 ng/ml  THC                            50 ng/ml  Methamphetamine            500 ng/ml  Tricyclic Antidepressants  300 ng/ml  Oxycodone                  100 ng/ml  Propoxyphene               300 ng/ml  Buprenorphine               10 ng/ml    The normal value for all drugs tested is negative. This report includes unconfirmed screening results, with the cutoff values listed, to be used for medical treatment purposes only.  Unconfirmed results must not be used for non-medical purposes such as employment or legal testing.  Clinical consideration should be applied to any drug of abuse test, particularly when unconfirmed results are used.      Blood Gas, Arterial [[id number]]  (Abnormal) Collected:  07/04/20 1055    Specimen:  Arterial Blood Updated:  07/04/20 1056     Site Right Radial     Wilian's Test N/A     pH, Arterial 7.111 pH units      Comment: 85 Value below critical limit        pCO2, Arterial 15.7 mm Hg      Comment: 85 Value below critical limit        pO2, Arterial 108.0 mm Hg      HCO3, Arterial 5.0 mmol/L      Comment: 84 Value below reference range        Base Excess, Arterial -22.4 mmol/L      Comment: 84 Value below reference range        O2 Saturation, Arterial 98.5 %      Barometric Pressure for Blood Gas 747 mmHg      Modality Room Air     FIO2 21 %      Ventilator Mode NA     Collected by MR     Comment: Meter: [id number]     :  [id number]       Ethanol [[id number]] Collected:  07/04/20 0959    Specimen:  Blood Updated:  07/04/20 1022     Ethanol <10 mg/dL      Ethanol % <0.010 %     Salicylate Level [[id number]]  (Normal) Collected:  07/04/20 0805    Specimen:  Blood Updated:  07/04/20 1005     Salicylate <0.3 mg/dL     Urine Drug Screen - Urine, Catheter [[id number]]  (Abnormal) Collected:  07/03/20 1821    Specimen:  Urine, Catheter Updated:  07/03/20 1907     THC, Screen, Urine  Negative     Phencyclidine (PCP), Urine Negative     Cocaine Screen, Urine Negative     Methamphetamine, Ur Negative     Opiate Screen Negative     Amphetamine Screen, Urine Negative     Benzodiazepine Screen, Urine Negative     Tricyclic Antidepressants Screen Positive     Methadone Screen, Urine Negative     Barbiturates Screen, Urine Negative     Oxycodone Screen, Urine Negative     Propoxyphene Screen Negative     Buprenorphine, Screen, Urine Negative    Narrative:       Cutoff For Drugs Screened:    Amphetamines               500 ng/ml  Barbiturates               200 ng/ml  Benzodiazepines            150 ng/ml  Cocaine                    150 ng/ml  Methadone                  200 ng/ml  Opiates                    100 ng/ml  Phencyclidine               25 ng/ml  THC                            50 ng/ml  Methamphetamine            500 ng/ml  Tricyclic Antidepressants  300 ng/ml  Oxycodone                  100 ng/ml  Propoxyphene               300 ng/ml  Buprenorphine               10 ng/ml    The normal value for all drugs tested is negative. This report includes unconfirmed screening results, with the cutoff values listed, to be used for medical treatment purposes only.  Unconfirmed results must not be used for non-medical purposes such as employment or legal testing.  Clinical consideration should be applied to any drug of abuse test, particularly when unconfirmed results are used.      Urinalysis, Microscopic Only - Urine, Catheter [[id number]]  (Abnormal) Collected:  07/03/20 1821    Specimen:  Urine, Catheter Updated:  07/03/20 1856     RBC, UA 0-2 /HPF      WBC, UA 6-12 /HPF      Bacteria, UA None Seen /HPF      Squamous Epithelial Cells, UA None Seen /HPF      Hyaline Casts, UA 0-2 /LPF      Methodology Automated Microscopy    Urinalysis With Culture If Indicated - Urine, Catheter [[id number]]  (Abnormal) Collected:  07/03/20 1821    Specimen:  Urine, Catheter Updated:  07/03/20 1856     Color, UA Yellow      Appearance, UA Clear     pH, UA 7.5     Specific Gravity, UA 1.012     Glucose, UA Negative     Ketones, UA Negative     Bilirubin, UA Negative     Blood, UA Small (1+)     Protein,  mg/dL (2+)     Leuk Esterase, UA Trace     Nitrite, UA Negative     Urobilinogen, UA 0.2 E.U./dL    Extra Tubes [[id number]] Collected:  07/03/20 0339    Specimen:  Blood, Venous Line Updated:  07/03/20 0445    Narrative:       The following orders were created for panel order Extra Tubes.  Procedure                               Abnormality         Status                     ---------                               -----------         ------                     Lavender Top[[id number]]                                     Final result               Green Top (Gel)[[id number]]                                  Final result                 Please view results for these tests on the individual orders.    Lavender Top [[id number]] Collected:  07/03/20 0339    Specimen:  Blood Updated:  07/03/20 0445     Extra Tube hold for add-on     Comment: Auto resulted       Green Top (Gel) [[id number]] Collected:  07/03/20 0339    Specimen:  Blood Updated:  07/03/20 0445     Extra Tube Hold for add-ons.     Comment: Auto resulted.       Dudley Draw [[id number]] Collected:  07/02/20 2122    Specimen:  Blood Updated:  07/02/20 2230    Narrative:       The following orders were created for panel order Dudley Draw.  Procedure                               Abnormality         Status                     ---------                               -----------         ------                     Light Blue Top[[id number]]                                   Final result               Green Top (Gel)[[id number]]                                  Final result               Lavender Top[[id number]]                                     Final result               Gold Top - SST[[id number]]                                   Final result                 Please view results for  these tests on the individual orders.    Light Blue Top [[id number]] Collected:  07/02/20 2122    Specimen:  Blood Updated:  07/02/20 2230     Extra Tube hold for add-on     Comment: Auto resulted       Green Top (Gel) [[id number]] Collected:  07/02/20 2122    Specimen:  Blood Updated:  07/02/20 2230     Extra Tube Hold for add-ons.     Comment: Auto resulted.       Lavender Top [[id number]] Collected:  07/02/20 2122    Specimen:  Blood Updated:  07/02/20 2230     Extra Tube hold for add-on     Comment: Auto resulted       Gold Top - SST [[id number]] Collected:  07/02/20 2122    Specimen:  Blood Updated:  07/02/20 2230     Extra Tube Hold for add-ons.     Comment: Auto resulted.       Lactic Acid, Plasma [[id number]]  (Normal) Collected:  07/02/20 2158    Specimen:  Blood from Arm, Left Updated:  07/02/20 2228     Lactate 0.8 mmol/L     Blood Gas, Arterial [[id number]]  (Abnormal) Collected:  07/02/20 2221    Specimen:  Arterial Blood Updated:  07/02/20 2227     Site Right Radial     Wilian's Test Positive     pH, Arterial 7.039 pH units      Comment: 85 Value below critical limit        pCO2, Arterial 20.0 mm Hg      Comment: 84 Value below reference range        pO2, Arterial 98.9 mm Hg      HCO3, Arterial 5.4 mmol/L      Comment: 84 Value below reference range        Base Excess, Arterial -23.6 mmol/L      Comment: 84 Value below reference range        O2 Saturation, Arterial 98.0 %      Barometric Pressure for Blood Gas 746 mmHg      Modality Room Air     Ventilator Mode NA     Collected by RT     Comment: Meter: [id number]     :  [id number]       Ketone Bodies, Serum (Not performed at [place]) [[id number]] Collected:  07/02/20 2153    Specimen:  Blood Updated:  07/02/20 2159    Narrative:       The following orders were created for panel order Ketone Bodies, Serum (Not performed at [place]).  Procedure                               Abnormality         Status                     ---------                                -----------         ------                     Acetone[[id number]]                      Normal              Final result                 Please view results for these tests on the individual orders.    Acetone [[id number]]  (Normal) Collected:  07/02/20 2153    Specimen:  Blood Updated:  07/02/20 2159     Acetone Negative    Troponin [[id number]]  (Normal) Collected:  07/02/20 2122    Specimen:  Blood Updated:  07/02/20 2153     Troponin T <0.010 ng/mL     Narrative:       Troponin T Reference Range:  <= 0.03 ng/mL-   Negative for AMI  >0.03 ng/mL-     Abnormal for myocardial necrosis.  Clinicians would have to utilize clinical acumen, EKG, Troponin and serial changes to determine if it is an Acute Myocardial Infarction or myocardial injury due to an underlying chronic condition.       Results may be falsely decreased if patient taking Biotin.      Lipase [[id number]]  (Normal) Collected:  07/02/20 2122    Specimen:  Blood Updated:  07/02/20 2149     Lipase 39 U/L         Imaging Results (Most Recent)     Procedure Component Value Units Date/Time     Renal Bilateral [[id number]] Collected:  07/08/20 1029     Updated:  07/08/20 1544    Narrative:       EXAMINATION:  ultrasound, renal     CLINICAL INDICATION / HISTORY:  follow up hydronephrosis, E11.10  Type 2 diabetes mellitus with ketoacidosis without coma G93.40  Encephalopathy, unspecified N17.9 Acute kidney failure,  unspecified N18.9 Chronic kidney disease, unspecified R13.10  Dysphagia, unspecified R26.89 Other abnormalities of gait and  mobility Z74.09 Other reduced mobility Z78.9 Other specified  health status    COMPARISON:  7/5/2020    TECHNIQUE:  ultrasound, renal    FULL RESULTS / FINDINGS:    Kidney, right:      size:  normal, measuring 11 x 5 x 6.1 cm    echotexture:  normal    no nephrolithiasis, solid mass, or collecting system dilation    Kidney, left:      size:  normal, measuring 12.2 x 4.8 x 6.5 cm    echotexture:  normal    no  nephrolithiasis, solid mass, or collecting system dilation    Urinary bladder:  Patient with history of prior cystectomy and  creation of a neobladder. This region was not imaged.        Impression:       CONCLUSION:    1.  Patient with history of prior cystectomy and creation of a  neobladder. This region was not imaged.  2.  Otherwise normal renal ultrasound.    Electronically signed by:  Tyron Cheek MD  7/8/2020 11:02 AM CDT  Workstation: [id number]    US Guided Vascular Access [[id number]] Resulted:  07/06/20 1348     Updated:  07/06/20 1348    Narrative:       This procedure was auto-finalized with no dictation required.    IR Insert Midline Without Port Pump 5 Plus [[id number]] Resulted:  07/06/20 1344     Updated:  07/06/20 1344    Narrative:       This procedure was auto-finalized with no dictation required.    US Renal Bilateral [[id number]] Collected:  07/05/20 0836     Updated:  07/05/20 0919    Narrative:       Ultrasound renal bilateral.    HISTORY: Renal insufficiency. Evaluate for obstruction.       Prior exam: Ultrasound kidneys November 17, 2019. CT November 16, 2019.    FINDINGS: The right kidney is normal size 10.6 x 5.0 x 5.0 cm.  Normal renal cortical echogenicity and cortical thickness. No  masses calculi or evidence of obstruction.    The left kidney measures 10.8 x 5.9 x 5.4 cm.    Mild hydronephrotic changes. Dilatation of calyces and left renal  pelvis. This demonstrates a change since prior ultrasound  examination.    Urinary bladder surgically absent.          Impression:       Interval development of mild hydronephrotic changes  left kidney.. In this patient with prior cystectomy and creation  of a neobladder, these hydronephrotic changes may be secondary  either to obstruction or reflux.    Electronically signed by:  Matt Packer MD  7/5/2020 9:18 AM CDT  Workstation: [id number]    CT Head Without Contrast [[id number]] Collected:  07/03/20 1112     Updated:  07/03/20 1136    Narrative:        Noncontrast CT examination of the brain.    INDICATION: Alteration mental status. Confusion, delirium.       Technique: Axial 5 mm contiguous images with brain parenchymal  and bone windows    This exam was performed according to our departmental  dose-optimization program, which includes automated exposure  control, adjustment of the mA and/or kV according to patient size  and/or use of iterative reconstruction technique.    Prior relevant examination: CT brain November 16, 2019.    Involutional, atrophic changes..    Brain parenchyma appears within normal limits. Ventricles are  within normal limits in size. No evidence of abnormal mass or  calcification is seen. No evidence of acute hemorrhage is noted.  Bony structures appear within normal limits and the mastoid air  cells and visualized paranasal sinuses are normally aerated.        Impression:       Involutional, atrophic changes. Otherwise unremarkable CT brain  without contrast. No change since prior exam.    Electronically signed by:  Matt Packer MD  7/3/2020 11:35 AM CDT  Workstation: [id number]    XR Chest 1 View [[id number]] Collected:  07/02/20 2141     Updated:  07/02/20 2209    Narrative:       EXAM DESCRIPTION:  XR CHEST 1 VW      CLINICAL HISTORY:  GEN WEAKNESS    TECHNIQUE:   Single frontal view of the chest is submitted.    COMPARISON:  11/16/2019    FINDINGS:  Heart: The cardiothoracic silhouette is within normal limits.  Lungs: No focal consolidation.  Mediastinum: Thoracic aortic atherosclerosis.  Pleura: No appreciable effusion. No pneumothorax.  Bones: Intact  Upper abdomen: Unremarkable      Impression:       No acute disease.    Electronically signed by:  Jennie Pedraza MD  7/2/2020 10:08 PM CDT  Workstation: [id number]          Chief Complaint on Day of Discharge: None    Hospital Course:  The patient is a 65 y.o. male who presented to [facility] with confusion secondary to DKA.  Patient was started on DKA protocol and his symptoms  "and metabolic derangements resolved nicely.  Nephrology was consulted due to acute kidney injury.  He was found to have left hydronephrosis his renal dysfunction was felt to be secondary to bladder outlet obstruction.  John catheter was placed and patient has urine retention.  Resolution of the hydronephrosis.  Urology consulted, patient and found patient's bladder outlet obstruction due to 2 mucoid sediment which is common for patient.  He had Mucomyst instilled in his neobladder with resolution of the mucoid sediment.  John catheter was discontinued and patient was started on self cathing 4 times daily.  Patient does have difficulty.  He felt much better and was discharged home with the patient.  No follow-up with his primary care provider in 1 to 2 weeks, Dr. Calderon in 1 week, and Dr. Brody in 2 to 3 weeks.    Condition on Discharge:  Stable    Physical Exam on Discharge:  /83 (BP Location: Left arm, Patient Position: Lying)   Pulse 77   Temp 97.5 °F (36.4 °C) (Oral)   Resp 18   Ht 170.2 cm (67.01\")   Wt 62.7 kg (138 lb 4.8 oz)   SpO2 98%   BMI 21.66 kg/m²       Physical Exam   Constitutional: He appears well-developed and well-nourished.   HENT:   Head: Normocephalic and atraumatic.   Eyes: Pupils are equal, round, and reactive to light. EOM are normal.   Neck: Normal range of motion. Neck supple.   Cardiovascular: Normal rate, regular rhythm, normal heart sounds and intact distal pulses. Exam reveals no gallop and no friction rub.   No murmur heard.  Pulmonary/Chest: Effort normal and breath sounds normal. No respiratory distress. He has no wheezes. He has no rales. He exhibits no tenderness.   Abdominal: Soft. Bowel sounds are normal. He exhibits no distension. There is no tenderness.   Psychiatric: He has a normal mood and affect. His behavior is normal.   Vitals reviewed.    Discharge Disposition:  Home or Self Care    Discharge Medications:     Discharge Medications      New Medications     "  Instructions Start Date   aMILoride 5 MG tablet  Commonly known as:  MIDAMOR   5 mg, Oral, Daily         Changes to Medications      Instructions Start Date   [employer] Blood Glucose Meter w/Device kit  What changed:  Another medication with the same name was added. Make sure you understand how and when to take each.  Notes to patient:  As directed   1 each, Does not apply, 4 Times Daily PRN      Accu-Chek Toma device  What changed:  You were already taking a medication with the same name, and this prescription was added. Make sure you understand how and when to take each.  Notes to patient:  As directed   1 each, Other, 3 Times Daily Before Meals, Use as instructed      freestyle lancets  What changed:  Another medication with the same name was added. Make sure you understand how and when to take each.  Notes to patient:  As directed   Tid      Accu-Chek Softclix Lancets lancets  What changed:  You were already taking a medication with the same name, and this prescription was added. Make sure you understand how and when to take each.  Notes to patient:  Ads directed   1 each, Other, 3 Times Daily Before Meals, Use as instructed      glucose blood test strip  What changed:  Another medication with the same name was added. Make sure you understand how and when to take each.  Notes to patient:  As directed   Use as instructed      glucose blood test strip  Commonly known as:  Accu-Chek Toma  What changed:  You were already taking a medication with the same name, and this prescription was added. Make sure you understand how and when to take each.  Notes to patient:  As directed   1 each, Other, As Needed, Use as instructed      omeprazole 20 MG capsule  Commonly known as:  PrilOSEC  What changed:  when to take this   20 mg, Oral, Daily      sertraline 50 MG tablet  Commonly known as:  Zoloft  What changed:    · how much to take  · how to take this  · when to take this  · additional instructions  Notes to patient:  As  directed   1/2 tablet hs 1 week then 1 tablet . (depression)         Continue These Medications      Instructions Start Date   albuterol (2.5 MG/3ML) 0.083% nebulizer solution  Commonly known as:  PROVENTIL   2.5 mg, Nebulization, Every 4 Hours PRN      albuterol sulfate  (90 Base) MCG/ACT inhaler  Commonly known as:  Ventolin HFA   2 puffs, Inhalation, Every 6 Hours PRN      Alcohol Prep pads  Notes to patient:  As Directed   1 pad, Does not apply, 5 Times Daily      aspirin 81 MG EC tablet   81 mg, Oral, Daily      budesonide-formoterol 160-4.5 MCG/ACT inhaler  Commonly known as:  SYMBICORT   2 puffs, Inhalation, 2 Times Daily - RT      carvedilol 3.125 MG tablet  Commonly known as:  COREG   TK 1 T PO BID WC      clopidogrel 75 MG tablet  Commonly known as:  PLAVIX   75 mg, Oral, Daily      cyclobenzaprine 10 MG tablet  Commonly known as:  FLEXERIL  Notes to patient:  Ad directed   10 mg, Oral      fluticasone 50 MCG/ACT nasal spray  Commonly known as:  Flonase   2 sprays, Nasal, Daily      glucose monitor monitoring kit  Notes to patient:  As directed   1 each, Does not apply, 3 Times Daily      hydrOXYzine 25 MG tablet  Commonly known as:  ATARAX   TK 1 T PO TID PRF ITCHING      insulin aspart 100 UNIT/ML solution pen-injector sc pen  Commonly known as:  novoLOG FLEXPEN   Subcutaneous, 3 Times Daily With Meals      Levemir FlexTouch 100 UNIT/ML injection  Generic drug:  insulin detemir   25 Units, Subcutaneous, 2 Times Daily      nitroglycerin 0.4 MG SL tablet  Commonly known as:  NITROSTAT   0.4 mg, Sublingual, Every 5 Minutes PRN, Take no more than 3 doses in 15 minutes.       POTASSIMIN PO   Oral      pravastatin 40 MG tablet  Commonly known as:  PRAVACHOL   40 mg, Oral, Nightly      traMADol 50 MG tablet  Commonly known as:  ULTRAM   50 mg, Oral, Every 6 Hours PRN         Stop These Medications    cephalexin 500 MG capsule  Commonly known as:  KEFLEX            Discharge Diet:   Diet Instructions      Advance Diet As Tolerated            Activity at Discharge:   Activity Instructions     Activity as Tolerated          Follow-up Appointments:   PCP in 1-2 weeks  Dr Calderon in 1 week  Dr. Brody in 2-3 weeks          This document has been electronically signed by Art Colin MD on July 10, 2020 13:11      Time: 35 min                Electronically signed by Art Colin MD at 07/10/20 3285

## 2020-07-14 ENCOUNTER — READMISSION MANAGEMENT (OUTPATIENT)
Dept: CALL CENTER | Facility: HOSPITAL | Age: 65
End: 2020-07-14

## 2020-07-14 NOTE — OUTREACH NOTE
Medical Week 1 Survey      Responses   [facility] patient discharged from?  [place]   COVID-19 Test Status  Not tested   Does the patient have one of the following disease processes/diagnoses(primary or secondary)?  Other   Is there a successful TCM telephone encounter documented?  No   Week 1 attempt successful?  No   Unsuccessful attempts  Attempt 1          Yadira Oro RN

## 2020-07-22 ENCOUNTER — READMISSION MANAGEMENT (OUTPATIENT)
Dept: CALL CENTER | Facility: HOSPITAL | Age: 65
End: 2020-07-22

## 2020-07-22 NOTE — OUTREACH NOTE
Medical Week 2 Survey      Responses   [facility] patient discharged from?  [place]   COVID-19 Test Status  Not tested   Does the patient have one of the following disease processes/diagnoses(primary or secondary)?  Other   Week 2 attempt successful?  No   Unsuccessful attempts  Attempt 1          Rula Almeida RN

## 2020-07-23 ENCOUNTER — READMISSION MANAGEMENT (OUTPATIENT)
Dept: CALL CENTER | Facility: HOSPITAL | Age: 65
End: 2020-07-23

## 2020-07-23 NOTE — OUTREACH NOTE
Medical Week 2 Survey      Responses   [facility] patient discharged from?  [place]   COVID-19 Test Status  Not tested   Does the patient have one of the following disease processes/diagnoses(primary or secondary)?  Other   Week 2 attempt successful?  No   Unsuccessful attempts  Attempt 2          Kathe Graham RN

## 2020-07-31 ENCOUNTER — READMISSION MANAGEMENT (OUTPATIENT)
Dept: CALL CENTER | Facility: HOSPITAL | Age: 65
End: 2020-07-31

## 2020-07-31 NOTE — OUTREACH NOTE
Medical Week 3 Survey      Responses   [facility] patient discharged from?  [place]   COVID-19 Test Status  Not tested   Does the patient have one of the following disease processes/diagnoses(primary or secondary)?  Other   Week 3 attempt successful?  No   Unsuccessful attempts  Attempt 1          Dagmar Whiting, RN

## 2020-08-06 ENCOUNTER — READMISSION MANAGEMENT (OUTPATIENT)
Dept: CALL CENTER | Facility: HOSPITAL | Age: 65
End: 2020-08-06

## 2020-08-06 NOTE — OUTREACH NOTE
Medical Week 3 Survey      Responses   [facility] patient discharged from?  [place]   COVID-19 Test Status  Not tested   Does the patient have one of the following disease processes/diagnoses(primary or secondary)?  Other   Week 3 attempt successful?  Yes   Call start time  1454   Call end time  1502   Discharge diagnosis  Acute encephalopathy, DKA, ARF on CKD, bladder outflow obstruction   Person spoke with today (if not patient) and relationship  Sofi, caregiver and friend   Meds reviewed with patient/caregiver?  Yes   Is the patient having any side effects they believe may be caused by any medication additions or changes?  No   Does the patient have all medications ordered at discharge?  Yes   Is the patient taking all medications as directed (includes completed medication regime)?  Yes   Does the patient have a primary care provider?   Yes   Does the patient have an appointment with their PCP within 7 days of discharge?  Yes   Has the patient kept scheduled appointments due by today?  Yes   What is the Home health agency?   [facility]   Has home health visited the patient within 72 hours of discharge?  Yes   Home health comments  states HH visits have ended unless pt requests more   Pulse Ox monitoring  None   Psychosocial issues?  No   Comments  pt has been self catheterizing as instructed q am and bedtime, voiding normally during the day   Did the patient receive a copy of their discharge instructions?  Yes   Nursing interventions  Reviewed instructions with patient   What is the patient's perception of their health status since discharge?  Improving   Is the patient/caregiver able to teach back signs and symptoms related to disease process for when to call PCP?  Yes   Is the patient/caregiver able to teach back signs and symptoms related to disease process for when to call 911?  Yes   Is the patient/caregiver able to teach back the hierarchy of who to call/visit for symptoms/problems? PCP, Specialist,  Home health nurse, Urgent Care, ED, 911  Yes   Additional teach back comments  pt compliant with BID self catheterizing to reduce risk of UTI's   Week 3 Call Completed?  Yes          Perlita Riley RN

## 2020-08-14 ENCOUNTER — READMISSION MANAGEMENT (OUTPATIENT)
Dept: CALL CENTER | Facility: HOSPITAL | Age: 65
End: 2020-08-14

## 2020-08-14 NOTE — OUTREACH NOTE
Medical Week 4 Survey      Responses   [facility] patient discharged from?  [place]   Does the patient have one of the following disease processes/diagnoses(primary or secondary)?  Other   Week 4 attempt successful?  Yes   Call start time  1004   Call end time  1006   Medication alerts for this patient  no new medications   Meds reviewed with patient/caregiver?  Yes   Is the patient taking all medications as directed (includes completed medication regime)?  Yes   Has the patient kept scheduled appointments due by today?  Yes   Is the patient still receiving Home Health Services?  No   Pulse Ox monitoring  None   What is the patient's perception of their health status since discharge?  Improving [doing well with self cathing]   Week 4 Call Completed?  Yes   Would the patient like one additional call?  No   Graduated  Yes   Did the patient feel the follow up calls were helpful during their recovery period?  Yes   Was the number of calls appropriate?  Yes   Wrap up additional comments  patient is doing well  according to friend          Haley Chakraborty RN

## 2020-09-05 ENCOUNTER — APPOINTMENT (OUTPATIENT)
Dept: GENERAL RADIOLOGY | Facility: HOSPITAL | Age: 65
End: 2020-09-05

## 2020-09-05 ENCOUNTER — APPOINTMENT (OUTPATIENT)
Dept: CT IMAGING | Facility: HOSPITAL | Age: 65
End: 2020-09-05

## 2020-09-05 ENCOUNTER — HOSPITAL ENCOUNTER (INPATIENT)
Facility: HOSPITAL | Age: 65
LOS: 5 days | Discharge: HOME HEALTH | End: 2020-09-10
Attending: FAMILY MEDICINE | Admitting: FAMILY MEDICINE

## 2020-09-05 DIAGNOSIS — Z78.9 IMPAIRED MOBILITY AND ADLS: ICD-10-CM

## 2020-09-05 DIAGNOSIS — E10.10 DIABETIC KETOACIDOSIS WITHOUT COMA ASSOCIATED WITH TYPE 1 DIABETES MELLITUS (HCC): Primary | ICD-10-CM

## 2020-09-05 DIAGNOSIS — R13.12 OROPHARYNGEAL DYSPHAGIA: ICD-10-CM

## 2020-09-05 DIAGNOSIS — E44.0 MODERATE MALNUTRITION (HCC): ICD-10-CM

## 2020-09-05 DIAGNOSIS — Z74.09 IMPAIRED FUNCTIONAL MOBILITY, BALANCE, GAIT, AND ENDURANCE: ICD-10-CM

## 2020-09-05 DIAGNOSIS — Z74.09 IMPAIRED MOBILITY AND ADLS: ICD-10-CM

## 2020-09-05 LAB
ALBUMIN SERPL-MCNC: 4 G/DL (ref 3.5–5.2)
ALBUMIN/GLOB SERPL: 1.5 G/DL
ALP SERPL-CCNC: 113 U/L (ref 39–117)
ALT SERPL W P-5'-P-CCNC: 11 U/L (ref 1–41)
AMMONIA BLD-SCNC: 18 UMOL/L (ref 16–60)
ANION GAP SERPL CALCULATED.3IONS-SCNC: 12 MMOL/L (ref 5–15)
ANION GAP SERPL CALCULATED.3IONS-SCNC: 15 MMOL/L (ref 5–15)
ARTERIAL PATENCY WRIST A: ABNORMAL
AST SERPL-CCNC: 7 U/L (ref 1–40)
ATMOSPHERIC PRESS: 753 MMHG
BASE EXCESS BLDA CALC-SCNC: -24 MMOL/L (ref 0–2)
BASOPHILS # BLD AUTO: 0.02 10*3/MM3 (ref 0–0.2)
BASOPHILS # BLD AUTO: 0.02 10*3/MM3 (ref 0–0.2)
BASOPHILS NFR BLD AUTO: 0.1 % (ref 0–1.5)
BASOPHILS NFR BLD AUTO: 0.1 % (ref 0–1.5)
BDY SITE: ABNORMAL
BILIRUB SERPL-MCNC: 0.5 MG/DL (ref 0–1.2)
BUN SERPL-MCNC: 106 MG/DL (ref 8–23)
BUN SERPL-MCNC: 107 MG/DL (ref 8–23)
BUN/CREAT SERPL: 20.3 (ref 7–25)
BUN/CREAT SERPL: 20.7 (ref 7–25)
CALCIUM SPEC-SCNC: 8.4 MG/DL (ref 8.6–10.5)
CALCIUM SPEC-SCNC: 9 MG/DL (ref 8.6–10.5)
CHLORIDE SERPL-SCNC: 104 MMOL/L (ref 98–107)
CHLORIDE SERPL-SCNC: 110 MMOL/L (ref 98–107)
CLUMPED PLATELETS: NORMAL
CO2 SERPL-SCNC: 4 MMOL/L (ref 22–29)
CO2 SERPL-SCNC: 5 MMOL/L (ref 22–29)
CREAT SERPL-MCNC: 5.16 MG/DL (ref 0.76–1.27)
CREAT SERPL-MCNC: 5.21 MG/DL (ref 0.76–1.27)
DEPRECATED RDW RBC AUTO: 44.4 FL (ref 37–54)
DEPRECATED RDW RBC AUTO: 44.9 FL (ref 37–54)
EOSINOPHIL # BLD AUTO: 0 10*3/MM3 (ref 0–0.4)
EOSINOPHIL # BLD AUTO: 0 10*3/MM3 (ref 0–0.4)
EOSINOPHIL NFR BLD AUTO: 0 % (ref 0.3–6.2)
EOSINOPHIL NFR BLD AUTO: 0 % (ref 0.3–6.2)
ERYTHROCYTE [DISTWIDTH] IN BLOOD BY AUTOMATED COUNT: 13.8 % (ref 12.3–15.4)
ERYTHROCYTE [DISTWIDTH] IN BLOOD BY AUTOMATED COUNT: 13.8 % (ref 12.3–15.4)
GFR SERPL CREATININE-BSD FRML MDRD: 11 ML/MIN/1.73
GFR SERPL CREATININE-BSD FRML MDRD: 11 ML/MIN/1.73
GFR SERPL CREATININE-BSD FRML MDRD: ABNORMAL ML/MIN/{1.73_M2}
GFR SERPL CREATININE-BSD FRML MDRD: ABNORMAL ML/MIN/{1.73_M2}
GLOBULIN UR ELPH-MCNC: 2.6 GM/DL
GLUCOSE BLDC GLUCOMTR-MCNC: 446 MG/DL (ref 70–130)
GLUCOSE SERPL-MCNC: 392 MG/DL (ref 65–99)
GLUCOSE SERPL-MCNC: 496 MG/DL (ref 65–99)
HCO3 BLDA-SCNC: 5.1 MMOL/L (ref 20–26)
HCT VFR BLD AUTO: 35.2 % (ref 37.5–51)
HCT VFR BLD AUTO: 40.3 % (ref 37.5–51)
HGB BLD-MCNC: 11.9 G/DL (ref 13–17.7)
HGB BLD-MCNC: 13.3 G/DL (ref 13–17.7)
HOLD SPECIMEN: NORMAL
HOLD SPECIMEN: NORMAL
IMM GRANULOCYTES # BLD AUTO: 0.08 10*3/MM3 (ref 0–0.05)
IMM GRANULOCYTES # BLD AUTO: 0.09 10*3/MM3 (ref 0–0.05)
IMM GRANULOCYTES NFR BLD AUTO: 0.5 % (ref 0–0.5)
IMM GRANULOCYTES NFR BLD AUTO: 0.5 % (ref 0–0.5)
LYMPHOCYTES # BLD AUTO: 0.43 10*3/MM3 (ref 0.7–3.1)
LYMPHOCYTES # BLD AUTO: 0.56 10*3/MM3 (ref 0.7–3.1)
LYMPHOCYTES NFR BLD AUTO: 2.4 % (ref 19.6–45.3)
LYMPHOCYTES NFR BLD AUTO: 3.5 % (ref 19.6–45.3)
Lab: ABNORMAL
MAGNESIUM SERPL-MCNC: 1.9 MG/DL (ref 1.6–2.4)
MAGNESIUM SERPL-MCNC: 2 MG/DL (ref 1.6–2.4)
MCH RBC QN AUTO: 29.6 PG (ref 26.6–33)
MCH RBC QN AUTO: 29.8 PG (ref 26.6–33)
MCHC RBC AUTO-ENTMCNC: 33 G/DL (ref 31.5–35.7)
MCHC RBC AUTO-ENTMCNC: 33.8 G/DL (ref 31.5–35.7)
MCV RBC AUTO: 88 FL (ref 79–97)
MCV RBC AUTO: 89.6 FL (ref 79–97)
MODALITY: ABNORMAL
MONOCYTES # BLD AUTO: 0.36 10*3/MM3 (ref 0.1–0.9)
MONOCYTES # BLD AUTO: 0.37 10*3/MM3 (ref 0.1–0.9)
MONOCYTES NFR BLD AUTO: 2.1 % (ref 5–12)
MONOCYTES NFR BLD AUTO: 2.2 % (ref 5–12)
NEUTROPHILS NFR BLD AUTO: 15.21 10*3/MM3 (ref 1.7–7)
NEUTROPHILS NFR BLD AUTO: 16.99 10*3/MM3 (ref 1.7–7)
NEUTROPHILS NFR BLD AUTO: 93.7 % (ref 42.7–76)
NEUTROPHILS NFR BLD AUTO: 94.9 % (ref 42.7–76)
NRBC BLD AUTO-RTO: 1.1 /100 WBC (ref 0–0.2)
NRBC BLD AUTO-RTO: 1.3 /100 WBC (ref 0–0.2)
OSMOLALITY SERPL: 330 MOSM/KG (ref 280–290)
PCO2 BLDA: 19.2 MM HG (ref 35–45)
PH BLDA: 7.03 PH UNITS (ref 7.35–7.45)
PH BLDV: 6.94 PH UNITS (ref 7.29–7.37)
PHOSPHATE SERPL-MCNC: 3.5 MG/DL (ref 2.5–4.5)
PHOSPHATE SERPL-MCNC: 5.3 MG/DL (ref 2.5–4.5)
PLATELET # BLD AUTO: 164 10*3/MM3 (ref 140–450)
PLATELET # BLD AUTO: 221 10*3/MM3 (ref 140–450)
PMV BLD AUTO: 10.3 FL (ref 6–12)
PMV BLD AUTO: 10.6 FL (ref 6–12)
PO2 BLDA: 130 MM HG (ref 83–108)
POTASSIUM SERPL-SCNC: 4.5 MMOL/L (ref 3.5–5.2)
POTASSIUM SERPL-SCNC: 5 MMOL/L (ref 3.5–5.2)
PROT SERPL-MCNC: 6.6 G/DL (ref 6–8.5)
RBC # BLD AUTO: 4 10*6/MM3 (ref 4.14–5.8)
RBC # BLD AUTO: 4.5 10*6/MM3 (ref 4.14–5.8)
RBC MORPH BLD: NORMAL
SAO2 % BLDCOA: 98.8 % (ref 94–99)
SARS-COV-2 N GENE RESP QL NAA+PROBE: NOT DETECTED
SMALL PLATELETS BLD QL SMEAR: ADEQUATE
SODIUM SERPL-SCNC: 124 MMOL/L (ref 136–145)
SODIUM SERPL-SCNC: 126 MMOL/L (ref 136–145)
TROPONIN T SERPL-MCNC: <0.01 NG/ML (ref 0–0.03)
TROPONIN T SERPL-MCNC: <0.01 NG/ML (ref 0–0.03)
VENTILATOR MODE: ABNORMAL
WBC # BLD AUTO: 16.23 10*3/MM3 (ref 3.4–10.8)
WBC # BLD AUTO: 17.9 10*3/MM3 (ref 3.4–10.8)
WBC MORPH BLD: NORMAL
WHOLE BLOOD HOLD SPECIMEN: NORMAL
WHOLE BLOOD HOLD SPECIMEN: NORMAL

## 2020-09-05 PROCEDURE — 84484 ASSAY OF TROPONIN QUANT: CPT | Performed by: FAMILY MEDICINE

## 2020-09-05 PROCEDURE — 87635 SARS-COV-2 COVID-19 AMP PRB: CPT | Performed by: FAMILY MEDICINE

## 2020-09-05 PROCEDURE — 93005 ELECTROCARDIOGRAM TRACING: CPT | Performed by: FAMILY MEDICINE

## 2020-09-05 PROCEDURE — 71045 X-RAY EXAM CHEST 1 VIEW: CPT

## 2020-09-05 PROCEDURE — 93010 ELECTROCARDIOGRAM REPORT: CPT | Performed by: INTERNAL MEDICINE

## 2020-09-05 PROCEDURE — 82962 GLUCOSE BLOOD TEST: CPT

## 2020-09-05 PROCEDURE — 94762 N-INVAS EAR/PLS OXIMTRY CONT: CPT

## 2020-09-05 PROCEDURE — 25010000003 INSULIN REGULAR HUMAN PER 5 UNITS: Performed by: FAMILY MEDICINE

## 2020-09-05 PROCEDURE — C9803 HOPD COVID-19 SPEC COLLECT: HCPCS

## 2020-09-05 PROCEDURE — 36600 WITHDRAWAL OF ARTERIAL BLOOD: CPT

## 2020-09-05 PROCEDURE — 25010000002 HEPARIN (PORCINE) PER 1000 UNITS: Performed by: FAMILY MEDICINE

## 2020-09-05 PROCEDURE — 85025 COMPLETE CBC W/AUTO DIFF WBC: CPT | Performed by: FAMILY MEDICINE

## 2020-09-05 PROCEDURE — 63710000001 INSULIN REGULAR HUMAN PER 5 UNITS: Performed by: FAMILY MEDICINE

## 2020-09-05 PROCEDURE — 83735 ASSAY OF MAGNESIUM: CPT | Performed by: FAMILY MEDICINE

## 2020-09-05 PROCEDURE — 70450 CT HEAD/BRAIN W/O DYE: CPT

## 2020-09-05 PROCEDURE — 83930 ASSAY OF BLOOD OSMOLALITY: CPT | Performed by: FAMILY MEDICINE

## 2020-09-05 PROCEDURE — 84100 ASSAY OF PHOSPHORUS: CPT | Performed by: FAMILY MEDICINE

## 2020-09-05 PROCEDURE — 82140 ASSAY OF AMMONIA: CPT | Performed by: FAMILY MEDICINE

## 2020-09-05 PROCEDURE — 85007 BL SMEAR W/DIFF WBC COUNT: CPT | Performed by: FAMILY MEDICINE

## 2020-09-05 PROCEDURE — 82800 BLOOD PH: CPT | Performed by: FAMILY MEDICINE

## 2020-09-05 PROCEDURE — 82803 BLOOD GASES ANY COMBINATION: CPT

## 2020-09-05 PROCEDURE — 99285 EMERGENCY DEPT VISIT HI MDM: CPT

## 2020-09-05 PROCEDURE — 94799 UNLISTED PULMONARY SVC/PX: CPT

## 2020-09-05 PROCEDURE — 80053 COMPREHEN METABOLIC PANEL: CPT | Performed by: FAMILY MEDICINE

## 2020-09-05 RX ORDER — SODIUM CHLORIDE 0.9 % (FLUSH) 0.9 %
10 SYRINGE (ML) INJECTION EVERY 12 HOURS SCHEDULED
Status: DISCONTINUED | OUTPATIENT
Start: 2020-09-05 | End: 2020-09-10 | Stop reason: HOSPADM

## 2020-09-05 RX ORDER — SODIUM CHLORIDE 0.9 % (FLUSH) 0.9 %
30 SYRINGE (ML) INJECTION ONCE AS NEEDED
Status: DISCONTINUED | OUTPATIENT
Start: 2020-09-05 | End: 2020-09-10 | Stop reason: HOSPADM

## 2020-09-05 RX ORDER — SODIUM CHLORIDE AND POTASSIUM CHLORIDE 150; 900 MG/100ML; MG/100ML
250 INJECTION, SOLUTION INTRAVENOUS CONTINUOUS PRN
Status: DISCONTINUED | OUTPATIENT
Start: 2020-09-05 | End: 2020-09-06

## 2020-09-05 RX ORDER — ONDANSETRON 4 MG/1
4 TABLET, FILM COATED ORAL EVERY 6 HOURS PRN
Status: DISCONTINUED | OUTPATIENT
Start: 2020-09-05 | End: 2020-09-10 | Stop reason: HOSPADM

## 2020-09-05 RX ORDER — DEXTROSE, SODIUM CHLORIDE, AND POTASSIUM CHLORIDE 5; .45; .3 G/100ML; G/100ML; G/100ML
150 INJECTION INTRAVENOUS CONTINUOUS PRN
Status: DISCONTINUED | OUTPATIENT
Start: 2020-09-05 | End: 2020-09-06

## 2020-09-05 RX ORDER — SODIUM CHLORIDE 0.9 % (FLUSH) 0.9 %
10 SYRINGE (ML) INJECTION AS NEEDED
Status: DISCONTINUED | OUTPATIENT
Start: 2020-09-05 | End: 2020-09-05

## 2020-09-05 RX ORDER — ACETAMINOPHEN 650 MG/1
650 SUPPOSITORY RECTAL EVERY 4 HOURS PRN
Status: DISCONTINUED | OUTPATIENT
Start: 2020-09-05 | End: 2020-09-10 | Stop reason: HOSPADM

## 2020-09-05 RX ORDER — DEXTROSE MONOHYDRATE 25 G/50ML
12.5 INJECTION, SOLUTION INTRAVENOUS AS NEEDED
Status: DISCONTINUED | OUTPATIENT
Start: 2020-09-05 | End: 2020-09-06

## 2020-09-05 RX ORDER — POTASSIUM CHLORIDE 750 MG/1
40 CAPSULE, EXTENDED RELEASE ORAL AS NEEDED
Status: DISCONTINUED | OUTPATIENT
Start: 2020-09-05 | End: 2020-09-07

## 2020-09-05 RX ORDER — HEPARIN SODIUM 5000 [USP'U]/ML
5000 INJECTION, SOLUTION INTRAVENOUS; SUBCUTANEOUS EVERY 8 HOURS SCHEDULED
Status: DISCONTINUED | OUTPATIENT
Start: 2020-09-05 | End: 2020-09-06

## 2020-09-05 RX ORDER — MAGNESIUM SULFATE HEPTAHYDRATE 40 MG/ML
2 INJECTION, SOLUTION INTRAVENOUS AS NEEDED
Status: DISCONTINUED | OUTPATIENT
Start: 2020-09-05 | End: 2020-09-10 | Stop reason: HOSPADM

## 2020-09-05 RX ORDER — DEXTROSE, SODIUM CHLORIDE, AND POTASSIUM CHLORIDE 5; .9; .15 G/100ML; G/100ML; G/100ML
150 INJECTION INTRAVENOUS CONTINUOUS PRN
Status: DISCONTINUED | OUTPATIENT
Start: 2020-09-05 | End: 2020-09-06

## 2020-09-05 RX ORDER — DEXTROSE AND SODIUM CHLORIDE 5; .9 G/100ML; G/100ML
150 INJECTION, SOLUTION INTRAVENOUS CONTINUOUS PRN
Status: DISCONTINUED | OUTPATIENT
Start: 2020-09-05 | End: 2020-09-06

## 2020-09-05 RX ORDER — SODIUM CHLORIDE 0.9 % (FLUSH) 0.9 %
10 SYRINGE (ML) INJECTION AS NEEDED
Status: DISCONTINUED | OUTPATIENT
Start: 2020-09-05 | End: 2020-09-10 | Stop reason: HOSPADM

## 2020-09-05 RX ORDER — POTASSIUM CHLORIDE 1.5 G/1.77G
40 POWDER, FOR SOLUTION ORAL AS NEEDED
Status: DISCONTINUED | OUTPATIENT
Start: 2020-09-05 | End: 2020-09-07

## 2020-09-05 RX ORDER — SODIUM CHLORIDE 0.9 % (FLUSH) 0.9 %
10 SYRINGE (ML) INJECTION ONCE AS NEEDED
Status: DISCONTINUED | OUTPATIENT
Start: 2020-09-05 | End: 2020-09-10 | Stop reason: HOSPADM

## 2020-09-05 RX ORDER — POTASSIUM CHLORIDE 7.45 MG/ML
10 INJECTION INTRAVENOUS
Status: DISCONTINUED | OUTPATIENT
Start: 2020-09-05 | End: 2020-09-07

## 2020-09-05 RX ORDER — SODIUM CHLORIDE 0.9 % (FLUSH) 0.9 %
3 SYRINGE (ML) INJECTION EVERY 12 HOURS SCHEDULED
Status: DISCONTINUED | OUTPATIENT
Start: 2020-09-05 | End: 2020-09-05

## 2020-09-05 RX ORDER — ONDANSETRON 2 MG/ML
4 INJECTION INTRAMUSCULAR; INTRAVENOUS EVERY 6 HOURS PRN
Status: DISCONTINUED | OUTPATIENT
Start: 2020-09-05 | End: 2020-09-10 | Stop reason: HOSPADM

## 2020-09-05 RX ORDER — SODIUM CHLORIDE AND POTASSIUM CHLORIDE 150; 450 MG/100ML; MG/100ML
250 INJECTION, SOLUTION INTRAVENOUS CONTINUOUS PRN
Status: DISCONTINUED | OUTPATIENT
Start: 2020-09-05 | End: 2020-09-06

## 2020-09-05 RX ORDER — SODIUM CHLORIDE 9 MG/ML
150 INJECTION, SOLUTION INTRAVENOUS CONTINUOUS
Status: DISCONTINUED | OUTPATIENT
Start: 2020-09-05 | End: 2020-09-06

## 2020-09-05 RX ORDER — SODIUM CHLORIDE 450 MG/100ML
250 INJECTION, SOLUTION INTRAVENOUS CONTINUOUS PRN
Status: DISCONTINUED | OUTPATIENT
Start: 2020-09-05 | End: 2020-09-06

## 2020-09-05 RX ORDER — POTASSIUM CHLORIDE, DEXTROSE MONOHYDRATE AND SODIUM CHLORIDE 300; 5; 900 MG/100ML; G/100ML; MG/100ML
150 INJECTION, SOLUTION INTRAVENOUS CONTINUOUS PRN
Status: DISCONTINUED | OUTPATIENT
Start: 2020-09-05 | End: 2020-09-06

## 2020-09-05 RX ORDER — DEXTROSE, SODIUM CHLORIDE, AND POTASSIUM CHLORIDE 5; .45; .15 G/100ML; G/100ML; G/100ML
150 INJECTION INTRAVENOUS CONTINUOUS PRN
Status: DISCONTINUED | OUTPATIENT
Start: 2020-09-05 | End: 2020-09-06

## 2020-09-05 RX ORDER — SODIUM CHLORIDE 9 MG/ML
10 INJECTION, SOLUTION INTRAVENOUS CONTINUOUS PRN
Status: DISCONTINUED | OUTPATIENT
Start: 2020-09-05 | End: 2020-09-05

## 2020-09-05 RX ORDER — SODIUM CHLORIDE 9 MG/ML
30 INJECTION, SOLUTION INTRAVENOUS CONTINUOUS PRN
Status: DISCONTINUED | OUTPATIENT
Start: 2020-09-05 | End: 2020-09-05

## 2020-09-05 RX ORDER — ACETAMINOPHEN 325 MG/1
650 TABLET ORAL EVERY 4 HOURS PRN
Status: DISCONTINUED | OUTPATIENT
Start: 2020-09-05 | End: 2020-09-10 | Stop reason: HOSPADM

## 2020-09-05 RX ORDER — DEXTROSE MONOHYDRATE 25 G/50ML
25-50 INJECTION, SOLUTION INTRAVENOUS
Status: DISCONTINUED | OUTPATIENT
Start: 2020-09-05 | End: 2020-09-06

## 2020-09-05 RX ORDER — MAGNESIUM SULFATE HEPTAHYDRATE 40 MG/ML
4 INJECTION, SOLUTION INTRAVENOUS AS NEEDED
Status: DISCONTINUED | OUTPATIENT
Start: 2020-09-05 | End: 2020-09-10 | Stop reason: HOSPADM

## 2020-09-05 RX ORDER — SODIUM CHLORIDE 9 MG/ML
250 INJECTION, SOLUTION INTRAVENOUS CONTINUOUS PRN
Status: DISCONTINUED | OUTPATIENT
Start: 2020-09-05 | End: 2020-09-05

## 2020-09-05 RX ORDER — BUDESONIDE AND FORMOTEROL FUMARATE DIHYDRATE 160; 4.5 UG/1; UG/1
2 AEROSOL RESPIRATORY (INHALATION)
Status: DISCONTINUED | OUTPATIENT
Start: 2020-09-05 | End: 2020-09-10 | Stop reason: HOSPADM

## 2020-09-05 RX ORDER — ALBUTEROL SULFATE 90 UG/1
2 AEROSOL, METERED RESPIRATORY (INHALATION) EVERY 6 HOURS PRN
Status: DISCONTINUED | OUTPATIENT
Start: 2020-09-05 | End: 2020-09-10 | Stop reason: HOSPADM

## 2020-09-05 RX ORDER — SODIUM CHLORIDE AND POTASSIUM CHLORIDE 300; 900 MG/100ML; MG/100ML
250 INJECTION, SOLUTION INTRAVENOUS CONTINUOUS PRN
Status: DISCONTINUED | OUTPATIENT
Start: 2020-09-05 | End: 2020-09-06

## 2020-09-05 RX ORDER — DEXTROSE AND SODIUM CHLORIDE 5; .45 G/100ML; G/100ML
150 INJECTION, SOLUTION INTRAVENOUS CONTINUOUS PRN
Status: DISCONTINUED | OUTPATIENT
Start: 2020-09-05 | End: 2020-09-06

## 2020-09-05 RX ADMIN — SODIUM CHLORIDE 125 ML/HR: 9 INJECTION, SOLUTION INTRAVENOUS at 18:53

## 2020-09-05 RX ADMIN — SODIUM BICARBONATE 50 MEQ: 84 INJECTION, SOLUTION INTRAVENOUS at 20:13

## 2020-09-05 RX ADMIN — SODIUM CHLORIDE 7.2 UNITS/HR: 9 INJECTION, SOLUTION INTRAVENOUS at 18:53

## 2020-09-05 RX ADMIN — HEPARIN SODIUM 5000 UNITS: 5000 INJECTION INTRAVENOUS; SUBCUTANEOUS at 21:11

## 2020-09-05 RX ADMIN — SODIUM BICARBONATE 100 MEQ: 84 INJECTION INTRAVENOUS at 23:08

## 2020-09-05 RX ADMIN — HUMAN INSULIN 5 UNITS: 100 INJECTION, SOLUTION SUBCUTANEOUS at 17:53

## 2020-09-05 RX ADMIN — BUDESONIDE AND FORMOTEROL FUMARATE DIHYDRATE 2 PUFF: 160; 4.5 AEROSOL RESPIRATORY (INHALATION) at 20:05

## 2020-09-05 RX ADMIN — SODIUM CHLORIDE 1000 ML: 900 INJECTION, SOLUTION INTRAVENOUS at 17:04

## 2020-09-05 NOTE — H&P
[facility] Medicine Admission      Date of Admission: 9/5/2020      Primary Care Physician: Shayne Merritt MD      Chief Complaint: Altered mental status    HPI: Patient is a 65 year old male with PMH notable for insulin dependent Diabetes, Hepatitis C, COPD, substance abuse, GERD, HTN, and hx of bladder cancer.  Patient was brought to the ED via EMS due to altered mental status.  Patient is notably altered in the emergency department and is unable to state where he is at or why he is here.  Patient does deny any complaints of pain, shortness of breath, chest pain, nausea, or vomiting during my exam though.    Evaluation in the emergency department showed a CBC with a white blood cell count of 17.9, hemoglobin and hematocrit were normal as were his platelets.  CMP showed glucose 496, , creatinine 5.16, sodium 124, CO2 5.0, and an EGFR of 11.  Troponin was normal.  Magnesium and ammonia were both normal as well.  Test x-ray and CT head were both normal.  EKG showed sinus rhythm.    Concurrent Medical History:  has a past medical history of Abnormal weight loss, Acute bronchiolitis, Acute bronchitis, Acute exacerbation of chronic obstructive airways disease (CMS/HCC), Adenomatous polyp of colon, Aptyalism, Artificial lens present, Artificial lens present, Astigmatism, Backache, Borderline glaucoma, Chest discomfort, Chest pain, Chronic hepatitis C (CMS/HCC), Chronic hepatitis C (CMS/HCC), Chronic obstructive lung disease (CMS/HCC), Common cold, Constipation, Coronary arteriosclerosis, Diarrhea, Disorder of duodenum, Disorder of duodenum, Disorder of gallbladder, Diverticula of colon, Diverticular disease of colon, Drug abuse (CMS/HCC), Elevated levels of transaminase & lactic acid dehydrogenase, Esophagitis, Essential hypertension, Fatigue, Gastritis, Gastroesophageal reflux disease, Generalized abdominal pain, Hand pain, right, Headache, Heel pain,  Hemorrhoids, History of colon polyps, History of colonoscopy (08/19/2013), History of esophagogastroduodenoscopy (07/24/2015), History of neoplasm of bladder, History of pneumococcal vaccination, History of seizure, Left lower quadrant pain, Male erectile disorder, Malignant tumor of prostate (CMS/HCC), Myopia, Nausea and vomiting, Need for immunization against influenza, Need for prophylactic vaccination and inoculation against influenza, Pain, Pain in right hand, Patient's noncompliance with other medical treatment and regimen, Periumbilical pain, Primary malignant neoplasm of bladder (CMS/HCC), Rash, Rash, Rheumatoid arthritis (CMS/HCC), Right upper quadrant pain, Sebaceous cyst, Seizure (CMS/HCC), Transient cerebral ischemia, Type 2 diabetes mellitus (CMS/HCC), and Viral hepatitis C.    Past Surgical History:  has a past surgical history that includes Bladder tumor excision; Bladder surgery (01/27/2005); Cholecystectomy (08/25/2011); Esophagogastroduodenoscopy (07/24/2015); Esophagogastroduodenoscopy (02/23/2009); Other surgical history (03/22/2012); Injection of Medication (05/23/2016); Wound Closure (03/22/2012); Back surgery (01/01/2000); Cataract extraction w/  intraocular lens implant (Right, 05/21/2009); Bladder surgery (01/27/2005); Cholecystectomy (08/25/2011); Cystoscopy (12/17/2004); Lumbar disc surgery (2000); Cataract Extraction (Right, 05/21/2009); Other surgical history; Injection of Medication (05/12/2016); Other surgical history (3/22/3012); Other surgical history (03/22/2012); Colonoscopy (08/19/2013); Colonoscopy (07/24/2015); Fracture surgery; Esophagogastroduodenoscopy (N/A, 3/3/2017); Upper gastrointestinal endoscopy (07/24/2015); Upper gastrointestinal endoscopy (03/03/2017); Wrist surgery (Left); Cardiac catheterization (N/A, 12/15/2017); Esophagogastroduodenoscopy (N/A, 4/22/2019); and Colonoscopy (N/A, 4/22/2019).    Family History: family history includes Cholelithiasis in an other  family member; Coronary artery disease in an other family member; Diabetes in his mother; Gallbladder disease in an other family member; Hepatitis in an other family member; Hypertension in an other family member; Liver cancer in his father; Tuberculosis in an other family member.  Unable to confirm due to patient's altered state.    Social History:  reports that he has been smoking cigarettes. He has a 159.00 pack-year smoking history. He quit smokeless tobacco use about 40 years ago.  His smokeless tobacco use included chew. He reports that he drinks alcohol. He reports that he has current or past drug history. Drug: Marijuana. Frequency: 2.00 times per week.    Allergies: No Known Allergies    Medications:   Prior to Admission medications    Medication Sig Start Date End Date Taking? Authorizing Provider   Accu-Chek Softclix Lancets lancets 1 each by Other route 3 (Three) Times a Day Before Meals. Use as instructed 7/10/20 7/10/21  Art Colin MD   albuterol (PROVENTIL) (2.5 MG/3ML) 0.083% nebulizer solution Take 2.5 mg by nebulization Every 4 (Four) Hours As Needed for Wheezing or Shortness of Air. 4/17/17   Harvinder Robert MD   albuterol (VENTOLIN HFA) 108 (90 Base) MCG/ACT inhaler Inhale 2 puffs Every 6 (Six) Hours As Needed for Wheezing. 8/22/18   Harvinder Robert MD   Alcohol Swabs (ALCOHOL PREP) pads 1 pad 5 (Five) Times a Day. 3/29/19   Radha Gómez MD   aMILoride (MIDAMOR) 5 MG tablet Take 1 tablet by mouth Daily. 7/10/20   Art Colin MD   aspirin 81 MG EC tablet Take 81 mg by mouth Daily.    Provider, MD Jorge   Blood Glucose Monitoring Suppl (ACCU-CHEK MARILU) device 1 each by Other route 3 (Three) Times a Day Before Meals. Use as instructed 7/10/20   Art Colin MD   Blood Glucose Monitoring Suppl (ACURA BLOOD GLUCOSE METER) w/Device kit 1 each 4 (Four) Times a Day As Needed (SUGARR). 8/23/18   Harvinder Robert MD   budesonide-formoterol (SYMBICORT) 160-4.5  MCG/ACT inhaler Inhale 2 puffs 2 (Two) Times a Day.    Jorge Cook MD   carvedilol (COREG) 3.125 MG tablet TK 1 T PO BID WC 10/15/19   Jorge Cook MD   clopidogrel (PLAVIX) 75 MG tablet Take 75 mg by mouth Daily.    Jorge Cook MD   cyclobenzaprine (FLEXERIL) 10 MG tablet Take 10 mg by mouth. 7/10/19   Jorge Cook MD   fluticasone (FLONASE) 50 MCG/ACT nasal spray 2 sprays into each nostril Daily. 5/22/18   Harvinder Robert MD   glucose blood (Accu-Chek Toma) test strip 1 each by Other route As Needed (glucose monitoring). Use as instructed 7/10/20   Art Colin MD   glucose blood test strip Use as instructed 3/29/19   Radha Gómez MD   glucose monitor monitoring kit 1 each 3 (Three) Times a Day. 3/29/19   Radha Gómez MD   hydrOXYzine (ATARAX) 25 MG tablet TK 1 T PO TID PRF ITCHING 9/27/19   Jorge Cook MD   insulin aspart (novoLOG FLEXPEN) 100 UNIT/ML solution pen-injector sc pen Inject  under the skin into the appropriate area as directed 3 (Three) Times a Day With Meals.    Jorge Cook MD   insulin detemir (LEVEMIR FLEXTOUCH) 100 UNIT/ML injection Inject 25 Units under the skin into the appropriate area as directed 2 (Two) Times a Day.    Jorge Cook MD   Lancets (FREESTYLE) lancets Tid 3/29/19   Radha Gómez MD   nitroglycerin (NITROSTAT) 0.4 MG SL tablet Place 0.4 mg under the tongue Every 5 (Five) Minutes As Needed for Chest Pain. Take no more than 3 doses in 15 minutes.    Jorge Cook MD   omeprazole (PRILOSEC) 20 MG capsule Take 1 capsule by mouth Daily.  Patient taking differently: Take 20 mg by mouth 2 (Two) Times a Day. 8/22/18   Harvinder Robert MD   Potassium (POTASSIMIN PO) Take  by mouth.    Jorge Cook MD   pravastatin (PRAVACHOL) 40 MG tablet Take 40 mg by mouth Every Night.    Jorge Cook MD   sertraline (ZOLOFT) 50 MG tablet 1/2 tablet hs 1 week then 1  tablet . (depression)  Patient taking differently: Take 50 mg by mouth Daily. 5/22/18   Harvinder Robert MD   traMADol (ULTRAM) 50 MG tablet Take 1 tablet by mouth Every 6 (Six) Hours As Needed for Moderate Pain . 7/1/20   Darian Vaughn MD       Review of Systems:  Review of Systems   Unable to perform ROS: Mental status change      Otherwise complete ROS is negative except as mentioned above.    Physical Exam:   Temp:  [97.6 °F (36.4 °C)] 97.6 °F (36.4 °C)  Heart Rate:  [86-98] 98  Resp:  [20] 20  BP: (120-146)/(56-75) 146/56  Physical Exam   Constitutional:   Thin, chronically ill-appearing male.  No apparent distress.   HENT:   Head: Normocephalic and atraumatic.   Nose: Nose normal.   Membranes are noticeably dry.   Eyes: Conjunctivae and EOM are normal.   Neck: Normal range of motion. No tracheal deviation present.   Cardiovascular: Normal rate, regular rhythm, normal heart sounds and intact distal pulses.   Pulmonary/Chest: Effort normal and breath sounds normal. No respiratory distress.   Abdominal: Soft. Bowel sounds are normal. He exhibits no distension. There is no tenderness.   Musculoskeletal: He exhibits no edema.   Neurological: He is alert.   He is not oriented to place or time but is to person.  He is moving all extremities and follows commands.  Speech is mumbled but understandable.   Skin: Skin is warm and dry. Capillary refill takes more than 3 seconds.   Psychiatric: His behavior is normal.         Results Reviewed:  I have personally reviewed current lab, radiology, and data and agree with results.  Lab Results (last 24 hours)     Procedure Component Value Units Date/Time    [place] Draw [[id number]] Collected:  09/05/20 1653    Specimen:  Blood Updated:  09/05/20 1800    Narrative:       The following orders were created for panel order [place] Draw.  Procedure                               Abnormality         Status                     ---------                               -----------          ------                     Light Blue Top[[id number]]                                   Final result               Green Top (Gel)[[id number]]                                  Final result               Lavender Top[[id number]]                                     Final result               Gold Top - SST[[id number]]                                   Final result                 Please view results for these tests on the individual orders.    Green Top (Gel) [[id number]] Collected:  09/05/20 1653    Specimen:  Blood Updated:  09/05/20 1800     Extra Tube Hold for add-ons.     Comment: Auto resulted.       Lavender Top [[id number]] Collected:  09/05/20 1653    Specimen:  Blood Updated:  09/05/20 1800     Extra Tube hold for add-on     Comment: Auto resulted       Light Blue Top [[id number]] Collected:  09/05/20 1653    Specimen:  Blood Updated:  09/05/20 1800     Extra Tube hold for add-on     Comment: Auto resulted       Gold Top - SST [[id number]] Collected:  09/05/20 1653    Specimen:  Blood Updated:  09/05/20 1800     Extra Tube Hold for add-ons.     Comment: Auto resulted.       Comprehensive Metabolic Panel [[id number]]  (Abnormal) Collected:  09/05/20 1653    Specimen:  Blood Updated:  09/05/20 1716     Glucose 496 mg/dL       mg/dL      Creatinine 5.16 mg/dL      Sodium 124 mmol/L      Potassium 5.0 mmol/L      Chloride 104 mmol/L      CO2 5.0 mmol/L      Calcium 9.0 mg/dL      Total Protein 6.6 g/dL      Albumin 4.00 g/dL      ALT (SGPT) 11 U/L      AST (SGOT) 7 U/L      Alkaline Phosphatase 113 U/L      Total Bilirubin 0.5 mg/dL      eGFR Non African Amer 11 mL/min/1.73      Comment: <15 Indicative of kidney failure.        eGFR   Amer --     Comment: <15 Indicative of kidney failure.        Globulin 2.6 gm/dL      A/G Ratio 1.5 g/dL      BUN/Creatinine Ratio 20.7     Anion Gap 15.0 mmol/L     Narrative:       GFR Normal >60  Chronic Kidney Disease <60  Kidney Failure <15      Magnesium  [[id number]]  (Normal) Collected:  09/05/20 1653    Specimen:  Blood Updated:  09/05/20 1714     Magnesium 2.0 mg/dL     Ammonia [[id number]]  (Normal) Collected:  09/05/20 1653    Specimen:  Blood Updated:  09/05/20 1713     Ammonia 18 umol/L     Troponin [[id number]]  (Normal) Collected:  09/05/20 1653    Specimen:  Blood Updated:  09/05/20 1713     Troponin T <0.010 ng/mL     Narrative:       Troponin T Reference Range:  <= 0.03 ng/mL-   Negative for AMI  >0.03 ng/mL-     Abnormal for myocardial necrosis.  Clinicians would have to utilize clinical acumen, EKG, Troponin and serial changes to determine if it is an Acute Myocardial Infarction or myocardial injury due to an underlying chronic condition.       Results may be falsely decreased if patient taking Biotin.      pH, Venous [[id number]]  (Abnormal) Collected:  09/05/20 1650    Specimen:  Blood Updated:  09/05/20 1713     pH, Venous 6.940 pH Units     CBC & Differential [[id number]] Collected:  09/05/20 1653    Specimen:  Blood Updated:  09/05/20 1656    Narrative:       The following orders were created for panel order CBC & Differential.  Procedure                               Abnormality         Status                     ---------                               -----------         ------                     CBC Auto Differential[[id number]]        Abnormal            Final result                 Please view results for these tests on the individual orders.    CBC Auto Differential [[id number]]  (Abnormal) Collected:  09/05/20 1653    Specimen:  Blood Updated:  09/05/20 1656     WBC 17.90 10*3/mm3      RBC 4.50 10*6/mm3      Hemoglobin 13.3 g/dL      Hematocrit 40.3 %      MCV 89.6 fL      MCH 29.6 pg      MCHC 33.0 g/dL      RDW 13.8 %      RDW-SD 44.9 fl      MPV 10.3 fL      Platelets 221 10*3/mm3      Neutrophil % 94.9 %      Lymphocyte % 2.4 %      Monocyte % 2.1 %      Eosinophil % 0.0 %      Basophil % 0.1 %      Immature Grans % 0.5 %      Neutrophils,  Absolute 16.99 10*3/mm3      Lymphocytes, Absolute 0.43 10*3/mm3      Monocytes, Absolute 0.37 10*3/mm3      Eosinophils, Absolute 0.00 10*3/mm3      Basophils, Absolute 0.02 10*3/mm3      Immature Grans, Absolute 0.09 10*3/mm3      nRBC 1.1 /100 WBC         Imaging Results (Last 24 Hours)     Procedure Component Value Units Date/Time    CT Head Without Contrast [[id number]] Collected:  09/05/20 1728     Updated:  09/05/20 1755    Narrative:         CT Head Without Contrast    History: Confusion and delirium. Altered level of consciousness.  Dizziness. Weakness.    Axial scans of the brain were obtained without intravenous  contrast.  Coronal and sagital reconstructions were preformed.    This exam was performed according to our departmental  dose-optimization program, which includes automated exposure  control, adjustment of the mA and/or kV according to patient size  and/or use of iterative reconstruction technique.    DLP: 923.90    Comparison: July 3, 2020.    Findings:  Bone windows are unremarkable.  The visualized paranasal sinuses are unremarkable.    No acute process.  Cerebral atrophy.  No hemorrhage.  No mass.  No abnormal areas of increased or decreased attenuation.  No midline shift.  No abnormal extra-axial fluid collections.      Impression:       CONCLUSION:  No acute process.  Cerebral atrophy.    [id number]    Electronically signed by:  Aaron Constantino MD  9/5/2020 5:54 PM CDT  Workstation: [id number]    XR Chest 1 View [[id number]] Collected:  09/05/20 1708     Updated:  09/05/20 1726    Narrative:         PORTABLE CHEST    HISTORY: Weakness. Dizziness. Altered mental status.    Portable AP film of the chest was obtained at 4:50 PM.  COMPARISON: July 2, 2020    FINDINGS:   EKG leads.  The lungs are clear of an acute process.  Old granulomatous disease is present.  The heart is not enlarged.  The pulmonary vasculature is not increased.  No pleural effusion.  No pneumothorax.  No acute osseous abnormality.  Old  right rib fractures.      Impression:       CONCLUSION:  No Acute Disease    [id number]    Electronically signed by:  Aaron Constantino MD  9/5/2020 5:25 PM CDT  Workstation: [id number]            Assessment:    Active Hospital Problems    Diagnosis   • Diabetic ketoacidosis without coma associated with type 1 diabetes mellitus (CMS/[facility])             Plan:    DKA - Last A1c was 10.8 in July 2020. Start insulin drip in the ED. Will also give an amp of Bicarb now. Monitor glucose as well as renal function and electrolytes.  Add electrolyte replacement protocol as well.  Make NPO for now.      SUJATHA on CKD 3 - Per review of his values in Care Everywhere his baseline renal function appears likely consistent with CKD 3.  Continue IV fluids and monitor.  Recheck and if needed then consult Nephrology.     Metabolic encephalopathy - Related to above, monitor for improvement.     Leukocytosis - Likely related to acuity of condition, recheck in the AM.     Hyponatremia - Appears to be pseudohyponatremia related to his hyperglycemia as his sodium corrects to 130 when his glucose is accounted for.  Monitor while getting IV fluids.        COPD - Will continue home inhalers, does not appear to be in exacerbation.    CAD - hold home meds right now until mental status improves.     DVT ppx - Heparin  CODE - Full    I discussed the patient's findings and my recommendations with: the patient/nursing.     Kevin Perry MD      The patient was evaluated during the global COVID-19 pandemic, and the diagnosis was suspected/considered upon their initial presentation.  Evaluation, treatment, and testing were consistent with current guidelines for patients who present with complaints or symptoms that may be related to COVID-19.
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
## 2020-09-06 ENCOUNTER — APPOINTMENT (OUTPATIENT)
Dept: ULTRASOUND IMAGING | Facility: HOSPITAL | Age: 65
End: 2020-09-06

## 2020-09-06 LAB
ALBUMIN SERPL-MCNC: 3.3 G/DL (ref 3.5–5.2)
ALBUMIN/GLOB SERPL: 1.7 G/DL
ALP SERPL-CCNC: 81 U/L (ref 39–117)
ALT SERPL W P-5'-P-CCNC: 9 U/L (ref 1–41)
ANION GAP SERPL CALCULATED.3IONS-SCNC: 10 MMOL/L (ref 5–15)
ANION GAP SERPL CALCULATED.3IONS-SCNC: 10 MMOL/L (ref 5–15)
ANION GAP SERPL CALCULATED.3IONS-SCNC: 11 MMOL/L (ref 5–15)
ANION GAP SERPL CALCULATED.3IONS-SCNC: 14 MMOL/L (ref 5–15)
ARTERIAL PATENCY WRIST A: NEGATIVE
AST SERPL-CCNC: 7 U/L (ref 1–40)
ATMOSPHERIC PRESS: 750 MMHG
BACTERIA UR QL AUTO: ABNORMAL /HPF
BASE EXCESS BLDA CALC-SCNC: -17.4 MMOL/L (ref 0–2)
BASOPHILS # BLD AUTO: 0.01 10*3/MM3 (ref 0–0.2)
BASOPHILS NFR BLD AUTO: 0.1 % (ref 0–1.5)
BDY SITE: ABNORMAL
BILIRUB SERPL-MCNC: 0.3 MG/DL (ref 0–1.2)
BILIRUB UR QL STRIP: ABNORMAL
BUN SERPL-MCNC: 106 MG/DL (ref 8–23)
BUN SERPL-MCNC: 110 MG/DL (ref 8–23)
BUN SERPL-MCNC: 110 MG/DL (ref 8–23)
BUN SERPL-MCNC: 112 MG/DL (ref 8–23)
BUN/CREAT SERPL: 20.6 (ref 7–25)
BUN/CREAT SERPL: 22.4 (ref 7–25)
BUN/CREAT SERPL: 23 (ref 7–25)
BUN/CREAT SERPL: 26.9 (ref 7–25)
CALCIUM SPEC-SCNC: 7.8 MG/DL (ref 8.6–10.5)
CALCIUM SPEC-SCNC: 8 MG/DL (ref 8.6–10.5)
CALCIUM SPEC-SCNC: 8.2 MG/DL (ref 8.6–10.5)
CALCIUM SPEC-SCNC: 8.3 MG/DL (ref 8.6–10.5)
CHLORIDE SERPL-SCNC: 113 MMOL/L (ref 98–107)
CHLORIDE SERPL-SCNC: 115 MMOL/L (ref 98–107)
CHLORIDE SERPL-SCNC: 115 MMOL/L (ref 98–107)
CHLORIDE SERPL-SCNC: 116 MMOL/L (ref 98–107)
CLARITY UR: ABNORMAL
CO2 SERPL-SCNC: 11 MMOL/L (ref 22–29)
CO2 SERPL-SCNC: 7 MMOL/L (ref 22–29)
CO2 SERPL-SCNC: 9 MMOL/L (ref 22–29)
CO2 SERPL-SCNC: 9 MMOL/L (ref 22–29)
COLOR UR: YELLOW
CREAT SERPL-MCNC: 4.09 MG/DL (ref 0.76–1.27)
CREAT SERPL-MCNC: 4.78 MG/DL (ref 0.76–1.27)
CREAT SERPL-MCNC: 5.01 MG/DL (ref 0.76–1.27)
CREAT SERPL-MCNC: 5.15 MG/DL (ref 0.76–1.27)
DEPRECATED RDW RBC AUTO: 43.9 FL (ref 37–54)
EOSINOPHIL # BLD AUTO: 0 10*3/MM3 (ref 0–0.4)
EOSINOPHIL NFR BLD AUTO: 0 % (ref 0.3–6.2)
ERYTHROCYTE [DISTWIDTH] IN BLOOD BY AUTOMATED COUNT: 13.7 % (ref 12.3–15.4)
GFR SERPL CREATININE-BSD FRML MDRD: 11 ML/MIN/1.73
GFR SERPL CREATININE-BSD FRML MDRD: 12 ML/MIN/1.73
GFR SERPL CREATININE-BSD FRML MDRD: 12 ML/MIN/1.73
GFR SERPL CREATININE-BSD FRML MDRD: 15 ML/MIN/1.73
GFR SERPL CREATININE-BSD FRML MDRD: ABNORMAL ML/MIN/{1.73_M2}
GLOBULIN UR ELPH-MCNC: 2 GM/DL
GLUCOSE BLDC GLUCOMTR-MCNC: 134 MG/DL (ref 70–130)
GLUCOSE BLDC GLUCOMTR-MCNC: 138 MG/DL (ref 70–130)
GLUCOSE BLDC GLUCOMTR-MCNC: 142 MG/DL (ref 70–130)
GLUCOSE BLDC GLUCOMTR-MCNC: 153 MG/DL (ref 70–130)
GLUCOSE BLDC GLUCOMTR-MCNC: 180 MG/DL (ref 70–130)
GLUCOSE BLDC GLUCOMTR-MCNC: 200 MG/DL (ref 70–130)
GLUCOSE BLDC GLUCOMTR-MCNC: 213 MG/DL (ref 70–130)
GLUCOSE BLDC GLUCOMTR-MCNC: 252 MG/DL (ref 70–130)
GLUCOSE BLDC GLUCOMTR-MCNC: 255 MG/DL (ref 70–130)
GLUCOSE BLDC GLUCOMTR-MCNC: 257 MG/DL (ref 70–130)
GLUCOSE BLDC GLUCOMTR-MCNC: 279 MG/DL (ref 70–130)
GLUCOSE BLDC GLUCOMTR-MCNC: 282 MG/DL (ref 70–130)
GLUCOSE BLDC GLUCOMTR-MCNC: 321 MG/DL (ref 70–130)
GLUCOSE SERPL-MCNC: 147 MG/DL (ref 65–99)
GLUCOSE SERPL-MCNC: 225 MG/DL (ref 65–99)
GLUCOSE SERPL-MCNC: 251 MG/DL (ref 65–99)
GLUCOSE SERPL-MCNC: 263 MG/DL (ref 65–99)
GLUCOSE UR STRIP-MCNC: NEGATIVE MG/DL
HBA1C MFR BLD: 13.6 % (ref 4.8–5.6)
HCO3 BLDA-SCNC: 9.2 MMOL/L (ref 20–26)
HCT VFR BLD AUTO: 33.6 % (ref 37.5–51)
HGB BLD-MCNC: 11.1 G/DL (ref 13–17.7)
HGB UR QL STRIP.AUTO: ABNORMAL
HYALINE CASTS UR QL AUTO: ABNORMAL /LPF
IMM GRANULOCYTES # BLD AUTO: 0.04 10*3/MM3 (ref 0–0.05)
IMM GRANULOCYTES NFR BLD AUTO: 0.3 % (ref 0–0.5)
KETONES UR QL STRIP: NEGATIVE
LEUKOCYTE ESTERASE UR QL STRIP.AUTO: NEGATIVE
LYMPHOCYTES # BLD AUTO: 0.68 10*3/MM3 (ref 0.7–3.1)
LYMPHOCYTES NFR BLD AUTO: 5.4 % (ref 19.6–45.3)
Lab: ABNORMAL
MAGNESIUM SERPL-MCNC: 1.7 MG/DL (ref 1.6–2.4)
MAGNESIUM SERPL-MCNC: 1.8 MG/DL (ref 1.6–2.4)
MAGNESIUM SERPL-MCNC: 1.8 MG/DL (ref 1.6–2.4)
MCH RBC QN AUTO: 29.2 PG (ref 26.6–33)
MCHC RBC AUTO-ENTMCNC: 33 G/DL (ref 31.5–35.7)
MCV RBC AUTO: 88.4 FL (ref 79–97)
MODALITY: ABNORMAL
MONOCYTES # BLD AUTO: 0.72 10*3/MM3 (ref 0.1–0.9)
MONOCYTES NFR BLD AUTO: 5.7 % (ref 5–12)
MUCOUS THREADS URNS QL MICRO: ABNORMAL /HPF
NEUTROPHILS NFR BLD AUTO: 11.18 10*3/MM3 (ref 1.7–7)
NEUTROPHILS NFR BLD AUTO: 88.5 % (ref 42.7–76)
NITRITE UR QL STRIP: NEGATIVE
NRBC BLD AUTO-RTO: 0.6 /100 WBC (ref 0–0.2)
PCO2 BLDA: 23.9 MM HG (ref 35–45)
PH BLDA: 7.19 PH UNITS (ref 7.35–7.45)
PH UR STRIP.AUTO: 7 [PH] (ref 5–9)
PHOSPHATE SERPL-MCNC: 2.3 MG/DL (ref 2.5–4.5)
PHOSPHATE SERPL-MCNC: 2.3 MG/DL (ref 2.5–4.5)
PHOSPHATE SERPL-MCNC: 3.1 MG/DL (ref 2.5–4.5)
PLATELET # BLD AUTO: 196 10*3/MM3 (ref 140–450)
PMV BLD AUTO: 9.9 FL (ref 6–12)
PO2 BLDA: 112 MM HG (ref 83–108)
POTASSIUM SERPL-SCNC: 3 MMOL/L (ref 3.5–5.2)
POTASSIUM SERPL-SCNC: 3.5 MMOL/L (ref 3.5–5.2)
POTASSIUM SERPL-SCNC: 3.6 MMOL/L (ref 3.5–5.2)
POTASSIUM SERPL-SCNC: 4 MMOL/L (ref 3.5–5.2)
PROT SERPL-MCNC: 5.3 G/DL (ref 6–8.5)
PROT UR QL STRIP: ABNORMAL
RBC # BLD AUTO: 3.8 10*6/MM3 (ref 4.14–5.8)
RBC # UR: ABNORMAL /HPF
REF LAB TEST METHOD: ABNORMAL
SAO2 % BLDCOA: 98.8 % (ref 94–99)
SODIUM SERPL-SCNC: 132 MMOL/L (ref 136–145)
SODIUM SERPL-SCNC: 135 MMOL/L (ref 136–145)
SODIUM SERPL-SCNC: 136 MMOL/L (ref 136–145)
SODIUM SERPL-SCNC: 137 MMOL/L (ref 136–145)
SP GR UR STRIP: 1.01 (ref 1–1.03)
SQUAMOUS #/AREA URNS HPF: ABNORMAL /HPF
UROBILINOGEN UR QL STRIP: ABNORMAL
VENTILATOR MODE: ABNORMAL
WBC # BLD AUTO: 12.63 10*3/MM3 (ref 3.4–10.8)
WBC UR QL AUTO: ABNORMAL /HPF

## 2020-09-06 PROCEDURE — 63710000001 INSULIN DETEMIR PER 5 UNITS: Performed by: INTERNAL MEDICINE

## 2020-09-06 PROCEDURE — 25010000002 HEPARIN (PORCINE) PER 1000 UNITS: Performed by: INTERNAL MEDICINE

## 2020-09-06 PROCEDURE — 84100 ASSAY OF PHOSPHORUS: CPT | Performed by: FAMILY MEDICINE

## 2020-09-06 PROCEDURE — 80053 COMPREHEN METABOLIC PANEL: CPT | Performed by: FAMILY MEDICINE

## 2020-09-06 PROCEDURE — 25010000003 POTASSIUM CHLORIDE PER 2 MEQ: Performed by: FAMILY MEDICINE

## 2020-09-06 PROCEDURE — 25010000002 HEPARIN (PORCINE) PER 1000 UNITS: Performed by: FAMILY MEDICINE

## 2020-09-06 PROCEDURE — 83735 ASSAY OF MAGNESIUM: CPT | Performed by: FAMILY MEDICINE

## 2020-09-06 PROCEDURE — 36600 WITHDRAWAL OF ARTERIAL BLOOD: CPT

## 2020-09-06 PROCEDURE — 81001 URINALYSIS AUTO W/SCOPE: CPT | Performed by: FAMILY MEDICINE

## 2020-09-06 PROCEDURE — 82803 BLOOD GASES ANY COMBINATION: CPT

## 2020-09-06 PROCEDURE — 92610 EVALUATE SWALLOWING FUNCTION: CPT | Performed by: SPEECH-LANGUAGE PATHOLOGIST

## 2020-09-06 PROCEDURE — 76775 US EXAM ABDO BACK WALL LIM: CPT

## 2020-09-06 PROCEDURE — 82962 GLUCOSE BLOOD TEST: CPT

## 2020-09-06 PROCEDURE — 83036 HEMOGLOBIN GLYCOSYLATED A1C: CPT | Performed by: FAMILY MEDICINE

## 2020-09-06 PROCEDURE — 25010000003 POTASSIUM CHLORIDE 10 MEQ/100ML SOLUTION: Performed by: FAMILY MEDICINE

## 2020-09-06 PROCEDURE — 63710000001 INSULIN ASPART PER 5 UNITS: Performed by: INTERNAL MEDICINE

## 2020-09-06 PROCEDURE — 85025 COMPLETE CBC W/AUTO DIFF WBC: CPT | Performed by: FAMILY MEDICINE

## 2020-09-06 RX ORDER — FAMOTIDINE 10 MG/ML
20 INJECTION, SOLUTION INTRAVENOUS DAILY
Status: DISCONTINUED | OUTPATIENT
Start: 2020-09-06 | End: 2020-09-06

## 2020-09-06 RX ORDER — CLOPIDOGREL BISULFATE 75 MG/1
75 TABLET ORAL DAILY
Status: DISCONTINUED | OUTPATIENT
Start: 2020-09-06 | End: 2020-09-10 | Stop reason: HOSPADM

## 2020-09-06 RX ORDER — HEPARIN SODIUM 5000 [USP'U]/ML
5000 INJECTION, SOLUTION INTRAVENOUS; SUBCUTANEOUS EVERY 12 HOURS SCHEDULED
Status: DISCONTINUED | OUTPATIENT
Start: 2020-09-06 | End: 2020-09-10 | Stop reason: HOSPADM

## 2020-09-06 RX ORDER — NITROGLYCERIN 0.4 MG/1
0.4 TABLET SUBLINGUAL
Status: DISCONTINUED | OUTPATIENT
Start: 2020-09-06 | End: 2020-09-10 | Stop reason: HOSPADM

## 2020-09-06 RX ORDER — ACETYLCYSTEINE 100 MG/ML
4 SOLUTION ORAL; RESPIRATORY (INHALATION) EVERY 8 HOURS
Status: DISCONTINUED | OUTPATIENT
Start: 2020-09-06 | End: 2020-09-09

## 2020-09-06 RX ORDER — NICOTINE POLACRILEX 4 MG
15 LOZENGE BUCCAL
Status: DISCONTINUED | OUTPATIENT
Start: 2020-09-06 | End: 2020-09-10 | Stop reason: HOSPADM

## 2020-09-06 RX ORDER — PRAVASTATIN SODIUM 40 MG
40 TABLET ORAL NIGHTLY
Status: DISCONTINUED | OUTPATIENT
Start: 2020-09-06 | End: 2020-09-10 | Stop reason: HOSPADM

## 2020-09-06 RX ORDER — DEXTROSE MONOHYDRATE 25 G/50ML
25 INJECTION, SOLUTION INTRAVENOUS
Status: DISCONTINUED | OUTPATIENT
Start: 2020-09-06 | End: 2020-09-10 | Stop reason: HOSPADM

## 2020-09-06 RX ORDER — NICOTINE 21 MG/24HR
1 PATCH, TRANSDERMAL 24 HOURS TRANSDERMAL
Status: DISCONTINUED | OUTPATIENT
Start: 2020-09-06 | End: 2020-09-10 | Stop reason: HOSPADM

## 2020-09-06 RX ORDER — CARVEDILOL 3.12 MG/1
3.12 TABLET ORAL EVERY 12 HOURS SCHEDULED
Status: DISCONTINUED | OUTPATIENT
Start: 2020-09-06 | End: 2020-09-10 | Stop reason: HOSPADM

## 2020-09-06 RX ORDER — MAGNESIUM HYDROXIDE 1200 MG/15ML
50 LIQUID ORAL EVERY 8 HOURS
Status: DISCONTINUED | OUTPATIENT
Start: 2020-09-06 | End: 2020-09-09

## 2020-09-06 RX ORDER — ASPIRIN 81 MG/1
81 TABLET ORAL DAILY
Status: DISCONTINUED | OUTPATIENT
Start: 2020-09-06 | End: 2020-09-10 | Stop reason: HOSPADM

## 2020-09-06 RX ORDER — PANTOPRAZOLE SODIUM 40 MG/1
40 TABLET, DELAYED RELEASE ORAL EVERY MORNING
Status: DISCONTINUED | OUTPATIENT
Start: 2020-09-06 | End: 2020-09-10 | Stop reason: HOSPADM

## 2020-09-06 RX ADMIN — CARVEDILOL 3.12 MG: 3.12 TABLET, FILM COATED ORAL at 11:48

## 2020-09-06 RX ADMIN — INSULIN ASPART 4 UNITS: 100 INJECTION, SOLUTION INTRAVENOUS; SUBCUTANEOUS at 17:10

## 2020-09-06 RX ADMIN — SODIUM BICARBONATE: 84 INJECTION INTRAVENOUS at 01:11

## 2020-09-06 RX ADMIN — SODIUM BICARBONATE 150 MEQ: 84 INJECTION, SOLUTION INTRAVENOUS at 13:19

## 2020-09-06 RX ADMIN — HEPARIN SODIUM 5000 UNITS: 5000 INJECTION INTRAVENOUS; SUBCUTANEOUS at 20:33

## 2020-09-06 RX ADMIN — POTASSIUM PHOSPHATE, MONOBASIC AND POTASSIUM PHOSPHATE, DIBASIC 15 MMOL: 224; 236 INJECTION, SOLUTION, CONCENTRATE INTRAVENOUS at 02:54

## 2020-09-06 RX ADMIN — POTASSIUM CHLORIDE AND SODIUM CHLORIDE 150 ML/HR: 450; 150 INJECTION, SOLUTION INTRAVENOUS at 01:32

## 2020-09-06 RX ADMIN — INSULIN DETEMIR 10 UNITS: 100 INJECTION, SOLUTION SUBCUTANEOUS at 09:06

## 2020-09-06 RX ADMIN — ACETYLCYSTEINE 4 ML: 100 SOLUTION ORAL; RESPIRATORY (INHALATION) at 14:05

## 2020-09-06 RX ADMIN — PRAVASTATIN SODIUM 40 MG: 40 TABLET ORAL at 20:32

## 2020-09-06 RX ADMIN — POTASSIUM CHLORIDE 10 MEQ: 10 INJECTION, SOLUTION INTRAVENOUS at 18:13

## 2020-09-06 RX ADMIN — CARVEDILOL 3.12 MG: 3.12 TABLET, FILM COATED ORAL at 20:33

## 2020-09-06 RX ADMIN — POTASSIUM CHLORIDE 10 MEQ: 10 INJECTION, SOLUTION INTRAVENOUS at 22:26

## 2020-09-06 RX ADMIN — POTASSIUM CHLORIDE 10 MEQ: 10 INJECTION, SOLUTION INTRAVENOUS at 17:08

## 2020-09-06 RX ADMIN — ACETYLCYSTEINE 4 ML: 100 SOLUTION ORAL; RESPIRATORY (INHALATION) at 21:43

## 2020-09-06 RX ADMIN — NICOTINE 1 PATCH: 21 PATCH, EXTENDED RELEASE TRANSDERMAL at 09:12

## 2020-09-06 RX ADMIN — SODIUM BICARBONATE 100 MEQ: 84 INJECTION, SOLUTION INTRAVENOUS at 09:06

## 2020-09-06 RX ADMIN — INSULIN ASPART 4 UNITS: 100 INJECTION, SOLUTION INTRAVENOUS; SUBCUTANEOUS at 11:57

## 2020-09-06 RX ADMIN — SODIUM CHLORIDE 50 ML: 900 IRRIGANT IRRIGATION at 21:43

## 2020-09-06 RX ADMIN — SODIUM BICARBONATE 100 MEQ: 84 INJECTION INTRAVENOUS at 11:48

## 2020-09-06 RX ADMIN — INSULIN DETEMIR 20 UNITS: 100 INJECTION, SOLUTION SUBCUTANEOUS at 20:32

## 2020-09-06 RX ADMIN — SODIUM CHLORIDE 50 ML: 900 IRRIGANT IRRIGATION at 14:05

## 2020-09-06 RX ADMIN — INSULIN ASPART 4 UNITS: 100 INJECTION, SOLUTION INTRAVENOUS; SUBCUTANEOUS at 09:46

## 2020-09-06 RX ADMIN — POTASSIUM CHLORIDE 10 MEQ: 10 INJECTION, SOLUTION INTRAVENOUS at 20:33

## 2020-09-06 RX ADMIN — FAMOTIDINE 20 MG: 10 INJECTION INTRAVENOUS at 09:13

## 2020-09-06 RX ADMIN — PANTOPRAZOLE SODIUM 40 MG: 40 TABLET, DELAYED RELEASE ORAL at 11:49

## 2020-09-06 RX ADMIN — ASPIRIN 81 MG: 81 TABLET, COATED ORAL at 11:49

## 2020-09-06 RX ADMIN — CLOPIDOGREL BISULFATE 75 MG: 75 TABLET ORAL at 11:49

## 2020-09-06 RX ADMIN — SERTRALINE HYDROCHLORIDE 50 MG: 50 TABLET ORAL at 11:48

## 2020-09-06 RX ADMIN — POTASSIUM CHLORIDE, DEXTROSE MONOHYDRATE AND SODIUM CHLORIDE 150 ML/HR: 150; 5; 450 INJECTION, SOLUTION INTRAVENOUS at 02:59

## 2020-09-06 RX ADMIN — HEPARIN SODIUM 5000 UNITS: 5000 INJECTION INTRAVENOUS; SUBCUTANEOUS at 06:46
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
## 2020-09-06 NOTE — CONSULTS
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      CBC & Differential [[id number]] Collected:  09/05/20 2014    Specimen:  Blood Updated:  09/05/20 2034    Narrative:       The following orders were created for panel order CBC & Differential.  Procedure                               Abnormality         Status                     ---------                               -----------         ------                     CBC Auto Differential[[id number]]        Abnormal            Final result                 Please view results for these tests on the individual orders.    CBC Auto Differential [[id number]]  (Abnormal) Collected:  09/05/20 2014    Specimen:  Blood Updated:  09/05/20 2034     WBC 16.23 10*3/mm3      RBC 4.00 10*6/mm3      Hemoglobin 11.9 g/dL      Hematocrit 35.2 %      MCV 88.0 fL      MCH 29.8 pg      MCHC 33.8 g/dL      RDW 13.8 %      RDW-SD 44.4 fl      MPV 10.6 fL      Platelets 164 10*3/mm3      Neutrophil % 93.7 %      Lymphocyte % 3.5 %      Monocyte % 2.2 %      Eosinophil % 0.0 %      Basophil % 0.1 %      Immature Grans % 0.5 %      Neutrophils, Absolute 15.21 10*3/mm3      Lymphocytes, Absolute 0.56 10*3/mm3      Monocytes, Absolute 0.36 10*3/mm3      Eosinophils, Absolute 0.00 10*3/mm3      Basophils, Absolute 0.02 10*3/mm3      Immature Grans,  Absolute 0.08 10*3/mm3      nRBC 1.3 /100 WBC     Magnesium [[id number]]  (Normal) Collected:  09/05/20 2014    Specimen:  Blood Updated:  09/05/20 2034     Magnesium 1.9 mg/dL     Phosphorus [[id number]]  (Abnormal) Collected:  09/05/20 1653    Specimen:  Blood Updated:  09/05/20 1934     Phosphorus 5.3 mg/dL     [place] Draw [[id number]] Collected:  09/05/20 1653    Specimen:  Blood Updated:  09/05/20 1800    Narrative:       The following orders were created for panel order [place] Draw.  Procedure                               Abnormality         Status                     ---------                               -----------         ------                     Light Blue Top[[id number]]                                   Final result               Green Top (Gel)[[id number]]                                  Final result               Lavender Top[[id number]]                                     Final result               Gold Top - SST[[id number]]                                   Final result                 Please view results for these tests on the individual orders.    Green Top (Gel) [[id number]] Collected:  09/05/20 1653    Specimen:  Blood Updated:  09/05/20 1800     Extra Tube Hold for add-ons.     Comment: Auto resulted.       Lavender Top [[id number]] Collected:  09/05/20 1653    Specimen:  Blood Updated:  09/05/20 1800     Extra Tube hold for add-on     Comment: Auto resulted       Light Blue Top [[id number]] Collected:  09/05/20 1653    Specimen:  Blood Updated:  09/05/20 1800     Extra Tube hold for add-on     Comment: Auto resulted       Gold Top - SST [[id number]] Collected:  09/05/20 1653    Specimen:  Blood Updated:  09/05/20 1800     Extra Tube Hold for add-ons.     Comment: Auto resulted.       Comprehensive Metabolic Panel [[id number]]  (Abnormal) Collected:  09/05/20 1653    Specimen:  Blood Updated:  09/05/20 1716     Glucose 496 mg/dL       mg/dL      Creatinine 5.16 mg/dL      Sodium 124 mmol/L       Potassium 5.0 mmol/L      Chloride 104 mmol/L      CO2 5.0 mmol/L      Calcium 9.0 mg/dL      Total Protein 6.6 g/dL      Albumin 4.00 g/dL      ALT (SGPT) 11 U/L      AST (SGOT) 7 U/L      Alkaline Phosphatase 113 U/L      Total Bilirubin 0.5 mg/dL      eGFR Non African Amer 11 mL/min/1.73      Comment: <15 Indicative of kidney failure.        eGFR   Amer --     Comment: <15 Indicative of kidney failure.        Globulin 2.6 gm/dL      A/G Ratio 1.5 g/dL      BUN/Creatinine Ratio 20.7     Anion Gap 15.0 mmol/L     Narrative:       GFR Normal >60  Chronic Kidney Disease <60  Kidney Failure <15      Magnesium [[id number]]  (Normal) Collected:  09/05/20 1653    Specimen:  Blood Updated:  09/05/20 1714     Magnesium 2.0 mg/dL     Ammonia [[id number]]  (Normal) Collected:  09/05/20 1653    Specimen:  Blood Updated:  09/05/20 1713     Ammonia 18 umol/L     Troponin [[id number]]  (Normal) Collected:  09/05/20 1653    Specimen:  Blood Updated:  09/05/20 1713     Troponin T <0.010 ng/mL     Narrative:       Troponin T Reference Range:  <= 0.03 ng/mL-   Negative for AMI  >0.03 ng/mL-     Abnormal for myocardial necrosis.  Clinicians would have to utilize clinical acumen, EKG, Troponin and serial changes to determine if it is an Acute Myocardial Infarction or myocardial injury due to an underlying chronic condition.       Results may be falsely decreased if patient taking Biotin.      pH, Venous [[id number]]  (Abnormal) Collected:  09/05/20 1650    Specimen:  Blood Updated:  09/05/20 1713     pH, Venous 6.940 pH Units     CBC & Differential [[id number]] Collected:  09/05/20 1653    Specimen:  Blood Updated:  09/05/20 1656    Narrative:       The following orders were created for panel order CBC & Differential.  Procedure                               Abnormality         Status                     ---------                               -----------         ------                     CBC Auto Differential[[id number]]         Abnormal            Final result                 Please view results for these tests on the individual orders.    CBC Auto Differential [[id number]]  (Abnormal) Collected:  09/05/20 1653    Specimen:  Blood Updated:  09/05/20 1656     WBC 17.90 10*3/mm3      RBC 4.50 10*6/mm3      Hemoglobin 13.3 g/dL      Hematocrit 40.3 %      MCV 89.6 fL      MCH 29.6 pg      MCHC 33.0 g/dL      RDW 13.8 %      RDW-SD 44.9 fl      MPV 10.3 fL      Platelets 221 10*3/mm3      Neutrophil % 94.9 %      Lymphocyte % 2.4 %      Monocyte % 2.1 %      Eosinophil % 0.0 %      Basophil % 0.1 %      Immature Grans % 0.5 %      Neutrophils, Absolute 16.99 10*3/mm3      Lymphocytes, Absolute 0.43 10*3/mm3      Monocytes, Absolute 0.37 10*3/mm3      Eosinophils, Absolute 0.00 10*3/mm3      Basophils, Absolute 0.02 10*3/mm3      Immature Grans, Absolute 0.09 10*3/mm3      nRBC 1.1 /100 WBC          Imaging Results (Last 24 Hours)     Procedure Component Value Units Date/Time    US Renal Limited [[id number]] Collected:  09/06/20 1123     Updated:  09/06/20 1232    Narrative:       EXAMINATION:  ultrasound, renal     CLINICAL INDICATION / HISTORY:  SUJATHA, E10.10 Type 1 diabetes  mellitus with ketoacidosis without coma R13.12 Dysphagia,  oropharyngeal phase    COMPARISON:  none    TECHNIQUE:  ultrasound, renal    FULL RESULTS / FINDINGS:  Severely limited exam secondary to patient's body habitus with  poor acoustic windows as well as restricted mobility.  Kidney, right:      size:  normal, measuring 10.9 x 5.4 x 5.2 cm    echotexture:  normal    no nephrolithiasis, solid mass, or collecting system dilation    Kidney, left:      size:  normal, measuring 9.5 x 5.4 x 6.5 cm    echotexture:  normal    no nephrolithiasis, solid mass, or collecting system dilation,  it most be noted that a portion of the kidney is obscured from  overlying bowel gas.    Urinary bladder:  Not imaged and therefore not evaluated. Per  history the bladder is surgically  absent.      Impression:       CONCLUSION:    1.  Severely limited exam secondary to patient's body habitus  with poor acoustic windows as well as restricted mobility.  2.  Urinary bladder not imaged and therefore not evaluated. Per  history patient has had bladder resection.  3.  Remainder of limited renal ultrasound unremarkable.    Electronically signed by:  Tyron Cheek MD  9/6/2020 12:20 PM CDT  Workstation: [id number]    CT Head Without Contrast [[id number]] Collected:  09/05/20 1728     Updated:  09/05/20 1755    Narrative:         CT Head Without Contrast    History: Confusion and delirium. Altered level of consciousness.  Dizziness. Weakness.    Axial scans of the brain were obtained without intravenous  contrast.  Coronal and sagital reconstructions were preformed.    This exam was performed according to our departmental  dose-optimization program, which includes automated exposure  control, adjustment of the mA and/or kV according to patient size  and/or use of iterative reconstruction technique.    DLP: 923.90    Comparison: July 3, 2020.    Findings:  Bone windows are unremarkable.  The visualized paranasal sinuses are unremarkable.    No acute process.  Cerebral atrophy.  No hemorrhage.  No mass.  No abnormal areas of increased or decreased attenuation.  No midline shift.  No abnormal extra-axial fluid collections.      Impression:       CONCLUSION:  No acute process.  Cerebral atrophy.    [id number]    Electronically signed by:  Aaron Constantino MD  9/5/2020 5:54 PM CDT  Workstation: [id number]    XR Chest 1 View [[id number]] Collected:  09/05/20 1708     Updated:  09/05/20 1726    Narrative:         PORTABLE CHEST    HISTORY: Weakness. Dizziness. Altered mental status.    Portable AP film of the chest was obtained at 4:50 PM.  COMPARISON: July 2, 2020    FINDINGS:   EKG leads.  The lungs are clear of an acute process.  Old granulomatous disease is present.  The heart is not enlarged.  The pulmonary vasculature  is not increased.  No pleural effusion.  No pneumothorax.  No acute osseous abnormality.  Old right rib fractures.      Impression:       CONCLUSION:  No Acute Disease    [id number]    Electronically signed by:  Aaron Constantino MD  9/5/2020 5:25 PM CDT  Workstation: [id number]           I reviewed the patient's new clinical results.  I reviewed the patient's new imaging results and agree with the interpretation.  I reviewed the patient's other test results and agree with the interpretation        Assessment/Plan       Diabetic ketoacidosis without coma associated with type 1 diabetes mellitus (CMS/[facility])      Assessment & Plan    Consulted to Urology to manage maximize his urinary drainage needs as he has acute kidney injury the setting on CKD III, hx of bladder/prostate cancer status post cystectomy/prostatectomy with neobladder with frequent outlet obstruction from mucus and sediment causing hydronephrosis. At present he does not have hydronephrosis but there is mucus and sediment in his John.   -WBC 17.90-->16.23-->12.63  -Estimated Creatinine Clearance: 12.3 mL/min (A) (by C-G formula based on SCr of 4.78 mg/dL (H)).  -Baseline Cr around 2 per Nephrology  -Cr 5.16-->5.21-->5.01-->4.78    Plan:  Start intravesical Mucomyst flushes   Continue John in place.  Will follow.     I discussed the patient's findings and my recommendations with patient, nursing staff, consulting provider and Anastacio Norris APRN  09/06/20  15:30

## 2020-09-06 NOTE — NURSING NOTE
ABG results back and contacted dr bautista, new orders for 2 amps of sodium bicarb to be pushed and to continue insulin drip. Will continue to monitor.

## 2020-09-06 NOTE — PROGRESS NOTES
[facility] Medicine Services  INPATIENT PROGRESS NOTE    Length of Stay: 1  Date of Admission: 9/5/2020  Primary Care Physician: Shayne Merritt MD    Subjective   Chief Complaint: No new complaints.    HPI: Patient is seen for follow-up.  He is a 65 year old male with history of insulin dependent Diabetes, Hepatitis C, COPD, substance abuse, GERD, HTN, and hx of bladder cancer who was admitted for DKA  and acute kidney injury complicated by acute metabolic encephalopathy.    He is doing better, remains deconditioned, mental status has improved and insulin infusion has been discontinued.     Review of Systems   Constitutional: Positive for activity change, appetite change and fatigue. Negative for chills, diaphoresis and fever.   HENT: Negative for trouble swallowing and voice change.    Eyes: Negative for photophobia and visual disturbance.   Respiratory: Negative for cough, choking, chest tightness, shortness of breath, wheezing and stridor.    Cardiovascular: Negative for chest pain, palpitations and leg swelling.   Gastrointestinal: Negative for abdominal distention, abdominal pain, blood in stool, constipation, diarrhea, nausea and vomiting.   Endocrine: Negative for cold intolerance, heat intolerance, polydipsia, polyphagia and polyuria.   Genitourinary: Negative for decreased urine volume, difficulty urinating, dysuria, enuresis, flank pain, frequency, hematuria and urgency.   Musculoskeletal: Negative for arthralgias, gait problem, myalgias, neck pain and neck stiffness.   Skin: Negative for pallor, rash and wound.   Neurological: Negative for dizziness, tremors, seizures, syncope, facial asymmetry, speech difficulty, weakness, light-headedness, numbness and headaches.   Hematological: Does not bruise/bleed easily.   Psychiatric/Behavioral: Negative for agitation, behavioral problems and confusion.       Objective    Temp:  [97.6 °F (36.4 °C)-98.1 °F (36.7  °C)] 98.1 °F (36.7 °C)  Heart Rate:  [86-98] 96  Resp:  [18-20] 18  BP: (103-146)/(56-75) 111/59    Physical Exam   Constitutional: He is oriented to person, place, and time. He appears cachectic. No distress.   HENT:   Head: Normocephalic and atraumatic.   Eyes: Pupils are equal, round, and reactive to light. EOM are normal. No scleral icterus.   Neck: Normal range of motion. Neck supple. No JVD present. No thyromegaly present.   Cardiovascular: Normal rate, regular rhythm and normal heart sounds. Exam reveals no gallop and no friction rub.   No murmur heard.  Pulmonary/Chest: Effort normal and breath sounds normal. He has no wheezes. He has no rales. He exhibits no tenderness.   Abdominal: Soft. Bowel sounds are normal. He exhibits no distension and no mass. There is no tenderness. There is no rebound and no guarding.   Musculoskeletal: He exhibits no edema, tenderness or deformity.   Neurological: He is alert and oriented to person, place, and time. No cranial nerve deficit or sensory deficit. He exhibits normal muscle tone. Coordination normal.   Skin: Skin is warm and dry. No rash noted. He is not diaphoretic. No erythema. No pallor.   Psychiatric: He has a normal mood and affect. His behavior is normal. Judgment and thought content normal.   Nursing note and vitals reviewed.        Medication Review:    Current Facility-Administered Medications:   •  acetaminophen (TYLENOL) tablet 650 mg, 650 mg, Oral, Q4H PRN **OR** acetaminophen (TYLENOL) suppository 650 mg, 650 mg, Rectal, Q4H PRN, Kevin Perry MD  •  albuterol sulfate HFA (PROVENTIL HFA;VENTOLIN HFA;PROAIR HFA) inhaler 2 puff, 2 puff, Inhalation, Q6H PRN, Kevin Perry MD  •  budesonide-formoterol (SYMBICORT) 160-4.5 MCG/ACT inhaler 2 puff, 2 puff, Inhalation, BID - RT, Kevin Perry MD, 2 puff at 09/05/20 2005  •  dextrose (D50W) 25 g/ 50mL Intravenous Solution 25 g, 25 g, Intravenous, Q15 Min PRN, Ciro Chauhan MD  •  dextrose (GLUTOSE) oral  gel 15 g, 15 g, Oral, Q15 Min PRN, Ciro Chauhan MD  •  famotidine (PEPCID) injection 20 mg, 20 mg, Intravenous, Daily, Ciro Chauhan MD, 20 mg at 09/06/20 0913  •  glucagon (human recombinant) (GLUCAGEN DIAGNOSTIC) injection 1 mg, 1 mg, Subcutaneous, Q15 Min PRN, Ciro Chauhan MD  •  heparin (porcine) 5000 UNIT/ML injection 5,000 Units, 5,000 Units, Subcutaneous, Q12H, Ciro Chauhan MD  •  insulin aspart (novoLOG) injection 0-7 Units, 0-7 Units, Subcutaneous, TID AC, Ciro Chauhan MD, 4 Units at 09/06/20 0946  •  insulin detemir (LEVEMIR) injection 15 Units, 15 Units, Subcutaneous, Nightly, Ciro Chauhan MD  •  Magnesium Sulfate 2 gram Bolus, followed by 8 gram infusion (total Mg dose 10 grams)- Mg less than or equal to 1mg/dL, 2 g, Intravenous, PRN **OR** Magnesium Sulfate 2 gram / 50mL Infusion (GIVE X 3 BAGS TO EQUAL 6GM TOTAL DOSE) - Mg 1.1 - 1.5 mg/dl, 2 g, Intravenous, PRN **OR** Magnesium Sulfate 4 gram infusion- Mg 1.6-1.9 mg/dL, 4 g, Intravenous, PRN, Kevin Perry MD  •  nicotine (NICODERM CQ) 21 MG/24HR patch 1 patch, 1 patch, Transdermal, Q24H, Ciro Chauhan MD, 1 patch at 09/06/20 0912  •  ondansetron (ZOFRAN) tablet 4 mg, 4 mg, Oral, Q6H PRN **OR** ondansetron (ZOFRAN) injection 4 mg, 4 mg, Intravenous, Q6H PRN, Kevin Perry MD  •  potassium chloride (MICRO-K) CR capsule 40 mEq, 40 mEq, Oral, PRN **OR** potassium chloride (KLOR-CON) packet 40 mEq, 40 mEq, Oral, PRN **OR** potassium chloride 10 mEq in 100 mL IVPB, 10 mEq, Intravenous, Q1H PRN, Kevin Perry MD  •  potassium phosphate 45 mmol in sodium chloride 0.9 % 500 mL infusion, 45 mmol, Intravenous, PRN **OR** potassium phosphate 30 mmol in sodium chloride 0.9 % 250 mL infusion, 30 mmol, Intravenous, PRN **OR** Potassium Phosphates 15 mmol in sodium chloride 0.9 % 100 mL infusion, 15 mmol, Intravenous, PRN, 15 mmol at 09/06/20 0254 **OR** sodium phosphates 45 mmol in sodium chloride 0.9 % 500 mL  IVPB, 45 mmol, Intravenous, PRN **OR** sodium phosphates 30 mmol in sodium chloride 0.9 % 250 mL IVPB, 30 mmol, Intravenous, PRN **OR** sodium phosphates 15 mmol in sodium chloride 0.9 % 250 mL IVPB, 15 mmol, Intravenous, PRN, Kevin Perry MD  •  sodium bicarbonate 8.4 % 150 mEq in sterile water 1,000 mL infusion (greater than 75 mEq), 150 mEq, Intravenous, Continuous, Anastacio Reilly APRN  •  sodium bicarbonate injection 8.4% 50 mEq, 50 mEq, Intravenous, Once, Anastacio Reilly APRN  •  sodium chloride 0.9 % flush 10 mL, 10 mL, Intravenous, Q12H, Kevin Perry MD  •  sodium chloride 0.9 % flush 10 mL, 10 mL, Intravenous, PRN, Kevin Perry MD  •  sodium chloride 0.9 % flush 10 mL, 10 mL, Intravenous, Once PRAISHWARYA, Kevin Perry MD  •  sodium chloride 0.9 % flush 30 mL, 30 mL, Intravenous, Once PRN, Kevin Perry MD    Results Review:  I have reviewed the labs, radiology results, and diagnostic studies.    Laboratory Data:   Results from last 7 days   Lab Units 09/06/20  0432 09/05/20  2348 09/05/20 2014 09/05/20  1653   SODIUM mmol/L 135* 136 126* 124*   POTASSIUM mmol/L 3.6 3.5 4.5 5.0   CHLORIDE mmol/L 116* 113* 110* 104   CO2 mmol/L 9.0* 9.0* 4.0* 5.0*   BUN mg/dL 112* 106* 106* 107*   CREATININE mg/dL 5.01* 5.15* 5.21* 5.16*   GLUCOSE mg/dL 147* 251* 392* 496*   CALCIUM mg/dL 8.2* 8.3* 8.4* 9.0   BILIRUBIN mg/dL  --   --   --  0.5   ALK PHOS U/L  --   --   --  113   ALT (SGPT) U/L  --   --   --  11   AST (SGOT) U/L  --   --   --  7   ANION GAP mmol/L 10.0 14.0 12.0 15.0     Estimated Creatinine Clearance: 11.7 mL/min (A) (by C-G formula based on SCr of 5.01 mg/dL (H)).  Results from last 7 days   Lab Units 09/06/20  0831 09/06/20  0432 09/05/20  2348   MAGNESIUM mg/dL 1.8 1.7 1.8   PHOSPHORUS mg/dL 3.1 2.3* 2.3*         Results from last 7 days   Lab Units 09/06/20 0432 09/05/20 2014 09/05/20  1653   WBC 10*3/mm3 12.63* 16.23* 17.90*   HEMOGLOBIN g/dL 11.1* 11.9* 13.3   HEMATOCRIT % 33.6* 35.2* 40.3    PLATELETS 10*3/mm3 196 164 221           Culture Data:   No results found for: BLOODCX  No results found for: URINECX  No results found for: RESPCX  No results found for: WOUNDCX  No results found for: STOOLCX  No components found for: BODYFLD    Radiology Data:   Imaging Results (Last 24 Hours)     Procedure Component Value Units Date/Time    CT Head Without Contrast [[id number]] Collected:  09/05/20 1728     Updated:  09/05/20 1755    Narrative:         CT Head Without Contrast    History: Confusion and delirium. Altered level of consciousness.  Dizziness. Weakness.    Axial scans of the brain were obtained without intravenous  contrast.  Coronal and sagital reconstructions were preformed.    This exam was performed according to our departmental  dose-optimization program, which includes automated exposure  control, adjustment of the mA and/or kV according to patient size  and/or use of iterative reconstruction technique.    DLP: 923.90    Comparison: July 3, 2020.    Findings:  Bone windows are unremarkable.  The visualized paranasal sinuses are unremarkable.    No acute process.  Cerebral atrophy.  No hemorrhage.  No mass.  No abnormal areas of increased or decreased attenuation.  No midline shift.  No abnormal extra-axial fluid collections.      Impression:       CONCLUSION:  No acute process.  Cerebral atrophy.    [id number]    Electronically signed by:  Aaron Constantino MD  9/5/2020 5:54 PM CDT  Workstation: [id number]    XR Chest 1 View [[id number]] Collected:  09/05/20 1708     Updated:  09/05/20 1726    Narrative:         PORTABLE CHEST    HISTORY: Weakness. Dizziness. Altered mental status.    Portable AP film of the chest was obtained at 4:50 PM.  COMPARISON: July 2, 2020    FINDINGS:   EKG leads.  The lungs are clear of an acute process.  Old granulomatous disease is present.  The heart is not enlarged.  The pulmonary vasculature is not increased.  No pleural effusion.  No pneumothorax.  No acute osseous  abnormality.  Old right rib fractures.      Impression:       CONCLUSION:  No Acute Disease    [id number]    Electronically signed by:  Aaron Constantino MD  9/5/2020 5:25 PM CDT  Workstation: [id number]          I have reviewed the patient's current medications.     Assessment/Plan     Hospital Problem List:  Active Problems:    Diabetic ketoacidosis without coma associated with type 1 diabetes mellitus (CMS/HCC)  Resolved and his mental status is back to baseline.  Transition to basal insulin with Accu-Cheks and sliding scale insulin.  Hemoglobin A1c is 13.6 indicative of poor control/poor compliance.    Reactive leukocytosis and pseudohyponatremia are much improved.    Acute on chronic kidney disease stage III (with metabolic acidosis): His baseline creatinine is between 1.4-1.6.  Begin bicarbonate infusion, check renal ultrasound, continue to monitor renal function, avoid nephrotoxins and consult nephrologist.    Nicotine dependence: Begin nicotine patch.  Nicotine cessation counseling has been discussed with the patient.    History of COPD: Patient is not in exacerbation.  Continue to monitor.    Undernutrition/cachexia: Dietitian has been consulted.    History of coronary artery disease: Resume home medications.    GERD: Continue PPI.    Continue DVT prophylaxis.    Deconditioning: Consult PT and OT.      Discharge Planning: In progress.    Ciro Chauhan MD   09/06/20   10:14

## 2020-09-06 NOTE — PLAN OF CARE
Problem: Patient Care Overview  Goal: Plan of Care Review  Outcome: Ongoing (interventions implemented as appropriate)  Flowsheets  Taken 9/6/2020 0308  Progress: no change  Outcome Summary: new admit, on insulin drip, pt is confused.  Taken 9/5/2020 2000  Plan of Care Reviewed With: patient

## 2020-09-06 NOTE — PLAN OF CARE
Problem: Patient Care Overview  Goal: Plan of Care Review  Outcome: Ongoing (interventions implemented as appropriate)  Flowsheets (Taken 9/6/2020 1006)  Plan of Care Reviewed With: patient  Outcome Summary: Pt seen for dysphagia evaluation this date w/AM meal.  Pt is currently on regular solids/thin liquids and presented as lethargic, but alert for safe PO intake.  Pt required repositioning and feeding by SLP.  Pt consumed regular (veronica) and mech soft solids (scrambled eggs).  Pt demonstrated inefficient mastication and bolus preparation, increased oral prep time, oral residue that was not cleared w/multiple liquid or puree washes and required removal of residue w/veronica.  Pt consumed puree solids w/no difficulty.  Pt demonstrated cough, audible swallow and hacking w/thin liquids via straw.  Pt safely tolerated NTL via spoon w/no overt s/s of aspiration.  Pt consumed limited amount (approx. 25%) of meal and concern for malnutrition is apparent.  SLP modified pt's diet to puree solids w/NTL via spoon.  SLP discussed diet recommendations and POC w/pt; however, no evdience of learning was noted d/t cognitive status.  SLP communicated diet change w/nsg and dietary
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
## 2020-09-07 LAB
ALBUMIN SERPL-MCNC: 2.8 G/DL (ref 3.5–5.2)
ALBUMIN/GLOB SERPL: 1.5 G/DL
ALP SERPL-CCNC: 67 U/L (ref 39–117)
ALT SERPL W P-5'-P-CCNC: 8 U/L (ref 1–41)
ANION GAP SERPL CALCULATED.3IONS-SCNC: 10 MMOL/L (ref 5–15)
AST SERPL-CCNC: 10 U/L (ref 1–40)
BASOPHILS # BLD AUTO: 0.01 10*3/MM3 (ref 0–0.2)
BASOPHILS NFR BLD AUTO: 0.1 % (ref 0–1.5)
BILIRUB SERPL-MCNC: 0.3 MG/DL (ref 0–1.2)
BUN SERPL-MCNC: 102 MG/DL (ref 8–23)
BUN/CREAT SERPL: 29.1 (ref 7–25)
CALCIUM SPEC-SCNC: 7.8 MG/DL (ref 8.6–10.5)
CHLORIDE SERPL-SCNC: 118 MMOL/L (ref 98–107)
CO2 SERPL-SCNC: 14 MMOL/L (ref 22–29)
CREAT SERPL-MCNC: 3.51 MG/DL (ref 0.76–1.27)
DEPRECATED RDW RBC AUTO: 42.5 FL (ref 37–54)
EOSINOPHIL # BLD AUTO: 0.01 10*3/MM3 (ref 0–0.4)
EOSINOPHIL NFR BLD AUTO: 0.1 % (ref 0.3–6.2)
ERYTHROCYTE [DISTWIDTH] IN BLOOD BY AUTOMATED COUNT: 13.8 % (ref 12.3–15.4)
GFR SERPL CREATININE-BSD FRML MDRD: 18 ML/MIN/1.73
GLOBULIN UR ELPH-MCNC: 1.9 GM/DL
GLUCOSE BLDC GLUCOMTR-MCNC: 167 MG/DL (ref 70–130)
GLUCOSE BLDC GLUCOMTR-MCNC: 278 MG/DL (ref 70–130)
GLUCOSE BLDC GLUCOMTR-MCNC: 312 MG/DL (ref 70–130)
GLUCOSE BLDC GLUCOMTR-MCNC: 76 MG/DL (ref 70–130)
GLUCOSE SERPL-MCNC: 59 MG/DL (ref 65–99)
HCT VFR BLD AUTO: 28.3 % (ref 37.5–51)
HGB BLD-MCNC: 9.8 G/DL (ref 13–17.7)
IMM GRANULOCYTES # BLD AUTO: 0.03 10*3/MM3 (ref 0–0.05)
IMM GRANULOCYTES NFR BLD AUTO: 0.3 % (ref 0–0.5)
LYMPHOCYTES # BLD AUTO: 0.79 10*3/MM3 (ref 0.7–3.1)
LYMPHOCYTES NFR BLD AUTO: 8.5 % (ref 19.6–45.3)
MAGNESIUM SERPL-MCNC: 1.7 MG/DL (ref 1.6–2.4)
MCH RBC QN AUTO: 29.8 PG (ref 26.6–33)
MCHC RBC AUTO-ENTMCNC: 34.6 G/DL (ref 31.5–35.7)
MCV RBC AUTO: 86 FL (ref 79–97)
MONOCYTES # BLD AUTO: 0.78 10*3/MM3 (ref 0.1–0.9)
MONOCYTES NFR BLD AUTO: 8.4 % (ref 5–12)
NEUTROPHILS NFR BLD AUTO: 7.67 10*3/MM3 (ref 1.7–7)
NEUTROPHILS NFR BLD AUTO: 82.6 % (ref 42.7–76)
NRBC BLD AUTO-RTO: 0.6 /100 WBC (ref 0–0.2)
PLATELET # BLD AUTO: 194 10*3/MM3 (ref 140–450)
PMV BLD AUTO: 9.9 FL (ref 6–12)
POTASSIUM SERPL-SCNC: 2.7 MMOL/L (ref 3.5–5.2)
POTASSIUM SERPL-SCNC: 3.4 MMOL/L (ref 3.5–5.2)
PROT SERPL-MCNC: 4.7 G/DL (ref 6–8.5)
RBC # BLD AUTO: 3.29 10*6/MM3 (ref 4.14–5.8)
SODIUM SERPL-SCNC: 142 MMOL/L (ref 136–145)
SODIUM UR-SCNC: 49 MMOL/L
WBC # BLD AUTO: 9.29 10*3/MM3 (ref 3.4–10.8)

## 2020-09-07 PROCEDURE — 97162 PT EVAL MOD COMPLEX 30 MIN: CPT | Performed by: PHYSICAL THERAPIST

## 2020-09-07 PROCEDURE — 80053 COMPREHEN METABOLIC PANEL: CPT | Performed by: NURSE PRACTITIONER

## 2020-09-07 PROCEDURE — 63710000001 INSULIN DETEMIR PER 5 UNITS: Performed by: INTERNAL MEDICINE

## 2020-09-07 PROCEDURE — 83735 ASSAY OF MAGNESIUM: CPT | Performed by: INTERNAL MEDICINE

## 2020-09-07 PROCEDURE — 63710000001 INSULIN ASPART PER 5 UNITS: Performed by: INTERNAL MEDICINE

## 2020-09-07 PROCEDURE — 25010000002 HEPARIN (PORCINE) PER 1000 UNITS: Performed by: INTERNAL MEDICINE

## 2020-09-07 PROCEDURE — 92526 ORAL FUNCTION THERAPY: CPT | Performed by: SPEECH-LANGUAGE PATHOLOGIST

## 2020-09-07 PROCEDURE — 85025 COMPLETE CBC W/AUTO DIFF WBC: CPT | Performed by: FAMILY MEDICINE

## 2020-09-07 PROCEDURE — 82570 ASSAY OF URINE CREATININE: CPT | Performed by: NURSE PRACTITIONER

## 2020-09-07 PROCEDURE — 25010000003 MAGNESIUM SULFATE 4 GM/100ML SOLUTION: Performed by: FAMILY MEDICINE

## 2020-09-07 PROCEDURE — 82962 GLUCOSE BLOOD TEST: CPT

## 2020-09-07 PROCEDURE — 84300 ASSAY OF URINE SODIUM: CPT | Performed by: NURSE PRACTITIONER

## 2020-09-07 PROCEDURE — 84132 ASSAY OF SERUM POTASSIUM: CPT | Performed by: INTERNAL MEDICINE

## 2020-09-07 PROCEDURE — 97110 THERAPEUTIC EXERCISES: CPT | Performed by: PHYSICAL THERAPIST

## 2020-09-07 RX ORDER — POTASSIUM CHLORIDE 750 MG/1
40 CAPSULE, EXTENDED RELEASE ORAL EVERY 4 HOURS
Status: COMPLETED | OUTPATIENT
Start: 2020-09-07 | End: 2020-09-07

## 2020-09-07 RX ORDER — POTASSIUM CHLORIDE 750 MG/1
10 CAPSULE, EXTENDED RELEASE ORAL ONCE
Status: COMPLETED | OUTPATIENT
Start: 2020-09-08 | End: 2020-09-08

## 2020-09-07 RX ADMIN — PANTOPRAZOLE SODIUM 40 MG: 40 TABLET, DELAYED RELEASE ORAL at 06:52

## 2020-09-07 RX ADMIN — SODIUM BICARBONATE 150 MEQ: 84 INJECTION, SOLUTION INTRAVENOUS at 03:36

## 2020-09-07 RX ADMIN — SODIUM CHLORIDE 50 ML: 900 IRRIGANT IRRIGATION at 21:03

## 2020-09-07 RX ADMIN — ACETYLCYSTEINE 4 ML: 100 SOLUTION ORAL; RESPIRATORY (INHALATION) at 06:31

## 2020-09-07 RX ADMIN — ACETYLCYSTEINE 4 ML: 100 SOLUTION ORAL; RESPIRATORY (INHALATION) at 21:02

## 2020-09-07 RX ADMIN — CARVEDILOL 3.12 MG: 3.12 TABLET, FILM COATED ORAL at 08:56

## 2020-09-07 RX ADMIN — POTASSIUM CHLORIDE 40 MEQ: 10 CAPSULE, COATED, EXTENDED RELEASE ORAL at 13:56

## 2020-09-07 RX ADMIN — ASPIRIN 81 MG: 81 TABLET, COATED ORAL at 08:56

## 2020-09-07 RX ADMIN — MAGNESIUM SULFATE HEPTAHYDRATE 4 G: 40 INJECTION, SOLUTION INTRAVENOUS at 12:39

## 2020-09-07 RX ADMIN — HEPARIN SODIUM 5000 UNITS: 5000 INJECTION INTRAVENOUS; SUBCUTANEOUS at 08:56

## 2020-09-07 RX ADMIN — CARVEDILOL 3.12 MG: 3.12 TABLET, FILM COATED ORAL at 21:02

## 2020-09-07 RX ADMIN — PRAVASTATIN SODIUM 40 MG: 40 TABLET ORAL at 21:02

## 2020-09-07 RX ADMIN — INSULIN DETEMIR 20 UNITS: 100 INJECTION, SOLUTION SUBCUTANEOUS at 21:02

## 2020-09-07 RX ADMIN — SODIUM CHLORIDE 50 ML: 900 IRRIGANT IRRIGATION at 06:31

## 2020-09-07 RX ADMIN — INSULIN ASPART 2 UNITS: 100 INJECTION, SOLUTION INTRAVENOUS; SUBCUTANEOUS at 17:34

## 2020-09-07 RX ADMIN — CLOPIDOGREL BISULFATE 75 MG: 75 TABLET ORAL at 08:56

## 2020-09-07 RX ADMIN — INSULIN ASPART 5 UNITS: 100 INJECTION, SOLUTION INTRAVENOUS; SUBCUTANEOUS at 11:39

## 2020-09-07 RX ADMIN — SODIUM CHLORIDE 50 ML: 900 IRRIGANT IRRIGATION at 13:58

## 2020-09-07 RX ADMIN — SERTRALINE HYDROCHLORIDE 50 MG: 50 TABLET ORAL at 08:56

## 2020-09-07 RX ADMIN — POTASSIUM CHLORIDE 40 MEQ: 10 CAPSULE, COATED, EXTENDED RELEASE ORAL at 17:34

## 2020-09-07 RX ADMIN — SODIUM CHLORIDE, PRESERVATIVE FREE 10 ML: 5 INJECTION INTRAVENOUS at 21:02

## 2020-09-07 RX ADMIN — NICOTINE 1 PATCH: 21 PATCH, EXTENDED RELEASE TRANSDERMAL at 08:58

## 2020-09-07 RX ADMIN — SODIUM BICARBONATE 150 MEQ: 84 INJECTION, SOLUTION INTRAVENOUS at 11:39

## 2020-09-07 RX ADMIN — HEPARIN SODIUM 5000 UNITS: 5000 INJECTION INTRAVENOUS; SUBCUTANEOUS at 21:02

## 2020-09-07 RX ADMIN — ACETYLCYSTEINE 4 ML: 100 SOLUTION ORAL; RESPIRATORY (INHALATION) at 13:56

## 2020-09-07 RX ADMIN — POTASSIUM CHLORIDE 40 MEQ: 10 CAPSULE, COATED, EXTENDED RELEASE ORAL at 08:56

## 2020-09-07 NOTE — PROGRESS NOTES
LOS: 2 days     Patient Care Team:  Shayne Merritt MD as PCP - General (Family Medicine)  Adryan Prater APRN as PCP - Claims Attributed  Kevin Robbins PA-C as Physician Assistant (Gastroenterology)  Harvinder Robert MD      Subjective     Renal failure status post cystectomy and ileal loop    Objective       Vital Signs  Temp:  [98.1 °F (36.7 °C)-98.8 °F (37.1 °C)] 98.2 °F (36.8 °C)  Heart Rate:  [] 103  Resp:  [18] 18  BP: ()/(52-85) 108/72    Physical Exam:        General Appearance:   Up eating     Respiratory:    UNLABORED RESPIRATIONS.     Abdomen:     SOFT.       Genitourinary:  John in place     Rectal:     DEFERRED       Results Review:       Imaging Results (Last 24 Hours)     Procedure Component Value Units Date/Time    US Renal Limited [[id number]] Collected:  09/06/20 1123     Updated:  09/06/20 1232    Narrative:       EXAMINATION:  ultrasound, renal     CLINICAL INDICATION / HISTORY:  SUJATHA, E10.10 Type 1 diabetes  mellitus with ketoacidosis without coma R13.12 Dysphagia,  oropharyngeal phase    COMPARISON:  none    TECHNIQUE:  ultrasound, renal    FULL RESULTS / FINDINGS:  Severely limited exam secondary to patient's body habitus with  poor acoustic windows as well as restricted mobility.  Kidney, right:      size:  normal, measuring 10.9 x 5.4 x 5.2 cm    echotexture:  normal    no nephrolithiasis, solid mass, or collecting system dilation    Kidney, left:      size:  normal, measuring 9.5 x 5.4 x 6.5 cm    echotexture:  normal    no nephrolithiasis, solid mass, or collecting system dilation,  it most be noted that a portion of the kidney is obscured from  overlying bowel gas.    Urinary bladder:  Not imaged and therefore not evaluated. Per  history the bladder is surgically absent.      Impression:       CONCLUSION:    1.  Severely limited exam secondary to patient's body habitus  with poor acoustic windows as well as restricted mobility.  2.  Urinary bladder not imaged  and therefore not evaluated. Per  history patient has had bladder resection.  3.  Remainder of limited renal ultrasound unremarkable.    Electronically signed by:  Tyron Cheek MD  9/6/2020 12:20 PM CDT  Workstation: [id number]        Lab Results (last 24 hours)     Procedure Component Value Units Date/Time    Magnesium [[id number]]  (Normal) Collected:  09/07/20 0535    Specimen:  Blood Updated:  09/07/20 1031     Magnesium 1.7 mg/dL     POC Glucose Once [[id number]]  (Normal) Collected:  09/07/20 0733    Specimen:  Blood Updated:  09/07/20 0914     Glucose 76 mg/dL      Comment: : [id number] [facility] [id number]       Comprehensive Metabolic Panel [[id number]]  (Abnormal) Collected:  09/07/20 0535    Specimen:  Blood Updated:  09/07/20 0637     Glucose 59 mg/dL       mg/dL      Creatinine 3.51 mg/dL      Sodium 142 mmol/L      Potassium 2.7 mmol/L      Chloride 118 mmol/L      CO2 14.0 mmol/L      Calcium 7.8 mg/dL      Total Protein 4.7 g/dL      Albumin 2.80 g/dL      ALT (SGPT) 8 U/L      AST (SGOT) 10 U/L      Alkaline Phosphatase 67 U/L      Total Bilirubin 0.3 mg/dL      eGFR Non African Amer 18 mL/min/1.73      Globulin 1.9 gm/dL      A/G Ratio 1.5 g/dL      BUN/Creatinine Ratio 29.1     Anion Gap 10.0 mmol/L     Narrative:       GFR Normal >60  Chronic Kidney Disease <60  Kidney Failure <15      CBC & Differential [[id number]] Collected:  09/07/20 0535    Specimen:  Blood Updated:  09/07/20 0620    Narrative:       The following orders were created for panel order CBC & Differential.  Procedure                               Abnormality         Status                     ---------                               -----------         ------                     CBC Auto Differential[[id number]]        Abnormal            Final result                 Please view results for these tests on the individual orders.    CBC Auto Differential [[id number]]  (Abnormal) Collected:  09/07/20 0535     Specimen:  Blood Updated:  09/07/20 0620     WBC 9.29 10*3/mm3      RBC 3.29 10*6/mm3      Hemoglobin 9.8 g/dL      Hematocrit 28.3 %      MCV 86.0 fL      MCH 29.8 pg      MCHC 34.6 g/dL      RDW 13.8 %      RDW-SD 42.5 fl      MPV 9.9 fL      Platelets 194 10*3/mm3      Neutrophil % 82.6 %      Lymphocyte % 8.5 %      Monocyte % 8.4 %      Eosinophil % 0.1 %      Basophil % 0.1 %      Immature Grans % 0.3 %      Neutrophils, Absolute 7.67 10*3/mm3      Lymphocytes, Absolute 0.79 10*3/mm3      Monocytes, Absolute 0.78 10*3/mm3      Eosinophils, Absolute 0.01 10*3/mm3      Basophils, Absolute 0.01 10*3/mm3      Immature Grans, Absolute 0.03 10*3/mm3      nRBC 0.6 /100 WBC     POC Glucose Once [[id number]]  (Abnormal) Collected:  09/1955    Specimen:  Blood Updated:  09/06/20 2008     Glucose 200 mg/dL      Comment: : [id number] KEZIA MERIDANIFERMeter ID: [id number]       POC Glucose Once [[id number]]  (Abnormal) Collected:  09/06/20 1709    Specimen:  Blood Updated:  09/06/20 1721     Glucose 252 mg/dL      Comment: RN NotifiedOperator: [id number] NINA SQUIRESURTNEYMeter ID: [id number]       Basic Metabolic Panel [[id number]]  (Abnormal) Collected:  09/06/20 1538    Specimen:  Blood Updated:  09/06/20 1604     Glucose 263 mg/dL       mg/dL      Creatinine 4.09 mg/dL      Sodium 137 mmol/L      Potassium 3.0 mmol/L      Chloride 115 mmol/L      CO2 11.0 mmol/L      Calcium 7.8 mg/dL      eGFR Non African Amer 15 mL/min/1.73      BUN/Creatinine Ratio 26.9     Anion Gap 11.0 mmol/L     Narrative:       GFR Normal >60  Chronic Kidney Disease <60  Kidney Failure <15      Blood Gas, Arterial [[id number]]  (Abnormal) Collected:  09/06/20 1435    Specimen:  Arterial Blood Updated:  09/06/20 1443     Site Right Radial     Wilian's Test Negative     pH, Arterial 7.192 pH units      Comment: 85 Value below critical limit        pCO2, Arterial 23.9 mm Hg      Comment: 84 Value below reference range        pO2,  Arterial 112.0 mm Hg      Comment: 83 Value above reference range        HCO3, Arterial 9.2 mmol/L      Comment: 84 Value below reference range        Base Excess, Arterial -17.4 mmol/L      Comment: 84 Value below reference range        O2 Saturation, Arterial 98.8 %      Barometric Pressure for Blood Gas 750 mmHg      Modality Room Air     Ventilator Mode NA     Collected by EJ     Comment: Meter: [id number]     :  [id number]               I reviewed the patient's new clinical results.  I reviewed the patient's new imaging results and agree with the interpretation.  I reviewed the patient's other test results and agree with the interpretation        Assessment/Plan       Diabetic ketoacidosis without coma associated with type 1 diabetes mellitus (CMS/[facility])      Continue John Mucomyst watch renal function antibiotics      Christofer Calderon MD  09/07/20  12:30

## 2020-09-07 NOTE — PLAN OF CARE
Problem: Patient Care Overview  Goal: Plan of Care Review  Outcome: Ongoing (interventions implemented as appropriate)  Flowsheets  Taken 9/7/2020 0804 by Teresa Ardon MS CCC-SLP  Progress: improving  Taken 9/7/2020 1005 by Tyron Turpin PT  Plan of Care Reviewed With: patient  Taken 9/7/2020 1411 by Layla Good, RN  Outcome Summary: pt vss. Pt resting well at this time.
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
## 2020-09-07 NOTE — PROGRESS NOTES
"[facility]  [address] [place] [address]. [address]  T - [phone]  F - [phone]     Progress Note          PATIENT  DEMOGRAPHICS   PATIENT NAME: Favio Casillas                      PHYSICIAN: TIFFANIE Franco  : 1955  MRN: [id number]   LOS: 2 days    Patient Care Team:  Shayne Merritt MD as PCP - General (Family Medicine)  Adryan Prater APRN as PCP - Claims Attributed  Kevin Robbins PA-C as Physician Assistant (Gastroenterology)  Harvinder Robert MD  Subjective   SUBJECTIVE   More alert today.         Objective   OBJECTIVE   Vital Signs  Temp:  [98.1 °F (36.7 °C)-98.8 °F (37.1 °C)] 98.2 °F (36.8 °C)  Heart Rate:  [] 103  Resp:  [18] 18  BP: ()/(52-85) 118/71    Flowsheet Rows      First Filed Value   Admission Height  170.2 cm (67\") Documented at 2020 1634   Admission Weight  55.9 kg (123 lb 4.8 oz) Documented at 2020 1634           I/O last 3 completed shifts:  In: 240 [P.O.:240]  Out: 2300 [Urine:2300]    PHYSICAL EXAM    Physical Exam   Constitutional: He is oriented to person, place, and time. He appears well-developed.   Cachectic   HENT:   Head: Normocephalic and atraumatic.   Eyes: Pupils are equal, round, and reactive to light. Conjunctivae and EOM are normal.   Cardiovascular: Normal rate and regular rhythm.   Pulmonary/Chest: Effort normal and breath sounds normal.   Abdominal: Soft.   Musculoskeletal: He exhibits no edema.   Neurological: He is alert and oriented to person, place, and time.   Skin: Skin is warm and dry.       RESULTS   Results Review:    Results from last 7 days   Lab Units 20  0535 20  1538 20  0831  20  1653   SODIUM mmol/L 142 137 132*   < > 124*   POTASSIUM mmol/L 2.7* 3.0* 4.0   < > 5.0   CHLORIDE mmol/L 118* 115* 115*   < > 104   CO2 mmol/L 14.0* 11.0* 7.0*   < > 5.0*   BUN mg/dL 102* 110* 110*   < > 107*   CREATININE mg/dL 3.51* 4.09* 4.78*   < > 5.16*   CALCIUM mg/dL " 7.8* 7.8* 8.0*   < > 9.0   BILIRUBIN mg/dL 0.3  --  0.3  --  0.5   ALK PHOS U/L 67  --  81  --  113   ALT (SGPT) U/L 8  --  9  --  11   AST (SGOT) U/L 10  --  7  --  7   GLUCOSE mg/dL 59* 263* 225*   < > 496*    < > = values in this interval not displayed.       Estimated Creatinine Clearance: 17.5 mL/min (A) (by C-G formula based on SCr of 3.51 mg/dL (H)).    Results from last 7 days   Lab Units 09/07/20  0535 09/06/20  0831 09/06/20 0432 09/05/20  2348   MAGNESIUM mg/dL 1.7 1.8 1.7 1.8   PHOSPHORUS mg/dL  --  3.1 2.3* 2.3*             Results from last 7 days   Lab Units 09/07/20  0535 09/06/20 0432 09/05/20 2014 09/05/20  1653   WBC 10*3/mm3 9.29 12.63* 16.23* 17.90*   HEMOGLOBIN g/dL 9.8* 11.1* 11.9* 13.3   PLATELETS 10*3/mm3 194 196 164 221               Imaging Results (Last 24 Hours)     Procedure Component Value Units Date/Time    US Renal Limited [[id number]] Collected:  09/06/20 1123     Updated:  09/06/20 1232    Narrative:       EXAMINATION:  ultrasound, renal     CLINICAL INDICATION / HISTORY:  SUJATHA, E10.10 Type 1 diabetes  mellitus with ketoacidosis without coma R13.12 Dysphagia,  oropharyngeal phase    COMPARISON:  none    TECHNIQUE:  ultrasound, renal    FULL RESULTS / FINDINGS:  Severely limited exam secondary to patient's body habitus with  poor acoustic windows as well as restricted mobility.  Kidney, right:      size:  normal, measuring 10.9 x 5.4 x 5.2 cm    echotexture:  normal    no nephrolithiasis, solid mass, or collecting system dilation    Kidney, left:      size:  normal, measuring 9.5 x 5.4 x 6.5 cm    echotexture:  normal    no nephrolithiasis, solid mass, or collecting system dilation,  it most be noted that a portion of the kidney is obscured from  overlying bowel gas.    Urinary bladder:  Not imaged and therefore not evaluated. Per  history the bladder is surgically absent.      Impression:       CONCLUSION:    1.  Severely limited exam secondary to patient's body habitus  with poor  acoustic windows as well as restricted mobility.  2.  Urinary bladder not imaged and therefore not evaluated. Per  history patient has had bladder resection.  3.  Remainder of limited renal ultrasound unremarkable.    Electronically signed by:  Tyron Cheek MD  9/6/2020 12:20 PM CDT  Workstation: [id number]           MEDICATIONS      acetylcysteine 4 mL Oral Q8H   And      sodium chloride 50 mL Irrigation Q8H   aspirin 81 mg Oral Daily   budesonide-formoterol 2 puff Inhalation BID - RT   carvedilol 3.125 mg Oral Q12H   clopidogrel 75 mg Oral Daily   heparin (porcine) 5,000 Units Subcutaneous Q12H   insulin aspart 0-7 Units Subcutaneous TID AC   insulin detemir 20 Units Subcutaneous Nightly   nicotine 1 patch Transdermal Q24H   pantoprazole 40 mg Oral QAM   pravastatin 40 mg Oral Nightly   sertraline 50 mg Oral Daily   sodium chloride 10 mL Intravenous Q12H       sodium bicarbonate drip (greater than 75 mEq/bag) 150 mEq Last Rate: 150 mEq (09/07/20 0336)       Assessment/Plan   ASSESSMENT / PLAN      Diabetic ketoacidosis without coma associated with type 1 diabetes mellitus (CMS/HCC)    1.  AK on CKD 3- baseline creatinine close to 2, creatinine now greater than 5.  John catheter already placed.  Prior history of bladder outlet obstruction and multiple acute kidney injuries as well as hydronephrosis.  Urinalysis was turbid and microscopy was unable to be performed.      -Cr improving, bicarb improving, sodium WNL, replace K.     2.  Severe metabolic acidosis- normal anion gap, low pH, bicarb gtt      3.  Altered mental status- improving     4.  History of bladder cancer- requiring neobladder surgery almost 10 years ago, also history of prostate cancer, Urology following     5.  CAD     6.  IDDM- controlled per primary team     7.  Hyponatremia- mild/pseudohyponatremia, resolved           This document has been electronically signed by TIFFANIE Franco on September 7, 2020 10:38

## 2020-09-07 NOTE — PLAN OF CARE
Problem: Patient Care Overview  Goal: Plan of Care Review  Outcome: Ongoing (interventions implemented as appropriate)  Flowsheets (Taken 9/7/2020 0251)  Progress: improving  Plan of Care Reviewed With: patient  Note:   Patient rests well. Blood glucose monitoring continues. John catheter in place. Urine is clear and yellow. VS WNL. Will continue to monitor.

## 2020-09-07 NOTE — PLAN OF CARE
Problem: Patient Care Overview  Goal: Plan of Care Review  Outcome: Ongoing (interventions implemented as appropriate)  Flowsheets  Taken 9/7/2020 0804  Progress: improving  Plan of Care Reviewed With: patient  Taken 9/7/2020 0724  Outcome Summary: Pt seen for dysphagia tx this date w/snack and AM meal.  Pt presented as much more alert this date than previous session.  Pt was able to self feed entire meal.  Pt consumed puree solids (egg, potatoes, applesauce) w/no difficulty clearing oral cavity.  Pt consumed NTL via cup w/no overt s/s of aspiration.  Pt trialed thin liquids via spoon and cup w/no overt s/s of aspiration.  Pt trialed cereal (mixed consistency) and was able to clear oral cavity between bites w/no liquid wash.  There were no overt s/s of aspiration w/mixed consistency trials.  SLP recommends diet advancement to [facility] soft solids w/thin liquids - no straws.  SLP informed pt and nsg of diet recommendations.  SLP updated informational board and informed dietary of diet change.

## 2020-09-07 NOTE — THERAPY TREATMENT NOTE
Acute Care - Speech Language Pathology   Swallow Treatment Note [facility]     Patient Name: Favio Casillas  : 1955  MRN: [id number]  Today's Date: 2020               Admit Date: 2020     Goal:  Patient will safely tolerate least restricted diet w/no overt s/s of aspiration for adequate nutrition and hydration:  Pt seen for dysphagia tx this date w/snack and AM meal. Pt presented as much more alert this date than previous session. Pt was able to self feed entire meal. Pt consumed puree solids (egg, potatoes, applesauce) w/no difficulty clearing oral cavity. Pt consumed NTL via cup w/no overt s/s of aspiration. Pt trialed thin liquids via spoon and cup w/no overt s/s of aspiration. Pt trialed cereal (mixed consistency) and was able to clear oral cavity between bites w/no liquid wash. There were no overt s/s of aspiration w/mixed consistency trials. SLP recommends diet advancement to [facility] soft solids w/thin liquids - no straws. SLP informed pt and nsg of diet recommendations. SLP updated informational board and informed dietary of diet change.      Visit Dx:      ICD-10-CM ICD-9-CM   1. Diabetic ketoacidosis without coma associated with type 1 diabetes mellitus (CMS/HCC) E10.10 250.13   2. Oropharyngeal dysphagia R13.12 787.22     Patient Active Problem List   Diagnosis   • Type 2 diabetes mellitus (CMS/HCC)   • Acute pain of right shoulder   • Shoulder impingement syndrome, right   • Chest pain   • Acute renal insufficiency   • Chronic hepatitis C virus infection (CMS/HCC)   • Gastritis   • SBO (small bowel obstruction) (CMS/HCC)   • CAD (coronary artery disease)   • Acute kidney injury (nontraumatic) (CMS/HCC)   • Intractable vomiting with nausea   • Gastroesophageal reflux disease with esophagitis   • Diarrhea   • Generalized abdominal pain   • History of colon polyps   • History of colitis   • Acute metabolic encephalopathy   • NSTEMI (non-ST elevated myocardial infarction) (CMS/HCC)   •  Acute renal failure superimposed on stage 3 chronic kidney disease (CMS/HCC)   • Influenza A   • Anemia, chronic disease   • Bladder outflow obstruction   • Acute kidney injury (CMS/HCC)   • Acute urinary tract infection   • Metabolic acidosis   • Hypokalemia   • Acute renal failure superimposed on chronic kidney disease (CMS/HCC)   • Diabetic ketoacidosis without coma associated with type 2 diabetes mellitus (CMS/HCC)   • Diabetic ketoacidosis without coma associated with type 1 diabetes mellitus (CMS/HCC)       Therapy Treatment  Rehabilitation Treatment Summary     Row Name 09/07/20 0724             Treatment Time/Intention    Discipline  speech language pathologist  -CK      Document Type  therapy note (daily note)  -CK      Subjective Information  no complaints  -CK2      Mode of Treatment  speech-language pathology;individual therapy  -CK2      Patient/Family Observations  No family present at bedside  -CK      Care Plan Review  evaluation/treatment results reviewed;care plan/treatment goals reviewed;risks/benefits reviewed;current/potential barriers reviewed  -CK2      Patient Effort  good  -CK3      Recorded by [CK] Teresa Ardon MS CCC-SLP 09/07/20 0725  [CK2] Teresa Ardon, MS CCC-SLP 09/07/20 0726  [CK3] Teresa Ardon, MS CCC-SLP 09/07/20 0756      Row Name 09/07/20 0724             Positioning and Restraints    Pre-Treatment Position  in bed  -CK      Post Treatment Position  bed  -CK2      In Bed  sitting;call light within reach;encouraged to call for assist;exit alarm on  -CK2      Recorded by [CK] Teresa Ardon MS CCC-SLP 09/07/20 0726  [CK2] Teresa Ardon, MS CCC-SLP 09/07/20 0804      Row Name 09/07/20 0724             Pain Scale: Numbers Pre/Post-Treatment    Pain Scale: Numbers, Pretreatment  0/10 - no pain  -CK      Pain Scale: Numbers, Post-Treatment  0/10 - no pain  -CK2      Recorded by [CK] Teresa Ardon MS CCC-SLP 09/07/20 0726  [CK2] Teresa Ardon, MS  CCC-[facility] 09/07/20 0804      Row Name 09/07/20 0724             Plan of Care Review    Plan of Care Reviewed With  patient  -CK      Progress  improving  -CK      Outcome Summary  Pt seen for dysphagia tx this date w/snack and AM meal.  Pt presented as much more alert this date than previous session.  Pt was able to self feed entire meal.  Pt consumed puree solids (egg, potatoes, applesauce) w/no difficulty clearing oral cavity.  Pt consumed NTL via cup w/no overt s/s of aspiration.  Pt trialed thin liquids via spoon and cup w/no overt s/s of aspiration.  Pt trialed cereal (mixed consistency) and was able to clear oral cavity between bites w/no liquid wash.  There were no overt s/s of aspiration w/mixed consistency trials.  SLP recommends diet advancement to [facility] soft solids w/thin liquids - no straws.  SLP informed pt and nsg of diet recommendations.  SLP updated informational board and informed dietary of diet change.  -CK      Recorded by [CK] Teresa Ardon MS CCC-SLP 09/07/20 0756      Row Name 09/07/20 0724             Outcome Summary/Treatment Plan (SLP)    Daily Summary of Progress (SLP)  progress toward functional goals is good  -CK      Barriers to Overall Progress (SLP)  alertness  -CK      Plan for Continued Treatment (SLP)  Advance diet to [facility] soft solids/thin - no straw  -CK      Anticipated Dischage Disposition (SLP)  unknown  -CK      Recorded by [CK] Teresa Ardon MS CCC-SLP 09/07/20 0756        User Key  (r) = Recorded By, (t) = Taken By, (c) = Cosigned By    Initials Name Effective Dates Discipline    CK Teresa Ardon MS CCC-SLP 02/11/20 -  SLP          Outcome Summary  Outcome Summary/Treatment Plan (SLP)  Daily Summary of Progress (SLP): progress toward functional goals is good (09/07/20 0724 : Teresa Ardon MS CCC-SLP)  Barriers to Overall Progress (SLP): alertness (09/07/20 0724 : Teresa Ardon MS CCC-SLP)  Plan for Continued Treatment (SLP): Advance diet to [facility]  soft solids/thin - no straw (09/07/20 0724 : Teresa Ardon, MS CCC-SLP)  Anticipated Dischage Disposition (SLP): unknown (09/07/20 0724 : Teresa Ardon, MS [facility]-SLP)      SLP GOALS     Row Name 09/07/20 0724 09/06/20 0936          Oral Nutrition/Hydration Goal 1 (SLP)    Oral Nutrition/Hydration Goal 1, SLP  Pt will safely tolerate least restricted diet w/no overt s/s of aspiration for adequate nutrition and hydration.  -CK  Pt will safely tolerate least restricted diet w/no overt s/s of aspiration for adequate nutrition and hydration.  -CK     Time Frame (Oral Nutrition/Hydration Goal 1, SLP)  by discharge  -CK  by discharge  -CK     Barriers (Oral Nutrition/Hydration Goal 1, SLP)  cognitive status  -CK  cognitive status  -CK     Progress/Outcomes (Oral Nutrition/Hydration Goal 1, SLP)  goal ongoing;goal partially met  -CK  other (see comments) new goal  -CK       User Key  (r) = Recorded By, (t) = Taken By, (c) = Cosigned By    Initials Name Provider Type    CK Teresa Ardon, MS CCC-SLP Speech and Language Pathologist          EDUCATION  The patient has been educated in the following areas:   Dysphagia (Swallowing Impairment) Modified Diet Instruction.    SLP Recommendation and Plan  Daily Summary of Progress (SLP): progress toward functional goals is good  Barriers to Overall Progress (SLP): alertness  Plan for Continued Treatment (SLP): Advance diet to [facility] soft solids/thin - no straw  Anticipated Dischage Disposition (SLP): unknown                    Time Calculation:   Time Calculation- SLP     Row Name 09/07/20 0804             Time Calculation- SLP    SLP Start Time  0724  -CK      SLP Stop Time  0804  -CK      SLP Time Calculation (min)  40 min  -CK      Total Timed Code Minutes- SLP  40 minute(s)  -CK      SLP Received On  09/07/20  -CK      SLP Goal Re-Cert Due Date  09/20/20  -CK        User Key  (r) = Recorded By, (t) = Taken By, (c) = Cosigned By    Initials Name Provider Type    CK  Teresa Ardno, MS CCC-SLP Speech and Language Pathologist          Therapy Charges for Today     Code Description Service Date Service Provider Modifiers Qty    [id number]  ST EVAL ORAL PHARYNG SWALLOW 3 9/6/2020 Teresa Ardon, MS CCC-SLP GN 1    [id number]  ST TREATMENT SWALLOW 3 9/7/2020 Teresa Ardon, MS VENCES-SLP GN 1                 MS ALIN Bryan  9/7/2020

## 2020-09-07 NOTE — PROGRESS NOTES
[facility] Medicine Services  INPATIENT PROGRESS NOTE    Length of Stay: 2  Date of Admission: 9/5/2020  Primary Care Physician: Shayne Merritt MD    Subjective   Chief Complaint: Altered mental status    HPI:  He is a 65 year old male with history of insulin dependent Diabetes, Hepatitis C, COPD, substance abuse, GERD, HTN, and hx of bladder cancer who was admitted for DKA  and acute kidney injury complicated by acute metabolic encephalopathy.  He also has malnutrition and history of bladder cancer status post neobladder creation with chronic John catheter placement.     Interval history: This morning patient has no complaints.  He is alert and oriented x3.  His appetite is improving.    Review of Systems   Constitutional: Positive for appetite change and fatigue. Negative for activity change, chills and fever.   HENT: Negative for congestion, sore throat and trouble swallowing.    Respiratory: Negative for cough, chest tightness, shortness of breath and wheezing.    Cardiovascular: Negative for chest pain, palpitations and leg swelling.   Gastrointestinal: Negative for abdominal distention, abdominal pain, diarrhea, nausea and vomiting.   Genitourinary: Negative for difficulty urinating, dysuria and hematuria.   Musculoskeletal: Negative for arthralgias, back pain and myalgias.   Skin: Negative for pallor and rash.   Neurological: Negative for dizziness, syncope, weakness, light-headedness and headaches.   Hematological: Negative for adenopathy. Does not bruise/bleed easily.   Psychiatric/Behavioral: Negative for agitation and confusion. The patient is not nervous/anxious.      Objective    Temp:  [98.1 °F (36.7 °C)-98.8 °F (37.1 °C)] 98.2 °F (36.8 °C)  Heart Rate:  [] 103  Resp:  [18] 18  BP: ()/(52-85) 108/72    Physical Exam   Constitutional: He is oriented to person, place, and time. He appears well-developed and well-nourished. No distress.    HENT:   Head: Normocephalic and atraumatic.   Mouth/Throat: Oropharynx is clear and moist. No oropharyngeal exudate.   Eyes: Pupils are equal, round, and reactive to light. Conjunctivae and EOM are normal. No scleral icterus.   Neck: Normal range of motion. Neck supple. No JVD present. No tracheal deviation present. No thyromegaly present.   Cardiovascular: Normal rate, regular rhythm, normal heart sounds and intact distal pulses. Exam reveals no gallop and no friction rub.   No murmur heard.  Pulmonary/Chest: Effort normal and breath sounds normal. No stridor. No respiratory distress. He has no wheezes. He has no rales. He exhibits no tenderness.   Abdominal: Soft. Bowel sounds are normal. He exhibits no distension and no mass. There is no tenderness. There is no rebound and no guarding. No hernia.   Genitourinary:   Genitourinary Comments: John catheter in situ.   Musculoskeletal: Normal range of motion. He exhibits no edema, tenderness or deformity.   Lymphadenopathy:     He has no cervical adenopathy.   Neurological: He is alert and oriented to person, place, and time. No cranial nerve deficit. He exhibits normal muscle tone.   Skin: Skin is warm and dry. No rash noted. He is not diaphoretic. No erythema. No pallor.   Psychiatric: He has a normal mood and affect. His behavior is normal. Judgment and thought content normal.     Medication Review:    Current Facility-Administered Medications:   •  acetaminophen (TYLENOL) tablet 650 mg, 650 mg, Oral, Q4H PRN **OR** acetaminophen (TYLENOL) suppository 650 mg, 650 mg, Rectal, Q4H PRN, Kevin Perry MD  •  acetylcysteine (MUCOMYST) 10 % solution 4 mL, 4 mL, Oral, Q8H, 4 mL at 09/07/20 1356 **AND** sodium chloride (NS) irrigation solution 50 mL, 50 mL, Irrigation, Q8H, Adryan Prater APRN, 50 mL at 09/07/20 1358  •  albuterol sulfate HFA (PROVENTIL HFA;VENTOLIN HFA;PROAIR HFA) inhaler 2 puff, 2 puff, Inhalation, Q6H PRN, Kevin Perry MD  •  aspirin EC tablet 81  mg, 81 mg, Oral, Daily, Ciro Chauhan MD, 81 mg at 09/07/20 0856  •  budesonide-formoterol (SYMBICORT) 160-4.5 MCG/ACT inhaler 2 puff, 2 puff, Inhalation, BID - RT, Kevin Perry MD, 2 puff at 09/05/20 2005  •  carvedilol (COREG) tablet 3.125 mg, 3.125 mg, Oral, Q12H, Ciro Chauhan MD, 3.125 mg at 09/07/20 0856  •  clopidogrel (PLAVIX) tablet 75 mg, 75 mg, Oral, Daily, Ciro Chauhan MD, 75 mg at 09/07/20 0856  •  dextrose (D50W) 25 g/ 50mL Intravenous Solution 25 g, 25 g, Intravenous, Q15 Min PRN, Ciro Chauhan MD  •  dextrose (GLUTOSE) oral gel 15 g, 15 g, Oral, Q15 Min PRN, Ciro Chauhan MD  •  glucagon (human recombinant) (GLUCAGEN DIAGNOSTIC) injection 1 mg, 1 mg, Subcutaneous, Q15 Min PRN, Ciro Chauhan MD  •  heparin (porcine) 5000 UNIT/ML injection 5,000 Units, 5,000 Units, Subcutaneous, Q12H, Ciro Chauhan MD, 5,000 Units at 09/07/20 0856  •  insulin aspart (novoLOG) injection 0-7 Units, 0-7 Units, Subcutaneous, TID AC, Ciro Chauhan MD, 5 Units at 09/07/20 1139  •  insulin detemir (LEVEMIR) injection 20 Units, 20 Units, Subcutaneous, Nightly, Ciro Chauhan MD, 20 Units at 09/06/20 2032  •  Magnesium Sulfate 2 gram Bolus, followed by 8 gram infusion (total Mg dose 10 grams)- Mg less than or equal to 1mg/dL, 2 g, Intravenous, PRN **OR** Magnesium Sulfate 2 gram / 50mL Infusion (GIVE X 3 BAGS TO EQUAL 6GM TOTAL DOSE) - Mg 1.1 - 1.5 mg/dl, 2 g, Intravenous, PRN **OR** Magnesium Sulfate 4 gram infusion- Mg 1.6-1.9 mg/dL, 4 g, Intravenous, PRN, Kevin Perry MD, Last Rate: 25 mL/hr at 09/07/20 1239, 4 g at 09/07/20 1239  •  nicotine (NICODERM CQ) 21 MG/24HR patch 1 patch, 1 patch, Transdermal, Q24H, Ciro Chauhan MD, 1 patch at 09/07/20 0858  •  nitroglycerin (NITROSTAT) SL tablet 0.4 mg, 0.4 mg, Sublingual, Q5 Min PRN, Ciro Chauhan MD  •  ondansetron (ZOFRAN) tablet 4 mg, 4 mg, Oral, Q6H PRN **OR** ondansetron (ZOFRAN) injection 4 mg, 4 mg,  Intravenous, Q6H PRN, Kevin Perry MD  •  pantoprazole (PROTONIX) EC tablet 40 mg, 40 mg, Oral, QAM, Ciro Chauhan MD, 40 mg at 09/07/20 0652  •  potassium chloride (MICRO-K) CR capsule 40 mEq, 40 mEq, Oral, Q4H, Anastacio Reilly, APRN, 40 mEq at 09/07/20 1356  •  potassium phosphate 45 mmol in sodium chloride 0.9 % 500 mL infusion, 45 mmol, Intravenous, PRN **OR** potassium phosphate 30 mmol in sodium chloride 0.9 % 250 mL infusion, 30 mmol, Intravenous, PRN **OR** Potassium Phosphates 15 mmol in sodium chloride 0.9 % 100 mL infusion, 15 mmol, Intravenous, PRN, 15 mmol at 09/06/20 0254 **OR** sodium phosphates 45 mmol in sodium chloride 0.9 % 500 mL IVPB, 45 mmol, Intravenous, PRN **OR** sodium phosphates 30 mmol in sodium chloride 0.9 % 250 mL IVPB, 30 mmol, Intravenous, PRN **OR** sodium phosphates 15 mmol in sodium chloride 0.9 % 250 mL IVPB, 15 mmol, Intravenous, PRN, Kevin Perry MD  •  pravastatin (PRAVACHOL) tablet 40 mg, 40 mg, Oral, Nightly, Ciro Chauhan MD, 40 mg at 09/06/20 2032  •  sertraline (ZOLOFT) tablet 50 mg, 50 mg, Oral, Daily, Ciro Chauhan MD, 50 mg at 09/07/20 0856  •  sodium bicarbonate 8.4 % 150 mEq in sterile water 1,000 mL infusion (greater than 75 mEq), 150 mEq, Intravenous, Continuous, Kaitlynn Patel MD, Last Rate: 100 mL/hr at 09/07/20 1404  •  sodium chloride 0.9 % flush 10 mL, 10 mL, Intravenous, Q12H, Kevin Perry MD  •  sodium chloride 0.9 % flush 10 mL, 10 mL, Intravenous, PRN, Kevin Perry MD  •  sodium chloride 0.9 % flush 10 mL, 10 mL, Intravenous, Once PRN, Kevin Perry MD  •  sodium chloride 0.9 % flush 30 mL, 30 mL, Intravenous, Once PRN, Kevin Perry MD    I have reviewed the patient's current medications.     Results Review:  I have reviewed the labs, radiology results, and diagnostic studies.    Laboratory Data:   Results from last 7 days   Lab Units 09/07/20  0535 09/06/20  1538 09/06/20  0831  09/05/20  1653   SODIUM mmol/L 142 137  132*   < > 124*   POTASSIUM mmol/L 2.7* 3.0* 4.0   < > 5.0   CHLORIDE mmol/L 118* 115* 115*   < > 104   CO2 mmol/L 14.0* 11.0* 7.0*   < > 5.0*   BUN mg/dL 102* 110* 110*   < > 107*   CREATININE mg/dL 3.51* 4.09* 4.78*   < > 5.16*   GLUCOSE mg/dL 59* 263* 225*   < > 496*   CALCIUM mg/dL 7.8* 7.8* 8.0*   < > 9.0   BILIRUBIN mg/dL 0.3  --  0.3  --  0.5   ALK PHOS U/L 67  --  81  --  113   ALT (SGPT) U/L 8  --  9  --  11   AST (SGOT) U/L 10  --  7  --  7   ANION GAP mmol/L 10.0 11.0 10.0   < > 15.0    < > = values in this interval not displayed.     Estimated Creatinine Clearance: 17.5 mL/min (A) (by C-G formula based on SCr of 3.51 mg/dL (H)).  Results from last 7 days   Lab Units 09/07/20  0535 09/06/20  0831 09/06/20 0432 09/05/20  2348   MAGNESIUM mg/dL 1.7 1.8 1.7 1.8   PHOSPHORUS mg/dL  --  3.1 2.3* 2.3*         Results from last 7 days   Lab Units 09/07/20  0535 09/06/20  0432 09/05/20 2014 09/05/20  1653   WBC 10*3/mm3 9.29 12.63* 16.23* 17.90*   HEMOGLOBIN g/dL 9.8* 11.1* 11.9* 13.3   HEMATOCRIT % 28.3* 33.6* 35.2* 40.3   PLATELETS 10*3/mm3 194 196 164 221           Culture Data:   No results found for: BLOODCX  No results found for: URINECX  No results found for: RESPCX  No results found for: WOUNDCX  No results found for: STOOLCX  No components found for: BODYFLD    Radiology Data:   Imaging Results (Last 24 Hours)     ** No results found for the last 24 hours. **          Assessment/Plan     Hospital Problem List:  Active Problems:    Diabetic ketoacidosis without coma associated with type 1 diabetes mellitus (CMS/HCC)  Acute kidney injury on chronic kidney disease  Metabolic acidosis  Nicotine dependence  COPD  Cachexia/severe malnutrition  GERD  Physical deconditioning  Hypokalemia    Plan  - Patient's mental status has improved and he appears back to baseline  - His renal function is improving with creatinine trending down.  Nephrology input appreciated.  Renal ultrasound unremarkable.  Continue IV  fluids with IV sodium bicarb for metabolic acidosis and acute kidney injury  - Urology input appreciated for history of neobladder creation.  Continue Mucomyst bladder irrigations  - Replete electrolytes  - Dietitian consult for malnutrition  - NovoLog sliding scale and Levemir for type 2 diabetes mellitus  - PT/OT consult for generalized weakness and deconditioning  -Nicotine patch for smoking cessation  - DVT prophylaxis with Lovenox  -CODE STATUS is full code    Discharge Planning: In progress    Poppy Méndez MD   09/07/20   16:32

## 2020-09-08 PROBLEM — E44.0 MODERATE MALNUTRITION (HCC): Status: ACTIVE | Noted: 2020-09-08

## 2020-09-08 LAB
ALBUMIN SERPL-MCNC: 2.9 G/DL (ref 3.5–5.2)
ALBUMIN/GLOB SERPL: 1.6 G/DL
ALP SERPL-CCNC: 60 U/L (ref 39–117)
ALT SERPL W P-5'-P-CCNC: 9 U/L (ref 1–41)
ANION GAP SERPL CALCULATED.3IONS-SCNC: 10 MMOL/L (ref 5–15)
AST SERPL-CCNC: 10 U/L (ref 1–40)
BASOPHILS # BLD AUTO: 0.01 10*3/MM3 (ref 0–0.2)
BASOPHILS NFR BLD AUTO: 0.1 % (ref 0–1.5)
BILIRUB SERPL-MCNC: 0.3 MG/DL (ref 0–1.2)
BUN SERPL-MCNC: 80 MG/DL (ref 8–23)
BUN/CREAT SERPL: 29 (ref 7–25)
CALCIUM SPEC-SCNC: 7.2 MG/DL (ref 8.6–10.5)
CHLORIDE SERPL-SCNC: 114 MMOL/L (ref 98–107)
CO2 SERPL-SCNC: 20 MMOL/L (ref 22–29)
CREAT SERPL-MCNC: 2.76 MG/DL (ref 0.76–1.27)
CREAT UR-MCNC: 109.5 MG/DL
DEPRECATED RDW RBC AUTO: 43.3 FL (ref 37–54)
EOSINOPHIL # BLD AUTO: 0 10*3/MM3 (ref 0–0.4)
EOSINOPHIL NFR BLD AUTO: 0 % (ref 0.3–6.2)
ERYTHROCYTE [DISTWIDTH] IN BLOOD BY AUTOMATED COUNT: 14 % (ref 12.3–15.4)
GFR SERPL CREATININE-BSD FRML MDRD: 23 ML/MIN/1.73
GLOBULIN UR ELPH-MCNC: 1.8 GM/DL
GLUCOSE BLDC GLUCOMTR-MCNC: 140 MG/DL (ref 70–130)
GLUCOSE BLDC GLUCOMTR-MCNC: 207 MG/DL (ref 70–130)
GLUCOSE BLDC GLUCOMTR-MCNC: 240 MG/DL (ref 70–130)
GLUCOSE BLDC GLUCOMTR-MCNC: 285 MG/DL (ref 70–130)
GLUCOSE BLDC GLUCOMTR-MCNC: 335 MG/DL (ref 70–130)
GLUCOSE BLDC GLUCOMTR-MCNC: 360 MG/DL (ref 70–130)
GLUCOSE BLDC GLUCOMTR-MCNC: 455 MG/DL (ref 70–130)
GLUCOSE BLDC GLUCOMTR-MCNC: 471 MG/DL (ref 70–130)
GLUCOSE BLDC GLUCOMTR-MCNC: 546 MG/DL (ref 70–130)
GLUCOSE SERPL-MCNC: 146 MG/DL (ref 65–99)
HCT VFR BLD AUTO: 26.7 % (ref 37.5–51)
HGB BLD-MCNC: 9.2 G/DL (ref 13–17.7)
IMM GRANULOCYTES # BLD AUTO: 0.03 10*3/MM3 (ref 0–0.05)
IMM GRANULOCYTES NFR BLD AUTO: 0.4 % (ref 0–0.5)
LYMPHOCYTES # BLD AUTO: 0.58 10*3/MM3 (ref 0.7–3.1)
LYMPHOCYTES NFR BLD AUTO: 7.4 % (ref 19.6–45.3)
MAGNESIUM SERPL-MCNC: 2.4 MG/DL (ref 1.6–2.4)
MCH RBC QN AUTO: 29.7 PG (ref 26.6–33)
MCHC RBC AUTO-ENTMCNC: 34.5 G/DL (ref 31.5–35.7)
MCV RBC AUTO: 86.1 FL (ref 79–97)
MONOCYTES # BLD AUTO: 0.46 10*3/MM3 (ref 0.1–0.9)
MONOCYTES NFR BLD AUTO: 5.8 % (ref 5–12)
NEUTROPHILS NFR BLD AUTO: 6.79 10*3/MM3 (ref 1.7–7)
NEUTROPHILS NFR BLD AUTO: 86.3 % (ref 42.7–76)
NRBC BLD AUTO-RTO: 0.3 /100 WBC (ref 0–0.2)
PLATELET # BLD AUTO: 200 10*3/MM3 (ref 140–450)
PMV BLD AUTO: 10.3 FL (ref 6–12)
POTASSIUM SERPL-SCNC: 3.1 MMOL/L (ref 3.5–5.2)
PROT SERPL-MCNC: 4.7 G/DL (ref 6–8.5)
RBC # BLD AUTO: 3.1 10*6/MM3 (ref 4.14–5.8)
SODIUM SERPL-SCNC: 144 MMOL/L (ref 136–145)
WBC # BLD AUTO: 7.87 10*3/MM3 (ref 3.4–10.8)

## 2020-09-08 PROCEDURE — 63710000001 INSULIN ASPART PER 5 UNITS: Performed by: FAMILY MEDICINE

## 2020-09-08 PROCEDURE — 97166 OT EVAL MOD COMPLEX 45 MIN: CPT

## 2020-09-08 PROCEDURE — 92526 ORAL FUNCTION THERAPY: CPT | Performed by: SPEECH-LANGUAGE PATHOLOGIST

## 2020-09-08 PROCEDURE — 80053 COMPREHEN METABOLIC PANEL: CPT | Performed by: NURSE PRACTITIONER

## 2020-09-08 PROCEDURE — 94799 UNLISTED PULMONARY SVC/PX: CPT

## 2020-09-08 PROCEDURE — 85025 COMPLETE CBC W/AUTO DIFF WBC: CPT | Performed by: FAMILY MEDICINE

## 2020-09-08 PROCEDURE — 83735 ASSAY OF MAGNESIUM: CPT | Performed by: INTERNAL MEDICINE

## 2020-09-08 PROCEDURE — 82962 GLUCOSE BLOOD TEST: CPT

## 2020-09-08 PROCEDURE — 25010000002 PROCHLORPERAZINE 10 MG/2ML SOLUTION: Performed by: FAMILY MEDICINE

## 2020-09-08 PROCEDURE — 25010000002 HEPARIN (PORCINE) PER 1000 UNITS: Performed by: INTERNAL MEDICINE

## 2020-09-08 PROCEDURE — 63710000001 INSULIN DETEMIR PER 5 UNITS: Performed by: INTERNAL MEDICINE

## 2020-09-08 PROCEDURE — 25010000002 ONDANSETRON PER 1 MG: Performed by: FAMILY MEDICINE

## 2020-09-08 PROCEDURE — 63710000001 INSULIN ASPART PER 5 UNITS: Performed by: INTERNAL MEDICINE

## 2020-09-08 RX ORDER — POTASSIUM CHLORIDE 750 MG/1
40 CAPSULE, EXTENDED RELEASE ORAL 2 TIMES DAILY WITH MEALS
Status: DISCONTINUED | OUTPATIENT
Start: 2020-09-08 | End: 2020-09-10 | Stop reason: HOSPADM

## 2020-09-08 RX ORDER — PROCHLORPERAZINE EDISYLATE 5 MG/ML
5 INJECTION INTRAMUSCULAR; INTRAVENOUS ONCE
Status: COMPLETED | OUTPATIENT
Start: 2020-09-08 | End: 2020-09-08

## 2020-09-08 RX ORDER — METOCLOPRAMIDE 5 MG/1
5 TABLET ORAL
Status: DISCONTINUED | OUTPATIENT
Start: 2020-09-08 | End: 2020-09-10 | Stop reason: HOSPADM

## 2020-09-08 RX ADMIN — ACETYLCYSTEINE 4 ML: 100 SOLUTION ORAL; RESPIRATORY (INHALATION) at 21:11

## 2020-09-08 RX ADMIN — CLOPIDOGREL BISULFATE 75 MG: 75 TABLET ORAL at 10:20

## 2020-09-08 RX ADMIN — SODIUM CHLORIDE 50 ML: 900 IRRIGANT IRRIGATION at 21:12

## 2020-09-08 RX ADMIN — PRAVASTATIN SODIUM 40 MG: 40 TABLET ORAL at 21:11

## 2020-09-08 RX ADMIN — INSULIN ASPART 5 UNITS: 100 INJECTION, SOLUTION INTRAVENOUS; SUBCUTANEOUS at 17:42

## 2020-09-08 RX ADMIN — METOCLOPRAMIDE 5 MG: 5 TABLET ORAL at 21:11

## 2020-09-08 RX ADMIN — INSULIN ASPART 4 UNITS: 100 INJECTION, SOLUTION INTRAVENOUS; SUBCUTANEOUS at 11:33

## 2020-09-08 RX ADMIN — HEPARIN SODIUM 5000 UNITS: 5000 INJECTION INTRAVENOUS; SUBCUTANEOUS at 21:11

## 2020-09-08 RX ADMIN — ACETYLCYSTEINE 4 ML: 100 SOLUTION ORAL; RESPIRATORY (INHALATION) at 06:22

## 2020-09-08 RX ADMIN — POTASSIUM CHLORIDE 10 MEQ: 10 CAPSULE, COATED, EXTENDED RELEASE ORAL at 00:45

## 2020-09-08 RX ADMIN — SODIUM BICARBONATE 150 MEQ: 84 INJECTION, SOLUTION INTRAVENOUS at 04:10

## 2020-09-08 RX ADMIN — ONDANSETRON 4 MG: 2 SOLUTION INTRAMUSCULAR; INTRAVENOUS at 08:27

## 2020-09-08 RX ADMIN — METOCLOPRAMIDE 5 MG: 5 TABLET ORAL at 13:21

## 2020-09-08 RX ADMIN — ACETYLCYSTEINE 4 ML: 100 SOLUTION ORAL; RESPIRATORY (INHALATION) at 13:21

## 2020-09-08 RX ADMIN — BUDESONIDE AND FORMOTEROL FUMARATE DIHYDRATE 2 PUFF: 160; 4.5 AEROSOL RESPIRATORY (INHALATION) at 21:00

## 2020-09-08 RX ADMIN — POTASSIUM CHLORIDE 40 MEQ: 10 CAPSULE, COATED, EXTENDED RELEASE ORAL at 17:42

## 2020-09-08 RX ADMIN — CARVEDILOL 3.12 MG: 3.12 TABLET, FILM COATED ORAL at 21:11

## 2020-09-08 RX ADMIN — PANTOPRAZOLE SODIUM 40 MG: 40 TABLET, DELAYED RELEASE ORAL at 06:22

## 2020-09-08 RX ADMIN — ASPIRIN 81 MG: 81 TABLET, COATED ORAL at 10:20

## 2020-09-08 RX ADMIN — METOCLOPRAMIDE 5 MG: 5 TABLET ORAL at 17:42

## 2020-09-08 RX ADMIN — HEPARIN SODIUM 5000 UNITS: 5000 INJECTION INTRAVENOUS; SUBCUTANEOUS at 08:28

## 2020-09-08 RX ADMIN — POTASSIUM CHLORIDE 40 MEQ: 10 CAPSULE, COATED, EXTENDED RELEASE ORAL at 10:20

## 2020-09-08 RX ADMIN — SERTRALINE HYDROCHLORIDE 50 MG: 50 TABLET ORAL at 10:20

## 2020-09-08 RX ADMIN — INSULIN DETEMIR 20 UNITS: 100 INJECTION, SOLUTION SUBCUTANEOUS at 21:11

## 2020-09-08 RX ADMIN — CARVEDILOL 3.12 MG: 3.12 TABLET, FILM COATED ORAL at 10:21

## 2020-09-08 RX ADMIN — INSULIN ASPART 3 UNITS: 100 INJECTION, SOLUTION INTRAVENOUS; SUBCUTANEOUS at 04:10

## 2020-09-08 RX ADMIN — SODIUM BICARBONATE 150 MEQ: 84 INJECTION, SOLUTION INTRAVENOUS at 14:15

## 2020-09-08 RX ADMIN — PROCHLORPERAZINE EDISYLATE 5 MG: 5 INJECTION INTRAMUSCULAR; INTRAVENOUS at 04:10

## 2020-09-08 RX ADMIN — SODIUM CHLORIDE 50 ML: 900 IRRIGANT IRRIGATION at 06:23

## 2020-09-08 RX ADMIN — SODIUM CHLORIDE 50 ML: 900 IRRIGANT IRRIGATION at 13:21

## 2020-09-08 NOTE — CONSULTS
Adult Nutrition  Assessment    Patient Name:  Favio Casillas  YOB: 1955  MRN: [id number]  Admit Date:  9/5/2020    Assessment Date:  9/8/2020    Comments:  Pt seen 9/7/2020.  Pt reports pretty good appetite.  Reports problem swallowing.  Speech following.  Intake 100% - 1x, 75% - 1x.  Blood glucose low on occasion but is usually elevated.  Levemir insulin, sliding scale novolog prescribed.  Pt with dx DM.  BMI 19 with pt at 85% IBW.  Pt assessed wiith moderate malnutrition due to muscle loss temple region and fat loss orbital region.  Pt willing to receive supplement.  Will add Boost Glucose Control to meals.     Reason for Assessment     Row Name 09/08/20 1509          Reason for Assessment    Reason For Assessment  per organizational policy;physician consult underweight     Identified At Risk by Screening Criteria  BMI             Labs/Tests/Procedures/Meds     Row Name 09/08/20 1510          Labs/Procedures/Meds    Lab Results Reviewed  reviewed, pertinent        Medications    Pertinent Medications Reviewed  reviewed, pertinent         Physical Findings     Row Name 09/08/20 1511          Physical Findings    Overall Physical Appearance  underweight         Estimated/Assessed Needs     Row Name 09/08/20 1511          Calculation Measurements    Weight Used For Calculations  67.2 kg (148 lb 2.4 oz)        Estimated/Assessed Needs    Additional Documentation  Calorie Requirements (Group);Fluid Requirements (Group);[place]-St. Jeor Equation (Group);Protein Requirements (Group)        Calorie Requirements    Estimated Calorie Requirement (kcal/day)  1853     Estimated Calorie Need Method  [place]-St Jeor        [place]-St. Jeor Equation    RMR ([place]-St. Jeor Equation)  1425.625        Protein Requirements    Weight Used For Protein Calculations  66.2 kg (145 lb 15.1 oz)     Est Protein Requirement Amount (gms/kg)  1.0 gm protein     Estimated Protein Requirements (gms/day)  66.2        Fluid  Requirements    Estimated Fluid Requirements (mL/day)  2018     RDA Method (mL)  2018         Nutrition Prescription Ordered     Row Name 09/08/20 1516          Nutrition Prescription PO    Current PO Diet  Pureed     Fluid Consistency  Nectar/syrup thick         Evaluation of Received Nutrient/Fluid Intake     Row Name 09/08/20 1517          PO Evaluation    Number of Meals  2     % PO Intake  100% - 1x, 75% - 2x               Electronically signed by:  Thea Mitchell RD  09/08/20 15:19

## 2020-09-08 NOTE — PLAN OF CARE
Problem: Patient Care Overview  Goal: Plan of Care Review  Outcome: Ongoing (interventions implemented as appropriate)  Flowsheets  Taken 9/7/2020 2059  Plan of Care Reviewed With: patient  Taken 9/8/2020 0313  Outcome Summary: vss, resting between care, liu irrigated, emesis x3 compazine ordred, will continue to monitor.

## 2020-09-08 NOTE — PLAN OF CARE
Problem: Patient Care Overview  Goal: Plan of Care Review  Outcome: Ongoing (interventions implemented as appropriate)  Flowsheets (Taken 9/8/2020 0419)  Progress: no change  Plan of Care Reviewed With: patient  Outcome Summary: initial assessment  Note:   Encourage intake of meals and supplement.

## 2020-09-08 NOTE — SIGNIFICANT NOTE
09/08/20 1412   Rehab Treatment   Discipline physical therapy assistant   Reason Treatment Not Performed patient/family declined treatment  (pt c/o nausea.)

## 2020-09-08 NOTE — PROGRESS NOTES
"[facility]  [address] [place] [address]. [id number]  T - [phone]  F - [phone]     Progress Note          PATIENT  DEMOGRAPHICS   PATIENT NAME: Favio Casillas                      PHYSICIAN: Rodríguez Brody MD  : 1955  MRN: [id number]   LOS: 3 days    Patient Care Team:  Shayne Merritt MD as PCP - General (Family Medicine)  Adryan Prater APRN as PCP - Claims Attributed  Kevin Robbins PA-C as Physician Assistant (Gastroenterology)  Harvinder Robert MD  Subjective   SUBJECTIVE   More alert today. Feels better good UO         Objective   OBJECTIVE   Vital Signs  Temp:  [96.7 °F (35.9 °C)-98.5 °F (36.9 °C)] 97.2 °F (36.2 °C)  Heart Rate:  [72-98] 80  Resp:  [16-18] 18  BP: (107-136)/(60-83) 115/83    Flowsheet Rows      First Filed Value   Admission Height  170.2 cm (67\") Documented at 2020 1634   Admission Weight  55.9 kg (123 lb 4.8 oz) Documented at 2020 1634           I/O last 3 completed shifts:  In: 480 [P.O.:480]  Out: 4825 [Urine:4825]    PHYSICAL EXAM    Physical Exam   Constitutional: He is oriented to person, place, and time. He appears well-developed.   Cachectic   HENT:   Head: Normocephalic and atraumatic.   Eyes: Pupils are equal, round, and reactive to light. Conjunctivae and EOM are normal.   Cardiovascular: Normal rate and regular rhythm.   Pulmonary/Chest: Effort normal and breath sounds normal.   Abdominal: Soft.   Musculoskeletal: He exhibits no edema.   Neurological: He is alert and oriented to person, place, and time.   Skin: Skin is warm and dry.       RESULTS   Results Review:    Results from last 7 days   Lab Units 20  0608 20  2139 20  0535 20  1538 20  0831   SODIUM mmol/L 144  --  142 137 132*   POTASSIUM mmol/L 3.1* 3.4* 2.7* 3.0* 4.0   CHLORIDE mmol/L 114*  --  118* 115* 115*   CO2 mmol/L 20.0*  --  14.0* 11.0* 7.0*   BUN mg/dL 80*  --  102* 110* 110*   CREATININE mg/dL 2.76*  --  3.51* " 4.09* 4.78*   CALCIUM mg/dL 7.2*  --  7.8* 7.8* 8.0*   BILIRUBIN mg/dL 0.3  --  0.3  --  0.3   ALK PHOS U/L 60  --  67  --  81   ALT (SGPT) U/L 9  --  8  --  9   AST (SGOT) U/L 10  --  10  --  7   GLUCOSE mg/dL 146*  --  59* 263* 225*       Estimated Creatinine Clearance: 23.1 mL/min (A) (by C-G formula based on SCr of 2.76 mg/dL (H)).    Results from last 7 days   Lab Units 09/08/20  0608 09/07/20 0535 09/06/20 0831 09/06/20 0432 09/05/20  2348   MAGNESIUM mg/dL 2.4 1.7 1.8 1.7 1.8   PHOSPHORUS mg/dL  --   --  3.1 2.3* 2.3*             Results from last 7 days   Lab Units 09/08/20  0608 09/07/20 0535 09/06/20 0432 09/05/20 2014 09/05/20  1653   WBC 10*3/mm3 7.87 9.29 12.63* 16.23* 17.90*   HEMOGLOBIN g/dL 9.2* 9.8* 11.1* 11.9* 13.3   PLATELETS 10*3/mm3 200 194 196 164 221               Imaging Results (Last 24 Hours)     ** No results found for the last 24 hours. **           MEDICATIONS      acetylcysteine 4 mL Oral Q8H   And      sodium chloride 50 mL Irrigation Q8H   aspirin 81 mg Oral Daily   budesonide-formoterol 2 puff Inhalation BID - RT   carvedilol 3.125 mg Oral Q12H   clopidogrel 75 mg Oral Daily   heparin (porcine) 5,000 Units Subcutaneous Q12H   insulin aspart 0-7 Units Subcutaneous TID AC   insulin detemir 20 Units Subcutaneous Nightly   metoclopramide 5 mg Oral 4x Daily AC & at Bedtime   nicotine 1 patch Transdermal Q24H   pantoprazole 40 mg Oral QAM   potassium chloride 40 mEq Oral BID With Meals   pravastatin 40 mg Oral Nightly   sertraline 50 mg Oral Daily   sodium chloride 10 mL Intravenous Q12H       sodium bicarbonate drip (greater than 75 mEq/bag) 150 mEq Last Rate: 150 mEq (09/08/20 1415)       Assessment/Plan   ASSESSMENT / PLAN      Diabetic ketoacidosis without coma associated with type 1 diabetes mellitus (CMS/HCC)    Moderate malnutrition (CMS/HCC)    1.  AK on CKD 3- baseline creatinine close to 2, creatinine now greater than 5.  John catheter already placed.  Prior history of  bladder outlet obstruction and multiple acute kidney injuries as well as hydronephrosis.  Urinalysis was turbid and microscopy was unable to be performed.      -Cr improving, bicarb improving, sodium WNL, on kcl 40meq bid. Keep bicarb drip for another day. Check mg and phos in am     2.  Severe metabolic acidosis- normal anion gap, low pH, bicarb gtt      3.  Altered mental status- improving     4.  History of bladder cancer- requiring neobladder surgery almost 10 years ago, also history of prostate cancer, Urology following     5.  CAD     6.  IDDM- controlled per primary team     7.  Hyponatremia- mild/pseudohyponatremia, resolved           This document has been electronically signed by Rodríguez Brody MD on September 8, 2020 17:17
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
## 2020-09-08 NOTE — THERAPY TREATMENT NOTE
"Acute Care - Speech Language Pathology   Swallow Treatment Note [facility]     Patient Name: Favio Casillas  : 1955  MRN: [id number]  Today's Date: 2020               Admit Date: 2020     Goal:  Patient will safely tolerate least restricted diet w/no overt s/s of aspiration for adequate nutrition and hydration:  Pt seen for dysphagia tx at AM meal. Pt reported that he had been sick overnight. Pt reported he was not nauseous at this time. Pt consumed 3-4 bites of mech soft solids (biscuit and gravy) and 2 sips of thin liquids via cup and began to vomit. SLP supplied pt w/basin and wet wash cloth. Once pt was finished he began eating again. When questioned by SLP he stated that he thought everything would \"stay down now.\" No other vomiting episodes occurred during session. Pt consumed mech soft (biscuit/gravy, scrambled eggs) and puree (peaches) solids w/no difficulty and cleared independently of cues from SLP. Pt did take slighly larger than recommended bites, but would take smaller bites once cued. SLP discussed recommendations and additional f/u w/pt as well as recommended safe swallowing strategies and pt was in agreement.      Visit Dx:      ICD-10-CM ICD-9-CM   1. Diabetic ketoacidosis without coma associated with type 1 diabetes mellitus (CMS/HCC) E10.10 250.13   2. Oropharyngeal dysphagia R13.12 787.22   3. Impaired functional mobility, balance, gait, and endurance Z74.09 V49.89     Patient Active Problem List   Diagnosis   • Type 2 diabetes mellitus (CMS/HCC)   • Acute pain of right shoulder   • Shoulder impingement syndrome, right   • Chest pain   • Acute renal insufficiency   • Chronic hepatitis C virus infection (CMS/HCC)   • Gastritis   • SBO (small bowel obstruction) (CMS/HCC)   • CAD (coronary artery disease)   • Acute kidney injury (nontraumatic) (CMS/HCC)   • Intractable vomiting with nausea   • Gastroesophageal reflux disease with esophagitis   • Diarrhea   • Generalized " abdominal pain   • History of colon polyps   • History of colitis   • Acute metabolic encephalopathy   • NSTEMI (non-ST elevated myocardial infarction) (CMS/HCC)   • Acute renal failure superimposed on stage 3 chronic kidney disease (CMS/HCC)   • Influenza A   • Anemia, chronic disease   • Bladder outflow obstruction   • Acute kidney injury (CMS/HCC)   • Acute urinary tract infection   • Metabolic acidosis   • Hypokalemia   • Acute renal failure superimposed on chronic kidney disease (CMS/HCC)   • Diabetic ketoacidosis without coma associated with type 2 diabetes mellitus (CMS/HCC)   • Diabetic ketoacidosis without coma associated with type 1 diabetes mellitus (CMS/HCC)       Therapy Treatment  Rehabilitation Treatment Summary     Row Name 09/08/20 0722             Treatment Time/Intention    Discipline  speech language pathologist  -CK      Document Type  therapy note (daily note)  -CK      Subjective Information  no complaints  -CK2      Mode of Treatment  speech-language pathology;individual therapy  -CK      Patient/Family Observations  No family present at bedside  -CK      Care Plan Review  evaluation/treatment results reviewed;care plan/treatment goals reviewed;risks/benefits reviewed;current/potential barriers reviewed  -CK3      Patient Effort  good  -CK3      Recorded by [CK] Teresa Ardon MS CCC-SLP 09/08/20 0725  [CK2] Teresa Ardon MS CCC-SLP 09/08/20 0742  [CK3] Teresa Ardon MS CCC-SLP 09/08/20 0751      Row Name 09/08/20 0722             Positioning and Restraints    Pre-Treatment Position  in bed  -CK      Post Treatment Position  bed  -CK2      In Bed  sitting;call light within reach;encouraged to call for assist;exit alarm on;with OT  -CK2      Recorded by [CK] Teresa Ardon MS CCC-SLP 09/08/20 0725  [CK2] Teresa Ardon MS CCC-SLP 09/08/20 0759      Row Name 09/08/20 0722             Pain Scale: Numbers Pre/Post-Treatment    Pain Scale: Numbers, Pretreatment  0/10 -  "no pain  -CK      Pain Scale: Numbers, Post-Treatment  0/10 - no pain  -CK2      Recorded by [CK] North StreetTeresa thomas MS CCC-SLP 09/08/20 0725  [CK2] Teresa Ardon, MS CCC-SLP 09/08/20 0759      Row Name 09/08/20 0722             Plan of Care Review    Plan of Care Reviewed With  patient  -CK      Progress  improving  -CK      Outcome Summary  Pt seen for dysphagia tx at AM meal.  Pt reported that he had been sick overnight.  Pt reported he was not nauseous at this time.  Pt consumed 3-4 bites of mech soft solids (biscuit and gravy) and 2 sips of thin liquids via cup and began to vomit.  SLP supplied pt w/basin and wet wash cloth. Once pt was finished he began eating again.  When questioned by SLP he stated that he thought everything would \"stay down now.\"  No other vomiting episodes occurred during session.  Pt consumed mech soft (biscuit/gravy, scrambled eggs) and puree (peaches) solids w/no difficulty and cleared independently of cues from SLP.  Pt did take slighly larger than recommended bites, but would take smaller bites once cued.  SLP discussed recommendations and additional f/u w/pt as well as recommended safe swallowing strategies and pt was in agreement.  -CK      Recorded by [CK] Breann Ardonkevin ALONSO MS CCC-SLP 09/08/20 0759      Row Name 09/08/20 0722             Outcome Summary/Treatment Plan (SLP)    Daily Summary of Progress (SLP)  progress toward functional goals is good  -CK      Plan for Continued Treatment (SLP)  1 F/U for diet toleration  -CK      Anticipated Dischage Disposition (SLP)  unknown  -CK      Recorded by [CK] Teresa Ardon MS CCC-SLP 09/08/20 0751        User Key  (r) = Recorded By, (t) = Taken By, (c) = Cosigned By    Initials Name Effective Dates Discipline    CK DuglasBreanne CELESTE, MS CCC-SLP 02/11/20 -  SLP          Outcome Summary  Outcome Summary/Treatment Plan (SLP)  Daily Summary of Progress (SLP): progress toward functional goals is good (09/08/20 0722 : Duglas, " Teresa ALONSO, MS CCC-SLP)  Plan for Continued Treatment (SLP): 1 F/U for diet toleration (09/08/20 0722 : Teresa Ardon, MS CCC-SLP)  Anticipated Dischage Disposition (SLP): unknown (09/08/20 0722 : Teresa Ardon, MS [facility]-SLP)      SLP GOALS     Row Name 09/08/20 0722 09/07/20 0724 09/06/20 0936       Oral Nutrition/Hydration Goal 1 (SLP)    Oral Nutrition/Hydration Goal 1, SLP  Pt will safely tolerate least restricted diet w/no overt s/s of aspiration for adequate nutrition and hydration.  -CK  Pt will safely tolerate least restricted diet w/no overt s/s of aspiration for adequate nutrition and hydration.  -CK  Pt will safely tolerate least restricted diet w/no overt s/s of aspiration for adequate nutrition and hydration.  -CK    Time Frame (Oral Nutrition/Hydration Goal 1, SLP)  by discharge  -CK  by discharge  -CK  by discharge  -CK    Barriers (Oral Nutrition/Hydration Goal 1, SLP)  cognitive status  -CK  cognitive status  -CK  cognitive status  -CK    Progress/Outcomes (Oral Nutrition/Hydration Goal 1, SLP)  goal ongoing;goal partially met  -CK  goal ongoing;goal partially met  -CK  other (see comments) new goal  -CK      User Key  (r) = Recorded By, (t) = Taken By, (c) = Cosigned By    Initials Name Provider Type    CK Teresa Ardon, MS CCC-SLP Speech and Language Pathologist          EDUCATION  The patient has been educated in the following areas:   Dysphagia (Swallowing Impairment) Modified Diet Instruction.    SLP Recommendation and Plan  Daily Summary of Progress (SLP): progress toward functional goals is good     Plan for Continued Treatment (SLP): 1 F/U for diet toleration  Anticipated Dischage Disposition (SLP): unknown                    Time Calculation:   Time Calculation- SLP     Row Name 09/08/20 0800             Time Calculation- SLP    SLP Start Time  0722  -CK      SLP Stop Time  0757  -CK      SLP Time Calculation (min)  35 min  -CK      Total Timed Code Minutes- SLP  25 minute(s)   -MATT      SLP Received On  09/08/20  -MATT      SLP Goal Re-Cert Due Date  09/20/20  -MATT        User Key  (r) = Recorded By, (t) = Taken By, (c) = Cosigned By    Initials Name Provider Type    Teresa Gutierrez, MS CCC-SLP Speech and Language Pathologist          Therapy Charges for Today     Code Description Service Date Service Provider Modifiers Qty    51188471091 HC ST TREATMENT SWALLOW 3 9/7/2020 Teresa Ardon MS CCC-SLP GN 1    [id number] HC ST TREATMENT SWALLOW 2 9/8/2020 Teresa Ardon, MS VENCES-SLP GN 1                 MS ALIN Bryan  9/8/2020

## 2020-09-08 NOTE — PLAN OF CARE
Problem: Patient Care Overview  Goal: Plan of Care Review  Outcome: Ongoing (interventions implemented as appropriate)  Flowsheets (Taken 9/8/2020 2366)  Outcome Summary: OT gissel completed this date. Pt alert and pleasant and attempted to engage but started not to feel well. Pt had episode of vomitting then reported dizziness with change of position. RN aware and assisted. Pt was SBA for sup to sit to sup. Pt SBA for EOB balance. Pt CGA to min assist for sit to stand t/f and min assist for toilet t/f, Pt min assist for mobility with RW. Pt could benefit from further skilled OT to reach PLOF/max independence with ADL. Pt may benefit from 24/7 care and further therapy at d/c depending on progress.

## 2020-09-08 NOTE — PROGRESS NOTES
"   LOS: 3 days   Patient Care Team:  Shayne Merritt MD as PCP - General (Family Medicine)  Adryan Prater APRN as PCP - Claims Attributed  Kevin Robbins PA-C as Physician Assistant (Gastroenterology)  Harvinder Robert MD    Subjective     Subjective:  Symptoms:  Stable.  (Clear yellow urine in John's gravity bag. Alert, less confused. ).    Diet:  No nausea or vomiting.        History taken from: patient chart    Objective     Vital Signs  Temp:  [96.7 °F (35.9 °C)-98.5 °F (36.9 °C)] 96.8 °F (36 °C)  Heart Rate:  [72-98] 82  Resp:  [16-18] 18  BP: (107-136)/(60-82) 136/82    Objective:  General Appearance:  In no acute distress.    Vital signs: (most recent): Blood pressure 136/82, pulse 82, temperature 96.8 °F (36 °C), temperature source Oral, resp. rate 18, height 170.2 cm (67\"), weight 59.7 kg (131 lb 9.6 oz), SpO2 100 %.  Vital signs are normal.  No fever.    Output: Producing urine (John).    Lungs:  Normal effort and normal respiratory rate.  He is not in respiratory distress.    Chest: Symmetric chest wall expansion.   Abdomen: Abdomen is soft and non-distended.  There is no abdominal tenderness.     Neurological: Patient is alert.              Results Review:    Lab Results (last 24 hours)     Procedure Component Value Units Date/Time    Creatinine, Urine, Random - [[id number]] Collected:  09/07/20 1911    Specimen:  Urine Updated:  09/08/20 1245     Creatinine, Urine 109.5 mg/dL     Narrative:       Reference intervals for random urine have not been established.  Clinical usage is dependent upon physician's interpretation in combination with other laboratory tests.       POC Glucose Once [[id number]]  (Abnormal) Collected:  09/08/20 1051    Specimen:  Blood Updated:  09/08/20 1115     Glucose 285 mg/dL      Comment: RN NotifiedOperator: [id number] GERARDO Ferreira ID: [id number]       POC Glucose Once [[id number]]  (Abnormal) Collected:  09/08/20 0836    Specimen:  Blood Updated:  " 09/08/20 0853     Glucose 207 mg/dL      Comment: RN NotifiedOperator: [id number] TRUDY Islaster ID: [id number]       Magnesium [[id number]]  (Normal) Collected:  09/08/20 0608    Specimen:  Blood Updated:  09/08/20 0738     Magnesium 2.4 mg/dL     Comprehensive Metabolic Panel [[id number]]  (Abnormal) Collected:  09/08/20 0608    Specimen:  Blood Updated:  09/08/20 0724     Glucose 146 mg/dL      BUN 80 mg/dL      Creatinine 2.76 mg/dL      Sodium 144 mmol/L      Potassium 3.1 mmol/L      Chloride 114 mmol/L      CO2 20.0 mmol/L      Calcium 7.2 mg/dL      Total Protein 4.7 g/dL      Albumin 2.90 g/dL      ALT (SGPT) 9 U/L      AST (SGOT) 10 U/L      Alkaline Phosphatase 60 U/L      Total Bilirubin 0.3 mg/dL      eGFR Non African Amer 23 mL/min/1.73      Globulin 1.8 gm/dL      A/G Ratio 1.6 g/dL      BUN/Creatinine Ratio 29.0     Anion Gap 10.0 mmol/L     Narrative:       GFR Normal >60  Chronic Kidney Disease <60  Kidney Failure <15      POC Glucose Once [[id number]]  (Abnormal) Collected:  09/08/20 0702    Specimen:  Blood Updated:  09/08/20 0722     Glucose 140 mg/dL      Comment: RN NotifiedOperator: [id number] GERARDO Hanna ID: [id number]       CBC & Differential [[id number]] Collected:  09/08/20 0608    Specimen:  Blood Updated:  09/08/20 0707    Narrative:       The following orders were created for panel order CBC & Differential.  Procedure                               Abnormality         Status                     ---------                               -----------         ------                     CBC Auto Differential[[id number]]        Abnormal            Final result                 Please view results for these tests on the individual orders.    CBC Auto Differential [[id number]]  (Abnormal) Collected:  09/08/20 0608    Specimen:  Blood Updated:  09/08/20 0707     WBC 7.87 10*3/mm3      RBC 3.10 10*6/mm3      Hemoglobin 9.2 g/dL      Hematocrit 26.7 %      MCV 86.1 fL      MCH 29.7 pg       MCHC 34.5 g/dL      RDW 14.0 %      RDW-SD 43.3 fl      MPV 10.3 fL      Platelets 200 10*3/mm3      Neutrophil % 86.3 %      Lymphocyte % 7.4 %      Monocyte % 5.8 %      Eosinophil % 0.0 %      Basophil % 0.1 %      Immature Grans % 0.4 %      Neutrophils, Absolute 6.79 10*3/mm3      Lymphocytes, Absolute 0.58 10*3/mm3      Monocytes, Absolute 0.46 10*3/mm3      Eosinophils, Absolute 0.00 10*3/mm3      Basophils, Absolute 0.01 10*3/mm3      Immature Grans, Absolute 0.03 10*3/mm3      nRBC 0.3 /100 WBC     POC Glucose Once [[id number]]  (Abnormal) Collected:  09/08/20 0207    Specimen:  Blood Updated:  09/08/20 0227     Glucose 240 mg/dL      Comment: RN NotifiedOperator: [id number] [facility] [id number]       Potassium [[id number]]  (Abnormal) Collected:  09/07/20 2139    Specimen:  Blood Updated:  09/07/20 2209     Potassium 3.4 mmol/L     POC Glucose Once [[id number]]  (Abnormal) Collected:  09/07/20 1915    Specimen:  Blood Updated:  09/07/20 1937     Glucose 278 mg/dL      Comment: RN NotifiedOperator: [id number] [facility] ID: [id number]       Sodium, Urine, Random - [[id number]] Collected:  09/07/20 1911    Specimen:  Urine Updated:  09/07/20 1913     Sodium, Urine 49 mmol/L     Narrative:       Reference intervals for random urine have not been established.  Clinical usage is dependent upon physician's interpretation in combination with other laboratory tests.       POC Glucose Once [[id number]]  (Abnormal) Collected:  09/07/20 1652    Specimen:  Blood Updated:  09/07/20 1712     Glucose 167 mg/dL      Comment: RN NotifiedOperator: [id number] [facility] [id number]       POC Glucose Once [[id number]]  (Abnormal) Collected:  09/07/20 1102    Specimen:  Blood Updated:  09/07/20 1711     Glucose 312 mg/dL      Comment: RN NotifiedOperator: [id number] GERARD[facility]: [id number]            Imaging Results (Last 24 Hours)     ** No results found for the last 24  hours. **           I reviewed the patient's new clinical results.  I reviewed the patient's new imaging results and agree with the interpretation.  I reviewed the patient's other test results and agree with the interpretation      Assessment/Plan       Diabetic ketoacidosis without coma associated with type 1 diabetes mellitus (CMS/HCC)      Assessment & Plan    Consulted to Urology to manage maximize his urinary drainage needs as he has acute kidney injury the setting on CKD III, hx of bladder/prostate cancer status post cystectomy/prostatectomy with neobladder with frequent outlet obstruction from mucus and sediment causing hydronephrosis. At present he does not have hydronephrosis but there is mucus and sediment in his John--->urine improved from 9/6/20  -WBC 17.90-->16.23-->12.63-->9.29-->7.87  -Estimated Creatinine Clearance: 23.1 mL/min (A) (by C-G formula based on SCr of 2.76 mg/dL (H)).  -Baseline Cr around 2 per Nephrology  -Cr 5.16-->5.21-->5.01-->4.78-->3.51-->2.76     Plan:  Continue intravesical Mucomyst flushes   Continue John in place.    TIFFANIE Michele  09/08/20  12:54

## 2020-09-08 NOTE — PLAN OF CARE
Problem: Patient Care Overview  Goal: Plan of Care Review  Outcome: Ongoing (interventions implemented as appropriate)  Flowsheets  Taken 9/8/2020 3060 by Teresa Ardon MS CCC-SLP  Progress: improving  Plan of Care Reviewed With: patient  Taken 9/8/2020 1445 by Layla Good, RN  Outcome Summary: pt vss. Pt resting well at this time.

## 2020-09-08 NOTE — PLAN OF CARE
"  Problem: Patient Care Overview  Goal: Plan of Care Review  Outcome: Ongoing (interventions implemented as appropriate)  Flowsheets  Taken 9/8/2020 1865  Progress: improving  Plan of Care Reviewed With: patient  Taken 9/8/2020 9138  Outcome Summary: Pt seen for dysphagia tx at AM meal.  Pt reported that he had been sick overnight.  Pt reported he was not nauseous at this time.  Pt consumed 3-4 bites of mech soft solids (biscuit and gravy) and 2 sips of thin liquids via cup and began to vomit.  SLP supplied pt w/basin and wet wash cloth. Once pt was finished he began eating again.  When questioned by SLP he stated that he thought everything would \"stay down now.\"  No other vomiting episodes occurred during session.  Pt consumed mech soft (biscuit/gravy, scrambled eggs) and puree (peaches) solids w/no difficulty and cleared independently of cues from SLP.  Pt did take slighly larger than recommended bites, but would take smaller bites once cued.  SLP discussed recommendations and additional f/u w/pt as well as recommended safe swallowing strategies and pt was in agreement.       "

## 2020-09-09 LAB
ALBUMIN SERPL-MCNC: 2.8 G/DL (ref 3.5–5.2)
ALBUMIN/GLOB SERPL: 1.6 G/DL
ALP SERPL-CCNC: 56 U/L (ref 39–117)
ALT SERPL W P-5'-P-CCNC: 8 U/L (ref 1–41)
ANION GAP SERPL CALCULATED.3IONS-SCNC: 7 MMOL/L (ref 5–15)
AST SERPL-CCNC: 10 U/L (ref 1–40)
BASOPHILS # BLD AUTO: 0.02 10*3/MM3 (ref 0–0.2)
BASOPHILS NFR BLD AUTO: 0.3 % (ref 0–1.5)
BILIRUB SERPL-MCNC: 0.3 MG/DL (ref 0–1.2)
BUN SERPL-MCNC: 59 MG/DL (ref 8–23)
BUN/CREAT SERPL: 30.6 (ref 7–25)
CALCIUM SPEC-SCNC: 7 MG/DL (ref 8.6–10.5)
CHLORIDE SERPL-SCNC: 107 MMOL/L (ref 98–107)
CO2 SERPL-SCNC: 28 MMOL/L (ref 22–29)
CREAT SERPL-MCNC: 1.93 MG/DL (ref 0.76–1.27)
DEPRECATED RDW RBC AUTO: 44.8 FL (ref 37–54)
EOSINOPHIL # BLD AUTO: 0.07 10*3/MM3 (ref 0–0.4)
EOSINOPHIL NFR BLD AUTO: 0.9 % (ref 0.3–6.2)
ERYTHROCYTE [DISTWIDTH] IN BLOOD BY AUTOMATED COUNT: 14.4 % (ref 12.3–15.4)
GFR SERPL CREATININE-BSD FRML MDRD: 35 ML/MIN/1.73
GLOBULIN UR ELPH-MCNC: 1.7 GM/DL
GLUCOSE BLDC GLUCOMTR-MCNC: 206 MG/DL (ref 70–130)
GLUCOSE BLDC GLUCOMTR-MCNC: 230 MG/DL (ref 70–130)
GLUCOSE BLDC GLUCOMTR-MCNC: 264 MG/DL (ref 70–130)
GLUCOSE BLDC GLUCOMTR-MCNC: 85 MG/DL (ref 70–130)
GLUCOSE SERPL-MCNC: 123 MG/DL (ref 65–99)
HCT VFR BLD AUTO: 26.7 % (ref 37.5–51)
HGB BLD-MCNC: 9.1 G/DL (ref 13–17.7)
IMM GRANULOCYTES # BLD AUTO: 0.03 10*3/MM3 (ref 0–0.05)
IMM GRANULOCYTES NFR BLD AUTO: 0.4 % (ref 0–0.5)
LYMPHOCYTES # BLD AUTO: 1.68 10*3/MM3 (ref 0.7–3.1)
LYMPHOCYTES NFR BLD AUTO: 22.3 % (ref 19.6–45.3)
MCH RBC QN AUTO: 29.4 PG (ref 26.6–33)
MCHC RBC AUTO-ENTMCNC: 34.1 G/DL (ref 31.5–35.7)
MCV RBC AUTO: 86.1 FL (ref 79–97)
MONOCYTES # BLD AUTO: 0.63 10*3/MM3 (ref 0.1–0.9)
MONOCYTES NFR BLD AUTO: 8.4 % (ref 5–12)
NEUTROPHILS NFR BLD AUTO: 5.1 10*3/MM3 (ref 1.7–7)
NEUTROPHILS NFR BLD AUTO: 67.7 % (ref 42.7–76)
NRBC BLD AUTO-RTO: 0.4 /100 WBC (ref 0–0.2)
PHOSPHATE SERPL-MCNC: 2 MG/DL (ref 2.5–4.5)
PLATELET # BLD AUTO: 198 10*3/MM3 (ref 140–450)
PMV BLD AUTO: 10.2 FL (ref 6–12)
POTASSIUM SERPL-SCNC: 3 MMOL/L (ref 3.5–5.2)
PROT SERPL-MCNC: 4.5 G/DL (ref 6–8.5)
RBC # BLD AUTO: 3.1 10*6/MM3 (ref 4.14–5.8)
SODIUM SERPL-SCNC: 142 MMOL/L (ref 136–145)
WBC # BLD AUTO: 7.53 10*3/MM3 (ref 3.4–10.8)

## 2020-09-09 PROCEDURE — 92526 ORAL FUNCTION THERAPY: CPT | Performed by: SPEECH-LANGUAGE PATHOLOGIST

## 2020-09-09 PROCEDURE — 94760 N-INVAS EAR/PLS OXIMETRY 1: CPT

## 2020-09-09 PROCEDURE — 85025 COMPLETE CBC W/AUTO DIFF WBC: CPT | Performed by: FAMILY MEDICINE

## 2020-09-09 PROCEDURE — 97535 SELF CARE MNGMENT TRAINING: CPT

## 2020-09-09 PROCEDURE — 80053 COMPREHEN METABOLIC PANEL: CPT | Performed by: NURSE PRACTITIONER

## 2020-09-09 PROCEDURE — 82962 GLUCOSE BLOOD TEST: CPT

## 2020-09-09 PROCEDURE — 94799 UNLISTED PULMONARY SVC/PX: CPT

## 2020-09-09 PROCEDURE — 63710000001 INSULIN DETEMIR PER 5 UNITS: Performed by: INTERNAL MEDICINE

## 2020-09-09 PROCEDURE — 84100 ASSAY OF PHOSPHORUS: CPT | Performed by: INTERNAL MEDICINE

## 2020-09-09 PROCEDURE — 63710000001 INSULIN ASPART PER 5 UNITS: Performed by: INTERNAL MEDICINE

## 2020-09-09 PROCEDURE — 25010000002 HEPARIN (PORCINE) PER 1000 UNITS: Performed by: INTERNAL MEDICINE

## 2020-09-09 RX ADMIN — CARVEDILOL 3.12 MG: 3.12 TABLET, FILM COATED ORAL at 20:50

## 2020-09-09 RX ADMIN — POTASSIUM & SODIUM PHOSPHATES POWDER PACK 280-160-250 MG 1 PACKET: 280-160-250 PACK at 17:02

## 2020-09-09 RX ADMIN — METOCLOPRAMIDE 5 MG: 5 TABLET ORAL at 17:02

## 2020-09-09 RX ADMIN — INSULIN ASPART 3 UNITS: 100 INJECTION, SOLUTION INTRAVENOUS; SUBCUTANEOUS at 17:02

## 2020-09-09 RX ADMIN — CLOPIDOGREL BISULFATE 75 MG: 75 TABLET ORAL at 08:20

## 2020-09-09 RX ADMIN — SODIUM CHLORIDE, PRESERVATIVE FREE 10 ML: 5 INJECTION INTRAVENOUS at 08:21

## 2020-09-09 RX ADMIN — SODIUM CHLORIDE 50 ML: 900 IRRIGANT IRRIGATION at 06:03

## 2020-09-09 RX ADMIN — BUDESONIDE AND FORMOTEROL FUMARATE DIHYDRATE 2 PUFF: 160; 4.5 AEROSOL RESPIRATORY (INHALATION) at 19:49

## 2020-09-09 RX ADMIN — INSULIN ASPART 3 UNITS: 100 INJECTION, SOLUTION INTRAVENOUS; SUBCUTANEOUS at 10:55

## 2020-09-09 RX ADMIN — METOCLOPRAMIDE 5 MG: 5 TABLET ORAL at 20:51

## 2020-09-09 RX ADMIN — METOCLOPRAMIDE 5 MG: 5 TABLET ORAL at 10:54

## 2020-09-09 RX ADMIN — INSULIN DETEMIR 20 UNITS: 100 INJECTION, SOLUTION SUBCUTANEOUS at 20:51

## 2020-09-09 RX ADMIN — POTASSIUM CHLORIDE 40 MEQ: 10 CAPSULE, COATED, EXTENDED RELEASE ORAL at 08:20

## 2020-09-09 RX ADMIN — HEPARIN SODIUM 5000 UNITS: 5000 INJECTION INTRAVENOUS; SUBCUTANEOUS at 08:21

## 2020-09-09 RX ADMIN — SERTRALINE HYDROCHLORIDE 50 MG: 50 TABLET ORAL at 08:20

## 2020-09-09 RX ADMIN — METOCLOPRAMIDE 5 MG: 5 TABLET ORAL at 08:20

## 2020-09-09 RX ADMIN — NICOTINE 1 PATCH: 21 PATCH, EXTENDED RELEASE TRANSDERMAL at 08:20

## 2020-09-09 RX ADMIN — PRAVASTATIN SODIUM 40 MG: 40 TABLET ORAL at 20:50

## 2020-09-09 RX ADMIN — ACETYLCYSTEINE 4 ML: 100 SOLUTION ORAL; RESPIRATORY (INHALATION) at 14:18

## 2020-09-09 RX ADMIN — PANTOPRAZOLE SODIUM 40 MG: 40 TABLET, DELAYED RELEASE ORAL at 06:02

## 2020-09-09 RX ADMIN — BUDESONIDE AND FORMOTEROL FUMARATE DIHYDRATE 2 PUFF: 160; 4.5 AEROSOL RESPIRATORY (INHALATION) at 08:40

## 2020-09-09 RX ADMIN — ACETYLCYSTEINE 4 ML: 100 SOLUTION ORAL; RESPIRATORY (INHALATION) at 06:02

## 2020-09-09 RX ADMIN — SODIUM BICARBONATE 150 MEQ: 84 INJECTION, SOLUTION INTRAVENOUS at 04:03

## 2020-09-09 RX ADMIN — SODIUM CHLORIDE 50 ML: 900 IRRIGANT IRRIGATION at 14:18

## 2020-09-09 RX ADMIN — CARVEDILOL 3.12 MG: 3.12 TABLET, FILM COATED ORAL at 08:20

## 2020-09-09 RX ADMIN — SODIUM CHLORIDE, PRESERVATIVE FREE 10 ML: 5 INJECTION INTRAVENOUS at 20:51

## 2020-09-09 RX ADMIN — POTASSIUM CHLORIDE 40 MEQ: 10 CAPSULE, COATED, EXTENDED RELEASE ORAL at 17:02

## 2020-09-09 RX ADMIN — ASPIRIN 81 MG: 81 TABLET, COATED ORAL at 08:20

## 2020-09-09 RX ADMIN — HEPARIN SODIUM 5000 UNITS: 5000 INJECTION INTRAVENOUS; SUBCUTANEOUS at 20:51

## 2020-09-09 NOTE — PLAN OF CARE
Problem: Patient Care Overview  Goal: Plan of Care Review  Outcome: Ongoing (interventions implemented as appropriate)  Flowsheets  Taken 9/9/2020 0720  Progress: improving  Plan of Care Reviewed With: patient  Taken 9/9/2020 0729  Outcome Summary: Pt seen for dysphagia tx this date for f/u session on current diet.  Pt was seen at AM meal and pt was able to self feed entire meal.  Pt consumed mech soft solids (diced potatoes, sausage) w/no difficulty masticating bolus efficiently, cleared oral cavity w/no difficulty.  Pt consumed thin liquids via cup w/no overt s/s of aspiation.  SLP discussed d/c on current diet and pt was in agreement.

## 2020-09-09 NOTE — PLAN OF CARE
Problem: Patient Care Overview  Goal: Plan of Care Review  Flowsheets  Taken 9/9/2020 1438  Progress: improving  Taken 9/9/2020 0841  Plan of Care Reviewed With: patient     Problem: Skin Injury Risk (Adult)  Goal: Skin Health and Integrity  Flowsheets (Taken 9/9/2020 1438)  Skin Health and Integrity: making progress toward outcome     Problem: Fall Risk (Adult)  Goal: Absence of Fall  Flowsheets (Taken 9/9/2020 1438)  Absence of Fall: achieves outcome     Problem: Diabetes, Type 1 (Adult)  Goal: Signs and Symptoms of Listed Potential Problems Will be Absent, Minimized or Managed (Diabetes, Type 1)  Flowsheets (Taken 9/7/2020 1411 by Layla Good RN)  Problems Assessed (Type 1 Diabetes): all  Problems Present (Type 1 Diabetes): hyperglycemia

## 2020-09-09 NOTE — PROGRESS NOTES
"[facility]  [address] [place] [address]. [address]  T - [phone]  F - [phone]     Progress Note          PATIENT  DEMOGRAPHICS   PATIENT NAME: Favio Casillas                      PHYSICIAN: Rodríguez Brody MD  : 1955  MRN: [id number]   LOS: 4 days    Patient Care Team:  Shayne Merritt MD as PCP - General (Family Medicine)  Adryan Prater APRN as PCP - Claims Attributed  Kevin Robbins PA-C as Physician Assistant (Gastroenterology)  Harvinder Robert MD  Subjective   SUBJECTIVE   FEELS WELL good UO         Objective   OBJECTIVE   Vital Signs  Temp:  [97.2 °F (36.2 °C)-98.3 °F (36.8 °C)] 98.3 °F (36.8 °C)  Heart Rate:  [77-94] 90  Resp:  [18-20] 18  BP: (100-121)/(70-83) 111/70    Flowsheet Rows      First Filed Value   Admission Height  170.2 cm (67\") Documented at 2020 1634   Admission Weight  55.9 kg (123 lb 4.8 oz) Documented at 2020 1634           I/O last 3 completed shifts:  In: -   Out: 4225 [Urine:4225]    PHYSICAL EXAM    Physical Exam   Constitutional: He is oriented to person, place, and time. He appears well-developed.   Cachectic   HENT:   Head: Normocephalic and atraumatic.   Eyes: Pupils are equal, round, and reactive to light. Conjunctivae and EOM are normal.   Cardiovascular: Normal rate and regular rhythm.   Pulmonary/Chest: Effort normal and breath sounds normal.   Abdominal: Soft.   Musculoskeletal: He exhibits no edema.   Neurological: He is alert and oriented to person, place, and time.   Skin: Skin is warm and dry.       RESULTS   Results Review:    Results from last 7 days   Lab Units 20  0546 20  0608 20  2139 20  0535   SODIUM mmol/L 142 144  --  142   POTASSIUM mmol/L 3.0* 3.1* 3.4* 2.7*   CHLORIDE mmol/L 107 114*  --  118*   CO2 mmol/L 28.0 20.0*  --  14.0*   BUN mg/dL 59* 80*  --  102*   CREATININE mg/dL 1.93* 2.76*  --  3.51*   CALCIUM mg/dL 7.0* 7.2*  --  7.8*   BILIRUBIN mg/dL 0.3 0.3  --  " 0.3   ALK PHOS U/L 56 60  --  67   ALT (SGPT) U/L 8 9  --  8   AST (SGOT) U/L 10 10  --  10   GLUCOSE mg/dL 123* 146*  --  59*       Estimated Creatinine Clearance: 33.7 mL/min (A) (by C-G formula based on SCr of 1.93 mg/dL (H)).    Results from last 7 days   Lab Units 09/09/20  0835 09/08/20  0608 09/07/20  0535 09/06/20 0831 09/06/20 0432   MAGNESIUM mg/dL  --  2.4 1.7 1.8 1.7   PHOSPHORUS mg/dL 2.0*  --   --  3.1 2.3*             Results from last 7 days   Lab Units 09/09/20  0546 09/08/20  0608 09/07/20  0535 09/06/20 0432 09/05/20 2014   WBC 10*3/mm3 7.53 7.87 9.29 12.63* 16.23*   HEMOGLOBIN g/dL 9.1* 9.2* 9.8* 11.1* 11.9*   PLATELETS 10*3/mm3 198 200 194 196 164               Imaging Results (Last 24 Hours)     ** No results found for the last 24 hours. **           MEDICATIONS      acetylcysteine 4 mL Oral Q8H   And      sodium chloride 50 mL Irrigation Q8H   aspirin 81 mg Oral Daily   budesonide-formoterol 2 puff Inhalation BID - RT   carvedilol 3.125 mg Oral Q12H   clopidogrel 75 mg Oral Daily   heparin (porcine) 5,000 Units Subcutaneous Q12H   insulin aspart 0-7 Units Subcutaneous TID AC   insulin detemir 20 Units Subcutaneous Nightly   metoclopramide 5 mg Oral 4x Daily AC & at Bedtime   nicotine 1 patch Transdermal Q24H   pantoprazole 40 mg Oral QAM   potassium chloride 40 mEq Oral BID With Meals   pravastatin 40 mg Oral Nightly   sertraline 50 mg Oral Daily   sodium chloride 10 mL Intravenous Q12H       sodium bicarbonate drip (greater than 75 mEq/bag) 150 mEq Last Rate: 150 mEq (09/09/20 0403)       Assessment/Plan   ASSESSMENT / PLAN      Diabetic ketoacidosis without coma associated with type 1 diabetes mellitus (CMS/HCC)    Moderate malnutrition (CMS/HCC)    1.  AK on CKD 3- baseline creatinine close to 2, creatinine now greater than 5.  John catheter already placed.  Prior history of bladder outlet obstruction and multiple acute kidney injuries as well as hydronephrosis.  Urinalysis was turbid  and microscopy was unable to be performed.      -Cr improving, bicarb improving, sodium WNL, on kcl 40meq bid. Dc bicarb drip. Replace phos    2.  Severe metabolic acidosis- normal anion gap, low pH, bicarb gtt . Will need po bicarb on discharge     3.  Altered mental status- improving     4.  History of bladder cancer- requiring neobladder surgery almost 10 years ago, also history of prostate cancer, Urology following     5.  CAD     6.  IDDM- controlled per primary team     7.  Hyponatremia- mild/pseudohyponatremia, resolved           This document has been electronically signed by Rodríguez Brody MD on September 9, 2020 12:18

## 2020-09-09 NOTE — PROGRESS NOTES
[facility]  INPATIENT PROGRESS NOTE    Length of Stay: 4  Date of Admission: 9/5/2020  Primary Care Physician: Shayne Merritt MD    Subjective     Chief Complaint: Altered mental status    HPI:  He is a 65 year old male with history of insulin dependent Diabetes, Hepatitis C, COPD, substance abuse, GERD, HTN, and hx of bladder cancer who was admitted for DKA  and acute kidney injury complicated by acute metabolic encephalopathy.  He also has malnutrition and history of bladder cancer status post neobladder creation with chronic John catheter placement.  He was started on IV fluid on sodium bicarbonate drip by nephrologist.  Creatinine is trending down.    Interval history: Patient has no episode of vomiting overnight.  Tolerated breakfast without problems. He is alert and oriented x3.  His appetite is improving as well as his energy.    Review of Systems   Constitutional: Positive for appetite change. Negative for activity change, chills, fatigue and fever.   HENT: Negative for congestion, sore throat and trouble swallowing.    Respiratory: Negative for cough, chest tightness, shortness of breath and wheezing.    Cardiovascular: Negative for chest pain, palpitations and leg swelling.   Gastrointestinal: Negative for abdominal distention, abdominal pain, diarrhea, nausea and vomiting.   Genitourinary: Negative for difficulty urinating, dysuria and hematuria.   Musculoskeletal: Negative for arthralgias, back pain and myalgias.   Skin: Negative for pallor and rash.   Neurological: Negative for dizziness, syncope, weakness, light-headedness and headaches.   Hematological: Negative for adenopathy. Does not bruise/bleed easily.   Psychiatric/Behavioral: Negative for agitation and confusion. The patient is not nervous/anxious.      Objective    Temp:  [97.9 °F (36.6 °C)-98.3 °F (36.8 °C)] 98.2 °F (36.8 °C)  Heart Rate:  [77-94] 85  Resp:  [18-20]  18  BP: (100-121)/(70-82) 119/72    Physical Exam   Constitutional: He is oriented to person, place, and time. He appears well-developed and well-nourished. No distress.   HENT:   Head: Normocephalic and atraumatic.   Mouth/Throat: Oropharynx is clear and moist. No oropharyngeal exudate.   Eyes: Pupils are equal, round, and reactive to light. Conjunctivae and EOM are normal. No scleral icterus.   Neck: Normal range of motion. Neck supple. No JVD present. No tracheal deviation present. No thyromegaly present.   Cardiovascular: Normal rate, regular rhythm, normal heart sounds and intact distal pulses. Exam reveals no gallop and no friction rub.   No murmur heard.  Pulmonary/Chest: Effort normal and breath sounds normal. No stridor. No respiratory distress. He has no wheezes. He has no rales. He exhibits no tenderness.   Abdominal: Soft. Bowel sounds are normal. He exhibits no distension and no mass. There is no tenderness. There is no rebound and no guarding. No hernia.   Genitourinary:   Genitourinary Comments: John catheter in situ.   Musculoskeletal: Normal range of motion. He exhibits no edema, tenderness or deformity.   Lymphadenopathy:     He has no cervical adenopathy.   Neurological: He is alert and oriented to person, place, and time. No cranial nerve deficit. He exhibits normal muscle tone.   Skin: Skin is warm and dry. No rash noted. He is not diaphoretic. No erythema. No pallor.   Psychiatric: He has a normal mood and affect. His behavior is normal. Judgment and thought content normal.     Medication Review:    Current Facility-Administered Medications:   •  acetaminophen (TYLENOL) tablet 650 mg, 650 mg, Oral, Q4H PRN **OR** acetaminophen (TYLENOL) suppository 650 mg, 650 mg, Rectal, Q4H PRN, Kevin Perry MD  •  albuterol sulfate HFA (PROVENTIL HFA;VENTOLIN HFA;PROAIR HFA) inhaler 2 puff, 2 puff, Inhalation, Q6H PRN, Kevin Perry MD  •  aspirin EC tablet 81 mg, 81 mg, Oral, Daily, Ciro Chauhan  MD BHARTI, 81 mg at 09/09/20 0820  •  budesonide-formoterol (SYMBICORT) 160-4.5 MCG/ACT inhaler 2 puff, 2 puff, Inhalation, BID - RT, Kevin Perry MD, 2 puff at 09/09/20 0840  •  carvedilol (COREG) tablet 3.125 mg, 3.125 mg, Oral, Q12H, Ciro Chauhan MD, 3.125 mg at 09/09/20 0820  •  clopidogrel (PLAVIX) tablet 75 mg, 75 mg, Oral, Daily, Ciro Chauhan MD, 75 mg at 09/09/20 0820  •  dextrose (D50W) 25 g/ 50mL Intravenous Solution 25 g, 25 g, Intravenous, Q15 Min PRN, Ciro Chauhan MD  •  dextrose (GLUTOSE) oral gel 15 g, 15 g, Oral, Q15 Min PRN, Ciro Chauhan MD  •  glucagon (human recombinant) (GLUCAGEN DIAGNOSTIC) injection 1 mg, 1 mg, Subcutaneous, Q15 Min PRN, Ciro Chauhan MD  •  heparin (porcine) 5000 UNIT/ML injection 5,000 Units, 5,000 Units, Subcutaneous, Q12H, Ciro Chauhan MD, 5,000 Units at 09/09/20 0821  •  insulin aspart (novoLOG) injection 0-7 Units, 0-7 Units, Subcutaneous, TID AC, Ciro Chauhan MD, 3 Units at 09/09/20 1702  •  insulin detemir (LEVEMIR) injection 20 Units, 20 Units, Subcutaneous, Nightly, Ciro Chauhan MD, 20 Units at 09/08/20 2111  •  Magnesium Sulfate 2 gram Bolus, followed by 8 gram infusion (total Mg dose 10 grams)- Mg less than or equal to 1mg/dL, 2 g, Intravenous, PRN **OR** Magnesium Sulfate 2 gram / 50mL Infusion (GIVE X 3 BAGS TO EQUAL 6GM TOTAL DOSE) - Mg 1.1 - 1.5 mg/dl, 2 g, Intravenous, PRN **OR** Magnesium Sulfate 4 gram infusion- Mg 1.6-1.9 mg/dL, 4 g, Intravenous, PRN, Kevin Perry MD, Last Rate: 25 mL/hr at 09/07/20 1239, 4 g at 09/07/20 1239  •  metoclopramide (REGLAN) tablet 5 mg, 5 mg, Oral, 4x Daily AC & at Bedtime, Poppy Méndez MD, 5 mg at 09/09/20 1702  •  nicotine (NICODERM CQ) 21 MG/24HR patch 1 patch, 1 patch, Transdermal, Q24H, Ciro Chauhan MD, 1 patch at 09/09/20 0820  •  nitroglycerin (NITROSTAT) SL tablet 0.4 mg, 0.4 mg, Sublingual, Q5 Min PRN, Ciro Chauhan MD  •  ondansetron (ZOFRAN)  tablet 4 mg, 4 mg, Oral, Q6H PRN **OR** ondansetron (ZOFRAN) injection 4 mg, 4 mg, Intravenous, Q6H PRN, Kevin Perry MD, 4 mg at 09/08/20 0827  •  pantoprazole (PROTONIX) EC tablet 40 mg, 40 mg, Oral, QAM, Ciro Chauhan MD, 40 mg at 09/09/20 0602  •  potassium & sodium phosphates (PHOS-NAK) oral packet, 1 packet, Oral, TID AC, Rodríguez Brody MD, 1 packet at 09/09/20 1702  •  potassium chloride (MICRO-K) CR capsule 40 mEq, 40 mEq, Oral, BID With Meals, Poppy Méndez MD, 40 mEq at 09/09/20 1702  •  potassium phosphate 45 mmol in sodium chloride 0.9 % 500 mL infusion, 45 mmol, Intravenous, PRN **OR** potassium phosphate 30 mmol in sodium chloride 0.9 % 250 mL infusion, 30 mmol, Intravenous, PRN **OR** Potassium Phosphates 15 mmol in sodium chloride 0.9 % 100 mL infusion, 15 mmol, Intravenous, PRN, 15 mmol at 09/06/20 0254 **OR** sodium phosphates 45 mmol in sodium chloride 0.9 % 500 mL IVPB, 45 mmol, Intravenous, PRN **OR** sodium phosphates 30 mmol in sodium chloride 0.9 % 250 mL IVPB, 30 mmol, Intravenous, PRN **OR** sodium phosphates 15 mmol in sodium chloride 0.9 % 250 mL IVPB, 15 mmol, Intravenous, PRN, Kevin Perry MD  •  pravastatin (PRAVACHOL) tablet 40 mg, 40 mg, Oral, Nightly, Ciro Chauhan MD, 40 mg at 09/08/20 2111  •  sertraline (ZOLOFT) tablet 50 mg, 50 mg, Oral, Daily, Ciro Chauhan MD, 50 mg at 09/09/20 0820  •  sodium chloride 0.9 % flush 10 mL, 10 mL, Intravenous, Q12H, Kevin Perry MD, 10 mL at 09/09/20 0821  •  sodium chloride 0.9 % flush 10 mL, 10 mL, Intravenous, PRN, Kevin Perry MD  •  sodium chloride 0.9 % flush 10 mL, 10 mL, Intravenous, Once PRN, Kevin Perry MD  •  sodium chloride 0.9 % flush 30 mL, 30 mL, Intravenous, Once PRN, Kevin Perry MD    I have reviewed the patient's current medications.     Results Review:  I have reviewed the labs, radiology results, and diagnostic studies.    Laboratory Data:   Results from last 7 days   Lab Units  09/09/20  0546 09/08/20  0608 09/07/20  2139 09/07/20  0535   SODIUM mmol/L 142 144  --  142   POTASSIUM mmol/L 3.0* 3.1* 3.4* 2.7*   CHLORIDE mmol/L 107 114*  --  118*   CO2 mmol/L 28.0 20.0*  --  14.0*   BUN mg/dL 59* 80*  --  102*   CREATININE mg/dL 1.93* 2.76*  --  3.51*   GLUCOSE mg/dL 123* 146*  --  59*   CALCIUM mg/dL 7.0* 7.2*  --  7.8*   BILIRUBIN mg/dL 0.3 0.3  --  0.3   ALK PHOS U/L 56 60  --  67   ALT (SGPT) U/L 8 9  --  8   AST (SGOT) U/L 10 10  --  10   ANION GAP mmol/L 7.0 10.0  --  10.0     Estimated Creatinine Clearance: 33.7 mL/min (A) (by C-G formula based on SCr of 1.93 mg/dL (H)).  Results from last 7 days   Lab Units 09/09/20  0835 09/08/20  0608 09/07/20  0535 09/06/20  0831 09/06/20  0432   MAGNESIUM mg/dL  --  2.4 1.7 1.8 1.7   PHOSPHORUS mg/dL 2.0*  --   --  3.1 2.3*         Results from last 7 days   Lab Units 09/09/20  0546 09/08/20  0608 09/07/20  0535 09/06/20  0432 09/05/20 2014   WBC 10*3/mm3 7.53 7.87 9.29 12.63* 16.23*   HEMOGLOBIN g/dL 9.1* 9.2* 9.8* 11.1* 11.9*   HEMATOCRIT % 26.7* 26.7* 28.3* 33.6* 35.2*   PLATELETS 10*3/mm3 198 200 194 196 164           Culture Data:   No results found for: BLOODCX  No results found for: URINECX  No results found for: RESPCX  No results found for: WOUNDCX  No results found for: STOOLCX  No components found for: BODYFLD    Radiology Data:   Imaging Results (Last 24 Hours)     ** No results found for the last 24 hours. **          Assessment/Plan     Hospital Problem List:  Active Problems:    Diabetic ketoacidosis without coma associated with type 1 diabetes mellitus (CMS/HCC)    Moderate malnutrition (CMS/HCC)  Acute kidney injury on chronic kidney disease  Metabolic acidosis  Nicotine dependence  COPD  Cachexia/severe malnutrition  GERD  Physical deconditioning  Hypokalemia    Plan  - Patient's mental status has improved and he appears back to baseline  - His renal function is improving with creatinine trending down to 1.9 today.  Nephrology  input appreciated.  Renal ultrasound unremarkable.  Discontinue sodium bicarbonate as metabolic acidosis has improved.  Patient will require bicarb on discharge.    - Urology input appreciated for history of neobladder creation.  Continue Mucomyst bladder irrigations to decrease urinary sediments. Will discontinue this evening.  - Replete electrolytes  -Patient has episode of vomiting with meals.  Continue p.o. Reglan 3 times daily AC as patient may have gastroparesis from diabetes.  Of note patient had upper GI endoscopy with Dr. Torres last year that showed gastritis.  Continue PO Protonix  - Dietitian consult for malnutrition  - NovoLog sliding scale and Levemir for type 2 diabetes mellitus  - PT/OT consult for generalized weakness and deconditioning  -Nicotine patch for smoking cessation  - DVT prophylaxis with Lovenox  -CODE STATUS is full code    Discharge Planning: Expect patient to be discharged in the next 24 hrs    Poppy Méndez MD   09/09/20   18:34

## 2020-09-09 NOTE — PROGRESS NOTES
Discharge Planning Assessment  [facility]     Patient Name: Favio Casillas  MRN: [id number]  Today's Date: 9/9/2020    Admit Date: 9/5/2020    Discharge Needs Assessment     Row Name 09/09/20 1015       Living Environment    Lives With  alone    Current Living Arrangements  home/apartment/condo Information on face sheet confirmed with patient.     Primary Care Provided by  self    Provides Primary Care For  no one    Family Caregiver if Needed  significant other    Family Caregiver Names  Sofibrock Pritchettham     Quality of Family Relationships  involved    Able to Return to Prior Arrangements  yes       Resource/Environmental Concerns    Resource/Environmental Concerns  none    Transportation Concerns  car, none       Transition Planning    Patient/Family Anticipates Transition to  home    Patient/Family Anticipated Services at Transition  none    Transportation Anticipated  family or friend will provide Patient does not drive. His significant other assists with transportation.        Discharge Needs Assessment    Readmission Within the Last 30 Days  no previous admission in last 30 days    Concerns to be Addressed  denies needs/concerns at this time    Concerns Comments  [facility] discussed therapy recommendations for PT/OT and 24/7 care. Patient voiced support/assistance from neighbors and significant other. He verbalized understanding re: 24/7 care but denied need. He is agreeable to  services.     Equipment Currently Used at Home  cane, straight;glucometer    Anticipated Changes Related to Illness  none    Equipment Needed After Discharge  walker, rolling    Discharge Facility/Level of Care Needs  home with home health    Provided Post Acute Provider List?  Yes    Post Acute Provider List  Home Health;DME Supplier    Delivered To  Patient    Method of Delivery  In person    Patient's Choice of Community Agency(s)  [facility] and [facility] for D[facility]    Current Discharge Risk  chronically ill         Discharge Plan     Row Name 09/09/20 1015       Plan    Plan Comments  LACE assessment complete. Patient is agreeable to HH and DME: [facility]. Patient requests [facility] for home health. He is agreeable to [facility] for DME: rolling walker and requests DME be delivered to his room at d/c.  NANDO left a note for the provider requesting HH for PT/OT and skilled nursing for medication education/DM management. Patient denies any additional needs and/or concerns at this time. ......Ashlee [facility]        Destination      Coordination has not been started for this encounter.      Durable Medical Equipment      Coordination has not been started for this encounter.      Dialysis/Infusion      Coordination has not been started for this encounter.      Home Medical Care      Coordination has not been started for this encounter.      Therapy      Coordination has not been started for this encounter.      Community Resources      Coordination has not been started for this encounter.          Demographic Summary     Row Name 09/09/20 1015       General Information    Admission Type  inpatient    Arrived From  emergency department    Referral Source  high risk screening LACE score 15    Preferred Language  English     Used During This Interaction  no    General Information Comments  Room visit made with patient. LACE assessment complete.        Contact Information    Contact Information Obtained for          Functional Status     Row Name 09/09/20 1015       Functional Status    Usual Activity Tolerance  good    Current Activity Tolerance  moderate    Functional Status Comments  Patient voiced independence with ADL's. He voiced that he has a cane available and uses it for ambulation. Patient performs his own bathing and dressing.        Functional Status, IADL    Medications  independent Pharmacy, [facility], and prescription coverage confirmed.     Meal Preparation  independent    Housekeeping  independent     Laundry  independent    Shopping  independent       Mental Status Summary    Recent Changes in Mental Status/Cognitive Functioning  no changes       Employment/    Employment Status  disabled        Psychosocial    No documentation.       Abuse/Neglect    No documentation.       Legal    No documentation.       Substance Abuse    No documentation.       Patient Forms    No documentation.           JOSHUA Fernandez

## 2020-09-09 NOTE — PROGRESS NOTES
[facility] Medicine Services  INPATIENT PROGRESS NOTE    Length of Stay: 3  Date of Admission: 9/5/2020  Primary Care Physician: Shayne Merritt MD    Subjective     Chief Complaint: Altered mental status    HPI:  He is a 65 year old male with history of insulin dependent Diabetes, Hepatitis C, COPD, substance abuse, GERD, HTN, and hx of bladder cancer who was admitted for DKA  and acute kidney injury complicated by acute metabolic encephalopathy.  He also has malnutrition and history of bladder cancer status post neobladder creation with chronic John catheter placement.  He was started on IV fluid on sodium bicarbonate drip by nephrologist.  Creatinine is trending down.    Interval history: Last night patient complained of vomiting and had 2 episodes of vomiting today.  Multiple episodes of vomiting today when he ate breakfast. He is alert and oriented x3.  His appetite is improving.    Review of Systems   Constitutional: Positive for appetite change and fatigue. Negative for activity change, chills and fever.   HENT: Negative for congestion, sore throat and trouble swallowing.    Respiratory: Negative for cough, chest tightness, shortness of breath and wheezing.    Cardiovascular: Negative for chest pain, palpitations and leg swelling.   Gastrointestinal: Negative for abdominal distention, abdominal pain, diarrhea, nausea and vomiting.   Genitourinary: Negative for difficulty urinating, dysuria and hematuria.   Musculoskeletal: Negative for arthralgias, back pain and myalgias.   Skin: Negative for pallor and rash.   Neurological: Negative for dizziness, syncope, weakness, light-headedness and headaches.   Hematological: Negative for adenopathy. Does not bruise/bleed easily.   Psychiatric/Behavioral: Negative for agitation and confusion. The patient is not nervous/anxious.      Objective    Temp:  [96.7 °F (35.9 °C)-98.5 °F (36.9 °C)] 97.2 °F (36.2 °C)  Heart  Rate:  [72-98] 80  Resp:  [16-18] 18  BP: (107-136)/(60-83) 115/83    Physical Exam   Constitutional: He is oriented to person, place, and time. He appears well-developed and well-nourished. No distress.   HENT:   Head: Normocephalic and atraumatic.   Mouth/Throat: Oropharynx is clear and moist. No oropharyngeal exudate.   Eyes: Pupils are equal, round, and reactive to light. Conjunctivae and EOM are normal. No scleral icterus.   Neck: Normal range of motion. Neck supple. No JVD present. No tracheal deviation present. No thyromegaly present.   Cardiovascular: Normal rate, regular rhythm, normal heart sounds and intact distal pulses. Exam reveals no gallop and no friction rub.   No murmur heard.  Pulmonary/Chest: Effort normal and breath sounds normal. No stridor. No respiratory distress. He has no wheezes. He has no rales. He exhibits no tenderness.   Abdominal: Soft. Bowel sounds are normal. He exhibits no distension and no mass. There is no tenderness. There is no rebound and no guarding. No hernia.   Genitourinary:   Genitourinary Comments: John catheter in situ.   Musculoskeletal: Normal range of motion. He exhibits no edema, tenderness or deformity.   Lymphadenopathy:     He has no cervical adenopathy.   Neurological: He is alert and oriented to person, place, and time. No cranial nerve deficit. He exhibits normal muscle tone.   Skin: Skin is warm and dry. No rash noted. He is not diaphoretic. No erythema. No pallor.   Psychiatric: He has a normal mood and affect. His behavior is normal. Judgment and thought content normal.     Medication Review:    Current Facility-Administered Medications:   •  acetaminophen (TYLENOL) tablet 650 mg, 650 mg, Oral, Q4H PRN **OR** acetaminophen (TYLENOL) suppository 650 mg, 650 mg, Rectal, Q4H PRN, Kevin Perry MD  •  acetylcysteine (MUCOMYST) 10 % solution 4 mL, 4 mL, Oral, Q8H, 4 mL at 09/08/20 1321 **AND** sodium chloride (NS) irrigation solution 50 mL, 50 mL, Irrigation,  Q8H, Adryan Prater, APRN, 50 mL at 09/08/20 1321  •  albuterol sulfate HFA (PROVENTIL HFA;VENTOLIN HFA;PROAIR HFA) inhaler 2 puff, 2 puff, Inhalation, Q6H PRN, Kevin Perry MD  •  aspirin EC tablet 81 mg, 81 mg, Oral, Daily, Ciro Chauhan MD, 81 mg at 09/08/20 1020  •  budesonide-formoterol (SYMBICORT) 160-4.5 MCG/ACT inhaler 2 puff, 2 puff, Inhalation, BID - RT, Kevin Perry MD, 2 puff at 09/05/20 2005  •  carvedilol (COREG) tablet 3.125 mg, 3.125 mg, Oral, Q12H, Ciro Chauhan MD, 3.125 mg at 09/08/20 1021  •  clopidogrel (PLAVIX) tablet 75 mg, 75 mg, Oral, Daily, Ciro Chauhan MD, 75 mg at 09/08/20 1020  •  dextrose (D50W) 25 g/ 50mL Intravenous Solution 25 g, 25 g, Intravenous, Q15 Min PRN, Ciro Chauhan MD  •  dextrose (GLUTOSE) oral gel 15 g, 15 g, Oral, Q15 Min PRN, Ciro Chauhan MD  •  glucagon (human recombinant) (GLUCAGEN DIAGNOSTIC) injection 1 mg, 1 mg, Subcutaneous, Q15 Min PRN, Ciro Chauhan MD  •  heparin (porcine) 5000 UNIT/ML injection 5,000 Units, 5,000 Units, Subcutaneous, Q12H, Ciro Chauhan MD, 5,000 Units at 09/08/20 0828  •  insulin aspart (novoLOG) injection 0-7 Units, 0-7 Units, Subcutaneous, TID AC, Ciro Chauhan MD, 5 Units at 09/08/20 1742  •  insulin detemir (LEVEMIR) injection 20 Units, 20 Units, Subcutaneous, Nightly, Ciro Chauhan MD, 20 Units at 09/07/20 2102  •  Magnesium Sulfate 2 gram Bolus, followed by 8 gram infusion (total Mg dose 10 grams)- Mg less than or equal to 1mg/dL, 2 g, Intravenous, PRN **OR** Magnesium Sulfate 2 gram / 50mL Infusion (GIVE X 3 BAGS TO EQUAL 6GM TOTAL DOSE) - Mg 1.1 - 1.5 mg/dl, 2 g, Intravenous, PRN **OR** Magnesium Sulfate 4 gram infusion- Mg 1.6-1.9 mg/dL, 4 g, Intravenous, PRN, Kevin Perry MD, Last Rate: 25 mL/hr at 09/07/20 1239, 4 g at 09/07/20 1239  •  metoclopramide (REGLAN) tablet 5 mg, 5 mg, Oral, 4x Daily AC & at Bedtime, Poppy Méndez MD, 5 mg at 09/08/20 1367  •  nicotine  (NICODERM CQ) 21 MG/24HR patch 1 patch, 1 patch, Transdermal, Q24H, Ciro Chauhan MD, 1 patch at 09/07/20 0858  •  nitroglycerin (NITROSTAT) SL tablet 0.4 mg, 0.4 mg, Sublingual, Q5 Min PRN, Ciro Chauhan MD  •  ondansetron (ZOFRAN) tablet 4 mg, 4 mg, Oral, Q6H PRN **OR** ondansetron (ZOFRAN) injection 4 mg, 4 mg, Intravenous, Q6H PRN, Kevin Perry MD, 4 mg at 09/08/20 0827  •  pantoprazole (PROTONIX) EC tablet 40 mg, 40 mg, Oral, QAM, Ciro Chauhan MD, 40 mg at 09/08/20 0622  •  potassium chloride (MICRO-K) CR capsule 40 mEq, 40 mEq, Oral, BID With Meals, Poppy Méndez MD, 40 mEq at 09/08/20 1742  •  potassium phosphate 45 mmol in sodium chloride 0.9 % 500 mL infusion, 45 mmol, Intravenous, PRN **OR** potassium phosphate 30 mmol in sodium chloride 0.9 % 250 mL infusion, 30 mmol, Intravenous, PRN **OR** Potassium Phosphates 15 mmol in sodium chloride 0.9 % 100 mL infusion, 15 mmol, Intravenous, PRN, 15 mmol at 09/06/20 0254 **OR** sodium phosphates 45 mmol in sodium chloride 0.9 % 500 mL IVPB, 45 mmol, Intravenous, PRN **OR** sodium phosphates 30 mmol in sodium chloride 0.9 % 250 mL IVPB, 30 mmol, Intravenous, PRN **OR** sodium phosphates 15 mmol in sodium chloride 0.9 % 250 mL IVPB, 15 mmol, Intravenous, PRN, Kevin Perry MD  •  pravastatin (PRAVACHOL) tablet 40 mg, 40 mg, Oral, Nightly, Ciro Chauhan MD, 40 mg at 09/07/20 2102  •  sertraline (ZOLOFT) tablet 50 mg, 50 mg, Oral, Daily, Ciro Chauhan MD, 50 mg at 09/08/20 1020  •  sodium bicarbonate 8.4 % 150 mEq in sterile water 1,000 mL infusion (greater than 75 mEq), 150 mEq, Intravenous, Continuous, Rodríguez Brody MD, Last Rate: 75 mL/hr at 09/08/20 1743  •  sodium chloride 0.9 % flush 10 mL, 10 mL, Intravenous, Q12H, Kevin Perry MD, 10 mL at 09/07/20 2102  •  sodium chloride 0.9 % flush 10 mL, 10 mL, Intravenous, PRN, Kevin Perry MD  •  sodium chloride 0.9 % flush 10 mL, 10 mL, Intravenous, Once PRN, Kevin Perry  MD CATINA  •  sodium chloride 0.9 % flush 30 mL, 30 mL, Intravenous, Once PRN, Kevin Perry MD    I have reviewed the patient's current medications.     Results Review:  I have reviewed the labs, radiology results, and diagnostic studies.    Laboratory Data:   Results from last 7 days   Lab Units 09/08/20  0608 09/07/20  2139 09/07/20  0535 09/06/20  1538 09/06/20  0831   SODIUM mmol/L 144  --  142 137 132*   POTASSIUM mmol/L 3.1* 3.4* 2.7* 3.0* 4.0   CHLORIDE mmol/L 114*  --  118* 115* 115*   CO2 mmol/L 20.0*  --  14.0* 11.0* 7.0*   BUN mg/dL 80*  --  102* 110* 110*   CREATININE mg/dL 2.76*  --  3.51* 4.09* 4.78*   GLUCOSE mg/dL 146*  --  59* 263* 225*   CALCIUM mg/dL 7.2*  --  7.8* 7.8* 8.0*   BILIRUBIN mg/dL 0.3  --  0.3  --  0.3   ALK PHOS U/L 60  --  67  --  81   ALT (SGPT) U/L 9  --  8  --  9   AST (SGOT) U/L 10  --  10  --  7   ANION GAP mmol/L 10.0  --  10.0 11.0 10.0     Estimated Creatinine Clearance: 23.1 mL/min (A) (by C-G formula based on SCr of 2.76 mg/dL (H)).  Results from last 7 days   Lab Units 09/08/20  0608 09/07/20  0535 09/06/20  0831 09/06/20  0432 09/05/20  2348   MAGNESIUM mg/dL 2.4 1.7 1.8 1.7 1.8   PHOSPHORUS mg/dL  --   --  3.1 2.3* 2.3*         Results from last 7 days   Lab Units 09/08/20  0608 09/07/20  0535 09/06/20  0432 09/05/20 2014 09/05/20  1653   WBC 10*3/mm3 7.87 9.29 12.63* 16.23* 17.90*   HEMOGLOBIN g/dL 9.2* 9.8* 11.1* 11.9* 13.3   HEMATOCRIT % 26.7* 28.3* 33.6* 35.2* 40.3   PLATELETS 10*3/mm3 200 194 196 164 221           Culture Data:   No results found for: BLOODCX  No results found for: URINECX  No results found for: RESPCX  No results found for: WOUNDCX  No results found for: STOOLCX  No components found for: BODYFLD    Radiology Data:   Imaging Results (Last 24 Hours)     ** No results found for the last 24 hours. **          Assessment/Plan     Hospital Problem List:  Active Problems:    Diabetic ketoacidosis without coma associated with type 1 diabetes mellitus  (CMS/HCC)    Moderate malnutrition (CMS/HCC)  Acute kidney injury on chronic kidney disease  Metabolic acidosis  Nicotine dependence  COPD  Cachexia/severe malnutrition  GERD  Physical deconditioning  Hypokalemia    Plan  - Patient's mental status has improved and he appears back to baseline  - His renal function is improving with creatinine trending down.  Nephrology input appreciated.  Renal ultrasound unremarkable.  Continue IV fluids with IV sodium bicarb for metabolic acidosis and acute kidney injury  - Urology input appreciated for history of neobladder creation.  Continue Mucomyst bladder irrigations to decrease urinary sediments  - Replete electrolytes  -Patient has episode of vomiting with meals.  Will start p.o. Reglan 3 times daily AC as patient may have gastroparesis from diabetes.  Of note patient had upper GI endoscopy with Dr. Torres last year that showed gastritis.  Continue PO Protonix  - Dietitian consult for malnutrition  - NovoLog sliding scale and Levemir for type 2 diabetes mellitus  - PT/OT consult for generalized weakness and deconditioning  -Nicotine patch for smoking cessation  - DVT prophylaxis with Lovenox  -CODE STATUS is full code    Discharge Planning: Expect patient to be discharged in the next 1 to 2 days    Poppy Méndez MD   09/08/20   19:07

## 2020-09-09 NOTE — THERAPY DISCHARGE NOTE
Acute Care - Speech Language Pathology   Swallow Treatment Note/Discharge   [facility]     Patient Name: Favio Casillas  : 1955  MRN: [id number]  Today's Date: 2020  Onset of Illness/Injury or Date of Surgery: 20            Admit Date: 2020     Goal:  Patient will safely tolerate least restricted diet w/no overt s/s of aspiration for adequate nutrition and hydration:  Pt seen for dysphagia tx this date for f/u session on current diet. Pt was seen at AM meal and pt was able to self feed entire meal. Pt consumed [facility] soft solids (diced potatoes, sausage) w/no difficulty masticating bolus efficiently, cleared oral cavity w/no difficulty. Pt consumed thin liquids via cup w/no overt s/s of aspiation. SLP discussed d/c on current diet and pt was in agreement.      Visit Dx:      ICD-10-CM ICD-9-CM   1. Diabetic ketoacidosis without coma associated with type 1 diabetes mellitus (CMS/HCC) E10.10 250.13   2. Oropharyngeal dysphagia R13.12 787.22   3. Impaired functional mobility, balance, gait, and endurance Z74.09 V49.89   4. Impaired mobility and ADLs Z74.09 V49.89    Z78.9      Patient Active Problem List   Diagnosis   • Type 2 diabetes mellitus (CMS/HCC)   • Acute pain of right shoulder   • Shoulder impingement syndrome, right   • Chest pain   • Acute renal insufficiency   • Chronic hepatitis C virus infection (CMS/HCC)   • Gastritis   • SBO (small bowel obstruction) (CMS/HCC)   • CAD (coronary artery disease)   • Acute kidney injury (nontraumatic) (CMS/HCC)   • Intractable vomiting with nausea   • Gastroesophageal reflux disease with esophagitis   • Diarrhea   • Generalized abdominal pain   • History of colon polyps   • History of colitis   • Acute metabolic encephalopathy   • NSTEMI (non-ST elevated myocardial infarction) (CMS/HCC)   • Acute renal failure superimposed on stage 3 chronic kidney disease (CMS/HCC)   • Influenza A   • Anemia, chronic disease   • Bladder outflow obstruction   •  Acute kidney injury (CMS/HCC)   • Acute urinary tract infection   • Metabolic acidosis   • Hypokalemia   • Acute renal failure superimposed on chronic kidney disease (CMS/HCC)   • Diabetic ketoacidosis without coma associated with type 2 diabetes mellitus (CMS/HCC)   • Diabetic ketoacidosis without coma associated with type 1 diabetes mellitus (CMS/HCC)   • Moderate malnutrition (CMS/HCC)       Therapy Treatment  Rehabilitation Treatment Summary     Row Name 09/09/20 0729             Treatment Time/Intention    Discipline  speech language pathologist  -CK      Document Type  discharge treatment  -CK      Subjective Information  no complaints  -CK      Mode of Treatment  speech-language pathology;individual therapy  -CK      Patient/Family Observations  No family present at bedside  -CK      Care Plan Review  evaluation/treatment results reviewed;care plan/treatment goals reviewed;risks/benefits reviewed;current/potential barriers reviewed  -CK      Patient Effort  good  -CK      Recorded by [CK] Teresa Ardon MS CCC-SLP 09/09/20 0731      Row Name 09/09/20 0729             Positioning and Restraints    Pre-Treatment Position  in bed  -CK      Post Treatment Position  bed  -CK2      In Bed  sitting;call light within reach;encouraged to call for assist;exit alarm on  -CK2      Recorded by [CK] Teresa Ardon MS CCC-SLP 09/09/20 0731  [CK2] Teresa Ardon MS CCC-SLP 09/09/20 0741      Row Name 09/09/20 0729             Pain Scale: Numbers Pre/Post-Treatment    Pain Scale: Numbers, Pretreatment  0/10 - no pain  -CK      Pain Scale: Numbers, Post-Treatment  0/10 - no pain  -CK2      Recorded by [CK] Teresa Ardon MS CCC-SLP 09/09/20 0731  [CK2] Teresa Ardon MS CCC-SLP 09/09/20 0741      Row Name 09/09/20 0729             Plan of Care Review    Plan of Care Reviewed With  patient  -CK      Progress  improving  -CK      Outcome Summary  Pt seen for dysphagia tx this date for f/u session on  current diet.  Pt was seen at AM meal and pt was able to self feed entire meal.  Pt consumed [facility] soft solids (diced potatoes, sausage) w/no difficulty masticating bolus efficiently, cleared oral cavity w/no difficulty.  Pt consumed thin liquids via cup w/no overt s/s of aspiation.  SLP discussed d/c on current diet and pt was in agreement.  -CK      Recorded by [CK] Teresa Ardon MS CCC-SLP 09/09/20 0741      Row Name 09/09/20 0729             Outcome Summary/Treatment Plan (SLP)    Daily Summary of Progress (SLP)  progress toward functional goals as expected;prepare for discharge  -CK      Plan for Continued Treatment (SLP)  d/c from skilled ST Services on current diet  -CK      Anticipated Dischage Disposition (SLP)  home  -CK      Recorded by [CK] Teresa Ardon MS CCC-SLP 09/09/20 0741        User Key  (r) = Recorded By, (t) = Taken By, (c) = Cosigned By    Initials Name Effective Dates Discipline    CK Teresa Ardon MS CCC-SLP 02/11/20 -  SLP        Outcome Summary  Outcome Summary/Treatment Plan (SLP)  Daily Summary of Progress (SLP): progress toward functional goals as expected, prepare for discharge (09/09/20 0729 : Teresa Ardon MS CCC-SLP)  Plan for Continued Treatment (SLP): d/c from skilled ST Services on current diet (09/09/20 0729 : Teresa Ardon, MS CCC-SLP)  Anticipated Dischage Disposition (SLP): home (09/09/20 0742 : Teresa Ardon, MS CCC-SLP)  Reason for Discharge: all goals and outcomes met, no further needs identified (09/09/20 0742 : Teresa Ardon, MS CCC-[facility])  SLP GOALS     Row Name 09/09/20 0729 09/08/20 0722 09/07/20 0724       Oral Nutrition/Hydration Goal 1 (SLP)    Oral Nutrition/Hydration Goal 1, SLP  Pt will safely tolerate least restricted diet w/no overt s/s of aspiration for adequate nutrition and hydration.  -CK  Pt will safely tolerate least restricted diet w/no overt s/s of aspiration for adequate nutrition and hydration.  -CK  Pt will safely  tolerate least restricted diet w/no overt s/s of aspiration for adequate nutrition and hydration.  -CK    Time Frame (Oral Nutrition/Hydration Goal 1, SLP)  by discharge  -CK  by discharge  -CK  by discharge  -CK    Barriers (Oral Nutrition/Hydration Goal 1, SLP)  cognitive status  -CK  cognitive status  -CK  cognitive status  -CK    Progress/Outcomes (Oral Nutrition/Hydration Goal 1, SLP)  (S) goal met  -CK  goal ongoing;goal partially met  -CK  goal ongoing;goal partially met  -CK    Row Name 09/06/20 0936             Oral Nutrition/Hydration Goal 1 (SLP)    Oral Nutrition/Hydration Goal 1, SLP  Pt will safely tolerate least restricted diet w/no overt s/s of aspiration for adequate nutrition and hydration.  -CK      Time Frame (Oral Nutrition/Hydration Goal 1, SLP)  by discharge  -CK      Barriers (Oral Nutrition/Hydration Goal 1, SLP)  cognitive status  -CK      Progress/Outcomes (Oral Nutrition/Hydration Goal 1, SLP)  other (see comments) new goal  -CK        User Key  (r) = Recorded By, (t) = Taken By, (c) = Cosigned By    Initials Name Provider Type    CK Teresa Ardon MS CCC-SLP Speech and Language Pathologist          EDUCATION  The patient has been educated in the following areas:   Dysphagia (Swallowing Impairment) Modified Diet Instruction.    SLP Recommendation and Plan  Daily Summary of Progress (SLP): progress toward functional goals as expected, prepare for discharge     Plan for Continued Treatment (SLP): d/c from skilled ST Services on current diet  Anticipated Dischage Disposition (SLP): home        Reason for Discharge: all goals and outcomes met, no further needs identified           Time Calculation:   Time Calculation- SLP     Row Name 09/09/20 0744             Time Calculation- SLP    SLP Start Time  0729  -CK      SLP Stop Time  0744  -CK      SLP Time Calculation (min)  15 min  -CK      Total Timed Code Minutes- SLP  15 minute(s)  -CK      SLP Received On  09/09/20  -CK        User Key   (r) = Recorded By, (t) = Taken By, (c) = Cosigned By    Initials Name Provider Type    Teresa Gutierrez MS CCC-SLP Speech and Language Pathologist          Therapy Charges for Today     Code Description Service Date Service Provider Modifiers Qty    10923033245 HC ST TREATMENT SWALLOW 2 9/8/2020 Teresa Ardon MS CCC-SLP GN 1    [id number] HC ST TREATMENT SWALLOW 1 9/9/2020 Teresa Ardon MS CCC-LUISA GN 1               SLP Discharge Summary  Anticipated Dischage Disposition (SLP): home  Reason for Discharge: all goals and outcomes met, no further needs identified  Progress Toward Achieving Short/long Term Goals: all goals met within established timelines    MS ALIN Bryan  9/9/2020

## 2020-09-09 NOTE — PROGRESS NOTES
"   LOS: 4 days   Patient Care Team:  Shayne Merritt MD as PCP - General (Family Medicine)  Adryan Prater APRN as PCP - Claims Attributed  Kevin Robbins PA-C as Physician Assistant (Gastroenterology)  Harvinder Robert MD    Subjective     Subjective:  Symptoms:  Stable.  (Urine clear minimal sediment).    Diet:  No nausea or vomiting.        History taken from: patient chart    Objective     Vital Signs  Temp:  [97.9 °F (36.6 °C)-98.3 °F (36.8 °C)] 98.2 °F (36.8 °C)  Heart Rate:  [77-94] 85  Resp:  [18-20] 18  BP: (100-121)/(70-82) 119/72    Objective:  General Appearance:  In no acute distress.    Vital signs: (most recent): Blood pressure 119/72, pulse 85, temperature 98.2 °F (36.8 °C), temperature source Oral, resp. rate 18, height 170.2 cm (67\"), weight 60.8 kg (134 lb), SpO2 97 %.  Vital signs are normal.  No fever.    Output: Producing urine (John).    Lungs:  Normal effort and normal respiratory rate.  He is not in respiratory distress.    Chest: Symmetric chest wall expansion.   Abdomen: Abdomen is soft and non-distended.  There is no abdominal tenderness.     Neurological: Patient is alert.              Results Review:    Lab Results (last 24 hours)     Procedure Component Value Units Date/Time    POC Glucose Once [[id number]]  (Abnormal) Collected:  09/09/20 1031    Specimen:  Blood Updated:  09/09/20 1159     Glucose 206 mg/dL      Comment: Result Not ConfirmedOperator: [id number] GERARDO [employer]G [employer] ID: [id number]       Phosphorus [[id number]]  (Abnormal) Collected:  09/09/20 0835    Specimen:  Blood Updated:  09/09/20 0945     Phosphorus 2.0 mg/dL     POC Glucose Once [[id number]]  (Normal) Collected:  09/09/20 0714    Specimen:  Blood Updated:  09/09/20 0746     Glucose 85 mg/dL      Comment: : [id number] GERARDO [facility] [employer] ID: [id number]       Comprehensive Metabolic Panel [[id number]]  (Abnormal) Collected:  09/09/20 0546    Specimen:  Blood Updated:  09/09/20 0654     " Glucose 123 mg/dL      BUN 59 mg/dL      Creatinine 1.93 mg/dL      Sodium 142 mmol/L      Potassium 3.0 mmol/L      Chloride 107 mmol/L      CO2 28.0 mmol/L      Calcium 7.0 mg/dL      Total Protein 4.5 g/dL      Albumin 2.80 g/dL      ALT (SGPT) 8 U/L      AST (SGOT) 10 U/L      Alkaline Phosphatase 56 U/L      Total Bilirubin 0.3 mg/dL      eGFR Non African Amer 35 mL/min/1.73      Globulin 1.7 gm/dL      A/G Ratio 1.6 g/dL      BUN/Creatinine Ratio 30.6     Anion Gap 7.0 mmol/L     Narrative:       GFR Normal >60  Chronic Kidney Disease <60  Kidney Failure <15      CBC & Differential [[id number]] Collected:  09/09/20 0546    Specimen:  Blood Updated:  09/09/20 0638    Narrative:       The following orders were created for panel order CBC & Differential.  Procedure                               Abnormality         Status                     ---------                               -----------         ------                     CBC Auto Differential[[id number]]        Abnormal            Final result                 Please view results for these tests on the individual orders.    CBC Auto Differential [[id number]]  (Abnormal) Collected:  09/09/20 0546    Specimen:  Blood Updated:  09/09/20 0638     WBC 7.53 10*3/mm3      RBC 3.10 10*6/mm3      Hemoglobin 9.1 g/dL      Hematocrit 26.7 %      MCV 86.1 fL      MCH 29.4 pg      MCHC 34.1 g/dL      RDW 14.4 %      RDW-SD 44.8 fl      MPV 10.2 fL      Platelets 198 10*3/mm3      Neutrophil % 67.7 %      Lymphocyte % 22.3 %      Monocyte % 8.4 %      Eosinophil % 0.9 %      Basophil % 0.3 %      Immature Grans % 0.4 %      Neutrophils, Absolute 5.10 10*3/mm3      Lymphocytes, Absolute 1.68 10*3/mm3      Monocytes, Absolute 0.63 10*3/mm3      Eosinophils, Absolute 0.07 10*3/mm3      Basophils, Absolute 0.02 10*3/mm3      Immature Grans, Absolute 0.03 10*3/mm3      nRBC 0.4 /100 WBC     POC Glucose Once [[id number]]  (Abnormal) Collected:  09/08/20 1906    Specimen:  Blood  Updated:  09/08/20 1937     Glucose 360 mg/dL      Comment: RN NotifiedOperator: [id number] FLOWER [employer]ilter ID: [id number]       POC Glucose Once [[id number]]  (Abnormal) Collected:  09/08/20 1634    Specimen:  Blood Updated:  09/08/20 1651     Glucose 335 mg/dL      Comment: RN NotifiedOperator: [id number] GERARDO [employer] ID: [id number]            Imaging Results (Last 24 Hours)     ** No results found for the last 24 hours. **           I reviewed the patient's new clinical results.  I reviewed the patient's new imaging results and agree with the interpretation.  I reviewed the patient's other test results and agree with the interpretation      Assessment/Plan       Diabetic ketoacidosis without coma associated with type 1 diabetes mellitus (CMS/HCC)    Moderate malnutrition (CMS/HCC)      Assessment & Plan    Consulted to Urology to manage maximize his urinary drainage needs as he has acute kidney injury the setting on CKD III, hx of bladder/prostate cancer status post cystectomy/prostatectomy with neobladder with frequent outlet obstruction from mucus and sediment causing hydronephrosis. At present he does not have hydronephrosis but there is mucus and sediment in his John--->urine improved from 9/6/20, clear yellow minimal sediment  -WBC 17.90-->16.23-->12.63-->9.29-->7.87-->7.53  -Estimated Creatinine Clearance: 33.7 mL/min (A) (by C-G formula based on SCr of 1.93 mg/dL (H)).  -Baseline Cr around 2 per Nephrology  -Cr 5.16-->5.21-->5.01-->4.78-->3.51-->2.76-->1.93     Plan:  Will discontinue intravesical Mucomyst flushes this PM  Continue Ojhn in place, can remove prior to discharge. He performs self-intermittent catheterizations twice daily at home which he can resume upon discharge.     TIFFANIE Michele  09/09/20  16:20
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
## 2020-09-09 NOTE — PLAN OF CARE
Problem: Patient Care Overview  Goal: Plan of Care Review  Outcome: Ongoing (interventions implemented as appropriate)  Flowsheets (Taken 9/9/2020 1519)  Progress: improving  Plan of Care Reviewed With: patient  Outcome Summary: pt perf well with with OT cont toward all goals

## 2020-09-09 NOTE — PROGRESS NOTES
"Adult Nutrition  Assessment    Patient Name:  Favio Casillas  YOB: 1955  MRN: [id number]  Admit Date:  9/5/2020    Assessment Date:  9/9/2020    Comments:  Per MD notes pt had n/v on 9/8- MD ? If r/t gastroparesis with elevated A1c of 13.6.  MD notes renal function is improving. Noted metabolic acidosis and Na Bicarb in place. Alb 2.8L on admit. Soft/ground diet and thin liquids, no straws per ST- ADA restriction in place.  Intake >75% mostly in past 2days. Admit wt 123# and currently 134#/BMI 20.9. Pt ?confused when RD visited and attempted diet ed re: basic ADA goals and increasing protein/calories d/t dx of moderate malnutrition. Will attach diet information to print out at d/c. Pt indicated he didn't like BGC but likes 2% milk and sandwiches. BGC d/c, 2% milk all meals and 1/2sandwich for 2p/hs snacks. RD to follow hospital course.     Reason for Assessment     Row Name 09/09/20 1337          Reason for Assessment    Reason For Assessment  follow-up protocol     Diagnosis  metabolic state;gastrointestinal disease     Identified At Risk by Screening Criteria  difficulty chewing/swallowing;BMI         Nutrition/Diet History     Row Name 09/09/20 1336          Nutrition/Diet History    Typical Food/Fluid Intake  Pt syas he is \"eating good\"- when asked about n/v he just shrugged. Uncertain of pt was confused or difficulty following conversation- when RD was asking about diabetes pt mentioned his walker but then stated 2minutes later---\"I dont eat to many sweets\" when RD asking about supplements and increasing protein and calories. He states  he \"cant have crackers\". He does not like whole milk, prefers 2%.     Food Preferences  2%, indicated he likes chicken salad and turkey sandwiches     Supplemental Drinks/Foods/Additives  stated he did not like           Labs/Tests/Procedures/Meds     Row Name 09/09/20 0342          Labs/Procedures/Meds    Lab Results Reviewed  reviewed     Lab Results " Comments  Glu , BUN 59H and Cr 1.6H, low K, Alb 2.8L, 9/6 A1c 13.6H        Diagnostic Tests/Procedures    Diagnostic Test/Procedure Reviewed  reviewed     Diagnostic Test/Procedures Comments  renal US        Medications    Pertinent Medications Reviewed  reviewed     Pertinent Medications Comments  levemir 20hs, SSI, Na bicarb             Nutrition Prescription Ordered     Row Name 09/09/20 1340          Nutrition Prescription PO    Current PO Diet  Soft Texture     Texture  Ground     Fluid Consistency  Thin no straws     Supplement  Boost Glucose Control (Glucerna Shake)     Supplement Frequency  3 times a day     Common Modifiers  Consistent Carbohydrate         Evaluation of Received Nutrient/Fluid Intake     Row Name 09/09/20 1343          PO Evaluation    Number of Days PO Intake Evaluated  2 days     Number of Meals  4     % PO Intake  25x1, 75x2, 100x1               Electronically signed by:  Maribel Knapp RD  09/09/20 13:50

## 2020-09-09 NOTE — PLAN OF CARE
Problem: Patient Care Overview  Goal: Plan of Care Review  Flowsheets  Taken 9/9/2020 0314  Progress: no change  Outcome Summary: vss; denies pain; resting well; will monitor  Taken 9/8/2020 2023  Plan of Care Reviewed With: patient

## 2020-09-10 VITALS
OXYGEN SATURATION: 97 % | BODY MASS INDEX: 20.75 KG/M2 | SYSTOLIC BLOOD PRESSURE: 102 MMHG | WEIGHT: 132.2 LBS | DIASTOLIC BLOOD PRESSURE: 70 MMHG | HEART RATE: 84 BPM | TEMPERATURE: 97.6 F | HEIGHT: 67 IN | RESPIRATION RATE: 18 BRPM

## 2020-09-10 LAB
ALBUMIN SERPL-MCNC: 2.8 G/DL (ref 3.5–5.2)
ALBUMIN/GLOB SERPL: 1.3 G/DL
ALP SERPL-CCNC: 51 U/L (ref 39–117)
ALT SERPL W P-5'-P-CCNC: 9 U/L (ref 1–41)
ANION GAP SERPL CALCULATED.3IONS-SCNC: 8 MMOL/L (ref 5–15)
AST SERPL-CCNC: 13 U/L (ref 1–40)
BASOPHILS # BLD AUTO: 0.01 10*3/MM3 (ref 0–0.2)
BASOPHILS NFR BLD AUTO: 0.1 % (ref 0–1.5)
BILIRUB SERPL-MCNC: 0.2 MG/DL (ref 0–1.2)
BUN SERPL-MCNC: 47 MG/DL (ref 8–23)
BUN/CREAT SERPL: 27.3 (ref 7–25)
CALCIUM SPEC-SCNC: 7.1 MG/DL (ref 8.6–10.5)
CHLORIDE SERPL-SCNC: 107 MMOL/L (ref 98–107)
CO2 SERPL-SCNC: 24 MMOL/L (ref 22–29)
CREAT SERPL-MCNC: 1.72 MG/DL (ref 0.76–1.27)
DEPRECATED RDW RBC AUTO: 46.5 FL (ref 37–54)
EOSINOPHIL # BLD AUTO: 0.06 10*3/MM3 (ref 0–0.4)
EOSINOPHIL NFR BLD AUTO: 0.9 % (ref 0.3–6.2)
ERYTHROCYTE [DISTWIDTH] IN BLOOD BY AUTOMATED COUNT: 14.6 % (ref 12.3–15.4)
GFR SERPL CREATININE-BSD FRML MDRD: 40 ML/MIN/1.73
GLOBULIN UR ELPH-MCNC: 2.2 GM/DL
GLUCOSE BLDC GLUCOMTR-MCNC: 301 MG/DL (ref 70–130)
GLUCOSE BLDC GLUCOMTR-MCNC: 75 MG/DL (ref 70–130)
GLUCOSE SERPL-MCNC: 84 MG/DL (ref 65–99)
HCT VFR BLD AUTO: 28.2 % (ref 37.5–51)
HGB BLD-MCNC: 9.3 G/DL (ref 13–17.7)
IMM GRANULOCYTES # BLD AUTO: 0.02 10*3/MM3 (ref 0–0.05)
IMM GRANULOCYTES NFR BLD AUTO: 0.3 % (ref 0–0.5)
LYMPHOCYTES # BLD AUTO: 1.1 10*3/MM3 (ref 0.7–3.1)
LYMPHOCYTES NFR BLD AUTO: 15.9 % (ref 19.6–45.3)
MCH RBC QN AUTO: 29.2 PG (ref 26.6–33)
MCHC RBC AUTO-ENTMCNC: 33 G/DL (ref 31.5–35.7)
MCV RBC AUTO: 88.4 FL (ref 79–97)
MONOCYTES # BLD AUTO: 0.69 10*3/MM3 (ref 0.1–0.9)
MONOCYTES NFR BLD AUTO: 10 % (ref 5–12)
NEUTROPHILS NFR BLD AUTO: 5.04 10*3/MM3 (ref 1.7–7)
NEUTROPHILS NFR BLD AUTO: 72.8 % (ref 42.7–76)
NRBC BLD AUTO-RTO: 0.3 /100 WBC (ref 0–0.2)
PHOSPHATE SERPL-MCNC: 1.9 MG/DL (ref 2.5–4.5)
PLATELET # BLD AUTO: 223 10*3/MM3 (ref 140–450)
PMV BLD AUTO: 9.8 FL (ref 6–12)
POTASSIUM SERPL-SCNC: 3.7 MMOL/L (ref 3.5–5.2)
PROT SERPL-MCNC: 5 G/DL (ref 6–8.5)
RBC # BLD AUTO: 3.19 10*6/MM3 (ref 4.14–5.8)
SODIUM SERPL-SCNC: 139 MMOL/L (ref 136–145)
WBC # BLD AUTO: 6.92 10*3/MM3 (ref 3.4–10.8)

## 2020-09-10 PROCEDURE — 97530 THERAPEUTIC ACTIVITIES: CPT

## 2020-09-10 PROCEDURE — 85025 COMPLETE CBC W/AUTO DIFF WBC: CPT | Performed by: FAMILY MEDICINE

## 2020-09-10 PROCEDURE — 84100 ASSAY OF PHOSPHORUS: CPT | Performed by: FAMILY MEDICINE

## 2020-09-10 PROCEDURE — 63710000001 INSULIN ASPART PER 5 UNITS: Performed by: INTERNAL MEDICINE

## 2020-09-10 PROCEDURE — 25010000002 HEPARIN (PORCINE) PER 1000 UNITS: Performed by: INTERNAL MEDICINE

## 2020-09-10 PROCEDURE — 82962 GLUCOSE BLOOD TEST: CPT

## 2020-09-10 PROCEDURE — 80053 COMPREHEN METABOLIC PANEL: CPT | Performed by: NURSE PRACTITIONER

## 2020-09-10 RX ORDER — METOCLOPRAMIDE 5 MG/1
5 TABLET ORAL 3 TIMES DAILY PRN
Qty: 30 TABLET | Refills: 0 | Status: SHIPPED | OUTPATIENT
Start: 2020-09-10 | End: 2021-01-17 | Stop reason: HOSPADM

## 2020-09-10 RX ORDER — SODIUM BICARBONATE 650 MG/1
650 TABLET ORAL 2 TIMES DAILY
Qty: 180 TABLET | Refills: 0 | Status: SHIPPED | OUTPATIENT
Start: 2020-09-10 | End: 2020-12-09

## 2020-09-10 RX ORDER — SODIUM BICARBONATE 650 MG/1
650 TABLET ORAL 2 TIMES DAILY
Status: DISCONTINUED | OUTPATIENT
Start: 2020-09-11 | End: 2020-09-10 | Stop reason: HOSPADM

## 2020-09-10 RX ADMIN — HEPARIN SODIUM 5000 UNITS: 5000 INJECTION INTRAVENOUS; SUBCUTANEOUS at 08:09

## 2020-09-10 RX ADMIN — ASPIRIN 81 MG: 81 TABLET, COATED ORAL at 08:09

## 2020-09-10 RX ADMIN — INSULIN ASPART 5 UNITS: 100 INJECTION, SOLUTION INTRAVENOUS; SUBCUTANEOUS at 11:41

## 2020-09-10 RX ADMIN — POTASSIUM CHLORIDE 40 MEQ: 10 CAPSULE, COATED, EXTENDED RELEASE ORAL at 08:09

## 2020-09-10 RX ADMIN — METOCLOPRAMIDE 5 MG: 5 TABLET ORAL at 11:41

## 2020-09-10 RX ADMIN — SERTRALINE HYDROCHLORIDE 50 MG: 50 TABLET ORAL at 08:10

## 2020-09-10 RX ADMIN — SODIUM CHLORIDE, PRESERVATIVE FREE 10 ML: 5 INJECTION INTRAVENOUS at 08:10

## 2020-09-10 RX ADMIN — POTASSIUM & SODIUM PHOSPHATES POWDER PACK 280-160-250 MG 1 PACKET: 280-160-250 PACK at 08:09

## 2020-09-10 RX ADMIN — METOCLOPRAMIDE 5 MG: 5 TABLET ORAL at 08:09

## 2020-09-10 RX ADMIN — CARVEDILOL 3.12 MG: 3.12 TABLET, FILM COATED ORAL at 08:09

## 2020-09-10 RX ADMIN — PANTOPRAZOLE SODIUM 40 MG: 40 TABLET, DELAYED RELEASE ORAL at 05:52

## 2020-09-10 RX ADMIN — POTASSIUM PHOSPHATE, MONOBASIC AND POTASSIUM PHOSPHATE, DIBASIC 15 MMOL: 224; 236 INJECTION, SOLUTION, CONCENTRATE INTRAVENOUS at 09:29

## 2020-09-10 RX ADMIN — CLOPIDOGREL BISULFATE 75 MG: 75 TABLET ORAL at 08:09
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
## 2020-09-10 NOTE — PLAN OF CARE
Problem: Patient Care Overview  Goal: Plan of Care Review  Outcome: Ongoing (interventions implemented as appropriate)  Flowsheets  Taken 9/10/2020 0431  Progress: improving  Outcome Summary: plans for possible discharge in AM; patient rested well throughout shift  Taken 9/9/2020 2100  Plan of Care Reviewed With: patient

## 2020-09-10 NOTE — DISCHARGE PLACEMENT REQUEST
"Favio Owens (65 y.o. Male)     Date of Birth Social Security Number Address Home Phone MRN    1955  [address]  [facility] [address] [phone] [id number]    Congregation Marital Status          Sabianist Single       Admission Date Admission Type Admitting Provider Attending Provider Department, Room/Bed    9/5/20 Emergency Kevin Perry MD Craig, David A, MD [address], 415/1    Discharge Date Discharge Disposition Discharge Destination         Home-Health Care [facility]              Attending Provider:  Kevin Perry MD    Allergies:  No Known Allergies    Isolation:  None   Infection:  None   Code Status:  CPR    Ht:  170.2 cm (67\")   Wt:  60 kg (132 lb 3.2 oz)    Admission Cmt:  None   Principal Problem:  Diabetic ketoacidosis without coma associated with type 1 diabetes mellitus (CMS/HCC) [E10.10]                 Active Insurance as of 9/5/2020     Primary Coverage     Payor Plan Insurance Group Employer/Plan Group    HUMANA MEDICARE REPLACEMENT HUMANA MEDICARE REPLACEMENT [id number]     Payor Plan Address Payor Plan Phone Number Payor Plan Fax Number Effective Dates    [address] [address] [phone]  5/1/2019 - None Entered    [facility] [address]       Subscriber Name Subscriber Birth Date Member ID       FAVIO OWENS 1955 [id number]           Secondary Coverage     Payor Plan Insurance Group Employer/Plan Group    [place]      Payor Plan Address Payor Plan Phone Number Payor Plan Fax Number Effective Dates    [address] [phone]  8/1/2017 - None Entered    [facility] [address]       Subscriber Name Subscriber Birth Date Member ID       FAVIO OWENS 1955 [id number]                 Emergency Contacts      (Rel.) Home Phone Work Phone Mobile Phone    Sofi Mcdonald (Friend) [phone] -- [phone]    OwensTong pal (Brother) -- -- [phone]            Emergency Contact Information   "    Name Relation Home Work Mobile    Sofi Mcdonald Friend [phone]    Tong Casillas Brother   [phone]          Insurance Information                HUMANA MEDICARE REPLACEMENT/HUMANA MEDICARE REPLACEMENT Phone: [phone]    Subscriber: Favio Casillas Subscriber#: [id number]    Group#: [id number] Precert#:         [place] Phone: [phone]    Subscriber: Favio Casillas Subscriber#: [id number]    Group#:  Precert#:              History & Physical      Kevin Perry MD at 09/05/20 1821                [facility] Medicine Admission      Date of Admission: 9/5/2020      Primary Care Physician: Shayne Merritt MD      Chief Complaint: Altered mental status    HPI: Patient is a 65 year old male with PMH notable for insulin dependent Diabetes, Hepatitis C, COPD, substance abuse, GERD, HTN, and hx of bladder cancer.  Patient was brought to the ED via EMS due to altered mental status.  Patient is notably altered in the emergency department and is unable to state where he is at or why he is here.  Patient does deny any complaints of pain, shortness of breath, chest pain, nausea, or vomiting during my exam though.    Evaluation in the emergency department showed a CBC with a white blood cell count of 17.9, hemoglobin and hematocrit were normal as were his platelets.  CMP showed glucose 496, , creatinine 5.16, sodium 124, CO2 5.0, and an EGFR of 11.  Troponin was normal.  Magnesium and ammonia were both normal as well.  Test x-ray and CT head were both normal.  EKG showed sinus rhythm.    Concurrent Medical History:  has a past medical history of Abnormal weight loss, Acute bronchiolitis, Acute bronchitis, Acute exacerbation of chronic obstructive airways disease (CMS/HCC), Adenomatous polyp of colon, Aptyalism, Artificial lens present, Artificial lens present, Astigmatism, Backache, Borderline glaucoma, Chest  discomfort, Chest pain, Chronic hepatitis C (CMS/HCC), Chronic hepatitis C (CMS/HCC), Chronic obstructive lung disease (CMS/HCC), Common cold, Constipation, Coronary arteriosclerosis, Diarrhea, Disorder of duodenum, Disorder of duodenum, Disorder of gallbladder, Diverticula of colon, Diverticular disease of colon, Drug abuse (CMS/HCC), Elevated levels of transaminase & lactic acid dehydrogenase, Esophagitis, Essential hypertension, Fatigue, Gastritis, Gastroesophageal reflux disease, Generalized abdominal pain, Hand pain, right, Headache, Heel pain, Hemorrhoids, History of colon polyps, History of colonoscopy (08/19/2013), History of esophagogastroduodenoscopy (07/24/2015), History of neoplasm of bladder, History of pneumococcal vaccination, History of seizure, Left lower quadrant pain, Male erectile disorder, Malignant tumor of prostate (CMS/HCC), Myopia, Nausea and vomiting, Need for immunization against influenza, Need for prophylactic vaccination and inoculation against influenza, Pain, Pain in right hand, Patient's noncompliance with other medical treatment and regimen, Periumbilical pain, Primary malignant neoplasm of bladder (CMS/HCC), Rash, Rash, Rheumatoid arthritis (CMS/HCC), Right upper quadrant pain, Sebaceous cyst, Seizure (CMS/HCC), Transient cerebral ischemia, Type 2 diabetes mellitus (CMS/HCC), and Viral hepatitis C.    Past Surgical History:  has a past surgical history that includes Bladder tumor excision; Bladder surgery (01/27/2005); Cholecystectomy (08/25/2011); Esophagogastroduodenoscopy (07/24/2015); Esophagogastroduodenoscopy (02/23/2009); Other surgical history (03/22/2012); Injection of Medication (05/23/2016); Wound Closure (03/22/2012); Back surgery (01/01/2000); Cataract extraction w/  intraocular lens implant (Right, 05/21/2009); Bladder surgery (01/27/2005); Cholecystectomy (08/25/2011); Cystoscopy (12/17/2004); Lumbar disc surgery (2000); Cataract Extraction (Right, 05/21/2009); Other  surgical history; Injection of Medication (05/12/2016); Other surgical history (3/22/3012); Other surgical history (03/22/2012); Colonoscopy (08/19/2013); Colonoscopy (07/24/2015); Fracture surgery; Esophagogastroduodenoscopy (N/A, 3/3/2017); Upper gastrointestinal endoscopy (07/24/2015); Upper gastrointestinal endoscopy (03/03/2017); Wrist surgery (Left); Cardiac catheterization (N/A, 12/15/2017); Esophagogastroduodenoscopy (N/A, 4/22/2019); and Colonoscopy (N/A, 4/22/2019).    Family History: family history includes Cholelithiasis in an other family member; Coronary artery disease in an other family member; Diabetes in his mother; Gallbladder disease in an other family member; Hepatitis in an other family member; Hypertension in an other family member; Liver cancer in his father; Tuberculosis in an other family member.  Unable to confirm due to patient's altered state.    Social History:  reports that he has been smoking cigarettes. He has a 159.00 pack-year smoking history. He quit smokeless tobacco use about 40 years ago.  His smokeless tobacco use included chew. He reports that he drinks alcohol. He reports that he has current or past drug history. Drug: Marijuana. Frequency: 2.00 times per week.    Allergies: No Known Allergies    Medications:   Prior to Admission medications    Medication Sig Start Date End Date Taking? Authorizing Provider   Accu-Chek Softclix Lancets lancets 1 each by Other route 3 (Three) Times a Day Before Meals. Use as instructed 7/10/20 7/10/21  Art Colin MD   albuterol (PROVENTIL) (2.5 MG/3ML) 0.083% nebulizer solution Take 2.5 mg by nebulization Every 4 (Four) Hours As Needed for Wheezing or Shortness of Air. 4/17/17   Harvinder Robert MD   albuterol (VENTOLIN HFA) 108 (90 Base) MCG/ACT inhaler Inhale 2 puffs Every 6 (Six) Hours As Needed for Wheezing. 8/22/18   Harvinder Robert MD   Alcohol Swabs (ALCOHOL PREP) pads 1 pad 5 (Five) Times a Day. 3/29/19   Radha Gómez  MD INGRID   aMILoride (MIDAMOR) 5 MG tablet Take 1 tablet by mouth Daily. 7/10/20   Art Colin MD   aspirin 81 MG EC tablet Take 81 mg by mouth Daily.    Jorge Cook MD   Blood Glucose Monitoring Suppl (ACCU-CHEK TOMA) device 1 each by Other route 3 (Three) Times a Day Before Meals. Use as instructed 7/10/20   Art Colin MD   Blood Glucose Monitoring Suppl (ACURA BLOOD GLUCOSE METER) w/Device kit 1 each 4 (Four) Times a Day As Needed (SUGARR). 8/23/18   Harvinder Robert MD   budesonide-formoterol (SYMBICORT) 160-4.5 MCG/ACT inhaler Inhale 2 puffs 2 (Two) Times a Day.    Jorge Cook MD   carvedilol (COREG) 3.125 MG tablet TK 1 T PO BID WC 10/15/19   Jorge Cook MD   clopidogrel (PLAVIX) 75 MG tablet Take 75 mg by mouth Daily.    Jorge Cook MD   cyclobenzaprine (FLEXERIL) 10 MG tablet Take 10 mg by mouth. 7/10/19   Jorge Cook MD   fluticasone (FLONASE) 50 MCG/ACT nasal spray 2 sprays into each nostril Daily. 5/22/18   Harvinder Robert MD   glucose blood (Accu-Chek Toma) test strip 1 each by Other route As Needed (glucose monitoring). Use as instructed 7/10/20   Art Colin MD   glucose blood test strip Use as instructed 3/29/19   Radha Gómez MD   glucose monitor monitoring kit 1 each 3 (Three) Times a Day. 3/29/19   Radha Gómez MD   hydrOXYzine (ATARAX) 25 MG tablet TK 1 T PO TID PRF ITCHING 9/27/19   Jorge Cook MD   insulin aspart (novoLOG FLEXPEN) 100 UNIT/ML solution pen-injector sc pen Inject  under the skin into the appropriate area as directed 3 (Three) Times a Day With Meals.    Jorge Cook MD   insulin detemir (LEVEMIR FLEXTOUCH) 100 UNIT/ML injection Inject 25 Units under the skin into the appropriate area as directed 2 (Two) Times a Day.    Jorge Cook MD   Lancets (FREESTYLE) lancets Tid 3/29/19   Radha Gómez MD   nitroglycerin (NITROSTAT) 0.4 MG SL tablet Place 0.4  mg under the tongue Every 5 (Five) Minutes As Needed for Chest Pain. Take no more than 3 doses in 15 minutes.    ProviderJorge MD   omeprazole (PRILOSEC) 20 MG capsule Take 1 capsule by mouth Daily.  Patient taking differently: Take 20 mg by mouth 2 (Two) Times a Day. 8/22/18   Harvinder Robert MD   Potassium (POTASSIMIN PO) Take  by mouth.    ProviderJorge MD   pravastatin (PRAVACHOL) 40 MG tablet Take 40 mg by mouth Every Night.    ProviderJorge MD   sertraline (ZOLOFT) 50 MG tablet 1/2 tablet hs 1 week then 1 tablet . (depression)  Patient taking differently: Take 50 mg by mouth Daily. 5/22/18   Harvinder Robert MD   traMADol (ULTRAM) 50 MG tablet Take 1 tablet by mouth Every 6 (Six) Hours As Needed for Moderate Pain . 7/1/20   Darian Vaughn MD       Review of Systems:  Review of Systems   Unable to perform ROS: Mental status change      Otherwise complete ROS is negative except as mentioned above.    Physical Exam:   Temp:  [97.6 °F (36.4 °C)] 97.6 °F (36.4 °C)  Heart Rate:  [86-98] 98  Resp:  [20] 20  BP: (120-146)/(56-75) 146/56  Physical Exam   Constitutional:   Thin, chronically ill-appearing male.  No apparent distress.   HENT:   Head: Normocephalic and atraumatic.   Nose: Nose normal.   Membranes are noticeably dry.   Eyes: Conjunctivae and EOM are normal.   Neck: Normal range of motion. No tracheal deviation present.   Cardiovascular: Normal rate, regular rhythm, normal heart sounds and intact distal pulses.   Pulmonary/Chest: Effort normal and breath sounds normal. No respiratory distress.   Abdominal: Soft. Bowel sounds are normal. He exhibits no distension. There is no tenderness.   Musculoskeletal: He exhibits no edema.   Neurological: He is alert.   He is not oriented to place or time but is to person.  He is moving all extremities and follows commands.  Speech is mumbled but understandable.   Skin: Skin is warm and dry. Capillary refill takes more than 3 seconds.      Psychiatric: His behavior is normal.         Results Reviewed:  I have personally reviewed current lab, radiology, and data and agree with results.  Lab Results (last 24 hours)     Procedure Component Value Units Date/Time    [place] Draw [[id number]] Collected:  09/05/20 1653    Specimen:  Blood Updated:  09/05/20 1800    Narrative:       The following orders were created for panel order [place] Draw.  Procedure                               Abnormality         Status                     ---------                               -----------         ------                     Light Blue Top[[id number]]                                   Final result               Green Top (Gel)[[id number]]                                  Final result               Lavender Top[[id number]]                                     Final result               Gold Top - SST[[id number]]                                   Final result                 Please view results for these tests on the individual orders.    Green Top (Gel) [[id number]] Collected:  09/05/20 1653    Specimen:  Blood Updated:  09/05/20 1800     Extra Tube Hold for add-ons.     Comment: Auto resulted.       Lavender Top [[id number]] Collected:  09/05/20 1653    Specimen:  Blood Updated:  09/05/20 1800     Extra Tube hold for add-on     Comment: Auto resulted       Light Blue Top [[id number]] Collected:  09/05/20 1653    Specimen:  Blood Updated:  09/05/20 1800     Extra Tube hold for add-on     Comment: Auto resulted       Gold Top - SST [[id number]] Collected:  09/05/20 1653    Specimen:  Blood Updated:  09/05/20 1800     Extra Tube Hold for add-ons.     Comment: Auto resulted.       Comprehensive Metabolic Panel [[id number]]  (Abnormal) Collected:  09/05/20 1653    Specimen:  Blood Updated:  09/05/20 1716     Glucose 496 mg/dL       mg/dL      Creatinine 5.16 mg/dL      Sodium 124 mmol/L      Potassium 5.0 mmol/L      Chloride 104 mmol/L      CO2 5.0 mmol/L      Calcium 9.0  mg/dL      Total Protein 6.6 g/dL      Albumin 4.00 g/dL      ALT (SGPT) 11 U/L      AST (SGOT) 7 U/L      Alkaline Phosphatase 113 U/L      Total Bilirubin 0.5 mg/dL      eGFR Non African Amer 11 mL/min/1.73      Comment: <15 Indicative of kidney failure.        eGFR   Amer --     Comment: <15 Indicative of kidney failure.        Globulin 2.6 gm/dL      A/G Ratio 1.5 g/dL      BUN/Creatinine Ratio 20.7     Anion Gap 15.0 mmol/L     Narrative:       GFR Normal >60  Chronic Kidney Disease <60  Kidney Failure <15      Magnesium [[id number]]  (Normal) Collected:  09/05/20 1653    Specimen:  Blood Updated:  09/05/20 1714     Magnesium 2.0 mg/dL     Ammonia [[id number]]  (Normal) Collected:  09/05/20 1653    Specimen:  Blood Updated:  09/05/20 1713     Ammonia 18 umol/L     Troponin [[id number]]  (Normal) Collected:  09/05/20 1653    Specimen:  Blood Updated:  09/05/20 1713     Troponin T <0.010 ng/mL     Narrative:       Troponin T Reference Range:  <= 0.03 ng/mL-   Negative for AMI  >0.03 ng/mL-     Abnormal for myocardial necrosis.  Clinicians would have to utilize clinical acumen, EKG, Troponin and serial changes to determine if it is an Acute Myocardial Infarction or myocardial injury due to an underlying chronic condition.       Results may be falsely decreased if patient taking Biotin.      pH, Venous [[id number]]  (Abnormal) Collected:  09/05/20 1650    Specimen:  Blood Updated:  09/05/20 1713     pH, Venous 6.940 pH Units     CBC & Differential [[id number]] Collected:  09/05/20 1653    Specimen:  Blood Updated:  09/05/20 1656    Narrative:       The following orders were created for panel order CBC & Differential.  Procedure                               Abnormality         Status                     ---------                               -----------         ------                     CBC Auto Differential[[id number]]        Abnormal            Final result                 Please view results for these tests  on the individual orders.    CBC Auto Differential [[id number]]  (Abnormal) Collected:  09/05/20 1653    Specimen:  Blood Updated:  09/05/20 1656     WBC 17.90 10*3/mm3      RBC 4.50 10*6/mm3      Hemoglobin 13.3 g/dL      Hematocrit 40.3 %      MCV 89.6 fL      MCH 29.6 pg      MCHC 33.0 g/dL      RDW 13.8 %      RDW-SD 44.9 fl      MPV 10.3 fL      Platelets 221 10*3/mm3      Neutrophil % 94.9 %      Lymphocyte % 2.4 %      Monocyte % 2.1 %      Eosinophil % 0.0 %      Basophil % 0.1 %      Immature Grans % 0.5 %      Neutrophils, Absolute 16.99 10*3/mm3      Lymphocytes, Absolute 0.43 10*3/mm3      Monocytes, Absolute 0.37 10*3/mm3      Eosinophils, Absolute 0.00 10*3/mm3      Basophils, Absolute 0.02 10*3/mm3      Immature Grans, Absolute 0.09 10*3/mm3      nRBC 1.1 /100 WBC         Imaging Results (Last 24 Hours)     Procedure Component Value Units Date/Time    CT Head Without Contrast [[id number]] Collected:  09/05/20 1728     Updated:  09/05/20 1755    Narrative:         CT Head Without Contrast    History: Confusion and delirium. Altered level of consciousness.  Dizziness. Weakness.    Axial scans of the brain were obtained without intravenous  contrast.  Coronal and sagital reconstructions were preformed.    This exam was performed according to our departmental  dose-optimization program, which includes automated exposure  control, adjustment of the mA and/or kV according to patient size  and/or use of iterative reconstruction technique.    DLP: 923.90    Comparison: July 3, 2020.    Findings:  Bone windows are unremarkable.  The visualized paranasal sinuses are unremarkable.    No acute process.  Cerebral atrophy.  No hemorrhage.  No mass.  No abnormal areas of increased or decreased attenuation.  No midline shift.  No abnormal extra-axial fluid collections.      Impression:       CONCLUSION:  No acute process.  Cerebral atrophy.    [id number]    Electronically signed by:  Aaron Constantino MD  9/5/2020 5:54 PM  CDT  Workstation: [id number]    XR Chest 1 View [[id number]] Collected:  09/05/20 1708     Updated:  09/05/20 1726    Narrative:         PORTABLE CHEST    HISTORY: Weakness. Dizziness. Altered mental status.    Portable AP film of the chest was obtained at 4:50 PM.  COMPARISON: July 2, 2020    FINDINGS:   EKG leads.  The lungs are clear of an acute process.  Old granulomatous disease is present.  The heart is not enlarged.  The pulmonary vasculature is not increased.  No pleural effusion.  No pneumothorax.  No acute osseous abnormality.  Old right rib fractures.      Impression:       CONCLUSION:  No Acute Disease    [id number]    Electronically signed by:  Aaron Constantino MD  9/5/2020 5:25 PM CDT  Workstation: [id number]            Assessment:    Active Hospital Problems    Diagnosis   • Diabetic ketoacidosis without coma associated with type 1 diabetes mellitus (CMS/[facility])             Plan:    DKA - Last A1c was 10.8 in July 2020. Start insulin drip in the ED. Will also give an amp of Bicarb now. Monitor glucose as well as renal function and electrolytes.  Add electrolyte replacement protocol as well.  Make NPO for now.      SUJATHA on CKD 3 - Per review of his values in Care Everywhere his baseline renal function appears likely consistent with CKD 3.  Continue IV fluids and monitor.  Recheck and if needed then consult Nephrology.     Metabolic encephalopathy - Related to above, monitor for improvement.     Leukocytosis - Likely related to acuity of condition, recheck in the AM.     Hyponatremia - Appears to be pseudohyponatremia related to his hyperglycemia as his sodium corrects to 130 when his glucose is accounted for.  Monitor while getting IV fluids.        COPD - Will continue home inhalers, does not appear to be in exacerbation.    CAD - hold home meds right now until mental status improves.     DVT ppx - Heparin  CODE - Full    I discussed the patient's findings and my recommendations with: the patient/nursing.     Kevin DOMINIQUE  MD [place]      The patient was evaluated during the global COVID-19 pandemic, and the diagnosis was suspected/considered upon their initial presentation.  Evaluation, treatment, and testing were consistent with current guidelines for patients who present with complaints or symptoms that may be related to COVID-19.      Electronically signed by Kevin Perry MD at 09/05/20 1927          Physician Progress Notes (last 24 hours) (Notes from 09/09/20 1227 through 09/10/20 1227)      Poppy Méndez MD at 09/09/20 1834              [facility] Medicine Services  INPATIENT PROGRESS NOTE    Length of Stay: 4  Date of Admission: 9/5/2020  Primary Care Physician: Shayne Merritt MD    Subjective     Chief Complaint: Altered mental status    HPI:  He is a 65 year old male with history of insulin dependent Diabetes, Hepatitis C, COPD, substance abuse, GERD, HTN, and hx of bladder cancer who was admitted for DKA  and acute kidney injury complicated by acute metabolic encephalopathy.  He also has malnutrition and history of bladder cancer status post neobladder creation with chronic John catheter placement.  He was started on IV fluid on sodium bicarbonate drip by nephrologist.  Creatinine is trending down.    Interval history: Patient has no episode of vomiting overnight.  Tolerated breakfast without problems. He is alert and oriented x3.  His appetite is improving as well as his energy.    Review of Systems   Constitutional: Positive for appetite change. Negative for activity change, chills, fatigue and fever.   HENT: Negative for congestion, sore throat and trouble swallowing.    Respiratory: Negative for cough, chest tightness, shortness of breath and wheezing.    Cardiovascular: Negative for chest pain, palpitations and leg swelling.   Gastrointestinal: Negative for abdominal distention, abdominal pain, diarrhea, nausea and vomiting.   Genitourinary: Negative for  difficulty urinating, dysuria and hematuria.   Musculoskeletal: Negative for arthralgias, back pain and myalgias.   Skin: Negative for pallor and rash.   Neurological: Negative for dizziness, syncope, weakness, light-headedness and headaches.   Hematological: Negative for adenopathy. Does not bruise/bleed easily.   Psychiatric/Behavioral: Negative for agitation and confusion. The patient is not nervous/anxious.      Objective    Temp:  [97.9 °F (36.6 °C)-98.3 °F (36.8 °C)] 98.2 °F (36.8 °C)  Heart Rate:  [77-94] 85  Resp:  [18-20] 18  BP: (100-121)/(70-82) 119/72    Physical Exam   Constitutional: He is oriented to person, place, and time. He appears well-developed and well-nourished. No distress.   HENT:   Head: Normocephalic and atraumatic.   Mouth/Throat: Oropharynx is clear and moist. No oropharyngeal exudate.   Eyes: Pupils are equal, round, and reactive to light. Conjunctivae and EOM are normal. No scleral icterus.   Neck: Normal range of motion. Neck supple. No JVD present. No tracheal deviation present. No thyromegaly present.   Cardiovascular: Normal rate, regular rhythm, normal heart sounds and intact distal pulses. Exam reveals no gallop and no friction rub.   No murmur heard.  Pulmonary/Chest: Effort normal and breath sounds normal. No stridor. No respiratory distress. He has no wheezes. He has no rales. He exhibits no tenderness.   Abdominal: Soft. Bowel sounds are normal. He exhibits no distension and no mass. There is no tenderness. There is no rebound and no guarding. No hernia.   Genitourinary:   Genitourinary Comments: John catheter in situ.   Musculoskeletal: Normal range of motion. He exhibits no edema, tenderness or deformity.   Lymphadenopathy:     He has no cervical adenopathy.   Neurological: He is alert and oriented to person, place, and time. No cranial nerve deficit. He exhibits normal muscle tone.   Skin: Skin is warm and dry. No rash noted. He is not diaphoretic. No erythema. No pallor.    Psychiatric: He has a normal mood and affect. His behavior is normal. Judgment and thought content normal.     Medication Review:    Current Facility-Administered Medications:   •  acetaminophen (TYLENOL) tablet 650 mg, 650 mg, Oral, Q4H PRN **OR** acetaminophen (TYLENOL) suppository 650 mg, 650 mg, Rectal, Q4H PRN, Kevin Perry MD  •  albuterol sulfate HFA (PROVENTIL HFA;VENTOLIN HFA;PROAIR HFA) inhaler 2 puff, 2 puff, Inhalation, Q6H PRN, Kevin Perry MD  •  aspirin EC tablet 81 mg, 81 mg, Oral, Daily, Ciro Chauhan MD, 81 mg at 09/09/20 0820  •  budesonide-formoterol (SYMBICORT) 160-4.5 MCG/ACT inhaler 2 puff, 2 puff, Inhalation, BID - RT, Kevin Perry MD, 2 puff at 09/09/20 0840  •  carvedilol (COREG) tablet 3.125 mg, 3.125 mg, Oral, Q12H, Ciro Chauhan MD, 3.125 mg at 09/09/20 0820  •  clopidogrel (PLAVIX) tablet 75 mg, 75 mg, Oral, Daily, Ciro Chauhan MD, 75 mg at 09/09/20 0820  •  dextrose (D50W) 25 g/ 50mL Intravenous Solution 25 g, 25 g, Intravenous, Q15 Min PRN, Ciro Chauhan MD  •  dextrose (GLUTOSE) oral gel 15 g, 15 g, Oral, Q15 Min PRN, Ciro Chauhan MD  •  glucagon (human recombinant) (GLUCAGEN DIAGNOSTIC) injection 1 mg, 1 mg, Subcutaneous, Q15 Min PRN, Ciro Chauhan MD  •  heparin (porcine) 5000 UNIT/ML injection 5,000 Units, 5,000 Units, Subcutaneous, Q12H, Ciro Chauhan MD, 5,000 Units at 09/09/20 0821  •  insulin aspart (novoLOG) injection 0-7 Units, 0-7 Units, Subcutaneous, TID AC, Ciro Chauhan MD, 3 Units at 09/09/20 1702  •  insulin detemir (LEVEMIR) injection 20 Units, 20 Units, Subcutaneous, Nightly, Ciro Chauhan MD, 20 Units at 09/08/20 2111  •  Magnesium Sulfate 2 gram Bolus, followed by 8 gram infusion (total Mg dose 10 grams)- Mg less than or equal to 1mg/dL, 2 g, Intravenous, PRN **OR** Magnesium Sulfate 2 gram / 50mL Infusion (GIVE X 3 BAGS TO EQUAL 6GM TOTAL DOSE) - Mg 1.1 - 1.5 mg/dl, 2 g, Intravenous, PRN **OR**  Magnesium Sulfate 4 gram infusion- Mg 1.6-1.9 mg/dL, 4 g, Intravenous, PRN, Kevin Perry MD, Last Rate: 25 mL/hr at 09/07/20 1239, 4 g at 09/07/20 1239  •  metoclopramide (REGLAN) tablet 5 mg, 5 mg, Oral, 4x Daily AC & at Bedtime, Poppy Méndez MD, 5 mg at 09/09/20 1702  •  nicotine (NICODERM CQ) 21 MG/24HR patch 1 patch, 1 patch, Transdermal, Q24H, Ciro Chauhan MD, 1 patch at 09/09/20 0820  •  nitroglycerin (NITROSTAT) SL tablet 0.4 mg, 0.4 mg, Sublingual, Q5 Min PRN, Ciro Chauhan MD  •  ondansetron (ZOFRAN) tablet 4 mg, 4 mg, Oral, Q6H PRN **OR** ondansetron (ZOFRAN) injection 4 mg, 4 mg, Intravenous, Q6H PRN, Kevin Perry MD, 4 mg at 09/08/20 0827  •  pantoprazole (PROTONIX) EC tablet 40 mg, 40 mg, Oral, QAM, Ciro Chauhan MD, 40 mg at 09/09/20 0602  •  potassium & sodium phosphates (PHOS-NAK) oral packet, 1 packet, Oral, TID AC, Rodríguez Brody MD, 1 packet at 09/09/20 1702  •  potassium chloride (MICRO-K) CR capsule 40 mEq, 40 mEq, Oral, BID With Meals, Poppy Méndez MD, 40 mEq at 09/09/20 1702  •  potassium phosphate 45 mmol in sodium chloride 0.9 % 500 mL infusion, 45 mmol, Intravenous, PRN **OR** potassium phosphate 30 mmol in sodium chloride 0.9 % 250 mL infusion, 30 mmol, Intravenous, PRN **OR** Potassium Phosphates 15 mmol in sodium chloride 0.9 % 100 mL infusion, 15 mmol, Intravenous, PRN, 15 mmol at 09/06/20 0254 **OR** sodium phosphates 45 mmol in sodium chloride 0.9 % 500 mL IVPB, 45 mmol, Intravenous, PRN **OR** sodium phosphates 30 mmol in sodium chloride 0.9 % 250 mL IVPB, 30 mmol, Intravenous, PRN **OR** sodium phosphates 15 mmol in sodium chloride 0.9 % 250 mL IVPB, 15 mmol, Intravenous, PRN, Kevin Perry MD  •  pravastatin (PRAVACHOL) tablet 40 mg, 40 mg, Oral, Nightly, Ciro Chauhan MD, 40 mg at 09/08/20 2111  •  sertraline (ZOLOFT) tablet 50 mg, 50 mg, Oral, Daily, Ciro Chauhan MD, 50 mg at 09/09/20 0820  •  sodium chloride 0.9 % flush 10  mL, 10 mL, Intravenous, Q12H, Kevin Perry MD, 10 mL at 09/09/20 0821  •  sodium chloride 0.9 % flush 10 mL, 10 mL, Intravenous, PRN, Kevin Perry MD  •  sodium chloride 0.9 % flush 10 mL, 10 mL, Intravenous, Once PRN, Kevin Perry MD  •  sodium chloride 0.9 % flush 30 mL, 30 mL, Intravenous, Once PRN, Kevin Perry MD    I have reviewed the patient's current medications.     Results Review:  I have reviewed the labs, radiology results, and diagnostic studies.    Laboratory Data:   Results from last 7 days   Lab Units 09/09/20  0546 09/08/20  0608 09/07/20 2139 09/07/20  0535   SODIUM mmol/L 142 144  --  142   POTASSIUM mmol/L 3.0* 3.1* 3.4* 2.7*   CHLORIDE mmol/L 107 114*  --  118*   CO2 mmol/L 28.0 20.0*  --  14.0*   BUN mg/dL 59* 80*  --  102*   CREATININE mg/dL 1.93* 2.76*  --  3.51*   GLUCOSE mg/dL 123* 146*  --  59*   CALCIUM mg/dL 7.0* 7.2*  --  7.8*   BILIRUBIN mg/dL 0.3 0.3  --  0.3   ALK PHOS U/L 56 60  --  67   ALT (SGPT) U/L 8 9  --  8   AST (SGOT) U/L 10 10  --  10   ANION GAP mmol/L 7.0 10.0  --  10.0     Estimated Creatinine Clearance: 33.7 mL/min (A) (by C-G formula based on SCr of 1.93 mg/dL (H)).  Results from last 7 days   Lab Units 09/09/20  0835 09/08/20 0608 09/07/20  0535 09/06/20  0831 09/06/20  0432   MAGNESIUM mg/dL  --  2.4 1.7 1.8 1.7   PHOSPHORUS mg/dL 2.0*  --   --  3.1 2.3*         Results from last 7 days   Lab Units 09/09/20  0546 09/08/20  0608 09/07/20  0535 09/06/20  0432 09/05/20 2014   WBC 10*3/mm3 7.53 7.87 9.29 12.63* 16.23*   HEMOGLOBIN g/dL 9.1* 9.2* 9.8* 11.1* 11.9*   HEMATOCRIT % 26.7* 26.7* 28.3* 33.6* 35.2*   PLATELETS 10*3/mm3 198 200 194 196 164           Culture Data:   No results found for: BLOODCX  No results found for: URINECX  No results found for: RESPCX  No results found for: WOUNDCX  No results found for: STOOLCX  No components found for: BODYFLD    Radiology Data:   Imaging Results (Last 24 Hours)     ** No results found for the last 24 hours. **           Assessment/Plan     Hospital Problem List:  Active Problems:    Diabetic ketoacidosis without coma associated with type 1 diabetes mellitus (CMS/HCC)    Moderate malnutrition (CMS/HCC)  Acute kidney injury on chronic kidney disease  Metabolic acidosis  Nicotine dependence  COPD  Cachexia/severe malnutrition  GERD  Physical deconditioning  Hypokalemia    Plan  - Patient's mental status has improved and he appears back to baseline  - His renal function is improving with creatinine trending down to 1.9 today.  Nephrology input appreciated.  Renal ultrasound unremarkable.  Discontinue sodium bicarbonate as metabolic acidosis has improved.  Patient will require bicarb on discharge.    - Urology input appreciated for history of neobladder creation.  Continue Mucomyst bladder irrigations to decrease urinary sediments. Will discontinue this evening.  - Replete electrolytes  -Patient has episode of vomiting with meals.  Continue p.o. Reglan 3 times daily AC as patient may have gastroparesis from diabetes.  Of note patient had upper GI endoscopy with Dr. Torres last year that showed gastritis.  Continue PO Protonix  - Dietitian consult for malnutrition  - NovoLog sliding scale and Levemir for type 2 diabetes mellitus  - PT/OT consult for generalized weakness and deconditioning  -Nicotine patch for smoking cessation  - DVT prophylaxis with Lovenox  -CODE STATUS is full code    Discharge Planning: Expect patient to be discharged in the next 24 hrs    Poppy Méndez MD   20   18:34            Electronically signed by Poppy Méndez MD at 20 1840       [address] [address]  Dept. Phone:  [phone]  Dept. Fax:   Date Ordered: Sep 10, 2020         Patient:  Favio Casillas MRN:  [id number]   [address] [place] [id number] :  1955  SSN:    Phone: [phone] Sex:  M     Weight: 60 kg (132 lb 3.2 oz)  "        Ht Readings from Last 1 Encounters:   09/05/20 170.2 cm (67\")         Walker               (Order ID: [id number])    Diagnosis:  Impaired functional mobility, balance, gait, and endurance (Z74.09 [ICD-10-CM] V49.89 [ICD-9-CM])  Impaired mobility and ADLs (Z74.09,Z78.9 [ICD-10-CM] V49.89 [ICD-9-CM])   Quantity:  1     Equipment:  Walker Folding with Wheels  Length of Need (99 Months = Lifetime): 99 Months = Lifetime        Authorizing Provider's Phone: [phone]   Authorizing Provider:Poppy Méndez MD  Authorizing Provider's NPI: [id number]  Order Entered By: Poppy Méndez MD 9/10/2020 12:26 PM     Electronically signed by: Poppy Méndez MD 9/10/2020 12:26 PM          "

## 2020-09-10 NOTE — DISCHARGE SUMMARY
[facility] Medicine Services  DISCHARGE SUMMARY       Date of Admission: 9/5/2020  Date of Discharge:  9/10/2020  Primary Care Physician: Shayne Merritt MD    Presenting Problem/History of Present Illness:  Diabetic ketoacidosis without coma associated with type 1 diabetes mellitus (CMS/HCC) [E10.10]     Final Discharge Diagnoses:  Active Hospital Problems    Diagnosis   • **Diabetic ketoacidosis without coma associated with type 1 diabetes mellitus (CMS/HCC)   • Moderate malnutrition (CMS/HCC)   • Acute renal failure superimposed on stage 3 chronic kidney disease (CMS/HCC)   • Acute metabolic encephalopathy   • Acute kidney injury (nontraumatic) (CMS/HCC)   Acute kidney injury on chronic kidney disease  Metabolic acidosis  Nicotine dependence  COPD  Cachexia  GERD  Physical deconditioning  Hypokalemia    Consults:   Consults     Date and Time Order Name Status Description    9/6/2020 1529 Inpatient Urology Consult      9/6/2020 0902 Inpatient Nephrology Consult Completed     9/5/2020 1824 Hospitalist (on-call MD unless specified)            Procedures Performed: None                Pertinent Test Results:   Collected Updated Procedure Result Status    09/10/2020 0648 09/10/2020 0742 Comprehensive Metabolic Panel [[id number]]    (Abnormal)   Blood    Final result Component Value Units   Glucose 84 mg/dL   BUN 47High  mg/dL   Creatinine 1.72High  mg/dL   Sodium 139 mmol/L   Potassium 3.7 mmol/L   Chloride 107 mmol/L   CO2 24.0 mmol/L   Calcium 7.1Low  mg/dL   Total Protein 5.0Low  g/dL   Albumin 2.80Low  g/dL   ALT (SGPT) 9 U/L   AST (SGOT) 13 U/L   Alkaline Phosphatase 51 U/L   Total Bilirubin 0.2 mg/dL   eGFR Non African Am 40Low  mL/min/1.73   Globulin 2.2 gm/dL   A/G Ratio 1.3 g/dL   BUN/Creatinine Ratio 27.3High     Anion Gap 8.0 mmol/L             09/05/2020 1653 09/05/2020 1716 Comprehensive Metabolic Panel [[id number]]    (Abnormal)   Blood    Final result  Component Value Units   Glucose 496High Critical  mg/dL   BUN 107High  mg/dL   Creatinine 5.16High  mg/dL   Sodium 124Low  mmol/L   Potassium 5.0 mmol/L   Chloride 104 mmol/L   CO2 5.0Low  mmol/L   Calcium 9.0 mg/dL   Total Protein 6.6 g/dL   Albumin 4.00 g/dL   ALT (SGPT) 11 U/L   AST (SGOT) 7 U/L   Alkaline Phosphatase 113 U/L   Total Bilirubin 0.5 mg/dL   eGFR Non  Am 11Low   mL/min/1.73   eGFR African Am --     Globulin 2.6 gm/dL   A/G Ratio 1.5 g/dL   BUN/Creatinine Ratio 20.7    Anion Gap 15.0 mmol/L           09/05/2020 1653 09/05/2020 1713 Troponin [[id number]]    Blood    Final result Component Value Units   Troponin T <0.010 ng/mL           09/05/2020 1653 09/05/2020 1714 Magnesium [[id number]]   Blood    Final result Component Value Units   Magnesium 2.0 mg/dL           09/05/2020 1653 09/05/2020 1713 Ammonia [[id number]]   Blood    Final result Component Value Units   Ammonia 18 umol/L           09/05/2020 1653 09/05/2020 1656 CBC Auto Differential [[id number]]   (Abnormal)   Blood    Final result Component Value Units   WBC 17.90High  10*3/mm3   RBC 4.50 10*6/mm3   Hemoglobin 13.3 g/dL   Hematocrit 40.3 %   MCV 89.6 fL   MCH 29.6 pg   MCHC 33.0 g/dL   RDW 13.8 %   RDW-SD 44.9 fl   MPV 10.3 fL   Platelets 221 10*3/mm3   Neutrophil Rel % 94.9High  %   Lymphocyte Rel % 2.4Low  %   Monocyte Rel % 2.1Low  %   Eosinophil Rel % 0.0Low  %   Basophil Rel % 0.1 %   Immature Granulocyte Rel % 0.5 %   Neutrophils Absolute 16.99High  10*3/mm3   Lymphocytes Absolute 0.43Low  10*3/mm3   Monocytes Absolute 0.37 10*3/mm3   Eosinophils Absolute 0.00 10*3/mm3   Basophils Absolute 0.02 10*3/mm3   Immature Grans, Absolute 0.09High  10*3/mm3   nRBC 1.1High  /100 WBC           09/05/2020 1653 09/05/2020 1934 Phosphorus [[id number]]   (Abnormal)   Blood    Final result Component Value Units   Phosphorus 5.3High  mg/dL           09/05/2020 1653 09/05/2020 2126 Osmolality, Serum [[id number]]   (Abnormal)   Blood    Final result  "Component Value Units   Osmolality 330High  mOsm/kg           09/05/2020 1650 09/05/2020 1713 pH, Venous [[id number]]   (Abnormal)   Blood    Final result Component Value Units   pH, Venous 6.940Low  pH Units             Chief Complaint on Day of Discharge: None    Hospital Course:  The patient is a 65 y.o. male with history of insulin dependent Diabetes, Hepatitis C, COPD, substance abuse, GERD, HTN, and hx of bladder cancer who was admitted for DKA  and acute kidney injury complicated by acute metabolic encephalopathy.  He also had malnutrition.  He received IV fluids and IV insulin and his DKA resolved.  He had acute kidney injury and was reviewed by nephrologist.  He received IV sodium bicarbonate drip for SUJATHA metabolic acidosis and his kidney function gradually improved.  He had a history of bladder cancer status post neobladder creation and received John catheter placement with irrigation for increased urinary sediment.  He was reviewed by urologist.  John catheter was discontinued prior to discharge when urine sediment became resolved and urine was clear.  He was discharged in stable condition.    Condition on Discharge: Stable    Physical Exam on Discharge:  /70 (BP Location: Left arm, Patient Position: Sitting)   Pulse 84   Temp 97.6 °F (36.4 °C) (Oral)   Resp 18   Ht 170.2 cm (67\")   Wt 60 kg (132 lb 3.2 oz)   SpO2 97%   BMI 20.71 kg/m²      Physical Exam  Constitutional: He is oriented to person, place, and time. He appears well-developed and well-nourished. No distress.   HENT:   Head: Normocephalic and atraumatic.   Mouth/Throat: Oropharynx is clear and moist. No oropharyngeal exudate.   Eyes: Pupils are equal, round, and reactive to light. Conjunctivae and EOM are normal. No scleral icterus.   Neck: Normal range of motion. Neck supple. No JVD present. No tracheal deviation present. No thyromegaly present.   Cardiovascular: Normal rate, regular rhythm, normal heart sounds and intact distal " pulses. Exam reveals no gallop and no friction rub.   No murmur heard.  Pulmonary/Chest: Effort normal and breath sounds normal. No stridor. No respiratory distress. He has no wheezes. He has no rales. He exhibits no tenderness.   Abdominal: Soft. Bowel sounds are normal. He exhibits no distension and no mass. There is no tenderness. There is no rebound and no guarding. No hernia.   Musculoskeletal: Normal range of motion. He exhibits no edema, tenderness or deformity.   Lymphadenopathy:     He has no cervical adenopathy.   Neurological: He is alert and oriented to person, place, and time. No cranial nerve deficit. He exhibits normal muscle tone.   Skin: Skin is warm and dry. No rash noted. He is not diaphoretic. No erythema. No pallor.   Psychiatric: He has a normal mood and affect. His behavior is normal. Judgment and thought content normal.     Discharge Disposition:  [place] [facility] [place]    Discharge Medications:     Discharge Medications      New Medications      Instructions Start Date   metoclopramide 5 MG tablet  Commonly known as:  REGLAN   5 mg, Oral, 3 Times Daily PRN      sodium bicarbonate 650 MG tablet   650 mg, Oral, 2 Times Daily         Changes to Medications      Instructions Start Date   insulin aspart 100 UNIT/ML solution pen-injector sc pen  Commonly known as:  novoLOG FLEXPEN  What changed:    · how to take this  · when to take this  · additional instructions   Subcut TIDAC for diabetes.Blood sugar =0 unit;150-199=2 u;200-249=3 u;250-299=4 u;300-349=5 u;350-400=6 u;>400=7 u. Dispense whatever insurance covers.      insulin detemir 100 UNIT/ML injection  Commonly known as:  Levemir FlexTouch  What changed:    · how much to take  · when to take this   20 Units, Subcutaneous, Nightly      omeprazole 20 MG capsule  Commonly known as:  PrilOSEC  What changed:  when to take this   20 mg, Oral, Daily      sertraline 50 MG tablet  Commonly known as:  Zoloft  What changed:    · how much to  take  · how to take this  · when to take this  · additional instructions   1/2 tablet hs 1 week then 1 tablet . (depression)         Continue These Medications      Instructions Start Date   [employer] Blood Glucose Meter w/Device kit   1 each, Does not apply, 4 Times Daily PRN      Accu-Chek Toma device   1 each, Other, 3 Times Daily Before Meals, Use as instructed      albuterol (2.5 MG/3ML) 0.083% nebulizer solution  Commonly known as:  PROVENTIL   2.5 mg, Nebulization, Every 4 Hours PRN      albuterol sulfate  (90 Base) MCG/ACT inhaler  Commonly known as:  Ventolin HFA   2 puffs, Inhalation, Every 6 Hours PRN      Alcohol Prep pads   1 pad, Does not apply, 5 Times Daily      aspirin 81 MG EC tablet   81 mg, Oral, Daily      budesonide-formoterol 160-4.5 MCG/ACT inhaler  Commonly known as:  SYMBICORT   2 puffs, Inhalation, 2 Times Daily - RT      carvedilol 3.125 MG tablet  Commonly known as:  COREG   TK 1 T PO BID WC      clopidogrel 75 MG tablet  Commonly known as:  PLAVIX   75 mg, Oral, Daily      cyclobenzaprine 10 MG tablet  Commonly known as:  FLEXERIL   10 mg, Oral      fluticasone 50 MCG/ACT nasal spray  Commonly known as:  Flonase   2 sprays, Nasal, Daily      freestyle lancets   Tid      Accu-Chek Softclix Lancets lancets   1 each, Other, 3 Times Daily Before Meals, Use as instructed      glucose blood test strip   Use as instructed      glucose blood test strip  Commonly known as:  Accu-Chek Toma   1 each, Other, As Needed, Use as instructed      glucose monitor monitoring kit   1 each, Does not apply, 3 Times Daily      hydrOXYzine 25 MG tablet  Commonly known as:  ATARAX   TK 1 T PO TID PRF ITCHING      nitroglycerin 0.4 MG SL tablet  Commonly known as:  NITROSTAT   0.4 mg, Sublingual, Every 5 Minutes PRN, Take no more than 3 doses in 15 minutes.       POTASSIMIN PO   Oral      pravastatin 40 MG tablet  Commonly known as:  PRAVACHOL   40 mg, Oral, Nightly      traMADol 50 MG tablet  Commonly  known as:  ULTRAM   50 mg, Oral, Every 6 Hours PRN         Stop These Medications    aMILoride 5 MG tablet  Commonly known as:  MIDAMOR            Discharge Diet:   Diet Instructions     Diet: Consistent Carbohydrate, Soft Texture; Thin Liquids, No Restrictions; Ground      Discharge Diet:   Consistent Carbohydrate  Soft Texture       Fluid Consistency:  Thin Liquids, No Restrictions    Soft Options:  Ground          Activity at Discharge:   Activity Instructions     Activity as Tolerated            Discharge Care Plan/Instructions:   1.  Follow-up with your primary care provider within 1 week of discharge.   2.  Follow-up with nephrologist Dr. Brody within 1 week of discharge.   3.  Follow-up with urologist Dr. Calderon within 1 month of discharge.   4.  Use your medications as prescribed to prevent elevated blood glucose.    Follow-up Appointments:   No future appointments.    Test Results Pending at Discharge:     Poppy Méndez MD  09/10/20  12:36    Time: 36 minutes of time was spent evaluating patient and planning discharge.      Part of this note may be an electronic transcription/translation of spoken language to printed text using the Dragon Dictation system.

## 2020-09-11 ENCOUNTER — READMISSION MANAGEMENT (OUTPATIENT)
Dept: CALL CENTER | Facility: HOSPITAL | Age: 65
End: 2020-09-11

## 2020-09-11 NOTE — OUTREACH NOTE
Prep Survey      Responses   [facility] patient discharged from?  [place]   Is LACE score < 7 ?  No   Eligibility  Readm Mgmt   Discharge diagnosis  DKA   COVID-19 Test Status  Negative   Does the patient have one of the following disease processes/diagnoses(primary or secondary)?  Other   Does the patient have Home health ordered?  Yes   What is the Home health agency?   [facility]   Is there a DME ordered?  Yes   What DME was ordered?  Rolling walker from [facility]   Comments regarding appointments  Per AVS   Medication alerts for this patient  continue aspirin and plavix   Prep survey completed?  Yes          Shruthi York RN        
video/written material/verbal instruction

## 2020-09-11 NOTE — PAYOR COMM NOTE
"Zulay Baumann  [facility]  P :[phone]  F: [phone]    [facility]#[id number]    Favio Casillas (65 y.o. Male)     Date of Birth Social Security Number Address Home Phone MRN    1955  [address]  [facility] [address] [phone] [id number]    Rastafari Marital Status          Jehovah's witness Single       Admission Date Admission Type Admitting Provider Attending Provider Department, Room/Bed    9/5/20 Emergency Kevin Perry MD  [address], 415/1    Discharge Date Discharge Disposition Discharge Destination        9/10/2020 Home-Health Care [facility]              Attending Provider:  (none)   Allergies:  No Known Allergies    Isolation:  None   Infection:  None   Code Status:  Prior    Ht:  170.2 cm (67\")   Wt:  60 kg (132 lb 3.2 oz)    Admission Cmt:  None   Principal Problem:  Diabetic ketoacidosis without coma associated with type 1 diabetes mellitus (CMS/[facility]) [E10.10]                 Active Insurance as of 9/5/2020     Primary Coverage     Payor Plan Insurance Group Employer/Plan Group    HUMANA MEDICARE REPLACEMENT HUMANA MEDICARE REPLACEMENT [id number]     Payor Plan Address Payor Plan Phone Number Payor Plan Fax Number Effective Dates    [address] [address] [phone]  5/1/2019 - None Entered    [facility] [address]       Subscriber Name Subscriber Birth Date Member ID       FAVIO CASILLAS 1955 [id number]           Secondary Coverage     Payor Plan Insurance Group Employer/Plan Group    [place]      Payor Plan Address Payor Plan Phone Number Payor Plan Fax Number Effective Dates    [address] [phone]  8/1/2017 - None Entered    [facility] [id number]       Subscriber Name Subscriber Birth Date Member ID       FAVIO CASILLAS 1955 [id number]                 Emergency Contacts      (Rel.) Home Phone Work Phone Mobile Phone    TamaraZulaySofi (Friend) [phone] -- [phone]    Vinny, " Tong (Brother) -- -- [phone]            Discharge Summary    No notes of this type exist for this encounter.         Discharge Order (From admission, onward)     Start     Ordered    09/10/20 1058  Discharge patient  Once     Expected Discharge Date:  09/10/20    Expected Discharge Time:  Morning    Discharge Disposition:  Home-Health Care [facility] [place]    Physician of Record for Attribution - Please select from Treatment Team:  KOLTON BUSTOS [[id number]]    Review needed by CMO to determine Physician of Record:  No       Question Answer Comment   Physician of Record for Attribution - Please select from Treatment Team KOLTON BUSTOS    Review needed by CMO to determine Physician of Record No        09/10/20 1057

## 2020-09-15 ENCOUNTER — READMISSION MANAGEMENT (OUTPATIENT)
Dept: CALL CENTER | Facility: HOSPITAL | Age: 65
End: 2020-09-15

## 2020-09-15 NOTE — OUTREACH NOTE
Medical Week 1 Survey      Responses   [facility]   COVID-19 Test Status  Negative   Does the patient have one of the following disease processes/diagnoses(primary or secondary)?  Other   Is there a successful TCM telephone encounter documented?  No   Week 1 attempt successful?  Yes   Call start time  1242   Call end time  1246   Discharge diagnosis  DKA   Is patient permission given to speak with other caregiver?  Yes   Person spoke with today (if not patient) and relationship  Sofi, caregiver and friend   Meds reviewed with patient/caregiver?  Yes   Is the patient having any side effects they believe may be caused by any medication additions or changes?  No   Does the patient have all medications ordered at discharge?  Yes   Is the patient taking all medications as directed (includes completed medication regime)?  Yes   Does the patient have a primary care provider?   Yes   Does the patient have an appointment with their PCP within 7 days of discharge?  Yes   Comments regarding PCP  Dr. Merritt on 9/17/2020   Has the patient kept scheduled appointments due by today?  N/A   What is the Home health agency?   [facility]   Has home health visited the patient within 72 hours of discharge?  Yes   Pulse Ox monitoring  None   Psychosocial issues?  No   Comments  Patient is having some edema to legs and feet. He is keeping them elevated and has a followup with PCP on 9/17/2020   Did the patient receive a copy of their discharge instructions?  Yes   Nursing interventions  Reviewed instructions with patient   What is the patient's perception of their health status since discharge?  Improving   Is the patient/caregiver able to teach back signs and symptoms related to disease process for when to call PCP?  Yes   Is the patient/caregiver able to teach back signs and symptoms related to disease process for when to call 911?  Yes   Is the patient/caregiver  able to teach back the hierarchy of who to call/visit for symptoms/problems? PCP, Specialist, Home health nurse, Urgent Care, ED, 911  Yes   Week 1 call completed?  Yes          Mathew Potts RN

## 2020-09-22 ENCOUNTER — READMISSION MANAGEMENT (OUTPATIENT)
Dept: CALL CENTER | Facility: HOSPITAL | Age: 65
End: 2020-09-22

## 2020-09-22 NOTE — OUTREACH NOTE
Medical Week 2 Survey      Responses   [facility]   COVID-19 Test Status  Negative   Does the patient have one of the following disease processes/diagnoses(primary or secondary)?  Other   Week 2 attempt successful?  Yes   Call start time  1621   Discharge diagnosis  DKA   Call end time  1624   Is patient permission given to speak with other caregiver?  Yes   Person spoke with today (if not patient) and relationship  Sofi, caregiver and friend   Meds reviewed with patient/caregiver?  Yes   Is the patient having any side effects they believe may be caused by any medication additions or changes?  No   Does the patient have all medications ordered at discharge?  Yes   Is the patient taking all medications as directed (includes completed medication regime)?  Yes   Does the patient have a primary care provider?   Yes   Does the patient have an appointment with their PCP within 7 days of discharge?  Yes   Comments regarding PCP  Dr. Merritt on 9/17/2020   Has the patient kept scheduled appointments due by today?  Yes   What is the Home health agency?   [facility]   Has home health visited the patient within 72 hours of discharge?  Yes   What DME was ordered?  Rolling walker from [facility]   Pulse Ox monitoring  None   Comments  /86  Legs are coming down.    Did the patient receive a copy of their discharge instructions?  Yes   Nursing interventions  Reviewed instructions with patient   What is the patient's perception of their health status since discharge?  Improving   Is the patient/caregiver able to teach back signs and symptoms related to disease process for when to call PCP?  Yes   Is the patient/caregiver able to teach back signs and symptoms related to disease process for when to call 911?  Yes   Is the patient/caregiver able to teach back the hierarchy of who to call/visit for symptoms/problems? PCP, Specialist, Home health  nurse, Urgent Care, ED, 911  Yes   Additional teach back comments  He goes to the bathroom during the day and does self cath AM and PM.   Week 2 Call Completed?  Yes   Wrap up additional comments  Patient is doing well.  He has had his artificial for 8 years.          Cari Good RN

## 2020-09-29 ENCOUNTER — READMISSION MANAGEMENT (OUTPATIENT)
Dept: CALL CENTER | Facility: HOSPITAL | Age: 65
End: 2020-09-29

## 2020-09-29 NOTE — OUTREACH NOTE
Medical Week 3 Survey      Responses   [facility] patient discharged from?  [place]   COVID-19 Test Status  Negative   Does the patient have one of the following disease processes/diagnoses(primary or secondary)?  Other   Week 3 attempt successful?  No   Unsuccessful attempts  Attempt 1          Frida Clya LPN

## 2020-10-01 ENCOUNTER — READMISSION MANAGEMENT (OUTPATIENT)
Dept: CALL CENTER | Facility: HOSPITAL | Age: 65
End: 2020-10-01

## 2020-10-01 NOTE — OUTREACH NOTE
Medical Week 3 Survey      Responses   [facility] patient discharged from?  [place]   Does the patient have one of the following disease processes/diagnoses(primary or secondary)?  Other   Week 3 attempt successful?  Yes   Call start time  1647   Call end time  1648   Discharge diagnosis  DKA   Meds reviewed with patient/caregiver?  Yes   Is the patient having any side effects they believe may be caused by any medication additions or changes?  No   Does the patient have all medications ordered at discharge?  Yes   Is the patient taking all medications as directed (includes completed medication regime)?  Yes   Does the patient have a primary care provider?   Yes   Does the patient have an appointment with their PCP within 7 days of discharge?  Yes   Has the patient kept scheduled appointments due by today?  Yes   Psychosocial issues?  No   Did the patient receive a copy of their discharge instructions?  Yes   Nursing interventions  Reviewed instructions with patient   What is the patient's perception of their health status since discharge?  Improving   Is the patient/caregiver able to teach back signs and symptoms related to disease process for when to call PCP?  Yes   Is the patient/caregiver able to teach back signs and symptoms related to disease process for when to call 911?  Yes   Is the patient/caregiver able to teach back the hierarchy of who to call/visit for symptoms/problems? PCP, Specialist, Home health nurse, Urgent Care, ED, 911  Yes   Week 3 Call Completed?  Yes          Kevin Kamara RN

## 2020-10-08 ENCOUNTER — READMISSION MANAGEMENT (OUTPATIENT)
Dept: CALL CENTER | Facility: HOSPITAL | Age: 65
End: 2020-10-08

## 2020-10-08 NOTE — OUTREACH NOTE
Medical Week 4 Survey      Responses   [facility] patient discharged from?  [place]   Does the patient have one of the following disease processes/diagnoses(primary or secondary)?  Other   Week 4 attempt successful?  No          Haley Chakraborty RN

## 2021-01-11 ENCOUNTER — HOSPITAL ENCOUNTER (INPATIENT)
Facility: HOSPITAL | Age: 66
LOS: 6 days | Discharge: HOME HEALTH | End: 2021-01-17
Attending: EMERGENCY MEDICINE | Admitting: INTERNAL MEDICINE

## 2021-01-11 ENCOUNTER — APPOINTMENT (OUTPATIENT)
Dept: CT IMAGING | Facility: HOSPITAL | Age: 66
End: 2021-01-11

## 2021-01-11 DIAGNOSIS — E11.65 UNCONTROLLED TYPE 2 DIABETES MELLITUS WITH HYPERGLYCEMIA (HCC): ICD-10-CM

## 2021-01-11 DIAGNOSIS — E87.1 HYPONATREMIA: ICD-10-CM

## 2021-01-11 DIAGNOSIS — A41.59 KLEBSIELLA SEPSIS (HCC): Primary | ICD-10-CM

## 2021-01-11 DIAGNOSIS — D72.829 LEUKOCYTOSIS, UNSPECIFIED TYPE: ICD-10-CM

## 2021-01-11 DIAGNOSIS — N17.9 ACUTE RENAL FAILURE, UNSPECIFIED ACUTE RENAL FAILURE TYPE (HCC): ICD-10-CM

## 2021-01-11 DIAGNOSIS — E87.5 HYPERKALEMIA: ICD-10-CM

## 2021-01-11 PROBLEM — R91.1 LUNG NODULE: Chronic | Status: ACTIVE | Noted: 2021-01-11

## 2021-01-11 LAB
ACETONE BLD QL: NEGATIVE
ALBUMIN SERPL-MCNC: 3.3 G/DL (ref 3.5–5.2)
ALBUMIN SERPL-MCNC: 3.8 G/DL (ref 3.5–5.2)
ALBUMIN/GLOB SERPL: 1 G/DL
ALBUMIN/GLOB SERPL: 1 G/DL
ALP SERPL-CCNC: 123 U/L (ref 39–117)
ALP SERPL-CCNC: 148 U/L (ref 39–117)
ALT SERPL W P-5'-P-CCNC: 7 U/L (ref 1–41)
ALT SERPL W P-5'-P-CCNC: 9 U/L (ref 1–41)
ANION GAP SERPL CALCULATED.3IONS-SCNC: 15 MMOL/L (ref 5–15)
ANION GAP SERPL CALCULATED.3IONS-SCNC: 17 MMOL/L (ref 5–15)
ANION GAP SERPL CALCULATED.3IONS-SCNC: 18 MMOL/L (ref 5–15)
AST SERPL-CCNC: 11 U/L (ref 1–40)
AST SERPL-CCNC: 7 U/L (ref 1–40)
BACTERIA UR QL AUTO: ABNORMAL /HPF
BACTERIA UR QL AUTO: ABNORMAL /HPF
BASOPHILS # BLD AUTO: 0.02 10*3/MM3 (ref 0–0.2)
BASOPHILS NFR BLD AUTO: 0.1 % (ref 0–1.5)
BILIRUB SERPL-MCNC: 0.2 MG/DL (ref 0–1.2)
BILIRUB SERPL-MCNC: 0.3 MG/DL (ref 0–1.2)
BILIRUB UR QL STRIP: ABNORMAL
BILIRUB UR QL STRIP: NEGATIVE
BUN SERPL-MCNC: 132 MG/DL (ref 8–23)
BUN SERPL-MCNC: 134 MG/DL (ref 8–23)
BUN SERPL-MCNC: 134 MG/DL (ref 8–23)
BUN/CREAT SERPL: 25 (ref 7–25)
BUN/CREAT SERPL: 27.8 (ref 7–25)
BUN/CREAT SERPL: 28.2 (ref 7–25)
CALCIUM SPEC-SCNC: 8.9 MG/DL (ref 8.6–10.5)
CALCIUM SPEC-SCNC: 9 MG/DL (ref 8.6–10.5)
CALCIUM SPEC-SCNC: 9 MG/DL (ref 8.6–10.5)
CHLORIDE SERPL-SCNC: 100 MMOL/L (ref 98–107)
CHLORIDE SERPL-SCNC: 89 MMOL/L (ref 98–107)
CHLORIDE SERPL-SCNC: 96 MMOL/L (ref 98–107)
CLARITY UR: ABNORMAL
CLARITY UR: ABNORMAL
CO2 SERPL-SCNC: 6 MMOL/L (ref 22–29)
CO2 SERPL-SCNC: 7 MMOL/L (ref 22–29)
CO2 SERPL-SCNC: 8 MMOL/L (ref 22–29)
COLOR UR: YELLOW
COLOR UR: YELLOW
CREAT SERPL-MCNC: 4.74 MG/DL (ref 0.76–1.27)
CREAT SERPL-MCNC: 4.76 MG/DL (ref 0.76–1.27)
CREAT SERPL-MCNC: 5.36 MG/DL (ref 0.76–1.27)
D-LACTATE SERPL-SCNC: 0.6 MMOL/L (ref 0.5–2)
DEPRECATED RDW RBC AUTO: 40 FL (ref 37–54)
EOSINOPHIL # BLD AUTO: 0 10*3/MM3 (ref 0–0.4)
EOSINOPHIL NFR BLD AUTO: 0 % (ref 0.3–6.2)
ERYTHROCYTE [DISTWIDTH] IN BLOOD BY AUTOMATED COUNT: 13.6 % (ref 12.3–15.4)
FLUAV RNA RESP QL NAA+PROBE: NOT DETECTED
FLUBV RNA RESP QL NAA+PROBE: NOT DETECTED
GFR SERPL CREATININE-BSD FRML MDRD: 11 ML/MIN/1.73
GFR SERPL CREATININE-BSD FRML MDRD: 12 ML/MIN/1.73
GFR SERPL CREATININE-BSD FRML MDRD: 13 ML/MIN/1.73
GFR SERPL CREATININE-BSD FRML MDRD: ABNORMAL ML/MIN/{1.73_M2}
GLOBULIN UR ELPH-MCNC: 3.4 GM/DL
GLOBULIN UR ELPH-MCNC: 3.9 GM/DL
GLUCOSE BLDC GLUCOMTR-MCNC: 109 MG/DL (ref 70–130)
GLUCOSE BLDC GLUCOMTR-MCNC: 123 MG/DL (ref 70–130)
GLUCOSE BLDC GLUCOMTR-MCNC: 179 MG/DL (ref 70–130)
GLUCOSE BLDC GLUCOMTR-MCNC: 236 MG/DL (ref 70–130)
GLUCOSE BLDC GLUCOMTR-MCNC: 321 MG/DL (ref 70–130)
GLUCOSE BLDC GLUCOMTR-MCNC: 334 MG/DL (ref 70–130)
GLUCOSE BLDC GLUCOMTR-MCNC: 360 MG/DL (ref 70–130)
GLUCOSE BLDC GLUCOMTR-MCNC: 58 MG/DL (ref 70–130)
GLUCOSE BLDC GLUCOMTR-MCNC: 78 MG/DL (ref 70–130)
GLUCOSE BLDC GLUCOMTR-MCNC: 80 MG/DL (ref 70–130)
GLUCOSE SERPL-MCNC: 125 MG/DL (ref 65–99)
GLUCOSE SERPL-MCNC: 329 MG/DL (ref 65–99)
GLUCOSE SERPL-MCNC: 419 MG/DL (ref 65–99)
GLUCOSE UR STRIP-MCNC: NEGATIVE MG/DL
GLUCOSE UR STRIP-MCNC: NEGATIVE MG/DL
HANSEL STAIN: NEGATIVE
HCT VFR BLD AUTO: 42.7 % (ref 37.5–51)
HGB BLD-MCNC: 14.8 G/DL (ref 13–17.7)
HGB UR QL STRIP.AUTO: ABNORMAL
HGB UR QL STRIP.AUTO: ABNORMAL
HOLD SPECIMEN: NORMAL
HOLD SPECIMEN: NORMAL
HYALINE CASTS UR QL AUTO: ABNORMAL /LPF
HYALINE CASTS UR QL AUTO: ABNORMAL /LPF
IMM GRANULOCYTES # BLD AUTO: 0.14 10*3/MM3 (ref 0–0.05)
IMM GRANULOCYTES NFR BLD AUTO: 0.7 % (ref 0–0.5)
KETONES UR QL STRIP: NEGATIVE
KETONES UR QL STRIP: NEGATIVE
LEUKOCYTE ESTERASE UR QL STRIP.AUTO: ABNORMAL
LEUKOCYTE ESTERASE UR QL STRIP.AUTO: ABNORMAL
LIPASE SERPL-CCNC: 83 U/L (ref 13–60)
LYMPHOCYTES # BLD AUTO: 0.51 10*3/MM3 (ref 0.7–3.1)
LYMPHOCYTES NFR BLD AUTO: 2.6 % (ref 19.6–45.3)
MAGNESIUM SERPL-MCNC: 2.6 MG/DL (ref 1.6–2.4)
MCH RBC QN AUTO: 28.2 PG (ref 26.6–33)
MCHC RBC AUTO-ENTMCNC: 34.7 G/DL (ref 31.5–35.7)
MCV RBC AUTO: 81.5 FL (ref 79–97)
MONOCYTES # BLD AUTO: 0.44 10*3/MM3 (ref 0.1–0.9)
MONOCYTES NFR BLD AUTO: 2.2 % (ref 5–12)
MUCOUS THREADS URNS QL MICRO: ABNORMAL /HPF
NEUTROPHILS NFR BLD AUTO: 18.66 10*3/MM3 (ref 1.7–7)
NEUTROPHILS NFR BLD AUTO: 94.4 % (ref 42.7–76)
NITRITE UR QL STRIP: NEGATIVE
NITRITE UR QL STRIP: NEGATIVE
NRBC BLD AUTO-RTO: 0.4 /100 WBC (ref 0–0.2)
PH BLDV: 6.96 PH UNITS (ref 7.29–7.37)
PH UR STRIP.AUTO: 6.5 [PH] (ref 5–9)
PH UR STRIP.AUTO: 6.5 [PH] (ref 5–9)
PHOSPHATE SERPL-MCNC: 5 MG/DL (ref 2.5–4.5)
PLATELET # BLD AUTO: 453 10*3/MM3 (ref 140–450)
PMV BLD AUTO: 10.1 FL (ref 6–12)
POTASSIUM SERPL-SCNC: 4.3 MMOL/L (ref 3.5–5.2)
POTASSIUM SERPL-SCNC: 5.4 MMOL/L (ref 3.5–5.2)
POTASSIUM SERPL-SCNC: 6 MMOL/L (ref 3.5–5.2)
PROT SERPL-MCNC: 6.7 G/DL (ref 6–8.5)
PROT SERPL-MCNC: 7.7 G/DL (ref 6–8.5)
PROT UR QL STRIP: ABNORMAL
PROT UR QL STRIP: ABNORMAL
RBC # BLD AUTO: 5.24 10*6/MM3 (ref 4.14–5.8)
RBC # UR: ABNORMAL /HPF
RBC # UR: ABNORMAL /HPF
REF LAB TEST METHOD: ABNORMAL
REF LAB TEST METHOD: ABNORMAL
SARS-COV-2 RNA RESP QL NAA+PROBE: NOT DETECTED
SODIUM SERPL-SCNC: 114 MMOL/L (ref 136–145)
SODIUM SERPL-SCNC: 117 MMOL/L (ref 136–145)
SODIUM SERPL-SCNC: 125 MMOL/L (ref 136–145)
SODIUM UR-SCNC: 40 MMOL/L
SP GR UR STRIP: 1.01 (ref 1–1.03)
SP GR UR STRIP: 1.01 (ref 1–1.03)
SQUAMOUS #/AREA URNS HPF: ABNORMAL /HPF
SQUAMOUS #/AREA URNS HPF: ABNORMAL /HPF
TROPONIN T SERPL-MCNC: <0.01 NG/ML (ref 0–0.03)
UROBILINOGEN UR QL STRIP: ABNORMAL
UROBILINOGEN UR QL STRIP: ABNORMAL
WBC # BLD AUTO: 19.77 10*3/MM3 (ref 3.4–10.8)
WBC UR QL AUTO: ABNORMAL /HPF
WBC UR QL AUTO: ABNORMAL /HPF
WHOLE BLOOD HOLD SPECIMEN: NORMAL
WHOLE BLOOD HOLD SPECIMEN: NORMAL

## 2021-01-11 PROCEDURE — 74176 CT ABD & PELVIS W/O CONTRAST: CPT

## 2021-01-11 PROCEDURE — 82009 KETONE BODYS QUAL: CPT | Performed by: EMERGENCY MEDICINE

## 2021-01-11 PROCEDURE — 81001 URINALYSIS AUTO W/SCOPE: CPT | Performed by: EMERGENCY MEDICINE

## 2021-01-11 PROCEDURE — 82962 GLUCOSE BLOOD TEST: CPT

## 2021-01-11 PROCEDURE — 80053 COMPREHEN METABOLIC PANEL: CPT | Performed by: EMERGENCY MEDICINE

## 2021-01-11 PROCEDURE — 84300 ASSAY OF URINE SODIUM: CPT | Performed by: NURSE PRACTITIONER

## 2021-01-11 PROCEDURE — 87205 SMEAR GRAM STAIN: CPT | Performed by: NURSE PRACTITIONER

## 2021-01-11 PROCEDURE — 25010000002 ONDANSETRON PER 1 MG: Performed by: EMERGENCY MEDICINE

## 2021-01-11 PROCEDURE — 25010000002 CEFTRIAXONE PER 250 MG: Performed by: INTERNAL MEDICINE

## 2021-01-11 PROCEDURE — 25010000003 INSULIN REGULAR HUMAN PER 5 UNITS: Performed by: INTERNAL MEDICINE

## 2021-01-11 PROCEDURE — 80053 COMPREHEN METABOLIC PANEL: CPT | Performed by: INTERNAL MEDICINE

## 2021-01-11 PROCEDURE — 81001 URINALYSIS AUTO W/SCOPE: CPT | Performed by: NURSE PRACTITIONER

## 2021-01-11 PROCEDURE — 82800 BLOOD PH: CPT | Performed by: EMERGENCY MEDICINE

## 2021-01-11 PROCEDURE — 84156 ASSAY OF PROTEIN URINE: CPT | Performed by: NURSE PRACTITIONER

## 2021-01-11 PROCEDURE — 99285 EMERGENCY DEPT VISIT HI MDM: CPT

## 2021-01-11 PROCEDURE — 25010000002 MORPHINE PER 10 MG: Performed by: EMERGENCY MEDICINE

## 2021-01-11 PROCEDURE — 36415 COLL VENOUS BLD VENIPUNCTURE: CPT

## 2021-01-11 PROCEDURE — 83690 ASSAY OF LIPASE: CPT | Performed by: EMERGENCY MEDICINE

## 2021-01-11 PROCEDURE — 84484 ASSAY OF TROPONIN QUANT: CPT | Performed by: EMERGENCY MEDICINE

## 2021-01-11 PROCEDURE — 82570 ASSAY OF URINE CREATININE: CPT | Performed by: NURSE PRACTITIONER

## 2021-01-11 PROCEDURE — 87086 URINE CULTURE/COLONY COUNT: CPT | Performed by: EMERGENCY MEDICINE

## 2021-01-11 PROCEDURE — 87077 CULTURE AEROBIC IDENTIFY: CPT | Performed by: EMERGENCY MEDICINE

## 2021-01-11 PROCEDURE — 87040 BLOOD CULTURE FOR BACTERIA: CPT | Performed by: EMERGENCY MEDICINE

## 2021-01-11 PROCEDURE — 84100 ASSAY OF PHOSPHORUS: CPT | Performed by: INTERNAL MEDICINE

## 2021-01-11 PROCEDURE — P9612 CATHETERIZE FOR URINE SPEC: HCPCS

## 2021-01-11 PROCEDURE — 87186 SC STD MICRODIL/AGAR DIL: CPT | Performed by: EMERGENCY MEDICINE

## 2021-01-11 PROCEDURE — 83605 ASSAY OF LACTIC ACID: CPT | Performed by: EMERGENCY MEDICINE

## 2021-01-11 PROCEDURE — 25010000003 INSULIN REGULAR HUMAN PER 5 UNITS: Performed by: EMERGENCY MEDICINE

## 2021-01-11 PROCEDURE — 85025 COMPLETE CBC W/AUTO DIFF WBC: CPT | Performed by: EMERGENCY MEDICINE

## 2021-01-11 PROCEDURE — 83735 ASSAY OF MAGNESIUM: CPT | Performed by: EMERGENCY MEDICINE

## 2021-01-11 PROCEDURE — 87636 SARSCOV2 & INF A&B AMP PRB: CPT | Performed by: EMERGENCY MEDICINE

## 2021-01-11 PROCEDURE — 36415 COLL VENOUS BLD VENIPUNCTURE: CPT | Performed by: EMERGENCY MEDICINE

## 2021-01-11 RX ORDER — PANTOPRAZOLE SODIUM 40 MG/1
40 TABLET, DELAYED RELEASE ORAL DAILY
COMMUNITY

## 2021-01-11 RX ORDER — MORPHINE SULFATE 2 MG/ML
2 INJECTION, SOLUTION INTRAMUSCULAR; INTRAVENOUS
Status: DISCONTINUED | OUTPATIENT
Start: 2021-01-11 | End: 2021-01-17 | Stop reason: HOSPADM

## 2021-01-11 RX ORDER — ONDANSETRON 2 MG/ML
4 INJECTION INTRAMUSCULAR; INTRAVENOUS ONCE
Status: COMPLETED | OUTPATIENT
Start: 2021-01-11 | End: 2021-01-11

## 2021-01-11 RX ORDER — NICOTINE POLACRILEX 4 MG
15 LOZENGE BUCCAL
Status: DISCONTINUED | OUTPATIENT
Start: 2021-01-11 | End: 2021-01-12 | Stop reason: SDUPTHER

## 2021-01-11 RX ORDER — DEXTROSE MONOHYDRATE 25 G/50ML
25 INJECTION, SOLUTION INTRAVENOUS
Status: DISCONTINUED | OUTPATIENT
Start: 2021-01-11 | End: 2021-01-12 | Stop reason: SDUPTHER

## 2021-01-11 RX ORDER — ASPIRIN 81 MG/1
81 TABLET ORAL DAILY
Status: DISCONTINUED | OUTPATIENT
Start: 2021-01-12 | End: 2021-01-17 | Stop reason: HOSPADM

## 2021-01-11 RX ORDER — DEXTROSE MONOHYDRATE 50 MG/ML
75 INJECTION, SOLUTION INTRAVENOUS CONTINUOUS
Status: DISCONTINUED | OUTPATIENT
Start: 2021-01-11 | End: 2021-01-12

## 2021-01-11 RX ORDER — FUROSEMIDE 20 MG/1
20 TABLET ORAL EVERY OTHER DAY
COMMUNITY
End: 2021-01-17 | Stop reason: HOSPADM

## 2021-01-11 RX ORDER — ONDANSETRON 2 MG/ML
4 INJECTION INTRAMUSCULAR; INTRAVENOUS EVERY 6 HOURS PRN
Status: DISCONTINUED | OUTPATIENT
Start: 2021-01-11 | End: 2021-01-17 | Stop reason: HOSPADM

## 2021-01-11 RX ORDER — AMITRIPTYLINE HYDROCHLORIDE 25 MG/1
25 TABLET, FILM COATED ORAL NIGHTLY
COMMUNITY
End: 2021-01-17 | Stop reason: HOSPADM

## 2021-01-11 RX ORDER — SODIUM CHLORIDE 0.9 % (FLUSH) 0.9 %
10 SYRINGE (ML) INJECTION EVERY 12 HOURS SCHEDULED
Status: DISCONTINUED | OUTPATIENT
Start: 2021-01-11 | End: 2021-01-17 | Stop reason: HOSPADM

## 2021-01-11 RX ORDER — DEXTROSE MONOHYDRATE 25 G/50ML
25-50 INJECTION, SOLUTION INTRAVENOUS
Status: DISCONTINUED | OUTPATIENT
Start: 2021-01-11 | End: 2021-01-11 | Stop reason: DRUGHIGH

## 2021-01-11 RX ORDER — POTASSIUM CHLORIDE 750 MG/1
10 CAPSULE, EXTENDED RELEASE ORAL DAILY
COMMUNITY
End: 2021-01-17 | Stop reason: HOSPADM

## 2021-01-11 RX ORDER — BUDESONIDE AND FORMOTEROL FUMARATE DIHYDRATE 160; 4.5 UG/1; UG/1
2 AEROSOL RESPIRATORY (INHALATION)
Status: DISCONTINUED | OUTPATIENT
Start: 2021-01-11 | End: 2021-01-17 | Stop reason: HOSPADM

## 2021-01-11 RX ORDER — ALBUTEROL SULFATE 2.5 MG/3ML
2.5 SOLUTION RESPIRATORY (INHALATION) EVERY 4 HOURS PRN
Status: DISCONTINUED | OUTPATIENT
Start: 2021-01-11 | End: 2021-01-12 | Stop reason: SDUPTHER

## 2021-01-11 RX ORDER — SODIUM CHLORIDE 0.9 % (FLUSH) 0.9 %
10 SYRINGE (ML) INJECTION AS NEEDED
Status: DISCONTINUED | OUTPATIENT
Start: 2021-01-11 | End: 2021-01-17 | Stop reason: HOSPADM

## 2021-01-11 RX ADMIN — SODIUM CHLORIDE, PRESERVATIVE FREE 10 ML: 5 INJECTION INTRAVENOUS at 20:51

## 2021-01-11 RX ADMIN — ONDANSETRON HYDROCHLORIDE 4 MG: 2 INJECTION, SOLUTION INTRAMUSCULAR; INTRAVENOUS at 11:14

## 2021-01-11 RX ADMIN — SODIUM BICARBONATE 50 MEQ: 84 INJECTION INTRAVENOUS at 18:14

## 2021-01-11 RX ADMIN — SODIUM CHLORIDE 1000 ML: 9 INJECTION, SOLUTION INTRAVENOUS at 11:14

## 2021-01-11 RX ADMIN — CEFTRIAXONE SODIUM 1 G: 1 INJECTION, POWDER, FOR SOLUTION INTRAMUSCULAR; INTRAVENOUS at 22:22

## 2021-01-11 RX ADMIN — MORPHINE SULFATE 4 MG: 4 INJECTION, SOLUTION INTRAMUSCULAR; INTRAVENOUS at 11:14

## 2021-01-11 RX ADMIN — SODIUM CHLORIDE 4 UNITS/HR: 9 INJECTION, SOLUTION INTRAVENOUS at 13:07

## 2021-01-11 RX ADMIN — DEXTROSE MONOHYDRATE 75 ML/HR: 50 INJECTION, SOLUTION INTRAVENOUS at 22:51

## 2021-01-11 RX ADMIN — SODIUM BICARBONATE: 84 INJECTION, SOLUTION INTRAVENOUS at 22:52

## 2021-01-11 RX ADMIN — SODIUM BICARBONATE 100 MEQ: 84 INJECTION, SOLUTION INTRAVENOUS at 16:32

## 2021-01-11 RX ADMIN — SODIUM CHLORIDE 1 UNITS/HR: 9 INJECTION, SOLUTION INTRAVENOUS at 22:50

## 2021-01-11 RX ADMIN — DEXTROSE MONOHYDRATE 50 ML: 25 INJECTION, SOLUTION INTRAVENOUS at 20:34

## 2021-01-11 NOTE — ED NOTES
[id number], no rate change to insulin drip per protocol.      Claudette Sue RN  01/11/21 1113       Claudette Sue RN  01/11/21 6098

## 2021-01-11 NOTE — H&P
History and Physical  Hu Charles MD  Hospitalist    Date of admission: 1/11/2021    Patient Care Team:  Shayne Merritt MD as PCP - General (Family Medicine)    Chief complaint   Chief Complaint   Patient presents with   • Generalized weakness   • Abdominal pain     Subjective     Patient is a 65 y.o. male admitted for generalized weakness accompanied by abdominal / flank discomfort and out of control glucose values. He has felt poorly for the last several days, did not have much of an appetite, has had trouble urinating and was barely able to get out of bed.    He has longstanding urinary tract and bladder issues related to previous surgeries for cancer and complicated by episodes of outlet obstruction.    History  Past Medical History:   Diagnosis Date   • Adenomatous polyp of colon    • Bladder cancer (CMS/HCC)    • Bladder outlet obstruction    • CAD (coronary artery disease)    • Chronic back pain    • Chronic gastritis    • Chronic hepatitis C (CMS/HCC)    • Chronic obstructive lung disease (CMS/HCC)    • Coronary arteriosclerosis    • Diabetes mellitus (CMS/HCC)    • Diverticular disease of colon    • Drug abuse (CMS/HCC)    • GERD (gastroesophageal reflux disease)    • Hypertension    • Prostate cancer (CMS/HCC)    • Rheumatoid arthritis (CMS/HCC)    • Seizure (CMS/HCC)    • Transient cerebral ischemia      Past Surgical History:   Procedure Laterality Date   • BLADDER TUMOR EXCISION     • CARDIAC CATHETERIZATION N/A 12/15/2017   • CHOLECYSTECTOMY     • COLONOSCOPY N/A 4/22/2019   • ENDOSCOPY N/A 3/3/2017   • ENDOSCOPY N/A 4/22/2019   • LUMBAR DISC SURGERY     • WRIST SURGERY Left      Family History   Problem Relation Age of Onset   • Diabetes Mother    • Liver cancer Father      Social History     Tobacco Use   • Smoking status: Current Every Day Smoker     Packs/day: 3.00     Years: 53.00     Pack years: 159.00     Types: Cigarettes   • Smokeless tobacco: Former User     Types: Chew     Quit  date: 1980   Substance Use Topics   • Alcohol use: Yes   • Drug use: Yes     Frequency: 2.0 times per week     Types: Marijuana     Medications Prior to Admission   Medication Sig Dispense Refill Last Dose   • Accu-Chek Softclix Lancets lancets 1 each by Other route 3 (Three) Times a Day Before Meals. Use as instructed 100 each 12 1/10/2021 at Unknown time   • albuterol (VENTOLIN HFA) 108 (90 Base) MCG/ACT inhaler Inhale 2 puffs Every 6 (Six) Hours As Needed for Wheezing. 18 g 11 Past Week at Unknown time   • Alcohol Swabs (ALCOHOL PREP) pads 1 pad 5 (Five) Times a Day. 150 each 1 1/10/2021 at Unknown time   • amitriptyline (ELAVIL) 25 MG tablet Take 25 mg by mouth Every Night.   Past Week at Unknown time   • aspirin 81 MG EC tablet Take 81 mg by mouth Daily.   Past Week at Unknown time   • Blood Glucose Monitoring Suppl (ACCU-CHEK MARILU) device 1 each by Other route 3 (Three) Times a Day Before Meals. Use as instructed 1 each 0 Past Week at Unknown time   • Blood Glucose Monitoring Suppl (ACURA BLOOD GLUCOSE METER) w/Device kit 1 each 4 (Four) Times a Day As Needed (SUGARR). 1 kit 11 Past Week at Unknown time   • budesonide-formoterol (SYMBICORT) 160-4.5 MCG/ACT inhaler Inhale 2 puffs 2 (Two) Times a Day.   Past Week at Unknown time   • carvedilol (COREG) 3.125 MG tablet TK 1 T PO BID WC  11 Past Week at Unknown time   • clopidogrel (PLAVIX) 75 MG tablet Take 75 mg by mouth Daily.   Past Week at Unknown time   • cyclobenzaprine (FLEXERIL) 10 MG tablet Take 10 mg by mouth 3 (Three) Times a Day As Needed.   Past Week at Unknown time   • furosemide (LASIX) 20 MG tablet Take 20 mg by mouth Every Other Day.   Past Week at Unknown time   • glucose blood (Accu-Chek Marilu) test strip 1 each by Other route As Needed (glucose monitoring). Use as instructed 100 each 12 1/10/2021 at Unknown time   • glucose blood test strip Use as instructed 90 each 12 1/10/2021 at Unknown time   • glucose monitor monitoring kit 1 each 3  (Three) Times a Day. 1 each 1 1/10/2021 at Unknown time   • hydrOXYzine (ATARAX) 25 MG tablet TK 1 T PO TID PRF ITCHING  9 Past Week at Unknown time   • insulin aspart (novoLOG FLEXPEN) 100 UNIT/ML solution pen-injector sc pen Subcut TIDAC for diabetes.Blood sugar =0 unit;150-199=2 u;200-249=3 u;250-299=4 u;300-349=5 u;350-400=6 u;>400=7 u. Dispense whatever insurance covers. 1 pen 3 Past Week at Unknown time   • insulin detemir (Levemir FlexTouch) 100 UNIT/ML injection Inject 20 Units under the skin into the appropriate area as directed Every Night. (Patient taking differently: Inject 25 Units under the skin into the appropriate area as directed Every Night.) 3 pen 3 Past Week at Unknown time   • Lancets (FREESTYLE) lancets Tid 90 each 1 Past Week at Unknown time   • pantoprazole (PROTONIX) 40 MG EC tablet Take 40 mg by mouth Daily.   Past Week at Unknown time   • potassium chloride (MICRO-K) 10 MEQ CR capsule Take 10 mEq by mouth Daily.   Past Week at Unknown time   • sertraline (ZOLOFT) 50 MG tablet 1/2 tablet hs 1 week then 1 tablet . (depression) (Patient taking differently: Take 50 mg by mouth Daily.) 30 tablet 2 Past Week at Unknown time   • traMADol (ULTRAM) 50 MG tablet Take 1 tablet by mouth Every 6 (Six) Hours As Needed for Moderate Pain . 12 tablet 0 Past Week at Unknown time   • albuterol (PROVENTIL) (2.5 MG/3ML) 0.083% nebulizer solution Take 2.5 mg by nebulization Every 4 (Four) Hours As Needed for Wheezing or Shortness of Air. 120 vial 11    • fluticasone (FLONASE) 50 MCG/ACT nasal spray 2 sprays into each nostril Daily. 1 bottle 11 Unknown at Unknown time   • metoclopramide (REGLAN) 5 MG tablet Take 1 tablet by mouth 3 (Three) Times a Day As Needed (Before meals for nausea or vomiting). 30 tablet 0 Unknown at Unknown time   • nitroglycerin (NITROSTAT) 0.4 MG SL tablet Place 0.4 mg under the tongue Every 5 (Five) Minutes As Needed for Chest Pain. Take no more than 3 doses in 15 minutes.    Unknown at Unknown time     Allergies:  Patient has no known allergies.    Review of Systems  Review of Systems   Constitutional: Positive for fatigue.   Respiratory: Negative for cough and wheezing.    Cardiovascular: Negative for chest pain, palpitations and leg swelling.   Gastrointestinal: Positive for abdominal pain. Negative for abdominal distention, anal bleeding, blood in stool, diarrhea and nausea.   Genitourinary: Positive for dysuria and urgency. Negative for frequency.   Musculoskeletal: Positive for arthralgias. Negative for back pain and gait problem.   Skin: Positive for pallor. Negative for color change and rash.   Neurological: Positive for weakness. Negative for syncope, numbness and headaches.   Psychiatric/Behavioral: Negative for agitation, behavioral problems and confusion.   All other systems reviewed and are negative.      Objective     Vital Signs  Temp:  [97.9 °F (36.6 °C)-98 °F (36.7 °C)] 98 °F (36.7 °C)  Heart Rate:  [62-99] 96  Resp:  [18] 18  BP: (104-132)/(63-73) 119/69    Physical Exam:  Physical Exam  Vitals signs reviewed.   Constitutional:       General: He is not in acute distress.     Appearance: He is ill-appearing.   HENT:      Head: Normocephalic and atraumatic.   Eyes:      General: No scleral icterus.     Extraocular Movements: Extraocular movements intact.      Pupils: Pupils are equal, round, and reactive to light.   Neck:      Musculoskeletal: Normal range of motion and neck supple. No neck rigidity or muscular tenderness.   Cardiovascular:      Rate and Rhythm: Normal rate and regular rhythm.   Pulmonary:      Effort: Pulmonary effort is normal. No respiratory distress.      Breath sounds: Normal breath sounds. No stridor. No wheezing, rhonchi or rales.   Abdominal:      General: There is no distension.      Palpations: Abdomen is soft. There is no mass.      Tenderness: There is no abdominal tenderness. There is no guarding.      Hernia: No hernia is present.    Musculoskeletal: Normal range of motion.         General: No swelling or tenderness.   Skin:     General: Skin is warm and dry.      Coloration: Skin is pale.   Neurological:      General: No focal deficit present.      Mental Status: He is alert and oriented to person, place, and time.      Cranial Nerves: No cranial nerve deficit.      Sensory: No sensory deficit.      Motor: No weakness.      Coordination: Coordination normal.   Psychiatric:         Mood and Affect: Mood normal.         Behavior: Behavior normal.         Results Review:   Lab Results (last 24 hours)     Procedure Component Value Units Date/Time    Comprehensive Metabolic Panel [[id number]]  (Abnormal) Collected: 01/11/21 1909    Specimen: Blood Updated: 01/11/21 2004     Glucose 125 mg/dL       mg/dL      Creatinine 4.76 mg/dL      Sodium 125 mmol/L      Potassium 4.3 mmol/L      Comment: Slight hemolysis detected by analyzer. Results may be affected.        Chloride 100 mmol/L      CO2 7.0 mmol/L      Calcium 8.9 mg/dL      Total Protein 6.7 g/dL      Albumin 3.30 g/dL      ALT (SGPT) 7 U/L      AST (SGOT) 11 U/L      Comment: Slight hemolysis detected by analyzer. Results may be affected.        Alkaline Phosphatase 123 U/L      Total Bilirubin 0.2 mg/dL      eGFR Non African Amer 12 mL/min/1.73      Comment: <15 Indicative of kidney failure.        eGFR   Amer --     Comment: <15 Indicative of kidney failure.        Globulin 3.4 gm/dL      A/G Ratio 1.0 g/dL      BUN/Creatinine Ratio 28.2     Anion Gap 18.0 mmol/L     Narrative:      GFR Normal >60  Chronic Kidney Disease <60  Kidney Failure <15      Phosphorus [[id number]]  (Abnormal) Collected: 01/11/21 1909    Specimen: Blood Updated: 01/11/21 1958     Phosphorus 5.0 mg/dL     POC Glucose Once [[id number]]  (Abnormal) Collected: 01/11/21 1816    Specimen: Blood Updated: 01/11/21 1835     Glucose 179 mg/dL      Comment: RN NotifiedOperator: [id number] JEFFERY Villarreal ID:  [id number]       POC Glucose Once [[id number]]  (Abnormal) Collected: 01/11/21 1715    Specimen: Blood Updated: 01/11/21 1835     Glucose 236 mg/dL      Comment: RN NotifiedOperator: [id number] JEFFERY Villarreal ID: [id number]       POC Glucose Once [[id number]]  (Abnormal) Collected: 01/11/21 1634    Specimen: Blood Updated: 01/11/21 1835     Glucose 321 mg/dL      Comment: RN NotifiedOperator: [id number] JEFFERY MENDOSAMeter ID: [id number]       Urinalysis, Microscopic Only - Urine, Clean Catch [[id number]]  (Abnormal) Collected: 01/11/21 1723    Specimen: Urine, Clean Catch Updated: 01/11/21 1752     RBC, UA 13-20 /HPF      WBC, UA Too Numerous to Count /HPF      Bacteria, UA 4+ /HPF      Squamous Epithelial Cells, UA 3-5 /HPF      Hyaline Casts, UA 31-50 /LPF      Mucus, UA Moderate/2+ /HPF      Methodology Manual Light Microscopy    Urinalysis With Microscopic If Indicated (No Culture) - [[id number]]  (Abnormal) Collected: 01/11/21 1723    Specimen: Urine Updated: 01/11/21 1739     Color, UA Yellow     Appearance, UA Turbid     pH, UA 6.5     Specific Gravity, UA 1.009     Glucose, UA Negative     Ketones, UA Negative     Bilirubin, UA Negative     Blood, UA Small (1+)     Protein,  mg/dL (2+)     Leuk Esterase, UA Large (3+)     Nitrite, UA Negative     Urobilinogen, UA 1.0 E.U./dL    Urine Eosinophils, Mitchel's Stain - [[id number]] Collected: 01/11/21 1723    Specimen: Urine Updated: 01/11/21 1724    Urinalysis, Microscopic Only - Urine, Catheter In/Out [[id number]]  (Abnormal) Collected: 01/11/21 1510    Specimen: Urine, Catheter In/Out Updated: 01/11/21 1651     RBC, UA 6-12 /HPF      WBC, UA Too Numerous to Count /HPF      Bacteria, UA 4+ /HPF      Squamous Epithelial Cells, UA 6-12 /HPF      Hyaline Casts, UA       Unable to determine due to loaded field     /LPF     Methodology Manual Light Microscopy    Urine Culture - Urine, Urine, Catheter In/Out [[id number]] Collected: 01/11/21 1510    Specimen: Urine,  Catheter In/Out Updated: 01/11/21 1651    Basic Metabolic Panel [[id number]]  (Abnormal) Collected: 01/11/21 1550    Specimen: Blood Updated: 01/11/21 1620     Glucose 329 mg/dL       mg/dL      Creatinine 4.74 mg/dL      Sodium 117 mmol/L      Potassium 5.4 mmol/L      Comment: Slight hemolysis detected by analyzer. Results may be affected.        Chloride 96 mmol/L      CO2 6.0 mmol/L      Calcium 9.0 mg/dL      eGFR   Amer --     Comment: <15 Indicative of kidney failure.        eGFR Non African Amer 13 mL/min/1.73      Comment: <15 Indicative of kidney failure.        BUN/Creatinine Ratio 27.8     Anion Gap 15.0 mmol/L     Narrative:      GFR Normal >60  Chronic Kidney Disease <60  Kidney Failure <15      Sodium, Urine, Random - Urine, Catheter In/Out [[id number]] Collected: 01/11/21 1510    Specimen: Urine, Catheter In/Out Updated: 01/11/21 1620     Sodium, Urine 40 mmol/L     Narrative:      Reference intervals for random urine have not been established.  Clinical usage is dependent upon physician's interpretation in combination with other laboratory tests.       Protein / Creatinine Ratio, Urine - Urine, Catheter In/Out [[id number]] Collected: 01/11/21 1510    Specimen: Urine, Catheter In/Out Updated: 01/11/21 1614    Creatinine, Urine, Random - Urine, Catheter In/Out [[id number]] Collected: 01/11/21 1510    Specimen: Urine, Catheter In/Out Updated: 01/11/21 1614    Urinalysis With Culture If Indicated - Urine, Catheter In/Out [[id number]]  (Abnormal) Collected: 01/11/21 1510    Specimen: Urine, Catheter In/Out Updated: 01/11/21 1545     Color, UA Yellow     Appearance, UA Cloudy     pH, UA 6.5     Specific Gravity, UA 1.013     Glucose, UA Negative     Ketones, UA Negative     Bilirubin, UA Small (1+)     Blood, UA Small (1+)     Protein,  mg/dL (2+)     Leuk Esterase, UA Moderate (2+)     Nitrite, UA Negative     Urobilinogen, UA 1.0 E.U./dL    POC Glucose Once [[id number]]  (Abnormal)  Collected: 01/11/21 1526    Specimen: Blood Updated: 01/11/21 1542     Glucose 334 mg/dL      Comment: RN NotifiedOperator: [id number] JOSE ALEJANDRO MIRANDAMeter ID: [id number]       Blood Culture - Blood, Hand, Right [[id number]] Collected: 01/11/21 1501    Specimen: Blood from Hand, Right Updated: 01/11/21 1505    POC Glucose Once [[id number]]  (Abnormal) Collected: 01/11/21 1426    Specimen: Blood Updated: 01/11/21 1442     Glucose 360 mg/dL      Comment: RN NotifiedOperator: [id number] JEFFERY LAURENMeter ID: [id number]       COVID-19 and FLU A/B PCR - Swab, Nasopharynx [[id number]]  (Normal) Collected: 01/11/21 1305    Specimen: Swab from Nasopharynx Updated: 01/11/21 1332     COVID19 Not Detected     Influenza A PCR Not Detected     Influenza B PCR Not Detected    Narrative:      Fact sheet for providers: https://www.fda.gov/media/633857/download    Fact sheet for patients: https://www.fda.gov/media/717217/download    Test performed by PCR.    Blood Culture - Blood, Arm, Right [[id number]] Collected: 01/11/21 1320    Specimen: Blood from Arm, Right Updated: 01/11/21 1325    pH, Venous [[id number]]  (Abnormal) Collected: 01/11/21 1306    Specimen: Blood Updated: 01/11/21 1309     pH, Venous 6.957 pH Units     Ketone Bodies, Serum (Not performed at [place]) [[id number]]  (Normal) Collected: 01/11/21 1113    Specimen: Blood Updated: 01/11/21 1152    Narrative:      The following orders were created for panel order Ketone Bodies, Serum (Not performed at [place]).  Procedure                               Abnormality         Status                     ---------                               -----------         ------                     Acetone[[id number]]                      Normal              Final result                 Please view results for these tests on the individual orders.    Acetone [[id number]]  (Normal) Collected: 01/11/21 1113    Specimen: Blood Updated: 01/11/21 1152     Acetone Negative    Lactic Acid, Plasma  [[id number]]  (Normal) Collected: 01/11/21 1113    Specimen: Blood Updated: 01/11/21 1150     Lactate 0.6 mmol/L     [place] Draw [[id number]] Collected: 01/11/21 1045    Specimen: Blood Updated: 01/11/21 1147    Narrative:      The following orders were created for panel order [facility] Draw.  Procedure                               Abnormality         Status                     ---------                               -----------         ------                     Light Blue Top[[id number]]                                   Final result               Green Top (Gel)[[id number]]                                  Final result               Lavender Top[[id number]]                                     Final result               Gold Top - SST[[id number]]                                   Final result                 Please view results for these tests on the individual orders.    Light Blue Top [[id number]] Collected: 01/11/21 1045    Specimen: Blood Updated: 01/11/21 1147     Extra Tube hold for add-on     Comment: Auto resulted       Green Top (Gel) [[id number]] Collected: 01/11/21 1045    Specimen: Blood Updated: 01/11/21 1147     Extra Tube Hold for add-ons.     Comment: Auto resulted.       Lavender Top [[id number]] Collected: 01/11/21 1045    Specimen: Blood Updated: 01/11/21 1147     Extra Tube hold for add-on     Comment: Auto resulted       Gold Top - SST [[id number]] Collected: 01/11/21 1045    Specimen: Blood Updated: 01/11/21 1147     Extra Tube Hold for add-ons.     Comment: Auto resulted.       Comprehensive Metabolic Panel [[id number]]  (Abnormal) Collected: 01/11/21 1045    Specimen: Blood Updated: 01/11/21 1136     Glucose 419 mg/dL       mg/dL      Creatinine 5.36 mg/dL      Sodium 114 mmol/L      Potassium 6.0 mmol/L      Comment: Slight hemolysis detected by analyzer. Results may be affected.        Chloride 89 mmol/L      CO2 8.0 mmol/L      Calcium 9.0 mg/dL      Total Protein 7.7 g/dL      Albumin 3.80  g/dL      ALT (SGPT) 9 U/L      AST (SGOT) 7 U/L      Alkaline Phosphatase 148 U/L      Total Bilirubin 0.3 mg/dL      eGFR Non African Amer 11 mL/min/1.73      Comment: <15 Indicative of kidney failure.        eGFR   Amer --     Comment: <15 Indicative of kidney failure.        Globulin 3.9 gm/dL      A/G Ratio 1.0 g/dL      BUN/Creatinine Ratio 25.0     Anion Gap 17.0 mmol/L     Narrative:      GFR Normal >60  Chronic Kidney Disease <60  Kidney Failure <15      Lipase [[id number]]  (Abnormal) Collected: 01/11/21 1045    Specimen: Blood Updated: 01/11/21 1119     Lipase 83 U/L     Magnesium [[id number]]  (Abnormal) Collected: 01/11/21 1045    Specimen: Blood Updated: 01/11/21 1119     Magnesium 2.6 mg/dL     Troponin [[id number]]  (Normal) Collected: 01/11/21 1045    Specimen: Blood Updated: 01/11/21 1118     Troponin T <0.010 ng/mL     Narrative:      Troponin T Reference Range:  <= 0.03 ng/mL-   Negative for AMI  >0.03 ng/mL-     Abnormal for myocardial necrosis.  Clinicians would have to utilize clinical acumen, EKG, Troponin and serial changes to determine if it is an Acute Myocardial Infarction or myocardial injury due to an underlying chronic condition.       Results may be falsely decreased if patient taking Biotin.      CBC & Differential [[id number]]  (Abnormal) Collected: 01/11/21 1045    Specimen: Blood Updated: 01/11/21 1101    Narrative:      The following orders were created for panel order CBC & Differential.  Procedure                               Abnormality         Status                     ---------                               -----------         ------                     CBC Auto Differential[[id number]]        Abnormal            Final result                 Please view results for these tests on the individual orders.    CBC Auto Differential [[id number]]  (Abnormal) Collected: 01/11/21 1045    Specimen: Blood Updated: 01/11/21 1101     WBC 19.77 10*3/mm3      RBC 5.24 10*6/mm3       Hemoglobin 14.8 g/dL      Hematocrit 42.7 %      MCV 81.5 fL      MCH 28.2 pg      MCHC 34.7 g/dL      RDW 13.6 %      RDW-SD 40.0 fl      MPV 10.1 fL      Platelets 453 10*3/mm3      Neutrophil % 94.4 %      Lymphocyte % 2.6 %      Monocyte % 2.2 %      Eosinophil % 0.0 %      Basophil % 0.1 %      Immature Grans % 0.7 %      Neutrophils, Absolute 18.66 10*3/mm3      Lymphocytes, Absolute 0.51 10*3/mm3      Monocytes, Absolute 0.44 10*3/mm3      Eosinophils, Absolute 0.00 10*3/mm3      Basophils, Absolute 0.02 10*3/mm3      Immature Grans, Absolute 0.14 10*3/mm3      nRBC 0.4 /100 WBC           Imaging Results (Last 24 Hours)     Procedure Component Value Units Date/Time    CT Abdomen Pelvis Without Contrast [[id number]] Collected: 01/11/21 1129     Updated: 01/11/21 1222    Narrative:        Patient Name: MR. MARTINA OWENS    ORDERING: AMADO OWEN     ATTENDING: AMADO OWEN     REFERRING: AMADO OWEN    -----------------------  EXAM DESCRIPTION: CT ABDOMEN PELVIS WO CONTRAST    CLINICAL HISTORY: right flank pain    COMPARISON: 11/16/2019    Dose Length Product: 301.6.    This exam was performed according to our departmental dose  optimization program, which includes automated exposure control,  adjustment of the mA and/or KV according to patient size and/or  use of iterative reconstruction technique.       TECHNIQUE: Multiple contiguous noncontrast axial images are  obtained of the abdomen and pelvis with coronal and sagittal  reformat images provided.     FINDINGS:     LOWER CHEST: No pulmonary consolidation or pleural effusion.  There is redemonstration of scarring or atelectasis within the  left posterior lung bases and calcified granuloma. Within the  lingula at the lung base (series 2 image 13) there is a new 7.3  mm noncalcified pulmonary nodule present. There is an additional  4.5 mm noncalcified nodular density right anterior lower lobe  abutting the fissure that is indeterminate but could represent  a  fissural lymph node.  No cardiac enlargement or pericardial effusion.    HEPATOBILIARY: Allowing for the lack of intravenous contrast no  suspicious hepatic lesion or ductal dilation. Cholecystectomy  changes.  SPLEEN: Calcified granulomata, otherwise unremarkable noncontrast  appearance.  PANCREAS: Unremarkable noncontrast appearance.  ADRENAL GLANDS: Unremarkable.  KIDNEYS/URETERS: Allowing for the lack of intravenous contrast no  suspicious renal mass. There is no intrarenal or ureteral  calcifications identified. There is very mild asymmetrical fluid  distention of the right renal pelvis by comparison to the left.  No findings confident for obstructive uropathy. No perinephric  fat stranding or fluid collection identified.    GASTROINTESTINAL: There is a small hiatal hernia. No findings of  bowel obstruction or acute inflammatory changes of bowel.  Redemonstration of surgical suture line within the right anterior  pelvic bowel.  REPRODUCTIVE ORGANS: Prostatectomy.  URINARY BLADDER: Redemonstrated postsurgical changes of  cystectomy with neobladder formation.    VASCULAR: Vascular calcification without abdominal aortic  aneurysm.  LYMPH NODES: No pathologically enlarged nodes by size criteria.  PERITONEUM/RETROPERITONEUM: No free air or suspicious fluid  collections..     OSSEOUS STRUCTURES: Mild degenerative changes within the spine.  No acute finding.    ADDITIONAL FINDINGS: None      Impression:      Redemonstrated postsurgical changes of prostatectomy and  cystectomy with neobladder formation. There is very mild  pelvicalyceal fluid distention right greater than left without  obstructing lesion identified. No perinephric fat stranding or  fluid collection.    7.3 mm noncalcified pulmonary nodule lingula at the lung base  representing a new finding which follow-up is recommended.  Additional new 4.5 mm noncalcified nodule right anterior lower  lobe associated with fissure possibly representing a  fissural  node. See below recommendations.    Follow-up according to the Fleischner Society criteria (see  below) is recommended.      Recommendations for follow-up of small nodules in low risk  patients detected incidentally on non-screening CT examinations  (based upon The Fleischner Society recommendations):    Nodule Size        Recommendations for Low-risk Patient    Less than or equal to 4 mm    No follow-up required    >4-6 mm          Follow-up CT in 12 months. If nodule is              stable- no further follow-up is required.    >6-8 mm          Initial follow-up in 6 months, then 12 months              and 24 months if no change.    Over 8 mm          Follow-up at 3, 6, 12 and 24 months. PET              if over 1 cm.    Patients with clinical risk factors for lung cancer (i.e.  smokers), or a history of malignancy require more careful  evaluation of small nodules with a repeat CT at 3, 6, 12 and 24  months. Some nodules may require more aggressive management.    Radiology 2005; Lamontehojanine H, et al. Guidelines for management of  small pulmonary nodules detected on CT scans: a statement from  the [employer]. 237: 395-400.    Electronically signed by:  Shreyas Patel MD  1/11/2021 12:21 PM  CST Workstation: [id number]          Assessment/Plan       Uncontrolled diabetes mellitus (CMS/HCC)    Acute kidney failure (CMS/HCC)    UTI (urinary tract infection)    Bladder cancer (CMS/HCC)    Lung nodule    Continue with aggressive IV hydration, start IV Insulin, closely monitor electrolytes, serum glucose values and renal function. Obtain urine culture and start IV Rocephin for the urinary tract infection.    Hu Charles MD  01/11/21  21:23 CST

## 2021-01-11 NOTE — ED PROVIDER NOTES
Subjective   65 years old male with a history of hypertension, GERD, diabetes mellitus, seizure disorder presented in the ER with chief complaint of right flank pain since yesterday along with 2-3 episodes of nonprojectile, nonbilious vomiting with no hematemesis.  Patient described this as a sharp shooting pain which is aggravated with palpation and movements and unable to find a comfortable spot.  Denies any difficulty urination or hematuria.  Denies fever or chills.  Patient is on insulin for his diabetes mellitus and glucometer was reading high.      History provided by:  Patient      Review of Systems   Constitutional: Negative for chills and fever.   HENT: Negative for congestion, postnasal drip, rhinorrhea, sinus pressure, sinus pain and tinnitus.    Respiratory: Negative for chest tightness and shortness of breath.    Cardiovascular: Negative for chest pain.   Gastrointestinal: Positive for abdominal pain, nausea and vomiting.   Genitourinary: Negative for difficulty urinating and hematuria.   Skin: Negative for color change.   Neurological: Negative for headaches.   Psychiatric/Behavioral: Negative for agitation.       Past Medical History:   Diagnosis Date   • Adenomatous polyp of colon    • Bladder cancer (CMS/HCC)    • Bladder outlet obstruction    • CAD (coronary artery disease)    • Chronic back pain    • Chronic gastritis    • Chronic hepatitis C (CMS/HCC)    • Chronic obstructive lung disease (CMS/HCC)    • Coronary arteriosclerosis    • Diabetes mellitus (CMS/HCC)    • Diverticular disease of colon    • Drug abuse (CMS/HCC)    • GERD (gastroesophageal reflux disease)    • Hypertension    • Prostate cancer (CMS/HCC)    • Rheumatoid arthritis (CMS/HCC)    • Seizure (CMS/HCC)    • Transient cerebral ischemia        No Known Allergies    Past Surgical History:   Procedure Laterality Date   • BLADDER TUMOR EXCISION     • CARDIAC CATHETERIZATION N/A 12/15/2017   • CHOLECYSTECTOMY     • COLONOSCOPY N/A  4/22/2019   • ENDOSCOPY N/A 3/3/2017   • ENDOSCOPY N/A 4/22/2019   • LUMBAR DISC SURGERY     • WRIST SURGERY Left        Family History   Problem Relation Age of Onset   • Diabetes Mother    • Liver cancer Father        Social History     Socioeconomic History   • Marital status: Single     Spouse name: Not on file   • Number of children: Not on file   • Years of education: Not on file   • Highest education level: Not on file   Tobacco Use   • Smoking status: Current Every Day Smoker     Packs/day: 3.00     Years: 53.00     Pack years: 159.00     Types: Cigarettes   • Smokeless tobacco: Former User     Types: Chew     Quit date: 1980   Substance and Sexual Activity   • Alcohol use: Yes   • Drug use: Yes     Frequency: 2.0 times per week     Types: Marijuana   • Sexual activity: Not Currently   Social History Narrative    ** Merged History Encounter **         Patient states that he just recently stopped smoking.     Was smoking 3 ppd prior to this.            Objective   Physical Exam  Vitals signs and nursing note reviewed.   Constitutional:       Appearance: Normal appearance.   HENT:      Head: Normocephalic and atraumatic.      Right Ear: External ear normal.      Left Ear: External ear normal.      Nose: Nose normal.      Mouth/Throat:      Mouth: Mucous membranes are moist.   Eyes:      Extraocular Movements: Extraocular movements intact.      Conjunctiva/sclera: Conjunctivae normal.      Pupils: Pupils are equal, round, and reactive to light.   Neck:      Musculoskeletal: Normal range of motion and neck supple.   Cardiovascular:      Rate and Rhythm: Normal rate and regular rhythm.   Abdominal:      General: Abdomen is flat.      Palpations: Abdomen is soft.      Tenderness: There is abdominal tenderness. There is right CVA tenderness and guarding.       Musculoskeletal: Normal range of motion.   Skin:     General: Skin is warm and dry.      Capillary Refill: Capillary refill takes less than 2 seconds.    Neurological:      General: No focal deficit present.      Mental Status: He is alert and oriented to person, place, and time.   Psychiatric:         Mood and Affect: Mood normal.         Procedures           ED Course                                           MDM  Number of Diagnoses or Management Options  Hyponatremia:   Diagnosis management comments: 65 years old is evaluated for right flank pain with vomiting and elevated blood sugar.  His blood sugar is in low 400s, given fluid bolus.  Work-up showed white count of 19 with normal lactate.  Patient has acute renal failure with a baseline creatinine around 1.3 and today is 5.3, BUN of 134 and potassium of 6.  Started on insulin drip.  He has a bicarb of 8 and anion gap of 17 with negative ketones.  Venous pH is pending.  I believe patient is dehydrated/volume contracted, has a sodium of 114, discussed with Dr. Brody, patient would be started on hypotonic fluid with bicarb by nephrology.  I have obtained CT abdomen pelvis without contrast which did not show any signs of pyelonephritis, obstructive stone or any other acute intra-abdominal pathology.  Cultures are obtained.  We will not start him on any antibiotics for now as we do not have any source of this elevation in white count and it could be reactive from hyperglycemia.  Discussed with Dr. Charles and patient is admitted.       Amount and/or Complexity of Data Reviewed  Clinical lab tests: ordered and reviewed  Tests in the radiology section of CPT®: ordered and reviewed  Discuss the patient with other providers: yes        Final diagnoses:   Hyponatremia   Acute renal failure, unspecified acute renal failure type (CMS/HCC)   Uncontrolled type 2 diabetes mellitus with hyperglycemia (CMS/HCC)   Hyperkalemia   Leukocytosis, unspecified type            Jose Eduardo Levy MD  01/23/21 3988

## 2021-01-11 NOTE — CONSULTS
[facility]  [address] [place] [address]. [address]   - [phone]    [phone]     Consultation         PATIENT  DEMOGRAPHICS   PATIENT NAME: Favio Wing Vinny                      PHYSICIAN: TIFFANIE Franco  : 1955  MRN: [id number]    Subjective   SUBJECTIVE   Referring Provider: Dr. Levy  Reason for Consultation: Acute kidney injury  History of present illness: This is a 65-year-old male with a past medical history significant for diabetes mellitus, CAD, bladder cancer status post neobladder formation, prostate cancer, hypertension, rheumatoid arthritis, seizure disorder who presented to the hospital today.  He reports a 2-day history of feeling unwell as well as having nausea and vomiting.  On evaluation he was found to have significant acute kidney injury, hyponatremia, hyperkalemia, hyperglycemia, metabolic acidosis and was admitted for further evaluation and management.  Nephrology has been consulted to help manage acute kidney injury as well as metabolic derangement.    Past Medical History:   Diagnosis Date   • Adenomatous polyp of colon    • Bladder cancer (CMS/HCC)    • CAD (coronary artery disease)    • Chronic back pain    • Chronic gastritis    • Chronic hepatitis C (CMS/HCC)    • Chronic obstructive lung disease (CMS/HCC)    • Coronary arteriosclerosis    • Diabetes mellitus (CMS/HCC)    • Diverticular disease of colon    • Drug abuse (CMS/HCC)    • GERD (gastroesophageal reflux disease)    • Hypertension    • Prostate cancer (CMS/HCC)    • Rheumatoid arthritis (CMS/HCC)    • Seizure (CMS/HCC)    • Transient cerebral ischemia      Past Surgical History:   Procedure Laterality Date   • BLADDER TUMOR EXCISION     • CARDIAC CATHETERIZATION N/A 12/15/2017   • CHOLECYSTECTOMY     • COLONOSCOPY N/A 2019   • ENDOSCOPY N/A 3/3/2017   • ENDOSCOPY N/A 2019   • LUMBAR DISC SURGERY     • WRIST SURGERY Left      Family History   Problem Relation Age of Onset  "  • Diabetes Mother    • Liver cancer Father      Social History     Tobacco Use   • Smoking status: Current Every Day Smoker     Packs/day: 3.00     Years: 53.00     Pack years: 159.00     Types: Cigarettes   • Smokeless tobacco: Former User     Types: Chew     Quit date: 1980   Substance Use Topics   • Alcohol use: Yes   • Drug use: Yes     Frequency: 2.0 times per week     Types: Marijuana     Allergies:  Patient has no known allergies.     REVIEW OF SYSTEMS    Review of Systems   Constitutional: Positive for fatigue.   Gastrointestinal: Positive for nausea and vomiting.   All other systems reviewed and are negative.      Objective   OBJECTIVE   Vital Signs  Temp:  [97.9 °F (36.6 °C)] 97.9 °F (36.6 °C)  Heart Rate:  [86-89] 88  Resp:  [18] 18  BP: (124-132)/(64-73) 124/64    Flowsheet Rows      First Filed Value   Admission Height  165.1 cm (65\") Documented at 01/11/2021 1041   Admission Weight  62.1 kg (137 lb) Documented at 01/11/2021 1041             No intake/output data recorded.    PHYSICAL EXAM    Physical Exam  Constitutional:       Appearance: He is well-developed. He is ill-appearing.   HENT:      Head: Normocephalic and atraumatic.   Eyes:      Conjunctiva/sclera: Conjunctivae normal.      Pupils: Pupils are equal, round, and reactive to light.   Neck:      Musculoskeletal: Neck supple.   Cardiovascular:      Rate and Rhythm: Normal rate and regular rhythm.   Pulmonary:      Effort: Pulmonary effort is normal.      Breath sounds: Normal breath sounds.   Abdominal:      Palpations: Abdomen is soft.   Musculoskeletal:      Right lower leg: No edema.      Left lower leg: No edema.   Skin:     General: Skin is warm and dry.   Neurological:      Mental Status: He is alert and oriented to person, place, and time.   Psychiatric:         Mood and Affect: Mood normal.         Behavior: Behavior normal.         RESULTS   Results Review:    Results from last 7 days   Lab Units 01/11/21  1045   SODIUM mmol/L 114* "   POTASSIUM mmol/L 6.0*   CHLORIDE mmol/L 89*   CO2 mmol/L 8.0*   BUN mg/dL 134*   CREATININE mg/dL 5.36*   CALCIUM mg/dL 9.0   BILIRUBIN mg/dL 0.3   ALK PHOS U/L 148*   ALT (SGPT) U/L 9   AST (SGOT) U/L 7   GLUCOSE mg/dL 419*       Estimated Creatinine Clearance: 12.4 mL/min (A) (by C-G formula based on SCr of 5.36 mg/dL (H)).    Results from last 7 days   Lab Units 01/11/21  1045   MAGNESIUM mg/dL 2.6*             Results from last 7 days   Lab Units 01/11/21  1045   WBC 10*3/mm3 19.77*   HEMOGLOBIN g/dL 14.8   PLATELETS 10*3/mm3 453*              MEDICATIONS       insulin, 6 Units/hr, Last Rate: 12 Units/hr (01/11/21 1526)  sodium bicarbonate drip (greater than 75 mEq/bag), 100 mEq      (Not in a hospital admission)    Assessment/Plan   ASSESSMENT / PLAN      Uncontrolled diabetes mellitus (CMS/[facility])    Acute kidney failure (CMS/[facility])    Hyponatremia    1.  SUJATHA on CKD 3- baseline creatinine around 2, creatinine up to 5.36 now.  He has a prior history of bladder outlet obstruction and multiple acute kidney injuries as well as hydronephrosis in the past.  CT revealed mild asymmetrical fluid distention in the right renal pelvis in comparison to the left.  We will place John catheter and start IV fluids with sodium bicarbonate.  Patient has a history of multiple urinary obstruction from mucus and sediment causing hydronephrosis in the past. If cr not improving may need Urology evaluation Check urine studies.    2.  Severe metabolic acidosis- bicarb drip as above    3.  Hyponatremia- corrected sodium 122, repeat BMP as patient has already received bolus prior to further dosing     4.  History of bladder cancer- requiring neobladder surgery almost 10 years ago, also history of prostate cancer    5.  History of CAD    6.  History of diabetes mellitus- now with acidosis, hyperglycemia, anion gap suspicious for early DKA    7.  Hyperkalemia- bicarb drip as above      Thank you for the consult, we will continue to follow  the patient.         I discussed the patients findings and my recommendations with patient and family      This document has been electronically signed by TIFFANIE Franco on January 11, 2021 15:37 CST      For this patient encounter, I have reviewed the Nurse Practitioner's documentation, medical decision making, and treatment plan.

## 2021-01-12 PROBLEM — A41.9 SEPSIS: Status: ACTIVE | Noted: 2021-01-12

## 2021-01-12 LAB
ANION GAP SERPL CALCULATED.3IONS-SCNC: 12 MMOL/L (ref 5–15)
ANION GAP SERPL CALCULATED.3IONS-SCNC: 12 MMOL/L (ref 5–15)
BUN SERPL-MCNC: 131 MG/DL (ref 8–23)
BUN SERPL-MCNC: 138 MG/DL (ref 8–23)
BUN/CREAT SERPL: 27.8 (ref 7–25)
BUN/CREAT SERPL: 30.8 (ref 7–25)
CALCIUM SPEC-SCNC: 8 MG/DL (ref 8.6–10.5)
CALCIUM SPEC-SCNC: 8.1 MG/DL (ref 8.6–10.5)
CHLORIDE SERPL-SCNC: 94 MMOL/L (ref 98–107)
CHLORIDE SERPL-SCNC: 98 MMOL/L (ref 98–107)
CO2 SERPL-SCNC: 10 MMOL/L (ref 22–29)
CO2 SERPL-SCNC: 12 MMOL/L (ref 22–29)
CREAT SERPL-MCNC: 4.48 MG/DL (ref 0.76–1.27)
CREAT SERPL-MCNC: 4.72 MG/DL (ref 0.76–1.27)
CREAT UR-MCNC: 79.3 MG/DL
CREAT UR-MCNC: 79.3 MG/DL
GFR SERPL CREATININE-BSD FRML MDRD: 13 ML/MIN/1.73
GFR SERPL CREATININE-BSD FRML MDRD: 13 ML/MIN/1.73
GFR SERPL CREATININE-BSD FRML MDRD: ABNORMAL ML/MIN/{1.73_M2}
GFR SERPL CREATININE-BSD FRML MDRD: ABNORMAL ML/MIN/{1.73_M2}
GLUCOSE BLDC GLUCOMTR-MCNC: 117 MG/DL (ref 70–130)
GLUCOSE BLDC GLUCOMTR-MCNC: 133 MG/DL (ref 70–130)
GLUCOSE BLDC GLUCOMTR-MCNC: 136 MG/DL (ref 70–130)
GLUCOSE BLDC GLUCOMTR-MCNC: 145 MG/DL (ref 70–130)
GLUCOSE BLDC GLUCOMTR-MCNC: 159 MG/DL (ref 70–130)
GLUCOSE BLDC GLUCOMTR-MCNC: 176 MG/DL (ref 70–130)
GLUCOSE BLDC GLUCOMTR-MCNC: 179 MG/DL (ref 70–130)
GLUCOSE BLDC GLUCOMTR-MCNC: 194 MG/DL (ref 70–130)
GLUCOSE BLDC GLUCOMTR-MCNC: 200 MG/DL (ref 70–130)
GLUCOSE BLDC GLUCOMTR-MCNC: 209 MG/DL (ref 70–130)
GLUCOSE BLDC GLUCOMTR-MCNC: 302 MG/DL (ref 70–130)
GLUCOSE SERPL-MCNC: 172 MG/DL (ref 65–99)
GLUCOSE SERPL-MCNC: 182 MG/DL (ref 65–99)
MAGNESIUM SERPL-MCNC: 2.1 MG/DL (ref 1.6–2.4)
PHOSPHATE SERPL-MCNC: 4.5 MG/DL (ref 2.5–4.5)
POTASSIUM SERPL-SCNC: 3.5 MMOL/L (ref 3.5–5.2)
POTASSIUM SERPL-SCNC: 3.7 MMOL/L (ref 3.5–5.2)
PROT UR-MCNC: 116 MG/DL
PROT/CREAT UR: 1462.8 MG/G CREA (ref 0–200)
SODIUM SERPL-SCNC: 118 MMOL/L (ref 136–145)
SODIUM SERPL-SCNC: 120 MMOL/L (ref 136–145)
WHOLE BLOOD HOLD SPECIMEN: NORMAL

## 2021-01-12 PROCEDURE — 63710000001 INSULIN ASPART PER 5 UNITS: Performed by: INTERNAL MEDICINE

## 2021-01-12 PROCEDURE — 94799 UNLISTED PULMONARY SVC/PX: CPT

## 2021-01-12 PROCEDURE — 82962 GLUCOSE BLOOD TEST: CPT

## 2021-01-12 PROCEDURE — 25010000002 CEFTRIAXONE PER 250 MG: Performed by: INTERNAL MEDICINE

## 2021-01-12 PROCEDURE — 80048 BASIC METABOLIC PNL TOTAL CA: CPT | Performed by: INTERNAL MEDICINE

## 2021-01-12 PROCEDURE — 63710000001 INSULIN DETEMIR PER 5 UNITS: Performed by: INTERNAL MEDICINE

## 2021-01-12 PROCEDURE — 94640 AIRWAY INHALATION TREATMENT: CPT

## 2021-01-12 PROCEDURE — 84100 ASSAY OF PHOSPHORUS: CPT | Performed by: INTERNAL MEDICINE

## 2021-01-12 PROCEDURE — 83735 ASSAY OF MAGNESIUM: CPT | Performed by: INTERNAL MEDICINE

## 2021-01-12 RX ORDER — NICOTINE POLACRILEX 4 MG
15 LOZENGE BUCCAL
Status: DISCONTINUED | OUTPATIENT
Start: 2021-01-12 | End: 2021-01-17 | Stop reason: HOSPADM

## 2021-01-12 RX ORDER — DEXTROSE MONOHYDRATE 25 G/50ML
25 INJECTION, SOLUTION INTRAVENOUS
Status: DISCONTINUED | OUTPATIENT
Start: 2021-01-12 | End: 2021-01-17 | Stop reason: HOSPADM

## 2021-01-12 RX ADMIN — SODIUM CHLORIDE, PRESERVATIVE FREE 10 ML: 5 INJECTION INTRAVENOUS at 20:29

## 2021-01-12 RX ADMIN — SODIUM CHLORIDE, PRESERVATIVE FREE 10 ML: 5 INJECTION INTRAVENOUS at 11:34

## 2021-01-12 RX ADMIN — BUDESONIDE AND FORMOTEROL FUMARATE DIHYDRATE 2 PUFF: 160; 4.5 AEROSOL RESPIRATORY (INHALATION) at 06:46

## 2021-01-12 RX ADMIN — INSULIN DETEMIR 25 UNITS: 100 INJECTION, SOLUTION SUBCUTANEOUS at 20:34

## 2021-01-12 RX ADMIN — CEFTRIAXONE 2 G: 2 INJECTION, POWDER, FOR SOLUTION INTRAMUSCULAR; INTRAVENOUS at 20:29

## 2021-01-12 RX ADMIN — INSULIN ASPART 3 UNITS: 100 INJECTION, SOLUTION INTRAVENOUS; SUBCUTANEOUS at 17:03

## 2021-01-12 RX ADMIN — SODIUM BICARBONATE 150 MEQ: 84 INJECTION, SOLUTION INTRAVENOUS at 22:49

## 2021-01-12 RX ADMIN — SERTRALINE HYDROCHLORIDE 50 MG: 50 TABLET ORAL at 09:33

## 2021-01-12 RX ADMIN — BUDESONIDE AND FORMOTEROL FUMARATE DIHYDRATE 2 PUFF: 160; 4.5 AEROSOL RESPIRATORY (INHALATION) at 20:44

## 2021-01-12 RX ADMIN — INSULIN DETEMIR 25 UNITS: 100 INJECTION, SOLUTION SUBCUTANEOUS at 12:37

## 2021-01-12 RX ADMIN — SODIUM BICARBONATE: 84 INJECTION, SOLUTION INTRAVENOUS at 13:53

## 2021-01-12 RX ADMIN — ASPIRIN 81 MG: 81 TABLET, COATED ORAL at 09:33

## 2021-01-12 NOTE — PLAN OF CARE
Goal Outcome Evaluation:  Plan of Care Reviewed With: patient  Progress: improving     Regular renal diet- no intakes available. GFR currently 13L, a lb 3.3L. 7/2020 wt 139# and currently 131#/BMI 21.8.  Per RD notes on past admission- pt w/ hx of requiring soft/ground meats, not accepting boost or whole milk. Has agreed to 2%milk and sandwiches between meals in the past. RD made these changes as well as d/c renal restriction d/t K and Phos wnl- added cardiac ADA for sodium restriction and h/o DM. RD following.

## 2021-01-12 NOTE — CONSULTS
Adult Nutrition  Assessment    Patient Name:  Favio Casillas  YOB: 1955  MRN: [id number]  Admit Date:  1/11/2021    Assessment Date:  1/12/2021    Comments:  64yo male admit with general weakness and flank/abdomen discomfort with decreased appetite several days. Noted uncontrolled diabetes and metabolic acidosis w/ Na 120L- insulin drip in place- IV Na bicarb has been d/c. SUJATHA on CKD stg 3 w/ UTI and h/o bladder & prostate cancer noted. PMH includes COPD, hepatitis C, gastritis and drug use. Regular renal diet- no intakes available. GFR currently 13L, a lb 3.3L. 7/2020 wt 139# and currently 131#/BMI 21.8. Pt asleep in bed and did not awaken to name or knock. Upon observation RD suspects malnutrition- will attempt to complete NFPE and MSA on next visit. Per RD notes on past admission- ptt w/ hx of requiring soft/ground meats, not accepting boost or whole milk. Has agreed to 2%milk and sandwiches between meals in the past. RD made these changes as well as d/c renal restriction d/t K and Phos wnl- added cardiac ADA for sodium restriction and h/o DM. RD to follow hospital course.    Reason for Assessment     Row Name 01/12/21 1320          Reason for Assessment    Reason For Assessment  nurse/nurse practitioner consult     Diagnosis  diabetes diagnosis/complications;renal disease;infection/sepsis;metabolic state     Identified At Risk by Screening Criteria  MST SCORE 2+         Nutrition/Diet History     Row Name 01/12/21 132          Nutrition/Diet History    Typical Food/Fluid Intake  Pt asleep in bed and did not awaken to name or knock.           Labs/Tests/Procedures/Meds     Row Name 01/12/21 1325          Labs/Procedures/Meds    Lab Results Reviewed  reviewed     Lab Results Comments  Glu 419 on admit and now 179, , BUN 138H/Cr 4.4H and GFR 13L, phos & K wnl, Na 120L, Alb 3.3L        Diagnostic Tests/Procedures    Diagnostic Test/Procedure Reviewed  reviewed        Medications    Pertinent  Medications Reviewed  reviewed     Pertinent Medications Comments  IVF, insulin drip, IV bicarb d/c           Estimated/Assessed Needs     Row Name 01/12/21 1327          Calculation Measurements    Weight Used For Calculations  59.4 kg (131 lb)        Estimated/Assessed Needs    Additional Documentation  Calorie Requirements (Group);KCAL/KG (Group);Protein Requirements (Group);Fluid Requirements (Group)        Calorie Requirements    Estimated Calorie Requirement (kcal/day)  1900        KCAL/KG    KCAL/KG  35 Kcal/Kg (kcal);30 Kcal/Kg (kcal)     30 Kcal/Kg (kcal)  1782.63     35 Kcal/Kg (kcal)  2079.735        Protein Requirements    Weight Used For Protein Calculations  59.4 kg (131 lb)     Est Protein Requirement Amount (gms/kg)  1.0 gm protein     Estimated Protein Requirements (gms/day)  59.42        Fluid Requirements    Fluid Requirements (mL/day)  1600     RDA Method (mL)  1600         Nutrition Prescription Ordered     Row Name 01/12/21 1329          Nutrition Prescription PO    Current PO Diet  Regular     Common Modifiers  Renal         Evaluation of Received Nutrient/Fluid Intake     Row Name 01/12/21 1329          PO Evaluation    Number of Days PO Intake Evaluated  Insufficient Data               Electronically signed by:  Maribel Knapp RD  01/12/21 13:53 CST

## 2021-01-12 NOTE — PLAN OF CARE
Goal Outcome Evaluation:  Plan of Care Reviewed With: patient  Progress: improving  Outcome Summary: Patient received this shift from ED on insulin gtt. Insulin gtt now titrated down to 1 un/hr with D5 at 75 mL/hr and bicarb at 100mL/hr. Patient resting well overnight, with no complaints this shift. Afebrile, VSS. Monitoring this patient closely.

## 2021-01-12 NOTE — PAYOR COMM NOTE
"Zulay Baumann  [facility]  P: [phone]  F: [phone]    Ref#[id number]      Favio Owens (65 y.o. Male)     Date of Birth Social Security Number Address Home Phone MRN    1955  [address]  [place] [address] [phone] [id number]    Jehovah's witness Marital Status          Anglican Single       Admission Date Admission Type Admitting Provider Attending Provider Department, Room/Bed    1/11/21 Emergency Hu Charles MD Popescu, Tudor, MD [facility] 6W MED SURG, 608/1    Discharge Date Discharge Disposition Discharge Destination                       Attending Provider: Hu Charles MD    Allergies: No Known Allergies    Isolation: None   Infection: None   Code Status: CPR    Ht: 165.1 cm (65\")   Wt: 59.6 kg (131 lb 6.4 oz)    Admission Cmt: None   Principal Problem: Uncontrolled diabetes mellitus (CMS/[facility]) [E11.65]                 Active Insurance as of 1/11/2021     Primary Coverage     Payor Plan Insurance Group Employer/Plan Group    ANTHEM MEDICARE REPLACEMENT ANTHEM MEDICARE ADVANTAGE [id number]     Payor Plan Address Payor Plan Phone Number Payor Plan Fax Number Effective Dates    [address] [phone]  1/1/2021 - None Entered    [facility] [id number]       Subscriber Name Subscriber Birth Date Member ID       FAVIO OWENS 1955 [id number]           Secondary Coverage     Payor Plan Insurance Group Employer/Plan Group    [place]      Payor Plan Address Payor Plan Phone Number Payor Plan Fax Number Effective Dates    [address] [phone]  8/1/2017 - None Entered    [facility] [address]       Subscriber Name Subscriber Birth Date Member ID       FAVIO OWENS 1955 [id number]                 Emergency Contacts      (Rel.) Home Phone Work Phone Mobile Phone    Sofi Mcdonald (Friend) [phone] -- [phone]    Vinny Tong (Brother) -- -- [phone]    "            History & Physical      Hu Charles MD at 01/11/21 1212          History and Physical  Hu Charles MD  Hospitalist    Date of admission: 1/11/2021    Patient Care Team:  Shayne Merritt MD as PCP - General (Family Medicine)    Chief complaint   Chief Complaint   Patient presents with   • Generalized weakness   • Abdominal pain     Subjective     Patient is a 65 y.o. male admitted for generalized weakness accompanied by abdominal / flank discomfort and out of control glucose values. He has felt poorly for the last several days, did not have much of an appetite, has had trouble urinating and was barely able to get out of bed.    He has longstanding urinary tract and bladder issues related to previous surgeries for cancer and complicated by episodes of outlet obstruction.    History  Past Medical History:   Diagnosis Date   • Adenomatous polyp of colon    • Bladder cancer (CMS/HCC)    • Bladder outlet obstruction    • CAD (coronary artery disease)    • Chronic back pain    • Chronic gastritis    • Chronic hepatitis C (CMS/HCC)    • Chronic obstructive lung disease (CMS/HCC)    • Coronary arteriosclerosis    • Diabetes mellitus (CMS/HCC)    • Diverticular disease of colon    • Drug abuse (CMS/HCC)    • GERD (gastroesophageal reflux disease)    • Hypertension    • Prostate cancer (CMS/HCC)    • Rheumatoid arthritis (CMS/HCC)    • Seizure (CMS/HCC)    • Transient cerebral ischemia      Past Surgical History:   Procedure Laterality Date   • BLADDER TUMOR EXCISION     • CARDIAC CATHETERIZATION N/A 12/15/2017   • CHOLECYSTECTOMY     • COLONOSCOPY N/A 4/22/2019   • ENDOSCOPY N/A 3/3/2017   • ENDOSCOPY N/A 4/22/2019   • LUMBAR DISC SURGERY     • WRIST SURGERY Left      Family History   Problem Relation Age of Onset   • Diabetes Mother    • Liver cancer Father      Social History     Tobacco Use   • Smoking status: Current Every Day Smoker     Packs/day: 3.00     Years: 53.00     Pack years: 159.00      Types: Cigarettes   • Smokeless tobacco: Former User     Types: Chew     Quit date: 1980   Substance Use Topics   • Alcohol use: Yes   • Drug use: Yes     Frequency: 2.0 times per week     Types: Marijuana     Medications Prior to Admission   Medication Sig Dispense Refill Last Dose   • Accu-Chek Softclix Lancets lancets 1 each by Other route 3 (Three) Times a Day Before Meals. Use as instructed 100 each 12 1/10/2021 at Unknown time   • albuterol (VENTOLIN HFA) 108 (90 Base) MCG/ACT inhaler Inhale 2 puffs Every 6 (Six) Hours As Needed for Wheezing. 18 g 11 Past Week at Unknown time   • Alcohol Swabs (ALCOHOL PREP) pads 1 pad 5 (Five) Times a Day. 150 each 1 1/10/2021 at Unknown time   • amitriptyline (ELAVIL) 25 MG tablet Take 25 mg by mouth Every Night.   Past Week at Unknown time   • aspirin 81 MG EC tablet Take 81 mg by mouth Daily.   Past Week at Unknown time   • Blood Glucose Monitoring Suppl (ACCU-CHEK MARILU) device 1 each by Other route 3 (Three) Times a Day Before Meals. Use as instructed 1 each 0 Past Week at Unknown time   • Blood Glucose Monitoring Suppl (ACURA BLOOD GLUCOSE METER) w/Device kit 1 each 4 (Four) Times a Day As Needed (SUGARR). 1 kit 11 Past Week at Unknown time   • budesonide-formoterol (SYMBICORT) 160-4.5 MCG/ACT inhaler Inhale 2 puffs 2 (Two) Times a Day.   Past Week at Unknown time   • carvedilol (COREG) 3.125 MG tablet TK 1 T PO BID WC  11 Past Week at Unknown time   • clopidogrel (PLAVIX) 75 MG tablet Take 75 mg by mouth Daily.   Past Week at Unknown time   • cyclobenzaprine (FLEXERIL) 10 MG tablet Take 10 mg by mouth 3 (Three) Times a Day As Needed.   Past Week at Unknown time   • furosemide (LASIX) 20 MG tablet Take 20 mg by mouth Every Other Day.   Past Week at Unknown time   • glucose blood (Accu-Chek Marilu) test strip 1 each by Other route As Needed (glucose monitoring). Use as instructed 100 each 12 1/10/2021 at Unknown time   • glucose blood test strip Use as instructed 90 each  12 1/10/2021 at Unknown time   • glucose monitor monitoring kit 1 each 3 (Three) Times a Day. 1 each 1 1/10/2021 at Unknown time   • hydrOXYzine (ATARAX) 25 MG tablet TK 1 T PO TID PRF ITCHING  9 Past Week at Unknown time   • insulin aspart (novoLOG FLEXPEN) 100 UNIT/ML solution pen-injector sc pen Subcut TIDAC for diabetes.Blood sugar =0 unit;150-199=2 u;200-249=3 u;250-299=4 u;300-349=5 u;350-400=6 u;>400=7 u. Dispense whatever insurance covers. 1 pen 3 Past Week at Unknown time   • insulin detemir (Levemir FlexTouch) 100 UNIT/ML injection Inject 20 Units under the skin into the appropriate area as directed Every Night. (Patient taking differently: Inject 25 Units under the skin into the appropriate area as directed Every Night.) 3 pen 3 Past Week at Unknown time   • Lancets (FREESTYLE) lancets Tid 90 each 1 Past Week at Unknown time   • pantoprazole (PROTONIX) 40 MG EC tablet Take 40 mg by mouth Daily.   Past Week at Unknown time   • potassium chloride (MICRO-K) 10 MEQ CR capsule Take 10 mEq by mouth Daily.   Past Week at Unknown time   • sertraline (ZOLOFT) 50 MG tablet 1/2 tablet hs 1 week then 1 tablet . (depression) (Patient taking differently: Take 50 mg by mouth Daily.) 30 tablet 2 Past Week at Unknown time   • traMADol (ULTRAM) 50 MG tablet Take 1 tablet by mouth Every 6 (Six) Hours As Needed for Moderate Pain . 12 tablet 0 Past Week at Unknown time   • albuterol (PROVENTIL) (2.5 MG/3ML) 0.083% nebulizer solution Take 2.5 mg by nebulization Every 4 (Four) Hours As Needed for Wheezing or Shortness of Air. 120 vial 11    • fluticasone (FLONASE) 50 MCG/ACT nasal spray 2 sprays into each nostril Daily. 1 bottle 11 Unknown at Unknown time   • metoclopramide (REGLAN) 5 MG tablet Take 1 tablet by mouth 3 (Three) Times a Day As Needed (Before meals for nausea or vomiting). 30 tablet 0 Unknown at Unknown time   • nitroglycerin (NITROSTAT) 0.4 MG SL tablet Place 0.4 mg under the tongue Every 5 (Five) Minutes  As Needed for Chest Pain. Take no more than 3 doses in 15 minutes.   Unknown at Unknown time     Allergies:  Patient has no known allergies.    Review of Systems  Review of Systems   Constitutional: Positive for fatigue.   Respiratory: Negative for cough and wheezing.    Cardiovascular: Negative for chest pain, palpitations and leg swelling.   Gastrointestinal: Positive for abdominal pain. Negative for abdominal distention, anal bleeding, blood in stool, diarrhea and nausea.   Genitourinary: Positive for dysuria and urgency. Negative for frequency.   Musculoskeletal: Positive for arthralgias. Negative for back pain and gait problem.   Skin: Positive for pallor. Negative for color change and rash.   Neurological: Positive for weakness. Negative for syncope, numbness and headaches.   Psychiatric/Behavioral: Negative for agitation, behavioral problems and confusion.   All other systems reviewed and are negative.      Objective     Vital Signs  Temp:  [97.9 °F (36.6 °C)-98 °F (36.7 °C)] 98 °F (36.7 °C)  Heart Rate:  [62-99] 96  Resp:  [18] 18  BP: (104-132)/(63-73) 119/69    Physical Exam:  Physical Exam  Vitals signs reviewed.   Constitutional:       General: He is not in acute distress.     Appearance: He is ill-appearing.   HENT:      Head: Normocephalic and atraumatic.   Eyes:      General: No scleral icterus.     Extraocular Movements: Extraocular movements intact.      Pupils: Pupils are equal, round, and reactive to light.   Neck:      Musculoskeletal: Normal range of motion and neck supple. No neck rigidity or muscular tenderness.   Cardiovascular:      Rate and Rhythm: Normal rate and regular rhythm.   Pulmonary:      Effort: Pulmonary effort is normal. No respiratory distress.      Breath sounds: Normal breath sounds. No stridor. No wheezing, rhonchi or rales.   Abdominal:      General: There is no distension.      Palpations: Abdomen is soft. There is no mass.      Tenderness: There is no abdominal  tenderness. There is no guarding.      Hernia: No hernia is present.   Musculoskeletal: Normal range of motion.         General: No swelling or tenderness.   Skin:     General: Skin is warm and dry.      Coloration: Skin is pale.   Neurological:      General: No focal deficit present.      Mental Status: He is alert and oriented to person, place, and time.      Cranial Nerves: No cranial nerve deficit.      Sensory: No sensory deficit.      Motor: No weakness.      Coordination: Coordination normal.   Psychiatric:         Mood and Affect: Mood normal.         Behavior: Behavior normal.         Results Review:   Lab Results (last 24 hours)     Procedure Component Value Units Date/Time    Comprehensive Metabolic Panel [[id number]]  (Abnormal) Collected: 01/11/21 1909    Specimen: Blood Updated: 01/11/21 2004     Glucose 125 mg/dL       mg/dL      Creatinine 4.76 mg/dL      Sodium 125 mmol/L      Potassium 4.3 mmol/L      Comment: Slight hemolysis detected by analyzer. Results may be affected.        Chloride 100 mmol/L      CO2 7.0 mmol/L      Calcium 8.9 mg/dL      Total Protein 6.7 g/dL      Albumin 3.30 g/dL      ALT (SGPT) 7 U/L      AST (SGOT) 11 U/L      Comment: Slight hemolysis detected by analyzer. Results may be affected.        Alkaline Phosphatase 123 U/L      Total Bilirubin 0.2 mg/dL      eGFR Non African Amer 12 mL/min/1.73      Comment: <15 Indicative of kidney failure.        eGFR   Amer --     Comment: <15 Indicative of kidney failure.        Globulin 3.4 gm/dL      A/G Ratio 1.0 g/dL      BUN/Creatinine Ratio 28.2     Anion Gap 18.0 mmol/L     Narrative:      GFR Normal >60  Chronic Kidney Disease <60  Kidney Failure <15      Phosphorus [[id number]]  (Abnormal) Collected: 01/11/21 1909    Specimen: Blood Updated: 01/11/21 1958     Phosphorus 5.0 mg/dL     POC Glucose Once [[id number]]  (Abnormal) Collected: 01/11/21 1816    Specimen: Blood Updated: 01/11/21 1835     Glucose 179 mg/dL       Comment: RN NotifiedOperator: [id number] JEFFERY MENDOSAMeter ID: [id number]       POC Glucose Once [[id number]]  (Abnormal) Collected: 01/11/21 1715    Specimen: Blood Updated: 01/11/21 1835     Glucose 236 mg/dL      Comment: RN NotifiedOperator: [id number] JEFFERY MENDOSAMeter ID: [id number]       POC Glucose Once [[id number]]  (Abnormal) Collected: 01/11/21 1634    Specimen: Blood Updated: 01/11/21 1835     Glucose 321 mg/dL      Comment: RN NotifiedOperator: [id number] JEFFERY FIGUEROAENMeter ID: [id number]       Urinalysis, Microscopic Only - Urine, Clean Catch [[id number]]  (Abnormal) Collected: 01/11/21 1723    Specimen: Urine, Clean Catch Updated: 01/11/21 1752     RBC, UA 13-20 /HPF      WBC, UA Too Numerous to Count /HPF      Bacteria, UA 4+ /HPF      Squamous Epithelial Cells, UA 3-5 /HPF      Hyaline Casts, UA 31-50 /LPF      Mucus, UA Moderate/2+ /HPF      Methodology Manual Light Microscopy    Urinalysis With Microscopic If Indicated (No Culture) - [[id number]]  (Abnormal) Collected: 01/11/21 1723    Specimen: Urine Updated: 01/11/21 1739     Color, UA Yellow     Appearance, UA Turbid     pH, UA 6.5     Specific Gravity, UA 1.009     Glucose, UA Negative     Ketones, UA Negative     Bilirubin, UA Negative     Blood, UA Small (1+)     Protein,  mg/dL (2+)     Leuk Esterase, UA Large (3+)     Nitrite, UA Negative     Urobilinogen, UA 1.0 E.U./dL    Urine Eosinophils, Mitchel's Stain - [[id number]] Collected: 01/11/21 1723    Specimen: Urine Updated: 01/11/21 1724    Urinalysis, Microscopic Only - Urine, Catheter In/Out [[id number]]  (Abnormal) Collected: 01/11/21 1510    Specimen: Urine, Catheter In/Out Updated: 01/11/21 1651     RBC, UA 6-12 /HPF      WBC, UA Too Numerous to Count /HPF      Bacteria, UA 4+ /HPF      Squamous Epithelial Cells, UA 6-12 /HPF      Hyaline Casts, UA       Unable to determine due to loaded field     /LPF     Methodology Manual Light Microscopy    Urine Culture - Urine, Urine, Catheter  In/Out [[id number]] Collected: 01/11/21 1510    Specimen: Urine, Catheter In/Out Updated: 01/11/21 1651    Basic Metabolic Panel [[id number]]  (Abnormal) Collected: 01/11/21 1550    Specimen: Blood Updated: 01/11/21 1620     Glucose 329 mg/dL       mg/dL      Creatinine 4.74 mg/dL      Sodium 117 mmol/L      Potassium 5.4 mmol/L      Comment: Slight hemolysis detected by analyzer. Results may be affected.        Chloride 96 mmol/L      CO2 6.0 mmol/L      Calcium 9.0 mg/dL      eGFR   Amer --     Comment: <15 Indicative of kidney failure.        eGFR Non African Amer 13 mL/min/1.73      Comment: <15 Indicative of kidney failure.        BUN/Creatinine Ratio 27.8     Anion Gap 15.0 mmol/L     Narrative:      GFR Normal >60  Chronic Kidney Disease <60  Kidney Failure <15      Sodium, Urine, Random - Urine, Catheter In/Out [[id number]] Collected: 01/11/21 1510    Specimen: Urine, Catheter In/Out Updated: 01/11/21 1620     Sodium, Urine 40 mmol/L     Narrative:      Reference intervals for random urine have not been established.  Clinical usage is dependent upon physician's interpretation in combination with other laboratory tests.       Protein / Creatinine Ratio, Urine - Urine, Catheter In/Out [[id number]] Collected: 01/11/21 1510    Specimen: Urine, Catheter In/Out Updated: 01/11/21 1614    Creatinine, Urine, Random - Urine, Catheter In/Out [[id number]] Collected: 01/11/21 1510    Specimen: Urine, Catheter In/Out Updated: 01/11/21 1614    Urinalysis With Culture If Indicated - Urine, Catheter In/Out [[id number]]  (Abnormal) Collected: 01/11/21 1510    Specimen: Urine, Catheter In/Out Updated: 01/11/21 1545     Color, UA Yellow     Appearance, UA Cloudy     pH, UA 6.5     Specific Gravity, UA 1.013     Glucose, UA Negative     Ketones, UA Negative     Bilirubin, UA Small (1+)     Blood, UA Small (1+)     Protein,  mg/dL (2+)     Leuk Esterase, UA Moderate (2+)     Nitrite, UA Negative     Urobilinogen, UA  1.0 E.U./dL    POC Glucose Once [[id number]]  (Abnormal) Collected: 01/11/21 1526    Specimen: Blood Updated: 01/11/21 1542     Glucose 334 mg/dL      Comment: RN NotifiedOperator: [id number] JOSE ALEJANDRO MIRANDAMeter ID: [id number]       Blood Culture - Blood, Hand, Right [[id number]] Collected: 01/11/21 1501    Specimen: Blood from Hand, Right Updated: 01/11/21 1505    POC Glucose Once [[id number]]  (Abnormal) Collected: 01/11/21 1426    Specimen: Blood Updated: 01/11/21 1442     Glucose 360 mg/dL      Comment: RN NotifiedOperator: [id number] JEFFERY LAURENMeter ID: [id number]       COVID-19 and FLU A/B PCR - Swab, Nasopharynx [[id number]]  (Normal) Collected: 01/11/21 1305    Specimen: Swab from Nasopharynx Updated: 01/11/21 1332     COVID19 Not Detected     Influenza A PCR Not Detected     Influenza B PCR Not Detected    Narrative:      Fact sheet for providers: https://www.fda.gov/media/329938/download    Fact sheet for patients: https://www.fda.gov/media/593988/download    Test performed by PCR.    Blood Culture - Blood, Arm, Right [[id number]] Collected: 01/11/21 1320    Specimen: Blood from Arm, Right Updated: 01/11/21 1325    pH, Venous [[id number]]  (Abnormal) Collected: 01/11/21 1306    Specimen: Blood Updated: 01/11/21 1309     pH, Venous 6.957 pH Units     Ketone Bodies, Serum (Not performed at [place]) [[id number]]  (Normal) Collected: 01/11/21 1113    Specimen: Blood Updated: 01/11/21 1152    Narrative:      The following orders were created for panel order Ketone Bodies, Serum (Not performed at [place]).  Procedure                               Abnormality         Status                     ---------                               -----------         ------                     Acetone[[id number]]                      Normal              Final result                 Please view results for these tests on the individual orders.    Acetone [[id number]]  (Normal) Collected: 01/11/21 1113    Specimen: Blood Updated:  01/11/21 1152     Acetone Negative    Lactic Acid, Plasma [[id number]]  (Normal) Collected: 01/11/21 1113    Specimen: Blood Updated: 01/11/21 1150     Lactate 0.6 mmol/L     [place] Draw [[id number]] Collected: 01/11/21 1045    Specimen: Blood Updated: 01/11/21 1147    Narrative:      The following orders were created for panel order [place] Draw.  Procedure                               Abnormality         Status                     ---------                               -----------         ------                     Light Blue Top[[id number]]                                   Final result               Green Top (Gel)[[id number]]                                  Final result               Lavender Top[[id number]]                                     Final result               Gold Top - SST[[id number]]                                   Final result                 Please view results for these tests on the individual orders.    Light Blue Top [[id number]] Collected: 01/11/21 1045    Specimen: Blood Updated: 01/11/21 1147     Extra Tube hold for add-on     Comment: Auto resulted       Green Top (Gel) [[id number]] Collected: 01/11/21 1045    Specimen: Blood Updated: 01/11/21 1147     Extra Tube Hold for add-ons.     Comment: Auto resulted.       Lavender Top [[id number]] Collected: 01/11/21 1045    Specimen: Blood Updated: 01/11/21 1147     Extra Tube hold for add-on     Comment: Auto resulted       Gold Top - SST [[id number]] Collected: 01/11/21 1045    Specimen: Blood Updated: 01/11/21 1147     Extra Tube Hold for add-ons.     Comment: Auto resulted.       Comprehensive Metabolic Panel [[id number]]  (Abnormal) Collected: 01/11/21 1045    Specimen: Blood Updated: 01/11/21 1136     Glucose 419 mg/dL       mg/dL      Creatinine 5.36 mg/dL      Sodium 114 mmol/L      Potassium 6.0 mmol/L      Comment: Slight hemolysis detected by analyzer. Results may be affected.        Chloride 89 mmol/L      CO2 8.0 mmol/L       Calcium 9.0 mg/dL      Total Protein 7.7 g/dL      Albumin 3.80 g/dL      ALT (SGPT) 9 U/L      AST (SGOT) 7 U/L      Alkaline Phosphatase 148 U/L      Total Bilirubin 0.3 mg/dL      eGFR Non African Amer 11 mL/min/1.73      Comment: <15 Indicative of kidney failure.        eGFR   Amer --     Comment: <15 Indicative of kidney failure.        Globulin 3.9 gm/dL      A/G Ratio 1.0 g/dL      BUN/Creatinine Ratio 25.0     Anion Gap 17.0 mmol/L     Narrative:      GFR Normal >60  Chronic Kidney Disease <60  Kidney Failure <15      Lipase [[id number]]  (Abnormal) Collected: 01/11/21 1045    Specimen: Blood Updated: 01/11/21 1119     Lipase 83 U/L     Magnesium [[id number]]  (Abnormal) Collected: 01/11/21 1045    Specimen: Blood Updated: 01/11/21 1119     Magnesium 2.6 mg/dL     Troponin [[id number]]  (Normal) Collected: 01/11/21 1045    Specimen: Blood Updated: 01/11/21 1118     Troponin T <0.010 ng/mL     Narrative:      Troponin T Reference Range:  <= 0.03 ng/mL-   Negative for AMI  >0.03 ng/mL-     Abnormal for myocardial necrosis.  Clinicians would have to utilize clinical acumen, EKG, Troponin and serial changes to determine if it is an Acute Myocardial Infarction or myocardial injury due to an underlying chronic condition.       Results may be falsely decreased if patient taking Biotin.      CBC & Differential [[id number]]  (Abnormal) Collected: 01/11/21 1045    Specimen: Blood Updated: 01/11/21 1101    Narrative:      The following orders were created for panel order CBC & Differential.  Procedure                               Abnormality         Status                     ---------                               -----------         ------                     CBC Auto Differential[[id number]]        Abnormal            Final result                 Please view results for these tests on the individual orders.    CBC Auto Differential [[id number]]  (Abnormal) Collected: 01/11/21 1045    Specimen: Blood Updated:  01/11/21 1101     WBC 19.77 10*3/mm3      RBC 5.24 10*6/mm3      Hemoglobin 14.8 g/dL      Hematocrit 42.7 %      MCV 81.5 fL      MCH 28.2 pg      MCHC 34.7 g/dL      RDW 13.6 %      RDW-SD 40.0 fl      MPV 10.1 fL      Platelets 453 10*3/mm3      Neutrophil % 94.4 %      Lymphocyte % 2.6 %      Monocyte % 2.2 %      Eosinophil % 0.0 %      Basophil % 0.1 %      Immature Grans % 0.7 %      Neutrophils, Absolute 18.66 10*3/mm3      Lymphocytes, Absolute 0.51 10*3/mm3      Monocytes, Absolute 0.44 10*3/mm3      Eosinophils, Absolute 0.00 10*3/mm3      Basophils, Absolute 0.02 10*3/mm3      Immature Grans, Absolute 0.14 10*3/mm3      nRBC 0.4 /100 WBC           Imaging Results (Last 24 Hours)     Procedure Component Value Units Date/Time    CT Abdomen Pelvis Without Contrast [[id number]] Collected: 01/11/21 1129     Updated: 01/11/21 1222    Narrative:        Patient Name: MR. MARTINA OWENS    ORDERING: AMADO OWEN     ATTENDING: AMADO OWEN     REFERRING: AMADO OWEN    -----------------------  EXAM DESCRIPTION: CT ABDOMEN PELVIS WO CONTRAST    CLINICAL HISTORY: right flank pain    COMPARISON: 11/16/2019    Dose Length Product: 301.6.    This exam was performed according to our departmental dose  optimization program, which includes automated exposure control,  adjustment of the mA and/or KV according to patient size and/or  use of iterative reconstruction technique.       TECHNIQUE: Multiple contiguous noncontrast axial images are  obtained of the abdomen and pelvis with coronal and sagittal  reformat images provided.     FINDINGS:     LOWER CHEST: No pulmonary consolidation or pleural effusion.  There is redemonstration of scarring or atelectasis within the  left posterior lung bases and calcified granuloma. Within the  lingula at the lung base (series 2 image 13) there is a new 7.3  mm noncalcified pulmonary nodule present. There is an additional  4.5 mm noncalcified nodular density right anterior lower  lobe  abutting the fissure that is indeterminate but could represent a  fissural lymph node.  No cardiac enlargement or pericardial effusion.    HEPATOBILIARY: Allowing for the lack of intravenous contrast no  suspicious hepatic lesion or ductal dilation. Cholecystectomy  changes.  SPLEEN: Calcified granulomata, otherwise unremarkable noncontrast  appearance.  PANCREAS: Unremarkable noncontrast appearance.  ADRENAL GLANDS: Unremarkable.  KIDNEYS/URETERS: Allowing for the lack of intravenous contrast no  suspicious renal mass. There is no intrarenal or ureteral  calcifications identified. There is very mild asymmetrical fluid  distention of the right renal pelvis by comparison to the left.  No findings confident for obstructive uropathy. No perinephric  fat stranding or fluid collection identified.    GASTROINTESTINAL: There is a small hiatal hernia. No findings of  bowel obstruction or acute inflammatory changes of bowel.  Redemonstration of surgical suture line within the right anterior  pelvic bowel.  REPRODUCTIVE ORGANS: Prostatectomy.  URINARY BLADDER: Redemonstrated postsurgical changes of  cystectomy with neobladder formation.    VASCULAR: Vascular calcification without abdominal aortic  aneurysm.  LYMPH NODES: No pathologically enlarged nodes by size criteria.  PERITONEUM/RETROPERITONEUM: No free air or suspicious fluid  collections..     OSSEOUS STRUCTURES: Mild degenerative changes within the spine.  No acute finding.    ADDITIONAL FINDINGS: None      Impression:      Redemonstrated postsurgical changes of prostatectomy and  cystectomy with neobladder formation. There is very mild  pelvicalyceal fluid distention right greater than left without  obstructing lesion identified. No perinephric fat stranding or  fluid collection.    7.3 mm noncalcified pulmonary nodule lingula at the lung base  representing a new finding which follow-up is recommended.  Additional new 4.5 mm noncalcified nodule right anterior  lower  lobe associated with fissure possibly representing a fissural  node. See below recommendations.    Follow-up according to the Fleischner Society criteria (see  below) is recommended.      Recommendations for follow-up of small nodules in low risk  patients detected incidentally on non-screening CT examinations  (based upon The Fleischner Society recommendations):    Nodule Size        Recommendations for Low-risk Patient    Less than or equal to 4 mm    No follow-up required    >4-6 mm          Follow-up CT in 12 months. If nodule is              stable- no further follow-up is required.    >6-8 mm          Initial follow-up in 6 months, then 12 months              and 24 months if no change.    Over 8 mm          Follow-up at 3, 6, 12 and 24 months. PET              if over 1 cm.    Patients with clinical risk factors for lung cancer (i.e.  smokers), or a history of malignancy require more careful  evaluation of small nodules with a repeat CT at 3, 6, 12 and 24  months. Some nodules may require more aggressive management.    Radiology 2005; Lamontehojanine H, et al. Guidelines for management of  small pulmonary nodules detected on CT scans: a statement from  the Fleischner Society. 237: 395-400.    Electronically signed by:  Shreyas Patel MD  1/11/2021 12:21 PM  CST Workstation: [id number]          Assessment/Plan       Uncontrolled diabetes mellitus (CMS/HCC)    Acute kidney failure (CMS/HCC)    UTI (urinary tract infection)    Bladder cancer (CMS/HCC)    Lung nodule    Continue with aggressive IV hydration, start IV Insulin, closely monitor electrolytes, serum glucose values and renal function. Obtain urine culture and start IV Rocephin for the urinary tract infection.    uH Charles MD  01/11/21  21:23 CST      Electronically signed by Hu Charles MD at 01/11/21 7327          Emergency Department Notes      Zulay العلي at 01/11/21 1039        FSBS 408     Zulay العلي  01/11/21  1039      Electronically signed by Zulay العلي at 01/11/21 1039     Claudette Sue, RN at 01/11/21 1515        [employer]     Claudette Sue RN  01/11/21 1515       Claudette Sue RN  01/11/21 1516      Electronically signed by Claudette Sue RN at 01/11/21 1516     Claudette Sue, RN at 01/11/21 1525        FSBS-334     Claudette Sue RN  01/11/21 1526      Electronically signed by Claudette Sue RN at 01/11/21 1526     Claudette Sue, RN at 01/11/21 1634        FSBS-321     Claudette Sue RN  01/11/21 1634      Electronically signed by Claudette Sue RN at 01/11/21 1634     Claudette Sue RN at 01/11/21 1716        FSBS-236, no rate change to insulin drip per protocol.      Claudette Sue RN  01/11/21 1716       Claudette Sue RN  01/11/21 1724      Electronically signed by Claudette Sue RN at 01/11/21 1724     Claudette Sue RN at 01/11/21 1817        FSBS-179     Claudette Sue RN  01/11/21 1817      Electronically signed by Claudette Sue RN at 01/11/21 1817     Claudette Sue RN at 01/11/21 1839        Report given to DORIE Boggs.      Claudette Sue RN  01/11/21 1839      Electronically signed by Claudette Sue RN at 01/11/21 1839         Facility-Administered Medications as of 1/12/2021   Medication Dose Route Frequency Provider Last Rate Last Admin   • albuterol (PROVENTIL) nebulizer solution 0.083% 2.5 mg/3mL  2.5 mg Nebulization Q4H PRN Hu Charles MD       • aspirin EC tablet 81 mg  81 mg Oral Daily Hu Charles MD       • budesonide-formoterol (SYMBICORT) 160-4.5 MCG/ACT inhaler 2 puff  2 puff Inhalation BID - RT Hu Charles MD   2 puff at 01/12/21 0646   • cefTRIAXone (ROCEPHIN) 1 g/100 mL 0.9% NS (MBP)  1 g Intravenous Q24H Hu Charles MD   1 g at 01/11/21 2222   • dextrose (D50W) 25 g/ 50mL Intravenous Solution 25 g  25 g Intravenous Q15 Min PRN Hu Charles MD       • dextrose (D5W) 5 % infusion  75 mL/hr Intravenous Continuous Rafal Bermudez MD 75 mL/hr at 01/12/21 0433 75 mL/hr at  21 0433   • dextrose (GLUTOSE) oral gel 15 g  15 g Oral Q15 Min PRN Hu Charles MD       • glucagon (human recombinant) (GLUCAGEN DIAGNOSTIC) injection 1 mg  1 mg Subcutaneous Q15 Min PRN Hu Charles MD       • insulin regular (HumuLIN R,NovoLIN R) 100 Units in sodium chloride 0.9 % 100 mL (1 Units/mL) infusion  6 Units/hr Intravenous Titrated Hu Charles MD 1 mL/hr at 21 0843 1 Units/hr at 21 0843   • morphine injection 2 mg  2 mg Intravenous Q2H PRN Hu Charles MD       • [COMPLETED] morphine injection 4 mg  4 mg Intravenous Once Jose Eduardo Owen MD   4 mg at 21 1114   • [COMPLETED] ondansetron (ZOFRAN) injection 4 mg  4 mg Intravenous Once Jose Eduardo Owen MD   4 mg at 21 1114   • ondansetron (ZOFRAN) injection 4 mg  4 mg Intravenous Q6H PRN Hu Charles MD       • sertraline (ZOLOFT) tablet 50 mg  50 mg Oral Daily Hu Charles MD       • [] sodium bicarbonate injection 8.4% 50 mEq  50 mEq Intravenous Q2H Rodríguez Brody MD   50 mEq at 21 1814   • [COMPLETED] sodium chloride 0.9 % bolus 1,000 mL  1,000 mL Intravenous Once Jose Eduardo Owen MD   Stopped at 21 1445   • sodium chloride 0.9 % flush 10 mL  10 mL Intravenous Q12H Hu Charles MD   10 mL at 21 205   • sodium chloride 0.9 % flush 10 mL  10 mL Intravenous PRN Hu Charles MD       • sterile water 1,000 mL with sodium bicarbonate 8.4 % 50 mEq infusion   Intravenous Continuous Rodríguez Brody  mL/hr at 21 0432 Currently Infusing at 21 0432       Imaging Results (Last 24 Hours)     Procedure Component Value Units Date/Time    CT Abdomen Pelvis Without Contrast [[id number]] Collected: 21 1129     Updated: 21 1222    Narrative:        Patient Name: MR. MARTINA OWENS    ORDERING: JOSE EDUARDO OWEN     ATTENDING: JOSE EDUARDO OWEN     REFERRING: JOSE EDUARDO OWEN    -----------------------  EXAM DESCRIPTION: CT ABDOMEN PELVIS WO CONTRAST    CLINICAL HISTORY: right flank  pain    COMPARISON: 11/16/2019    Dose Length Product: 301.6.    This exam was performed according to our departmental dose  optimization program, which includes automated exposure control,  adjustment of the mA and/or KV according to patient size and/or  use of iterative reconstruction technique.       TECHNIQUE: Multiple contiguous noncontrast axial images are  obtained of the abdomen and pelvis with coronal and sagittal  reformat images provided.     FINDINGS:     LOWER CHEST: No pulmonary consolidation or pleural effusion.  There is redemonstration of scarring or atelectasis within the  left posterior lung bases and calcified granuloma. Within the  lingula at the lung base (series 2 image 13) there is a new 7.3  mm noncalcified pulmonary nodule present. There is an additional  4.5 mm noncalcified nodular density right anterior lower lobe  abutting the fissure that is indeterminate but could represent a  fissural lymph node.  No cardiac enlargement or pericardial effusion.    HEPATOBILIARY: Allowing for the lack of intravenous contrast no  suspicious hepatic lesion or ductal dilation. Cholecystectomy  changes.  SPLEEN: Calcified granulomata, otherwise unremarkable noncontrast  appearance.  PANCREAS: Unremarkable noncontrast appearance.  ADRENAL GLANDS: Unremarkable.  KIDNEYS/URETERS: Allowing for the lack of intravenous contrast no  suspicious renal mass. There is no intrarenal or ureteral  calcifications identified. There is very mild asymmetrical fluid  distention of the right renal pelvis by comparison to the left.  No findings confident for obstructive uropathy. No perinephric  fat stranding or fluid collection identified.    GASTROINTESTINAL: There is a small hiatal hernia. No findings of  bowel obstruction or acute inflammatory changes of bowel.  Redemonstration of surgical suture line within the right anterior  pelvic bowel.  REPRODUCTIVE ORGANS: Prostatectomy.  URINARY BLADDER: Redemonstrated postsurgical  changes of  cystectomy with neobladder formation.    VASCULAR: Vascular calcification without abdominal aortic  aneurysm.  LYMPH NODES: No pathologically enlarged nodes by size criteria.  PERITONEUM/RETROPERITONEUM: No free air or suspicious fluid  collections..     OSSEOUS STRUCTURES: Mild degenerative changes within the spine.  No acute finding.    ADDITIONAL FINDINGS: None      Impression:      Redemonstrated postsurgical changes of prostatectomy and  cystectomy with neobladder formation. There is very mild  pelvicalyceal fluid distention right greater than left without  obstructing lesion identified. No perinephric fat stranding or  fluid collection.    7.3 mm noncalcified pulmonary nodule lingula at the lung base  representing a new finding which follow-up is recommended.  Additional new 4.5 mm noncalcified nodule right anterior lower  lobe associated with fissure possibly representing a fissural  node. See below recommendations.    Follow-up according to the Fleischner Society criteria (see  below) is recommended.      Recommendations for follow-up of small nodules in low risk  patients detected incidentally on non-screening CT examinations  (based upon The Fleischner Society recommendations):    Nodule Size        Recommendations for Low-risk Patient    Less than or equal to 4 mm    No follow-up required    >4-6 mm          Follow-up CT in 12 months. If nodule is              stable- no further follow-up is required.    >6-8 mm          Initial follow-up in 6 months, then 12 months              and 24 months if no change.    Over 8 mm          Follow-up at 3, 6, 12 and 24 months. PET              if over 1 cm.    Patients with clinical risk factors for lung cancer (i.e.  smokers), or a history of malignancy require more careful  evaluation of small nodules with a repeat CT at 3, 6, 12 and 24  months. Some nodules may require more aggressive management.    Radiology 2005; MacMahojanine H, et al. Guidelines for  management of  small pulmonary nodules detected on CT scans: a statement from  the Fleischner Society. 237: 395-400.    Electronically signed by:  Shreyas Patel MD  2021 12:21 PM  CST Workstation: [id number]        Physician Progress Notes (last 24 hours) (Notes from 21 through 21)    No notes of this type exist for this encounter.            Consult Notes (last 24 hours) (Notes from 21 through 21)      Rodríguez Brody MD at 21 1537          [facility]  [address] [place] [address]. [id number]  T - [phone]  F - [phone]     Consultation         PATIENT  DEMOGRAPHICS   PATIENT NAME: Favio Casillas                      PHYSICIAN: TIFFANIE Franco  : 1955  MRN: [id number]    Subjective   SUBJECTIVE   Referring Provider: Dr. Levy  Reason for Consultation: Acute kidney injury  History of present illness: This is a 65-year-old male with a past medical history significant for diabetes mellitus, CAD, bladder cancer status post neobladder formation, prostate cancer, hypertension, rheumatoid arthritis, seizure disorder who presented to the hospital today.  He reports a 2-day history of feeling unwell as well as having nausea and vomiting.  On evaluation he was found to have significant acute kidney injury, hyponatremia, hyperkalemia, hyperglycemia, metabolic acidosis and was admitted for further evaluation and management.  Nephrology has been consulted to help manage acute kidney injury as well as metabolic derangement.    Past Medical History:   Diagnosis Date   • Adenomatous polyp of colon    • Bladder cancer (CMS/HCC)    • CAD (coronary artery disease)    • Chronic back pain    • Chronic gastritis    • Chronic hepatitis C (CMS/HCC)    • Chronic obstructive lung disease (CMS/HCC)    • Coronary arteriosclerosis    • Diabetes mellitus (CMS/HCC)    • Diverticular disease of colon    • Drug abuse (CMS/HCC)    • GERD  "(gastroesophageal reflux disease)    • Hypertension    • Prostate cancer (CMS/HCC)    • Rheumatoid arthritis (CMS/HCC)    • Seizure (CMS/HCC)    • Transient cerebral ischemia      Past Surgical History:   Procedure Laterality Date   • BLADDER TUMOR EXCISION     • CARDIAC CATHETERIZATION N/A 12/15/2017   • CHOLECYSTECTOMY     • COLONOSCOPY N/A 4/22/2019   • ENDOSCOPY N/A 3/3/2017   • ENDOSCOPY N/A 4/22/2019   • LUMBAR DISC SURGERY     • WRIST SURGERY Left      Family History   Problem Relation Age of Onset   • Diabetes Mother    • Liver cancer Father      Social History     Tobacco Use   • Smoking status: Current Every Day Smoker     Packs/day: 3.00     Years: 53.00     Pack years: 159.00     Types: Cigarettes   • Smokeless tobacco: Former User     Types: Chew     Quit date: 1980   Substance Use Topics   • Alcohol use: Yes   • Drug use: Yes     Frequency: 2.0 times per week     Types: Marijuana     Allergies:  Patient has no known allergies.     REVIEW OF SYSTEMS    Review of Systems   Constitutional: Positive for fatigue.   Gastrointestinal: Positive for nausea and vomiting.   All other systems reviewed and are negative.      Objective   OBJECTIVE   Vital Signs  Temp:  [97.9 °F (36.6 °C)] 97.9 °F (36.6 °C)  Heart Rate:  [86-89] 88  Resp:  [18] 18  BP: (124-132)/(64-73) 124/64    Flowsheet Rows      First Filed Value   Admission Height  165.1 cm (65\") Documented at 01/11/2021 1041   Admission Weight  62.1 kg (137 lb) Documented at 01/11/2021 1041             No intake/output data recorded.    PHYSICAL EXAM    Physical Exam  Constitutional:       Appearance: He is well-developed. He is ill-appearing.   HENT:      Head: Normocephalic and atraumatic.   Eyes:      Conjunctiva/sclera: Conjunctivae normal.      Pupils: Pupils are equal, round, and reactive to light.   Neck:      Musculoskeletal: Neck supple.   Cardiovascular:      Rate and Rhythm: Normal rate and regular rhythm.   Pulmonary:      Effort: Pulmonary effort " is normal.      Breath sounds: Normal breath sounds.   Abdominal:      Palpations: Abdomen is soft.   Musculoskeletal:      Right lower leg: No edema.      Left lower leg: No edema.   Skin:     General: Skin is warm and dry.   Neurological:      Mental Status: He is alert and oriented to person, place, and time.   Psychiatric:         Mood and Affect: Mood normal.         Behavior: Behavior normal.         RESULTS   Results Review:    Results from last 7 days   Lab Units 01/11/21  1045   SODIUM mmol/L 114*   POTASSIUM mmol/L 6.0*   CHLORIDE mmol/L 89*   CO2 mmol/L 8.0*   BUN mg/dL 134*   CREATININE mg/dL 5.36*   CALCIUM mg/dL 9.0   BILIRUBIN mg/dL 0.3   ALK PHOS U/L 148*   ALT (SGPT) U/L 9   AST (SGOT) U/L 7   GLUCOSE mg/dL 419*       Estimated Creatinine Clearance: 12.4 mL/min (A) (by C-G formula based on SCr of 5.36 mg/dL (H)).    Results from last 7 days   Lab Units 01/11/21  1045   MAGNESIUM mg/dL 2.6*             Results from last 7 days   Lab Units 01/11/21  1045   WBC 10*3/mm3 19.77*   HEMOGLOBIN g/dL 14.8   PLATELETS 10*3/mm3 453*              MEDICATIONS       insulin, 6 Units/hr, Last Rate: 12 Units/hr (01/11/21 1526)  sodium bicarbonate drip (greater than 75 mEq/bag), 100 mEq      (Not in a hospital admission)    Assessment/Plan   ASSESSMENT / PLAN      Uncontrolled diabetes mellitus (CMS/[facility])    Acute kidney failure (CMS/[facility])    Hyponatremia    1.  SUJATHA on CKD 3- baseline creatinine around 2, creatinine up to 5.36 now.  He has a prior history of bladder outlet obstruction and multiple acute kidney injuries as well as hydronephrosis in the past.  CT revealed mild asymmetrical fluid distention in the right renal pelvis in comparison to the left.  We will place John catheter and start IV fluids with sodium bicarbonate.  Patient has a history of multiple urinary obstruction from mucus and sediment causing hydronephrosis in the past. If cr not improving may need Urology evaluation Check urine studies.    2.   Severe metabolic acidosis- bicarb drip as above    3.  Hyponatremia- corrected sodium 122, repeat BMP as patient has already received bolus prior to further dosing     4.  History of bladder cancer- requiring neobladder surgery almost 10 years ago, also history of prostate cancer    5.  History of CAD    6.  History of diabetes mellitus- now with acidosis, hyperglycemia, anion gap suspicious for early DKA    7.  Hyperkalemia- bicarb drip as above      Thank you for the consult, we will continue to follow the patient.         I discussed the patients findings and my recommendations with patient and family      This document has been electronically signed by TIFFANIE Franco on January 11, 2021 15:37 CST      For this patient encounter, I have reviewed the Nurse Practitioner's documentation, medical decision making, and treatment plan.           Electronically signed by Rodríguez Brody MD at 01/11/21 1361

## 2021-01-12 NOTE — PROGRESS NOTES
Progress Note  Hu Charles MD  Hospitalist    Date of visit: 1/12/2021     LOS: 1 day   Patient Care Team:  Shayne Merritt MD as PCP - General (Family Medicine)    Chief Complaint: weakness    Subjective     Interval History:     Patient Complaints: weakness / abdominal pain, improved    History taken from: patient / nursing    Medication Review:   Current Facility-Administered Medications   Medication Dose Route Frequency Provider Last Rate Last Admin   • albuterol (PROVENTIL) nebulizer solution 0.083% 2.5 mg/3mL  2.5 mg Nebulization Q4H PRN Hu Charles MD       • aspirin EC tablet 81 mg  81 mg Oral Daily Hu Charles MD   81 mg at 01/12/21 0933   • budesonide-formoterol (SYMBICORT) 160-4.5 MCG/ACT inhaler 2 puff  2 puff Inhalation BID - RT Hu Charles MD   2 puff at 01/12/21 0646   • cefTRIAXone (ROCEPHIN) 1 g/100 mL 0.9% NS (MBP)  1 g Intravenous Q24H Hu Charles MD   1 g at 01/11/21 2222   • dextrose (D50W) 25 g/ 50mL Intravenous Solution 25 g  25 g Intravenous Q15 Min PRN Hu Charles MD       • dextrose (GLUTOSE) oral gel 15 g  15 g Oral Q15 Min PRN Hu Charles MD       • glucagon (human recombinant) (GLUCAGEN DIAGNOSTIC) injection 1 mg  1 mg Subcutaneous Q15 Min PRN Hu Charles MD       • insulin aspart (novoLOG) injection 0-14 Units  0-14 Units Subcutaneous TID AC Hu Charles MD       • insulin detemir (LEVEMIR) injection 25 Units  25 Units Subcutaneous Q12H Hu Charles MD   25 Units at 01/12/21 1237   • morphine injection 2 mg  2 mg Intravenous Q2H PRN Hu Charles MD       • ondansetron (ZOFRAN) injection 4 mg  4 mg Intravenous Q6H PRN Hu Charles MD       • sertraline (ZOLOFT) tablet 50 mg  50 mg Oral Daily Hu Charles MD   50 mg at 01/12/21 0933   • sodium chloride 0.9 % flush 10 mL  10 mL Intravenous Q12H Hu Charles MD   10 mL at 01/12/21 1134   • sodium chloride 0.9 % flush 10 mL  10 mL Intravenous PRN Hu Charles MD       • sterile  water 850 mL with sodium bicarbonate 8.4 % 150 mEq infusion   Intravenous Continuous Rodríguez Brody MD           Review of Systems:   Review of Systems   Constitutional: Positive for fatigue. Negative for fever.   Respiratory: Positive for cough. Negative for shortness of breath.    Gastrointestinal: Negative for abdominal distention, anal bleeding, constipation, diarrhea, nausea and vomiting.   Genitourinary: Positive for frequency. Negative for dysuria, flank pain, hematuria and urgency.   Musculoskeletal: Positive for arthralgias. Negative for back pain and myalgias.   Skin: Positive for pallor. Negative for color change.   Neurological: Positive for weakness. Negative for seizures, syncope, speech difficulty and headaches.   Psychiatric/Behavioral: Negative for agitation, behavioral problems and confusion.   All other systems reviewed and are negative.      Objective     Vital Signs  Temp:  [97.5 °F (36.4 °C)-98 °F (36.7 °C)] 97.8 °F (36.6 °C)  Heart Rate:  [] 100  Resp:  [15-21] 21  BP: (100-132)/(63-70) 115/67    Physical Exam:  Physical Exam  Vitals signs reviewed.   Constitutional:       General: He is not in acute distress.     Appearance: He is ill-appearing.   HENT:      Head: Normocephalic and atraumatic.   Eyes:      General: No scleral icterus.     Extraocular Movements: Extraocular movements intact.      Pupils: Pupils are equal, round, and reactive to light.   Neck:      Musculoskeletal: Normal range of motion and neck supple. No neck rigidity or muscular tenderness.   Cardiovascular:      Rate and Rhythm: Normal rate and regular rhythm.   Pulmonary:      Effort: Pulmonary effort is normal. No respiratory distress.      Breath sounds: Normal breath sounds. No stridor. No wheezing or rhonchi.   Abdominal:      General: Bowel sounds are normal. There is no distension.      Palpations: Abdomen is soft. There is no mass.      Tenderness: There is no abdominal tenderness.      Hernia: No hernia is  present.   Musculoskeletal: Normal range of motion.         General: No swelling, tenderness or deformity.      Right lower leg: No edema.      Left lower leg: No edema.   Skin:     General: Skin is warm and dry.      Coloration: Skin is pale. Skin is not jaundiced.      Findings: No bruising or erythema.   Neurological:      General: No focal deficit present.      Mental Status: He is alert and oriented to person, place, and time. Mental status is at baseline.      Cranial Nerves: No cranial nerve deficit.      Sensory: No sensory deficit.      Motor: No weakness.      Coordination: Coordination normal.   Psychiatric:         Mood and Affect: Mood normal.         Behavior: Behavior normal.          Results Review:    Lab Results (last 24 hours)     Procedure Component Value Units Date/Time    Blood Culture - Blood, Arm, Right [[id number]]  (Abnormal) Collected: 01/11/21 1320    Specimen: Blood from Arm, Right Updated: 01/12/21 1330     Blood Culture No growth at 24 hours     Gram Stain Anaerobic Bottle Gram positive bacilli     Comment: 1/3 positive for gpb         Aerobic Bottle Gram positive bacilli     Comment: 3/3 positive got gpb       Narrative:      Blood culture does not meet the specified criteria for PCR identification.  If pregnant, immunocompromised, or clinical concern for meningitis, call lab to run BCID for Listeria monocytogenes.    POC Glucose Once [[id number]]  (Abnormal) Collected: 01/12/21 1217    Specimen: Blood Updated: 01/12/21 1232     Glucose 194 mg/dL      Comment: : [id number] GlySenseter ID: [id number]       POC Glucose Once [[id number]]  (Abnormal) Collected: 01/12/21 1128    Specimen: Blood Updated: 01/12/21 1140     Glucose 302 mg/dL      Comment: : [id number] GlySenseter ID: [id number]       POC Glucose Once [[id number]]  (Abnormal) Collected: 01/12/21 0956    Specimen: Blood Updated: 01/12/21 1010     Glucose 200 mg/dL      Comment: :  [phone] KAVITA Telnexuseter ID: [id number]       Urine Culture - Urine, Urine, Catheter In/Out [[id number]]  (Abnormal) Collected: 01/11/21 1510    Specimen: Urine, Catheter In/Out Updated: 01/12/21 0856     Urine Culture >100,000 CFU/mL Gram Negative Bacilli    POC Glucose Once [[id number]]  (Abnormal) Collected: 01/12/21 0756    Specimen: Blood Updated: 01/12/21 0808     Glucose 179 mg/dL      Comment: : [id number] KAVITA TeachbaseHAMeter ID: [id number]       Basic Metabolic Panel [[id number]]  (Abnormal) Collected: 01/12/21 0640    Specimen: Blood Updated: 01/12/21 0802     Glucose 172 mg/dL       mg/dL      Creatinine 4.48 mg/dL      Sodium 120 mmol/L      Potassium 3.7 mmol/L      Chloride 98 mmol/L      CO2 10.0 mmol/L      Calcium 8.1 mg/dL      eGFR   Amer --     Comment: <15 Indicative of kidney failure.        eGFR Non African Amer 13 mL/min/1.73      Comment: <15 Indicative of kidney failure.        BUN/Creatinine Ratio 30.8     Anion Gap 12.0 mmol/L     Narrative:      GFR Normal >60  Chronic Kidney Disease <60  Kidney Failure <15      Phosphorus [[id number]]  (Normal) Collected: 01/12/21 0640    Specimen: Blood Updated: 01/12/21 0756     Phosphorus 4.5 mg/dL     Magnesium [[id number]]  (Normal) Collected: 01/12/21 0640    Specimen: Blood Updated: 01/12/21 0756     Magnesium 2.1 mg/dL     Extra Tubes [[id number]] Collected: 01/12/21 0640    Specimen: Blood, Venous Line Updated: 01/12/21 0747    Narrative:      The following orders were created for panel order Extra Tubes.  Procedure                               Abnormality         Status                     ---------                               -----------         ------                     Lavender Top[[id number]]                                     Final result                 Please view results for these tests on the individual orders.    Lavender Top [[id number]] Collected: 01/12/21 0640    Specimen: Blood Updated: 01/12/21 0747      Extra Tube hold for add-on     Comment: Auto resulted       POC Glucose Once [[id number]]  (Abnormal) Collected: 01/12/21 0619    Specimen: Blood Updated: 01/12/21 0642     Glucose 159 mg/dL      Comment: : [phone] ADDIS KAURN BMeter ID: [id number]       POC Glucose Once [[id number]]  (Abnormal) Collected: 01/12/21 0510    Specimen: Blood Updated: 01/12/21 0642     Glucose 176 mg/dL      Comment: : [id number] ADDIS KAURN BMeter ID: [id number]       Blood Culture - Blood, Hand, Right [[id number]]  (Abnormal) Collected: 01/11/21 1501    Specimen: Blood from Hand, Right Updated: 01/12/21 0541     Blood Culture Abnormal Stain     Gram Stain Pediatric Bottle Gram positive bacilli     Comment: 2/3 positive for gpb       Narrative:      Blood culture does not meet the specified criteria for PCR identification.  If pregnant, immunocompromised, or clinical concern for meningitis, call lab to run BCID for Listeria monocytogenes.    POC Glucose Once [[id number]]  (Abnormal) Collected: 01/12/21 0408    Specimen: Blood Updated: 01/12/21 0420     Glucose 145 mg/dL      Comment: : [id number] ADDIS DEAN BMeter ID: [id number]       POC Glucose Once [[id number]]  (Abnormal) Collected: 01/12/21 0311    Specimen: Blood Updated: 01/12/21 0324     Glucose 136 mg/dL      Comment: : [phone] ADDIS MORAN BMeter ID: [id number]       Protein / Creatinine Ratio, Urine - Urine, Catheter In/Out [[id number]]  (Abnormal) Collected: 01/11/21 1510    Specimen: Urine, Catheter In/Out Updated: 01/12/21 0251     Protein/Creatinine Ratio, Urine 1,462.8 mg/G Crea      Creatinine, Urine 79.3 mg/dL      Total Protein, Urine 116.0 mg/dL     POC Glucose Once [[id number]]  (Abnormal) Collected: 01/12/21 0218    Specimen: Blood Updated: 01/12/21 0229     Glucose 133 mg/dL      Comment: : [id number] ADDIS MORAN BMeter ID: [id number]       Creatinine, Urine, Random - Urine, Catheter In/Out  [[id number]] Collected: 01/11/21 1510    Specimen: Urine, Catheter In/Out Updated: 01/12/21 0149     Creatinine, Urine 79.3 mg/dL     Narrative:      Reference intervals for random urine have not been established.  Clinical usage is dependent upon physician's interpretation in combination with other laboratory tests.       POC Glucose Once [[id number]]  (Normal) Collected: 01/12/21 0105    Specimen: Blood Updated: 01/12/21 0116     Glucose 117 mg/dL      Comment: : [id number] ADDIS DEAN BMeter ID: [id number]       POC Glucose Once [[id number]]  (Normal) Collected: 01/11/21 2325    Specimen: Blood Updated: 01/11/21 2338     Glucose 78 mg/dL      Comment: : [id number] ADDIS DEAN BMeter ID: [id number]       POC Glucose Once [[id number]]  (Normal) Collected: 01/11/21 2218    Specimen: Blood Updated: 01/11/21 2247     Glucose 80 mg/dL      Comment: : [id number] ADDIS MORAN BMeter ID: [id number]       Urine Eosinophils, Mitchel's Stain - [[id number]]  (Normal) Collected: 01/11/21 1723    Specimen: Urine Updated: 01/11/21 2158     Mitchel Stain Negative    POC Glucose Once [[id number]]  (Normal) Collected: 01/11/21 2125    Specimen: Blood Updated: 01/11/21 2138     Glucose 109 mg/dL      Comment: : [id number] ADDIS DEAN BMeter ID: [id number]       POC Glucose Once [[id number]]  (Normal) Collected: 01/11/21 2053    Specimen: Blood Updated: 01/11/21 2138     Glucose 123 mg/dL      Comment: : [id number] ADDIS DEAN BMeter ID: [id number]       POC Glucose Once [[id number]]  (Abnormal) Collected: 01/11/21 2032    Specimen: Blood Updated: 01/11/21 2138     Glucose 58 mg/dL      Comment: : [id number] ADDIS MORAN BMeter ID: [id number]       Comprehensive Metabolic Panel [[id number]]  (Abnormal) Collected: 01/11/21 1909    Specimen: Blood Updated: 01/11/21 2004     Glucose 125 mg/dL       mg/dL      Creatinine 4.76 mg/dL      Sodium 125 mmol/L       Potassium 4.3 mmol/L      Comment: Slight hemolysis detected by analyzer. Results may be affected.        Chloride 100 mmol/L      CO2 7.0 mmol/L      Calcium 8.9 mg/dL      Total Protein 6.7 g/dL      Albumin 3.30 g/dL      ALT (SGPT) 7 U/L      AST (SGOT) 11 U/L      Comment: Slight hemolysis detected by analyzer. Results may be affected.        Alkaline Phosphatase 123 U/L      Total Bilirubin 0.2 mg/dL      eGFR Non African Amer 12 mL/min/1.73      Comment: <15 Indicative of kidney failure.        eGFR   Amer --     Comment: <15 Indicative of kidney failure.        Globulin 3.4 gm/dL      A/G Ratio 1.0 g/dL      BUN/Creatinine Ratio 28.2     Anion Gap 18.0 mmol/L     Narrative:      GFR Normal >60  Chronic Kidney Disease <60  Kidney Failure <15      Phosphorus [[id number]]  (Abnormal) Collected: 01/11/21 1909    Specimen: Blood Updated: 01/11/21 1958     Phosphorus 5.0 mg/dL     POC Glucose Once [[id number]]  (Abnormal) Collected: 01/11/21 1816    Specimen: Blood Updated: 01/11/21 1835     Glucose 179 mg/dL      Comment: RN NotifiedOperator: [id number] JEFFERY FIGUEROAENMeter ID: [id number]       POC Glucose Once [[id number]]  (Abnormal) Collected: 01/11/21 1715    Specimen: Blood Updated: 01/11/21 1835     Glucose 236 mg/dL      Comment: RN NotifiedOperator: [id number] JEFFERY FIGUEROAENMeter ID: [id number]       POC Glucose Once [[id number]]  (Abnormal) Collected: 01/11/21 1634    Specimen: Blood Updated: 01/11/21 1835     Glucose 321 mg/dL      Comment: RN NotifiedOperator: [id number] JEFFERY FIGUEROAENMeter ID: [id number]       Urinalysis, Microscopic Only - Urine, Clean Catch [[id number]]  (Abnormal) Collected: 01/11/21 1723    Specimen: Urine, Clean Catch Updated: 01/11/21 1752     RBC, UA 13-20 /HPF      WBC, UA Too Numerous to Count /HPF      Bacteria, UA 4+ /HPF      Squamous Epithelial Cells, UA 3-5 /HPF      Hyaline Casts, UA 31-50 /LPF      Mucus, UA Moderate/2+ /HPF      Methodology Manual Light Microscopy     Urinalysis With Microscopic If Indicated (No Culture) - [[id number]]  (Abnormal) Collected: 01/11/21 1723    Specimen: Urine Updated: 01/11/21 1739     Color, UA Yellow     Appearance, UA Turbid     pH, UA 6.5     Specific Gravity, UA 1.009     Glucose, UA Negative     Ketones, UA Negative     Bilirubin, UA Negative     Blood, UA Small (1+)     Protein,  mg/dL (2+)     Leuk Esterase, UA Large (3+)     Nitrite, UA Negative     Urobilinogen, UA 1.0 E.U./dL    Urinalysis, Microscopic Only - Urine, Catheter In/Out [[id number]]  (Abnormal) Collected: 01/11/21 1510    Specimen: Urine, Catheter In/Out Updated: 01/11/21 1651     RBC, UA 6-12 /HPF      WBC, UA Too Numerous to Count /HPF      Bacteria, UA 4+ /HPF      Squamous Epithelial Cells, UA 6-12 /HPF      Hyaline Casts, UA       Unable to determine due to loaded field     /LPF     Methodology Manual Light Microscopy    Basic Metabolic Panel [[id number]]  (Abnormal) Collected: 01/11/21 1550    Specimen: Blood Updated: 01/11/21 1620     Glucose 329 mg/dL       mg/dL      Creatinine 4.74 mg/dL      Sodium 117 mmol/L      Potassium 5.4 mmol/L      Comment: Slight hemolysis detected by analyzer. Results may be affected.        Chloride 96 mmol/L      CO2 6.0 mmol/L      Calcium 9.0 mg/dL      eGFR   Amer --     Comment: <15 Indicative of kidney failure.        eGFR Non African Amer 13 mL/min/1.73      Comment: <15 Indicative of kidney failure.        BUN/Creatinine Ratio 27.8     Anion Gap 15.0 mmol/L     Narrative:      GFR Normal >60  Chronic Kidney Disease <60  Kidney Failure <15      Sodium, Urine, Random - Urine, Catheter In/Out [[id number]] Collected: 01/11/21 1510    Specimen: Urine, Catheter In/Out Updated: 01/11/21 1620     Sodium, Urine 40 mmol/L     Narrative:      Reference intervals for random urine have not been established.  Clinical usage is dependent upon physician's interpretation in combination with other laboratory tests.       Urinalysis  With Culture If Indicated - Urine, Catheter In/Out [[id number]]  (Abnormal) Collected: 01/11/21 1510    Specimen: Urine, Catheter In/Out Updated: 01/11/21 1545     Color, UA Yellow     Appearance, UA Cloudy     pH, UA 6.5     Specific Gravity, UA 1.013     Glucose, UA Negative     Ketones, UA Negative     Bilirubin, UA Small (1+)     Blood, UA Small (1+)     Protein,  mg/dL (2+)     Leuk Esterase, UA Moderate (2+)     Nitrite, UA Negative     Urobilinogen, UA 1.0 E.U./dL    POC Glucose Once [[id number]]  (Abnormal) Collected: 01/11/21 1526    Specimen: Blood Updated: 01/11/21 1542     Glucose 334 mg/dL      Comment: RN NotifiedOperator: [id number] JOSE ALEJANDRO MIRANDAMeter ID: [id number]       POC Glucose Once [[id number]]  (Abnormal) Collected: 01/11/21 1426    Specimen: Blood Updated: 01/11/21 1442     Glucose 360 mg/dL      Comment: RN NotifiedOperator: [id number] JEFFERY LAURENMeter ID: [id number]             Imaging Results (Last 24 Hours)     ** No results found for the last 24 hours. **          Assessment/Plan       Sepsis (CMS/formerly [facility])    UTI (urinary tract infection)    Uncontrolled diabetes mellitus (CMS/formerly [facility])    Acute kidney failure (CMS/formerly [facility])    Lung nodule    Bladder cancer (CMS/formerly [facility])    Hyponatremia    His serum glucose and creatinine values have improved - continue with the IV hydration, Levemir BID instead of the IV Insulin. Continue with the IV antibiotics as the blood culture have isolated Gram positive bacilli. Monitor renal function and CBC.    Hu Charles MD  01/12/21  13:34 CST

## 2021-01-12 NOTE — PROGRESS NOTES
"[facility]  [address] [place] [address]. [address]  T - [phone]  F - [phone]     Progress Note          PATIENT  DEMOGRAPHICS   PATIENT NAME: Favio Casillas                      PHYSICIAN: Rodríguez Brody MD  : 1955  MRN: [id number]   LOS: 1 day    Patient Care Team:  Shayne Merritt MD as PCP - General (Family Medicine)  Subjective   SUBJECTIVE   Feels well no soa. Eating po now. Off d5w         Objective   OBJECTIVE   Vital Signs  Temp:  [97.5 °F (36.4 °C)-98 °F (36.7 °C)] 97.8 °F (36.6 °C)  Heart Rate:  [] 100  Resp:  [15-21] 21  BP: (100-132)/(63-70) 115/67    Flowsheet Rows      First Filed Value   Admission Height  165.1 cm (65\") Documented at 2021 1041   Admission Weight  62.1 kg (137 lb) Documented at 2021 1041           I/O last 3 completed shifts:  In: 5167 [I.V.:3042; IV Piggyback:2125]  Out: 800 [Urine:800]    PHYSICAL EXAM    Physical Exam  Vitals signs reviewed.   Constitutional:       General: He is not in acute distress.     Appearance: He is well-developed.   Eyes:      General:         Right eye: No discharge.   Neck:      Vascular: JVD present.   Cardiovascular:      Rate and Rhythm: Normal rate and regular rhythm.      Heart sounds: Normal heart sounds, S1 normal and S2 normal. No murmur.   Pulmonary:      Effort: Pulmonary effort is normal. No respiratory distress.      Breath sounds: No rales.   Abdominal:      General: Bowel sounds are normal. There is no distension.      Palpations: Abdomen is soft.      Tenderness: There is no abdominal tenderness.   Skin:     Coloration: Skin is not pale.      Findings: No erythema.   Neurological:      Mental Status: He is alert and oriented to person, place, and time.      Sensory: No sensory deficit.      Motor: No abnormal muscle tone.         RESULTS   Results Review:    Results from last 7 days   Lab Units 21  0640 21  1909 21  1550 21  1045   SODIUM " mmol/L 120* 125* 117* 114*   POTASSIUM mmol/L 3.7 4.3 5.4* 6.0*   CHLORIDE mmol/L 98 100 96* 89*   CO2 mmol/L 10.0* 7.0* 6.0* 8.0*   BUN mg/dL 138* 134* 132* 134*   CREATININE mg/dL 4.48* 4.76* 4.74* 5.36*   CALCIUM mg/dL 8.1* 8.9 9.0 9.0   BILIRUBIN mg/dL  --  0.2  --  0.3   ALK PHOS U/L  --  123*  --  148*   ALT (SGPT) U/L  --  7  --  9   AST (SGOT) U/L  --  11  --  7   GLUCOSE mg/dL 172* 125* 329* 419*       Estimated Creatinine Clearance: 14.2 mL/min (A) (by C-G formula based on SCr of 4.48 mg/dL (H)).    Results from last 7 days   Lab Units 01/12/21  0640 01/11/21  1909 01/11/21  1045   MAGNESIUM mg/dL 2.1  --  2.6*   PHOSPHORUS mg/dL 4.5 5.0*  --              Results from last 7 days   Lab Units 01/11/21  1045   WBC 10*3/mm3 19.77*   HEMOGLOBIN g/dL 14.8   PLATELETS 10*3/mm3 453*               Imaging Results (Last 24 Hours)     ** No results found for the last 24 hours. **           MEDICATIONS    aspirin, 81 mg, Oral, Daily  budesonide-formoterol, 2 puff, Inhalation, BID - RT  cefTRIAXone, 1 g, Intravenous, Q24H  insulin aspart, 0-14 Units, Subcutaneous, TID AC  insulin detemir, 25 Units, Subcutaneous, Q12H  sertraline, 50 mg, Oral, Daily  sodium chloride, 10 mL, Intravenous, Q12H      dextrose, 75 mL/hr, Last Rate: 75 mL/hr (01/12/21 0433)  custom IV infusion builder, , Last Rate: 100 mL/hr at 01/12/21 0432        Assessment/Plan   ASSESSMENT / PLAN      Uncontrolled diabetes mellitus (CMS/HCC)    Acute kidney failure (CMS/HCC)    UTI (urinary tract infection)    Lung nodule    Bladder cancer (CMS/HCC)    1.  SUJATHA on CKD 3- baseline creatinine around 2, creatinine up to 5.36 now.  He has a prior history of bladder outlet obstruction and multiple acute kidney injuries as well as hydronephrosis in the past.  CT revealed mild asymmetrical fluid distention in the right renal pelvis in comparison to the left.  We will place John catheter and start IV fluids with sodium bicarbonate.  Patient has a history of  multiple urinary obstruction from mucus and sediment causing hydronephrosis in the past.    Cr is better. Increase bicarb in the drip. Iv bicarb push     2.  Severe metabolic acidosis- bicarb drip as above     3.  Hyponatremia- na improved then drop while on d5w. Plan as above.        4.  History of bladder cancer- requiring neobladder surgery almost 10 years ago, also history of prostate cancer     5.  History of CAD     6.  History of diabetes mellitus- now with acidosis, hyperglycemia, anion gap suspicious for early DKA     7.  Hyperkalemia- bicarb drip as above                This document has been electronically signed by Rodríguez Brody MD on January 12, 2021 12:43 CST

## 2021-01-13 PROBLEM — A41.59 KLEBSIELLA SEPSIS: Status: ACTIVE | Noted: 2021-01-13

## 2021-01-13 LAB
ANION GAP SERPL CALCULATED.3IONS-SCNC: 13 MMOL/L (ref 5–15)
ANION GAP SERPL CALCULATED.3IONS-SCNC: 13 MMOL/L (ref 5–15)
BACTERIA SPEC AEROBE CULT: ABNORMAL
BASOPHILS # BLD AUTO: 0.01 10*3/MM3 (ref 0–0.2)
BASOPHILS NFR BLD AUTO: 0.1 % (ref 0–1.5)
BUN SERPL-MCNC: 123 MG/DL (ref 8–23)
BUN SERPL-MCNC: 128 MG/DL (ref 8–23)
BUN/CREAT SERPL: 31.6 (ref 7–25)
BUN/CREAT SERPL: 34.2 (ref 7–25)
CALCIUM SPEC-SCNC: 7.4 MG/DL (ref 8.6–10.5)
CALCIUM SPEC-SCNC: 7.5 MG/DL (ref 8.6–10.5)
CHLORIDE SERPL-SCNC: 97 MMOL/L (ref 98–107)
CHLORIDE SERPL-SCNC: 98 MMOL/L (ref 98–107)
CO2 SERPL-SCNC: 15 MMOL/L (ref 22–29)
CO2 SERPL-SCNC: 18 MMOL/L (ref 22–29)
CREAT SERPL-MCNC: 3.6 MG/DL (ref 0.76–1.27)
CREAT SERPL-MCNC: 4.05 MG/DL (ref 0.76–1.27)
DEPRECATED RDW RBC AUTO: 36.8 FL (ref 37–54)
EOSINOPHIL # BLD AUTO: 0.01 10*3/MM3 (ref 0–0.4)
EOSINOPHIL NFR BLD AUTO: 0.1 % (ref 0.3–6.2)
ERYTHROCYTE [DISTWIDTH] IN BLOOD BY AUTOMATED COUNT: 13 % (ref 12.3–15.4)
GFR SERPL CREATININE-BSD FRML MDRD: 15 ML/MIN/1.73
GFR SERPL CREATININE-BSD FRML MDRD: 17 ML/MIN/1.73
GLUCOSE BLDC GLUCOMTR-MCNC: 113 MG/DL (ref 70–130)
GLUCOSE BLDC GLUCOMTR-MCNC: 115 MG/DL (ref 70–130)
GLUCOSE BLDC GLUCOMTR-MCNC: 148 MG/DL (ref 70–130)
GLUCOSE BLDC GLUCOMTR-MCNC: 233 MG/DL (ref 70–130)
GLUCOSE SERPL-MCNC: 102 MG/DL (ref 65–99)
GLUCOSE SERPL-MCNC: 149 MG/DL (ref 65–99)
HCT VFR BLD AUTO: 32.9 % (ref 37.5–51)
HGB BLD-MCNC: 11.6 G/DL (ref 13–17.7)
IMM GRANULOCYTES # BLD AUTO: 0.14 10*3/MM3 (ref 0–0.05)
IMM GRANULOCYTES NFR BLD AUTO: 0.8 % (ref 0–0.5)
LYMPHOCYTES # BLD AUTO: 0.32 10*3/MM3 (ref 0.7–3.1)
LYMPHOCYTES NFR BLD AUTO: 1.8 % (ref 19.6–45.3)
MAGNESIUM SERPL-MCNC: 1.6 MG/DL (ref 1.6–2.4)
MCH RBC QN AUTO: 28 PG (ref 26.6–33)
MCHC RBC AUTO-ENTMCNC: 35.3 G/DL (ref 31.5–35.7)
MCV RBC AUTO: 79.5 FL (ref 79–97)
MONOCYTES # BLD AUTO: 1.01 10*3/MM3 (ref 0.1–0.9)
MONOCYTES NFR BLD AUTO: 5.5 % (ref 5–12)
NEUTROPHILS NFR BLD AUTO: 16.78 10*3/MM3 (ref 1.7–7)
NEUTROPHILS NFR BLD AUTO: 91.7 % (ref 42.7–76)
NRBC BLD AUTO-RTO: 0.1 /100 WBC (ref 0–0.2)
PLATELET # BLD AUTO: 247 10*3/MM3 (ref 140–450)
PMV BLD AUTO: 9.8 FL (ref 6–12)
POTASSIUM SERPL-SCNC: 3 MMOL/L (ref 3.5–5.2)
POTASSIUM SERPL-SCNC: 3.2 MMOL/L (ref 3.5–5.2)
POTASSIUM SERPL-SCNC: 3.9 MMOL/L (ref 3.5–5.2)
RBC # BLD AUTO: 4.14 10*6/MM3 (ref 4.14–5.8)
SODIUM SERPL-SCNC: 126 MMOL/L (ref 136–145)
SODIUM SERPL-SCNC: 128 MMOL/L (ref 136–145)
WBC # BLD AUTO: 18.27 10*3/MM3 (ref 3.4–10.8)

## 2021-01-13 PROCEDURE — 80048 BASIC METABOLIC PNL TOTAL CA: CPT | Performed by: INTERNAL MEDICINE

## 2021-01-13 PROCEDURE — 63710000001 INSULIN DETEMIR PER 5 UNITS: Performed by: INTERNAL MEDICINE

## 2021-01-13 PROCEDURE — 25010000002 ONDANSETRON PER 1 MG: Performed by: INTERNAL MEDICINE

## 2021-01-13 PROCEDURE — 84132 ASSAY OF SERUM POTASSIUM: CPT | Performed by: INTERNAL MEDICINE

## 2021-01-13 PROCEDURE — 25010000002 CEFTRIAXONE PER 250 MG: Performed by: INTERNAL MEDICINE

## 2021-01-13 PROCEDURE — 82962 GLUCOSE BLOOD TEST: CPT

## 2021-01-13 PROCEDURE — 25010000003 POTASSIUM CHLORIDE 10 MEQ/100ML SOLUTION: Performed by: INTERNAL MEDICINE

## 2021-01-13 PROCEDURE — 83735 ASSAY OF MAGNESIUM: CPT | Performed by: INTERNAL MEDICINE

## 2021-01-13 PROCEDURE — 94799 UNLISTED PULMONARY SVC/PX: CPT

## 2021-01-13 PROCEDURE — 85025 COMPLETE CBC W/AUTO DIFF WBC: CPT | Performed by: INTERNAL MEDICINE

## 2021-01-13 RX ORDER — POTASSIUM CHLORIDE 1.5 G/1.77G
40 POWDER, FOR SOLUTION ORAL AS NEEDED
Status: DISCONTINUED | OUTPATIENT
Start: 2021-01-13 | End: 2021-01-17 | Stop reason: HOSPADM

## 2021-01-13 RX ORDER — POTASSIUM CHLORIDE 7.45 MG/ML
10 INJECTION INTRAVENOUS
Status: DISCONTINUED | OUTPATIENT
Start: 2021-01-13 | End: 2021-01-17 | Stop reason: HOSPADM

## 2021-01-13 RX ORDER — POTASSIUM CHLORIDE 750 MG/1
40 CAPSULE, EXTENDED RELEASE ORAL AS NEEDED
Status: DISCONTINUED | OUTPATIENT
Start: 2021-01-13 | End: 2021-01-17 | Stop reason: HOSPADM

## 2021-01-13 RX ORDER — ONDANSETRON 2 MG/ML
4 INJECTION INTRAMUSCULAR; INTRAVENOUS ONCE
Status: COMPLETED | OUTPATIENT
Start: 2021-01-13 | End: 2021-01-13

## 2021-01-13 RX ADMIN — POTASSIUM CHLORIDE 10 MEQ: 7.46 INJECTION, SOLUTION INTRAVENOUS at 10:20

## 2021-01-13 RX ADMIN — POTASSIUM CHLORIDE 10 MEQ: 7.46 INJECTION, SOLUTION INTRAVENOUS at 15:12

## 2021-01-13 RX ADMIN — SODIUM BICARBONATE 75 MEQ: 84 INJECTION, SOLUTION INTRAVENOUS at 11:11

## 2021-01-13 RX ADMIN — INSULIN DETEMIR 25 UNITS: 100 INJECTION, SOLUTION SUBCUTANEOUS at 21:51

## 2021-01-13 RX ADMIN — SODIUM CHLORIDE, PRESERVATIVE FREE 10 ML: 5 INJECTION INTRAVENOUS at 09:09

## 2021-01-13 RX ADMIN — SODIUM BICARBONATE 75 MEQ: 84 INJECTION, SOLUTION INTRAVENOUS at 23:25

## 2021-01-13 RX ADMIN — ONDANSETRON HYDROCHLORIDE 4 MG: 2 INJECTION, SOLUTION INTRAMUSCULAR; INTRAVENOUS at 05:58

## 2021-01-13 RX ADMIN — ONDANSETRON HYDROCHLORIDE 4 MG: 2 INJECTION, SOLUTION INTRAMUSCULAR; INTRAVENOUS at 23:25

## 2021-01-13 RX ADMIN — SODIUM BICARBONATE 150 MEQ: 84 INJECTION, SOLUTION INTRAVENOUS at 09:35

## 2021-01-13 RX ADMIN — ONDANSETRON HYDROCHLORIDE 4 MG: 2 INJECTION, SOLUTION INTRAMUSCULAR; INTRAVENOUS at 09:09

## 2021-01-13 RX ADMIN — BUDESONIDE AND FORMOTEROL FUMARATE DIHYDRATE 2 PUFF: 160; 4.5 AEROSOL RESPIRATORY (INHALATION) at 07:07

## 2021-01-13 RX ADMIN — SODIUM CHLORIDE, PRESERVATIVE FREE 10 ML: 5 INJECTION INTRAVENOUS at 21:52

## 2021-01-13 RX ADMIN — BUDESONIDE AND FORMOTEROL FUMARATE DIHYDRATE 2 PUFF: 160; 4.5 AEROSOL RESPIRATORY (INHALATION) at 19:17

## 2021-01-13 RX ADMIN — POTASSIUM CHLORIDE 40 MEQ: 10 CAPSULE, COATED, EXTENDED RELEASE ORAL at 22:05

## 2021-01-13 RX ADMIN — POTASSIUM CHLORIDE 10 MEQ: 7.46 INJECTION, SOLUTION INTRAVENOUS at 12:13

## 2021-01-13 RX ADMIN — POTASSIUM CHLORIDE 10 MEQ: 7.46 INJECTION, SOLUTION INTRAVENOUS at 13:22

## 2021-01-13 RX ADMIN — ONDANSETRON HYDROCHLORIDE 4 MG: 2 INJECTION, SOLUTION INTRAMUSCULAR; INTRAVENOUS at 13:00

## 2021-01-13 RX ADMIN — CEFTRIAXONE 2 G: 2 INJECTION, POWDER, FOR SOLUTION INTRAMUSCULAR; INTRAVENOUS at 18:38

## 2021-01-13 RX ADMIN — POTASSIUM CHLORIDE 10 MEQ: 7.46 INJECTION, SOLUTION INTRAVENOUS at 11:15

## 2021-01-13 RX ADMIN — POTASSIUM CHLORIDE 10 MEQ: 7.46 INJECTION, SOLUTION INTRAVENOUS at 14:22

## 2021-01-13 RX ADMIN — ASPIRIN 81 MG: 81 TABLET, COATED ORAL at 21:50

## 2021-01-13 RX ADMIN — SERTRALINE HYDROCHLORIDE 50 MG: 50 TABLET ORAL at 21:49

## 2021-01-13 NOTE — PROGRESS NOTES
Progress Note  Hu Charles MD  Hospitalist    Date of visit: 1/13/2021     LOS: 2 days   Patient Care Team:  Shayne Merritt MD as PCP - General (Family Medicine)    Chief Complaint: weakness    Subjective     Interval History:     Patient Complaints: weakness / abdominal pain, improved    History taken from: patient / nursing    Medication Review:   Current Facility-Administered Medications   Medication Dose Route Frequency Provider Last Rate Last Admin   • aspirin EC tablet 81 mg  81 mg Oral Daily Hu Charles MD   81 mg at 01/12/21 0933   • budesonide-formoterol (SYMBICORT) 160-4.5 MCG/ACT inhaler 2 puff  2 puff Inhalation BID - RT Hu Charles MD   2 puff at 01/13/21 0707   • cefTRIAXone (ROCEPHIN) 2 g/100 mL 0.9% NS IVPB (MBP)  2 g Intravenous Q24H Hu Charles MD   2 g at 01/12/21 2029   • dextrose (D50W) 25 g/ 50mL Intravenous Solution 25 g  25 g Intravenous Q15 Min PRN Hu Charles MD       • dextrose (GLUTOSE) oral gel 15 g  15 g Oral Q15 Min PRN Hu Charles MD       • glucagon (human recombinant) (GLUCAGEN DIAGNOSTIC) injection 1 mg  1 mg Subcutaneous Q15 Min PRN Hu Charles MD       • insulin aspart (novoLOG) injection 0-14 Units  0-14 Units Subcutaneous TID AC Hu Charles MD       • insulin detemir (LEVEMIR) injection 25 Units  25 Units Subcutaneous Q12H Hu Charles MD   25 Units at 01/12/21 2034   • morphine injection 2 mg  2 mg Intravenous Q2H PRN Hu Charles MD       • ondansetron (ZOFRAN) injection 4 mg  4 mg Intravenous Q6H PRN Hu Charles MD   4 mg at 01/13/21 0558   • potassium chloride (MICRO-K) CR capsule 40 mEq  40 mEq Oral PRN Hu Charles MD        Or   • potassium chloride (KLOR-CON) packet 40 mEq  40 mEq Oral PRN Hu Charles MD        Or   • potassium chloride 10 mEq in 100 mL IVPB  10 mEq Intravenous Q1H PRN Hu Charles  mL/hr at 01/13/21 1020 10 mEq at 01/13/21 1020   • sertraline (ZOLOFT) tablet 50 mg  50 mg Oral Daily  Hu Charles MD   50 mg at 01/12/21 0933   • sodium bicarbonate 8.4 % 75 mEq in sodium chloride 0.45 % 1,075 mL infusion (greater than 75 mEq)  75 mEq Intravenous Continuous Rodríguez Brody MD       • sodium chloride 0.9 % flush 10 mL  10 mL Intravenous Q12H Hu Charles MD   10 mL at 01/13/21 0909   • sodium chloride 0.9 % flush 10 mL  10 mL Intravenous PRN Hu Charles MD           Review of Systems:   Review of Systems   Constitutional: Positive for fatigue. Negative for fever.   Respiratory: Positive for cough. Negative for shortness of breath.    Gastrointestinal: Negative for abdominal distention, anal bleeding, constipation, diarrhea, nausea and vomiting.   Genitourinary: Positive for frequency. Negative for dysuria, flank pain, hematuria and urgency.   Musculoskeletal: Positive for arthralgias. Negative for back pain and myalgias.   Skin: Positive for pallor. Negative for color change.   Neurological: Positive for weakness. Negative for seizures, syncope, speech difficulty and headaches.   Psychiatric/Behavioral: Negative for agitation, behavioral problems and confusion.   All other systems reviewed and are negative.      Objective     Vital Signs  Temp:  [96.6 °F (35.9 °C)-98.4 °F (36.9 °C)] 96.6 °F (35.9 °C)  Heart Rate:  [] 98  Resp:  [18-23] 18  BP: (106-124)/(59-73) 116/68    Physical Exam:  Physical Exam  Vitals signs reviewed.   Constitutional:       General: He is not in acute distress.     Appearance: He is ill-appearing.   HENT:      Head: Normocephalic and atraumatic.   Eyes:      General: No scleral icterus.     Extraocular Movements: Extraocular movements intact.      Pupils: Pupils are equal, round, and reactive to light.   Neck:      Musculoskeletal: Normal range of motion and neck supple. No neck rigidity or muscular tenderness.   Cardiovascular:      Rate and Rhythm: Normal rate and regular rhythm.   Pulmonary:      Effort: Pulmonary effort is normal. No respiratory distress.       Breath sounds: Normal breath sounds. No stridor. No wheezing or rhonchi.   Abdominal:      General: Bowel sounds are normal. There is no distension.      Palpations: Abdomen is soft. There is no mass.      Tenderness: There is no abdominal tenderness.      Hernia: No hernia is present.   Musculoskeletal: Normal range of motion.         General: No swelling, tenderness or deformity.      Right lower leg: No edema.      Left lower leg: No edema.   Skin:     General: Skin is warm and dry.      Coloration: Skin is pale. Skin is not jaundiced.      Findings: No bruising or erythema.   Neurological:      General: No focal deficit present.      Mental Status: He is alert and oriented to person, place, and time. Mental status is at baseline.      Cranial Nerves: No cranial nerve deficit.      Sensory: No sensory deficit.      Motor: No weakness.      Coordination: Coordination normal.   Psychiatric:         Mood and Affect: Mood normal.         Behavior: Behavior normal.          Results Review:    Lab Results (last 24 hours)     Procedure Component Value Units Date/Time    Blood Culture - Blood, Arm, Right [[id number]]  (Abnormal) Collected: 01/11/21 1320    Specimen: Blood from Arm, Right Updated: 01/13/21 0930     Blood Culture Gram Negative Bacilli     Isolated from Aerobic and Anaerobic Bottles     Gram Stain Anaerobic Bottle Gram negative bacilli     Comment: 1/3 positive for gpb  Modified report. Previous result was Anaerobic Bottle Gram positive bacilli on 1/12/2021 at 1330 CST.         Aerobic Bottle Gram negative bacilli     Comment: 3/3 positive got gpb  Modified report. Previous result was Aerobic Bottle Gram positive bacilli on 1/12/2021 at 1330 CST.       Narrative:      Corrected report called to Dr. Charles    Blood Culture - Blood, Hand, Right [[id number]]  (Abnormal) Collected: 01/11/21 1501    Specimen: Blood from Hand, Right Updated: 01/13/21 0930     Blood Culture Gram Negative Bacilli     Gram Stain  Pediatric Bottle Gram negative bacilli     Comment: 2/3 positive for gpb  Modified report. Previous result was Pediatric Bottle Gram positive bacilli on 1/12/2021 at 1515 CST.       Narrative:      Corrected report called to Dr. Charles    Basic Metabolic Panel [[id number]]  (Abnormal) Collected: 01/13/21 0614    Specimen: Blood Updated: 01/13/21 0701     Glucose 102 mg/dL       mg/dL      Creatinine 4.05 mg/dL      Sodium 126 mmol/L      Potassium 3.0 mmol/L      Chloride 98 mmol/L      CO2 15.0 mmol/L      Calcium 7.4 mg/dL      eGFR Non African Amer 15 mL/min/1.73      BUN/Creatinine Ratio 31.6     Anion Gap 13.0 mmol/L     Narrative:      GFR Normal >60  Chronic Kidney Disease <60  Kidney Failure <15      CBC & Differential [[id number]]  (Abnormal) Collected: 01/13/21 0614    Specimen: Blood Updated: 01/13/21 0650    Narrative:      The following orders were created for panel order CBC & Differential.  Procedure                               Abnormality         Status                     ---------                               -----------         ------                     CBC Auto Differential[[id number]]        Abnormal            Final result                 Please view results for these tests on the individual orders.    CBC Auto Differential [[id number]]  (Abnormal) Collected: 01/13/21 0614    Specimen: Blood Updated: 01/13/21 0650     WBC 18.27 10*3/mm3      RBC 4.14 10*6/mm3      Hemoglobin 11.6 g/dL      Comment: SPECIMEN REANALYZED TO CONFIRM HGB RESULTS        Hematocrit 32.9 %      MCV 79.5 fL      MCH 28.0 pg      MCHC 35.3 g/dL      RDW 13.0 %      RDW-SD 36.8 fl      MPV 9.8 fL      Platelets 247 10*3/mm3      Neutrophil % 91.7 %      Lymphocyte % 1.8 %      Monocyte % 5.5 %      Eosinophil % 0.1 %      Basophil % 0.1 %      Immature Grans % 0.8 %      Neutrophils, Absolute 16.78 10*3/mm3      Lymphocytes, Absolute 0.32 10*3/mm3      Monocytes, Absolute 1.01 10*3/mm3      Eosinophils, Absolute  0.01 10*3/mm3      Basophils, Absolute 0.01 10*3/mm3      Immature Grans, Absolute 0.14 10*3/mm3      nRBC 0.1 /100 WBC     POC Glucose Once [[id number]]  (Normal) Collected: 01/13/21 0620    Specimen: Blood Updated: 01/13/21 0649     Glucose 115 mg/dL      Comment: RN NotifiedOperator: [id number] JERMAINE Condo[employer] ID: [id number]       Urine Culture - Urine, Urine, Catheter In/Out [[id number]]  (Abnormal)  (Susceptibility) Collected: 01/11/21 1510    Specimen: Urine, Catheter In/Out Updated: 01/13/21 0328     Urine Culture >100,000 CFU/mL Klebsiella pneumoniae ssp pneumoniae    Susceptibility      Klebsiella pneumoniae ssp pneumoniae     COLLEEN     Ampicillin Resistant     Ampicillin + Sulbactam Susceptible     Cefazolin Susceptible     Cefepime Susceptible     Ceftazidime Susceptible     Ceftriaxone Susceptible     Gentamicin Susceptible     Levofloxacin Susceptible     Nitrofurantoin Resistant     Piperacillin + Tazobactam Susceptible     Tetracycline Susceptible     Trimethoprim + Sulfamethoxazole Susceptible                    POC Glucose Once [[id number]]  (Abnormal) Collected: 01/12/21 1903    Specimen: Blood Updated: 01/12/21 2212     Glucose 209 mg/dL      Comment: RN NotifiedOperator: [id number] [employer] ID: [id number]       Basic Metabolic Panel [[id number]]  (Abnormal) Collected: 01/12/21 2008    Specimen: Blood Updated: 01/12/21 2103     Glucose 182 mg/dL       mg/dL      Creatinine 4.72 mg/dL      Sodium 118 mmol/L      Potassium 3.5 mmol/L      Comment: Slight hemolysis detected by analyzer. Results may be affected.        Chloride 94 mmol/L      CO2 12.0 mmol/L      Calcium 8.0 mg/dL      eGFR   Amer --     Comment: <15 Indicative of kidney failure.        eGFR Non African Amer 13 mL/min/1.73      Comment: <15 Indicative of kidney failure.        BUN/Creatinine Ratio 27.8     Anion Gap 12.0 mmol/L     Narrative:      GFR Normal >60  Chronic Kidney Disease <60  Kidney  Failure <15      POC Glucose Once [[id number]]  (Abnormal) Collected: 01/12/21 1217    Specimen: Blood Updated: 01/12/21 1232     Glucose 194 mg/dL      Comment: : [id number] KAVITA TRISHAMeter ID: [id number]       POC Glucose Once [[id number]]  (Abnormal) Collected: 01/12/21 1128    Specimen: Blood Updated: 01/12/21 1140     Glucose 302 mg/dL      Comment: : [id number] Twisted Family CreationsHAMeter ID: [id number]             Imaging Results (Last 24 Hours)     ** No results found for the last 24 hours. **          Assessment/Plan       Klebsiella sepsis (CMS/HCC)    UTI (urinary tract infection)    Uncontrolled diabetes mellitus (CMS/HCC)    Acute kidney failure (CMS/HCC)    Lung nodule    Bladder cancer (CMS/HCC)    Hyponatremia    His serum glucose and creatinine values have improved to some extent - continue with the IV hydration, Levemir BID instead of the IV Insulin. Continue with the IV Rocephin as the blood culture have isolated Gram negative bacilli (Klebsiella). Monitor renal function and CBC.    Hu Charles MD  01/13/21  10:44 CST

## 2021-01-13 NOTE — PROGRESS NOTES
"[facility]  [address] [place] [address]. [id number]  T - [phone]  F - [phone]     Progress Note          PATIENT  DEMOGRAPHICS   PATIENT NAME: Favio Casillas                      PHYSICIAN: Rodríguez Bordy MD  : 1955  MRN: [id number]   LOS: 2 days    Patient Care Team:  Shayne Merritt MD as PCP - General (Family Medicine)  Subjective   SUBJECTIVE   Feels well no soa. Eating po now. resting         Objective   OBJECTIVE   Vital Signs  Temp:  [96.6 °F (35.9 °C)-98.4 °F (36.9 °C)] 96.6 °F (35.9 °C)  Heart Rate:  [] 98  Resp:  [18-23] 18  BP: (106-124)/(59-73) 116/68    Flowsheet Rows      First Filed Value   Admission Height  165.1 cm (65\") Documented at 2021 1041   Admission Weight  62.1 kg (137 lb) Documented at 2021 1041           I/O last 3 completed shifts:  In: 3575 [P.O.:840; I.V.:2610; IV Piggyback:125]  Out: 2475 [Urine:2475]    PHYSICAL EXAM    Physical Exam  Vitals signs reviewed.   Constitutional:       General: He is not in acute distress.     Appearance: He is well-developed.   Eyes:      General:         Right eye: No discharge.   Neck:      Vascular: JVD present.   Cardiovascular:      Rate and Rhythm: Normal rate and regular rhythm.      Heart sounds: Normal heart sounds, S1 normal and S2 normal. No murmur.   Pulmonary:      Effort: Pulmonary effort is normal. No respiratory distress.      Breath sounds: No rales.   Abdominal:      General: Bowel sounds are normal. There is no distension.      Palpations: Abdomen is soft.      Tenderness: There is no abdominal tenderness.   Skin:     Coloration: Skin is not pale.      Findings: No erythema.   Neurological:      Mental Status: He is alert and oriented to person, place, and time.      Sensory: No sensory deficit.      Motor: No abnormal muscle tone.         RESULTS   Results Review:    Results from last 7 days   Lab Units 21  0614 21  0640 21  1909 "  01/11/21  1045   SODIUM mmol/L 126* 118* 120* 125*   < > 114*   POTASSIUM mmol/L 3.0* 3.5 3.7 4.3   < > 6.0*   CHLORIDE mmol/L 98 94* 98 100   < > 89*   CO2 mmol/L 15.0* 12.0* 10.0* 7.0*   < > 8.0*   BUN mg/dL 128* 131* 138* 134*   < > 134*   CREATININE mg/dL 4.05* 4.72* 4.48* 4.76*   < > 5.36*   CALCIUM mg/dL 7.4* 8.0* 8.1* 8.9   < > 9.0   BILIRUBIN mg/dL  --   --   --  0.2  --  0.3   ALK PHOS U/L  --   --   --  123*  --  148*   ALT (SGPT) U/L  --   --   --  7  --  9   AST (SGOT) U/L  --   --   --  11  --  7   GLUCOSE mg/dL 102* 182* 172* 125*   < > 419*    < > = values in this interval not displayed.       Estimated Creatinine Clearance: 15.6 mL/min (A) (by C-G formula based on SCr of 4.05 mg/dL (H)).    Results from last 7 days   Lab Units 01/12/21  0640 01/11/21  1909 01/11/21  1045   MAGNESIUM mg/dL 2.1  --  2.6*   PHOSPHORUS mg/dL 4.5 5.0*  --              Results from last 7 days   Lab Units 01/13/21  0614 01/11/21  1045   WBC 10*3/mm3 18.27* 19.77*   HEMOGLOBIN g/dL 11.6* 14.8   PLATELETS 10*3/mm3 247 453*               Imaging Results (Last 24 Hours)     ** No results found for the last 24 hours. **           MEDICATIONS    aspirin, 81 mg, Oral, Daily  budesonide-formoterol, 2 puff, Inhalation, BID - RT  cefTRIAXone, 2 g, Intravenous, Q24H  insulin aspart, 0-14 Units, Subcutaneous, TID AC  insulin detemir, 25 Units, Subcutaneous, Q12H  sertraline, 50 mg, Oral, Daily  sodium chloride, 10 mL, Intravenous, Q12H      sodium bicarbonate drip (greater than 75 mEq/bag), 75 mEq, Last Rate: 75 mEq (01/13/21 1111)        Assessment/Plan   ASSESSMENT / PLAN      Klebsiella sepsis (CMS/HCC)    Uncontrolled diabetes mellitus (CMS/HCC)    Acute kidney failure (CMS/HCC)    UTI (urinary tract infection)    Hyponatremia    Lung nodule    Bladder cancer (CMS/[facility])    1.  SUJATHA on CKD 3- baseline creatinine around 2, creatinine up to 5.36 now.  He has a prior history of bladder outlet obstruction and multiple acute kidney  injuries as well as hydronephrosis in the past.  CT revealed mild asymmetrical fluid distention in the right renal pelvis in comparison to the left.  Patient has a history of multiple urinary obstruction from mucus and sediment causing hydronephrosis in the past.    Cr is better. Trial of removing liu     2.  Severe metabolic acidosis- bicarb drip as above, level is improving     3.  Hyponatremia- na improved to 126. Keep bicarb drip with half saline to make it isotonic      4.  History of bladder cancer- requiring neobladder surgery almost 10 years ago, also history of prostate cancer     5.  History of CAD     6.  History of diabetes mellitus- now with acidosis, hyperglycemia, anion gap suspicious for early DKA     7.  Hyperkalemia- now low on replacement    8. GNR bacteremia                This document has been electronically signed by Rodríguez Brody MD on January 13, 2021 11:19 CST

## 2021-01-13 NOTE — PROGRESS NOTES
Discharge Planning Assessment  [facility]     Patient Name: Favio Casillas  MRN: [id number]  Today's Date: 1/13/2021    Admit Date: 1/11/2021    Discharge Needs Assessment     Row Name 01/13/21 1246       Living Environment    Lives With  alone    Current Living Arrangements  home/apartment/condo    Primary Care Provided by  self    Provides Primary Care For  no one    Family Caregiver if Needed  friend(s)    Family Caregiver Names  Sofi    Quality of Family Relationships  helpful;involved;supportive    Able to Return to Prior Arrangements  yes    Living Arrangement Comments  Pt resides at home alone. According to girlfriensarah pt has good support system.       Resource/Environmental Concerns    Resource/Environmental Concerns  none       Transition Planning    Patient/Family Anticipates Transition to  home    Patient/Family Anticipated Services at Transition  home health care Pt has used [facility] in past, no services currently.    Transportation Anticipated  family or friend will provide       Discharge Needs Assessment    Equipment Currently Used at Home  cane, straight;nebulizer;glucometer    Concerns to be Addressed  adjustment to diagnosis/illness    Equipment Needed After Discharge  -- Awaiting therapy recomendations.    Discharge Facility/Level of Care Needs  home with home health    Provided Post Acute Provider List?  Yes    Post Acute Provider List  Home Health    Delivered To  Support Person    Method of Delivery  Telephone    Patient's Choice of Community Agency(s)  [facility]    Current Discharge Risk  chronically ill;lives alone        Discharge Plan     Row Name 01/13/21 1252       Plan    Plan Comments  LSW assessment complete. Pt resides at home alone. According to ronfriensarah pt has good support system. She reports pt was independent prior to hospitalization. She checks on pt daily and assist with any needs. Pt has used home health in past, no services currently. She feels pt would  benefit from home health at d/c. Pt uses cane to assist with mobility. She reports pt plans to return home at d/c. LSW/case mgt awaiting additional recomendations from MD and therapy. LSW/case mgt will follow up as consulted and complete arrangements as ordered. Hernan LEWIS        Continued Care and Services - Admitted Since 1/11/2021    Coordination has not been started for this encounter.         Demographic Summary     Row Name 01/13/21 1244       General Information    Admission Type  inpatient    Referral Source  high risk screening    Reason for Consult  discharge planning    Preferred Language  English     Used During This Interaction  yes Girlfriend, Sofi.       Contact Information    Contact Information Obtained for          Functional Status     Row Name 01/13/21 1245       Functional Status    Usual Activity Tolerance  moderate    Current Activity Tolerance  fair    Functional Status Comments  Pt uses cane to assist with mobility.       Functional Status, IADL    Medications  independent Pt uses [facility]    Meal Preparation  independent    Housekeeping  independent    Laundry  independent    Shopping  assistive person       Mental Status Summary    Recent Changes in Mental Status/Cognitive Functioning  unable to assess        Psychosocial    No documentation.       Abuse/Neglect    No documentation.       Legal    No documentation.       Substance Abuse    No documentation.       Patient Forms    No documentation.           JOSHUA Mosley

## 2021-01-14 LAB
ANION GAP SERPL CALCULATED.3IONS-SCNC: 9 MMOL/L (ref 5–15)
BACTERIA SPEC AEROBE CULT: ABNORMAL
BACTERIA SPEC AEROBE CULT: ABNORMAL
BASOPHILS # BLD AUTO: 0.01 10*3/MM3 (ref 0–0.2)
BASOPHILS NFR BLD AUTO: 0.1 % (ref 0–1.5)
BUN SERPL-MCNC: 108 MG/DL (ref 8–23)
BUN/CREAT SERPL: 37 (ref 7–25)
CALCIUM SPEC-SCNC: 7.7 MG/DL (ref 8.6–10.5)
CHLORIDE SERPL-SCNC: 97 MMOL/L (ref 98–107)
CO2 SERPL-SCNC: 22 MMOL/L (ref 22–29)
CREAT SERPL-MCNC: 2.92 MG/DL (ref 0.76–1.27)
DEPRECATED RDW RBC AUTO: 39.1 FL (ref 37–54)
EOSINOPHIL # BLD AUTO: 0 10*3/MM3 (ref 0–0.4)
EOSINOPHIL NFR BLD AUTO: 0 % (ref 0.3–6.2)
ERYTHROCYTE [DISTWIDTH] IN BLOOD BY AUTOMATED COUNT: 13.4 % (ref 12.3–15.4)
GFR SERPL CREATININE-BSD FRML MDRD: 22 ML/MIN/1.73
GLUCOSE BLDC GLUCOMTR-MCNC: 198 MG/DL (ref 70–130)
GLUCOSE BLDC GLUCOMTR-MCNC: 408 MG/DL (ref 70–130)
GLUCOSE BLDC GLUCOMTR-MCNC: 411 MG/DL (ref 70–130)
GLUCOSE BLDC GLUCOMTR-MCNC: 99 MG/DL (ref 70–130)
GLUCOSE SERPL-MCNC: 137 MG/DL (ref 65–99)
GRAM STN SPEC: ABNORMAL
HCT VFR BLD AUTO: 30.8 % (ref 37.5–51)
HGB BLD-MCNC: 10.8 G/DL (ref 13–17.7)
IMM GRANULOCYTES # BLD AUTO: 0.09 10*3/MM3 (ref 0–0.05)
IMM GRANULOCYTES NFR BLD AUTO: 0.7 % (ref 0–0.5)
ISOLATED FROM: ABNORMAL
ISOLATED FROM: ABNORMAL
LYMPHOCYTES # BLD AUTO: 0.18 10*3/MM3 (ref 0.7–3.1)
LYMPHOCYTES NFR BLD AUTO: 1.3 % (ref 19.6–45.3)
MAGNESIUM SERPL-MCNC: 1.5 MG/DL (ref 1.6–2.4)
MCH RBC QN AUTO: 27.9 PG (ref 26.6–33)
MCHC RBC AUTO-ENTMCNC: 35.1 G/DL (ref 31.5–35.7)
MCV RBC AUTO: 79.6 FL (ref 79–97)
MONOCYTES # BLD AUTO: 0.3 10*3/MM3 (ref 0.1–0.9)
MONOCYTES NFR BLD AUTO: 2.2 % (ref 5–12)
NEUTROPHILS NFR BLD AUTO: 12.86 10*3/MM3 (ref 1.7–7)
NEUTROPHILS NFR BLD AUTO: 95.7 % (ref 42.7–76)
NRBC BLD AUTO-RTO: 0 /100 WBC (ref 0–0.2)
PHOSPHATE SERPL-MCNC: 2.7 MG/DL (ref 2.5–4.5)
PLATELET # BLD AUTO: 188 10*3/MM3 (ref 140–450)
PMV BLD AUTO: 10 FL (ref 6–12)
POTASSIUM SERPL-SCNC: 3.2 MMOL/L (ref 3.5–5.2)
RBC # BLD AUTO: 3.87 10*6/MM3 (ref 4.14–5.8)
SODIUM SERPL-SCNC: 128 MMOL/L (ref 136–145)
WBC # BLD AUTO: 13.44 10*3/MM3 (ref 3.4–10.8)

## 2021-01-14 PROCEDURE — 80048 BASIC METABOLIC PNL TOTAL CA: CPT | Performed by: INTERNAL MEDICINE

## 2021-01-14 PROCEDURE — 25010000002 CEFTRIAXONE: Performed by: INTERNAL MEDICINE

## 2021-01-14 PROCEDURE — 63710000001 INSULIN ASPART PER 5 UNITS: Performed by: INTERNAL MEDICINE

## 2021-01-14 PROCEDURE — 85025 COMPLETE CBC W/AUTO DIFF WBC: CPT | Performed by: INTERNAL MEDICINE

## 2021-01-14 PROCEDURE — 82962 GLUCOSE BLOOD TEST: CPT

## 2021-01-14 PROCEDURE — 63710000001 INSULIN DETEMIR PER 5 UNITS: Performed by: INTERNAL MEDICINE

## 2021-01-14 PROCEDURE — 83735 ASSAY OF MAGNESIUM: CPT | Performed by: INTERNAL MEDICINE

## 2021-01-14 PROCEDURE — 84100 ASSAY OF PHOSPHORUS: CPT | Performed by: INTERNAL MEDICINE

## 2021-01-14 PROCEDURE — 25010000002 MAGNESIUM SULFATE IN D5W 1G/100ML (PREMIX) 1-5 GM/100ML-% SOLUTION: Performed by: INTERNAL MEDICINE

## 2021-01-14 PROCEDURE — 94799 UNLISTED PULMONARY SVC/PX: CPT

## 2021-01-14 RX ORDER — SODIUM CHLORIDE, SODIUM LACTATE, POTASSIUM CHLORIDE, CALCIUM CHLORIDE 600; 310; 30; 20 MG/100ML; MG/100ML; MG/100ML; MG/100ML
100 INJECTION, SOLUTION INTRAVENOUS CONTINUOUS
Status: DISCONTINUED | OUTPATIENT
Start: 2021-01-14 | End: 2021-01-15

## 2021-01-14 RX ORDER — MAGNESIUM SULFATE 1 G/100ML
1 INJECTION INTRAVENOUS ONCE
Status: COMPLETED | OUTPATIENT
Start: 2021-01-14 | End: 2021-01-14

## 2021-01-14 RX ORDER — CYCLOBENZAPRINE HCL 5 MG
5 TABLET ORAL 3 TIMES DAILY PRN
Status: DISCONTINUED | OUTPATIENT
Start: 2021-01-14 | End: 2021-01-17 | Stop reason: HOSPADM

## 2021-01-14 RX ORDER — POTASSIUM CHLORIDE 1.5 G/1.77G
20 POWDER, FOR SOLUTION ORAL ONCE
Status: COMPLETED | OUTPATIENT
Start: 2021-01-14 | End: 2021-01-14

## 2021-01-14 RX ORDER — LANOLIN ALCOHOL/MO/W.PET/CERES
400 CREAM (GRAM) TOPICAL DAILY
Status: DISCONTINUED | OUTPATIENT
Start: 2021-01-14 | End: 2021-01-17 | Stop reason: HOSPADM

## 2021-01-14 RX ORDER — POTASSIUM CHLORIDE 750 MG/1
20 CAPSULE, EXTENDED RELEASE ORAL 2 TIMES DAILY WITH MEALS
Status: DISCONTINUED | OUTPATIENT
Start: 2021-01-14 | End: 2021-01-15

## 2021-01-14 RX ADMIN — POTASSIUM CHLORIDE 40 MEQ: 10 CAPSULE, COATED, EXTENDED RELEASE ORAL at 02:09

## 2021-01-14 RX ADMIN — CEFTRIAXONE 2 G: 2 INJECTION, POWDER, FOR SOLUTION INTRAMUSCULAR; INTRAVENOUS at 18:41

## 2021-01-14 RX ADMIN — BUDESONIDE AND FORMOTEROL FUMARATE DIHYDRATE 2 PUFF: 160; 4.5 AEROSOL RESPIRATORY (INHALATION) at 20:53

## 2021-01-14 RX ADMIN — POTASSIUM CHLORIDE 20 MEQ: 1.5 POWDER, FOR SOLUTION ORAL at 10:35

## 2021-01-14 RX ADMIN — MAGNESIUM SULFATE HEPTAHYDRATE 1 G: 1 INJECTION, SOLUTION INTRAVENOUS at 12:13

## 2021-01-14 RX ADMIN — POTASSIUM CHLORIDE 20 MEQ: 10 CAPSULE, COATED, EXTENDED RELEASE ORAL at 10:36

## 2021-01-14 RX ADMIN — Medication 400 MG: at 10:36

## 2021-01-14 RX ADMIN — SODIUM CHLORIDE, POTASSIUM CHLORIDE, SODIUM LACTATE AND CALCIUM CHLORIDE 100 ML/HR: 600; 310; 30; 20 INJECTION, SOLUTION INTRAVENOUS at 20:18

## 2021-01-14 RX ADMIN — INSULIN ASPART 3 UNITS: 100 INJECTION, SOLUTION INTRAVENOUS; SUBCUTANEOUS at 17:39

## 2021-01-14 RX ADMIN — POTASSIUM CHLORIDE 20 MEQ: 10 CAPSULE, COATED, EXTENDED RELEASE ORAL at 17:39

## 2021-01-14 RX ADMIN — SODIUM CHLORIDE, PRESERVATIVE FREE 10 ML: 5 INJECTION INTRAVENOUS at 20:19

## 2021-01-14 RX ADMIN — BUDESONIDE AND FORMOTEROL FUMARATE DIHYDRATE 2 PUFF: 160; 4.5 AEROSOL RESPIRATORY (INHALATION) at 07:39

## 2021-01-14 RX ADMIN — INSULIN DETEMIR 25 UNITS: 100 INJECTION, SOLUTION SUBCUTANEOUS at 09:33

## 2021-01-14 RX ADMIN — INSULIN ASPART 8 UNITS: 100 INJECTION, SOLUTION INTRAVENOUS; SUBCUTANEOUS at 12:13

## 2021-01-14 RX ADMIN — INSULIN DETEMIR 25 UNITS: 100 INJECTION, SOLUTION SUBCUTANEOUS at 20:30

## 2021-01-14 RX ADMIN — ASPIRIN 81 MG: 81 TABLET, COATED ORAL at 09:33

## 2021-01-14 RX ADMIN — SERTRALINE HYDROCHLORIDE 50 MG: 50 TABLET ORAL at 09:33

## 2021-01-14 RX ADMIN — SODIUM CHLORIDE, POTASSIUM CHLORIDE, SODIUM LACTATE AND CALCIUM CHLORIDE 100 ML/HR: 600; 310; 30; 20 INJECTION, SOLUTION INTRAVENOUS at 10:36

## 2021-01-14 NOTE — PROGRESS NOTES
"[facility]  [address] [place] [address]. [id number]  T - [phone]  F - [phone]     Progress Note          PATIENT  DEMOGRAPHICS   PATIENT NAME: Favio Casillas                      PHYSICIAN: Rodríguez Brody MD  : 1955  MRN: [id number]   LOS: 3 days    Patient Care Team:  Shayne Merritt MD as PCP - General (Family Medicine)  Subjective   SUBJECTIVE   Feels well no soa. Eating po now. Has BM today         Objective   OBJECTIVE   Vital Signs  Temp:  [96.3 °F (35.7 °C)-98.6 °F (37 °C)] 97.3 °F (36.3 °C)  Heart Rate:  [] 107  Resp:  [16-18] 18  BP: (111-135)/(60-80) 111/63    Flowsheet Rows      First Filed Value   Admission Height  165.1 cm (65\") Documented at 2021 1041   Admission Weight  62.1 kg (137 lb) Documented at 2021 1041           I/O last 3 completed shifts:  In: 580 [P.O.:580]  Out: 3625 [Urine:3625]    PHYSICAL EXAM    Physical Exam  Vitals signs reviewed.   Constitutional:       General: He is not in acute distress.     Appearance: He is well-developed.   Eyes:      General:         Right eye: No discharge.   Neck:      Vascular: JVD present.   Cardiovascular:      Rate and Rhythm: Normal rate and regular rhythm.      Heart sounds: Normal heart sounds, S1 normal and S2 normal. No murmur.   Pulmonary:      Effort: Pulmonary effort is normal. No respiratory distress.      Breath sounds: No rales.   Abdominal:      General: Bowel sounds are normal. There is no distension.      Palpations: Abdomen is soft.      Tenderness: There is no abdominal tenderness.   Skin:     Coloration: Skin is not pale.      Findings: No erythema.   Neurological:      Mental Status: He is alert and oriented to person, place, and time.      Sensory: No sensory deficit.      Motor: No abnormal muscle tone.         RESULTS   Results Review:    Results from last 7 days   Lab Units 21  0807 21  2004 21  1511 21  0614  21  1909  " 01/11/21  1045   SODIUM mmol/L 128*  --  128* 126*   < > 125*   < > 114*   POTASSIUM mmol/L 3.2* 3.2* 3.9 3.0*   < > 4.3   < > 6.0*   CHLORIDE mmol/L 97*  --  97* 98   < > 100   < > 89*   CO2 mmol/L 22.0  --  18.0* 15.0*   < > 7.0*   < > 8.0*   BUN mg/dL 108*  --  123* 128*   < > 134*   < > 134*   CREATININE mg/dL 2.92*  --  3.60* 4.05*   < > 4.76*   < > 5.36*   CALCIUM mg/dL 7.7*  --  7.5* 7.4*   < > 8.9   < > 9.0   BILIRUBIN mg/dL  --   --   --   --   --  0.2  --  0.3   ALK PHOS U/L  --   --   --   --   --  123*  --  148*   ALT (SGPT) U/L  --   --   --   --   --  7  --  9   AST (SGOT) U/L  --   --   --   --   --  11  --  7   GLUCOSE mg/dL 137*  --  149* 102*   < > 125*   < > 419*    < > = values in this interval not displayed.       Estimated Creatinine Clearance: 21.8 mL/min (A) (by C-G formula based on SCr of 2.92 mg/dL (H)).    Results from last 7 days   Lab Units 01/14/21  0807 01/13/21 2004 01/12/21  0640 01/11/21  1909   MAGNESIUM mg/dL 1.5* 1.6 2.1  --    PHOSPHORUS mg/dL 2.7  --  4.5 5.0*             Results from last 7 days   Lab Units 01/14/21  0807 01/13/21  0614 01/11/21  1045   WBC 10*3/mm3 13.44* 18.27* 19.77*   HEMOGLOBIN g/dL 10.8* 11.6* 14.8   PLATELETS 10*3/mm3 188 247 453*               Imaging Results (Last 24 Hours)     ** No results found for the last 24 hours. **           MEDICATIONS    aspirin, 81 mg, Oral, Daily  budesonide-formoterol, 2 puff, Inhalation, BID - RT  cefTRIAXone, 2 g, Intravenous, Q24H  insulin aspart, 0-14 Units, Subcutaneous, TID AC  insulin detemir, 25 Units, Subcutaneous, Q12H  magnesium oxide, 400 mg, Oral, Daily  potassium chloride, 20 mEq, Oral, BID With Meals  sertraline, 50 mg, Oral, Daily  sodium chloride, 10 mL, Intravenous, Q12H      sodium bicarbonate drip (greater than 75 mEq/bag), 75 mEq, Last Rate: 75 mEq (01/13/21 4899)        Assessment/Plan   ASSESSMENT / PLAN      Klebsiella sepsis (CMS/[facility])    Uncontrolled diabetes mellitus (CMS/[facility])    Acute kidney  failure (CMS/HCC)    UTI (urinary tract infection)    Hyponatremia    Lung nodule    Bladder cancer (CMS/HCC)    1.  SUJATHA on CKD 3- baseline creatinine around 2, creatinine up to 5.36 now.  He has a prior history of bladder outlet obstruction and multiple acute kidney injuries as well as hydronephrosis in the past.  CT revealed mild asymmetrical fluid distention in the right renal pelvis in comparison to the left.  Patient has a history of multiple urinary obstruction from mucus and sediment causing hydronephrosis in the past.    Cr is better and now 2.9. Trial of removing liu today. ivf for another day     2.  Severe metabolic acidosis- bicarb drip is corrected - switch to LR     3.  Hyponatremia- na stable. Add salt tab      4.  History of bladder cancer- requiring neobladder surgery almost 10 years ago, also history of prostate cancer     5.  History of CAD     6.  History of diabetes mellitus- now with acidosis, hyperglycemia, anion gap suspicious for early DKA     7.  Hyperkalemia- now low on replacement. Replace mg    8. Kleibsella urosepsis on ceftriaxone               This document has been electronically signed by Rodríguez Brody MD on January 14, 2021 09:50 CST

## 2021-01-14 NOTE — PROGRESS NOTES
Progress Note  Hu Charles MD  Hospitalist    Date of visit: 1/14/2021     LOS: 3 days   Patient Care Team:  Shayne Merritt MD as PCP - General (Family Medicine)    Chief Complaint: weakness    Subjective     Interval History:     Patient Complaints: weakness / abdominal pain, improved. Less nausea as well.    History taken from: patient / nursing    Medication Review:   Current Facility-Administered Medications   Medication Dose Route Frequency Provider Last Rate Last Admin   • aspirin EC tablet 81 mg  81 mg Oral Daily Hu Charles MD   81 mg at 01/14/21 0933   • budesonide-formoterol (SYMBICORT) 160-4.5 MCG/ACT inhaler 2 puff  2 puff Inhalation BID - RT Hu Charles MD   2 puff at 01/14/21 0739   • cefTRIAXone (ROCEPHIN) 2 g/100 mL 0.9% NS IVPB (MBP)  2 g Intravenous Q24H Hu Charles MD   2 g at 01/13/21 1838   • cyclobenzaprine (FLEXERIL) tablet 5 mg  5 mg Oral TID PRN Hu Charles MD       • dextrose (D50W) 25 g/ 50mL Intravenous Solution 25 g  25 g Intravenous Q15 Min PRN Hu Charles MD       • dextrose (GLUTOSE) oral gel 15 g  15 g Oral Q15 Min PRN Hu Charles MD       • glucagon (human recombinant) (GLUCAGEN DIAGNOSTIC) injection 1 mg  1 mg Subcutaneous Q15 Min PRN Hu Charles MD       • insulin aspart (novoLOG) injection 0-14 Units  0-14 Units Subcutaneous TID AC Hu Charles MD   8 Units at 01/14/21 1213   • insulin detemir (LEVEMIR) injection 25 Units  25 Units Subcutaneous Q12H Hu Charles MD   25 Units at 01/14/21 0933   • lactated ringers infusion  100 mL/hr Intravenous Continuous Rodríguez Brody  mL/hr at 01/14/21 1036 100 mL/hr at 01/14/21 1036   • Magnesium Oxide tablet 400 mg  400 mg Oral Daily Hu Charles MD   400 mg at 01/14/21 1036   • morphine injection 2 mg  2 mg Intravenous Q2H PRN Hu Charles MD       • ondansetron (ZOFRAN) injection 4 mg  4 mg Intravenous Q6H PRN Hu Charles MD   4 mg at 01/13/21 5417   • potassium chloride  (MICRO-K) CR capsule 40 mEq  40 mEq Oral PRN Hu Charles MD   40 mEq at 01/14/21 0209    Or   • potassium chloride (KLOR-CON) packet 40 mEq  40 mEq Oral PRN Hu Charles MD        Or   • potassium chloride 10 mEq in 100 mL IVPB  10 mEq Intravenous Q1H PRN Hu Charles  mL/hr at 01/13/21 1512 10 mEq at 01/13/21 1512   • potassium chloride (MICRO-K) CR capsule 20 mEq  20 mEq Oral BID With Meals Hu Charles MD   20 mEq at 01/14/21 1036   • sertraline (ZOLOFT) tablet 50 mg  50 mg Oral Daily Hu Charles MD   50 mg at 01/14/21 0933   • sodium chloride 0.9 % flush 10 mL  10 mL Intravenous Q12H Hu Charles MD   10 mL at 01/13/21 2152   • sodium chloride 0.9 % flush 10 mL  10 mL Intravenous PRN Hu Charles MD           Review of Systems:   Review of Systems   Constitutional: Positive for fatigue. Negative for fever.   Respiratory: Positive for cough. Negative for shortness of breath.    Gastrointestinal: Negative for abdominal distention, anal bleeding, constipation, diarrhea, nausea and vomiting.   Genitourinary: Positive for frequency. Negative for dysuria, flank pain, hematuria and urgency.   Musculoskeletal: Positive for arthralgias. Negative for back pain and myalgias.   Skin: Positive for pallor. Negative for color change.   Neurological: Positive for weakness. Negative for seizures, syncope, speech difficulty and headaches.   Psychiatric/Behavioral: Negative for agitation, behavioral problems and confusion.   All other systems reviewed and are negative.      Objective     Vital Signs  Temp:  [97.3 °F (36.3 °C)-99.1 °F (37.3 °C)] 99.1 °F (37.3 °C)  Heart Rate:  [] 102  Resp:  [16-18] 18  BP: (111-135)/(60-80) 113/76    Physical Exam:  Physical Exam  Vitals signs reviewed.   Constitutional:       General: He is not in acute distress.     Appearance: He is ill-appearing.   HENT:      Head: Normocephalic and atraumatic.   Eyes:      General: No scleral icterus.     Extraocular  Movements: Extraocular movements intact.      Pupils: Pupils are equal, round, and reactive to light.   Neck:      Musculoskeletal: Normal range of motion and neck supple. No neck rigidity or muscular tenderness.   Cardiovascular:      Rate and Rhythm: Normal rate and regular rhythm.   Pulmonary:      Effort: Pulmonary effort is normal. No respiratory distress.      Breath sounds: Normal breath sounds. No stridor. No wheezing or rhonchi.   Abdominal:      General: Bowel sounds are normal. There is no distension.      Palpations: Abdomen is soft. There is no mass.      Tenderness: There is no abdominal tenderness.      Hernia: No hernia is present.   Musculoskeletal: Normal range of motion.         General: No swelling, tenderness or deformity.      Right lower leg: No edema.      Left lower leg: No edema.   Skin:     General: Skin is warm and dry.      Coloration: Skin is pale. Skin is not jaundiced.      Findings: No bruising or erythema.   Neurological:      General: No focal deficit present.      Mental Status: He is alert and oriented to person, place, and time. Mental status is at baseline.      Cranial Nerves: No cranial nerve deficit.      Sensory: No sensory deficit.      Motor: No weakness.      Coordination: Coordination normal.   Psychiatric:         Mood and Affect: Mood normal.         Behavior: Behavior normal.          Results Review:    Lab Results (last 24 hours)     Procedure Component Value Units Date/Time    Phosphorus [[id number]]  (Normal) Collected: 01/14/21 0807    Specimen: Blood Updated: 01/14/21 0854     Phosphorus 2.7 mg/dL     Magnesium [[id number]]  (Abnormal) Collected: 01/14/21 0807    Specimen: Blood Updated: 01/14/21 0853     Magnesium 1.5 mg/dL     Basic Metabolic Panel [[id number]]  (Abnormal) Collected: 01/14/21 0807    Specimen: Blood Updated: 01/14/21 0853     Glucose 137 mg/dL       mg/dL      Creatinine 2.92 mg/dL      Sodium 128 mmol/L      Potassium 3.2 mmol/L       Chloride 97 mmol/L      CO2 22.0 mmol/L      Calcium 7.7 mg/dL      eGFR Non African Amer 22 mL/min/1.73      BUN/Creatinine Ratio 37.0     Anion Gap 9.0 mmol/L     Narrative:      GFR Normal >60  Chronic Kidney Disease <60  Kidney Failure <15      CBC & Differential [[id number]]  (Abnormal) Collected: 01/14/21 0807    Specimen: Blood Updated: 01/14/21 0833    Narrative:      The following orders were created for panel order CBC & Differential.  Procedure                               Abnormality         Status                     ---------                               -----------         ------                     CBC Auto Differential[[id number]]        Abnormal            Final result                 Please view results for these tests on the individual orders.    CBC Auto Differential [[id number]]  (Abnormal) Collected: 01/14/21 0807    Specimen: Blood Updated: 01/14/21 0833     WBC 13.44 10*3/mm3      RBC 3.87 10*6/mm3      Hemoglobin 10.8 g/dL      Hematocrit 30.8 %      MCV 79.6 fL      MCH 27.9 pg      MCHC 35.1 g/dL      RDW 13.4 %      RDW-SD 39.1 fl      MPV 10.0 fL      Platelets 188 10*3/mm3      Neutrophil % 95.7 %      Lymphocyte % 1.3 %      Monocyte % 2.2 %      Eosinophil % 0.0 %      Basophil % 0.1 %      Immature Grans % 0.7 %      Neutrophils, Absolute 12.86 10*3/mm3      Lymphocytes, Absolute 0.18 10*3/mm3      Monocytes, Absolute 0.30 10*3/mm3      Eosinophils, Absolute 0.00 10*3/mm3      Basophils, Absolute 0.01 10*3/mm3      Immature Grans, Absolute 0.09 10*3/mm3      nRBC 0.0 /100 WBC     Blood Culture - Blood, Hand, Right [[id number]]  (Abnormal) Collected: 01/11/21 1501    Specimen: Blood from Hand, Right Updated: 01/14/21 0832     Blood Culture Klebsiella pneumoniae ssp pneumoniae     Isolated from Pediatric Bottle     Gram Stain Pediatric Bottle Gram negative bacilli     Comment: 2/3 positive for gpb  Modified report. Previous result was Pediatric Bottle Gram positive bacilli on 1/12/2021  at 1515 CST.       Narrative:      Corrected report called to Dr. Charles  Refer to previous blood culture collected on 1/11 for vivi's.      Blood Culture - Blood, Arm, Right [[id number]]  (Abnormal)  (Susceptibility) Collected: 01/11/21 1320    Specimen: Blood from Arm, Right Updated: 01/14/21 0831     Blood Culture Klebsiella pneumoniae ssp pneumoniae     Isolated from Aerobic and Anaerobic Bottles     Gram Stain Anaerobic Bottle Gram negative bacilli     Comment: 1/3 positive for gpb  Modified report. Previous result was Anaerobic Bottle Gram positive bacilli on 1/12/2021 at 1330 CST.         Aerobic Bottle Gram negative bacilli     Comment: 3/3 positive got gpb  Modified report. Previous result was Aerobic Bottle Gram positive bacilli on 1/12/2021 at 1330 CST.       Narrative:      Corrected report called to Dr. Charles    Susceptibility      Klebsiella pneumoniae ssp pneumoniae     VIVI     Ampicillin Resistant     Ampicillin + Sulbactam Susceptible     Cefepime Susceptible     Ceftazidime Susceptible     Ceftriaxone Susceptible     Gentamicin Susceptible     Levofloxacin Susceptible     Piperacillin + Tazobactam Susceptible     Trimethoprim + Sulfamethoxazole Susceptible                Susceptibility Comments     Klebsiella pneumoniae ssp pneumoniae    Cefazolin sensitivity will not be reported for Enterobacteriaceae in non-urine isolates. If cefazolin is preferred, please call the microbiology lab to request an E-test.  With the exception of urinary-sourced infections, aminoglycosides should not be used as monotherapy.             POC Glucose Once [[id number]]  (Abnormal) Collected: 01/12/21 1601    Specimen: Blood Updated: 01/14/21 0753     Glucose 198 mg/dL      Comment: : [id number] KAVITA [facility] ID: [id number]       POC Glucose Once [[id number]]  (Normal) Collected: 01/12/21 0006    Specimen: Blood Updated: 01/14/21 0747     Glucose 99 mg/dL      Comment: : [id number] ADDIS MORAN  BMeter ID: [id number]       POC Glucose Once [[id number]]  (Abnormal) Collected: 01/11/21 1307    Specimen: Blood Updated: 01/14/21 0746     Glucose 411 mg/dL      Comment: RN NotifiedOperator: [id number] JOSE ALEJANDRO ROSEMeter ID: [id number]       POC Glucose Once [[id number]]  (Abnormal) Collected: 01/11/21 1036    Specimen: Blood Updated: 01/14/21 0746     Glucose 408 mg/dL      Comment: RN NotifiedNotify DoctorOperator: [id number] NICHELLE ARREDONDOeter ID: [id number]       POC Glucose Once [[id number]]  (Abnormal) Collected: 01/13/21 1914    Specimen: Blood Updated: 01/13/21 2159     Glucose 233 mg/dL      Comment: RN NotifiedOperator: [id number] JERMAINE GEORGEMeter ID: [id number]       POC Glucose Once [[id number]]  (Abnormal) Collected: 01/13/21 1622    Specimen: Blood Updated: 01/13/21 2158     Glucose 148 mg/dL      Comment: RN NotifiedOperator: [id number] RYANN ALEXISMeter ID: [id number]       Magnesium [[id number]]  (Normal) Collected: 01/13/21 2004    Specimen: Blood Updated: 01/13/21 2158     Magnesium 1.6 mg/dL     POC Glucose Once [[id number]]  (Normal) Collected: 01/13/21 1014    Specimen: Blood Updated: 01/13/21 2158     Glucose 113 mg/dL      Comment: RN NotifiedOperator: [id number] GARZON ALEXISMeter ID: [id number]       Potassium [[id number]]  (Abnormal) Collected: 01/13/21 2004    Specimen: Blood Updated: 01/13/21 2042     Potassium 3.2 mmol/L     Basic Metabolic Panel [[id number]]  (Abnormal) Collected: 01/13/21 1511    Specimen: Blood Updated: 01/13/21 1606     Glucose 149 mg/dL       mg/dL      Creatinine 3.60 mg/dL      Sodium 128 mmol/L      Potassium 3.9 mmol/L      Chloride 97 mmol/L      CO2 18.0 mmol/L      Calcium 7.5 mg/dL      eGFR Non African Amer 17 mL/min/1.73      BUN/Creatinine Ratio 34.2     Anion Gap 13.0 mmol/L     Narrative:      GFR Normal >60  Chronic Kidney Disease <60  Kidney Failure <15            Imaging Results (Last 24 Hours)     ** No results found for the last  24 hours. **          Assessment/Plan       Klebsiella sepsis (CMS/HCC)    UTI (urinary tract infection)    Uncontrolled diabetes mellitus (CMS/HCC)    Acute kidney failure (CMS/HCC)    Lung nodule    Bladder cancer (CMS/HCC)    Hyponatremia    His serum glucose and creatinine values have improved - continue with the IV hydration, Levemir BID instead of the IV Insulin. Continue with the IV Rocephin as the blood culture have isolated Gram negative bacilli (Klebsiella). Monitor renal function and CBC - improving.    Hu Charles MD  01/14/21  14:22 CST

## 2021-01-14 NOTE — PLAN OF CARE
Goal Outcome Evaluation:  Plan of Care Reviewed With: patient  Progress: improving  Outcome Summary: Pt has decreased nausea & vomiting & is tolerating regular diet at this point. Blood glucose better controlled. John catheter d/c'd & pt voiding with no problems. Anticipate possible d/c tomorrow. VSS.

## 2021-01-14 NOTE — PLAN OF CARE
Goal Outcome Evaluation:  Plan of Care Reviewed With: patient  Progress: improving  Outcome Summary: pts nausea seems to be imporved, will continue to monitor

## 2021-01-14 NOTE — PROGRESS NOTES
"Adult Nutrition  Assessment    Patient Name:  Favio Casillas  YOB: 1955  MRN: [id number]  Admit Date:  1/11/2021    Assessment Date:  1/14/2021    Comments:  Glu more controlled has switched to insulin BID. Metabolic acidosis improving and Na now 128L. Was on clears for nausea- now back to Regular textures Cardiac/ADA w/ low fiber/residue at lunch today- no intakes available. GFR currently 22L and renal function slightly improved, alb 3.3L. 7/2020 wt 139# and currently 131#/BMI 21.8. RD completed NFPE -noted moderate fat loss and muscle wasting across body. Pt reports -135# for several months and that he has been eating \"fair\" pta. Pt meets criteria for moderate, chronic malnutrition likely r/t h/o cancer, COPD, uncontrolled diabetes and drug use. Pt states he would drink supplements at home, but cant afford them. Discussed ideas for increasing protein and calories while keeping DM in mind. Encouraged snacks between meals. Left information and coupons for [employer] and pictures of store brand supplements. RD changed pt back to soft/ground meats, added BGC and 2% milk all meals and SF ice cream L/D and encouraged snacks between meals. RD to follow hospital course.       Reason for Assessment     Row Name 01/14/21 7100          Reason for Assessment    Reason For Assessment  follow-up protocol     Diagnosis  diabetes diagnosis/complications;renal disease;infection/sepsis;metabolic state     Identified At Risk by Screening Criteria  MST SCORE 2+         Nutrition/Diet History     Row Name 01/14/21 1336          Nutrition/Diet History    Typical Food/Fluid Intake  Pt states he had nausea yesterday and had few meals of clear liquids---today No GI distress w/ 2 BMS today---he states -135# He reports he eats ok. Would drink supplements at home, but cant afford them. Discussed ideas for increasing protein and calories while keeping DM in mind. Encouraged snacks between meals. Left information " "and coupons for [employer] and pictures of store brand supplements.     Food Preferences  wnats to try crystal light and he likes ice cream (SF)     Supplemental Drinks/Foods/Additives  likes all flavors           Labs/Tests/Procedures/Meds     Row Name 01/14/21 1339          Labs/Procedures/Meds    Lab Results Reviewed  reviewed     Lab Results Comments  Glu 233/137, /Cr2.92- GFR 22L, Na 128L, K and phos wnl, Alb 3.3L        Diagnostic Tests/Procedures    Diagnostic Test/Procedure Reviewed  reviewed        Medications    Pertinent Medications Reviewed  reviewed     Pertinent Medications Comments  bicarb drip, levemir 25u BID             Nutrition Prescription Ordered     Row Name 01/14/21 1340          Nutrition Prescription PO    Current PO Diet  Regular     Common Modifiers  Cardiac;Consistent Carbohydrate     Other Modifiers  Low Fiber;Low Residue             Malnutrition Severity Assessment     Row Name 01/14/21 1341          Malnutrition Severity Assessment    Malnutrition Type  Chronic Disease - Related Malnutrition        Insufficient Energy Intake     Insufficient Energy Intake Findings  Moderate     Insufficient Energy Intake   <50% of est. energy requirement for >or equal to 1 month pt states he eats \"fair\"        Unintentional Weight Loss     Unintentional Weight Loss Findings  None -135# for \" past several months\"        Muscle Loss    Loss of Muscle Mass Findings  Moderate     Faith Region  Moderate - slight depression     Clavicle Bone Region  Moderate - some protrusion in females, visible in males     Acromion Bone Region  Moderate - acromion may slightly protrude     Scapular Bone Region  Moderate - mild depression, bones may show slightly     Dorsal Hand Region  Moderate - slight depression     Patellar Region  Moderate - patella more prominent, less muscle definition around patella     Anterior Thigh Region  Moderate - mild depression on inner thigh     Posterior Calf Region  Moderate - " some roundness, slight firmness        Fat Loss    Subcutaneous Fat Loss Findings  Moderate     Orbital Region   Moderate -  somewhat hollowness, slightly dark circles     Upper Arm Region  Moderate - some fat tissue, not ample     Thoracic & Lumbar Region  -- mild- hips mild to moderate----ribs not visible        Criteria Met (Must meet criteria for severity in at least 2 of these categories: M Wasting, Fat Loss, Fluid, Secondary Signs, Wt. Status, Intake)    Patient meets criteria for   Moderate (non-severe) Malnutrition           Electronically signed by:  Maribel Knapp RD  01/14/21 13:47 CST

## 2021-01-14 NOTE — PAYOR COMM NOTE
"      Vane Patton RN [facility]  [phone]      Phone  [phone]       Fax  Cont. Stay Review      Favio Owens (65 y.o. Male)     Date of Birth Social Security Number Address Home Phone MRN    1955  [address]  [place] [address] [phone] [id number]    Worship Marital Status          Amish Single       Admission Date Admission Type Admitting Provider Attending Provider Department, Room/Bed    1/11/21 Emergency Hu Charles MD Popescu, Tudor, MD [address], 322/1    Discharge Date Discharge Disposition Discharge Destination                       Attending Provider: Hu Charles MD    Allergies: No Known Allergies    Isolation: None   Infection: None   Code Status: CPR    Ht: 170.2 cm (67\")   Wt: 59.4 kg (131 lb)    Admission Cmt: None   Principal Problem: Klebsiella sepsis (CMS/[facility]) [A41.4]                 Active Insurance as of 1/11/2021     Primary Coverage     Payor Plan Insurance Group Employer/Plan Group    ANTHEM MEDICARE REPLACEMENT ANTHEM MEDICARE ADVANTAGE [id number]     Payor Plan Address Payor Plan Phone Number Payor Plan Fax Number Effective Dates    [address] [phone]  1/1/2021 - None Entered    [facility] [address]       Subscriber Name Subscriber Birth Date Member ID       FAVIO OWENS 1955 [id number]           Secondary Coverage     Payor Plan Insurance Group Employer/Plan Group    [place]      Payor Plan Address Payor Plan Phone Number Payor Plan Fax Number Effective Dates    [address] [phone]  8/1/2017 - None Entered    [facility] [address]       Subscriber Name Subscriber Birth Date Member ID       FAVIO OWENS 1955 [id number]                 Emergency Contacts      (Rel.) Home Phone Work Phone Mobile Phone    Sofi Mcdonald (Friend) [phone] -- [phone]    VinnyTong (Brother) -- -- [phone]    "         Vital Signs (last day)     Date/Time   Temp   Temp src   Pulse   Resp   BP   Patient Position   SpO2    01/14/21 0756   97.3 (36.3)   Axillary   107   18   111/63   Lying   96    01/14/21 0745   --   --   106   --   --   --   --    01/14/21 0739   --   --   89   16   --   --   96    01/14/21 0520   --   --   96   --   --   --   --    01/14/21 0519   --   --   (!) 147   --   --   --   --    01/14/21 0415   98.2 (36.8)   Temporal   92   16   124/73   Lying   96    01/14/21 0010   --   --   94   --   --   --   --    01/13/21 2314   97.7 (36.5)   Temporal   92   18   113/60   Lying   96    01/13/21 1917   --   --   99   18   --   --   97    01/13/21 1911   98.6 (37)   Temporal   98   18   135/80   Lying   97    01/13/21 1634   --   --   93   --   --   --   --    01/13/21 1500   97.3 (36.3)   Oral   97   18   128/68   Lying   96    01/13/21 1100   96.3 (35.7)   Oral   98   18   130/69   Lying   96    01/13/21 0743   --   --   98   --   --   --   --    01/13/21 0707   --   --   97   18   --   --   94    01/13/21 0700   96.6 (35.9)   Oral   98   18   116/68   Lying   94    01/13/21 0558   --   --   107   --   --   --   --    01/13/21 0350   97.1 (36.2)   Temporal   98   18   106/59   Lying   97    01/13/21 0059   --   --   101   --   --   --   --              Current Facility-Administered Medications   Medication Dose Route Frequency Provider Last Rate Last Admin   • aspirin EC tablet 81 mg  81 mg Oral Daily Hu Charles MD   81 mg at 01/13/21 2150   • budesonide-formoterol (SYMBICORT) 160-4.5 MCG/ACT inhaler 2 puff  2 puff Inhalation BID - RT Hu Charles MD   2 puff at 01/14/21 0739   • cefTRIAXone (ROCEPHIN) 2 g/100 mL 0.9% NS IVPB (MBP)  2 g Intravenous Q24H Hu Charles MD   2 g at 01/13/21 1835   • dextrose (D50W) 25 g/ 50mL Intravenous Solution 25 g  25 g Intravenous Q15 Min PRN Hu Charles MD       • dextrose (GLUTOSE) oral gel 15 g  15 g Oral Q15 Min PRN Hu Charles MD       • glucagon  (human recombinant) (GLUCAGEN DIAGNOSTIC) injection 1 mg  1 mg Subcutaneous Q15 Min PRN Hu Charles MD       • insulin aspart (novoLOG) injection 0-14 Units  0-14 Units Subcutaneous TID AC Hu Charles MD       • insulin detemir (LEVEMIR) injection 25 Units  25 Units Subcutaneous Q12H Hu Charles MD   25 Units at 21 215   • morphine injection 2 mg  2 mg Intravenous Q2H PRN Hu Charles MD       • ondansetron (ZOFRAN) injection 4 mg  4 mg Intravenous Q6H PRN Hu Charles MD   4 mg at 21 2325   • potassium chloride (MICRO-K) CR capsule 40 mEq  40 mEq Oral PRN Hu Charles MD   40 mEq at 21 0209    Or   • potassium chloride (KLOR-CON) packet 40 mEq  40 mEq Oral PRN Hu Charles MD        Or   • potassium chloride 10 mEq in 100 mL IVPB  10 mEq Intravenous Q1H PRN Hu Charles  mL/hr at 21 1512 10 mEq at 21 1512   • sertraline (ZOLOFT) tablet 50 mg  50 mg Oral Daily Hu Charles MD   50 mg at 21   • sodium bicarbonate 8.4 % 75 mEq in sodium chloride 0.45 % 1,075 mL infusion (greater than 75 mEq)  75 mEq Intravenous Continuous Rodríguez Brody  mL/hr at 21 75 mEq at 21 232   • sodium chloride 0.9 % flush 10 mL  10 mL Intravenous Q12H Hu Charles MD   10 mL at 21   • sodium chloride 0.9 % flush 10 mL  10 mL Intravenous PRN Hu Charles MD            Physician Progress Notes (last 48 hours) (Notes from 21 0834 through 21 0834)      Rodríguez Brody MD at 21 111          [facility]  [address] [place] [address]. [address]  T - [phone]  F - [phone]     Progress Note          PATIENT  DEMOGRAPHICS   PATIENT NAME: Favio Casillas                      PHYSICIAN: Rodríguez Brody MD  : 1955  MRN: [id number]   LOS: 2 days    Patient Care Team:  Shayne Merritt MD as PCP - General (Family Medicine)  Subjective   SUBJECTIVE   Feels well no soa.  "Eating po now. resting         Objective   OBJECTIVE   Vital Signs  Temp:  [96.6 °F (35.9 °C)-98.4 °F (36.9 °C)] 96.6 °F (35.9 °C)  Heart Rate:  [] 98  Resp:  [18-23] 18  BP: (106-124)/(59-73) 116/68    Flowsheet Rows      First Filed Value   Admission Height  165.1 cm (65\") Documented at 01/11/2021 1041   Admission Weight  62.1 kg (137 lb) Documented at 01/11/2021 1041           I/O last 3 completed shifts:  In: 3575 [P.O.:840; I.V.:2610; IV Piggyback:125]  Out: 2475 [Urine:2475]    PHYSICAL EXAM    Physical Exam  Vitals signs reviewed.   Constitutional:       General: He is not in acute distress.     Appearance: He is well-developed.   Eyes:      General:         Right eye: No discharge.   Neck:      Vascular: JVD present.   Cardiovascular:      Rate and Rhythm: Normal rate and regular rhythm.      Heart sounds: Normal heart sounds, S1 normal and S2 normal. No murmur.   Pulmonary:      Effort: Pulmonary effort is normal. No respiratory distress.      Breath sounds: No rales.   Abdominal:      General: Bowel sounds are normal. There is no distension.      Palpations: Abdomen is soft.      Tenderness: There is no abdominal tenderness.   Skin:     Coloration: Skin is not pale.      Findings: No erythema.   Neurological:      Mental Status: He is alert and oriented to person, place, and time.      Sensory: No sensory deficit.      Motor: No abnormal muscle tone.         RESULTS   Results Review:    Results from last 7 days   Lab Units 01/13/21  0614 01/12/21 2008 01/12/21  0640 01/11/21  1909  01/11/21  1045   SODIUM mmol/L 126* 118* 120* 125*   < > 114*   POTASSIUM mmol/L 3.0* 3.5 3.7 4.3   < > 6.0*   CHLORIDE mmol/L 98 94* 98 100   < > 89*   CO2 mmol/L 15.0* 12.0* 10.0* 7.0*   < > 8.0*   BUN mg/dL 128* 131* 138* 134*   < > 134*   CREATININE mg/dL 4.05* 4.72* 4.48* 4.76*   < > 5.36*   CALCIUM mg/dL 7.4* 8.0* 8.1* 8.9   < > 9.0   BILIRUBIN mg/dL  --   --   --  0.2  --  0.3   ALK PHOS U/L  --   --   --  123*  -- "  148*   ALT (SGPT) U/L  --   --   --  7  --  9   AST (SGOT) U/L  --   --   --  11  --  7   GLUCOSE mg/dL 102* 182* 172* 125*   < > 419*    < > = values in this interval not displayed.       Estimated Creatinine Clearance: 15.6 mL/min (A) (by C-G formula based on SCr of 4.05 mg/dL (H)).    Results from last 7 days   Lab Units 01/12/21  0640 01/11/21  1909 01/11/21  1045   MAGNESIUM mg/dL 2.1  --  2.6*   PHOSPHORUS mg/dL 4.5 5.0*  --              Results from last 7 days   Lab Units 01/13/21  0614 01/11/21  1045   WBC 10*3/mm3 18.27* 19.77*   HEMOGLOBIN g/dL 11.6* 14.8   PLATELETS 10*3/mm3 247 453*               Imaging Results (Last 24 Hours)     ** No results found for the last 24 hours. **           MEDICATIONS    aspirin, 81 mg, Oral, Daily  budesonide-formoterol, 2 puff, Inhalation, BID - RT  cefTRIAXone, 2 g, Intravenous, Q24H  insulin aspart, 0-14 Units, Subcutaneous, TID AC  insulin detemir, 25 Units, Subcutaneous, Q12H  sertraline, 50 mg, Oral, Daily  sodium chloride, 10 mL, Intravenous, Q12H      sodium bicarbonate drip (greater than 75 mEq/bag), 75 mEq, Last Rate: 75 mEq (01/13/21 1111)        Assessment/Plan   ASSESSMENT / PLAN      Klebsiella sepsis (CMS/HCC)    Uncontrolled diabetes mellitus (CMS/HCC)    Acute kidney failure (CMS/HCC)    UTI (urinary tract infection)    Hyponatremia    Lung nodule    Bladder cancer (CMS/HCC)    1.  SUJATHA on CKD 3- baseline creatinine around 2, creatinine up to 5.36 now.  He has a prior history of bladder outlet obstruction and multiple acute kidney injuries as well as hydronephrosis in the past.  CT revealed mild asymmetrical fluid distention in the right renal pelvis in comparison to the left.  Patient has a history of multiple urinary obstruction from mucus and sediment causing hydronephrosis in the past.    Cr is better. Trial of removing liu     2.  Severe metabolic acidosis- bicarb drip as above, level is improving     3.  Hyponatremia- na improved to 126. Keep  bicarb drip with half saline to make it isotonic      4.  History of bladder cancer- requiring neobladder surgery almost 10 years ago, also history of prostate cancer     5.  History of CAD     6.  History of diabetes mellitus- now with acidosis, hyperglycemia, anion gap suspicious for early DKA     7.  Hyperkalemia- now low on replacement    8. GNR bacteremia                This document has been electronically signed by Rodríguez Brody MD on January 13, 2021 11:19 CST           Electronically signed by Rodríguez Brody MD at 01/13/21 1122     Hu Charles MD at 01/13/21 0832          Progress Note  Hu Charles MD  Hospitalist    Date of visit: 1/13/2021     LOS: 2 days   Patient Care Team:  Shayne Merritt MD as PCP - General (Family Medicine)    Chief Complaint: weakness    Subjective     Interval History:     Patient Complaints: weakness / abdominal pain, improved    History taken from: patient / nursing    Medication Review:   Current Facility-Administered Medications   Medication Dose Route Frequency Provider Last Rate Last Admin   • aspirin EC tablet 81 mg  81 mg Oral Daily Hu Charles MD   81 mg at 01/12/21 0933   • budesonide-formoterol (SYMBICORT) 160-4.5 MCG/ACT inhaler 2 puff  2 puff Inhalation BID - RT Hu Charles MD   2 puff at 01/13/21 0707   • cefTRIAXone (ROCEPHIN) 2 g/100 mL 0.9% NS IVPB (MBP)  2 g Intravenous Q24H Hu Charles MD   2 g at 01/12/21 2029   • dextrose (D50W) 25 g/ 50mL Intravenous Solution 25 g  25 g Intravenous Q15 Min PRN Hu Charles MD       • dextrose (GLUTOSE) oral gel 15 g  15 g Oral Q15 Min PRN Hu Charles MD       • glucagon (human recombinant) (GLUCAGEN DIAGNOSTIC) injection 1 mg  1 mg Subcutaneous Q15 Min PRN Hu Charles MD       • insulin aspart (novoLOG) injection 0-14 Units  0-14 Units Subcutaneous TID AC Hu Charles MD       • insulin detemir (LEVEMIR) injection 25 Units  25 Units Subcutaneous Q12H Hu Charles MD   25 Units at  01/12/21 2034   • morphine injection 2 mg  2 mg Intravenous Q2H PRN Hu Charles MD       • ondansetron (ZOFRAN) injection 4 mg  4 mg Intravenous Q6H PRN Hu Charles MD   4 mg at 01/13/21 0558   • potassium chloride (MICRO-K) CR capsule 40 mEq  40 mEq Oral PRN Hu Charles MD        Or   • potassium chloride (KLOR-CON) packet 40 mEq  40 mEq Oral PRN Hu Charles MD        Or   • potassium chloride 10 mEq in 100 mL IVPB  10 mEq Intravenous Q1H PRN Hu Charles  mL/hr at 01/13/21 1020 10 mEq at 01/13/21 1020   • sertraline (ZOLOFT) tablet 50 mg  50 mg Oral Daily Hu Charles MD   50 mg at 01/12/21 0933   • sodium bicarbonate 8.4 % 75 mEq in sodium chloride 0.45 % 1,075 mL infusion (greater than 75 mEq)  75 mEq Intravenous Continuous Rodríguez Brody MD       • sodium chloride 0.9 % flush 10 mL  10 mL Intravenous Q12H Hu Charles MD   10 mL at 01/13/21 0909   • sodium chloride 0.9 % flush 10 mL  10 mL Intravenous PRN Hu Charles MD           Review of Systems:   Review of Systems   Constitutional: Positive for fatigue. Negative for fever.   Respiratory: Positive for cough. Negative for shortness of breath.    Gastrointestinal: Negative for abdominal distention, anal bleeding, constipation, diarrhea, nausea and vomiting.   Genitourinary: Positive for frequency. Negative for dysuria, flank pain, hematuria and urgency.   Musculoskeletal: Positive for arthralgias. Negative for back pain and myalgias.   Skin: Positive for pallor. Negative for color change.   Neurological: Positive for weakness. Negative for seizures, syncope, speech difficulty and headaches.   Psychiatric/Behavioral: Negative for agitation, behavioral problems and confusion.   All other systems reviewed and are negative.      Objective     Vital Signs  Temp:  [96.6 °F (35.9 °C)-98.4 °F (36.9 °C)] 96.6 °F (35.9 °C)  Heart Rate:  [] 98  Resp:  [18-23] 18  BP: (106-124)/(59-73) 116/68    Physical Exam:  Physical  Exam  Vitals signs reviewed.   Constitutional:       General: He is not in acute distress.     Appearance: He is ill-appearing.   HENT:      Head: Normocephalic and atraumatic.   Eyes:      General: No scleral icterus.     Extraocular Movements: Extraocular movements intact.      Pupils: Pupils are equal, round, and reactive to light.   Neck:      Musculoskeletal: Normal range of motion and neck supple. No neck rigidity or muscular tenderness.   Cardiovascular:      Rate and Rhythm: Normal rate and regular rhythm.   Pulmonary:      Effort: Pulmonary effort is normal. No respiratory distress.      Breath sounds: Normal breath sounds. No stridor. No wheezing or rhonchi.   Abdominal:      General: Bowel sounds are normal. There is no distension.      Palpations: Abdomen is soft. There is no mass.      Tenderness: There is no abdominal tenderness.      Hernia: No hernia is present.   Musculoskeletal: Normal range of motion.         General: No swelling, tenderness or deformity.      Right lower leg: No edema.      Left lower leg: No edema.   Skin:     General: Skin is warm and dry.      Coloration: Skin is pale. Skin is not jaundiced.      Findings: No bruising or erythema.   Neurological:      General: No focal deficit present.      Mental Status: He is alert and oriented to person, place, and time. Mental status is at baseline.      Cranial Nerves: No cranial nerve deficit.      Sensory: No sensory deficit.      Motor: No weakness.      Coordination: Coordination normal.   Psychiatric:         Mood and Affect: Mood normal.         Behavior: Behavior normal.          Results Review:    Lab Results (last 24 hours)     Procedure Component Value Units Date/Time    Blood Culture - Blood, Arm, Right [[id number]]  (Abnormal) Collected: 01/11/21 1320    Specimen: Blood from Arm, Right Updated: 01/13/21 0930     Blood Culture Gram Negative Bacilli     Isolated from Aerobic and Anaerobic Bottles     Gram Stain Anaerobic Bottle  Gram negative bacilli     Comment: 1/3 positive for gpb  Modified report. Previous result was Anaerobic Bottle Gram positive bacilli on 1/12/2021 at 1330 CST.         Aerobic Bottle Gram negative bacilli     Comment: 3/3 positive got gpb  Modified report. Previous result was Aerobic Bottle Gram positive bacilli on 1/12/2021 at 1330 CST.       Narrative:      Corrected report called to Dr. Charles    Blood Culture - Blood, Hand, Right [[id number]]  (Abnormal) Collected: 01/11/21 1501    Specimen: Blood from Hand, Right Updated: 01/13/21 0930     Blood Culture Gram Negative Bacilli     Gram Stain Pediatric Bottle Gram negative bacilli     Comment: 2/3 positive for gpb  Modified report. Previous result was Pediatric Bottle Gram positive bacilli on 1/12/2021 at 1515 CST.       Narrative:      Corrected report called to Dr. Charles    Basic Metabolic Panel [[id number]]  (Abnormal) Collected: 01/13/21 0614    Specimen: Blood Updated: 01/13/21 0701     Glucose 102 mg/dL       mg/dL      Creatinine 4.05 mg/dL      Sodium 126 mmol/L      Potassium 3.0 mmol/L      Chloride 98 mmol/L      CO2 15.0 mmol/L      Calcium 7.4 mg/dL      eGFR Non African Amer 15 mL/min/1.73      BUN/Creatinine Ratio 31.6     Anion Gap 13.0 mmol/L     Narrative:      GFR Normal >60  Chronic Kidney Disease <60  Kidney Failure <15      CBC & Differential [[id number]]  (Abnormal) Collected: 01/13/21 0614    Specimen: Blood Updated: 01/13/21 0650    Narrative:      The following orders were created for panel order CBC & Differential.  Procedure                               Abnormality         Status                     ---------                               -----------         ------                     CBC Auto Differential[[id number]]        Abnormal            Final result                 Please view results for these tests on the individual orders.    CBC Auto Differential [[id number]]  (Abnormal) Collected: 01/13/21 0614    Specimen: Blood  Updated: 01/13/21 0650     WBC 18.27 10*3/mm3      RBC 4.14 10*6/mm3      Hemoglobin 11.6 g/dL      Comment: SPECIMEN REANALYZED TO CONFIRM HGB RESULTS        Hematocrit 32.9 %      MCV 79.5 fL      MCH 28.0 pg      MCHC 35.3 g/dL      RDW 13.0 %      RDW-SD 36.8 fl      MPV 9.8 fL      Platelets 247 10*3/mm3      Neutrophil % 91.7 %      Lymphocyte % 1.8 %      Monocyte % 5.5 %      Eosinophil % 0.1 %      Basophil % 0.1 %      Immature Grans % 0.8 %      Neutrophils, Absolute 16.78 10*3/mm3      Lymphocytes, Absolute 0.32 10*3/mm3      Monocytes, Absolute 1.01 10*3/mm3      Eosinophils, Absolute 0.01 10*3/mm3      Basophils, Absolute 0.01 10*3/mm3      Immature Grans, Absolute 0.14 10*3/mm3      nRBC 0.1 /100 WBC     POC Glucose Once [[id number]]  (Normal) Collected: 01/13/21 0620    Specimen: Blood Updated: 01/13/21 0649     Glucose 115 mg/dL      Comment: RN NotifiedOperator: [id number] JERMAINE GEORGEMeter ID: [id number]       Urine Culture - Urine, Urine, Catheter In/Out [[id number]]  (Abnormal)  (Susceptibility) Collected: 01/11/21 1510    Specimen: Urine, Catheter In/Out Updated: 01/13/21 0328     Urine Culture >100,000 CFU/mL Klebsiella pneumoniae ssp pneumoniae    Susceptibility      Klebsiella pneumoniae ssp pneumoniae     COLLEEN     Ampicillin Resistant     Ampicillin + Sulbactam Susceptible     Cefazolin Susceptible     Cefepime Susceptible     Ceftazidime Susceptible     Ceftriaxone Susceptible     Gentamicin Susceptible     Levofloxacin Susceptible     Nitrofurantoin Resistant     Piperacillin + Tazobactam Susceptible     Tetracycline Susceptible     Trimethoprim + Sulfamethoxazole Susceptible                    POC Glucose Once [[id number]]  (Abnormal) Collected: 01/12/21 1903    Specimen: Blood Updated: 01/12/21 2212     Glucose 209 mg/dL      Comment: RN NotifiedOperator: [id number] JERMAINE GEORGEiFrat Warsjose ID: [id number]       Basic Metabolic Panel [[id number]]  (Abnormal) Collected: 01/12/21 2008     Specimen: Blood Updated: 01/12/21 2103     Glucose 182 mg/dL       mg/dL      Creatinine 4.72 mg/dL      Sodium 118 mmol/L      Potassium 3.5 mmol/L      Comment: Slight hemolysis detected by analyzer. Results may be affected.        Chloride 94 mmol/L      CO2 12.0 mmol/L      Calcium 8.0 mg/dL      eGFR   Amer --     Comment: <15 Indicative of kidney failure.        eGFR Non African Amer 13 mL/min/1.73      Comment: <15 Indicative of kidney failure.        BUN/Creatinine Ratio 27.8     Anion Gap 12.0 mmol/L     Narrative:      GFR Normal >60  Chronic Kidney Disease <60  Kidney Failure <15      POC Glucose Once [[id number]]  (Abnormal) Collected: 01/12/21 1217    Specimen: Blood Updated: 01/12/21 1232     Glucose 194 mg/dL      Comment: : [id number] MATRIXX Softwareeter ID: [id number]       POC Glucose Once [[id number]]  (Abnormal) Collected: 01/12/21 1128    Specimen: Blood Updated: 01/12/21 1140     Glucose 302 mg/dL      Comment: : [id number] MATRIXX Softwareeter ID: [id number]             Imaging Results (Last 24 Hours)     ** No results found for the last 24 hours. **          Assessment/Plan       Klebsiella sepsis (CMS/[facility])    UTI (urinary tract infection)    Uncontrolled diabetes mellitus (CMS/[facility])    Acute kidney failure (CMS/[facility])    Lung nodule    Bladder cancer (CMS/[facility])    Hyponatremia    His serum glucose and creatinine values have improved to some extent - continue with the IV hydration, Levemir BID instead of the IV Insulin. Continue with the IV Rocephin as the blood culture have isolated Gram negative bacilli (Klebsiella). Monitor renal function and CBC.    Hu Charles MD  01/13/21  10:44 CST        Electronically signed by Hu Charles MD at 01/13/21 104     Rodríguez Brody MD at 01/12/21 1248          [facility]  [address] [place] [address]. [address]  T - [phone]  F - [phone]     Progress Note          PATIENT  DEMOGRAPHICS  "  PATIENT NAME: Favio Casillas                      PHYSICIAN: Rodríguez Brody MD  : 1955  MRN: [id number]   LOS: 1 day    Patient Care Team:  Shayne Merritt MD as PCP - General (Family Medicine)  Subjective   SUBJECTIVE   Feels well no soa. Eating po now. Off d5w         Objective   OBJECTIVE   Vital Signs  Temp:  [97.5 °F (36.4 °C)-98 °F (36.7 °C)] 97.8 °F (36.6 °C)  Heart Rate:  [] 100  Resp:  [15-21] 21  BP: (100-132)/(63-70) 115/67    Flowsheet Rows      First Filed Value   Admission Height  165.1 cm (65\") Documented at 2021 1041   Admission Weight  62.1 kg (137 lb) Documented at 2021 1041           I/O last 3 completed shifts:  In: 5167 [I.V.:3042; IV Piggyback:2125]  Out: 800 [Urine:800]    PHYSICAL EXAM    Physical Exam  Vitals signs reviewed.   Constitutional:       General: He is not in acute distress.     Appearance: He is well-developed.   Eyes:      General:         Right eye: No discharge.   Neck:      Vascular: JVD present.   Cardiovascular:      Rate and Rhythm: Normal rate and regular rhythm.      Heart sounds: Normal heart sounds, S1 normal and S2 normal. No murmur.   Pulmonary:      Effort: Pulmonary effort is normal. No respiratory distress.      Breath sounds: No rales.   Abdominal:      General: Bowel sounds are normal. There is no distension.      Palpations: Abdomen is soft.      Tenderness: There is no abdominal tenderness.   Skin:     Coloration: Skin is not pale.      Findings: No erythema.   Neurological:      Mental Status: He is alert and oriented to person, place, and time.      Sensory: No sensory deficit.      Motor: No abnormal muscle tone.         RESULTS   Results Review:    Results from last 7 days   Lab Units 21  0640 21  1909 21  1550 21  1045   SODIUM mmol/L 120* 125* 117* 114*   POTASSIUM mmol/L 3.7 4.3 5.4* 6.0*   CHLORIDE mmol/L 98 100 96* 89*   CO2 mmol/L 10.0* 7.0* 6.0* 8.0*   BUN mg/dL 138* 134* 132* 134* "   CREATININE mg/dL 4.48* 4.76* 4.74* 5.36*   CALCIUM mg/dL 8.1* 8.9 9.0 9.0   BILIRUBIN mg/dL  --  0.2  --  0.3   ALK PHOS U/L  --  123*  --  148*   ALT (SGPT) U/L  --  7  --  9   AST (SGOT) U/L  --  11  --  7   GLUCOSE mg/dL 172* 125* 329* 419*       Estimated Creatinine Clearance: 14.2 mL/min (A) (by C-G formula based on SCr of 4.48 mg/dL (H)).    Results from last 7 days   Lab Units 01/12/21  0640 01/11/21  1909 01/11/21  1045   MAGNESIUM mg/dL 2.1  --  2.6*   PHOSPHORUS mg/dL 4.5 5.0*  --              Results from last 7 days   Lab Units 01/11/21  1045   WBC 10*3/mm3 19.77*   HEMOGLOBIN g/dL 14.8   PLATELETS 10*3/mm3 453*               Imaging Results (Last 24 Hours)     ** No results found for the last 24 hours. **           MEDICATIONS    aspirin, 81 mg, Oral, Daily  budesonide-formoterol, 2 puff, Inhalation, BID - RT  cefTRIAXone, 1 g, Intravenous, Q24H  insulin aspart, 0-14 Units, Subcutaneous, TID AC  insulin detemir, 25 Units, Subcutaneous, Q12H  sertraline, 50 mg, Oral, Daily  sodium chloride, 10 mL, Intravenous, Q12H      dextrose, 75 mL/hr, Last Rate: 75 mL/hr (01/12/21 0433)  custom IV infusion builder, , Last Rate: 100 mL/hr at 01/12/21 0432        Assessment/Plan   ASSESSMENT / PLAN      Uncontrolled diabetes mellitus (CMS/HCC)    Acute kidney failure (CMS/HCC)    UTI (urinary tract infection)    Lung nodule    Bladder cancer (CMS/HCC)    1.  SUJATHA on CKD 3- baseline creatinine around 2, creatinine up to 5.36 now.  He has a prior history of bladder outlet obstruction and multiple acute kidney injuries as well as hydronephrosis in the past.  CT revealed mild asymmetrical fluid distention in the right renal pelvis in comparison to the left.  We will place John catheter and start IV fluids with sodium bicarbonate.  Patient has a history of multiple urinary obstruction from mucus and sediment causing hydronephrosis in the past.    Cr is better. Increase bicarb in the drip. Iv bicarb push     2.  Severe  metabolic acidosis- bicarb drip as above     3.  Hyponatremia- na improved then drop while on d5w. Plan as above.        4.  History of bladder cancer- requiring neobladder surgery almost 10 years ago, also history of prostate cancer     5.  History of CAD     6.  History of diabetes mellitus- now with acidosis, hyperglycemia, anion gap suspicious for early DKA     7.  Hyperkalemia- bicarb drip as above                This document has been electronically signed by Rodríguez Brody MD on January 12, 2021 12:43 CST           Electronically signed by Rodríguez Brody MD at 01/12/21 1245     Hu Chalres MD at 01/12/21 1027          Progress Note  Hu Charles MD  Hospitalist    Date of visit: 1/12/2021     LOS: 1 day   Patient Care Team:  Shayne Merritt MD as PCP - General (Family Medicine)    Chief Complaint: weakness    Subjective     Interval History:     Patient Complaints: weakness / abdominal pain, improved    History taken from: patient / nursing    Medication Review:   Current Facility-Administered Medications   Medication Dose Route Frequency Provider Last Rate Last Admin   • albuterol (PROVENTIL) nebulizer solution 0.083% 2.5 mg/3mL  2.5 mg Nebulization Q4H PRN Hu Charles MD       • aspirin EC tablet 81 mg  81 mg Oral Daily Hu Charles MD   81 mg at 01/12/21 0933   • budesonide-formoterol (SYMBICORT) 160-4.5 MCG/ACT inhaler 2 puff  2 puff Inhalation BID - RT Hu Charles MD   2 puff at 01/12/21 0646   • cefTRIAXone (ROCEPHIN) 1 g/100 mL 0.9% NS (MBP)  1 g Intravenous Q24H Hu Charles MD   1 g at 01/11/21 2222   • dextrose (D50W) 25 g/ 50mL Intravenous Solution 25 g  25 g Intravenous Q15 Min PRN Hu Charles MD       • dextrose (GLUTOSE) oral gel 15 g  15 g Oral Q15 Min PRN Hu Charles MD       • glucagon (human recombinant) (GLUCAGEN DIAGNOSTIC) injection 1 mg  1 mg Subcutaneous Q15 Min PRN Hu Charles MD       • insulin aspart (novoLOG) injection 0-14 Units  0-14 Units  Subcutaneous TID AC Hu Charles MD       • insulin detemir (LEVEMIR) injection 25 Units  25 Units Subcutaneous Q12H Hu Charles MD   25 Units at 01/12/21 1237   • morphine injection 2 mg  2 mg Intravenous Q2H PRN Hu Charles MD       • ondansetron (ZOFRAN) injection 4 mg  4 mg Intravenous Q6H PRN Hu Charles MD       • sertraline (ZOLOFT) tablet 50 mg  50 mg Oral Daily Hu Charles MD   50 mg at 01/12/21 0933   • sodium chloride 0.9 % flush 10 mL  10 mL Intravenous Q12H Hu Charles MD   10 mL at 01/12/21 1134   • sodium chloride 0.9 % flush 10 mL  10 mL Intravenous PRN Hu Charles MD       • sterile water 850 mL with sodium bicarbonate 8.4 % 150 mEq infusion   Intravenous Continuous Rodríguez Brody MD           Review of Systems:   Review of Systems   Constitutional: Positive for fatigue. Negative for fever.   Respiratory: Positive for cough. Negative for shortness of breath.    Gastrointestinal: Negative for abdominal distention, anal bleeding, constipation, diarrhea, nausea and vomiting.   Genitourinary: Positive for frequency. Negative for dysuria, flank pain, hematuria and urgency.   Musculoskeletal: Positive for arthralgias. Negative for back pain and myalgias.   Skin: Positive for pallor. Negative for color change.   Neurological: Positive for weakness. Negative for seizures, syncope, speech difficulty and headaches.   Psychiatric/Behavioral: Negative for agitation, behavioral problems and confusion.   All other systems reviewed and are negative.      Objective     Vital Signs  Temp:  [97.5 °F (36.4 °C)-98 °F (36.7 °C)] 97.8 °F (36.6 °C)  Heart Rate:  [] 100  Resp:  [15-21] 21  BP: (100-132)/(63-70) 115/67    Physical Exam:  Physical Exam  Vitals signs reviewed.   Constitutional:       General: He is not in acute distress.     Appearance: He is ill-appearing.   HENT:      Head: Normocephalic and atraumatic.   Eyes:      General: No scleral icterus.     Extraocular Movements:  Extraocular movements intact.      Pupils: Pupils are equal, round, and reactive to light.   Neck:      Musculoskeletal: Normal range of motion and neck supple. No neck rigidity or muscular tenderness.   Cardiovascular:      Rate and Rhythm: Normal rate and regular rhythm.   Pulmonary:      Effort: Pulmonary effort is normal. No respiratory distress.      Breath sounds: Normal breath sounds. No stridor. No wheezing or rhonchi.   Abdominal:      General: Bowel sounds are normal. There is no distension.      Palpations: Abdomen is soft. There is no mass.      Tenderness: There is no abdominal tenderness.      Hernia: No hernia is present.   Musculoskeletal: Normal range of motion.         General: No swelling, tenderness or deformity.      Right lower leg: No edema.      Left lower leg: No edema.   Skin:     General: Skin is warm and dry.      Coloration: Skin is pale. Skin is not jaundiced.      Findings: No bruising or erythema.   Neurological:      General: No focal deficit present.      Mental Status: He is alert and oriented to person, place, and time. Mental status is at baseline.      Cranial Nerves: No cranial nerve deficit.      Sensory: No sensory deficit.      Motor: No weakness.      Coordination: Coordination normal.   Psychiatric:         Mood and Affect: Mood normal.         Behavior: Behavior normal.          Results Review:    Lab Results (last 24 hours)     Procedure Component Value Units Date/Time    Blood Culture - Blood, Arm, Right [[id number]]  (Abnormal) Collected: 01/11/21 1320    Specimen: Blood from Arm, Right Updated: 01/12/21 1330     Blood Culture No growth at 24 hours     Gram Stain Anaerobic Bottle Gram positive bacilli     Comment: 1/3 positive for gpb         Aerobic Bottle Gram positive bacilli     Comment: 3/3 positive got gpb       Narrative:      Blood culture does not meet the specified criteria for PCR identification.  If pregnant, immunocompromised, or clinical concern for  meningitis, call lab to run BCID for Listeria monocytogenes.    POC Glucose Once [[id number]]  (Abnormal) Collected: 01/12/21 1217    Specimen: Blood Updated: 01/12/21 1232     Glucose 194 mg/dL      Comment: : [id number] OdnoklassnikiHAMeter ID: [id number]       POC Glucose Once [[id number]]  (Abnormal) Collected: 01/12/21 1128    Specimen: Blood Updated: 01/12/21 1140     Glucose 302 mg/dL      Comment: : [id number] O[employer]lass[employer]ikiHAMeter ID: [id number]       POC Glucose Once [[id number]]  (Abnormal) Collected: 01/12/21 0956    Specimen: Blood Updated: 01/12/21 1010     Glucose 200 mg/dL      Comment: : [id number] O[employer]lass[employer]HAM[employer] ID: [id number]       Urine Culture - Urine, Urine, Catheter In/Out [[id number]]  (Abnormal) Collected: 01/11/21 1510    Specimen: Urine, Catheter In/Out Updated: 01/12/21 0856     Urine Culture >100,000 CFU/mL Gram Negative Bacilli    POC Glucose Once [[id number]]  (Abnormal) Collected: 01/12/21 0756    Specimen: Blood Updated: 01/12/21 0808     Glucose 179 mg/dL      Comment: : [id number] [employer]lass[employer]HAMeter ID: [id number]       Basic Metabolic Panel [[id number]]  (Abnormal) Collected: 01/12/21 0640    Specimen: Blood Updated: 01/12/21 0802     Glucose 172 mg/dL       mg/dL      Creatinine 4.48 mg/dL      Sodium 120 mmol/L      Potassium 3.7 mmol/L      Chloride 98 mmol/L      CO2 10.0 mmol/L      Calcium 8.1 mg/dL      eGFR   Amer --     Comment: <15 Indicative of kidney failure.        eGFR Non African Amer 13 mL/min/1.73      Comment: <15 Indicative of kidney failure.        BUN/Creatinine Ratio 30.8     Anion Gap 12.0 mmol/L     Narrative:      GFR Normal >60  Chronic Kidney Disease <60  Kidney Failure <15      Phosphorus [[id number]]  (Normal) Collected: 01/12/21 0640    Specimen: Blood Updated: 01/12/21 0756     Phosphorus 4.5 mg/dL     Magnesium [[id number]]  (Normal) Collected: 01/12/21 0640    Specimen: Blood Updated: 01/12/21 0752      Magnesium 2.1 mg/dL     Extra Tubes [[id number]] Collected: 01/12/21 0640    Specimen: Blood, Venous Line Updated: 01/12/21 0747    Narrative:      The following orders were created for panel order Extra Tubes.  Procedure                               Abnormality         Status                     ---------                               -----------         ------                     Lavender Top[[id number]]                                     Final result                 Please view results for these tests on the individual orders.    Lavender Top [[id number]] Collected: 01/12/21 0640    Specimen: Blood Updated: 01/12/21 0747     Extra Tube hold for add-on     Comment: Auto resulted       POC Glucose Once [[id number]]  (Abnormal) Collected: 01/12/21 0619    Specimen: Blood Updated: 01/12/21 0642     Glucose 159 mg/dL      Comment: : [id number] ADDIS MORAN BMeter ID: [id number]       POC Glucose Once [[id number]]  (Abnormal) Collected: 01/12/21 0510    Specimen: Blood Updated: 01/12/21 0642     Glucose 176 mg/dL      Comment: : [id number] ADDIS MORAN BMeter ID: [id number]       Blood Culture - Blood, Hand, Right [[id number]]  (Abnormal) Collected: 01/11/21 1501    Specimen: Blood from Hand, Right Updated: 01/12/21 0541     Blood Culture Abnormal Stain     Gram Stain Pediatric Bottle Gram positive bacilli     Comment: 2/3 positive for gpb       Narrative:      Blood culture does not meet the specified criteria for PCR identification.  If pregnant, immunocompromised, or clinical concern for meningitis, call lab to run BCID for Listeria monocytogenes.    POC Glucose Once [[id number]]  (Abnormal) Collected: 01/12/21 0408    Specimen: Blood Updated: 01/12/21 0420     Glucose 145 mg/dL      Comment: : [id number] ADDIS MORAN BMeter ID: [id number]       POC Glucose Once [[id number]]  (Abnormal) Collected: 01/12/21 0311    Specimen: Blood Updated: 01/12/21 0324     Glucose 136 mg/dL       Comment: : [id number] ADDIS MORAN BMeter ID: [id number]       Protein / Creatinine Ratio, Urine - Urine, Catheter In/Out [[id number]]  (Abnormal) Collected: 01/11/21 1510    Specimen: Urine, Catheter In/Out Updated: 01/12/21 0251     Protein/Creatinine Ratio, Urine 1,462.8 mg/G Crea      Creatinine, Urine 79.3 mg/dL      Total Protein, Urine 116.0 mg/dL     POC Glucose Once [[id number]]  (Abnormal) Collected: 01/12/21 0218    Specimen: Blood Updated: 01/12/21 0229     Glucose 133 mg/dL      Comment: : [id number] ADDIS MORAN BMeter ID: [id number]       Creatinine, Urine, Random - Urine, Catheter In/Out [[id number]] Collected: 01/11/21 1510    Specimen: Urine, Catheter In/Out Updated: 01/12/21 0149     Creatinine, Urine 79.3 mg/dL     Narrative:      Reference intervals for random urine have not been established.  Clinical usage is dependent upon physician's interpretation in combination with other laboratory tests.       POC Glucose Once [[id number]]  (Normal) Collected: 01/12/21 0105    Specimen: Blood Updated: 01/12/21 0116     Glucose 117 mg/dL      Comment: : [id number] ADDIS MORAN BMeter ID: [id number]       POC Glucose Once [[id number]]  (Normal) Collected: 01/11/21 2325    Specimen: Blood Updated: 01/11/21 2338     Glucose 78 mg/dL      Comment: : [id number] ADDIS MORAN BMeter ID: [id number]       POC Glucose Once [[id number]]  (Normal) Collected: 01/11/21 2218    Specimen: Blood Updated: 01/11/21 2247     Glucose 80 mg/dL      Comment: : [id number] ADDIS MORAN BMeter ID: [id number]       Urine Eosinophils, Mitchel's Stain - [[id number]]  (Normal) Collected: 01/11/21 1723    Specimen: Urine Updated: 01/11/21 2158     Mitchel Stain Negative    POC Glucose Once [[id number]]  (Normal) Collected: 01/11/21 2125    Specimen: Blood Updated: 01/11/21 2138     Glucose 109 mg/dL      Comment: : [id number] ADDIS MORAN BMeter ID: [id number]       POC  Glucose Once [[id number]]  (Normal) Collected: 01/11/21 2053    Specimen: Blood Updated: 01/11/21 2138     Glucose 123 mg/dL      Comment: : [id number] ADDIS MORAN BMeter ID: [id number]       POC Glucose Once [[id number]]  (Abnormal) Collected: 01/11/21 2032    Specimen: Blood Updated: 01/11/21 2138     Glucose 58 mg/dL      Comment: : [id number] ADDIS MORAN BMeter ID: [id number]       Comprehensive Metabolic Panel [[id number]]  (Abnormal) Collected: 01/11/21 1909    Specimen: Blood Updated: 01/11/21 2004     Glucose 125 mg/dL       mg/dL      Creatinine 4.76 mg/dL      Sodium 125 mmol/L      Potassium 4.3 mmol/L      Comment: Slight hemolysis detected by analyzer. Results may be affected.        Chloride 100 mmol/L      CO2 7.0 mmol/L      Calcium 8.9 mg/dL      Total Protein 6.7 g/dL      Albumin 3.30 g/dL      ALT (SGPT) 7 U/L      AST (SGOT) 11 U/L      Comment: Slight hemolysis detected by analyzer. Results may be affected.        Alkaline Phosphatase 123 U/L      Total Bilirubin 0.2 mg/dL      eGFR Non African Amer 12 mL/min/1.73      Comment: <15 Indicative of kidney failure.        eGFR   Amer --     Comment: <15 Indicative of kidney failure.        Globulin 3.4 gm/dL      A/G Ratio 1.0 g/dL      BUN/Creatinine Ratio 28.2     Anion Gap 18.0 mmol/L     Narrative:      GFR Normal >60  Chronic Kidney Disease <60  Kidney Failure <15      Phosphorus [[id number]]  (Abnormal) Collected: 01/11/21 1909    Specimen: Blood Updated: 01/11/21 1958     Phosphorus 5.0 mg/dL     POC Glucose Once [[id number]]  (Abnormal) Collected: 01/11/21 1816    Specimen: Blood Updated: 01/11/21 1835     Glucose 179 mg/dL      Comment: RN NotifiedOperator: [id number] JEFFERY Erener ID: [id number]       POC Glucose Once [[id number]]  (Abnormal) Collected: 01/11/21 1715    Specimen: Blood Updated: 01/11/21 1835     Glucose 236 mg/dL      Comment: RN NotifiedOperator: [id number] JEFFERY Villarreal ID:  [id number]       POC Glucose Once [[id number]]  (Abnormal) Collected: 01/11/21 1634    Specimen: Blood Updated: 01/11/21 1835     Glucose 321 mg/dL      Comment: RN NotifiedOperator: [id number] JEFFERY Villarreal ID: [id number]       Urinalysis, Microscopic Only - Urine, Clean Catch [[id number]]  (Abnormal) Collected: 01/11/21 1723    Specimen: Urine, Clean Catch Updated: 01/11/21 1752     RBC, UA 13-20 /HPF      WBC, UA Too Numerous to Count /HPF      Bacteria, UA 4+ /HPF      Squamous Epithelial Cells, UA 3-5 /HPF      Hyaline Casts, UA 31-50 /LPF      Mucus, UA Moderate/2+ /HPF      Methodology Manual Light Microscopy    Urinalysis With Microscopic If Indicated (No Culture) - [[id number]]  (Abnormal) Collected: 01/11/21 1723    Specimen: Urine Updated: 01/11/21 1739     Color, UA Yellow     Appearance, UA Turbid     pH, UA 6.5     Specific Gravity, UA 1.009     Glucose, UA Negative     Ketones, UA Negative     Bilirubin, UA Negative     Blood, UA Small (1+)     Protein,  mg/dL (2+)     Leuk Esterase, UA Large (3+)     Nitrite, UA Negative     Urobilinogen, UA 1.0 E.U./dL    Urinalysis, Microscopic Only - Urine, Catheter In/Out [[id number]]  (Abnormal) Collected: 01/11/21 1510    Specimen: Urine, Catheter In/Out Updated: 01/11/21 1651     RBC, UA 6-12 /HPF      WBC, UA Too Numerous to Count /HPF      Bacteria, UA 4+ /HPF      Squamous Epithelial Cells, UA 6-12 /HPF      Hyaline Casts, UA       Unable to determine due to loaded field     /LPF     Methodology Manual Light Microscopy    Basic Metabolic Panel [[id number]]  (Abnormal) Collected: 01/11/21 1550    Specimen: Blood Updated: 01/11/21 1620     Glucose 329 mg/dL       mg/dL      Creatinine 4.74 mg/dL      Sodium 117 mmol/L      Potassium 5.4 mmol/L      Comment: Slight hemolysis detected by analyzer. Results may be affected.        Chloride 96 mmol/L      CO2 6.0 mmol/L      Calcium 9.0 mg/dL      eGFR   Amer --     Comment: <15 Indicative of  kidney failure.        eGFR Non African Amer 13 mL/min/1.73      Comment: <15 Indicative of kidney failure.        BUN/Creatinine Ratio 27.8     Anion Gap 15.0 mmol/L     Narrative:      GFR Normal >60  Chronic Kidney Disease <60  Kidney Failure <15      Sodium, Urine, Random - Urine, Catheter In/Out [[id number]] Collected: 01/11/21 1510    Specimen: Urine, Catheter In/Out Updated: 01/11/21 1620     Sodium, Urine 40 mmol/L     Narrative:      Reference intervals for random urine have not been established.  Clinical usage is dependent upon physician's interpretation in combination with other laboratory tests.       Urinalysis With Culture If Indicated - Urine, Catheter In/Out [[id number]]  (Abnormal) Collected: 01/11/21 1510    Specimen: Urine, Catheter In/Out Updated: 01/11/21 1545     Color, UA Yellow     Appearance, UA Cloudy     pH, UA 6.5     Specific Gravity, UA 1.013     Glucose, UA Negative     Ketones, UA Negative     Bilirubin, UA Small (1+)     Blood, UA Small (1+)     Protein,  mg/dL (2+)     Leuk Esterase, UA Moderate (2+)     Nitrite, UA Negative     Urobilinogen, UA 1.0 E.U./dL    POC Glucose Once [[id number]]  (Abnormal) Collected: 01/11/21 1526    Specimen: Blood Updated: 01/11/21 1542     Glucose 334 mg/dL      Comment: RN NotifiedOperator: [id number] JOSE ALEJANDRO MIRANDAMeter ID: [id number]       POC Glucose Once [[id number]]  (Abnormal) Collected: 01/11/21 1426    Specimen: Blood Updated: 01/11/21 1442     Glucose 360 mg/dL      Comment: RN NotifiedOperator: [id number] JEFFERY LAURENMeter ID: [id number]             Imaging Results (Last 24 Hours)     ** No results found for the last 24 hours. **          Assessment/Plan       Sepsis (CMS/[facility])    UTI (urinary tract infection)    Uncontrolled diabetes mellitus (CMS/HCC)    Acute kidney failure (CMS/HCC)    Lung nodule    Bladder cancer (CMS/[facility])    Hyponatremia    His serum glucose and creatinine values have improved - continue with the IV hydration,  Levemir BID instead of the IV Insulin. Continue with the IV antibiotics as the blood culture have isolated Gram positive bacilli. Monitor renal function and CBC.    Hu Charles MD  01/12/21  13:34 CST        Electronically signed by Hu Charles MD at 01/12/21 1336       Medical Student Notes (last 24 hours) (Notes from 01/13/21 0834 through 01/14/21 0834)    No notes of this type exist for this encounter.         Consult Notes (last 24 hours) (Notes from 01/13/21 0834 through 01/14/21 0834)    No notes of this type exist for this encounter.

## 2021-01-15 LAB
ANION GAP SERPL CALCULATED.3IONS-SCNC: 12 MMOL/L (ref 5–15)
ANISOCYTOSIS BLD QL: NORMAL
BASOPHILS # BLD AUTO: 0.02 10*3/MM3 (ref 0–0.2)
BASOPHILS NFR BLD AUTO: 0.1 % (ref 0–1.5)
BUN SERPL-MCNC: 73 MG/DL (ref 8–23)
BUN/CREAT SERPL: 32.2 (ref 7–25)
CALCIUM SPEC-SCNC: 7.3 MG/DL (ref 8.6–10.5)
CHLORIDE SERPL-SCNC: 99 MMOL/L (ref 98–107)
CO2 SERPL-SCNC: 19 MMOL/L (ref 22–29)
CREAT SERPL-MCNC: 2.27 MG/DL (ref 0.76–1.27)
DEPRECATED RDW RBC AUTO: 40.6 FL (ref 37–54)
EOSINOPHIL # BLD AUTO: 0.04 10*3/MM3 (ref 0–0.4)
EOSINOPHIL NFR BLD AUTO: 0.3 % (ref 0.3–6.2)
ERYTHROCYTE [DISTWIDTH] IN BLOOD BY AUTOMATED COUNT: 13.9 % (ref 12.3–15.4)
GFR SERPL CREATININE-BSD FRML MDRD: 29 ML/MIN/1.73
GLUCOSE BLDC GLUCOMTR-MCNC: 151 MG/DL (ref 70–130)
GLUCOSE BLDC GLUCOMTR-MCNC: 162 MG/DL (ref 70–130)
GLUCOSE BLDC GLUCOMTR-MCNC: 212 MG/DL (ref 70–130)
GLUCOSE BLDC GLUCOMTR-MCNC: 254 MG/DL (ref 70–130)
GLUCOSE BLDC GLUCOMTR-MCNC: 264 MG/DL (ref 70–130)
GLUCOSE BLDC GLUCOMTR-MCNC: 385 MG/DL (ref 70–130)
GLUCOSE BLDC GLUCOMTR-MCNC: 73 MG/DL (ref 70–130)
GLUCOSE SERPL-MCNC: 219 MG/DL (ref 65–99)
HCT VFR BLD AUTO: 30.9 % (ref 37.5–51)
HGB BLD-MCNC: 10.7 G/DL (ref 13–17.7)
HYPOCHROMIA BLD QL: NORMAL
IMM GRANULOCYTES # BLD AUTO: 0.13 10*3/MM3 (ref 0–0.05)
IMM GRANULOCYTES NFR BLD AUTO: 0.9 % (ref 0–0.5)
LYMPHOCYTES # BLD AUTO: 0.59 10*3/MM3 (ref 0.7–3.1)
LYMPHOCYTES NFR BLD AUTO: 3.9 % (ref 19.6–45.3)
MCH RBC QN AUTO: 28 PG (ref 26.6–33)
MCHC RBC AUTO-ENTMCNC: 34.6 G/DL (ref 31.5–35.7)
MCV RBC AUTO: 80.9 FL (ref 79–97)
MONOCYTES # BLD AUTO: 1.27 10*3/MM3 (ref 0.1–0.9)
MONOCYTES NFR BLD AUTO: 8.5 % (ref 5–12)
NEUTROPHILS NFR BLD AUTO: 12.91 10*3/MM3 (ref 1.7–7)
NEUTROPHILS NFR BLD AUTO: 86.3 % (ref 42.7–76)
NRBC BLD AUTO-RTO: 0 /100 WBC (ref 0–0.2)
PLATELET # BLD AUTO: 154 10*3/MM3 (ref 140–450)
PMV BLD AUTO: 10.7 FL (ref 6–12)
POTASSIUM SERPL-SCNC: 4 MMOL/L (ref 3.5–5.2)
RBC # BLD AUTO: 3.82 10*6/MM3 (ref 4.14–5.8)
SMALL PLATELETS BLD QL SMEAR: ADEQUATE
SODIUM SERPL-SCNC: 130 MMOL/L (ref 136–145)
WBC # BLD AUTO: 14.96 10*3/MM3 (ref 3.4–10.8)
WBC MORPH BLD: NORMAL

## 2021-01-15 PROCEDURE — 82962 GLUCOSE BLOOD TEST: CPT

## 2021-01-15 PROCEDURE — 80048 BASIC METABOLIC PNL TOTAL CA: CPT | Performed by: INTERNAL MEDICINE

## 2021-01-15 PROCEDURE — 94799 UNLISTED PULMONARY SVC/PX: CPT

## 2021-01-15 PROCEDURE — 25010000002 ONDANSETRON PER 1 MG: Performed by: INTERNAL MEDICINE

## 2021-01-15 PROCEDURE — 63710000001 INSULIN DETEMIR PER 5 UNITS: Performed by: INTERNAL MEDICINE

## 2021-01-15 PROCEDURE — 63710000001 INSULIN ASPART PER 5 UNITS: Performed by: INTERNAL MEDICINE

## 2021-01-15 PROCEDURE — 25010000002 MORPHINE PER 10 MG: Performed by: INTERNAL MEDICINE

## 2021-01-15 PROCEDURE — 25010000002 CEFTRIAXONE: Performed by: INTERNAL MEDICINE

## 2021-01-15 PROCEDURE — 85025 COMPLETE CBC W/AUTO DIFF WBC: CPT | Performed by: INTERNAL MEDICINE

## 2021-01-15 PROCEDURE — 85007 BL SMEAR W/DIFF WBC COUNT: CPT | Performed by: INTERNAL MEDICINE

## 2021-01-15 RX ORDER — POTASSIUM CHLORIDE 750 MG/1
20 CAPSULE, EXTENDED RELEASE ORAL DAILY
Status: DISCONTINUED | OUTPATIENT
Start: 2021-01-16 | End: 2021-01-16

## 2021-01-15 RX ORDER — SODIUM BICARBONATE 650 MG/1
650 TABLET ORAL 3 TIMES DAILY
Status: DISCONTINUED | OUTPATIENT
Start: 2021-01-15 | End: 2021-01-17 | Stop reason: HOSPADM

## 2021-01-15 RX ADMIN — MORPHINE SULFATE 2 MG: 2 INJECTION, SOLUTION INTRAMUSCULAR; INTRAVENOUS at 02:26

## 2021-01-15 RX ADMIN — SODIUM BICARBONATE 650 MG: 650 TABLET ORAL at 20:26

## 2021-01-15 RX ADMIN — Medication 400 MG: at 09:44

## 2021-01-15 RX ADMIN — SODIUM CHLORIDE, POTASSIUM CHLORIDE, SODIUM LACTATE AND CALCIUM CHLORIDE 100 ML/HR: 600; 310; 30; 20 INJECTION, SOLUTION INTRAVENOUS at 09:46

## 2021-01-15 RX ADMIN — INSULIN DETEMIR 30 UNITS: 100 INJECTION, SOLUTION SUBCUTANEOUS at 21:10

## 2021-01-15 RX ADMIN — INSULIN ASPART 12 UNITS: 100 INJECTION, SOLUTION INTRAVENOUS; SUBCUTANEOUS at 11:45

## 2021-01-15 RX ADMIN — SODIUM CHLORIDE, PRESERVATIVE FREE 10 ML: 5 INJECTION INTRAVENOUS at 20:26

## 2021-01-15 RX ADMIN — SODIUM BICARBONATE 650 MG: 650 TABLET ORAL at 17:01

## 2021-01-15 RX ADMIN — CYCLOBENZAPRINE 5 MG: 5 TABLET, FILM COATED ORAL at 20:25

## 2021-01-15 RX ADMIN — SERTRALINE HYDROCHLORIDE 50 MG: 50 TABLET ORAL at 09:44

## 2021-01-15 RX ADMIN — BUDESONIDE AND FORMOTEROL FUMARATE DIHYDRATE 2 PUFF: 160; 4.5 AEROSOL RESPIRATORY (INHALATION) at 08:23

## 2021-01-15 RX ADMIN — ASPIRIN 81 MG: 81 TABLET, COATED ORAL at 09:44

## 2021-01-15 RX ADMIN — SODIUM CHLORIDE, PRESERVATIVE FREE 10 ML: 5 INJECTION INTRAVENOUS at 09:46

## 2021-01-15 RX ADMIN — INSULIN ASPART 3 UNITS: 100 INJECTION, SOLUTION INTRAVENOUS; SUBCUTANEOUS at 17:02

## 2021-01-15 RX ADMIN — POTASSIUM CHLORIDE 20 MEQ: 10 CAPSULE, COATED, EXTENDED RELEASE ORAL at 09:44

## 2021-01-15 RX ADMIN — INSULIN ASPART 5 UNITS: 100 INJECTION, SOLUTION INTRAVENOUS; SUBCUTANEOUS at 09:46

## 2021-01-15 RX ADMIN — SODIUM BICARBONATE 650 MG: 650 TABLET ORAL at 11:46

## 2021-01-15 RX ADMIN — INSULIN DETEMIR 25 UNITS: 100 INJECTION, SOLUTION SUBCUTANEOUS at 09:00

## 2021-01-15 RX ADMIN — ONDANSETRON HYDROCHLORIDE 4 MG: 2 INJECTION, SOLUTION INTRAMUSCULAR; INTRAVENOUS at 02:22

## 2021-01-15 RX ADMIN — CYCLOBENZAPRINE 5 MG: 5 TABLET, FILM COATED ORAL at 11:49

## 2021-01-15 RX ADMIN — CEFTRIAXONE 2 G: 2 INJECTION, POWDER, FOR SOLUTION INTRAMUSCULAR; INTRAVENOUS at 18:40

## 2021-01-15 NOTE — PROGRESS NOTES
Progress Note  Hu Charles MD  Hospitalist    Date of visit: 1/15/2021     LOS: 4 days   Patient Care Team:  Shayne Merritt MD as PCP - General (Family Medicine)    Chief Complaint: weakness    Subjective     Interval History:     Patient Complaints: weakness / abdominal pain, improved. Less nausea as well.    History taken from: patient / nursing    Medication Review:   Current Facility-Administered Medications   Medication Dose Route Frequency Provider Last Rate Last Admin   • aspirin EC tablet 81 mg  81 mg Oral Daily Hu Charles MD   81 mg at 01/15/21 0944   • budesonide-formoterol (SYMBICORT) 160-4.5 MCG/ACT inhaler 2 puff  2 puff Inhalation BID - RT Hu Charles MD   2 puff at 01/15/21 0823   • cefTRIAXone (ROCEPHIN) 2 g/100 mL 0.9% NS IVPB (MBP)  2 g Intravenous Q24H Hu Charles MD   2 g at 01/14/21 1841   • cyclobenzaprine (FLEXERIL) tablet 5 mg  5 mg Oral TID PRN Hu Charles MD   5 mg at 01/15/21 1149   • dextrose (D50W) 25 g/ 50mL Intravenous Solution 25 g  25 g Intravenous Q15 Min PRN Hu Charles MD       • dextrose (GLUTOSE) oral gel 15 g  15 g Oral Q15 Min PRN Hu Charles MD       • glucagon (human recombinant) (GLUCAGEN DIAGNOSTIC) injection 1 mg  1 mg Subcutaneous Q15 Min PRN Hu Charles MD       • insulin aspart (novoLOG) injection 0-14 Units  0-14 Units Subcutaneous TID AC Hu Charles MD   12 Units at 01/15/21 1145   • insulin detemir (LEVEMIR) injection 30 Units  30 Units Subcutaneous Q12H Hu Charles MD       • Magnesium Oxide tablet 400 mg  400 mg Oral Daily Hu Charles MD   400 mg at 01/15/21 0944   • morphine injection 2 mg  2 mg Intravenous Q2H PRN Hu Charles MD   2 mg at 01/15/21 0226   • ondansetron (ZOFRAN) injection 4 mg  4 mg Intravenous Q6H PRN Hu Charles MD   4 mg at 01/15/21 0222   • potassium chloride (MICRO-K) CR capsule 40 mEq  40 mEq Oral PRN Hu Charles MD   40 mEq at 01/14/21 0209    Or   • potassium chloride  (KLOR-CON) packet 40 mEq  40 mEq Oral PRN Hu Charles MD        Or   • potassium chloride 10 mEq in 100 mL IVPB  10 mEq Intravenous Q1H PRN Hu Charles  mL/hr at 01/13/21 1512 10 mEq at 01/13/21 1512   • [START ON 1/16/2021] potassium chloride (MICRO-K) CR capsule 20 mEq  20 mEq Oral Daily Rodríguez Brody MD       • sertraline (ZOLOFT) tablet 50 mg  50 mg Oral Daily Hu Charles MD   50 mg at 01/15/21 0944   • sodium bicarbonate tablet 650 mg  650 mg Oral TID Rodríguez Brody MD   650 mg at 01/15/21 1146   • sodium chloride 0.9 % flush 10 mL  10 mL Intravenous Q12H Hu Charles MD   10 mL at 01/15/21 0946   • sodium chloride 0.9 % flush 10 mL  10 mL Intravenous PRN Hu Charles MD           Review of Systems:   Review of Systems   Constitutional: Positive for fatigue. Negative for fever.   Respiratory: Positive for cough. Negative for shortness of breath.    Gastrointestinal: Negative for abdominal distention, anal bleeding, constipation, diarrhea, nausea and vomiting.   Genitourinary: Positive for frequency. Negative for dysuria, flank pain, hematuria and urgency.   Musculoskeletal: Positive for arthralgias. Negative for back pain and myalgias.   Skin: Positive for pallor. Negative for color change.   Neurological: Positive for weakness. Negative for seizures, syncope, speech difficulty and headaches.   Psychiatric/Behavioral: Negative for agitation, behavioral problems and confusion.   All other systems reviewed and are negative.      Objective     Vital Signs  Temp:  [97.9 °F (36.6 °C)-98.9 °F (37.2 °C)] 97.9 °F (36.6 °C)  Heart Rate:  [] 93  Resp:  [15-18] 18  BP: (115-135)/(65-76) 122/74    Physical Exam:  Physical Exam  Vitals signs reviewed.   Constitutional:       General: He is not in acute distress.     Appearance: He is ill-appearing.   HENT:      Head: Normocephalic and atraumatic.   Eyes:      General: No scleral icterus.     Extraocular Movements: Extraocular movements intact.       Pupils: Pupils are equal, round, and reactive to light.   Neck:      Musculoskeletal: Normal range of motion and neck supple. No neck rigidity or muscular tenderness.   Cardiovascular:      Rate and Rhythm: Normal rate and regular rhythm.   Pulmonary:      Effort: Pulmonary effort is normal. No respiratory distress.      Breath sounds: Normal breath sounds. No stridor. No wheezing or rhonchi.   Abdominal:      General: Bowel sounds are normal. There is no distension.      Palpations: Abdomen is soft. There is no mass.      Tenderness: There is no abdominal tenderness.      Hernia: No hernia is present.   Musculoskeletal: Normal range of motion.         General: No swelling, tenderness or deformity.      Right lower leg: No edema.      Left lower leg: No edema.   Skin:     General: Skin is warm and dry.      Coloration: Skin is pale. Skin is not jaundiced.      Findings: No bruising or erythema.   Neurological:      General: No focal deficit present.      Mental Status: He is alert and oriented to person, place, and time. Mental status is at baseline.      Cranial Nerves: No cranial nerve deficit.      Sensory: No sensory deficit.      Motor: No weakness.      Coordination: Coordination normal.   Psychiatric:         Mood and Affect: Mood normal.         Behavior: Behavior normal.          Results Review:    Lab Results (last 24 hours)     Procedure Component Value Units Date/Time    POC Glucose Once [[id number]]  (Abnormal) Collected: 01/15/21 1034    Specimen: Blood Updated: 01/15/21 1132     Glucose 385 mg/dL      Comment: RN NotifiedOperator: [id number] Butler VICKERYMeter ID: [id number]       CBC & Differential [[id number]]  (Abnormal) Collected: 01/15/21 0619    Specimen: Blood Updated: 01/15/21 0703    Narrative:      The following orders were created for panel order CBC & Differential.  Procedure                               Abnormality         Status                     ---------                                -----------         ------                     Scan Slide[[id number]]                                       Final result               CBC Auto Differential[[id number]]        Abnormal            Final result                 Please view results for these tests on the individual orders.    Scan Slide [[id number]] Collected: 01/15/21 0619    Specimen: Blood Updated: 01/15/21 0703     Anisocytosis Slight/1+     Hypochromia Slight/1+     WBC Morphology Normal     Platelet Estimate Adequate    Basic Metabolic Panel [[id number]]  (Abnormal) Collected: 01/15/21 0619    Specimen: Blood Updated: 01/15/21 0649     Glucose 219 mg/dL      BUN 73 mg/dL      Creatinine 2.27 mg/dL      Sodium 130 mmol/L      Potassium 4.0 mmol/L      Comment: Slight hemolysis detected by analyzer. Results may be affected.        Chloride 99 mmol/L      CO2 19.0 mmol/L      Calcium 7.3 mg/dL      eGFR Non African Amer 29 mL/min/1.73      BUN/Creatinine Ratio 32.2     Anion Gap 12.0 mmol/L     Narrative:      GFR Normal >60  Chronic Kidney Disease <60  Kidney Failure <15      CBC Auto Differential [[id number]]  (Abnormal) Collected: 01/15/21 0619    Specimen: Blood Updated: 01/15/21 0635     WBC 14.96 10*3/mm3      RBC 3.82 10*6/mm3      Hemoglobin 10.7 g/dL      Hematocrit 30.9 %      MCV 80.9 fL      MCH 28.0 pg      MCHC 34.6 g/dL      RDW 13.9 %      RDW-SD 40.6 fl      MPV 10.7 fL      Platelets 154 10*3/mm3      Neutrophil % 86.3 %      Lymphocyte % 3.9 %      Monocyte % 8.5 %      Eosinophil % 0.3 %      Basophil % 0.1 %      Immature Grans % 0.9 %      Neutrophils, Absolute 12.91 10*3/mm3      Lymphocytes, Absolute 0.59 10*3/mm3      Monocytes, Absolute 1.27 10*3/mm3      Eosinophils, Absolute 0.04 10*3/mm3      Basophils, Absolute 0.02 10*3/mm3      Immature Grans, Absolute 0.13 10*3/mm3      nRBC 0.0 /100 WBC     POC Glucose Once [[id number]]  (Abnormal) Collected: 01/15/21 0604    Specimen: Blood Updated: 01/15/21 0625     Glucose 212  mg/dL      Comment: RN NotifiedOperator: [id number] TRIPP MAYFIELDMeter ID: [id number]       POC Glucose Once [[id number]]  (Abnormal) Collected: 01/14/21 1930    Specimen: Blood Updated: 01/15/21 0418     Glucose 254 mg/dL      Comment: Result Not ConfirmedOperator: [id number] JERMAINE GEORGEMeter ID: [id number]       POC Glucose Once [[id number]]  (Abnormal) Collected: 01/14/21 1620    Specimen: Blood Updated: 01/15/21 0417     Glucose 151 mg/dL      Comment: Result Not ConfirmedOperator: [id number] LEA BOWERSMeter ID: [id number]       POC Glucose Once [[id number]]  (Abnormal) Collected: 01/14/21 1029    Specimen: Blood Updated: 01/15/21 0417     Glucose 264 mg/dL      Comment: RN NotifiedOperator: [id number] HUSK CELESTEMeter ID: [id number]       POC Glucose Once [[id number]]  (Normal) Collected: 01/14/21 0556    Specimen: Blood Updated: 01/15/21 0415     Glucose 73 mg/dL      Comment: : [id number] JERMAINE GEORGEMeter ID: [id number]             Imaging Results (Last 24 Hours)     ** No results found for the last 24 hours. **          Assessment/Plan       Klebsiella sepsis (CMS/[facility])    UTI (urinary tract infection)    Uncontrolled diabetes mellitus (CMS/[facility])    Acute kidney failure (CMS/[facility])    Lung nodule    Bladder cancer (CMS/[facility])    Hyponatremia    His serum glucose and creatinine values have improved - continue with the IV hydration, Levemir BID instead of the IV Insulin. Continue with the IV Rocephin as the blood culture have isolated Gram negative bacilli (Klebsiella). Monitor renal function and CBC - improving.    Hu Charles MD  01/15/21  12:00 CST

## 2021-01-15 NOTE — PROGRESS NOTES
"[facility]  [address] [place] [address]. [address]  T - [phone]  F - [phone]     Progress Note          PATIENT  DEMOGRAPHICS   PATIENT NAME: Favio Casillas                      PHYSICIAN: Rodríguez Brody MD  : 1955  MRN: [id number]   LOS: 4 days    Patient Care Team:  Shayne Merritt MD as PCP - General (Family Medicine)  Subjective   SUBJECTIVE   Feels well no soa. Eating po now. No n/v       Objective   OBJECTIVE   Vital Signs  Temp:  [97.9 °F (36.6 °C)-99.1 °F (37.3 °C)] 97.9 °F (36.6 °C)  Heart Rate:  [] 93  Resp:  [15-18] 15  BP: (113-135)/(65-76) 124/73    Flowsheet Rows      First Filed Value   Admission Height  165.1 cm (65\") Documented at 2021 1041   Admission Weight  62.1 kg (137 lb) Documented at 2021 1041           I/O last 3 completed shifts:  In: 3518.3 [P.O.:1560; I.V.:1858.3; IV Piggyback:100]  Out: 2100 [Urine:2100]    PHYSICAL EXAM    Physical Exam  Vitals signs reviewed.   Constitutional:       General: He is not in acute distress.     Appearance: He is well-developed.   Eyes:      General:         Right eye: No discharge.   Neck:      Vascular: JVD present.   Cardiovascular:      Rate and Rhythm: Normal rate and regular rhythm.      Heart sounds: Normal heart sounds, S1 normal and S2 normal. No murmur.   Pulmonary:      Effort: Pulmonary effort is normal. No respiratory distress.      Breath sounds: No rales.   Abdominal:      General: Bowel sounds are normal. There is no distension.      Palpations: Abdomen is soft.      Tenderness: There is no abdominal tenderness.   Skin:     Coloration: Skin is not pale.      Findings: No erythema.   Neurological:      Mental Status: He is alert and oriented to person, place, and time.      Sensory: No sensory deficit.      Motor: No abnormal muscle tone.         RESULTS   Results Review:    Results from last 7 days   Lab Units 01/15/21  0619 21  0807 21 " 01/13/21  1511  01/11/21  1909  01/11/21  1045   SODIUM mmol/L 130* 128*  --  128*   < > 125*   < > 114*   POTASSIUM mmol/L 4.0 3.2* 3.2* 3.9   < > 4.3   < > 6.0*   CHLORIDE mmol/L 99 97*  --  97*   < > 100   < > 89*   CO2 mmol/L 19.0* 22.0  --  18.0*   < > 7.0*   < > 8.0*   BUN mg/dL 73* 108*  --  123*   < > 134*   < > 134*   CREATININE mg/dL 2.27* 2.92*  --  3.60*   < > 4.76*   < > 5.36*   CALCIUM mg/dL 7.3* 7.7*  --  7.5*   < > 8.9   < > 9.0   BILIRUBIN mg/dL  --   --   --   --   --  0.2  --  0.3   ALK PHOS U/L  --   --   --   --   --  123*  --  148*   ALT (SGPT) U/L  --   --   --   --   --  7  --  9   AST (SGOT) U/L  --   --   --   --   --  11  --  7   GLUCOSE mg/dL 219* 137*  --  149*   < > 125*   < > 419*    < > = values in this interval not displayed.       Estimated Creatinine Clearance: 28 mL/min (A) (by C-G formula based on SCr of 2.27 mg/dL (H)).    Results from last 7 days   Lab Units 01/14/21  0807 01/13/21 2004 01/12/21  0640 01/11/21  1909   MAGNESIUM mg/dL 1.5* 1.6 2.1  --    PHOSPHORUS mg/dL 2.7  --  4.5 5.0*             Results from last 7 days   Lab Units 01/15/21  0619 01/14/21  0807 01/13/21  0614 01/11/21  1045   WBC 10*3/mm3 14.96* 13.44* 18.27* 19.77*   HEMOGLOBIN g/dL 10.7* 10.8* 11.6* 14.8   PLATELETS 10*3/mm3 154 188 247 453*               Imaging Results (Last 24 Hours)     ** No results found for the last 24 hours. **           MEDICATIONS    aspirin, 81 mg, Oral, Daily  budesonide-formoterol, 2 puff, Inhalation, BID - RT  cefTRIAXone, 2 g, Intravenous, Q24H  insulin aspart, 0-14 Units, Subcutaneous, TID AC  insulin detemir, 25 Units, Subcutaneous, Q12H  Magnesium Oxide, 400 mg, Oral, Daily  potassium chloride, 20 mEq, Oral, BID With Meals  sertraline, 50 mg, Oral, Daily  sodium chloride, 10 mL, Intravenous, Q12H      lactated ringers, 100 mL/hr, Last Rate: 100 mL/hr (01/15/21 0983)        Assessment/Plan   ASSESSMENT / PLAN      Klebsiella sepsis (CMS/[facility])    Uncontrolled diabetes  mellitus (CMS/HCC)    Acute kidney failure (CMS/HCC)    UTI (urinary tract infection)    Hyponatremia    Lung nodule    Bladder cancer (CMS/HCC)    1.  SUJATHA on CKD 3- baseline creatinine around 2, creatinine up to 5.36 now.  He has a prior history of bladder outlet obstruction and multiple acute kidney injuries as well as hydronephrosis in the past.  CT revealed mild asymmetrical fluid distention in the right renal pelvis in comparison to the left.  Patient has a history of multiple urinary obstruction from mucus and sediment causing hydronephrosis in the past.    Cr is better and now 2.2, dc ivf. John is out and no marked PVR on bladder scan.    2.  Severe metabolic acidosis- bicarb drip earlier. Add po bicarb     3.  Hyponatremia- na stable.      4.  History of bladder cancer- requiring neobladder surgery almost 10 years ago, also history of prostate cancer     5.  History of CAD     6.  History of diabetes mellitus- now with acidosis, hyperglycemia, anion gap suspicious for early DKA     7.  Hyperkalemia- now low on replacement. Replace mg    8. Kleibsella urosepsis on ceftriaxone     dispo ok to discharge from renal standpoint              This document has been electronically signed by Rodríguez Brody MD on January 15, 2021 09:49 CST

## 2021-01-16 LAB
ANION GAP SERPL CALCULATED.3IONS-SCNC: 9 MMOL/L (ref 5–15)
BASOPHILS # BLD AUTO: 0.03 10*3/MM3 (ref 0–0.2)
BASOPHILS NFR BLD AUTO: 0.2 % (ref 0–1.5)
BUN SERPL-MCNC: 57 MG/DL (ref 8–23)
BUN/CREAT SERPL: 27.1 (ref 7–25)
CALCIUM SPEC-SCNC: 7.1 MG/DL (ref 8.6–10.5)
CHLORIDE SERPL-SCNC: 101 MMOL/L (ref 98–107)
CO2 SERPL-SCNC: 19 MMOL/L (ref 22–29)
CREAT SERPL-MCNC: 2.1 MG/DL (ref 0.76–1.27)
DEPRECATED RDW RBC AUTO: 42.2 FL (ref 37–54)
EOSINOPHIL # BLD AUTO: 0.02 10*3/MM3 (ref 0–0.4)
EOSINOPHIL NFR BLD AUTO: 0.1 % (ref 0.3–6.2)
ERYTHROCYTE [DISTWIDTH] IN BLOOD BY AUTOMATED COUNT: 13.8 % (ref 12.3–15.4)
GFR SERPL CREATININE-BSD FRML MDRD: 32 ML/MIN/1.73
GLUCOSE BLDC GLUCOMTR-MCNC: 128 MG/DL (ref 70–130)
GLUCOSE BLDC GLUCOMTR-MCNC: 204 MG/DL (ref 70–130)
GLUCOSE BLDC GLUCOMTR-MCNC: 268 MG/DL (ref 70–130)
GLUCOSE BLDC GLUCOMTR-MCNC: 366 MG/DL (ref 70–130)
GLUCOSE SERPL-MCNC: 266 MG/DL (ref 65–99)
HCT VFR BLD AUTO: 31.2 % (ref 37.5–51)
HGB BLD-MCNC: 10.4 G/DL (ref 13–17.7)
IMM GRANULOCYTES # BLD AUTO: 0.14 10*3/MM3 (ref 0–0.05)
IMM GRANULOCYTES NFR BLD AUTO: 1 % (ref 0–0.5)
LYMPHOCYTES # BLD AUTO: 0.64 10*3/MM3 (ref 0.7–3.1)
LYMPHOCYTES NFR BLD AUTO: 4.5 % (ref 19.6–45.3)
MCH RBC QN AUTO: 27.8 PG (ref 26.6–33)
MCHC RBC AUTO-ENTMCNC: 33.3 G/DL (ref 31.5–35.7)
MCV RBC AUTO: 83.4 FL (ref 79–97)
MONOCYTES # BLD AUTO: 1.52 10*3/MM3 (ref 0.1–0.9)
MONOCYTES NFR BLD AUTO: 10.6 % (ref 5–12)
NEUTROPHILS NFR BLD AUTO: 11.99 10*3/MM3 (ref 1.7–7)
NEUTROPHILS NFR BLD AUTO: 83.6 % (ref 42.7–76)
NRBC BLD AUTO-RTO: 0 /100 WBC (ref 0–0.2)
PLATELET # BLD AUTO: 193 10*3/MM3 (ref 140–450)
PMV BLD AUTO: 10.4 FL (ref 6–12)
POTASSIUM SERPL-SCNC: 4 MMOL/L (ref 3.5–5.2)
RBC # BLD AUTO: 3.74 10*6/MM3 (ref 4.14–5.8)
SODIUM SERPL-SCNC: 129 MMOL/L (ref 136–145)
WBC # BLD AUTO: 14.34 10*3/MM3 (ref 3.4–10.8)

## 2021-01-16 PROCEDURE — 82962 GLUCOSE BLOOD TEST: CPT

## 2021-01-16 PROCEDURE — 85025 COMPLETE CBC W/AUTO DIFF WBC: CPT | Performed by: INTERNAL MEDICINE

## 2021-01-16 PROCEDURE — 63710000001 INSULIN ASPART PER 5 UNITS: Performed by: INTERNAL MEDICINE

## 2021-01-16 PROCEDURE — 94799 UNLISTED PULMONARY SVC/PX: CPT

## 2021-01-16 PROCEDURE — 63710000001 INSULIN DETEMIR PER 5 UNITS: Performed by: INTERNAL MEDICINE

## 2021-01-16 PROCEDURE — 80048 BASIC METABOLIC PNL TOTAL CA: CPT | Performed by: INTERNAL MEDICINE

## 2021-01-16 PROCEDURE — 25010000002 CEFTRIAXONE: Performed by: INTERNAL MEDICINE

## 2021-01-16 RX ORDER — POTASSIUM CHLORIDE 750 MG/1
10 CAPSULE, EXTENDED RELEASE ORAL DAILY
Status: DISCONTINUED | OUTPATIENT
Start: 2021-01-17 | End: 2021-01-17 | Stop reason: HOSPADM

## 2021-01-16 RX ORDER — PANTOPRAZOLE SODIUM 40 MG/1
40 TABLET, DELAYED RELEASE ORAL
Status: DISCONTINUED | OUTPATIENT
Start: 2021-01-16 | End: 2021-01-17 | Stop reason: HOSPADM

## 2021-01-16 RX ADMIN — SODIUM CHLORIDE, PRESERVATIVE FREE 10 ML: 5 INJECTION INTRAVENOUS at 20:45

## 2021-01-16 RX ADMIN — CYCLOBENZAPRINE 5 MG: 5 TABLET, FILM COATED ORAL at 08:59

## 2021-01-16 RX ADMIN — CYCLOBENZAPRINE 5 MG: 5 TABLET, FILM COATED ORAL at 20:45

## 2021-01-16 RX ADMIN — INSULIN DETEMIR 30 UNITS: 100 INJECTION, SOLUTION SUBCUTANEOUS at 09:00

## 2021-01-16 RX ADMIN — SODIUM BICARBONATE 650 MG: 650 TABLET ORAL at 15:50

## 2021-01-16 RX ADMIN — SODIUM BICARBONATE 650 MG: 650 TABLET ORAL at 08:55

## 2021-01-16 RX ADMIN — Medication 400 MG: at 08:55

## 2021-01-16 RX ADMIN — ASPIRIN 81 MG: 81 TABLET, COATED ORAL at 08:55

## 2021-01-16 RX ADMIN — POTASSIUM CHLORIDE 20 MEQ: 10 CAPSULE, COATED, EXTENDED RELEASE ORAL at 08:55

## 2021-01-16 RX ADMIN — INSULIN ASPART 8 UNITS: 100 INJECTION, SOLUTION INTRAVENOUS; SUBCUTANEOUS at 08:55

## 2021-01-16 RX ADMIN — BUDESONIDE AND FORMOTEROL FUMARATE DIHYDRATE 2 PUFF: 160; 4.5 AEROSOL RESPIRATORY (INHALATION) at 08:07

## 2021-01-16 RX ADMIN — CEFTRIAXONE 2 G: 2 INJECTION, POWDER, FOR SOLUTION INTRAMUSCULAR; INTRAVENOUS at 18:00

## 2021-01-16 RX ADMIN — BUDESONIDE AND FORMOTEROL FUMARATE DIHYDRATE 2 PUFF: 160; 4.5 AEROSOL RESPIRATORY (INHALATION) at 19:05

## 2021-01-16 RX ADMIN — INSULIN ASPART 12 UNITS: 100 INJECTION, SOLUTION INTRAVENOUS; SUBCUTANEOUS at 11:31

## 2021-01-16 RX ADMIN — PANTOPRAZOLE SODIUM 40 MG: 40 TABLET, DELAYED RELEASE ORAL at 11:31

## 2021-01-16 RX ADMIN — SODIUM BICARBONATE 650 MG: 650 TABLET ORAL at 20:45

## 2021-01-16 RX ADMIN — SERTRALINE HYDROCHLORIDE 50 MG: 50 TABLET ORAL at 08:55

## 2021-01-16 NOTE — PROGRESS NOTES
Progress Note  Hu Charles MD  Hospitalist    Date of visit: 1/16/2021     LOS: 5 days   Patient Care Team:  Shayne Merritt MD as PCP - General (Family Medicine)    Chief Complaint: weakness    Subjective     Interval History:     Patient Complaints: weakness / abdominal pain, improved. Less nausea as well.    History taken from: patient / nursing    Medication Review:   Current Facility-Administered Medications   Medication Dose Route Frequency Provider Last Rate Last Admin   • aspirin EC tablet 81 mg  81 mg Oral Daily Hu Charles MD   81 mg at 01/16/21 0855   • budesonide-formoterol (SYMBICORT) 160-4.5 MCG/ACT inhaler 2 puff  2 puff Inhalation BID - RT Hu Charles MD   2 puff at 01/16/21 0807   • cefTRIAXone (ROCEPHIN) 2 g/100 mL 0.9% NS IVPB (MBP)  2 g Intravenous Q24H Hu Charles MD   2 g at 01/15/21 1840   • cyclobenzaprine (FLEXERIL) tablet 5 mg  5 mg Oral TID PRN Hu Charles MD   5 mg at 01/16/21 0859   • dextrose (D50W) 25 g/ 50mL Intravenous Solution 25 g  25 g Intravenous Q15 Min PRN Hu Charles MD       • dextrose (GLUTOSE) oral gel 15 g  15 g Oral Q15 Min PRN Hu Charles MD       • glucagon (human recombinant) (GLUCAGEN DIAGNOSTIC) injection 1 mg  1 mg Subcutaneous Q15 Min PRN Hu Charles MD       • insulin aspart (novoLOG) injection 0-14 Units  0-14 Units Subcutaneous TID AC Hu Charles MD   8 Units at 01/16/21 0855   • insulin detemir (LEVEMIR) injection 35 Units  35 Units Subcutaneous Q12H Hu Charles MD       • Magnesium Oxide tablet 400 mg  400 mg Oral Daily Hu Charles MD   400 mg at 01/16/21 0855   • morphine injection 2 mg  2 mg Intravenous Q2H PRN Hu Charles MD   2 mg at 01/15/21 0226   • ondansetron (ZOFRAN) injection 4 mg  4 mg Intravenous Q6H PRN Hu Charles MD   4 mg at 01/15/21 0222   • potassium chloride (MICRO-K) CR capsule 40 mEq  40 mEq Oral PRN Hu Charles MD   40 mEq at 01/14/21 0209    Or   • potassium chloride  (KLOR-CON) packet 40 mEq  40 mEq Oral PRN Hu Charles MD        Or   • potassium chloride 10 mEq in 100 mL IVPB  10 mEq Intravenous Q1H PRN Hu Charles  mL/hr at 01/13/21 1512 10 mEq at 01/13/21 1512   • potassium chloride (MICRO-K) CR capsule 20 mEq  20 mEq Oral Daily Rdoríguez rBody MD   20 mEq at 01/16/21 0855   • sertraline (ZOLOFT) tablet 50 mg  50 mg Oral Daily Hu Charles MD   50 mg at 01/16/21 0855   • sodium bicarbonate tablet 650 mg  650 mg Oral TID Rodríguez Brody MD   650 mg at 01/16/21 0855   • sodium chloride 0.9 % flush 10 mL  10 mL Intravenous Q12H Hu Charels MD   10 mL at 01/15/21 2026   • sodium chloride 0.9 % flush 10 mL  10 mL Intravenous PRN uH Charles MD           Review of Systems:   Review of Systems   Constitutional: Positive for fatigue. Negative for fever.   Respiratory: Positive for cough. Negative for shortness of breath.    Gastrointestinal: Negative for abdominal distention, anal bleeding, constipation, diarrhea, nausea and vomiting.   Genitourinary: Positive for frequency. Negative for dysuria, flank pain, hematuria and urgency.   Musculoskeletal: Positive for arthralgias. Negative for back pain and myalgias.   Skin: Positive for pallor. Negative for color change.   Neurological: Positive for weakness. Negative for seizures, syncope, speech difficulty and headaches.   Psychiatric/Behavioral: Negative for agitation, behavioral problems and confusion.   All other systems reviewed and are negative.      Objective     Vital Signs  Temp:  [97.1 °F (36.2 °C)-98.1 °F (36.7 °C)] 97.8 °F (36.6 °C)  Heart Rate:  [86-99] 91  Resp:  [16-18] 16  BP: (104-141)/(57-88) 119/67    Physical Exam:  Physical Exam  Vitals signs reviewed.   Constitutional:       General: He is not in acute distress.     Appearance: He is ill-appearing.   HENT:      Head: Normocephalic and atraumatic.   Eyes:      General: No scleral icterus.     Extraocular Movements: Extraocular movements intact.       Pupils: Pupils are equal, round, and reactive to light.   Neck:      Musculoskeletal: Normal range of motion and neck supple. No neck rigidity or muscular tenderness.   Cardiovascular:      Rate and Rhythm: Normal rate and regular rhythm.   Pulmonary:      Effort: Pulmonary effort is normal. No respiratory distress.      Breath sounds: Normal breath sounds. No stridor. No wheezing or rhonchi.   Abdominal:      General: Bowel sounds are normal. There is no distension.      Palpations: Abdomen is soft. There is no mass.      Tenderness: There is no abdominal tenderness.      Hernia: No hernia is present.   Musculoskeletal: Normal range of motion.         General: No swelling, tenderness or deformity.      Right lower leg: No edema.      Left lower leg: No edema.   Skin:     General: Skin is warm and dry.      Coloration: Skin is pale. Skin is not jaundiced.      Findings: No bruising or erythema.   Neurological:      General: No focal deficit present.      Mental Status: He is alert and oriented to person, place, and time. Mental status is at baseline.      Cranial Nerves: No cranial nerve deficit.      Sensory: No sensory deficit.      Motor: No weakness.      Coordination: Coordination normal.   Psychiatric:         Mood and Affect: Mood normal.         Behavior: Behavior normal.          Results Review:    Lab Results (last 24 hours)     Procedure Component Value Units Date/Time    Basic Metabolic Panel [[id number]]  (Abnormal) Collected: 01/16/21 0642    Specimen: Blood Updated: 01/16/21 0713     Glucose 266 mg/dL      BUN 57 mg/dL      Creatinine 2.10 mg/dL      Sodium 129 mmol/L      Potassium 4.0 mmol/L      Chloride 101 mmol/L      CO2 19.0 mmol/L      Calcium 7.1 mg/dL      eGFR Non African Amer 32 mL/min/1.73      BUN/Creatinine Ratio 27.1     Anion Gap 9.0 mmol/L     Narrative:      GFR Normal >60  Chronic Kidney Disease <60  Kidney Failure <15      CBC & Differential [[id number]]  (Abnormal) Collected:  01/16/21 0642    Specimen: Blood Updated: 01/16/21 0659    Narrative:      The following orders were created for panel order CBC & Differential.  Procedure                               Abnormality         Status                     ---------                               -----------         ------                     CBC Auto Differential[[id number]]        Abnormal            Final result                 Please view results for these tests on the individual orders.    CBC Auto Differential [[id number]]  (Abnormal) Collected: 01/16/21 0642    Specimen: Blood Updated: 01/16/21 0659     WBC 14.34 10*3/mm3      RBC 3.74 10*6/mm3      Hemoglobin 10.4 g/dL      Hematocrit 31.2 %      MCV 83.4 fL      MCH 27.8 pg      MCHC 33.3 g/dL      RDW 13.8 %      RDW-SD 42.2 fl      MPV 10.4 fL      Platelets 193 10*3/mm3      Neutrophil % 83.6 %      Lymphocyte % 4.5 %      Monocyte % 10.6 %      Eosinophil % 0.1 %      Basophil % 0.2 %      Immature Grans % 1.0 %      Neutrophils, Absolute 11.99 10*3/mm3      Lymphocytes, Absolute 0.64 10*3/mm3      Monocytes, Absolute 1.52 10*3/mm3      Eosinophils, Absolute 0.02 10*3/mm3      Basophils, Absolute 0.03 10*3/mm3      Immature Grans, Absolute 0.14 10*3/mm3      nRBC 0.0 /100 WBC     POC Glucose Once [[id number]]  (Abnormal) Collected: 01/16/21 0613    Specimen: Blood Updated: 01/16/21 0642     Glucose 268 mg/dL      Comment: RN NotifiedOperator: [id number] YUVAL ZARATEFERMeter ID: [id number]       POC Glucose Once [[id number]]  (Abnormal) Collected: 01/15/21 2010    Specimen: Blood Updated: 01/16/21 0641     Glucose 204 mg/dL      Comment: RN NotifiedOperator: [id number] YUVAL ZARATEFERMeter ID: [id number]       POC Glucose Once [[id number]]  (Abnormal) Collected: 01/15/21 1613    Specimen: Blood Updated: 01/15/21 1718     Glucose 162 mg/dL      Comment: RN NotifiedOperator: [id number] JOHN VICKERYMeter ID: [id number]       POC Glucose Once [[id number]]  (Abnormal) Collected:  01/15/21 1034    Specimen: Blood Updated: 01/15/21 1132     Glucose 385 mg/dL      Comment: RN NotifiedOperator: [id number] JOHN Combs ID: [id number]             Imaging Results (Last 24 Hours)     ** No results found for the last 24 hours. **          Assessment/Plan       Klebsiella sepsis (CMS/HCC)    UTI (urinary tract infection)    Uncontrolled diabetes mellitus (CMS/HCC)    Acute kidney failure (CMS/HCC)    Lung nodule    Bladder cancer (CMS/HCC)    Hyponatremia    His serum glucose and creatinine values have improved - continue with the IV hydration, Levemir BID instead of the IV Insulin. Continue with the IV Rocephin as the blood culture have isolated Gram negative bacilli (Klebsiella). Monitor renal function and CBC - improving.    Hu Charles MD  01/16/21  10:06 CST

## 2021-01-17 VITALS
SYSTOLIC BLOOD PRESSURE: 120 MMHG | HEIGHT: 67 IN | DIASTOLIC BLOOD PRESSURE: 77 MMHG | TEMPERATURE: 98.9 F | BODY MASS INDEX: 20.56 KG/M2 | HEART RATE: 89 BPM | WEIGHT: 131 LBS | OXYGEN SATURATION: 98 % | RESPIRATION RATE: 18 BRPM

## 2021-01-17 LAB
ANION GAP SERPL CALCULATED.3IONS-SCNC: 8 MMOL/L (ref 5–15)
BASOPHILS # BLD AUTO: 0.02 10*3/MM3 (ref 0–0.2)
BASOPHILS NFR BLD AUTO: 0.2 % (ref 0–1.5)
BUN SERPL-MCNC: 52 MG/DL (ref 8–23)
BUN/CREAT SERPL: 28 (ref 7–25)
CALCIUM SPEC-SCNC: 7.4 MG/DL (ref 8.6–10.5)
CHLORIDE SERPL-SCNC: 101 MMOL/L (ref 98–107)
CO2 SERPL-SCNC: 21 MMOL/L (ref 22–29)
CREAT SERPL-MCNC: 1.86 MG/DL (ref 0.76–1.27)
DEPRECATED RDW RBC AUTO: 40.4 FL (ref 37–54)
EOSINOPHIL # BLD AUTO: 0.05 10*3/MM3 (ref 0–0.4)
EOSINOPHIL NFR BLD AUTO: 0.5 % (ref 0.3–6.2)
ERYTHROCYTE [DISTWIDTH] IN BLOOD BY AUTOMATED COUNT: 13.4 % (ref 12.3–15.4)
GFR SERPL CREATININE-BSD FRML MDRD: 37 ML/MIN/1.73
GLUCOSE BLDC GLUCOMTR-MCNC: 157 MG/DL (ref 70–130)
GLUCOSE BLDC GLUCOMTR-MCNC: 255 MG/DL (ref 70–130)
GLUCOSE BLDC GLUCOMTR-MCNC: 306 MG/DL (ref 70–130)
GLUCOSE SERPL-MCNC: 271 MG/DL (ref 65–99)
HCT VFR BLD AUTO: 29.8 % (ref 37.5–51)
HGB BLD-MCNC: 10.1 G/DL (ref 13–17.7)
IMM GRANULOCYTES # BLD AUTO: 0.1 10*3/MM3 (ref 0–0.05)
IMM GRANULOCYTES NFR BLD AUTO: 0.9 % (ref 0–0.5)
LYMPHOCYTES # BLD AUTO: 0.67 10*3/MM3 (ref 0.7–3.1)
LYMPHOCYTES NFR BLD AUTO: 6.2 % (ref 19.6–45.3)
MCH RBC QN AUTO: 27.7 PG (ref 26.6–33)
MCHC RBC AUTO-ENTMCNC: 33.9 G/DL (ref 31.5–35.7)
MCV RBC AUTO: 81.9 FL (ref 79–97)
MONOCYTES # BLD AUTO: 1.28 10*3/MM3 (ref 0.1–0.9)
MONOCYTES NFR BLD AUTO: 11.8 % (ref 5–12)
NEUTROPHILS NFR BLD AUTO: 8.77 10*3/MM3 (ref 1.7–7)
NEUTROPHILS NFR BLD AUTO: 80.4 % (ref 42.7–76)
NRBC BLD AUTO-RTO: 0 /100 WBC (ref 0–0.2)
PLATELET # BLD AUTO: 212 10*3/MM3 (ref 140–450)
PMV BLD AUTO: 10.8 FL (ref 6–12)
POTASSIUM SERPL-SCNC: 3.9 MMOL/L (ref 3.5–5.2)
RBC # BLD AUTO: 3.64 10*6/MM3 (ref 4.14–5.8)
SODIUM SERPL-SCNC: 130 MMOL/L (ref 136–145)
WBC # BLD AUTO: 10.89 10*3/MM3 (ref 3.4–10.8)

## 2021-01-17 PROCEDURE — 63710000001 INSULIN DETEMIR PER 5 UNITS: Performed by: INTERNAL MEDICINE

## 2021-01-17 PROCEDURE — 85025 COMPLETE CBC W/AUTO DIFF WBC: CPT | Performed by: INTERNAL MEDICINE

## 2021-01-17 PROCEDURE — 63710000001 INSULIN ASPART PER 5 UNITS: Performed by: INTERNAL MEDICINE

## 2021-01-17 PROCEDURE — 82962 GLUCOSE BLOOD TEST: CPT

## 2021-01-17 PROCEDURE — 80048 BASIC METABOLIC PNL TOTAL CA: CPT | Performed by: INTERNAL MEDICINE

## 2021-01-17 RX ORDER — LEVOFLOXACIN 250 MG/1
250 TABLET ORAL DAILY
Qty: 5 TABLET | Refills: 0 | Status: SHIPPED | OUTPATIENT
Start: 2021-01-17 | End: 2021-03-17

## 2021-01-17 RX ORDER — INSULIN DETEMIR 100 [IU]/ML
40 INJECTION, SOLUTION SUBCUTANEOUS NIGHTLY
Start: 2021-01-17 | End: 2021-01-17 | Stop reason: HOSPADM

## 2021-01-17 RX ORDER — ALBUTEROL SULFATE 90 UG/1
2 AEROSOL, METERED RESPIRATORY (INHALATION) EVERY 6 HOURS PRN
Qty: 18 G | Refills: 1 | Status: SHIPPED | OUTPATIENT
Start: 2021-01-17

## 2021-01-17 RX ORDER — INSULIN DETEMIR 100 [IU]/ML
40 INJECTION, SOLUTION SUBCUTANEOUS NIGHTLY
Start: 2021-01-17 | End: 2021-01-17 | Stop reason: SDUPTHER

## 2021-01-17 RX ADMIN — SERTRALINE HYDROCHLORIDE 50 MG: 50 TABLET ORAL at 08:32

## 2021-01-17 RX ADMIN — POTASSIUM CHLORIDE 10 MEQ: 10 CAPSULE, COATED, EXTENDED RELEASE ORAL at 08:32

## 2021-01-17 RX ADMIN — ASPIRIN 81 MG: 81 TABLET, COATED ORAL at 08:32

## 2021-01-17 RX ADMIN — CYCLOBENZAPRINE 5 MG: 5 TABLET, FILM COATED ORAL at 05:04

## 2021-01-17 RX ADMIN — INSULIN ASPART 8 UNITS: 100 INJECTION, SOLUTION INTRAVENOUS; SUBCUTANEOUS at 08:32

## 2021-01-17 RX ADMIN — INSULIN ASPART 10 UNITS: 100 INJECTION, SOLUTION INTRAVENOUS; SUBCUTANEOUS at 11:55

## 2021-01-17 RX ADMIN — SODIUM CHLORIDE, PRESERVATIVE FREE 10 ML: 5 INJECTION INTRAVENOUS at 08:33

## 2021-01-17 RX ADMIN — Medication 400 MG: at 08:32

## 2021-01-17 RX ADMIN — INSULIN DETEMIR 35 UNITS: 100 INJECTION, SOLUTION SUBCUTANEOUS at 08:32

## 2021-01-17 RX ADMIN — PANTOPRAZOLE SODIUM 40 MG: 40 TABLET, DELAYED RELEASE ORAL at 05:04

## 2021-01-17 RX ADMIN — SODIUM BICARBONATE 650 MG: 650 TABLET ORAL at 08:33

## 2021-01-17 NOTE — PROGRESS NOTES
"[facility]  [address] [place] [address]. [address]  T - [phone]  F - [phone]     Progress Note          PATIENT  DEMOGRAPHICS   PATIENT NAME: Favio Casillas                      PHYSICIAN: Rodríguez Brody MD  : 1955  MRN: [id number]   LOS: 6 days    Patient Care Team:  Shayne Merritt MD as PCP - General (Family Medicine)  Subjective   SUBJECTIVE   Feels well no soa. Ready to go home       Objective   OBJECTIVE   Vital Signs  Temp:  [97.6 °F (36.4 °C)-98.8 °F (37.1 °C)] 97.9 °F (36.6 °C)  Heart Rate:  [] 93  Resp:  [18-20] 18  BP: (113-142)/(68-85) 128/85    Flowsheet Rows      First Filed Value   Admission Height  165.1 cm (65\") Documented at 2021 1041   Admission Weight  62.1 kg (137 lb) Documented at 2021 1041           No intake/output data recorded.    PHYSICAL EXAM    Physical Exam  Vitals signs reviewed.   Constitutional:       General: He is not in acute distress.     Appearance: He is well-developed.   Eyes:      General:         Right eye: No discharge.   Neck:      Vascular: JVD present.   Cardiovascular:      Rate and Rhythm: Normal rate and regular rhythm.      Heart sounds: Normal heart sounds, S1 normal and S2 normal. No murmur.   Pulmonary:      Effort: Pulmonary effort is normal. No respiratory distress.      Breath sounds: No rales.   Abdominal:      General: Bowel sounds are normal. There is no distension.      Palpations: Abdomen is soft.      Tenderness: There is no abdominal tenderness.   Skin:     Coloration: Skin is not pale.      Findings: No erythema.   Neurological:      Mental Status: He is alert and oriented to person, place, and time.      Sensory: No sensory deficit.      Motor: No abnormal muscle tone.         RESULTS   Results Review:    Results from last 7 days   Lab Units 21  0557 21  0642 01/15/21  0619  21  1909  21  1045   SODIUM mmol/L 130* 129* 130*   < > 125*   < > 114* "   POTASSIUM mmol/L 3.9 4.0 4.0   < > 4.3   < > 6.0*   CHLORIDE mmol/L 101 101 99   < > 100   < > 89*   CO2 mmol/L 21.0* 19.0* 19.0*   < > 7.0*   < > 8.0*   BUN mg/dL 52* 57* 73*   < > 134*   < > 134*   CREATININE mg/dL 1.86* 2.10* 2.27*   < > 4.76*   < > 5.36*   CALCIUM mg/dL 7.4* 7.1* 7.3*   < > 8.9   < > 9.0   BILIRUBIN mg/dL  --   --   --   --  0.2  --  0.3   ALK PHOS U/L  --   --   --   --  123*  --  148*   ALT (SGPT) U/L  --   --   --   --  7  --  9   AST (SGOT) U/L  --   --   --   --  11  --  7   GLUCOSE mg/dL 271* 266* 219*   < > 125*   < > 419*    < > = values in this interval not displayed.       Estimated Creatinine Clearance: 34.2 mL/min (A) (by C-G formula based on SCr of 1.86 mg/dL (H)).    Results from last 7 days   Lab Units 01/14/21  0807 01/13/21 2004 01/12/21  0640 01/11/21  1909   MAGNESIUM mg/dL 1.5* 1.6 2.1  --    PHOSPHORUS mg/dL 2.7  --  4.5 5.0*             Results from last 7 days   Lab Units 01/17/21  0556 01/16/21  0642 01/15/21  0619 01/14/21  0807 01/13/21  0614   WBC 10*3/mm3 10.89* 14.34* 14.96* 13.44* 18.27*   HEMOGLOBIN g/dL 10.1* 10.4* 10.7* 10.8* 11.6*   PLATELETS 10*3/mm3 212 193 154 188 247               Imaging Results (Last 24 Hours)     ** No results found for the last 24 hours. **           MEDICATIONS    aspirin, 81 mg, Oral, Daily  budesonide-formoterol, 2 puff, Inhalation, BID - RT  cefTRIAXone, 2 g, Intravenous, Q24H  insulin aspart, 0-14 Units, Subcutaneous, TID AC  insulin detemir, 35 Units, Subcutaneous, Q12H  Magnesium Oxide, 400 mg, Oral, Daily  pantoprazole, 40 mg, Oral, Q AM  potassium chloride, 10 mEq, Oral, Daily  sertraline, 50 mg, Oral, Daily  sodium bicarbonate, 650 mg, Oral, TID  sodium chloride, 10 mL, Intravenous, Q12H           Assessment/Plan   ASSESSMENT / PLAN      Klebsiella sepsis (CMS/HCC)    Uncontrolled diabetes mellitus (CMS/HCC)    Acute kidney failure (CMS/HCC)    UTI (urinary tract infection)    Hyponatremia    Lung nodule    Bladder cancer  (CMS/HCC)    1.  SUJATHA on CKD 3- baseline creatinine around 2, creatinine up to 5.36 now.  He has a prior history of bladder outlet obstruction and multiple acute kidney injuries as well as hydronephrosis in the past.  CT revealed mild asymmetrical fluid distention in the right renal pelvis in comparison to the left.  Patient has a history of multiple urinary obstruction from mucus and sediment causing hydronephrosis in the past.    Cr is better and now 1.9 off ivf. John is out and no marked PVR on bladder scan.    2.  Severe metabolic acidosis- bicarb drip earlier.on po bicarb, bicarb has improved     3.  Hyponatremia- na stable.      4.  History of bladder cancer- requiring neobladder surgery almost 10 years ago, also history of prostate cancer     5.  History of CAD     6.  History of diabetes mellitus- now with acidosis, hyperglycemia, anion gap suspicious for early DKA now stable     7.  Hyperkalemia / hypokalemia later - keep kcl to 10meq. Received mg yesterday    8. Kleibsella urosepsis on ceftriaxone                 This document has been electronically signed by Rodríguez Brody MD on January 17, 2021 09:28 CST

## 2021-01-17 NOTE — DISCHARGE SUMMARY
Discharge Summary  Hu Charles MD  Hospitalist     Date of Discharge:  1/17/2021    Discharge Diagnosis:      Klebsiella sepsis (CMS/[facility])    UTI (urinary tract infection)    Uncontrolled diabetes mellitus (CMS/[facility])    Acute kidney failure (CMS/[facility])    Lung nodule    Bladder cancer (CMS/[facility])    Hyponatremia      Presenting Problem/History of Present Illness  Generalized weakness    Hospital Course  Patient is a 65 y.o. male admitted for generalized weakness / out of control diabetes mellitus (glucose > 400 mg/dL) due to a Kebsiella UTI complicated by sepsis and severe acute renal failure. He improved slowly with aggressive IV hydration, IV Insulin, IV antibiotics, supportive care. His creatinine is now 1.87 (down from 5.36), his glucose values are much better controlled and he feels good enough to be able to ambulate around with minimal assistance if any.    He remains afebrile, his vital signs are stable and he will continue with PT/OT at home. He will see his family doctor and the Nephrologist as scheduled.    Consults:   Consults     Date and Time Order Name Status Description    1/11/2021 2051 Inpatient Nephrology Consult          Condition on Discharge:  stable    Vital Signs  Temp:  [97.6 °F (36.4 °C)-98.9 °F (37.2 °C)] 98.9 °F (37.2 °C)  Heart Rate:  [] 89  Resp:  [18-20] 18  BP: (113-128)/(68-85) 120/77    Physical Exam:  Physical Exam  Vitals signs reviewed.   Constitutional:       General: He is not in acute distress.     Appearance: He is ill-appearing.   HENT:      Head: Normocephalic and atraumatic.   Eyes:      General: No scleral icterus.     Extraocular Movements: Extraocular movements intact.      Pupils: Pupils are equal, round, and reactive to light.   Neck:      Musculoskeletal: Normal range of motion and neck supple. No neck rigidity or muscular tenderness.   Cardiovascular:      Rate and Rhythm: Normal rate and regular rhythm.   Pulmonary:      Effort: No respiratory distress.       Breath sounds: No stridor. No wheezing or rhonchi.   Abdominal:      General: There is no distension.      Palpations: There is no mass.      Tenderness: There is no abdominal tenderness.   Musculoskeletal: Normal range of motion.         General: No swelling or deformity.      Right lower leg: No edema.      Left lower leg: No edema.   Skin:     General: Skin is warm and dry.      Coloration: Skin is pale. Skin is not jaundiced.      Findings: No bruising.   Neurological:      General: No focal deficit present.      Mental Status: He is alert and oriented to person, place, and time. Mental status is at baseline.      Cranial Nerves: No cranial nerve deficit.      Sensory: No sensory deficit.   Psychiatric:         Mood and Affect: Mood normal.         Behavior: Behavior normal.         Discharge Disposition      Discharge Medications     Discharge Medications      New Medications      Instructions Start Date   insulin detemir 100 UNIT/ML injection  Commonly known as: LEVEMIR  Replaces: insulin detemir 100 UNIT/ML injection   40 Units, Subcutaneous, Every 12 Hours Scheduled      levoFLOXacin 250 MG tablet  Commonly known as: Levaquin   250 mg, Oral, Daily         Continue These Medications      Instructions Start Date   [employer] Blood Glucose Meter w/Device kit   1 each, Does not apply, 4 Times Daily PRN      Accu-Chek Toma device   1 each, Other, 3 Times Daily Before Meals, Use as instructed      albuterol (2.5 MG/3ML) 0.083% nebulizer solution  Commonly known as: PROVENTIL   2.5 mg, Nebulization, Every 4 Hours PRN      albuterol sulfate  (90 Base) MCG/ACT inhaler  Commonly known as: Ventolin HFA   2 puffs, Inhalation, Every 6 Hours PRN      Alcohol Prep pads   1 pad, Does not apply, 5 Times Daily      aspirin 81 MG EC tablet   81 mg, Oral, Daily      budesonide-formoterol 160-4.5 MCG/ACT inhaler  Commonly known as: SYMBICORT   2 puffs, Inhalation, 2 Times Daily - RT      carvedilol 3.125 MG tablet  Commonly  known as: COREG   TK 1 T PO BID WC      clopidogrel 75 MG tablet  Commonly known as: PLAVIX   75 mg, Oral, Daily      cyclobenzaprine 10 MG tablet  Commonly known as: FLEXERIL   10 mg, Oral, 3 Times Daily PRN      fluticasone 50 MCG/ACT nasal spray  Commonly known as: Flonase   2 sprays, Nasal, Daily      freestyle lancets   Tid      Accu-Chek Softclix Lancets lancets   1 each, Other, 3 Times Daily Before Meals, Use as instructed      glucose blood test strip   Use as instructed      glucose blood test strip  Commonly known as: Accu-Chek Toma   1 each, Other, As Needed, Use as instructed      glucose monitor monitoring kit   1 each, Does not apply, 3 Times Daily      hydrOXYzine 25 MG tablet  Commonly known as: ATARAX   TK 1 T PO TID PRF ITCHING      insulin aspart 100 UNIT/ML solution pen-injector sc pen  Commonly known as: novoLOG FLEXPEN   Subcut TIDAC for diabetes.Blood sugar =0 unit;150-199=2 u;200-249=3 u;250-299=4 u;300-349=5 u;350-400=6 u;>400=7 u. Dispense whatever insurance covers.      nitroglycerin 0.4 MG SL tablet  Commonly known as: NITROSTAT   0.4 mg, Sublingual, Every 5 Minutes PRN, Take no more than 3 doses in 15 minutes.       pantoprazole 40 MG EC tablet  Commonly known as: PROTONIX   40 mg, Oral, Daily      sertraline 50 MG tablet  Commonly known as: Zoloft   50 mg, Oral, Daily         Stop These Medications    amitriptyline 25 MG tablet  Commonly known as: ELAVIL     furosemide 20 MG tablet  Commonly known as: LASIX     insulin detemir 100 UNIT/ML injection  Commonly known as: Levemir FlexTouch  Replaced by: insulin detemir 100 UNIT/ML injection     metoclopramide 5 MG tablet  Commonly known as: REGLAN     potassium chloride 10 MEQ CR capsule  Commonly known as: MICRO-K     traMADol 50 MG tablet  Commonly known as: ULTRAM            Discharge Diet:   Diet Instructions     Diet: Renal      Discharge Diet: Renal          Activity at Discharge:   Activity Instructions     Activity as  Tolerated            Follow-up Appointments  Future Appointments   Date Time Provider Department Center   3/17/2021  9:45 AM Shoaib Constantino APRN [facility] None     Additional Instructions for the Follow-ups that You Need to Schedule     Ambulatory Referral to Home Health   As directed      Face to Face Visit Date: 1/17/2021    Follow-up provider for Plan of Care?: I treated the patient in an acute care facility and will not continue treatment after discharge.    Follow-up provider: ELOISA CARRINGTON [[id number]]    Reason/Clinical Findings: SUJATHA    Describe mobility limitations that make leaving home difficult: SUJATHA    Nursing/Therapeutic Services Requested: Skilled Nursing Physical Therapy Occupational Therapy    Skilled nursing orders: Cardiopulmonary assessments Medication education    PT orders: Therapeutic exercise    Occupational orders: Strengthening    Frequency: 1 Week 1         Discharge Follow-up with PCP   As directed       Currently Documented PCP:    Eloisa Carrington MD    PCP Phone Number:    [phone]     Follow Up Details: in 1 week         Discharge Follow-up with Specified Provider: Dr. Brody - Nephrology; 2 Weeks   As directed      To: Dr. Brody - Nephrology    Follow Up: 2 Weeks                Hu Charles MD  01/17/21  13:55 CST

## 2021-01-17 NOTE — PLAN OF CARE
Problem: Adult Inpatient Plan of Care  Goal: Plan of Care Review  Outcome: Ongoing, Progressing  Flowsheets  Taken 1/17/2021 0535 by Keila Benoit, RN  Outcome Summary: pt rested well during the night vital signs remained stable. no s/.s of distress.  Taken 1/14/2021 1646 by Lolly Parsons RN  Progress: improving  Plan of Care Reviewed With: patient   Goal Outcome Evaluation:  Plan of Care Reviewed With: patient  Progress: improving  Outcome Summary: pt rested well during the night vital signs remained stable. no s/.s of distress.

## 2021-01-17 NOTE — DISCHARGE PLACEMENT REQUEST
"Favio Owens (65 y.o. Male)     Date of Birth Social Security Number Address Home Phone MRN    1955  [address]  [place] [id number] [phone] [id number]    Yazidi Marital Status          [place] Single       Admission Date Admission Type Admitting Provider Attending Provider Department, Room/Bed    1/11/21 Emergency Hu Charles MD Popescu, Tudor, MD [address], 322/1    Discharge Date Discharge Disposition Discharge Destination         Home or Self Care              Attending Provider: Hu Charles MD    Allergies: No Known Allergies    Isolation: None   Infection: None   Code Status: CPR    Ht: 170.2 cm (67\")   Wt: 59.4 kg (131 lb)    Admission Cmt: None   Principal Problem: Klebsiella sepsis (CMS/[facility]) [A41.4]                 Active Insurance as of 1/11/2021     Primary Coverage     Payor Plan Insurance Group Employer/Plan Group    ANTHEM MEDICARE REPLACEMENT ANTHEM MEDICARE ADVANTAGE [id number]     Payor Plan Address Payor Plan Phone Number Payor Plan Fax Number Effective Dates    [address] [phone]  1/1/2021 - None Entered    [facility] [address]       Subscriber Name Subscriber Birth Date Member ID       FAVIO OWENS 1955 [id number]           Secondary Coverage     Payor Plan Insurance Group Employer/Plan Group    [place]      Payor Plan Address Payor Plan Phone Number Payor Plan Fax Number Effective Dates    [address] [phone]  8/1/2017 - None Entered    [facility] [address]       Subscriber Name Subscriber Birth Date Member ID       FAVIO OWENS 1955 [id number]                 Emergency Contacts      (Rel.) Home Phone Work Phone Mobile Phone    Sofi Mcdonald (Friend) [phone] -- [phone]    OwensTong pal (Brother) -- -- [phone]        [address] [address]  Phone:  " [phone]  Fax:  [phone] Date: 2021      Ambulatory Referral to Home Health     Patient:  Favio Casillas MRN:  [id number]   [address]  [facility] [address] :  1955  SSN:    Phone: [phone] Sex:  M      INSURANCE PAYOR PLAN GROUP # SUBSCRIBER ID   Primary:  Secondary:    ANTHEM MEDICARE REPLACEMENT  [place] [id number]  [id number]    [id number]  [id number]      Referring Provider Information:  JANET JOSEPH Phone: [phone] Fax: [phone]      Referral Information:   # Visits:  1 Referral Type: Home Health [42]   Urgency:  Routine Referral Reason: Specialty Services Required   Start Date: 2021 End Date:  To be determined by Insurer   Diagnosis: Klebsiella sepsis (CMS/[facility]) (A41.4 [ICD-10-CM] 038.49,995.91 [ICD-9-CM])      Refer to Dept:   Refer to Provider:   Refer to Facility:       Face to Face Visit Date: 2021  Follow-up provider for Plan of Care? I treated the patient in an acute care facility and will not continue treatment after discharge.  Follow-up provider: ELOISA CARRINGTON [[id number]]  Reason/Clinical Findings: SUJATHA  Describe mobility limitations that make leaving home difficult: SUJATHA  Nursing/Therapeutic Services Requested: Skilled Nursing  Nursing/Therapeutic Services Requested: Physical Therapy  Nursing/Therapeutic Services Requested: Occupational Therapy  Skilled nursing orders: Cardiopulmonary assessments  Skilled nursing orders: Medication education  PT orders: Therapeutic exercise  Occupational orders: Strengthening  Frequency: 1 Week 1     This document serves as a request of services and does not constitute Insurance authorization or approval of services.  To determine eligibility, please contact the members Insurance carrier to verify and review coverage.     If you have medical questions regarding this request for services. Please contact [address] at [phone] during normal  business hours.       Authorizing Provider:Hu Charles MD  Authorizing Provider's NPI: [id number]  Order Entered By: Hu Charles MD 1/17/2021 11:21 AM     Electronically signed by: Hu Charles MD 1/17/2021 11:21 AM            Emergency Contact Information     Name Relation Home Work Mobile    Sofi Mcdonald Friend [phone]    Casillas Tong Brother   [phone]

## 2021-01-18 ENCOUNTER — READMISSION MANAGEMENT (OUTPATIENT)
Dept: CALL CENTER | Facility: HOSPITAL | Age: 66
End: 2021-01-18

## 2021-01-18 NOTE — OUTREACH NOTE
Prep Survey      Responses   [facility] facility patient discharged from?  [facility]   Is LACE score < 7 ?  No   Emergency Room discharge w/ pulse ox?  No   Eligibility  Read[facility] Mgmt   Discharge diagnosis  Klebsiella sepsis    Does the patient have one of the following disease processes/diagnoses(primary or secondary)?  Sepsis   Does the patient have Home health ordered?  Yes   What is the Home health agency?   [facility]    Is there a DME ordered?  No   Prep survey completed?  Yes          Deedee Araiza RN

## 2021-01-18 NOTE — PAYOR COMM NOTE
"Zulay Baumann  [facility]  P: [phone]  F: [phone]    Ref#[id number]    Favio Owens (65 y.o. Male)     Date of Birth Social Security Number Address Home Phone MRN    1955  [address]  [facility] [address] [phone] [id number]    Religious Marital Status          Faith Single       Admission Date Admission Type Admitting Provider Attending Provider Department, Room/Bed    1/11/21 Emergency Hu Charles MD  [facility] 3 [place], 322/1    Discharge Date Discharge Disposition Discharge Destination        1/17/2021 Home or Self Care              Attending Provider: (none)   Allergies: No Known Allergies    Isolation: None   Infection: None   Code Status: Prior    Ht: 170.2 cm (67\")   Wt: 59.4 kg (131 lb)    Admission Cmt: None   Principal Problem: Klebsiella sepsis (CMS/[facility]) [A41.4]                 Active Insurance as of 1/11/2021     Primary Coverage     Payor Plan Insurance Group Employer/Plan Group    ANTHEM MEDICARE REPLACEMENT ANTHEM MEDICARE ADVANTAGE [id number]     Payor Plan Address Payor Plan Phone Number Payor Plan Fax Number Effective Dates    [address] [phone]  1/1/2021 - None Entered    [facility] [address]       Subscriber Name Subscriber Birth Date Member ID       FAVIO OWENS 1955 [id number]           Secondary Coverage     Payor Plan Insurance Group Employer/Plan Group    [place]      Payor Plan Address Payor Plan Phone Number Payor Plan Fax Number Effective Dates    [address] [phone]  8/1/2017 - None Entered    [facility] [id number]       Subscriber Name Subscriber Birth Date Member ID       FAVIO OWENS 1955 [id number]                 Emergency Contacts      (Rel.) Home Phone Work Phone Mobile Phone    Sofi Mcdonald (Friend) [phone] -- [phone]    Tong Owens (Brother) -- -- [phone]               Discharge Summary    "   Hu Charles MD at 01/17/21 1023          Discharge Summary  Hu Charles MD  Hospitalist     Date of Discharge:  1/17/2021    Discharge Diagnosis:      Klebsiella sepsis (CMS/[facility])    UTI (urinary tract infection)    Uncontrolled diabetes mellitus (CMS/[facility])    Acute kidney failure (CMS/[facility])    Lung nodule    Bladder cancer (CMS/[facility])    Hyponatremia      Presenting Problem/History of Present Illness  Generalized weakness    Hospital Course  Patient is a 65 y.o. male admitted for generalized weakness / out of control diabetes mellitus (glucose > 400 mg/dL) due to a Kebsiella UTI complicated by sepsis and severe acute renal failure. He improved slowly with aggressive IV hydration, IV Insulin, IV antibiotics, supportive care. His creatinine is now 1.87 (down from 5.36), his glucose values are much better controlled and he feels good enough to be able to ambulate around with minimal assistance if any.    He remains afebrile, his vital signs are stable and he will continue with PT/OT at home. He will see his family doctor and the Nephrologist as scheduled.    Consults:   Consults     Date and Time Order Name Status Description    1/11/2021 2051 Inpatient Nephrology Consult          Condition on Discharge:  stable    Vital Signs  Temp:  [97.6 °F (36.4 °C)-98.9 °F (37.2 °C)] 98.9 °F (37.2 °C)  Heart Rate:  [] 89  Resp:  [18-20] 18  BP: (113-128)/(68-85) 120/77    Physical Exam:  Physical Exam  Vitals signs reviewed.   Constitutional:       General: He is not in acute distress.     Appearance: He is ill-appearing.   HENT:      Head: Normocephalic and atraumatic.   Eyes:      General: No scleral icterus.     Extraocular Movements: Extraocular movements intact.      Pupils: Pupils are equal, round, and reactive to light.   Neck:      Musculoskeletal: Normal range of motion and neck supple. No neck rigidity or muscular tenderness.   Cardiovascular:      Rate and Rhythm: Normal rate and regular rhythm.   Pulmonary:       Effort: No respiratory distress.      Breath sounds: No stridor. No wheezing or rhonchi.   Abdominal:      General: There is no distension.      Palpations: There is no mass.      Tenderness: There is no abdominal tenderness.   Musculoskeletal: Normal range of motion.         General: No swelling or deformity.      Right lower leg: No edema.      Left lower leg: No edema.   Skin:     General: Skin is warm and dry.      Coloration: Skin is pale. Skin is not jaundiced.      Findings: No bruising.   Neurological:      General: No focal deficit present.      Mental Status: He is alert and oriented to person, place, and time. Mental status is at baseline.      Cranial Nerves: No cranial nerve deficit.      Sensory: No sensory deficit.   Psychiatric:         Mood and Affect: Mood normal.         Behavior: Behavior normal.         Discharge Disposition      Discharge Medications     Discharge Medications      New Medications      Instructions Start Date   insulin detemir 100 UNIT/ML injection  Commonly known as: LEVEMIR  Replaces: insulin detemir 100 UNIT/ML injection   40 Units, Subcutaneous, Every 12 Hours Scheduled      levoFLOXacin 250 MG tablet  Commonly known as: Levaquin   250 mg, Oral, Daily         Continue These Medications      Instructions Start Date   [employer] Blood Glucose Meter w/Device kit   1 each, Does not apply, 4 Times Daily PRN      Accu-Chek Toma device   1 each, Other, 3 Times Daily Before Meals, Use as instructed      albuterol (2.5 MG/3ML) 0.083% nebulizer solution  Commonly known as: PROVENTIL   2.5 mg, Nebulization, Every 4 Hours PRN      albuterol sulfate  (90 Base) MCG/ACT inhaler  Commonly known as: Ventolin HFA   2 puffs, Inhalation, Every 6 Hours PRN      Alcohol Prep pads   1 pad, Does not apply, 5 Times Daily      aspirin 81 MG EC tablet   81 mg, Oral, Daily      budesonide-formoterol 160-4.5 MCG/ACT inhaler  Commonly known as: SYMBICORT   2 puffs, Inhalation, 2 Times Daily -  RT      carvedilol 3.125 MG tablet  Commonly known as: COREG   TK 1 T PO BID WC      clopidogrel 75 MG tablet  Commonly known as: PLAVIX   75 mg, Oral, Daily      cyclobenzaprine 10 MG tablet  Commonly known as: FLEXERIL   10 mg, Oral, 3 Times Daily PRN      fluticasone 50 MCG/ACT nasal spray  Commonly known as: Flonase   2 sprays, Nasal, Daily      freestyle lancets   Tid      Accu-Chek Softclix Lancets lancets   1 each, Other, 3 Times Daily Before Meals, Use as instructed      glucose blood test strip   Use as instructed      glucose blood test strip  Commonly known as: Accu-Chek Toma   1 each, Other, As Needed, Use as instructed      glucose monitor monitoring kit   1 each, Does not apply, 3 Times Daily      hydrOXYzine 25 MG tablet  Commonly known as: ATARAX   TK 1 T PO TID PRF ITCHING      insulin aspart 100 UNIT/ML solution pen-injector sc pen  Commonly known as: novoLOG FLEXPEN   Subcut TIDAC for diabetes.Blood sugar =0 unit;150-199=2 u;200-249=3 u;250-299=4 u;300-349=5 u;350-400=6 u;>400=7 u. Dispense whatever insurance covers.      nitroglycerin 0.4 MG SL tablet  Commonly known as: NITROSTAT   0.4 mg, Sublingual, Every 5 Minutes PRN, Take no more than 3 doses in 15 minutes.       pantoprazole 40 MG EC tablet  Commonly known as: PROTONIX   40 mg, Oral, Daily      sertraline 50 MG tablet  Commonly known as: Zoloft   50 mg, Oral, Daily         Stop These Medications    amitriptyline 25 MG tablet  Commonly known as: ELAVIL     furosemide 20 MG tablet  Commonly known as: LASIX     insulin detemir 100 UNIT/ML injection  Commonly known as: Levemir FlexTouch  Replaced by: insulin detemir 100 UNIT/ML injection     metoclopramide 5 MG tablet  Commonly known as: REGLAN     potassium chloride 10 MEQ CR capsule  Commonly known as: MICRO-K     traMADol 50 MG tablet  Commonly known as: ULTRAM            Discharge Diet:   Diet Instructions     Diet: Renal      Discharge Diet: Renal          Activity at Discharge:    Activity Instructions     Activity as Tolerated            Follow-up Appointments  Future Appointments   Date Time Provider Department Center   3/17/2021  9:45 AM Shoaib Constantino APRN [facility] None     Additional Instructions for the Follow-ups that You Need to Schedule     Ambulatory Referral to Home Health   As directed      Face to Face Visit Date: 1/17/2021    Follow-up provider for Plan of Care?: I treated the patient in an acute care facility and will not continue treatment after discharge.    Follow-up provider: ELOISA CARRINGTON [[id number]]    Reason/Clinical Findings: SUJATHA    Describe mobility limitations that make leaving home difficult: SUJATHA    Nursing/Therapeutic Services Requested: Skilled Nursing Physical Therapy Occupational Therapy    Skilled nursing orders: Cardiopulmonary assessments Medication education    PT orders: Therapeutic exercise    Occupational orders: Strengthening    Frequency: 1 Week 1         Discharge Follow-up with PCP   As directed       Currently Documented PCP:    Eloisa Carrington MD    PCP Phone Number:    [phone]     Follow Up Details: in 1 week         Discharge Follow-up with Specified Provider: Dr. Brody - Nephrology; 2 Weeks   As directed      To: Dr. Brody - Nephrology    Follow Up: 2 Weeks                Janet Charles MD  01/17/21  13:55 CST          Electronically signed by Janet Charles MD at 01/17/21 1356       Discharge Order (From admission, onward)     Start     Ordered    01/17/21 1044  Discharge patient  Once     Expected Discharge Date: 01/17/21    Discharge Disposition: Home or Self Care    Physician of Record for Attribution - Please select from Treatment Team: JANET CHARLES [[id number]]    Review needed by CMO to determine Physician of Record: No       Question Answer Comment   Physician of Record for Attribution - Please select from Treatment Team JANET CHARLES    Review needed by CMO to determine Physician of Record No         01/17/21 1053

## 2021-01-20 ENCOUNTER — READMISSION MANAGEMENT (OUTPATIENT)
Dept: CALL CENTER | Facility: HOSPITAL | Age: 66
End: 2021-01-20

## 2021-01-20 NOTE — OUTREACH NOTE
Sepsis Week 1 Survey      Responses   [facility] [place] patient discharged from?  [place]   Does the patient have one of the following disease processes/diagnoses(primary or secondary)?  Sepsis   Week 1 attempt successful?  No   Unsuccessful attempts  Attempt 1          Rula Almeida RN

## 2021-01-21 ENCOUNTER — READMISSION MANAGEMENT (OUTPATIENT)
Dept: CALL CENTER | Facility: HOSPITAL | Age: 66
End: 2021-01-21

## 2021-01-21 NOTE — OUTREACH NOTE
Sepsis Week 1 Survey      Responses   [facility] patient discharged from?  [place]   Does the patient have one of the following disease processes/diagnoses(primary or secondary)?  Sepsis   Week 1 attempt successful?  No   Unsuccessful attempts  Attempt 2          Kevin Kamara RN

## 2021-01-22 ENCOUNTER — READMISSION MANAGEMENT (OUTPATIENT)
Dept: CALL CENTER | Facility: HOSPITAL | Age: 66
End: 2021-01-22

## 2021-01-22 NOTE — OUTREACH NOTE
Sepsis Week 1 Survey      Responses   [facility] patient discharged from?  [place]   Does the patient have one of the following disease processes/diagnoses(primary or secondary)?  Sepsis   Week 1 attempt successful?  No   Unsuccessful attempts  Attempt 3          Minnie Liriano RN

## 2021-02-02 ENCOUNTER — READMISSION MANAGEMENT (OUTPATIENT)
Dept: CALL CENTER | Facility: HOSPITAL | Age: 66
End: 2021-02-02

## 2021-02-02 NOTE — OUTREACH NOTE
Sepsis Week 2 Survey      Responses   [facility] patient discharged from?  [place]   Does the patient have one of the following disease processes/diagnoses(primary or secondary)?  Sepsis   Week 2 attempt successful?  No   Unsuccessful attempts  Attempt 1   Revoke  Decline to participate          Kathe Graham RN

## 2021-03-17 ENCOUNTER — OFFICE VISIT (OUTPATIENT)
Dept: ENDOCRINOLOGY | Facility: CLINIC | Age: 66
End: 2021-03-17

## 2021-03-17 VITALS
HEIGHT: 68 IN | SYSTOLIC BLOOD PRESSURE: 118 MMHG | WEIGHT: 146.5 LBS | DIASTOLIC BLOOD PRESSURE: 74 MMHG | HEART RATE: 87 BPM | BODY MASS INDEX: 22.2 KG/M2 | OXYGEN SATURATION: 100 %

## 2021-03-17 DIAGNOSIS — I10 ESSENTIAL HYPERTENSION: ICD-10-CM

## 2021-03-17 DIAGNOSIS — E11.65 TYPE 2 DIABETES MELLITUS WITH HYPERGLYCEMIA, WITH LONG-TERM CURRENT USE OF INSULIN (HCC): Primary | ICD-10-CM

## 2021-03-17 DIAGNOSIS — E78.2 MIXED HYPERLIPIDEMIA: ICD-10-CM

## 2021-03-17 DIAGNOSIS — F17.200 SMOKER: ICD-10-CM

## 2021-03-17 DIAGNOSIS — Z79.4 TYPE 2 DIABETES MELLITUS WITH HYPERGLYCEMIA, WITH LONG-TERM CURRENT USE OF INSULIN (HCC): Primary | ICD-10-CM

## 2021-03-17 PROCEDURE — 99204 OFFICE O/P NEW MOD 45 MIN: CPT | Performed by: NURSE PRACTITIONER

## 2021-03-17 PROCEDURE — 95250 CONT GLUC MNTR PHYS/QHP EQP: CPT | Performed by: NURSE PRACTITIONER

## 2021-03-17 RX ORDER — PRAVASTATIN SODIUM 40 MG
40 TABLET ORAL DAILY
COMMUNITY

## 2021-03-17 RX ORDER — DOXYCYCLINE 100 MG/1
100 CAPSULE ORAL DAILY
COMMUNITY
End: 2022-12-25 | Stop reason: HOSPADM

## 2021-03-17 RX ORDER — OMEPRAZOLE 20 MG/1
20 CAPSULE, DELAYED RELEASE ORAL DAILY
COMMUNITY
End: 2021-10-27

## 2021-03-17 RX ORDER — FUROSEMIDE 20 MG/1
20 TABLET ORAL DAILY
COMMUNITY

## 2021-03-17 RX ORDER — NORTRIPTYLINE HYDROCHLORIDE 10 MG/1
10 CAPSULE ORAL NIGHTLY
COMMUNITY

## 2021-03-17 NOTE — PROGRESS NOTES
"Chief Complaint  Diabetes    Subjective          Favio Casillas presents to [facility] ENDOCRINOLOGY  History of Present Illness     In office visit    Referring provider Shayne Merritt MD    66-year-old male presents for consultation    Reason diabetes mellitus type 2    Timing constant    Quality not controlled    Duration-- diagnosed in 2016     Macrovascular complications-- CAD    Stents time 1     Microvascular complications--- neuropathy, CKD stage ?    Current regimen-- insulin     Current glucose monitoring is finger sticks    Checks 4 times     Am readings 160 up to 448         Symptoms today--burning feet           Jan. 2021    HgbA1c 10.9 %       Objective   Vital Signs:   /74   Pulse 87   Ht 171.5 cm (67.5\")   Wt 66.5 kg (146 lb 8 oz)   SpO2 100%   BMI 22.61 kg/m²     Physical Exam  Constitutional:       Appearance: Normal appearance.   HENT:      Head: Normocephalic.      Right Ear: External ear normal.      Left Ear: External ear normal.      Nose: Nose normal.   Eyes:      Conjunctiva/sclera: Conjunctivae normal.      Pupils: Pupils are equal, round, and reactive to light.   Cardiovascular:      Rate and Rhythm: Regular rhythm.      Heart sounds: Normal heart sounds.   Pulmonary:      Breath sounds: Normal breath sounds.   Musculoskeletal:         General: Normal range of motion.      Cervical back: Normal range of motion and neck supple.   Skin:     Coloration: Skin is not pale.   Neurological:      General: No focal deficit present.      Mental Status: He is alert.   Psychiatric:         Mood and Affect: Mood normal.         Thought Content: Thought content normal.         Judgment: Judgment normal.        Result Review :   The following data was reviewed by: TIFFANIE Stephen on 03/17/2021:  Common labs    Common Labsle 1/15/21 1/15/21 1/16/21 1/16/21 1/17/21 1/17/21    0619 0619 [id number] [id number] [id number] 0557   Glucose  219 (A)  266 (A)  271 (A)   BUN  73 (A)  57 " (A)  52 (A)   Creatinine  2.27 (A)  2.10 (A)  1.86 (A)   eGFR Non  Am  29 (A)  32 (A)  37 (A)   Sodium  130 (A)  129 (A)  130 (A)   Potassium  4.0  4.0  3.9   Chloride  99  101  101   Calcium  7.3 (A)  7.1 (A)  7.4 (A)   WBC 14.96 (A)  14.34 (A)  10.89 (A)    Hemoglobin 10.7 (A)  10.4 (A)  10.1 (A)    Hematocrit 30.9 (A)  31.2 (A)  29.8 (A)    Platelets 154  193  212    (A) Abnormal value       Comments are available for some flowsheets but are not being displayed.                     Assessment and Plan    Diagnoses and all orders for this visit:    1. Type 2 diabetes mellitus with hyperglycemia, with long-term current use of insulin (CMS/[facility]) (Primary)    2. Essential hypertension    3. Mixed hyperlipidemia    4. Smoker               Type 2 diabetes not controlled    Glycemic management    Taking levemir 25 units BID ---keep       Novolog taking 3 up to 4 units TID before meals     Change to the  Following       Take Novolog 9 units before each meal when no juice     Take 12 units with the meal if drinking juice       We discussed GLP-1 and he does not want       Started him on the laverne to in the office, demonstrated how to insert, change every 14 days, how to interpret the data, gave him the handout book and then we will send off for approval of more sensors        Approve for Laverne Personal Use        #1  Patient has diabetes mellitus, insulin-dependent.     #2 he performs blood glucose testing 4 times daily and blood glucose log was brought to office with variability from .     #3  he is requiring  Basal insulin  and Prandial Insulin 3 times daily for a total of 4 injections per day.     #4 Based on blood glucose readings we are making adjustments.      #5 I have personally seen patient within the past 6 months     #6 We plan on seeing him every 2-3 months for continuous adjustment of her diabetes regimen      #7 patient has hypoglycemia with unawareness.     #8 patient has day-to-day variation in  her mealtime which confounds the degree of insulin dosing with multiple daily injections.     #9 patient has completed diabetes education program with us.     #10 she has demonstrated the ability to self monitor her glucose.         #11 Patient is motivated in improving  diabetes control          Lipid management      Taking Pravachol 40 mg one at night     Blood pressure management      Hypertension    Taking Coreg 3.125 mg twice a day      Macrovascular monitoring      Last eye exam---April 2019     Neuropathy-- no RX             Bone health      Vitamin D deficiency    Taking mvi       Preventative care      Smoker  ]  Favio Casillas  reports that he has been smoking cigarettes. He has a 159.00 pack-year smoking history. He quit smokeless tobacco use about 41 years ago.  His smokeless tobacco use included chew.. I have educated him on the risk of diseases from using tobacco products such as cancer, COPD and heart disease.     I advised him to quit and he is not willing to quit.    I spent 3  minutes counseling the patient.         Weight management      Patient's Body mass index is 22.61 kg/m². BMI is within normal parameters. No follow-up required..        Records from  received and reviewed from [id number]  Thank you for this consultation          Follow Up   Return in about 2 weeks (around 3/31/2021).  Patient was given instructions and counseling regarding his condition or for health maintenance advice. Please see specific information pulled into the AVS if appropriate.

## 2021-03-17 NOTE — PATIENT INSTRUCTIONS
Take  Novolog 9 units before each meal when no juice     Take 12 units with the meal if drinking juice

## 2021-03-29 ENCOUNTER — TELEPHONE (OUTPATIENT)
Dept: ENDOCRINOLOGY | Facility: CLINIC | Age: 66
End: 2021-03-29

## 2021-03-29 NOTE — TELEPHONE ENCOUNTER
I can schedule this patient if needed, but unfortunately we aren't able to give clinical information up front.

## 2021-03-29 NOTE — TELEPHONE ENCOUNTER
Called pt's number. LM - so sorry that was missed. We sent FSL2 order to [facility]. Please call them at [phone].

## 2021-03-29 NOTE — TELEPHONE ENCOUNTER
Pt's and his friend Sofi called, she is not on the verbal but he was with her and gave permission to give information.     They are needing to know about the Freestyle Laverne, she says that his will go out on the 31st and they still have not received anything in the mail or got a call from the company.

## 2021-03-31 ENCOUNTER — TELEPHONE (OUTPATIENT)
Dept: ENDOCRINOLOGY | Facility: CLINIC | Age: 66
End: 2021-03-31

## 2021-04-02 ENCOUNTER — TELEPHONE (OUTPATIENT)
Dept: ENDOCRINOLOGY | Facility: CLINIC | Age: 66
End: 2021-04-02

## 2021-04-02 NOTE — TELEPHONE ENCOUNTER
Sofi left a vm asking for a call, she says they have to change his freestyle annie to a dexcom g6 for the insurance to pay for it.

## 2021-05-17 ENCOUNTER — TELEPHONE (OUTPATIENT)
Dept: ENDOCRINOLOGY | Facility: CLINIC | Age: 66
End: 2021-05-17

## 2021-05-20 ENCOUNTER — TELEPHONE (OUTPATIENT)
Dept: ENDOCRINOLOGY | Facility: CLINIC | Age: 66
End: 2021-05-20

## 2021-05-20 NOTE — TELEPHONE ENCOUNTER
Pt's insurance won't cover Laverne but will cover Dexcom G 6 and needs us to fax script   Fax [phone]

## 2021-05-21 ENCOUNTER — TELEPHONE (OUTPATIENT)
Dept: ENDOCRINOLOGY | Facility: CLINIC | Age: 66
End: 2021-05-21

## 2021-05-25 ENCOUNTER — TELEPHONE (OUTPATIENT)
Dept: ENDOCRINOLOGY | Facility: CLINIC | Age: 66
End: 2021-05-25

## 2021-05-25 NOTE — TELEPHONE ENCOUNTER
Sofi called with a fax number for the insurance company to get approval for the Dexcom.     Fax [phone].

## 2021-05-25 NOTE — TELEPHONE ENCOUNTER
[facility] [employer] supply  Left  with 5th floor about the PA status for diabetic supplies and left the number [phone]-extension [phone]

## 2021-05-27 ENCOUNTER — TELEPHONE (OUTPATIENT)
Dept: ENDOCRINOLOGY | Facility: CLINIC | Age: 66
End: 2021-05-27

## 2021-05-27 ENCOUNTER — OFFICE VISIT (OUTPATIENT)
Dept: ENDOCRINOLOGY | Facility: CLINIC | Age: 66
End: 2021-05-27

## 2021-05-27 VITALS
OXYGEN SATURATION: 97 % | BODY MASS INDEX: 24.4 KG/M2 | HEART RATE: 93 BPM | WEIGHT: 161 LBS | DIASTOLIC BLOOD PRESSURE: 98 MMHG | SYSTOLIC BLOOD PRESSURE: 142 MMHG | HEIGHT: 68 IN

## 2021-05-27 DIAGNOSIS — I10 ESSENTIAL HYPERTENSION: ICD-10-CM

## 2021-05-27 DIAGNOSIS — Z79.4 TYPE 2 DIABETES MELLITUS WITH HYPERGLYCEMIA, WITH LONG-TERM CURRENT USE OF INSULIN (HCC): Primary | ICD-10-CM

## 2021-05-27 DIAGNOSIS — E78.2 MIXED HYPERLIPIDEMIA: ICD-10-CM

## 2021-05-27 DIAGNOSIS — F17.200 SMOKER: ICD-10-CM

## 2021-05-27 DIAGNOSIS — E11.65 TYPE 2 DIABETES MELLITUS WITH HYPERGLYCEMIA, WITH LONG-TERM CURRENT USE OF INSULIN (HCC): Primary | ICD-10-CM

## 2021-05-27 PROCEDURE — 99214 OFFICE O/P EST MOD 30 MIN: CPT | Performed by: NURSE PRACTITIONER

## 2021-05-27 RX ORDER — PEN NEEDLE, DIABETIC 31 GX5/16"
NEEDLE, DISPOSABLE MISCELLANEOUS SEE ADMIN INSTRUCTIONS
COMMUNITY
Start: 2021-04-30

## 2021-05-27 RX ORDER — POTASSIUM CHLORIDE 20 MEQ/1
40 TABLET, EXTENDED RELEASE ORAL DAILY
COMMUNITY
Start: 2021-03-23 | End: 2021-08-04

## 2021-05-27 NOTE — PROGRESS NOTES
"Chief Complaint  Follow-up (2 week ) and Diabetes (Type 2 )    Subjective          Favio Casillas presents to [facility] ENDOCRINOLOGY  History of Present Illness     In office visit       Referring provider Shayne Merritt MD      66 -year-old male presents for follow-up    Reason diabetes mellitus type 2    Duration diagnosed in 2016    Timing constant    Quality not controlled    Microvascular complications--- neuropathy, CKD I do not know the staging    Macrovascular complications CAD, stent x1    Current regimen is insulin    Current glucose monitoring fingersticks    Checks 4 times daily      States improved     Now under 150            Jan. 2021     HgbA1c 10.9 %       Review of Systems - General ROS: positive for  - fatigue             Objective   Vital Signs:   /98   Pulse 93   Ht 171.5 cm (67.5\")   Wt 73 kg (161 lb)   SpO2 97%   BMI 24.84 kg/m²     Physical Exam  Constitutional:       Appearance: Normal appearance.   Cardiovascular:      Rate and Rhythm: Regular rhythm.      Heart sounds: Normal heart sounds.   Pulmonary:      Breath sounds: Normal breath sounds.   Musculoskeletal:         General: Normal range of motion.      Cervical back: Normal range of motion.   Skin:     Coloration: Skin is not pale.   Neurological:      General: No focal deficit present.      Mental Status: He is alert.   Psychiatric:         Mood and Affect: Mood normal.         Thought Content: Thought content normal.        Result Review :   The following data was reviewed by: TIFFANIE Stephen on 05/27/2021:  Common labs    Common Labsle 1/16/21 1/16/21 1/17/21 1/17/21 3/23/21    0642 0642 0556 0557    Glucose  266 (A)  271 (A)    BUN  57 (A)  52 (A)    Creatinine  2.10 (A)  1.86 (A)    eGFR Non  Am  32 (A)  37 (A)    Sodium  129 (A)  130 (A)    Potassium  4.0  3.9    Chloride  101  101    Calcium  7.1 (A)  7.4 (A)    WBC 14.34 (A)  10.89 (A)     Hemoglobin 10.4 (A)  10.1 (A)   "   Hematocrit 31.2 (A)  29.8 (A)     Platelets 193  212     PSA     0.1   (A) Abnormal value       Comments are available for some flowsheets but are not being displayed.                     Assessment and Plan    Diagnoses and all orders for this visit:    1. Type 2 diabetes mellitus with hyperglycemia, with long-term current use of insulin (CMS/[facility]) (Primary)    2. Mixed hyperlipidemia    3. Smoker    4. Essential hypertension               Glycemic management    Type 2 diabetes not controlled            Taking levemir 25 units BID --keep this dose                Take Novolog 9 units before each meal when no juice      Take 12 units with the meal if drinking juice     States working no change    He will use the dexcom to help wiht dosage as well        We discussed GLP-1 and he does not want               approve dexcom       The patient has diabetes mellitus, insulin-dependent.     Our Diabetes Department has evaluated the patient in the last six months and will continue counseling on insulin adjustment.      The patient performs blood glucose testing at least four times daily with proven glucose variability from 50 to 300 mg per dl.     The patient is administering basal insulin and prandial insulin four times per day for more than six months.     The patient uses a home blood glucose monitor to assess blood glucose at least four times daily for more than six months.     The patient requires frequent adjustment of insulin treatment regimen based on blood glucose readings.     The patient has frequent variability in blood glucose readings due to activity and variability in meal content and time.      The patient has completed a diabetes education program with us.     The patient has demonstrated the ability to self-monitor her glucose.      The patient is motivated in improving diabetes control      The patient has hypoglycemia unawareness                Lipid management        Taking Pravachol 40 mg one at night             Blood pressure management        Hypertension     Taking Coreg 3.125 mg twice a day        Microvascular monitoring        Last eye exam---April 2019      Neuropathy-- no RX                  Bone health        Vitamin D deficiency     Taking mvi         Preventative care        Smoker      Favio Casillas  reports that he has been smoking cigarettes. He has a 159.00 pack-year smoking history. He quit smokeless tobacco use about 41 years ago.  His smokeless tobacco use included chew.. I have educated him on the risk of diseases from using tobacco products such as cancer, COPD and heart disease.     I advised him to quit and he is not willing to quit.    I spent 3  minutes counseling the patient.                  Weight management        Patient's Body mass index is 24.84 kg/m². indicating that he is within normal range (BMI 18.5-24.9). No BMI management plan needed..              Follow Up   Return in about 3 months (around 8/27/2021) for Recheck.  Patient was given instructions and counseling regarding his condition or for health maintenance advice. Please see specific information pulled into the AVS if appropriate.

## 2021-06-01 ENCOUNTER — TELEPHONE (OUTPATIENT)
Dept: ENDOCRINOLOGY | Facility: CLINIC | Age: 66
End: 2021-06-01

## 2021-06-01 NOTE — TELEPHONE ENCOUNTER
ashwini with [employer] need a PA , faxed a for with all we need in order to submit for dexcom transmiter , sensor and    Faxed on May 5th will refax today     Ph [phone] ext [phone]

## 2021-06-03 DIAGNOSIS — Z79.4 TYPE 2 DIABETES MELLITUS WITH HYPERGLYCEMIA, WITH LONG-TERM CURRENT USE OF INSULIN (HCC): Primary | ICD-10-CM

## 2021-06-03 DIAGNOSIS — E11.65 TYPE 2 DIABETES MELLITUS WITH HYPERGLYCEMIA, WITH LONG-TERM CURRENT USE OF INSULIN (HCC): Primary | ICD-10-CM

## 2021-06-03 RX ORDER — PROCHLORPERAZINE 25 MG/1
1 SUPPOSITORY RECTAL
Qty: 1 EACH | Refills: 3 | Status: SHIPPED | OUTPATIENT
Start: 2021-06-03 | End: 2021-12-02 | Stop reason: SDUPTHER

## 2021-06-03 RX ORDER — PROCHLORPERAZINE 25 MG/1
1 SUPPOSITORY RECTAL DAILY
Qty: 1 EACH | Refills: 0 | Status: ON HOLD | OUTPATIENT
Start: 2021-06-03 | End: 2021-07-21

## 2021-06-03 RX ORDER — PROCHLORPERAZINE 25 MG/1
SUPPOSITORY RECTAL
Qty: 9 EACH | Refills: 3 | Status: SHIPPED | OUTPATIENT
Start: 2021-06-03 | End: 2021-08-05 | Stop reason: SDUPTHER

## 2021-06-03 NOTE — TELEPHONE ENCOUNTER
Spoke with pt - confirmed Megan ID number is what we have on file. Called [facility]. Spoke with precert team. She stated [facility] is out of network. Send script to [facility]  - scripts forwarded to be signed and sent to [facility] home delivery.

## 2021-06-04 ENCOUNTER — TELEPHONE (OUTPATIENT)
Dept: ENDOCRINOLOGY | Facility: CLINIC | Age: 66
End: 2021-06-04

## 2021-06-04 NOTE — TELEPHONE ENCOUNTER
LM - for Ela with [employer] that pt's account with them is canceled due to OON. I spoke with pt's insurance yesterday, June 3rd, and ins. Instructed us to e-scribe cgm to [place] which was done yesterday.

## 2021-06-04 NOTE — TELEPHONE ENCOUNTER
ashwini with [employer] need a PA , faxed a for with all we need in order to submit for dexcom transmiter , sensor and    Faxed on May 5th will refax today      Ph [phone] ext [phone]

## 2021-06-07 ENCOUNTER — DOCUMENTATION (OUTPATIENT)
Dept: ENDOCRINOLOGY | Facility: CLINIC | Age: 66
End: 2021-06-07

## 2021-06-07 NOTE — PROGRESS NOTES
Key: [id number]  Dexcom G6 Sensor    Key: [id number]  Dexcom G6 Transmitter    Key: [id number]  Dexcom G6  device  Status: PA Response - Approved    PA APPROVED - CMM

## 2021-06-09 ENCOUNTER — TELEPHONE (OUTPATIENT)
Dept: ENDOCRINOLOGY | Facility: CLINIC | Age: 66
End: 2021-06-09

## 2021-06-09 NOTE — TELEPHONE ENCOUNTER
Spoke with Sofi - provided [employer] [facility] Phone number - she will call to check status. If that doesn't work she wants us to try [facility].

## 2021-06-09 NOTE — TELEPHONE ENCOUNTER
Sofi left a vm stating that they are needing a call back, says they were told about a place they could get the dexcom G6 closer and they would like to talk to someone about this

## 2021-07-06 ENCOUNTER — PREP FOR SURGERY (OUTPATIENT)
Dept: OTHER | Facility: HOSPITAL | Age: 66
End: 2021-07-06

## 2021-07-06 DIAGNOSIS — D49.4 BLADDER NEOPLASM: Primary | ICD-10-CM

## 2021-07-07 PROBLEM — D49.4 BLADDER NEOPLASM: Status: ACTIVE | Noted: 2021-07-07

## 2021-07-19 ENCOUNTER — PRE-ADMISSION TESTING (OUTPATIENT)
Dept: PREADMISSION TESTING | Facility: HOSPITAL | Age: 66
End: 2021-07-19

## 2021-07-19 ENCOUNTER — LAB (OUTPATIENT)
Dept: LAB | Facility: HOSPITAL | Age: 66
End: 2021-07-19

## 2021-07-19 VITALS
BODY MASS INDEX: 25.58 KG/M2 | HEIGHT: 67 IN | DIASTOLIC BLOOD PRESSURE: 80 MMHG | HEART RATE: 84 BPM | SYSTOLIC BLOOD PRESSURE: 142 MMHG | RESPIRATION RATE: 18 BRPM | WEIGHT: 163 LBS | OXYGEN SATURATION: 96 %

## 2021-07-19 DIAGNOSIS — D49.4 BLADDER NEOPLASM: ICD-10-CM

## 2021-07-19 DIAGNOSIS — Z01.818 PREOP TESTING: Primary | ICD-10-CM

## 2021-07-19 LAB
ANION GAP SERPL CALCULATED.3IONS-SCNC: 8 MMOL/L (ref 5–15)
BILIRUB UR QL STRIP: NEGATIVE
BUN SERPL-MCNC: 29 MG/DL (ref 8–23)
BUN/CREAT SERPL: 20.4 (ref 7–25)
CALCIUM SPEC-SCNC: 9.2 MG/DL (ref 8.6–10.5)
CHLORIDE SERPL-SCNC: 100 MMOL/L (ref 98–107)
CLARITY UR: ABNORMAL
CO2 SERPL-SCNC: 33 MMOL/L (ref 22–29)
COLOR UR: YELLOW
CREAT SERPL-MCNC: 1.42 MG/DL (ref 0.76–1.27)
GFR SERPL CREATININE-BSD FRML MDRD: 50 ML/MIN/1.73
GLUCOSE SERPL-MCNC: 74 MG/DL (ref 65–99)
GLUCOSE UR STRIP-MCNC: NEGATIVE MG/DL
HGB UR QL STRIP.AUTO: ABNORMAL
KETONES UR QL STRIP: NEGATIVE
LEUKOCYTE ESTERASE UR QL STRIP.AUTO: ABNORMAL
NITRITE UR QL STRIP: NEGATIVE
PH UR STRIP.AUTO: 7 [PH] (ref 5–9)
POTASSIUM SERPL-SCNC: 3.9 MMOL/L (ref 3.5–5.2)
PROT UR QL STRIP: NEGATIVE
QT INTERVAL: 380 MS
QTC INTERVAL: 454 MS
SARS-COV-2 N GENE RESP QL NAA+PROBE: NOT DETECTED
SODIUM SERPL-SCNC: 141 MMOL/L (ref 136–145)
SP GR UR STRIP: 1.01 (ref 1–1.03)
UROBILINOGEN UR QL STRIP: ABNORMAL

## 2021-07-19 PROCEDURE — 87635 SARS-COV-2 COVID-19 AMP PRB: CPT

## 2021-07-19 PROCEDURE — 81003 URINALYSIS AUTO W/O SCOPE: CPT

## 2021-07-19 PROCEDURE — 80048 BASIC METABOLIC PNL TOTAL CA: CPT

## 2021-07-19 PROCEDURE — 93010 ELECTROCARDIOGRAM REPORT: CPT | Performed by: INTERNAL MEDICINE

## 2021-07-19 PROCEDURE — 36415 COLL VENOUS BLD VENIPUNCTURE: CPT

## 2021-07-19 PROCEDURE — 93005 ELECTROCARDIOGRAM TRACING: CPT

## 2021-07-19 PROCEDURE — C9803 HOPD COVID-19 SPEC COLLECT: HCPCS

## 2021-07-19 RX ORDER — SODIUM CHLORIDE 9 MG/ML
1000 INJECTION, SOLUTION INTRAVENOUS CONTINUOUS
Status: CANCELLED | OUTPATIENT
Start: 2021-07-21

## 2021-07-19 NOTE — H&P
[facility] History and Physical  MARTINA OWENS  66-year-old; :1955;;male  [address];[place] [address]  Ins: 1) [employer] 2) [place] [facility]  MRN:[id number]  SALLY PARSONS MD  [facility]  Allergies  No Known Drug Allergies Unknown  Current Medications  Proventil HFA 90 mcg/Actuation Aerosol Inhaler Inhalation  dicyclomine 10 mg Cap Oral  amitriptyline 10 mg Tab Oral  isosorbide mononitrate ER 30 mg 24 hr tablet, extended release   cyclobenzaprine 5 mg Tab Oral  dicyclomine 10 mg Cap Oral  carvedilol 3.125 mg Tab Oral  Adoxa 100 mg oral tablet Take 1 Tablet(s) 1 time a day  MiraLax oral powder for reconstitution 17 gram  daily. Mix with 8 ox fluid as directed on label  PriLOSEC OTC 20 mg oral delayed release  tablet Take 1 Tablet(s) 1 time a day  * Medications Reconciled  Problem List  Carcinoma of bladder 2021  Medical History  UNS MALIG GINNY BLADDER  HEART DISEASE  STROKE  Diabetes mellitus  Essential hypertension  Kidney disease  Seizure  Gastric ulcer  Glaucoma  Cataract  HAS HAD COLONSCOPY IN LAST 9 YEARS  Surgical History  CYSTO  BLADDER SURGERY  Psychiatric History  Patient is generally satisfied with life and has no anxiety or thoughts of suicide. Has depression.  Family History  CANCER  Heart Disease, Unspecified  Type 2 Diabetes Mellitus Without Complications  Essential Primary Hypertension  Disorder Of Kidney And Ureter, Unspecified  Cerebral Infarction, Unspecified  Mother  Father  Brother   Brother Alive Sister Alive Sister Alive  Social History  He is  and lives alone. He is  unemployed/disabled. He does not drink. He  smokes 2 packs daily.  Notes  PRE DIAGNOSIS:  HISTORY OF CYSTECTOMY  POST DIAGNOSIS:  RECURRENT TUMOR  ANESTHESIA: Under sterile conditions, Xylocaine jelly was inserted into the urethra for local anesthesia.  PROCEDURE DETAILS:  The history,  physical findings, current medications, and indications were reviewed prior to the procedure. I discussed the procedure with the patient, including possible complications. Patient was prepped and draped in the usual fashion.  Urethra: No abnormalities of the urethra are noted.  Prostate: Prostate fossa is not obstructed.  Bladder: HISTORY OF CONTINENT DIVERSION, 2 LESIONS  NOTED IN BLADDER POUCH.  Ureter: Clear efflux noted both orifices. Orifices normal  configuration and location.  The cystoscope was removed. The patient tolerated the procedure well.  COMPLICATIONS:  No complications.  FINDINGS: BLADDER TUMOR X 2.  INSTRUCTIONS:Appropriate post procedure instructions were given to patient. Verbalized understanding.  Chief Complaint  Cystoscopy  History of Present Illness  Patient is here today for cystoscopy.  Associated signs and symptoms: No fever  Review of Systems  Eyes: Denies: blurry vision, pain in the eyes and double vision  ENMT: Reports: sinus problems; Denies: ear pain and sore throat  Cardiovascular: Reports: chest pain; Denies: varicose veins, angina and syncope  Respiratory: Reports: shortness of breath; Denies: wheezing and frequent cough  Gastrointestinal: Reports: abdominal pain, nausea, vomiting, indigestion and heartburn  Genitourinary: Denies: other symptoms (see HPI)  Musculoskeletal: Reports: joint pain, neck pain and back pain  Integumentary: Denies: skin rash, boils and persistent itch  Neurological: Denies: tremors, dizzy spells and numbness / tingling  Psychiatric: Reports: generally satisfied with life and depression; Denies: suicidal ideation and anxiety  Endocrine: Denies: Excessive thirst, cold intolerance, heat intolerance and tired/sluggish  Hematologic/Lymphatic: Denies: swollen glands and blood clotting problem  Allergic/Immunologic: Denies: hayfever  Constitutional: Denies: fever, chills and weight loss  Vital Signs  Heart Rate: 103 (/min) Oral Temp: 97.1° (f)  Weight: 150 (lb)  Resp Rate: 18 (/min)  Height: 67 (in) BMI: 23.49  Smoking Status: Current some day smoker  BP: 127/86 (mmHg)  Exam  General appearance: The patient appears well developed and well nourished, in no apparent acute distress.  Examination of pupils and irises: Normal.  Assessment of hearing: Normal.  Examination of neck: Neck appears supple.  Assessment of respiratory effort: Respiratory effort appears normal. No apparent distress.  Obtain stool sample: Stool specimen for occult blood is not indicated.  Examination of gait and station: Normal.  Inspection of skin and subcutaneous tissue: Normal.  Orientation to time, place and person: Normal.  Recent and remote memory: Normal.  Mood and affect: Normal  Data Review  Medications and chart reviewed. History and physical form/ROS reviewed and no changes noted. Previous encounter reviewed.   Assessment - Carcinoma of bladder  DX:  Carcinoma of bladder  SNO: [id number], ICD-9: 188.9, ICD-10: C67.9  Plan  Plan Notes:  Cystoscopy, transurethral resection bladder tumor.  Procedures:  40204 CYSTOSCOPY  Discussion:  Discussed my findings and plan of action and reasoning behind decision making. All questions were answered. FINDINGS WERE DISCUSSED WITH PATIENT. RISKS AND BENEFITS EXPLAINED. PATIENT WISHES TO PROCEED.  Instructions:  Patient is instructed to call with any problems. Patient is instructed to call if the condition worsens. Patient is instructed to call with any changes in condition.

## 2021-07-21 ENCOUNTER — ANESTHESIA EVENT (OUTPATIENT)
Dept: PERIOP | Facility: HOSPITAL | Age: 66
End: 2021-07-21

## 2021-07-21 ENCOUNTER — HOSPITAL ENCOUNTER (OUTPATIENT)
Facility: HOSPITAL | Age: 66
Setting detail: HOSPITAL OUTPATIENT SURGERY
Discharge: HOME | End: 2021-07-21
Attending: UROLOGY | Admitting: UROLOGY

## 2021-07-21 ENCOUNTER — ANESTHESIA (OUTPATIENT)
Dept: PERIOP | Facility: HOSPITAL | Age: 66
End: 2021-07-21

## 2021-07-21 VITALS
DIASTOLIC BLOOD PRESSURE: 86 MMHG | OXYGEN SATURATION: 97 % | HEIGHT: 67 IN | HEART RATE: 80 BPM | WEIGHT: 161.82 LBS | BODY MASS INDEX: 25.4 KG/M2 | SYSTOLIC BLOOD PRESSURE: 154 MMHG | RESPIRATION RATE: 18 BRPM | TEMPERATURE: 97.2 F

## 2021-07-21 DIAGNOSIS — C67.2 MALIGNANT NEOPLASM OF LATERAL WALL OF URINARY BLADDER (HCC): Primary | ICD-10-CM

## 2021-07-21 DIAGNOSIS — D49.4 BLADDER NEOPLASM: ICD-10-CM

## 2021-07-21 LAB
AMPHET+METHAMPHET UR QL: NEGATIVE
AMPHETAMINES UR QL: NEGATIVE
BARBITURATES UR QL SCN: NEGATIVE
BENZODIAZ UR QL SCN: NEGATIVE
BUPRENORPHINE SERPL-MCNC: NEGATIVE NG/ML
CANNABINOIDS SERPL QL: POSITIVE
COCAINE UR QL: NEGATIVE
GLUCOSE BLDC GLUCOMTR-MCNC: 136 MG/DL (ref 70–130)
GLUCOSE BLDC GLUCOMTR-MCNC: 191 MG/DL (ref 70–130)
METHADONE UR QL SCN: NEGATIVE
OPIATES UR QL: NEGATIVE
OXYCODONE UR QL SCN: NEGATIVE
PCP UR QL SCN: NEGATIVE
PROPOXYPH UR QL: NEGATIVE
TRICYCLICS UR QL SCN: NEGATIVE

## 2021-07-21 PROCEDURE — 94640 AIRWAY INHALATION TREATMENT: CPT

## 2021-07-21 PROCEDURE — 25010000002 PROPOFOL 10 MG/ML EMULSION: Performed by: NURSE ANESTHETIST, CERTIFIED REGISTERED

## 2021-07-21 PROCEDURE — 80306 DRUG TEST PRSMV INSTRMNT: CPT | Performed by: ANESTHESIOLOGY

## 2021-07-21 PROCEDURE — 25010000002 FENTANYL CITRATE (PF) 50 MCG/ML SOLUTION: Performed by: NURSE ANESTHETIST, CERTIFIED REGISTERED

## 2021-07-21 PROCEDURE — 25010000002 ONDANSETRON PER 1 MG: Performed by: NURSE ANESTHETIST, CERTIFIED REGISTERED

## 2021-07-21 PROCEDURE — 25010000002 CEFTRIAXONE: Performed by: UROLOGY

## 2021-07-21 PROCEDURE — 25010000002 MIDAZOLAM PER 1 MG: Performed by: NURSE ANESTHETIST, CERTIFIED REGISTERED

## 2021-07-21 PROCEDURE — 88307 TISSUE EXAM BY PATHOLOGIST: CPT

## 2021-07-21 PROCEDURE — 82962 GLUCOSE BLOOD TEST: CPT

## 2021-07-21 RX ORDER — MIDAZOLAM HYDROCHLORIDE 1 MG/ML
INJECTION INTRAMUSCULAR; INTRAVENOUS AS NEEDED
Status: DISCONTINUED | OUTPATIENT
Start: 2021-07-21 | End: 2021-07-21 | Stop reason: SURG

## 2021-07-21 RX ORDER — OXYCODONE AND ACETAMINOPHEN 7.5; 325 MG/1; MG/1
1-2 TABLET ORAL EVERY 4 HOURS PRN
Qty: 10 TABLET | Refills: 0 | Status: SHIPPED | OUTPATIENT
Start: 2021-07-21 | End: 2021-08-24

## 2021-07-21 RX ORDER — ACETAMINOPHEN 325 MG/1
650 TABLET ORAL ONCE
Status: COMPLETED | OUTPATIENT
Start: 2021-07-21 | End: 2021-07-21

## 2021-07-21 RX ORDER — LEVOFLOXACIN 250 MG/1
250 TABLET ORAL DAILY
Qty: 7 TABLET | Refills: 0 | Status: SHIPPED | OUTPATIENT
Start: 2021-07-21 | End: 2021-07-29

## 2021-07-21 RX ORDER — ONDANSETRON 2 MG/ML
4 INJECTION INTRAMUSCULAR; INTRAVENOUS ONCE AS NEEDED
Status: DISCONTINUED | OUTPATIENT
Start: 2021-07-21 | End: 2021-07-21 | Stop reason: HOSPADM

## 2021-07-21 RX ORDER — LIDOCAINE HYDROCHLORIDE 20 MG/ML
INJECTION, SOLUTION INFILTRATION; PERINEURAL AS NEEDED
Status: DISCONTINUED | OUTPATIENT
Start: 2021-07-21 | End: 2021-07-21 | Stop reason: SURG

## 2021-07-21 RX ORDER — ONDANSETRON 2 MG/ML
INJECTION INTRAMUSCULAR; INTRAVENOUS AS NEEDED
Status: DISCONTINUED | OUTPATIENT
Start: 2021-07-21 | End: 2021-07-21 | Stop reason: SURG

## 2021-07-21 RX ORDER — NALOXONE HCL 0.4 MG/ML
0.4 VIAL (ML) INJECTION AS NEEDED
Status: DISCONTINUED | OUTPATIENT
Start: 2021-07-21 | End: 2021-07-21 | Stop reason: HOSPADM

## 2021-07-21 RX ORDER — ALBUTEROL SULFATE 2.5 MG/3ML
2.5 SOLUTION RESPIRATORY (INHALATION) EVERY 6 HOURS PRN
Status: DISCONTINUED | OUTPATIENT
Start: 2021-07-21 | End: 2021-07-21 | Stop reason: HOSPADM

## 2021-07-21 RX ORDER — SODIUM CHLORIDE 9 MG/ML
1000 INJECTION, SOLUTION INTRAVENOUS CONTINUOUS
Status: DISCONTINUED | OUTPATIENT
Start: 2021-07-21 | End: 2021-07-21 | Stop reason: HOSPADM

## 2021-07-21 RX ORDER — FENTANYL CITRATE 50 UG/ML
INJECTION, SOLUTION INTRAMUSCULAR; INTRAVENOUS AS NEEDED
Status: DISCONTINUED | OUTPATIENT
Start: 2021-07-21 | End: 2021-07-21 | Stop reason: SURG

## 2021-07-21 RX ORDER — ALBUTEROL SULFATE 2.5 MG/3ML
2.5 SOLUTION RESPIRATORY (INHALATION) ONCE AS NEEDED
Status: DISCONTINUED | OUTPATIENT
Start: 2021-07-21 | End: 2021-07-21 | Stop reason: HOSPADM

## 2021-07-21 RX ORDER — PROPOFOL 10 MG/ML
VIAL (ML) INTRAVENOUS AS NEEDED
Status: DISCONTINUED | OUTPATIENT
Start: 2021-07-21 | End: 2021-07-21 | Stop reason: SURG

## 2021-07-21 RX ADMIN — ALBUTEROL SULFATE 2.5 MG: 2.5 SOLUTION RESPIRATORY (INHALATION) at 11:21

## 2021-07-21 RX ADMIN — ACETAMINOPHEN 650 MG: 325 TABLET, FILM COATED ORAL at 11:49

## 2021-07-21 RX ADMIN — SODIUM CHLORIDE 1000 ML: 9 INJECTION, SOLUTION INTRAVENOUS at 08:01

## 2021-07-21 RX ADMIN — FENTANYL CITRATE 25 MCG: 50 INJECTION INTRAMUSCULAR; INTRAVENOUS at 13:42

## 2021-07-21 RX ADMIN — CEFTRIAXONE 1 G: 1 INJECTION, POWDER, FOR SOLUTION INTRAMUSCULAR; INTRAVENOUS at 13:38

## 2021-07-21 RX ADMIN — FENTANYL CITRATE 50 MCG: 50 INJECTION INTRAMUSCULAR; INTRAVENOUS at 13:47

## 2021-07-21 RX ADMIN — PROPOFOL 120 MG: 10 INJECTION, EMULSION INTRAVENOUS at 13:35

## 2021-07-21 RX ADMIN — MIDAZOLAM HYDROCHLORIDE 1 MG: 2 INJECTION, SOLUTION INTRAMUSCULAR; INTRAVENOUS at 13:28

## 2021-07-21 RX ADMIN — ONDANSETRON 4 MG: 2 INJECTION INTRAMUSCULAR; INTRAVENOUS at 13:51

## 2021-07-21 RX ADMIN — MIDAZOLAM HYDROCHLORIDE 1 MG: 2 INJECTION, SOLUTION INTRAMUSCULAR; INTRAVENOUS at 13:31

## 2021-07-21 RX ADMIN — LIDOCAINE HYDROCHLORIDE 60 MG: 20 INJECTION, SOLUTION INFILTRATION; PERINEURAL at 13:35

## 2021-07-21 RX ADMIN — FENTANYL CITRATE 25 MCG: 50 INJECTION INTRAMUSCULAR; INTRAVENOUS at 13:39

## 2021-07-21 NOTE — ANESTHESIA PROCEDURE NOTES
Airway  Urgency: elective    Date/Time: 7/21/2021 1:36 PM  Airway not difficult    General Information and Staff    Patient location during procedure: OR  CRNA: Ewa Araujo CRNA    Indications and Patient Condition  Indications for airway management: airway protection    Preoxygenated: yes  Mask difficulty assessment: 0 - not attempted    Final Airway Details  Final airway type: supraglottic airway      Successful airway: I-gel  Size 4    Number of attempts at approach: 1  Assessment: lips, teeth, and gum same as pre-op and atraumatic intubation

## 2021-07-21 NOTE — ANESTHESIA POSTPROCEDURE EVALUATION
Patient: Favio Casillas    Procedure Summary     Date: 07/21/21 Room / Location: [facility] OR 02 / [facility] OR    Anesthesia Start: 1330 Anesthesia Stop: 1404    Procedure: CYSTOSCOPY TRANSURETHRAL RESECTION OF BLADDER TUMOR (N/A ) Diagnosis:       Bladder neoplasm      (Bladder neoplasm [D49.4])    Surgeons: Yumiko, Christofer VELAZQUEZ MD Provider: Ewa Araujo CRNA    Anesthesia Type: general ASA Status: 4          Anesthesia Type: general    Vitals  No vitals data found for the desired time range.          Post Anesthesia Care and Evaluation    Patient location during evaluation: PACU  Patient participation: complete - patient participated  Level of consciousness: awake and sleepy but conscious  Pain management: adequate  Airway patency: patent  Anesthetic complications: No anesthetic complications  PONV Status: none  Cardiovascular status: acceptable  Respiratory status: acceptable and room air  Hydration status: acceptable    Comments: ---------------------------               07/21/21                      1402         ---------------------------   BP:          135/87         Pulse:         85           Resp:          20           Temp:   97.4 °F (36.3 °C)   SpO2:          97%         ---------------------------

## 2021-07-21 NOTE — ANESTHESIA PREPROCEDURE EVALUATION
Anesthesia Evaluation     no history of anesthetic complications:  NPO Solid Status: > 8 hours  NPO Liquid Status: > 4 hours           Airway   Mallampati: II  TM distance: >3 FB  Neck ROM: full  Possible difficult intubation  Dental    (+) edentulous    Pulmonary    (+) a smoker (2 ppd) Current Smoked day of surgery, COPD, wheezes,   (-) sleep apnea  Cardiovascular   Exercise tolerance: poor (<4 METS)    ECG reviewed  PT is on anticoagulation therapy  Rhythm: irregular  Rate: normal    (+) hypertension, past MI  >12 months, CAD, cardiac stents more than 12 months ago angina (states usually has CP almost daily, denies any changes), hyperlipidemia,   (-) valvular problems/murmurs, dysrhythmias, murmur, DVT    ROS comment: Normal sinus rhythm  Septal infarct (cited on or before 26-NOV-2018)  Abnormal ECG  When compared with ECG of 05-SEP-2020 17:07,  Questionable change in initial forces of Anterior leads    Neuro/Psych  (+) seizures (multiple seizures in one day when sick) well controlled, headaches (migraines monthly), numbness (hands neuropathy), psychiatric history Anxiety and Depression,     (-) TIA, CVA  GI/Hepatic/Renal/Endo    (+)  GERD well controlled,  hepatitis (treated 10 yrs ago) C, liver disease, renal disease CRI, diabetes mellitus (glu 191) type 2 well controlled using insulin,     Musculoskeletal     (+) arthralgias, back pain, chronic pain,   Abdominal    Substance History   (+) drug use (marijuana)  (-) alcohol use     OB/GYN          Other   arthritis,    history of cancer (bladder and prostate CA)    ROS/Med Hx Other: Stopped plavix more than a month ago  Bladder tumor                  Anesthesia Plan    ASA 4     general   (Insulin in monitor on left proximal arm)  intravenous induction     Anesthetic plan, all risks, benefits, and alternatives have been provided, discussed and informed consent has been obtained with: patient.

## 2021-07-21 NOTE — OP NOTE
CYSTOSCOPY TRANSURETHRAL RESECTION OF BLADDER TUMOR  Procedure Note    Favio Casillas  7/21/2021    Pre-op Diagnosis:   Bladder neoplasm [D49.4]    Post-op Diagnosis:     Post-Op Diagnosis Codes:     * Bladder neoplasm [D49.4]    Procedure(s):  CYSTOSCOPY TRANSURETHRAL RESECTION OF BLADDER TUMOR    Surgeon(s):  Christofer Calderon MD    Anesthesia: General    Staff:   Circulator: Abigail Ponce RN  Scrub Person: Jazmin Pugh  Assistant: Lee Ann Fernandez CSA    Assistant: Lee Ann Fernandez CSA was responsible for performing the following activities: Irrigation and their skilled assistance was necessary for the success of this case.     Estimated Blood Loss: minimal    Specimens:                ID Type Source Tests Collected by Time   A : Bladder tumor  Tissue Urinary Bladder TISSUE PATHOLOGY EXAM Christofer Calderon MD 7/21/2021 1349         Drains:   Urethral Catheter Non-latex 22 Fr. (Active)       Continuous Bladder Irrigation Triple-lumen 22 Fr (Active)       Findings: Tumor in contnent diversion 1.0 cm     Complications: none    Indications: same     Description of Procedure: Patient brought to surgery placed in dorsolithotomy position prepped from the genitalia. 22French cystoscope into the bladder to areas   That were papillary tumors.  A cold cup biopsy forceps were used to biopsy The Lesiontimes two .  Then I used the Bugbee electrode prior setting cut 50 coagulation 50 and cauterized the lesions    I then used the evacuator to remove tissue from the bladder and I placed a 22 Jordanian three-way catheter 20 cc placed in the balloon.  Patient taken recovery.  Christofer Calderon MD      Date: 7/21/2021  Time: 13:56 CDT

## 2021-07-23 LAB
LAB AP CASE REPORT: NORMAL
PATH REPORT.FINAL DX SPEC: NORMAL

## 2021-08-04 ENCOUNTER — OFFICE VISIT (OUTPATIENT)
Dept: GASTROENTEROLOGY | Facility: CLINIC | Age: 66
End: 2021-08-04

## 2021-08-04 ENCOUNTER — TELEPHONE (OUTPATIENT)
Dept: ENDOCRINOLOGY | Facility: CLINIC | Age: 66
End: 2021-08-04

## 2021-08-04 VITALS
DIASTOLIC BLOOD PRESSURE: 80 MMHG | HEART RATE: 96 BPM | SYSTOLIC BLOOD PRESSURE: 124 MMHG | HEIGHT: 67 IN | WEIGHT: 159.6 LBS | BODY MASS INDEX: 25.05 KG/M2

## 2021-08-04 DIAGNOSIS — K21.00 GASTROESOPHAGEAL REFLUX DISEASE WITH ESOPHAGITIS WITHOUT HEMORRHAGE: Primary | ICD-10-CM

## 2021-08-04 DIAGNOSIS — G89.29 CHRONIC ABDOMINAL PAIN: ICD-10-CM

## 2021-08-04 DIAGNOSIS — R10.9 CHRONIC ABDOMINAL PAIN: ICD-10-CM

## 2021-08-04 DIAGNOSIS — D50.8 OTHER IRON DEFICIENCY ANEMIA: ICD-10-CM

## 2021-08-04 DIAGNOSIS — R19.7 DIARRHEA, UNSPECIFIED TYPE: ICD-10-CM

## 2021-08-04 PROCEDURE — 99214 OFFICE O/P EST MOD 30 MIN: CPT | Performed by: PHYSICIAN ASSISTANT

## 2021-08-04 RX ORDER — DEXTROSE AND SODIUM CHLORIDE 5; .45 G/100ML; G/100ML
30 INJECTION, SOLUTION INTRAVENOUS CONTINUOUS PRN
Status: CANCELLED | OUTPATIENT
Start: 2021-10-19

## 2021-08-04 RX ORDER — SODIUM, POTASSIUM,MAG SULFATES 17.5-3.13G
SOLUTION, RECONSTITUTED, ORAL ORAL
Qty: 354 ML | Refills: 0 | Status: SHIPPED | OUTPATIENT
Start: 2021-08-04 | End: 2021-10-19 | Stop reason: HOSPADM

## 2021-08-04 NOTE — PATIENT INSTRUCTIONS
MyPlate from USDA    MyPlate is an outline of a general healthy diet based on the 2010 Dietary Guidelines for Americans, from the U.S. Department of Agriculture (USDA). It sets guidelines for how much food you should eat from each food group based on your age, sex, and level of physical activity.  What are tips for following MyPlate?  To follow MyPlate recommendations:  · Eat a wide variety of fruits and vegetables, grains, and protein foods.  · Serve smaller portions and eat less food throughout the day.  · Limit portion sizes to avoid overeating.  · Enjoy your food.  · Get at least 150 minutes of exercise every week. This is about 30 minutes each day, 5 or more days per week.  It can be difficult to have every meal look like MyPlate. Think about MyPlate as eating guidelines for an entire day, rather than each individual meal.  Fruits and vegetables  · Make half of your plate fruits and vegetables.  · Eat many different colors of fruits and vegetables each day.  · For a 2,000 calorie daily food plan, eat:  ? 2½ cups of vegetables every day.  ? 2 cups of fruit every day.  · 1 cup is equal to:  ? 1 cup raw or cooked vegetables.  ? 1 cup raw fruit.  ? 1 medium-sized orange, apple, or banana.  ? 1 cup 100% fruit or vegetable juice.  ? 2 cups raw leafy greens, such as lettuce, spinach, or kale.  ? ½ cup dried fruit.  Grains  · One fourth of your plate should be grains.  · Make at least half of the grains you eat each day whole grains.  · For a 2,000 calorie daily food plan, eat 6 oz of grains every day.  · 1 oz is equal to:  ? 1 slice bread.  ? 1 cup cereal.  ? ½ cup cooked rice, cereal, or pasta.  Protein  · One fourth of your plate should be protein.  · Eat a wide variety of protein foods, including meat, poultry, fish, eggs, beans, nuts, and tofu.  · For a 2,000 calorie daily food plan, eat 5½ oz of protein every day.  · 1 oz is equal to:  ? 1 oz meat, poultry, or fish.  ? ¼ cup cooked beans.  ? 1 egg.  ? ½ oz nuts  or seeds.  ? 1 Tbsp peanut butter.  Dairy  · Drink fat-free or low-fat (1%) milk.  · Eat or drink dairy as a side to meals.  · For a 2,000 calorie daily food plan, eat or drink 3 cups of dairy every day.  · 1 cup is equal to:  ? 1 cup milk, yogurt, cottage cheese, or soy milk (soy beverage).  ? 2 oz processed cheese.  ? 1½ oz natural cheese.  Fats, oils, salt, and sugars  · Only small amounts of oils are recommended.  · Avoid foods that are high in calories and low in nutritional value (empty calories), like foods high in fat or added sugars.  · Choose foods that are low in salt (sodium). Choose foods that have less than 140 milligrams (mg) of sodium per serving.  · Drink water instead of sugary drinks. Drink enough water each day to keep your urine pale yellow.  Where to find support  · Work with your health care provider or a nutrition specialist (dietitian) to develop a customized eating plan that is right for you.  · Download an marcelo (mobile application) to help you track your daily food intake.  Where to find more information  · Go to ChooseMyPlate.gov for more information.  Summary  · MyPlate is a general guideline for healthy eating from the USDA. It is based on the 2010 Dietary Guidelines for Americans.  · In general, fruits and vegetables should take up ½ of your plate, grains should take up ¼ of your plate, and protein should take up ¼ of your plate.  This information is not intended to replace advice given to you by your health care provider. Make sure you discuss any questions you have with your health care provider.  Document Revised: 05/21/2020 Document Reviewed: 03/19/2018  Elsevier Patient Education © 2021 Elsevier Inc.  BMI for Adults  What is BMI?  Body mass index (BMI) is a number that is calculated from a person's weight and height. BMI can help estimate how much of a person's weight is composed of fat. BMI does not measure body fat directly. Rather, it is an alternative to procedures that  "directly measure body fat, which can be difficult and expensive.  BMI can help identify people who may be at higher risk for certain medical problems.  What are BMI measurements used for?  BMI is used as a screening tool to identify possible weight problems. It helps determine whether a person is obese, overweight, a healthy weight, or underweight.  BMI is useful for:  · Identifying a weight problem that may be related to a medical condition or may increase the risk for medical problems.  · Promoting changes, such as changes in diet and exercise, to help reach a healthy weight. BMI screening can be repeated to see if these changes are working.  How is BMI calculated?  BMI involves measuring your weight in relation to your height. Both height and weight are measured, and the BMI is calculated from those numbers. This can be done either in English (U[place] or metric measurements. Note that charts and online BMI calculators are available to help you find your BMI quickly and easily without having to do these calculations yourself.  To calculate your BMI in English (U.S[place] measurements:    1. Measure your weight in pounds (lb).  2. Multiply the number of pounds by 703.  ? For example, for a person who weighs 180 lb, multiply that number by 703, which equals 126,540.  3. Measure your height in inches. Then multiply that number by itself to get a measurement called \"inches squared.\"  ? For example, for a person who is 70 inches tall, the \"inches squared\" measurement is 70 inches x 70 inches, which equals 4,900 inches squared.  4. Divide the total from step 2 (number of lb x 703) by the total from step 3 (inches squared): 126,540 ÷ 4,900 = 25.8. This is your BMI.  To calculate your BMI in metric measurements:  1. Measure your weight in kilograms (kg).  2. Measure your height in meters (m). Then multiply that number by itself to get a measurement called \"meters squared.\"  ? For example, for a person who is 1.75 m tall, the " "\"meters squared\" measurement is 1.75 m x 1.75 m, which is equal to 3.1 meters squared.  3. Divide the number of kilograms (your weight) by the meters squared number. In this example: 70 ÷ 3.1 = 22.6. This is your BMI.  What do the results mean?  BMI charts are used to identify whether you are underweight, normal weight, overweight, or obese. The following guidelines will be used:  · Underweight: BMI less than 18.5.  · Normal weight: BMI between 18.5 and 24.9.  · Overweight: BMI between 25 and 29.9.  · Obese: BMI of 30 or above.  Keep these notes in mind:  · Weight includes both fat and muscle, so someone with a muscular build, such as an athlete, may have a BMI that is higher than 24.9. In cases like these, BMI is not an accurate measure of body fat.  · To determine if excess body fat is the cause of a BMI of 25 or higher, further assessments may need to be done by a health care provider.  · BMI is usually interpreted in the same way for men and women.  Where to find more information  For more information about BMI, including tools to quickly calculate your BMI, go to these websites:  · Centers for Disease Control and Prevention: www.cdc.gov  · American Heart Association: www.heart.org  · National Heart, Lung, and Blood [place]: www.nhlbi.nih.gov  Summary  · Body mass index (BMI) is a number that is calculated from a person's weight and height.  · BMI may help estimate how much of a person's weight is composed of fat. BMI can help identify those who may be at higher risk for certain medical problems.  · BMI can be measured using English measurements or metric measurements.  · BMI charts are used to identify whether you are underweight, normal weight, overweight, or obese.  This information is not intended to replace advice given to you by your health care provider. Make sure you discuss any questions you have with your health care provider.  Document Revised: 09/09/2020 Document Reviewed: 07/17/2020  [employer] " Patient Education © 2021 Elsevier Inc.

## 2021-08-04 NOTE — PROGRESS NOTES
Chief Complaint   Patient presents with   • Eval For Colonoscopy     Ref. Dr. René LEVIN PROCEDURE ORDERED: EGD/COLON gerd, diarrhea fe def anemia    Subjective    Favio Casillas is a 66 y.o. male. he is being seen for consultation today at the request of Dr. Merritt.    History of Present Illness    This is a 66-year-old male was sent for evaluation for endoscopy by Dr. Merritt who saw the patient on 07/06/2021. I had last seen him 06/17/2019. The patient currently denies abdominal pain. He states that his gastroesophageal reflux disease is doing well on omeprazole and pantoprazole. He denied nausea, vomiting, and dysphagia. He states that he has been having a lot of diarrhea but no blood in his stool. Weight is up 23 pounds since last visit. Last EGD/colonoscopy 04/22/2019 showed esophagitis, gastritis, and diverticulosis.     The patient had laboratories 01/11/2021, CMP showed glucose 419, , creatinine 5.36, sodium 114, potassium 6.0, chloride 89, CO2 8.0, alkaline phosphatase 148, glomerular filtration rate 11. CT scan of chest, abdomen, and pelvis without contrast showed fairly normal liver, granuloma of the spleen. He had laboratories 07/02/2021, renal function panel showed glucose 126, BUN 27, creatinine 1.4, glomerular filtration rate 51. He does see Anastacio Reilly, in nephrology. Iron studies were okay, ferritin was 23, magnesium 1.6, uric acid 3.7, vitamin D was low at 21.2, A1c 9.9%. CBC fairly normal. Laboratories 07/19/2021 BMP showed a creatinine 1.42, BUN 29, CO2 33, glomerular filtration rate 50. He had a urinalysis showing blood in the UTI. Urine drug screen positive for THC on 07/21/2021. He states that he does do self catheterizations. He recently had a cystoscopy by Dr. Calderon 07/21/2021 for a tumor, this showed interstitial metaplasia.     ASSESSMENT/PLAN: This is a patient with iron deficiency anemia with chronic renal disease, recurrent UTIs. The patient is having a lot of  diarrhea, known diverticular disease, increasing gastroesophageal reflux disease symptoms. I did recommend EGD/colonoscopy, we will do biopsies as indicated. We will plan follow-up after the above, further pending clinical course and the results of the above.    Thank you very much Dr. Merritt for involving us in the care of your patient, we will keep you informed.       The following portions of the patient's history were reviewed and updated as appropriate:   Past Medical History:   Diagnosis Date   • Adenomatous polyp of colon    • Bladder cancer (CMS/HCC)    • Bladder outlet obstruction    • CAD (coronary artery disease)    • Chronic back pain    • Chronic gastritis    • Chronic hepatitis C (CMS/HCC)    • Chronic obstructive lung disease (CMS/HCC)    • Coronary arteriosclerosis    • Diabetes mellitus (CMS/HCC)    • Diverticular disease of colon    • Drug abuse (CMS/HCC)    • GERD (gastroesophageal reflux disease)    • Hypertension    • Prostate cancer (CMS/HCC)    • Rheumatoid arthritis (CMS/HCC)    • Seizure (CMS/HCC)    • Transient cerebral ischemia      Past Surgical History:   Procedure Laterality Date   • BLADDER TUMOR EXCISION     • CARDIAC CATHETERIZATION N/A 12/15/2017   • CHOLECYSTECTOMY     • COLONOSCOPY N/A 4/22/2019   • CORONARY ANGIOPLASTY WITH STENT PLACEMENT     • ENDOSCOPY N/A 3/3/2017   • ENDOSCOPY N/A 4/22/2019   • KIDNEY SURGERY     • LUMBAR DISC SURGERY     • TRANSURETHRAL RESECTION OF BLADDER TUMOR N/A 7/21/2021    Procedure: CYSTOSCOPY TRANSURETHRAL RESECTION OF BLADDER TUMOR;  Surgeon: Christofer Calderon MD;  Location: [facility];  Service: Urology;  Laterality: N/A;   • UPPER GASTROINTESTINAL ENDOSCOPY  04/22/2019   • WRIST SURGERY Left      Family History   Problem Relation Age of Onset   • Diabetes Mother    • Liver cancer Father        No Known Allergies  Social History     Socioeconomic History   • Marital status:      Spouse name: Not on file   • Number of children: Not on file    • Years of education: Not on file   • Highest education level: Not on file   Tobacco Use   • Smoking status: Current Every Day Smoker     Packs/day: 2.00     Years: 53.00     Pack years: 106.00     Types: Cigarettes   • Smokeless tobacco: Former User     Types: Chew     Quit date: 1980   Vaping Use   • Vaping Use: Never used   Substance and Sexual Activity   • Alcohol use: Not Currently   • Drug use: Yes     Frequency: 2.0 times per week     Types: Marijuana     Comment: DAILY   • Sexual activity: Not Currently     Current Medications:  Prior to Admission medications    Medication Sig Start Date End Date Taking? Authorizing Provider   albuterol (PROVENTIL) (2.5 MG/3ML) 0.083% nebulizer solution Take 2.5 mg by nebulization Every 4 (Four) Hours As Needed for Wheezing or Shortness of Air. 4/17/17  Yes Harvinder Robert MD   albuterol sulfate HFA (Ventolin HFA) 108 (90 Base) MCG/ACT inhaler Inhale 2 puffs Every 6 (Six) Hours As Needed for Wheezing. 1/17/21  Yes Hu Charles MD   Alcohol Swabs (ALCOHOL PREP) pads 1 pad 5 (Five) Times a Day. 3/29/19  Yes Radha Gómez MD   B-D ULTRAFINE III SHORT PEN 31G X 8 MM misc See Admin Instructions. 4/30/21  Yes Jorge Cook MD   Blood Glucose Monitoring Suppl (ACURA BLOOD GLUCOSE METER) w/Device kit 1 each 4 (Four) Times a Day As Needed (SUGARR). 8/23/18  Yes Harvinder Robert MD   carvedilol (COREG) 3.125 MG tablet Take 3.125 mg by mouth 2 (Two) Times a Day With Meals. 10/15/19  Yes Jorge Cook MD   clopidogrel (PLAVIX) 75 MG tablet Take 75 mg by mouth Daily.   Yes Jorge Cook MD   Continuous Blood Gluc Sensor (Dexcom G6 Sensor) Every 10 (Ten) Days. 6/3/21  Yes Shoaib Constantino APRN   Continuous Blood Gluc Transmit (Dexcom G6 Transmitter) misc 1 each Every 3 (Three) Months. 6/3/21  Yes Shoaib Constantino APRN   cyclobenzaprine (FLEXERIL) 10 MG tablet Take 10 mg by mouth 3 (Three) Times a Day As Needed. 7/10/19  Yes Provider,  MD Jorge   doxycycline (MONODOX) 100 MG capsule Take 100 mg by mouth Daily.   Yes Jogre Cook MD   furosemide (LASIX) 20 MG tablet Take 20 mg by mouth Daily.   Yes Jorge Cook MD   glucose blood test strip Use as instructed 3/29/19  Yes Radha Gómez MD   glucose monitor monitoring kit 1 each 3 (Three) Times a Day. 3/29/19  Yes Radha Gómez MD   hydrOXYzine (ATARAX) 25 MG tablet Take 25 mg by mouth Every 4 (Four) Hours As Needed. 9/27/19  Yes Jorge Cook MD   insulin aspart (novoLOG FLEXPEN) 100 UNIT/ML solution pen-injector sc pen Subcut TIDAC for diabetes.Blood sugar =0 unit;150-199=2 u;200-249=3 u;250-299=4 u;300-349=5 u;350-400=6 u;>400=7 u. Dispense whatever insurance covers.  Patient taking differently: 10 Units 3 (Three) Times a Day With Meals. Subcut TIDAC for diabetes.Blood sugar =0 unit;150-199=2 u;200-249=3 u;250-299=4 u;300-349=5 u;350-400=6 u;>400=7 u. Dispense whatever insurance covers. 9/10/20  Yes Poppy Méndez MD   insulin detemir (LEVEMIR) 100 UNIT/ML injection Inject 25 Units under the skin into the appropriate area as directed 2 (Two) Times a Day.   Yes Jorge Cook MD   Lancets (FREESTYLE) lancets Tid 3/29/19  Yes Radha Gómez MD   Magnesium Oxide (MAG-OXIDE PO) Take 400 mg by mouth Daily.   Yes Jorge Cook MD   nitroglycerin (NITROSTAT) 0.4 MG SL tablet Place 0.4 mg under the tongue Every 5 (Five) Minutes As Needed for Chest Pain. Take no more than 3 doses in 15 minutes.   Yes Jorge Cook MD   nortriptyline (PAMELOR) 10 MG capsule Take 10 mg by mouth Every Night.   Yes Jroge Cook MD   omeprazole (priLOSEC) 20 MG capsule Take 20 mg by mouth Daily.   Yes Jorge Cook MD   oxyCODONE-acetaminophen (PERCOCET) 7.5-325 MG per tablet Take 1 to 2 tablets by mouth Every 4 (Four) Hours As Needed for Pain. 7/21/21  Yes [place], Christofer VELAZQUEZ MD   pantoprazole (PROTONIX) 40 MG  "EC tablet Take 40 mg by mouth Daily.   Yes ProviderJorge MD   pravastatin (PRAVACHOL) 40 MG tablet Take 40 mg by mouth Daily.   Yes Jorge Cook MD   sertraline (ZOLOFT) 50 MG tablet Take 50 mg by mouth Daily. 4/26/21  Yes Jorge Cook MD   fluticasone (FLONASE) 50 MCG/ACT nasal spray 2 sprays into each nostril Daily. 5/22/18 8/4/21  Harvinder Robert MD   potassium chloride (K-DUR,KLOR-CON) 20 MEQ CR tablet Take 40 mEq by mouth Daily. 3/23/21 8/4/21  ProviderJorge MD     Review of Systems  Review of Systems   Constitutional: Positive for unexpected weight change.   HENT: Negative for trouble swallowing.    Gastrointestinal: Positive for abdominal pain, diarrhea and nausea. Negative for abdominal distention, anal bleeding, blood in stool, constipation, rectal pain and vomiting.          Objective    /80   Pulse 96   Ht 170.2 cm (67\")   Wt 72.4 kg (159 lb 9.6 oz)   BMI 25.00 kg/m²   Physical Exam  Vitals and nursing note reviewed.   Constitutional:       General: He is not in acute distress.     Appearance: He is well-developed. He is ill-appearing.   HENT:      Head: Normocephalic and atraumatic.   Eyes:      Pupils: Pupils are equal, round, and reactive to light.   Cardiovascular:      Rate and Rhythm: Normal rate and regular rhythm.      Heart sounds: Normal heart sounds.   Pulmonary:      Effort: Pulmonary effort is normal.      Breath sounds: Normal breath sounds.   Abdominal:      General: Bowel sounds are normal. There is no distension or abdominal bruit.      Palpations: Abdomen is soft. Abdomen is not rigid. There is no shifting dullness or mass.      Tenderness: There is abdominal tenderness. There is no guarding or rebound.      Hernia: No hernia is present. There is no hernia in the ventral area.      Comments: Obese, mild   Musculoskeletal:         General: Normal range of motion.      Cervical back: Normal range of motion.   Skin:     General: Skin is warm and " dry.   Neurological:      Mental Status: He is alert and oriented to person, place, and time.   Psychiatric:         Behavior: Behavior normal.         Thought Content: Thought content normal.         Judgment: Judgment normal.       Assessment/Plan      1. Gastroesophageal reflux disease with esophagitis without hemorrhage    2. Chronic abdominal pain    3. Other iron deficiency anemia    4. Diarrhea, unspecified type    .   Diagnoses and all orders for this visit:    1. Gastroesophageal reflux disease with esophagitis without hemorrhage (Primary)  -     Case Request; Standing  -     dextrose 5 % and sodium chloride 0.45 % infusion  -     Case Request    2. Chronic abdominal pain  -     Case Request; Standing  -     dextrose 5 % and sodium chloride 0.45 % infusion  -     Case Request    3. Other iron deficiency anemia  -     Case Request; Standing  -     dextrose 5 % and sodium chloride 0.45 % infusion  -     Case Request    4. Diarrhea, unspecified type  -     Case Request; Standing  -     dextrose 5 % and sodium chloride 0.45 % infusion  -     Case Request    Other orders  -     Follow Anesthesia Guidelines / Standing Orders; Future  -     Obtain Informed Consent; Future  -     Obtain Informed Consent; Standing  -     POC Glucose Once; Standing  -     sodium-potassium-magnesium sulfates (Suprep Bowel Prep Kit) 17.5-3.13-1.6 GM/177ML solution oral solution; As directed per instruction sheet for colonoscopy  Dispense: 354 mL; Refill: 0        Orders placed during this encounter include:  Orders Placed This Encounter   Procedures   • Follow Anesthesia Guidelines / Standing Orders     Standing Status:   Future   • Obtain Informed Consent     Standing Status:   Future     Order Specific Question:   Informed Consent Given For     Answer:   ESOPHAGOGASTRODUODENOSCOPY and colonoscopy       Medications prescribed:  New Medications Ordered This Visit   Medications   • sodium-potassium-magnesium sulfates (Suprep Bowel Prep  Kit) 17.5-3.13-1.6 GM/177ML solution oral solution     Sig: As directed per instruction sheet for colonoscopy     Dispense:  354 mL     Refill:  0     Discontinued Medications       Reason for Discontinue     fluticasone (FLONASE) 50 MCG/ACT nasal spray    *Therapy completed     potassium chloride (K-DUR,KLOR-CON) 20 MEQ CR tablet    *Therapy completed        Requested Prescriptions     Signed Prescriptions Disp Refills   • sodium-potassium-magnesium sulfates (Suprep Bowel Prep Kit) 17.5-3.13-1.6 GM/177ML solution oral solution 354 mL 0     Sig: As directed per instruction sheet for colonoscopy       Review and/or summary of lab tests, radiology, procedures, medications. Review and summary of old records and obtaining of history. The risks and benefits of my recommendations, as well as other treatment options were discussed with the patient and wife today. Questions were answered.    Follow-up: Return in about 1 month (around 9/4/2021), or if symptoms worsen or fail to improve.     ESOPHAGOGASTRODUODENOSCOPY (N/A), COLONOSCOPY (N/A)      This document has been electronically signed by Kevin Robbins PA-C on August 9, 2021 18:51 CDT      Results for orders placed or performed during the hospital encounter of 07/21/21   Urine Drug Screen - Urine, Clean Catch    Specimen: Urine, Clean Catch   Result Value Ref Range    THC, Screen, Urine Positive (A) Negative    Phencyclidine (PCP), Urine Negative Negative    Cocaine Screen, Urine Negative Negative    Methamphetamine, Ur Negative Negative    Opiate Screen Negative Negative    Amphetamine Screen, Urine Negative Negative    Benzodiazepine Screen, Urine Negative Negative    Tricyclic Antidepressants Screen Negative Negative    Methadone Screen, Urine Negative Negative    Barbiturates Screen, Urine Negative Negative    Oxycodone Screen, Urine Negative Negative    Propoxyphene Screen Negative Negative    Buprenorphine, Screen, Urine Negative Negative   Tissue Pathology  Exam    Specimen: Urinary Bladder; Tissue   Result Value Ref Range    Case Report       Surgical Pathology Report                         Case: [id number]                                  Authorizing Provider:  Chrsitofer Calderon MD       Collected:           07/21/2021 01:49 PM          Ordering Location:     [facility]             Received:            07/21/2021 02:28 PM                                 [facility]                                                              Pathologist:           Svetlana Benoit DO                                                        Specimen:    Urinary Bladder, Bladder tumor                                                             Final Diagnosis       See Scanned Report       POC Glucose Once    Specimen: Blood   Result Value Ref Range    Glucose 136 (H) 70 - 130 mg/dL   POC Glucose Once    Specimen: Blood   Result Value Ref Range    Glucose 191 (H) 70 - 130 mg/dL   Results for orders placed or performed in visit on 07/19/21   COVID-19, [facility] IN-HOUSE, NP SWAB IN TRANSPORT MEDIA 8-10 HR TAT - Swab, Nasopharynx    Specimen: Nasopharynx; Swab   Result Value Ref Range    COVID19 Not Detected Not Detected - Ref. Range   Results for orders placed or performed in visit on 07/19/21   Urinalysis without microscopic (no culture) - Urine, Clean Catch    Specimen: Urine, Clean Catch   Result Value Ref Range    Color, UA Yellow Yellow, Straw, Dark Yellow, Joi    Appearance, UA Cloudy (A) Clear    pH, UA 7.0 5.0 - 9.0    Specific Gravity, UA 1.008 1.003 - 1.030    Glucose, UA Negative Negative    Ketones, UA Negative Negative    Bilirubin, UA Negative Negative    Blood, UA Small (1+) (A) Negative    Protein, UA Negative Negative    Leuk Esterase, UA Large (3+) (A) Negative    Nitrite, UA Negative Negative    Urobilinogen, UA 0.2 E.U./dL 0.2 - 1.0 E.U./dL   Basic Metabolic Panel    Specimen: Blood   Result Value Ref Range    Glucose 74 65 - 99 mg/dL    BUN 29 (H) 8 - 23  mg/dL    Creatinine 1.42 (H) 0.76 - 1.27 mg/dL    Sodium 141 136 - 145 mmol/L    Potassium 3.9 3.5 - 5.2 mmol/L    Chloride 100 98 - 107 mmol/L    CO2 33.0 (H) 22.0 - 29.0 mmol/L    Calcium 9.2 8.6 - 10.5 mg/dL    eGFR Non African Amer 50 (L) >60 mL/min/1.73    BUN/Creatinine Ratio 20.4 7.0 - 25.0    Anion Gap 8.0 5.0 - 15.0 mmol/L   ECG 12 Lead   Result Value Ref Range    QT Interval 380 ms    QTC Interval 454 ms   Results for orders placed or performed during the hospital encounter of 01/11/21   Urine Eosinophils, Mitchel's Stain -    Specimen: Urine   Result Value Ref Range    Mitchel Stain Negative Negative   Gold Top - SST   Result Value Ref Range    Extra Tube Hold for add-ons.    Green Top (Gel)   Result Value Ref Range    Extra Tube Hold for add-ons.    Scan Slide    Specimen: Blood   Result Value Ref Range    Anisocytosis Slight/1+ None Seen    Hypochromia Slight/1+ None Seen    WBC Morphology Normal Normal    Platelet Estimate Adequate Normal   COVID-19 and FLU A/B PCR - Swab, Nasopharynx    Specimen: Nasopharynx; Swab   Result Value Ref Range    COVID19 Not Detected Not Detected - Ref. Range    Influenza A PCR Not Detected Not Detected    Influenza B PCR Not Detected Not Detected   Urinalysis, Microscopic Only - Urine, Clean Catch    Specimen: Urine   Result Value Ref Range    RBC, UA 13-20 (A) None Seen /HPF    WBC, UA Too Numerous to Count (A) None Seen, 0-2, 3-5 /HPF    Bacteria, UA 4+ (A) None Seen /HPF    Squamous Epithelial Cells, UA 3-5 (A) None Seen, 0-2 /HPF    Hyaline Casts, UA 31-50 None Seen /LPF    Mucus, UA Moderate/2+ (A) None Seen, Trace /HPF    Methodology Manual Light Microscopy    Urinalysis, Microscopic Only - Urine, Catheter In/Out    Specimen: Urine, Catheter In/Out   Result Value Ref Range    RBC, UA 6-12 (A) None Seen /HPF    WBC, UA Too Numerous to Count (A) None Seen, 0-2, 3-5 /HPF    Bacteria, UA 4+ (A) None Seen /HPF    Squamous Epithelial Cells, UA 6-12 (A) None Seen, 0-2 /HPF     Hyaline Casts, UA Unable to determine due to loaded field None Seen /LPF    Methodology Manual Light Microscopy    Urinalysis With Culture If Indicated - Urine, Catheter In/Out    Specimen: Urine, Catheter In/Out   Result Value Ref Range    Color, UA Yellow Yellow, Straw, Dark Yellow, Joi    Appearance, UA Cloudy (A) Clear    pH, UA 6.5 5.0 - 9.0    Specific Gravity, UA 1.013 1.003 - 1.030    Glucose, UA Negative Negative    Ketones, UA Negative Negative    Bilirubin, UA Small (1+) (A) Negative    Blood, UA Small (1+) (A) Negative    Protein,  mg/dL (2+) (A) Negative    Leuk Esterase, UA Moderate (2+) (A) Negative    Nitrite, UA Negative Negative    Urobilinogen, UA 1.0 E.U./dL 0.2 - 1.0 E.U./dL   Urinalysis With Microscopic If Indicated (No Culture) -    Specimen: Urine   Result Value Ref Range    Color, UA Yellow Yellow, Straw, Dark Yellow, Joi    Appearance, UA Turbid (A) Clear    pH, UA 6.5 5.0 - 9.0    Specific Gravity, UA 1.009 1.003 - 1.030    Glucose, UA Negative Negative    Ketones, UA Negative Negative    Bilirubin, UA Negative Negative    Blood, UA Small (1+) (A) Negative    Protein,  mg/dL (2+) (A) Negative    Leuk Esterase, UA Large (3+) (A) Negative    Nitrite, UA Negative Negative    Urobilinogen, UA 1.0 E.U./dL 0.2 - 1.0 E.U./dL   CBC Auto Differential    Specimen: Blood   Result Value Ref Range    WBC 10.89 (H) 3.40 - 10.80 10*3/mm3    RBC 3.64 (L) 4.14 - 5.80 10*6/mm3    Hemoglobin 10.1 (L) 13.0 - 17.7 g/dL    Hematocrit 29.8 (L) 37.5 - 51.0 %    MCV 81.9 79.0 - 97.0 fL    MCH 27.7 26.6 - 33.0 pg    MCHC 33.9 31.5 - 35.7 g/dL    RDW 13.4 12.3 - 15.4 %    RDW-SD 40.4 37.0 - 54.0 fl    MPV 10.8 6.0 - 12.0 fL    Platelets 212 140 - 450 10*3/mm3    Neutrophil % 80.4 (H) 42.7 - 76.0 %    Lymphocyte % 6.2 (L) 19.6 - 45.3 %    Monocyte % 11.8 5.0 - 12.0 %    Eosinophil % 0.5 0.3 - 6.2 %    Basophil % 0.2 0.0 - 1.5 %    Immature Grans % 0.9 (H) 0.0 - 0.5 %    Neutrophils, Absolute 8.77  (H) 1.70 - 7.00 10*3/mm3    Lymphocytes, Absolute 0.67 (L) 0.70 - 3.10 10*3/mm3    Monocytes, Absolute 1.28 (H) 0.10 - 0.90 10*3/mm3    Eosinophils, Absolute 0.05 0.00 - 0.40 10*3/mm3    Basophils, Absolute 0.02 0.00 - 0.20 10*3/mm3    Immature Grans, Absolute 0.10 (H) 0.00 - 0.05 10*3/mm3    nRBC 0.0 0.0 - 0.2 /100 WBC     *Note: Due to a large number of results and/or encounters for the requested time period, some results have not been displayed. A complete set of results can be found in Results Review.

## 2021-08-04 NOTE — TELEPHONE ENCOUNTER
Sofi left a vm stating that they are needing to speak to someone about needing dexcom sensors, they sent the request.    He is also needing Syringes for his Levimir

## 2021-08-05 DIAGNOSIS — E11.65 TYPE 2 DIABETES MELLITUS WITH HYPERGLYCEMIA, WITH LONG-TERM CURRENT USE OF INSULIN (HCC): ICD-10-CM

## 2021-08-05 DIAGNOSIS — Z79.4 TYPE 2 DIABETES MELLITUS WITH HYPERGLYCEMIA, WITH LONG-TERM CURRENT USE OF INSULIN (HCC): ICD-10-CM

## 2021-08-05 RX ORDER — PROCHLORPERAZINE 25 MG/1
SUPPOSITORY RECTAL
Qty: 9 EACH | Refills: 3 | Status: SHIPPED | OUTPATIENT
Start: 2021-08-05 | End: 2021-12-17 | Stop reason: SDUPTHER

## 2021-08-05 RX ORDER — PEN NEEDLE, DIABETIC 30 GX3/16"
1 NEEDLE, DISPOSABLE MISCELLANEOUS 4 TIMES DAILY
Qty: 120 EACH | Refills: 11 | Status: SHIPPED | OUTPATIENT
Start: 2021-08-05 | End: 2021-12-17 | Stop reason: SDUPTHER

## 2021-08-27 ENCOUNTER — OFFICE VISIT (OUTPATIENT)
Dept: ENDOCRINOLOGY | Facility: CLINIC | Age: 66
End: 2021-08-27

## 2021-08-27 VITALS
HEIGHT: 68 IN | SYSTOLIC BLOOD PRESSURE: 144 MMHG | BODY MASS INDEX: 24.26 KG/M2 | WEIGHT: 160.1 LBS | DIASTOLIC BLOOD PRESSURE: 88 MMHG | OXYGEN SATURATION: 98 % | HEART RATE: 86 BPM

## 2021-08-27 DIAGNOSIS — I10 ESSENTIAL HYPERTENSION: ICD-10-CM

## 2021-08-27 DIAGNOSIS — F17.200 SMOKER: ICD-10-CM

## 2021-08-27 DIAGNOSIS — E11.65 TYPE 2 DIABETES MELLITUS WITH HYPERGLYCEMIA, WITH LONG-TERM CURRENT USE OF INSULIN (HCC): Primary | ICD-10-CM

## 2021-08-27 DIAGNOSIS — Z79.4 TYPE 2 DIABETES MELLITUS WITH HYPERGLYCEMIA, WITH LONG-TERM CURRENT USE OF INSULIN (HCC): Primary | ICD-10-CM

## 2021-08-27 DIAGNOSIS — E78.2 MIXED HYPERLIPIDEMIA: ICD-10-CM

## 2021-08-27 PROCEDURE — 99214 OFFICE O/P EST MOD 30 MIN: CPT | Performed by: NURSE PRACTITIONER

## 2021-08-27 PROCEDURE — 95251 CONT GLUC MNTR ANALYSIS I&R: CPT | Performed by: NURSE PRACTITIONER

## 2021-08-27 NOTE — PROGRESS NOTES
"Chief Complaint  Diabetes    Subjective          Favio Casillas presents to [facility] ENDOCRINOLOGY  History of Present Illness       In office visit      Referring provider Shayne Merritt MD      66-year-old male presents for follow-up    Reason diabetes mellitus type 2    Quality not controlled      Lab Results   Component Value Date    HGBA1C 9.9 (H) 07/02/2021    HGBA1C 9.9 (H) 07/02/2021         Timing constant    Duration diagnosed in 2016    Severity is high         Microvascular complications--- neuropathy, CKD I do not know the staging     Macrovascular complications CAD, stent x1     Current regimen is insulin     Current glucose monitoring fingersticks     Checks 4 times daily    Now using Dexcom G6    See below        States improved      Now under 150            Review of Systems - General ROS: negative             Objective   Vital Signs:   /88   Pulse 86   Ht 171.5 cm (67.5\")   Wt 72.6 kg (160 lb 1.6 oz)   SpO2 98%   BMI 24.71 kg/m²     Physical Exam  Constitutional:       Appearance: Normal appearance.   Cardiovascular:      Rate and Rhythm: Regular rhythm.      Heart sounds: Normal heart sounds.   Pulmonary:      Breath sounds: Normal breath sounds.   Musculoskeletal:      Cervical back: Normal range of motion.   Neurological:      Mental Status: He is alert.        Result Review :   The following data was reviewed by: TIFFANIE Stephen on 08/27/2021:  Common labs    Common Labsle 5/27/21 5/27/21 7/2/21 7/2/21 7/2/21 7/2/21 7/2/21 7/19/21    1055 1055 1052 1052 1052 1052 1052    Glucose 106  126 (A)     74   BUN 25 (A)  27 (A)     29 (A)   Creatinine 1.5 (A)  1.4 (A)     1.42 (A)   eGFR Non African Am        50 (A)   eGFR  Am 57  62        Sodium 144  139     141   Potassium 3.9  3.7     3.9   Chloride 106  107     100   Calcium 9.2  9.4     9.2   Albumin 4.0  4.3        Hemoglobin A1C  8.6 (A)    9.9 (A) 9.9 (A)    Uric Acid    3.7 (A) 3.7 (A)   "    (A) Abnormal value       Comments are available for some flowsheets but are not being displayed.                     Assessment and Plan    Diagnoses and all orders for this visit:    1. Type 2 diabetes mellitus with hyperglycemia, with long-term current use of insulin (CMS/[facility]) (Primary)    2. Essential hypertension    3. Mixed hyperlipidemia    4. Smoker           Glycemic management     Type 2 diabetes not controlled            Lab Results   Component Value Date    HGBA1C 9.9 (H) 07/02/2021    HGBA1C 9.9 (H) 07/02/2021         Dexcom G6    Download and reviewed     Dated from August 14 to August 27, 2021       Average     Time in target range 55%    High 44%    Very high 11%    Low 0.4%    Very low 0.3%    Hypoglycemia occurs overnight    He has postprandial hyperglycemia    Back off the basal insulin    And adjust the mealtime insulin         Taking levemir 25 units BID -- change to 25 units am and 22 units pm                  Take Novolog 9 units before each meal when no juice --increase to 12 units     Take 12 units with the meal if drinking juice --- increase to 14 units                  We discussed GLP-1 and he does not want                                Lipid management        Taking Pravachol 40 mg one at night       Total Cholesterol   Date Value Ref Range Status   01/04/2021 69 <200 mg/dL Final     Triglycerides   Date Value Ref Range Status   01/04/2021 125 30 - 150 mg/dL Final     HDL Cholesterol   Date Value Ref Range Status   01/04/2021 18 (L) 32 - 72 mg/dL Final     LDL Cholesterol    Date Value Ref Range Status   01/04/2021 27 5 - 99 mg/dL Final                 Blood pressure management        Hypertension     Taking Coreg 3.125 mg twice a day        Microvascular monitoring        Last eye exam---April 2019      Neuropathy-- no RX                  Bone health        Vitamin D deficiency     Taking mvi       Component      Latest Ref Rng & Units 7/2/2021   3-Epi-Vitamin D,25-Hydroxy       30 - 100 ng/ml 21.2 (L)             Preventative care        Smoker      Favio Casillas  reports that he has been smoking cigarettes. He has a 53.00 pack-year smoking history. He quit smokeless tobacco use about 41 years ago.  His smokeless tobacco use included chew.. I have educated him on the risk of diseases from using tobacco products such as cancer, COPD and heart disease.     I advised him to quit and he is not willing to quit.    I spent 3  minutes counseling the patient.                   Weight management        Patient's Body mass index is 24.71 kg/m². indicating that he is within normal range (BMI 18.5-24.9). No BMI management plan needed..                      Follow Up   Return in about 3 months (around 11/27/2021) for Recheck.  Patient was given instructions and counseling regarding his condition or for health maintenance advice. Please see specific information pulled into the AVS if appropriate.         This document has been electronically signed by TIFFANIE Stephen on August 27, 2021 09:15 CDT.

## 2021-10-05 ENCOUNTER — TELEPHONE (OUTPATIENT)
Dept: ENDOCRINOLOGY | Facility: CLINIC | Age: 66
End: 2021-10-05

## 2021-10-15 ENCOUNTER — TELEPHONE (OUTPATIENT)
Dept: ENDOCRINOLOGY | Facility: CLINIC | Age: 66
End: 2021-10-15

## 2021-10-15 NOTE — TELEPHONE ENCOUNTER
Pt says DexcomG6 transmiter has still not gotten it and we have to call this number to get approved     Call [phone]        [facility]

## 2021-10-18 ENCOUNTER — LAB (OUTPATIENT)
Dept: LAB | Facility: HOSPITAL | Age: 66
End: 2021-10-18

## 2021-10-18 ENCOUNTER — ANESTHESIA EVENT (OUTPATIENT)
Dept: GASTROENTEROLOGY | Facility: HOSPITAL | Age: 66
End: 2021-10-18

## 2021-10-18 DIAGNOSIS — Z01.818 PREOP TESTING: ICD-10-CM

## 2021-10-18 LAB — SARS-COV-2 N GENE RESP QL NAA+PROBE: NOT DETECTED

## 2021-10-18 PROCEDURE — 87635 SARS-COV-2 COVID-19 AMP PRB: CPT

## 2021-10-18 PROCEDURE — C9803 HOPD COVID-19 SPEC COLLECT: HCPCS

## 2021-10-19 ENCOUNTER — ANESTHESIA (OUTPATIENT)
Dept: GASTROENTEROLOGY | Facility: HOSPITAL | Age: 66
End: 2021-10-19

## 2021-10-19 ENCOUNTER — HOSPITAL ENCOUNTER (OUTPATIENT)
Facility: HOSPITAL | Age: 66
Setting detail: HOSPITAL OUTPATIENT SURGERY
Discharge: HOME | End: 2021-10-19
Attending: INTERNAL MEDICINE | Admitting: INTERNAL MEDICINE

## 2021-10-19 VITALS
DIASTOLIC BLOOD PRESSURE: 71 MMHG | RESPIRATION RATE: 18 BRPM | WEIGHT: 159 LBS | BODY MASS INDEX: 24.1 KG/M2 | HEART RATE: 96 BPM | OXYGEN SATURATION: 97 % | SYSTOLIC BLOOD PRESSURE: 102 MMHG | TEMPERATURE: 97.6 F | HEIGHT: 68 IN

## 2021-10-19 DIAGNOSIS — R10.9 CHRONIC ABDOMINAL PAIN: ICD-10-CM

## 2021-10-19 DIAGNOSIS — K21.00 GASTROESOPHAGEAL REFLUX DISEASE WITH ESOPHAGITIS WITHOUT HEMORRHAGE: ICD-10-CM

## 2021-10-19 DIAGNOSIS — R19.7 DIARRHEA, UNSPECIFIED TYPE: ICD-10-CM

## 2021-10-19 DIAGNOSIS — D50.8 OTHER IRON DEFICIENCY ANEMIA: ICD-10-CM

## 2021-10-19 DIAGNOSIS — G89.29 CHRONIC ABDOMINAL PAIN: ICD-10-CM

## 2021-10-19 LAB — GLUCOSE BLDC GLUCOMTR-MCNC: 282 MG/DL (ref 70–130)

## 2021-10-19 PROCEDURE — 94640 AIRWAY INHALATION TREATMENT: CPT

## 2021-10-19 PROCEDURE — 25010000002 GLUCAGON (HUMAN RECOMBINANT) 1 MG RECONSTITUTED SOLUTION: Performed by: NURSE ANESTHETIST, CERTIFIED REGISTERED

## 2021-10-19 PROCEDURE — 82962 GLUCOSE BLOOD TEST: CPT

## 2021-10-19 PROCEDURE — 43239 EGD BIOPSY SINGLE/MULTIPLE: CPT | Performed by: INTERNAL MEDICINE

## 2021-10-19 PROCEDURE — 25010000002 PROPOFOL 10 MG/ML EMULSION: Performed by: NURSE ANESTHETIST, CERTIFIED REGISTERED

## 2021-10-19 PROCEDURE — 45385 COLONOSCOPY W/LESION REMOVAL: CPT | Performed by: INTERNAL MEDICINE

## 2021-10-19 PROCEDURE — 88305 TISSUE EXAM BY PATHOLOGIST: CPT

## 2021-10-19 RX ORDER — LIDOCAINE HYDROCHLORIDE 20 MG/ML
INJECTION, SOLUTION EPIDURAL; INFILTRATION; INTRACAUDAL; PERINEURAL AS NEEDED
Status: DISCONTINUED | OUTPATIENT
Start: 2021-10-19 | End: 2021-10-19 | Stop reason: SURG

## 2021-10-19 RX ORDER — ONDANSETRON 2 MG/ML
4 INJECTION INTRAMUSCULAR; INTRAVENOUS ONCE AS NEEDED
Status: DISCONTINUED | OUTPATIENT
Start: 2021-10-19 | End: 2021-10-19 | Stop reason: HOSPADM

## 2021-10-19 RX ORDER — SODIUM CHLORIDE 9 MG/ML
50 INJECTION, SOLUTION INTRAVENOUS CONTINUOUS
Status: DISCONTINUED | OUTPATIENT
Start: 2021-10-19 | End: 2021-10-19 | Stop reason: HOSPADM

## 2021-10-19 RX ORDER — PROPOFOL 10 MG/ML
VIAL (ML) INTRAVENOUS AS NEEDED
Status: DISCONTINUED | OUTPATIENT
Start: 2021-10-19 | End: 2021-10-19 | Stop reason: SURG

## 2021-10-19 RX ORDER — DEXTROSE AND SODIUM CHLORIDE 5; .45 G/100ML; G/100ML
30 INJECTION, SOLUTION INTRAVENOUS CONTINUOUS PRN
Status: DISCONTINUED | OUTPATIENT
Start: 2021-10-19 | End: 2021-10-19

## 2021-10-19 RX ORDER — DEXTROSE AND SODIUM CHLORIDE 5; .45 G/100ML; G/100ML
20 INJECTION, SOLUTION INTRAVENOUS CONTINUOUS
Status: DISCONTINUED | OUTPATIENT
Start: 2021-10-19 | End: 2021-10-19

## 2021-10-19 RX ORDER — ALBUTEROL SULFATE 2.5 MG/3ML
2.5 SOLUTION RESPIRATORY (INHALATION) ONCE
Status: COMPLETED | OUTPATIENT
Start: 2021-10-19 | End: 2021-10-19

## 2021-10-19 RX ADMIN — PROPOFOL 30 MG: 10 INJECTION, EMULSION INTRAVENOUS at 10:53

## 2021-10-19 RX ADMIN — PROPOFOL 30 MG: 10 INJECTION, EMULSION INTRAVENOUS at 10:47

## 2021-10-19 RX ADMIN — SODIUM CHLORIDE 50 ML/HR: 9 INJECTION, SOLUTION INTRAVENOUS at 09:49

## 2021-10-19 RX ADMIN — PROPOFOL 30 MG: 10 INJECTION, EMULSION INTRAVENOUS at 10:44

## 2021-10-19 RX ADMIN — ALBUTEROL SULFATE 2.5 MG: 2.5 SOLUTION RESPIRATORY (INHALATION) at 10:05

## 2021-10-19 RX ADMIN — LIDOCAINE HYDROCHLORIDE 100 MG: 20 INJECTION, SOLUTION EPIDURAL; INFILTRATION; INTRACAUDAL; PERINEURAL at 10:36

## 2021-10-19 RX ADMIN — PROPOFOL 20 MG: 10 INJECTION, EMULSION INTRAVENOUS at 10:41

## 2021-10-19 RX ADMIN — PROPOFOL 30 MG: 10 INJECTION, EMULSION INTRAVENOUS at 10:39

## 2021-10-19 RX ADMIN — PROPOFOL 80 MG: 10 INJECTION, EMULSION INTRAVENOUS at 10:36

## 2021-10-19 RX ADMIN — PROPOFOL 30 MG: 10 INJECTION, EMULSION INTRAVENOUS at 10:50

## 2021-10-19 RX ADMIN — GLUCAGON HYDROCHLORIDE 0.5 MG: KIT at 10:45

## 2021-10-19 NOTE — H&P
No chief complaint on file.      ENDO PROCEDURE ORDERED: EGD/COLON gerd, diarrhea fe def anemia    Subjective    Favio Casillas is a 66 y.o. male. he is being seen for consultation today at the request of Dr. Merritt.    History of Present Illness    This is a 66-year-old male was sent for evaluation for endoscopy by Dr. Merritt who saw the patient on 07/06/2021. I had last seen him 06/17/2019. The patient currently denies abdominal pain. He states that his gastroesophageal reflux disease is doing well on omeprazole and pantoprazole. He denied nausea, vomiting, and dysphagia. He states that he has been having a lot of diarrhea but no blood in his stool. Weight is up 23 pounds since last visit. Last EGD/colonoscopy 04/22/2019 showed esophagitis, gastritis, and diverticulosis.     The patient had laboratories 01/11/2021, CMP showed glucose 419, , creatinine 5.36, sodium 114, potassium 6.0, chloride 89, CO2 8.0, alkaline phosphatase 148, glomerular filtration rate 11. CT scan of chest, abdomen, and pelvis without contrast showed fairly normal liver, granuloma of the spleen. He had laboratories 07/02/2021, renal function panel showed glucose 126, BUN 27, creatinine 1.4, glomerular filtration rate 51. He does see Anastacio Reilly, in nephrology. Iron studies were okay, ferritin was 23, magnesium 1.6, uric acid 3.7, vitamin D was low at 21.2, A1c 9.9%. CBC fairly normal. Laboratories 07/19/2021 BMP showed a creatinine 1.42, BUN 29, CO2 33, glomerular filtration rate 50. He had a urinalysis showing blood in the UTI. Urine drug screen positive for THC on 07/21/2021. He states that he does do self catheterizations. He recently had a cystoscopy by Dr. Calderon 07/21/2021 for a tumor, this showed interstitial metaplasia.     ASSESSMENT/PLAN: This is a patient with iron deficiency anemia with chronic renal disease, recurrent UTIs. The patient is having a lot of diarrhea, known diverticular disease, increasing  gastroesophageal reflux disease symptoms. I did recommend EGD/colonoscopy, we will do biopsies as indicated. We will plan follow-up after the above, further pending clinical course and the results of the above.    Thank you very much Dr. Merritt for involving us in the care of your patient, we will keep you informed.       The following portions of the patient's history were reviewed and updated as appropriate:   Past Medical History:   Diagnosis Date   • Adenomatous polyp of colon    • Bladder cancer (HCC)    • Bladder outlet obstruction    • CAD (coronary artery disease)    • Chronic back pain    • Chronic gastritis    • Chronic hepatitis C (HCC)    • Chronic obstructive lung disease (HCC)    • Coronary arteriosclerosis    • Diabetes mellitus (HCC)    • Diverticular disease of colon    • Drug abuse (HCC)    • GERD (gastroesophageal reflux disease)    • Hypertension    • Prostate cancer (HCC)    • Rheumatoid arthritis (HCC)    • Seizure (HCC)    • Transient cerebral ischemia      Past Surgical History:   Procedure Laterality Date   • BLADDER TUMOR EXCISION     • CARDIAC CATHETERIZATION N/A 12/15/2017   • CHOLECYSTECTOMY     • COLONOSCOPY N/A 4/22/2019   • CORONARY ANGIOPLASTY WITH STENT PLACEMENT     • ENDOSCOPY N/A 3/3/2017   • ENDOSCOPY N/A 4/22/2019   • KIDNEY SURGERY     • LUMBAR DISC SURGERY     • TRANSURETHRAL RESECTION OF BLADDER TUMOR N/A 7/21/2021    Procedure: CYSTOSCOPY TRANSURETHRAL RESECTION OF BLADDER TUMOR;  Surgeon: Christofer Calderon MD;  Location: [facility];  Service: Urology;  Laterality: N/A;   • UPPER GASTROINTESTINAL ENDOSCOPY  04/22/2019   • WRIST SURGERY Left      Family History   Problem Relation Age of Onset   • Diabetes Mother    • Liver cancer Father        No Known Allergies  Social History     Socioeconomic History   • Marital status:    Tobacco Use   • Smoking status: Current Every Day Smoker     Packs/day: 1.00     Years: 53.00     Pack years: 53.00     Types: Cigarettes   •  Smokeless tobacco: Former User     Types: Chew     Quit date: 1980   Vaping Use   • Vaping Use: Some days   • Substances: Nicotine, Flavoring   • Devices: Disposable   Substance and Sexual Activity   • Alcohol use: Never   • Drug use: Yes     Frequency: 2.0 times per week     Types: Marijuana     Comment: DAILY   • Sexual activity: Not Currently     Current Medications:  Prior to Admission medications    Medication Sig Start Date End Date Taking? Authorizing Provider   albuterol (PROVENTIL) (2.5 MG/3ML) 0.083% nebulizer solution Take 2.5 mg by nebulization Every 4 (Four) Hours As Needed for Wheezing or Shortness of Air. 4/17/17  Yes Harvinder Robert MD   albuterol sulfate HFA (Ventolin HFA) 108 (90 Base) MCG/ACT inhaler Inhale 2 puffs Every 6 (Six) Hours As Needed for Wheezing. 1/17/21  Yes Hu Charles MD   Alcohol Swabs (ALCOHOL PREP) pads 1 pad 5 (Five) Times a Day. 3/29/19  Yes Radha Gómez MD   B-D ULTRAFINE III SHORT PEN 31G X 8 MM misc See Admin Instructions. 4/30/21  Yes Jorge Cook MD   Blood Glucose Monitoring Suppl (ACURA BLOOD GLUCOSE METER) w/Device kit 1 each 4 (Four) Times a Day As Needed (SUGARR). 8/23/18  Yes Harvinder Robert MD   carvedilol (COREG) 3.125 MG tablet Take 3.125 mg by mouth 2 (Two) Times a Day With Meals. 10/15/19  Yes Jorge Cook MD   clopidogrel (PLAVIX) 75 MG tablet Take 75 mg by mouth Daily.   Yes Jorge Cook MD   Continuous Blood Gluc Sensor (Dexcom G6 Sensor) Every 10 (Ten) Days. 6/3/21  Yes Shoaib Constantino APRN   Continuous Blood Gluc Transmit (Dexcom G6 Transmitter) misc 1 each Every 3 (Three) Months. 6/3/21  Yes Shoaib Constantino APRN   cyclobenzaprine (FLEXERIL) 10 MG tablet Take 10 mg by mouth 3 (Three) Times a Day As Needed. 7/10/19  Yes Jorge Cook MD   doxycycline (MONODOX) 100 MG capsule Take 100 mg by mouth Daily.   Yes Jorge Cook MD   furosemide (LASIX) 20 MG tablet Take 20 mg by mouth  Daily.   Yes Jorge Cook MD   glucose blood test strip Use as instructed 3/29/19  Yes Radha Gómez MD   glucose monitor monitoring kit 1 each 3 (Three) Times a Day. 3/29/19  Yes Radha Gómez MD   hydrOXYzine (ATARAX) 25 MG tablet Take 25 mg by mouth Every 4 (Four) Hours As Needed. 9/27/19  Yes Jorge Cook MD   insulin aspart (novoLOG FLEXPEN) 100 UNIT/ML solution pen-injector sc pen Subcut TIDAC for diabetes.Blood sugar =0 unit;150-199=2 u;200-249=3 u;250-299=4 u;300-349=5 u;350-400=6 u;>400=7 u. Dispense whatever insurance covers.  Patient taking differently: 10 Units 3 (Three) Times a Day With Meals. Subcut TIDAC for diabetes.Blood sugar =0 unit;150-199=2 u;200-249=3 u;250-299=4 u;300-349=5 u;350-400=6 u;>400=7 u. Dispense whatever insurance covers. 9/10/20  Yes Poppy Méndez MD   insulin detemir (LEVEMIR) 100 UNIT/ML injection Inject 25 Units under the skin into the appropriate area as directed 2 (Two) Times a Day.   Yes Jorge Cook MD   Lancets (FREESTYLE) lancets Tid 3/29/19  Yes Radha Gómez MD   Magnesium Oxide (MAG-OXIDE PO) Take 400 mg by mouth Daily.   Yes Jorge Cook MD   nitroglycerin (NITROSTAT) 0.4 MG SL tablet Place 0.4 mg under the tongue Every 5 (Five) Minutes As Needed for Chest Pain. Take no more than 3 doses in 15 minutes.   Yes Jorge Cook MD   nortriptyline (PAMELOR) 10 MG capsule Take 10 mg by mouth Every Night.   Yes Jorge Cook MD   omeprazole (priLOSEC) 20 MG capsule Take 20 mg by mouth Daily.   Yes Jorge Cook MD   oxyCODONE-acetaminophen (PERCOCET) 7.5-325 MG per tablet Take 1 to 2 tablets by mouth Every 4 (Four) Hours As Needed for Pain. 7/21/21  Yes [place], Christofer VELAZQUEZ MD   pantoprazole (PROTONIX) 40 MG EC tablet Take 40 mg by mouth Daily.   Yes Provider, MD Jorge   pravastatin (PRAVACHOL) 40 MG tablet Take 40 mg by mouth Daily.   Yes Provider, MD Jorge  "  sertraline (ZOLOFT) 50 MG tablet Take 50 mg by mouth Daily. 4/26/21  Yes ProviderJorge MD   fluticasone (FLONASE) 50 MCG/ACT nasal spray 2 sprays into each nostril Daily. 5/22/18 8/4/21  Harvinder Robert MD   potassium chloride (K-DUR,KLOR-CON) 20 MEQ CR tablet Take 40 mEq by mouth Daily. 3/23/21 8/4/21  Provider, MD Jorge     Review of Systems  Review of Systems   Constitutional: Positive for unexpected weight change.   HENT: Negative for trouble swallowing.    Gastrointestinal: Positive for abdominal pain, diarrhea and nausea. Negative for abdominal distention, anal bleeding, blood in stool, constipation, rectal pain and vomiting.          Objective    Ht 171.5 cm (67.5\")   Wt 76.2 kg (168 lb)   BMI 25.92 kg/m²   Physical Exam  Vitals and nursing note reviewed.   Constitutional:       General: He is not in acute distress.     Appearance: He is well-developed. He is ill-appearing.   HENT:      Head: Normocephalic and atraumatic.   Eyes:      Pupils: Pupils are equal, round, and reactive to light.   Cardiovascular:      Rate and Rhythm: Normal rate and regular rhythm.      Heart sounds: Normal heart sounds.   Pulmonary:      Effort: Pulmonary effort is normal.      Breath sounds: Normal breath sounds.   Abdominal:      General: Bowel sounds are normal. There is no distension or abdominal bruit.      Palpations: Abdomen is soft. Abdomen is not rigid. There is no shifting dullness or mass.      Tenderness: There is abdominal tenderness. There is no guarding or rebound.      Hernia: No hernia is present. There is no hernia in the ventral area.      Comments: Obese, mild   Musculoskeletal:         General: Normal range of motion.      Cervical back: Normal range of motion.   Skin:     General: Skin is warm and dry.   Neurological:      Mental Status: He is alert and oriented to person, place, and time.   Psychiatric:         Behavior: Behavior normal.         Thought Content: Thought content normal.    "      Judgment: Judgment normal.       Assessment/Plan      1. Gastroesophageal reflux disease with esophagitis without hemorrhage    2. Chronic abdominal pain    3. Other iron deficiency anemia    4. Diarrhea, unspecified type    .   Diagnoses and all orders for this visit:    1. Gastroesophageal reflux disease with esophagitis without hemorrhage  -     dextrose 5 % and sodium chloride 0.45 % infusion    2. Chronic abdominal pain  -     dextrose 5 % and sodium chloride 0.45 % infusion    3. Other iron deficiency anemia  -     dextrose 5 % and sodium chloride 0.45 % infusion    4. Diarrhea, unspecified type  -     dextrose 5 % and sodium chloride 0.45 % infusion    Other orders  -     Insert Peripheral IV; Standing  -     POC Glucose Once; Standing  -     Obtain Informed Consent; Standing  -     Insert Peripheral IV  -     POC Glucose Once  -     Obtain Informed Consent  -     Oxygen Therapy- Nasal Cannula; Titrate for SPO2: 90% - 95%; Standing  -     Pulse Oximetry, Continuous; Standing  -     Vital Signs Every 5 Minutes for 15 Minutes, Every 15 Minutes Thereafter.; Standing  -     Notify Anesthesia of Any Acute Changes in Patient Condition; Standing  -     Notify Anesthesia for Unrelieved Pain; Standing  -     ondansetron (ZOFRAN) injection 4 mg  -     Once Discharge Criteria to Floor Met, Follow Surgeon Orders; Standing  -     Discharge Patient From PACU When Discharge Criteria Met; Standing  -     Oxygen Therapy- Nasal Cannula; Titrate for SPO2: 90% - 95%  -     Pulse Oximetry, Continuous  -     Vital Signs Every 5 Minutes for 15 Minutes, Every 15 Minutes Thereafter.  -     Notify Anesthesia of Any Acute Changes in Patient Condition  -     Notify Anesthesia for Unrelieved Pain  -     Once Discharge Criteria to Floor Met, Follow Surgeon Orders  -     Discharge Patient From PACU When Discharge Criteria Met        Orders placed during this encounter include:  Orders Placed This Encounter   Procedures   • Obtain  Informed Consent     Standing Status:   Standing     Number of Occurrences:   1     Order Specific Question:   Informed Consent Given For     Answer:   ESOPHAGOGASTRODUODENOSCOPY and colonoscopy   • Pulse Oximetry, Continuous     Standing Status:   Standing     Number of Occurrences:   1   • Vital Signs Every 5 Minutes for 15 Minutes, Every 15 Minutes Thereafter.     Standing Status:   Standing     Number of Occurrences:   1   • Notify Anesthesia of Any Acute Changes in Patient Condition     Standing Status:   Standing     Number of Occurrences:   1     Order Specific Question:   Other     Answer:   Any Acute Changes in Patient Condition   • Notify Anesthesia for Unrelieved Pain     Standing Status:   Standing     Number of Occurrences:   1     Order Specific Question:   Other     Answer:   Unrelieved Pain   • Once Discharge Criteria to Floor Met, Follow Surgeon Orders     Standing Status:   Standing     Number of Occurrences:   1   • Discharge Patient From PACU When Discharge Criteria Met     Standing Status:   Standing     Number of Occurrences:   1   • Oxygen Therapy- Nasal Cannula; Titrate for SPO2: 90% - 95%     Standing Status:   Standing     Number of Occurrences:   1     Order Specific Question:   Device     Answer:   Nasal Cannula     Order Specific Question:   Titrate for SPO2     Answer:   90% - 95%     Order Specific Question:   Indications for Oxygen Therapy     Answer:   Immediate Post-Op Period   • POC Glucose Once     Prior to Procedure on ALL Diabetic Patients     Standing Status:   Standing     Number of Occurrences:   1     Order Specific Question:   Release to patient     Answer:   Immediate   • Insert Peripheral IV     Standing Status:   Standing     Number of Occurrences:   1       Medications prescribed:  New Medications Ordered This Visit   Medications   • dextrose 5 % and sodium chloride 0.45 % infusion   • ondansetron (ZOFRAN) injection 4 mg     Discontinued Medications       Reason for  Discontinue     fluticasone (FLONASE) 50 MCG/ACT nasal spray    *Therapy completed     potassium chloride (K-DUR,KLOR-CON) 20 MEQ CR tablet    *Therapy completed        Requested Prescriptions     Signed Prescriptions Disp Refills   • sodium-potassium-magnesium sulfates (Suprep Bowel Prep Kit) 17.5-3.13-1.6 GM/177ML solution oral solution 354 mL 0     Sig: As directed per instruction sheet for colonoscopy       Review and/or summary of lab tests, radiology, procedures, medications. Review and summary of old records and obtaining of history. The risks and benefits of my recommendations, as well as other treatment options were discussed with the patient and wife today. Questions were answered.    Follow-up: No follow-ups on file.     ESOPHAGOGASTRODUODENOSCOPY (N/A), COLONOSCOPY (N/A)      This document has been electronically signed by Alfonso Torres MD on October 19, 2021 09:36 CDT      Results for orders placed or performed in visit on 10/18/21   COVID-19, [facility] IN-HOUSE, NP SWAB IN TRANSPORT MEDIA 8-10 HR TAT - Swab, Nasopharynx    Specimen: Nasopharynx; Swab   Result Value Ref Range    COVID19 Not Detected Not Detected - Ref. Range   Results for orders placed or performed during the hospital encounter of 07/21/21   Urine Drug Screen - Urine, Clean Catch    Specimen: Urine, Clean Catch   Result Value Ref Range    THC, Screen, Urine Positive (A) Negative    Phencyclidine (PCP), Urine Negative Negative    Cocaine Screen, Urine Negative Negative    Methamphetamine, Ur Negative Negative    Opiate Screen Negative Negative    Amphetamine Screen, Urine Negative Negative    Benzodiazepine Screen, Urine Negative Negative    Tricyclic Antidepressants Screen Negative Negative    Methadone Screen, Urine Negative Negative    Barbiturates Screen, Urine Negative Negative    Oxycodone Screen, Urine Negative Negative    Propoxyphene Screen Negative Negative    Buprenorphine, Screen, Urine Negative Negative   Tissue  Pathology Exam    Specimen: Urinary Bladder; Tissue   Result Value Ref Range    Case Report       Surgical Pathology Report                         Case: [id number]                                  Authorizing Provider:  Christofer Calderon MD       Collected:           07/21/2021 01:49 PM          Ordering Location:     [facility]             Received:            07/21/2021 02:28 PM                                 [place]                                                              Pathologist:           Svetlana Benoit DO                                                        Specimen:    Urinary Bladder, Bladder tumor                                                             Final Diagnosis       See Scanned Report       POC Glucose Once    Specimen: Blood   Result Value Ref Range    Glucose 136 (H) 70 - 130 mg/dL   POC Glucose Once    Specimen: Blood   Result Value Ref Range    Glucose 191 (H) 70 - 130 mg/dL   Results for orders placed or performed in visit on 07/19/21   COVID-19, [facility] IN-HOUSE, NP SWAB IN TRANSPORT MEDIA 8-10 HR TAT - Swab, Nasopharynx    Specimen: Nasopharynx; Swab   Result Value Ref Range    COVID19 Not Detected Not Detected - Ref. Range   Results for orders placed or performed in visit on 07/19/21   Urinalysis without microscopic (no culture) - Urine, Clean Catch    Specimen: Urine, Clean Catch   Result Value Ref Range    Color, UA Yellow Yellow, Straw, Dark Yellow, Joi    Appearance, UA Cloudy (A) Clear    pH, UA 7.0 5.0 - 9.0    Specific Gravity, UA 1.008 1.003 - 1.030    Glucose, UA Negative Negative    Ketones, UA Negative Negative    Bilirubin, UA Negative Negative    Blood, UA Small (1+) (A) Negative    Protein, UA Negative Negative    Leuk Esterase, UA Large (3+) (A) Negative    Nitrite, UA Negative Negative    Urobilinogen, UA 0.2 E.U./dL 0.2 - 1.0 E.U./dL   Basic Metabolic Panel    Specimen: Blood   Result Value Ref Range    Glucose 74 65 - 99 mg/dL    BUN 29  (H) 8 - 23 mg/dL    Creatinine 1.42 (H) 0.76 - 1.27 mg/dL    Sodium 141 136 - 145 mmol/L    Potassium 3.9 3.5 - 5.2 mmol/L    Chloride 100 98 - 107 mmol/L    CO2 33.0 (H) 22.0 - 29.0 mmol/L    Calcium 9.2 8.6 - 10.5 mg/dL    eGFR Non African Amer 50 (L) >60 mL/min/1.73    BUN/Creatinine Ratio 20.4 7.0 - 25.0    Anion Gap 8.0 5.0 - 15.0 mmol/L   ECG 12 Lead   Result Value Ref Range    QT Interval 380 ms    QTC Interval 454 ms   Results for orders placed or performed during the hospital encounter of 01/11/21   Urine Eosinophils, Mitchel's Stain -    Specimen: Urine   Result Value Ref Range    Mitchel Stain Negative Negative   Gold Top - SST   Result Value Ref Range    Extra Tube Hold for add-ons.    Green Top (Gel)   Result Value Ref Range    Extra Tube Hold for add-ons.    Scan Slide    Specimen: Blood   Result Value Ref Range    Anisocytosis Slight/1+ None Seen    Hypochromia Slight/1+ None Seen    WBC Morphology Normal Normal    Platelet Estimate Adequate Normal   COVID-19 and FLU A/B PCR - Swab, Nasopharynx    Specimen: Nasopharynx; Swab   Result Value Ref Range    COVID19 Not Detected Not Detected - Ref. Range    Influenza A PCR Not Detected Not Detected    Influenza B PCR Not Detected Not Detected   Urinalysis, Microscopic Only - Urine, Clean Catch    Specimen: Urine   Result Value Ref Range    RBC, UA 13-20 (A) None Seen /HPF    WBC, UA Too Numerous to Count (A) None Seen, 0-2, 3-5 /HPF    Bacteria, UA 4+ (A) None Seen /HPF    Squamous Epithelial Cells, UA 3-5 (A) None Seen, 0-2 /HPF    Hyaline Casts, UA 31-50 None Seen /LPF    Mucus, UA Moderate/2+ (A) None Seen, Trace /HPF    Methodology Manual Light Microscopy    Urinalysis, Microscopic Only - Urine, Catheter In/Out    Specimen: Urine, Catheter In/Out   Result Value Ref Range    RBC, UA 6-12 (A) None Seen /HPF    WBC, UA Too Numerous to Count (A) None Seen, 0-2, 3-5 /HPF    Bacteria, UA 4+ (A) None Seen /HPF    Squamous Epithelial Cells, UA 6-12 (A) None Seen,  0-2 /HPF    Hyaline Casts, UA Unable to determine due to loaded field None Seen /LPF    Methodology Manual Light Microscopy    Urinalysis With Culture If Indicated - Urine, Catheter In/Out    Specimen: Urine, Catheter In/Out   Result Value Ref Range    Color, UA Yellow Yellow, Straw, Dark Yellow, Joi    Appearance, UA Cloudy (A) Clear    pH, UA 6.5 5.0 - 9.0    Specific Gravity, UA 1.013 1.003 - 1.030    Glucose, UA Negative Negative    Ketones, UA Negative Negative    Bilirubin, UA Small (1+) (A) Negative    Blood, UA Small (1+) (A) Negative    Protein,  mg/dL (2+) (A) Negative    Leuk Esterase, UA Moderate (2+) (A) Negative    Nitrite, UA Negative Negative    Urobilinogen, UA 1.0 E.U./dL 0.2 - 1.0 E.U./dL   Urinalysis With Microscopic If Indicated (No Culture) -    Specimen: Urine   Result Value Ref Range    Color, UA Yellow Yellow, Straw, Dark Yellow, Joi    Appearance, UA Turbid (A) Clear    pH, UA 6.5 5.0 - 9.0    Specific Gravity, UA 1.009 1.003 - 1.030    Glucose, UA Negative Negative    Ketones, UA Negative Negative    Bilirubin, UA Negative Negative    Blood, UA Small (1+) (A) Negative    Protein,  mg/dL (2+) (A) Negative    Leuk Esterase, UA Large (3+) (A) Negative    Nitrite, UA Negative Negative    Urobilinogen, UA 1.0 E.U./dL 0.2 - 1.0 E.U./dL   CBC Auto Differential    Specimen: Blood   Result Value Ref Range    WBC 10.89 (H) 3.40 - 10.80 10*3/mm3    RBC 3.64 (L) 4.14 - 5.80 10*6/mm3    Hemoglobin 10.1 (L) 13.0 - 17.7 g/dL    Hematocrit 29.8 (L) 37.5 - 51.0 %    MCV 81.9 79.0 - 97.0 fL    MCH 27.7 26.6 - 33.0 pg    MCHC 33.9 31.5 - 35.7 g/dL    RDW 13.4 12.3 - 15.4 %    RDW-SD 40.4 37.0 - 54.0 fl    MPV 10.8 6.0 - 12.0 fL    Platelets 212 140 - 450 10*3/mm3    Neutrophil % 80.4 (H) 42.7 - 76.0 %    Lymphocyte % 6.2 (L) 19.6 - 45.3 %    Monocyte % 11.8 5.0 - 12.0 %    Eosinophil % 0.5 0.3 - 6.2 %    Basophil % 0.2 0.0 - 1.5 %    Immature Grans % 0.9 (H) 0.0 - 0.5 %    Neutrophils,  Absolute 8.77 (H) 1.70 - 7.00 10*3/mm3    Lymphocytes, Absolute 0.67 (L) 0.70 - 3.10 10*3/mm3    Monocytes, Absolute 1.28 (H) 0.10 - 0.90 10*3/mm3    Eosinophils, Absolute 0.05 0.00 - 0.40 10*3/mm3    Basophils, Absolute 0.02 0.00 - 0.20 10*3/mm3    Immature Grans, Absolute 0.10 (H) 0.00 - 0.05 10*3/mm3    nRBC 0.0 0.0 - 0.2 /100 WBC     *Note: Due to a large number of results and/or encounters for the requested time period, some results have not been displayed. A complete set of results can be found in Results Review.

## 2021-10-19 NOTE — ANESTHESIA PREPROCEDURE EVALUATION
Anesthesia Evaluation     Patient summary reviewed and Nursing notes reviewed   no history of anesthetic complications:  NPO Solid Status: > 8 hours  NPO Liquid Status: > 4 hours           Airway   Mallampati: II  TM distance: >3 FB  Neck ROM: full  Possible difficult intubation  Dental    (+) edentulous    Pulmonary    (+) a smoker (2 ppd) Current Smoked day of surgery, COPD moderate, wheezes,   (-) sleep apnea  Cardiovascular   Exercise tolerance: poor (<4 METS)    ECG reviewed  PT is on anticoagulation therapy  Rate: normal    (+) hypertension well controlled 2 medications or greater, past MI  >12 months, CAD, cardiac stents (LAD X2) more than 12 months ago angina (states usually has CP almost daily, denies any changes) with exertion, hyperlipidemia,   (-) valvular problems/murmurs, dysrhythmias, murmur, DVT    ROS comment: Normal sinus rhythm  Septal infarct (cited on or before 26-NOV-2018)  Abnormal ECG  When compared with ECG of 05-SEP-2020 17:07,  Questionable change in initial forces of Anterior leads    Neuro/Psych  (+) seizures (multiple seizures in one day when sick) well controlled, headaches (migraines monthly), numbness (hands neuropathy), psychiatric history Anxiety and Depression,     (-) TIA, CVA  GI/Hepatic/Renal/Endo    (+)  GERD well controlled,  hepatitis (treated 10 yrs ago) C, liver disease, renal disease CRI, diabetes mellitus (glu 191) type 2 well controlled using insulin,     Musculoskeletal     (+) arthralgias, back pain, chronic pain,   Abdominal    Substance History   (+) drug use (marijuana)  (-) alcohol use     OB/GYN          Other   arthritis,    history of cancer (bladder and prostate CA) active                    Anesthesia Plan    ASA 4     MAC   (Pt missed home neb tx this a.m., Albuterol neb ordered preop)  intravenous induction     Anesthetic plan, all risks, benefits, and alternatives have been provided, discussed and informed consent has been obtained with: patient.

## 2021-10-19 NOTE — ANESTHESIA POSTPROCEDURE EVALUATION
Patient: Favio Casillas    Procedure Summary     Date: 10/19/21 Room / Location: [facility] ENDOSCOPY 1 / [facility] ENDOSCOPY    Anesthesia Start: 1027 Anesthesia Stop: 1056    Procedures:       ESOPHAGOGASTRODUODENOSCOPY (N/A )      COLONOSCOPY (N/A ) Diagnosis:       Gastroesophageal reflux disease with esophagitis without hemorrhage      Chronic abdominal pain      Other iron deficiency anemia      Diarrhea, unspecified type      (Gastroesophageal reflux disease with esophagitis without hemorrhage [K21.00])      (Chronic abdominal pain [R10.9, G89.29])      (Other iron deficiency anemia [D50.8])      (Diarrhea, unspecified type [R19.7])    Surgeons: Alfonso Torres MD Provider: Ewa Araujo CRNA    Anesthesia Type: MAC ASA Status: 4          Anesthesia Type: MAC    Vitals  No vitals data found for the desired time range.          Post Anesthesia Care and Evaluation    Patient location during evaluation: bedside  Patient participation: complete - patient participated  Level of consciousness: sleepy but conscious  Pain score: 0  Pain management: adequate  Airway patency: patent  Anesthetic complications: No anesthetic complications  PONV Status: none  Cardiovascular status: acceptable and hemodynamically stable  Respiratory status: acceptable and room air  Hydration status: acceptable

## 2021-10-21 LAB
LAB AP CASE REPORT: NORMAL
PATH REPORT.FINAL DX SPEC: NORMAL

## 2021-10-27 ENCOUNTER — OFFICE VISIT (OUTPATIENT)
Dept: GASTROENTEROLOGY | Facility: CLINIC | Age: 66
End: 2021-10-27

## 2021-10-27 VITALS
HEIGHT: 67 IN | DIASTOLIC BLOOD PRESSURE: 85 MMHG | SYSTOLIC BLOOD PRESSURE: 138 MMHG | BODY MASS INDEX: 24.64 KG/M2 | HEART RATE: 101 BPM | WEIGHT: 157 LBS

## 2021-10-27 DIAGNOSIS — R10.9 CHRONIC ABDOMINAL PAIN: ICD-10-CM

## 2021-10-27 DIAGNOSIS — D36.9 TUBULAR ADENOMA: ICD-10-CM

## 2021-10-27 DIAGNOSIS — K21.00 GASTROESOPHAGEAL REFLUX DISEASE WITH ESOPHAGITIS WITHOUT HEMORRHAGE: Primary | ICD-10-CM

## 2021-10-27 DIAGNOSIS — G89.29 CHRONIC ABDOMINAL PAIN: ICD-10-CM

## 2021-10-27 DIAGNOSIS — D50.8 OTHER IRON DEFICIENCY ANEMIA: ICD-10-CM

## 2021-10-27 PROCEDURE — 99214 OFFICE O/P EST MOD 30 MIN: CPT | Performed by: PHYSICIAN ASSISTANT

## 2021-10-27 RX ORDER — BUDESONIDE AND FORMOTEROL FUMARATE DIHYDRATE 80; 4.5 UG/1; UG/1
2 AEROSOL RESPIRATORY (INHALATION)
COMMUNITY

## 2021-12-02 ENCOUNTER — OFFICE VISIT (OUTPATIENT)
Dept: ENDOCRINOLOGY | Facility: CLINIC | Age: 66
End: 2021-12-02

## 2021-12-02 VITALS
DIASTOLIC BLOOD PRESSURE: 70 MMHG | OXYGEN SATURATION: 98 % | BODY MASS INDEX: 24.83 KG/M2 | HEIGHT: 67 IN | SYSTOLIC BLOOD PRESSURE: 130 MMHG | HEART RATE: 95 BPM | WEIGHT: 158.2 LBS

## 2021-12-02 DIAGNOSIS — E11.65 TYPE 2 DIABETES MELLITUS WITH HYPERGLYCEMIA, WITH LONG-TERM CURRENT USE OF INSULIN (HCC): ICD-10-CM

## 2021-12-02 DIAGNOSIS — B35.1 TOENAIL FUNGUS: Primary | ICD-10-CM

## 2021-12-02 DIAGNOSIS — E78.2 MIXED HYPERLIPIDEMIA: ICD-10-CM

## 2021-12-02 DIAGNOSIS — L60.2 LONG TOENAIL: ICD-10-CM

## 2021-12-02 DIAGNOSIS — Z79.4 TYPE 2 DIABETES MELLITUS WITH HYPERGLYCEMIA, WITH LONG-TERM CURRENT USE OF INSULIN (HCC): ICD-10-CM

## 2021-12-02 DIAGNOSIS — I10 ESSENTIAL HYPERTENSION: ICD-10-CM

## 2021-12-02 PROCEDURE — 99214 OFFICE O/P EST MOD 30 MIN: CPT | Performed by: NURSE PRACTITIONER

## 2021-12-02 RX ORDER — PROCHLORPERAZINE 25 MG/1
1 SUPPOSITORY RECTAL
Qty: 1 EACH | Refills: 3 | Status: SHIPPED | OUTPATIENT
Start: 2021-12-02 | End: 2021-12-17 | Stop reason: SDUPTHER

## 2021-12-02 RX ORDER — IBUPROFEN 200 MG
TABLET ORAL
Qty: 120 EACH | Refills: 11 | Status: SHIPPED | OUTPATIENT
Start: 2021-12-02 | End: 2021-12-17 | Stop reason: SDUPTHER

## 2021-12-02 NOTE — PROGRESS NOTES
"Chief Complaint  Diabetes    Subjective          Favio Casillas presents to [facility] ENDOCRINOLOGY  History of Present Illness      [facility]    66-year-old male presents for follow-up    Reason diabetes mellitus type 2    Diagnosed in 2016    Timing constant    Severity is high    Quality is not controlled      Lab Results   Component Value Date    HGBA1C 9.9 (H) 07/02/2021    HGBA1C 9.9 (H) 07/02/2021                Microvascular complications--- neuropathy, CKD I do not know the staging     Macrovascular complications CAD, stent x1     Current regimen is insulin     Current glucose monitoring fingersticks     Checks 4 times daily       Has dexcom but needs a new transmitter      100 up to 140                      Review of Systems - General ROS: negative        Objective   Vital Signs:   /70   Pulse 95   Ht 170.2 cm (67\")   Wt 71.8 kg (158 lb 3.2 oz)   SpO2 98%   BMI 24.78 kg/m²     Physical Exam  Constitutional:       Appearance: Normal appearance.   Cardiovascular:      Rate and Rhythm: Regular rhythm.      Pulses:           Dorsalis pedis pulses are 2+ on the right side and 2+ on the left side.        Posterior tibial pulses are 2+ on the right side and 2+ on the left side.      Heart sounds: Normal heart sounds.   Pulmonary:      Breath sounds: Normal breath sounds.   Musculoskeletal:      Cervical back: Normal range of motion.   Feet:      Right foot:      Protective Sensation: 10 sites tested. 6 sites sensed.      Skin integrity: Callus and dry skin present.      Toenail Condition: Right toenails are abnormally thick, long and ingrown. Fungal disease present.     Left foot:      Protective Sensation: 10 sites tested. 6 sites sensed.      Skin integrity: Callus and dry skin present.      Toenail Condition: Left toenails are abnormally thick, long and ingrown. Fungal disease present.     Comments: Diabetic Foot Exam Performed " "and Monofilament Test Performed    Neurological:      Mental Status: He is alert.        Result Review :   The following data was reviewed by: TIFFANIE Stephen on 12/02/2021:  Common labs    Common Labsle 5/27/21 5/27/21 7/2/21 7/2/21 7/2/21 7/2/21 7/2/21 7/19/21    1055 1055 1052 1052 1052 1052 1052    Glucose 106  126 (A)     74   BUN 25 (A)  27 (A)     29 (A)   Creatinine 1.5 (A)  1.4 (A)     1.42 (A)   eGFR Non African Am        50 (A)   eGFR  Am 57  62        Sodium 144  139     141   Potassium 3.9  3.7     3.9   Chloride 106  107     100   Calcium 9.2  9.4     9.2   Albumin 4.0  4.3        Hemoglobin A1C  8.6 (A)    9.9 (A) 9.9 (A)    Uric Acid    3.7 (A) 3.7 (A)      (A) Abnormal value       Comments are available for some flowsheets but are not being displayed.                     Assessment and Plan    Diagnoses and all orders for this visit:    1. Toenail fungus (Primary)  -     Ambulatory Referral to Podiatry    2. Type 2 diabetes mellitus with hyperglycemia, with long-term current use of insulin (HCC)  -     Continuous Blood Gluc Transmit (Dexcom G6 Transmitter) misc; 1 each Every 3 (Three) Months.  Dispense: 1 each; Refill: 3  -     Hemoglobin A1c  -     TSH  -     Ambulatory Referral to Podiatry    3. Long toenail  -     Ambulatory Referral to Podiatry    4. Mixed hyperlipidemia    5. Essential hypertension    Other orders  -     Insulin Syringe 29G X 1/2\" 0.5 ML misc; Inject 4 times daily  Dispense: 120 each; Refill: 11           Glycemic management     Type 2 diabetes not controlled         Lab Results   Component Value Date    HGBA1C 9.9 (H) 07/02/2021    HGBA1C 9.9 (H) 07/02/2021          Dexcom G6            Taking 25 units am and 22 in the pm       Taking novolog 12 up to 14 units TID before each meal                      We discussed GLP-1 and he does not want                                 Lipid management        Taking Pravachol 40 mg one at night               Total " Cholesterol   Date Value Ref Range Status   01/04/2021 69 <200 mg/dL Final            Triglycerides   Date Value Ref Range Status   01/04/2021 125 30 - 150 mg/dL Final            HDL Cholesterol   Date Value Ref Range Status   01/04/2021 18 (L) 32 - 72 mg/dL Final            LDL Cholesterol    Date Value Ref Range Status   01/04/2021 27 5 - 99 mg/dL Final                  Blood pressure management        Hypertension     Taking Coreg 3.125 mg twice a day        Microvascular monitoring        Last eye exam---July 2021, no DR      Neuropathy-- no RX         Diabetic foot exam done    Rx for shoes given per Dr. Schmitt    Referral for podiatry for thickened toenails fungal toenails            Bone health        Vitamin D deficiency     Taking mvi         Component      Latest Ref Rng & Units 7/2/2021   3-Epi-Vitamin D,25-Hydroxy      30 - 100 ng/ml 21.2 (L)               Preventative care        Smoker     Favio Casillas  reports that he has been smoking cigarettes. He has a 53.00 pack-year smoking history. He quit smokeless tobacco use about 41 years ago.  His smokeless tobacco use included chew.. I have educated him on the risk of diseases from using tobacco products such as cancer and COPD.     I advised him to quit and he is not willing to quit.    I spent 3  minutes counseling the patient.                        Weight management       Patient's Body mass index is 24.78 kg/m². indicating that he is within normal range (BMI 18.5-24.9). No BMI management plan needed..                      Follow Up   Return in about 3 months (around 3/2/2022) for Recheck.  Patient was given instructions and counseling regarding his condition or for health maintenance advice. Please see specific information pulled into the AVS if appropriate.         This document has been electronically signed by TIFFANIE Stephen on December 2, 2021 09:44 CST.

## 2021-12-08 ENCOUNTER — OFFICE VISIT (OUTPATIENT)
Dept: PODIATRY | Facility: CLINIC | Age: 66
End: 2021-12-08

## 2021-12-08 VITALS
DIASTOLIC BLOOD PRESSURE: 84 MMHG | OXYGEN SATURATION: 96 % | BODY MASS INDEX: 24.8 KG/M2 | WEIGHT: 158 LBS | SYSTOLIC BLOOD PRESSURE: 126 MMHG | HEIGHT: 67 IN | HEART RATE: 101 BPM

## 2021-12-08 DIAGNOSIS — G89.29 CHRONIC TOE PAIN, BILATERAL: ICD-10-CM

## 2021-12-08 DIAGNOSIS — B35.1 ONYCHOMYCOSIS: ICD-10-CM

## 2021-12-08 DIAGNOSIS — E11.9 ENCOUNTER FOR DIABETIC FOOT EXAM (HCC): Primary | ICD-10-CM

## 2021-12-08 DIAGNOSIS — M79.674 CHRONIC TOE PAIN, BILATERAL: ICD-10-CM

## 2021-12-08 DIAGNOSIS — M79.675 CHRONIC TOE PAIN, BILATERAL: ICD-10-CM

## 2021-12-08 DIAGNOSIS — E11.42 TYPE 2 DIABETES MELLITUS WITH PERIPHERAL NEUROPATHY (HCC): ICD-10-CM

## 2021-12-08 PROCEDURE — 11721 DEBRIDE NAIL 6 OR MORE: CPT | Performed by: PODIATRIST

## 2021-12-08 PROCEDURE — 99202 OFFICE O/P NEW SF 15 MIN: CPT | Performed by: PODIATRIST

## 2021-12-08 RX ORDER — FLUTICASONE PROPIONATE 50 MCG
1 SPRAY, SUSPENSION (ML) NASAL DAILY PRN
COMMUNITY
Start: 2021-11-23

## 2021-12-08 NOTE — PROGRESS NOTES
Favio Casillas  1955  66 y.o. male  PCP: Shayne Merritt MD: 10/06/2021  BS: 158 per pt     Diabetic foot exam  12/08/2021    Chief Complaint   Patient presents with   • Right Foot - Diabetic foot exam   • Left Foot - Diabetic foot exam       History of Present Illness    Favio Casillas is a 66 y.o.male who presents to clinic as a referral from TIFFANIE Haywood for diabetic foot exam and care.  Today patient complains of thickened toenails.  Denies any injuries.  No other complaints.    Past Medical History:   Diagnosis Date   • Adenomatous polyp of colon    • Bladder cancer (HCC)    • Bladder outlet obstruction    • CAD (coronary artery disease)    • Chronic back pain    • Chronic gastritis    • Chronic hepatitis C (HCC)    • Chronic obstructive lung disease (HCC)    • Coronary arteriosclerosis    • Diabetes mellitus (HCC)    • Diverticular disease of colon    • Drug abuse (HCC)    • GERD (gastroesophageal reflux disease)    • Hypertension    • Ingrown toenail    • Prostate cancer (HCC)    • Rheumatoid arthritis (HCC)    • Seizure (HCC)    • Stroke (HCC)     heat stroke   • Transient cerebral ischemia          Past Surgical History:   Procedure Laterality Date   • BLADDER TUMOR EXCISION     • CARDIAC CATHETERIZATION N/A 12/15/2017   • CHOLECYSTECTOMY     • COLONOSCOPY N/A 4/22/2019   • COLONOSCOPY N/A 10/19/2021    Procedure: COLONOSCOPY;  Surgeon: Alfonso Torres MD;  Location: [facility] ENDOSCOPY;  Service: Gastroenterology;  Laterality: N/A;   • CORONARY ANGIOPLASTY WITH STENT PLACEMENT     • ENDOSCOPY N/A 3/3/2017   • ENDOSCOPY N/A 4/22/2019   • ENDOSCOPY N/A 10/19/2021    Procedure: ESOPHAGOGASTRODUODENOSCOPY;  Surgeon: Alfonso Torres MD;  Location: [facility] ENDOSCOPY;  Service: Gastroenterology;  Laterality: N/A;   • KIDNEY SURGERY     • LUMBAR DISC SURGERY     • TRANSURETHRAL RESECTION OF BLADDER TUMOR N/A 7/21/2021    Procedure: CYSTOSCOPY TRANSURETHRAL RESECTION OF BLADDER TUMOR;  Surgeon:  [place], Christofer VELAZQUEZ MD;  Location: [facility];  Service: Urology;  Laterality: N/A;   • UPPER GASTROINTESTINAL ENDOSCOPY  04/22/2019   • UPPER GASTROINTESTINAL ENDOSCOPY  10/19/2021   • WRIST SURGERY Left          Family History   Problem Relation Age of Onset   • Diabetes Mother    • Liver cancer Father        No Known Allergies    Social History     Socioeconomic History   • Marital status:    Tobacco Use   • Smoking status: Current Every Day Smoker     Packs/day: 1.00     Years: 53.00     Pack years: 53.00     Types: Cigarettes   • Smokeless tobacco: Former User     Types: Chew     Quit date: 1980   Vaping Use   • Vaping Use: Some days   • Substances: Nicotine, Flavoring   • Devices: Disposable   Substance and Sexual Activity   • Alcohol use: Never   • Drug use: Yes     Frequency: 2.0 times per week     Types: Marijuana     Comment: DAILY   • Sexual activity: Not Currently         Current Outpatient Medications   Medication Sig Dispense Refill   • albuterol (PROVENTIL) (2.5 MG/3ML) 0.083% nebulizer solution Take 2.5 mg by nebulization Every 4 (Four) Hours As Needed for Wheezing or Shortness of Air. 120 vial 11   • albuterol sulfate HFA (Ventolin HFA) 108 (90 Base) MCG/ACT inhaler Inhale 2 puffs Every 6 (Six) Hours As Needed for Wheezing. 18 g 1   • Alcohol Swabs (ALCOHOL PREP) pads 1 pad 5 (Five) Times a Day. 150 each 1   • ASPIRIN 81 PO Take 1 tablet by mouth Daily.     • B-D ULTRAFINE III SHORT PEN 31G X 8 MM misc See Admin Instructions.     • Blood Glucose Monitoring Suppl (ACURA BLOOD GLUCOSE METER) w/Device kit 1 each 4 (Four) Times a Day As Needed (SUGARR). 1 kit 11   • budesonide-formoterol (SYMBICORT) 80-4.5 MCG/ACT inhaler Inhale 2 puffs 2 (Two) Times a Day.     • carvedilol (COREG) 3.125 MG tablet Take 3.125 mg by mouth 2 (Two) Times a Day With Meals.  11   • clopidogrel (PLAVIX) 75 MG tablet Take 75 mg by mouth Daily.     • Continuous Blood Gluc Sensor (Dexcom G6 Sensor) Every 10 (Ten) Days. 9 each  "3   • Continuous Blood Gluc Transmit (Dexcom G6 Transmitter) misc 1 each Every 3 (Three) Months. 1 each 3   • cyclobenzaprine (FLEXERIL) 10 MG tablet Take 10 mg by mouth 3 (Three) Times a Day As Needed.     • doxycycline (MONODOX) 100 MG capsule Take 100 mg by mouth Daily.     • fluticasone (FLONASE) 50 MCG/ACT nasal spray 1 spray by Each Nare route Daily. Shake before using.     • furosemide (LASIX) 20 MG tablet Take 20 mg by mouth Daily.     • glucose blood test strip Use as instructed 90 each 12   • glucose monitor monitoring kit 1 each 3 (Three) Times a Day. 1 each 1   • hydrOXYzine (ATARAX) 25 MG tablet Take 25 mg by mouth Every 4 (Four) Hours As Needed.  9   • insulin aspart (novoLOG FLEXPEN) 100 UNIT/ML solution pen-injector sc pen Subcut TIDAC for diabetes.Blood sugar =0 unit;150-199=2 u;200-249=3 u;250-299=4 u;300-349=5 u;350-400=6 u;>400=7 u. Dispense whatever insurance covers. (Patient taking differently: 10 Units 3 (Three) Times a Day With Meals. Subcut TIDAC for diabetes.Blood sugar =0 unit;150-199=2 u;200-249=3 u;250-299=4 u;300-349=5 u;350-400=6 u;>400=7 u. Dispense whatever insurance covers.) 1 pen 3   • insulin detemir (LEVEMIR) 100 UNIT/ML injection Inject 25 Units under the skin into the appropriate area as directed 2 (Two) Times a Day.     • Insulin Pen Needle (Pen Needles) 32G X 4 MM misc 1 each 4 (Four) Times a Day. Use 4 x daily, Dx code E11.65 120 each 11   • Insulin Syringe 29G X 1/2\" 0.5 ML misc Inject 4 times daily 120 each 11   • Lancets (FREESTYLE) lancets Tid 90 each 1   • nitroglycerin (NITROSTAT) 0.4 MG SL tablet Place 0.4 mg under the tongue Every 5 (Five) Minutes As Needed for Chest Pain. Take no more than 3 doses in 15 minutes.     • nortriptyline (PAMELOR) 10 MG capsule Take 10 mg by mouth Every Night.     • pantoprazole (PROTONIX) 40 MG EC tablet Take 40 mg by mouth Daily.     • pravastatin (PRAVACHOL) 40 MG tablet Take 40 mg by mouth Daily.     • sertraline (ZOLOFT) " "50 MG tablet Take 50 mg by mouth Daily.       No current facility-administered medications for this visit.       Review of Systems   Constitutional: Positive for unexpected weight change.   HENT: Positive for hearing loss.    Eyes: Positive for visual disturbance.   Respiratory: Positive for cough and shortness of breath.    Gastrointestinal: Positive for constipation.   Endocrine: Negative.    Genitourinary: Negative.    Musculoskeletal:        Foot pain   Skin: Negative.    Allergic/Immunologic: Negative.    Neurological: Negative.    Hematological: Negative.    Psychiatric/Behavioral: The patient is nervous/anxious.          OBJECTIVE    /84   Pulse 101   Ht 170.2 cm (67\")   Wt 71.7 kg (158 lb)   SpO2 96%   BMI 24.75 kg/m²     Physical Exam  Vitals reviewed.   Constitutional:       General: He is not in acute distress.     Appearance: He is well-developed.   HENT:      Head: Normocephalic and atraumatic.      Nose: Nose normal.   Eyes:      Conjunctiva/sclera: Conjunctivae normal.      Pupils: Pupils are equal, round, and reactive to light.   Pulmonary:      Effort: Pulmonary effort is normal. No respiratory distress.      Breath sounds: No wheezing.   Musculoskeletal:         General: Tenderness present. No deformity. Normal range of motion.   Skin:     General: Skin is warm and dry.      Capillary Refill: Capillary refill takes less than 2 seconds.   Neurological:      Mental Status: He is alert and oriented to person, place, and time.   Psychiatric:         Behavior: Behavior normal.         Thought Content: Thought content normal.          Lower Extremity Exam:     Cardiovascular:    DP/PT pulses nonpalpable b/l, positive Doppler signals  CFT brisk  to all digits b/l  No erythema or edema noted b/l  Musculoskeletal:  Muscle strength is 5/5 for all muscle groups tested b/l  ROM of the 1st MTP is WNL b/l  ROM of the ankle joint is  WNL b/l  Dermatological:   Webspaces 1-4 b/l are clean, dry and " intact.   No subcutaneous nodules or masses noted  b/l  No open wounds noted b/l  Nails 1-5 b/l are thickened, discolored, elongated with subungual debris.  Pain on palpation to the nail plates.  Neurological:   Protective sensation intact b/l  Sensation intact to light touch b/l   Vibratory sensation intact to HIPJ b/l      Foot/Ankle Exam:       General:   Diabetic Foot Exam Performed            Procedures        ASSESSMENT AND PLAN    Diagnoses and all orders for this visit:    1. Encounter for diabetic foot exam (HCC) (Primary)    2. Type 2 diabetes mellitus with peripheral neuropathy (HCC)    3. Onychomycosis    4. Chronic toe pain, bilateral        - A diabetic foot screening exam was performed and the patient was educated on the foot complications related to diabetes,  preventative foot care and what signs and symptoms to watch for.  Instructed to contact our office if any foot problems develop before next visit.  - Nails 1-5 bilateral were debrided in length and thickness with nail nipper and electric  to decrease fungal load and risk of infection.  - All the patients questions were answered.  - RTC 3 months or sooner if needed.              This document has been electronically signed by Benji Hanson DPM on December 10, 2021 13:02 CST     12/10/2021  13:02 CST

## 2021-12-17 ENCOUNTER — TELEPHONE (OUTPATIENT)
Dept: ENDOCRINOLOGY | Facility: CLINIC | Age: 66
End: 2021-12-17

## 2021-12-17 DIAGNOSIS — Z79.4 TYPE 2 DIABETES MELLITUS WITH HYPERGLYCEMIA, WITH LONG-TERM CURRENT USE OF INSULIN (HCC): ICD-10-CM

## 2021-12-17 DIAGNOSIS — E11.65 TYPE 2 DIABETES MELLITUS WITH HYPERGLYCEMIA, WITH LONG-TERM CURRENT USE OF INSULIN (HCC): ICD-10-CM

## 2021-12-17 RX ORDER — PROCHLORPERAZINE 25 MG/1
1 SUPPOSITORY RECTAL
Qty: 1 EACH | Refills: 3 | Status: SHIPPED | OUTPATIENT
Start: 2021-12-17 | End: 2022-09-02 | Stop reason: SDUPTHER

## 2021-12-17 RX ORDER — LANCETS 28 GAUGE
EACH MISCELLANEOUS
Qty: 90 EACH | Refills: 1 | Status: SHIPPED | OUTPATIENT
Start: 2021-12-17

## 2021-12-17 RX ORDER — IBUPROFEN 200 MG
TABLET ORAL
Qty: 120 EACH | Refills: 11 | Status: SHIPPED | OUTPATIENT
Start: 2021-12-17

## 2021-12-17 RX ORDER — PROCHLORPERAZINE 25 MG/1
1 SUPPOSITORY RECTAL CONTINUOUS
Qty: 1 EACH | Refills: 1 | Status: SHIPPED | OUTPATIENT
Start: 2021-12-17

## 2021-12-17 RX ORDER — PROCHLORPERAZINE 25 MG/1
SUPPOSITORY RECTAL
Qty: 9 EACH | Refills: 3 | Status: SHIPPED | OUTPATIENT
Start: 2021-12-17 | End: 2022-09-02 | Stop reason: SDUPTHER

## 2021-12-17 RX ORDER — BLOOD-GLUCOSE METER
1 KIT MISCELLANEOUS 3 TIMES DAILY
Qty: 1 EACH | Refills: 1 | Status: SHIPPED | OUTPATIENT
Start: 2021-12-17

## 2021-12-17 RX ORDER — PEN NEEDLE, DIABETIC 30 GX3/16"
1 NEEDLE, DISPOSABLE MISCELLANEOUS 4 TIMES DAILY
Qty: 120 EACH | Refills: 11 | Status: SHIPPED | OUTPATIENT
Start: 2021-12-17
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
## 2022-01-04 ENCOUNTER — TRANSCRIBE ORDERS (OUTPATIENT)
Dept: ORTHOPEDIC SURGERY | Facility: CLINIC | Age: 67
End: 2022-01-04

## 2022-01-04 DIAGNOSIS — M25.511 RIGHT SHOULDER PAIN, UNSPECIFIED CHRONICITY: Primary | ICD-10-CM

## 2022-01-10 DIAGNOSIS — M25.511 ACUTE PAIN OF RIGHT SHOULDER: Primary | ICD-10-CM

## 2022-03-08 ENCOUNTER — OFFICE VISIT (OUTPATIENT)
Dept: ENDOCRINOLOGY | Facility: CLINIC | Age: 67
End: 2022-03-08

## 2022-03-08 VITALS
DIASTOLIC BLOOD PRESSURE: 84 MMHG | BODY MASS INDEX: 23.23 KG/M2 | WEIGHT: 148 LBS | HEIGHT: 67 IN | OXYGEN SATURATION: 100 % | SYSTOLIC BLOOD PRESSURE: 126 MMHG | HEART RATE: 88 BPM

## 2022-03-08 DIAGNOSIS — E11.65 TYPE 2 DIABETES MELLITUS WITH HYPERGLYCEMIA, WITH LONG-TERM CURRENT USE OF INSULIN: Primary | ICD-10-CM

## 2022-03-08 DIAGNOSIS — Z79.4 TYPE 2 DIABETES MELLITUS WITH HYPERGLYCEMIA, WITH LONG-TERM CURRENT USE OF INSULIN: Primary | ICD-10-CM

## 2022-03-08 DIAGNOSIS — I10 ESSENTIAL HYPERTENSION: ICD-10-CM

## 2022-03-08 DIAGNOSIS — E55.9 VITAMIN D DEFICIENCY: ICD-10-CM

## 2022-03-08 DIAGNOSIS — E78.2 MIXED HYPERLIPIDEMIA: ICD-10-CM

## 2022-03-08 PROCEDURE — 99214 OFFICE O/P EST MOD 30 MIN: CPT | Performed by: NURSE PRACTITIONER

## 2022-03-08 NOTE — PROGRESS NOTES
"Chief Complaint  Diabetes    Subjective          Favio Casillas presents to [facility] ENDOCRINOLOGY  History of Present Illness     In office visit      Primary care provider  Dr. Merritt    67 year old male presents for follow up      Reason diabetes mellitus type 2     Diagnosed in 2016     Timing constant     Severity is high     Quality is not controlled                Microvascular complications--- neuropathy, CKD I do not know the staging     Macrovascular complications CAD, stent x1     Current regimen is insulin     Current glucose monitoring fingersticks     Checks 4 times daily       States doing good     Averaging 147       Now has dexcom and usiing but forgot reader            Review of Systems - General ROS: negative        Objective   Vital Signs:   /84   Pulse 88   Ht 170.2 cm (67\")   Wt 67.1 kg (148 lb)   SpO2 100%   BMI 23.18 kg/m²     Physical Exam  Constitutional:       Appearance: Normal appearance.   Cardiovascular:      Rate and Rhythm: Regular rhythm.      Heart sounds: Normal heart sounds.   Pulmonary:      Breath sounds: Normal breath sounds.   Musculoskeletal:      Cervical back: Normal range of motion.   Neurological:      Mental Status: He is alert.        Result Review :   The following data was reviewed by: TIFFANIE Stephen on 03/08/2022:  Common labs    Common Labsle 7/2/21 7/2/21 7/2/21 7/2/21 7/2/21 7/2/21 7/19/21 1/11/22 1/11/22 1/11/22 1/11/22 1/11/22    1052 1052 1052 1052 1052 1052  1027 1027 1028 1028 1028   Glucose 126 (A) NEG     74  NEG      Glucose           212 (A)    BUN 27 (A)      29 (A)    37 (A)    Creatinine 1.4 (A)      1.42 (A)    1.6 (A)    eGFR Non African Am       50 (A)        eGFR  Am 62              Sodium 139      141    137    Potassium 3.7      3.9    4.0    Chloride 107      100    108 (A)    Calcium 9.4      9.2    9.7    Albumin 4.3          4.7    Total Bilirubin           0.58    Alkaline " Phosphatase           113 (A)    AST (SGOT)           12 (A)    ALT (SGPT)           11    Total Cholesterol          126     Triglycerides          164 (A)     HDL Cholesterol          45     LDL Cholesterol           48     Hemoglobin A1C     9.9 (A) 9.9 (A)      11.5 (A)   Microalbumin, Urine        1.4       Uric Acid   3.7 (A) 3.7 (A)           (A) Abnormal value       Comments are available for some flowsheets but are not being displayed.                     Assessment and Plan    Diagnoses and all orders for this visit:    1. Type 2 diabetes mellitus with hyperglycemia, with long-term current use of insulin (HCC) (Primary)  -     CBC & Differential; Future  -     Comprehensive Metabolic Panel; Future  -     Hemoglobin A1c; Future  -     Lipid Panel; Future  -     Microalbumin / Creatinine Urine Ratio - Urine, Clean Catch; Future  -     TSH; Future  -     Vitamin D 25 Hydroxy; Future    2. Essential hypertension  -     CBC & Differential; Future  -     Comprehensive Metabolic Panel; Future  -     Hemoglobin A1c; Future  -     Lipid Panel; Future  -     Microalbumin / Creatinine Urine Ratio - Urine, Clean Catch; Future  -     TSH; Future  -     Vitamin D 25 Hydroxy; Future    3. Mixed hyperlipidemia  -     CBC & Differential; Future  -     Comprehensive Metabolic Panel; Future  -     Hemoglobin A1c; Future  -     Lipid Panel; Future  -     Microalbumin / Creatinine Urine Ratio - Urine, Clean Catch; Future  -     TSH; Future  -     Vitamin D 25 Hydroxy; Future    4. Vitamin D deficiency  -     CBC & Differential; Future  -     Comprehensive Metabolic Panel; Future  -     Hemoglobin A1c; Future  -     Lipid Panel; Future  -     Microalbumin / Creatinine Urine Ratio - Urine, Clean Catch; Future  -     TSH; Future  -     Vitamin D 25 Hydroxy; Future             Glycemic management     Type 2 diabetes not controlled         Lab Results   Component Value Date    HGBA1C 11.5 (H) 01/11/2022             Dexcom G6---he  is using but forgot reader               Naunemir Taking 25 units am and 25  in the pm         Taking novolog 10 units up to 20 units TID before each meal                        We discussed GLP-1 and he does not want             using the dexcom     Dexcom has allowed the patient to minimize hypoglycemia as alarms have predicted and avoided them    he also uses the arrows on the dexcom as follows:    If arrow is horizontal , he uses the predicted dosage     If the arrow is slanted up or down-- he increase or decrease by 2 units    If the arrow is vertical up or down - he increases or decreases by 3 units                    Lipid management        Taking Pravachol 40 mg one at night         Total Cholesterol   Date Value Ref Range Status   01/04/2021 69 <200 mg/dL Final     Triglycerides   Date Value Ref Range Status   01/11/2022 164 (H) 10 - 150 mg/dL Final     HDL Cholesterol   Date Value Ref Range Status   01/11/2022 45 23 - 92 mg/dL Final     LDL Cholesterol    Date Value Ref Range Status   01/11/2022 48 mg/dL Final     Comment:         OPTIMAL: <100 mg/dl  LOW RISK: 100-129 mg/dl  BORDERLINE HIGH: 130-159 mg/dl  HIGH: 160-189 mg/dl  VERY HIGH: >189 mg/dl              Blood pressure management        Hypertension     Taking Coreg 3.125 mg twice a day        Microvascular monitoring        Last eye exam---July 2021, no DR      Neuropathy-- no RX                      Bone health        Vitamin D deficiency     Taking mvi         Component      Latest Ref Rng & Units 7/2/2021   3-Epi-Vitamin D,25-Hydroxy      30 - 100 ng/ml 21.2 (L)               Preventative care        Smoker      Favio Jacksonbar  reports that he has been smoking cigarettes. He has a 53.00 pack-year smoking history. He quit smokeless tobacco use about 42 years ago.  His smokeless tobacco use included chew.. I have educated him on the risk of diseases from using tobacco products such as cancer and COPD.     I advised him to quit and he is not  willing to quit.    I spent 3  minutes counseling the patient.         Weight management        Patient's Body mass index is 23.18 kg/m². indicating that he is within normal range (BMI 18.5-24.9). No BMI management plan needed..                            Follow Up   Return in about 3 months (around 6/8/2022) for Recheck.  Patient was given instructions and counseling regarding his condition or for health maintenance advice. Please see specific information pulled into the AVS if appropriate.         This document has been electronically signed by TIFFANIE Stephen on March 8, 2022 10:37 CST.
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
## 2022-05-23 ENCOUNTER — DOCUMENTATION (OUTPATIENT)
Dept: ENDOCRINOLOGY | Facility: CLINIC | Age: 67
End: 2022-05-23

## 2022-06-09 ENCOUNTER — TELEPHONE (OUTPATIENT)
Dept: ENDOCRINOLOGY | Facility: CLINIC | Age: 67
End: 2022-06-09

## 2022-07-12 ENCOUNTER — TELEPHONE (OUTPATIENT)
Dept: ENDOCRINOLOGY | Facility: CLINIC | Age: 67
End: 2022-07-12

## 2022-07-12 NOTE — TELEPHONE ENCOUNTER
Pt says sugar is dropping overnight if he takes Novolog at supper. Dr Merritt instructed pt to up Novolog from 5 to 6 units after meals. Please inform pt what to do on evening dosage.    Thanks

## 2022-07-13 ENCOUNTER — TELEPHONE (OUTPATIENT)
Dept: ENDOCRINOLOGY | Facility: CLINIC | Age: 67
End: 2022-07-13

## 2022-07-13 NOTE — TELEPHONE ENCOUNTER
Advised pt to continue taking insulin as prescribed (Levemir 25 units BID & mealtime insulin). Pt experiencing BGs in the upper 80s overnight occasionally but not dropping low.

## 2022-08-11 ENCOUNTER — TELEPHONE (OUTPATIENT)
Dept: ENDOCRINOLOGY | Facility: CLINIC | Age: 67
End: 2022-08-11

## 2022-08-11 NOTE — TELEPHONE ENCOUNTER
PT called and is needing a refill called in on Rx: Continuous Blood Gluc Sensor (Dexcom G6 Sensor)    Pharmacy Info:  [id number] - [place], [place] - [address] AT [facility] - [phone]  - [phone] FX   [address] [facility] [address]   Phone:  [phone]  Fax:  [phone]

## 2022-09-02 ENCOUNTER — OFFICE VISIT (OUTPATIENT)
Dept: ENDOCRINOLOGY | Facility: CLINIC | Age: 67
End: 2022-09-02

## 2022-09-02 VITALS
DIASTOLIC BLOOD PRESSURE: 78 MMHG | OXYGEN SATURATION: 99 % | BODY MASS INDEX: 22.9 KG/M2 | SYSTOLIC BLOOD PRESSURE: 114 MMHG | WEIGHT: 145.9 LBS | HEART RATE: 84 BPM | HEIGHT: 67 IN

## 2022-09-02 DIAGNOSIS — E78.2 MIXED HYPERLIPIDEMIA: ICD-10-CM

## 2022-09-02 DIAGNOSIS — E11.65 TYPE 2 DIABETES MELLITUS WITH HYPERGLYCEMIA, WITH LONG-TERM CURRENT USE OF INSULIN: ICD-10-CM

## 2022-09-02 DIAGNOSIS — Z79.4 TYPE 2 DIABETES MELLITUS WITH HYPERGLYCEMIA, WITH LONG-TERM CURRENT USE OF INSULIN: ICD-10-CM

## 2022-09-02 DIAGNOSIS — I10 ESSENTIAL HYPERTENSION: Primary | ICD-10-CM

## 2022-09-02 DIAGNOSIS — F17.200 SMOKER: ICD-10-CM

## 2022-09-02 PROCEDURE — 99214 OFFICE O/P EST MOD 30 MIN: CPT | Performed by: NURSE PRACTITIONER

## 2022-09-02 RX ORDER — PROCHLORPERAZINE 25 MG/1
1 SUPPOSITORY RECTAL
Qty: 1 EACH | Refills: 3 | Status: SHIPPED | OUTPATIENT
Start: 2022-09-02 | End: 2023-02-09

## 2022-09-02 RX ORDER — PROCHLORPERAZINE 25 MG/1
SUPPOSITORY RECTAL
Qty: 9 EACH | Refills: 3 | Status: SHIPPED | OUTPATIENT
Start: 2022-09-02

## 2022-09-02 NOTE — PROGRESS NOTES
"Chief Complaint  Diabetes    Subjective          Favio Casillas presents to [facility] ENDOCRINOLOGY  History of Present Illness       In office visit      Primary care provider  Dr. Merritt    67 year old male presents for follow up      Reason diabetes mellitus type 2     Diagnosed in 2016     Timing constant     Severity is high     Quality is not controlled                Microvascular complications--- neuropathy, CKD I do not know the staging     Macrovascular complications CAD, stent x1     Current regimen is insulin     Current glucose monitoring fingersticks     Checks 4 times daily       Uses dexcom but out of sensors      States he has been doing good        This am was 150    He has had lows overnight so he keeps bg up to 200 at bedtime     Review of Systems - General ROS: negative          Objective   Vital Signs:   /78   Pulse 84   Ht 170.2 cm (67\")   Wt 66.2 kg (145 lb 14.4 oz)   SpO2 99%   BMI 22.85 kg/m²     Physical Exam  Constitutional:       Appearance: Normal appearance.   Cardiovascular:      Rate and Rhythm: Regular rhythm.      Heart sounds: Normal heart sounds.   Pulmonary:      Breath sounds: Normal breath sounds.   Musculoskeletal:      Cervical back: Normal range of motion.   Neurological:      Mental Status: He is alert.        Result Review :   The following data was reviewed by: TIFFANIE Stephen on 03/08/2022:  Common labs    Common Labsle 1/11/22 1/11/22 1/11/22 1/11/22 1/11/22 4/4/22 4/4/22 7/5/22 7/5/22    1027 1027 1028 1028 1028 0911 0911 0928 0928   Glucose  NEG          Glucose    212 (A)  178 (A)  458 (A)    BUN    37 (A)  32 (A)  27 (A)    Creatinine    1.6 (A)  1.5 (A)  1.6 (A)    Sodium    137  138  136    Potassium    4.0  4.1  3.8    Chloride    108 (A)  110 (A)  105    Calcium    9.7  9.1  8.7    Albumin    4.7  3.9  3.9    Total Bilirubin    0.58  0.32      Alkaline Phosphatase    113 (A)  97      AST (SGOT)    12 (A) "  9 (A)      ALT (SGPT)    11  8      Total Cholesterol   126         Triglycerides   164 (A)         HDL Cholesterol   45         LDL Cholesterol    48         Hemoglobin A1C     11.5 (A)  9.8 (A)  10.1 (A)   Microalbumin, Urine 1.4           (A) Abnormal value       Comments are available for some flowsheets but are not being displayed.                       Assessment and Plan    Diagnoses and all orders for this visit:    1. Essential hypertension (Primary)    2. Type 2 diabetes mellitus with hyperglycemia, with long-term current use of insulin (HCC)  -     Continuous Blood Gluc Transmit (Dexcom G6 Transmitter) misc; 1 each Every 3 (Three) Months.  Dispense: 1 each; Refill: 3  -     Continuous Blood Gluc Sensor (Dexcom G6 Sensor); Every 10 (Ten) Days.  Dispense: 9 each; Refill: 3    3. Mixed hyperlipidemia    4. Smoker             Glycemic management     Type 2 diabetes not controlled         Lab Results   Component Value Date    HGBA1C 10.1 (H) 07/05/2022             Dexcom G6- unable to download he is out of sensors               Levemir Taking 25 units am and 25  in the pm ---- decrease pm dose to 20 since having to eat to not have lows         Taking novolog 10 units up to 20 units TID before each meal                        We discussed GLP-1 and he does not want             using the dexcom --called in refills     Dexcom has allowed the patient to minimize hypoglycemia as alarms have predicted and avoided them    he also uses the arrows on the dexcom as follows:    If arrow is horizontal , he uses the predicted dosage     If the arrow is slanted up or down-- he increase or decrease by 2 units    If the arrow is vertical up or down - he increases or decreases by 3 units                    Lipid management        Taking Pravachol 40 mg one at night         Total Cholesterol   Date Value Ref Range Status   01/04/2021 69 <200 mg/dL Final     Triglycerides   Date Value Ref Range Status   01/11/2022 164 (H) 10 -  150 mg/dL Final     HDL Cholesterol   Date Value Ref Range Status   01/11/2022 45 23 - 92 mg/dL Final     LDL Cholesterol    Date Value Ref Range Status   01/11/2022 48 mg/dL Final     Comment:         OPTIMAL: <100 mg/dl  LOW RISK: 100-129 mg/dl  BORDERLINE HIGH: 130-159 mg/dl  HIGH: 160-189 mg/dl  VERY HIGH: >189 mg/dl              Blood pressure management        Hypertension     Taking Coreg 3.125 mg twice a day        Microvascular monitoring        Last eye exam---July 2021, no DR      Neuropathy-- no RX                      Bone health        Vitamin D deficiency     Taking mvi         Component      Latest Ref Rng & Units 7/2/2021   3-Epi-Vitamin D,25-Hydroxy      30 - 100 ng/ml 21.2 (L)               Preventative care        Smoker      Favio Casillas  reports that he has been smoking cigarettes. He has a 53.00 pack-year smoking history. He quit smokeless tobacco use about 42 years ago.  His smokeless tobacco use included chew.. I have educated him on the risk of diseases from using tobacco products such as cancer and COPD.     I advised him to quit and he is not willing to quit.    I spent 3  minutes counseling the patient.         Weight management        Patient's Body mass index is 22.85 kg/m². indicating that he is within normal range (BMI 18.5-24.9). No BMI management plan needed..                            Follow Up   Return in about 4 months (around 1/2/2023) for Recheck.  Patient was given instructions and counseling regarding his condition or for health maintenance advice. Please see specific information pulled into the AVS if appropriate.         This document has been electronically signed by TIFFANIE Stephen on September 2, 2022 10:05 CDT.

## 2022-09-19 ENCOUNTER — HOSPITAL ENCOUNTER (EMERGENCY)
Facility: HOSPITAL | Age: 67
Discharge: HOME | End: 2022-09-19
Attending: EMERGENCY MEDICINE | Admitting: EMERGENCY MEDICINE

## 2022-09-19 ENCOUNTER — APPOINTMENT (OUTPATIENT)
Dept: GENERAL RADIOLOGY | Facility: HOSPITAL | Age: 67
End: 2022-09-19

## 2022-09-19 VITALS
OXYGEN SATURATION: 98 % | HEART RATE: 76 BPM | SYSTOLIC BLOOD PRESSURE: 128 MMHG | WEIGHT: 145 LBS | HEIGHT: 67 IN | BODY MASS INDEX: 22.76 KG/M2 | RESPIRATION RATE: 18 BRPM | DIASTOLIC BLOOD PRESSURE: 83 MMHG | TEMPERATURE: 98.3 F

## 2022-09-19 DIAGNOSIS — J44.1 COPD EXACERBATION: Primary | ICD-10-CM

## 2022-09-19 PROCEDURE — 96372 THER/PROPH/DIAG INJ SC/IM: CPT

## 2022-09-19 PROCEDURE — 94760 N-INVAS EAR/PLS OXIMETRY 1: CPT

## 2022-09-19 PROCEDURE — 25010000002 METHYLPREDNISOLONE PER 125 MG: Performed by: EMERGENCY MEDICINE

## 2022-09-19 PROCEDURE — 94640 AIRWAY INHALATION TREATMENT: CPT

## 2022-09-19 PROCEDURE — 99283 EMERGENCY DEPT VISIT LOW MDM: CPT

## 2022-09-19 PROCEDURE — 71045 X-RAY EXAM CHEST 1 VIEW: CPT

## 2022-09-19 PROCEDURE — 94799 UNLISTED PULMONARY SVC/PX: CPT

## 2022-09-19 RX ORDER — IPRATROPIUM BROMIDE AND ALBUTEROL SULFATE 2.5; .5 MG/3ML; MG/3ML
3 SOLUTION RESPIRATORY (INHALATION) ONCE
Status: COMPLETED | OUTPATIENT
Start: 2022-09-19 | End: 2022-09-19

## 2022-09-19 RX ORDER — IPRATROPIUM BROMIDE AND ALBUTEROL SULFATE 2.5; .5 MG/3ML; MG/3ML
3 SOLUTION RESPIRATORY (INHALATION) EVERY 4 HOURS PRN
Qty: 30 ML | Refills: 0 | Status: SHIPPED | OUTPATIENT
Start: 2022-09-19

## 2022-09-19 RX ORDER — PREDNISONE 20 MG/1
20 TABLET ORAL DAILY
Qty: 5 TABLET | Refills: 0 | Status: SHIPPED | OUTPATIENT
Start: 2022-09-19 | End: 2022-09-24

## 2022-09-19 RX ORDER — METHYLPREDNISOLONE SODIUM SUCCINATE 125 MG/2ML
80 INJECTION, POWDER, LYOPHILIZED, FOR SOLUTION INTRAMUSCULAR; INTRAVENOUS ONCE
Status: COMPLETED | OUTPATIENT
Start: 2022-09-19 | End: 2022-09-19

## 2022-09-19 RX ADMIN — METHYLPREDNISOLONE SODIUM SUCCINATE 80 MG: 125 INJECTION, POWDER, FOR SOLUTION INTRAMUSCULAR; INTRAVENOUS at 05:48

## 2022-09-19 RX ADMIN — IPRATROPIUM BROMIDE AND ALBUTEROL SULFATE 3 ML: .5; 3 SOLUTION RESPIRATORY (INHALATION) at 06:07

## 2022-09-19 NOTE — ED PROVIDER NOTES
Subjective   History of Present Illness  67-year-old male presents to the emergency department with concern for cough.  He reports history of COPD and black lung.  Denies chest pain.  Reports he felt short of breath only while having a coughing episode and that is what brought him in.  Reports he almost threw up he coughed so much and could not catch his breath.  Sick for couple of days.  Used a breathing treatment at home which helped some.  No recent antibiotics or steroids for his breathing. Denies fever, chills, productive cough or nasal congestion and drainage.     Family history, surgical history, social history, current medications and allergies are reviewed with the patient and triage documentation and vitals are reviewed.    History provided by:  Patient   used: No        Review of Systems   Constitutional: Negative for chills and fever.   HENT: Negative for congestion and sore throat.    Eyes: Negative for photophobia and visual disturbance.   Respiratory: Positive for cough, chest tightness, shortness of breath and wheezing.    Cardiovascular: Negative for chest pain, palpitations and leg swelling.   Gastrointestinal: Negative for abdominal pain, diarrhea, nausea and vomiting.   Endocrine: Negative for polydipsia, polyphagia and polyuria.   Genitourinary: Negative.    Musculoskeletal: Negative.    Skin: Negative.    Allergic/Immunologic: Negative.    Neurological: Negative.    Hematological: Negative.    Psychiatric/Behavioral: Negative.        Past Medical History:   Diagnosis Date   • Adenomatous polyp of colon    • Bladder cancer (HCC)    • Bladder outlet obstruction    • CAD (coronary artery disease)    • Chronic back pain    • Chronic gastritis    • Chronic hepatitis C (HCC)    • Chronic obstructive lung disease (HCC)    • Coronary arteriosclerosis    • Diabetes mellitus (HCC)    • Diverticular disease of colon    • Drug abuse (HCC)    • GERD (gastroesophageal reflux disease)    •  Hypertension    • Ingrown toenail    • Prostate cancer (HCC)    • Rheumatoid arthritis (HCC)    • Seizure (HCC)    • Stroke (HCC)     heat stroke   • Transient cerebral ischemia        No Known Allergies    Past Surgical History:   Procedure Laterality Date   • BLADDER TUMOR EXCISION     • CARDIAC CATHETERIZATION N/A 12/15/2017   • CHOLECYSTECTOMY     • COLONOSCOPY N/A 4/22/2019   • COLONOSCOPY N/A 10/19/2021    Procedure: COLONOSCOPY;  Surgeon: Alfonso Torres MD;  Location: [facility] ENDOSCOPY;  Service: Gastroenterology;  Laterality: N/A;   • CORONARY ANGIOPLASTY WITH STENT PLACEMENT     • ENDOSCOPY N/A 3/3/2017   • ENDOSCOPY N/A 4/22/2019   • ENDOSCOPY N/A 10/19/2021    Procedure: ESOPHAGOGASTRODUODENOSCOPY;  Surgeon: Alfonso Torres MD;  Location: [facility] ENDOSCOPY;  Service: Gastroenterology;  Laterality: N/A;   • KIDNEY SURGERY     • LUMBAR DISC SURGERY     • TRANSURETHRAL RESECTION OF BLADDER TUMOR N/A 7/21/2021    Procedure: CYSTOSCOPY TRANSURETHRAL RESECTION OF BLADDER TUMOR;  Surgeon: Christofer Calderon MD;  Location: [facility] OR;  Service: Urology;  Laterality: N/A;   • UPPER GASTROINTESTINAL ENDOSCOPY  04/22/2019   • UPPER GASTROINTESTINAL ENDOSCOPY  10/19/2021   • WRIST SURGERY Left        Family History   Problem Relation Age of Onset   • Diabetes Mother    • Liver cancer Father        Social History     Socioeconomic History   • Marital status:    Tobacco Use   • Smoking status: Current Every Day Smoker     Packs/day: 1.00     Years: 53.00     Pack years: 53.00     Types: Cigarettes   • Smokeless tobacco: Former User     Types: Chew     Quit date: 1980   Vaping Use   • Vaping Use: Some days   • Substances: Nicotine, Flavoring   • Devices: Disposable   Substance and Sexual Activity   • Alcohol use: Never   • Drug use: Yes     Frequency: 2.0 times per week     Types: Marijuana     Comment: DAILY   • Sexual activity: Not Currently           Objective   Physical Exam  Vitals and nursing note reviewed.    Constitutional:       General: He is not in acute distress.     Appearance: Normal appearance. He is normal weight. He is not ill-appearing, toxic-appearing or diaphoretic.   HENT:      Head: Normocephalic.   Eyes:      Conjunctiva/sclera: Conjunctivae normal.      Pupils: Pupils are equal, round, and reactive to light.   Cardiovascular:      Rate and Rhythm: Normal rate and regular rhythm.   Pulmonary:      Effort: Pulmonary effort is normal. No tachypnea, accessory muscle usage or respiratory distress.      Breath sounds: Decreased air movement present. No stridor or transmitted upper airway sounds. Examination of the right-lower field reveals decreased breath sounds. Examination of the left-lower field reveals decreased breath sounds. Decreased breath sounds and wheezing present. No rhonchi or rales.   Abdominal:      Palpations: Abdomen is soft.   Musculoskeletal:         General: Normal range of motion.      Cervical back: Normal range of motion and neck supple.   Skin:     General: Skin is warm and dry.      Capillary Refill: Capillary refill takes less than 2 seconds.   Neurological:      General: No focal deficit present.      Mental Status: He is alert.   Psychiatric:         Mood and Affect: Mood normal.         Behavior: Behavior normal.         Procedures  none         ED Course    Labs Reviewed - No data to display  XR Chest 1 View    Result Date: 9/19/2022  Narrative: PORTABLE CHEST CLINICAL HISTORY:  wheeze, cough COMPARISON:  9/5/2020. FINDINGS:  Single portable view of the chest obtained.  The lungs are well expanded and clear. There are calcified residua of granulomatous disease present. Cardiac size is within normal limits.  Vascularity is normal considering technique.  No pleural fluid is demonstrated by portable imaging. Stable aortic ectasia     Impression: No active disease by portable imaging. Electronically signed by:  Bernard Carpenter MD  9/19/2022 6:41 AM CDT Workstation:  [id number]      [facility]  Number of Diagnoses or Management Options     Amount and/or Complexity of Data Reviewed  Tests in the radiology section of CPT®: reviewed    Patient Progress  Patient progress: stable    Chest x-ray negative.  Patient feels much better after steroids and breathing treatment.  DuoNeb solution sent to the pharmacy for his nebulizer.  Short course of oral steroid also sent to the pharmacy.  Advised return with any new or worsening symptoms.  Agreeable to plan and discharge.    Final diagnoses:   COPD exacerbation (HCC)       ED Disposition  ED Disposition     ED Disposition   Discharge    Condition   Stable    Comment   --             Shayne Merritt MD  [address] [id number] [phone]               Medication List      New Prescriptions    ipratropium-albuterol 0.5-2.5 mg/3 ml nebulizer  Commonly known as: DUO-NEB  Take 3 mL by nebulization Every 4 (Four) Hours As Needed for Wheezing.     predniSONE 20 MG tablet  Commonly known as: DELTASONE  Take 1 tablet by mouth Daily for 5 days.           Where to Get Your Medications      These medications were sent to [facility] #[id number] - [place] [address] [facility] [address] [facility] - [phone]  - [phone]   [address] [address]    Phone: [phone]   · ipratropium-albuterol 0.5-2.5 mg/3 ml nebulizer  · predniSONE 20 MG tablet          Timmy Colin DO  09/19/22 2009

## 2022-09-19 NOTE — DISCHARGE INSTRUCTIONS
Please return for new or worsening symptoms.  Follow-up with primary care.  Medication for your nebulizer has been sent to the pharmacy.  Use this with your albuterol inhaler.  Also steroid for the next 5 days.

## 2022-12-22 ENCOUNTER — HOSPITAL ENCOUNTER (OUTPATIENT)
Facility: HOSPITAL | Age: 67
Setting detail: OBSERVATION
Discharge: HOME | End: 2022-12-25
Attending: STUDENT IN AN ORGANIZED HEALTH CARE EDUCATION/TRAINING PROGRAM | Admitting: INTERNAL MEDICINE

## 2022-12-22 ENCOUNTER — APPOINTMENT (OUTPATIENT)
Dept: GENERAL RADIOLOGY | Facility: HOSPITAL | Age: 67
End: 2022-12-22

## 2022-12-22 DIAGNOSIS — Z98.61 HISTORY OF PTCA: ICD-10-CM

## 2022-12-22 DIAGNOSIS — I25.10 ATHEROSCLEROSIS OF NATIVE CORONARY ARTERY OF NATIVE HEART, UNSPECIFIED WHETHER ANGINA PRESENT: ICD-10-CM

## 2022-12-22 DIAGNOSIS — Z78.9 IMPAIRED MOBILITY AND ADLS: ICD-10-CM

## 2022-12-22 DIAGNOSIS — R07.9 CHEST PAIN, UNSPECIFIED TYPE: Primary | ICD-10-CM

## 2022-12-22 DIAGNOSIS — Z74.09 IMPAIRED MOBILITY AND ADLS: ICD-10-CM

## 2022-12-22 LAB
ALBUMIN SERPL-MCNC: 4 G/DL (ref 3.5–5.2)
ALBUMIN/GLOB SERPL: 1.7 G/DL
ALP SERPL-CCNC: 100 U/L (ref 39–117)
ALT SERPL W P-5'-P-CCNC: 8 U/L (ref 1–41)
ANION GAP SERPL CALCULATED.3IONS-SCNC: 11 MMOL/L (ref 5–15)
AST SERPL-CCNC: 8 U/L (ref 1–40)
BASOPHILS # BLD AUTO: 0.04 10*3/MM3 (ref 0–0.2)
BASOPHILS NFR BLD AUTO: 0.6 % (ref 0–1.5)
BILIRUB SERPL-MCNC: 0.4 MG/DL (ref 0–1.2)
BUN SERPL-MCNC: 33 MG/DL (ref 8–23)
BUN/CREAT SERPL: 19.3 (ref 7–25)
CALCIUM SPEC-SCNC: 8.6 MG/DL (ref 8.6–10.5)
CHLORIDE SERPL-SCNC: 108 MMOL/L (ref 98–107)
CO2 SERPL-SCNC: 15 MMOL/L (ref 22–29)
CREAT SERPL-MCNC: 1.71 MG/DL (ref 0.76–1.27)
DEPRECATED RDW RBC AUTO: 44.4 FL (ref 37–54)
EGFRCR SERPLBLD CKD-EPI 2021: 43.9 ML/MIN/1.73
EOSINOPHIL # BLD AUTO: 0.06 10*3/MM3 (ref 0–0.4)
EOSINOPHIL NFR BLD AUTO: 0.9 % (ref 0.3–6.2)
ERYTHROCYTE [DISTWIDTH] IN BLOOD BY AUTOMATED COUNT: 14 % (ref 12.3–15.4)
GLOBULIN UR ELPH-MCNC: 2.4 GM/DL
GLUCOSE BLDC GLUCOMTR-MCNC: 249 MG/DL (ref 70–130)
GLUCOSE SERPL-MCNC: 382 MG/DL (ref 65–99)
HBA1C MFR BLD: 11 % (ref 4.8–5.6)
HCT VFR BLD AUTO: 47 % (ref 37.5–51)
HGB BLD-MCNC: 15.1 G/DL (ref 13–17.7)
HOLD SPECIMEN: NORMAL
HOLD SPECIMEN: NORMAL
IMM GRANULOCYTES # BLD AUTO: 0.02 10*3/MM3 (ref 0–0.05)
IMM GRANULOCYTES NFR BLD AUTO: 0.3 % (ref 0–0.5)
LYMPHOCYTES # BLD AUTO: 1.75 10*3/MM3 (ref 0.7–3.1)
LYMPHOCYTES NFR BLD AUTO: 25.5 % (ref 19.6–45.3)
MCH RBC QN AUTO: 27.9 PG (ref 26.6–33)
MCHC RBC AUTO-ENTMCNC: 32.1 G/DL (ref 31.5–35.7)
MCV RBC AUTO: 86.9 FL (ref 79–97)
MONOCYTES # BLD AUTO: 0.47 10*3/MM3 (ref 0.1–0.9)
MONOCYTES NFR BLD AUTO: 6.9 % (ref 5–12)
NEUTROPHILS NFR BLD AUTO: 4.51 10*3/MM3 (ref 1.7–7)
NEUTROPHILS NFR BLD AUTO: 65.8 % (ref 42.7–76)
NRBC BLD AUTO-RTO: 0 /100 WBC (ref 0–0.2)
NT-PROBNP SERPL-MCNC: 488.6 PG/ML (ref 0–900)
PLATELET # BLD AUTO: 172 10*3/MM3 (ref 140–450)
PMV BLD AUTO: 10.4 FL (ref 6–12)
POTASSIUM SERPL-SCNC: 3.7 MMOL/L (ref 3.5–5.2)
PROT SERPL-MCNC: 6.4 G/DL (ref 6–8.5)
RBC # BLD AUTO: 5.41 10*6/MM3 (ref 4.14–5.8)
SODIUM SERPL-SCNC: 134 MMOL/L (ref 136–145)
TROPONIN T SERPL-MCNC: <0.01 NG/ML (ref 0–0.03)
TROPONIN T SERPL-MCNC: <0.01 NG/ML (ref 0–0.03)
WBC NRBC COR # BLD: 6.85 10*3/MM3 (ref 3.4–10.8)
WHOLE BLOOD HOLD COAG: NORMAL
WHOLE BLOOD HOLD SPECIMEN: NORMAL

## 2022-12-22 PROCEDURE — 93005 ELECTROCARDIOGRAM TRACING: CPT | Performed by: STUDENT IN AN ORGANIZED HEALTH CARE EDUCATION/TRAINING PROGRAM

## 2022-12-22 PROCEDURE — G0378 HOSPITAL OBSERVATION PER HR: HCPCS

## 2022-12-22 PROCEDURE — 94760 N-INVAS EAR/PLS OXIMETRY 1: CPT

## 2022-12-22 PROCEDURE — 63710000001 INSULIN ASPART PER 5 UNITS: Performed by: INTERNAL MEDICINE

## 2022-12-22 PROCEDURE — 99285 EMERGENCY DEPT VISIT HI MDM: CPT

## 2022-12-22 PROCEDURE — 93005 ELECTROCARDIOGRAM TRACING: CPT

## 2022-12-22 PROCEDURE — 36415 COLL VENOUS BLD VENIPUNCTURE: CPT | Performed by: STUDENT IN AN ORGANIZED HEALTH CARE EDUCATION/TRAINING PROGRAM

## 2022-12-22 PROCEDURE — 80053 COMPREHEN METABOLIC PANEL: CPT | Performed by: STUDENT IN AN ORGANIZED HEALTH CARE EDUCATION/TRAINING PROGRAM

## 2022-12-22 PROCEDURE — 25010000002 METHYLPREDNISOLONE PER 125 MG: Performed by: INTERNAL MEDICINE

## 2022-12-22 PROCEDURE — 83880 ASSAY OF NATRIURETIC PEPTIDE: CPT | Performed by: STUDENT IN AN ORGANIZED HEALTH CARE EDUCATION/TRAINING PROGRAM

## 2022-12-22 PROCEDURE — 96374 THER/PROPH/DIAG INJ IV PUSH: CPT

## 2022-12-22 PROCEDURE — 83036 HEMOGLOBIN GLYCOSYLATED A1C: CPT | Performed by: INTERNAL MEDICINE

## 2022-12-22 PROCEDURE — 63710000001 INSULIN DETEMIR PER 5 UNITS: Performed by: INTERNAL MEDICINE

## 2022-12-22 PROCEDURE — 93010 ELECTROCARDIOGRAM REPORT: CPT | Performed by: INTERNAL MEDICINE

## 2022-12-22 PROCEDURE — 85025 COMPLETE CBC W/AUTO DIFF WBC: CPT | Performed by: STUDENT IN AN ORGANIZED HEALTH CARE EDUCATION/TRAINING PROGRAM

## 2022-12-22 PROCEDURE — 25010000002 HEPARIN (PORCINE) PER 1000 UNITS: Performed by: INTERNAL MEDICINE

## 2022-12-22 PROCEDURE — 25010000002 KETOROLAC TROMETHAMINE PER 15 MG: Performed by: INTERNAL MEDICINE

## 2022-12-22 PROCEDURE — 96361 HYDRATE IV INFUSION ADD-ON: CPT

## 2022-12-22 PROCEDURE — 71045 X-RAY EXAM CHEST 1 VIEW: CPT

## 2022-12-22 PROCEDURE — 96372 THER/PROPH/DIAG INJ SC/IM: CPT

## 2022-12-22 PROCEDURE — 84484 ASSAY OF TROPONIN QUANT: CPT | Performed by: STUDENT IN AN ORGANIZED HEALTH CARE EDUCATION/TRAINING PROGRAM

## 2022-12-22 PROCEDURE — 82962 GLUCOSE BLOOD TEST: CPT

## 2022-12-22 PROCEDURE — 94640 AIRWAY INHALATION TREATMENT: CPT

## 2022-12-22 PROCEDURE — 96375 TX/PRO/DX INJ NEW DRUG ADDON: CPT

## 2022-12-22 RX ORDER — SODIUM CHLORIDE 0.9 % (FLUSH) 0.9 %
10 SYRINGE (ML) INJECTION AS NEEDED
Status: DISCONTINUED | OUTPATIENT
Start: 2022-12-22 | End: 2022-12-25 | Stop reason: HOSPADM

## 2022-12-22 RX ORDER — NICOTINE 21 MG/24HR
1 PATCH, TRANSDERMAL 24 HOURS TRANSDERMAL
Status: DISCONTINUED | OUTPATIENT
Start: 2022-12-22 | End: 2022-12-25 | Stop reason: HOSPADM

## 2022-12-22 RX ORDER — SODIUM CHLORIDE 9 MG/ML
40 INJECTION, SOLUTION INTRAVENOUS AS NEEDED
Status: DISCONTINUED | OUTPATIENT
Start: 2022-12-22 | End: 2022-12-25 | Stop reason: HOSPADM

## 2022-12-22 RX ORDER — HEPARIN SODIUM 5000 [USP'U]/ML
5000 INJECTION, SOLUTION INTRAVENOUS; SUBCUTANEOUS EVERY 12 HOURS SCHEDULED
Status: DISCONTINUED | OUTPATIENT
Start: 2022-12-22 | End: 2022-12-25 | Stop reason: HOSPADM

## 2022-12-22 RX ORDER — ASPIRIN 81 MG/1
81 TABLET ORAL DAILY
Status: DISCONTINUED | OUTPATIENT
Start: 2022-12-23 | End: 2022-12-25 | Stop reason: HOSPADM

## 2022-12-22 RX ORDER — ONDANSETRON 4 MG/1
4 TABLET, FILM COATED ORAL EVERY 6 HOURS PRN
Status: DISCONTINUED | OUTPATIENT
Start: 2022-12-22 | End: 2022-12-25 | Stop reason: HOSPADM

## 2022-12-22 RX ORDER — CYCLOBENZAPRINE HCL 10 MG
10 TABLET ORAL 3 TIMES DAILY PRN
Status: DISCONTINUED | OUTPATIENT
Start: 2022-12-22 | End: 2022-12-25 | Stop reason: HOSPADM

## 2022-12-22 RX ORDER — ACETAMINOPHEN 325 MG/1
650 TABLET ORAL EVERY 4 HOURS PRN
Status: DISCONTINUED | OUTPATIENT
Start: 2022-12-22 | End: 2022-12-25 | Stop reason: HOSPADM

## 2022-12-22 RX ORDER — KETOROLAC TROMETHAMINE 15 MG/ML
15 INJECTION, SOLUTION INTRAMUSCULAR; INTRAVENOUS ONCE
Status: COMPLETED | OUTPATIENT
Start: 2022-12-22 | End: 2022-12-22

## 2022-12-22 RX ORDER — ONDANSETRON 2 MG/ML
4 INJECTION INTRAMUSCULAR; INTRAVENOUS EVERY 6 HOURS PRN
Status: DISCONTINUED | OUTPATIENT
Start: 2022-12-22 | End: 2022-12-25 | Stop reason: HOSPADM

## 2022-12-22 RX ORDER — BUDESONIDE AND FORMOTEROL FUMARATE DIHYDRATE 80; 4.5 UG/1; UG/1
2 AEROSOL RESPIRATORY (INHALATION)
Status: DISCONTINUED | OUTPATIENT
Start: 2022-12-22 | End: 2022-12-25 | Stop reason: HOSPADM

## 2022-12-22 RX ORDER — ASPIRIN 325 MG
325 TABLET ORAL ONCE
Status: COMPLETED | OUTPATIENT
Start: 2022-12-22 | End: 2022-12-22

## 2022-12-22 RX ORDER — CARVEDILOL 3.12 MG/1
3.12 TABLET ORAL 2 TIMES DAILY WITH MEALS
Status: DISCONTINUED | OUTPATIENT
Start: 2022-12-22 | End: 2022-12-24

## 2022-12-22 RX ORDER — CLOPIDOGREL BISULFATE 75 MG/1
75 TABLET ORAL DAILY
Status: DISCONTINUED | OUTPATIENT
Start: 2022-12-22 | End: 2022-12-25 | Stop reason: HOSPADM

## 2022-12-22 RX ORDER — SODIUM CHLORIDE 9 MG/ML
100 INJECTION, SOLUTION INTRAVENOUS CONTINUOUS
Status: DISCONTINUED | OUTPATIENT
Start: 2022-12-22 | End: 2022-12-24

## 2022-12-22 RX ORDER — METHYLPREDNISOLONE SODIUM SUCCINATE 125 MG/2ML
60 INJECTION, POWDER, LYOPHILIZED, FOR SOLUTION INTRAMUSCULAR; INTRAVENOUS EVERY 8 HOURS
Status: DISCONTINUED | OUTPATIENT
Start: 2022-12-22 | End: 2022-12-24

## 2022-12-22 RX ORDER — FLUTICASONE PROPIONATE 50 MCG
1 SPRAY, SUSPENSION (ML) NASAL DAILY
Status: DISCONTINUED | OUTPATIENT
Start: 2022-12-22 | End: 2022-12-25 | Stop reason: HOSPADM

## 2022-12-22 RX ORDER — ACETAMINOPHEN 160 MG/5ML
650 SOLUTION ORAL EVERY 4 HOURS PRN
Status: DISCONTINUED | OUTPATIENT
Start: 2022-12-22 | End: 2022-12-25 | Stop reason: HOSPADM

## 2022-12-22 RX ORDER — NITROGLYCERIN 0.4 MG/1
0.4 TABLET SUBLINGUAL
Status: DISCONTINUED | OUTPATIENT
Start: 2022-12-22 | End: 2022-12-25 | Stop reason: HOSPADM

## 2022-12-22 RX ORDER — IPRATROPIUM BROMIDE AND ALBUTEROL SULFATE 2.5; .5 MG/3ML; MG/3ML
3 SOLUTION RESPIRATORY (INHALATION)
Status: DISCONTINUED | OUTPATIENT
Start: 2022-12-22 | End: 2022-12-25 | Stop reason: HOSPADM

## 2022-12-22 RX ORDER — PANTOPRAZOLE SODIUM 40 MG/1
40 TABLET, DELAYED RELEASE ORAL DAILY
Status: DISCONTINUED | OUTPATIENT
Start: 2022-12-22 | End: 2022-12-25 | Stop reason: HOSPADM

## 2022-12-22 RX ORDER — DEXTROSE MONOHYDRATE 25 G/50ML
25 INJECTION, SOLUTION INTRAVENOUS
Status: DISCONTINUED | OUTPATIENT
Start: 2022-12-22 | End: 2022-12-25 | Stop reason: HOSPADM

## 2022-12-22 RX ORDER — SODIUM BICARBONATE 650 MG/1
650 TABLET ORAL 3 TIMES DAILY
Status: DISCONTINUED | OUTPATIENT
Start: 2022-12-22 | End: 2022-12-25 | Stop reason: HOSPADM

## 2022-12-22 RX ORDER — HYDROXYZINE HYDROCHLORIDE 25 MG/1
25 TABLET, FILM COATED ORAL EVERY 4 HOURS PRN
Status: DISCONTINUED | OUTPATIENT
Start: 2022-12-22 | End: 2022-12-25 | Stop reason: HOSPADM

## 2022-12-22 RX ORDER — INSULIN ASPART 100 [IU]/ML
0-7 INJECTION, SOLUTION INTRAVENOUS; SUBCUTANEOUS
Status: DISCONTINUED | OUTPATIENT
Start: 2022-12-22 | End: 2022-12-25 | Stop reason: HOSPADM

## 2022-12-22 RX ORDER — NORTRIPTYLINE HYDROCHLORIDE 10 MG/1
10 CAPSULE ORAL NIGHTLY
Status: DISCONTINUED | OUTPATIENT
Start: 2022-12-22 | End: 2022-12-25 | Stop reason: HOSPADM

## 2022-12-22 RX ORDER — ACETAMINOPHEN 650 MG/1
650 SUPPOSITORY RECTAL EVERY 4 HOURS PRN
Status: DISCONTINUED | OUTPATIENT
Start: 2022-12-22 | End: 2022-12-25 | Stop reason: HOSPADM

## 2022-12-22 RX ORDER — SODIUM CHLORIDE 0.9 % (FLUSH) 0.9 %
10 SYRINGE (ML) INJECTION EVERY 12 HOURS SCHEDULED
Status: DISCONTINUED | OUTPATIENT
Start: 2022-12-22 | End: 2022-12-25 | Stop reason: HOSPADM

## 2022-12-22 RX ORDER — PRAVASTATIN SODIUM 40 MG
40 TABLET ORAL DAILY
Status: DISCONTINUED | OUTPATIENT
Start: 2022-12-22 | End: 2022-12-25 | Stop reason: HOSPADM

## 2022-12-22 RX ORDER — NICOTINE POLACRILEX 4 MG
15 LOZENGE BUCCAL
Status: DISCONTINUED | OUTPATIENT
Start: 2022-12-22 | End: 2022-12-25 | Stop reason: HOSPADM

## 2022-12-22 RX ADMIN — CARVEDILOL 3.12 MG: 3.12 TABLET, FILM COATED ORAL at 18:11

## 2022-12-22 RX ADMIN — SODIUM BICARBONATE 650 MG: 650 TABLET ORAL at 18:11

## 2022-12-22 RX ADMIN — HYDROXYZINE HYDROCHLORIDE 25 MG: 25 TABLET, FILM COATED ORAL at 20:57

## 2022-12-22 RX ADMIN — SODIUM BICARBONATE 650 MG: 650 TABLET ORAL at 20:53

## 2022-12-22 RX ADMIN — Medication 10 ML: at 20:53

## 2022-12-22 RX ADMIN — ASPIRIN 325 MG: 325 TABLET ORAL at 15:17

## 2022-12-22 RX ADMIN — SODIUM CHLORIDE 100 ML/HR: 9 INJECTION, SOLUTION INTRAVENOUS at 17:34

## 2022-12-22 RX ADMIN — METHYLPREDNISOLONE SODIUM SUCCINATE 60 MG: 125 INJECTION, POWDER, FOR SOLUTION INTRAMUSCULAR; INTRAVENOUS at 18:12

## 2022-12-22 RX ADMIN — INSULIN DETEMIR 30 UNITS: 100 INJECTION, SOLUTION SUBCUTANEOUS at 20:53

## 2022-12-22 RX ADMIN — NORTRIPTYLINE HYDROCHLORIDE 10 MG: 10 CAPSULE ORAL at 20:53

## 2022-12-22 RX ADMIN — IPRATROPIUM BROMIDE AND ALBUTEROL SULFATE 3 ML: 2.5; .5 SOLUTION RESPIRATORY (INHALATION) at 20:15

## 2022-12-22 RX ADMIN — CYCLOBENZAPRINE HYDROCHLORIDE 10 MG: 10 TABLET, FILM COATED ORAL at 20:56

## 2022-12-22 RX ADMIN — HEPARIN SODIUM 5000 UNITS: 5000 INJECTION INTRAVENOUS; SUBCUTANEOUS at 22:00

## 2022-12-22 RX ADMIN — INSULIN ASPART 5 UNITS: 100 INJECTION, SOLUTION INTRAVENOUS; SUBCUTANEOUS at 18:10

## 2022-12-22 RX ADMIN — NICOTINE 1 PATCH: 21 PATCH, EXTENDED RELEASE TRANSDERMAL at 18:14

## 2022-12-22 RX ADMIN — KETOROLAC TROMETHAMINE 15 MG: 15 INJECTION, SOLUTION INTRAMUSCULAR; INTRAVENOUS at 18:13

## 2022-12-22 NOTE — ED NOTES
Pt reports intermittent CP since 8am today. Pt states he waited until 9 to take x1 SL nitro because he thought CP would go away. Pt called his cardiologist and was told to go to ED. Pt c/o mid sternal CP that does not radiate.

## 2022-12-22 NOTE — ED PROVIDER NOTES
Subjective   History of Present Illness  67-year-old male with PMH of bladder cancer, CAD, chronic back pain, COPD, type II DM, prostate cancer, stroke, and seizures comes in due to chest pain and shortness of breath.  He reports that he woke up this morning about 8 AM and began having some chest pain.  He took a nitroglycerin tablet which relieved his chest pain.  Chest pain came back about half hour later and he took a second nitroglycerin tablet.  Pain was relieved again and he took a nap.  He woke up a few hours later and the chest pain was gone however after about an hour of being awake the chest pain returned.  He reports that he called his cardiologist office, Dr. Garcia, who told him to come to the ED.  He had a friend bring him here to the ED.  Describes his current chest pain as intermittent, sharp, a feeling of pressure, and some shortness of breath.    Is not on home oxygen.    Review of Systems   Constitutional: Negative for chills, fatigue and fever.   HENT: Negative for congestion and rhinorrhea.    Respiratory: Positive for cough, chest tightness and shortness of breath.    Cardiovascular: Positive for chest pain and leg swelling.   Gastrointestinal: Positive for abdominal pain.   Musculoskeletal: Positive for back pain.   Skin: Negative for rash.   Neurological: Negative for dizziness and light-headedness.       Past Medical History:   Diagnosis Date   • Adenomatous polyp of colon    • Bladder cancer (HCC)    • Bladder outlet obstruction    • CAD (coronary artery disease)    • Chronic back pain    • Chronic gastritis    • Chronic hepatitis C (HCC)    • Chronic obstructive lung disease (HCC)    • Coronary arteriosclerosis    • Diabetes mellitus (HCC)    • Diverticular disease of colon    • Drug abuse (HCC)    • GERD (gastroesophageal reflux disease)    • Hypertension    • Ingrown toenail    • Prostate cancer (HCC)    • Rheumatoid arthritis (HCC)    • Seizure (HCC)    • Stroke (HCC)     heat stroke  "  • Transient cerebral ischemia        No Known Allergies    Past Surgical History:   Procedure Laterality Date   • BLADDER TUMOR EXCISION     • CARDIAC CATHETERIZATION N/A 12/15/2017   • CHOLECYSTECTOMY     • COLONOSCOPY N/A 4/22/2019   • COLONOSCOPY N/A 10/19/2021    Procedure: COLONOSCOPY;  Surgeon: Alfonso Torres MD;  Location: [facility] ENDOSCOPY;  Service: Gastroenterology;  Laterality: N/A;   • CORONARY ANGIOPLASTY WITH STENT PLACEMENT     • ENDOSCOPY N/A 3/3/2017   • ENDOSCOPY N/A 4/22/2019   • ENDOSCOPY N/A 10/19/2021    Procedure: ESOPHAGOGASTRODUODENOSCOPY;  Surgeon: Alfonso Torres MD;  Location: [facility] ENDOSCOPY;  Service: Gastroenterology;  Laterality: N/A;   • KIDNEY SURGERY     • LUMBAR DISC SURGERY     • TRANSURETHRAL RESECTION OF BLADDER TUMOR N/A 7/21/2021    Procedure: CYSTOSCOPY TRANSURETHRAL RESECTION OF BLADDER TUMOR;  Surgeon: Christofer Calderon MD;  Location: [facility] OR;  Service: Urology;  Laterality: N/A;   • UPPER GASTROINTESTINAL ENDOSCOPY  04/22/2019   • UPPER GASTROINTESTINAL ENDOSCOPY  10/19/2021   • WRIST SURGERY Left        Family History   Problem Relation Age of Onset   • Diabetes Mother    • Liver cancer Father        Social History     Socioeconomic History   • Marital status:    Tobacco Use   • Smoking status: Every Day     Packs/day: 1.00     Years: 53.00     Pack years: 53.00     Types: Cigarettes   • Smokeless tobacco: Former     Types: Chew     Quit date: 1980   Vaping Use   • Vaping Use: Some days   • Substances: Nicotine, Flavoring   • Devices: Disposable   Substance and Sexual Activity   • Alcohol use: Never   • Drug use: Yes     Frequency: 2.0 times per week     Types: Marijuana     Comment: DAILY   • Sexual activity: Not Currently           Objective    /90   Pulse 102   Temp 98.5 °F (36.9 °C) (Oral)   Resp 20   Ht 171.5 cm (67.5\")   Wt 65.8 kg (145 lb)   SpO2 97%   BMI 22.38 kg/m²     Physical Exam  Vitals reviewed.   Constitutional:       " Appearance: He is not diaphoretic.   HENT:      Head: Atraumatic.   Eyes:      Extraocular Movements: Extraocular movements intact.      Pupils: Pupils are equal, round, and reactive to light.   Neck:      Trachea: No tracheal deviation.   Cardiovascular:      Rate and Rhythm: Tachycardia present.      Pulses:           Carotid pulses are 2+ on the right side and 2+ on the left side.       Radial pulses are 2+ on the right side and 2+ on the left side.        Posterior tibial pulses are 2+ on the right side and 2+ on the left side.      Heart sounds:    No systolic murmur is present.   No diastolic murmur is present.    No S3 or S4 sounds.   Pulmonary:      Breath sounds: Examination of the right-upper field reveals decreased breath sounds and wheezing. Examination of the left-upper field reveals decreased breath sounds and wheezing. Examination of the right-middle field reveals decreased breath sounds and wheezing. Examination of the left-middle field reveals decreased breath sounds and wheezing. Examination of the right-lower field reveals decreased breath sounds and wheezing. Examination of the left-lower field reveals decreased breath sounds and wheezing. Decreased breath sounds and wheezing present. No rhonchi or rales.   Chest:      Chest wall: Tenderness present.      Comments: Left anterior chest wall just lateral to the sternum rib #5  Abdominal:      Tenderness: There is abdominal tenderness.   Musculoskeletal:      Right lower leg: Edema present.      Left lower leg: Edema present.      Comments: Mild +1 pitting edema bilaterally lower extremities to mid calf   Lymphadenopathy:      Cervical: No cervical adenopathy.   Skin:     General: Skin is warm.      Capillary Refill: Capillary refill takes less than 2 seconds.      Findings: No rash.   Neurological:      General: No focal deficit present.      Mental Status: He is alert and oriented to person, place, and time.   Psychiatric:         Mood and Affect:  Mood normal. Mood is not anxious.         Behavior: Behavior is not agitated.         Procedures       None    ED Course  ED Course as of 12/22/22 1605   u Dec 22, 2022   1515 Patient brought into ED by friend.  Triage.  Seen by resident and attending physician.  Given chest pain and cardiac history cardiac work-up was initiated. [JM]   1605 Labs resulted.  Decision made to admit.  Admitting hospitalist Dr. Charles was notified and agreed to accept patient. [JM]      ED Course User Index  [JM] Christofer Traore MD                      HEART Score: 6                      MDM  Number of Diagnoses or Management Options     Amount and/or Complexity of Data Reviewed  Decide to obtain previous medical records or to obtain history from someone other than the patient: yes    Risk of Complications, Morbidity, and/or Mortality  Presenting problems: moderate  Diagnostic procedures: moderate  Management options: moderate        Final diagnoses:   Chest pain, unspecified type       ED Disposition  ED Disposition     ED Disposition   Decision to Admit    Condition   --    Comment   Level of Care: Telemetry [5]   Diagnosis: Chest pain, unspecified type [[id number]]   Admitting Physician: JANET CHARLES [[id number]]   Attending Physician: JANET CHARLES [[id number]]               No follow-up provider specified.       Medication List      No changes were made to your prescriptions during this visit.           This document has been electronically signed by Christofer Traore MD on December 22, 2022 16:05 [facility]         Christofer Traore MD  Resident  12/22/22 [id number]

## 2022-12-22 NOTE — ED NOTES
"Nursing report ED to floor  Favio Casillas  67 y.o.  male    HPI:   Chief Complaint   Patient presents with    Chest Pain    Shortness of Breath       Admitting doctor:   Hu Charles MD    Consulting provider(s):  Consults       No orders found from 11/23/2022 to 12/23/2022.             Admitting diagnosis:   The encounter diagnosis was Chest pain, unspecified type.    Code status:   Current Code Status       Date Active Code Status Order ID Comments User Context       Prior            Allergies:   Patient has no known allergies.    Intake and Output  No intake or output data in the 24 hours ending 12/22/22 1607    Weight:       12/22/22  1442   Weight: 65.8 kg (145 lb)       Most recent vitals:   Vitals:    12/22/22 1442 12/22/22 1530   BP: 139/86 120/90   BP Location: Right arm    Patient Position: Sitting    Pulse: 110 102   Resp: 20    Temp: 98.5 °F (36.9 °C)    TempSrc: Oral    SpO2: 96% 97%   Weight: 65.8 kg (145 lb)    Height: 171.5 cm (67.5\")      Oxygen Therapy: RA    Active LDAs/IV Access:   Lines, Drains & Airways       Active LDAs       Name Placement date Placement time Site Days    Peripheral IV 12/22/22 1459 Left Antecubital 12/22/22 1459  Antecubital  less than 1                    Labs (abnormal labs have a star):   Labs Reviewed   COMPREHENSIVE METABOLIC PANEL - Abnormal; Notable for the following components:       Result Value    Glucose 382 (*)     BUN 33 (*)     Creatinine 1.71 (*)     Sodium 134 (*)     Chloride 108 (*)     CO2 15.0 (*)     eGFR 43.9 (*)     All other components within normal limits    Narrative:     GFR Normal >60  Chronic Kidney Disease <60  Kidney Failure <15     TROPONIN (IN-HOUSE) - Normal    Narrative:     Troponin T Reference Range:  <= 0.03 ng/mL-   Negative for AMI  >0.03 ng/mL-     Abnormal for myocardial necrosis.  Clinicians would have to utilize clinical acumen, EKG, Troponin and serial changes to determine if it is an Acute Myocardial Infarction or " myocardial injury due to an underlying chronic condition.       Results may be falsely decreased if patient taking Biotin.     BNP (IN-HOUSE) - Normal    Narrative:     Among patients with dyspnea, NT-proBNP is highly sensitive for the detection of acute congestive heart failure. In addition NT-proBNP of <300 pg/ml effectively rules out acute congestive heart failure with 99% negative predictive value.    Results may be falsely decreased if patient taking Biotin.     CBC WITH AUTO DIFFERENTIAL - Normal   RAINBOW DRAW    Narrative:     The following orders were created for panel order [place] Draw.  Procedure                               Abnormality         Status                     ---------                               -----------         ------                     Green Top (Gel)[[id number]]                                  Final result               Lavender Top[[id number]]                                     Final result               Gold Top - SST[[id number]]                                   Final result               Light Blue Top[[id number]]                                   Final result                 Please view results for these tests on the individual orders.   TROPONIN (IN-HOUSE)   POCT GLUCOSE FINGERSTICK   POCT GLUCOSE FINGERSTICK   POCT GLUCOSE FINGERSTICK   CBC AND DIFFERENTIAL    Narrative:     The following orders were created for panel order CBC & Differential.  Procedure                               Abnormality         Status                     ---------                               -----------         ------                     CBC Auto Differential[[id number]]        Normal              Final result                 Please view results for these tests on the individual orders.   GREEN TOP   LAVENDER TOP   GOLD TOP - SST   LIGHT BLUE TOP       Meds given in ED:   Medications   sodium chloride 0.9 % flush 10 mL (has no administration in time range)   aspirin EC tablet 81 mg (has no  administration in time range)   budesonide-formoterol (SYMBICORT) 80-4.5 MCG/ACT inhaler 2 puff (has no administration in time range)   carvedilol (COREG) tablet 3.125 mg (has no administration in time range)   clopidogrel (PLAVIX) tablet 75 mg (has no administration in time range)   cyclobenzaprine (FLEXERIL) tablet 10 mg (has no administration in time range)   fluticasone (FLONASE) 50 MCG/ACT nasal spray 1 spray (has no administration in time range)   hydrOXYzine (ATARAX) tablet 25 mg (has no administration in time range)   insulin detemir (LEVEMIR) injection 30 Units (has no administration in time range)   nitroglycerin (NITROSTAT) SL tablet 0.4 mg (has no administration in time range)   nortriptyline (PAMELOR) capsule 10 mg (has no administration in time range)   pantoprazole (PROTONIX) EC tablet 40 mg (has no administration in time range)   pravastatin (PRAVACHOL) tablet 40 mg (has no administration in time range)   sertraline (ZOLOFT) tablet 50 mg (has no administration in time range)   ipratropium-albuterol (DUO-NEB) nebulizer solution 3 mL (has no administration in time range)   dextrose (GLUTOSE) oral gel 15 g (has no administration in time range)   dextrose (D50W) (25 g/50 mL) IV injection 25 g (has no administration in time range)   glucagon (human recombinant) (GLUCAGEN DIAGNOSTIC) injection 1 mg (has no administration in time range)   Insulin Aspart (novoLOG) injection 0-7 Units (has no administration in time range)   aspirin tablet 325 mg (325 mg Oral Given 12/22/22 1517)           NIH Stroke Scale:       Isolation/Infection(s):  No active isolations   COVID Screen (preop/placement)     COVID Testing  Collected N/A  Resulted N/A    Nursing report ED to floor:  Mental status: A&Ox4  Ambulatory status: Selfer  Precautions: None    ED nurse phone [phone]

## 2022-12-22 NOTE — H&P
[facility] Medicine Services  INPATIENT HISTORY AND PHYSICAL       Patient Care Team:  Shayne Merritt MD as PCP - General (Family Medicine)    Date of Admission: 12/22/2022    Primary Care Physician: Shayne Merritt MD    Chief complaint   Chief Complaint   Patient presents with   • Chest Pain   • Shortness of Breath       Subjective     Patient is a 67 y.o. male with history of coronary artery disease status post PCI, diabetes mellitus, COPD, nicotine dependence, chronic kidney disease, GERD, diabetes mellitus presents with onset of nonradiating left-sided chest pain which started early this morning.  Pain is associated with shortness of air and exacerbated by cough.    He denies fever, chills, nausea, vomiting, hematemesis, melena, hematochezia, palpitation, diaphoresis, syncope or presyncope.    On triage, he was hemodynamically stable and maintaining O2 saturation of 96 to 97% on room air.  Initial troponin was negative, chest x-ray did not show any acute changes and EKG showed sinus tachycardia with nonspecific ST-T wave changes and PVCs.  Blood work showed normal proBNP, sodium 134, glucose 382, creatinine 1.71 and CBC was unremarkable.    Review of Systems   Constitutional: Positive for activity change and fatigue. Negative for appetite change, chills, diaphoresis and fever.   HENT: Negative for trouble swallowing and voice change.    Eyes: Negative for photophobia and visual disturbance.   Respiratory: Positive for cough and shortness of breath. Negative for choking, chest tightness, wheezing and stridor.    Cardiovascular: Positive for chest pain. Negative for palpitations and leg swelling.   Gastrointestinal: Negative for abdominal distention, abdominal pain, blood in stool, constipation, diarrhea, nausea and vomiting.   Endocrine: Negative for cold intolerance, heat intolerance, polydipsia, polyphagia and polyuria.   Genitourinary: Negative  for decreased urine volume, difficulty urinating, dysuria, enuresis, flank pain, frequency, hematuria and urgency.   Musculoskeletal: Negative for arthralgias, gait problem, myalgias, neck pain and neck stiffness.   Skin: Negative for pallor, rash and wound.   Neurological: Negative for dizziness, tremors, seizures, syncope, facial asymmetry, speech difficulty, weakness, light-headedness, numbness and headaches.   Hematological: Does not bruise/bleed easily.   Psychiatric/Behavioral: Negative for agitation, behavioral problems and confusion.         History  Past Medical History:   Diagnosis Date   • Adenomatous polyp of colon    • Bladder cancer (HCC)    • Bladder outlet obstruction    • CAD (coronary artery disease)    • Chronic back pain    • Chronic gastritis    • Chronic hepatitis C (HCC)    • Chronic obstructive lung disease (HCC)    • Coronary arteriosclerosis    • Diabetes mellitus (HCC)    • Diverticular disease of colon    • Drug abuse (HCC)    • GERD (gastroesophageal reflux disease)    • Hypertension    • Ingrown toenail    • Prostate cancer (HCC)    • Rheumatoid arthritis (HCC)    • Seizure (HCC)    • Stroke (HCC)     heat stroke   • Transient cerebral ischemia      Past Surgical History:   Procedure Laterality Date   • BLADDER TUMOR EXCISION     • CARDIAC CATHETERIZATION N/A 12/15/2017   • CHOLECYSTECTOMY     • COLONOSCOPY N/A 4/22/2019   • COLONOSCOPY N/A 10/19/2021    Procedure: COLONOSCOPY;  Surgeon: Alfonso Torres MD;  Location: [facility] ENDOSCOPY;  Service: Gastroenterology;  Laterality: N/A;   • CORONARY ANGIOPLASTY WITH STENT PLACEMENT     • ENDOSCOPY N/A 3/3/2017   • ENDOSCOPY N/A 4/22/2019   • ENDOSCOPY N/A 10/19/2021    Procedure: ESOPHAGOGASTRODUODENOSCOPY;  Surgeon: Alfonso Torres MD;  Location: [facility] ENDOSCOPY;  Service: Gastroenterology;  Laterality: N/A;   • KIDNEY SURGERY     • LUMBAR DISC SURGERY     • TRANSURETHRAL RESECTION OF BLADDER TUMOR N/A 7/21/2021    Procedure: CYSTOSCOPY  TRANSURETHRAL RESECTION OF BLADDER TUMOR;  Surgeon: Christofer Calderon MD;  Location: [facility];  Service: Urology;  Laterality: N/A;   • UPPER GASTROINTESTINAL ENDOSCOPY  04/22/2019   • UPPER GASTROINTESTINAL ENDOSCOPY  10/19/2021   • WRIST SURGERY Left      Family History   Problem Relation Age of Onset   • Diabetes Mother    • Liver cancer Father      Social History     Tobacco Use   • Smoking status: Every Day     Packs/day: 1.00     Years: 53.00     Pack years: 53.00     Types: Cigarettes   • Smokeless tobacco: Former     Types: Chew     Quit date: 1980   Vaping Use   • Vaping Use: Some days   • Substances: Nicotine, Flavoring   • Devices: Disposable   Substance Use Topics   • Alcohol use: Never   • Drug use: Yes     Frequency: 2.0 times per week     Types: Marijuana     Comment: DAILY     (Not in a hospital admission)    Allergies:  Patient has no known allergies.  Prior to Admission medications    Medication Sig Start Date End Date Taking? Authorizing Provider   albuterol (PROVENTIL) (2.5 MG/3ML) 0.083% nebulizer solution Take 2.5 mg by nebulization Every 4 (Four) Hours As Needed for Wheezing or Shortness of Air. 4/17/17   Harvinder Robert MD   albuterol sulfate HFA (Ventolin HFA) 108 (90 Base) MCG/ACT inhaler Inhale 2 puffs Every 6 (Six) Hours As Needed for Wheezing. 1/17/21   Hu Charles MD   Alcohol Swabs (ALCOHOL PREP) pads 1 pad 5 (Five) Times a Day. 3/29/19   Radha Gómez MD   ASPIRIN 81 PO Take 1 tablet by mouth Daily.    ProviderJorge MD   B-D ULTRAFINE III SHORT PEN 31G X 8 MM misc See Admin Instructions. 4/30/21   Jorge Cook MD   Blood Glucose Monitoring Suppl (ACURA BLOOD GLUCOSE METER) w/Device kit 1 each 4 (Four) Times a Day As Needed (SUGARR). 8/23/18   Harvinder Robert MD   budesonide-formoterol (SYMBICORT) 80-4.5 MCG/ACT inhaler Inhale 2 puffs 2 (Two) Times a Day.    Jorge Cook MD   carvedilol (COREG) 3.125 MG tablet Take 3.125 mg by mouth 2 (Two)  "Times a Day With Meals. 10/15/19   Jorge Cook MD   clopidogrel (PLAVIX) 75 MG tablet Take 75 mg by mouth Daily.    Jorge Cook MD   Continuous Blood Gluc  (Dexcom G6 ) device 1 each Continuous. 12/17/21   Shoaib Constantino APRN   Continuous Blood Gluc Sensor (Dexcom G6 Sensor) Every 10 (Ten) Days. 9/2/22   Shoaib Constantino APRN   Continuous Blood Gluc Transmit (Dexcom G6 Transmitter) misc 1 each Every 3 (Three) Months. 9/2/22   Shoaib Constantino APRN   cyclobenzaprine (FLEXERIL) 10 MG tablet Take 10 mg by mouth 3 (Three) Times a Day As Needed. 7/10/19   Jorge Cook MD   doxycycline (MONODOX) 100 MG capsule Take 100 mg by mouth Daily.    Jorge Cook MD   fluticasone (FLONASE) 50 MCG/ACT nasal spray 1 spray by Each Nare route Daily. Shake before using. 11/23/21   Jorge Cook MD   furosemide (LASIX) 20 MG tablet Take 20 mg by mouth Daily.    Jorge Cook MD   glucose blood test strip Use as instructed 12/17/21   Shoaib Constantino APRN   glucose monitor monitoring kit 1 each 3 (Three) Times a Day. 12/17/21   Shoaib Constantino APRN   hydrOXYzine (ATARAX) 25 MG tablet Take 25 mg by mouth Every 4 (Four) Hours As Needed. 9/27/19   Jorge Cook MD   insulin aspart (novoLOG FLEXPEN) 100 UNIT/ML solution pen-injector sc pen u. Dispense whatever insurance covers. 12/17/21   Shoaib Constantino APRN   insulin detemir (LEVEMIR) 100 UNIT/ML injection Inject 25 Units under the skin into the appropriate area as directed 2 (Two) Times a Day. 12/17/21   Shoaib Constantino APRN   Insulin Pen Needle (Pen Needles) 32G X 4 MM misc 1 each 4 (Four) Times a Day. Use 4 x daily, Dx code E11.65 12/17/21   Shoaib Constantino APRN   Insulin Syringe 29G X 1/2\" 0.5 ML misc Inject 4 times daily 12/17/21   Shoaib Constantino APRN   ipratropium-albuterol (DUO-NEB) 0.5-2.5 mg/3 ml nebulizer Take 3 mL by nebulization " Every 4 (Four) Hours As Needed for Wheezing. 9/19/22   Timmy Colin, DO   Lancets (freestyle) lancets Tid 12/17/21   Shoaib Constantino APRN   nitroglycerin (NITROSTAT) 0.4 MG SL tablet Place 0.4 mg under the tongue Every 5 (Five) Minutes As Needed for Chest Pain. Take no more than 3 doses in 15 minutes.    Jorge Cook MD   nortriptyline (PAMELOR) 10 MG capsule Take 10 mg by mouth Every Night.    Jorge Cook MD   pantoprazole (PROTONIX) 40 MG EC tablet Take 40 mg by mouth Daily.    Jorge Cook MD   pravastatin (PRAVACHOL) 40 MG tablet Take 40 mg by mouth Daily.    Jorge Cook MD   sertraline (ZOLOFT) 50 MG tablet Take 50 mg by mouth Daily. 4/26/21   Jorge Cook MD       Objective        Vital Signs  Temp:  [98.5 °F (36.9 °C)] 98.5 °F (36.9 °C)  Heart Rate:  [102-110] 102  Resp:  [20] 20  BP: (120-139)/(86-90) 120/90      Physical Exam  Vitals and nursing note reviewed.   Constitutional:       General: He is not in acute distress.     Appearance: He is well-developed. He is ill-appearing. He is not diaphoretic.   HENT:      Head: Normocephalic and atraumatic.   Eyes:      General: No scleral icterus.        Right eye: No discharge.         Left eye: No discharge.      Extraocular Movements: Extraocular movements intact.      Pupils: Pupils are equal, round, and reactive to light.   Neck:      Thyroid: No thyromegaly.      Vascular: No carotid bruit or JVD.   Cardiovascular:      Rate and Rhythm: Normal rate and regular rhythm.      Heart sounds: Normal heart sounds. No murmur heard.    No friction rub. No gallop.   Pulmonary:      Effort: Pulmonary effort is normal. No respiratory distress.      Breath sounds: No stridor. Wheezing and rhonchi present. No rales.   Chest:      Chest wall: No tenderness.      Comments: He has reproducible pain on the left anterior chest wall.  Abdominal:      General: Bowel sounds are normal. There is no distension.       Palpations: Abdomen is soft. There is no mass.      Tenderness: There is no abdominal tenderness. There is no right CVA tenderness, left CVA tenderness, guarding or rebound.      Hernia: No hernia is present.   Musculoskeletal:         General: No swelling, tenderness or deformity.      Cervical back: Normal range of motion and neck supple.      Right lower leg: No edema.      Left lower leg: No edema.   Skin:     General: Skin is warm and dry.      Coloration: Skin is not jaundiced or pale.      Findings: No bruising, erythema, lesion or rash.   Neurological:      General: No focal deficit present.      Mental Status: He is alert and oriented to person, place, and time.      Cranial Nerves: No cranial nerve deficit.      Sensory: No sensory deficit.      Motor: No weakness or abnormal muscle tone.      Coordination: Coordination normal.      Gait: Gait normal.      Deep Tendon Reflexes: Reflexes normal.   Psychiatric:         Mood and Affect: Mood normal.         Behavior: Behavior normal.         Thought Content: Thought content normal.         Judgment: Judgment normal.           Results Review:     Results from last 7 days   Lab Units 12/22/22  1500   SODIUM mmol/L 134*   POTASSIUM mmol/L 3.7   CHLORIDE mmol/L 108*   CO2 mmol/L 15.0*   BUN mg/dL 33*   CREATININE mg/dL 1.71*   GLUCOSE mg/dL 382*   CALCIUM mg/dL 8.6   BILIRUBIN mg/dL 0.4   ALK PHOS U/L 100   ALT (SGPT) U/L 8   AST (SGOT) U/L 8             Results from last 7 days   Lab Units 12/22/22  1500   WBC 10*3/mm3 6.85   HEMOGLOBIN g/dL 15.1   HEMATOCRIT % 47.0   PLATELETS 10*3/mm3 172           Imaging Results (Last 7 Days)     Procedure Component Value Units Date/Time    XR Chest 1 View [[id number]] Collected: 12/22/22 1500     Updated: 12/22/22 1543    Narrative:      EXAM DESCRIPTION:  XR CHEST 1 VIEW    CLINICAL INDICATION: Chest Pain triage protocol    COMPARISON: Chest x-ray dated 9/19/2022    FINDINGS: No focal airspace consolidation. No pleural  effusion or  pneumothorax. Normal heart size and pulmonary vascularity.      Impression:      No acute cardiopulmonary process.    Electronically signed by:  John Peng MD  12/22/2022 3:41 PM  CST Workstation: [id number]          Assessment / Plan       Hospital Problem List:    Chest pain, unspecified type    Chest pain (likely atypical): Patient has history of coronary artery disease and risk factors for acute coronary syndrome.  He will be admitted to rule out MI and started on guideline directed medical therapy.  Will trend troponin, ECG, check echocardiogram and consult cardiologist.  He had invasive coronary angiogram on 12/15/2017 with PCI to the LAD.    Acute exacerbation of COPD: Continue bronchodilators, begin steroids and invasive respiratory therapy.    Chronic kidney disease stage II/III: Patient's baseline creatinine is between 1.5-2.0.  Creatinine is at baseline.  Continue to monitor and consult nephrologist if the need arises.    Diabetes mellitus: Patient currently has poor glycemic control.  Continue basal insulin with Accu-Cheks and sliding scale insulin.  Make adjustments as needed to achieve optimal glycemic control.  Pseudohyponatremia is reactive and will be monitored.    Nicotine dependence: Begin nicotine patch.  Nicotine cessation counseling has been discussed with the patient.    Continue PPI for GERD and begin DVT prophylaxis.    Additional orders and treatment plan as hospital course dictates.      I confirmed that the patient's Advance Care Plan is present, code status is documented, or surrogate decision maker is listed in the patient's medical record.     I have utilized all available immediate resources to obtain, update, or review the patient's current medications    I discussed the patient's findings and my recommendations with patient.     Ciro Chauhan MD  12/22/22  16:08 CST      Dictated Utilizing Dragon Dictation

## 2022-12-23 ENCOUNTER — APPOINTMENT (OUTPATIENT)
Dept: CARDIOLOGY | Facility: HOSPITAL | Age: 67
End: 2022-12-23

## 2022-12-23 LAB
ANION GAP SERPL CALCULATED.3IONS-SCNC: 11 MMOL/L (ref 5–15)
BASOPHILS # BLD AUTO: 0 10*3/MM3 (ref 0–0.2)
BASOPHILS NFR BLD AUTO: 0 % (ref 0–1.5)
BH CV ECHO MEAS - ACS: 2.12 CM
BH CV ECHO MEAS - AO MAX PG: 6.3 MMHG
BH CV ECHO MEAS - AO MEAN PG: 3.7 MMHG
BH CV ECHO MEAS - AO ROOT DIAM: 3.7 CM
BH CV ECHO MEAS - AO V2 MAX: 125.2 CM/SEC
BH CV ECHO MEAS - AO V2 VTI: 19.9 CM
BH CV ECHO MEAS - AVA(I,D): 2.22 CM2
BH CV ECHO MEAS - EDV(CUBED): 98.1 ML
BH CV ECHO MEAS - EDV(MOD-SP2): 79.4 ML
BH CV ECHO MEAS - EDV(MOD-SP4): 74.3 ML
BH CV ECHO MEAS - EF(MOD-BP): 42 %
BH CV ECHO MEAS - EF(MOD-SP2): 38.7 %
BH CV ECHO MEAS - EF(MOD-SP4): 47.8 %
BH CV ECHO MEAS - ESV(CUBED): 40.7 ML
BH CV ECHO MEAS - ESV(MOD-SP2): 48.7 ML
BH CV ECHO MEAS - ESV(MOD-SP4): 38.8 ML
BH CV ECHO MEAS - FS: 25.4 %
BH CV ECHO MEAS - IVS/LVPW: 1.24 CM
BH CV ECHO MEAS - IVSD: 1.21 CM
BH CV ECHO MEAS - LA DIMENSION: 3.7 CM
BH CV ECHO MEAS - LAT PEAK E' VEL: 9.4 CM/SEC
BH CV ECHO MEAS - LV DIASTOLIC VOL/BSA (35-75): 43.3 CM2
BH CV ECHO MEAS - LV MASS(C)D: 180.1 GRAMS
BH CV ECHO MEAS - LV MAX PG: 3.8 MMHG
BH CV ECHO MEAS - LV MEAN PG: 1.9 MMHG
BH CV ECHO MEAS - LV SYSTOLIC VOL/BSA (12-30): 22.6 CM2
BH CV ECHO MEAS - LV V1 MAX: 97 CM/SEC
BH CV ECHO MEAS - LV V1 VTI: 15.6 CM
BH CV ECHO MEAS - LVIDD: 4.6 CM
BH CV ECHO MEAS - LVIDS: 3.4 CM
BH CV ECHO MEAS - LVOT AREA: 2.8 CM2
BH CV ECHO MEAS - LVOT DIAM: 1.9 CM
BH CV ECHO MEAS - LVPWD: 0.98 CM
BH CV ECHO MEAS - MED PEAK E' VEL: 7.5 CM/SEC
BH CV ECHO MEAS - MR MAX PG: 40.1 MMHG
BH CV ECHO MEAS - MR MAX VEL: 316.7 CM/SEC
BH CV ECHO MEAS - MV DEC SLOPE: 661.9 CM/SEC2
BH CV ECHO MEAS - MV E MAX VEL: 108 CM/SEC
BH CV ECHO MEAS - MV MAX PG: 6.2 MMHG
BH CV ECHO MEAS - MV MEAN PG: 2.5 MMHG
BH CV ECHO MEAS - MV P1/2T: 56.2 MSEC
BH CV ECHO MEAS - MV V2 VTI: 20.9 CM
BH CV ECHO MEAS - MVA(P1/2T): 3.9 CM2
BH CV ECHO MEAS - MVA(VTI): 2.11 CM2
BH CV ECHO MEAS - PA V2 MAX: 122.2 CM/SEC
BH CV ECHO MEAS - RAP SYSTOLE: 10 MMHG
BH CV ECHO MEAS - RVDD: 2.7 CM
BH CV ECHO MEAS - RVSP: 48.2 MMHG
BH CV ECHO MEAS - SI(MOD-SP2): 17.9 ML/M2
BH CV ECHO MEAS - SI(MOD-SP4): 20.7 ML/M2
BH CV ECHO MEAS - SV(LVOT): 44.1 ML
BH CV ECHO MEAS - SV(MOD-SP2): 30.7 ML
BH CV ECHO MEAS - SV(MOD-SP4): 35.5 ML
BH CV ECHO MEAS - TR MAX PG: 38.2 MMHG
BH CV ECHO MEAS - TR MAX VEL: 309.2 CM/SEC
BH CV ECHO MEASUREMENTS AVERAGE E/E' RATIO: 12.78
BUN SERPL-MCNC: 39 MG/DL (ref 8–23)
BUN/CREAT SERPL: 20 (ref 7–25)
CALCIUM SPEC-SCNC: 8.9 MG/DL (ref 8.6–10.5)
CHLORIDE SERPL-SCNC: 110 MMOL/L (ref 98–107)
CO2 SERPL-SCNC: 15 MMOL/L (ref 22–29)
CREAT SERPL-MCNC: 1.95 MG/DL (ref 0.76–1.27)
DEPRECATED RDW RBC AUTO: 44.2 FL (ref 37–54)
EGFRCR SERPLBLD CKD-EPI 2021: 37.5 ML/MIN/1.73
EOSINOPHIL # BLD AUTO: 0 10*3/MM3 (ref 0–0.4)
EOSINOPHIL NFR BLD AUTO: 0 % (ref 0.3–6.2)
ERYTHROCYTE [DISTWIDTH] IN BLOOD BY AUTOMATED COUNT: 13.8 % (ref 12.3–15.4)
GLUCOSE BLDC GLUCOMTR-MCNC: 351 MG/DL (ref 70–130)
GLUCOSE BLDC GLUCOMTR-MCNC: 386 MG/DL (ref 70–130)
GLUCOSE SERPL-MCNC: 483 MG/DL (ref 65–99)
HCT VFR BLD AUTO: 48.1 % (ref 37.5–51)
HGB BLD-MCNC: 15.5 G/DL (ref 13–17.7)
IMM GRANULOCYTES # BLD AUTO: 0.01 10*3/MM3 (ref 0–0.05)
IMM GRANULOCYTES NFR BLD AUTO: 0.2 % (ref 0–0.5)
LEFT ATRIUM VOLUME INDEX: 15.1 ML/M2
LYMPHOCYTES # BLD AUTO: 0.56 10*3/MM3 (ref 0.7–3.1)
LYMPHOCYTES NFR BLD AUTO: 10.3 % (ref 19.6–45.3)
MAXIMAL PREDICTED HEART RATE: 155 BPM
MCH RBC QN AUTO: 28.1 PG (ref 26.6–33)
MCHC RBC AUTO-ENTMCNC: 32.2 G/DL (ref 31.5–35.7)
MCV RBC AUTO: 87.1 FL (ref 79–97)
MONOCYTES # BLD AUTO: 0.13 10*3/MM3 (ref 0.1–0.9)
MONOCYTES NFR BLD AUTO: 2.4 % (ref 5–12)
NEUTROPHILS NFR BLD AUTO: 4.73 10*3/MM3 (ref 1.7–7)
NEUTROPHILS NFR BLD AUTO: 87.1 % (ref 42.7–76)
NRBC BLD AUTO-RTO: 0 /100 WBC (ref 0–0.2)
PLATELET # BLD AUTO: 160 10*3/MM3 (ref 140–450)
PMV BLD AUTO: 10.7 FL (ref 6–12)
POTASSIUM SERPL-SCNC: 4.5 MMOL/L (ref 3.5–5.2)
RBC # BLD AUTO: 5.52 10*6/MM3 (ref 4.14–5.8)
SODIUM SERPL-SCNC: 136 MMOL/L (ref 136–145)
STRESS TARGET HR: 132 BPM
WBC NRBC COR # BLD: 5.43 10*3/MM3 (ref 3.4–10.8)

## 2022-12-23 PROCEDURE — 96376 TX/PRO/DX INJ SAME DRUG ADON: CPT

## 2022-12-23 PROCEDURE — G0378 HOSPITAL OBSERVATION PER HR: HCPCS

## 2022-12-23 PROCEDURE — 85025 COMPLETE CBC W/AUTO DIFF WBC: CPT | Performed by: INTERNAL MEDICINE

## 2022-12-23 PROCEDURE — 96361 HYDRATE IV INFUSION ADD-ON: CPT

## 2022-12-23 PROCEDURE — 82962 GLUCOSE BLOOD TEST: CPT

## 2022-12-23 PROCEDURE — 63710000001 INSULIN DETEMIR PER 5 UNITS: Performed by: INTERNAL MEDICINE

## 2022-12-23 PROCEDURE — 97165 OT EVAL LOW COMPLEX 30 MIN: CPT

## 2022-12-23 PROCEDURE — 63710000001 INSULIN ASPART PER 5 UNITS: Performed by: INTERNAL MEDICINE

## 2022-12-23 PROCEDURE — 94760 N-INVAS EAR/PLS OXIMETRY 1: CPT

## 2022-12-23 PROCEDURE — 94799 UNLISTED PULMONARY SVC/PX: CPT

## 2022-12-23 PROCEDURE — 25010000002 HEPARIN (PORCINE) PER 1000 UNITS: Performed by: INTERNAL MEDICINE

## 2022-12-23 PROCEDURE — 80048 BASIC METABOLIC PNL TOTAL CA: CPT | Performed by: INTERNAL MEDICINE

## 2022-12-23 PROCEDURE — 96372 THER/PROPH/DIAG INJ SC/IM: CPT

## 2022-12-23 PROCEDURE — 25010000002 METHYLPREDNISOLONE PER 125 MG: Performed by: INTERNAL MEDICINE

## 2022-12-23 PROCEDURE — 93306 TTE W/DOPPLER COMPLETE: CPT

## 2022-12-23 RX ADMIN — SERTRALINE 50 MG: 50 TABLET, FILM COATED ORAL at 09:20

## 2022-12-23 RX ADMIN — CLOPIDOGREL BISULFATE 75 MG: 75 TABLET ORAL at 09:20

## 2022-12-23 RX ADMIN — BUDESONIDE AND FORMOTEROL FUMARATE DIHYDRATE 2 PUFF: 80; 4.5 AEROSOL RESPIRATORY (INHALATION) at 20:27

## 2022-12-23 RX ADMIN — INSULIN ASPART 7 UNITS: 100 INJECTION, SOLUTION INTRAVENOUS; SUBCUTANEOUS at 06:32

## 2022-12-23 RX ADMIN — INSULIN DETEMIR 30 UNITS: 100 INJECTION, SOLUTION SUBCUTANEOUS at 20:46

## 2022-12-23 RX ADMIN — PRAVASTATIN SODIUM 40 MG: 40 TABLET ORAL at 09:19

## 2022-12-23 RX ADMIN — IPRATROPIUM BROMIDE AND ALBUTEROL SULFATE 3 ML: 2.5; .5 SOLUTION RESPIRATORY (INHALATION) at 07:25

## 2022-12-23 RX ADMIN — METHYLPREDNISOLONE SODIUM SUCCINATE 60 MG: 125 INJECTION, POWDER, FOR SOLUTION INTRAMUSCULAR; INTRAVENOUS at 15:46

## 2022-12-23 RX ADMIN — HEPARIN SODIUM 5000 UNITS: 5000 INJECTION INTRAVENOUS; SUBCUTANEOUS at 20:45

## 2022-12-23 RX ADMIN — INSULIN DETEMIR 30 UNITS: 100 INJECTION, SOLUTION SUBCUTANEOUS at 09:20

## 2022-12-23 RX ADMIN — ASPIRIN 81 MG: 81 TABLET, FILM COATED ORAL at 09:19

## 2022-12-23 RX ADMIN — BUDESONIDE AND FORMOTEROL FUMARATE DIHYDRATE 2 PUFF: 80; 4.5 AEROSOL RESPIRATORY (INHALATION) at 07:25

## 2022-12-23 RX ADMIN — IPRATROPIUM BROMIDE AND ALBUTEROL SULFATE 3 ML: 2.5; .5 SOLUTION RESPIRATORY (INHALATION) at 10:24

## 2022-12-23 RX ADMIN — FLUTICASONE PROPIONATE 1 SPRAY: 50 SPRAY, METERED NASAL at 10:01

## 2022-12-23 RX ADMIN — SODIUM BICARBONATE 650 MG: 650 TABLET ORAL at 15:46

## 2022-12-23 RX ADMIN — NORTRIPTYLINE HYDROCHLORIDE 10 MG: 10 CAPSULE ORAL at 20:44

## 2022-12-23 RX ADMIN — IPRATROPIUM BROMIDE AND ALBUTEROL SULFATE 3 ML: 2.5; .5 SOLUTION RESPIRATORY (INHALATION) at 14:12

## 2022-12-23 RX ADMIN — SODIUM BICARBONATE 650 MG: 650 TABLET ORAL at 09:20

## 2022-12-23 RX ADMIN — CARVEDILOL 3.12 MG: 3.12 TABLET, FILM COATED ORAL at 17:47

## 2022-12-23 RX ADMIN — NICOTINE 1 PATCH: 21 PATCH, EXTENDED RELEASE TRANSDERMAL at 09:21

## 2022-12-23 RX ADMIN — SODIUM BICARBONATE 650 MG: 650 TABLET ORAL at 20:44

## 2022-12-23 RX ADMIN — METHYLPREDNISOLONE SODIUM SUCCINATE 60 MG: 125 INJECTION, POWDER, FOR SOLUTION INTRAMUSCULAR; INTRAVENOUS at 02:15

## 2022-12-23 RX ADMIN — SODIUM CHLORIDE 100 ML/HR: 9 INJECTION, SOLUTION INTRAVENOUS at 04:50

## 2022-12-23 RX ADMIN — INSULIN ASPART 7 UNITS: 100 INJECTION, SOLUTION INTRAVENOUS; SUBCUTANEOUS at 17:47

## 2022-12-23 RX ADMIN — PANTOPRAZOLE SODIUM 40 MG: 40 TABLET, DELAYED RELEASE ORAL at 09:20

## 2022-12-23 RX ADMIN — CARVEDILOL 3.12 MG: 3.12 TABLET, FILM COATED ORAL at 09:20

## 2022-12-23 RX ADMIN — HEPARIN SODIUM 5000 UNITS: 5000 INJECTION INTRAVENOUS; SUBCUTANEOUS at 09:19

## 2022-12-23 RX ADMIN — METHYLPREDNISOLONE SODIUM SUCCINATE 60 MG: 125 INJECTION, POWDER, FOR SOLUTION INTRAMUSCULAR; INTRAVENOUS at 09:19

## 2022-12-23 RX ADMIN — INSULIN ASPART 6 UNITS: 100 INJECTION, SOLUTION INTRAVENOUS; SUBCUTANEOUS at 11:52

## 2022-12-23 RX ADMIN — IPRATROPIUM BROMIDE AND ALBUTEROL SULFATE 3 ML: 2.5; .5 SOLUTION RESPIRATORY (INHALATION) at 20:27

## 2022-12-23 NOTE — PROGRESS NOTES
[facility] Medicine Services  INPATIENT PROGRESS NOTE    Length of Stay: 0  Date of Admission: 12/22/2022  Primary Care Physician: Shayne Merritt MD    Subjective   Chief Complaint: No new complaints.    HPI: Patient is seen for follow-up today 12/23/2022.  He is doing better, remains chest pain-free, less short of air, less deconditioned and voices no new complaints.  He is maintaining O2 saturation in the mid 90s on room air.    Review of Systems   Constitutional: Positive for activity change and fatigue. Negative for appetite change, chills, diaphoresis and fever.   HENT: Negative for trouble swallowing and voice change.    Eyes: Negative for photophobia and visual disturbance.   Respiratory: Positive for shortness of breath. Negative for cough, choking, chest tightness, wheezing and stridor.    Cardiovascular: Negative for chest pain, palpitations and leg swelling.   Gastrointestinal: Negative for abdominal distention, abdominal pain, blood in stool, constipation, diarrhea, nausea and vomiting.   Endocrine: Negative for cold intolerance, heat intolerance, polydipsia, polyphagia and polyuria.   Genitourinary: Negative for decreased urine volume, difficulty urinating, dysuria, enuresis, flank pain, frequency, hematuria and urgency.   Musculoskeletal: Negative for arthralgias, gait problem, myalgias, neck pain and neck stiffness.   Skin: Negative for pallor, rash and wound.   Neurological: Negative for dizziness, tremors, seizures, syncope, facial asymmetry, speech difficulty, weakness, light-headedness, numbness and headaches.   Hematological: Does not bruise/bleed easily.   Psychiatric/Behavioral: Negative for agitation, behavioral problems and confusion.       Objective    Temp:  [96.8 °F (36 °C)-98.6 °F (37 °C)] 98.6 °F (37 °C)  Heart Rate:  [] 100  Resp:  [16-20] 18  BP: (120-160)/(85-95) 152/91         Physical Exam  Vitals and nursing note reviewed.    Constitutional:       General: He is not in acute distress.     Appearance: He is well-developed. He is ill-appearing. He is not diaphoretic.   HENT:      Head: Normocephalic and atraumatic.   Eyes:      General: No scleral icterus.        Right eye: No discharge.         Left eye: No discharge.      Extraocular Movements: Extraocular movements intact.      Pupils: Pupils are equal, round, and reactive to light.   Neck:      Thyroid: No thyromegaly.      Vascular: No carotid bruit or JVD.   Cardiovascular:      Rate and Rhythm: Normal rate and regular rhythm.      Heart sounds: Normal heart sounds. No murmur heard.    No friction rub. No gallop.   Pulmonary:      Effort: Pulmonary effort is normal. No respiratory distress.      Breath sounds: No stridor. Wheezing and rhonchi present. No rales.   Chest:      Chest wall: No tenderness.   Abdominal:      General: Bowel sounds are normal. There is no distension.      Palpations: Abdomen is soft. There is no mass.      Tenderness: There is no abdominal tenderness. There is no right CVA tenderness, left CVA tenderness, guarding or rebound.      Hernia: No hernia is present.   Musculoskeletal:         General: No swelling, tenderness or deformity.      Cervical back: Normal range of motion and neck supple.      Right lower leg: No edema.      Left lower leg: No edema.   Skin:     General: Skin is warm and dry.      Coloration: Skin is not jaundiced or pale.      Findings: No bruising, erythema, lesion or rash.   Neurological:      General: No focal deficit present.      Mental Status: He is alert and oriented to person, place, and time.      Cranial Nerves: No cranial nerve deficit.      Sensory: No sensory deficit.      Motor: No weakness or abnormal muscle tone.      Coordination: Coordination normal.      Gait: Gait normal.      Deep Tendon Reflexes: Reflexes normal.   Psychiatric:         Mood and Affect: Mood normal.         Behavior: Behavior normal.          Thought Content: Thought content normal.         Judgment: Judgment normal.           Medication Review:    Current Facility-Administered Medications:   •  acetaminophen (TYLENOL) tablet 650 mg, 650 mg, Oral, Q4H PRN **OR** acetaminophen (TYLENOL) 160 MG/5ML solution 650 mg, 650 mg, Oral, Q4H PRN **OR** acetaminophen (TYLENOL) suppository 650 mg, 650 mg, Rectal, Q4H PRN, Ciro Chauhan MD  •  aspirin EC tablet 81 mg, 81 mg, Oral, Daily, Ciro Chauhan MD, 81 mg at 12/23/22 0919  •  budesonide-formoterol (SYMBICORT) 80-4.5 MCG/ACT inhaler 2 puff, 2 puff, Inhalation, BID - RT, Ciro Chauhan MD, 2 puff at 12/23/22 0725  •  carvedilol (COREG) tablet 3.125 mg, 3.125 mg, Oral, BID With Meals, Ciro Chauhan MD, 3.125 mg at 12/23/22 0920  •  clopidogrel (PLAVIX) tablet 75 mg, 75 mg, Oral, Daily, Ciro Chauhan MD, 75 mg at 12/23/22 0920  •  cyclobenzaprine (FLEXERIL) tablet 10 mg, 10 mg, Oral, TID PRN, Ciro Chauhan MD, 10 mg at 12/22/22 2056  •  dextrose (D50W) (25 g/50 mL) IV injection 25 g, 25 g, Intravenous, Q15 Min PRN, Ciro Chauhan MD  •  dextrose (GLUTOSE) oral gel 15 g, 15 g, Oral, Q15 Min PRN, Ciro Chauhan MD  •  fluticasone (FLONASE) 50 MCG/ACT nasal spray 1 spray, 1 spray, Each Nare, Daily, Ciro Chauhan MD  •  glucagon (human recombinant) (GLUCAGEN DIAGNOSTIC) injection 1 mg, 1 mg, Intramuscular, Q15 Min PRN, Ciro Chauhan MD  •  heparin (porcine) 5000 UNIT/ML injection 5,000 Units, 5,000 Units, Subcutaneous, Q12H, Ciro Chauhan MD, 5,000 Units at 12/23/22 0919  •  hydrOXYzine (ATARAX) tablet 25 mg, 25 mg, Oral, Q4H PRN, Ciro Chauhan MD, 25 mg at 12/22/22 2057  •  Insulin Aspart (novoLOG) injection 0-7 Units, 0-7 Units, Subcutaneous, TID AC, Ciro Chauhan MD, 7 Units at 12/23/22 0632  •  insulin detemir (LEVEMIR) injection 30 Units, 30 Units, Subcutaneous, Q12H, Ciro Chauhan MD, 30 Units at 12/23/22 0920  •   ipratropium-albuterol (DUO-NEB) nebulizer solution 3 mL, 3 mL, Nebulization, 4x Daily - RT, Ciro Chauhan MD, 3 mL at 12/23/22 0725  •  methylPREDNISolone sodium succinate (SOLU-Medrol) injection 60 mg, 60 mg, Intravenous, Q8H, Ciro Chauhan MD, 60 mg at 12/23/22 0919  •  nicotine (NICODERM CQ) 21 MG/24HR patch 1 patch, 1 patch, Transdermal, Q24H, Ciro Chauhan MD, 1 patch at 12/23/22 0921  •  nitroglycerin (NITROSTAT) SL tablet 0.4 mg, 0.4 mg, Sublingual, Q5 Min PRN, Ciro Chauhan MD  •  nortriptyline (PAMELOR) capsule 10 mg, 10 mg, Oral, Nightly, Ciro Chauhan MD, 10 mg at 12/22/22 2053  •  ondansetron (ZOFRAN) tablet 4 mg, 4 mg, Oral, Q6H PRN **OR** ondansetron (ZOFRAN) injection 4 mg, 4 mg, Intravenous, Q6H PRN, Ciro Chauhan MD  •  pantoprazole (PROTONIX) EC tablet 40 mg, 40 mg, Oral, Daily, Ciro Chauhan MD, 40 mg at 12/23/22 0920  •  pravastatin (PRAVACHOL) tablet 40 mg, 40 mg, Oral, Daily, Ciro Chauhan MD, 40 mg at 12/23/22 0919  •  sertraline (ZOLOFT) tablet 50 mg, 50 mg, Oral, Daily, Ciro Chauhan MD, 50 mg at 12/23/22 0920  •  sodium bicarbonate tablet 650 mg, 650 mg, Oral, TID, Ciro Chauhan MD, 650 mg at 12/23/22 0920  •  sodium chloride 0.9 % flush 10 mL, 10 mL, Intravenous, PRN, Ciro Chauhan MD  •  sodium chloride 0.9 % flush 10 mL, 10 mL, Intravenous, Q12H, Ciro Chauhan MD, 10 mL at 12/22/22 2053  •  sodium chloride 0.9 % flush 10 mL, 10 mL, Intravenous, PRN, Ciro Chauhan MD  •  sodium chloride 0.9 % infusion 40 mL, 40 mL, Intravenous, PRN, Ciro Chauhan MD  •  sodium chloride 0.9 % infusion, 100 mL/hr, Intravenous, Continuous, Ciro Chauhan MD, Last Rate: 100 mL/hr at 12/23/22 0921, 100 mL/hr at 12/23/22 0921    Results Review:  I have reviewed the labs, radiology results, and diagnostic studies.    Laboratory Data:   Results from last 7 days   Lab Units 12/23/22  0411 12/22/22  1500   SODIUM mmol/L 136  134*   POTASSIUM mmol/L 4.5 3.7   CHLORIDE mmol/L 110* 108*   CO2 mmol/L 15.0* 15.0*   BUN mg/dL 39* 33*   CREATININE mg/dL 1.95* 1.71*   GLUCOSE mg/dL 483* 382*   CALCIUM mg/dL 8.9 8.6   BILIRUBIN mg/dL  --  0.4   ALK PHOS U/L  --  100   ALT (SGPT) U/L  --  8   AST (SGOT) U/L  --  8   ANION GAP mmol/L 11.0 11.0     Estimated Creatinine Clearance: 33.1 mL/min (A) (by C-G formula based on SCr of 1.95 mg/dL (H)).          Results from last 7 days   Lab Units 12/23/22  0411 12/22/22  1500   WBC 10*3/mm3 5.43 6.85   HEMOGLOBIN g/dL 15.5 15.1   HEMATOCRIT % 48.1 47.0   PLATELETS 10*3/mm3 160 172           Culture Data:   No results found for: BLOODCX  No results found for: URINECX  No results found for: RESPCX  No results found for: WOUNDCX  No results found for: STOOLCX  No components found for: BODYFLD    Radiology Data:   Imaging Results (Last 24 Hours)     Procedure Component Value Units Date/Time    XR Chest 1 View [[id number]] Collected: 12/22/22 1500     Updated: 12/22/22 1543    Narrative:      EXAM DESCRIPTION:  XR CHEST 1 VIEW    CLINICAL INDICATION: Chest Pain triage protocol    COMPARISON: Chest x-ray dated 9/19/2022    FINDINGS: No focal airspace consolidation. No pleural effusion or  pneumothorax. Normal heart size and pulmonary vascularity.      Impression:      No acute cardiopulmonary process.    Electronically signed by:  John Peng MD  12/22/2022 3:41 PM  [facility] Workstation: [id number]          I have reviewed the patient's current medications.     Assessment/Plan     Hospital Problem List:  Principal Problem:    Chest pain, unspecified type    Chest pain (likely atypical): Patient has history of coronary artery disease and risk factors for acute coronary syndrome.  He has had negative cardiac markers, been seen by the cardiologist on consult, determined to have atypical chest pain and recommended medical therapy.   Echocardiogram is pending.      Acute exacerbation of COPD: Continue bronchodilators,  steroids and invasive respiratory therapy.     Chronic kidney disease stage II/III: Patient's baseline creatinine is between 1.5-2.0.  Creatinine is at baseline.  Continue to monitor and consult nephrologist if the need arises.     Diabetes mellitus: Patient currently has poor glycemic control.  Continue basal insulin with Accu-Cheks and sliding scale insulin.  Make adjustments as needed to achieve optimal glycemic control.  Hemoglobin A1c is 11.0 consistent with poor outpatient control.  Pseudohyponatremia is reactive and has resolved.       Nicotine dependence: Continue nicotine patch.  Nicotine cessation counseling has been discussed with the patient.    Deconditioning: Consult PT and OT.     Continue PPI for GERD/ DVT prophylaxis with Lovenox.       Discharge Planning: In progress.    I confirmed that the patient's Advance Care Plan is present, code status is documented, or surrogate decision maker is listed in the patient's medical record.     I have utilized all available immediate resources to obtain, update, or review the patient's current medications      Ciro Chauhan MD   12/23/22   09:40 CST

## 2022-12-23 NOTE — PLAN OF CARE
Problem: Adult Inpatient Plan of Care  Goal: Plan of Care Review  Outcome: Ongoing, Progressing  Flowsheets (Taken 12/23/2022 1428)  Plan of Care Reviewed With: patient   Goal Outcome Evaluation:  Plan of Care Reviewed With: patient            CP as well as COPD exacerbation. Noted hx CKD stg 2/3 and diabetes (A1C 11) and h/o bladder & prostate cancer, hepatitis C, and drug use. Regular textures, cardiac ADA diet- intakes 100%x3 meals. Alb 4.0L. 10/2021 wt 159# and 9/2022 145#. Currently 136#/BMI 21.0. Pt states nkfa, some c/s problems and requests chopped meats(excepts sausage). He prefers 2% milk and will take BGC if I send it. He states he eats well  but continues to lose wt. NFPE shows mostly moderate fat loss and muscle wasting. Pt meets criteria for moderate, chronic malnutrition likely r/t COPD and possibly influenced by elevated A1C. Will change to soft/ground meats, and 2%milk w/ BGC TID. Pt seems to understand importance of high calorie/protein and keeping Glu levels down but do not think there is much compliance. Will attach diet information r/t these to print w/ d/c papers. RD following.

## 2022-12-23 NOTE — PROGRESS NOTES
Adult Nutrition  Assessment/PES    Patient Name:  Favio Casillas  YOB: 1955  MRN: [id number]  Admit Date:  12/22/2022    Assessment Date:  12/23/2022    Comments:  68yo male admit with CP as well as COPD exacerbation. Noted hx CKD stg 2/3 and diabetes (A1C 11) and h/o bladder & prostate cancer, hepatitis C, and drug use. Regular textures, cardiac ADA diet- intakes 100%x3 meals. Alb 4.0L. 10/2021 wt 159# and 9/2022 145#. Currently 136#/BMI 21.0. Pt states nkfa, some c/s problems and requests chopped meats(excepts sausage). He prefers 2% milk and will take BGC if I send it. He states he eats well  but continues to lose wt. NFPE shows mostly moderate fat loss and muscle wasting. Pt meets criteria for moderate, chronic malnutrition likely r/t COPD and possibly influenced by elevated A1C. Will change to soft/ground meats, and 2%milk w/ BGC TID. Pt seems to understand importance of high calorie/protein and keeping Glu levels down but do not think there is much compliance. Will attach diet information r/t these to print w/ d/c papers. RD to follow hospital course.     Reason for Assessment     Row Name 12/23/22 1412          Reason for Assessment    Reason For Assessment identified at risk by screening criteria     Diagnosis pulmonary disease     Identified At Risk by Screening Criteria MST SCORE 2+;unintentional loss of 10 lbs or more in the past 2 mos                Nutrition/Diet History     Row Name 12/23/22 1412          Nutrition/Diet History    Typical Intake (Food/Fluid/EN/PN) Pt states nkfa, some c/s problems and requests chopped meats. He states he eats well  but continues to lose wt     Food Preferences likes sweet tea, OJ at [employer], prefers sausage (not chpped), prefers 2% milk     Supplemental Drinks/Foods/Additives drinks when can get them     Factors Affecting Nutritional Intake chewing difficulties                Labs/Tests/Procedures/Meds     Row Name 12/23/22 0437           "Labs/Procedures/Meds    Lab Results Reviewed reviewed     Lab Results Comments Glu 351, A1C 11H, BUN 39H/Cr 1.9H, alb 4.0        Diagnostic Tests/Procedures    Diagnostic Test/Procedure Reviewed reviewed        Medications    Pertinent Medications Reviewed reviewed     Pertinent Medications Comments SSI, levemir 30BID, IV solumedrol TID, IVF 100ml/hr                  Estimated/Assessed Needs - Anthropometrics     Row Name 12/23/22 1415 12/23/22 0853       Anthropometrics    Height -- 171.5 cm (67.52\")    Weight -- 61.9 kg (136 lb 7.4 oz)    Weight for Calculation 61.7 kg (136 lb) --       Estimated/Assessed Needs    Additional Documentation Fluid Requirements (Group);Estimated Calorie Needs (Group);KCAL/KG (Group);Protein Requirements (Group) --       Estimated Calorie Needs    Estimated Calorie Requirement (kcal/day) 2000 --       KCAL/KG    KCAL/KG 30 Kcal/Kg (kcal);35 Kcal/Kg (kcal) --    30 Kcal/Kg (kcal) 1850.67 --    35 Kcal/Kg (kcal) 2159.115 --       Protein Requirements    Weight Used For Protein Calculations 61.7 kg (136 lb) --    Est Protein Requirement Amount (gms/kg) 1.0 gm protein --    Estimated Protein Requirements (gms/day) 61.69 --       Fluid Requirements    Fluid Requirements (mL/day) 1800 --    RDA Method (mL) 1800 --               Nutrition Prescription Ordered     Row Name 12/23/22 1415          Nutrition Prescription PO    Current PO Diet Regular     Common Modifiers Cardiac;Consistent Carbohydrate                Evaluation of Received Nutrient/Fluid Intake     Row Name 12/23/22 1415          PO Evaluation    Number of Meals 3     % PO Intake 100all                Malnutrition Severity Assessment     Row Name 12/23/22 1415          Malnutrition Severity Assessment    Malnutrition Type Chronic Disease - Related Malnutrition        Insufficient Energy Intake     Insufficient Energy Intake Findings Moderate     Insufficient Energy Intake  --  intake is good but has elevated EEN r/t COPD        " Unintentional Weight Loss     Unintentional Weight Loss Findings Moderate     Unintentional Weight Loss  --  -14% year        Muscle Loss    Loss of Muscle Mass Findings Moderate     [place] Region Severe - deep hollowing/scooping, lack of muscle to touch, facial bones well defined     Clavicle Bone Region Moderate - some protrusion in females, visible in males     Acromion Bone Region Moderate - acromion may slightly protrude     Scapular Bone Region Moderate - mild depression, bones may show slightly     Dorsal Hand Region Moderate - slight depression     Patellar Region Moderate - patella more prominent, less muscle definition around patella     Anterior Thigh Region Moderate - mild depression on inner thigh     Posterior Calf Region Moderate - some roundness, slight firmness        Fat Loss    Subcutaneous Fat Loss Findings Moderate     Orbital Region  Severe - pronounced hollowness/depression, dark circles, loose saggy skin     Upper Arm Region Moderate - some fat tissue, not ample     Thoracic & Lumbar Region None        Criteria Met (Must meet criteria for severity in at least 2 of these categories: M Wasting, Fat Loss, Fluid, Secondary Signs, Wt. Status, Intake)    Patient meets criteria for  Moderate (non-severe) Malnutrition                 Problem/Interventions:   Problem 1     Row Name 12/23/22 1417          Nutrition Diagnoses Problem 1    Problem 1 Malnutrition  moderate chronic     Etiology (related to) Medical Diagnosis     Pulmonary/Critical Care COPD     Signs/Symptoms (evidenced by) Unintended Weight Change;Other (comment)  mostly moderate fat loss & muscle wasting     Unintended Weight Change Loss     Number of Pounds Lost 23#     Weight loss time period <year (-14%, moderate)                Problem 2     Row Name 12/23/22 1418          Nutrition Diagnoses Problem 2    Problem 2 Increased Nutrient Needs     Macronutrient Kcal;Protein     Etiology (related to) Medical Diagnosis      Pulmonary/Critical Care COPD                    Intervention Goal     Row Name 12/23/22 1418          Intervention Goal    General Maintain nutrition;Meet nutritional needs for age/condition;Reduce/improve symptoms;Improved nutrition related lab(s)     PO Meet estimated needs;Maintain intake;Modify texture/consistency     Weight Maintain weight                Nutrition Intervention     Row Name 12/23/22 1418          Nutrition Intervention    RD/Tech Action Interview for preference;Encourage intake;Care plan reviewd;Follow Tx progress;Recommend/ordered     Recommended/Ordered Diet;Supplement                Nutrition Prescription     Row Name 12/23/22 1418          Nutrition Prescription PO    PO Prescription Begin/change diet;Begin/change supplement     Begin/Change Diet to Soft Texture     Texture Chopped     Supplement Boost Glucose Control;Milk     Supplement Frequency 3 times a day     New PO Prescription Ordered? Yes                Education/Evaluation     Row Name 12/23/22 1418          Education    Education Education topics;Advised regarding habits/behavior     Education Topics Basic nutrition;Protein;Weight management - maintain;Diabetes     Advised Regarding Habits/Behavior Food choices;Use supplement;Self monitor;Snacks;Increased nutrient density        Monitor/Evaluation    Monitor PO intake;Supplement intake;Pertinent labs;Weight;Symptoms     Education Follow-up Reinforce PRN                 Electronically signed by:  Maribel Knapp RD  12/23/22 14:20 CST

## 2022-12-23 NOTE — CONSULTS
Cardiology Consultation Note.        Patient Name: Favio Casillas  Age/Sex: 67 y.o. male  : 1955  MRN: [id number]    Date of consultation: 2022  Consulting Physician: Maxx Garcia MD  Primary care Physician: Shayne Merritt MD  Requesting Physician:   Ciro Chauhan MD   Reason for consultation: Chest pain history of atherosclerotic coronary artery disease      Subjective:       Chief Complaint: Chest pain    History of Present Illness:  Favio Casillas is a 67 y.o. male     Body mass index is 21.39 kg/m². With the past medical history significant for atherosclerotic coronary artery disease, coronary angiogram done in 2017 with PTCA and stenting of the proximal left anterior descending artery with deployment of 2.75 mm x 8 mm Alpine drug-eluting stent 2017, nondominant right coronary artery with ostial 30% stenosis, ramus intermedius branch with 30% stenosis, arterial hypertension, hypertensive heart disease, mild mitral regurgitation and mild tricuspid regurgitation, concentric left ventricular hypertrophy with diastolic dysfunction, history of rheumatoid arthritis, chronic obstructive lung disease, chronic back pain, hepatitis C, esophagitis, colonic polyp and previous history of bladder carcinoma with reconstruction and history of cerebrovascular accident.    Patient has continued to smoke.  Patient was hospitalized in  with symptoms of chest pain with positive troponin.  Patient had renal insufficiency and patient did not undergo invasive evaluation.    Patient now present with symptoms of substernal chest pain.  Patient has worsening symptoms of chest discomfort associated with shortness of breath.  Patient chest pain would usually resolve with rest.  Patient chest discomfort did not resolve and patient subsequently presented to the hospital.  Patient initial resting electrocardiogram did not show any acute ST-T wave changes and patient was  subsequently hospitalized and cardiology was consulted.    On my evaluation patient currently is not having any symptoms of chest pain.  Patient currently is not having any symptoms of severe substernal chest pain.  Patient denies any fever chill patient denies any hemoptysis hematuria bright of blood per rectum.    Patient 10 point review of system except for what stated in the history of present illness and negative      Concurrent Medical History:  1.  Chest pain  2.  Atherosclerotic coronary artery disease.  3.  PTCA and stenting of the proximal left anterior descending artery with deployment of a 2.75 mm x 8 mm Alpine drug-eluting stent done in December 2017.  4.  30% stenosis in the ostium of her nondominant right coronary artery.  5.  Ostial ramus intermediate branch with 30% stenosis.  6.  Arterial hypertension.  7.  Hypertensive heart disease.  8.  Nonrheumatic mitral and nonrheumatic tricuspid regurgitation.  9.  Concentric left ventricular hypertrophy with diastolic dysfunction.  10.  History of hepatitis C.  11.  Chronic obstructive lung disease.  12.  History of rheumatoid arthritis.  13.  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  14.  Non-insulin-dependent diabetes  15.  History of cerebrovascular accident.  16.  Chronic kidney disease.  17.  Rheumatoid arthritis.  18.  History of prostate carcinoma.  19.  Gastroesophageal reflux disease.  20.  History of seizure.  21.  Chronic back pain      Past Surgical History:  1.  Coronary angiogram and PTCA and stenting in 2017.  2.  Back surgery.  3.  Bladder surgery.  4.  Cataract surgery.  5.  Cholecystectomy.  6.  Colonoscopy.  7.  Esophagogastroduodenoscopy.  8.  Cystoscopy.  9.  Wrist surgery with surgical open reduction.  10.  Transient urethral resection of the bladder tumor.  11.  Cystoscopy.        Family History: Family history significant for atherosclerotic coronary artery disease.        Social History: 2 packs/day tobacco abuse denies  any alcohol intake.        Cardiac Risk Factors:   1.  Male.  2.  Arterial hypertension.  3.  Hyperlipidemia.  4.  Tobacco abuse.  5.  Family history for coronary artery disease    Allergies:  No Known Allergies    Medication:  Medications Prior to Admission   Medication Sig Dispense Refill Last Dose   • albuterol (PROVENTIL) (2.5 MG/3ML) 0.083% nebulizer solution Take 2.5 mg by nebulization Every 4 (Four) Hours As Needed for Wheezing or Shortness of Air. 120 vial 11 12/21/2022   • albuterol sulfate HFA (Ventolin HFA) 108 (90 Base) MCG/ACT inhaler Inhale 2 puffs Every 6 (Six) Hours As Needed for Wheezing. 18 g 1 12/22/2022   • Alcohol Swabs (ALCOHOL PREP) pads 1 pad 5 (Five) Times a Day. 150 each 1 12/22/2022   • ASPIRIN 81 PO Take 1 tablet by mouth Daily.   12/22/2022   • B-D ULTRAFINE III SHORT PEN 31G X 8 MM misc See Admin Instructions.      • Blood Glucose Monitoring Suppl (ACURA BLOOD GLUCOSE METER) w/Device kit 1 each 4 (Four) Times a Day As Needed (SUGARR). 1 kit 11    • budesonide-formoterol (SYMBICORT) 80-4.5 MCG/ACT inhaler Inhale 2 puffs 2 (Two) Times a Day.      • carvedilol (COREG) 3.125 MG tablet Take 3.125 mg by mouth 2 (Two) Times a Day With Meals.  11 12/22/2022   • clopidogrel (PLAVIX) 75 MG tablet Take 75 mg by mouth Daily.   12/22/2022   • Continuous Blood Gluc  (Dexcom G6 ) device 1 each Continuous. 1 each 1    • Continuous Blood Gluc Sensor (Dexcom G6 Sensor) Every 10 (Ten) Days. 9 each 3    • Continuous Blood Gluc Transmit (Dexcom G6 Transmitter) misc 1 each Every 3 (Three) Months. 1 each 3    • cyclobenzaprine (FLEXERIL) 10 MG tablet Take 10 mg by mouth 3 (Three) Times a Day As Needed.      • doxycycline (MONODOX) 100 MG capsule Take 100 mg by mouth Daily.   12/22/2022   • fluticasone (FLONASE) 50 MCG/ACT nasal spray 1 spray by Each Nare route Daily. Shake before using.      • furosemide (LASIX) 20 MG tablet Take 20 mg by mouth Daily.   12/22/2022   • glucose blood test strip  "Use as instructed 90 each 12    • glucose monitor monitoring kit 1 each 3 (Three) Times a Day. 1 each 1    • hydrOXYzine (ATARAX) 25 MG tablet Take 25 mg by mouth Every 4 (Four) Hours As Needed.  9    • insulin aspart (novoLOG FLEXPEN) 100 UNIT/ML solution pen-injector sc pen u. Dispense whatever insurance covers. 1 pen 3    • insulin detemir (LEVEMIR) 100 UNIT/ML injection Inject 25 Units under the skin into the appropriate area as directed 2 (Two) Times a Day. (Patient taking differently: Inject 30 Units under the skin into the appropriate area as directed 2 (Two) Times a Day.) 20 mL 5    • Insulin Pen Needle (Pen Needles) 32G X 4 MM misc 1 each 4 (Four) Times a Day. Use 4 x daily, Dx code E11.65 120 each 11    • Insulin Syringe 29G X 1/2\" 0.5 ML misc Inject 4 times daily 120 each 11    • ipratropium-albuterol (DUO-NEB) 0.5-2.5 mg/3 ml nebulizer Take 3 mL by nebulization Every 4 (Four) Hours As Needed for Wheezing. 30 mL 0    • Lancets (freestyle) lancets Tid 90 each 1    • nitroglycerin (NITROSTAT) 0.4 MG SL tablet Place 0.4 mg under the tongue Every 5 (Five) Minutes As Needed for Chest Pain. Take no more than 3 doses in 15 minutes.      • nortriptyline (PAMELOR) 10 MG capsule Take 10 mg by mouth Every Night.      • pantoprazole (PROTONIX) 40 MG EC tablet Take 40 mg by mouth Daily.   12/22/2022   • pravastatin (PRAVACHOL) 40 MG tablet Take 40 mg by mouth Daily.   12/22/2022   • sertraline (ZOLOFT) 50 MG tablet Take 50 mg by mouth Daily.   12/22/2022           Review of Systems:       Constitutional:  Denies recent weight loss, weight gain, fever or chills, no change in exercise tolerance.     HENT:  Denies any hearing loss, epistaxis, hoarseness, or difficulty speaking.     Eyes: Wears eyeglasses or contact lenses     Respiratory:  Denies dyspnea with exertion,no cough, wheezing, or hemoptysis.     Cardiovascular: Positive for chest pain.  Negative for palpitations,  orthopnea, PND, peripheral edema, syncope, or " claudication.     Gastrointestinal:  Denies change in bowel habits, dyspepsia, ulcer disease, hematochezia, or melena.  No nausea, no vomiting, no hematemesis, no diarrhea or constipation.    Endocrine: Negative for cold intolerance, heat intolerance, polydipsia, polyphagia or polyuria. Denies any history of weight change or unintended weight loss.    Genitourinary: Negative for hematuria.  No frequent urination or nocturia.      Musculoskeletal: Denies any history of arthritic symptoms or back problems .  No joint pain, joint stiffness, joint swelling, muscle pain, muscle weakness or neck pain.    Skin:  Denies any change in hair or nails, rashes, or skin lesions.     Allergic/Immunologic: Negative.  Negative for environmental allergies, food allergies or immunocompromised state.     Neurological:  Denies any history of recurrent headaches, strokes, TIA, or seizure disorder.     Hematological: Denies excessive bleeding, easy bruising, fatigue, lymphadenopathy or petechiae or any bleeding disorders.     Psychiatric/Behavioral: Denies any history of depression, substance abuse, or change in cognitive function. Denies any psychomotor reaction or tangential thought.  No depression, homicidal ideations or suicidal ideations.          Objective:     Objective:  Temp:  [96.8 °F (36 °C)-98.5 °F (36.9 °C)] 98.2 °F (36.8 °C)  Heart Rate:  [] 93  Resp:  [18-20] 18  BP: (120-160)/(85-95) 145/89      Body mass index is 21.39 kg/m².           Physical Exam:   General Appearance:    Alert, oriented, cooperative, in no acute distress.   Head:    Normocephalic, atraumatic, without obvious abnormality.   Eyes:           VANGIE.  Lids and lashes normal, conjunctivae and sclerae normal, no icterus, no pallor.   Ears:    Ears appear intact with no abnormalities noted.   Throat:   Mucous membranes pink and moist.   Neck:   Supple, trachea midline, no carotid bruit, no organomegaly or JVD.   Lungs:     Clear to auscultation and  percussion, respirations regular, even and unlabored. No wheezes, rales or rhonchi.    Heart:    Regular rhythm and normal rate, normal S1 and S2, no murmur, no gallop, no rub, no click.   Abdomen:     Soft, nontender, nondistended, no guarding, no rebound tenderness, normal bowel sounds in all four quadrants, no masses, liver and spleen nonpalpable.   Genitalia:    Deferred.   Extremities:   Moves all extremities well, no edema, no cyanosis, no  redness, no clubbing.   Pulses:   Pulses palpable and equal bilaterally.   Skin:   Moist and warm. No bleeding, bruising or rash.   Neurologic/Psychiatric:   Alert and oriented to person, place, and time.  Motor, power and tone in upper and lower extremities are grossly intact. No focal neurological deficits. Normal cognitive function. No psychomotor reaction or tangential thought. No depression, homicidal ideations and suicidal ideations.       Medication Review:   Current Facility-Administered Medications   Medication Dose Route Frequency Provider Last Rate Last Admin   • acetaminophen (TYLENOL) tablet 650 mg  650 mg Oral Q4H PRN Ciro Chauhan MD        Or   • acetaminophen (TYLENOL) 160 MG/5ML solution 650 mg  650 mg Oral Q4H PRN Ciro Chauhan MD        Or   • acetaminophen (TYLENOL) suppository 650 mg  650 mg Rectal Q4H PRN Ciro Chauhan MD       • [START ON 12/23/2022] aspirin EC tablet 81 mg  81 mg Oral Daily Ciro Chauhan MD       • budesonide-formoterol (SYMBICORT) 80-4.5 MCG/ACT inhaler 2 puff  2 puff Inhalation BID - RT Ciro Chauhan MD       • carvedilol (COREG) tablet 3.125 mg  3.125 mg Oral BID With Meals Ciro Chauhan MD   3.125 mg at 12/22/22 1811   • clopidogrel (PLAVIX) tablet 75 mg  75 mg Oral Daily Ciro Chauhan MD       • cyclobenzaprine (FLEXERIL) tablet 10 mg  10 mg Oral TID PRN Ciro Chauhan MD       • dextrose (D50W) (25 g/50 mL) IV injection 25 g  25 g Intravenous Q15 Min PRN Ciro Chauhan MD        • dextrose (GLUTOSE) oral gel 15 g  15 g Oral Q15 Min PRN Ciro Chauhan MD       • fluticasone (FLONASE) 50 MCG/ACT nasal spray 1 spray  1 spray Each Nare Daily Ciro Chauhan MD       • glucagon (human recombinant) (GLUCAGEN DIAGNOSTIC) injection 1 mg  1 mg Intramuscular Q15 Min PRN Ciro Chauhan MD       • heparin (porcine) 5000 UNIT/ML injection 5,000 Units  5,000 Units Subcutaneous Q12H Ciro Chauhan MD       • hydrOXYzine (ATARAX) tablet 25 mg  25 mg Oral Q4H PRN Ciro Chauhan MD       • Insulin Aspart (novoLOG) injection 0-7 Units  0-7 Units Subcutaneous TID AC Ciro Chauhan MD   5 Units at 12/22/22 1810   • insulin detemir (LEVEMIR) injection 30 Units  30 Units Subcutaneous Nightly Ciro Chauhan MD       • ipratropium-albuterol (DUO-NEB) nebulizer solution 3 mL  3 mL Nebulization 4x Daily - RT Ciro Chauhan MD       • methylPREDNISolone sodium succinate (SOLU-Medrol) injection 60 mg  60 mg Intravenous Q8H Ciro Chauhan MD   60 mg at 12/22/22 1812   • nicotine (NICODERM CQ) 21 MG/24HR patch 1 patch  1 patch Transdermal Q24H Ciro Chauhan MD   1 patch at 12/22/22 1814   • nitroglycerin (NITROSTAT) SL tablet 0.4 mg  0.4 mg Sublingual Q5 Min PRN Ciro Chauhan MD       • nortriptyline (PAMELOR) capsule 10 mg  10 mg Oral Nightly Ciro Chauhan MD       • ondansetron (ZOFRAN) tablet 4 mg  4 mg Oral Q6H PRN Ciro Chauhan MD        Or   • ondansetron (ZOFRAN) injection 4 mg  4 mg Intravenous Q6H PRN Ciro Chauhan MD       • pantoprazole (PROTONIX) EC tablet 40 mg  40 mg Oral Daily Ciro Chauhan MD       • pravastatin (PRAVACHOL) tablet 40 mg  40 mg Oral Daily Ciro Chauhan MD       • sertraline (ZOLOFT) tablet 50 mg  50 mg Oral Daily Ciro Chauhan MD       • sodium bicarbonate tablet 650 mg  650 mg Oral TID Ciro Chauhan MD   650 mg at 12/22/22 1811   • sodium chloride 0.9 % flush 10 mL  10 mL Intravenous PRN  Ciro Chauhan MD       • sodium chloride 0.9 % flush 10 mL  10 mL Intravenous Q12H Ciro Chauhan MD       • sodium chloride 0.9 % flush 10 mL  10 mL Intravenous PRN Ciro Chauhan MD       • sodium chloride 0.9 % infusion 40 mL  40 mL Intravenous PRN Ciro Chauhan MD       • sodium chloride 0.9 % infusion  100 mL/hr Intravenous Continuous Ciro Chauhan  mL/hr at 12/22/22 1758 100 mL/hr at 12/22/22 1758       Lab Review:     Results from last 7 days   Lab Units 12/22/22  1500   SODIUM mmol/L 134*   POTASSIUM mmol/L 3.7   CHLORIDE mmol/L 108*   CO2 mmol/L 15.0*   BUN mg/dL 33*   CREATININE mg/dL 1.71*   CALCIUM mg/dL 8.6   BILIRUBIN mg/dL 0.4   ALK PHOS U/L 100   ALT (SGPT) U/L 8   AST (SGOT) U/L 8   GLUCOSE mg/dL 382*     Results from last 7 days   Lab Units 12/22/22  1500   TROPONIN T ng/mL <0.010         Results from last 7 days   Lab Units 12/22/22  1500   WBC 10*3/mm3 6.85   HEMOGLOBIN g/dL 15.1   HEMATOCRIT % 47.0   PLATELETS 10*3/mm3 172                           EKG:   ECG/EMG Results (last 24 hours)     Procedure Component Value Units Date/Time    ECG 12 Lead Chest Pain [[id number]] Collected: 12/22/22 1445     Updated: 12/22/22 1704     QT Interval 358 ms      QTC Interval 486 ms     Narrative:      Test Reason : Chest Pain  Blood Pressure :   */*   mmHG  Vent. Rate : 111 BPM     Atrial Rate : 111 BPM     P-R Int : 136 ms          QRS Dur :  92 ms      QT Int : 358 ms       P-R-T Axes :  75   4  87 degrees     QTc Int : 486 ms    Sinus tachycardia with frequent Premature ventricular complexes  Septal infarct (cited on or before 26-NOV-2018)  Abnormal ECG  When compared with ECG of 19-JUL-2021 07:29,  Premature ventricular complexes are now Present  Nonspecific T wave abnormality, worse in Lateral leads    Referred By:            Confirmed By:           ECHO:       Imaging:  Imaging Results (Last 24 Hours)     Procedure Component Value Units Date/Time    XR Chest 1 View  [[id number]] Collected: 12/22/22 1500     Updated: 12/22/22 1543    Narrative:      EXAM DESCRIPTION:  XR CHEST 1 VIEW    CLINICAL INDICATION: Chest Pain triage protocol    COMPARISON: Chest x-ray dated 9/19/2022    FINDINGS: No focal airspace consolidation. No pleural effusion or  pneumothorax. Normal heart size and pulmonary vascularity.      Impression:      No acute cardiopulmonary process.    Electronically signed by:  John Peng MD  12/22/2022 3:41 PM  [facility] Workstation: [id number]          I personally viewed and interpreted the patient's EKG/Telemetry data.    Assessment:   1.  Chest pain.  2.  Atherosclerotic coronary artery disease.  3.  Arterial hypertension.  4.  Hypertensive heart disease.  5.  Chronic kidney disease          Plan:   1.  Chest pain.  Patient has history of documented atherosclerotic coronary artery disease with coronary angiogram done in December 2017 with PTCA and stenting of the proximal left anterior descending artery with deployment of a 2.75 mm x 8 mm Alpine drug-eluting stent.  Patient has continued to smoke.  Patient was hospitalized in 2020 with symptoms of chest pain with positive troponin but had a creatinine that was elevated and patient did not undergo invasive evaluation.  Patient at the present time has atypical symptoms of chest pain with negative troponin.  Patient would not be subjected to any invasive evaluation and patient would be treated medically.  Patient would be started on Ranexa.    2.  Arterial hypertension.  Patient blood pressure has been labile.  Patient blood pressure would be followed.    3.  Renal insufficiency.  Patient does have chronic kidney disease with previous history of grade 2 transitional cell carcinoma of the bladder with surgical intervention.  Patient renal function would be followed with gentle hydration.    4.  Hypertensive heart disease.  Clinically at the present time patient not in congestive heart failure.    5.  Risk factor  modification.  Patient has been counseled extensively to quit smoking.  Patient has been explained the risk associated with smoking and the occurrence and progression of atherosclerotic coronary artery disease and peripheral vascular disease.    The above plan of management were discussed with the patient.    Thank you for the consultation      Time: Time spent in face-to-face evaluation of greater than 55 minutes interacting, formulating, examining and discussing the plan with the patient with 50% of greater time spent in face-to-face interaction.    Electronically signed by Maxx Garcia MD, 12/22/22, 8:12 PM CST.    Dictated utilizing Dragon dictation.

## 2022-12-23 NOTE — SIGNIFICANT NOTE
12/23/22 1415   OTHER   Discipline physical therapist   Rehab Time/Intention   Session Not Performed patient/family declined evaluation   PT eval attempted. Patient reports is independent and just got up and walked with OT in the hallway with no AD. Patient does not believe he needs skilled PT at this time. Will d/c skilled PT orders at this time.

## 2022-12-23 NOTE — THERAPY DISCHARGE NOTE
Acute Care - Occupational Therapy Discharge  [facility]    Patient Name: Favio Casillas  : 1955    MRN: [id number]                              Today's Date: 2022       Admit Date: 2022    Visit Dx:     ICD-10-CM ICD-9-CM   1. Chest pain, unspecified type  R07.9 786.50   2. Impaired mobility and ADLs  Z74.09 V49.89    Z78.9      Patient Active Problem List   Diagnosis   • Type 2 diabetes mellitus with hyperglycemia, with long-term current use of insulin (HCC)   • Acute pain of right shoulder   • Shoulder impingement syndrome, right   • Chest pain   • Acute renal insufficiency   • Chronic hepatitis C virus infection (HCC)   • Gastritis   • SBO (small bowel obstruction) (HCC)   • CAD (coronary artery disease)   • Acute kidney failure (HCC)   • Intractable vomiting with nausea   • Gastroesophageal reflux disease with esophagitis   • Diarrhea   • Generalized abdominal pain   • History of colon polyps   • History of colitis   • Acute metabolic encephalopathy   • NSTEMI (non-ST elevated myocardial infarction) (HCC)   • Acute renal failure superimposed on stage 3 chronic kidney disease (HCC)   • Influenza A   • Anemia, chronic disease   • Bladder outflow obstruction   • Acute kidney injury (HCC)   • UTI (urinary tract infection)   • Metabolic acidosis   • Hypokalemia   • Acute renal failure superimposed on chronic kidney disease (HCC)   • Diabetic ketoacidosis without coma associated with type 2 diabetes mellitus (HCC)   • Diabetic ketoacidosis without coma associated with type 1 diabetes mellitus (HCC)   • Moderate malnutrition (HCC)   • Hyponatremia   • Lung nodule   • Bladder cancer (HCC)   • Sepsis (HCC)   • Klebsiella sepsis (HCC)   • Essential hypertension   • Mixed hyperlipidemia   • Smoker   • Bladder neoplasm   • Chronic abdominal pain   • Vitamin D deficiency   • Chest pain, unspecified type     Past Medical History:   Diagnosis Date   • Adenomatous polyp of colon    • Bladder cancer  (HCC)    • Bladder outlet obstruction    • CAD (coronary artery disease)    • Chronic back pain    • Chronic gastritis    • Chronic hepatitis C (HCC)    • Chronic obstructive lung disease (HCC)    • Coronary arteriosclerosis    • Diabetes mellitus (HCC)    • Diverticular disease of colon    • Drug abuse (HCC)    • GERD (gastroesophageal reflux disease)    • Hypertension    • Ingrown toenail    • Prostate cancer (HCC)    • Rheumatoid arthritis (HCC)    • Seizure (HCC)    • Stroke (HCC)     heat stroke   • Transient cerebral ischemia      Past Surgical History:   Procedure Laterality Date   • BLADDER TUMOR EXCISION     • CARDIAC CATHETERIZATION N/A 12/15/2017   • CHOLECYSTECTOMY     • COLONOSCOPY N/A 4/22/2019   • COLONOSCOPY N/A 10/19/2021    Procedure: COLONOSCOPY;  Surgeon: Alfonso Torres MD;  Location: [facility] ENDOSCOPY;  Service: Gastroenterology;  Laterality: N/A;   • CORONARY ANGIOPLASTY WITH STENT PLACEMENT     • ENDOSCOPY N/A 3/3/2017   • ENDOSCOPY N/A 4/22/2019   • ENDOSCOPY N/A 10/19/2021    Procedure: ESOPHAGOGASTRODUODENOSCOPY;  Surgeon: Alfonso Torres MD;  Location: [facility] ENDOSCOPY;  Service: Gastroenterology;  Laterality: N/A;   • KIDNEY SURGERY     • LUMBAR DISC SURGERY     • TRANSURETHRAL RESECTION OF BLADDER TUMOR N/A 7/21/2021    Procedure: CYSTOSCOPY TRANSURETHRAL RESECTION OF BLADDER TUMOR;  Surgeon: Christofer Calderon MD;  Location: [facility] OR;  Service: Urology;  Laterality: N/A;   • UPPER GASTROINTESTINAL ENDOSCOPY  04/22/2019   • UPPER GASTROINTESTINAL ENDOSCOPY  10/19/2021   • WRIST SURGERY Left       General Information     Row Name 12/23/22 1341          OT Time and Intention    Document Type evaluation  -SA     Mode of Treatment individual therapy;occupational therapy  -SA     Row Name 12/23/22 1347          General Information    Patient Profile Reviewed yes  -SA     Prior Level of Function independent:;all household mobility;community mobility;gait;ADL's;driving;shopping  -SA      Barriers to Rehab --  -     Row Name 12/23/22 1341          Living Environment    People in Home alone  -     Row Name 12/23/22 1341          Home Main Entrance    Number of Stairs, Main Entrance other (see comments)  Has ramp to enter.  -     Row Name 12/23/22 1341          Stairs Within Home, Primary    Stairs, Within Home, Primary Uses a cane PRN. Has a FWW available. Walk in shower. Regular toilet.  -     Number of Stairs, Within Home, Primary none  -     Stair Railings, Within Home, Primary none  -SA     Row Name 12/23/22 1341          Cognition    Orientation Status (Cognition) oriented x 4  -           User Key  (r) = Recorded By, (t) = Taken By, (c) = Cosigned By    Initials Name Provider Type    Cordelia Mora OT Occupational Therapist               Mobility/ADL's     Row Name 12/23/22 1341          Bed Mobility    Bed Mobility bed mobility (all) activities  -     All Activities, [place] (Bed Mobility) independent  -     Row Name 12/23/22 1341          Transfers    Transfers sit-stand transfer;stand-sit transfer  -[facility] Name 12/23/22 1341          Sit-Stand Transfer    Sit-Stand [place] (Transfers) independent  -     Row Name 12/23/22 1341          Stand-Sit Transfer    Stand-Sit [place] (Transfers) independent  -[facility] Name 12/23/22 1341          Functional Mobility    Functional Mobility- Ind. Level independent  -     Functional Mobility-Distance (Feet) 200  -     Row Name 12/23/22 1341          Activities of Daily Living    BADL Assessment/Intervention bathing;upper body dressing;lower body dressing  -     Row Name 12/23/22 1341          Bathing Assessment/Intervention    [place] Level (Bathing) bathing skills;independent  -     Position (Bathing) unsupported standing  -     Row Name 12/23/22 1341          Upper Body Dressing Assessment/Training    [place] Level (Upper Body Dressing) upper body dressing skills;doff;don;other (see  comments);independent  hospital gown  -     Position (Upper Body Dressing) edge of bed sitting  -     Row Name 12/23/22 1341          Lower Body Dressing Assessment/Training    [place] Level (Lower Body Dressing) doff;don;pants/bottoms;socks;independent  -     Position (Lower Body Dressing) unsupported sitting;unsupported standing  -           User Key  (r) = Recorded By, (t) = Taken By, (c) = Cosigned By    Initials Name Provider Type    Cordelia Mora OT Occupational Therapist               Obj/Interventions     Row Name 12/23/22 1341          Sensory Assessment (Somatosensory)    Sensory Assessment (Somatosensory) UE sensation intact  -     Row Name 12/23/22 1341          Range of Motion Comprehensive    General Range of Motion no range of motion deficits identified  -[facility] Name 12/23/22 1341          Strength Comprehensive (MMT)    General Manual Muscle Testing (MMT) Assessment no strength deficits identified  -     Comment, General Manual Muscle Testing (MMT) Assessment BUE 5/5.  -           User Key  (r) = Recorded By, (t) = Taken By, (c) = Cosigned By    Initials Name Provider Type    Cordelia Mora OT Occupational Therapist               Goals/Plan    No documentation.                Clinical Impression     Row Name 12/23/22 1341          Pain Assessment    Pretreatment Pain Rating 0/10 - no pain  -     Posttreatment Pain Rating 0/10 - no pain  -[facility] Name 12/23/22 1341          Plan of Care Review    Plan of Care Reviewed With patient  -SA     Outcome Evaluation OT eval completed. Patient pleasant and agreeable. Patient independent and at baseline with func mob, dressing, bathing, grooming. No skilled IP/OT needs at this time. OT will sign off.  -     Row Name 12/23/22 1341          Therapy Assessment/Plan (OT)    Patient/Family Therapy Goal Statement (OT) return home  -     Rehab Potential (OT) good, to achieve stated therapy goals  -     Criteria for Skilled  Therapeutic Interventions Met (OT) no  -SA     Therapy Frequency (OT) evaluation only  -     Row Name 12/23/22 1341          Therapy Plan Review/Discharge Plan (OT)    Anticipated Discharge Disposition (OT) home  -     Row Name 12/23/22 1341          Vital Signs    Pre Systolic BP Rehab 153  -SA     Pre Treatment Diastolic BP 92  -SA     Pretreatment Heart Rate (beats/min) 104  -SA     Pre Patient Position Supine  -     Row Name 12/23/22 1341          Positioning and Restraints    Pre-Treatment Position in bed  -SA     Post Treatment Position bed  -SA     In Bed call light within reach;encouraged to call for assist  -SA           User Key  (r) = Recorded By, (t) = Taken By, (c) = Cosigned By    Initials Name Provider Type    Cordelia Mora OT Occupational Therapist               Outcome Measures     Row Name 12/23/22 1341          How much help from another is currently needed...    Putting on and taking off regular lower body clothing? 4  -SA     Bathing (including washing, rinsing, and drying) 4  -SA     Toileting (which includes using toilet bed pan or urinal) 4  -SA     Putting on and taking off regular upper body clothing 4  -SA     Taking care of personal grooming (such as brushing teeth) 4  -SA     Eating meals 4  -SA     AM-PAC 6 Clicks Score (OT) 24  -SA     Row Name 12/23/22 0900          How much help from another person do you currently need...    Turning from your back to your side while in flat bed without using bedrails? 4  -SM     Moving from lying on back to sitting on the side of a flat bed without bedrails? 4  -SM     Moving to and from a bed to a chair (including a wheelchair)? 4  -SM     Standing up from a chair using your arms (e.g., wheelchair, bedside chair)? 4  -SM     Climbing 3-5 steps with a railing? 4  -SM     To walk in hospital room? 4  -SM     AM-PAC 6 Clicks Score (PT) 24  -SM     Highest level of mobility 8 --> Walked 250 feet or more  -     Row Name 12/23/22 1341           Functional Assessment    Outcome Measure Options AM-PAC 6 Clicks Daily Activity (OT)  -           User Key  (r) = Recorded By, (t) = Taken By, (c) = Cosigned By    Initials Name Provider Type    Sulma Vizcaino RN Registered Nurse    Cordelia Mora OT Occupational Therapist              Occupational Therapy Education     Title: PT OT SLP Therapies (Done)     Topic: Occupational Therapy (Done)     Point: ADL training (Done)     Description:   Instruct learner(s) on proper safety adaptation and remediation techniques during self care or transfers.   Instruct in proper use of assistive devices.              Learning Progress Summary           Patient Acceptance, E,TB, VU by  at 12/23/2022 1417    Comment: No skilled IP/OT needs at this time. Patient agrees he is at baseline                   Point: Home exercise program (Done)     Description:   Instruct learner(s) on appropriate technique for monitoring, assisting and/or progressing therapeutic exercises/activities.              Learning Progress Summary           Patient Acceptance, E,TB, VU by  at 12/23/2022 1417    Comment: No skilled IP/OT needs at this time. Patient agrees he is at baseline                   Point: Precautions (Done)     Description:   Instruct learner(s) on prescribed precautions during self-care and functional transfers.              Learning Progress Summary           Patient Acceptance, E,TB, VU by  at 12/23/2022 1417    Comment: No skilled IP/OT needs at this time. Patient agrees he is at baseline                   Point: Body mechanics (Done)     Description:   Instruct learner(s) on proper positioning and spine alignment during self-care, functional mobility activities and/or exercises.              Learning Progress Summary           Patient Acceptance, E,TB, VU by  at 12/23/2022 1417    Comment: No skilled IP/OT needs at this time. Patient agrees he is at baseline                               User Key     Initials  Effective Dates Name Provider Type Discipline     08/11/22 -  Cordelia Barrow OT Occupational Therapist OT              OT Recommendation and Plan  Therapy Frequency (OT): evaluation only  Plan of Care Review  Plan of Care Reviewed With: patient  Outcome Evaluation: OT eval completed. Patient pleasant and agreeable. Patient independent and at baseline with func mob, dressing, bathing, grooming. No skilled IP/OT needs at this time. OT will sign off.  Plan of Care Reviewed With: patient  Outcome Evaluation: OT eval completed. Patient pleasant and agreeable. Patient independent and at baseline with func mob, dressing, bathing, grooming. No skilled IP/OT needs at this time. OT will sign off.     Time Calculation:    Time Calculation- OT     Row Name 12/23/22 1341             Time Calculation- OT    OT Start Time 1341  -      OT Stop Time 1419  -      OT Time Calculation (min) 38 min  -SA      OT Received On 12/23/22  -         Untimed Charges    OT Eval/Re-eval Minutes 38  -SA         Total Minutes    Untimed Charges Total Minutes 38  -SA       Total Minutes 38  -SA            User Key  (r) = Recorded By, (t) = Taken By, (c) = Cosigned By    Initials Name Provider Type     Cordelia Barrow OT Occupational Therapist              Therapy Charges for Today     Code Description Service Date Service Provider Modifiers Qty    69998773405 HC OT EVAL LOW COMPLEXITY 3 12/23/2022 Cordelia Barrow OT GO 1             OT Discharge Summary  Anticipated Discharge Disposition (OT): home    Cordelia Barrow OT  12/23/2022

## 2022-12-23 NOTE — PLAN OF CARE
Goal Outcome Evaluation:               Pt new to [facility]. No report of chest pain since admission. Does report right shoulder pain at times. He reports cough and mucous. No fever and no known encounters with anyone sick.

## 2022-12-23 NOTE — PLAN OF CARE
Goal Outcome Evaluation:  Plan of Care Reviewed With: patient           Outcome Evaluation: OT eval completed. Patient pleasant and agreeable. Patient independent and at baseline with func mob, dressing, bathing, grooming. No skilled IP/OT needs at this time. OT will sign off.

## 2022-12-24 LAB
ANION GAP SERPL CALCULATED.3IONS-SCNC: 10 MMOL/L (ref 5–15)
BASOPHILS # BLD AUTO: 0.01 10*3/MM3 (ref 0–0.2)
BASOPHILS NFR BLD AUTO: 0.1 % (ref 0–1.5)
BUN SERPL-MCNC: 36 MG/DL (ref 8–23)
BUN/CREAT SERPL: 23.4 (ref 7–25)
CALCIUM SPEC-SCNC: 8.8 MG/DL (ref 8.6–10.5)
CHLORIDE SERPL-SCNC: 113 MMOL/L (ref 98–107)
CO2 SERPL-SCNC: 14 MMOL/L (ref 22–29)
CREAT SERPL-MCNC: 1.54 MG/DL (ref 0.76–1.27)
DEPRECATED RDW RBC AUTO: 44.7 FL (ref 37–54)
EGFRCR SERPLBLD CKD-EPI 2021: 49.7 ML/MIN/1.73
EOSINOPHIL # BLD AUTO: 0 10*3/MM3 (ref 0–0.4)
EOSINOPHIL NFR BLD AUTO: 0 % (ref 0.3–6.2)
ERYTHROCYTE [DISTWIDTH] IN BLOOD BY AUTOMATED COUNT: 14 % (ref 12.3–15.4)
GLUCOSE BLDC GLUCOMTR-MCNC: 190 MG/DL (ref 70–130)
GLUCOSE BLDC GLUCOMTR-MCNC: 384 MG/DL (ref 70–130)
GLUCOSE BLDC GLUCOMTR-MCNC: 393 MG/DL (ref 70–130)
GLUCOSE BLDC GLUCOMTR-MCNC: 397 MG/DL (ref 70–130)
GLUCOSE SERPL-MCNC: 228 MG/DL (ref 65–99)
HCT VFR BLD AUTO: 42.4 % (ref 37.5–51)
HGB BLD-MCNC: 13.8 G/DL (ref 13–17.7)
IMM GRANULOCYTES # BLD AUTO: 0.04 10*3/MM3 (ref 0–0.05)
IMM GRANULOCYTES NFR BLD AUTO: 0.3 % (ref 0–0.5)
LYMPHOCYTES # BLD AUTO: 0.89 10*3/MM3 (ref 0.7–3.1)
LYMPHOCYTES NFR BLD AUTO: 6.2 % (ref 19.6–45.3)
MCH RBC QN AUTO: 28.3 PG (ref 26.6–33)
MCHC RBC AUTO-ENTMCNC: 32.5 G/DL (ref 31.5–35.7)
MCV RBC AUTO: 87.1 FL (ref 79–97)
MONOCYTES # BLD AUTO: 0.58 10*3/MM3 (ref 0.1–0.9)
MONOCYTES NFR BLD AUTO: 4 % (ref 5–12)
NEUTROPHILS NFR BLD AUTO: 12.84 10*3/MM3 (ref 1.7–7)
NEUTROPHILS NFR BLD AUTO: 89.4 % (ref 42.7–76)
NRBC BLD AUTO-RTO: 0 /100 WBC (ref 0–0.2)
PLATELET # BLD AUTO: 170 10*3/MM3 (ref 140–450)
PMV BLD AUTO: 10.2 FL (ref 6–12)
POTASSIUM SERPL-SCNC: 4.5 MMOL/L (ref 3.5–5.2)
RBC # BLD AUTO: 4.87 10*6/MM3 (ref 4.14–5.8)
SODIUM SERPL-SCNC: 137 MMOL/L (ref 136–145)
WBC NRBC COR # BLD: 14.36 10*3/MM3 (ref 3.4–10.8)

## 2022-12-24 PROCEDURE — 96376 TX/PRO/DX INJ SAME DRUG ADON: CPT

## 2022-12-24 PROCEDURE — G0378 HOSPITAL OBSERVATION PER HR: HCPCS

## 2022-12-24 PROCEDURE — 85025 COMPLETE CBC W/AUTO DIFF WBC: CPT | Performed by: INTERNAL MEDICINE

## 2022-12-24 PROCEDURE — 96372 THER/PROPH/DIAG INJ SC/IM: CPT

## 2022-12-24 PROCEDURE — 96361 HYDRATE IV INFUSION ADD-ON: CPT

## 2022-12-24 PROCEDURE — 63710000001 INSULIN ASPART PER 5 UNITS: Performed by: INTERNAL MEDICINE

## 2022-12-24 PROCEDURE — 25010000002 METHYLPREDNISOLONE PER 125 MG: Performed by: INTERNAL MEDICINE

## 2022-12-24 PROCEDURE — 63710000001 INSULIN DETEMIR PER 5 UNITS: Performed by: INTERNAL MEDICINE

## 2022-12-24 PROCEDURE — 94760 N-INVAS EAR/PLS OXIMETRY 1: CPT

## 2022-12-24 PROCEDURE — 80048 BASIC METABOLIC PNL TOTAL CA: CPT | Performed by: INTERNAL MEDICINE

## 2022-12-24 PROCEDURE — 82962 GLUCOSE BLOOD TEST: CPT

## 2022-12-24 PROCEDURE — 25010000002 HEPARIN (PORCINE) PER 1000 UNITS: Performed by: INTERNAL MEDICINE

## 2022-12-24 PROCEDURE — 94799 UNLISTED PULMONARY SVC/PX: CPT

## 2022-12-24 RX ORDER — PREDNISONE 20 MG/1
40 TABLET ORAL
Status: DISCONTINUED | OUTPATIENT
Start: 2022-12-25 | End: 2022-12-25 | Stop reason: HOSPADM

## 2022-12-24 RX ORDER — CARVEDILOL 6.25 MG/1
6.25 TABLET ORAL 2 TIMES DAILY WITH MEALS
Status: DISCONTINUED | OUTPATIENT
Start: 2022-12-25 | End: 2022-12-25 | Stop reason: HOSPADM

## 2022-12-24 RX ORDER — INSULIN ASPART 100 [IU]/ML
20 INJECTION, SOLUTION INTRAVENOUS; SUBCUTANEOUS ONCE
Status: COMPLETED | OUTPATIENT
Start: 2022-12-24 | End: 2022-12-24

## 2022-12-24 RX ADMIN — SODIUM CHLORIDE 100 ML/HR: 9 INJECTION, SOLUTION INTRAVENOUS at 00:26

## 2022-12-24 RX ADMIN — METHYLPREDNISOLONE SODIUM SUCCINATE 60 MG: 125 INJECTION, POWDER, FOR SOLUTION INTRAMUSCULAR; INTRAVENOUS at 09:16

## 2022-12-24 RX ADMIN — HEPARIN SODIUM 5000 UNITS: 5000 INJECTION INTRAVENOUS; SUBCUTANEOUS at 09:18

## 2022-12-24 RX ADMIN — ASPIRIN 81 MG: 81 TABLET, FILM COATED ORAL at 09:16

## 2022-12-24 RX ADMIN — NICOTINE 1 PATCH: 21 PATCH, EXTENDED RELEASE TRANSDERMAL at 09:17

## 2022-12-24 RX ADMIN — METHYLPREDNISOLONE SODIUM SUCCINATE 60 MG: 125 INJECTION, POWDER, FOR SOLUTION INTRAMUSCULAR; INTRAVENOUS at 00:26

## 2022-12-24 RX ADMIN — CARVEDILOL 3.12 MG: 3.12 TABLET, FILM COATED ORAL at 17:46

## 2022-12-24 RX ADMIN — INSULIN ASPART 6 UNITS: 100 INJECTION, SOLUTION INTRAVENOUS; SUBCUTANEOUS at 09:15

## 2022-12-24 RX ADMIN — SODIUM BICARBONATE 650 MG: 650 TABLET ORAL at 09:16

## 2022-12-24 RX ADMIN — PANTOPRAZOLE SODIUM 40 MG: 40 TABLET, DELAYED RELEASE ORAL at 09:16

## 2022-12-24 RX ADMIN — IPRATROPIUM BROMIDE AND ALBUTEROL SULFATE 3 ML: 2.5; .5 SOLUTION RESPIRATORY (INHALATION) at 07:32

## 2022-12-24 RX ADMIN — INSULIN ASPART 7 UNITS: 100 INJECTION, SOLUTION INTRAVENOUS; SUBCUTANEOUS at 11:48

## 2022-12-24 RX ADMIN — Medication 10 ML: at 20:49

## 2022-12-24 RX ADMIN — NORTRIPTYLINE HYDROCHLORIDE 10 MG: 10 CAPSULE ORAL at 20:49

## 2022-12-24 RX ADMIN — CYCLOBENZAPRINE HYDROCHLORIDE 10 MG: 10 TABLET, FILM COATED ORAL at 20:51

## 2022-12-24 RX ADMIN — SODIUM BICARBONATE 650 MG: 650 TABLET ORAL at 15:11

## 2022-12-24 RX ADMIN — FLUTICASONE PROPIONATE 1 SPRAY: 50 SPRAY, METERED NASAL at 09:18

## 2022-12-24 RX ADMIN — CLOPIDOGREL BISULFATE 75 MG: 75 TABLET ORAL at 09:16

## 2022-12-24 RX ADMIN — IPRATROPIUM BROMIDE AND ALBUTEROL SULFATE 3 ML: 2.5; .5 SOLUTION RESPIRATORY (INHALATION) at 16:03

## 2022-12-24 RX ADMIN — SERTRALINE 50 MG: 50 TABLET, FILM COATED ORAL at 09:16

## 2022-12-24 RX ADMIN — SODIUM BICARBONATE 650 MG: 650 TABLET ORAL at 20:49

## 2022-12-24 RX ADMIN — BUDESONIDE AND FORMOTEROL FUMARATE DIHYDRATE 2 PUFF: 80; 4.5 AEROSOL RESPIRATORY (INHALATION) at 20:17

## 2022-12-24 RX ADMIN — PRAVASTATIN SODIUM 40 MG: 40 TABLET ORAL at 09:17

## 2022-12-24 RX ADMIN — IPRATROPIUM BROMIDE AND ALBUTEROL SULFATE 3 ML: 2.5; .5 SOLUTION RESPIRATORY (INHALATION) at 20:15

## 2022-12-24 RX ADMIN — INSULIN ASPART 20 UNITS: 100 INJECTION, SOLUTION INTRAVENOUS; SUBCUTANEOUS at 01:03

## 2022-12-24 RX ADMIN — INSULIN DETEMIR 30 UNITS: 100 INJECTION, SOLUTION SUBCUTANEOUS at 20:49

## 2022-12-24 RX ADMIN — HEPARIN SODIUM 5000 UNITS: 5000 INJECTION INTRAVENOUS; SUBCUTANEOUS at 20:49

## 2022-12-24 RX ADMIN — BUDESONIDE AND FORMOTEROL FUMARATE DIHYDRATE 2 PUFF: 80; 4.5 AEROSOL RESPIRATORY (INHALATION) at 07:32

## 2022-12-24 RX ADMIN — CARVEDILOL 3.12 MG: 3.12 TABLET, FILM COATED ORAL at 09:16

## 2022-12-24 RX ADMIN — INSULIN DETEMIR 30 UNITS: 100 INJECTION, SOLUTION SUBCUTANEOUS at 09:20

## 2022-12-24 RX ADMIN — IPRATROPIUM BROMIDE AND ALBUTEROL SULFATE 3 ML: 2.5; .5 SOLUTION RESPIRATORY (INHALATION) at 11:23

## 2022-12-24 RX ADMIN — INSULIN ASPART 6 UNITS: 100 INJECTION, SOLUTION INTRAVENOUS; SUBCUTANEOUS at 17:46

## 2022-12-24 NOTE — PROGRESS NOTES
[facility]  INPATIENT PROGRESS NOTE    Length of Stay: 0  Date of Admission: 12/22/2022  Primary Care Physician: Shayne Merritt MD    Subjective   Chief Complaint: No new complaints.    HPI: Patient is seen for follow-up today 12/24/2022.  He is doing better, remains chest pain-free, less short of air, less deconditioned, maintaining O2 saturation in the mid 90s on room air and voices no new complaints.    Review of Systems   Constitutional: Positive for activity change and fatigue. Negative for appetite change, chills, diaphoresis and fever.   HENT: Negative for trouble swallowing and voice change.    Eyes: Negative for photophobia and visual disturbance.   Respiratory: Positive for shortness of breath. Negative for cough, choking, chest tightness, wheezing and stridor.    Cardiovascular: Negative for chest pain, palpitations and leg swelling.   Gastrointestinal: Negative for abdominal distention, abdominal pain, blood in stool, constipation, diarrhea, nausea and vomiting.   Endocrine: Negative for cold intolerance, heat intolerance, polydipsia, polyphagia and polyuria.   Genitourinary: Negative for decreased urine volume, difficulty urinating, dysuria, enuresis, flank pain, frequency, hematuria and urgency.   Musculoskeletal: Negative for arthralgias, gait problem, myalgias, neck pain and neck stiffness.   Skin: Negative for pallor, rash and wound.   Neurological: Negative for dizziness, tremors, seizures, syncope, facial asymmetry, speech difficulty, weakness, light-headedness, numbness and headaches.   Hematological: Does not bruise/bleed easily.   Psychiatric/Behavioral: Negative for agitation, behavioral problems and confusion.       Objective    Temp:  [97.1 °F (36.2 °C)-98 °F (36.7 °C)] 98 °F (36.7 °C)  Heart Rate:  [] 88  Resp:  [16-18] 16  BP: (119-146)/(79-90) 119/79    AM-PAC 6 Clicks Score (PT): 24 (12/23/22 0900)    Physical  Exam  Vitals and nursing note reviewed.   Constitutional:       General: He is not in acute distress.     Appearance: He is well-developed. He is ill-appearing. He is not diaphoretic.   HENT:      Head: Normocephalic and atraumatic.   Eyes:      General: No scleral icterus.        Right eye: No discharge.         Left eye: No discharge.      Extraocular Movements: Extraocular movements intact.      Pupils: Pupils are equal, round, and reactive to light.   Neck:      Thyroid: No thyromegaly.      Vascular: No carotid bruit or JVD.   Cardiovascular:      Rate and Rhythm: Normal rate and regular rhythm.      Heart sounds: Normal heart sounds. No murmur heard.    No friction rub. No gallop.   Pulmonary:      Effort: Pulmonary effort is normal. No respiratory distress.      Breath sounds: No stridor. Rhonchi present. No wheezing or rales.   Chest:      Chest wall: No tenderness.   Abdominal:      General: Bowel sounds are normal. There is no distension.      Palpations: Abdomen is soft. There is no mass.      Tenderness: There is no abdominal tenderness. There is no right CVA tenderness, left CVA tenderness, guarding or rebound.      Hernia: No hernia is present.   Musculoskeletal:         General: No swelling, tenderness or deformity.      Cervical back: Normal range of motion and neck supple.      Right lower leg: No edema.      Left lower leg: No edema.   Skin:     General: Skin is warm and dry.      Coloration: Skin is not jaundiced or pale.      Findings: No bruising, erythema, lesion or rash.   Neurological:      General: No focal deficit present.      Mental Status: He is alert and oriented to person, place, and time.      Cranial Nerves: No cranial nerve deficit.      Sensory: No sensory deficit.      Motor: No weakness or abnormal muscle tone.      Coordination: Coordination normal.      Gait: Gait normal.      Deep Tendon Reflexes: Reflexes normal.   Psychiatric:         Mood and Affect: Mood normal.          Behavior: Behavior normal.         Thought Content: Thought content normal.         Judgment: Judgment normal.           Medication Review:    Current Facility-Administered Medications:   •  acetaminophen (TYLENOL) tablet 650 mg, 650 mg, Oral, Q4H PRN **OR** acetaminophen (TYLENOL) 160 MG/5ML solution 650 mg, 650 mg, Oral, Q4H PRN **OR** acetaminophen (TYLENOL) suppository 650 mg, 650 mg, Rectal, Q4H PRN, Ciro Chauhan MD  •  aspirin EC tablet 81 mg, 81 mg, Oral, Daily, Ciro Chauhan MD, 81 mg at 12/23/22 0919  •  budesonide-formoterol (SYMBICORT) 80-4.5 MCG/ACT inhaler 2 puff, 2 puff, Inhalation, BID - RT, Ciro Chauhan MD, 2 puff at 12/24/22 0732  •  carvedilol (COREG) tablet 3.125 mg, 3.125 mg, Oral, BID With Meals, Ciro Chauhan MD, 3.125 mg at 12/23/22 1747  •  clopidogrel (PLAVIX) tablet 75 mg, 75 mg, Oral, Daily, Ciro Chauhan MD, 75 mg at 12/23/22 0920  •  cyclobenzaprine (FLEXERIL) tablet 10 mg, 10 mg, Oral, TID PRN, Ciro Chauhan MD, 10 mg at 12/22/22 2056  •  dextrose (D50W) (25 g/50 mL) IV injection 25 g, 25 g, Intravenous, Q15 Min PRN, Ciro Chauhan MD  •  dextrose (GLUTOSE) oral gel 15 g, 15 g, Oral, Q15 Min PRN, Ciro Chauhan MD  •  fluticasone (FLONASE) 50 MCG/ACT nasal spray 1 spray, 1 spray, Each Nare, Daily, Ciro Chauhan MD, 1 spray at 12/23/22 1001  •  glucagon (human recombinant) (GLUCAGEN DIAGNOSTIC) injection 1 mg, 1 mg, Intramuscular, Q15 Min PRN, Ciro Chauhan MD  •  heparin (porcine) 5000 UNIT/ML injection 5,000 Units, 5,000 Units, Subcutaneous, Q12H, Ciro Chauhan MD, 5,000 Units at 12/23/22 2045  •  hydrOXYzine (ATARAX) tablet 25 mg, 25 mg, Oral, Q4H PRN, Ciro Chauhan MD, 25 mg at 12/22/22 2057  •  Insulin Aspart (novoLOG) injection 0-7 Units, 0-7 Units, Subcutaneous, TID AC, Ciro Chauhan MD, 6 Units at 12/24/22 0915  •  insulin detemir (LEVEMIR) injection 30 Units, 30 Units, Subcutaneous, Q12H, Tien  Ciro HAY MD, 30 Units at 12/23/22 2046  •  ipratropium-albuterol (DUO-NEB) nebulizer solution 3 mL, 3 mL, Nebulization, 4x Daily - RT, Ciro Chauhan MD, 3 mL at 12/24/22 0732  •  methylPREDNISolone sodium succinate (SOLU-Medrol) injection 60 mg, 60 mg, Intravenous, Q8H, Ciro Chauhan MD, 60 mg at 12/24/22 0026  •  nicotine (NICODERM CQ) 21 MG/24HR patch 1 patch, 1 patch, Transdermal, Q24H, Ciro Chauhan MD, 1 patch at 12/23/22 0921  •  nitroglycerin (NITROSTAT) SL tablet 0.4 mg, 0.4 mg, Sublingual, Q5 Min PRN, Ciro Chauhan MD  •  nortriptyline (PAMELOR) capsule 10 mg, 10 mg, Oral, Nightly, Ciro Chauhan MD, 10 mg at 12/23/22 2044  •  ondansetron (ZOFRAN) tablet 4 mg, 4 mg, Oral, Q6H PRN **OR** ondansetron (ZOFRAN) injection 4 mg, 4 mg, Intravenous, Q6H PRN, Ciro Chauhan MD  •  pantoprazole (PROTONIX) EC tablet 40 mg, 40 mg, Oral, Daily, Ciro Chauhan MD, 40 mg at 12/23/22 0920  •  pravastatin (PRAVACHOL) tablet 40 mg, 40 mg, Oral, Daily, Ciro Chauhan MD, 40 mg at 12/23/22 0919  •  sertraline (ZOLOFT) tablet 50 mg, 50 mg, Oral, Daily, Ciro Chauhan MD, 50 mg at 12/23/22 0920  •  sodium bicarbonate tablet 650 mg, 650 mg, Oral, TID, Ciro Chauhan MD, 650 mg at 12/23/22 2044  •  sodium chloride 0.9 % flush 10 mL, 10 mL, Intravenous, PRN, Ciro Chauhan MD  •  sodium chloride 0.9 % flush 10 mL, 10 mL, Intravenous, Q12H, Ciro Chauhan MD, 10 mL at 12/22/22 2053  •  sodium chloride 0.9 % flush 10 mL, 10 mL, Intravenous, PRN, Ciro Chauhan MD  •  sodium chloride 0.9 % infusion 40 mL, 40 mL, Intravenous, PRN, Ciro Chauhan MD  •  sodium chloride 0.9 % infusion, 100 mL/hr, Intravenous, Continuous, Ciro Chauhan MD, Last Rate: 100 mL/hr at 12/24/22 0709, 100 mL/hr at 12/24/22 0709    Results Review:  I have reviewed the labs, radiology results, and diagnostic studies.    Laboratory Data:   Results from last 7 days   Lab Units  12/24/22  0620 12/23/22  0411 12/22/22  1500   SODIUM mmol/L 137 136 134*   POTASSIUM mmol/L 4.5 4.5 3.7   CHLORIDE mmol/L 113* 110* 108*   CO2 mmol/L 14.0* 15.0* 15.0*   BUN mg/dL 36* 39* 33*   CREATININE mg/dL 1.54* 1.95* 1.71*   GLUCOSE mg/dL 228* 483* 382*   CALCIUM mg/dL 8.8 8.9 8.6   BILIRUBIN mg/dL  --   --  0.4   ALK PHOS U/L  --   --  100   ALT (SGPT) U/L  --   --  8   AST (SGOT) U/L  --   --  8   ANION GAP mmol/L 10.0 11.0 11.0     Estimated Creatinine Clearance: 43.3 mL/min (A) (by C-G formula based on SCr of 1.54 mg/dL (H)).          Results from last 7 days   Lab Units 12/24/22  0620 12/23/22 0411 12/22/22  1500   WBC 10*3/mm3 14.36* 5.43 6.85   HEMOGLOBIN g/dL 13.8 15.5 15.1   HEMATOCRIT % 42.4 48.1 47.0   PLATELETS 10*3/mm3 170 160 172           Culture Data:   No results found for: BLOODCX  No results found for: URINECX  No results found for: RESPCX  No results found for: WOUNDCX  No results found for: STOOLCX  No components found for: BODYFLD    Radiology Data:   Imaging Results (Last 24 Hours)     ** No results found for the last 24 hours. **          I have reviewed the patient's current medications.     Assessment/Plan     Hospital Problem List:  Principal Problem:    Chest pain, unspecified type    Chest pain (likely atypical): Patient has history of coronary artery disease and risk factors for acute coronary syndrome.  He has had negative cardiac markers, been seen by the cardiologist on consult, determined to have atypical chest pain and recommended medical therapy.   Echocardiogram showed:      Left ventricular ejection fraction appears to be 46 - 50%.  •  Left ventricular wall thickness is consistent with borderline concentric hypertrophy.  •  Mildly decreased posterior leaflet mobility.  •  Estimated right ventricular systolic pressure from tricuspid regurgitation is moderately elevated (45-55 mmHg).  •  Mild to moderate pulmonary hypertension is present.    Acute exacerbation of COPD:  Improving.  Continue bronchodilators, steroids and invasive respiratory therapy as needed.     Chronic kidney disease stage II/III: Patient's baseline creatinine is between 1.5-2.0.  Creatinine is at baseline.  Continue to monitor and consult nephrologist if the need arises.     Diabetes mellitus: Glycemic control is much improved. Continue basal insulin with Accu-Cheks and sliding scale insulin.  Make adjustments as needed to achieve optimal glycemic control.  Hemoglobin A1c is 11.0 consistent with poor outpatient control.  Pseudohyponatremia is reactive and has resolved.       Nicotine dependence: Continue nicotine patch.  Nicotine cessation counseling has been discussed with the patient.    Deconditioning: Continue PT and OT.     Continue PPI for GERD/ DVT prophylaxis with Lovenox.       Discharge Planning: Likely discharge in 1 to 2 days.     I confirmed that the patient's Advance Care Plan is present, code status is documented, or surrogate decision maker is listed in the patient's medical record.     I have utilized all available immediate resources to obtain, update, or review the patient's current medications      Ciro Chauhan MD   12/24/22   09:16 CST

## 2022-12-24 NOTE — NURSING NOTE
Md Hawkins ordered 20 units of Humalog called [facility] in pharmacy our equivalent is 20 units of Novolog its been given to pt and will recheck blood sugar in 30 mins.

## 2022-12-24 NOTE — PLAN OF CARE
Problem: Adult Inpatient Plan of Care  Goal: Plan of Care Review  Outcome: Ongoing, Progressing  Flowsheets  Taken 12/24/2022 0227 by Deedee Watson RN  Progress: no change  Outcome Evaluation: pt fsbs elevated called MD gave 20 units of Novolog, vs stable at this time, no new acute events at this time.  Taken 12/23/2022 1428 by Maribel Knapp RD  Plan of Care Reviewed With: patient  Goal: Patient-Specific Goal (Individualized)  Outcome: Ongoing, Progressing  Goal: Absence of Hospital-Acquired Illness or Injury  Outcome: Ongoing, Progressing  Intervention: Identify and Manage Fall Risk  Recent Flowsheet Documentation  Taken 12/24/2022 0026 by Deedee Watson RN  Safety Promotion/Fall Prevention:   safety round/check completed   nonskid shoes/slippers when out of bed  Taken 12/23/2022 2200 by Deedee Watson RN  Safety Promotion/Fall Prevention: nonskid shoes/slippers when out of bed  Taken 12/23/2022 2105 by Deedee Watson RN  Safety Promotion/Fall Prevention:   safety round/check completed   nonskid shoes/slippers when out of bed  Intervention: Prevent Skin Injury  Recent Flowsheet Documentation  Taken 12/24/2022 0026 by Deedee Watson RN  Body Position:   supine   position changed independently  Taken 12/23/2022 2200 by Deedee Watson RN  Body Position:   position changed independently   supine  Intervention: Prevent Infection  Recent Flowsheet Documentation  Taken 12/23/2022 2200 by Deedee Watson RN  Infection Prevention: rest/sleep promoted  Goal: Optimal Comfort and Wellbeing  Outcome: Ongoing, Progressing  Goal: Readiness for Transition of Care  Outcome: Ongoing, Progressing   Goal Outcome Evaluation:           Progress: no change  Outcome Evaluation: pt fsbs elevated called MD gave 20 units of Novolog, vs stable at this time, no new acute events at this time.

## 2022-12-25 ENCOUNTER — READMISSION MANAGEMENT (OUTPATIENT)
Dept: CALL CENTER | Facility: HOSPITAL | Age: 67
End: 2022-12-25

## 2022-12-25 VITALS
OXYGEN SATURATION: 95 % | BODY MASS INDEX: 21.22 KG/M2 | WEIGHT: 140 LBS | HEIGHT: 68 IN | HEART RATE: 90 BPM | DIASTOLIC BLOOD PRESSURE: 95 MMHG | RESPIRATION RATE: 18 BRPM | TEMPERATURE: 97.3 F | SYSTOLIC BLOOD PRESSURE: 152 MMHG

## 2022-12-25 LAB
ANION GAP SERPL CALCULATED.3IONS-SCNC: 10 MMOL/L (ref 5–15)
BASOPHILS # BLD AUTO: 0.01 10*3/MM3 (ref 0–0.2)
BASOPHILS NFR BLD AUTO: 0.1 % (ref 0–1.5)
BUN SERPL-MCNC: 41 MG/DL (ref 8–23)
BUN/CREAT SERPL: 27.7 (ref 7–25)
CALCIUM SPEC-SCNC: 8.9 MG/DL (ref 8.6–10.5)
CHLORIDE SERPL-SCNC: 111 MMOL/L (ref 98–107)
CO2 SERPL-SCNC: 16 MMOL/L (ref 22–29)
CREAT SERPL-MCNC: 1.48 MG/DL (ref 0.76–1.27)
DEPRECATED RDW RBC AUTO: 45.5 FL (ref 37–54)
EGFRCR SERPLBLD CKD-EPI 2021: 52.2 ML/MIN/1.73
EOSINOPHIL # BLD AUTO: 0.01 10*3/MM3 (ref 0–0.4)
EOSINOPHIL NFR BLD AUTO: 0.1 % (ref 0.3–6.2)
ERYTHROCYTE [DISTWIDTH] IN BLOOD BY AUTOMATED COUNT: 14.3 % (ref 12.3–15.4)
GLUCOSE BLDC GLUCOMTR-MCNC: 213 MG/DL (ref 70–130)
GLUCOSE SERPL-MCNC: 261 MG/DL (ref 65–99)
HCT VFR BLD AUTO: 45.7 % (ref 37.5–51)
HGB BLD-MCNC: 14.8 G/DL (ref 13–17.7)
IMM GRANULOCYTES # BLD AUTO: 0.03 10*3/MM3 (ref 0–0.05)
IMM GRANULOCYTES NFR BLD AUTO: 0.3 % (ref 0–0.5)
LYMPHOCYTES # BLD AUTO: 2.22 10*3/MM3 (ref 0.7–3.1)
LYMPHOCYTES NFR BLD AUTO: 21.7 % (ref 19.6–45.3)
MCH RBC QN AUTO: 28.3 PG (ref 26.6–33)
MCHC RBC AUTO-ENTMCNC: 32.4 G/DL (ref 31.5–35.7)
MCV RBC AUTO: 87.4 FL (ref 79–97)
MONOCYTES # BLD AUTO: 0.68 10*3/MM3 (ref 0.1–0.9)
MONOCYTES NFR BLD AUTO: 6.6 % (ref 5–12)
NEUTROPHILS NFR BLD AUTO: 7.3 10*3/MM3 (ref 1.7–7)
NEUTROPHILS NFR BLD AUTO: 71.2 % (ref 42.7–76)
NRBC BLD AUTO-RTO: 0 /100 WBC (ref 0–0.2)
PLATELET # BLD AUTO: 157 10*3/MM3 (ref 140–450)
PMV BLD AUTO: 10.4 FL (ref 6–12)
POTASSIUM SERPL-SCNC: 3.9 MMOL/L (ref 3.5–5.2)
RBC # BLD AUTO: 5.23 10*6/MM3 (ref 4.14–5.8)
SODIUM SERPL-SCNC: 137 MMOL/L (ref 136–145)
WBC NRBC COR # BLD: 10.25 10*3/MM3 (ref 3.4–10.8)

## 2022-12-25 PROCEDURE — 94760 N-INVAS EAR/PLS OXIMETRY 1: CPT

## 2022-12-25 PROCEDURE — 85025 COMPLETE CBC W/AUTO DIFF WBC: CPT | Performed by: INTERNAL MEDICINE

## 2022-12-25 PROCEDURE — 94664 DEMO&/EVAL PT USE INHALER: CPT

## 2022-12-25 PROCEDURE — G0378 HOSPITAL OBSERVATION PER HR: HCPCS

## 2022-12-25 PROCEDURE — 63710000001 INSULIN ASPART PER 5 UNITS: Performed by: INTERNAL MEDICINE

## 2022-12-25 PROCEDURE — 94799 UNLISTED PULMONARY SVC/PX: CPT

## 2022-12-25 PROCEDURE — 80048 BASIC METABOLIC PNL TOTAL CA: CPT | Performed by: INTERNAL MEDICINE

## 2022-12-25 PROCEDURE — 93005 ELECTROCARDIOGRAM TRACING: CPT | Performed by: INTERNAL MEDICINE

## 2022-12-25 PROCEDURE — 82962 GLUCOSE BLOOD TEST: CPT

## 2022-12-25 PROCEDURE — 93010 ELECTROCARDIOGRAM REPORT: CPT | Performed by: INTERNAL MEDICINE

## 2022-12-25 PROCEDURE — 63710000001 INSULIN DETEMIR PER 5 UNITS: Performed by: INTERNAL MEDICINE

## 2022-12-25 PROCEDURE — 63710000001 PREDNISONE PER 1 MG: Performed by: INTERNAL MEDICINE

## 2022-12-25 RX ORDER — SODIUM BICARBONATE 650 MG/1
650 TABLET ORAL 3 TIMES DAILY
Qty: 6 TABLET | Refills: 0 | Status: SHIPPED | OUTPATIENT
Start: 2022-12-25 | End: 2022-12-27

## 2022-12-25 RX ORDER — PREDNISONE 20 MG/1
40 TABLET ORAL
Qty: 8 TABLET | Refills: 0 | Status: SHIPPED | OUTPATIENT
Start: 2022-12-26 | End: 2022-12-30

## 2022-12-25 RX ORDER — CARVEDILOL 6.25 MG/1
6.25 TABLET ORAL 2 TIMES DAILY WITH MEALS
Qty: 60 TABLET | Refills: 2 | Status: SHIPPED | OUTPATIENT
Start: 2022-12-25

## 2022-12-25 RX ADMIN — NICOTINE 1 PATCH: 21 PATCH, EXTENDED RELEASE TRANSDERMAL at 08:25

## 2022-12-25 RX ADMIN — BUDESONIDE AND FORMOTEROL FUMARATE DIHYDRATE 2 PUFF: 80; 4.5 AEROSOL RESPIRATORY (INHALATION) at 08:42

## 2022-12-25 RX ADMIN — PREDNISONE 40 MG: 20 TABLET ORAL at 08:24

## 2022-12-25 RX ADMIN — CARVEDILOL 6.25 MG: 6.25 TABLET, FILM COATED ORAL at 08:24

## 2022-12-25 RX ADMIN — INSULIN DETEMIR 30 UNITS: 100 INJECTION, SOLUTION SUBCUTANEOUS at 08:27

## 2022-12-25 RX ADMIN — INSULIN ASPART 3 UNITS: 100 INJECTION, SOLUTION INTRAVENOUS; SUBCUTANEOUS at 08:22

## 2022-12-25 RX ADMIN — PANTOPRAZOLE SODIUM 40 MG: 40 TABLET, DELAYED RELEASE ORAL at 08:24

## 2022-12-25 RX ADMIN — SODIUM BICARBONATE 650 MG: 650 TABLET ORAL at 08:24

## 2022-12-25 RX ADMIN — IPRATROPIUM BROMIDE AND ALBUTEROL SULFATE 3 ML: 2.5; .5 SOLUTION RESPIRATORY (INHALATION) at 08:42

## 2022-12-25 RX ADMIN — ASPIRIN 81 MG: 81 TABLET, FILM COATED ORAL at 08:24

## 2022-12-25 RX ADMIN — CLOPIDOGREL BISULFATE 75 MG: 75 TABLET ORAL at 08:24

## 2022-12-25 RX ADMIN — SERTRALINE 50 MG: 50 TABLET, FILM COATED ORAL at 08:25

## 2022-12-25 RX ADMIN — PRAVASTATIN SODIUM 40 MG: 40 TABLET ORAL at 08:24

## 2022-12-25 NOTE — PLAN OF CARE
Problem: Adult Inpatient Plan of Care  Goal: Plan of Care Review  Outcome: Ongoing, Progressing  Flowsheets  Taken 12/25/2022 0039 by Deedee Watson RN  Progress: improving  Outcome Evaluation: pt vs stable changed to oral steroids, on dayshift, no new events at this time.  Taken 12/23/2022 1428 by Maribel Knapp RD  Plan of Care Reviewed With: patient  Goal: Patient-Specific Goal (Individualized)  Outcome: Ongoing, Progressing  Goal: Absence of Hospital-Acquired Illness or Injury  Outcome: Ongoing, Progressing  Intervention: Identify and Manage Fall Risk  Recent Flowsheet Documentation  Taken 12/25/2022 0038 by Deedee Wtason RN  Safety Promotion/Fall Prevention:   safety round/check completed   nonskid shoes/slippers when out of bed  Taken 12/24/2022 2240 by Deedee Watson RN  Safety Promotion/Fall Prevention:   safety round/check completed   nonskid shoes/slippers when out of bed  Taken 12/24/2022 2129 by Deedee Watson RN  Safety Promotion/Fall Prevention:   safety round/check completed   nonskid shoes/slippers when out of bed  Taken 12/24/2022 2055 by Deedee Watson RN  Safety Promotion/Fall Prevention:   safety round/check completed   nonskid shoes/slippers when out of bed  Taken 12/24/2022 1926 by Deedee Watson RN  Safety Promotion/Fall Prevention:   safety round/check completed   nonskid shoes/slippers when out of bed  Intervention: Prevent Skin Injury  Recent Flowsheet Documentation  Taken 12/25/2022 0038 by Deedee Watson RN  Body Position:   position changed independently   left   side-lying  Taken 12/24/2022 2240 by Deedee Watson RN  Body Position:   position changed independently   supine  Taken 12/24/2022 2055 by Deedee Watson RN  Body Position:   position changed independently   sitting up in bed  Intervention: Prevent and Manage VTE (Venous Thromboembolism) Risk  Recent Flowsheet Documentation  Taken 12/24/2022 2055 by Deedee Watson RN  Activity  Management: activity adjusted per tolerance  VTE Prevention/Management: (heparin subq) other (see comments)  Intervention: Prevent Infection  Recent Flowsheet Documentation  Taken 12/24/2022 2055 by Deedee Watson RN  Infection Prevention: rest/sleep promoted  Goal: Optimal Comfort and Wellbeing  Outcome: Ongoing, Progressing  Intervention: Provide Person-Centered Care  Recent Flowsheet Documentation  Taken 12/24/2022 2055 by Deedee Watson RN  Trust Relationship/Rapport:   care explained   questions answered   thoughts/feelings acknowledged  Goal: Readiness for Transition of Care  Outcome: Ongoing, Progressing   Goal Outcome Evaluation:           Progress: improving  Outcome Evaluation: pt vs stable changed to oral steroids, on dayshift, no new events at this time.

## 2022-12-25 NOTE — DISCHARGE SUMMARY
[facility] Medicine Services  DISCHARGE SUMMARY       Date of Admission: 12/22/2022  Date of Discharge:  12/25/2022  Primary Care Physician: Shayne Merritt MD    Presenting Problem/History of Present Illness:  Chest pain, unspecified type [R07.9]       Final Discharge Diagnoses:  Active Hospital Problems    Diagnosis    • **Chest pain, unspecified type    Acute exacerbation of COPD.  Diabetes mellitus with hyperglycemia.    Consults:   Consults     Date and Time Order Name Status Description    12/22/2022  4:08 PM Inpatient Cardiology Consult Completed           Procedures Performed: None.                Pertinent Test Results:   Lab Results (last 7 days)     Procedure Component Value Units Date/Time    Basic Metabolic Panel [[id number]]  (Abnormal) Collected: 12/25/22 0543    Specimen: Blood Updated: 12/25/22 0634     Glucose 261 mg/dL      BUN 41 mg/dL      Creatinine 1.48 mg/dL      Sodium 137 mmol/L      Potassium 3.9 mmol/L      Chloride 111 mmol/L      CO2 16.0 mmol/L      Calcium 8.9 mg/dL      BUN/Creatinine Ratio 27.7     Anion Gap 10.0 mmol/L      eGFR 52.2 mL/min/1.73      Comment: National Kidney Foundation and American Society of Nephrology (ASN) Task Force recommended calculation based on the Chronic Kidney Disease Epidemiology Collaboration (CKD-EPI) equation refit without adjustment for race.       Narrative:      GFR Normal >60  Chronic Kidney Disease <60  Kidney Failure <15      POC Glucose Once [[id number]]  (Abnormal) Collected: 12/25/22 0602    Specimen: Blood Updated: 12/25/22 0619     Glucose 213 mg/dL      Comment: RN NotifiedOperator: [id number] NURY ZARATEHonorHealth John C. Lincoln Medical CenterMeter ID: [id number]       CBC & Differential [[id number]]  (Abnormal) Collected: 12/25/22 0543    Specimen: Blood Updated: 12/25/22 0617    Narrative:      The following orders were created for panel order CBC & Differential.  Procedure                               Abnormality          Status                     ---------                               -----------         ------                     CBC Auto Differential[[id number]]        Abnormal            Final result                 Please view results for these tests on the individual orders.    CBC Auto Differential [[id number]]  (Abnormal) Collected: 12/25/22 0543    Specimen: Blood Updated: 12/25/22 0617     WBC 10.25 10*3/mm3      RBC 5.23 10*6/mm3      Hemoglobin 14.8 g/dL      Hematocrit 45.7 %      MCV 87.4 fL      MCH 28.3 pg      MCHC 32.4 g/dL      RDW 14.3 %      RDW-SD 45.5 fl      MPV 10.4 fL      Platelets 157 10*3/mm3      Neutrophil % 71.2 %      Lymphocyte % 21.7 %      Monocyte % 6.6 %      Eosinophil % 0.1 %      Basophil % 0.1 %      Immature Grans % 0.3 %      Neutrophils, Absolute 7.30 10*3/mm3      Lymphocytes, Absolute 2.22 10*3/mm3      Monocytes, Absolute 0.68 10*3/mm3      Eosinophils, Absolute 0.01 10*3/mm3      Basophils, Absolute 0.01 10*3/mm3      Immature Grans, Absolute 0.03 10*3/mm3      nRBC 0.0 /100 WBC     POC Glucose Once [[id number]]  (Abnormal) Collected: 12/24/22 1928    Specimen: Blood Updated: 12/24/22 2005     Glucose 397 mg/dL      Comment: RN NotifiedOperator: [id number] NURY ZARATEFERMeter ID: [id number]       POC Glucose Once [[id number]]  (Abnormal) Collected: 12/24/22 1621    Specimen: Blood Updated: 12/24/22 1701     Glucose 393 mg/dL      Comment: RN NotifiedOperator: [id number] STEFANY ALEXISMeter ID: [id number]       Basic Metabolic Panel [[id number]]  (Abnormal) Collected: 12/24/22 0620    Specimen: Blood Updated: 12/24/22 0641     Glucose 228 mg/dL      BUN 36 mg/dL      Creatinine 1.54 mg/dL      Sodium 137 mmol/L      Potassium 4.5 mmol/L      Chloride 113 mmol/L      CO2 14.0 mmol/L      Calcium 8.8 mg/dL      BUN/Creatinine Ratio 23.4     Anion Gap 10.0 mmol/L      eGFR 49.7 mL/min/1.73      Comment: National Kidney Foundation and American Society of Nephrology (ASN) Task Force  recommended calculation based on the Chronic Kidney Disease Epidemiology Collaboration (CKD-EPI) equation refit without adjustment for race.       Narrative:      GFR Normal >60  Chronic Kidney Disease <60  Kidney Failure <15      CBC & Differential [[id number]]  (Abnormal) Collected: 12/24/22 0620    Specimen: Blood Updated: 12/24/22 0632    Narrative:      The following orders were created for panel order CBC & Differential.  Procedure                               Abnormality         Status                     ---------                               -----------         ------                     CBC Auto Differential[[id number]]        Abnormal            Final result                 Please view results for these tests on the individual orders.    CBC Auto Differential [[id number]]  (Abnormal) Collected: 12/24/22 0620    Specimen: Blood Updated: 12/24/22 0632     WBC 14.36 10*3/mm3      RBC 4.87 10*6/mm3      Hemoglobin 13.8 g/dL      Hematocrit 42.4 %      MCV 87.1 fL      MCH 28.3 pg      MCHC 32.5 g/dL      RDW 14.0 %      RDW-SD 44.7 fl      MPV 10.2 fL      Platelets 170 10*3/mm3      Neutrophil % 89.4 %      Lymphocyte % 6.2 %      Monocyte % 4.0 %      Eosinophil % 0.0 %      Basophil % 0.1 %      Immature Grans % 0.3 %      Neutrophils, Absolute 12.84 10*3/mm3      Lymphocytes, Absolute 0.89 10*3/mm3      Monocytes, Absolute 0.58 10*3/mm3      Eosinophils, Absolute 0.00 10*3/mm3      Basophils, Absolute 0.01 10*3/mm3      Immature Grans, Absolute 0.04 10*3/mm3      nRBC 0.0 /100 WBC     POC Glucose Once [[id number]]  (Abnormal) Collected: 12/24/22 0611    Specimen: Blood Updated: 12/24/22 0624     Glucose 190 mg/dL      Comment: RN NotifiedOperator: [id number] NURY KABAMeter ID: [id number]       POC Glucose Once [[id number]]  (Abnormal) Collected: 12/24/22 0223    Specimen: Blood Updated: 12/24/22 0443     Glucose 384 mg/dL      Comment: RN NotifiedOperator: [id number] JERMAINE GEORGEMeter ID:  [id number]       POC Glucose Once [[id number]]  (Abnormal) Collected: 12/23/22 1021    Specimen: Blood Updated: 12/23/22 1050     Glucose 386 mg/dL      Comment: RN NotifiedOperator: [id number] STEFANY HSIEHISMjudi ID: [id number]       POC Glucose Once [[id number]]  (Abnormal) Collected: 12/23/22 0607    Specimen: Blood Updated: 12/23/22 0624     Glucose 351 mg/dL      Comment: RN NotifiedOperator: [id number] CARINA FAITHMeter ID: [id number]       Basic Metabolic Panel [[id number]]  (Abnormal) Collected: 12/23/22 0411    Specimen: Blood Updated: 12/23/22 0614     Glucose 483 mg/dL      BUN 39 mg/dL      Creatinine 1.95 mg/dL      Sodium 136 mmol/L      Potassium 4.5 mmol/L      Chloride 110 mmol/L      CO2 15.0 mmol/L      Calcium 8.9 mg/dL      BUN/Creatinine Ratio 20.0     Anion Gap 11.0 mmol/L      eGFR 37.5 mL/min/1.73      Comment: National Kidney Foundation and American Society of Nephrology (ASN) Task Force recommended calculation based on the Chronic Kidney Disease Epidemiology Collaboration (CKD-EPI) equation refit without adjustment for race.       Narrative:      GFR Normal >60  Chronic Kidney Disease <60  Kidney Failure <15      CBC & Differential [[id number]]  (Abnormal) Collected: 12/23/22 0411    Specimen: Blood Updated: 12/23/22 0458    Narrative:      The following orders were created for panel order CBC & Differential.  Procedure                               Abnormality         Status                     ---------                               -----------         ------                     CBC Auto Differential[[id number]]        Abnormal            Final result                 Please view results for these tests on the individual orders.    CBC Auto Differential [[id number]]  (Abnormal) Collected: 12/23/22 0411    Specimen: Blood Updated: 12/23/22 0458     WBC 5.43 10*3/mm3      RBC 5.52 10*6/mm3      Hemoglobin 15.5 g/dL      Hematocrit 48.1 %      MCV 87.1 fL      MCH 28.1 pg      MCHC 32.2 g/dL       RDW 13.8 %      RDW-SD 44.2 fl      MPV 10.7 fL      Platelets 160 10*3/mm3      Neutrophil % 87.1 %      Lymphocyte % 10.3 %      Monocyte % 2.4 %      Eosinophil % 0.0 %      Basophil % 0.0 %      Immature Grans % 0.2 %      Neutrophils, Absolute 4.73 10*3/mm3      Lymphocytes, Absolute 0.56 10*3/mm3      Monocytes, Absolute 0.13 10*3/mm3      Eosinophils, Absolute 0.00 10*3/mm3      Basophils, Absolute 0.00 10*3/mm3      Immature Grans, Absolute 0.01 10*3/mm3      nRBC 0.0 /100 WBC     Troponin [[id number]]  (Normal) Collected: 12/22/22 2007    Specimen: Blood Updated: 12/22/22 2032     Troponin T <0.010 ng/mL     Narrative:      Troponin T Reference Range:  <= 0.03 ng/mL-   Negative for AMI  >0.03 ng/mL-     Abnormal for myocardial necrosis.  Clinicians would have to utilize clinical acumen, EKG, Troponin and serial changes to determine if it is an Acute Myocardial Infarction or myocardial injury due to an underlying chronic condition.       Results may be falsely decreased if patient taking Biotin.      POC Glucose Once [[id number]]  (Abnormal) Collected: 12/22/22 1950    Specimen: Blood Updated: 12/22/22 2023     Glucose 249 mg/dL      Comment: RN NotifiedOperator: [id number] [place] FAITHMeter ID: [id number]       Hemoglobin A1c [[id number]]  (Abnormal) Collected: 12/22/22 1500    Specimen: Blood Updated: 12/22/22 2006     Hemoglobin A1C 11.00 %     Narrative:      Hemoglobin A1C Ranges:    Increased Risk for Diabetes  5.7% to 6.4%  Diabetes                     >= 6.5%  Diabetic Goal                < 7.0%    [place] Draw [[id number]] Collected: 12/22/22 1500    Specimen: Blood Updated: 12/22/22 1601    Narrative:      The following orders were created for panel order [place] Draw.  Procedure                               Abnormality         Status                     ---------                               -----------         ------                     Green Top (Gel)[[id number]]                                   Final result               Lavender Top[[id number]]                                     Final result               Gold Top - SST[[id number]]                                   Final result               Light Blue Top[[id number]]                                   Final result                 Please view results for these tests on the individual orders.    Green Top (Gel) [[id number]] Collected: 12/22/22 1500    Specimen: Blood Updated: 12/22/22 1601     Extra Tube Hold for add-ons.     Comment: Auto resulted.       Lavender Top [[id number]] Collected: 12/22/22 1500    Specimen: Blood Updated: 12/22/22 1601     Extra Tube hold for add-on     Comment: Auto resulted       Gold Top - SST [[id number]] Collected: 12/22/22 1500    Specimen: Blood Updated: 12/22/22 1601     Extra Tube Hold for add-ons.     Comment: Auto resulted.       Light Blue Top [[id number]] Collected: 12/22/22 1500    Specimen: Blood Updated: 12/22/22 1601     Extra Tube Hold for add-ons.     Comment: Auto resulted       Troponin [[id number]]  (Normal) Collected: 12/22/22 1500    Specimen: Blood Updated: 12/22/22 1543     Troponin T <0.010 ng/mL     Narrative:      Troponin T Reference Range:  <= 0.03 ng/mL-   Negative for AMI  >0.03 ng/mL-     Abnormal for myocardial necrosis.  Clinicians would have to utilize clinical acumen, EKG, Troponin and serial changes to determine if it is an Acute Myocardial Infarction or myocardial injury due to an underlying chronic condition.       Results may be falsely decreased if patient taking Biotin.      Comprehensive Metabolic Panel [[id number]]  (Abnormal) Collected: 12/22/22 1500    Specimen: Blood Updated: 12/22/22 1543     Glucose 382 mg/dL      BUN 33 mg/dL      Creatinine 1.71 mg/dL      Sodium 134 mmol/L      Potassium 3.7 mmol/L      Chloride 108 mmol/L      CO2 15.0 mmol/L      Calcium 8.6 mg/dL      Total Protein 6.4 g/dL      Albumin 4.00 g/dL      ALT (SGPT) 8 U/L      AST (SGOT) 8 U/L      Alkaline  Phosphatase 100 U/L      Total Bilirubin 0.4 mg/dL      Globulin 2.4 gm/dL      A/G Ratio 1.7 g/dL      BUN/Creatinine Ratio 19.3     Anion Gap 11.0 mmol/L      eGFR 43.9 mL/min/1.73      Comment: National Kidney Foundation and American Society of Nephrology (ASN) Task Force recommended calculation based on the Chronic Kidney Disease Epidemiology Collaboration (CKD-EPI) equation refit without adjustment for race.       Narrative:      GFR Normal >60  Chronic Kidney Disease <60  Kidney Failure <15      BNP [[id number]]  (Normal) Collected: 12/22/22 1500    Specimen: Blood Updated: 12/22/22 1541     proBNP 488.6 pg/mL     Narrative:      Among patients with dyspnea, NT-proBNP is highly sensitive for the detection of acute congestive heart failure. In addition NT-proBNP of <300 pg/ml effectively rules out acute congestive heart failure with 99% negative predictive value.    Results may be falsely decreased if patient taking Biotin.      CBC & Differential [[id number]]  (Normal) Collected: 12/22/22 1500    Specimen: Blood Updated: 12/22/22 1510    Narrative:      The following orders were created for panel order CBC & Differential.  Procedure                               Abnormality         Status                     ---------                               -----------         ------                     CBC Auto Differential[[id number]]        Normal              Final result                 Please view results for these tests on the individual orders.    CBC Auto Differential [[id number]]  (Normal) Collected: 12/22/22 1500    Specimen: Blood Updated: 12/22/22 1510     WBC 6.85 10*3/mm3      RBC 5.41 10*6/mm3      Hemoglobin 15.1 g/dL      Hematocrit 47.0 %      MCV 86.9 fL      MCH 27.9 pg      MCHC 32.1 g/dL      RDW 14.0 %      RDW-SD 44.4 fl      MPV 10.4 fL      Platelets 172 10*3/mm3      Neutrophil % 65.8 %      Lymphocyte % 25.5 %      Monocyte % 6.9 %      Eosinophil % 0.9 %      Basophil % 0.6 %      Immature  Grans % 0.3 %      Neutrophils, Absolute 4.51 10*3/mm3      Lymphocytes, Absolute 1.75 10*3/mm3      Monocytes, Absolute 0.47 10*3/mm3      Eosinophils, Absolute 0.06 10*3/mm3      Basophils, Absolute 0.04 10*3/mm3      Immature Grans, Absolute 0.02 10*3/mm3      nRBC 0.0 /100 WBC         Imaging Results (Last 7 Days)     Procedure Component Value Units Date/Time    XR Chest 1 View [[id number]] Collected: 12/22/22 1500     Updated: 12/22/22 1543    Narrative:      EXAM DESCRIPTION:  XR CHEST 1 VIEW    CLINICAL INDICATION: Chest Pain triage protocol    COMPARISON: Chest x-ray dated 9/19/2022    FINDINGS: No focal airspace consolidation. No pleural effusion or  pneumothorax. Normal heart size and pulmonary vascularity.      Impression:      No acute cardiopulmonary process.    Electronically signed by:  John Peng MD  12/22/2022 3:41 PM  CST Workstation: [id number]              Chief Complaint on Day of Discharge: None.    Hospital Course:  The patient was admitted and managed as follows:    Chest pain (likely atypical): Patient has history of coronary artery disease and risk factors for acute coronary syndrome.  He was admitted to rule out MI,  had negative cardiac markers, seen by the cardiologist on consult, determined to have atypical chest pain and recommended medical therapy.  He was asymptomatic on discharge.  Echocardiogram showed:      Left ventricular ejection fraction appears to be 46 - 50%.  •  Left ventricular wall thickness is consistent with borderline concentric hypertrophy.  •  Mildly decreased posterior leaflet mobility.  •  Estimated right ventricular systolic pressure from tricuspid regurgitation is moderately elevated (45-55 mmHg).  •  Mild to moderate pulmonary hypertension is present.     Acute exacerbation of COPD: He was started on bronchodilators, steroids and invasive respiratory therapy as needed.  He was asymptomatic and maintaining O2 saturation in the mid to upper 90s on room air prior  "to discharge.     Chronic kidney disease stage II/III: Patient's baseline creatinine is between 1.5-2.0.  Creatinine remained at baseline.      Diabetes mellitus (with hyperglycemia): Glycemic control is much improved.   He was started on  basal insulin with Accu-Cheks and sliding scale insulin with adjustments as needed to achieve optimal glycemic control.  Hemoglobin A1c is 11.0 consistent with poor outpatient control.  Pseudohyponatremia is reactive and has resolved.       Nicotine dependence: He was started on nicotine patch and counseled regarding nicotine cessation.        Condition on Discharge: Stable and improved.    Physical Exam on Discharge:  /95 (BP Location: Right arm, Patient Position: Lying)   Pulse 92   Temp 97.3 °F (36.3 °C) (Temporal)   Resp 18   Ht 171.5 cm (67.52\")   Wt 63.5 kg (140 lb)   SpO2 96%   BMI 21.59 kg/m²   Physical Exam  Vitals and nursing note reviewed.   Constitutional:       General: He is not in acute distress.     Appearance: He is well-developed. He is not diaphoretic.   HENT:      Head: Normocephalic and atraumatic.   Eyes:      General: No scleral icterus.        Right eye: No discharge.         Left eye: No discharge.      Extraocular Movements: Extraocular movements intact.      Pupils: Pupils are equal, round, and reactive to light.   Neck:      Thyroid: No thyromegaly.      Vascular: No carotid bruit or JVD.   Cardiovascular:      Rate and Rhythm: Normal rate and regular rhythm.      Heart sounds: Normal heart sounds. No murmur heard.    No friction rub. No gallop.   Pulmonary:      Effort: Pulmonary effort is normal. No respiratory distress.      Breath sounds: Normal breath sounds. No stridor. No wheezing or rales.   Chest:      Chest wall: No tenderness.   Abdominal:      General: Bowel sounds are normal. There is no distension.      Palpations: Abdomen is soft. There is no mass.      Tenderness: There is no abdominal tenderness. There is no right CVA " tenderness, left CVA tenderness, guarding or rebound.      Hernia: No hernia is present.   Musculoskeletal:         General: No swelling, tenderness or deformity.      Cervical back: Normal range of motion and neck supple.      Right lower leg: No edema.      Left lower leg: No edema.   Skin:     General: Skin is warm and dry.      Coloration: Skin is not jaundiced or pale.      Findings: No bruising, erythema, lesion or rash.   Neurological:      General: No focal deficit present.      Mental Status: He is alert and oriented to person, place, and time.      Cranial Nerves: No cranial nerve deficit.      Sensory: No sensory deficit.      Motor: No weakness or abnormal muscle tone.      Coordination: Coordination normal.      Gait: Gait normal.      Deep Tendon Reflexes: Reflexes normal.   Psychiatric:         Mood and Affect: Mood normal.         Behavior: Behavior normal.         Thought Content: Thought content normal.         Judgment: Judgment normal.           Discharge Disposition:  Home or Self Care    Discharge Medications:     Discharge Medications      New Medications      Instructions Start Date   predniSONE 20 MG tablet  Commonly known as: DELTASONE   40 mg, Oral, Daily With Breakfast   Start Date: December 26, 2022     sodium bicarbonate 650 MG tablet   650 mg, Oral, 3 Times Daily         Changes to Medications      Instructions Start Date   insulin detemir 100 UNIT/ML injection  Commonly known as: LEVEMIR  What changed: how much to take   25 Units, Subcutaneous, 2 Times Daily         Continue These Medications      Instructions Start Date   [employer] Blood Glucose Meter w/Device kit   1 each, Does not apply, 4 Times Daily PRN      albuterol (2.5 MG/3ML) 0.083% nebulizer solution  Commonly known as: PROVENTIL   2.5 mg, Nebulization, Every 4 Hours PRN      albuterol sulfate  (90 Base) MCG/ACT inhaler  Commonly known as: Ventolin HFA   2 puffs, Inhalation, Every 6 Hours PRN      Alcohol Prep pads   1  "pad, Does not apply, 5 Times Daily      ASPIRIN 81 PO   1 tablet, Oral, Daily      B-D ULTRAFINE III SHORT PEN 31G X 8 MM misc  Generic drug: Insulin Pen Needle   See Admin Instructions      Pen Needles 32G X 4 MM misc   1 each, Does not apply, 4 Times Daily, Use 4 x daily, Dx code E11.65      budesonide-formoterol 80-4.5 MCG/ACT inhaler  Commonly known as: SYMBICORT   2 puffs, Inhalation, 2 Times Daily - RT      clopidogrel 75 MG tablet  Commonly known as: PLAVIX   75 mg, Oral, Daily      cyclobenzaprine 10 MG tablet  Commonly known as: FLEXERIL   10 mg, Oral, 3 Times Daily PRN      Dexcom G6  device   1 each, Does not apply, Continuous      Dexcom G6 Sensor   Does not apply, Every 10 Days      Dexcom G6 Transmitter misc   1 each, Does not apply, Every 3 Months      fluticasone 50 MCG/ACT nasal spray  Commonly known as: FLONASE   1 spray, Each Nare, Daily, Shake before using.       freestyle lancets   Tid      furosemide 20 MG tablet  Commonly known as: LASIX   20 mg, Oral, Daily      glucose blood test strip   Use as instructed      glucose monitor monitoring kit   1 each, Does not apply, 3 Times Daily      hydrOXYzine 25 MG tablet  Commonly known as: ATARAX   25 mg, Oral, Every 4 Hours PRN      insulin aspart 100 UNIT/ML solution pen-injector sc pen  Commonly known as: novoLOG FLEXPEN   u. Dispense whatever insurance covers.      Insulin Syringe 29G X 1/2\" 0.5 ML misc   Inject 4 times daily      ipratropium-albuterol 0.5-2.5 mg/3 ml nebulizer  Commonly known as: DUO-NEB   3 mL, Nebulization, Every 4 Hours PRN      nitroglycerin 0.4 MG SL tablet  Commonly known as: NITROSTAT   0.4 mg, Sublingual, Every 5 Minutes PRN, Take no more than 3 doses in 15 minutes.       nortriptyline 10 MG capsule  Commonly known as: PAMELOR   10 mg, Oral, Nightly      pantoprazole 40 MG EC tablet  Commonly known as: PROTONIX   40 mg, Oral, Daily      pravastatin 40 MG tablet  Commonly known as: PRAVACHOL   40 mg, Oral, Daily    "   sertraline 50 MG tablet  Commonly known as: ZOLOFT   50 mg, Oral, Daily         Stop These Medications    doxycycline 100 MG capsule  Commonly known as: MONODOX        ASK your doctor about these medications      Instructions Start Date   carvedilol 6.25 MG tablet  Commonly known as: COREG  Ask about: Which instructions should I use?   6.25 mg, Oral, Every 12 Hours Scheduled      carvedilol 6.25 MG tablet  Commonly known as: COREG  Ask about: Which instructions should I use?   6.25 mg, Oral, 2 Times Daily With Meals             Discharge Diet: Cardiac, renal and diabetic.    Activity at Discharge: As tolerated.     Discharge Care Plan/Instructions: Patient has been advised to take his medications as prescribed and to return to the emergency room in the event of any worsening symptoms.    Follow-up Appointments:   Future Appointments   Date Time Provider Department Center   1/3/2023  9:30 AM Shoaib Constantino APRN [facility]       Test Results Pending at Discharge:     Ciro Chauhan MD  12/25/22  08:36 CST    Time: 35 minutes.

## 2022-12-25 NOTE — PROGRESS NOTES
Cardiology Progress Note     LOS: 0 days   Patient Care Team:  Shayne Merritt MD as PCP - General (Family Medicine)    Subjective:    Chart reviewed. Patient seen and examined. Patient symptoms of shortness of breath has improved.  Patient has not had any further recurrent symptoms of chest pain.  Patient renal function has improved with a creatinine of 1.54.  Patient white blood cell count is 14.  Patient has not had any fever or chills.      Objective:  Temp:  [97.5 °F (36.4 °C)-98.7 °F (37.1 °C)] 98.7 °F (37.1 °C)  Heart Rate:  [] 91  Resp:  [16-20] 18  BP: (119-151)/(79-96) 143/94    Intake/Output Summary (Last 24 hours) at 12/24/2022 1801  Last data filed at 12/24/2022 0745  Gross per 24 hour   Intake 1320 ml   Output --   Net 1320 ml       Physical Exam:   General Appearance:    Alert, oriented, cooperative, in no acute distress.   Head:    Normocephalic, atraumatic, without obvious abnormality   Eyes:             VANGIE. Lids and lashes normal, conjunctivae and sclerae normal, no icterus, no pallor.   Ears:    Ears appear intact with no abnormalities noted.   Throat:   Mucous membranes pink and moist.   Neck:  Supple, trachea midline, no carotid bruit, no organomegaly or JVD.   Lungs:    Clear to auscultation and percussion.  Respirations regular, even and unlabored. No wheezes, rales, or rhonchi.    Heart:    Regular rhythm and normal rate, normal S1 and S2, no      murmur, no gallop, no rub, no click.   Abdomen:    Soft, nontender, nondistended, no guarding, no rebound tenderness. Normal bowel sounds in all four quadrants, no masses, liver and spleen nonpalpable.    Genitalia:    Deferred.   Extremities:   Moves all extremities well, no edema, no cyanosis, no       redness, no clubbing.   Pulses:   Pulses palpable and equal bilaterally.   Skin:   Moist and warm. No bleeding, bruising or rash.   Neurologic/Psychiatric:   Alert and oriented to person, place, and time.  Motor, power and tone in  upper and lower extremities are grossly intact.  No focal neurological deficits. Normal cognitive function. No psychomotor reaction or tangential thought. No depression, homicidal ideations and suicidal ideations.          Results Review:    Results from last 7 days   Lab Units 12/24/22  0620 12/23/22 0411 12/22/22  1500   SODIUM mmol/L 137   < > 134*   POTASSIUM mmol/L 4.5   < > 3.7   CHLORIDE mmol/L 113*   < > 108*   CO2 mmol/L 14.0*   < > 15.0*   BUN mg/dL 36*   < > 33*   CREATININE mg/dL 1.54*   < > 1.71*   CALCIUM mg/dL 8.8   < > 8.6   BILIRUBIN mg/dL  --   --  0.4   ALK PHOS U/L  --   --  100   ALT (SGPT) U/L  --   --  8   AST (SGOT) U/L  --   --  8   GLUCOSE mg/dL 228*   < > 382*    < > = values in this interval not displayed.     Results from last 7 days   Lab Units 12/22/22 2007 12/22/22  1500   TROPONIN T ng/mL <0.010 <0.010         Results from last 7 days   Lab Units 12/24/22  0620   WBC 10*3/mm3 14.36*   HEMOGLOBIN g/dL 13.8   HEMATOCRIT % 42.4   PLATELETS 10*3/mm3 170                           ECHO:  Results for orders placed during the hospital encounter of 12/22/22    Adult Transthoracic Echo Complete w/ Color, Spectral and Contrast if necessary per protocol    Interpretation Summary  •  Left ventricular ejection fraction appears to be 46 - 50%.  •  Left ventricular wall thickness is consistent with borderline concentric hypertrophy.  •  Mildly decreased posterior leaflet mobility.  •  Estimated right ventricular systolic pressure from tricuspid regurgitation is moderately elevated (45-55 mmHg).  •  Mild to moderate pulmonary hypertension is present.      ECG 12 Lead Chest Pain   Preliminary Result   Test Reason : Chest Pain   Blood Pressure :   */*   mmHG   Vent. Rate : 111 BPM     Atrial Rate : 111 BPM      P-R Int : 136 ms          QRS Dur :  92 ms       QT Int : 358 ms       P-R-T Axes :  75   4  87 degrees      QTc Int : 486 ms      Sinus tachycardia with frequent Premature ventricular  complexes   Septal infarct (cited on or before 26-NOV-2018)   Abnormal ECG   When compared with ECG of 19-JUL-2021 07:29,   Premature ventricular complexes are now Present   Nonspecific T wave abnormality, worse in Lateral leads      Referred By:            Confirmed By:            Medication Review:   Current Facility-Administered Medications   Medication Dose Route Frequency Provider Last Rate Last Admin   • acetaminophen (TYLENOL) tablet 650 mg  650 mg Oral Q4H PRN Ciro Chahuan MD        Or   • acetaminophen (TYLENOL) 160 MG/5ML solution 650 mg  650 mg Oral Q4H PRN Ciro Chauhan MD        Or   • acetaminophen (TYLENOL) suppository 650 mg  650 mg Rectal Q4H PRN Ciro Chauhan MD       • aspirin EC tablet 81 mg  81 mg Oral Daily Ciro Chauhan MD   81 mg at 12/24/22 0916   • budesonide-formoterol (SYMBICORT) 80-4.5 MCG/ACT inhaler 2 puff  2 puff Inhalation BID - RT Ciro Chauhan MD   2 puff at 12/24/22 0732   • carvedilol (COREG) tablet 3.125 mg  3.125 mg Oral BID With Meals Ciro Chauhan MD   3.125 mg at 12/24/22 1746   • clopidogrel (PLAVIX) tablet 75 mg  75 mg Oral Daily Ciro Chauhan MD   75 mg at 12/24/22 0916   • cyclobenzaprine (FLEXERIL) tablet 10 mg  10 mg Oral TID PRN Ciro Chauhan MD   10 mg at 12/22/22 2056   • dextrose (D50W) (25 g/50 mL) IV injection 25 g  25 g Intravenous Q15 Min PRN Ciro Chauhan MD       • dextrose (GLUTOSE) oral gel 15 g  15 g Oral Q15 Min PRN Ciro Chauhan MD       • fluticasone (FLONASE) 50 MCG/ACT nasal spray 1 spray  1 spray Each Nare Daily Ciro Chauhan MD   1 spray at 12/24/22 0918   • glucagon (human recombinant) (GLUCAGEN DIAGNOSTIC) injection 1 mg  1 mg Intramuscular Q15 Min PRN Ciro Chauhan MD       • heparin (porcine) 5000 UNIT/ML injection 5,000 Units  5,000 Units Subcutaneous Q12H Ciro Chauhan MD   5,000 Units at 12/24/22 0918   • hydrOXYzine (ATARAX) tablet 25 mg  25 mg Oral Q4H PRN  Ciro Chuahan MD   25 mg at 12/22/22 2057   • Insulin Aspart (novoLOG) injection 0-7 Units  0-7 Units Subcutaneous TID AC Ciro Chauhan MD   6 Units at 12/24/22 1746   • insulin detemir (LEVEMIR) injection 30 Units  30 Units Subcutaneous Q12H Ciro Chauhan MD   30 Units at 12/24/22 0920   • ipratropium-albuterol (DUO-NEB) nebulizer solution 3 mL  3 mL Nebulization 4x Daily - RT Ciro Chauhan MD   3 mL at 12/24/22 1603   • nicotine (NICODERM CQ) 21 MG/24HR patch 1 patch  1 patch Transdermal Q24H Ciro Chauhan MD   1 patch at 12/24/22 0917   • nitroglycerin (NITROSTAT) SL tablet 0.4 mg  0.4 mg Sublingual Q5 Min PRN Ciro Chauhan MD       • nortriptyline (PAMELOR) capsule 10 mg  10 mg Oral Nightly Ciro Chauhan MD   10 mg at 12/23/22 2044   • ondansetron (ZOFRAN) tablet 4 mg  4 mg Oral Q6H PRN Ciro Chauhan MD        Or   • ondansetron (ZOFRAN) injection 4 mg  4 mg Intravenous Q6H PRN Ciro Chauhan MD       • pantoprazole (PROTONIX) EC tablet 40 mg  40 mg Oral Daily Ciro Chauhan MD   40 mg at 12/24/22 0916   • pravastatin (PRAVACHOL) tablet 40 mg  40 mg Oral Daily Ciro Chauhan MD   40 mg at 12/24/22 0917   • [START ON 12/25/2022] predniSONE (DELTASONE) tablet 40 mg  40 mg Oral Daily With Breakfast Ciro Chauhan MD       • sertraline (ZOLOFT) tablet 50 mg  50 mg Oral Daily Ciro Chauhan MD   50 mg at 12/24/22 0916   • sodium bicarbonate tablet 650 mg  650 mg Oral TID Ciro Chauhan MD   650 mg at 12/24/22 1511   • sodium chloride 0.9 % flush 10 mL  10 mL Intravenous PRN Ciro Chauhan MD       • sodium chloride 0.9 % flush 10 mL  10 mL Intravenous Q12H Ciro Chauhan MD   10 mL at 12/22/22 2053   • sodium chloride 0.9 % flush 10 mL  10 mL Intravenous PRN Ciro Chauhan MD       • sodium chloride 0.9 % infusion 40 mL  40 mL Intravenous Ciro Johnson MD           Assessment and Plan:      Chest pain, unspecified  type  1.  Chest pain.  Patient has not had any further recurrent symptoms or chest pain.  Patient had undergone previous coronary angiogram done in December 2017 with PTCA and stenting of the proximal left anterior descending artery with deployment of 2.75 mm x 8 mm Alpine drug-eluting stent December 2017, nondominant right coronary artery with ostial 30% stenosis, ramus intermedius branch with 30% stenosis.  Patient during this hospitalization has not had any elevated troponin.  Review of the record indicate patient in 2020 at positive troponin but did not undergo invasive evaluation secondary to the patient renal insufficiency.  Patient at the present time continues to have a creatinine of 1.54 though patient has not complained of symptoms of chest pain and at the present time patient would not be subjected to any invasive evaluation and would be treated medically.  Patient would ambulate in the hallway and the patient did not have any further recurrent symptoms or chest pain patient would be stable to be discharged.    2.  Shortness of breath.  Patient symptoms or shortness of breath has improved.  Patient does have chronic obstructive lung disease.  Patient has been counseled to quit smoking.  Patient transthoracic echocardiogram has revealed left ventricular systolic dysfunction with an ejection fraction of 45 to 50% with pulmonary hypertension.  Patient would be administered Lasix on a as needed basis.    3.  Arterial hypertension.  Patient blood pressure is labile and elevated.  Patient carvedilol would be increased to 6.25 mg twice a day.  Patient is not on any ACE inhibitor or angiotensin receptor blocker secondary to the patient renal insufficiency.  Patient has been counseled to decrease her salt intake.    4.  Hypertensive heart disease with pulmonary hypertension with history of chronic obstructive lung disease with an ejection fraction of 45 to 50%.  Clinically at the present time patient is not fluid  overloaded.  Patient does have chronic obstructive lung disease.  Patient at the present time would not need any diuretics.    5.  Hyperlipidemia.  Patient has been counseled on low-fat low-cholesterol diet and to continue the present dose of the pravastatin.    6.  Chronic kidney disease.  Patient's creatinine has improved.  Patient renal function would be followed.    The above plan of management were discussed with the patient          Electronically signed by Maxx Garcia MD, 12/24/22, 6:01 PM CST.      Time: Time spent on face-to-face interaction 20-minute      Dictated utilizing Dragon dictation.

## 2022-12-26 LAB
GLUCOSE BLDC GLUCOMTR-MCNC: 415 MG/DL (ref 70–130)
GLUCOSE BLDC GLUCOMTR-MCNC: 424 MG/DL (ref 70–130)
GLUCOSE BLDC GLUCOMTR-MCNC: 440 MG/DL (ref 70–130)
GLUCOSE BLDC GLUCOMTR-MCNC: 442 MG/DL (ref 70–130)
GLUCOSE BLDC GLUCOMTR-MCNC: 457 MG/DL (ref 70–130)
GLUCOSE BLDC GLUCOMTR-MCNC: 497 MG/DL (ref 70–130)

## 2022-12-26 NOTE — OUTREACH NOTE
Prep Survey    Flowsheet Row Responses   Sikh facility patient discharged from? [place]   Is LACE score < 7 ? No   Eligibility Readm Mgmt   Discharge diagnosis Chest pain     Does the patient have one of the following disease processes/diagnoses(primary or secondary)? COPD   Does the patient have Home health ordered? No   Is there a DME ordered? No   Prep survey completed? Yes          MONICA TRAN - Registered Nurse

## 2022-12-28 ENCOUNTER — READMISSION MANAGEMENT (OUTPATIENT)
Dept: CALL CENTER | Facility: HOSPITAL | Age: 67
End: 2022-12-28

## 2022-12-28 NOTE — OUTREACH NOTE
COPD/PN Week 1 Survey    Flowsheet Row Responses   Islam facility patient discharged from? [place]   Does the patient have one of the following disease processes/diagnoses(primary or secondary)? COPD   Week 1 attempt successful? No   Unsuccessful attempts Attempt 1          MICHA Oneil Registered Nurse

## 2022-12-31 LAB
QT INTERVAL: 356 MS
QT INTERVAL: 358 MS
QTC INTERVAL: 433 MS
QTC INTERVAL: 486 MS

## 2023-01-05 ENCOUNTER — READMISSION MANAGEMENT (OUTPATIENT)
Dept: CALL CENTER | Facility: HOSPITAL | Age: 68
End: 2023-01-05
Payer: MEDICARE

## 2023-01-05 NOTE — OUTREACH NOTE
COPD/PN Week 2 Survey    Flowsheet Row Responses   Yazidism facility patient discharged from? [place]   Does the patient have one of the following disease processes/diagnoses(primary or secondary)? COPD   Week 2 attempt successful? No   Unsuccessful attempts Attempt 1          SHANTI QUINTERO - Registered Nurse

## 2023-01-08 ENCOUNTER — READMISSION MANAGEMENT (OUTPATIENT)
Dept: CALL CENTER | Facility: HOSPITAL | Age: 68
End: 2023-01-08
Payer: MEDICARE

## 2023-01-08 NOTE — OUTREACH NOTE
COPD/PN Week 2 Survey    Flowsheet Row Responses   Episcopalian facility patient discharged from? [place]   Does the patient have one of the following disease processes/diagnoses(primary or secondary)? COPD   Week 2 attempt successful? No   Unsuccessful attempts Attempt 2          JAVED GRANT - Registered Nurse

## 2023-01-09 ENCOUNTER — OFFICE VISIT (OUTPATIENT)
Dept: ENDOCRINOLOGY | Facility: CLINIC | Age: 68
End: 2023-01-09
Payer: MEDICARE

## 2023-01-09 VITALS
HEIGHT: 67 IN | DIASTOLIC BLOOD PRESSURE: 90 MMHG | SYSTOLIC BLOOD PRESSURE: 140 MMHG | WEIGHT: 140.5 LBS | HEART RATE: 97 BPM | OXYGEN SATURATION: 99 % | BODY MASS INDEX: 22.05 KG/M2

## 2023-01-09 DIAGNOSIS — I10 ESSENTIAL HYPERTENSION: ICD-10-CM

## 2023-01-09 DIAGNOSIS — E55.9 VITAMIN D DEFICIENCY: ICD-10-CM

## 2023-01-09 DIAGNOSIS — F17.200 SMOKER: ICD-10-CM

## 2023-01-09 DIAGNOSIS — E78.2 MIXED HYPERLIPIDEMIA: ICD-10-CM

## 2023-01-09 DIAGNOSIS — Z79.4 TYPE 2 DIABETES MELLITUS WITH HYPERGLYCEMIA, WITH LONG-TERM CURRENT USE OF INSULIN: Primary | ICD-10-CM

## 2023-01-09 DIAGNOSIS — E11.65 TYPE 2 DIABETES MELLITUS WITH HYPERGLYCEMIA, WITH LONG-TERM CURRENT USE OF INSULIN: Primary | ICD-10-CM

## 2023-01-09 PROCEDURE — 99214 OFFICE O/P EST MOD 30 MIN: CPT | Performed by: NURSE PRACTITIONER

## 2023-01-09 PROCEDURE — 95251 CONT GLUC MNTR ANALYSIS I&R: CPT | Performed by: NURSE PRACTITIONER

## 2023-01-09 NOTE — PROGRESS NOTES
Chief Complaint  Diabetes    Subjective          Favio Casillas presents to [facility] END[facility]CRINOLOGY  History of Present Illness       In office visit      Primary care provider  Dr. Merritt    67 year old male presents for follow up      Reason diabetes mellitus type 2     Diagnosed in 2016     Timing constant     Severity is high     Quality is not controlled          Microvascular complications--- neuropathy, CKD I do not know the staging     Macrovascular complications CAD, stent x1     Current regimen is insulin     Current glucose monitoring fingersticks     Checks 4 times daily       Using the dexcom     See below      Review of Systems - General ROS: negative                  Objective   Vital Signs:   /90   Pulse 97   Ht 170.2 cm (67\")   Wt 63.7 kg (140 lb 8 oz)   SpO2 99%   BMI 22.01 kg/m²     Physical Exam  Constitutional:       Appearance: Normal appearance.   Cardiovascular:      Rate and Rhythm: Regular rhythm.      Heart sounds: Normal heart sounds.   Pulmonary:      Breath sounds: Normal breath sounds.   Musculoskeletal:      Cervical back: Normal range of motion.   Neurological:      Mental Status: He is alert.        Result Review :   The following data was reviewed by: TIFFANIE Stephen on 03/08/2022:  Common labs    Common Labs 12/23/22 12/23/22 12/24/22 12/24/22 12/25/22 12/25/22    0411 0411 0620 0620 0543 0543   Glucose  483 (A)  228 (A)  261 (A)   BUN  39 (A)  36 (A)  41 (A)   Creatinine  1.95 (A)  1.54 (A)  1.48 (A)   Sodium  136  137  137   Potassium  4.5  4.5  3.9   Chloride  110 (A)  113 (A)  111 (A)   Calcium  8.9  8.8  8.9   WBC 5.43  14.36 (A)  10.25    Hemoglobin 15.5  13.8  14.8    Hematocrit 48.1  42.4  45.7    Platelets 160  170  157    (A) Abnormal value                        Assessment and Plan    Diagnoses and all orders for this visit:    1. Type 2 diabetes mellitus with hyperglycemia, with long-term current use of insulin  (HCC) (Primary)    2. Mixed hyperlipidemia    3. Smoker    4. Essential hypertension    5. Vitamin D deficiency             Glycemic management     Type 2 diabetes not controlled         Lab Results   Component Value Date    HGBA1C 11.00 (H) 12/22/2022       Dexcom G6 is using     dowloaded and reviewed     Dated from Dec. 27 to Jan. 9, 2023    Average bg 317     Time in target 2%     High 21 %     Very high 77%     Low 0%     Very low 0%     He is using sliding scale only and not giving mealtime                Levemir Taking 20 units twice day         Taking novolog using sliding scale only     Change to the following         Start with 4 units for regular meals    Give 6 units for large meals     Add     Sliding scale     151-200         2 units  201-250        4 units   251-300        6 units   Above 301     8 units                        We discussed GLP-1 and he does not want             Dexcom has allowed the patient to minimize hypoglycemia as alarms have predicted and avoided them    he also uses the arrows on the dexcom as follows:    If arrow is horizontal , he uses the predicted dosage     If the arrow is slanted up or down-- he increase or decrease by 2 units    If the arrow is vertical up or down - he increases or decreases by 3 units                    Lipid management        Taking Pravachol 40 mg one at night         Total Cholesterol   Date Value Ref Range Status   01/04/2021 69 <200 mg/dL Final     Triglycerides   Date Value Ref Range Status   10/05/2022 96 10 - 150 mg/dL Final     HDL Cholesterol   Date Value Ref Range Status   10/05/2022 53 23 - 92 mg/dL Final     LDL Cholesterol    Date Value Ref Range Status   10/05/2022 56 mg/dL Final     Comment:         OPTIMAL: <100 mg/dl  LOW RISK: 100-129 mg/dl  BORDERLINE HIGH: 130-159 mg/dl  HIGH: 160-189 mg/dl  VERY HIGH: >189 mg/dl              Blood pressure management        Hypertension     Taking Coreg 3.125 mg twice a day        Microvascular  monitoring        Last eye exam---July 2021, no       Neuropathy-- no RX                      Bone health        Vitamin D deficiency     Taking mvi         Component      Latest Ref Rng & Units 7/2/2021   3-Epi-Vitamin D,25-Hydroxy      30 - 100 ng/ml 21.2 (L)               Preventative care        Smoker      Favio Casillas  reports that he has been smoking cigarettes. He has a 53.00 pack-year smoking history. He quit smokeless tobacco use about 43 years ago.  His smokeless tobacco use included chew.. I have educated him on the risk of diseases from using tobacco products such as cancer and COPD.     I advised him to quit and he is not willing to quit.    I spent 3  minutes counseling the patient.         Weight management        Patient's Body mass index is 22.01 kg/m². indicating that he is within normal range (BMI 18.5-24.9). No BMI management plan needed..                            Follow Up   No follow-ups on file.  Patient was given instructions and counseling regarding his condition or for health maintenance advice. Please see specific information pulled into the AVS if appropriate.         This document has been electronically signed by TIFFANIE Stephen on January 9, 2023 08:34 CST.

## 2023-01-09 NOTE — PATIENT INSTRUCTIONS
Levemir Taking 20 units twice day         Taking novolog       Start with 4 units for regular meals    Give 6 units for large meals     Add     Sliding scale     151-200         2 units  201-250        4 units   251-300        6 units   Above 301     8 units

## 2023-01-18 ENCOUNTER — READMISSION MANAGEMENT (OUTPATIENT)
Dept: CALL CENTER | Facility: HOSPITAL | Age: 68
End: 2023-01-18
Payer: MEDICARE

## 2023-01-18 NOTE — OUTREACH NOTE
COPD/PN Week 3 Survey    Flowsheet Row Responses   [facility] [place] facility patient discharged from? [place]   Does the patient have one of the following disease processes/diagnoses(primary or secondary)? COPD   Week 3 attempt successful? No   Unsuccessful attempts Attempt 1   Revoke Decline to participate          HAM ELY - Licensed Nurse

## 2023-01-27 ENCOUNTER — TELEPHONE (OUTPATIENT)
Dept: ENDOCRINOLOGY | Facility: CLINIC | Age: 68
End: 2023-01-27
Payer: MEDICARE

## 2023-01-27 DIAGNOSIS — Z79.4 TYPE 2 DIABETES MELLITUS WITH HYPERGLYCEMIA, WITH LONG-TERM CURRENT USE OF INSULIN: ICD-10-CM

## 2023-01-27 DIAGNOSIS — E11.65 TYPE 2 DIABETES MELLITUS WITH HYPERGLYCEMIA, WITH LONG-TERM CURRENT USE OF INSULIN: ICD-10-CM

## 2023-01-27 NOTE — TELEPHONE ENCOUNTER
Patient came to office to request a pescription for the Contour Next EZ test [facility].    Phone: [phone]    Thank you.

## 2023-02-09 DIAGNOSIS — Z79.4 TYPE 2 DIABETES MELLITUS WITH HYPERGLYCEMIA, WITH LONG-TERM CURRENT USE OF INSULIN: ICD-10-CM

## 2023-02-09 DIAGNOSIS — E11.65 TYPE 2 DIABETES MELLITUS WITH HYPERGLYCEMIA, WITH LONG-TERM CURRENT USE OF INSULIN: ICD-10-CM

## 2023-02-09 RX ORDER — PROCHLORPERAZINE 25 MG/1
SUPPOSITORY RECTAL
Qty: 1 EACH | Refills: 3 | Status: SHIPPED | OUTPATIENT
Start: 2023-02-09

## 2023-03-13 ENCOUNTER — PREP FOR SURGERY (OUTPATIENT)
Dept: OTHER | Facility: HOSPITAL | Age: 68
End: 2023-03-13
Payer: MEDICARE

## 2023-03-13 DIAGNOSIS — D49.4 BLADDER NEOPLASM: Primary | ICD-10-CM

## 2023-03-19 ENCOUNTER — HOSPITAL ENCOUNTER (EMERGENCY)
Facility: HOSPITAL | Age: 68
Discharge: HOME | End: 2023-03-19
Attending: STUDENT IN AN ORGANIZED HEALTH CARE EDUCATION/TRAINING PROGRAM | Admitting: STUDENT IN AN ORGANIZED HEALTH CARE EDUCATION/TRAINING PROGRAM
Payer: MEDICARE

## 2023-03-19 ENCOUNTER — APPOINTMENT (OUTPATIENT)
Dept: CT IMAGING | Facility: HOSPITAL | Age: 68
End: 2023-03-19
Payer: MEDICARE

## 2023-03-19 VITALS
HEART RATE: 114 BPM | TEMPERATURE: 98.2 F | OXYGEN SATURATION: 97 % | SYSTOLIC BLOOD PRESSURE: 172 MMHG | HEIGHT: 67 IN | WEIGHT: 141 LBS | RESPIRATION RATE: 20 BRPM | BODY MASS INDEX: 22.13 KG/M2 | DIASTOLIC BLOOD PRESSURE: 90 MMHG

## 2023-03-19 DIAGNOSIS — R11.0 NAUSEA: ICD-10-CM

## 2023-03-19 DIAGNOSIS — N39.0 URINARY TRACT INFECTION WITHOUT HEMATURIA, SITE UNSPECIFIED: Primary | ICD-10-CM

## 2023-03-19 LAB
ALBUMIN SERPL-MCNC: 4.5 G/DL (ref 3.5–5.2)
ALBUMIN/GLOB SERPL: 1.5 G/DL
ALP SERPL-CCNC: 113 U/L (ref 39–117)
ALT SERPL W P-5'-P-CCNC: 10 U/L (ref 1–41)
ANION GAP SERPL CALCULATED.3IONS-SCNC: 14 MMOL/L (ref 5–15)
AST SERPL-CCNC: 11 U/L (ref 1–40)
BACTERIA UR QL AUTO: ABNORMAL /HPF
BASOPHILS # BLD AUTO: 0.02 10*3/MM3 (ref 0–0.2)
BASOPHILS NFR BLD AUTO: 0.2 % (ref 0–1.5)
BILIRUB SERPL-MCNC: 0.6 MG/DL (ref 0–1.2)
BILIRUB UR QL STRIP: NEGATIVE
BUN SERPL-MCNC: 49 MG/DL (ref 8–23)
BUN/CREAT SERPL: 25.3 (ref 7–25)
CALCIUM SPEC-SCNC: 9.6 MG/DL (ref 8.6–10.5)
CHLORIDE SERPL-SCNC: 108 MMOL/L (ref 98–107)
CLARITY UR: ABNORMAL
CO2 SERPL-SCNC: 8 MMOL/L (ref 22–29)
COARSE GRAN CASTS URNS QL MICRO: ABNORMAL /LPF
COLOR UR: YELLOW
CREAT SERPL-MCNC: 1.94 MG/DL (ref 0.76–1.27)
DEPRECATED RDW RBC AUTO: 44.9 FL (ref 37–54)
EGFRCR SERPLBLD CKD-EPI 2021: 37.5 ML/MIN/1.73
EOSINOPHIL # BLD AUTO: 0.01 10*3/MM3 (ref 0–0.4)
EOSINOPHIL NFR BLD AUTO: 0.1 % (ref 0.3–6.2)
ERYTHROCYTE [DISTWIDTH] IN BLOOD BY AUTOMATED COUNT: 13.9 % (ref 12.3–15.4)
GLOBULIN UR ELPH-MCNC: 3.1 GM/DL
GLUCOSE SERPL-MCNC: 267 MG/DL (ref 65–99)
GLUCOSE UR STRIP-MCNC: NEGATIVE MG/DL
HCT VFR BLD AUTO: 53.2 % (ref 37.5–51)
HGB BLD-MCNC: 17.2 G/DL (ref 13–17.7)
HGB UR QL STRIP.AUTO: ABNORMAL
HOLD SPECIMEN: NORMAL
HOLD SPECIMEN: NORMAL
HYALINE CASTS UR QL AUTO: ABNORMAL /LPF
IMM GRANULOCYTES # BLD AUTO: 0.04 10*3/MM3 (ref 0–0.05)
IMM GRANULOCYTES NFR BLD AUTO: 0.4 % (ref 0–0.5)
KETONES UR QL STRIP: NEGATIVE
LEUKOCYTE ESTERASE UR QL STRIP.AUTO: ABNORMAL
LIPASE SERPL-CCNC: 18 U/L (ref 13–60)
LYMPHOCYTES # BLD AUTO: 1.04 10*3/MM3 (ref 0.7–3.1)
LYMPHOCYTES NFR BLD AUTO: 10.6 % (ref 19.6–45.3)
MCH RBC QN AUTO: 28.8 PG (ref 26.6–33)
MCHC RBC AUTO-ENTMCNC: 32.3 G/DL (ref 31.5–35.7)
MCV RBC AUTO: 89 FL (ref 79–97)
MONOCYTES # BLD AUTO: 0.49 10*3/MM3 (ref 0.1–0.9)
MONOCYTES NFR BLD AUTO: 5 % (ref 5–12)
MUCOUS THREADS URNS QL MICRO: ABNORMAL /HPF
NEUTROPHILS NFR BLD AUTO: 8.22 10*3/MM3 (ref 1.7–7)
NEUTROPHILS NFR BLD AUTO: 83.7 % (ref 42.7–76)
NITRITE UR QL STRIP: NEGATIVE
NRBC BLD AUTO-RTO: 0 /100 WBC (ref 0–0.2)
PH UR STRIP.AUTO: 7 [PH] (ref 5–9)
PLATELET # BLD AUTO: 166 10*3/MM3 (ref 140–450)
PMV BLD AUTO: 10.7 FL (ref 6–12)
POTASSIUM SERPL-SCNC: 4.8 MMOL/L (ref 3.5–5.2)
PROT SERPL-MCNC: 7.6 G/DL (ref 6–8.5)
PROT UR QL STRIP: ABNORMAL
RBC # BLD AUTO: 5.98 10*6/MM3 (ref 4.14–5.8)
RBC # UR STRIP: ABNORMAL /HPF
REF LAB TEST METHOD: ABNORMAL
SODIUM SERPL-SCNC: 130 MMOL/L (ref 136–145)
SP GR UR STRIP: 1.01 (ref 1–1.03)
SQUAMOUS #/AREA URNS HPF: ABNORMAL /HPF
UROBILINOGEN UR QL STRIP: ABNORMAL
WBC # UR STRIP: ABNORMAL /HPF
WBC NRBC COR # BLD: 9.82 10*3/MM3 (ref 3.4–10.8)
WHOLE BLOOD HOLD COAG: NORMAL
WHOLE BLOOD HOLD SPECIMEN: NORMAL

## 2023-03-19 PROCEDURE — 80053 COMPREHEN METABOLIC PANEL: CPT | Performed by: STUDENT IN AN ORGANIZED HEALTH CARE EDUCATION/TRAINING PROGRAM

## 2023-03-19 PROCEDURE — 87086 URINE CULTURE/COLONY COUNT: CPT | Performed by: STUDENT IN AN ORGANIZED HEALTH CARE EDUCATION/TRAINING PROGRAM

## 2023-03-19 PROCEDURE — 99283 EMERGENCY DEPT VISIT LOW MDM: CPT

## 2023-03-19 PROCEDURE — 63710000001 ONDANSETRON ODT 4 MG TABLET DISPERSIBLE: Performed by: STUDENT IN AN ORGANIZED HEALTH CARE EDUCATION/TRAINING PROGRAM

## 2023-03-19 PROCEDURE — 81001 URINALYSIS AUTO W/SCOPE: CPT | Performed by: STUDENT IN AN ORGANIZED HEALTH CARE EDUCATION/TRAINING PROGRAM

## 2023-03-19 PROCEDURE — 87077 CULTURE AEROBIC IDENTIFY: CPT | Performed by: STUDENT IN AN ORGANIZED HEALTH CARE EDUCATION/TRAINING PROGRAM

## 2023-03-19 PROCEDURE — 99284 EMERGENCY DEPT VISIT MOD MDM: CPT

## 2023-03-19 PROCEDURE — 87186 SC STD MICRODIL/AGAR DIL: CPT | Performed by: STUDENT IN AN ORGANIZED HEALTH CARE EDUCATION/TRAINING PROGRAM

## 2023-03-19 PROCEDURE — 74177 CT ABD & PELVIS W/CONTRAST: CPT

## 2023-03-19 PROCEDURE — 25510000001 IOPAMIDOL 61 % SOLUTION: Performed by: STUDENT IN AN ORGANIZED HEALTH CARE EDUCATION/TRAINING PROGRAM

## 2023-03-19 PROCEDURE — 83690 ASSAY OF LIPASE: CPT | Performed by: STUDENT IN AN ORGANIZED HEALTH CARE EDUCATION/TRAINING PROGRAM

## 2023-03-19 PROCEDURE — 85025 COMPLETE CBC W/AUTO DIFF WBC: CPT | Performed by: STUDENT IN AN ORGANIZED HEALTH CARE EDUCATION/TRAINING PROGRAM

## 2023-03-19 RX ORDER — ONDANSETRON 4 MG/1
4 TABLET, ORALLY DISINTEGRATING ORAL 4 TIMES DAILY PRN
Qty: 12 TABLET | Refills: 0 | Status: SHIPPED | OUTPATIENT
Start: 2023-03-19

## 2023-03-19 RX ORDER — SODIUM CHLORIDE 0.9 % (FLUSH) 0.9 %
10 SYRINGE (ML) INJECTION AS NEEDED
Status: DISCONTINUED | OUTPATIENT
Start: 2023-03-19 | End: 2023-03-19 | Stop reason: HOSPADM

## 2023-03-19 RX ORDER — LIDOCAINE HYDROCHLORIDE 20 MG/ML
15 SOLUTION OROPHARYNGEAL ONCE
Status: COMPLETED | OUTPATIENT
Start: 2023-03-19 | End: 2023-03-19

## 2023-03-19 RX ORDER — CEPHALEXIN 500 MG/1
500 CAPSULE ORAL ONCE
Status: COMPLETED | OUTPATIENT
Start: 2023-03-19 | End: 2023-03-19

## 2023-03-19 RX ORDER — ALUMINA, MAGNESIA, AND SIMETHICONE 2400; 2400; 240 MG/30ML; MG/30ML; MG/30ML
15 SUSPENSION ORAL ONCE
Status: COMPLETED | OUTPATIENT
Start: 2023-03-19 | End: 2023-03-19

## 2023-03-19 RX ORDER — ONDANSETRON 4 MG/1
4 TABLET, ORALLY DISINTEGRATING ORAL ONCE
Status: COMPLETED | OUTPATIENT
Start: 2023-03-19 | End: 2023-03-19

## 2023-03-19 RX ORDER — CEPHALEXIN 500 MG/1
500 CAPSULE ORAL 3 TIMES DAILY
Qty: 30 CAPSULE | Refills: 0 | Status: SHIPPED | OUTPATIENT
Start: 2023-03-19 | End: 2023-03-29

## 2023-03-19 RX ADMIN — IOPAMIDOL 100 ML: 612 INJECTION, SOLUTION INTRAVENOUS at 20:31

## 2023-03-19 RX ADMIN — SODIUM CHLORIDE 1000 ML: 9 INJECTION, SOLUTION INTRAVENOUS at 19:26

## 2023-03-19 RX ADMIN — ONDANSETRON 4 MG: 4 TABLET, ORALLY DISINTEGRATING ORAL at 19:25

## 2023-03-19 RX ADMIN — ALUMINUM HYDROXIDE, MAGNESIUM HYDROXIDE, AND DIMETHICONE 15 ML: 400; 400; 40 SUSPENSION ORAL at 19:46

## 2023-03-19 RX ADMIN — LIDOCAINE HYDROCHLORIDE 15 ML: 20 SOLUTION ORAL; TOPICAL at 19:46

## 2023-03-19 RX ADMIN — CEPHALEXIN 500 MG: 500 CAPSULE ORAL at 21:40

## 2023-03-19 NOTE — ED PROVIDER NOTES
Subjective   History of Present Illness  68-year-old male comes to the ER with a 1 day history of burning whenever he eats with nausea and vomiting.  Patient states that he has a tumor in his belly and is followed by Dr. Calderon.  He was afraid that the tumor might of ruptured given the burning pain.  He denies other symptoms.    History provided by:  Patient   used: No        Review of Systems   Constitutional: Positive for appetite change. Negative for chills and fever.   HENT: Negative for drooling.    Eyes: Negative for redness.   Respiratory: Negative for shortness of breath.    Cardiovascular: Negative for chest pain.   Gastrointestinal: Positive for abdominal pain, nausea and vomiting.   Genitourinary: Negative for flank pain.   Skin: Negative for color change.   Neurological: Negative for seizures.   Psychiatric/Behavioral: Negative for confusion.       Past Medical History:   Diagnosis Date   • Adenomatous polyp of colon    • Bladder cancer (HCC)    • Bladder outlet obstruction    • CAD (coronary artery disease)    • Chronic back pain    • Chronic gastritis    • Chronic hepatitis C (HCC)    • Chronic obstructive lung disease (HCC)    • Coronary arteriosclerosis    • Diabetes mellitus (HCC)    • Diverticular disease of colon    • Drug abuse (HCC)    • GERD (gastroesophageal reflux disease)    • Hypertension    • Ingrown toenail    • Prostate cancer (HCC)    • Rheumatoid arthritis (HCC)    • Seizure (HCC)    • Stroke (HCC)     heat stroke   • Transient cerebral ischemia        No Known Allergies    Past Surgical History:   Procedure Laterality Date   • BLADDER TUMOR EXCISION     • CARDIAC CATHETERIZATION N/A 12/15/2017   • CHOLECYSTECTOMY     • COLONOSCOPY N/A 4/22/2019   • COLONOSCOPY N/A 10/19/2021    Procedure: COLONOSCOPY;  Surgeon: Alfonso Torres MD;  Location: [facility] ENDOSCOPY;  Service: Gastroenterology;  Laterality: N/A;   • CORONARY ANGIOPLASTY WITH STENT PLACEMENT     •  ENDOSCOPY N/A 3/3/2017   • ENDOSCOPY N/A 4/22/2019   • ENDOSCOPY N/A 10/19/2021    Procedure: ESOPHAGOGASTRODUODENOSCOPY;  Surgeon: Alfonso Torres MD;  Location: [facility] ENDOSCOPY;  Service: Gastroenterology;  Laterality: N/A;   • KIDNEY SURGERY     • LUMBAR DISC SURGERY     • TRANSURETHRAL RESECTION OF BLADDER TUMOR N/A 7/21/2021    Procedure: CYSTOSCOPY TRANSURETHRAL RESECTION OF BLADDER TUMOR;  Surgeon: Christofer Calderon MD;  Location: [facility] OR;  Service: Urology;  Laterality: N/A;   • UPPER GASTROINTESTINAL ENDOSCOPY  04/22/2019   • UPPER GASTROINTESTINAL ENDOSCOPY  10/19/2021   • WRIST SURGERY Left        Family History   Problem Relation Age of Onset   • Diabetes Mother    • Liver cancer Father        Social History     Socioeconomic History   • Marital status:    Tobacco Use   • Smoking status: Every Day     Packs/day: 1.00     Years: 53.00     Pack years: 53.00     Types: Cigarettes   • Smokeless tobacco: Former     Types: Chew     Quit date: 1980   Vaping Use   • Vaping Use: Some days   • Substances: Nicotine, Flavoring   • Devices: Disposable   Substance and Sexual Activity   • Alcohol use: Never   • Drug use: Yes     Frequency: 2.0 times per week     Types: Marijuana     Comment: DAILY   • Sexual activity: Not Currently           Objective    Vitals:    03/19/23 2015 03/19/23 2025 03/19/23 2118 03/19/23 2122   BP: 174/89      BP Location:       Patient Position:       Pulse: 110 110 110 110   Resp:       Temp:       TempSrc:       SpO2: 98% 98% 98% 98%   Weight:       Height:           Physical Exam  Vitals and nursing note reviewed.   Constitutional:       General: He is not in acute distress.     Appearance: He is well-developed. He is not ill-appearing, toxic-appearing or diaphoretic.   Pulmonary:      Effort: Pulmonary effort is normal. No accessory muscle usage or respiratory distress.   Chest:      Chest wall: No tenderness.   Abdominal:      Palpations: Abdomen is soft.      Tenderness:  There is abdominal tenderness (deep palpation). There is no guarding or rebound.   Skin:     General: Skin is warm and dry.      Capillary Refill: Capillary refill takes less than 2 seconds.   Neurological:      Mental Status: He is alert and oriented to person, place, and time.         Procedures           ED Course      Results for orders placed or performed during the hospital encounter of 03/19/23   Comprehensive Metabolic Panel    Specimen: Blood   Result Value Ref Range    Glucose 267 (H) 65 - 99 mg/dL    BUN 49 (H) 8 - 23 mg/dL    Creatinine 1.94 (H) 0.76 - 1.27 mg/dL    Sodium 130 (L) 136 - 145 mmol/L    Potassium 4.8 3.5 - 5.2 mmol/L    Chloride 108 (H) 98 - 107 mmol/L    CO2 8.0 (L) 22.0 - 29.0 mmol/L    Calcium 9.6 8.6 - 10.5 mg/dL    Total Protein 7.6 6.0 - 8.5 g/dL    Albumin 4.5 3.5 - 5.2 g/dL    ALT (SGPT) 10 1 - 41 U/L    AST (SGOT) 11 1 - 40 U/L    Alkaline Phosphatase 113 39 - 117 U/L    Total Bilirubin 0.6 0.0 - 1.2 mg/dL    Globulin 3.1 gm/dL    A/G Ratio 1.5 g/dL    BUN/Creatinine Ratio 25.3 (H) 7.0 - 25.0    Anion Gap 14.0 5.0 - 15.0 mmol/L    eGFR 37.5 (L) >60.0 mL/min/1.73   Lipase    Specimen: Blood   Result Value Ref Range    Lipase 18 13 - 60 U/L   Urinalysis With Microscopic If Indicated (No Culture) - Urine, Clean Catch    Specimen: Urine, Clean Catch   Result Value Ref Range    Color, UA Yellow Yellow, Straw, Dark Yellow, Joi    Appearance, UA Turbid (A) Clear    pH, UA 7.0 5.0 - 9.0    Specific Gravity, UA 1.012 1.003 - 1.030    Glucose, UA Negative Negative    Ketones, UA Negative Negative    Bilirubin, UA Negative Negative    Blood, UA Moderate (2+) (A) Negative    Protein, UA >=300 mg/dL (3+) (A) Negative    Leuk Esterase, UA Moderate (2+) (A) Negative    Nitrite, UA Negative Negative    Urobilinogen, UA 1.0 E.U./dL 0.2 - 1.0 E.U./dL   CBC Auto Differential    Specimen: Blood   Result Value Ref Range    WBC 9.82 3.40 - 10.80 10*3/mm3    RBC 5.98 (H) 4.14 - 5.80 10*6/mm3     Hemoglobin 17.2 13.0 - 17.7 g/dL    Hematocrit 53.2 (H) 37.5 - 51.0 %    MCV 89.0 79.0 - 97.0 fL    MCH 28.8 26.6 - 33.0 pg    MCHC 32.3 31.5 - 35.7 g/dL    RDW 13.9 12.3 - 15.4 %    RDW-SD 44.9 37.0 - 54.0 fl    MPV 10.7 6.0 - 12.0 fL    Platelets 166 140 - 450 10*3/mm3    Neutrophil % 83.7 (H) 42.7 - 76.0 %    Lymphocyte % 10.6 (L) 19.6 - 45.3 %    Monocyte % 5.0 5.0 - 12.0 %    Eosinophil % 0.1 (L) 0.3 - 6.2 %    Basophil % 0.2 0.0 - 1.5 %    Immature Grans % 0.4 0.0 - 0.5 %    Neutrophils, Absolute 8.22 (H) 1.70 - 7.00 10*3/mm3    Lymphocytes, Absolute 1.04 0.70 - 3.10 10*3/mm3    Monocytes, Absolute 0.49 0.10 - 0.90 10*3/mm3    Eosinophils, Absolute 0.01 0.00 - 0.40 10*3/mm3    Basophils, Absolute 0.02 0.00 - 0.20 10*3/mm3    Immature Grans, Absolute 0.04 0.00 - 0.05 10*3/mm3    nRBC 0.0 0.0 - 0.2 /100 WBC   Urinalysis, Microscopic Only - Urine, Clean Catch    Specimen: Urine, Clean Catch   Result Value Ref Range    RBC, UA 3-5 (A) None Seen /HPF    WBC, UA 21-30 (A) None Seen, 0-2, 3-5 /HPF    Bacteria, UA 4+ (A) None Seen /HPF    Squamous Epithelial Cells, UA 0-2 None Seen, 0-2 /HPF    Hyaline Casts, UA 0-2 None Seen /LPF    Coarse Granular Casts, UA 0-2 None Seen /LPF    Mucus, UA Moderate/2+ (A) None Seen, Trace /HPF    Methodology Manual Light Microscopy    Green Top (Gel)   Result Value Ref Range    Extra Tube Hold for add-ons.    Lavender Top   Result Value Ref Range    Extra Tube hold for add-on    Gold Top - SST   Result Value Ref Range    Extra Tube Hold for add-ons.    Light Blue Top   Result Value Ref Range    Extra Tube Hold for add-ons.      CT Abdomen Pelvis With Contrast   Final Result   1.  No bowel obstruction, herniated bowel loops or focal   inflammatory changes.       Electronically signed by:  Neal Peralta MD  3/19/2023 9:10 PM CDT   Workstation: [id number]                Medical Decision Making  Vital signs are stable, afebrile.  Labs remarkable for 2+ leukocytes, nitrate negative, 4+ bacteria  and UA.  White count is normal.  CKD creatinine 1.9.  Anion gap is normal.  CT abdomen pelvis negative for acute findings.  Patient received antibiotics, Zofran, IVF bolus.  No respiratory symptoms.  Recommend follow-up with his PCP and urology.  We will call in Zofran and antibiotics to his pharmacy.  Return precautions given.  Patient states understanding and is agreeable to the plan.    Nausea: acute illness or injury  Urinary tract infection without hematuria, site unspecified: acute illness or injury  Amount and/or Complexity of Data Reviewed  Labs: ordered.  Radiology: ordered.      Risk  OTC drugs.  Prescription drug management.          Final diagnoses:   Urinary tract infection without hematuria, site unspecified   Nausea       ED Disposition  ED Disposition     ED Disposition   Discharge    Condition   Stable    Comment   --             Shayne Merritt MD  [address] [id number] [phone]    Schedule an appointment as soon as possible for a visit in 2 days  ER follow up         Medication List      New Prescriptions    cephalexin 500 MG capsule  Commonly known as: KEFLEX  Take 1 capsule by mouth 3 (Three) Times a Day for 10 days.     ondansetron ODT 4 MG disintegrating tablet  Commonly known as: ZOFRAN-ODT  Place 1 tablet on the tongue 4 (Four) Times a Day As Needed for Nausea or Vomiting.           Where to Get Your Medications      These medications were sent to [id number] - [place], [place] - [address] AT [address] - [phone]  - [phone]   [address] [address]    Phone: [phone]   · cephalexin 500 MG capsule  · ondansetron ODT 4 MG disintegrating tablet          Shabbir Webster MD  03/19/23 5261

## 2023-03-21 LAB — BACTERIA SPEC AEROBE CULT: ABNORMAL

## 2023-04-05 ENCOUNTER — PRE-ADMISSION TESTING (OUTPATIENT)
Dept: PREADMISSION TESTING | Facility: HOSPITAL | Age: 68
End: 2023-04-05
Payer: MEDICARE

## 2023-04-05 VITALS
RESPIRATION RATE: 20 BRPM | HEART RATE: 78 BPM | WEIGHT: 145 LBS | BODY MASS INDEX: 21.98 KG/M2 | SYSTOLIC BLOOD PRESSURE: 112 MMHG | HEIGHT: 68 IN | OXYGEN SATURATION: 97 % | DIASTOLIC BLOOD PRESSURE: 68 MMHG

## 2023-04-05 DIAGNOSIS — D49.4 BLADDER NEOPLASM: ICD-10-CM

## 2023-04-05 LAB
APTT PPP: 26.5 SECONDS (ref 20–40.3)
BILIRUB UR QL STRIP: NEGATIVE
CLARITY UR: CLEAR
COLOR UR: YELLOW
GLUCOSE UR STRIP-MCNC: ABNORMAL MG/DL
HGB UR QL STRIP.AUTO: ABNORMAL
INR PPP: 1 (ref 0.8–1.2)
KETONES UR QL STRIP: NEGATIVE
LEUKOCYTE ESTERASE UR QL STRIP.AUTO: NEGATIVE
NITRITE UR QL STRIP: NEGATIVE
PH UR STRIP.AUTO: 7.5 [PH] (ref 5–9)
PROT UR QL STRIP: ABNORMAL
PROTHROMBIN TIME: 13.1 SECONDS (ref 11.1–15.3)
SP GR UR STRIP: 1.01 (ref 1–1.03)
UROBILINOGEN UR QL STRIP: ABNORMAL

## 2023-04-05 PROCEDURE — 81003 URINALYSIS AUTO W/O SCOPE: CPT

## 2023-04-05 PROCEDURE — 36415 COLL VENOUS BLD VENIPUNCTURE: CPT

## 2023-04-05 PROCEDURE — 85610 PROTHROMBIN TIME: CPT

## 2023-04-05 PROCEDURE — 85730 THROMBOPLASTIN TIME PARTIAL: CPT

## 2023-04-05 RX ORDER — DOXYCYCLINE HYCLATE 100 MG/1
100 CAPSULE ORAL DAILY
Status: ON HOLD | COMMUNITY
End: 2023-04-12

## 2023-04-05 RX ORDER — SODIUM CHLORIDE 9 MG/ML
1000 INJECTION, SOLUTION INTRAVENOUS CONTINUOUS
Status: CANCELLED | OUTPATIENT
Start: 2023-04-12

## 2023-04-05 RX ORDER — INSULIN ASPART 100 [IU]/ML
0-20 INJECTION, SOLUTION INTRAVENOUS; SUBCUTANEOUS
COMMUNITY

## 2023-04-05 NOTE — PAT
Called OR to clarify blood thinner instructions with Dr. Calderon. Per MD, patient should stop Aspirin today 4/5/23 and stop Plavix 3 days prior to procedure.     reviewed EKG and echo results. No orders received, ok to proceed.

## 2023-04-11 NOTE — H&P
[facility] History and Physical  MARTINA OWENS  68-year-old; :1955;;male  [address];[place] [address]  Ins: 1) ANTHEM/ANUJA 2) [place] MEDICAID-[employer]  MRN:[id number]  SALLY PARSONS MD  [facility]  Allergies  No Known Drug Allergies Unknown  Current Medications  doxycycline monohydrate 100 mg oral tablet TAKE 1 TABLET BY MOUTH EVERY DAY  Adoxa 100 mg oral tablet Take 1 Tablet(s) 1 time a day  MiraLax oral powder for reconstitution 17 gram daily. Mix with 8 ox fluid as directed on label  PriLOSEC OTC 20 mg oral delayed release tablet Take 1 Tablet(s) 1 time a day  Proventil HFA 90 mcg/Actuation Aerosol Inhaler Inhalation  dicyclomine 10 mg Cap Oral  amitriptyline 10 mg Tab Oral  isosorbide mononitrate ER 30 mg 24 hr Tab tablet, extended release Oral  cyclobenzaprine 5 mg Tab Oral  dicyclomine 10 mg Cap Oral  carvedilol 3.125 mg Tab Oral  * Medications Reconciled  Problem List  Carcinoma of bladder 2023  Medical History  UNS MALIG GINNY BLADDER  HEART DISEASE  STROKE  Type 2 Diabetes Mellitus Without Complications  Essential Primary Hypertension  Disorder Of Kidney And Ureter, Unspecified  Unspecified Convulsions  Gastric Ulcer, Unspecified As Acute Or Chronic, Without Hemorrhage Or Perforation  Unspecified Glaucoma  Unspecified Cataract  HAS HAD COLONSCOPY IN LAST 9 YEARS  Surgical History  CYSTO  BLADDER SURGERY  Psychiatric History  Patient is generally satisfied with life and has no anxiety or thoughts of suicide. Has depression.  Family History  CANCER  Heart Disease, Unspecified  Type 2 Diabetes Mellitus Without Complications  Essential Primary Hypertension  Disorder Of Kidney And Ureter, Unspecified  Cerebral Infarction, Unspecified  Mother ; Father ; Brother ; Brother Alive; Sister Alive; Sister Alive  Social History  He is  and lives alone. He is unemployed/disabled. He does not drink. He smokes 2  packs daily.  Imm/Inj/Office Meds History Comments  PT HAS HAD FLU AND PNEUMONIA VACCINES. COVID 2  Chief Complaint  Neurogenic bladder, history of bladder cancer  History of Present Illness  MARTINA OWENS is a 68-year-old male here for evaluation. He has a history of bladder cancer with Continent Diversion, NGB, urinary retention. Recent history of UTI that culture grew e-coli. On Keflex which appears to cover. Denies any fever. He also has a bladder lesion and needs schedule for TURBT. + smoke history.  CT abdomen and pevlis-03/19/2023-no bowel obstruction, herniated bowel loops or focal inflammatory changes.  Cystoscopy-03/02/2023-pending result  Cystoscopy-01/26/2022-No recurrence of bladder cancer.  Cystoscopy-07/06/2021-HISTORY OF CONTINENT DIVERSION, 2 LESIONS NOTED IN BLADDER POUCH.  Cystoscopy-03/31/2021-No abnormalities. Pouch without infection. No tumor or calculi.  Pathology-07/21/2021-mucosa with diffuse intestinal metaplasia, erosive/reactive changes,  chronic inflammation.  Location: bladder  Severity: no pain  Onset / Duration: ongoing  Timing: chronic  Modifying factors: TURBT, antibiotics  Associated signs and symptoms: hematuria  Review of Systems  Eyes:Denies: blurry vision, pain in the eyes and double vision  ENMT:Reports: sinus problems ; Denies: ear pain and sore throat  Cardiovascular:Reports: chest pain ; Denies: varicose veins, angina and syncope  Respiratory:Denies: shortness of breath, wheezing and frequent cough  Gastrointestinal:Reports: abdominal pain ; Denies: nausea, vomiting, indigestion and heartburn  Genitourinary:Reports: other symptoms (see HPI)  Musculoskeletal:Reports: joint pain ; Denies: neck pain and back pain  Integumentary:Denies: skin rash, boils and persistent itch  Neurological:Denies: tremors, dizzy spells and numbness / tingling  Psychiatric:Reports: generally satisfied with life ; Denies: depression, suicidal ideation and anxiety  Endocrine:Denies: Excessive  thirst, cold intolerance, heat intolerance and tired/sluggish  Hematologic/Lymphatic:Denies: swollen glands and blood clotting problem  Allergic/Immunologic:Denies: hayfever  Constitutional:Denies: fever, chills and weight loss  Vital Signs  VITAL SIGNS NOT TAKEN DUE TO COVID 19 RESTRICTIONS  Weight: 147 (lb) Resp Rate: 18 (/min)   Height: 67 (in) BMI: 23.02  Never smoker  Exam  General appearance: The patient appears well developed and well nourished, in no apparent acute distress.  Examination of pupils and irises: No redness or drainage noted.  Assessment of hearing: Responds to verbal command.  Examination of neck: Neck appears supple. No masses noted.  Assessment of respiratory effort: Respiratory effort appears normal. No apparent distress.  Examination of abdomen: No nausea, vomiting, or diarrhea.  Genitourinary: hematuria  Examination of gait and station: Normal gait and stature.  Head and neck (Assessment of stability): Walks without assistance.  Inspection of skin and subcutaneous tissue: normal color  Orientation to time, place and person: Oriented to person, place, and time.  Mood and affect: Normal mood and affect.  Data Review  Medications and chart reviewed. History and physical form/ROS reviewed.   Assessment - Carcinoma of bladder  DX:  Carcinoma of bladder  SNO: [id number], ICD-10: C67.9  Plan  Plan Notes:  CYSTOSCOPY, TRANSURETHRAL RESECTION OF BLADDER TUMOR  Education:  Bladder Cancer  Discussion:  Discussed my findings and plan of action and reasoning behind decision making. All questions were answered. RISKS AND BENEFITS OF PROCEDURE DISCUSSED WITH PATIENT WHO WISHES TO PROCEED.  Instructions:  Patient is instructed to call with any problems. Patient is instructed to call if the condition worsens. Patient is instructed to call with any changes in condition. Patient is instructed to call if he has any intractable pain, fever, chills, nausea, or vomiting.

## 2023-04-12 ENCOUNTER — HOSPITAL ENCOUNTER (OUTPATIENT)
Facility: HOSPITAL | Age: 68
Setting detail: HOSPITAL OUTPATIENT SURGERY
Discharge: HOME | End: 2023-04-12
Attending: UROLOGY | Admitting: UROLOGY
Payer: MEDICARE

## 2023-04-12 ENCOUNTER — ANESTHESIA (OUTPATIENT)
Dept: PERIOP | Facility: HOSPITAL | Age: 68
End: 2023-04-12
Payer: MEDICARE

## 2023-04-12 ENCOUNTER — ANESTHESIA EVENT (OUTPATIENT)
Dept: PERIOP | Facility: HOSPITAL | Age: 68
End: 2023-04-12
Payer: MEDICARE

## 2023-04-12 VITALS
OXYGEN SATURATION: 97 % | HEIGHT: 68 IN | WEIGHT: 138.23 LBS | TEMPERATURE: 97.6 F | DIASTOLIC BLOOD PRESSURE: 72 MMHG | BODY MASS INDEX: 20.95 KG/M2 | SYSTOLIC BLOOD PRESSURE: 133 MMHG | RESPIRATION RATE: 18 BRPM | HEART RATE: 91 BPM

## 2023-04-12 DIAGNOSIS — D49.4 BLADDER NEOPLASM: ICD-10-CM

## 2023-04-12 DIAGNOSIS — C67.2 MALIGNANT NEOPLASM OF LATERAL WALL OF URINARY BLADDER: Primary | Chronic | ICD-10-CM

## 2023-04-12 LAB
AMPHET+METHAMPHET UR QL: NEGATIVE
AMPHETAMINES UR QL: NEGATIVE
BARBITURATES UR QL SCN: NEGATIVE
BENZODIAZ UR QL SCN: NEGATIVE
BUPRENORPHINE SERPL-MCNC: NEGATIVE NG/ML
CANNABINOIDS SERPL QL: POSITIVE
COCAINE UR QL: NEGATIVE
GLUCOSE BLDC GLUCOMTR-MCNC: 351 MG/DL (ref 70–130)
GLUCOSE BLDC GLUCOMTR-MCNC: 365 MG/DL (ref 70–130)
GLUCOSE BLDC GLUCOMTR-MCNC: 398 MG/DL (ref 70–130)
METHADONE UR QL SCN: NEGATIVE
OPIATES UR QL: NEGATIVE
OXYCODONE UR QL SCN: NEGATIVE
PCP UR QL SCN: NEGATIVE
PROPOXYPH UR QL: NEGATIVE
TRICYCLICS UR QL SCN: NEGATIVE

## 2023-04-12 PROCEDURE — 63710000001 INSULIN REGULAR HUMAN PER 5 UNITS

## 2023-04-12 PROCEDURE — 80306 DRUG TEST PRSMV INSTRMNT: CPT | Performed by: ANESTHESIOLOGY

## 2023-04-12 PROCEDURE — 25010000002 PROPOFOL 200 MG/20ML EMULSION: Performed by: NURSE ANESTHETIST, CERTIFIED REGISTERED

## 2023-04-12 PROCEDURE — 25010000002 ONDANSETRON PER 1 MG: Performed by: NURSE ANESTHETIST, CERTIFIED REGISTERED

## 2023-04-12 PROCEDURE — 82962 GLUCOSE BLOOD TEST: CPT

## 2023-04-12 PROCEDURE — 88305 TISSUE EXAM BY PATHOLOGIST: CPT

## 2023-04-12 PROCEDURE — 63710000001 INSULIN REGULAR HUMAN PER 5 UNITS: Performed by: ANESTHESIOLOGY

## 2023-04-12 PROCEDURE — 25010000002 CEFTRIAXONE PER 250 MG: Performed by: UROLOGY

## 2023-04-12 PROCEDURE — 25010000002 MIDAZOLAM PER 1 MG: Performed by: NURSE ANESTHETIST, CERTIFIED REGISTERED

## 2023-04-12 PROCEDURE — 25010000002 FENTANYL CITRATE (PF) 50 MCG/ML SOLUTION: Performed by: NURSE ANESTHETIST, CERTIFIED REGISTERED

## 2023-04-12 PROCEDURE — 94640 AIRWAY INHALATION TREATMENT: CPT

## 2023-04-12 RX ORDER — FENTANYL CITRATE 50 UG/ML
INJECTION, SOLUTION INTRAMUSCULAR; INTRAVENOUS AS NEEDED
Status: DISCONTINUED | OUTPATIENT
Start: 2023-04-12 | End: 2023-04-12 | Stop reason: SURG

## 2023-04-12 RX ORDER — ONDANSETRON 2 MG/ML
4 INJECTION INTRAMUSCULAR; INTRAVENOUS ONCE AS NEEDED
Status: DISCONTINUED | OUTPATIENT
Start: 2023-04-12 | End: 2023-04-12 | Stop reason: HOSPADM

## 2023-04-12 RX ORDER — OXYCODONE AND ACETAMINOPHEN 7.5; 325 MG/1; MG/1
1 TABLET ORAL EVERY 6 HOURS PRN
Qty: 10 TABLET | Refills: 0 | Status: SHIPPED | OUTPATIENT
Start: 2023-04-12 | End: 2023-04-19

## 2023-04-12 RX ORDER — IPRATROPIUM BROMIDE AND ALBUTEROL SULFATE 2.5; .5 MG/3ML; MG/3ML
3 SOLUTION RESPIRATORY (INHALATION) ONCE
Status: COMPLETED | OUTPATIENT
Start: 2023-04-12 | End: 2023-04-12

## 2023-04-12 RX ORDER — PROMETHAZINE HYDROCHLORIDE 25 MG/1
25 TABLET ORAL ONCE AS NEEDED
Status: DISCONTINUED | OUTPATIENT
Start: 2023-04-12 | End: 2023-04-12 | Stop reason: HOSPADM

## 2023-04-12 RX ORDER — ACETAMINOPHEN 650 MG/1
650 SUPPOSITORY RECTAL ONCE AS NEEDED
Status: DISCONTINUED | OUTPATIENT
Start: 2023-04-12 | End: 2023-04-12 | Stop reason: HOSPADM

## 2023-04-12 RX ORDER — NALOXONE HCL 0.4 MG/ML
0.4 VIAL (ML) INJECTION AS NEEDED
Status: DISCONTINUED | OUTPATIENT
Start: 2023-04-12 | End: 2023-04-12 | Stop reason: HOSPADM

## 2023-04-12 RX ORDER — PROPOFOL 10 MG/ML
INJECTION, EMULSION INTRAVENOUS AS NEEDED
Status: DISCONTINUED | OUTPATIENT
Start: 2023-04-12 | End: 2023-04-12 | Stop reason: SURG

## 2023-04-12 RX ORDER — LEVOFLOXACIN 250 MG/1
250 TABLET ORAL DAILY
Qty: 7 TABLET | Refills: 0 | Status: SHIPPED | OUTPATIENT
Start: 2023-04-12 | End: 2023-04-19

## 2023-04-12 RX ORDER — LIDOCAINE HYDROCHLORIDE 10 MG/ML
INJECTION, SOLUTION EPIDURAL; INFILTRATION; INTRACAUDAL; PERINEURAL AS NEEDED
Status: DISCONTINUED | OUTPATIENT
Start: 2023-04-12 | End: 2023-04-12 | Stop reason: SURG

## 2023-04-12 RX ORDER — PROMETHAZINE HYDROCHLORIDE 25 MG/1
25 SUPPOSITORY RECTAL ONCE AS NEEDED
Status: DISCONTINUED | OUTPATIENT
Start: 2023-04-12 | End: 2023-04-12 | Stop reason: HOSPADM

## 2023-04-12 RX ORDER — ACETAMINOPHEN 325 MG/1
650 TABLET ORAL ONCE AS NEEDED
Status: DISCONTINUED | OUTPATIENT
Start: 2023-04-12 | End: 2023-04-12 | Stop reason: HOSPADM

## 2023-04-12 RX ORDER — EPHEDRINE SULFATE 50 MG/ML
5 INJECTION, SOLUTION INTRAVENOUS ONCE AS NEEDED
Status: DISCONTINUED | OUTPATIENT
Start: 2023-04-12 | End: 2023-04-12 | Stop reason: HOSPADM

## 2023-04-12 RX ORDER — ONDANSETRON 2 MG/ML
INJECTION INTRAMUSCULAR; INTRAVENOUS AS NEEDED
Status: DISCONTINUED | OUTPATIENT
Start: 2023-04-12 | End: 2023-04-12 | Stop reason: SURG

## 2023-04-12 RX ORDER — SODIUM CHLORIDE 9 MG/ML
1000 INJECTION, SOLUTION INTRAVENOUS CONTINUOUS
Status: DISCONTINUED | OUTPATIENT
Start: 2023-04-12 | End: 2023-04-12 | Stop reason: HOSPADM

## 2023-04-12 RX ORDER — FLUMAZENIL 0.1 MG/ML
0.2 INJECTION INTRAVENOUS AS NEEDED
Status: DISCONTINUED | OUTPATIENT
Start: 2023-04-12 | End: 2023-04-12 | Stop reason: HOSPADM

## 2023-04-12 RX ORDER — DIPHENHYDRAMINE HYDROCHLORIDE 50 MG/ML
12.5 INJECTION INTRAMUSCULAR; INTRAVENOUS
Status: DISCONTINUED | OUTPATIENT
Start: 2023-04-12 | End: 2023-04-12 | Stop reason: HOSPADM

## 2023-04-12 RX ORDER — MIDAZOLAM HYDROCHLORIDE 1 MG/ML
INJECTION INTRAMUSCULAR; INTRAVENOUS AS NEEDED
Status: DISCONTINUED | OUTPATIENT
Start: 2023-04-12 | End: 2023-04-12 | Stop reason: SURG

## 2023-04-12 RX ADMIN — LIDOCAINE HYDROCHLORIDE 50 MG: 10 INJECTION, SOLUTION EPIDURAL; INFILTRATION; INTRACAUDAL; PERINEURAL at 07:57

## 2023-04-12 RX ADMIN — FENTANYL CITRATE 25 MCG: 50 INJECTION, SOLUTION INTRAMUSCULAR; INTRAVENOUS at 08:06

## 2023-04-12 RX ADMIN — MIDAZOLAM HYDROCHLORIDE 1 MG: 1 INJECTION, SOLUTION INTRAMUSCULAR; INTRAVENOUS at 07:57

## 2023-04-12 RX ADMIN — PROPOFOL 150 MG: 10 INJECTION, EMULSION INTRAVENOUS at 07:57

## 2023-04-12 RX ADMIN — HUMAN INSULIN 6 UNITS: 100 INJECTION, SOLUTION SUBCUTANEOUS at 09:10

## 2023-04-12 RX ADMIN — SODIUM CHLORIDE 1000 ML: 9 INJECTION, SOLUTION INTRAVENOUS at 07:10

## 2023-04-12 RX ADMIN — HUMAN INSULIN 6 UNITS: 100 INJECTION, SOLUTION SUBCUTANEOUS at 07:46

## 2023-04-12 RX ADMIN — FENTANYL CITRATE 25 MCG: 50 INJECTION, SOLUTION INTRAMUSCULAR; INTRAVENOUS at 07:57

## 2023-04-12 RX ADMIN — IPRATROPIUM BROMIDE AND ALBUTEROL SULFATE 3 ML: .5; 3 SOLUTION RESPIRATORY (INHALATION) at 07:27

## 2023-04-12 RX ADMIN — ONDANSETRON 4 MG: 2 INJECTION INTRAMUSCULAR; INTRAVENOUS at 08:03

## 2023-04-12 NOTE — OP NOTE
CYSTOSCOPY TRANSURETHRAL RESECTION OF BLADDER TUMOR  Procedure Note    Favio Casillas  4/12/2023    Pre-op Diagnosis:   Bladder neoplasm [D49.4]    Post-op Diagnosis:     Post-Op Diagnosis Codes:     * Bladder neoplasm [D49.4]    Procedure(s):  CYSTOSCOPY TRANSURETHRAL RESECTION OF BLADDER TUMOR    Surgeon(s):  Christofer Calderon MD    Anesthesia: General    Staff:   Circulator: Kenny Payton RN; Lu Basurto RN  Scrub Person: Sandra Callahan          Estimated Blood Loss: minimal    Specimens:                ID Type Source Tests Collected by Time   A (Not marked as sent) : BLADDER TUMOR Tissue Urinary Bladder TISSUE EXAM, [facility] ([facility]) Christofer Calderon MD 4/12/2023 0817         Drains:   Urethral Catheter Silicone;Latex 22 Fr. (Active)       Continuous Bladder Irrigation Triple-lumen 22 Fr (Active)       Findings: Ileal polypoid growth    Complications: None    Indications: Same    Description of Procedure: Patient brought the operative suite placed in the dorsolithotomy position sterile prep and drape genitalia in routine fashion I passed a 22 Bots[place]an scope into the continent diversion once in the bladder we saw polypoid papillary type lesion hard to distinguish from the papillae from the bowel we switched to a 24 resectoscope sheath and loop and a power setting 100 cut 75 coagulation did excisional biopsy of the spot in the bowel and then cauterized this and then the leak evacuated the tumor out placed a 24 three-way catheter back in the bladder 20 cc placed in balloon thorough irrigation initiated patient taken recovery having tolerated the procedure well    Christofer Calderon MD     Date: 4/12/2023  Time: 08:20 CDT

## 2023-04-12 NOTE — DISCHARGE INSTR - APPOINTMENTS
***********************CALL THIS AFTERNOON OR TOMORROW AFTERNOON FOR FOLLOW UP APPT WITH DR. PARSONS [phone]**********

## 2023-04-12 NOTE — ANESTHESIA PROCEDURE NOTES
Airway  Urgency: elective    Date/Time: 4/12/2023 8:00 AM  End Time:4/12/2023 8:00 AM  Airway not difficult    General Information and Staff    Patient location during procedure: OR  CRNA/CAA: Dulce Olea CRNA  SRNA: Loi Barone SRNA  Indications and Patient Condition  Indications for airway management: airway protection    Preoxygenated: yes  MILS not maintained throughout  Mask difficulty assessment: 0 - not attempted    Final Airway Details  Final airway type: supraglottic airway      Successful airway: I-gel  Size 4     Number of attempts at approach: 1  Assessment: lips, teeth, and gum same as pre-op

## 2023-04-12 NOTE — ANESTHESIA POSTPROCEDURE EVALUATION
Patient: Favio Casillas    Procedure Summary     Date: 04/12/23 Room / Location: [facility] OR 02 / [facility] OR    Anesthesia Start: 0755 Anesthesia Stop: 0824    Procedure: CYSTOSCOPY TRANSURETHRAL RESECTION OF BLADDER TUMOR (Bladder) Diagnosis:       Bladder neoplasm      (Bladder neoplasm [D49.4])    Surgeons: uYmiko, Christofer VELAZQUEZ MD Provider: Dulce Olea CRNA    Anesthesia Type: general ASA Status: 4          Anesthesia Type: general    Vitals  No vitals data found for the desired time range.          Post Anesthesia Care and Evaluation    Patient location during evaluation: PACU  Patient participation: waiting for patient participation  Level of consciousness: sleepy but conscious  Pain score: 0  Pain management: adequate    Airway patency: patent  Anesthetic complications: No anesthetic complications  PONV Status: none  Cardiovascular status: acceptable  Respiratory status: acceptable, spontaneous ventilation and room air  Hydration status: acceptable    Comments:   /81  SpO2 100%  RR 18  T. 98.2  No anesthesia care post op

## 2023-04-14 LAB — REF LAB TEST METHOD: NORMAL

## 2023-04-19 ENCOUNTER — OFFICE VISIT (OUTPATIENT)
Dept: ENDOCRINOLOGY | Facility: CLINIC | Age: 68
End: 2023-04-19
Payer: MEDICARE

## 2023-04-19 VITALS
SYSTOLIC BLOOD PRESSURE: 130 MMHG | HEART RATE: 89 BPM | BODY MASS INDEX: 21.02 KG/M2 | OXYGEN SATURATION: 96 % | DIASTOLIC BLOOD PRESSURE: 80 MMHG | HEIGHT: 67 IN | WEIGHT: 133.9 LBS

## 2023-04-19 DIAGNOSIS — I10 ESSENTIAL HYPERTENSION: ICD-10-CM

## 2023-04-19 DIAGNOSIS — F17.200 SMOKER: ICD-10-CM

## 2023-04-19 DIAGNOSIS — E78.2 MIXED HYPERLIPIDEMIA: ICD-10-CM

## 2023-04-19 DIAGNOSIS — E11.65 TYPE 2 DIABETES MELLITUS WITH HYPERGLYCEMIA, WITH LONG-TERM CURRENT USE OF INSULIN: Primary | ICD-10-CM

## 2023-04-19 DIAGNOSIS — Z79.4 TYPE 2 DIABETES MELLITUS WITH HYPERGLYCEMIA, WITH LONG-TERM CURRENT USE OF INSULIN: Primary | ICD-10-CM

## 2023-04-19 NOTE — PROGRESS NOTES
"Chief Complaint  Diabetes    Subjective          Favio Casillas presents to [facility] ENDOCRINOLOGY  Diabetes         In office visit      Primary care provider  Dr. Merritt    68 year old male presents for follow     Diabetes mellitus type 2     Diagnosed 2016    Timing constant     Quality not controlled     Severity high          Microvascular complications--- neuropathy, CKD I do not know the staging     Macrovascular complications CAD, stent x1     Current regimen is insulin     Current glucose monitoring fingersticks     Checks 4 times daily       Using the dexcom     See below        Review of Systems - General ROS: negative            Objective   Vital Signs:   /80   Pulse 89   Ht 170.2 cm (67\")   Wt 60.7 kg (133 lb 14.4 oz)   SpO2 96%   BMI 20.97 kg/m²     Physical Exam  Constitutional:       Appearance: Normal appearance.   Cardiovascular:      Rate and Rhythm: Regular rhythm.      Heart sounds: Normal heart sounds.   Pulmonary:      Breath sounds: Normal breath sounds.   Musculoskeletal:      Cervical back: Normal range of motion.   Neurological:      Mental Status: He is alert.        Result Review :   The following data was reviewed by: TIFFANIE Stephen on 03/08/2022:  Common labs        12/25/2022    05:43 2/10/2023    09:53 3/19/2023    19:16   Common Labs   Glucose 261    267     Glucose  293         BUN 41   29      49     Creatinine 1.48   1.8      1.94     Sodium 137   138      130     Potassium 3.9   5.0      4.8     Chloride 111   104      108     Calcium 8.9   8.9      9.6     Albumin  3.7      4.5     Total Bilirubin   0.6     Alkaline Phosphatase   113     AST (SGOT)   11     ALT (SGPT)   10     WBC 10.25    9.82     Hemoglobin 14.8   14.4      17.2     Hematocrit 45.7   44.5      53.2     Platelets 157    166     Uric Acid  2.7             This result is from an external source.                 Assessment and Plan    There are no " diagnoses linked to this encounter.         Glycemic management     Type 2 diabetes not controlled         Lab Results   Component Value Date    HGBA1C 11.00 (H) 12/22/2022       Dexcom G6 is using     dowloaded and reviewed       Ambulatory Glucose Profile Report    Days Analyzed : 2 week period ending on 04/19/23      Continuous Glucose Monitory Device:  Dexcom G6     - 92% very high target range          7%  high target range  - 1% in target range  - 0% below target range  - GMI 11.1%  - Average glucose 326 mg/dl    Interpretation : Diabetes Type 2      hyperglycemia all the time but he states he keeps it high due to lows     He is giving to much long acting and not giving the mealtime                 Levemir Taking 25  units twice day ---decrease to 15 units twice day         Take the novolog for meals        4 units for regular meals---increase to 6 units      6 units for large meals ---increase to 8 units     Add     Sliding scale     151-200         2 units  201-250        4 units   251-300        6 units   Above 301     8 units                        We discussed GLP-1 and he does not want             Dexcom has allowed the patient to minimize hypoglycemia as alarms have predicted and avoided them    he also uses the arrows on the dexcom as follows:    If arrow is horizontal , he uses the predicted dosage     If the arrow is slanted up or down-- he increase or decrease by 2 units    If the arrow is vertical up or down - he increases or decreases by 3 units                    Lipid management        Taking Pravachol 40 mg one at night         Total Cholesterol   Date Value Ref Range Status   01/04/2021 69 <200 mg/dL Final     Triglycerides   Date Value Ref Range Status   10/05/2022 96 10 - 150 mg/dL Final     HDL Cholesterol   Date Value Ref Range Status   10/05/2022 53 23 - 92 mg/dL Final     LDL Cholesterol    Date Value Ref Range Status   10/05/2022 56 mg/dL Final     Comment:         OPTIMAL: <100  mg/dl  LOW RISK: 100-129 mg/dl  BORDERLINE HIGH: 130-159 mg/dl  HIGH: 160-189 mg/dl  VERY HIGH: >189 mg/dl              Blood pressure management        Hypertension     Taking Coreg 3.125 mg twice a day        Microvascular monitoring        Last eye exam---July 2021, no DR      Neuropathy-- no RX                      Bone health        Vitamin D deficiency     Taking mvi         Component      Latest Ref Rng & Units 7/2/2021   3-Epi-Vitamin D,25-Hydroxy      30 - 100 ng/ml 21.2 (L)               Preventative care        Smoker      Favio Casillas  reports that he quit smoking about 2 months ago. His smoking use included cigarettes. He has a 53.00 pack-year smoking history. He quit smokeless tobacco use about 43 years ago.  His smokeless tobacco use included chew.. I have educated him on the risk of diseases from using tobacco products such as cancer and COPD.     I advised him to quit and he is not willing to quit.    I spent 3  minutes counseling the patient.         Weight management        Patient's Body mass index is 20.97 kg/m². indicating that he is within normal range (BMI 18.5-24.9). No BMI management plan needed..                            Follow Up   Return in about 4 weeks (around 5/17/2023) for Recheck.  Patient was given instructions and counseling regarding his condition or for health maintenance advice. Please see specific information pulled into the AVS if appropriate.         This document has been electronically signed by TIFFANIE Stephen on April 19, 2023 09:07 CDT.

## 2023-04-19 NOTE — PATIENT INSTRUCTIONS
check 2 hours after meals and for now we need to get the sugar to 200         Levemir Taking 25  units twice day ---decrease to 15 units twice da day         Taking novolog using sliding scale only     Change to the following        4 units for regular meals---increase to 6 units      6 units for large meals ---increase to 8 units

## 2023-04-26 ENCOUNTER — HOSPITAL ENCOUNTER (INPATIENT)
Facility: HOSPITAL | Age: 68
LOS: 5 days | Discharge: HOME | End: 2023-05-03
Attending: EMERGENCY MEDICINE | Admitting: FAMILY MEDICINE
Payer: MEDICARE

## 2023-04-26 DIAGNOSIS — Z74.09 IMPAIRED FUNCTIONAL MOBILITY, BALANCE, GAIT, AND ENDURANCE: ICD-10-CM

## 2023-04-26 DIAGNOSIS — Z78.9 IMPAIRED MOBILITY AND ADLS: ICD-10-CM

## 2023-04-26 DIAGNOSIS — R31.0 GROSS HEMATURIA: Primary | ICD-10-CM

## 2023-04-26 DIAGNOSIS — Z74.09 IMPAIRED MOBILITY AND ADLS: ICD-10-CM

## 2023-04-26 LAB
ALBUMIN SERPL-MCNC: 3.5 G/DL (ref 3.5–5.2)
ALBUMIN/GLOB SERPL: 1.3 G/DL
ALP SERPL-CCNC: 114 U/L (ref 39–117)
ALT SERPL W P-5'-P-CCNC: 28 U/L (ref 1–41)
ANION GAP SERPL CALCULATED.3IONS-SCNC: 12 MMOL/L (ref 5–15)
APTT PPP: 25.4 SECONDS (ref 20–40.3)
AST SERPL-CCNC: 24 U/L (ref 1–40)
BACTERIA UR QL AUTO: ABNORMAL /HPF
BASOPHILS # BLD AUTO: 0.07 10*3/MM3 (ref 0–0.2)
BASOPHILS NFR BLD AUTO: 0.6 % (ref 0–1.5)
BILIRUB SERPL-MCNC: 0.3 MG/DL (ref 0–1.2)
BILIRUB UR QL STRIP: ABNORMAL
BUN SERPL-MCNC: 47 MG/DL (ref 8–23)
BUN/CREAT SERPL: 26.1 (ref 7–25)
CALCIUM SPEC-SCNC: 8.5 MG/DL (ref 8.6–10.5)
CHLORIDE SERPL-SCNC: 104 MMOL/L (ref 98–107)
CLARITY UR: ABNORMAL
CO2 SERPL-SCNC: 14 MMOL/L (ref 22–29)
COLOR UR: ABNORMAL
CREAT SERPL-MCNC: 1.8 MG/DL (ref 0.76–1.27)
DEPRECATED RDW RBC AUTO: 42.1 FL (ref 37–54)
EGFRCR SERPLBLD CKD-EPI 2021: 41 ML/MIN/1.73
EOSINOPHIL # BLD AUTO: 0.08 10*3/MM3 (ref 0–0.4)
EOSINOPHIL NFR BLD AUTO: 0.7 % (ref 0.3–6.2)
ERYTHROCYTE [DISTWIDTH] IN BLOOD BY AUTOMATED COUNT: 13.7 % (ref 12.3–15.4)
GLOBULIN UR ELPH-MCNC: 2.8 GM/DL
GLUCOSE SERPL-MCNC: 309 MG/DL (ref 65–99)
GLUCOSE UR STRIP-MCNC: ABNORMAL MG/DL
HCT VFR BLD AUTO: 38 % (ref 37.5–51)
HGB BLD-MCNC: 13 G/DL (ref 13–17.7)
HGB UR QL STRIP.AUTO: ABNORMAL
HOLD SPECIMEN: NORMAL
HOLD SPECIMEN: NORMAL
HYALINE CASTS UR QL AUTO: ABNORMAL /LPF
IMM GRANULOCYTES # BLD AUTO: 0.11 10*3/MM3 (ref 0–0.05)
IMM GRANULOCYTES NFR BLD AUTO: 0.9 % (ref 0–0.5)
INR PPP: 1.05 (ref 0.8–1.2)
KETONES UR QL STRIP: NEGATIVE
LEUKOCYTE ESTERASE UR QL STRIP.AUTO: ABNORMAL
LYMPHOCYTES # BLD AUTO: 1.37 10*3/MM3 (ref 0.7–3.1)
LYMPHOCYTES NFR BLD AUTO: 11.7 % (ref 19.6–45.3)
MAGNESIUM SERPL-MCNC: 1.7 MG/DL (ref 1.6–2.4)
MCH RBC QN AUTO: 28.7 PG (ref 26.6–33)
MCHC RBC AUTO-ENTMCNC: 34.2 G/DL (ref 31.5–35.7)
MCV RBC AUTO: 83.9 FL (ref 79–97)
MONOCYTES # BLD AUTO: 1.12 10*3/MM3 (ref 0.1–0.9)
MONOCYTES NFR BLD AUTO: 9.6 % (ref 5–12)
MUCOUS THREADS URNS QL MICRO: ABNORMAL /HPF
NEUTROPHILS NFR BLD AUTO: 76.5 % (ref 42.7–76)
NEUTROPHILS NFR BLD AUTO: 8.95 10*3/MM3 (ref 1.7–7)
NITRITE UR QL STRIP: POSITIVE
NRBC BLD AUTO-RTO: 0.2 /100 WBC (ref 0–0.2)
PH UR STRIP.AUTO: 7 [PH] (ref 5–9)
PLATELET # BLD AUTO: 230 10*3/MM3 (ref 140–450)
PMV BLD AUTO: 9.8 FL (ref 6–12)
POTASSIUM SERPL-SCNC: 3 MMOL/L (ref 3.5–5.2)
PROT SERPL-MCNC: 6.3 G/DL (ref 6–8.5)
PROT UR QL STRIP: ABNORMAL
PROTHROMBIN TIME: 13.7 SECONDS (ref 11.1–15.3)
RBC # BLD AUTO: 4.53 10*6/MM3 (ref 4.14–5.8)
RBC # UR STRIP: ABNORMAL /HPF
REF LAB TEST METHOD: ABNORMAL
SODIUM SERPL-SCNC: 130 MMOL/L (ref 136–145)
SP GR UR STRIP: 1.01 (ref 1–1.03)
SQUAMOUS #/AREA URNS HPF: ABNORMAL /HPF
UROBILINOGEN UR QL STRIP: ABNORMAL
WBC # UR STRIP: ABNORMAL /HPF
WBC NRBC COR # BLD: 11.7 10*3/MM3 (ref 3.4–10.8)
WHOLE BLOOD HOLD COAG: NORMAL
WHOLE BLOOD HOLD SPECIMEN: NORMAL

## 2023-04-26 PROCEDURE — 99284 EMERGENCY DEPT VISIT MOD MDM: CPT

## 2023-04-26 PROCEDURE — 87086 URINE CULTURE/COLONY COUNT: CPT

## 2023-04-26 PROCEDURE — 81001 URINALYSIS AUTO W/SCOPE: CPT | Performed by: EMERGENCY MEDICINE

## 2023-04-26 PROCEDURE — 83735 ASSAY OF MAGNESIUM: CPT | Performed by: EMERGENCY MEDICINE

## 2023-04-26 PROCEDURE — 85610 PROTHROMBIN TIME: CPT | Performed by: EMERGENCY MEDICINE

## 2023-04-26 PROCEDURE — 80053 COMPREHEN METABOLIC PANEL: CPT | Performed by: EMERGENCY MEDICINE

## 2023-04-26 PROCEDURE — 85025 COMPLETE CBC W/AUTO DIFF WBC: CPT | Performed by: EMERGENCY MEDICINE

## 2023-04-26 PROCEDURE — 25010000002 CEFTRIAXONE PER 250 MG: Performed by: EMERGENCY MEDICINE

## 2023-04-26 PROCEDURE — 85730 THROMBOPLASTIN TIME PARTIAL: CPT | Performed by: EMERGENCY MEDICINE

## 2023-04-26 RX ORDER — SODIUM CHLORIDE 0.9 % (FLUSH) 0.9 %
10 SYRINGE (ML) INJECTION AS NEEDED
Status: DISCONTINUED | OUTPATIENT
Start: 2023-04-26 | End: 2023-05-03 | Stop reason: HOSPADM

## 2023-04-27 ENCOUNTER — APPOINTMENT (OUTPATIENT)
Dept: ULTRASOUND IMAGING | Facility: HOSPITAL | Age: 68
End: 2023-04-27
Payer: MEDICARE

## 2023-04-27 PROBLEM — R31.9 HEMATURIA: Status: ACTIVE | Noted: 2023-04-27

## 2023-04-27 PROBLEM — E87.1 HYPONATREMIA: Status: ACTIVE | Noted: 2023-04-27

## 2023-04-27 PROBLEM — N18.30 CKD (CHRONIC KIDNEY DISEASE) STAGE 3, GFR 30-59 ML/MIN: Status: ACTIVE | Noted: 2023-04-27

## 2023-04-27 PROBLEM — E87.6 HYPOKALEMIA: Status: ACTIVE | Noted: 2023-04-27

## 2023-04-27 LAB
ANION GAP SERPL CALCULATED.3IONS-SCNC: 13 MMOL/L (ref 5–15)
BASOPHILS # BLD AUTO: 0.05 10*3/MM3 (ref 0–0.2)
BASOPHILS NFR BLD AUTO: 0.6 % (ref 0–1.5)
BUN SERPL-MCNC: 45 MG/DL (ref 8–23)
BUN/CREAT SERPL: 27.1 (ref 7–25)
CALCIUM SPEC-SCNC: 8.9 MG/DL (ref 8.6–10.5)
CHLORIDE SERPL-SCNC: 108 MMOL/L (ref 98–107)
CO2 SERPL-SCNC: 12 MMOL/L (ref 22–29)
CREAT SERPL-MCNC: 1.66 MG/DL (ref 0.76–1.27)
D-LACTATE SERPL-SCNC: 0.6 MMOL/L (ref 0.5–2)
DEPRECATED RDW RBC AUTO: 41.9 FL (ref 37–54)
EGFRCR SERPLBLD CKD-EPI 2021: 45.2 ML/MIN/1.73
EOSINOPHIL # BLD AUTO: 0.07 10*3/MM3 (ref 0–0.4)
EOSINOPHIL NFR BLD AUTO: 0.8 % (ref 0.3–6.2)
ERYTHROCYTE [DISTWIDTH] IN BLOOD BY AUTOMATED COUNT: 13.5 % (ref 12.3–15.4)
GLUCOSE BLDC GLUCOMTR-MCNC: 285 MG/DL (ref 70–130)
GLUCOSE BLDC GLUCOMTR-MCNC: 349 MG/DL (ref 70–130)
GLUCOSE BLDC GLUCOMTR-MCNC: 352 MG/DL (ref 70–130)
GLUCOSE BLDC GLUCOMTR-MCNC: 452 MG/DL (ref 70–130)
GLUCOSE SERPL-MCNC: 399 MG/DL (ref 65–99)
HCT VFR BLD AUTO: 41.1 % (ref 37.5–51)
HGB BLD-MCNC: 13.6 G/DL (ref 13–17.7)
IMM GRANULOCYTES # BLD AUTO: 0.16 10*3/MM3 (ref 0–0.05)
IMM GRANULOCYTES NFR BLD AUTO: 1.8 % (ref 0–0.5)
LYMPHOCYTES # BLD AUTO: 1.25 10*3/MM3 (ref 0.7–3.1)
LYMPHOCYTES NFR BLD AUTO: 14 % (ref 19.6–45.3)
MCH RBC QN AUTO: 28 PG (ref 26.6–33)
MCHC RBC AUTO-ENTMCNC: 33.1 G/DL (ref 31.5–35.7)
MCV RBC AUTO: 84.6 FL (ref 79–97)
MONOCYTES # BLD AUTO: 0.56 10*3/MM3 (ref 0.1–0.9)
MONOCYTES NFR BLD AUTO: 6.3 % (ref 5–12)
NEUTROPHILS NFR BLD AUTO: 6.81 10*3/MM3 (ref 1.7–7)
NEUTROPHILS NFR BLD AUTO: 76.5 % (ref 42.7–76)
NRBC BLD AUTO-RTO: 0 /100 WBC (ref 0–0.2)
PLATELET # BLD AUTO: 249 10*3/MM3 (ref 140–450)
PMV BLD AUTO: 9.8 FL (ref 6–12)
POTASSIUM SERPL-SCNC: 2.8 MMOL/L (ref 3.5–5.2)
POTASSIUM SERPL-SCNC: 3.8 MMOL/L (ref 3.5–5.2)
RBC # BLD AUTO: 4.86 10*6/MM3 (ref 4.14–5.8)
SODIUM SERPL-SCNC: 133 MMOL/L (ref 136–145)
WBC NRBC COR # BLD: 8.9 10*3/MM3 (ref 3.4–10.8)

## 2023-04-27 PROCEDURE — 63710000001 INSULIN ASPART PER 5 UNITS

## 2023-04-27 PROCEDURE — 0 POTASSIUM CHLORIDE 10 MEQ/100ML SOLUTION

## 2023-04-27 PROCEDURE — 25010000002 CEFTRIAXONE PER 250 MG

## 2023-04-27 PROCEDURE — G0378 HOSPITAL OBSERVATION PER HR: HCPCS

## 2023-04-27 PROCEDURE — 85025 COMPLETE CBC W/AUTO DIFF WBC: CPT

## 2023-04-27 PROCEDURE — 97165 OT EVAL LOW COMPLEX 30 MIN: CPT

## 2023-04-27 PROCEDURE — 82948 REAGENT STRIP/BLOOD GLUCOSE: CPT

## 2023-04-27 PROCEDURE — 63710000001 INSULIN DETEMIR PER 5 UNITS

## 2023-04-27 PROCEDURE — 76775 US EXAM ABDO BACK WALL LIM: CPT

## 2023-04-27 PROCEDURE — 82962 GLUCOSE BLOOD TEST: CPT

## 2023-04-27 PROCEDURE — 80048 BASIC METABOLIC PNL TOTAL CA: CPT

## 2023-04-27 PROCEDURE — 84132 ASSAY OF SERUM POTASSIUM: CPT | Performed by: NURSE PRACTITIONER

## 2023-04-27 PROCEDURE — 83605 ASSAY OF LACTIC ACID: CPT

## 2023-04-27 PROCEDURE — 94640 AIRWAY INHALATION TREATMENT: CPT

## 2023-04-27 PROCEDURE — 0 MAGNESIUM SULFATE 4 GM/100ML SOLUTION

## 2023-04-27 PROCEDURE — 87040 BLOOD CULTURE FOR BACTERIA: CPT

## 2023-04-27 PROCEDURE — 94664 DEMO&/EVAL PT USE INHALER: CPT

## 2023-04-27 PROCEDURE — 97161 PT EVAL LOW COMPLEX 20 MIN: CPT

## 2023-04-27 RX ORDER — MAGNESIUM SULFATE HEPTAHYDRATE 40 MG/ML
2 INJECTION, SOLUTION INTRAVENOUS AS NEEDED
Status: DISCONTINUED | OUTPATIENT
Start: 2023-04-27 | End: 2023-05-03 | Stop reason: HOSPADM

## 2023-04-27 RX ORDER — HYDROXYZINE HYDROCHLORIDE 25 MG/1
25 TABLET, FILM COATED ORAL EVERY 4 HOURS PRN
Status: DISCONTINUED | OUTPATIENT
Start: 2023-04-27 | End: 2023-05-03 | Stop reason: HOSPADM

## 2023-04-27 RX ORDER — ACETYLCYSTEINE 200 MG/ML
2 SOLUTION ORAL; RESPIRATORY (INHALATION) EVERY 4 HOURS
Status: COMPLETED | OUTPATIENT
Start: 2023-04-27 | End: 2023-04-28

## 2023-04-27 RX ORDER — ONDANSETRON 2 MG/ML
4 INJECTION INTRAMUSCULAR; INTRAVENOUS EVERY 6 HOURS PRN
Status: DISCONTINUED | OUTPATIENT
Start: 2023-04-27 | End: 2023-05-03 | Stop reason: HOSPADM

## 2023-04-27 RX ORDER — ONDANSETRON 4 MG/1
4 TABLET, ORALLY DISINTEGRATING ORAL 4 TIMES DAILY PRN
Status: DISCONTINUED | OUTPATIENT
Start: 2023-04-27 | End: 2023-05-03 | Stop reason: HOSPADM

## 2023-04-27 RX ORDER — NALOXONE HCL 0.4 MG/ML
0.4 VIAL (ML) INJECTION
Status: DISCONTINUED | OUTPATIENT
Start: 2023-04-27 | End: 2023-05-03 | Stop reason: HOSPADM

## 2023-04-27 RX ORDER — MAGNESIUM SULFATE HEPTAHYDRATE 40 MG/ML
4 INJECTION, SOLUTION INTRAVENOUS AS NEEDED
Status: DISCONTINUED | OUTPATIENT
Start: 2023-04-27 | End: 2023-05-03 | Stop reason: HOSPADM

## 2023-04-27 RX ORDER — SODIUM CHLORIDE 0.9 % (FLUSH) 0.9 %
10 SYRINGE (ML) INJECTION AS NEEDED
Status: DISCONTINUED | OUTPATIENT
Start: 2023-04-27 | End: 2023-05-03 | Stop reason: HOSPADM

## 2023-04-27 RX ORDER — ONDANSETRON 4 MG/1
4 TABLET, FILM COATED ORAL EVERY 6 HOURS PRN
Status: DISCONTINUED | OUTPATIENT
Start: 2023-04-27 | End: 2023-05-03 | Stop reason: HOSPADM

## 2023-04-27 RX ORDER — ALBUTEROL SULFATE 2.5 MG/3ML
2.5 SOLUTION RESPIRATORY (INHALATION) EVERY 4 HOURS PRN
Status: DISCONTINUED | OUTPATIENT
Start: 2023-04-27 | End: 2023-05-03 | Stop reason: HOSPADM

## 2023-04-27 RX ORDER — PRAVASTATIN SODIUM 40 MG
40 TABLET ORAL DAILY
Status: DISCONTINUED | OUTPATIENT
Start: 2023-04-27 | End: 2023-05-03 | Stop reason: HOSPADM

## 2023-04-27 RX ORDER — CYCLOBENZAPRINE HCL 10 MG
10 TABLET ORAL 3 TIMES DAILY PRN
Status: DISCONTINUED | OUTPATIENT
Start: 2023-04-27 | End: 2023-05-03 | Stop reason: HOSPADM

## 2023-04-27 RX ORDER — ACETAMINOPHEN 160 MG/5ML
650 SOLUTION ORAL EVERY 4 HOURS PRN
Status: DISCONTINUED | OUTPATIENT
Start: 2023-04-27 | End: 2023-05-03 | Stop reason: HOSPADM

## 2023-04-27 RX ORDER — INSULIN ASPART 100 [IU]/ML
0-24 INJECTION, SOLUTION INTRAVENOUS; SUBCUTANEOUS
Status: DISCONTINUED | OUTPATIENT
Start: 2023-04-27 | End: 2023-05-03 | Stop reason: HOSPADM

## 2023-04-27 RX ORDER — SODIUM CHLORIDE 9 MG/ML
100 INJECTION, SOLUTION INTRAVENOUS CONTINUOUS
Status: DISCONTINUED | OUTPATIENT
Start: 2023-04-27 | End: 2023-05-02

## 2023-04-27 RX ORDER — POTASSIUM CHLORIDE 1.5 G/1.77G
40 POWDER, FOR SOLUTION ORAL AS NEEDED
Status: DISCONTINUED | OUTPATIENT
Start: 2023-04-27 | End: 2023-05-03 | Stop reason: HOSPADM

## 2023-04-27 RX ORDER — NORTRIPTYLINE HYDROCHLORIDE 10 MG/1
10 CAPSULE ORAL NIGHTLY
Status: DISCONTINUED | OUTPATIENT
Start: 2023-04-27 | End: 2023-05-03 | Stop reason: HOSPADM

## 2023-04-27 RX ORDER — FLUTICASONE PROPIONATE 50 MCG
1 SPRAY, SUSPENSION (ML) NASAL DAILY PRN
Status: DISCONTINUED | OUTPATIENT
Start: 2023-04-27 | End: 2023-05-03 | Stop reason: HOSPADM

## 2023-04-27 RX ORDER — NITROGLYCERIN 0.4 MG/1
0.4 TABLET SUBLINGUAL
Status: DISCONTINUED | OUTPATIENT
Start: 2023-04-27 | End: 2023-05-03 | Stop reason: HOSPADM

## 2023-04-27 RX ORDER — NICOTINE POLACRILEX 4 MG
15 LOZENGE BUCCAL
Status: DISCONTINUED | OUTPATIENT
Start: 2023-04-27 | End: 2023-05-03 | Stop reason: HOSPADM

## 2023-04-27 RX ORDER — POTASSIUM CHLORIDE 7.45 MG/ML
10 INJECTION INTRAVENOUS
Status: DISCONTINUED | OUTPATIENT
Start: 2023-04-27 | End: 2023-05-03 | Stop reason: HOSPADM

## 2023-04-27 RX ORDER — POTASSIUM CHLORIDE 750 MG/1
40 CAPSULE, EXTENDED RELEASE ORAL AS NEEDED
Status: DISCONTINUED | OUTPATIENT
Start: 2023-04-27 | End: 2023-05-03 | Stop reason: HOSPADM

## 2023-04-27 RX ORDER — SODIUM CHLORIDE 9 MG/ML
40 INJECTION, SOLUTION INTRAVENOUS AS NEEDED
Status: DISCONTINUED | OUTPATIENT
Start: 2023-04-27 | End: 2023-05-03 | Stop reason: HOSPADM

## 2023-04-27 RX ORDER — DEXTROSE MONOHYDRATE 25 G/50ML
25 INJECTION, SOLUTION INTRAVENOUS
Status: DISCONTINUED | OUTPATIENT
Start: 2023-04-27 | End: 2023-05-03 | Stop reason: HOSPADM

## 2023-04-27 RX ORDER — ACETAMINOPHEN 325 MG/1
650 TABLET ORAL EVERY 4 HOURS PRN
Status: DISCONTINUED | OUTPATIENT
Start: 2023-04-27 | End: 2023-05-03 | Stop reason: HOSPADM

## 2023-04-27 RX ORDER — FUROSEMIDE 20 MG/1
20 TABLET ORAL DAILY
Status: DISCONTINUED | OUTPATIENT
Start: 2023-04-27 | End: 2023-05-03 | Stop reason: HOSPADM

## 2023-04-27 RX ORDER — LORAZEPAM 2 MG/ML
0.5 INJECTION INTRAMUSCULAR EVERY 6 HOURS PRN
Status: DISCONTINUED | OUTPATIENT
Start: 2023-04-27 | End: 2023-05-03 | Stop reason: HOSPADM

## 2023-04-27 RX ORDER — BUDESONIDE AND FORMOTEROL FUMARATE DIHYDRATE 160; 4.5 UG/1; UG/1
2 AEROSOL RESPIRATORY (INHALATION)
Status: DISCONTINUED | OUTPATIENT
Start: 2023-04-27 | End: 2023-05-03 | Stop reason: HOSPADM

## 2023-04-27 RX ORDER — SODIUM CHLORIDE 0.9 % (FLUSH) 0.9 %
10 SYRINGE (ML) INJECTION EVERY 12 HOURS SCHEDULED
Status: DISCONTINUED | OUTPATIENT
Start: 2023-04-27 | End: 2023-05-03 | Stop reason: HOSPADM

## 2023-04-27 RX ORDER — PANTOPRAZOLE SODIUM 40 MG/1
40 TABLET, DELAYED RELEASE ORAL DAILY
Status: DISCONTINUED | OUTPATIENT
Start: 2023-04-27 | End: 2023-05-03 | Stop reason: HOSPADM

## 2023-04-27 RX ORDER — CARVEDILOL 6.25 MG/1
6.25 TABLET ORAL 2 TIMES DAILY WITH MEALS
Status: DISCONTINUED | OUTPATIENT
Start: 2023-04-27 | End: 2023-05-03 | Stop reason: HOSPADM

## 2023-04-27 RX ORDER — ACETAMINOPHEN 650 MG/1
650 SUPPOSITORY RECTAL EVERY 4 HOURS PRN
Status: DISCONTINUED | OUTPATIENT
Start: 2023-04-27 | End: 2023-05-03 | Stop reason: HOSPADM

## 2023-04-27 RX ORDER — MORPHINE SULFATE 2 MG/ML
2 INJECTION, SOLUTION INTRAMUSCULAR; INTRAVENOUS
Status: DISCONTINUED | OUTPATIENT
Start: 2023-04-27 | End: 2023-05-03 | Stop reason: HOSPADM

## 2023-04-27 RX ADMIN — INSULIN ASPART 20 UNITS: 100 INJECTION, SOLUTION INTRAVENOUS; SUBCUTANEOUS at 11:40

## 2023-04-27 RX ADMIN — FUROSEMIDE 20 MG: 20 TABLET ORAL at 09:52

## 2023-04-27 RX ADMIN — POTASSIUM CHLORIDE 10 MEQ: 7.46 INJECTION, SOLUTION INTRAVENOUS at 12:22

## 2023-04-27 RX ADMIN — BUDESONIDE AND FORMOTEROL FUMARATE DIHYDRATE 2 PUFF: 160; 4.5 AEROSOL RESPIRATORY (INHALATION) at 19:50

## 2023-04-27 RX ADMIN — PANTOPRAZOLE SODIUM 40 MG: 40 TABLET, DELAYED RELEASE ORAL at 09:52

## 2023-04-27 RX ADMIN — ACETYLCYSTEINE 2 ML: 200 SOLUTION ORAL; RESPIRATORY (INHALATION) at 21:07

## 2023-04-27 RX ADMIN — INSULIN ASPART 12 UNITS: 100 INJECTION, SOLUTION INTRAVENOUS; SUBCUTANEOUS at 17:11

## 2023-04-27 RX ADMIN — INSULIN DETEMIR 25 UNITS: 100 INJECTION, SOLUTION SUBCUTANEOUS at 09:50

## 2023-04-27 RX ADMIN — NORTRIPTYLINE HYDROCHLORIDE 10 MG: 10 CAPSULE ORAL at 21:06

## 2023-04-27 RX ADMIN — SERTRALINE HYDROCHLORIDE 50 MG: 50 TABLET ORAL at 09:52

## 2023-04-27 RX ADMIN — SODIUM CHLORIDE 125 ML/HR: 9 INJECTION, SOLUTION INTRAVENOUS at 21:06

## 2023-04-27 RX ADMIN — CEFTRIAXONE SODIUM 1 G: 1 INJECTION, POWDER, FOR SOLUTION INTRAMUSCULAR; INTRAVENOUS at 21:06

## 2023-04-27 RX ADMIN — ACETYLCYSTEINE 2 ML: 200 SOLUTION ORAL; RESPIRATORY (INHALATION) at 15:30

## 2023-04-27 RX ADMIN — CARVEDILOL 6.25 MG: 6.25 TABLET, FILM COATED ORAL at 17:11

## 2023-04-27 RX ADMIN — MAGNESIUM SULFATE 4 G: 4 INJECTION INTRAVENOUS at 14:29

## 2023-04-27 RX ADMIN — POTASSIUM CHLORIDE 10 MEQ: 7.46 INJECTION, SOLUTION INTRAVENOUS at 10:30

## 2023-04-27 RX ADMIN — CARVEDILOL 6.25 MG: 6.25 TABLET, FILM COATED ORAL at 09:52

## 2023-04-27 RX ADMIN — SODIUM CHLORIDE 125 ML/HR: 9 INJECTION, SOLUTION INTRAVENOUS at 06:35

## 2023-04-27 RX ADMIN — Medication 10 ML: at 09:52

## 2023-04-27 RX ADMIN — INSULIN DETEMIR 25 UNITS: 100 INJECTION, SOLUTION SUBCUTANEOUS at 21:06

## 2023-04-27 RX ADMIN — POTASSIUM CHLORIDE 10 MEQ: 7.46 INJECTION, SOLUTION INTRAVENOUS at 06:36

## 2023-04-27 RX ADMIN — POTASSIUM CHLORIDE 10 MEQ: 7.46 INJECTION, SOLUTION INTRAVENOUS at 13:17

## 2023-04-27 RX ADMIN — POTASSIUM CHLORIDE 10 MEQ: 7.46 INJECTION, SOLUTION INTRAVENOUS at 07:40

## 2023-04-27 RX ADMIN — PRAVASTATIN SODIUM 40 MG: 40 TABLET ORAL at 09:52

## 2023-04-27 RX ADMIN — POTASSIUM CHLORIDE 10 MEQ: 7.46 INJECTION, SOLUTION INTRAVENOUS at 09:00

## 2023-04-27 NOTE — NURSING NOTE
Patient had some cramping; pain and leaking around the liu catheter earlier this morning with some clots. FC irrigated with NS with some clots. Symptoms has improved and the continuous CBI is now running pink tinged urine with some tiny clots at moderate rate. Potassium replaced.

## 2023-04-27 NOTE — PROGRESS NOTES
[facility]   INPATIENT PROGRESS NOTE      Patient Name: Favio Casillas  Date of Admission: 4/26/2023  Today's Date: 04/27/23  Length of Stay: 0  Primary Care Physician: Shayne Merritt MD    Subjective   Chief Complaint: Hematuria  HPI   68 year old male with history of bladder cancer presented with hematuria.     4/27/23 C/O abdominal cramping. Undergoing CBI, Denies fever or chills.         Review of Systems   Constitutional: Negative.    HENT: Negative.    Eyes: Negative.    Respiratory: Negative.    Cardiovascular: Negative.    Gastrointestinal: Positive for abdominal pain.   Genitourinary: Positive for hematuria.   Musculoskeletal: Negative.    Neurological: Negative.         All pertinent negatives and positives are as above. All other systems have been reviewed and are negative unless otherwise stated.     Objective    Temp:  [96.4 °F (35.8 °C)-98.3 °F (36.8 °C)] 96.4 °F (35.8 °C)  Heart Rate:  [] 81  Resp:  [18] 18  BP: (100-136)/(70-84) 130/74     Physical Exam  Vitals reviewed.   Constitutional:       Appearance: Normal appearance.   HENT:      Head: Normocephalic and atraumatic.      Mouth/Throat:      Mouth: Mucous membranes are moist.      Pharynx: Oropharynx is clear.   Eyes:      Pupils: Pupils are equal, round, and reactive to light.   Cardiovascular:      Rate and Rhythm: Normal rate and regular rhythm.   Pulmonary:      Effort: Pulmonary effort is normal.   Abdominal:      General: Bowel sounds are normal.      Tenderness: There is abdominal tenderness.   Musculoskeletal:         General: Normal range of motion.   Skin:     General: Skin is warm.      Capillary Refill: Capillary refill takes less than 2 seconds.   Neurological:      General: No focal deficit present.      Mental Status: He is alert.   Psychiatric:         Thought Content: Thought content normal.             Results Review:  I have reviewed the labs,  radiology results, and diagnostic studies.    Laboratory Data:   Results from last 7 days   Lab Units 04/27/23  0625 04/26/23 2147   WBC 10*3/mm3 8.90 11.70*   HEMOGLOBIN g/dL 13.6 13.0   HEMATOCRIT % 41.1 38.0   PLATELETS 10*3/mm3 249 230        Results from last 7 days   Lab Units 04/27/23  0625 04/26/23  2147   SODIUM mmol/L 133* 130*   POTASSIUM mmol/L 2.8* 3.0*   CHLORIDE mmol/L 108* 104   CO2 mmol/L 12.0* 14.0*   BUN mg/dL 45* 47*   CREATININE mg/dL 1.66* 1.80*   CALCIUM mg/dL 8.9 8.5*   BILIRUBIN mg/dL  --  0.3   ALK PHOS U/L  --  114   ALT (SGPT) U/L  --  28   AST (SGOT) U/L  --  24   GLUCOSE mg/dL 399* 309*       Culture Data:   No results found for: BLOODCX  No results found for: URINECX  No results found for: RESPCX  No results found for: WOUNDCX  No results found for: STOOLCX  No components found for: BODYFLD    Radiology Data:   Imaging Results (Last 24 Hours)     Procedure Component Value Units Date/Time    US Renal Bilateral [[id number]] Collected: 04/27/23 1015     Updated: 04/27/23 1219    Narrative:      PROCEDURE:  Complete retroperitoneal sonogram.    COMPARISON:  Renal ultrasound 9/6/2020.    HISTORY:  Hematuria.      TECHNIQUE:  High-resolution gray scale images of the bilateral kidneys and bladder were  obtained.    FINDINGS:  Realtime sonographic images are obtained of the kidneys and bladder. The kidneys  are normal in size and echogenicity. Mild bilateral hydronephrosis. The right  kidney measures 10.1 cm in length, the left kidney 11.1 cm in length.    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  There is a 4.1 cm masslike  filling defect in the bladder.      Impression:      1.  Mild bilateral hydronephrosis.    2.  There is a 4.1 cm round mass or filling defect in the bladder.  Malignancy  not excluded.  Visualization should be considered.              I have reviewed the patient's current medications.     Assessment/Plan     I have utilized all available immediate resources to  obtain, update, or review the patient's current medications (including all prescriptions, over-the-counter products, herbals, cannabis/cannabidiol products, and vitamin/mineral/dietary (nutritional) supplements).      Active Hospital Problems    Diagnosis    • **Hematuria    • Hyponatremia    • Hypokalemia    • CKD (chronic kidney disease) stage 3, GFR 30-59 ml/min    • Acute UTI (urinary tract infection)        Medical Decision Making  Number and Complexity of problems: 1 High 4 Moderate    Conditions and Status:        Condition is improving.     MDM Data  External documents reviewed: None today  My EKG interpretation: None today  My plain film interpretation: None today  Tests considered but not ordered: None today       Discussed with: Patient, RN     Treatment Plan  1. Hematuria  -Urology consult  -CBI    2. UTI  -Ceftriaxaone    3.CKD III  -renally adjust medications, avoid nephrotoxins    4. Hyponatremia  -improved with IVF    5. Hypokalemia  -Electrolyte replacement therapy    Care Planning  Shared decision making: With patient  Code status and discussions: Full Code    Disposition  Social Determinants of Health that impact treatment or disposition: n/a  I expect the patient to be discharged to home in 1-2 days.      I confirmed that the patient's Advance Care Plan is present, code status is documented, or surrogate decision maker is listed in the patient's medical record.   ________________________________        Copied text in this note has been reviewed and is accurate as of 04/27/23       Electronically signed by TIFFANIE Dominguez, 04/27/23, 16:47 CDT.

## 2023-04-27 NOTE — H&P
[facility] Medicine Admission      Date of Admission: 4/26/2023      Primary Care Physician: Shayne Merritt MD    Chief Complaint:   Juan David hematuria    HPI:  Patient has a known history of juan david hematuria with prior history of recurrent bladder cancer.  The patient is followed by Dr. Calderon and was actually in his clinic today being assessed for hematuria and having CBI.  They initially thought that this is improved and the patient was sent home but it recurred and he is now presented to the ED.  Patient is currently on a CBI and showing signs of improvement but consultation with urology would be advised considering the repetitive nature of it.  Concerns were raised based on the fact that the patient's most recent hemoglobin was 17.2 and so there is concern that he may have dropped significantly in his hemoglobin despite its normal value.  Patient denies any other GI or  symptoms on focused review.    Concurrent Medical History:  has a past medical history of Adenomatous polyp of colon, Anemia, Bladder cancer, Bladder outlet obstruction, CAD (coronary artery disease), Chronic back pain, Chronic gastritis, Chronic hepatitis C, Chronic obstructive lung disease, CKD (chronic kidney disease), Coronary arteriosclerosis, Diabetes mellitus, Diverticular disease of colon, Drug abuse, Elevated cholesterol, GERD (gastroesophageal reflux disease), Hypertension, Ingrown toenail, Prostate cancer, Rheumatoid arthritis, Seizure, Stroke, and Transient cerebral ischemia.    Past Surgical History:  has a past surgical history that includes Cholecystectomy; Esophagogastroduodenoscopy (N/A, 03/03/2017); Wrist surgery (Left); Cardiac catheterization (N/A, 12/15/2017); Esophagogastroduodenoscopy (N/A, 04/22/2019); Colonoscopy (N/A, 04/22/2019); Coronary angioplasty with stent; Transurethral resection of bladder tumor (N/A, 07/21/2021); Esophagogastroduodenoscopy (N/A, 10/19/2021);  Colonoscopy (N/A, 10/19/2021); Upper gastrointestinal endoscopy (04/22/2019); Upper gastrointestinal endoscopy (10/19/2021); Bladder surgery; and Transurethral resection of bladder tumor (N/A, 4/12/2023).    Family History: family history includes Diabetes in his mother; Liver cancer in his father.     Social History:  reports that he quit smoking about 3 months ago. His smoking use included cigarettes. He has a 53.00 pack-year smoking history. He quit smokeless tobacco use about 43 years ago.  His smokeless tobacco use included chew. He reports that he does not currently use drugs after having used the following drugs: Marijuana. Frequency: 2.00 times per week. He reports that he does not drink alcohol.    Allergies: No Known Allergies    Medications:   Prior to Admission medications    Medication Sig Start Date End Date Taking? Authorizing Provider   albuterol (PROVENTIL) (2.5 MG/3ML) 0.083% nebulizer solution Take 2.5 mg by nebulization Every 4 (Four) Hours As Needed for Wheezing or Shortness of Air. 4/17/17  Yes Harvinder Robert MD   albuterol sulfate HFA (Ventolin HFA) 108 (90 Base) MCG/ACT inhaler Inhale 2 puffs Every 6 (Six) Hours As Needed for Wheezing. 1/17/21  Yes Hu Charles MD   budesonide-formoterol (SYMBICORT) 80-4.5 MCG/ACT inhaler Inhale 2 puffs 2 (Two) Times a Day.   Yes Jorge Cook MD   carvedilol (COREG) 6.25 MG tablet Take 1 tablet by mouth 2 (Two) Times a Day With Meals. 12/25/22  Yes Ciro Chauhan MD   clopidogrel (PLAVIX) 75 MG tablet Take 1 tablet by mouth Daily.   Yes Jorge Cook MD   cyclobenzaprine (FLEXERIL) 10 MG tablet Take 1 tablet by mouth 3 (Three) Times a Day As Needed. 7/10/19  Yes Jorge Cook MD   fluticasone (FLONASE) 50 MCG/ACT nasal spray 1 spray by Each Nare route Daily As Needed. Shake before using. 11/23/21  Yes Jorge Cook MD   furosemide (LASIX) 20 MG tablet Take 1 tablet by mouth Daily.   Yes Jorge Cook MD    hydrOXYzine (ATARAX) 25 MG tablet Take 1 tablet by mouth Every 4 (Four) Hours As Needed. 9/27/19  Yes Jorge Cook MD   Insulin Aspart (novoLOG) 100 UNIT/ML injection Inject 0-20 Units under the skin into the appropriate area as directed 3 (Three) Times a Day Before Meals.   Yes Jorge Cook MD   insulin detemir (LEVEMIR) 100 UNIT/ML injection Inject 25 Units under the skin into the appropriate area as directed 2 (Two) Times a Day. 12/17/21  Yes Shoaib Constantino APRN   ipratropium-albuterol (DUO-NEB) 0.5-2.5 mg/3 ml nebulizer Take 3 mL by nebulization Every 4 (Four) Hours As Needed for Wheezing. 9/19/22  Yes Timmy Colin DO   nortriptyline (PAMELOR) 10 MG capsule Take 1 capsule by mouth Every Night.   Yes Jorge Cook MD   ondansetron ODT (ZOFRAN-ODT) 4 MG disintegrating tablet Place 1 tablet on the tongue 4 (Four) Times a Day As Needed for Nausea or Vomiting. 3/19/23  Yes Shabbir Webster MD   pantoprazole (PROTONIX) 40 MG EC tablet Take 1 tablet by mouth Daily.   Yes Jorge Cook MD   pravastatin (PRAVACHOL) 40 MG tablet Take 1 tablet by mouth Daily.   Yes Jorge Cook MD   sertraline (ZOLOFT) 50 MG tablet Take 1 tablet by mouth Daily. 4/26/21  Yes Jorge Cook MD   Alcohol Swabs (ALCOHOL PREP) pads 1 pad 5 (Five) Times a Day. 3/29/19   Radha Gómez MD   ASPIRIN 81 PO Take 1 tablet by mouth Daily.    Jorge Cook MD   B-D ULTRAFINE III SHORT PEN 31G X 8 MM misc See Admin Instructions. 4/30/21   Jorge Cook MD   Blood Glucose Monitoring Suppl (ACURA BLOOD GLUCOSE METER) w/Device kit 1 each 4 (Four) Times a Day As Needed (SUGARR). 8/23/18   Harvinder Robert MD   Continuous Blood Gluc  (Dexcom G6 ) device 1 each Continuous. 12/17/21   Shoaib Constantino APRN   Continuous Blood Gluc Sensor (Dexcom G6 Sensor) Every 10 (Ten) Days. 9/2/22   Shoaib Constantino, TIFFANIE   Continuous Blood Gluc  "Transmit (Dexcom G6 Transmitter) misc 1 EACH EVERY 3 MONTHS. 2/9/23   Shoaib Constantino APRN   glucose blood test strip Use as instructed Contour Next EZ test strips 1/27/23   Shoaib Constantino APRN   glucose monitor monitoring kit 1 each 3 (Three) Times a Day. 12/17/21   Shoaib Constantino APRN   Insulin Pen Needle (Pen Needles) 32G X 4 MM misc 1 each 4 (Four) Times a Day. Use 4 x daily, Dx code E11.65 12/17/21   Shoaib Constantino APRN   Insulin Syringe 29G X 1/2\" 0.5 ML misc Inject 4 times daily 12/17/21   Shoaib Constantino APRN   Lancets (freestyle) lancets Tid 12/17/21   Shoaib Constantino APRN   nitroglycerin (NITROSTAT) 0.4 MG SL tablet Place 1 tablet under the tongue Every 5 (Five) Minutes As Needed for Chest Pain. Take no more than 3 doses in 15 minutes.    Provider, Historical, MD       Review of Systems:  Review of Systems   Constitutional: Negative for chills, diaphoresis, fever and unexpected weight change.   HENT: Negative for congestion, ear pain, rhinorrhea, sinus pressure, sneezing and sore throat.    Respiratory: Negative for cough, shortness of breath, wheezing and stridor.    Cardiovascular: Negative for chest pain and palpitations.   Gastrointestinal: Negative for abdominal distention, abdominal pain, constipation, diarrhea, nausea and vomiting.   Genitourinary: Positive for hematuria. Negative for dysuria, flank pain, frequency and urgency.   Musculoskeletal: Negative for arthralgias, joint swelling and myalgias.   Skin: Negative for color change, rash and wound.   Neurological: Negative for dizziness, syncope, light-headedness and headaches.   Psychiatric/Behavioral: Negative for agitation, confusion and hallucinations.      Otherwise complete ROS is negative except as mentioned above.    Physical Exam:   Temp:  [97.4 °F (36.3 °C)-98.3 °F (36.8 °C)] 97.6 °F (36.4 °C)  Heart Rate:  [] 95  Resp:  [18] 18  BP: (100-136)/(70-84) 134/82  Physical Exam  Vitals " and nursing note reviewed.   Constitutional:       General: He is awake. He is not in acute distress.     Appearance: He is normal weight. He is ill-appearing. He is not toxic-appearing or diaphoretic.   HENT:      Head: Normocephalic and atraumatic.      Nose: Nose normal. No congestion or rhinorrhea.      Mouth/Throat:      Pharynx: Oropharynx is clear. No oropharyngeal exudate or posterior oropharyngeal erythema.   Eyes:      General:         Right eye: No discharge.         Left eye: No discharge.      Extraocular Movements: Extraocular movements intact.      Conjunctiva/sclera: Conjunctivae normal.      Pupils: Pupils are equal, round, and reactive to light.   Cardiovascular:      Rate and Rhythm: Normal rate and regular rhythm.      Pulses: Normal pulses.      Heart sounds: Normal heart sounds. No murmur heard.    No friction rub. No gallop.   Pulmonary:      Effort: Pulmonary effort is normal. No respiratory distress.      Breath sounds: Normal breath sounds. No stridor.   Abdominal:      General: Abdomen is flat. There is no distension.      Palpations: Abdomen is soft. There is no mass.      Tenderness: There is abdominal tenderness in the suprapubic area. There is no right CVA tenderness, left CVA tenderness, guarding or rebound.      Hernia: No hernia is present.   Musculoskeletal:         General: No swelling, tenderness or signs of injury.   Skin:     Coloration: Skin is not jaundiced.      Findings: No bruising, erythema or rash.   Neurological:      General: No focal deficit present.      Mental Status: He is alert and oriented to person, place, and time. Mental status is at baseline.      Cranial Nerves: No cranial nerve deficit.   Psychiatric:         Behavior: Behavior normal. Behavior is cooperative.         Thought Content: Thought content normal.         Judgment: Judgment normal.         Results Reviewed:  I have personally reviewed current lab, radiology, and data and agree with results.  Lab  Results (last 24 hours)     Procedure Component Value Units Date/Time    [place] Draw [[id number]] Collected: 04/26/23 2147    Specimen: Blood Updated: 04/26/23 2300    Narrative:      The following orders were created for panel order [place] Draw.  Procedure                               Abnormality         Status                     ---------                               -----------         ------                     Green Top (Gel)[[id number]]                                  Final result               Lavender Top[[id number]]                                     Final result               Gold Top - SST[[id number]]                                   Final result               Light Blue Top[[id number]]                                   Final result                 Please view results for these tests on the individual orders.    Green Top (Gel) [[id number]] Collected: 04/26/23 2147    Specimen: Blood Updated: 04/26/23 2300     Extra Tube Hold for add-ons.     Comment: Auto resulted.       Lavender Top [[id number]] Collected: 04/26/23 2147    Specimen: Blood Updated: 04/26/23 2300     Extra Tube hold for add-on     Comment: Auto resulted       Gold Top - SST [[id number]] Collected: 04/26/23 2147    Specimen: Blood Updated: 04/26/23 2300     Extra Tube Hold for add-ons.     Comment: Auto resulted.       Light Blue Top [[id number]] Collected: 04/26/23 2147    Specimen: Blood Updated: 04/26/23 2300     Extra Tube Hold for add-ons.     Comment: Auto resulted       Magnesium [[id number]]  (Normal) Collected: 04/26/23 2147    Specimen: Blood Updated: 04/26/23 2230     Magnesium 1.7 mg/dL     aPTT [[id number]]  (Normal) Collected: 04/26/23 2147    Specimen: Blood Updated: 04/26/23 2215     PTT 25.4 seconds     Narrative:      The recommended Heparin therapeutic range is 68-97 seconds.    Protime-INR [[id number]]  (Normal) Collected: 04/26/23 2147    Specimen: Blood Updated: 04/26/23 2215     Protime 13.7 Seconds      INR 1.05     Narrative:      Therapeutic range for most indications is 2.0-3.0 INR,  or 2.5-3.5 for mechanical heart valves.    Comprehensive Metabolic Panel [[id number]]  (Abnormal) Collected: 04/26/23 2147    Specimen: Blood Updated: 04/26/23 2205     Glucose 309 mg/dL      BUN 47 mg/dL      Creatinine 1.80 mg/dL      Sodium 130 mmol/L      Potassium 3.0 mmol/L      Chloride 104 mmol/L      CO2 14.0 mmol/L      Calcium 8.5 mg/dL      Total Protein 6.3 g/dL      Albumin 3.5 g/dL      ALT (SGPT) 28 U/L      AST (SGOT) 24 U/L      Alkaline Phosphatase 114 U/L      Total Bilirubin 0.3 mg/dL      Globulin 2.8 gm/dL      A/G Ratio 1.3 g/dL      BUN/Creatinine Ratio 26.1     Anion Gap 12.0 mmol/L      eGFR 41.0 mL/min/1.73     Narrative:      GFR Normal >60  Chronic Kidney Disease <60  Kidney Failure <15      Urinalysis, Microscopic Only - Urine, Clean Catch [[id number]]  (Abnormal) Collected: 04/26/23 2130    Specimen: Urine, Clean Catch Updated: 04/26/23 2203     RBC, UA Too Numerous to Count /HPF      WBC, UA 13-20 /HPF      Bacteria, UA 1+ /HPF      Squamous Epithelial Cells, UA 6-12 /HPF      Hyaline Casts, UA None Seen /LPF      Mucus, UA Moderate/2+ /HPF      Methodology Manual Light Microscopy    CBC & Differential [[id number]]  (Abnormal) Collected: 04/26/23 2147    Specimen: Blood Updated: 04/26/23 2150    Narrative:      The following orders were created for panel order CBC & Differential.  Procedure                               Abnormality         Status                     ---------                               -----------         ------                     CBC Auto Differential[[id number]]        Abnormal            Final result                 Please view results for these tests on the individual orders.    CBC Auto Differential [[id number]]  (Abnormal) Collected: 04/26/23 2147    Specimen: Blood Updated: 04/26/23 2150     WBC 11.70 10*3/mm3      RBC 4.53 10*6/mm3      Hemoglobin 13.0 g/dL      Hematocrit 38.0 %      MCV  83.9 fL      MCH 28.7 pg      MCHC 34.2 g/dL      RDW 13.7 %      RDW-SD 42.1 fl      MPV 9.8 fL      Platelets 230 10*3/mm3      Neutrophil % 76.5 %      Lymphocyte % 11.7 %      Monocyte % 9.6 %      Eosinophil % 0.7 %      Basophil % 0.6 %      Immature Grans % 0.9 %      Neutrophils, Absolute 8.95 10*3/mm3      Lymphocytes, Absolute 1.37 10*3/mm3      Monocytes, Absolute 1.12 10*3/mm3      Eosinophils, Absolute 0.08 10*3/mm3      Basophils, Absolute 0.07 10*3/mm3      Immature Grans, Absolute 0.11 10*3/mm3      nRBC 0.2 /100 WBC     Urinalysis With Microscopic If Indicated (No Culture) - Urine, Clean Catch [[id number]]  (Abnormal) Collected: 04/26/23 2130    Specimen: Urine, Clean Catch Updated: 04/26/23 2148     Color, UA Red     Appearance, UA Cloudy     pH, UA 7.0     Specific Gravity, UA 1.015     Glucose,  mg/dL (Trace)     Ketones, UA Negative     Bilirubin, UA Small (1+)     Blood, UA Large (3+)     Protein, UA >=300 mg/dL (3+)     Leuk Esterase, UA Small (1+)     Nitrite, UA Positive     Urobilinogen, UA 0.2 E.U./dL        Imaging Results (Last 24 Hours)     ** No results found for the last 24 hours. **            Assessment:    Active Hospital Problems    Diagnosis    • **Hematuria    • Hyponatremia    • Hypokalemia    • CKD (chronic kidney disease) stage 3, GFR 30-59 ml/min    • Acute UTI (urinary tract infection)          Medical Decision Making  Number and Complexity of problems: 5 - High    Differential Diagnosis:   As above    Conditions and Status:        Condition is improving.     [facility] Data  External documents reviewed: Chart review  My EKG interpretation: Not applicable  My US interpretation: Renal ultrasound is awaited  Tests considered but not ordered: CT Abdo/pelvis     Decision rules/scores evaluated (example [id number], Wells, etc): Nil     Discussed with: The patient     Treatment Plan  1.  IV antibiotics  2.  Urology consultation  3.  Renal tract ultrasound  4.  Home medication  reconciliation  5.  Avoid antiplatelets and anticoagulation  6.  VTE prophylaxis  7.  Daily blood work  8.  Continue CBI until runs clear  9.  NPO diet until assessed by urology (in case Dr. Calderon wishes to perform an interventional procedure  10.  IV saline fluid maintenance  11.  Electrolyte replacement protocols      Care Planning  Shared decision making: With the patient  Code status and discussions: Full code    Disposition  Social Determinants of Health that impact treatment or disposition: Nil  I expect the patient to be discharged to home in 2-4 days.       Jesus Ortiz MD    Electronically signed by Jesus Ortiz MD, 04/27/23, 5:45 AM CDT.

## 2023-04-27 NOTE — ED PROVIDER NOTES
"Subjective   History of Present Illness  68-year-old male presents the emergency department with complaint of hematuria.  He does not have a catheter in place but is history of bladder cancer and reports having a \"artificial bladder\".  Reportedly 14 days ago he had surgery to move the tumor from that artificial bladder.  He reports he was in Dr. Calderon office and saw the nurse practitioner today and was found to have UTI and hematuria with some retention.  They report that catheter was placed and he received continuous bladder irrigation until the urine was pale pink and the catheter was removed and he was discharged home.  He does not take blood thinners.  He is currently on Keflex for UTI started today.  He reports that after he got home he went to urinate again and noted clots and difficulty urinating and felt like he was going to pass out.  He was told to come to the ED if things worsened.    Family history, surgical history, social history, current medications and allergies are reviewed with the patient and triage documentation and vitals are reviewed.    History provided by:  Patient, relative and medical records   used: No        Review of Systems   Constitutional: Negative for chills and fever.   HENT: Negative.    Eyes: Negative for photophobia and visual disturbance.   Respiratory: Negative for cough and shortness of breath.    Cardiovascular: Negative for chest pain and palpitations.   Gastrointestinal: Negative for abdominal pain, diarrhea, nausea and vomiting.   Endocrine: Negative for polydipsia, polyphagia and polyuria.   Genitourinary: Positive for decreased urine volume, difficulty urinating and hematuria. Negative for dysuria, penile discharge, penile pain, penile swelling, scrotal swelling, testicular pain and urgency.   Musculoskeletal: Negative.    Skin: Positive for pallor.   Allergic/Immunologic: Negative.    Neurological: Negative.    Hematological: Negative for " adenopathy. Does not bruise/bleed easily.   Psychiatric/Behavioral: Negative.        Past Medical History:   Diagnosis Date   • Adenomatous polyp of colon    • Anemia    • Bladder cancer    • Bladder outlet obstruction    • CAD (coronary artery disease)     1 stent, followed by Dr. Garcia   • Chronic back pain    • Chronic gastritis    • Chronic hepatitis C    • Chronic obstructive lung disease    • CKD (chronic kidney disease)    • Coronary arteriosclerosis    • Diabetes mellitus    • Diverticular disease of colon    • Drug abuse    • Elevated cholesterol    • GERD (gastroesophageal reflux disease)    • Hypertension    • Ingrown toenail    • Prostate cancer    • Rheumatoid arthritis    • Seizure    • Stroke     heat stroke   • Transient cerebral ischemia        No Known Allergies    Past Surgical History:   Procedure Laterality Date   • BLADDER SURGERY      artificial bladder placed   • CARDIAC CATHETERIZATION N/A 12/15/2017   • CHOLECYSTECTOMY     • COLONOSCOPY N/A 04/22/2019   • COLONOSCOPY N/A 10/19/2021    Procedure: COLONOSCOPY;  Surgeon: Alfonso Torres MD;  Location: [facility] ENDOSCOPY;  Service: Gastroenterology;  Laterality: N/A;   • CORONARY ANGIOPLASTY WITH STENT PLACEMENT     • ENDOSCOPY N/A 03/03/2017   • ENDOSCOPY N/A 04/22/2019   • ENDOSCOPY N/A 10/19/2021    Procedure: ESOPHAGOGASTRODUODENOSCOPY;  Surgeon: Alfonso Torres MD;  Location: [facility] ENDOSCOPY;  Service: Gastroenterology;  Laterality: N/A;   • TRANSURETHRAL RESECTION OF BLADDER TUMOR N/A 07/21/2021    Procedure: CYSTOSCOPY TRANSURETHRAL RESECTION OF BLADDER TUMOR;  Surgeon: Christofer Calderon MD;  Location: [facility] OR;  Service: Urology;  Laterality: N/A;   • TRANSURETHRAL RESECTION OF BLADDER TUMOR N/A 4/12/2023    Procedure: CYSTOSCOPY TRANSURETHRAL RESECTION OF BLADDER TUMOR;  Surgeon: Christofer Calderon MD;  Location: [facility] OR;  Service: Urology;  Laterality: N/A;   • UPPER GASTROINTESTINAL ENDOSCOPY  04/22/2019   • UPPER  GASTROINTESTINAL ENDOSCOPY  10/19/2021   • WRIST SURGERY Left        Family History   Problem Relation Age of Onset   • Diabetes Mother    • Liver cancer Father        Social History     Socioeconomic History   • Marital status:    Tobacco Use   • Smoking status: Former     Packs/day: 1.00     Years: 53.00     Pack years: 53.00     Types: Cigarettes     Quit date: 2023     Years since quittin.2   • Smokeless tobacco: Former     Types: Chew     Quit date:    Vaping Use   • Vaping Use: Former   • Substances: Nicotine, Flavoring   • Devices: Disposable   Substance and Sexual Activity   • Alcohol use: Never   • Drug use: Not Currently     Frequency: 2.0 times per week     Types: Marijuana   • Sexual activity: Defer           Objective   Physical Exam  Vitals and nursing note reviewed.   Constitutional:       General: He is not in acute distress.     Appearance: Normal appearance. He is normal weight. He is ill-appearing. He is not toxic-appearing or diaphoretic.   HENT:      Head: Normocephalic.      Mouth/Throat:      Mouth: Mucous membranes are moist.   Eyes:      Pupils: Pupils are equal, round, and reactive to light.   Cardiovascular:      Rate and Rhythm: Normal rate and regular rhythm.   Pulmonary:      Effort: Pulmonary effort is normal.      Breath sounds: Normal breath sounds.   Abdominal:      General: Bowel sounds are normal.      Palpations: Abdomen is soft.   Musculoskeletal:         General: Normal range of motion.      Cervical back: Normal range of motion and neck supple.   Skin:     General: Skin is warm and dry.      Capillary Refill: Capillary refill takes less than 2 seconds.   Neurological:      General: No focal deficit present.      Mental Status: He is alert.   Psychiatric:         Mood and Affect: Mood normal.         Procedures  none         ED Course      Labs Reviewed   URINALYSIS W/ MICROSCOPIC IF INDICATED (NO CULTURE) - Abnormal; Notable for the following components:        Result Value    Color, UA Red (*)     Appearance, UA Cloudy (*)     Glucose,  mg/dL (Trace) (*)     Bilirubin, UA Small (1+) (*)     Blood, UA Large (3+) (*)     Protein, UA >=300 mg/dL (3+) (*)     Leuk Esterase, UA Small (1+) (*)     Nitrite, UA Positive (*)     All other components within normal limits   COMPREHENSIVE METABOLIC PANEL - Abnormal; Notable for the following components:    Glucose 309 (*)     BUN 47 (*)     Creatinine 1.80 (*)     Sodium 130 (*)     Potassium 3.0 (*)     CO2 14.0 (*)     Calcium 8.5 (*)     BUN/Creatinine Ratio 26.1 (*)     eGFR 41.0 (*)     All other components within normal limits    Narrative:     GFR Normal >60  Chronic Kidney Disease <60  Kidney Failure <15     CBC WITH AUTO DIFFERENTIAL - Abnormal; Notable for the following components:    WBC 11.70 (*)     Neutrophil % 76.5 (*)     Lymphocyte % 11.7 (*)     Immature Grans % 0.9 (*)     Neutrophils, Absolute 8.95 (*)     Monocytes, Absolute 1.12 (*)     Immature Grans, Absolute 0.11 (*)     All other components within normal limits   URINALYSIS, MICROSCOPIC ONLY - Abnormal; Notable for the following components:    RBC, UA Too Numerous to Count (*)     WBC, UA 13-20 (*)     Bacteria, UA 1+ (*)     Squamous Epithelial Cells, UA 6-12 (*)     Mucus, UA Moderate/2+ (*)     All other components within normal limits   PROTIME-INR - Normal    Narrative:     Therapeutic range for most indications is 2.0-3.0 INR,  or 2.5-3.5 for mechanical heart valves.   APTT - Normal    Narrative:     The recommended Heparin therapeutic range is 68-97 seconds.   MAGNESIUM - Normal   BLOOD CULTURE   BLOOD CULTURE   RAINBOW DRAW    Narrative:     The following orders were created for panel order [place] Draw.  Procedure                               Abnormality         Status                     ---------                               -----------         ------                     Green Top (Gel)[[id number]]                                  Final  result               Lavender Top[[id number]]                                     Final result               Gold Top - SST[[id number]]                                   Final result               Light Blue Top[[id number]]                                   Final result                 Please view results for these tests on the individual orders.   LACTIC ACID, PLASMA   URINALYSIS W/ CULTURE IF INDICATED   BASIC METABOLIC PANEL   CBC WITH AUTO DIFFERENTIAL   POCT GLUCOSE FINGERSTICK   POCT GLUCOSE FINGERSTICK   POCT GLUCOSE FINGERSTICK   CBC AND DIFFERENTIAL    Narrative:     The following orders were created for panel order CBC & Differential.  Procedure                               Abnormality         Status                     ---------                               -----------         ------                     CBC Auto Differential[[id number]]        Abnormal            Final result                 Please view results for these tests on the individual orders.   GREEN TOP   LAVENDER TOP   GOLD TOP - SST   LIGHT BLUE TOP   CBC AND DIFFERENTIAL    Narrative:     The following orders were created for panel order CBC & Differential.  Procedure                               Abnormality         Status                     ---------                               -----------         ------                     CBC Auto Differential[[id number]]                                                         Please view results for these tests on the individual orders.     No results found.      Medical Decision Making  Gross hematuria: acute illness or injury  Amount and/or Complexity of Data Reviewed  Labs: ordered. Decision-making details documented in ED Course.      Risk  Prescription drug management.  Decision regarding hospitalization.      Patient passing large clots.  John catheter is placed and CBI is initiated.  Remains with bright red urine even after significant amount of irrigation.  Hemoglobin is 13 but 1 month ago it  was 17.  Started on Rocephin given known UTI in the office and continued evidence of UTI on the urinalysis here.  Discussed with hospitalist who is agreeable to admission.  Consultation placed for Dr. Calderon.  Patient and family acknowledge understanding of treatment, need for admission and agreeable at this time.    Final diagnoses:   Gross hematuria         ED Disposition  ED Disposition     ED Disposition   Decision to Admit    Condition   --    Comment   Level of Care: Med/Surg [1]   Diagnosis: Hematuria [238137]   Admitting Physician: NANO HESS [[id number]]               No follow-up provider specified.       Medication List      No changes were made to your prescriptions during this visit.          Timmy Colin,   04/27/23 0553

## 2023-04-27 NOTE — ED NOTES
Nursing report ED to floor  Favio Casillas  68 y.o.  male    HPI:   Chief Complaint   Patient presents with    Blood in Urine       Admitting doctor:   Jesus Ortiz MD    Consulting provider(s):  Consults       Date and Time Order Name Status Description    4/27/2023 12:36 AM Urology (on-call MD unless specified)      4/27/2023 12:35 AM Hospitalist (on-call MD unless specified)               Admitting diagnosis:   There were no encounter diagnoses.    Code status:   Current Code Status       Date Active Code Status Order ID Comments User Context       Prior            Allergies:   Patient has no known allergies.    Intake and Output    Intake/Output Summary (Last 24 hours) at 4/27/2023 0038  Last data filed at 4/27/2023 0022  Gross per 24 hour   Intake 100 ml   Output --   Net 100 ml       Weight:       04/26/23 2106   Weight: 61.1 kg (134 lb 12.8 oz)       Most recent vitals:   Vitals:    04/26/23 2200 04/26/23 2300 04/27/23 0000 04/27/23 0002   BP: 120/78 129/84 136/83 119/76   BP Location:       Patient Position:       Pulse: 94 96 98 94   Resp:       Temp:       TempSrc:       SpO2: 97% 97% 98% 98%   Weight:       Height:         Oxygen Therapy: room air    Active LDAs/IV Access:   Lines, Drains & Airways       Active LDAs       Name Placement date Placement time Site Days    Peripheral IV 04/26/23 2143 Anterior;Left Forearm 04/26/23 2143  Forearm  less than 1    Continuous Bladder Irrigation Triple-lumen 20 Fr 04/26/23 2245  Triple-lumen  less than 1                    Labs (abnormal labs have a star):   Labs Reviewed   URINALYSIS W/ MICROSCOPIC IF INDICATED (NO CULTURE) - Abnormal; Notable for the following components:       Result Value    Color, UA Red (*)     Appearance, UA Cloudy (*)     Glucose,  mg/dL (Trace) (*)     Bilirubin, UA Small (1+) (*)     Blood, UA Large (3+) (*)     Protein, UA >=300 mg/dL (3+) (*)     Leuk Esterase, UA Small (1+) (*)     Nitrite, UA Positive (*)     All  other components within normal limits   COMPREHENSIVE METABOLIC PANEL - Abnormal; Notable for the following components:    Glucose 309 (*)     BUN 47 (*)     Creatinine 1.80 (*)     Sodium 130 (*)     Potassium 3.0 (*)     CO2 14.0 (*)     Calcium 8.5 (*)     BUN/Creatinine Ratio 26.1 (*)     eGFR 41.0 (*)     All other components within normal limits    Narrative:     GFR Normal >60  Chronic Kidney Disease <60  Kidney Failure <15     CBC WITH AUTO DIFFERENTIAL - Abnormal; Notable for the following components:    WBC 11.70 (*)     Neutrophil % 76.5 (*)     Lymphocyte % 11.7 (*)     Immature Grans % 0.9 (*)     Neutrophils, Absolute 8.95 (*)     Monocytes, Absolute 1.12 (*)     Immature Grans, Absolute 0.11 (*)     All other components within normal limits   URINALYSIS, MICROSCOPIC ONLY - Abnormal; Notable for the following components:    RBC, UA Too Numerous to Count (*)     WBC, UA 13-20 (*)     Bacteria, UA 1+ (*)     Squamous Epithelial Cells, UA 6-12 (*)     Mucus, UA Moderate/2+ (*)     All other components within normal limits   PROTIME-INR - Normal    Narrative:     Therapeutic range for most indications is 2.0-3.0 INR,  or 2.5-3.5 for mechanical heart valves.   APTT - Normal    Narrative:     The recommended Heparin therapeutic range is 68-97 seconds.   MAGNESIUM - Normal   RAINBOW DRAW    Narrative:     The following orders were created for panel order [place] Draw.  Procedure                               Abnormality         Status                     ---------                               -----------         ------                     Green Top (Gel)[[id number]]                                  Final result               Lavender Top[[id number]]                                     Final result               Gold Top - [facility][[id number]]                                   Final result               Light Blue Top[[id number]]                                   Final result                 Please view results for  these tests on the individual orders.   CBC AND DIFFERENTIAL    Narrative:     The following orders were created for panel order CBC & Differential.  Procedure                               Abnormality         Status                     ---------                               -----------         ------                     CBC Auto Differential[[id number]]        Abnormal            Final result                 Please view results for these tests on the individual orders.   GREEN TOP   LAVENDER TOP   GOLD TOP - SST   LIGHT BLUE TOP       Meds given in ED:   Medications   sodium chloride 0.9 % flush 10 mL (has no administration in time range)   cefTRIAXone (ROCEPHIN) 1 g/100 mL 0.9% NS (MBP) (0 g Intravenous Stopped 4/27/23 0022)           NIH Stroke Scale:       Isolation/Infection(s):  No active isolations   COVID Screen (preop/placement)     COVID Testing  Collected n/a  Resulted n/a    Nursing report ED to floor:  Mental status: a/o  Ambulatory status: assist x1  Precautions: n/a    ED nurse phone extentsiod- [phone]

## 2023-04-27 NOTE — THERAPY EVALUATION
Patient Name: Favio Casillas  : 1955    MRN: [id number]                              Today's Date: 2023       Admit Date: 2023    Visit Dx:     ICD-10-CM ICD-9-CM   1. Gross hematuria  R31.0 599.71   2. Impaired mobility and ADLs  Z74.09 V49.89    Z78.9    3. Impaired functional mobility, balance, gait, and endurance  Z74.09 V49.89     Patient Active Problem List   Diagnosis   • Type 2 diabetes mellitus with hyperglycemia, with long-term current use of insulin   • Acute pain of right shoulder   • Shoulder impingement syndrome, right   • Chest pain   • Acute renal insufficiency   • Chronic hepatitis C virus infection   • Gastritis   • SBO (small bowel obstruction)   • CAD (coronary artery disease)   • Acute kidney failure   • Intractable vomiting with nausea   • Gastroesophageal reflux disease with esophagitis   • Diarrhea   • Generalized abdominal pain   • History of colon polyps   • History of colitis   • Acute metabolic encephalopathy   • NSTEMI (non-ST elevated myocardial infarction)   • Acute renal failure superimposed on stage 3 chronic kidney disease   • Influenza A   • Anemia, chronic disease   • Bladder outflow obstruction   • Acute kidney injury   • Acute UTI (urinary tract infection)   • Metabolic acidosis   • Hypokalemia   • Acute renal failure superimposed on chronic kidney disease   • Diabetic ketoacidosis without coma associated with type 2 diabetes mellitus   • Diabetic ketoacidosis without coma associated with type 1 diabetes mellitus   • Moderate malnutrition   • Hyponatremia   • Lung nodule   • Bladder cancer   • Sepsis   • Klebsiella sepsis   • Essential hypertension   • Mixed hyperlipidemia   • Smoker   • Bladder neoplasm   • Chronic abdominal pain   • Vitamin D deficiency   • Chest pain, unspecified type   • Hematuria   • Hyponatremia   • Hypokalemia   • CKD (chronic kidney disease) stage 3, GFR 30-59 ml/min     Past Medical History:   Diagnosis Date   • Adenomatous polyp of  colon    • Anemia    • Bladder cancer    • Bladder outlet obstruction    • CAD (coronary artery disease)     1 stent, followed by Dr. Garcia   • Chronic back pain    • Chronic gastritis    • Chronic hepatitis C    • Chronic obstructive lung disease    • CKD (chronic kidney disease)    • Coronary arteriosclerosis    • Diabetes mellitus    • Diverticular disease of colon    • Drug abuse    • Elevated cholesterol    • GERD (gastroesophageal reflux disease)    • Hypertension    • Ingrown toenail    • Prostate cancer    • Rheumatoid arthritis    • Seizure    • Stroke     heat stroke   • Transient cerebral ischemia      Past Surgical History:   Procedure Laterality Date   • BLADDER SURGERY      artificial bladder placed   • CARDIAC CATHETERIZATION N/A 12/15/2017   • CHOLECYSTECTOMY     • COLONOSCOPY N/A 04/22/2019   • COLONOSCOPY N/A 10/19/2021    Procedure: COLONOSCOPY;  Surgeon: Alfonso Torres MD;  Location: [facility] ENDOSCOPY;  Service: Gastroenterology;  Laterality: N/A;   • CORONARY ANGIOPLASTY WITH STENT PLACEMENT     • ENDOSCOPY N/A 03/03/2017   • ENDOSCOPY N/A 04/22/2019   • ENDOSCOPY N/A 10/19/2021    Procedure: ESOPHAGOGASTRODUODENOSCOPY;  Surgeon: Alfonso Torres MD;  Location: [facility] ENDOSCOPY;  Service: Gastroenterology;  Laterality: N/A;   • TRANSURETHRAL RESECTION OF BLADDER TUMOR N/A 07/21/2021    Procedure: CYSTOSCOPY TRANSURETHRAL RESECTION OF BLADDER TUMOR;  Surgeon: Christofer Calderon MD;  Location: [facility] OR;  Service: Urology;  Laterality: N/A;   • TRANSURETHRAL RESECTION OF BLADDER TUMOR N/A 4/12/2023    Procedure: CYSTOSCOPY TRANSURETHRAL RESECTION OF BLADDER TUMOR;  Surgeon: Christofer Calderon MD;  Location: [facility];  Service: Urology;  Laterality: N/A;   • UPPER GASTROINTESTINAL ENDOSCOPY  04/22/2019   • UPPER GASTROINTESTINAL ENDOSCOPY  10/19/2021   • WRIST SURGERY Left       General Information     Row Name 04/27/23 0832          Physical Therapy Time and Intention    Document Type evaluation   -     Mode of Treatment co-treatment;physical therapy;occupational therapy  -     Row Name 04/27/23 08          General Information    Patient Profile Reviewed yes  -     Prior Level of Function independent:;all household mobility;community mobility;shopping;home management;ADL's;driving  -     Barriers to Rehab medically complex  -     Row Name 04/27/23 08          Living Environment    People in Home alone  -     Row Name 04/27/23 [facility]          Home Main Entrance    Number of Stairs, Main Entrance none  Ramp  -     Row Name 04/27/23 08          Stairs Within Home, Primary    Stairs, Within Home, Primary Uses cane sometimes. Drives. Walk-in shower, no seat. No recent falls.  -     Number of Stairs, Within Home, Primary none  -     Row Name 04/27/23 08          Cognition    Orientation Status (Cognition) oriented x 4;verbal cues/prompts needed for orientation  -           User Key  (r) = Recorded By, (t) = Taken By, (c) = Cosigned By    Initials Name Provider Type     Aicha Grey PT Physical Therapist               Mobility     Row Name 04/27/23 08          Bed Mobility    Bed Mobility bed mobility (all) activities  -     All Activities, [place] (Bed Mobility) modified independence  -     Assistive Device (Bed Mobility) head of bed elevated  -     Row Name 04/27/23 0832          Bed-Chair Transfer    Bed-Chair [place] (Transfers) independent  -     Row Name 04/27/23 08          Sit-Stand Transfer    Sit-Stand [place] (Transfers) independent  -     Row Name 04/27/23 08          Gait/Stairs (Locomotion)    [place] Level (Gait) independent  -     Distance in Feet (Gait) 5' x 2; Unable to assess further due to patient on CBI and multiple lines/tubes  -           User Key  (r) = Recorded By, (t) = Taken By, (c) = Cosigned By    Initials Name Provider Type     Aicha Grey PT Physical Therapist               Obj/Interventions     Row  Name 04/27/23 0832          Range of Motion Comprehensive    General Range of Motion bilateral lower extremity ROM WFL  -     Row Name 04/27/23 0832          Strength Comprehensive (MMT)    General Manual Muscle Testing (MMT) Assessment no strength deficits identified  -     Comment, General Manual Muscle Testing (MMT) Assessment BLE 5/5  -HM     Row Name 04/27/23 0832          Balance    Balance Assessment sitting static balance;standing static balance;standing dynamic balance  -     Static Sitting Balance independent  -HM     Position, Sitting Balance unsupported;sitting edge of bed  -     Static Standing Balance independent  -HM     Dynamic Standing Balance independent  -HM     Position/Device Used, Standing Balance unsupported  -     Row Name 04/27/23 0832          Sensory Assessment (Somatosensory)    Sensory Assessment (Somatosensory) LE sensation intact  -           User Key  (r) = Recorded By, (t) = Taken By, (c) = Cosigned By    Initials Name Provider Type     Aicha Grey, PATRICIA Physical Therapist               Goals/Plan    No documentation.                Clinical Impression     Row Name 04/27/23 0834          Pain    Pretreatment Pain Rating 5/10  -HM     Posttreatment Pain Rating 5/10  -     Pain Location - abdomen  -     Pre/Posttreatment Pain Comment cramping  -     Pain Intervention(s) Repositioned;Ambulation/increased activity  -     Row Name 04/27/23 0834          Plan of Care Review    Plan of Care Reviewed With patient  -     Outcome Evaluation PT Maria C completed. Co-Eval w/OT. Patient in supine in bed, agreeable to evaluation. A&Ox4 w/verbal cues for date. Kevin for bed mobility with good sitting balance. LE strength 5/5. Patient is Ind with static standing and dynamic standing tasks. Ind w/toilet transfers and room mobility. Did not ambulate further due to presence of CBI and multiple catheter tubes. Patient with good endurance and functional activity tolerance. At this  time, patient is Ind and skilled PT not necessary. PT to sign off. Recommend home at D/C.  -     Row Name 04/27/23 0834          Therapy Assessment/Plan (PT)    Criteria for Skilled Interventions Met (PT) no;no problems identified which require skilled intervention  -     Therapy Frequency (PT) evaluation only  -     Row Name 04/27/23 0834          Vital Signs    Pre Systolic BP Rehab 160  -HM     Pre Treatment Diastolic   -HM     Pretreatment Heart Rate (beats/min) 99  -HM     Pre SpO2 (%) 97  -HM     O2 Delivery Pre Treatment room air  -HM     Pre Patient Position Supine  -HM     Intra Patient Position Standing  -HM     Post Patient Position Supine  -HM     Row Name 04/27/23 0834          Positioning and Restraints    Pre-Treatment Position in bed  -     Post Treatment Position bed  -HM     In Bed call light within reach;encouraged to call for assist;side rails up x2  -HM           User Key  (r) = Recorded By, (t) = Taken By, (c) = Cosigned By    Initials Name Provider Type     Aicha Grey, PT Physical Therapist               Outcome Measures     Row Name 04/27/23 0856 04/27/23 0146       How much help from another person do you currently need...    Turning from your back to your side while in flat bed without using bedrails? 4  - 4  -BG    Moving from lying on back to sitting on the side of a flat bed without bedrails? 4  - 4  -BG    Moving to and from a bed to a chair (including a wheelchair)? 4  - 3  -BG    Standing up from a chair using your arms (e.g., wheelchair, bedside chair)? 4  - 3  -BG    Climbing 3-5 steps with a railing? 4  - 3  -BG    To walk in hospital room? 4  - 3  -BG    AM-PAC 6 Clicks Score (PT) 24  -HM 20  -BG    Highest level of mobility 8 --> Walked 250 feet or more  - 6 --> Walked 10 steps or more  -BG    Row Name 04/27/23 0856          Tinetti Assessment    Tinetti Assessment yes  -HM     Sitting Balance 1  -HM     Arises 2  -HM     Attempts to Rise 2   "-HM     Immediate Standing Balance (first 5 sec) 2  -HM     Standing Balance 2  -HM     Sternal Nudge (feet close together) 2  -HM     Eyes Closed (feet close together) 0  -HM     Turning 360 Degrees- Steps 1  -HM     Turning 360 Degrees- Steadiness 1  -HM     Sitting Down 2  -HM     Tinetti Balance Score 15  -HM     Gait Initiation (immediate after told \"go\") 1  -HM     Step Length- Right Swing 1  -HM     Step Length- Left Swing 1  -HM     Foot Clearance- Right Foot 1  -HM     Foot Clearance- Left Foot 1  -HM     Step Symmetry 1  -HM     Step Continuity 1  -HM     Path (excursion) 2  -HM     Trunk 2  -HM     Base of Support 1  -HM     Gait Score 12  -HM     Tinetti Total Score 27  -     Row Name 04/27/23 0856 04/27/23 0831       Functional Assessment    Outcome Measure Options AM-PAC 6 Clicks Basic Mobility (PT);Tinetti  -HM AM-PAC 6 Clicks Daily Activity (OT)  -CM          User Key  (r) = Recorded By, (t) = Taken By, (c) = Cosigned By    Initials Name Provider Type    BG Ricco Salazar RN Registered Nurse    Yanelis Garzon OT Occupational Therapist     Aicha Grey PT Physical Therapist                             Physical Therapy Education     Title: PT OT SLP Therapies (In Progress)     Topic: Physical Therapy (In Progress)     Point: Mobility training (Done)     Learning Progress Summary           Patient AcceptanceIRIS VU by  at 4/27/2023 0857    Comment: POC                   Point: Home exercise program (Not Started)     Learner Progress:  Not documented in this visit.          Point: Body mechanics (Not Started)     Learner Progress:  Not documented in this visit.          Point: Precautions (Not Started)     Learner Progress:  Not documented in this visit.                      User Key     Initials Effective Dates Name Provider Type Discipline     01/09/23 -  Aicha Gery PT Physical Therapist PT              PT Recommendation and Plan     Plan of Care Reviewed With: " patient  Outcome Evaluation: PT Eval completed. Co-Eval w/OT. Patient in supine in bed, agreeable to evaluation. A&Ox4 w/verbal cues for date. Kevin for bed mobility with good sitting balance. LE strength 5/5. Patient is Ind with static standing and dynamic standing tasks. Ind w/toilet transfers and room mobility. Did not ambulate further due to presence of CBI and multiple catheter tubes. Patient with good endurance and functional activity tolerance. At this time, patient is Ind and skilled PT not necessary. PT to sign off. Recommend home at D/C.     Time Calculation:    PT Charges     Row Name 04/27/23 0832             Time Calculation    Start Time 0832  -HM      Stop Time 0859  -HM      Time Calculation (min) 27 min  -HM      PT Received On 04/27/23  -HM         Untimed Charges    PT Eval/Re-eval Minutes 27  -HM         Total Minutes    Untimed Charges Total Minutes 27  -HM       Total Minutes 27  -HM            User Key  (r) = Recorded By, (t) = Taken By, (c) = Cosigned By    Initials Name Provider Type     Aicha Grey, PATRICIA Physical Therapist              Therapy Charges for Today     Code Description Service Date Service Provider Modifiers Qty    29368282887 HC PT EVAL LOW COMPLEXITY 2 4/27/2023 Aicha Grey, PT GP 1          PT G-Codes  Outcome Measure Options: AM-PAC 6 Clicks Basic Mobility (PT), Tinetti  AM-PAC 6 Clicks Score (PT): 24  AM-PAC 6 Clicks Score (OT): 24  Tinetti Total Score: 27  PT Discharge Summary  Anticipated Discharge Disposition (PT): home    Aicha Grey PT  4/27/2023

## 2023-04-27 NOTE — PLAN OF CARE
Goal Outcome Evaluation:      VSS. CBI going with pink tinged urine with some small clots. Pt is comfortable as of this time.potassium and magnesium replaced.

## 2023-04-27 NOTE — PLAN OF CARE
Goal Outcome Evaluation:  Plan of Care Reviewed With: patient           Outcome Evaluation: PT Eval completed. Co-Eval w/OT. Patient in supine in bed, agreeable to evaluation. A&Ox4 w/verbal cues for date. Kevin for bed mobility with good sitting balance. LE strength 5/5. Patient is Ind with static standing and dynamic standing tasks. Ind w/toilet transfers and room mobility. Did not ambulate further due to presence of CBI and multiple catheter tubes. Patient with good endurance and functional activity tolerance. At this time, patient is Ind and skilled PT not necessary. PT to sign off. Recommend home at D/C.

## 2023-04-27 NOTE — PLAN OF CARE
Goal Outcome Evaluation:  Plan of Care Reviewed With: patient           Outcome Evaluation: OT eval completed Co-eval with PT. Pt supine in bed upon arrival. VSS. Pt reported being (I) at home with ADLs and mobility. Pt lives alone, however, he has neighbors that check on him often. Pt was (I) for bed mobility, STS, mobility in room, and for donning/doffing socks. Pt was left supine in bed with all needs in reach. Pt has good BUE ROM and MMT. Pt reports being at baseline for ADLs and mobility with pt demonstrating safe mobiltiy during eval. No I/P OT needed at this time. Will d/c OT orders. Anticipate d/c home.

## 2023-04-27 NOTE — CONSULTS
[facility]   Consult Note    Patient Name: Favio Casillas  : 1955  MRN: [id number]  Primary Care Physician:  Shayne Merritt MD  Referring Physician: Timmy Colin DO  Date of admission: 2023    Inpatient Urology Consult  Consult performed by: Adryan Prater APRN  Consult ordered by: Jesus Ortiz MD        Subjective   Subjective     Reason for Consult/ Chief Complaint: hematuria    History of Present Illness  Favio Casillas is a 68 y.o. male consulted to Urology for hematuria, history of bladder cancer with cystectomy and neobladder, has required John with CBI both in office yesterday and now. Nursing reports some tube obstruction requiring manual irrigation, complains of abdominal pain in suprapubic area, gross hematuria.    Review of Systems   Constitutional: Positive for activity change.   HENT: Negative.    Eyes: Negative.    Respiratory: Negative.    Cardiovascular: Negative.    Gastrointestinal: Positive for abdominal pain. Negative for constipation, diarrhea, nausea and vomiting.   Endocrine: Negative.    Genitourinary: Positive for decreased urine volume, difficulty urinating, frequency and hematuria.   Musculoskeletal: Negative.    Skin: Negative.    Allergic/Immunologic: Negative.    Neurological: Positive for weakness.   Hematological: Negative.    Psychiatric/Behavioral: Negative.         Personal History     Past Medical History:   Diagnosis Date   • Adenomatous polyp of colon    • Anemia    • Bladder cancer    • Bladder outlet obstruction    • CAD (coronary artery disease)     1 stent, followed by Dr. Garcia   • Chronic back pain    • Chronic gastritis    • Chronic hepatitis C    • Chronic obstructive lung disease    • CKD (chronic kidney disease)    • Coronary arteriosclerosis    • Diabetes mellitus    • Diverticular disease of colon    • Drug abuse    • Elevated cholesterol    • GERD (gastroesophageal reflux disease)    • Hypertension    • Ingrown  toenail    • Prostate cancer    • Rheumatoid arthritis    • Seizure    • Stroke     heat stroke   • Transient cerebral ischemia        Past Surgical History:   Procedure Laterality Date   • BLADDER SURGERY      artificial bladder placed   • CARDIAC CATHETERIZATION N/A 12/15/2017   • CHOLECYSTECTOMY     • COLONOSCOPY N/A 04/22/2019   • COLONOSCOPY N/A 10/19/2021    Procedure: COLONOSCOPY;  Surgeon: Alfonso Torres MD;  Location: [facility] ENDOSCOPY;  Service: Gastroenterology;  Laterality: N/A;   • CORONARY ANGIOPLASTY WITH STENT PLACEMENT     • ENDOSCOPY N/A 03/03/2017   • ENDOSCOPY N/A 04/22/2019   • ENDOSCOPY N/A 10/19/2021    Procedure: ESOPHAGOGASTRODUODENOSCOPY;  Surgeon: Alfonso Torres MD;  Location: [facility] ENDOSCOPY;  Service: Gastroenterology;  Laterality: N/A;   • TRANSURETHRAL RESECTION OF BLADDER TUMOR N/A 07/21/2021    Procedure: CYSTOSCOPY TRANSURETHRAL RESECTION OF BLADDER TUMOR;  Surgeon: Christofer Calderon MD;  Location: [facility] OR;  Service: Urology;  Laterality: N/A;   • TRANSURETHRAL RESECTION OF BLADDER TUMOR N/A 4/12/2023    Procedure: CYSTOSCOPY TRANSURETHRAL RESECTION OF BLADDER TUMOR;  Surgeon: Christofer Calderon MD;  Location: [facility] OR;  Service: Urology;  Laterality: N/A;   • UPPER GASTROINTESTINAL ENDOSCOPY  04/22/2019   • UPPER GASTROINTESTINAL ENDOSCOPY  10/19/2021   • WRIST SURGERY Left        Family History: family history includes Diabetes in his mother; Liver cancer in his father. Otherwise pertinent FHx was reviewed and not pertinent to current issue.    Social History:  reports that he quit smoking about 3 months ago. His smoking use included cigarettes. He has a 53.00 pack-year smoking history. He quit smokeless tobacco use about 43 years ago.  His smokeless tobacco use included chew. He reports that he does not currently use drugs after having used the following drugs: Marijuana. Frequency: 2.00 times per week. He reports that he does not drink alcohol.    Home Medications:   [employer]  Blood Glucose Meter, Alcohol Prep, Aspirin, Dexcom G6 , Dexcom G6 Sensor, Dexcom G6 Transmitter, Insulin Aspart, Insulin Pen Needle, Insulin Syringe, Pen Needles, albuterol, albuterol sulfate HFA, budesonide-formoterol, carvedilol, clopidogrel, cyclobenzaprine, fluticasone, freestyle, furosemide, glucose blood, glucose monitor, hydrOXYzine, insulin detemir, ipratropium-albuterol, nitroglycerin, nortriptyline, ondansetron ODT, pantoprazole, pravastatin, and sertraline    Allergies:  No Known Allergies    Objective    Objective     Vitals:  Temp:  [97.4 °F (36.3 °C)-98.3 °F (36.8 °C)] 97.7 °F (36.5 °C)  Heart Rate:  [] 100  Resp:  [18] 18  BP: (100-136)/(70-84) 133/83    Physical Exam  Constitutional:       General: He is not in acute distress.     Appearance: Normal appearance. He is ill-appearing. He is not diaphoretic.   HENT:      Head: Normocephalic and atraumatic.      Right Ear: External ear normal.      Left Ear: External ear normal.      Nose: Nose normal.      Mouth/Throat:      Mouth: Mucous membranes are moist.   Eyes:      General: No scleral icterus.        Right eye: No discharge.         Left eye: No discharge.      Pupils: Pupils are equal, round, and reactive to light.   Cardiovascular:      Rate and Rhythm: Normal rate.   Pulmonary:      Effort: Pulmonary effort is normal. No respiratory distress.   Abdominal:      General: There is no distension.      Palpations: Abdomen is soft. There is no mass.      Tenderness: There is abdominal tenderness. There is no right CVA tenderness, left CVA tenderness, guarding or rebound.   Genitourinary:     Comments: John CBI pink urine with sediment  Musculoskeletal:         General: No swelling, tenderness, deformity or signs of injury.   Skin:     General: Skin is warm and dry.      Capillary Refill: Capillary refill takes less than 2 seconds.      Coloration: Skin is pale.      Findings: No bruising.   Neurological:      General: No focal deficit  present.      Mental Status: He is alert and oriented to person, place, and time. Mental status is at baseline.   Psychiatric:         Mood and Affect: Mood normal.         Behavior: Behavior normal.         Thought Content: Thought content normal.         Result Review    Result Review:  I have personally reviewed the results from the time of this admission to 4/27/2023 13:49 CDT and agree with these findings:  [x]  Laboratory list / accordion  [x]  Microbiology  [x]  Radiology  []  EKG/Telemetry   []  Cardiology/Vascular   []  Pathology  []  Old records  []  Other:  Most notable findings include:     Imaging Results (Last 72 Hours)     Procedure Component Value Units Date/Time    US Renal Bilateral [[id number]] Collected: 04/27/23 1015     Updated: 04/27/23 1219    Narrative:      PROCEDURE:  Complete retroperitoneal sonogram.    COMPARISON:  Renal ultrasound 9/6/2020.    HISTORY:  Hematuria.      TECHNIQUE:  High-resolution gray scale images of the bilateral kidneys and bladder were  obtained.    FINDINGS:  Realtime sonographic images are obtained of the kidneys and bladder. The kidneys  are normal in size and echogenicity. Mild bilateral hydronephrosis. The right  kidney measures 10.1 cm in length, the left kidney 11.1 cm in length.    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  There is a 4.1 cm masslike  filling defect in the bladder.      Impression:      1.  Mild bilateral hydronephrosis.    2.  There is a 4.1 cm round mass or filling defect in the bladder.  Malignancy  not excluded.  Visualization should be considered.                Assessment & Plan   Assessment / Plan     Brief Patient Summary:  Favio Casillas is a 68 y.o. male who has gross hematuria, UTI culture pending; imaging showed bilateral hydronephrosis with 4.1 cm round bladder mass or filling defect and neoplasm must be excluded.     Active Hospital Problems:  Active Hospital Problems    Diagnosis    • **Hematuria    • Hyponatremia     • Hypokalemia    • CKD (chronic kidney disease) stage 3, GFR 30-59 ml/min    • Acute UTI (urinary tract infection)      Plan:   Continue John and CBI  Will add Mucomyst to irrigation  Will require cystoscopy    TIFFANIE Michele

## 2023-04-27 NOTE — THERAPY DISCHARGE NOTE
Acute Care - Occupational Therapy Discharge  [facility]    Patient Name: Favio Casillas  : 1955    MRN: [id number]                              Today's Date: 2023       Admit Date: 2023    Visit Dx:     ICD-10-CM ICD-9-CM   1. Gross hematuria  R31.0 599.71   2. Impaired mobility and ADLs  Z74.09 V49.89    Z78.9      Patient Active Problem List   Diagnosis   • Type 2 diabetes mellitus with hyperglycemia, with long-term current use of insulin   • Acute pain of right shoulder   • Shoulder impingement syndrome, right   • Chest pain   • Acute renal insufficiency   • Chronic hepatitis C virus infection   • Gastritis   • SBO (small bowel obstruction)   • CAD (coronary artery disease)   • Acute kidney failure   • Intractable vomiting with nausea   • Gastroesophageal reflux disease with esophagitis   • Diarrhea   • Generalized abdominal pain   • History of colon polyps   • History of colitis   • Acute metabolic encephalopathy   • NSTEMI (non-ST elevated myocardial infarction)   • Acute renal failure superimposed on stage 3 chronic kidney disease   • Influenza A   • Anemia, chronic disease   • Bladder outflow obstruction   • Acute kidney injury   • Acute UTI (urinary tract infection)   • Metabolic acidosis   • Hypokalemia   • Acute renal failure superimposed on chronic kidney disease   • Diabetic ketoacidosis without coma associated with type 2 diabetes mellitus   • Diabetic ketoacidosis without coma associated with type 1 diabetes mellitus   • Moderate malnutrition   • Hyponatremia   • Lung nodule   • Bladder cancer   • Sepsis   • Klebsiella sepsis   • Essential hypertension   • Mixed hyperlipidemia   • Smoker   • Bladder neoplasm   • Chronic abdominal pain   • Vitamin D deficiency   • Chest pain, unspecified type   • Hematuria   • Hyponatremia   • Hypokalemia   • CKD (chronic kidney disease) stage 3, GFR 30-59 ml/min     Past Medical History:   Diagnosis Date   • Adenomatous polyp of colon    •  Anemia    • Bladder cancer    • Bladder outlet obstruction    • CAD (coronary artery disease)     1 stent, followed by Dr. Garcia   • Chronic back pain    • Chronic gastritis    • Chronic hepatitis C    • Chronic obstructive lung disease    • CKD (chronic kidney disease)    • Coronary arteriosclerosis    • Diabetes mellitus    • Diverticular disease of colon    • Drug abuse    • Elevated cholesterol    • GERD (gastroesophageal reflux disease)    • Hypertension    • Ingrown toenail    • Prostate cancer    • Rheumatoid arthritis    • Seizure    • Stroke     heat stroke   • Transient cerebral ischemia      Past Surgical History:   Procedure Laterality Date   • BLADDER SURGERY      artificial bladder placed   • CARDIAC CATHETERIZATION N/A 12/15/2017   • CHOLECYSTECTOMY     • COLONOSCOPY N/A 04/22/2019   • COLONOSCOPY N/A 10/19/2021    Procedure: COLONOSCOPY;  Surgeon: Alfonso Torres MD;  Location: [facility] ENDOSCOPY;  Service: Gastroenterology;  Laterality: N/A;   • CORONARY ANGIOPLASTY WITH STENT PLACEMENT     • ENDOSCOPY N/A 03/03/2017   • ENDOSCOPY N/A 04/22/2019   • ENDOSCOPY N/A 10/19/2021    Procedure: ESOPHAGOGASTRODUODENOSCOPY;  Surgeon: Alfonso Torres MD;  Location: [facility] ENDOSCOPY;  Service: Gastroenterology;  Laterality: N/A;   • TRANSURETHRAL RESECTION OF BLADDER TUMOR N/A 07/21/2021    Procedure: CYSTOSCOPY TRANSURETHRAL RESECTION OF BLADDER TUMOR;  Surgeon: Christofer Calderon MD;  Location: [facility] OR;  Service: Urology;  Laterality: N/A;   • TRANSURETHRAL RESECTION OF BLADDER TUMOR N/A 4/12/2023    Procedure: CYSTOSCOPY TRANSURETHRAL RESECTION OF BLADDER TUMOR;  Surgeon: Christofer Calderon MD;  Location: [facility] OR;  Service: Urology;  Laterality: N/A;   • UPPER GASTROINTESTINAL ENDOSCOPY  04/22/2019   • UPPER GASTROINTESTINAL ENDOSCOPY  10/19/2021   • WRIST SURGERY Left       General Information     Row Name 04/27/23 0831          OT Time and Intention    Document Type evaluation  -CM     Mode of  Treatment co-treatment;physical therapy;occupational therapy  -CM     Row Name 04/27/23 0831          General Information    Patient Profile Reviewed yes  -CM     Prior Level of Function independent:;all household mobility;community mobility;ADL's;cooking;home management;cleaning;driving  -CM     Barriers to Rehab medically complex  -CM     Row Name 04/27/23 0831          Living Environment    People in Home alone  -CM     Row Name 04/27/23 0831          Home Main Entrance    Number of Stairs, Main Entrance none  ramp to enter  -CM     Row Name 04/27/23 0831          Stairs Within Home, Primary    Stairs, Within Home, Primary Uses cane sometimes. Drives. Walk-in shower, no seat. No recent falls.  -CM     Number of Stairs, Within Home, Primary none  -CM     Row Name 04/27/23 0831          Cognition    Orientation Status (Cognition) oriented x 4;verbal cues/prompts needed for orientation  -CM           User Key  (r) = Recorded By, (t) = Taken By, (c) = Cosigned By    Initials Name Provider Type    CM Yanelis Goldman OT Occupational Therapist               Mobility/ADL's     Row Name 04/27/23 0854          Bed Mobility    Bed Mobility bed mobility (all) activities  -CM     All Activities, [place] (Bed Mobility) modified independence  -CM     Assistive Device (Bed Mobility) head of bed elevated  -CM     Row Name 04/27/23 0854          Transfers    Transfers sit-stand transfer;stand-sit transfer  -CM     Row Name 04/27/23 0854          Sit-Stand Transfer    Sit-Stand [place] (Transfers) independent  -CM     Row Name 04/27/23 0854          Stand-Sit Transfer    Stand-Sit [place] (Transfers) independent  -CM     Row Name 04/27/23 0854          Functional Mobility    Functional Mobility- Ind. Level independent  -CM     Row Name 04/27/23 0854          Activities of Daily Living    BADL Assessment/Intervention lower body dressing  -CM     Row Name 04/27/23 0854          Lower Body Dressing Assessment/Training     [place] Level (Lower Body Dressing) lower body dressing skills;don;doff;socks;independent  -CM           User Key  (r) = Recorded By, (t) = Taken By, (c) = Cosigned By    Initials Name Provider Type    Yanelis Garzon OT Occupational Therapist               Obj/Interventions     Row Name 04/27/23 0831          Sensory Assessment (Somatosensory)    Sensory Assessment (Somatosensory) UE sensation intact  -CM     Row Name 04/27/23 0831          Range of Motion Comprehensive    General Range of Motion bilateral upper extremity ROM WFL  -CM     Row Name 04/27/23 0831          Strength Comprehensive (MMT)    Comment, General Manual Muscle Testing (MMT) Assessment BUE 4+/5 grossly.  -CM           User Key  (r) = Recorded By, (t) = Taken By, (c) = Cosigned By    Initials Name Provider Type    Yanelis Garzon OT Occupational Therapist               Goals/Plan    No documentation.                Clinical Impression     Row Name 04/27/23 0831          Pain Assessment    Pretreatment Pain Rating 5/10  -CM     Posttreatment Pain Rating 5/10  -CM     Pain Location - abdomen  -CM     Pre/Posttreatment Pain Comment cramping  -CM     Pain Intervention(s) Ambulation/increased activity;Repositioned  -CM     Row Name 04/27/23 0831          Plan of Care Review    Plan of Care Reviewed With patient  -CM     Outcome Evaluation OT eval completed Co-eval with PT. Pt supine in bed upon arrival. VSS. Pt reported being (I) at home with ADLs and mobility. Pt lives alone, however, he has neighbors that check on him often. Pt was (I) for bed mobility, STS, mobility in room, and for donning/doffing socks. Pt was left supine in bed with all needs in reach. Pt has good BUE ROM and MMT. Pt reports being at baseline for ADLs and mobility with pt demonstrating safe mobiltiy during eval. No I/P OT needed at this time. Will d/c OT orders. Anticipate d/c home.  -CM     Row Name 04/27/23 0831          Therapy Assessment/Plan (OT)    Criteria for  Skilled Therapeutic Interventions Met (OT) no  -CM     Therapy Frequency (OT) evaluation only  -CM     Row Name 04/27/23 0831          Vital Signs    Pre Systolic BP Rehab 160  -CM     Pre Treatment Diastolic   -CM     Pretreatment Heart Rate (beats/min) 99  -CM     Pre SpO2 (%) 97  -CM     O2 Delivery Pre Treatment room air  -CM     Pre Patient Position Supine  -CM     Row Name 04/27/23 0831          Positioning and Restraints    Pre-Treatment Position in bed  -CM     Post Treatment Position bed  -CM     In Bed call light within reach;side rails up x2;encouraged to call for assist  -CM           User Key  (r) = Recorded By, (t) = Taken By, (c) = Cosigned By    Initials Name Provider Type    Yanelis Garzon, MENA Occupational Therapist               Outcome Measures     Row Name 04/27/23 0831          How much help from another is currently needed...    Putting on and taking off regular lower body clothing? 4  -CM     Bathing (including washing, rinsing, and drying) 4  -CM     Toileting (which includes using toilet bed pan or urinal) 4  -CM     Putting on and taking off regular upper body clothing 4  -CM     Taking care of personal grooming (such as brushing teeth) 4  -CM     Eating meals 4  -CM     AM-PAC 6 Clicks Score (OT) 24  -CM     Row Name 04/27/23 0856 04/27/23 0146       How much help from another person do you currently need...    Turning from your back to your side while in flat bed without using bedrails? 4  -HM 4  -BG    Moving from lying on back to sitting on the side of a flat bed without bedrails? 4  - 4  -BG    Moving to and from a bed to a chair (including a wheelchair)? 4  -HM 3  -BG    Standing up from a chair using your arms (e.g., wheelchair, bedside chair)? 4  - 3  -BG    Climbing 3-5 steps with a railing? 4  - 3  -BG    To walk in hospital room? 4  -HM 3  -BG    AM-PAC 6 Clicks Score (PT) 24  -HM 20  -BG    Highest level of mobility 8 --> Walked 250 feet or more  -HM 6 --> Walked  10 steps or more  -    Row Name 04/27/23 0856 04/27/23 0831       Functional Assessment    Outcome Measure Options AM-PAC 6 Clicks Basic Mobility (PT);[place]  - AM-PAC 6 Clicks Daily Activity (OT)  -CM          User Key  (r) = Recorded By, (t) = Taken By, (c) = Cosigned By    Initials Name Provider Type    BG Ricco Salazar, RN Registered Nurse    Yanelis Garzon, OT Occupational Therapist    HM Aicha Grey, PT Physical Therapist              Occupational Therapy Education     Title: PT OT SLP Therapies (In Progress)     Topic: Occupational Therapy (Not Started)     Point: ADL training (Not Started)     Description:   Instruct learner(s) on proper safety adaptation and remediation techniques during self care or transfers.   Instruct in proper use of assistive devices.              Learner Progress:  Not documented in this visit.          Point: Home exercise program (Not Started)     Description:   Instruct learner(s) on appropriate technique for monitoring, assisting and/or progressing therapeutic exercises/activities.              Learner Progress:  Not documented in this visit.          Point: Precautions (Not Started)     Description:   Instruct learner(s) on prescribed precautions during self-care and functional transfers.              Learner Progress:  Not documented in this visit.          Point: Body mechanics (Not Started)     Description:   Instruct learner(s) on proper positioning and spine alignment during self-care, functional mobility activities and/or exercises.              Learner Progress:  Not documented in this visit.                          OT Recommendation and Plan  Therapy Frequency (OT): evaluation only  Plan of Care Review  Plan of Care Reviewed With: patient  Outcome Evaluation: OT eval completed Co-eval with PT. Pt supine in bed upon arrival. VSS. Pt reported being (I) at home with ADLs and mobility. Pt lives alone, however, he has neighbors that check on him often. Pt was  (I) for bed mobility, STS, mobility in room, and for donning/doffing socks. Pt was left supine in bed with all needs in reach. Pt has good BUE ROM and MMT. Pt reports being at baseline for ADLs and mobility with pt demonstrating safe mobiltiy during eval. No I/P OT needed at this time. Will d/c OT orders. Anticipate d/c home.  Plan of Care Reviewed With: patient  Outcome Evaluation: OT eval completed Co-eval with PT. Pt supine in bed upon arrival. VSS. Pt reported being (I) at home with ADLs and mobility. Pt lives alone, however, he has neighbors that check on him often. Pt was (I) for bed mobility, STS, mobility in room, and for donning/doffing socks. Pt was left supine in bed with all needs in reach. Pt has good BUE ROM and MMT. Pt reports being at baseline for ADLs and mobility with pt demonstrating safe mobiltiy during eval. No I/P OT needed at this time. Will d/c OT orders. Anticipate d/c home.     Time Calculation:    Time Calculation- OT     Row Name 04/27/23 0858             Time Calculation- OT    OT Start Time 0832  -CM      OT Stop Time 0857  -CM      OT Time Calculation (min) 25 min  -CM      OT Received On 04/27/23  -CM         Untimed Charges    OT Eval/Re-eval Minutes 25  -CM         Total Minutes    Untimed Charges Total Minutes 25  -CM       Total Minutes 25  -CM            User Key  (r) = Recorded By, (t) = Taken By, (c) = Cosigned By    Initials Name Provider Type    Yanelis Garzon OT Occupational Therapist              Therapy Charges for Today     Code Description Service Date Service Provider Modifiers Qty    [id number]  OT EVAL LOW COMPLEXITY 2 4/27/2023 Yanelis Goldman OT GO 1                  Yanelis Goldman OT  4/27/2023

## 2023-04-28 PROBLEM — R31.0 GROSS HEMATURIA: Status: ACTIVE | Noted: 2023-04-28

## 2023-04-28 LAB
BACTERIA SPEC AEROBE CULT: NO GROWTH
GLUCOSE BLDC GLUCOMTR-MCNC: 208 MG/DL (ref 70–130)
GLUCOSE BLDC GLUCOMTR-MCNC: 307 MG/DL (ref 70–130)
GLUCOSE BLDC GLUCOMTR-MCNC: 309 MG/DL (ref 70–130)
GLUCOSE BLDC GLUCOMTR-MCNC: 356 MG/DL (ref 70–130)
MAGNESIUM SERPL-MCNC: 2.5 MG/DL (ref 1.6–2.4)
WHOLE BLOOD HOLD SPECIMEN: NORMAL

## 2023-04-28 PROCEDURE — 63710000001 INSULIN DETEMIR PER 5 UNITS: Performed by: NURSE PRACTITIONER

## 2023-04-28 PROCEDURE — 94760 N-INVAS EAR/PLS OXIMETRY 1: CPT

## 2023-04-28 PROCEDURE — 94664 DEMO&/EVAL PT USE INHALER: CPT

## 2023-04-28 PROCEDURE — 94799 UNLISTED PULMONARY SVC/PX: CPT

## 2023-04-28 PROCEDURE — 82948 REAGENT STRIP/BLOOD GLUCOSE: CPT

## 2023-04-28 PROCEDURE — 25010000002 CEFTRIAXONE PER 250 MG

## 2023-04-28 PROCEDURE — 83735 ASSAY OF MAGNESIUM: CPT | Performed by: NURSE PRACTITIONER

## 2023-04-28 PROCEDURE — 63710000001 INSULIN ASPART PER 5 UNITS

## 2023-04-28 PROCEDURE — 63710000001 INSULIN DETEMIR PER 5 UNITS

## 2023-04-28 PROCEDURE — 25010000002 ONDANSETRON PER 1 MG

## 2023-04-28 RX ADMIN — SERTRALINE HYDROCHLORIDE 50 MG: 50 TABLET ORAL at 09:06

## 2023-04-28 RX ADMIN — SODIUM CHLORIDE 125 ML/HR: 9 INJECTION, SOLUTION INTRAVENOUS at 18:36

## 2023-04-28 RX ADMIN — CARVEDILOL 6.25 MG: 6.25 TABLET, FILM COATED ORAL at 18:31

## 2023-04-28 RX ADMIN — PANTOPRAZOLE SODIUM 40 MG: 40 TABLET, DELAYED RELEASE ORAL at 09:06

## 2023-04-28 RX ADMIN — BUDESONIDE AND FORMOTEROL FUMARATE DIHYDRATE 2 PUFF: 160; 4.5 AEROSOL RESPIRATORY (INHALATION) at 07:36

## 2023-04-28 RX ADMIN — NORTRIPTYLINE HYDROCHLORIDE 10 MG: 10 CAPSULE ORAL at 20:05

## 2023-04-28 RX ADMIN — ACETYLCYSTEINE 2 ML: 200 SOLUTION ORAL; RESPIRATORY (INHALATION) at 05:41

## 2023-04-28 RX ADMIN — Medication 10 ML: at 20:05

## 2023-04-28 RX ADMIN — INSULIN ASPART 16 UNITS: 100 INJECTION, SOLUTION INTRAVENOUS; SUBCUTANEOUS at 09:07

## 2023-04-28 RX ADMIN — CYCLOBENZAPRINE HYDROCHLORIDE 10 MG: 10 TABLET, FILM COATED ORAL at 22:41

## 2023-04-28 RX ADMIN — CARVEDILOL 6.25 MG: 6.25 TABLET, FILM COATED ORAL at 09:06

## 2023-04-28 RX ADMIN — PRAVASTATIN SODIUM 40 MG: 40 TABLET ORAL at 09:06

## 2023-04-28 RX ADMIN — ACETYLCYSTEINE 2 ML: 200 SOLUTION ORAL; RESPIRATORY (INHALATION) at 00:51

## 2023-04-28 RX ADMIN — ACETYLCYSTEINE 2 ML: 200 SOLUTION ORAL; RESPIRATORY (INHALATION) at 15:58

## 2023-04-28 RX ADMIN — FUROSEMIDE 20 MG: 20 TABLET ORAL at 09:06

## 2023-04-28 RX ADMIN — CEFTRIAXONE SODIUM 1 G: 1 INJECTION, POWDER, FOR SOLUTION INTRAMUSCULAR; INTRAVENOUS at 23:02

## 2023-04-28 RX ADMIN — ACETYLCYSTEINE 2 ML: 200 SOLUTION ORAL; RESPIRATORY (INHALATION) at 11:01

## 2023-04-28 RX ADMIN — Medication 10 ML: at 09:06

## 2023-04-28 RX ADMIN — BUDESONIDE AND FORMOTEROL FUMARATE DIHYDRATE 2 PUFF: 160; 4.5 AEROSOL RESPIRATORY (INHALATION) at 19:29

## 2023-04-28 RX ADMIN — INSULIN ASPART 20 UNITS: 100 INJECTION, SOLUTION INTRAVENOUS; SUBCUTANEOUS at 11:06

## 2023-04-28 RX ADMIN — ONDANSETRON 4 MG: 2 INJECTION INTRAMUSCULAR; INTRAVENOUS at 23:58

## 2023-04-28 RX ADMIN — INSULIN ASPART 8 UNITS: 100 INJECTION, SOLUTION INTRAVENOUS; SUBCUTANEOUS at 17:11

## 2023-04-28 RX ADMIN — SODIUM CHLORIDE 125 ML/HR: 9 INJECTION, SOLUTION INTRAVENOUS at 05:41

## 2023-04-28 RX ADMIN — INSULIN DETEMIR 25 UNITS: 100 INJECTION, SOLUTION SUBCUTANEOUS at 09:10

## 2023-04-28 RX ADMIN — INSULIN DETEMIR 28 UNITS: 100 INJECTION, SOLUTION SUBCUTANEOUS at 20:08

## 2023-04-28 NOTE — PLAN OF CARE
Goal Outcome Evaluation:            No acute changes this shift. Patients CBI draining appropriately. Denies pain. Resting at this time, VSS.

## 2023-04-28 NOTE — PROGRESS NOTES
"    [facility] Medicine   INPATIENT PROGRESS NOTE      Patient Name: Favio Casillas  Date of Admission: 4/26/2023  Today's Date: 04/28/23  Length of Stay: 0  Primary Care Physician: Shayne Merritt MD    Subjective   Chief Complaint: Hematuria  Blood in Urine  Pertinent negatives include no abdominal pain.      68 year old male with history of bladder cancer presented with hematuria.     4/27/23 C/O abdominal cramping. Undergoing CBI, Denies fever or chills.     4/28/23 States he passed a lot of \"clots\" overnight. Denies any further abdominal bloating or cramping. CBI continues.         Review of Systems   Constitutional: Negative.    HENT: Negative.    Eyes: Negative.    Respiratory: Negative.    Cardiovascular: Negative.    Gastrointestinal: Negative for abdominal pain.   Genitourinary: Positive for hematuria.   Musculoskeletal: Negative.    Neurological: Negative.         All pertinent negatives and positives are as above. All other systems have been reviewed and are negative unless otherwise stated.     Objective    Temp:  [96.4 °F (35.8 °C)-98.4 °F (36.9 °C)] 98.2 °F (36.8 °C)  Heart Rate:  [76-96] 76  Resp:  [16-20] 16  BP: (100-135)/(58-79) 118/66       Physical Exam  Vitals reviewed.   Constitutional:       Appearance: Normal appearance.   HENT:      Head: Normocephalic and atraumatic.      Mouth/Throat:      Mouth: Mucous membranes are moist.      Pharynx: Oropharynx is clear.   Eyes:      Pupils: Pupils are equal, round, and reactive to light.   Cardiovascular:      Rate and Rhythm: Normal rate and regular rhythm.   Pulmonary:      Effort: Pulmonary effort is normal.   Abdominal:      General: Bowel sounds are normal.      Tenderness: There is no abdominal tenderness.   Musculoskeletal:         General: Normal range of motion.   Skin:     General: Skin is warm.      Capillary Refill: Capillary refill takes less than 2 seconds.   Neurological:    "   General: No focal deficit present.      Mental Status: He is alert.   Psychiatric:         Thought Content: Thought content normal.             Results Review:  I have reviewed the labs, radiology results, and diagnostic studies.    Laboratory Data:   Results from last 7 days   Lab Units 04/27/23  0625 04/26/23  2147   WBC 10*3/mm3 8.90 11.70*   HEMOGLOBIN g/dL 13.6 13.0   HEMATOCRIT % 41.1 38.0   PLATELETS 10*3/mm3 249 230        Results from last 7 days   Lab Units 04/27/23  1819 04/27/23  0625 04/26/23  2147   SODIUM mmol/L  --  133* 130*   POTASSIUM mmol/L 3.8 2.8* 3.0*   CHLORIDE mmol/L  --  108* 104   CO2 mmol/L  --  12.0* 14.0*   BUN mg/dL  --  45* 47*   CREATININE mg/dL  --  1.66* 1.80*   CALCIUM mg/dL  --  8.9 8.5*   BILIRUBIN mg/dL  --   --  0.3   ALK PHOS U/L  --   --  114   ALT (SGPT) U/L  --   --  28   AST (SGOT) U/L  --   --  24   GLUCOSE mg/dL  --  399* 309*       Culture Data:   Blood Culture   Date Value Ref Range Status   04/27/2023 No growth at 24 hours  Preliminary   04/27/2023 No growth at 24 hours  Preliminary     Urine Culture   Date Value Ref Range Status   04/26/2023 No growth  Final     No results found for: RESPCX  No results found for: WOUNDCX  No results found for: STOOLCX  No components found for: BODYFLD    Radiology Data:   Imaging Results (Last 24 Hours)     ** No results found for the last 24 hours. **          I have reviewed the patient's current medications.     Assessment/Plan     I have utilized all available immediate resources to obtain, update, or review the patient's current medications (including all prescriptions, over-the-counter products, herbals, cannabis/cannabidiol products, and vitamin/mineral/dietary (nutritional) supplements).      Active Hospital Problems    Diagnosis    • **Hematuria    • Gross hematuria    • Hyponatremia    • Hypokalemia    • CKD (chronic kidney disease) stage 3, GFR 30-59 ml/min    • Acute UTI (urinary tract infection)        Medical  Decision Making  Number and Complexity of problems: 1 High 4 Moderate    Conditions and Status:        Condition is improving.     [facility] Data  External documents reviewed: None today  My EKG interpretation: None today  My plain film interpretation: None today  Tests considered but not ordered: None today       Discussed with: Patient, RN     Treatment Plan  1. Hematuria  -Urology consulted, appreciate Dr Fellow's assistance  -CBI  -cysto pending    2. UTI  -Ceftriaxaone    3.CKD III  -renally adjust medications, avoid nephrotoxins    4. Hyponatremia  -improved with IVF    5. Hypokalemia  -Electrolyte replacement therapy    6. IDDM type 2  -Increase lantus to 28 units SC BID    Care Planning  Shared decision making: With patient  Code status and discussions: Full Code    Disposition  Social Determinants of Health that impact treatment or disposition: n/a  I expect the patient to be discharged to home in 1-2 days.      I confirmed that the patient's Advance Care Plan is present, code status is documented, or surrogate decision maker is listed in the patient's medical record.   ________________________________        Copied text in this note has been reviewed and is accurate as of 04/28/23       Electronically signed by TIFFANIE Dominguez, 04/28/23, 15:43 CDT.

## 2023-04-28 NOTE — PAYOR COMM NOTE
"[facility]  Case Managment Extender   Carli Harris  P) [phone]  (F) [phone]          REF# [id number]  Favio Owens (68 y.o. Male)     Date of Birth   1955    Social Security Number       Address   [address] [address]    Home Phone   [phone]    MRN   [id number]       Pentecostalism   Advent    Marital Status                               Admission Date   4/26/23    Admission Type   Emergency    Admitting Provider   Kevin Perry MD    Attending Provider   Jesus Ortiz MD    Department, Room/Bed   [facility] 3 EAST, 383/1       Discharge Date       Discharge Disposition       Discharge Destination                               Attending Provider: Jesus Ortiz MD    Allergies: No Known Allergies    Isolation: None   Infection: None   Code Status: CPR    Ht: 170.2 cm (67\")   Wt: 62.1 kg (137 lb)    Admission Cmt: None   Principal Problem: Hematuria [R31.9]                 Active Insurance as of 4/26/2023     Primary Coverage     Payor Plan Insurance Group Employer/Plan Group    ANTHEM MEDICARE REPLACEMENT ANTHEM MEDICARE ADVANTAGE [id number]     Payor Plan Address Payor Plan Phone Number Payor Plan Fax Number Effective Dates    [id number] [phone]  8/1/2022 - None Entered    [facility] [id number]       Subscriber Name Subscriber Birth Date Member ID       FAVIO OWENS 1955 [id number]           Secondary Coverage     Payor Plan Insurance Group Employer/Plan Group    [place]      Payor Plan Address Payor Plan Phone Number Payor Plan Fax Number Effective Dates    [address] [phone]  8/1/2017 - None Entered    [facility] [id number]       Subscriber Name Subscriber Birth Date Member ID       FAVIO OWENS 1955 [id number]                 Emergency Contacts      (Rel.) Home Phone Work Phone Mobile Phone    " Sofi Mcdonald (Friend) [phone] -- [phone]    GABRIELLE OWENS (Brother) [phone] -- [phone]               History & Physical      OrtizJesus MD at 04/27/23 0100                [facility] Medicine Admission      Date of Admission: 4/26/2023      Primary Care Physician: Shayne Merritt MD    Chief Complaint:   Juan David hematuria    HPI:  Patient has a known history of juan david hematuria with prior history of recurrent bladder cancer.  The patient is followed by Dr. Calderon and was actually in his clinic today being assessed for hematuria and having CBI.  They initially thought that this is improved and the patient was sent home but it recurred and he is now presented to the ED.  Patient is currently on a CBI and showing signs of improvement but consultation with urology would be advised considering the repetitive nature of it.  Concerns were raised based on the fact that the patient's most recent hemoglobin was 17.2 and so there is concern that he may have dropped significantly in his hemoglobin despite its normal value.  Patient denies any other GI or  symptoms on focused review.    Concurrent Medical History:  has a past medical history of Adenomatous polyp of colon, Anemia, Bladder cancer, Bladder outlet obstruction, CAD (coronary artery disease), Chronic back pain, Chronic gastritis, Chronic hepatitis C, Chronic obstructive lung disease, CKD (chronic kidney disease), Coronary arteriosclerosis, Diabetes mellitus, Diverticular disease of colon, Drug abuse, Elevated cholesterol, GERD (gastroesophageal reflux disease), Hypertension, Ingrown toenail, Prostate cancer, Rheumatoid arthritis, Seizure, Stroke, and Transient cerebral ischemia.    Past Surgical History:  has a past surgical history that includes Cholecystectomy; Esophagogastroduodenoscopy (N/A, 03/03/2017); Wrist surgery (Left); Cardiac catheterization (N/A, 12/15/2017);  Esophagogastroduodenoscopy (N/A, 04/22/2019); Colonoscopy (N/A, 04/22/2019); Coronary angioplasty with stent; Transurethral resection of bladder tumor (N/A, 07/21/2021); Esophagogastroduodenoscopy (N/A, 10/19/2021); Colonoscopy (N/A, 10/19/2021); Upper gastrointestinal endoscopy (04/22/2019); Upper gastrointestinal endoscopy (10/19/2021); Bladder surgery; and Transurethral resection of bladder tumor (N/A, 4/12/2023).    Family History: family history includes Diabetes in his mother; Liver cancer in his father.     Social History:  reports that he quit smoking about 3 months ago. His smoking use included cigarettes. He has a 53.00 pack-year smoking history. He quit smokeless tobacco use about 43 years ago.  His smokeless tobacco use included chew. He reports that he does not currently use drugs after having used the following drugs: Marijuana. Frequency: 2.00 times per week. He reports that he does not drink alcohol.    Allergies: No Known Allergies    Medications:   Prior to Admission medications    Medication Sig Start Date End Date Taking? Authorizing Provider   albuterol (PROVENTIL) (2.5 MG/3ML) 0.083% nebulizer solution Take 2.5 mg by nebulization Every 4 (Four) Hours As Needed for Wheezing or Shortness of Air. 4/17/17  Yes Harvinder Robert MD   albuterol sulfate HFA (Ventolin HFA) 108 (90 Base) MCG/ACT inhaler Inhale 2 puffs Every 6 (Six) Hours As Needed for Wheezing. 1/17/21  Yes Hu Charles MD   budesonide-formoterol (SYMBICORT) 80-4.5 MCG/ACT inhaler Inhale 2 puffs 2 (Two) Times a Day.   Yes Jorge Cook MD   carvedilol (COREG) 6.25 MG tablet Take 1 tablet by mouth 2 (Two) Times a Day With Meals. 12/25/22  Yes Ciro Chauhan MD   clopidogrel (PLAVIX) 75 MG tablet Take 1 tablet by mouth Daily.   Yes Jorge Cook MD   cyclobenzaprine (FLEXERIL) 10 MG tablet Take 1 tablet by mouth 3 (Three) Times a Day As Needed. 7/10/19  Yes Provider, MD Jorge   fluticasone (FLONASE) 50 MCG/ACT  nasal spray 1 spray by Each Nare route Daily As Needed. Shake before using. 11/23/21  Yes Jorge Cook MD   furosemide (LASIX) 20 MG tablet Take 1 tablet by mouth Daily.   Yes Jorge Cook MD   hydrOXYzine (ATARAX) 25 MG tablet Take 1 tablet by mouth Every 4 (Four) Hours As Needed. 9/27/19  Yes Jorge Cook MD   Insulin Aspart (novoLOG) 100 UNIT/ML injection Inject 0-20 Units under the skin into the appropriate area as directed 3 (Three) Times a Day Before Meals.   Yes Jorge Cook MD   insulin detemir (LEVEMIR) 100 UNIT/ML injection Inject 25 Units under the skin into the appropriate area as directed 2 (Two) Times a Day. 12/17/21  Yes Shoaib Constantino APRN   ipratropium-albuterol (DUO-NEB) 0.5-2.5 mg/3 ml nebulizer Take 3 mL by nebulization Every 4 (Four) Hours As Needed for Wheezing. 9/19/22  Yes Timmy Colin DO   nortriptyline (PAMELOR) 10 MG capsule Take 1 capsule by mouth Every Night.   Yes Jorge Cook MD   ondansetron ODT (ZOFRAN-ODT) 4 MG disintegrating tablet Place 1 tablet on the tongue 4 (Four) Times a Day As Needed for Nausea or Vomiting. 3/19/23  Yes Shabbir Webster MD   pantoprazole (PROTONIX) 40 MG EC tablet Take 1 tablet by mouth Daily.   Yes Jorge Cook MD   pravastatin (PRAVACHOL) 40 MG tablet Take 1 tablet by mouth Daily.   Yes Jorge Cook MD   sertraline (ZOLOFT) 50 MG tablet Take 1 tablet by mouth Daily. 4/26/21  Yes Jorge Cook MD   Alcohol Swabs (ALCOHOL PREP) pads 1 pad 5 (Five) Times a Day. 3/29/19   Radha Gómez MD   ASPIRIN 81 PO Take 1 tablet by mouth Daily.    Jorge Cook MD   B-D ULTRAFINE III SHORT PEN 31G X 8 MM misc See Admin Instructions. 4/30/21   Jorge Cook MD   Blood Glucose Monitoring Suppl (ACURA BLOOD GLUCOSE METER) w/Device kit 1 each 4 (Four) Times a Day As Needed (SUGARR). 8/23/18   Harvinder Robert MD   Continuous Blood Gluc  (Dexcom G6  ") device 1 each Continuous. 12/17/21   Shoaib Constantino APRN   Continuous Blood Gluc Sensor (Dexcom G6 Sensor) Every 10 (Ten) Days. 9/2/22   Shoaib Constantino APRN   Continuous Blood Gluc Transmit (Dexcom G6 Transmitter) misc 1 EACH EVERY 3 MONTHS. 2/9/23   Shoaib Constantino APRN   glucose blood test strip Use as instructed Contour Next EZ test strips 1/27/23   Shoabi Constantino APRN   glucose monitor monitoring kit 1 each 3 (Three) Times a Day. 12/17/21   Shoaib Constantino APRN   Insulin Pen Needle (Pen Needles) 32G X 4 MM misc 1 each 4 (Four) Times a Day. Use 4 x daily, Dx code E11.65 12/17/21   Shoaib Constantino APRN   Insulin Syringe 29G X 1/2\" 0.5 ML misc Inject 4 times daily 12/17/21   Shoaib Constantino APRN   Lancets (freestyle) lancets Tid 12/17/21   Shoaib Constantino APRN   nitroglycerin (NITROSTAT) 0.4 MG SL tablet Place 1 tablet under the tongue Every 5 (Five) Minutes As Needed for Chest Pain. Take no more than 3 doses in 15 minutes.    Provider, Historical, MD       Review of Systems:  Review of Systems   Constitutional: Negative for chills, diaphoresis, fever and unexpected weight change.   HENT: Negative for congestion, ear pain, rhinorrhea, sinus pressure, sneezing and sore throat.    Respiratory: Negative for cough, shortness of breath, wheezing and stridor.    Cardiovascular: Negative for chest pain and palpitations.   Gastrointestinal: Negative for abdominal distention, abdominal pain, constipation, diarrhea, nausea and vomiting.   Genitourinary: Positive for hematuria. Negative for dysuria, flank pain, frequency and urgency.   Musculoskeletal: Negative for arthralgias, joint swelling and myalgias.   Skin: Negative for color change, rash and wound.   Neurological: Negative for dizziness, syncope, light-headedness and headaches.   Psychiatric/Behavioral: Negative for agitation, confusion and hallucinations.      Otherwise complete ROS is " negative except as mentioned above.    Physical Exam:   Temp:  [97.4 °F (36.3 °C)-98.3 °F (36.8 °C)] 97.6 °F (36.4 °C)  Heart Rate:  [] 95  Resp:  [18] 18  BP: (100-136)/(70-84) 134/82  Physical Exam  Vitals and nursing note reviewed.   Constitutional:       General: He is awake. He is not in acute distress.     Appearance: He is normal weight. He is ill-appearing. He is not toxic-appearing or diaphoretic.   HENT:      Head: Normocephalic and atraumatic.      Nose: Nose normal. No congestion or rhinorrhea.      Mouth/Throat:      Pharynx: Oropharynx is clear. No oropharyngeal exudate or posterior oropharyngeal erythema.   Eyes:      General:         Right eye: No discharge.         Left eye: No discharge.      Extraocular Movements: Extraocular movements intact.      Conjunctiva/sclera: Conjunctivae normal.      Pupils: Pupils are equal, round, and reactive to light.   Cardiovascular:      Rate and Rhythm: Normal rate and regular rhythm.      Pulses: Normal pulses.      Heart sounds: Normal heart sounds. No murmur heard.    No friction rub. No gallop.   Pulmonary:      Effort: Pulmonary effort is normal. No respiratory distress.      Breath sounds: Normal breath sounds. No stridor.   Abdominal:      General: Abdomen is flat. There is no distension.      Palpations: Abdomen is soft. There is no mass.      Tenderness: There is abdominal tenderness in the suprapubic area. There is no right CVA tenderness, left CVA tenderness, guarding or rebound.      Hernia: No hernia is present.   Musculoskeletal:         General: No swelling, tenderness or signs of injury.   Skin:     Coloration: Skin is not jaundiced.      Findings: No bruising, erythema or rash.   Neurological:      General: No focal deficit present.      Mental Status: He is alert and oriented to person, place, and time. Mental status is at baseline.      Cranial Nerves: No cranial nerve deficit.   Psychiatric:         Behavior: Behavior normal. Behavior is  cooperative.         Thought Content: Thought content normal.         Judgment: Judgment normal.         Results Reviewed:  I have personally reviewed current lab, radiology, and data and agree with results.  Lab Results (last 24 hours)     Procedure Component Value Units Date/Time    [place] Draw [[id number]] Collected: 04/26/23 2147    Specimen: Blood Updated: 04/26/23 2300    Narrative:      The following orders were created for panel order [place] Draw.  Procedure                               Abnormality         Status                     ---------                               -----------         ------                     Green Top (Gel)[[id number]]                                  Final result               Lavender Top[[id number]]                                     Final result               Gold Top - SST[[id number]]                                   Final result               Light Blue Top[[id number]]                                   Final result                 Please view results for these tests on the individual orders.    Green Top (Gel) [[id number]] Collected: 04/26/23 2147    Specimen: Blood Updated: 04/26/23 2300     Extra Tube Hold for add-ons.     Comment: Auto resulted.       Lavender Top [[id number]] Collected: 04/26/23 2147    Specimen: Blood Updated: 04/26/23 2300     Extra Tube hold for add-on     Comment: Auto resulted       Gold Top - SST [[id number]] Collected: 04/26/23 2147    Specimen: Blood Updated: 04/26/23 2300     Extra Tube Hold for add-ons.     Comment: Auto resulted.       Light Blue Top [[id number]] Collected: 04/26/23 2147    Specimen: Blood Updated: 04/26/23 2300     Extra Tube Hold for add-ons.     Comment: Auto resulted       Magnesium [[id number]]  (Normal) Collected: 04/26/23 2147    Specimen: Blood Updated: 04/26/23 2230     Magnesium 1.7 mg/dL     aPTT [[id number]]  (Normal) Collected: 04/26/23 2147    Specimen: Blood Updated: 04/26/23 2215     PTT 25.4 seconds     Narrative:       The recommended Heparin therapeutic range is 68-97 seconds.    Protime-INR [[id number]]  (Normal) Collected: 04/26/23 2147    Specimen: Blood Updated: 04/26/23 2215     Protime 13.7 Seconds      INR 1.05    Narrative:      Therapeutic range for most indications is 2.0-3.0 INR,  or 2.5-3.5 for mechanical heart valves.    Comprehensive Metabolic Panel [[id number]]  (Abnormal) Collected: 04/26/23 2147    Specimen: Blood Updated: 04/26/23 2205     Glucose 309 mg/dL      BUN 47 mg/dL      Creatinine 1.80 mg/dL      Sodium 130 mmol/L      Potassium 3.0 mmol/L      Chloride 104 mmol/L      CO2 14.0 mmol/L      Calcium 8.5 mg/dL      Total Protein 6.3 g/dL      Albumin 3.5 g/dL      ALT (SGPT) 28 U/L      AST (SGOT) 24 U/L      Alkaline Phosphatase 114 U/L      Total Bilirubin 0.3 mg/dL      Globulin 2.8 gm/dL      A/G Ratio 1.3 g/dL      BUN/Creatinine Ratio 26.1     Anion Gap 12.0 mmol/L      eGFR 41.0 mL/min/1.73     Narrative:      GFR Normal >60  Chronic Kidney Disease <60  Kidney Failure <15      Urinalysis, Microscopic Only - Urine, Clean Catch [[id number]]  (Abnormal) Collected: 04/26/23 2130    Specimen: Urine, Clean Catch Updated: 04/26/23 2203     RBC, UA Too Numerous to Count /HPF      WBC, UA 13-20 /HPF      Bacteria, UA 1+ /HPF      Squamous Epithelial Cells, UA 6-12 /HPF      Hyaline Casts, UA None Seen /LPF      Mucus, UA Moderate/2+ /HPF      Methodology Manual Light Microscopy    CBC & Differential [[id number]]  (Abnormal) Collected: 04/26/23 2147    Specimen: Blood Updated: 04/26/23 2150    Narrative:      The following orders were created for panel order CBC & Differential.  Procedure                               Abnormality         Status                     ---------                               -----------         ------                     CBC Auto Differential[[id number]]        Abnormal            Final result                 Please view results for these tests on the individual orders.    CBC Auto  Differential [[id number]]  (Abnormal) Collected: 04/26/23 2147    Specimen: Blood Updated: 04/26/23 2150     WBC 11.70 10*3/mm3      RBC 4.53 10*6/mm3      Hemoglobin 13.0 g/dL      Hematocrit 38.0 %      MCV 83.9 fL      MCH 28.7 pg      MCHC 34.2 g/dL      RDW 13.7 %      RDW-SD 42.1 fl      MPV 9.8 fL      Platelets 230 10*3/mm3      Neutrophil % 76.5 %      Lymphocyte % 11.7 %      Monocyte % 9.6 %      Eosinophil % 0.7 %      Basophil % 0.6 %      Immature Grans % 0.9 %      Neutrophils, Absolute 8.95 10*3/mm3      Lymphocytes, Absolute 1.37 10*3/mm3      Monocytes, Absolute 1.12 10*3/mm3      Eosinophils, Absolute 0.08 10*3/mm3      Basophils, Absolute 0.07 10*3/mm3      Immature Grans, Absolute 0.11 10*3/mm3      nRBC 0.2 /100 WBC     Urinalysis With Microscopic If Indicated (No Culture) - Urine, Clean Catch [[id number]]  (Abnormal) Collected: 04/26/23 2130    Specimen: Urine, Clean Catch Updated: 04/26/23 2148     Color, UA Red     Appearance, UA Cloudy     pH, UA 7.0     Specific Gravity, UA 1.015     Glucose,  mg/dL (Trace)     Ketones, UA Negative     Bilirubin, UA Small (1+)     Blood, UA Large (3+)     Protein, UA >=300 mg/dL (3+)     Leuk Esterase, UA Small (1+)     Nitrite, UA Positive     Urobilinogen, UA 0.2 E.U./dL        Imaging Results (Last 24 Hours)     ** No results found for the last 24 hours. **            Assessment:    Active Hospital Problems    Diagnosis    • **Hematuria    • Hyponatremia    • Hypokalemia    • CKD (chronic kidney disease) stage 3, GFR 30-59 ml/min    • Acute UTI (urinary tract infection)          Medical Decision Making  Number and Complexity of problems: 5 - High    Differential Diagnosis:   As above    Conditions and Status:        Condition is improving.     [facility] Data  External documents reviewed: Chart review  My EKG interpretation: Not applicable  My US interpretation: Renal ultrasound is awaited  Tests considered but not ordered: CT Abdo/pelvis     Decision  "rules/scores evaluated (example [id number], Wells, etc): Nil     Discussed with: The patient     Treatment Plan  1.  IV antibiotics  2.  Urology consultation  3.  Renal tract ultrasound  4.  Home medication reconciliation  5.  Avoid antiplatelets and anticoagulation  6.  VTE prophylaxis  7.  Daily blood work  8.  Continue CBI until runs clear  9.  NPO diet until assessed by urology (in case Dr. Calderon wishes to perform an interventional procedure  10.  IV saline fluid maintenance  11.  Electrolyte replacement protocols      Care Planning  Shared decision making: With the patient  Code status and discussions: Full code    Disposition  Social Determinants of Health that impact treatment or disposition: Nil  I expect the patient to be discharged to home in 2-4 days.       Jesus Ortiz MD    Electronically signed by Jesus Ortiz MD, 04/27/23, 5:45 AM CDT.              Electronically signed by Jesus Ortiz MD at 04/27/23 0530          Emergency Department Notes      Timmy Colin, DO at 04/26/23 7687          Subjective   History of Present Illness  68-year-old male presents the emergency department with complaint of hematuria.  He does not have a catheter in place but is history of bladder cancer and reports having a \"artificial bladder\".  Reportedly 14 days ago he had surgery to move the tumor from that artificial bladder.  He reports he was in Dr. Calderon office and saw the nurse practitioner today and was found to have UTI and hematuria with some retention.  They report that catheter was placed and he received continuous bladder irrigation until the urine was pale pink and the catheter was removed and he was discharged home.  He does not take blood thinners.  He is currently on Keflex for UTI started today.  He reports that after he got home he went to urinate again and noted clots and difficulty urinating and felt like he was going to pass out.  He was told to come to the ED if things " worsened.    Family history, surgical history, social history, current medications and allergies are reviewed with the patient and triage documentation and vitals are reviewed.    History provided by:  Patient, relative and medical records   used: No        Review of Systems   Constitutional: Negative for chills and fever.   HENT: Negative.    Eyes: Negative for photophobia and visual disturbance.   Respiratory: Negative for cough and shortness of breath.    Cardiovascular: Negative for chest pain and palpitations.   Gastrointestinal: Negative for abdominal pain, diarrhea, nausea and vomiting.   Endocrine: Negative for polydipsia, polyphagia and polyuria.   Genitourinary: Positive for decreased urine volume, difficulty urinating and hematuria. Negative for dysuria, penile discharge, penile pain, penile swelling, scrotal swelling, testicular pain and urgency.   Musculoskeletal: Negative.    Skin: Positive for pallor.   Allergic/Immunologic: Negative.    Neurological: Negative.    Hematological: Negative for adenopathy. Does not bruise/bleed easily.   Psychiatric/Behavioral: Negative.        Past Medical History:   Diagnosis Date   • Adenomatous polyp of colon    • Anemia    • Bladder cancer    • Bladder outlet obstruction    • CAD (coronary artery disease)     1 stent, followed by Dr. Garcia   • Chronic back pain    • Chronic gastritis    • Chronic hepatitis C    • Chronic obstructive lung disease    • CKD (chronic kidney disease)    • Coronary arteriosclerosis    • Diabetes mellitus    • Diverticular disease of colon    • Drug abuse    • Elevated cholesterol    • GERD (gastroesophageal reflux disease)    • Hypertension    • Ingrown toenail    • Prostate cancer    • Rheumatoid arthritis    • Seizure    • Stroke     heat stroke   • Transient cerebral ischemia        No Known Allergies    Past Surgical History:   Procedure Laterality Date   • BLADDER SURGERY      artificial bladder placed   •  CARDIAC CATHETERIZATION N/A 12/15/2017   • CHOLECYSTECTOMY     • COLONOSCOPY N/A 2019   • COLONOSCOPY N/A 10/19/2021    Procedure: COLONOSCOPY;  Surgeon: Alfonso Torres MD;  Location: [facility] ENDOSCOPY;  Service: Gastroenterology;  Laterality: N/A;   • CORONARY ANGIOPLASTY WITH STENT PLACEMENT     • ENDOSCOPY N/A 2017   • ENDOSCOPY N/A 2019   • ENDOSCOPY N/A 10/19/2021    Procedure: ESOPHAGOGASTRODUODENOSCOPY;  Surgeon: Alfonso Torres MD;  Location: [facility] ENDOSCOPY;  Service: Gastroenterology;  Laterality: N/A;   • TRANSURETHRAL RESECTION OF BLADDER TUMOR N/A 2021    Procedure: CYSTOSCOPY TRANSURETHRAL RESECTION OF BLADDER TUMOR;  Surgeon: Christofer Calderon MD;  Location: [facility] OR;  Service: Urology;  Laterality: N/A;   • TRANSURETHRAL RESECTION OF BLADDER TUMOR N/A 2023    Procedure: CYSTOSCOPY TRANSURETHRAL RESECTION OF BLADDER TUMOR;  Surgeon: Christofer Calderon MD;  Location: [facility] OR;  Service: Urology;  Laterality: N/A;   • UPPER GASTROINTESTINAL ENDOSCOPY  2019   • UPPER GASTROINTESTINAL ENDOSCOPY  10/19/2021   • WRIST SURGERY Left        Family History   Problem Relation Age of Onset   • Diabetes Mother    • Liver cancer Father        Social History     Socioeconomic History   • Marital status:    Tobacco Use   • Smoking status: Former     Packs/day: 1.00     Years: 53.00     Pack years: 53.00     Types: Cigarettes     Quit date: 2023     Years since quittin.2   • Smokeless tobacco: Former     Types: Chew     Quit date:    Vaping Use   • Vaping Use: Former   • Substances: Nicotine, Flavoring   • Devices: Disposable   Substance and Sexual Activity   • Alcohol use: Never   • Drug use: Not Currently     Frequency: 2.0 times per week     Types: Marijuana   • Sexual activity: Defer           Objective   Physical Exam  Vitals and nursing note reviewed.   Constitutional:       General: He is not in acute distress.     Appearance: Normal appearance. He is  normal weight. He is ill-appearing. He is not toxic-appearing or diaphoretic.   HENT:      Head: Normocephalic.      Mouth/Throat:      Mouth: Mucous membranes are moist.   Eyes:      Pupils: Pupils are equal, round, and reactive to light.   Cardiovascular:      Rate and Rhythm: Normal rate and regular rhythm.   Pulmonary:      Effort: Pulmonary effort is normal.      Breath sounds: Normal breath sounds.   Abdominal:      General: Bowel sounds are normal.      Palpations: Abdomen is soft.   Musculoskeletal:         General: Normal range of motion.      Cervical back: Normal range of motion and neck supple.   Skin:     General: Skin is warm and dry.      Capillary Refill: Capillary refill takes less than 2 seconds.   Neurological:      General: No focal deficit present.      Mental Status: He is alert.   Psychiatric:         Mood and Affect: Mood normal.         Procedures  none        ED Course      Labs Reviewed   URINALYSIS W/ MICROSCOPIC IF INDICATED (NO CULTURE) - Abnormal; Notable for the following components:       Result Value    Color, UA Red (*)     Appearance, UA Cloudy (*)     Glucose,  mg/dL (Trace) (*)     Bilirubin, UA Small (1+) (*)     Blood, UA Large (3+) (*)     Protein, UA >=300 mg/dL (3+) (*)     Leuk Esterase, UA Small (1+) (*)     Nitrite, UA Positive (*)     All other components within normal limits   COMPREHENSIVE METABOLIC PANEL - Abnormal; Notable for the following components:    Glucose 309 (*)     BUN 47 (*)     Creatinine 1.80 (*)     Sodium 130 (*)     Potassium 3.0 (*)     CO2 14.0 (*)     Calcium 8.5 (*)     BUN/Creatinine Ratio 26.1 (*)     eGFR 41.0 (*)     All other components within normal limits    Narrative:     GFR Normal >60  Chronic Kidney Disease <60  Kidney Failure <15     CBC WITH AUTO DIFFERENTIAL - Abnormal; Notable for the following components:    WBC 11.70 (*)     Neutrophil % 76.5 (*)     Lymphocyte % 11.7 (*)     Immature Grans % 0.9 (*)     Neutrophils,  Absolute 8.95 (*)     Monocytes, Absolute 1.12 (*)     Immature Grans, Absolute 0.11 (*)     All other components within normal limits   URINALYSIS, MICROSCOPIC ONLY - Abnormal; Notable for the following components:    RBC, UA Too Numerous to Count (*)     WBC, UA 13-20 (*)     Bacteria, UA 1+ (*)     Squamous Epithelial Cells, UA 6-12 (*)     Mucus, UA Moderate/2+ (*)     All other components within normal limits   PROTIME-INR - Normal    Narrative:     Therapeutic range for most indications is 2.0-3.0 INR,  or 2.5-3.5 for mechanical heart valves.   APTT - Normal    Narrative:     The recommended Heparin therapeutic range is 68-97 seconds.   MAGNESIUM - Normal   BLOOD CULTURE   BLOOD CULTURE   RAINBOW DRAW    Narrative:     The following orders were created for panel order [place] Draw.  Procedure                               Abnormality         Status                     ---------                               -----------         ------                     Green Top (Gel)[[id number]]                                  Final result               Lavender Top[[id number]]                                     Final result               Gold Top - SST[[id number]]                                   Final result               Light Blue Top[[id number]]                                   Final result                 Please view results for these tests on the individual orders.   LACTIC ACID, PLASMA   URINALYSIS W/ CULTURE IF INDICATED   BASIC METABOLIC PANEL   CBC WITH AUTO DIFFERENTIAL   POCT GLUCOSE FINGERSTICK   POCT GLUCOSE FINGERSTICK   POCT GLUCOSE FINGERSTICK   CBC AND DIFFERENTIAL    Narrative:     The following orders were created for panel order CBC & Differential.  Procedure                               Abnormality         Status                     ---------                               -----------         ------                     CBC Auto Differential[[id number]]        Abnormal            Final result                  Please view results for these tests on the individual orders.   GREEN TOP   LAVENDER TOP   GOLD TOP - SST   LIGHT BLUE TOP   CBC AND DIFFERENTIAL    Narrative:     The following orders were created for panel order CBC & Differential.  Procedure                               Abnormality         Status                     ---------                               -----------         ------                     CBC Auto Differential[[id number]]                                                         Please view results for these tests on the individual orders.     No results found.      Medical Decision Making  Gross hematuria: acute illness or injury  Amount and/or Complexity of Data Reviewed  Labs: ordered. Decision-making details documented in ED Course.      Risk  Prescription drug management.  Decision regarding hospitalization.      Patient passing large clots.  John catheter is placed and CBI is initiated.  Remains with bright red urine even after significant amount of irrigation.  Hemoglobin is 13 but 1 month ago it was 17.  Started on Rocephin given known UTI in the office and continued evidence of UTI on the urinalysis here.  Discussed with hospitalist who is agreeable to admission.  Consultation placed for Dr. Calderon.  Patient and family acknowledge understanding of treatment, need for admission and agreeable at this time.    Final diagnoses:   Gross hematuria         ED Disposition  ED Disposition     ED Disposition   Decision to Admit    Condition   --    Comment   Level of Care: Med/Surg [1]   Diagnosis: Hematuria [905548]   Admitting Physician: NANO HESS [[id number]]               No follow-up provider specified.       Medication List      No changes were made to your prescriptions during this visit.          Timmy Colin DO  04/27/23 0553      Electronically signed by Timmy Colin DO at 04/27/23 0553     Kay Alatorre RN at 04/27/23 0038          Nursing report ED to  floor  Favio Jacksonbar  68 y.o.  male    HPI:   Chief Complaint   Patient presents with   • Blood in Urine       Admitting doctor:   Jesus Ortiz MD    Consulting provider(s):  Consults       Date and Time Order Name Status Description    4/27/2023 12:36 AM Urology (on-call MD unless specified)      4/27/2023 12:35 AM Hospitalist (on-call MD unless specified)               Admitting diagnosis:   There were no encounter diagnoses.    Code status:   Current Code Status       Date Active Code Status Order ID Comments User Context       Prior            Allergies:   Patient has no known allergies.    Intake and Output    Intake/Output Summary (Last 24 hours) at 4/27/2023 0038  Last data filed at 4/27/2023 0022  Gross per 24 hour   Intake 100 ml   Output --   Net 100 ml       Weight:       04/26/23 2106   Weight: 61.1 kg (134 lb 12.8 oz)       Most recent vitals:   Vitals:    04/26/23 2200 04/26/23 2300 04/27/23 0000 04/27/23 0002   BP: 120/78 129/84 136/83 119/76   BP Location:       Patient Position:       Pulse: 94 96 98 94   Resp:       Temp:       TempSrc:       SpO2: 97% 97% 98% 98%   Weight:       Height:         Oxygen Therapy: room air    Active LDAs/IV Access:   Lines, Drains & Airways       Active LDAs       Name Placement date Placement time Site Days    Peripheral IV 04/26/23 2143 Anterior;Left Forearm 04/26/23 2143  Forearm  less than 1    Continuous Bladder Irrigation Triple-lumen 20 Fr 04/26/23 2245  Triple-lumen  less than 1                    Labs (abnormal labs have a star):   Labs Reviewed   URINALYSIS W/ MICROSCOPIC IF INDICATED (NO CULTURE) - Abnormal; Notable for the following components:       Result Value    Color, UA Red (*)     Appearance, UA Cloudy (*)     Glucose,  mg/dL (Trace) (*)     Bilirubin, UA Small (1+) (*)     Blood, UA Large (3+) (*)     Protein, UA >=300 mg/dL (3+) (*)     Leuk Esterase, UA Small (1+) (*)     Nitrite, UA Positive (*)     All other components  within normal limits   COMPREHENSIVE METABOLIC PANEL - Abnormal; Notable for the following components:    Glucose 309 (*)     BUN 47 (*)     Creatinine 1.80 (*)     Sodium 130 (*)     Potassium 3.0 (*)     CO2 14.0 (*)     Calcium 8.5 (*)     BUN/Creatinine Ratio 26.1 (*)     eGFR 41.0 (*)     All other components within normal limits    Narrative:     GFR Normal >60  Chronic Kidney Disease <60  Kidney Failure <15     CBC WITH AUTO DIFFERENTIAL - Abnormal; Notable for the following components:    WBC 11.70 (*)     Neutrophil % 76.5 (*)     Lymphocyte % 11.7 (*)     Immature Grans % 0.9 (*)     Neutrophils, Absolute 8.95 (*)     Monocytes, Absolute 1.12 (*)     Immature Grans, Absolute 0.11 (*)     All other components within normal limits   URINALYSIS, MICROSCOPIC ONLY - Abnormal; Notable for the following components:    RBC, UA Too Numerous to Count (*)     WBC, UA 13-20 (*)     Bacteria, UA 1+ (*)     Squamous Epithelial Cells, UA 6-12 (*)     Mucus, UA Moderate/2+ (*)     All other components within normal limits   PROTIME-INR - Normal    Narrative:     Therapeutic range for most indications is 2.0-3.0 INR,  or 2.5-3.5 for mechanical heart valves.   APTT - Normal    Narrative:     The recommended Heparin therapeutic range is 68-97 seconds.   MAGNESIUM - Normal   RAINBOW DRAW    Narrative:     The following orders were created for panel order [place] Draw.  Procedure                               Abnormality         Status                     ---------                               -----------         ------                     Green Top (Gel)[[id number]]                                  Final result               Lavender Top[[id number]]                                     Final result               Gold Top - SST[[id number]]                                   Final result               Light Blue Top[[id number]]                                   Final result                 Please view results for these tests on the  individual orders.   CBC AND DIFFERENTIAL    Narrative:     The following orders were created for panel order CBC & Differential.  Procedure                               Abnormality         Status                     ---------                               -----------         ------                     CBC Auto Differential[[id number]]        Abnormal            Final result                 Please view results for these tests on the individual orders.   GREEN TOP   LAVENDER TOP   GOLD TOP - SST   LIGHT BLUE TOP       Meds given in ED:   Medications   sodium chloride 0.9 % flush 10 mL (has no administration in time range)   cefTRIAXone (ROCEPHIN) 1 g/100 mL 0.9% NS (MBP) (0 g Intravenous Stopped 4/27/23 0022)           NIH Stroke Scale:       Isolation/Infection(s):  No active isolations   COVID Screen (preop/placement)     COVID Testing  Collected n/a  Resulted n/a    Nursing report ED to floor:  Mental status: a/o  Ambulatory status: assist x1  Precautions: n/a    ED nurse phone extentsijy- [phone]    Electronically signed by Kay Alatorre RN at 04/27/23 0039         Lab Results (last 24 hours)     Procedure Component Value Units Date/Time    Urine Culture - Urine, Urine, Clean Catch [[id number]]  (Normal) Collected: 04/26/23 2130    Specimen: Urine, Clean Catch Updated: 04/28/23 1158     Urine Culture No growth    POC Glucose Once [[id number]]  (Abnormal) Collected: 04/28/23 1009    Specimen: Blood Updated: 04/28/23 1045     Glucose 356 mg/dL      Comment: RN NotifiedOperator: [id number] MANUEL ATKINSONMeter ID: [id number]       Extra Tubes [[id number]] Collected: 04/28/23 0553    Specimen: Blood, Venous Line Updated: 04/28/23 0700    Narrative:      The following orders were created for panel order Extra Tubes.  Procedure                               Abnormality         Status                     ---------                               -----------         ------                     Lavender Top[[id number]]                                      Final result                 Please view results for these tests on the individual orders.    Lavender Top [[id number]] Collected: 04/28/23 0553    Specimen: Blood Updated: 04/28/23 0700     Extra Tube hold for add-on     Comment: Auto resulted       Blood Culture - Blood, Arm, Left [[id number]]  (Normal) Collected: 04/27/23 0625    Specimen: Blood from Arm, Left Updated: 04/28/23 0646     Blood Culture No growth at 24 hours    Blood Culture - Blood, Arm, Right [[id number]]  (Normal) Collected: 04/27/23 0622    Specimen: Blood from Arm, Right Updated: 04/28/23 0646     Blood Culture No growth at 24 hours    Magnesium [[id number]]  (Abnormal) Collected: 04/28/23 0553    Specimen: Blood Updated: 04/28/23 0629     Magnesium 2.5 mg/dL     POC Glucose Once [[id number]]  (Abnormal) Collected: 04/28/23 0607    Specimen: Blood Updated: 04/28/23 0623     Glucose 307 mg/dL      Comment: RN NotifiedOperator: [id number] MICHAELLE GARCIAMeter ID: [id number]       POC Glucose Once [[id number]]  (Abnormal) Collected: 04/27/23 1936    Specimen: Blood Updated: 04/27/23 2136     Glucose 349 mg/dL      Comment: RN NotifiedOperator: [id number] MICHAELLE OLEARYEYMeter ID: [id number]       Potassium [[id number]]  (Normal) Collected: 04/27/23 1819    Specimen: Blood Updated: 04/27/23 1857     Potassium 3.8 mmol/L      Comment: Slight hemolysis detected by analyzer. Results may be affected.       POC Glucose Once [[id number]]  (Abnormal) Collected: 04/27/23 1559    Specimen: Blood Updated: 04/27/23 1656     Glucose 285 mg/dL      Comment: RN NotifiedOperator: [id number] LEA BOWERSMeter ID: [id number]           Imaging Results (Last 24 Hours)     ** No results found for the last 24 hours. **        ECG/EMG Results (last 24 hours)     ** No results found for the last 24 hours. **           Physician Progress Notes (last 24 hours)      Elena Hall APRN at 04/27/23 1647     Attestation signed by Kevin Perry MD at  04/27/23 1911    I personally evaluated and examined the patient in conjunction with TIFFANIE Mcmillan and agree with the assessment, treatment plan, and disposition of the patient as recorded.                        [facility] Medicine   INPATIENT PROGRESS NOTE      Patient Name: Favio Casillas  Date of Admission: 4/26/2023  Today's Date: 04/27/23  Length of Stay: 0  Primary Care Physician: Shayne Merritt MD    Subjective   Chief Complaint: Hematuria  HPI   68 year old male with history of bladder cancer presented with hematuria.     4/27/23 C/O abdominal cramping. Undergoing CBI, Denies fever or chills.         Review of Systems   Constitutional: Negative.    HENT: Negative.    Eyes: Negative.    Respiratory: Negative.    Cardiovascular: Negative.    Gastrointestinal: Positive for abdominal pain.   Genitourinary: Positive for hematuria.   Musculoskeletal: Negative.    Neurological: Negative.         All pertinent negatives and positives are as above. All other systems have been reviewed and are negative unless otherwise stated.     Objective    Temp:  [96.4 °F (35.8 °C)-98.3 °F (36.8 °C)] 96.4 °F (35.8 °C)  Heart Rate:  [] 81  Resp:  [18] 18  BP: (100-136)/(70-84) 130/74     Physical Exam  Vitals reviewed.   Constitutional:       Appearance: Normal appearance.   HENT:      Head: Normocephalic and atraumatic.      Mouth/Throat:      Mouth: Mucous membranes are moist.      Pharynx: Oropharynx is clear.   Eyes:      Pupils: Pupils are equal, round, and reactive to light.   Cardiovascular:      Rate and Rhythm: Normal rate and regular rhythm.   Pulmonary:      Effort: Pulmonary effort is normal.   Abdominal:      General: Bowel sounds are normal.      Tenderness: There is abdominal tenderness.   Musculoskeletal:         General: Normal range of motion.   Skin:     General: Skin is warm.      Capillary Refill: Capillary refill takes less than 2  seconds.   Neurological:      General: No focal deficit present.      Mental Status: He is alert.   Psychiatric:         Thought Content: Thought content normal.             Results Review:  I have reviewed the labs, radiology results, and diagnostic studies.    Laboratory Data:   Results from last 7 days   Lab Units 04/27/23  0625 04/26/23  2147   WBC 10*3/mm3 8.90 11.70*   HEMOGLOBIN g/dL 13.6 13.0   HEMATOCRIT % 41.1 38.0   PLATELETS 10*3/mm3 249 230        Results from last 7 days   Lab Units 04/27/23  0625 04/26/23  2147   SODIUM mmol/L 133* 130*   POTASSIUM mmol/L 2.8* 3.0*   CHLORIDE mmol/L 108* 104   CO2 mmol/L 12.0* 14.0*   BUN mg/dL 45* 47*   CREATININE mg/dL 1.66* 1.80*   CALCIUM mg/dL 8.9 8.5*   BILIRUBIN mg/dL  --  0.3   ALK PHOS U/L  --  114   ALT (SGPT) U/L  --  28   AST (SGOT) U/L  --  24   GLUCOSE mg/dL 399* 309*       Culture Data:   No results found for: BLOODCX  No results found for: URINECX  No results found for: RESPCX  No results found for: WOUNDCX  No results found for: STOOLCX  No components found for: BODYFLD    Radiology Data:   Imaging Results (Last 24 Hours)     Procedure Component Value Units Date/Time    US Renal Bilateral [[id number]] Collected: 04/27/23 1015     Updated: 04/27/23 1219    Narrative:      PROCEDURE:  Complete retroperitoneal sonogram.    COMPARISON:  Renal ultrasound 9/6/2020.    HISTORY:  Hematuria.      TECHNIQUE:  High-resolution gray scale images of the bilateral kidneys and bladder were  obtained.    FINDINGS:  Realtime sonographic images are obtained of the kidneys and bladder. The kidneys  are normal in size and echogenicity. Mild bilateral hydronephrosis. The right  kidney measures 10.1 cm in length, the left kidney 11.1 cm in length.    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  There is a 4.1 cm masslike  filling defect in the bladder.      Impression:      1.  Mild bilateral hydronephrosis.    2.  There is a 4.1 cm round mass or filling defect in  the bladder.  Malignancy  not excluded.  Visualization should be considered.              I have reviewed the patient's current medications.     Assessment/Plan     I have utilized all available immediate resources to obtain, update, or review the patient's current medications (including all prescriptions, over-the-counter products, herbals, cannabis/cannabidiol products, and vitamin/mineral/dietary (nutritional) supplements).      Active Hospital Problems    Diagnosis    • **Hematuria    • Hyponatremia    • Hypokalemia    • CKD (chronic kidney disease) stage 3, GFR 30-59 ml/min    • Acute UTI (urinary tract infection)        Medical Decision Making  Number and Complexity of problems: 1 High 4 Moderate    Conditions and Status:        Condition is improving.     MDM Data  External documents reviewed: None today  My EKG interpretation: None today  My plain film interpretation: None today  Tests considered but not ordered: None today       Discussed with: Patient, RN     Treatment Plan  1. Hematuria  -Urology consult  -CBI    2. UTI  -Ceftriaxaone    3.CKD III  -renally adjust medications, avoid nephrotoxins    4. Hyponatremia  -improved with IVF    5. Hypokalemia  -Electrolyte replacement therapy    Care Planning  Shared decision making: With patient  Code status and discussions: Full Code    Disposition  Social Determinants of Health that impact treatment or disposition: n/a  I expect the patient to be discharged to home in 1-2 days.      I confirmed that the patient's Advance Care Plan is present, code status is documented, or surrogate decision maker is listed in the patient's medical record.   ________________________________        Copied text in this note has been reviewed and is accurate as of 04/27/23       Electronically signed by TIFFANIE Dominguez, 04/27/23, 16:47 CDT.      Electronically signed by Kevin Perry MD at 04/27/23 1911       Medical Student Notes (last 24 hours)  Notes from 04/27/23 1411  through 04/28/23 1411   No notes of this type exist for this encounter.         Consult Notes (last 24 hours)  Notes from 04/27/23 1411 through 04/28/23 1411   No notes of this type exist for this encounter.

## 2023-04-29 ENCOUNTER — APPOINTMENT (OUTPATIENT)
Dept: ULTRASOUND IMAGING | Facility: HOSPITAL | Age: 68
End: 2023-04-29
Payer: MEDICARE

## 2023-04-29 LAB
ANION GAP SERPL CALCULATED.3IONS-SCNC: 9 MMOL/L (ref 5–15)
BASOPHILS # BLD AUTO: 0.02 10*3/MM3 (ref 0–0.2)
BASOPHILS NFR BLD AUTO: 0.2 % (ref 0–1.5)
BUN SERPL-MCNC: 24 MG/DL (ref 8–23)
BUN/CREAT SERPL: 15.1 (ref 7–25)
CALCIUM SPEC-SCNC: 8.1 MG/DL (ref 8.6–10.5)
CHLORIDE SERPL-SCNC: 117 MMOL/L (ref 98–107)
CO2 SERPL-SCNC: 14 MMOL/L (ref 22–29)
CREAT SERPL-MCNC: 1.59 MG/DL (ref 0.76–1.27)
DEPRECATED RDW RBC AUTO: 48.4 FL (ref 37–54)
EGFRCR SERPLBLD CKD-EPI 2021: 47.6 ML/MIN/1.73
EOSINOPHIL # BLD AUTO: 0.04 10*3/MM3 (ref 0–0.4)
EOSINOPHIL NFR BLD AUTO: 0.5 % (ref 0.3–6.2)
ERYTHROCYTE [DISTWIDTH] IN BLOOD BY AUTOMATED COUNT: 14.9 % (ref 12.3–15.4)
GLUCOSE BLDC GLUCOMTR-MCNC: 254 MG/DL (ref 70–130)
GLUCOSE BLDC GLUCOMTR-MCNC: 334 MG/DL (ref 70–130)
GLUCOSE SERPL-MCNC: 271 MG/DL (ref 65–99)
HCT VFR BLD AUTO: 31.9 % (ref 37.5–51)
HGB BLD-MCNC: 10.3 G/DL (ref 13–17.7)
IMM GRANULOCYTES # BLD AUTO: 0.06 10*3/MM3 (ref 0–0.05)
IMM GRANULOCYTES NFR BLD AUTO: 0.7 % (ref 0–0.5)
LYMPHOCYTES # BLD AUTO: 1.11 10*3/MM3 (ref 0.7–3.1)
LYMPHOCYTES NFR BLD AUTO: 13.4 % (ref 19.6–45.3)
MAGNESIUM SERPL-MCNC: 2.1 MG/DL (ref 1.6–2.4)
MCH RBC QN AUTO: 28.3 PG (ref 26.6–33)
MCHC RBC AUTO-ENTMCNC: 32.3 G/DL (ref 31.5–35.7)
MCV RBC AUTO: 87.6 FL (ref 79–97)
MONOCYTES # BLD AUTO: 0.61 10*3/MM3 (ref 0.1–0.9)
MONOCYTES NFR BLD AUTO: 7.3 % (ref 5–12)
NEUTROPHILS NFR BLD AUTO: 6.46 10*3/MM3 (ref 1.7–7)
NEUTROPHILS NFR BLD AUTO: 77.9 % (ref 42.7–76)
NRBC BLD AUTO-RTO: 0.5 /100 WBC (ref 0–0.2)
PLATELET # BLD AUTO: 217 10*3/MM3 (ref 140–450)
PMV BLD AUTO: 10.1 FL (ref 6–12)
POTASSIUM SERPL-SCNC: 3 MMOL/L (ref 3.5–5.2)
POTASSIUM SERPL-SCNC: 3.4 MMOL/L (ref 3.5–5.2)
POTASSIUM SERPL-SCNC: 4.2 MMOL/L (ref 3.5–5.2)
RBC # BLD AUTO: 3.64 10*6/MM3 (ref 4.14–5.8)
SODIUM SERPL-SCNC: 140 MMOL/L (ref 136–145)
WBC NRBC COR # BLD: 8.3 10*3/MM3 (ref 3.4–10.8)

## 2023-04-29 PROCEDURE — 82948 REAGENT STRIP/BLOOD GLUCOSE: CPT

## 2023-04-29 PROCEDURE — 94799 UNLISTED PULMONARY SVC/PX: CPT

## 2023-04-29 PROCEDURE — 25010000002 CEFTRIAXONE PER 250 MG

## 2023-04-29 PROCEDURE — 63710000001 INSULIN DETEMIR PER 5 UNITS: Performed by: NURSE PRACTITIONER

## 2023-04-29 PROCEDURE — 25010000002 MORPHINE PER 10 MG

## 2023-04-29 PROCEDURE — 85025 COMPLETE CBC W/AUTO DIFF WBC: CPT | Performed by: NURSE PRACTITIONER

## 2023-04-29 PROCEDURE — 84132 ASSAY OF SERUM POTASSIUM: CPT | Performed by: NURSE PRACTITIONER

## 2023-04-29 PROCEDURE — 83735 ASSAY OF MAGNESIUM: CPT

## 2023-04-29 PROCEDURE — 76775 US EXAM ABDO BACK WALL LIM: CPT

## 2023-04-29 PROCEDURE — 63710000001 INSULIN ASPART PER 5 UNITS

## 2023-04-29 PROCEDURE — 80048 BASIC METABOLIC PNL TOTAL CA: CPT | Performed by: NURSE PRACTITIONER

## 2023-04-29 PROCEDURE — 84132 ASSAY OF SERUM POTASSIUM: CPT

## 2023-04-29 PROCEDURE — 94760 N-INVAS EAR/PLS OXIMETRY 1: CPT

## 2023-04-29 RX ADMIN — CEFTRIAXONE SODIUM 1 G: 1 INJECTION, POWDER, FOR SOLUTION INTRAMUSCULAR; INTRAVENOUS at 22:19

## 2023-04-29 RX ADMIN — SODIUM CHLORIDE 100 ML/HR: 9 INJECTION, SOLUTION INTRAVENOUS at 21:45

## 2023-04-29 RX ADMIN — SERTRALINE HYDROCHLORIDE 50 MG: 50 TABLET ORAL at 08:13

## 2023-04-29 RX ADMIN — POTASSIUM CHLORIDE 40 MEQ: 750 CAPSULE, EXTENDED RELEASE ORAL at 06:35

## 2023-04-29 RX ADMIN — MORPHINE SULFATE 2 MG: 2 INJECTION, SOLUTION INTRAMUSCULAR; INTRAVENOUS at 01:23

## 2023-04-29 RX ADMIN — INSULIN DETEMIR 28 UNITS: 100 INJECTION, SOLUTION SUBCUTANEOUS at 08:13

## 2023-04-29 RX ADMIN — Medication 10 ML: at 08:13

## 2023-04-29 RX ADMIN — BUDESONIDE AND FORMOTEROL FUMARATE DIHYDRATE 2 PUFF: 160; 4.5 AEROSOL RESPIRATORY (INHALATION) at 19:35

## 2023-04-29 RX ADMIN — INSULIN ASPART 24 UNITS: 100 INJECTION, SOLUTION INTRAVENOUS; SUBCUTANEOUS at 11:05

## 2023-04-29 RX ADMIN — CARVEDILOL 6.25 MG: 6.25 TABLET, FILM COATED ORAL at 18:26

## 2023-04-29 RX ADMIN — PANTOPRAZOLE SODIUM 40 MG: 40 TABLET, DELAYED RELEASE ORAL at 08:13

## 2023-04-29 RX ADMIN — INSULIN ASPART 12 UNITS: 100 INJECTION, SOLUTION INTRAVENOUS; SUBCUTANEOUS at 09:03

## 2023-04-29 RX ADMIN — FUROSEMIDE 20 MG: 20 TABLET ORAL at 08:12

## 2023-04-29 RX ADMIN — SODIUM CHLORIDE 100 ML/HR: 9 INJECTION, SOLUTION INTRAVENOUS at 11:04

## 2023-04-29 RX ADMIN — INSULIN ASPART 16 UNITS: 100 INJECTION, SOLUTION INTRAVENOUS; SUBCUTANEOUS at 18:27

## 2023-04-29 RX ADMIN — CARVEDILOL 6.25 MG: 6.25 TABLET, FILM COATED ORAL at 08:12

## 2023-04-29 RX ADMIN — BUDESONIDE AND FORMOTEROL FUMARATE DIHYDRATE 2 PUFF: 160; 4.5 AEROSOL RESPIRATORY (INHALATION) at 08:04

## 2023-04-29 RX ADMIN — POTASSIUM CHLORIDE 40 MEQ: 750 CAPSULE, EXTENDED RELEASE ORAL at 11:03

## 2023-04-29 RX ADMIN — INSULIN DETEMIR 30 UNITS: 100 INJECTION, SOLUTION SUBCUTANEOUS at 20:16

## 2023-04-29 RX ADMIN — PRAVASTATIN SODIUM 40 MG: 40 TABLET ORAL at 08:13

## 2023-04-29 RX ADMIN — NORTRIPTYLINE HYDROCHLORIDE 10 MG: 10 CAPSULE ORAL at 20:41

## 2023-04-29 NOTE — PROGRESS NOTES
LOS: 1 day     Patient Care Team:  Shayne Merritt MD as PCP - General (Family Medicine)      Subjective     Hematuria post ileal loop biopsy    Objective       Vital Signs  Temp:  [97.4 °F (36.3 °C)-99 °F (37.2 °C)] 97.8 °F (36.6 °C)  Heart Rate:  [76-97] 97  Resp:  [16-20] 18  BP: (111-131)/(66-79) 119/71    Physical Exam:        General Appearance:   No acute distress     Respiratory:    UNLABORED RESPIRATIONS.     Abdomen:     SOFT.       Genitourinary:  Catheter coming out of the loop draining clear     Rectal:     DEFERRED       Results Review:       Imaging Results (Last 24 Hours)     ** No results found for the last 24 hours. **        Lab Results (last 24 hours)     Procedure Component Value Units Date/Time    Magnesium [[id number]]  (Normal) Collected: 04/29/23 0647    Specimen: Blood Updated: 04/29/23 0711     Magnesium 2.1 mg/dL     Blood Culture - Blood, Arm, Left [[id number]]  (Normal) Collected: 04/27/23 0625    Specimen: Blood from Arm, Left Updated: 04/29/23 0645     Blood Culture No growth at 2 days    Blood Culture - Blood, Arm, Right [[id number]]  (Normal) Collected: 04/27/23 0622    Specimen: Blood from Arm, Right Updated: 04/29/23 0645     Blood Culture No growth at 2 days    POC Glucose Once [[id number]]  (Abnormal) Collected: 04/29/23 0609    Specimen: Blood Updated: 04/29/23 0623     Glucose 254 mg/dL      Comment: RN NotifiedOperator: [id number] MICHAELLE OLEARYIntronisMeter ID: [id number]       Potassium [[id number]]  (Abnormal) Collected: 04/29/23 0051    Specimen: Blood Updated: 04/29/23 0113     Potassium 3.0 mmol/L     POC Glucose Once [[id number]]  (Abnormal) Collected: 04/28/23 2008    Specimen: Blood Updated: 04/28/23 2031     Glucose 309 mg/dL      Comment: RN NotifiedOperator: [id number] MICHAELLE OLEARYEYMeter ID: [id number]       POC Glucose Once [[id number]]  (Abnormal) Collected: 04/28/23 1642    Specimen: Blood Updated: 04/28/23 2030     Glucose 208 mg/dL      Comment: RN  NotifiedOperator: [id number] [employer] ID: [id number]       Urine Culture - Urine, Urine, Clean Catch [[id number]]  (Normal) Collected: 04/26/23 2130    Specimen: Urine, Clean Catch Updated: 04/28/23 1158     Urine Culture No growth    POC Glucose Once [[id number]]  (Abnormal) Collected: 04/28/23 1009    Specimen: Blood Updated: 04/28/23 1045     Glucose 356 mg/dL      Comment: RN NotifiedOperator: [id number] [employer] ID: [id number]               I reviewed the patient's new clinical results.  I reviewed the patient's new imaging results and agree with the interpretation.  I reviewed the patient's other test results and agree with the interpretation        Assessment:    Resolving hematuria from ileal conduit postbiopsy    Plan:     Continue CBI      Christofer Calderon MD  04/29/23  08:13 CDT

## 2023-04-29 NOTE — PROGRESS NOTES
"    [facility] Medicine   INPATIENT PROGRESS NOTE      Patient Name: Favio Casillas  Date of Admission: 4/26/2023  Today's Date: 04/29/23  Length of Stay: 1  Primary Care Physician: Shayne Merritt MD    Subjective   Chief Complaint: Hematuria  Blood in Urine  Pertinent negatives include no abdominal pain.      68 year old male with history of bladder cancer presented with hematuria.     4/27/23 C/O abdominal cramping. Undergoing CBI, Denies fever or chills.     4/28/23 States he passed a lot of \"clots\" overnight. Denies any further abdominal bloating or cramping. CBI continues.     4/29/23 No acute events overnight, blood sugar better controlled but still 250 range. Has some abdominal cramping at times, eating well.     Review of Systems   Constitutional: Negative.    HENT: Negative.    Eyes: Negative.    Respiratory: Negative.    Cardiovascular: Negative.    Gastrointestinal: Negative for abdominal pain.   Genitourinary: Positive for hematuria.   Musculoskeletal: Negative.    Neurological: Negative.         All pertinent negatives and positives are as above. All other systems have been reviewed and are negative unless otherwise stated.     Objective    Temp:  [97.4 °F (36.3 °C)-99 °F (37.2 °C)] 97.4 °F (36.3 °C)  Heart Rate:  [76-97] 83  Resp:  [16-20] 18  BP: (107-119)/(58-71) 107/58       Physical Exam  Vitals reviewed.   Constitutional:       Appearance: Normal appearance.   HENT:      Head: Normocephalic and atraumatic.      Mouth/Throat:      Mouth: Mucous membranes are moist.      Pharynx: Oropharynx is clear.   Eyes:      Pupils: Pupils are equal, round, and reactive to light.   Cardiovascular:      Rate and Rhythm: Normal rate and regular rhythm.   Pulmonary:      Effort: Pulmonary effort is normal.   Abdominal:      General: Bowel sounds are normal.      Tenderness: There is no abdominal tenderness.   Musculoskeletal:         General: Normal " range of motion.   Skin:     General: Skin is warm.      Capillary Refill: Capillary refill takes less than 2 seconds.   Neurological:      General: No focal deficit present.      Mental Status: He is alert.   Psychiatric:         Thought Content: Thought content normal.             Results Review:  I have reviewed the labs, radiology results, and diagnostic studies.    Laboratory Data:   Results from last 7 days   Lab Units 04/29/23  0631 04/27/23  0625 04/26/23  2147   WBC 10*3/mm3 8.30 8.90 11.70*   HEMOGLOBIN g/dL 10.3* 13.6 13.0   HEMATOCRIT % 31.9* 41.1 38.0   PLATELETS 10*3/mm3 217 249 230        Results from last 7 days   Lab Units 04/29/23  0647 04/29/23  0051 04/27/23  1819 04/27/23  0625 04/26/23  2147   SODIUM mmol/L 140  --   --  133* 130*   POTASSIUM mmol/L 3.4* 3.0* 3.8 2.8* 3.0*   CHLORIDE mmol/L 117*  --   --  108* 104   CO2 mmol/L 14.0*  --   --  12.0* 14.0*   BUN mg/dL 24*  --   --  45* 47*   CREATININE mg/dL 1.59*  --   --  1.66* 1.80*   CALCIUM mg/dL 8.1*  --   --  8.9 8.5*   BILIRUBIN mg/dL  --   --   --   --  0.3   ALK PHOS U/L  --   --   --   --  114   ALT (SGPT) U/L  --   --   --   --  28   AST (SGOT) U/L  --   --   --   --  24   GLUCOSE mg/dL 271*  --   --  399* 309*       Culture Data:   Blood Culture   Date Value Ref Range Status   04/27/2023 No growth at 2 days  Preliminary   04/27/2023 No growth at 2 days  Preliminary     Urine Culture   Date Value Ref Range Status   04/26/2023 No growth  Final     No results found for: RESPCX  No results found for: WOUNDCX  No results found for: STOOLCX  No components found for: BODYFLD    Radiology Data:   Imaging Results (Last 24 Hours)     Procedure Component Value Units Date/Time    US Renal Bilateral [[id number]] Collected: 04/29/23 1144     Updated: 04/29/23 1219    Narrative:      COMPARISON:  4/27/2023.    TECHNIQUE:  High-resolution gray scale images of the bilateral kidneys and bladder were  obtained.    FINDINGS:  Compared to the previous  study two days ago there is now significant echogenic  material within both collecting systems bilaterally producing severe artifact.  This is likely representative of gas given the patient undergoing continuous  bladder irrigation.  The degree of artifact present makes it difficult to assess  for persistent hydronephrosis but on a few of the images it does appear the  hydronephrosis is improved.  A John catheter is seen within the urinary  bladder.          I have reviewed the patient's current medications.     Assessment/Plan     I have utilized all available immediate resources to obtain, update, or review the patient's current medications (including all prescriptions, over-the-counter products, herbals, cannabis/cannabidiol products, and vitamin/mineral/dietary (nutritional) supplements).      Active Hospital Problems    Diagnosis    • **Hematuria    • Gross hematuria    • Hyponatremia    • Hypokalemia    • CKD (chronic kidney disease) stage 3, GFR 30-59 ml/min    • Acute UTI (urinary tract infection)        Medical Decision Making  Number and Complexity of problems: 1 High 5 Moderate    Conditions and Status:        Condition is improving.     MDM Data  External documents reviewed: None today  My EKG interpretation: None today  My plain film interpretation: None today  Tests considered but not ordered: None today       Discussed with: Patient, RN     Treatment Plan  1. Hematuria  -Urology consulted, appreciate Dr Fellow's assistance  -CBI  -cysto pending    2. UTI  -Ceftriaxaone empirically  -culture negative    3.CKD III, creat at baseline 1.6  -renally adjust medications, avoid nephrotoxins    4. Hyponatremia  -improved with IVF    5. Hypokalemia  -Electrolyte replacement therapy    6. IDDM type 2  -Increase lantus to 30 units SC BID    Care Planning  Shared decision making: With patient  Code status and discussions: Full Code    Disposition  Social Determinants of Health that impact treatment or  disposition: n/a  I expect the patient to be discharged to home in 1-2 days.      I confirmed that the patient's Advance Care Plan is present, code status is documented, or surrogate decision maker is listed in the patient's medical record.   ________________________________        Copied text in this note has been reviewed and is accurate as of 04/29/23       Electronically signed by TIFFANIE Dominguez, 04/29/23, 12:47 CDT.

## 2023-04-29 NOTE — PLAN OF CARE
Goal Outcome Evaluation:      Patient complains of a lot of abdominal cramping. Morphine given for pain and is effective per patient. CBI currently running with small clots present. VS stable. Will continue to monitor patient

## 2023-04-29 NOTE — PROGRESS NOTES
"   LOS: 1 day   Patient Care Team:  Shayne Merritt MD as PCP - General (Family Medicine)    Subjective     Subjective:  Symptoms:  Stable.        History taken from: patient chart    Objective     Vital Signs  Temp:  [97.4 °F (36.3 °C)-99 °F (37.2 °C)] 97.8 °F (36.6 °C)  Heart Rate:  [76-97] 97  Resp:  [16-20] 18  BP: (111-131)/(66-79) 119/71    Objective:  General Appearance:  In no acute distress.    Vital signs: (most recent): Blood pressure 119/71, pulse 97, temperature 97.8 °F (36.6 °C), temperature source Oral, resp. rate 18, height 170.2 cm (67\"), weight 61 kg (134 lb 8 oz), SpO2 99 %.  Vital signs are normal.  No fever.    Output: Producing urine.    Lungs:  Normal effort and normal respiratory rate.    Abdomen: Abdomen is soft and non-distended.  There is no abdominal tenderness.     Neurological: Patient is alert.    Pupils:  Pupils are equal, round, and reactive to light.    Skin:  Warm and dry.              Results Review:    Lab Results (last 24 hours)     Procedure Component Value Units Date/Time    Magnesium [[id number]]  (Normal) Collected: 04/29/23 0647    Specimen: Blood Updated: 04/29/23 0711     Magnesium 2.1 mg/dL     Blood Culture - Blood, Arm, Left [[id number]]  (Normal) Collected: 04/27/23 0625    Specimen: Blood from Arm, Left Updated: 04/29/23 0645     Blood Culture No growth at 2 days    Blood Culture - Blood, Arm, Right [[id number]]  (Normal) Collected: 04/27/23 0622    Specimen: Blood from Arm, Right Updated: 04/29/23 0645     Blood Culture No growth at 2 days    POC Glucose Once [[id number]]  (Abnormal) Collected: 04/29/23 0609    Specimen: Blood Updated: 04/29/23 0623     Glucose 254 mg/dL      Comment: RN NotifiedOperator: [id number] MICHAELLE OLEARYEYMeter ID: [id number]       Potassium [[id number]]  (Abnormal) Collected: 04/29/23 0051    Specimen: Blood Updated: 04/29/23 0113     Potassium 3.0 mmol/L     POC Glucose Once [[id number]]  (Abnormal) Collected: 04/28/23 2008    Specimen: " Blood Updated: 04/28/23 2031     Glucose 309 mg/dL      Comment: RN NotifiedOperator: [id number] MICHAELLE GARCIAMeter ID: [id number]       POC Glucose Once [[id number]]  (Abnormal) Collected: 04/28/23 1642    Specimen: Blood Updated: 04/28/23 2030     Glucose 208 mg/dL      Comment: RN NotifiedOperator: [id number] MANUEL ATKINSONMeter ID: [id number]       Urine Culture - Urine, Urine, Clean Catch [[id number]]  (Normal) Collected: 04/26/23 2130    Specimen: Urine, Clean Catch Updated: 04/28/23 1158     Urine Culture No growth    POC Glucose Once [[id number]]  (Abnormal) Collected: 04/28/23 1009    Specimen: Blood Updated: 04/28/23 1045     Glucose 356 mg/dL      Comment: RN NotifiedOperator: [id number] MANUEL ATKINSONMeter ID: [id number]            Imaging Results (Last 24 Hours)     ** No results found for the last 24 hours. **           I reviewed the patient's new clinical results.  I reviewed the patient's new imaging results and agree with the interpretation.  I reviewed the patient's other test results and agree with the interpretation      Assessment & Plan       Hematuria    Acute UTI (urinary tract infection)    Hyponatremia    Hypokalemia    CKD (chronic kidney disease) stage 3, GFR 30-59 ml/min    Gross hematuria      Assessment & Plan    Favio Casillas is a 68 y.o. male who has gross hematuria, UTI culture no growth; imaging showed bilateral hydronephrosis with 4.1 cm round bladder mass or filling defect and neoplasm must be excluded.   -Estimated Creatinine Clearance: 37.8 mL/min (A) (by C-G formula based on SCr of 1.66 mg/dL (H)).    Plan:  Repeat renal ultrasound and plan to remove John next    TIFFANIE Michele  04/29/23  09:24 CDT

## 2023-04-29 NOTE — PLAN OF CARE
Goal Outcome Evaluation:          Patient is resting in bed at this time. Denies pain, no s/s of distress. CBI is currently running, output is clear. pink tinged at times, small clots present at times. VSS.

## 2023-04-30 LAB
GLUCOSE BLDC GLUCOMTR-MCNC: 227 MG/DL (ref 70–130)
GLUCOSE BLDC GLUCOMTR-MCNC: 287 MG/DL (ref 70–130)
GLUCOSE BLDC GLUCOMTR-MCNC: 300 MG/DL (ref 70–130)
GLUCOSE BLDC GLUCOMTR-MCNC: 335 MG/DL (ref 70–130)

## 2023-04-30 PROCEDURE — 63710000001 INSULIN ASPART PER 5 UNITS

## 2023-04-30 PROCEDURE — 82948 REAGENT STRIP/BLOOD GLUCOSE: CPT

## 2023-04-30 PROCEDURE — 94799 UNLISTED PULMONARY SVC/PX: CPT

## 2023-04-30 PROCEDURE — 94760 N-INVAS EAR/PLS OXIMETRY 1: CPT

## 2023-04-30 PROCEDURE — 25010000002 CEFTRIAXONE PER 250 MG

## 2023-04-30 PROCEDURE — 63710000001 INSULIN DETEMIR PER 5 UNITS: Performed by: NURSE PRACTITIONER

## 2023-04-30 PROCEDURE — 94664 DEMO&/EVAL PT USE INHALER: CPT

## 2023-04-30 PROCEDURE — 63710000001 INSULIN ASPART PER 5 UNITS: Performed by: NURSE PRACTITIONER

## 2023-04-30 RX ORDER — INSULIN ASPART 100 [IU]/ML
5 INJECTION, SOLUTION INTRAVENOUS; SUBCUTANEOUS
Status: DISCONTINUED | OUTPATIENT
Start: 2023-04-30 | End: 2023-05-03 | Stop reason: HOSPADM

## 2023-04-30 RX ADMIN — INSULIN ASPART 8 UNITS: 100 INJECTION, SOLUTION INTRAVENOUS; SUBCUTANEOUS at 17:31

## 2023-04-30 RX ADMIN — INSULIN ASPART 5 UNITS: 100 INJECTION, SOLUTION INTRAVENOUS; SUBCUTANEOUS at 14:45

## 2023-04-30 RX ADMIN — SODIUM CHLORIDE 100 ML/HR: 9 INJECTION, SOLUTION INTRAVENOUS at 08:30

## 2023-04-30 RX ADMIN — INSULIN ASPART 20 UNITS: 100 INJECTION, SOLUTION INTRAVENOUS; SUBCUTANEOUS at 12:08

## 2023-04-30 RX ADMIN — CARVEDILOL 6.25 MG: 6.25 TABLET, FILM COATED ORAL at 08:57

## 2023-04-30 RX ADMIN — PRAVASTATIN SODIUM 40 MG: 40 TABLET ORAL at 08:57

## 2023-04-30 RX ADMIN — BUDESONIDE AND FORMOTEROL FUMARATE DIHYDRATE 2 PUFF: 160; 4.5 AEROSOL RESPIRATORY (INHALATION) at 07:20

## 2023-04-30 RX ADMIN — INSULIN ASPART 5 UNITS: 100 INJECTION, SOLUTION INTRAVENOUS; SUBCUTANEOUS at 17:31

## 2023-04-30 RX ADMIN — SODIUM CHLORIDE 100 ML/HR: 9 INJECTION, SOLUTION INTRAVENOUS at 18:04

## 2023-04-30 RX ADMIN — FUROSEMIDE 20 MG: 20 TABLET ORAL at 08:57

## 2023-04-30 RX ADMIN — CEFTRIAXONE SODIUM 1 G: 1 INJECTION, POWDER, FOR SOLUTION INTRAMUSCULAR; INTRAVENOUS at 21:42

## 2023-04-30 RX ADMIN — NORTRIPTYLINE HYDROCHLORIDE 10 MG: 10 CAPSULE ORAL at 21:42

## 2023-04-30 RX ADMIN — PANTOPRAZOLE SODIUM 40 MG: 40 TABLET, DELAYED RELEASE ORAL at 08:57

## 2023-04-30 RX ADMIN — BUDESONIDE AND FORMOTEROL FUMARATE DIHYDRATE 2 PUFF: 160; 4.5 AEROSOL RESPIRATORY (INHALATION) at 20:35

## 2023-04-30 RX ADMIN — INSULIN ASPART 12 UNITS: 100 INJECTION, SOLUTION INTRAVENOUS; SUBCUTANEOUS at 08:57

## 2023-04-30 RX ADMIN — SERTRALINE HYDROCHLORIDE 50 MG: 50 TABLET ORAL at 08:57

## 2023-04-30 RX ADMIN — INSULIN DETEMIR 30 UNITS: 100 INJECTION, SOLUTION SUBCUTANEOUS at 21:41

## 2023-04-30 RX ADMIN — CARVEDILOL 6.25 MG: 6.25 TABLET, FILM COATED ORAL at 17:31

## 2023-04-30 RX ADMIN — INSULIN DETEMIR 30 UNITS: 100 INJECTION, SOLUTION SUBCUTANEOUS at 08:57

## 2023-04-30 RX ADMIN — Medication 10 ML: at 08:58

## 2023-04-30 NOTE — PROGRESS NOTES
"   LOS: 2 days   Patient Care Team:  Shayne Merritt MD as PCP - General (Family Medicine)    Subjective     Subjective:  Symptoms:  Stable.        History taken from: patient chart    Objective     Vital Signs  Temp:  [97.4 °F (36.3 °C)-98.2 °F (36.8 °C)] 98.2 °F (36.8 °C)  Heart Rate:  [] 101  Resp:  [16-18] 16  BP: (107-129)/(58-77) 124/71    Objective:  General Appearance:  In no acute distress.    Vital signs: (most recent): Blood pressure 124/71, pulse 101, temperature 98.2 °F (36.8 °C), temperature source Temporal, resp. rate 16, height 170.2 cm (67\"), weight 61 kg (134 lb 8 oz), SpO2 98 %.  Vital signs are normal.  No fever.    Output: Producing urine.    Lungs:  Normal effort and normal respiratory rate.    Abdomen: Abdomen is soft and non-distended.  There is no abdominal tenderness.     Neurological: Patient is alert and oriented to person, place and time.    Pupils:  Pupils are equal, round, and reactive to light.    Skin:  Warm and dry.              Results Review:    Lab Results (last 24 hours)     Procedure Component Value Units Date/Time    Blood Culture - Blood, Arm, Left [[id number]]  (Normal) Collected: 04/27/23 0625    Specimen: Blood from Arm, Left Updated: 04/30/23 0645     Blood Culture No growth at 3 days    Blood Culture - Blood, Arm, Right [[id number]]  (Normal) Collected: 04/27/23 0622    Specimen: Blood from Arm, Right Updated: 04/30/23 0645     Blood Culture No growth at 3 days    POC Glucose Once [[id number]]  (Abnormal) Collected: 04/30/23 0618    Specimen: Blood Updated: 04/30/23 0631     Glucose 287 mg/dL      Comment: RN NotifiedOperator: [id number] MAGGI MedTera Solutionseter ID: [id number]       POC Glucose Once [[id number]]  (Abnormal) Collected: 04/29/23 2024    Specimen: Blood Updated: 04/30/23 0221     Glucose 335 mg/dL      Comment: RN NotifiedOperator: [id number] Instabugeter ID: [id number]       POC Glucose Once [[id number]]  (Abnormal) Collected: 04/29/23 1608 "    Specimen: Blood Updated: 04/29/23 1636     Glucose 334 mg/dL      Comment: RN NotifiedOperator: [id number] MANUEL ATKINSONMeter ID: [id number]       Potassium [[id number]]  (Normal) Collected: 04/29/23 1537    Specimen: Blood Updated: 04/29/23 1635     Potassium 4.2 mmol/L      Comment: Slight hemolysis detected by analyzer. Results may be affected.       CBC & Differential [[id number]]  (Abnormal) Collected: 04/29/23 0631    Specimen: Blood Updated: 04/29/23 1136    Narrative:      The following orders were created for panel order CBC & Differential.  Procedure                               Abnormality         Status                     ---------                               -----------         ------                     CBC Auto Differential[[id number]]        Abnormal            Final result                 Please view results for these tests on the individual orders.    CBC Auto Differential [[id number]]  (Abnormal) Collected: 04/29/23 0631    Specimen: Blood Updated: 04/29/23 1136     WBC 8.30 10*3/mm3      RBC 3.64 10*6/mm3      Hemoglobin 10.3 g/dL      Hematocrit 31.9 %      MCV 87.6 fL      MCH 28.3 pg      MCHC 32.3 g/dL      RDW 14.9 %      RDW-SD 48.4 fl      MPV 10.1 fL      Platelets 217 10*3/mm3      Neutrophil % 77.9 %      Lymphocyte % 13.4 %      Monocyte % 7.3 %      Eosinophil % 0.5 %      Basophil % 0.2 %      Immature Grans % 0.7 %      Neutrophils, Absolute 6.46 10*3/mm3      Lymphocytes, Absolute 1.11 10*3/mm3      Monocytes, Absolute 0.61 10*3/mm3      Eosinophils, Absolute 0.04 10*3/mm3      Basophils, Absolute 0.02 10*3/mm3      Immature Grans, Absolute 0.06 10*3/mm3      nRBC 0.5 /100 WBC     Basic Metabolic Panel [[id number]]  (Abnormal) Collected: 04/29/23 0647    Specimen: Blood Updated: 04/29/23 1132     Glucose 271 mg/dL      BUN 24 mg/dL      Creatinine 1.59 mg/dL      Sodium 140 mmol/L      Potassium 3.4 mmol/L      Chloride 117 mmol/L      CO2 14.0 mmol/L      Calcium 8.1 mg/dL       BUN/Creatinine Ratio 15.1     Anion Gap 9.0 mmol/L      eGFR 47.6 mL/min/1.73     Narrative:      GFR Normal >60  Chronic Kidney Disease <60  Kidney Failure <15           Imaging Results (Last 24 Hours)     Procedure Component Value Units Date/Time    US Renal Bilateral [[id number]] Collected: 04/29/23 1144     Updated: 04/29/23 1219    Narrative:      COMPARISON:  4/27/2023.    TECHNIQUE:  High-resolution gray scale images of the bilateral kidneys and bladder were  obtained.    FINDINGS:  Compared to the previous study two days ago there is now significant echogenic  material within both collecting systems bilaterally producing severe artifact.  This is likely representative of gas given the patient undergoing continuous  bladder irrigation.  The degree of artifact present makes it difficult to assess  for persistent hydronephrosis but on a few of the images it does appear the  hydronephrosis is improved.  A John catheter is seen within the urinary  bladder.           I reviewed the patient's new clinical results.  I reviewed the patient's new imaging results and agree with the interpretation.  I reviewed the patient's other test results and agree with the interpretation      Assessment & Plan       Hematuria    Acute UTI (urinary tract infection)    Hyponatremia    Hypokalemia    CKD (chronic kidney disease) stage 3, GFR 30-59 ml/min    Gross hematuria      Assessment & Plan    Favio Casillas is a 68 y.o. male who has gross hematuria, UTI culture no growth; imaging showed bilateral hydronephrosis with 4.1 cm round bladder mass or filling defect and neoplasm must be excluded.   --Estimated Creatinine Clearance: 39.4 mL/min (A) (by C-G formula based on SCr of 1.59 mg/dL (H)).    Plan:  Remove John in TIFFANIE Dooley  04/30/23  08:24 CDT

## 2023-04-30 NOTE — PLAN OF CARE
Goal Outcome Evaluation:         CBI currently infusing with clear output and occasional small clots. Denies any abdominal pain. Will continue to monitor patient

## 2023-04-30 NOTE — PLAN OF CARE
Goal Outcome Evaluation:  Plan of Care Reviewed With: patient        Progress: improving  Outcome Evaluation: Patient sit up in bed today.  CBI completed today and patient has tolerated well.  No s/s of distress noted.  Michael continue to monitor.

## 2023-04-30 NOTE — PROGRESS NOTES
"    [facility] Medicine   INPATIENT PROGRESS NOTE      Patient Name: Favio Casillas  Date of Admission: 4/26/2023  Today's Date: 04/30/23  Length of Stay: 2  Primary Care Physician: Shayne Merritt MD    Subjective   Chief Complaint: Hematuria  Blood in Urine  Pertinent negatives include no abdominal pain.      68 year old male with history of bladder cancer presented with hematuria.     4/27/23 C/O abdominal cramping. Undergoing CBI, Denies fever or chills.     4/28/23 States he passed a lot of \"clots\" overnight. Denies any further abdominal bloating or cramping. CBI continues.     4/29/23 No acute events overnight, blood sugar better controlled but still 250 range. Has some abdominal cramping at times, eating well.     4/30/23 Denies cramping today. CBI d/c'd per uro. Urine in f/c bag blood tinged.     Review of Systems   Constitutional: Negative.    HENT: Negative.    Eyes: Negative.    Respiratory: Negative.    Cardiovascular: Negative.    Gastrointestinal: Negative for abdominal pain.   Genitourinary: Positive for hematuria.   Musculoskeletal: Negative.    Neurological: Negative.         All pertinent negatives and positives are as above. All other systems have been reviewed and are negative unless otherwise stated.     Objective    Temp:  [98 °F (36.7 °C)-98.5 °F (36.9 °C)] 98.5 °F (36.9 °C)  Heart Rate:  [] 83  Resp:  [16-18] 16  BP: (108-129)/(70-77) 108/72       Physical Exam  Vitals reviewed.   Constitutional:       Appearance: Normal appearance.   HENT:      Head: Normocephalic and atraumatic.      Mouth/Throat:      Mouth: Mucous membranes are moist.      Pharynx: Oropharynx is clear.   Eyes:      Pupils: Pupils are equal, round, and reactive to light.   Cardiovascular:      Rate and Rhythm: Normal rate and regular rhythm.   Pulmonary:      Effort: Pulmonary effort is normal.   Abdominal:      General: Bowel sounds are normal.      " Tenderness: There is no abdominal tenderness.   Musculoskeletal:         General: Normal range of motion.   Skin:     General: Skin is warm.      Capillary Refill: Capillary refill takes less than 2 seconds.   Neurological:      General: No focal deficit present.      Mental Status: He is alert.   Psychiatric:         Thought Content: Thought content normal.             Results Review:  I have reviewed the labs, radiology results, and diagnostic studies.    Laboratory Data:   Results from last 7 days   Lab Units 04/29/23  0631 04/27/23  0625 04/26/23  2147   WBC 10*3/mm3 8.30 8.90 11.70*   HEMOGLOBIN g/dL 10.3* 13.6 13.0   HEMATOCRIT % 31.9* 41.1 38.0   PLATELETS 10*3/mm3 217 249 230        Results from last 7 days   Lab Units 04/29/23  1537 04/29/23  0647 04/29/23  0051 04/27/23  1819 04/27/23  0625 04/26/23  2147   SODIUM mmol/L  --  140  --   --  133* 130*   POTASSIUM mmol/L 4.2 3.4* 3.0*   < > 2.8* 3.0*   CHLORIDE mmol/L  --  117*  --   --  108* 104   CO2 mmol/L  --  14.0*  --   --  12.0* 14.0*   BUN mg/dL  --  24*  --   --  45* 47*   CREATININE mg/dL  --  1.59*  --   --  1.66* 1.80*   CALCIUM mg/dL  --  8.1*  --   --  8.9 8.5*   BILIRUBIN mg/dL  --   --   --   --   --  0.3   ALK PHOS U/L  --   --   --   --   --  114   ALT (SGPT) U/L  --   --   --   --   --  28   AST (SGOT) U/L  --   --   --   --   --  24   GLUCOSE mg/dL  --  271*  --   --  399* 309*    < > = values in this interval not displayed.       Culture Data:   No results found for: BLOODCX  No results found for: URINECX  No results found for: RESPCX  No results found for: WOUNDCX  No results found for: STOOLCX  No components found for: BODYFLD    Radiology Data:   Imaging Results (Last 24 Hours)     ** No results found for the last 24 hours. **          I have reviewed the patient's current medications.     Assessment/Plan     I have utilized all available immediate resources to obtain, update, or review the patient's current medications (including all  prescriptions, over-the-counter products, herbals, cannabis/cannabidiol products, and vitamin/mineral/dietary (nutritional) supplements).      Active Hospital Problems    Diagnosis    • **Hematuria    • Gross hematuria    • Hyponatremia    • Hypokalemia    • CKD (chronic kidney disease) stage 3, GFR 30-59 ml/min    • Acute UTI (urinary tract infection)        Medical Decision Making  Number and Complexity of problems: 1 High 5 Moderate    Conditions and Status:        Condition is improving.     MDM Data  External documents reviewed: None today  My EKG interpretation: None today  My plain film interpretation: None today  Tests considered but not ordered: None today       Discussed with: Patient, RN     Treatment Plan  1. Hematuria  -Urology consulted, appreciate Dr Fellow's assistance  -suspected bladder mass per CT  -cysto pending    2. UTI  -Ceftriaxaone empirically  -culture negative    3.CKD III, creat at baseline 1.6  -renally adjust medications, avoid nephrotoxins    4. Hyponatremia  -improved with IVF    5. Hypokalemia  -Electrolyte replacement therapy    6. IDDM type 2  -Increased lantus to 30 units SC BID yesterday, glucose remains uncontrolled, add 5 units humalog TIDAC    Care Planning  Shared decision making: With patient  Code status and discussions: Full Code    Disposition  Social Determinants of Health that impact treatment or disposition: n/a  I expect the patient to be discharged to home in 1-2 days.      I confirmed that the patient's Advance Care Plan is present, code status is documented, or surrogate decision maker is listed in the patient's medical record.   ________________________________        Copied text in this note has been reviewed and is accurate as of 04/30/23       Electronically signed by TIFFANIE Dominguez, 04/30/23, 13:00 CDT.

## 2023-05-01 LAB
ANION GAP SERPL CALCULATED.3IONS-SCNC: 9 MMOL/L (ref 5–15)
BASOPHILS # BLD AUTO: 0.03 10*3/MM3 (ref 0–0.2)
BASOPHILS NFR BLD AUTO: 0.4 % (ref 0–1.5)
BUN SERPL-MCNC: 25 MG/DL (ref 8–23)
BUN/CREAT SERPL: 17.2 (ref 7–25)
CALCIUM SPEC-SCNC: 8.1 MG/DL (ref 8.6–10.5)
CHLORIDE SERPL-SCNC: 111 MMOL/L (ref 98–107)
CO2 SERPL-SCNC: 15 MMOL/L (ref 22–29)
CREAT SERPL-MCNC: 1.45 MG/DL (ref 0.76–1.27)
DEPRECATED RDW RBC AUTO: 47.7 FL (ref 37–54)
EGFRCR SERPLBLD CKD-EPI 2021: 53.1 ML/MIN/1.73
EOSINOPHIL # BLD AUTO: 0.08 10*3/MM3 (ref 0–0.4)
EOSINOPHIL NFR BLD AUTO: 1.2 % (ref 0.3–6.2)
ERYTHROCYTE [DISTWIDTH] IN BLOOD BY AUTOMATED COUNT: 15 % (ref 12.3–15.4)
GLUCOSE BLDC GLUCOMTR-MCNC: 205 MG/DL (ref 70–130)
GLUCOSE BLDC GLUCOMTR-MCNC: 231 MG/DL (ref 70–130)
GLUCOSE BLDC GLUCOMTR-MCNC: 239 MG/DL (ref 70–130)
GLUCOSE BLDC GLUCOMTR-MCNC: 287 MG/DL (ref 70–130)
GLUCOSE BLDC GLUCOMTR-MCNC: 374 MG/DL (ref 70–130)
GLUCOSE BLDC GLUCOMTR-MCNC: 406 MG/DL (ref 70–130)
GLUCOSE SERPL-MCNC: 267 MG/DL (ref 65–99)
HCT VFR BLD AUTO: 29.7 % (ref 37.5–51)
HGB BLD-MCNC: 9.7 G/DL (ref 13–17.7)
IMM GRANULOCYTES # BLD AUTO: 0.05 10*3/MM3 (ref 0–0.05)
IMM GRANULOCYTES NFR BLD AUTO: 0.7 % (ref 0–0.5)
LYMPHOCYTES # BLD AUTO: 1 10*3/MM3 (ref 0.7–3.1)
LYMPHOCYTES NFR BLD AUTO: 14.6 % (ref 19.6–45.3)
MAGNESIUM SERPL-MCNC: 1.8 MG/DL (ref 1.6–2.4)
MCH RBC QN AUTO: 28.6 PG (ref 26.6–33)
MCHC RBC AUTO-ENTMCNC: 32.7 G/DL (ref 31.5–35.7)
MCV RBC AUTO: 87.6 FL (ref 79–97)
MONOCYTES # BLD AUTO: 0.45 10*3/MM3 (ref 0.1–0.9)
MONOCYTES NFR BLD AUTO: 6.6 % (ref 5–12)
NEUTROPHILS NFR BLD AUTO: 5.22 10*3/MM3 (ref 1.7–7)
NEUTROPHILS NFR BLD AUTO: 76.5 % (ref 42.7–76)
NRBC BLD AUTO-RTO: 0.9 /100 WBC (ref 0–0.2)
PLATELET # BLD AUTO: 223 10*3/MM3 (ref 140–450)
PMV BLD AUTO: 9.6 FL (ref 6–12)
POTASSIUM SERPL-SCNC: 3.2 MMOL/L (ref 3.5–5.2)
POTASSIUM SERPL-SCNC: 4 MMOL/L (ref 3.5–5.2)
RBC # BLD AUTO: 3.39 10*6/MM3 (ref 4.14–5.8)
SODIUM SERPL-SCNC: 135 MMOL/L (ref 136–145)
WBC NRBC COR # BLD: 6.83 10*3/MM3 (ref 3.4–10.8)

## 2023-05-01 PROCEDURE — 85025 COMPLETE CBC W/AUTO DIFF WBC: CPT | Performed by: NURSE PRACTITIONER

## 2023-05-01 PROCEDURE — 80048 BASIC METABOLIC PNL TOTAL CA: CPT | Performed by: NURSE PRACTITIONER

## 2023-05-01 PROCEDURE — 63710000001 INSULIN DETEMIR PER 5 UNITS: Performed by: NURSE PRACTITIONER

## 2023-05-01 PROCEDURE — 94664 DEMO&/EVAL PT USE INHALER: CPT

## 2023-05-01 PROCEDURE — 25010000002 CEFTRIAXONE PER 250 MG

## 2023-05-01 PROCEDURE — 82948 REAGENT STRIP/BLOOD GLUCOSE: CPT

## 2023-05-01 PROCEDURE — 94799 UNLISTED PULMONARY SVC/PX: CPT

## 2023-05-01 PROCEDURE — 63710000001 INSULIN ASPART PER 5 UNITS

## 2023-05-01 PROCEDURE — 83735 ASSAY OF MAGNESIUM: CPT | Performed by: NURSE PRACTITIONER

## 2023-05-01 PROCEDURE — 0 MAGNESIUM SULFATE 4 GM/100ML SOLUTION

## 2023-05-01 PROCEDURE — 63710000001 INSULIN ASPART PER 5 UNITS: Performed by: NURSE PRACTITIONER

## 2023-05-01 PROCEDURE — 84132 ASSAY OF SERUM POTASSIUM: CPT | Performed by: NURSE PRACTITIONER

## 2023-05-01 RX ADMIN — CARVEDILOL 6.25 MG: 6.25 TABLET, FILM COATED ORAL at 08:29

## 2023-05-01 RX ADMIN — INSULIN ASPART 5 UNITS: 100 INJECTION, SOLUTION INTRAVENOUS; SUBCUTANEOUS at 07:19

## 2023-05-01 RX ADMIN — INSULIN DETEMIR 30 UNITS: 100 INJECTION, SOLUTION SUBCUTANEOUS at 08:29

## 2023-05-01 RX ADMIN — BUDESONIDE AND FORMOTEROL FUMARATE DIHYDRATE 2 PUFF: 160; 4.5 AEROSOL RESPIRATORY (INHALATION) at 07:40

## 2023-05-01 RX ADMIN — SODIUM CHLORIDE 100 ML/HR: 9 INJECTION, SOLUTION INTRAVENOUS at 05:09

## 2023-05-01 RX ADMIN — INSULIN ASPART 8 UNITS: 100 INJECTION, SOLUTION INTRAVENOUS; SUBCUTANEOUS at 07:19

## 2023-05-01 RX ADMIN — BUDESONIDE AND FORMOTEROL FUMARATE DIHYDRATE 2 PUFF: 160; 4.5 AEROSOL RESPIRATORY (INHALATION) at 19:15

## 2023-05-01 RX ADMIN — NORTRIPTYLINE HYDROCHLORIDE 10 MG: 10 CAPSULE ORAL at 21:14

## 2023-05-01 RX ADMIN — CARVEDILOL 6.25 MG: 6.25 TABLET, FILM COATED ORAL at 17:18

## 2023-05-01 RX ADMIN — INSULIN ASPART 8 UNITS: 100 INJECTION, SOLUTION INTRAVENOUS; SUBCUTANEOUS at 11:11

## 2023-05-01 RX ADMIN — SERTRALINE HYDROCHLORIDE 50 MG: 50 TABLET ORAL at 08:29

## 2023-05-01 RX ADMIN — FUROSEMIDE 20 MG: 20 TABLET ORAL at 08:29

## 2023-05-01 RX ADMIN — INSULIN DETEMIR 30 UNITS: 100 INJECTION, SOLUTION SUBCUTANEOUS at 21:14

## 2023-05-01 RX ADMIN — CEFTRIAXONE SODIUM 1 G: 1 INJECTION, POWDER, FOR SOLUTION INTRAMUSCULAR; INTRAVENOUS at 21:14

## 2023-05-01 RX ADMIN — POTASSIUM CHLORIDE 40 MEQ: 750 CAPSULE, EXTENDED RELEASE ORAL at 09:51

## 2023-05-01 RX ADMIN — INSULIN ASPART 8 UNITS: 100 INJECTION, SOLUTION INTRAVENOUS; SUBCUTANEOUS at 17:19

## 2023-05-01 RX ADMIN — Medication 10 ML: at 08:29

## 2023-05-01 RX ADMIN — INSULIN ASPART 5 UNITS: 100 INJECTION, SOLUTION INTRAVENOUS; SUBCUTANEOUS at 11:11

## 2023-05-01 RX ADMIN — PRAVASTATIN SODIUM 40 MG: 40 TABLET ORAL at 08:29

## 2023-05-01 RX ADMIN — INSULIN ASPART 5 UNITS: 100 INJECTION, SOLUTION INTRAVENOUS; SUBCUTANEOUS at 17:20

## 2023-05-01 RX ADMIN — SODIUM CHLORIDE 100 ML/HR: 9 INJECTION, SOLUTION INTRAVENOUS at 19:46

## 2023-05-01 RX ADMIN — POTASSIUM CHLORIDE 40 MEQ: 750 CAPSULE, EXTENDED RELEASE ORAL at 14:50

## 2023-05-01 RX ADMIN — MAGNESIUM SULFATE 4 G: 4 INJECTION INTRAVENOUS at 12:11

## 2023-05-01 RX ADMIN — PANTOPRAZOLE SODIUM 40 MG: 40 TABLET, DELAYED RELEASE ORAL at 08:29

## 2023-05-01 NOTE — PLAN OF CARE
Problem: Adult Inpatient Plan of Care  Goal: Plan of Care Review  Outcome: Ongoing, Progressing  Flowsheets  Taken 5/1/2023 1655 by Fartun Matias, RN  Progress: improving  Outcome Evaluation: John removed, having good output.  Urine clear.  Blood sugars treated per order.  No new concerns.  Call light within reach.  Taken 5/1/2023 0453 by Allyssa Zarco RN  Plan of Care Reviewed With: patient   Goal Outcome Evaluation:           Progress: improving  Outcome Evaluation: John removed, having good output.  Urine clear.  Blood sugars treated per order.  No new concerns.  Call light within reach.

## 2023-05-01 NOTE — PROGRESS NOTES
"    [facility] Medicine   INPATIENT PROGRESS NOTE      Patient Name: Favio Casillas  Date of Admission: 4/26/2023  Today's Date: 05/01/23  Length of Stay: 3  Primary Care Physician: Shayne Merritt MD    Subjective   Chief Complaint: Hematuria  Blood in Urine  Pertinent negatives include no abdominal pain.      68 year old male with history of bladder cancer presented with hematuria.     4/27/23 C/O abdominal cramping. Undergoing CBI, Denies fever or chills.     4/28/23 States he passed a lot of \"clots\" overnight. Denies any further abdominal bloating or cramping. CBI continues.     4/29/23 No acute events overnight, blood sugar better controlled but still 250 range. Has some abdominal cramping at times, eating well.     4/30/23 Denies cramping today. CBI d/c'd per uro. Urine in f/c bag blood tinged.     5/1/23 - Feels better today, denies cramping or bloating. Is expecting to have his f/c d/c'd today. Asking for us to help him get a home glucometer. Blood sugars better controlled.     Review of Systems   Constitutional: Negative.    HENT: Negative.    Eyes: Negative.    Respiratory: Negative.    Cardiovascular: Negative.    Gastrointestinal: Negative for abdominal pain.   Genitourinary: Positive for hematuria.   Musculoskeletal: Negative.    Neurological: Negative.         All pertinent negatives and positives are as above. All other systems have been reviewed and are negative unless otherwise stated.     Objective    Temp:  [97.8 °F (36.6 °C)-98.3 °F (36.8 °C)] 98.3 °F (36.8 °C)  Heart Rate:  [81-92] 81  Resp:  [16-18] 18  BP: (104-134)/(64-78) 125/76       Physical Exam  Vitals reviewed.   Constitutional:       Appearance: Normal appearance.   HENT:      Head: Normocephalic and atraumatic.      Mouth/Throat:      Mouth: Mucous membranes are moist.      Pharynx: Oropharynx is clear.   Eyes:      Pupils: Pupils are equal, round, and reactive to light. "   Cardiovascular:      Rate and Rhythm: Normal rate and regular rhythm.   Pulmonary:      Effort: Pulmonary effort is normal.   Abdominal:      General: Bowel sounds are normal.      Tenderness: There is no abdominal tenderness.   Musculoskeletal:         General: Normal range of motion.   Skin:     General: Skin is warm.      Capillary Refill: Capillary refill takes less than 2 seconds.   Neurological:      General: No focal deficit present.      Mental Status: He is alert.   Psychiatric:         Thought Content: Thought content normal.             Results Review:  I have reviewed the labs, radiology results, and diagnostic studies.    Laboratory Data:   Results from last 7 days   Lab Units 05/01/23  0844 04/29/23  0631 04/27/23  0625   WBC 10*3/mm3 6.83 8.30 8.90   HEMOGLOBIN g/dL 9.7* 10.3* 13.6   HEMATOCRIT % 29.7* 31.9* 41.1   PLATELETS 10*3/mm3 223 217 249        Results from last 7 days   Lab Units 05/01/23  0844 04/29/23  1537 04/29/23  0647 04/27/23  1819 04/27/23  0625 04/26/23  2147   SODIUM mmol/L 135*  --  140  --  133* 130*   POTASSIUM mmol/L 3.2* 4.2 3.4*   < > 2.8* 3.0*   CHLORIDE mmol/L 111*  --  117*  --  108* 104   CO2 mmol/L 15.0*  --  14.0*  --  12.0* 14.0*   BUN mg/dL 25*  --  24*  --  45* 47*   CREATININE mg/dL 1.45*  --  1.59*  --  1.66* 1.80*   CALCIUM mg/dL 8.1*  --  8.1*  --  8.9 8.5*   BILIRUBIN mg/dL  --   --   --   --   --  0.3   ALK PHOS U/L  --   --   --   --   --  114   ALT (SGPT) U/L  --   --   --   --   --  28   AST (SGOT) U/L  --   --   --   --   --  24   GLUCOSE mg/dL 267*  --  271*  --  399* 309*    < > = values in this interval not displayed.       Culture Data:   No results found for: BLOODCX  No results found for: URINECX  No results found for: RESPCX  No results found for: WOUNDCX  No results found for: STOOLCX  No components found for: BODYFLD    Radiology Data:   Imaging Results (Last 24 Hours)     ** No results found for the last 24 hours. **          I have reviewed the  patient's current medications.     Assessment/Plan     I have utilized all available immediate resources to obtain, update, or review the patient's current medications (including all prescriptions, over-the-counter products, herbals, cannabis/cannabidiol products, and vitamin/mineral/dietary (nutritional) supplements).      Active Hospital Problems    Diagnosis    • **Hematuria    • Gross hematuria    • Hyponatremia    • Hypokalemia    • CKD (chronic kidney disease) stage 3, GFR 30-59 ml/min    • Acute UTI (urinary tract infection)        Medical Decision Making  Number and Complexity of problems: 1 High 5 Moderate    Conditions and Status:        Condition is improving.     [facility] Data  External documents reviewed: None today  My EKG interpretation: None today  My plain film interpretation: None today  Tests considered but not ordered: None today       Discussed with: Patient, RN     Treatment Plan  1. Hematuria  -Urology consulted, appreciate Dr Fellow's assistance  -suspected bladder mass per CT      2. UTI  -Ceftriaxaone empirically- completed  -culture negative    3.CKD III, creat at baseline   -renally adjust medications, avoid nephrotoxins    4. Hyponatremia-clinically insignificant  -improved with IVF    5. Hypokalemia  -Electrolyte replacement therapy    6. IDDM type 2  -lantus 30 units SC BID ,  5 units humalog TIDAC  -SSITIDAC    Care Planning  Shared decision making: With patient  Code status and discussions: Full Code    Disposition  Social Determinants of Health that impact treatment or disposition: n/a  I expect the patient to be discharged to home in 1-2 days.      I confirmed that the patient's Advance Care Plan is present, code status is documented, or surrogate decision maker is listed in the patient's medical record.   ________________________________        Copied text in this note has been reviewed and is accurate as of 05/01/23       Electronically signed by TIFFANIE Dominguez, 05/01/23, 16:36  CDT.

## 2023-05-01 NOTE — PROGRESS NOTES
"   LOS: 3 days   Patient Care Team:  Shayne Merritt MD as PCP - General (Family Medicine)    Subjective     Subjective:  Symptoms:  Stable.        History taken from: patient chart RN    Objective     Vital Signs  Temp:  [97.8 °F (36.6 °C)-99 °F (37.2 °C)] 97.9 °F (36.6 °C)  Heart Rate:  [81-92] 84  Resp:  [16-18] 16  BP: (104-134)/(64-78) 134/74    Objective:  General Appearance:  In no acute distress.    Vital signs: (most recent): Blood pressure 134/74, pulse 84, temperature 97.9 °F (36.6 °C), temperature source Axillary, resp. rate 16, height 170.2 cm (67\"), weight 62.4 kg (137 lb 9.6 oz), SpO2 97 %.  Vital signs are normal.  No fever.    Output: Producing urine.    Lungs:  Normal effort and normal respiratory rate.    Abdomen: Abdomen is soft and non-distended.  There is no abdominal tenderness.     Neurological: Patient is alert.    Pupils:  Pupils are equal, round, and reactive to light.    Skin:  Warm and dry.              Results Review:    Lab Results (last 24 hours)     Procedure Component Value Units Date/Time    POC Glucose Once [[id number]]  (Abnormal) Collected: 05/01/23 1036    Specimen: Blood Updated: 05/01/23 1052     Glucose 231 mg/dL      Comment: RN NotifiedOperator: [id number] TANC1AMeter ID: [id number]       Magnesium [[id number]]  (Normal) Collected: 05/01/23 0844    Specimen: Blood Updated: 05/01/23 1024     Magnesium 1.8 mg/dL     Basic Metabolic Panel [[id number]]  (Abnormal) Collected: 05/01/23 0844    Specimen: Blood Updated: 05/01/23 0930     Glucose 267 mg/dL      BUN 25 mg/dL      Creatinine 1.45 mg/dL      Sodium 135 mmol/L      Potassium 3.2 mmol/L      Comment: Slight hemolysis detected by analyzer. Results may be affected.        Chloride 111 mmol/L      CO2 15.0 mmol/L      Calcium 8.1 mg/dL      BUN/Creatinine Ratio 17.2     Anion Gap 9.0 mmol/L      eGFR 53.1 mL/min/1.73     Narrative:      GFR Normal >60  Chronic Kidney Disease <60  Kidney Failure <15      " CBC & Differential [[id number]]  (Abnormal) Collected: 05/01/23 0844    Specimen: Blood Updated: 05/01/23 0901    Narrative:      The following orders were created for panel order CBC & Differential.  Procedure                               Abnormality         Status                     ---------                               -----------         ------                     CBC Auto Differential[[id number]]        Abnormal            Final result                 Please view results for these tests on the individual orders.    CBC Auto Differential [[id number]]  (Abnormal) Collected: 05/01/23 0844    Specimen: Blood Updated: 05/01/23 0901     WBC 6.83 10*3/mm3      RBC 3.39 10*6/mm3      Hemoglobin 9.7 g/dL      Hematocrit 29.7 %      MCV 87.6 fL      MCH 28.6 pg      MCHC 32.7 g/dL      RDW 15.0 %      RDW-SD 47.7 fl      MPV 9.6 fL      Platelets 223 10*3/mm3      Neutrophil % 76.5 %      Lymphocyte % 14.6 %      Monocyte % 6.6 %      Eosinophil % 1.2 %      Basophil % 0.4 %      Immature Grans % 0.7 %      Neutrophils, Absolute 5.22 10*3/mm3      Lymphocytes, Absolute 1.00 10*3/mm3      Monocytes, Absolute 0.45 10*3/mm3      Eosinophils, Absolute 0.08 10*3/mm3      Basophils, Absolute 0.03 10*3/mm3      Immature Grans, Absolute 0.05 10*3/mm3      nRBC 0.9 /100 WBC     POC Glucose Once [[id number]]  (Abnormal) Collected: 04/30/23 1017    Specimen: Blood Updated: 05/01/23 0758     Glucose 374 mg/dL      Comment: RN NotifiedOperator: [id number] [employer] ID: [id number]       POC Glucose Once [[id number]]  (Abnormal) Collected: 04/29/23 1014    Specimen: Blood Updated: 05/01/23 0758     Glucose 406 mg/dL      Comment: RN NotifiedOperator: [id number] [employer] ID: [id number]       Blood Culture - Blood, Arm, Right [[id number]]  (Normal) Collected: 04/27/23 0622    Specimen: Blood from Arm, Right Updated: 05/01/23 0645     Blood Culture No growth at 4 days    Blood Culture - Blood, Arm, Left  [[id number]]  (Normal) Collected: 04/27/23 0625    Specimen: Blood from Arm, Left Updated: 05/01/23 0645     Blood Culture No growth at 4 days    POC Glucose Once [[id number]]  (Abnormal) Collected: 05/01/23 0615    Specimen: Blood Updated: 05/01/23 0639     Glucose 205 mg/dL      Comment: RN NotifiedOperator: [id number] MAGGI REEDRAMeter ID: [id number]       POC Glucose Once [[id number]]  (Abnormal) Collected: 04/30/23 1918    Specimen: Blood Updated: 04/30/23 1931     Glucose 300 mg/dL      Comment: RN NotifiedOperator: [id number] MAGGI SANDRAMeter ID: [id number]       POC Glucose Once [[id number]]  (Abnormal) Collected: 04/30/23 1606    Specimen: Blood Updated: 04/30/23 1620     Glucose 227 mg/dL      Comment: RN NotifiedOperator: [id number] MANUEL Keenan ID: [id number]            Imaging Results (Last 24 Hours)     ** No results found for the last 24 hours. **           I reviewed the patient's new clinical results.  I reviewed the patient's new imaging results and agree with the interpretation.  I reviewed the patient's other test results and agree with the interpretation      Assessment & Plan       Hematuria    Acute UTI (urinary tract infection)    Hyponatremia    Hypokalemia    CKD (chronic kidney disease) stage 3, GFR 30-59 ml/min    Gross hematuria      Assessment & Plan    Favio Casillas is a 68 y.o. male who has gross hematuria, UTI culture no growth; imaging showed bilateral hydronephrosis with 4.1 cm round bladder mass or filling defect and neoplasm must be excluded. CBI discontinued, pale pink urine  Estimated Creatinine Clearance: 44.2 mL/min (A) (by C-G formula based on SCr of 1.45 mg/dL (H)).     Plan:  Remove John, monitor for retention of urine    TIFFANIE Michele  05/01/23  11:20 CDT

## 2023-05-01 NOTE — PAYOR COMM NOTE
"      Vane Patton RN [facility]  [phone]      Phone  [phone]      Fax  Cont. Stay Review      Favio Owens (68 y.o. Male)     Date of Birth   1955    Social Security Number       Address   [address] [facility] [address]    Home Phone   [phone]    MRN   [id number]       Yazidi   Confucianism    Marital Status                               Admission Date   4/26/23    Admission Type   Emergency    Admitting Provider   Kevin Perry MD    Attending Provider   Jesus Ortiz MD    Department, Room/Bed   [facility] 3 EAST, 383/1       Discharge Date       Discharge Disposition       Discharge Destination                               Attending Provider: Jesus Ortiz MD    Allergies: No Known Allergies    Isolation: None   Infection: None   Code Status: CPR    Ht: 170.2 cm (67\")   Wt: 62.4 kg (137 lb 9.6 oz)    Admission Cmt: None   Principal Problem: Hematuria [R31.9]                 Active Insurance as of 4/26/2023     Primary Coverage     Payor Plan Insurance Group Employer/Plan Group    ANTHEM MEDICARE REPLACEMENT ANTHEM MEDICARE ADVANTAGE [id number]     Payor Plan Address Payor Plan Phone Number Payor Plan Fax Number Effective Dates    [id number] [phone]  8/1/2022 - None Entered    [facility] [id number]       Subscriber Name Subscriber Birth Date Member ID       FAVIO OWENS 1955 [id number]           Secondary Coverage     Payor Plan Insurance Group Employer/Plan Group    [place]      Payor Plan Address Payor Plan Phone Number Payor Plan Fax Number Effective Dates    [address] [phone]  8/1/2017 - None Entered    [facility] [address]       Subscriber Name Subscriber Birth Date Member ID       FAVIO OWENS 1955 [id number]                 Emergency Contacts      (Rel.) Home Phone Work Phone Mobile Phone    " Sofi Mcdonald (Friend) [phone] -- [phone]    GABRIELLE OWENS (Brother) [phone] -- [phone]            Vital Signs (last day)     Date/Time Temp Temp src Pulse Resp BP Patient Position SpO2    05/01/23 0716 97.9 (36.6) Axillary 84 16 134/74 Lying 97    05/01/23 0420 98.1 (36.7) Temporal 92 18 124/78 Lying 96    04/30/23 2035 -- -- 81 18 -- -- 97    04/30/23 1944 97.8 (36.6) Temporal 82 16 104/64 -- 97    04/30/23 1552 99 (37.2) Oral 86 18 104/71 Lying 100    04/30/23 1056 98.5 (36.9) Oral 83 16 108/72 Lying 99    04/30/23 0857 98 (36.7) Temporal 95 18 126/77 Sitting 98    04/30/23 0720 -- -- 101 16 -- -- 98    04/30/23 0352 98.2 (36.8) Temporal 93 18 124/71 Lying 99          Oxygen Therapy (last day)     Date/Time SpO2 Device (Oxygen Therapy) Flow (L/min) Oxygen Concentration (%) ETCO2 (mmHg)    05/01/23 0854 -- room air -- -- --    05/01/23 0716 97 room air -- -- --    05/01/23 0420 96 room air -- -- --    04/30/23 2035 97 room air -- -- --    04/30/23 1944 97 room air -- -- --    04/30/23 1552 100 room air -- -- --    04/30/23 1056 99 room air -- -- --    04/30/23 0900 -- room air -- -- --    04/30/23 0857 98 room air -- -- --    04/30/23 0720 98 room air -- -- --    04/30/23 0352 99 room air -- -- --          Current Facility-Administered Medications   Medication Dose Route Frequency Provider Last Rate Last Admin   • acetaminophen (TYLENOL) tablet 650 mg  650 mg Oral Q4H PRN Jesus Ortiz MD        Or   • acetaminophen (TYLENOL) 160 MG/5ML solution 650 mg  650 mg Oral Q4H PRN Jesus Ortiz MD        Or   • acetaminophen (TYLENOL) suppository 650 mg  650 mg Rectal Q4H PRN Jesus Ortiz MD       • albuterol (PROVENTIL) nebulizer solution 0.083% 2.5 mg/3mL  2.5 mg Nebulization Q4H PRN Jesus Ortiz MD       • budesonide-formoterol (SYMBICORT) 160-4.5 MCG/ACT inhaler 2 puff  2 puff Inhalation BID - RT Jesus Ortiz MD   2 puff at 05/01/23 0740   • carvedilol  (COREG) tablet 6.25 mg  6.25 mg Oral BID With Meals Jesus Ortiz MD   6.25 mg at 05/01/23 0829   • cefTRIAXone (ROCEPHIN) 1 g/100 mL 0.9% NS (MBP)  1 g Intravenous Q24H Jesus Ortiz MD 0 mL/hr at 04/27/23 2136 1 g at 04/30/23 2142   • cyclobenzaprine (FLEXERIL) tablet 10 mg  10 mg Oral TID PRN Jesus Ortiz MD   10 mg at 04/28/23 2241   • dextrose (D50W) (25 g/50 mL) IV injection 25 g  25 g Intravenous Q15 Min PRN Jesus Ortiz MD       • dextrose (GLUTOSE) oral gel 15 g  15 g Oral Q15 Min PRN Jesus Ortiz MD       • fluticasone (FLONASE) 50 MCG/ACT nasal spray 1 spray  1 spray Each Nare Daily PRN Jesus Ortiz MD       • furosemide (LASIX) tablet 20 mg  20 mg Oral Daily Jesus Ortiz MD   20 mg at 05/01/23 0829   • glucagon (human recombinant) (GLUCAGEN DIAGNOSTIC) injection 1 mg  1 mg Intramuscular Q15 Min PRN Jesus Ortiz MD       • hydrOXYzine (ATARAX) tablet 25 mg  25 mg Oral Q4H PRN Jesus Ortiz MD       • Insulin Aspart (novoLOG) injection 0-24 Units  0-24 Units Subcutaneous TID AC Jesus Ortiz MD   8 Units at 05/01/23 0719   • Insulin Aspart (novoLOG) injection 5 Units  5 Units Subcutaneous TID With Meals Elena Hall APRN   5 Units at 05/01/23 0719   • insulin detemir (LEVEMIR) injection 30 Units  30 Units Subcutaneous BID Elena Hall APRN   30 Units at 05/01/23 0829   • LORazepam (ATIVAN) injection 0.5 mg  0.5 mg Intravenous Q6H PRN Jesus Ortiz MD       • Magnesium Sulfate - Total Dose 10 grams - Magnesium 1 or Less  2 g Intravenous PRN Jesus Ortiz MD        Or   • Magnesium Sulfate - Total Dose 6 grams - Magnesium 1.1 - 1.5  2 g Intravenous PRN Jesus Ortiz MD        Or   • Magnesium Sulfate - Total Dose 4 grams - Magnesium 1.6 - 1.9  4 g Intravenous PRN Jesus Ortiz MD   Stopped at 04/27/23 1830   • morphine injection 2 mg  2 mg Intravenous Q3H PRN Angel,  Jesus Stahl MD   2 mg at 04/29/23 0123    And   • naloxone (NARCAN) injection 0.4 mg  0.4 mg Intravenous Q5 Min PRN Jesus Ortiz MD       • nitroglycerin (NITROSTAT) SL tablet 0.4 mg  0.4 mg Sublingual Q5 Min PRN Jesus Ortiz MD       • nortriptyline (PAMELOR) capsule 10 mg  10 mg Oral Nightly Jesus Ortiz MD   10 mg at 04/30/23 2142   • ondansetron (ZOFRAN) tablet 4 mg  4 mg Oral Q6H PRN Jesus Ortiz MD        Or   • ondansetron (ZOFRAN) injection 4 mg  4 mg Intravenous Q6H PRN Jesus Ortiz MD   4 mg at 04/28/23 2358   • ondansetron ODT (ZOFRAN-ODT) disintegrating tablet 4 mg  4 mg Oral 4x Daily PRN Jesus Ortiz MD       • pantoprazole (PROTONIX) EC tablet 40 mg  40 mg Oral Daily Jesus Ortiz MD   40 mg at 05/01/23 0829   • potassium chloride (MICRO-K) CR capsule 40 mEq  40 mEq Oral PRN Jesus Ortiz MD   40 mEq at 05/01/23 0951    Or   • potassium chloride (KLOR-CON) packet 40 mEq  40 mEq Oral PRN Jesus Ortiz MD        Or   • potassium chloride 10 mEq in 100 mL IVPB  10 mEq Intravenous Q1H PRN Jesus Ortiz MD   Stopped at 04/27/23 1427   • pravastatin (PRAVACHOL) tablet 40 mg  40 mg Oral Daily Jesus Ortiz MD   40 mg at 05/01/23 0829   • sertraline (ZOLOFT) tablet 50 mg  50 mg Oral Daily Jesus Ortiz MD   50 mg at 05/01/23 0829   • sodium chloride 0.9 % flush 10 mL  10 mL Intravenous PRN Timmy Colin DO       • sodium chloride 0.9 % flush 10 mL  10 mL Intravenous Q12H Jesus Ortiz MD   10 mL at 05/01/23 0829   • sodium chloride 0.9 % flush 10 mL  10 mL Intravenous PRN Jesus Ortiz MD       • sodium chloride 0.9 % infusion 40 mL  40 mL Intravenous PRN Jesus Ortiz MD       • sodium chloride 0.9 % infusion  100 mL/hr Intravenous Continuous Elena Hall APRN 100 mL/hr at 05/01/23 0509 100 mL/hr at 05/01/23 0509        Physician Progress Notes (last 48  "hours)      Elena Hall APRN at 04/30/23 1300     Attestation signed by Kevin Perry MD at 04/30/23 1311    I personally evaluated and examined the patient in conjunction with TIFFANIE Mcmillan and agree with the assessment, treatment plan, and disposition of the patient as recorded.                        [facility] Medicine   INPATIENT PROGRESS NOTE      Patient Name: Favio Casillas  Date of Admission: 4/26/2023  Today's Date: 04/30/23  Length of Stay: 2  Primary Care Physician: Shayne Merritt MD    Subjective   Chief Complaint: Hematuria  Blood in Urine  Pertinent negatives include no abdominal pain.      68 year old male with history of bladder cancer presented with hematuria.     4/27/23 C/O abdominal cramping. Undergoing CBI, Denies fever or chills.     4/28/23 States he passed a lot of \"clots\" overnight. Denies any further abdominal bloating or cramping. CBI continues.     4/29/23 No acute events overnight, blood sugar better controlled but still 250 range. Has some abdominal cramping at times, eating well.     4/30/23 Denies cramping today. CBI d/c'd per uro. Urine in f/c bag blood tinged.     Review of Systems   Constitutional: Negative.    HENT: Negative.    Eyes: Negative.    Respiratory: Negative.    Cardiovascular: Negative.    Gastrointestinal: Negative for abdominal pain.   Genitourinary: Positive for hematuria.   Musculoskeletal: Negative.    Neurological: Negative.         All pertinent negatives and positives are as above. All other systems have been reviewed and are negative unless otherwise stated.     Objective    Temp:  [98 °F (36.7 °C)-98.5 °F (36.9 °C)] 98.5 °F (36.9 °C)  Heart Rate:  [] 83  Resp:  [16-18] 16  BP: (108-129)/(70-77) 108/72       Physical Exam  Vitals reviewed.   Constitutional:       Appearance: Normal appearance.   HENT:      Head: Normocephalic and atraumatic.      Mouth/Throat:      Mouth: " Mucous membranes are moist.      Pharynx: Oropharynx is clear.   Eyes:      Pupils: Pupils are equal, round, and reactive to light.   Cardiovascular:      Rate and Rhythm: Normal rate and regular rhythm.   Pulmonary:      Effort: Pulmonary effort is normal.   Abdominal:      General: Bowel sounds are normal.      Tenderness: There is no abdominal tenderness.   Musculoskeletal:         General: Normal range of motion.   Skin:     General: Skin is warm.      Capillary Refill: Capillary refill takes less than 2 seconds.   Neurological:      General: No focal deficit present.      Mental Status: He is alert.   Psychiatric:         Thought Content: Thought content normal.             Results Review:  I have reviewed the labs, radiology results, and diagnostic studies.    Laboratory Data:   Results from last 7 days   Lab Units 04/29/23  0631 04/27/23  0625 04/26/23  2147   WBC 10*3/mm3 8.30 8.90 11.70*   HEMOGLOBIN g/dL 10.3* 13.6 13.0   HEMATOCRIT % 31.9* 41.1 38.0   PLATELETS 10*3/mm3 217 249 230        Results from last 7 days   Lab Units 04/29/23  1537 04/29/23  0647 04/29/23  0051 04/27/23  1819 04/27/23  0625 04/26/23  2147   SODIUM mmol/L  --  140  --   --  133* 130*   POTASSIUM mmol/L 4.2 3.4* 3.0*   < > 2.8* 3.0*   CHLORIDE mmol/L  --  117*  --   --  108* 104   CO2 mmol/L  --  14.0*  --   --  12.0* 14.0*   BUN mg/dL  --  24*  --   --  45* 47*   CREATININE mg/dL  --  1.59*  --   --  1.66* 1.80*   CALCIUM mg/dL  --  8.1*  --   --  8.9 8.5*   BILIRUBIN mg/dL  --   --   --   --   --  0.3   ALK PHOS U/L  --   --   --   --   --  114   ALT (SGPT) U/L  --   --   --   --   --  28   AST (SGOT) U/L  --   --   --   --   --  24   GLUCOSE mg/dL  --  271*  --   --  399* 309*    < > = values in this interval not displayed.       Culture Data:   No results found for: BLOODCX  No results found for: URINECX  No results found for: RESPCX  No results found for: WOUNDCX  No results found for: STOOLCX  No components found for:  BODYFLD    Radiology Data:   Imaging Results (Last 24 Hours)     ** No results found for the last 24 hours. **          I have reviewed the patient's current medications.     Assessment/Plan     I have utilized all available immediate resources to obtain, update, or review the patient's current medications (including all prescriptions, over-the-counter products, herbals, cannabis/cannabidiol products, and vitamin/mineral/dietary (nutritional) supplements).      Active Hospital Problems    Diagnosis    • **Hematuria    • Gross hematuria    • Hyponatremia    • Hypokalemia    • CKD (chronic kidney disease) stage 3, GFR 30-59 ml/min    • Acute UTI (urinary tract infection)        Medical Decision Making  Number and Complexity of problems: 1 High 5 Moderate    Conditions and Status:        Condition is improving.     MDM Data  External documents reviewed: None today  My EKG interpretation: None today  My plain film interpretation: None today  Tests considered but not ordered: None today       Discussed with: Patient, RN     Treatment Plan  1. Hematuria  -Urology consulted, appreciate Dr Fellow's assistance  -suspected bladder mass per CT  -cysto pending    2. UTI  -Ceftriaxaone empirically  -culture negative    3.CKD III, creat at baseline 1.6  -renally adjust medications, avoid nephrotoxins    4. Hyponatremia  -improved with IVF    5. Hypokalemia  -Electrolyte replacement therapy    6. IDDM type 2  -Increased lantus to 30 units SC BID yesterday, glucose remains uncontrolled, add 5 units humalog TIDAC    Care Planning  Shared decision making: With patient  Code status and discussions: Full Code    Disposition  Social Determinants of Health that impact treatment or disposition: n/a  I expect the patient to be discharged to home in 1-2 days.      I confirmed that the patient's Advance Care Plan is present, code status is documented, or surrogate decision maker is listed in the patient's medical record.  "  ________________________________        Copied text in this note has been reviewed and is accurate as of 04/30/23       Electronically signed by TIFFANIE Dominguez, 04/30/23, 13:00 CDT.      Electronically signed by Kevin Perry MD at 04/30/23 1311     Adryan Prater APRN at 04/30/23 0824     Attestation signed by Christofer Calderon MD at 04/30/23 2207    I have reviewed this documentation and agree.                     LOS: 2 days   Patient Care Team:  Shayne Merritt MD as PCP - General (Family Medicine)    Subjective     Subjective:  Symptoms:  Stable.        History taken from: patient chart    Objective     Vital Signs  Temp:  [97.4 °F (36.3 °C)-98.2 °F (36.8 °C)] 98.2 °F (36.8 °C)  Heart Rate:  [] 101  Resp:  [16-18] 16  BP: (107-129)/(58-77) 124/71    Objective:  General Appearance:  In no acute distress.    Vital signs: (most recent): Blood pressure 124/71, pulse 101, temperature 98.2 °F (36.8 °C), temperature source Temporal, resp. rate 16, height 170.2 cm (67\"), weight 61 kg (134 lb 8 oz), SpO2 98 %.  Vital signs are normal.  No fever.    Output: Producing urine.    Lungs:  Normal effort and normal respiratory rate.    Abdomen: Abdomen is soft and non-distended.  There is no abdominal tenderness.     Neurological: Patient is alert and oriented to person, place and time.    Pupils:  Pupils are equal, round, and reactive to light.    Skin:  Warm and dry.              Results Review:    Lab Results (last 24 hours)     Procedure Component Value Units Date/Time    Blood Culture - Blood, Arm, Left [[id number]]  (Normal) Collected: 04/27/23 0625    Specimen: Blood from Arm, Left Updated: 04/30/23 0645     Blood Culture No growth at 3 days    Blood Culture - Blood, Arm, Right [[id number]]  (Normal) Collected: 04/27/23 0622    Specimen: Blood from Arm, Right Updated: 04/30/23 0645     Blood Culture No growth at 3 days    POC Glucose Once [[id number]]  (Abnormal) Collected: 04/30/23 0618    " Specimen: Blood Updated: 04/30/23 0631     Glucose 287 mg/dL      Comment: RN NotifiedOperator: [id number] MAGGI Altman ID: [id number]       POC Glucose Once [[id number]]  (Abnormal) Collected: 04/29/23 2024    Specimen: Blood Updated: 04/30/23 0221     Glucose 335 mg/dL      Comment: RN NotifiedOperator: [id number] MAGGI Altman ID: [id number]       POC Glucose Once [[id number]]  (Abnormal) Collected: 04/29/23 1608    Specimen: Blood Updated: 04/29/23 1636     Glucose 334 mg/dL      Comment: RN NotifiedOperator: [id number] MANUEL Keenan ID: [id number]       Potassium [[id number]]  (Normal) Collected: 04/29/23 1537    Specimen: Blood Updated: 04/29/23 1635     Potassium 4.2 mmol/L      Comment: Slight hemolysis detected by analyzer. Results may be affected.       CBC & Differential [[id number]]  (Abnormal) Collected: 04/29/23 0631    Specimen: Blood Updated: 04/29/23 1136    Narrative:      The following orders were created for panel order CBC & Differential.  Procedure                               Abnormality         Status                     ---------                               -----------         ------                     CBC Auto Differential[[id number]]        Abnormal            Final result                 Please view results for these tests on the individual orders.    CBC Auto Differential [[id number]]  (Abnormal) Collected: 04/29/23 0631    Specimen: Blood Updated: 04/29/23 1136     WBC 8.30 10*3/mm3      RBC 3.64 10*6/mm3      Hemoglobin 10.3 g/dL      Hematocrit 31.9 %      MCV 87.6 fL      MCH 28.3 pg      MCHC 32.3 g/dL      RDW 14.9 %      RDW-SD 48.4 fl      MPV 10.1 fL      Platelets 217 10*3/mm3      Neutrophil % 77.9 %      Lymphocyte % 13.4 %      Monocyte % 7.3 %      Eosinophil % 0.5 %      Basophil % 0.2 %      Immature Grans % 0.7 %      Neutrophils, Absolute 6.46 10*3/mm3      Lymphocytes, Absolute 1.11 10*3/mm3      Monocytes, Absolute 0.61 10*3/mm3      Eosinophils,  Absolute 0.04 10*3/mm3      Basophils, Absolute 0.02 10*3/mm3      Immature Grans, Absolute 0.06 10*3/mm3      nRBC 0.5 /100 WBC     Basic Metabolic Panel [[id number]]  (Abnormal) Collected: 04/29/23 0647    Specimen: Blood Updated: 04/29/23 1132     Glucose 271 mg/dL      BUN 24 mg/dL      Creatinine 1.59 mg/dL      Sodium 140 mmol/L      Potassium 3.4 mmol/L      Chloride 117 mmol/L      CO2 14.0 mmol/L      Calcium 8.1 mg/dL      BUN/Creatinine Ratio 15.1     Anion Gap 9.0 mmol/L      eGFR 47.6 mL/min/1.73     Narrative:      GFR Normal >60  Chronic Kidney Disease <60  Kidney Failure <15           Imaging Results (Last 24 Hours)     Procedure Component Value Units Date/Time    US Renal Bilateral [[id number]] Collected: 04/29/23 1144     Updated: 04/29/23 1219    Narrative:      COMPARISON:  4/27/2023.    TECHNIQUE:  High-resolution gray scale images of the bilateral kidneys and bladder were  obtained.    FINDINGS:  Compared to the previous study two days ago there is now significant echogenic  material within both collecting systems bilaterally producing severe artifact.  This is likely representative of gas given the patient undergoing continuous  bladder irrigation.  The degree of artifact present makes it difficult to assess  for persistent hydronephrosis but on a few of the images it does appear the  hydronephrosis is improved.  A John catheter is seen within the urinary  bladder.           I reviewed the patient's new clinical results.  I reviewed the patient's new imaging results and agree with the interpretation.  I reviewed the patient's other test results and agree with the interpretation      Assessment & Plan       Hematuria    Acute UTI (urinary tract infection)    Hyponatremia    Hypokalemia    CKD (chronic kidney disease) stage 3, GFR 30-59 ml/min    Gross hematuria      Assessment & Plan    Favio Casillas is a 68 y.o. male who has gross hematuria, UTI culture no growth; imaging showed bilateral  "hydronephrosis with 4.1 cm round bladder mass or filling defect and neoplasm must be excluded.   --Estimated Creatinine Clearance: 39.4 mL/min (A) (by C-G formula based on SCr of 1.59 mg/dL (H)).    Plan:  Remove John in AM    TIFFANIE Michele  04/30/23  08:24 CDT        Electronically signed by Christofer Calderon MD at 04/30/23 2204     Elena Hall APRN at 04/29/23 1246     Attestation signed by Kevin Perry MD at 04/29/23 1816    I personally evaluated and examined the patient in conjunction with TIFFANIE Mcmillan and agree with the assessment, treatment plan, and disposition of the patient as recorded.                       [facility] Medicine   INPATIENT PROGRESS NOTE      Patient Name: Favio Casillas  Date of Admission: 4/26/2023  Today's Date: 04/29/23  Length of Stay: 1  Primary Care Physician: Shayne Merritt MD    Subjective   Chief Complaint: Hematuria  Blood in Urine  Pertinent negatives include no abdominal pain.      68 year old male with history of bladder cancer presented with hematuria.     4/27/23 C/O abdominal cramping. Undergoing CBI, Denies fever or chills.     4/28/23 States he passed a lot of \"clots\" overnight. Denies any further abdominal bloating or cramping. CBI continues.     4/29/23 No acute events overnight, blood sugar better controlled but still 250 range. Has some abdominal cramping at times, eating well.     Review of Systems   Constitutional: Negative.    HENT: Negative.    Eyes: Negative.    Respiratory: Negative.    Cardiovascular: Negative.    Gastrointestinal: Negative for abdominal pain.   Genitourinary: Positive for hematuria.   Musculoskeletal: Negative.    Neurological: Negative.         All pertinent negatives and positives are as above. All other systems have been reviewed and are negative unless otherwise stated.     Objective    Temp:  [97.4 °F (36.3 °C)-99 °F (37.2 °C)] 97.4 °F (36.3 °C)  Heart " Rate:  [76-97] 83  Resp:  [16-20] 18  BP: (107-119)/(58-71) 107/58       Physical Exam  Vitals reviewed.   Constitutional:       Appearance: Normal appearance.   HENT:      Head: Normocephalic and atraumatic.      Mouth/Throat:      Mouth: Mucous membranes are moist.      Pharynx: Oropharynx is clear.   Eyes:      Pupils: Pupils are equal, round, and reactive to light.   Cardiovascular:      Rate and Rhythm: Normal rate and regular rhythm.   Pulmonary:      Effort: Pulmonary effort is normal.   Abdominal:      General: Bowel sounds are normal.      Tenderness: There is no abdominal tenderness.   Musculoskeletal:         General: Normal range of motion.   Skin:     General: Skin is warm.      Capillary Refill: Capillary refill takes less than 2 seconds.   Neurological:      General: No focal deficit present.      Mental Status: He is alert.   Psychiatric:         Thought Content: Thought content normal.             Results Review:  I have reviewed the labs, radiology results, and diagnostic studies.    Laboratory Data:   Results from last 7 days   Lab Units 04/29/23  0631 04/27/23  0625 04/26/23  2147   WBC 10*3/mm3 8.30 8.90 11.70*   HEMOGLOBIN g/dL 10.3* 13.6 13.0   HEMATOCRIT % 31.9* 41.1 38.0   PLATELETS 10*3/mm3 217 249 230        Results from last 7 days   Lab Units 04/29/23  0647 04/29/23  0051 04/27/23  1819 04/27/23  0625 04/26/23  2147   SODIUM mmol/L 140  --   --  133* 130*   POTASSIUM mmol/L 3.4* 3.0* 3.8 2.8* 3.0*   CHLORIDE mmol/L 117*  --   --  108* 104   CO2 mmol/L 14.0*  --   --  12.0* 14.0*   BUN mg/dL 24*  --   --  45* 47*   CREATININE mg/dL 1.59*  --   --  1.66* 1.80*   CALCIUM mg/dL 8.1*  --   --  8.9 8.5*   BILIRUBIN mg/dL  --   --   --   --  0.3   ALK PHOS U/L  --   --   --   --  114   ALT (SGPT) U/L  --   --   --   --  28   AST (SGOT) U/L  --   --   --   --  24   GLUCOSE mg/dL 271*  --   --  399* 309*       Culture Data:   Blood Culture   Date Value Ref Range Status   04/27/2023 No growth at 2  days  Preliminary   04/27/2023 No growth at 2 days  Preliminary     Urine Culture   Date Value Ref Range Status   04/26/2023 No growth  Final     No results found for: RESPCX  No results found for: WOUNDCX  No results found for: STOOLCX  No components found for: BODYFLD    Radiology Data:   Imaging Results (Last 24 Hours)     Procedure Component Value Units Date/Time    US Renal Bilateral [[id number]] Collected: 04/29/23 1144     Updated: 04/29/23 1219    Narrative:      COMPARISON:  4/27/2023.    TECHNIQUE:  High-resolution gray scale images of the bilateral kidneys and bladder were  obtained.    FINDINGS:  Compared to the previous study two days ago there is now significant echogenic  material within both collecting systems bilaterally producing severe artifact.  This is likely representative of gas given the patient undergoing continuous  bladder irrigation.  The degree of artifact present makes it difficult to assess  for persistent hydronephrosis but on a few of the images it does appear the  hydronephrosis is improved.  A John catheter is seen within the urinary  bladder.          I have reviewed the patient's current medications.     Assessment/Plan     I have utilized all available immediate resources to obtain, update, or review the patient's current medications (including all prescriptions, over-the-counter products, herbals, cannabis/cannabidiol products, and vitamin/mineral/dietary (nutritional) supplements).      Active Hospital Problems    Diagnosis    • **Hematuria    • Gross hematuria    • Hyponatremia    • Hypokalemia    • CKD (chronic kidney disease) stage 3, GFR 30-59 ml/min    • Acute UTI (urinary tract infection)        Medical Decision Making  Number and Complexity of problems: 1 High 5 Moderate    Conditions and Status:        Condition is improving.     [facility] Data  External documents reviewed: None today  My EKG interpretation: None today  My plain film interpretation: None today  Tests  considered but not ordered: None today       Discussed with: Patient, RN     Treatment Plan  1. Hematuria  -Urology consulted, appreciate Dr Fellow's assistance  -CBI  -cysto pending    2. UTI  -Ceftriaxaone empirically  -culture negative    3.CKD III, creat at baseline 1.6  -renally adjust medications, avoid nephrotoxins    4. Hyponatremia  -improved with IVF    5. Hypokalemia  -Electrolyte replacement therapy    6. IDDM type 2  -Increase lantus to 30 units SC BID    Care Planning  Shared decision making: With patient  Code status and discussions: Full Code    Disposition  Social Determinants of Health that impact treatment or disposition: n/a  I expect the patient to be discharged to home in 1-2 days.      I confirmed that the patient's Advance Care Plan is present, code status is documented, or surrogate decision maker is listed in the patient's medical record.   ________________________________        Copied text in this note has been reviewed and is accurate as of 04/29/23       Electronically signed by TIFFANIE Dominguez, 04/29/23, 12:47 CDT.      Electronically signed by Kevin Perry MD at 04/29/23 4241

## 2023-05-01 NOTE — PLAN OF CARE
Goal Outcome Evaluation:  Plan of Care Reviewed With: patient        Progress: no change  Outcome Evaluation: patient required catheter to be irrigated once this shift, urine flowing well at this time, pink/cherry with small clots present

## 2023-05-02 LAB
ANION GAP SERPL CALCULATED.3IONS-SCNC: 10 MMOL/L (ref 5–15)
BACTERIA SPEC AEROBE CULT: NORMAL
BACTERIA SPEC AEROBE CULT: NORMAL
BUN SERPL-MCNC: 30 MG/DL (ref 8–23)
BUN/CREAT SERPL: 18.2 (ref 7–25)
CALCIUM SPEC-SCNC: 8.2 MG/DL (ref 8.6–10.5)
CHLORIDE SERPL-SCNC: 114 MMOL/L (ref 98–107)
CO2 SERPL-SCNC: 13 MMOL/L (ref 22–29)
CREAT SERPL-MCNC: 1.65 MG/DL (ref 0.76–1.27)
EGFRCR SERPLBLD CKD-EPI 2021: 45.5 ML/MIN/1.73
GLUCOSE BLDC GLUCOMTR-MCNC: 210 MG/DL (ref 70–130)
GLUCOSE BLDC GLUCOMTR-MCNC: 229 MG/DL (ref 70–130)
GLUCOSE BLDC GLUCOMTR-MCNC: 239 MG/DL (ref 70–130)
GLUCOSE BLDC GLUCOMTR-MCNC: 314 MG/DL (ref 70–130)
GLUCOSE SERPL-MCNC: 229 MG/DL (ref 65–99)
MAGNESIUM SERPL-MCNC: 2.4 MG/DL (ref 1.6–2.4)
POTASSIUM SERPL-SCNC: 3.8 MMOL/L (ref 3.5–5.2)
SODIUM SERPL-SCNC: 137 MMOL/L (ref 136–145)
WHOLE BLOOD HOLD SPECIMEN: NORMAL

## 2023-05-02 PROCEDURE — 83735 ASSAY OF MAGNESIUM: CPT | Performed by: NURSE PRACTITIONER

## 2023-05-02 PROCEDURE — 63710000001 INSULIN ASPART PER 5 UNITS: Performed by: NURSE PRACTITIONER

## 2023-05-02 PROCEDURE — 63710000001 INSULIN ASPART PER 5 UNITS

## 2023-05-02 PROCEDURE — 82948 REAGENT STRIP/BLOOD GLUCOSE: CPT

## 2023-05-02 PROCEDURE — 94760 N-INVAS EAR/PLS OXIMETRY 1: CPT

## 2023-05-02 PROCEDURE — 63710000001 INSULIN DETEMIR PER 5 UNITS: Performed by: NURSE PRACTITIONER

## 2023-05-02 PROCEDURE — 94799 UNLISTED PULMONARY SVC/PX: CPT

## 2023-05-02 PROCEDURE — 80048 BASIC METABOLIC PNL TOTAL CA: CPT | Performed by: NURSE PRACTITIONER

## 2023-05-02 PROCEDURE — 25010000002 CEFTRIAXONE PER 250 MG

## 2023-05-02 RX ORDER — BISACODYL 10 MG
10 SUPPOSITORY, RECTAL RECTAL DAILY
Status: DISCONTINUED | OUTPATIENT
Start: 2023-05-02 | End: 2023-05-03 | Stop reason: HOSPADM

## 2023-05-02 RX ADMIN — BUDESONIDE AND FORMOTEROL FUMARATE DIHYDRATE 2 PUFF: 160; 4.5 AEROSOL RESPIRATORY (INHALATION) at 08:03

## 2023-05-02 RX ADMIN — PANTOPRAZOLE SODIUM 40 MG: 40 TABLET, DELAYED RELEASE ORAL at 08:41

## 2023-05-02 RX ADMIN — INSULIN ASPART 8 UNITS: 100 INJECTION, SOLUTION INTRAVENOUS; SUBCUTANEOUS at 17:54

## 2023-05-02 RX ADMIN — INSULIN ASPART 5 UNITS: 100 INJECTION, SOLUTION INTRAVENOUS; SUBCUTANEOUS at 11:58

## 2023-05-02 RX ADMIN — INSULIN ASPART 5 UNITS: 100 INJECTION, SOLUTION INTRAVENOUS; SUBCUTANEOUS at 17:54

## 2023-05-02 RX ADMIN — CARVEDILOL 6.25 MG: 6.25 TABLET, FILM COATED ORAL at 08:41

## 2023-05-02 RX ADMIN — BISACODYL 10 MG: 10 SUPPOSITORY RECTAL at 15:16

## 2023-05-02 RX ADMIN — Medication 10 ML: at 20:32

## 2023-05-02 RX ADMIN — INSULIN DETEMIR 30 UNITS: 100 INJECTION, SOLUTION SUBCUTANEOUS at 20:32

## 2023-05-02 RX ADMIN — PRAVASTATIN SODIUM 40 MG: 40 TABLET ORAL at 08:41

## 2023-05-02 RX ADMIN — INSULIN ASPART 5 UNITS: 100 INJECTION, SOLUTION INTRAVENOUS; SUBCUTANEOUS at 08:38

## 2023-05-02 RX ADMIN — CARVEDILOL 6.25 MG: 6.25 TABLET, FILM COATED ORAL at 17:54

## 2023-05-02 RX ADMIN — CEFTRIAXONE SODIUM 1 G: 1 INJECTION, POWDER, FOR SOLUTION INTRAMUSCULAR; INTRAVENOUS at 20:33

## 2023-05-02 RX ADMIN — BUDESONIDE AND FORMOTEROL FUMARATE DIHYDRATE 2 PUFF: 160; 4.5 AEROSOL RESPIRATORY (INHALATION) at 20:11

## 2023-05-02 RX ADMIN — INSULIN ASPART 8 UNITS: 100 INJECTION, SOLUTION INTRAVENOUS; SUBCUTANEOUS at 08:38

## 2023-05-02 RX ADMIN — SERTRALINE HYDROCHLORIDE 50 MG: 50 TABLET ORAL at 08:41

## 2023-05-02 RX ADMIN — FUROSEMIDE 20 MG: 20 TABLET ORAL at 08:41

## 2023-05-02 RX ADMIN — SODIUM CHLORIDE 100 ML/HR: 9 INJECTION, SOLUTION INTRAVENOUS at 05:54

## 2023-05-02 RX ADMIN — NORTRIPTYLINE HYDROCHLORIDE 10 MG: 10 CAPSULE ORAL at 20:32

## 2023-05-02 RX ADMIN — INSULIN ASPART 16 UNITS: 100 INJECTION, SOLUTION INTRAVENOUS; SUBCUTANEOUS at 11:57

## 2023-05-02 RX ADMIN — INSULIN DETEMIR 30 UNITS: 100 INJECTION, SOLUTION SUBCUTANEOUS at 08:41

## 2023-05-02 NOTE — PLAN OF CARE
Goal Outcome Evaluation:           Progress: improving  Outcome Evaluation: patient has been voiding well with clear yellow urine. order placed to straight cath q8hr prn if needed for urine retention. bladder scan done earlier post residual and was 0. will cont to monitor.

## 2023-05-02 NOTE — PROGRESS NOTES
"    [facility] Medicine   INPATIENT PROGRESS NOTE      Patient Name: Favio Casillas  Date of Admission: 4/26/2023  Today's Date: 05/02/23  Length of Stay: 4  Primary Care Physician: Shayne Merritt MD    Subjective   Chief Complaint: Hematuria  Blood in Urine  Pertinent negatives include no abdominal pain.      68 year old male with history of bladder cancer presented with hematuria.     4/27/23 C/O abdominal cramping. Undergoing CBI, Denies fever or chills.     4/28/23 States he passed a lot of \"clots\" overnight. Denies any further abdominal bloating or cramping. CBI continues.     4/29/23 No acute events overnight, blood sugar better controlled but still 250 range. Has some abdominal cramping at times, eating well.     4/30/23 Denies cramping today. CBI d/c'd per uro. Urine in f/c bag blood tinged.     5/1/23 - Feels better today, denies cramping or bloating. Is expecting to have his f/c d/c'd today. Asking for us to help him get a home glucometer. Blood sugars better controlled.     5/2/23 Continues to void without difficulty, no residual this morning. OP cysto per Uro. Patient denies any complaints. Urine without significant hematuria.     Review of Systems   Constitutional: Negative.    HENT: Negative.    Eyes: Negative.    Respiratory: Negative.    Cardiovascular: Negative.    Gastrointestinal: Negative for abdominal pain.   Genitourinary: Positive for hematuria.   Musculoskeletal: Negative.    Neurological: Negative.         All pertinent negatives and positives are as above. All other systems have been reviewed and are negative unless otherwise stated.     Objective    Temp:  [98 °F (36.7 °C)-98.2 °F (36.8 °C)] 98.2 °F (36.8 °C)  Heart Rate:  [] 83  Resp:  [16-20] 18  BP: (126-137)/(69-77) 126/76       Physical Exam  Vitals reviewed.   Constitutional:       Appearance: Normal appearance.   HENT:      Head: Normocephalic and atraumatic.      " Mouth/Throat:      Mouth: Mucous membranes are moist.      Pharynx: Oropharynx is clear.   Eyes:      Pupils: Pupils are equal, round, and reactive to light.   Cardiovascular:      Rate and Rhythm: Normal rate and regular rhythm.   Pulmonary:      Effort: Pulmonary effort is normal.   Abdominal:      General: Bowel sounds are normal.      Tenderness: There is no abdominal tenderness.   Musculoskeletal:         General: Normal range of motion.   Skin:     General: Skin is warm.      Capillary Refill: Capillary refill takes less than 2 seconds.   Neurological:      General: No focal deficit present.      Mental Status: He is alert.   Psychiatric:         Thought Content: Thought content normal.             Results Review:  I have reviewed the labs, radiology results, and diagnostic studies.    Laboratory Data:   Results from last 7 days   Lab Units 05/01/23  0844 04/29/23  0631 04/27/23  0625   WBC 10*3/mm3 6.83 8.30 8.90   HEMOGLOBIN g/dL 9.7* 10.3* 13.6   HEMATOCRIT % 29.7* 31.9* 41.1   PLATELETS 10*3/mm3 223 217 249        Results from last 7 days   Lab Units 05/02/23  0657 05/01/23  1838 05/01/23  0844 04/29/23  1537 04/29/23  0647 04/27/23  0625 04/26/23  2147   SODIUM mmol/L 137  --  135*  --  140   < > 130*   POTASSIUM mmol/L 3.8 4.0 3.2*   < > 3.4*   < > 3.0*   CHLORIDE mmol/L 114*  --  111*  --  117*   < > 104   CO2 mmol/L 13.0*  --  15.0*  --  14.0*   < > 14.0*   BUN mg/dL 30*  --  25*  --  24*   < > 47*   CREATININE mg/dL 1.65*  --  1.45*  --  1.59*   < > 1.80*   CALCIUM mg/dL 8.2*  --  8.1*  --  8.1*   < > 8.5*   BILIRUBIN mg/dL  --   --   --   --   --   --  0.3   ALK PHOS U/L  --   --   --   --   --   --  114   ALT (SGPT) U/L  --   --   --   --   --   --  28   AST (SGOT) U/L  --   --   --   --   --   --  24   GLUCOSE mg/dL 229*  --  267*  --  271*   < > 309*    < > = values in this interval not displayed.       Culture Data:   No results found for: BLOODCX  No results found for: URINECX  No results found  for: RESPCX  No results found for: WOUNDCX  No results found for: STOOLCX  No components found for: BODYFLD    Radiology Data:   Imaging Results (Last 24 Hours)     ** No results found for the last 24 hours. **          I have reviewed the patient's current medications.     Assessment/Plan     I have utilized all available immediate resources to obtain, update, or review the patient's current medications (including all prescriptions, over-the-counter products, herbals, cannabis/cannabidiol products, and vitamin/mineral/dietary (nutritional) supplements).      Active Hospital Problems    Diagnosis    • **Hematuria    • Gross hematuria    • Hyponatremia    • Hypokalemia    • CKD (chronic kidney disease) stage 3, GFR 30-59 ml/min    • Acute UTI (urinary tract infection)        Medical Decision Making  Number and Complexity of problems: 1 High 5 Moderate    Conditions and Status:        Condition is improving.     [facility] Data  External documents reviewed: None today  My EKG interpretation: None today  My plain film interpretation: None today  Tests considered but not ordered: None today       Discussed with: Patient, RN     Treatment Plan  1. Hematuria  -Urology consulted, appreciate Dr Fellow's assistance  -suspected bladder mass per CT  -OP cysto  -SIC TID PRN per uro recs    2. UTI  -Ceftriaxaone empirically- completed  -culture negative    3.CKD III, creat at baseline   -renally adjust medications, avoid nephrotoxins    4. Hyponatremia-clinically insignificant  -improved with IVF    5. Hypokalemia  -Electrolyte replacement therapy    6. IDDM type 2  -lantus 30 units SC BID ,  5 units humalog TIDAC  -SSITIDAC  -scrip for glucometer sent     7. CAD s/p stenting 2017 of LAD  -ASA and Plavix on hold for now, pt will need OP cysto/biopsy bladder mass  -No recent cardiac stenting, will defer resuming in OP setting  -continue statin, beta blocker  Care Planning  Shared decision making: With patient  Code status and  discussions: Full Code    Disposition  Social Determinants of Health that impact treatment or disposition: n/a  I expect the patient to be discharged to home tomorrow if ok with urology    I confirmed that the patient's Advance Care Plan is present, code status is documented, or surrogate decision maker is listed in the patient's medical record.   ________________________________        Copied text in this note has been reviewed and is accurate as of 05/02/23       Electronically signed by TIFFANIE Dominguez, 05/02/23, 16:05 CDT.

## 2023-05-02 NOTE — PROGRESS NOTES
"   LOS: 4 days   Patient Care Team:  Shayne Merritt MD as PCP - General (Family Medicine)    Subjective     Subjective:  Symptoms:  Stable.        History taken from: patient chart    Objective     Vital Signs  Temp:  [98 °F (36.7 °C)-98.3 °F (36.8 °C)] 98 °F (36.7 °C)  Heart Rate:  [] 107  Resp:  [16-20] 20  BP: (125-137)/(69-77) 137/69    Objective:  General Appearance:  In no acute distress.    Vital signs: (most recent): Blood pressure 137/69, pulse 107, temperature 98 °F (36.7 °C), temperature source Oral, resp. rate 20, height 170.2 cm (67\"), weight 62.8 kg (138 lb 6.4 oz), SpO2 99 %.  Vital signs are normal.  No fever.    Output: Producing urine.    Lungs:  Normal effort and normal respiratory rate.    Abdomen: Abdomen is soft and non-distended.  There is no abdominal tenderness.     Neurological: Patient is alert and oriented to person, place and time.    Pupils:  Pupils are equal, round, and reactive to light.    Skin:  Warm and dry.              Results Review:    Lab Results (last 24 hours)     Procedure Component Value Units Date/Time    Extra Tubes [[id number]] Collected: 05/02/23 0714    Specimen: Blood, Venous Line Updated: 05/02/23 0816    Narrative:      The following orders were created for panel order Extra Tubes.  Procedure                               Abnormality         Status                     ---------                               -----------         ------                     Lavender Top[[id number]]                                     Final result                 Please view results for these tests on the individual orders.    Lavender Top [[id number]] Collected: 05/02/23 0714    Specimen: Blood Updated: 05/02/23 0816     Extra Tube hold for add-on     Comment: Auto resulted       Magnesium [[id number]]  (Normal) Collected: 05/02/23 0657    Specimen: Blood Updated: 05/02/23 0753     Magnesium 2.4 mg/dL     Basic Metabolic Panel [[id number]]  (Abnormal) Collected: 05/02/23 " 0657    Specimen: Blood Updated: 05/02/23 0753     Glucose 229 mg/dL      BUN 30 mg/dL      Creatinine 1.65 mg/dL      Sodium 137 mmol/L      Potassium 3.8 mmol/L      Chloride 114 mmol/L      CO2 13.0 mmol/L      Calcium 8.2 mg/dL      BUN/Creatinine Ratio 18.2     Anion Gap 10.0 mmol/L      eGFR 45.5 mL/min/1.73     Narrative:      GFR Normal >60  Chronic Kidney Disease <60  Kidney Failure <15      Blood Culture - Blood, Arm, Right [[id number]]  (Normal) Collected: 04/27/23 0622    Specimen: Blood from Arm, Right Updated: 05/02/23 0646     Blood Culture No growth at 5 days    Blood Culture - Blood, Arm, Left [[id number]]  (Normal) Collected: 04/27/23 0625    Specimen: Blood from Arm, Left Updated: 05/02/23 0646     Blood Culture No growth at 5 days    POC Glucose Once [[id number]]  (Abnormal) Collected: 05/02/23 0620    Specimen: Blood Updated: 05/02/23 0644     Glucose 229 mg/dL      Comment: RN NotifiedOperator: [id number] MARIE Briggs ID: [id number]       POC Glucose Once [[id number]]  (Abnormal) Collected: 05/01/23 1908    Specimen: Blood Updated: 05/01/23 2019     Glucose 287 mg/dL      Comment: RN NotifiedOperator: [id number] MARIE Briggs ID: [id number]       Potassium [[id number]]  (Normal) Collected: 05/01/23 1838    Specimen: Blood Updated: 05/01/23 1858     Potassium 4.0 mmol/L      Comment: Slight hemolysis detected by analyzer. Results may be affected.       POC Glucose Once [[id number]]  (Abnormal) Collected: 05/01/23 1609    Specimen: Blood Updated: 05/01/23 1634     Glucose 239 mg/dL      Comment: RN NotifiedOperator: [id number] LEA Blanton ID: [id number]       POC Glucose Once [[id number]]  (Abnormal) Collected: 05/01/23 1036    Specimen: Blood Updated: 05/01/23 1052     Glucose 231 mg/dL      Comment: RN NotifiedOperator: [id number] ERNST Landon ID: [id number]       Magnesium [[id number]]  (Normal) Collected: 05/01/23 0844    Specimen: Blood Updated: 05/01/23 1024      Magnesium 1.8 mg/dL          Imaging Results (Last 24 Hours)     ** No results found for the last 24 hours. **           I reviewed the patient's new clinical results.  I reviewed the patient's new imaging results and agree with the interpretation.  I reviewed the patient's other test results and agree with the interpretation      Assessment & Plan       Hematuria    Acute UTI (urinary tract infection)    Hyponatremia    Hypokalemia    CKD (chronic kidney disease) stage 3, GFR 30-59 ml/min    Gross hematuria      Assessment & Plan    Favio Casillas is a 68 y.o. male who has gross hematuria, UTI culture no growth; imaging showed bilateral hydronephrosis with 4.1 cm round bladder mass or filling defect and neoplasm must be excluded. CBI discontinued, John removed 5/1/23, denies retention but has a history of NGB and SIC x 3 daily at home  Estimated Creatinine Clearance: 39.1 mL/min (A) (by C-G formula based on SCr of 1.65 mg/dL (H)).     Plan:  SIC x 3 daily PRN retention  Outpatient cystoscopy    TIFFANIE Michele  05/02/23  10:04 CDT

## 2023-05-02 NOTE — PAYOR COMM NOTE
"[facility]  Case Management  Shoaib ESTRADA RN, CM   (P) [phone] (f) [phone]    Reference # [id number]    REQUEST TO RE-REVIEW FOR INPATIENT ADMISSION APPROVAL    Favio Owens (68 y.o. Male)     Date of Birth   1955    Social Security Number       Address   [address] [place] [address]    Home Phone   [phone]    MRN   [id number]       Yarsani   Jew    Marital Status                               Admission Date   4/26/23    Admission Type   Emergency    Admitting Provider   Kevin Perry MD    Attending Provider   Jesus Ortiz MD    Department, Room/Bed   [facility] 3 EAST, [facility]/1       Discharge Date       Discharge Disposition       Discharge Destination                               Attending Provider: Jesus Ortiz MD    Allergies: No Known Allergies    Isolation: None   Infection: None   Code Status: CPR    Ht: 170.2 cm (67\")   Wt: 62.8 kg (138 lb 6.4 oz)    Admission Cmt: None   Principal Problem: Hematuria [R31.9]                 Active Insurance as of 4/26/2023     Primary Coverage     Payor Plan Insurance Group Employer/Plan Group    ANTHEM MEDICARE REPLACEMENT ANTHEM MEDICARE ADVANTAGE [id number]     Payor Plan Address Payor Plan Phone Number Payor Plan Fax Number Effective Dates    [address] [phone]  8/1/2022 - None Entered    [facility] [id number]       Subscriber Name Subscriber Birth Date Member ID       FAVIO OWENS 1955 [id number]           Secondary Coverage     Payor Plan Insurance Group Employer/Plan Group    [place]      Payor Plan Address Payor Plan Phone Number Payor Plan Fax Number Effective Dates    [address] [phone]  8/1/2017 - None Entered    [place] [address]       Subscriber Name Subscriber Birth Date Member ID       FAVIO OWENS 1955 [id number]                 Emergency Contacts     Contact " "Person (Rel.) Home Phone Work Phone Mobile Phone    Sofi Mcdonald (Friend) [phone] -- [phone]    GABRIELLE OWENS (Brother) [phone] -- [phone]               Physician Progress Notes (last 72 hours)      Adryan Prater APRN at 05/01/23 1120     Attestation signed by Drayden, Christofer VELAZQUEZ MD at 05/01/23 2005    I have reviewed this documentation and agree.                     LOS: 3 days   Patient Care Team:  Shayne Merritt MD as PCP - General (Family Medicine)    Subjective     Subjective:  Symptoms:  Stable.        History taken from: patient chart RN    Objective     Vital Signs  Temp:  [97.8 °F (36.6 °C)-99 °F (37.2 °C)] 97.9 °F (36.6 °C)  Heart Rate:  [81-92] 84  Resp:  [16-18] 16  BP: (104-134)/(64-78) 134/74    Objective:  General Appearance:  In no acute distress.    Vital signs: (most recent): Blood pressure 134/74, pulse 84, temperature 97.9 °F (36.6 °C), temperature source Axillary, resp. rate 16, height 170.2 cm (67\"), weight 62.4 kg (137 lb 9.6 oz), SpO2 97 %.  Vital signs are normal.  No fever.    Output: Producing urine.    Lungs:  Normal effort and normal respiratory rate.    Abdomen: Abdomen is soft and non-distended.  There is no abdominal tenderness.     Neurological: Patient is alert.    Pupils:  Pupils are equal, round, and reactive to light.    Skin:  Warm and dry.              Results Review:    Lab Results (last 24 hours)     Procedure Component Value Units Date/Time    POC Glucose Once [[id number]]  (Abnormal) Collected: 05/01/23 1036    Specimen: Blood Updated: 05/01/23 1052     Glucose 231 mg/dL      Comment: RN NotifiedOperator: [id number] ERNST MUKHERJEEAMeter ID: [id number]       Magnesium [[id number]]  (Normal) Collected: 05/01/23 0844    Specimen: Blood Updated: 05/01/23 1024     Magnesium 1.8 mg/dL     Basic Metabolic Panel [[id number]]  (Abnormal) Collected: 05/01/23 0844    Specimen: Blood Updated: 05/01/23 0930     Glucose 267 mg/dL      BUN 25 mg/dL      " Creatinine 1.45 mg/dL      Sodium 135 mmol/L      Potassium 3.2 mmol/L      Comment: Slight hemolysis detected by analyzer. Results may be affected.        Chloride 111 mmol/L      CO2 15.0 mmol/L      Calcium 8.1 mg/dL      BUN/Creatinine Ratio 17.2     Anion Gap 9.0 mmol/L      eGFR 53.1 mL/min/1.73     Narrative:      GFR Normal >60  Chronic Kidney Disease <60  Kidney Failure <15      CBC & Differential [[id number]]  (Abnormal) Collected: 05/01/23 0844    Specimen: Blood Updated: 05/01/23 0901    Narrative:      The following orders were created for panel order CBC & Differential.  Procedure                               Abnormality         Status                     ---------                               -----------         ------                     CBC Auto Differential[[id number]]        Abnormal            Final result                 Please view results for these tests on the individual orders.    CBC Auto Differential [[id number]]  (Abnormal) Collected: 05/01/23 0844    Specimen: Blood Updated: 05/01/23 0901     WBC 6.83 10*3/mm3      RBC 3.39 10*6/mm3      Hemoglobin 9.7 g/dL      Hematocrit 29.7 %      MCV 87.6 fL      MCH 28.6 pg      MCHC 32.7 g/dL      RDW 15.0 %      RDW-SD 47.7 fl      MPV 9.6 fL      Platelets 223 10*3/mm3      Neutrophil % 76.5 %      Lymphocyte % 14.6 %      Monocyte % 6.6 %      Eosinophil % 1.2 %      Basophil % 0.4 %      Immature Grans % 0.7 %      Neutrophils, Absolute 5.22 10*3/mm3      Lymphocytes, Absolute 1.00 10*3/mm3      Monocytes, Absolute 0.45 10*3/mm3      Eosinophils, Absolute 0.08 10*3/mm3      Basophils, Absolute 0.03 10*3/mm3      Immature Grans, Absolute 0.05 10*3/mm3      nRBC 0.9 /100 WBC     POC Glucose Once [[id number]]  (Abnormal) Collected: 04/30/23 1017    Specimen: Blood Updated: 05/01/23 0758     Glucose 374 mg/dL      Comment: RN NotifiedOperator: [id number] MANUEL THAOISSAMeter ID: [id number]       POC Glucose Once [[id number]]  (Abnormal) Collected:  04/29/23 1014    Specimen: Blood Updated: 05/01/23 0758     Glucose 406 mg/dL      Comment: RN NotifiedOperator: [id number] MANUEL ATKINSONMeter ID: [id number]       Blood Culture - Blood, Arm, Right [[id number]]  (Normal) Collected: 04/27/23 0622    Specimen: Blood from Arm, Right Updated: 05/01/23 0645     Blood Culture No growth at 4 days    Blood Culture - Blood, Arm, Left [[id number]]  (Normal) Collected: 04/27/23 0625    Specimen: Blood from Arm, Left Updated: 05/01/23 0645     Blood Culture No growth at 4 days    POC Glucose Once [[id number]]  (Abnormal) Collected: 05/01/23 0615    Specimen: Blood Updated: 05/01/23 0639     Glucose 205 mg/dL      Comment: RN NotifiedOperator: [id number] MAGGI CABALLEROeter ID: [id number]       POC Glucose Once [[id number]]  (Abnormal) Collected: 04/30/23 1918    Specimen: Blood Updated: 04/30/23 1931     Glucose 300 mg/dL      Comment: RN NotifiedOperator: [id number] MAGGI REEDRAMeter ID: [id number]       POC Glucose Once [[id number]]  (Abnormal) Collected: 04/30/23 1606    Specimen: Blood Updated: 04/30/23 1620     Glucose 227 mg/dL      Comment: RN NotifiedOperator: [id number] MANUEL ATKINSONMeter ID: [id number]            Imaging Results (Last 24 Hours)     ** No results found for the last 24 hours. **           I reviewed the patient's new clinical results.  I reviewed the patient's new imaging results and agree with the interpretation.  I reviewed the patient's other test results and agree with the interpretation      Assessment & Plan       Hematuria    Acute UTI (urinary tract infection)    Hyponatremia    Hypokalemia    CKD (chronic kidney disease) stage 3, GFR 30-59 ml/min    Gross hematuria      Assessment & Plan    Favio Casillas is a 68 y.o. male who has gross hematuria, UTI culture no growth; imaging showed bilateral hydronephrosis with 4.1 cm round bladder mass or filling defect and neoplasm must be excluded. CBI discontinued, pale pink urine  Estimated  "Creatinine Clearance: 44.2 mL/min (A) (by C-G formula based on SCr of 1.45 mg/dL (H)).     Plan:  Remove John, monitor for retention of urine    Adryan TIFFANIE Prater  05/01/23  11:20 CDT        Electronically signed by Christofer Calderon MD at 05/01/23 2005     Elena Hall APRN at 05/01/23 0806     Attestation signed by Randa Atwood MD at 05/01/23 7987    I have reviewed this documentation and agree.                      [facility] Medicine   INPATIENT PROGRESS NOTE      Patient Name: Favio Casillas  Date of Admission: 4/26/2023  Today's Date: 05/01/23  Length of Stay: 3  Primary Care Physician: Shayne Merritt MD    Subjective   Chief Complaint: Hematuria  Blood in Urine  Pertinent negatives include no abdominal pain.      68 year old male with history of bladder cancer presented with hematuria.     4/27/23 C/O abdominal cramping. Undergoing CBI, Denies fever or chills.     4/28/23 States he passed a lot of \"clots\" overnight. Denies any further abdominal bloating or cramping. CBI continues.     4/29/23 No acute events overnight, blood sugar better controlled but still 250 range. Has some abdominal cramping at times, eating well.     4/30/23 Denies cramping today. CBI d/c'd per uro. Urine in f/c bag blood tinged.     5/1/23 - Feels better today, denies cramping or bloating. Is expecting to have his f/c d/c'd today. Asking for us to help him get a home glucometer. Blood sugars better controlled.     Review of Systems   Constitutional: Negative.    HENT: Negative.    Eyes: Negative.    Respiratory: Negative.    Cardiovascular: Negative.    Gastrointestinal: Negative for abdominal pain.   Genitourinary: Positive for hematuria.   Musculoskeletal: Negative.    Neurological: Negative.         All pertinent negatives and positives are as above. All other systems have been reviewed and are negative unless otherwise stated.     Objective    Temp:  [97.8 " °F (36.6 °C)-98.3 °F (36.8 °C)] 98.3 °F (36.8 °C)  Heart Rate:  [81-92] 81  Resp:  [16-18] 18  BP: (104-134)/(64-78) 125/76       Physical Exam  Vitals reviewed.   Constitutional:       Appearance: Normal appearance.   HENT:      Head: Normocephalic and atraumatic.      Mouth/Throat:      Mouth: Mucous membranes are moist.      Pharynx: Oropharynx is clear.   Eyes:      Pupils: Pupils are equal, round, and reactive to light.   Cardiovascular:      Rate and Rhythm: Normal rate and regular rhythm.   Pulmonary:      Effort: Pulmonary effort is normal.   Abdominal:      General: Bowel sounds are normal.      Tenderness: There is no abdominal tenderness.   Musculoskeletal:         General: Normal range of motion.   Skin:     General: Skin is warm.      Capillary Refill: Capillary refill takes less than 2 seconds.   Neurological:      General: No focal deficit present.      Mental Status: He is alert.   Psychiatric:         Thought Content: Thought content normal.             Results Review:  I have reviewed the labs, radiology results, and diagnostic studies.    Laboratory Data:   Results from last 7 days   Lab Units 05/01/23  0844 04/29/23  0631 04/27/23  0625   WBC 10*3/mm3 6.83 8.30 8.90   HEMOGLOBIN g/dL 9.7* 10.3* 13.6   HEMATOCRIT % 29.7* 31.9* 41.1   PLATELETS 10*3/mm3 223 217 249        Results from last 7 days   Lab Units 05/01/23  0844 04/29/23  1537 04/29/23  0647 04/27/23  1819 04/27/23  0625 04/26/23  2147   SODIUM mmol/L 135*  --  140  --  133* 130*   POTASSIUM mmol/L 3.2* 4.2 3.4*   < > 2.8* 3.0*   CHLORIDE mmol/L 111*  --  117*  --  108* 104   CO2 mmol/L 15.0*  --  14.0*  --  12.0* 14.0*   BUN mg/dL 25*  --  24*  --  45* 47*   CREATININE mg/dL 1.45*  --  1.59*  --  1.66* 1.80*   CALCIUM mg/dL 8.1*  --  8.1*  --  8.9 8.5*   BILIRUBIN mg/dL  --   --   --   --   --  0.3   ALK PHOS U/L  --   --   --   --   --  114   ALT (SGPT) U/L  --   --   --   --   --  28   AST (SGOT) U/L  --   --   --   --   --  24    GLUCOSE mg/dL 267*  --  271*  --  399* 309*    < > = values in this interval not displayed.       Culture Data:   No results found for: BLOODCX  No results found for: URINECX  No results found for: RESPCX  No results found for: WOUNDCX  No results found for: STOOLCX  No components found for: BODYFLD    Radiology Data:   Imaging Results (Last 24 Hours)     ** No results found for the last 24 hours. **          I have reviewed the patient's current medications.     Assessment/Plan     I have utilized all available immediate resources to obtain, update, or review the patient's current medications (including all prescriptions, over-the-counter products, herbals, cannabis/cannabidiol products, and vitamin/mineral/dietary (nutritional) supplements).      Active Hospital Problems    Diagnosis    • **Hematuria    • Gross hematuria    • Hyponatremia    • Hypokalemia    • CKD (chronic kidney disease) stage 3, GFR 30-59 ml/min    • Acute UTI (urinary tract infection)        Medical Decision Making  Number and Complexity of problems: 1 High 5 Moderate    Conditions and Status:        Condition is improving.     MDM Data  External documents reviewed: None today  My EKG interpretation: None today  My plain film interpretation: None today  Tests considered but not ordered: None today       Discussed with: Patient, RN     Treatment Plan  1. Hematuria  -Urology consulted, appreciate Dr Fellow's assistance  -suspected bladder mass per CT      2. UTI  -Ceftriaxaone empirically- completed  -culture negative    3.CKD III, creat at baseline   -renally adjust medications, avoid nephrotoxins    4. Hyponatremia-clinically insignificant  -improved with IVF    5. Hypokalemia  -Electrolyte replacement therapy    6. IDDM type 2  -lantus 30 units SC BID ,  5 units humalog TIDAC  -SSITIDAC    Care Planning  Shared decision making: With patient  Code status and discussions: Full Code    Disposition  Social Determinants of Health that impact  "treatment or disposition: n/a  I expect the patient to be discharged to home in 1-2 days.      I confirmed that the patient's Advance Care Plan is present, code status is documented, or surrogate decision maker is listed in the patient's medical record.   ________________________________        Copied text in this note has been reviewed and is accurate as of 05/01/23       Electronically signed by TIFFANIE Dominguez, 05/01/23, 16:36 CDT.      Electronically signed by Randa Atwood MD at 05/01/23 1731     Elena Hall APRN at 04/30/23 1300     Attestation signed by Kevin Perry MD at 04/30/23 1311    I personally evaluated and examined the patient in conjunction with TIFFANIE Mcmillan and agree with the assessment, treatment plan, and disposition of the patient as recorded.                        [facility] Medicine   INPATIENT PROGRESS NOTE      Patient Name: Favio Casillas  Date of Admission: 4/26/2023  Today's Date: 04/30/23  Length of Stay: 2  Primary Care Physician: Shayne Merritt MD    Subjective   Chief Complaint: Hematuria  Blood in Urine  Pertinent negatives include no abdominal pain.      68 year old male with history of bladder cancer presented with hematuria.     4/27/23 C/O abdominal cramping. Undergoing CBI, Denies fever or chills.     4/28/23 States he passed a lot of \"clots\" overnight. Denies any further abdominal bloating or cramping. CBI continues.     4/29/23 No acute events overnight, blood sugar better controlled but still 250 range. Has some abdominal cramping at times, eating well.     4/30/23 Denies cramping today. CBI d/c'd per uro. Urine in f/c bag blood tinged.     Review of Systems   Constitutional: Negative.    HENT: Negative.    Eyes: Negative.    Respiratory: Negative.    Cardiovascular: Negative.    Gastrointestinal: Negative for abdominal pain.   Genitourinary: Positive for hematuria.   Musculoskeletal: " Negative.    Neurological: Negative.         All pertinent negatives and positives are as above. All other systems have been reviewed and are negative unless otherwise stated.     Objective    Temp:  [98 °F (36.7 °C)-98.5 °F (36.9 °C)] 98.5 °F (36.9 °C)  Heart Rate:  [] 83  Resp:  [16-18] 16  BP: (108-129)/(70-77) 108/72       Physical Exam  Vitals reviewed.   Constitutional:       Appearance: Normal appearance.   HENT:      Head: Normocephalic and atraumatic.      Mouth/Throat:      Mouth: Mucous membranes are moist.      Pharynx: Oropharynx is clear.   Eyes:      Pupils: Pupils are equal, round, and reactive to light.   Cardiovascular:      Rate and Rhythm: Normal rate and regular rhythm.   Pulmonary:      Effort: Pulmonary effort is normal.   Abdominal:      General: Bowel sounds are normal.      Tenderness: There is no abdominal tenderness.   Musculoskeletal:         General: Normal range of motion.   Skin:     General: Skin is warm.      Capillary Refill: Capillary refill takes less than 2 seconds.   Neurological:      General: No focal deficit present.      Mental Status: He is alert.   Psychiatric:         Thought Content: Thought content normal.             Results Review:  I have reviewed the labs, radiology results, and diagnostic studies.    Laboratory Data:   Results from last 7 days   Lab Units 04/29/23  0631 04/27/23  0625 04/26/23  2147   WBC 10*3/mm3 8.30 8.90 11.70*   HEMOGLOBIN g/dL 10.3* 13.6 13.0   HEMATOCRIT % 31.9* 41.1 38.0   PLATELETS 10*3/mm3 217 249 230        Results from last 7 days   Lab Units 04/29/23  1537 04/29/23  0647 04/29/23  0051 04/27/23  1819 04/27/23  0625 04/26/23  2147   SODIUM mmol/L  --  140  --   --  133* 130*   POTASSIUM mmol/L 4.2 3.4* 3.0*   < > 2.8* 3.0*   CHLORIDE mmol/L  --  117*  --   --  108* 104   CO2 mmol/L  --  14.0*  --   --  12.0* 14.0*   BUN mg/dL  --  24*  --   --  45* 47*   CREATININE mg/dL  --  1.59*  --   --  1.66* 1.80*   CALCIUM mg/dL  --  8.1*   --   --  8.9 8.5*   BILIRUBIN mg/dL  --   --   --   --   --  0.3   ALK PHOS U/L  --   --   --   --   --  114   ALT (SGPT) U/L  --   --   --   --   --  28   AST (SGOT) U/L  --   --   --   --   --  24   GLUCOSE mg/dL  --  271*  --   --  399* 309*    < > = values in this interval not displayed.       Culture Data:   No results found for: BLOODCX  No results found for: URINECX  No results found for: RESPCX  No results found for: WOUNDCX  No results found for: STOOLCX  No components found for: BODYFLD    Radiology Data:   Imaging Results (Last 24 Hours)     ** No results found for the last 24 hours. **          I have reviewed the patient's current medications.     Assessment/Plan     I have utilized all available immediate resources to obtain, update, or review the patient's current medications (including all prescriptions, over-the-counter products, herbals, cannabis/cannabidiol products, and vitamin/mineral/dietary (nutritional) supplements).      Active Hospital Problems    Diagnosis    • **Hematuria    • Gross hematuria    • Hyponatremia    • Hypokalemia    • CKD (chronic kidney disease) stage 3, GFR 30-59 ml/min    • Acute UTI (urinary tract infection)        Medical Decision Making  Number and Complexity of problems: 1 High 5 Moderate    Conditions and Status:        Condition is improving.     [facility] Data  External documents reviewed: None today  My EKG interpretation: None today  My plain film interpretation: None today  Tests considered but not ordered: None today       Discussed with: Patient, RN     Treatment Plan  1. Hematuria  -Urology consulted, appreciate Dr Fellow's assistance  -suspected bladder mass per CT  -cysto pending    2. UTI  -Ceftriaxaone empirically  -culture negative    3.CKD III, creat at baseline 1.6  -renally adjust medications, avoid nephrotoxins    4. Hyponatremia  -improved with IVF    5. Hypokalemia  -Electrolyte replacement therapy    6. IDDM type 2  -Increased lantus to 30 units SC BID  "yesterday, glucose remains uncontrolled, add 5 units humalog TIDAC    Care Planning  Shared decision making: With patient  Code status and discussions: Full Code    Disposition  Social Determinants of Health that impact treatment or disposition: n/a  I expect the patient to be discharged to home in 1-2 days.      I confirmed that the patient's Advance Care Plan is present, code status is documented, or surrogate decision maker is listed in the patient's medical record.   ________________________________        Copied text in this note has been reviewed and is accurate as of 04/30/23       Electronically signed by TIFFANIE Dominguez, 04/30/23, 13:00 CDT.      Electronically signed by Kevin Perry MD at 04/30/23 1311     Adryan Prater APRN at 04/30/23 0824     Attestation signed by Christofer Calderon MD at 04/30/23 2207    I have reviewed this documentation and agree.                     LOS: 2 days   Patient Care Team:  Shayne Merritt MD as PCP - General (Family Medicine)    Subjective     Subjective:  Symptoms:  Stable.        History taken from: patient chart    Objective     Vital Signs  Temp:  [97.4 °F (36.3 °C)-98.2 °F (36.8 °C)] 98.2 °F (36.8 °C)  Heart Rate:  [] 101  Resp:  [16-18] 16  BP: (107-129)/(58-77) 124/71    Objective:  General Appearance:  In no acute distress.    Vital signs: (most recent): Blood pressure 124/71, pulse 101, temperature 98.2 °F (36.8 °C), temperature source Temporal, resp. rate 16, height 170.2 cm (67\"), weight 61 kg (134 lb 8 oz), SpO2 98 %.  Vital signs are normal.  No fever.    Output: Producing urine.    Lungs:  Normal effort and normal respiratory rate.    Abdomen: Abdomen is soft and non-distended.  There is no abdominal tenderness.     Neurological: Patient is alert and oriented to person, place and time.    Pupils:  Pupils are equal, round, and reactive to light.    Skin:  Warm and dry.              Results Review:    Lab Results (last 24 hours)     " Procedure Component Value Units Date/Time    Blood Culture - Blood, Arm, Left [[id number]]  (Normal) Collected: 04/27/23 0625    Specimen: Blood from Arm, Left Updated: 04/30/23 0645     Blood Culture No growth at 3 days    Blood Culture - Blood, Arm, Right [[id number]]  (Normal) Collected: 04/27/23 0622    Specimen: Blood from Arm, Right Updated: 04/30/23 0645     Blood Culture No growth at 3 days    POC Glucose Once [[id number]]  (Abnormal) Collected: 04/30/23 0618    Specimen: Blood Updated: 04/30/23 0631     Glucose 287 mg/dL      Comment: RN NotifiedOperator: [id number] MAGGI REEDRAMeter ID: [id number]       POC Glucose Once [[id number]]  (Abnormal) Collected: 04/29/23 2024    Specimen: Blood Updated: 04/30/23 0221     Glucose 335 mg/dL      Comment: RN NotifiedOperator: [id number] MAGGI REEDRAMeter ID: [id number]       POC Glucose Once [[id number]]  (Abnormal) Collected: 04/29/23 1608    Specimen: Blood Updated: 04/29/23 1636     Glucose 334 mg/dL      Comment: RN NotifiedOperator: [id number] MANUEL MELISSAMeter ID: [id number]       Potassium [[id number]]  (Normal) Collected: 04/29/23 1537    Specimen: Blood Updated: 04/29/23 1635     Potassium 4.2 mmol/L      Comment: Slight hemolysis detected by analyzer. Results may be affected.       CBC & Differential [[id number]]  (Abnormal) Collected: 04/29/23 0631    Specimen: Blood Updated: 04/29/23 1136    Narrative:      The following orders were created for panel order CBC & Differential.  Procedure                               Abnormality         Status                     ---------                               -----------         ------                     CBC Auto Differential[[id number]]        Abnormal            Final result                 Please view results for these tests on the individual orders.    CBC Auto Differential [[id number]]  (Abnormal) Collected: 04/29/23 0631    Specimen: Blood Updated: 04/29/23 1136     WBC 8.30 10*3/mm3      RBC 3.64  10*6/mm3      Hemoglobin 10.3 g/dL      Hematocrit 31.9 %      MCV 87.6 fL      MCH 28.3 pg      MCHC 32.3 g/dL      RDW 14.9 %      RDW-SD 48.4 fl      MPV 10.1 fL      Platelets 217 10*3/mm3      Neutrophil % 77.9 %      Lymphocyte % 13.4 %      Monocyte % 7.3 %      Eosinophil % 0.5 %      Basophil % 0.2 %      Immature Grans % 0.7 %      Neutrophils, Absolute 6.46 10*3/mm3      Lymphocytes, Absolute 1.11 10*3/mm3      Monocytes, Absolute 0.61 10*3/mm3      Eosinophils, Absolute 0.04 10*3/mm3      Basophils, Absolute 0.02 10*3/mm3      Immature Grans, Absolute 0.06 10*3/mm3      nRBC 0.5 /100 WBC     Basic Metabolic Panel [[id number]]  (Abnormal) Collected: 04/29/23 0647    Specimen: Blood Updated: 04/29/23 1132     Glucose 271 mg/dL      BUN 24 mg/dL      Creatinine 1.59 mg/dL      Sodium 140 mmol/L      Potassium 3.4 mmol/L      Chloride 117 mmol/L      CO2 14.0 mmol/L      Calcium 8.1 mg/dL      BUN/Creatinine Ratio 15.1     Anion Gap 9.0 mmol/L      eGFR 47.6 mL/min/1.73     Narrative:      GFR Normal >60  Chronic Kidney Disease <60  Kidney Failure <15           Imaging Results (Last 24 Hours)     Procedure Component Value Units Date/Time    US Renal Bilateral [[id number]] Collected: 04/29/23 1144     Updated: 04/29/23 1219    Narrative:      COMPARISON:  4/27/2023.    TECHNIQUE:  High-resolution gray scale images of the bilateral kidneys and bladder were  obtained.    FINDINGS:  Compared to the previous study two days ago there is now significant echogenic  material within both collecting systems bilaterally producing severe artifact.  This is likely representative of gas given the patient undergoing continuous  bladder irrigation.  The degree of artifact present makes it difficult to assess  for persistent hydronephrosis but on a few of the images it does appear the  hydronephrosis is improved.  A John catheter is seen within the urinary  bladder.           I reviewed the patient's new clinical results.  I  "reviewed the patient's new imaging results and agree with the interpretation.  I reviewed the patient's other test results and agree with the interpretation      Assessment & Plan       Hematuria    Acute UTI (urinary tract infection)    Hyponatremia    Hypokalemia    CKD (chronic kidney disease) stage 3, GFR 30-59 ml/min    Gross hematuria      Assessment & Plan    Favio Casillas is a 68 y.o. male who has gross hematuria, UTI culture no growth; imaging showed bilateral hydronephrosis with 4.1 cm round bladder mass or filling defect and neoplasm must be excluded.   --Estimated Creatinine Clearance: 39.4 mL/min (A) (by C-G formula based on SCr of 1.59 mg/dL (H)).    Plan:  Remove John in AM    TIFFANIE Michele  04/30/23  08:24 CDT        Electronically signed by Christofer Calderon MD at 04/30/23 3136     Elena Hall APRN at 04/29/23 1246     Attestation signed by Kevin Perry MD at 04/29/23 1816    I personally evaluated and examined the patient in conjunction with TIFFANIE Mcmillan and agree with the assessment, treatment plan, and disposition of the patient as recorded.                       [facility] Medicine   INPATIENT PROGRESS NOTE      Patient Name: Favio Casillas  Date of Admission: 4/26/2023  Today's Date: 04/29/23  Length of Stay: 1  Primary Care Physician: Shayne Merritt MD    Subjective   Chief Complaint: Hematuria  Blood in Urine  Pertinent negatives include no abdominal pain.      68 year old male with history of bladder cancer presented with hematuria.     4/27/23 C/O abdominal cramping. Undergoing CBI, Denies fever or chills.     4/28/23 States he passed a lot of \"clots\" overnight. Denies any further abdominal bloating or cramping. CBI continues.     4/29/23 No acute events overnight, blood sugar better controlled but still 250 range. Has some abdominal cramping at times, eating well.     Review of Systems "   Constitutional: Negative.    HENT: Negative.    Eyes: Negative.    Respiratory: Negative.    Cardiovascular: Negative.    Gastrointestinal: Negative for abdominal pain.   Genitourinary: Positive for hematuria.   Musculoskeletal: Negative.    Neurological: Negative.         All pertinent negatives and positives are as above. All other systems have been reviewed and are negative unless otherwise stated.     Objective    Temp:  [97.4 °F (36.3 °C)-99 °F (37.2 °C)] 97.4 °F (36.3 °C)  Heart Rate:  [76-97] 83  Resp:  [16-20] 18  BP: (107-119)/(58-71) 107/58       Physical Exam  Vitals reviewed.   Constitutional:       Appearance: Normal appearance.   HENT:      Head: Normocephalic and atraumatic.      Mouth/Throat:      Mouth: Mucous membranes are moist.      Pharynx: Oropharynx is clear.   Eyes:      Pupils: Pupils are equal, round, and reactive to light.   Cardiovascular:      Rate and Rhythm: Normal rate and regular rhythm.   Pulmonary:      Effort: Pulmonary effort is normal.   Abdominal:      General: Bowel sounds are normal.      Tenderness: There is no abdominal tenderness.   Musculoskeletal:         General: Normal range of motion.   Skin:     General: Skin is warm.      Capillary Refill: Capillary refill takes less than 2 seconds.   Neurological:      General: No focal deficit present.      Mental Status: He is alert.   Psychiatric:         Thought Content: Thought content normal.             Results Review:  I have reviewed the labs, radiology results, and diagnostic studies.    Laboratory Data:   Results from last 7 days   Lab Units 04/29/23  0631 04/27/23  0625 04/26/23  2147   WBC 10*3/mm3 8.30 8.90 11.70*   HEMOGLOBIN g/dL 10.3* 13.6 13.0   HEMATOCRIT % 31.9* 41.1 38.0   PLATELETS 10*3/mm3 217 249 230        Results from last 7 days   Lab Units 04/29/23  0647 04/29/23  0051 04/27/23  1819 04/27/23  0625 04/26/23  2147   SODIUM mmol/L 140  --   --  133* 130*   POTASSIUM mmol/L 3.4* 3.0* 3.8 2.8* 3.0*    CHLORIDE mmol/L 117*  --   --  108* 104   CO2 mmol/L 14.0*  --   --  12.0* 14.0*   BUN mg/dL 24*  --   --  45* 47*   CREATININE mg/dL 1.59*  --   --  1.66* 1.80*   CALCIUM mg/dL 8.1*  --   --  8.9 8.5*   BILIRUBIN mg/dL  --   --   --   --  0.3   ALK PHOS U/L  --   --   --   --  114   ALT (SGPT) U/L  --   --   --   --  28   AST (SGOT) U/L  --   --   --   --  24   GLUCOSE mg/dL 271*  --   --  399* 309*       Culture Data:   Blood Culture   Date Value Ref Range Status   04/27/2023 No growth at 2 days  Preliminary   04/27/2023 No growth at 2 days  Preliminary     Urine Culture   Date Value Ref Range Status   04/26/2023 No growth  Final     No results found for: RESPCX  No results found for: WOUNDCX  No results found for: STOOLCX  No components found for: BODYFLD    Radiology Data:   Imaging Results (Last 24 Hours)     Procedure Component Value Units Date/Time    US Renal Bilateral [[id number]] Collected: 04/29/23 1144     Updated: 04/29/23 1219    Narrative:      COMPARISON:  4/27/2023.    TECHNIQUE:  High-resolution gray scale images of the bilateral kidneys and bladder were  obtained.    FINDINGS:  Compared to the previous study two days ago there is now significant echogenic  material within both collecting systems bilaterally producing severe artifact.  This is likely representative of gas given the patient undergoing continuous  bladder irrigation.  The degree of artifact present makes it difficult to assess  for persistent hydronephrosis but on a few of the images it does appear the  hydronephrosis is improved.  A John catheter is seen within the urinary  bladder.          I have reviewed the patient's current medications.     Assessment/Plan     I have utilized all available immediate resources to obtain, update, or review the patient's current medications (including all prescriptions, over-the-counter products, herbals, cannabis/cannabidiol products, and vitamin/mineral/dietary (nutritional) supplements).       Active Hospital Problems    Diagnosis    • **Hematuria    • Gross hematuria    • Hyponatremia    • Hypokalemia    • CKD (chronic kidney disease) stage 3, GFR 30-59 ml/min    • Acute UTI (urinary tract infection)        Medical Decision Making  Number and Complexity of problems: 1 High 5 Moderate    Conditions and Status:        Condition is improving.     [facility] Data  External documents reviewed: None today  My EKG interpretation: None today  My plain film interpretation: None today  Tests considered but not ordered: None today       Discussed with: Patient, RN     Treatment Plan  1. Hematuria  -Urology consulted, appreciate Dr Fellow's assistance  -CBI  -cysto pending    2. UTI  -Ceftriaxaone empirically  -culture negative    3.CKD III, creat at baseline 1.6  -renally adjust medications, avoid nephrotoxins    4. Hyponatremia  -improved with IVF    5. Hypokalemia  -Electrolyte replacement therapy    6. IDDM type 2  -Increase lantus to 30 units SC BID    Care Planning  Shared decision making: With patient  Code status and discussions: Full Code    Disposition  Social Determinants of Health that impact treatment or disposition: n/a  I expect the patient to be discharged to home in 1-2 days.      I confirmed that the patient's Advance Care Plan is present, code status is documented, or surrogate decision maker is listed in the patient's medical record.   ________________________________        Copied text in this note has been reviewed and is accurate as of 04/29/23       Electronically signed by TIFFANIE Dominguez, 04/29/23, 12:47 CDT.      Electronically signed by Kevin Perry MD at 04/29/23 1816     Adryan Prater APRN at 04/29/23 0908     Attestation signed by Christofer Calderon MD at 04/29/23 1202    I have reviewed this documentation and agree.                     LOS: 1 day   Patient Care Team:  Shayne Merritt MD as PCP - General (Family Medicine)    Subjective     Subjective:  Symptoms:   "Stable.        History taken from: patient chart    Objective     Vital Signs  Temp:  [97.4 °F (36.3 °C)-99 °F (37.2 °C)] 97.8 °F (36.6 °C)  Heart Rate:  [76-97] 97  Resp:  [16-20] 18  BP: (111-131)/(66-79) 119/71    Objective:  General Appearance:  In no acute distress.    Vital signs: (most recent): Blood pressure 119/71, pulse 97, temperature 97.8 °F (36.6 °C), temperature source Oral, resp. rate 18, height 170.2 cm (67\"), weight 61 kg (134 lb 8 oz), SpO2 99 %.  Vital signs are normal.  No fever.    Output: Producing urine.    Lungs:  Normal effort and normal respiratory rate.    Abdomen: Abdomen is soft and non-distended.  There is no abdominal tenderness.     Neurological: Patient is alert.    Pupils:  Pupils are equal, round, and reactive to light.    Skin:  Warm and dry.              Results Review:    Lab Results (last 24 hours)     Procedure Component Value Units Date/Time    Magnesium [[id number]]  (Normal) Collected: 04/29/23 0647    Specimen: Blood Updated: 04/29/23 0711     Magnesium 2.1 mg/dL     Blood Culture - Blood, Arm, Left [[id number]]  (Normal) Collected: 04/27/23 0625    Specimen: Blood from Arm, Left Updated: 04/29/23 0645     Blood Culture No growth at 2 days    Blood Culture - Blood, Arm, Right [[id number]]  (Normal) Collected: 04/27/23 0622    Specimen: Blood from Arm, Right Updated: 04/29/23 0645     Blood Culture No growth at 2 days    POC Glucose Once [[id number]]  (Abnormal) Collected: 04/29/23 0609    Specimen: Blood Updated: 04/29/23 0623     Glucose 254 mg/dL      Comment: RN NotifiedOperator: [id number] MICHAELLE Moore ID: [id number]       Potassium [[id number]]  (Abnormal) Collected: 04/29/23 0051    Specimen: Blood Updated: 04/29/23 0113     Potassium 3.0 mmol/L     POC Glucose Once [[id number]]  (Abnormal) Collected: 04/28/23 2008    Specimen: Blood Updated: 04/28/23 2031     Glucose 309 mg/dL      Comment: RN NotifiedOperator: [id number] MICHAELLE GARCIAMeter ID: [id number]       POC " Glucose Once [[id number]]  (Abnormal) Collected: 04/28/23 1642    Specimen: Blood Updated: 04/28/23 2030     Glucose 208 mg/dL      Comment: RN NotifiedOperator: [id number] MANUEL Keenan ID: [id number]       Urine Culture - Urine, Urine, Clean Catch [[id number]]  (Normal) Collected: 04/26/23 2130    Specimen: Urine, Clean Catch Updated: 04/28/23 1158     Urine Culture No growth    POC Glucose Once [[id number]]  (Abnormal) Collected: 04/28/23 1009    Specimen: Blood Updated: 04/28/23 1045     Glucose 356 mg/dL      Comment: RN NotifiedOperator: [id number] MANUEL ATKINSONMeter ID: [id number]            Imaging Results (Last 24 Hours)     ** No results found for the last 24 hours. **           I reviewed the patient's new clinical results.  I reviewed the patient's new imaging results and agree with the interpretation.  I reviewed the patient's other test results and agree with the interpretation      Assessment & Plan       Hematuria    Acute UTI (urinary tract infection)    Hyponatremia    Hypokalemia    CKD (chronic kidney disease) stage 3, GFR 30-59 ml/min    Gross hematuria      Assessment & Plan    Favio Casillas is a 68 y.o. male who has gross hematuria, UTI culture no growth; imaging showed bilateral hydronephrosis with 4.1 cm round bladder mass or filling defect and neoplasm must be excluded.   -Estimated Creatinine Clearance: 37.8 mL/min (A) (by C-G formula based on SCr of 1.66 mg/dL (H)).    Plan:  Repeat renal ultrasound and plan to remove John next    TIFFANIE Michele  04/29/23  09:24 CDT        Electronically signed by [place], Christofer VELAZQUEZ MD at 04/29/23 0649

## 2023-05-03 ENCOUNTER — READMISSION MANAGEMENT (OUTPATIENT)
Dept: CALL CENTER | Facility: HOSPITAL | Age: 68
End: 2023-05-03
Payer: MEDICARE

## 2023-05-03 VITALS
DIASTOLIC BLOOD PRESSURE: 74 MMHG | HEIGHT: 67 IN | TEMPERATURE: 98 F | SYSTOLIC BLOOD PRESSURE: 128 MMHG | BODY MASS INDEX: 21.99 KG/M2 | RESPIRATION RATE: 18 BRPM | HEART RATE: 94 BPM | WEIGHT: 140.1 LBS | OXYGEN SATURATION: 98 %

## 2023-05-03 PROBLEM — I50.22 CHRONIC HFREF (HEART FAILURE WITH REDUCED EJECTION FRACTION): Status: ACTIVE | Noted: 2023-05-03

## 2023-05-03 PROBLEM — I50.22 CHRONIC SYSTOLIC HEART FAILURE: Status: ACTIVE | Noted: 2023-05-03

## 2023-05-03 LAB
ANION GAP SERPL CALCULATED.3IONS-SCNC: 8 MMOL/L (ref 5–15)
BUN SERPL-MCNC: 28 MG/DL (ref 8–23)
BUN/CREAT SERPL: 19.3 (ref 7–25)
CALCIUM SPEC-SCNC: 8.6 MG/DL (ref 8.6–10.5)
CHLORIDE SERPL-SCNC: 112 MMOL/L (ref 98–107)
CO2 SERPL-SCNC: 14 MMOL/L (ref 22–29)
CREAT SERPL-MCNC: 1.45 MG/DL (ref 0.76–1.27)
EGFRCR SERPLBLD CKD-EPI 2021: 53.1 ML/MIN/1.73
GLUCOSE BLDC GLUCOMTR-MCNC: 146 MG/DL (ref 70–130)
GLUCOSE BLDC GLUCOMTR-MCNC: 247 MG/DL (ref 70–130)
GLUCOSE SERPL-MCNC: 155 MG/DL (ref 65–99)
MAGNESIUM SERPL-MCNC: 2.2 MG/DL (ref 1.6–2.4)
POTASSIUM SERPL-SCNC: 3.6 MMOL/L (ref 3.5–5.2)
SODIUM SERPL-SCNC: 134 MMOL/L (ref 136–145)
WHOLE BLOOD HOLD SPECIMEN: NORMAL

## 2023-05-03 PROCEDURE — 94799 UNLISTED PULMONARY SVC/PX: CPT

## 2023-05-03 PROCEDURE — 94760 N-INVAS EAR/PLS OXIMETRY 1: CPT

## 2023-05-03 PROCEDURE — 82948 REAGENT STRIP/BLOOD GLUCOSE: CPT

## 2023-05-03 PROCEDURE — 83735 ASSAY OF MAGNESIUM: CPT | Performed by: NURSE PRACTITIONER

## 2023-05-03 PROCEDURE — 63710000001 INSULIN ASPART PER 5 UNITS

## 2023-05-03 PROCEDURE — 63710000001 INSULIN DETEMIR PER 5 UNITS: Performed by: NURSE PRACTITIONER

## 2023-05-03 PROCEDURE — 80048 BASIC METABOLIC PNL TOTAL CA: CPT | Performed by: NURSE PRACTITIONER

## 2023-05-03 PROCEDURE — 63710000001 INSULIN ASPART PER 5 UNITS: Performed by: NURSE PRACTITIONER

## 2023-05-03 RX ADMIN — POTASSIUM CHLORIDE 40 MEQ: 750 CAPSULE, EXTENDED RELEASE ORAL at 10:28

## 2023-05-03 RX ADMIN — BUDESONIDE AND FORMOTEROL FUMARATE DIHYDRATE 2 PUFF: 160; 4.5 AEROSOL RESPIRATORY (INHALATION) at 08:15

## 2023-05-03 RX ADMIN — INSULIN DETEMIR 30 UNITS: 100 INJECTION, SOLUTION SUBCUTANEOUS at 07:47

## 2023-05-03 RX ADMIN — INSULIN ASPART 5 UNITS: 100 INJECTION, SOLUTION INTRAVENOUS; SUBCUTANEOUS at 12:09

## 2023-05-03 RX ADMIN — PRAVASTATIN SODIUM 40 MG: 40 TABLET ORAL at 07:45

## 2023-05-03 RX ADMIN — PANTOPRAZOLE SODIUM 40 MG: 40 TABLET, DELAYED RELEASE ORAL at 07:45

## 2023-05-03 RX ADMIN — FUROSEMIDE 20 MG: 20 TABLET ORAL at 07:45

## 2023-05-03 RX ADMIN — Medication 10 ML: at 07:48

## 2023-05-03 RX ADMIN — CARVEDILOL 6.25 MG: 6.25 TABLET, FILM COATED ORAL at 07:45

## 2023-05-03 RX ADMIN — SERTRALINE HYDROCHLORIDE 50 MG: 50 TABLET ORAL at 07:45

## 2023-05-03 RX ADMIN — INSULIN ASPART 5 UNITS: 100 INJECTION, SOLUTION INTRAVENOUS; SUBCUTANEOUS at 07:46

## 2023-05-03 RX ADMIN — INSULIN ASPART 8 UNITS: 100 INJECTION, SOLUTION INTRAVENOUS; SUBCUTANEOUS at 12:09

## 2023-05-03 NOTE — PROGRESS NOTES
"   LOS: 5 days   Patient Care Team:  Shayne Merritt MD as PCP - General (Family Medicine)    Subjective     Subjective:  Symptoms:  Stable.        History taken from: patient chart family    Objective     Vital Signs  Temp:  [97.3 °F (36.3 °C)-98.4 °F (36.9 °C)] 98.4 °F (36.9 °C)  Heart Rate:  [80-95] 95  Resp:  [16-18] 16  BP: (113-139)/(69-76) 123/76    Objective:  General Appearance:  In no acute distress.    Vital signs: (most recent): Blood pressure 123/76, pulse 95, temperature 98.4 °F (36.9 °C), temperature source Oral, resp. rate 16, height 170.2 cm (67\"), weight 63.5 kg (140 lb 1.6 oz), SpO2 98 %.  Vital signs are normal.    Output: Producing urine.    Lungs:  Normal effort and normal respiratory rate.    Abdomen: Abdomen is soft and non-distended.  There is no abdominal tenderness.     Neurological: Patient is alert and oriented to person, place and time.    Pupils:  Pupils are equal, round, and reactive to light.    Skin:  Warm, dry and pale.              Results Review:    Lab Results (last 24 hours)     Procedure Component Value Units Date/Time    POC Glucose Once [[id number]]  (Abnormal) Collected: 05/03/23 1037    Specimen: Blood Updated: 05/03/23 1113     Glucose 247 mg/dL      Comment: RN NotifiedOperator: [id number] JOANNA REBAMeter ID: [id number]       Magnesium [[id number]]  (Normal) Collected: 05/03/23 0603    Specimen: Blood Updated: 05/03/23 1101     Magnesium 2.2 mg/dL     Extra Tubes [[id number]] Collected: 05/03/23 0603    Specimen: Blood, Venous Line Updated: 05/03/23 0715    Narrative:      The following orders were created for panel order Extra Tubes.  Procedure                               Abnormality         Status                     ---------                               -----------         ------                     Lavender Top[[id number]]                                     Final result                 Please view results for these tests on the individual orders.    " Dianelys Top [[id number]] Collected: 05/03/23 0603    Specimen: Blood Updated: 05/03/23 0715     Extra Tube hold for add-on     Comment: Auto resulted       Basic Metabolic Panel [[id number]]  (Abnormal) Collected: 05/03/23 0603    Specimen: Blood Updated: 05/03/23 0644     Glucose 155 mg/dL      BUN 28 mg/dL      Creatinine 1.45 mg/dL      Sodium 134 mmol/L      Potassium 3.6 mmol/L      Chloride 112 mmol/L      CO2 14.0 mmol/L      Calcium 8.6 mg/dL      BUN/Creatinine Ratio 19.3     Anion Gap 8.0 mmol/L      eGFR 53.1 mL/min/1.73     Narrative:      GFR Normal >60  Chronic Kidney Disease <60  Kidney Failure <15      POC Glucose Once [[id number]]  (Abnormal) Collected: 05/03/23 0621    Specimen: Blood Updated: 05/03/23 0640     Glucose 146 mg/dL      Comment: RN NotifiedOperator: [id number] ADRIAN LACEYMeter ID: [id number]       POC Glucose Once [[id number]]  (Abnormal) Collected: 05/02/23 1927    Specimen: Blood Updated: 05/02/23 1939     Glucose 210 mg/dL      Comment: RN NotifiedOperator: [id number] ADRIAN LACEYMeter ID: [id number]       POC Glucose Once [[id number]]  (Abnormal) Collected: 05/02/23 1616    Specimen: Blood Updated: 05/02/23 1635     Glucose 239 mg/dL      Comment: RN NotifiedOperator: [id number] LEA LI ANNMeter ID: [id number]            Imaging Results (Last 24 Hours)     ** No results found for the last 24 hours. **           I reviewed the patient's new clinical results.  I reviewed the patient's new imaging results and agree with the interpretation.  I reviewed the patient's other test results and agree with the interpretation      Assessment & Plan       Hematuria    Acute UTI (urinary tract infection)    Hyponatremia    Hypokalemia    CKD (chronic kidney disease) stage 3, GFR 30-59 ml/min    Gross hematuria      Assessment & Plan    Favio Casillas is a 68 y.o. male who has gross hematuria, UTI culture no growth; imaging showed bilateral hydronephrosis with 4.1 cm round  bladder mass or filling defect and neoplasm must be excluded. CBI discontinued, John removed 5/1/23, denies retention but has a history of NGB and SIC x 3 daily at home; started straight catheterization yesterday with low volumes; voiding on his own  Estimated Creatinine Clearance: 45 mL/min (A) (by C-G formula based on SCr of 1.45 mg/dL (H)).     Plan:  SIC x 3 daily PRN retention  Outpatient cystoscopy  Urology will sign off, call if needed    TIFFANIE Michele  05/03/23  11:28 CDT

## 2023-05-03 NOTE — DISCHARGE SUMMARY
[facility] Medicine Services  DISCHARGE SUMMARY       Date of Admission: 4/26/2023  Date of Discharge:  5/3/2023  Primary Care Physician: Shayne Merritt MD    Presenting Problem/History of Present Illness:  Hematuria [R31.9]  Gross hematuria [R31.0]  Chronic HFrEF (heart failure with reduced ejection fraction) [I50.22]       Final Discharge Diagnoses:  Active Hospital Problems    Diagnosis    • **Hematuria    • Chronic systolic heart failure (CMS/HCC)    • Chronic HFrEF (heart failure with reduced ejection fraction)    • Gross hematuria    • Hyponatremia    • Hypokalemia    • CKD (chronic kidney disease) stage 3, GFR 30-59 ml/min    • Acute UTI (urinary tract infection)        Consults:   Consults     Date and Time Order Name Status Description    4/27/2023  8:25 AM Inpatient Urology Consult Completed           Procedures Performed:                 Pertinent Test Results:   Lab Results (most recent)     Procedure Component Value Units Date/Time    POC Glucose Once [[id number]]  (Abnormal) Collected: 05/03/23 1037    Specimen: Blood Updated: 05/03/23 1113     Glucose 247 mg/dL      Comment: RN NotifiedOperator: [id number] JOANNA REBAMeter ID: [id number]       Magnesium [[id number]]  (Normal) Collected: 05/03/23 0603    Specimen: Blood Updated: 05/03/23 1101     Magnesium 2.2 mg/dL     Extra Tubes [[id number]] Collected: 05/03/23 0603    Specimen: Blood, Venous Line Updated: 05/03/23 0715    Narrative:      The following orders were created for panel order Extra Tubes.  Procedure                               Abnormality         Status                     ---------                               -----------         ------                     Lavender Top[[id number]]                                     Final result                 Please view results for these tests on the individual orders.    Lavender Top [[id number]] Collected: 05/03/23 0603    Specimen:  Blood Updated: 05/03/23 0715     Extra Tube hold for add-on     Comment: Auto resulted       Basic Metabolic Panel [[id number]]  (Abnormal) Collected: 05/03/23 0603    Specimen: Blood Updated: 05/03/23 0644     Glucose 155 mg/dL      BUN 28 mg/dL      Creatinine 1.45 mg/dL      Sodium 134 mmol/L      Potassium 3.6 mmol/L      Chloride 112 mmol/L      CO2 14.0 mmol/L      Calcium 8.6 mg/dL      BUN/Creatinine Ratio 19.3     Anion Gap 8.0 mmol/L      eGFR 53.1 mL/min/1.73     Narrative:      GFR Normal >60  Chronic Kidney Disease <60  Kidney Failure <15      POC Glucose Once [[id number]]  (Abnormal) Collected: 05/03/23 0621    Specimen: Blood Updated: 05/03/23 0640     Glucose 146 mg/dL      Comment: RN NotifiedOperator: [id number] ADRIAN LACEYMeter ID: [id number]       Extra Tubes [[id number]] Collected: 05/02/23 0714    Specimen: Blood, Venous Line Updated: 05/02/23 0816    Narrative:      The following orders were created for panel order Extra Tubes.  Procedure                               Abnormality         Status                     ---------                               -----------         ------                     Lavender Top[[id number]]                                     Final result                 Please view results for these tests on the individual orders.    Lavender Top [[id number]] Collected: 05/02/23 0714    Specimen: Blood Updated: 05/02/23 0816     Extra Tube hold for add-on     Comment: Auto resulted       Magnesium [[id number]]  (Normal) Collected: 05/02/23 0657    Specimen: Blood Updated: 05/02/23 0753     Magnesium 2.4 mg/dL     Basic Metabolic Panel [[id number]]  (Abnormal) Collected: 05/02/23 0657    Specimen: Blood Updated: 05/02/23 0753     Glucose 229 mg/dL      BUN 30 mg/dL      Creatinine 1.65 mg/dL      Sodium 137 mmol/L      Potassium 3.8 mmol/L      Chloride 114 mmol/L      CO2 13.0 mmol/L      Calcium 8.2 mg/dL      BUN/Creatinine Ratio 18.2     Anion Gap 10.0 mmol/L      eGFR  45.5 mL/min/1.73     Narrative:      GFR Normal >60  Chronic Kidney Disease <60  Kidney Failure <15      Blood Culture - Blood, Arm, Right [[id number]]  (Normal) Collected: 04/27/23 0622    Specimen: Blood from Arm, Right Updated: 05/02/23 0646     Blood Culture No growth at 5 days    Blood Culture - Blood, Arm, Left [[id number]]  (Normal) Collected: 04/27/23 0625    Specimen: Blood from Arm, Left Updated: 05/02/23 0646     Blood Culture No growth at 5 days    Potassium [[id number]]  (Normal) Collected: 05/01/23 1838    Specimen: Blood Updated: 05/01/23 1858     Potassium 4.0 mmol/L      Comment: Slight hemolysis detected by analyzer. Results may be affected.       CBC & Differential [[id number]]  (Abnormal) Collected: 05/01/23 0844    Specimen: Blood Updated: 05/01/23 0901    Narrative:      The following orders were created for panel order CBC & Differential.  Procedure                               Abnormality         Status                     ---------                               -----------         ------                     CBC Auto Differential[[id number]]        Abnormal            Final result                 Please view results for these tests on the individual orders.    CBC Auto Differential [[id number]]  (Abnormal) Collected: 05/01/23 0844    Specimen: Blood Updated: 05/01/23 0901     WBC 6.83 10*3/mm3      RBC 3.39 10*6/mm3      Hemoglobin 9.7 g/dL      Hematocrit 29.7 %      MCV 87.6 fL      MCH 28.6 pg      MCHC 32.7 g/dL      RDW 15.0 %      RDW-SD 47.7 fl      MPV 9.6 fL      Platelets 223 10*3/mm3      Neutrophil % 76.5 %      Lymphocyte % 14.6 %      Monocyte % 6.6 %      Eosinophil % 1.2 %      Basophil % 0.4 %      Immature Grans % 0.7 %      Neutrophils, Absolute 5.22 10*3/mm3      Lymphocytes, Absolute 1.00 10*3/mm3      Monocytes, Absolute 0.45 10*3/mm3      Eosinophils, Absolute 0.08 10*3/mm3      Basophils, Absolute 0.03 10*3/mm3      Immature Grans, Absolute 0.05 10*3/mm3      nRBC 0.9 /100  WBC     Potassium [[id number]]  (Normal) Collected: 04/29/23 1537    Specimen: Blood Updated: 04/29/23 1635     Potassium 4.2 mmol/L      Comment: Slight hemolysis detected by analyzer. Results may be affected.       CBC & Differential [[id number]]  (Abnormal) Collected: 04/29/23 0631    Specimen: Blood Updated: 04/29/23 1136    Narrative:      The following orders were created for panel order CBC & Differential.  Procedure                               Abnormality         Status                     ---------                               -----------         ------                     CBC Auto Differential[[id number]]        Abnormal            Final result                 Please view results for these tests on the individual orders.    CBC Auto Differential [[id number]]  (Abnormal) Collected: 04/29/23 0631    Specimen: Blood Updated: 04/29/23 1136     WBC 8.30 10*3/mm3      RBC 3.64 10*6/mm3      Hemoglobin 10.3 g/dL      Hematocrit 31.9 %      MCV 87.6 fL      MCH 28.3 pg      MCHC 32.3 g/dL      RDW 14.9 %      RDW-SD 48.4 fl      MPV 10.1 fL      Platelets 217 10*3/mm3      Neutrophil % 77.9 %      Lymphocyte % 13.4 %      Monocyte % 7.3 %      Eosinophil % 0.5 %      Basophil % 0.2 %      Immature Grans % 0.7 %      Neutrophils, Absolute 6.46 10*3/mm3      Lymphocytes, Absolute 1.11 10*3/mm3      Monocytes, Absolute 0.61 10*3/mm3      Eosinophils, Absolute 0.04 10*3/mm3      Basophils, Absolute 0.02 10*3/mm3      Immature Grans, Absolute 0.06 10*3/mm3      nRBC 0.5 /100 WBC     Urine Culture - Urine, Urine, Clean Catch [[id number]]  (Normal) Collected: 04/26/23 2130    Specimen: Urine, Clean Catch Updated: 04/28/23 1158     Urine Culture No growth    Lactic Acid, Plasma [[id number]]  (Normal) Collected: 04/27/23 0625    Specimen: Blood Updated: 04/27/23 0653     Lactate 0.6 mmol/L     [place] Draw [[id number]] Collected: 04/26/23 2147    Specimen: Blood Updated: 04/26/23 2300    Narrative:      The following orders  were created for panel order [place] Draw.  Procedure                               Abnormality         Status                     ---------                               -----------         ------                     Green Top (Gel)[[id number]]                                  Final result               Lavender Top[[id number]]                                     Final result               Gold Top - SST[[id number]]                                   Final result               Light Blue Top[[id number]]                                   Final result                 Please view results for these tests on the individual orders.    Green Top (Gel) [[id number]] Collected: 04/26/23 2147    Specimen: Blood Updated: 04/26/23 2300     Extra Tube Hold for add-ons.     Comment: Auto resulted.       Gold Top - SST [[id number]] Collected: 04/26/23 2147    Specimen: Blood Updated: 04/26/23 2300     Extra Tube Hold for add-ons.     Comment: Auto resulted.       Light Blue Top [[id number]] Collected: 04/26/23 2147    Specimen: Blood Updated: 04/26/23 2300     Extra Tube Hold for add-ons.     Comment: Auto resulted       aPTT [[id number]]  (Normal) Collected: 04/26/23 2147    Specimen: Blood Updated: 04/26/23 2215     PTT 25.4 seconds     Narrative:      The recommended Heparin therapeutic range is 68-97 seconds.    Protime-INR [[id number]]  (Normal) Collected: 04/26/23 2147    Specimen: Blood Updated: 04/26/23 2215     Protime 13.7 Seconds      INR 1.05    Narrative:      Therapeutic range for most indications is 2.0-3.0 INR,  or 2.5-3.5 for mechanical heart valves.    Comprehensive Metabolic Panel [[id number]]  (Abnormal) Collected: 04/26/23 2147    Specimen: Blood Updated: 04/26/23 2205     Glucose 309 mg/dL      BUN 47 mg/dL      Creatinine 1.80 mg/dL      Sodium 130 mmol/L      Potassium 3.0 mmol/L      Chloride 104 mmol/L      CO2 14.0 mmol/L      Calcium 8.5 mg/dL      Total Protein 6.3 g/dL      Albumin 3.5 g/dL      ALT (SGPT) 28  U/L      AST (SGOT) 24 U/L      Alkaline Phosphatase 114 U/L      Total Bilirubin 0.3 mg/dL      Globulin 2.8 gm/dL      A/G Ratio 1.3 g/dL      BUN/Creatinine Ratio 26.1     Anion Gap 12.0 mmol/L      eGFR 41.0 mL/min/1.73     Narrative:      GFR Normal >60  Chronic Kidney Disease <60  Kidney Failure <15      Urinalysis, Microscopic Only - Urine, Clean Catch [[id number]]  (Abnormal) Collected: 04/26/23 2130    Specimen: Urine, Clean Catch Updated: 04/26/23 2203     RBC, UA Too Numerous to Count /HPF      WBC, UA 13-20 /HPF      Bacteria, UA 1+ /HPF      Squamous Epithelial Cells, UA 6-12 /HPF      Hyaline Casts, UA None Seen /LPF      Mucus, UA Moderate/2+ /HPF      Methodology Manual Light Microscopy    Urinalysis With Microscopic If Indicated (No Culture) - Urine, Clean Catch [[id number]]  (Abnormal) Collected: 04/26/23 2130    Specimen: Urine, Clean Catch Updated: 04/26/23 2148     Color, UA Red     Appearance, UA Cloudy     pH, UA 7.0     Specific Gravity, UA 1.015     Glucose,  mg/dL (Trace)     Ketones, UA Negative     Bilirubin, UA Small (1+)     Blood, UA Large (3+)     Protein, UA >=300 mg/dL (3+)     Leuk Esterase, UA Small (1+)     Nitrite, UA Positive     Urobilinogen, UA 0.2 E.U./dL        Imaging Results (Most Recent)     Procedure Component Value Units Date/Time    US Renal Bilateral [[id number]] Collected: 04/29/23 1144     Updated: 04/29/23 1219    Narrative:      COMPARISON:  4/27/2023.    TECHNIQUE:  High-resolution gray scale images of the bilateral kidneys and bladder were  obtained.    FINDINGS:  Compared to the previous study two days ago there is now significant echogenic  material within both collecting systems bilaterally producing severe artifact.  This is likely representative of gas given the patient undergoing continuous  bladder irrigation.  The degree of artifact present makes it difficult to assess  for persistent hydronephrosis but on a few of the images it does appear  the  hydronephrosis is improved.  A John catheter is seen within the urinary  bladder.    US Renal Bilateral [[id number]] Collected: 04/27/23 1015     Updated: 04/27/23 1219    Narrative:      PROCEDURE:  Complete retroperitoneal sonogram.    COMPARISON:  Renal ultrasound 9/6/2020.    HISTORY:  Hematuria.      TECHNIQUE:  High-resolution gray scale images of the bilateral kidneys and bladder were  obtained.    FINDINGS:  Realtime sonographic images are obtained of the kidneys and bladder. The kidneys  are normal in size and echogenicity. Mild bilateral hydronephrosis. The right  kidney measures 10.1 cm in length, the left kidney 11.1 cm in length.    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  There is a 4.1 cm masslike  filling defect in the bladder.      Impression:      1.  Mild bilateral hydronephrosis.    2.  There is a 4.1 cm round mass or filling defect in the bladder.  Malignancy  not excluded.  Visualization should be considered.              Chief Complaint on Day of Discharge: None    Hospital Course:  The patient is a 68 y.o. male who presented to [facility] with gross hematuria.  CT scan showed bilateral hydronephrosis with 4 cm bladder mass or filling defect neoplasm must be excluded.  Dr. Calderon was consulted, patient underwent CBI.  John removed 5/1/2023.  [place]lie treated for UTI culture was  Neg , completed 5 days of ceftriaxone.  Has a history of neurogenic bladder intermittent self-catheterization 3 times daily at home.  He is currently voiding on his own with low volumes.  No further hematuria.  Urology signed off today.  Patient is stable for discharge home and follow-up with urology in 1 week.  Cystoscopy as an outpatient.  His aspirin and Plavix will remain on hold. he requested a glucometer at discharge it was provided.  No further needs voiced.    Condition on Discharge: Stable    Physical Exam on Discharge:  /74   Pulse 94   Temp 98 °F (36.7 °C)   Resp  "18   Ht 170.2 cm (67\")   Wt 63.5 kg (140 lb 1.6 oz)   SpO2 98%   BMI 21.94 kg/m²      Physical Exam  Vitals reviewed.   HENT:      Head: Normocephalic.      Nose: Nose normal.      Mouth/Throat:      Pharynx: Oropharynx is clear.   Eyes:      Conjunctiva/sclera: Conjunctivae normal.   Cardiovascular:      Rate and Rhythm: Normal rate and regular rhythm.      Pulses: Normal pulses.      Heart sounds: Normal heart sounds.   Pulmonary:      Effort: Pulmonary effort is normal.   Abdominal:      General: Bowel sounds are normal.   Musculoskeletal:         General: Normal range of motion.   Skin:     General: Skin is warm.      Capillary Refill: Capillary refill takes less than 2 seconds.   Neurological:      General: No focal deficit present.      Mental Status: He is alert. Mental status is at baseline.   Psychiatric:         Thought Content: Thought content normal.            Discharge Disposition:  Home or Self Care    Discharge Medications:     Discharge Medications      Changes to Medications      Instructions Start Date   insulin detemir 100 UNIT/ML injection  Commonly known as: LEVEMIR  What changed: how much to take   30 Units, Subcutaneous, 2 Times Daily         Continue These Medications      Instructions Start Date   [employer] Blood Glucose Meter w/Device kit   1 each, Does not apply, 4 Times Daily PRN      albuterol (2.5 MG/3ML) 0.083% nebulizer solution  Commonly known as: PROVENTIL  Notes to patient: As needed   2.5 mg, Nebulization, Every 4 Hours PRN      albuterol sulfate  (90 Base) MCG/ACT inhaler  Commonly known as: Ventolin HFA  Notes to patient: As needed   2 puffs, Inhalation, Every 6 Hours PRN      Alcohol Prep pads   1 pad, Does not apply, 5 Times Daily      B-D ULTRAFINE III SHORT PEN 31G X 8 MM misc  Generic drug: Insulin Pen Needle   See Admin Instructions      Pen Needles 32G X 4 MM misc   1 each, Does not apply, 4 Times Daily, Use 4 x daily, Dx code E11.65      budesonide-formoterol " "80-4.5 MCG/ACT inhaler  Commonly known as: SYMBICORT   2 puffs, Inhalation, 2 Times Daily - RT      carvedilol 6.25 MG tablet  Commonly known as: COREG   6.25 mg, Oral, 2 Times Daily With Meals      cyclobenzaprine 10 MG tablet  Commonly known as: FLEXERIL   10 mg, Oral, 3 Times Daily PRN      Dexcom G6  device   1 each, Does not apply, Continuous      Dexcom G6 Sensor   Does not apply, Every 10 Days      Dexcom G6 Transmitter misc   1 EACH EVERY 3 MONTHS.      fluticasone 50 MCG/ACT nasal spray  Commonly known as: FLONASE   1 spray, Each Nare, Daily PRN, Shake before using.       freestyle lancets   Tid      furosemide 20 MG tablet  Commonly known as: LASIX   20 mg, Oral, Daily      glucose blood test strip   Use as instructed Contour Next EZ test strips      glucose monitor monitoring kit   1 each, Does not apply, 3 Times Daily      hydrOXYzine 25 MG tablet  Commonly known as: ATARAX   25 mg, Oral, Every 4 Hours PRN      Insulin Aspart 100 UNIT/ML injection  Commonly known as: novoLOG   0-20 Units, Subcutaneous, 3 Times Daily Before Meals      Insulin Syringe 29G X 1/2\" 0.5 ML misc   Inject 4 times daily      ipratropium-albuterol 0.5-2.5 mg/3 ml nebulizer  Commonly known as: DUO-NEB   3 mL, Nebulization, Every 4 Hours PRN      nitroglycerin 0.4 MG SL tablet  Commonly known as: NITROSTAT   0.4 mg, Sublingual, Every 5 Minutes PRN, Take no more than 3 doses in 15 minutes.       nortriptyline 10 MG capsule  Commonly known as: PAMELOR   10 mg, Oral, Nightly      ondansetron ODT 4 MG disintegrating tablet  Commonly known as: ZOFRAN-ODT   4 mg, Translingual, 4 Times Daily PRN      pantoprazole 40 MG EC tablet  Commonly known as: PROTONIX   40 mg, Oral, Daily      pravastatin 40 MG tablet  Commonly known as: PRAVACHOL   40 mg, Oral, Daily      sertraline 50 MG tablet  Commonly known as: ZOLOFT   50 mg, Oral, Daily         Stop These Medications    ASPIRIN 81 PO     clopidogrel 75 MG tablet  Commonly known as: " PLAVIX            Discharge Diet:   Diet Instructions     Diet: Cardiac Diets; Healthy Heart (2-3 Na+); Regular Texture (IDDSI 7); Thin (IDDSI 0)      Discharge Diet: Cardiac Diets    Cardiac Diet: Healthy Heart (2-3 Na+)    Texture: Regular Texture (IDDSI 7)    Fluid Consistency: Thin (IDDSI 0)          Activity at Discharge:   Activity Instructions     Gradually Increase Activity Until at Pre-Hospitalization Level            Discharge Care Plan/Instructions: Take medications as prescribed, keep follow up appointments. Return to ER if return of or worsening of symptoms.     Follow-up Appointments:   Future Appointments   Date Time Provider Department Center   5/19/2023  1:00 PM Shoaib Constanitno APRN [facility]                 Time:

## 2023-05-04 NOTE — OUTREACH NOTE
Prep Survey    Flowsheet Row Responses   [facility] facility patient discharged from? [facility]   Is LACE score < 7 ? No   Eligibility Not Eligible   What are the reasons patient is not eligible? Other   Does the patient have one of the following disease processes/diagnoses(primary or secondary)? Other   Prep survey completed? Yes          Nilam HAY - Registered Nurse

## 2023-05-04 NOTE — PAYOR COMM NOTE
"Soraya Bruce  [facility]  Case Management Extender  [phone] phone  [phone] fax      Ref# [id number]    Favio Owens (68 y.o. Male)     Date of Birth   1955    Social Security Number       Address   [address] [facility] [address]    Home Phone   [phone]    MRN   [id number]       Alevism   Islam    Marital Status                               Admission Date   4/26/23    Admission Type   Emergency    Admitting Provider   Kevin Perry MD    Attending Provider       Department, Room/Bed   [facility] 3 EAST, 383/1       Discharge Date   5/3/2023    Discharge Disposition   Home or Self Care    Discharge Destination                               Attending Provider: (none)   Allergies: No Known Allergies    Isolation: None   Infection: None   Code Status: Prior    Ht: 170.2 cm (67\")   Wt: 63.5 kg (140 lb 1.6 oz)    Admission Cmt: None   Principal Problem: Hematuria [R31.9]                 Active Insurance as of 4/26/2023     Primary Coverage     Payor Plan Insurance Group Employer/Plan Group    ANTHEM MEDICARE REPLACEMENT ANTHEM MEDICARE ADVANTAGE [id number]     Payor Plan Address Payor Plan Phone Number Payor Plan Fax Number Effective Dates    [id number] [phone]  8/1/2022 - None Entered    [facility] [address]       Subscriber Name Subscriber Birth Date Member ID       FAVIO OWENS 1955 [id number]           Secondary Coverage     Payor Plan Insurance Group Employer/Plan Group    [place]      Payor Plan Address Payor Plan Phone Number Payor Plan Fax Number Effective Dates    [address] [phone]  8/1/2017 - None Entered    [facility] [id number]       Subscriber Name Subscriber Birth Date Member ID       FAVIO OWENS 1955 [id number]                 Emergency Contacts      (Rel.) Home Phone Work Phone Mobile Phone    Sofi Mcdonald " (Friend) [phone] -- [phone]    GABRIELLE OWENS (Brother) [phone] -- [phone]               Discharge Summary      Elena Hall APRN at 05/03/23 1435     Attestation signed by Warren Huerta DO at 05/03/23 1750    I have reviewed this documentation and agree.                      [facility] Medicine Services  DISCHARGE SUMMARY       Date of Admission: 4/26/2023  Date of Discharge:  5/3/2023  Primary Care Physician: Shayne Merritt MD    Presenting Problem/History of Present Illness:  Hematuria [R31.9]  Gross hematuria [R31.0]  Chronic HFrEF (heart failure with reduced ejection fraction) [I50.22]       Final Discharge Diagnoses:  Active Hospital Problems    Diagnosis    • **Hematuria    • Chronic systolic heart failure (CMS/HCC)    • Chronic HFrEF (heart failure with reduced ejection fraction)    • Gross hematuria    • Hyponatremia    • Hypokalemia    • CKD (chronic kidney disease) stage 3, GFR 30-59 ml/min    • Acute UTI (urinary tract infection)        Consults:   Consults     Date and Time Order Name Status Description    4/27/2023  8:25 AM Inpatient Urology Consult Completed           Procedures Performed:                 Pertinent Test Results:   Lab Results (most recent)     Procedure Component Value Units Date/Time    POC Glucose Once [[id number]]  (Abnormal) Collected: 05/03/23 1037    Specimen: Blood Updated: 05/03/23 1113     Glucose 247 mg/dL      Comment: RN NotifiedOperator: [id number] JOANNA REBAMeter ID: [id number]       Magnesium [[id number]]  (Normal) Collected: 05/03/23 0603    Specimen: Blood Updated: 05/03/23 1101     Magnesium 2.2 mg/dL     Extra Tubes [[id number]] Collected: 05/03/23 0603    Specimen: Blood, Venous Line Updated: 05/03/23 0715    Narrative:      The following orders were created for panel order Extra Tubes.  Procedure                               Abnormality         Status                      ---------                               -----------         ------                     Lavender Top[[id number]]                                     Final result                 Please view results for these tests on the individual orders.    Lavender Top [[id number]] Collected: 05/03/23 0603    Specimen: Blood Updated: 05/03/23 0715     Extra Tube hold for add-on     Comment: Auto resulted       Basic Metabolic Panel [[id number]]  (Abnormal) Collected: 05/03/23 0603    Specimen: Blood Updated: 05/03/23 0644     Glucose 155 mg/dL      BUN 28 mg/dL      Creatinine 1.45 mg/dL      Sodium 134 mmol/L      Potassium 3.6 mmol/L      Chloride 112 mmol/L      CO2 14.0 mmol/L      Calcium 8.6 mg/dL      BUN/Creatinine Ratio 19.3     Anion Gap 8.0 mmol/L      eGFR 53.1 mL/min/1.73     Narrative:      GFR Normal >60  Chronic Kidney Disease <60  Kidney Failure <15      POC Glucose Once [[id number]]  (Abnormal) Collected: 05/03/23 0621    Specimen: Blood Updated: 05/03/23 0640     Glucose 146 mg/dL      Comment: RN NotifiedOperator: [id number] ADRIAN LACEYMeter ID: [id number]       Extra Tubes [[id number]] Collected: 05/02/23 0714    Specimen: Blood, Venous Line Updated: 05/02/23 0816    Narrative:      The following orders were created for panel order Extra Tubes.  Procedure                               Abnormality         Status                     ---------                               -----------         ------                     Lavender Top[[id number]]                                     Final result                 Please view results for these tests on the individual orders.    Lavender Top [[id number]] Collected: 05/02/23 0714    Specimen: Blood Updated: 05/02/23 0816     Extra Tube hold for add-on     Comment: Auto resulted       Magnesium [[id number]]  (Normal) Collected: 05/02/23 0657    Specimen: Blood Updated: 05/02/23 0753     Magnesium 2.4 mg/dL     Basic Metabolic Panel [[id number]]  (Abnormal) Collected:  05/02/23 0657    Specimen: Blood Updated: 05/02/23 0753     Glucose 229 mg/dL      BUN 30 mg/dL      Creatinine 1.65 mg/dL      Sodium 137 mmol/L      Potassium 3.8 mmol/L      Chloride 114 mmol/L      CO2 13.0 mmol/L      Calcium 8.2 mg/dL      BUN/Creatinine Ratio 18.2     Anion Gap 10.0 mmol/L      eGFR 45.5 mL/min/1.73     Narrative:      GFR Normal >60  Chronic Kidney Disease <60  Kidney Failure <15      Blood Culture - Blood, Arm, Right [[id number]]  (Normal) Collected: 04/27/23 0622    Specimen: Blood from Arm, Right Updated: 05/02/23 0646     Blood Culture No growth at 5 days    Blood Culture - Blood, Arm, Left [[id number]]  (Normal) Collected: 04/27/23 0625    Specimen: Blood from Arm, Left Updated: 05/02/23 0646     Blood Culture No growth at 5 days    Potassium [[id number]]  (Normal) Collected: 05/01/23 1838    Specimen: Blood Updated: 05/01/23 1858     Potassium 4.0 mmol/L      Comment: Slight hemolysis detected by analyzer. Results may be affected.       CBC & Differential [[id number]]  (Abnormal) Collected: 05/01/23 0844    Specimen: Blood Updated: 05/01/23 0901    Narrative:      The following orders were created for panel order CBC & Differential.  Procedure                               Abnormality         Status                     ---------                               -----------         ------                     CBC Auto Differential[[id number]]        Abnormal            Final result                 Please view results for these tests on the individual orders.    CBC Auto Differential [[id number]]  (Abnormal) Collected: 05/01/23 0844    Specimen: Blood Updated: 05/01/23 0901     WBC 6.83 10*3/mm3      RBC 3.39 10*6/mm3      Hemoglobin 9.7 g/dL      Hematocrit 29.7 %      MCV 87.6 fL      MCH 28.6 pg      MCHC 32.7 g/dL      RDW 15.0 %      RDW-SD 47.7 fl      MPV 9.6 fL      Platelets 223 10*3/mm3      Neutrophil % 76.5 %      Lymphocyte % 14.6 %      Monocyte % 6.6 %      Eosinophil % 1.2 %       Basophil % 0.4 %      Immature Grans % 0.7 %      Neutrophils, Absolute 5.22 10*3/mm3      Lymphocytes, Absolute 1.00 10*3/mm3      Monocytes, Absolute 0.45 10*3/mm3      Eosinophils, Absolute 0.08 10*3/mm3      Basophils, Absolute 0.03 10*3/mm3      Immature Grans, Absolute 0.05 10*3/mm3      nRBC 0.9 /100 WBC     Potassium [[id number]]  (Normal) Collected: 04/29/23 1537    Specimen: Blood Updated: 04/29/23 1635     Potassium 4.2 mmol/L      Comment: Slight hemolysis detected by analyzer. Results may be affected.       CBC & Differential [[id number]]  (Abnormal) Collected: 04/29/23 0631    Specimen: Blood Updated: 04/29/23 1136    Narrative:      The following orders were created for panel order CBC & Differential.  Procedure                               Abnormality         Status                     ---------                               -----------         ------                     CBC Auto Differential[[id number]]        Abnormal            Final result                 Please view results for these tests on the individual orders.    CBC Auto Differential [[id number]]  (Abnormal) Collected: 04/29/23 0631    Specimen: Blood Updated: 04/29/23 1136     WBC 8.30 10*3/mm3      RBC 3.64 10*6/mm3      Hemoglobin 10.3 g/dL      Hematocrit 31.9 %      MCV 87.6 fL      MCH 28.3 pg      MCHC 32.3 g/dL      RDW 14.9 %      RDW-SD 48.4 fl      MPV 10.1 fL      Platelets 217 10*3/mm3      Neutrophil % 77.9 %      Lymphocyte % 13.4 %      Monocyte % 7.3 %      Eosinophil % 0.5 %      Basophil % 0.2 %      Immature Grans % 0.7 %      Neutrophils, Absolute 6.46 10*3/mm3      Lymphocytes, Absolute 1.11 10*3/mm3      Monocytes, Absolute 0.61 10*3/mm3      Eosinophils, Absolute 0.04 10*3/mm3      Basophils, Absolute 0.02 10*3/mm3      Immature Grans, Absolute 0.06 10*3/mm3      nRBC 0.5 /100 WBC     Urine Culture - Urine, Urine, Clean Catch [[id number]]  (Normal) Collected: 04/26/23 2130    Specimen: Urine, Clean Catch Updated:  04/28/23 1158     Urine Culture No growth    Lactic Acid, Plasma [[id number]]  (Normal) Collected: 04/27/23 0625    Specimen: Blood Updated: 04/27/23 0653     Lactate 0.6 mmol/L     [place] Draw [[id number]] Collected: 04/26/23 2147    Specimen: Blood Updated: 04/26/23 2300    Narrative:      The following orders were created for panel order [place] Draw.  Procedure                               Abnormality         Status                     ---------                               -----------         ------                     Green Top (Gel)[[id number]]                                  Final result               Lavender Top[[id number]]                                     Final result               Gold Top - SST[[id number]]                                   Final result               Light Blue Top[[id number]]                                   Final result                 Please view results for these tests on the individual orders.    Green Top (Gel) [[id number]] Collected: 04/26/23 2147    Specimen: Blood Updated: 04/26/23 2300     Extra Tube Hold for add-ons.     Comment: Auto resulted.       Gold Top - SST [[id number]] Collected: 04/26/23 2147    Specimen: Blood Updated: 04/26/23 2300     Extra Tube Hold for add-ons.     Comment: Auto resulted.       Light Blue Top [[id number]] Collected: 04/26/23 2147    Specimen: Blood Updated: 04/26/23 2300     Extra Tube Hold for add-ons.     Comment: Auto resulted       aPTT [[id number]]  (Normal) Collected: 04/26/23 2147    Specimen: Blood Updated: 04/26/23 2215     PTT 25.4 seconds     Narrative:      The recommended Heparin therapeutic range is 68-97 seconds.    Protime-INR [[id number]]  (Normal) Collected: 04/26/23 2147    Specimen: Blood Updated: 04/26/23 2215     Protime 13.7 Seconds      INR 1.05    Narrative:      Therapeutic range for most indications is 2.0-3.0 INR,  or 2.5-3.5 for mechanical heart valves.    Comprehensive Metabolic Panel [[id number]]  (Abnormal) Collected:  04/26/23 2147    Specimen: Blood Updated: 04/26/23 2205     Glucose 309 mg/dL      BUN 47 mg/dL      Creatinine 1.80 mg/dL      Sodium 130 mmol/L      Potassium 3.0 mmol/L      Chloride 104 mmol/L      CO2 14.0 mmol/L      Calcium 8.5 mg/dL      Total Protein 6.3 g/dL      Albumin 3.5 g/dL      ALT (SGPT) 28 U/L      AST (SGOT) 24 U/L      Alkaline Phosphatase 114 U/L      Total Bilirubin 0.3 mg/dL      Globulin 2.8 gm/dL      A/G Ratio 1.3 g/dL      BUN/Creatinine Ratio 26.1     Anion Gap 12.0 mmol/L      eGFR 41.0 mL/min/1.73     Narrative:      GFR Normal >60  Chronic Kidney Disease <60  Kidney Failure <15      Urinalysis, Microscopic Only - Urine, Clean Catch [[id number]]  (Abnormal) Collected: 04/26/23 2130    Specimen: Urine, Clean Catch Updated: 04/26/23 2203     RBC, UA Too Numerous to Count /HPF      WBC, UA 13-20 /HPF      Bacteria, UA 1+ /HPF      Squamous Epithelial Cells, UA 6-12 /HPF      Hyaline Casts, UA None Seen /LPF      Mucus, UA Moderate/2+ /HPF      Methodology Manual Light Microscopy    Urinalysis With Microscopic If Indicated (No Culture) - Urine, Clean Catch [[id number]]  (Abnormal) Collected: 04/26/23 2130    Specimen: Urine, Clean Catch Updated: 04/26/23 2148     Color, UA Red     Appearance, UA Cloudy     pH, UA 7.0     Specific Gravity, UA 1.015     Glucose,  mg/dL (Trace)     Ketones, UA Negative     Bilirubin, UA Small (1+)     Blood, UA Large (3+)     Protein, UA >=300 mg/dL (3+)     Leuk Esterase, UA Small (1+)     Nitrite, UA Positive     Urobilinogen, UA 0.2 E.U./dL        Imaging Results (Most Recent)     Procedure Component Value Units Date/Time    US Renal Bilateral [[id number]] Collected: 04/29/23 1144     Updated: 04/29/23 1219    Narrative:      COMPARISON:  4/27/2023.    TECHNIQUE:  High-resolution gray scale images of the bilateral kidneys and bladder were  obtained.    FINDINGS:  Compared to the previous study two days ago there is now significant echogenic  material  within both collecting systems bilaterally producing severe artifact.  This is likely representative of gas given the patient undergoing continuous  bladder irrigation.  The degree of artifact present makes it difficult to assess  for persistent hydronephrosis but on a few of the images it does appear the  hydronephrosis is improved.  A John catheter is seen within the urinary  bladder.    US Renal Bilateral [[id number]] Collected: 04/27/23 1015     Updated: 04/27/23 1219    Narrative:      PROCEDURE:  Complete retroperitoneal sonogram.    COMPARISON:  Renal ultrasound 9/6/2020.    HISTORY:  Hematuria.      TECHNIQUE:  High-resolution gray scale images of the bilateral kidneys and bladder were  obtained.    FINDINGS:  Realtime sonographic images are obtained of the kidneys and bladder. The kidneys  are normal in size and echogenicity. Mild bilateral hydronephrosis. The right  kidney measures 10.1 cm in length, the left kidney 11.1 cm in length.    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  There is a 4.1 cm masslike  filling defect in the bladder.      Impression:      1.  Mild bilateral hydronephrosis.    2.  There is a 4.1 cm round mass or filling defect in the bladder.  Malignancy  not excluded.  Visualization should be considered.              Chief Complaint on Day of Discharge: None    Hospital Course:  The patient is a 68 y.o. male who [facility] to [facility] with gross hematuria.  CT scan showed bilateral hydronephrosis with 4 cm bladder mass or filling defect neoplasm must be excluded.  Dr. Calderon was consulted, patient underwent CBI.  John removed 5/1/2023.  Per[place]lie treated for UTI culture was  Neg , completed 5 days of ceftriaxone.  Has a history of neurogenic bladder intermittent self-catheterization 3 times daily at home.  He is currently voiding on his own with low volumes.  No further hematuria.  Urology signed off today.  Patient is stable for discharge home and  "follow-up with urology in 1 week.  Cystoscopy as an outpatient.  His aspirin and Plavix will remain on hold. he requested a glucometer at discharge it was provided.  No further needs voiced.    Condition on Discharge: Stable    Physical Exam on Discharge:  /74   Pulse 94   Temp 98 °F (36.7 °C)   Resp 18   Ht 170.2 cm (67\")   Wt 63.5 kg (140 lb 1.6 oz)   SpO2 98%   BMI 21.94 kg/m²      Physical Exam  Vitals reviewed.   HENT:      Head: Normocephalic.      Nose: Nose normal.      Mouth/Throat:      Pharynx: Oropharynx is clear.   Eyes:      Conjunctiva/sclera: Conjunctivae normal.   Cardiovascular:      Rate and Rhythm: Normal rate and regular rhythm.      Pulses: Normal pulses.      Heart sounds: Normal heart sounds.   Pulmonary:      Effort: Pulmonary effort is normal.   Abdominal:      General: Bowel sounds are normal.   Musculoskeletal:         General: Normal range of motion.   Skin:     General: Skin is warm.      Capillary Refill: Capillary refill takes less than 2 seconds.   Neurological:      General: No focal deficit present.      Mental Status: He is alert. Mental status is at baseline.   Psychiatric:         Thought Content: Thought content normal.            Discharge Disposition:  Home or Self Care    Discharge Medications:     Discharge Medications      Changes to Medications      Instructions Start Date   insulin detemir 100 UNIT/ML injection  Commonly known as: LEVEMIR  What changed: how much to take   30 Units, Subcutaneous, 2 Times Daily         Continue These Medications      Instructions Start Date   [employer] Blood Glucose Meter w/Device kit   1 each, Does not apply, 4 Times Daily PRN      albuterol (2.5 MG/3ML) 0.083% nebulizer solution  Commonly known as: PROVENTIL  Notes to patient: As needed   2.5 mg, Nebulization, Every 4 Hours PRN      albuterol sulfate  (90 Base) MCG/ACT inhaler  Commonly known as: Ventolin HFA  Notes to patient: As needed   2 puffs, Inhalation, Every 6 " "Hours PRN      Alcohol Prep pads   1 pad, Does not apply, 5 Times Daily      B-D ULTRAFINE III SHORT PEN 31G X 8 MM misc  Generic drug: Insulin Pen Needle   See Admin Instructions      Pen Needles 32G X 4 MM misc   1 each, Does not apply, 4 Times Daily, Use 4 x daily, Dx code E11.65      budesonide-formoterol 80-4.5 MCG/ACT inhaler  Commonly known as: SYMBICORT   2 puffs, Inhalation, 2 Times Daily - RT      carvedilol 6.25 MG tablet  Commonly known as: COREG   6.25 mg, Oral, 2 Times Daily With Meals      cyclobenzaprine 10 MG tablet  Commonly known as: FLEXERIL   10 mg, Oral, 3 Times Daily PRN      Dexcom G6  device   1 each, Does not apply, Continuous      Dexcom G6 Sensor   Does not apply, Every 10 Days      Dexcom G6 Transmitter misc   1 EACH EVERY 3 MONTHS.      fluticasone 50 MCG/ACT nasal spray  Commonly known as: FLONASE   1 spray, Each Nare, Daily PRN, Shake before using.       freestyle lancets   Tid      furosemide 20 MG tablet  Commonly known as: LASIX   20 mg, Oral, Daily      glucose blood test strip   Use as instructed Contour Next EZ test strips      glucose monitor monitoring kit   1 each, Does not apply, 3 Times Daily      hydrOXYzine 25 MG tablet  Commonly known as: ATARAX   25 mg, Oral, Every 4 Hours PRN      Insulin Aspart 100 UNIT/ML injection  Commonly known as: novoLOG   0-20 Units, Subcutaneous, 3 Times Daily Before Meals      Insulin Syringe 29G X 1/2\" 0.5 ML misc   Inject 4 times daily      ipratropium-albuterol 0.5-2.5 mg/3 ml nebulizer  Commonly known as: DUO-NEB   3 mL, Nebulization, Every 4 Hours PRN      nitroglycerin 0.4 MG SL tablet  Commonly known as: NITROSTAT   0.4 mg, Sublingual, Every 5 Minutes PRN, Take no more than 3 doses in 15 minutes.       nortriptyline 10 MG capsule  Commonly known as: PAMELOR   10 mg, Oral, Nightly      ondansetron ODT 4 MG disintegrating tablet  Commonly known as: ZOFRAN-ODT   4 mg, Translingual, 4 Times Daily PRN      pantoprazole 40 MG EC " tablet  Commonly known as: PROTONIX   40 mg, Oral, Daily      pravastatin 40 MG tablet  Commonly known as: PRAVACHOL   40 mg, Oral, Daily      sertraline 50 MG tablet  Commonly known as: ZOLOFT   50 mg, Oral, Daily         Stop These Medications    ASPIRIN 81 PO     clopidogrel 75 MG tablet  Commonly known as: PLAVIX            Discharge Diet:   Diet Instructions     Diet: Cardiac Diets; Healthy Heart (2-3 Na+); Regular Texture (IDDSI 7); Thin (IDDSI 0)      Discharge Diet: Cardiac Diets    Cardiac Diet: Healthy Heart (2-3 Na+)    Texture: Regular Texture (IDDSI 7)    Fluid Consistency: Thin (IDDSI 0)          Activity at Discharge:   Activity Instructions     Gradually Increase Activity Until at Pre-Hospitalization Level            Discharge Care Plan/Instructions: Take medications as prescribed, keep follow up appointments. Return to ER if return of or worsening of symptoms.     Follow-up Appointments:   Future Appointments   Date Time Provider Department Center   5/19/2023  1:00 PM Shoaib Constantino APRN [facility]                 Time:               Electronically signed by Yunior Madrigal DO at 05/03/23 1750       Discharge Order (From admission, onward)     Start     Ordered    05/03/23 1218  Discharge patient  Once        Expected Discharge Date: 05/03/23    Discharge Disposition: Home or Self Care    Physician of Record for Attribution - Please select from Treatment Team: YUNIOR MADRIGAL [[id number]]    Review needed by CMO to determine Physician of Record: No       Question Answer Comment   Physician of Record for Attribution - Please select from Treatment Team YUNIOR MADRIGAL    Review needed by CMO to determine Physician of Record No        05/03/23 6730

## 2023-05-11 NOTE — PROGRESS NOTES
Enter Query Response Below      Query Response: Hyponatremia clinically insignificant             If applicable, please update the problem list.     Thank you for your response to the CDI query.   When responding to queries sent by CDI, the preferred method is to respond with a New Note. By responding with a new note, the note will include your query response and signature.  How to Respond to this query:  a. Click New Note  b. Answer query within the yellow box.  c. Update the Problem List, if applicable.    ----- Message -----  From: Elena Hall APRN  Sent: 2023   4:34 PM CDT  To: Aicha Hale  Subject: RE: CDI Query                                         Hyponatremia-clinically insignificant      ----- Message -----  From: Aicha Hale  Sent: 2023   9:21 AM CDT  To: TIFFANIE Dominguez  Subject: CDI Query                                             Patient: Favio Casillas        : 1955  Account: [id number]           Admit Date: 23                                                    How to Respond to this query:                   a. Click New Note                b. Answer query within the yellow box.                c. Update the Problem List, if applicable.        If you have any questions about this query contact me at: [email or web address]      TIFFANIE Hall,      67 y/o noted with Hematuria, Urinary Tract Infection, Chronic Kidney Disease stage III, Hyponatremia, Hypokalemia, and Diabetes. On admit, sodium 130. On , sodium improved to 140. Patient treated with monitoring and IV Fluids.     After study, can the Hyponatremia be specified as:      >>Hyponatremia-clinically significant  >>Hyponatremia-clinically insignificant  >>Other (please specify)_______  >>Unable to determine     By submitting this query, we are merely seeking further clarification of documentation to accurately reflect all conditions that you are monitoring, evaluating, treating  or that extend the hospitalization or utilize additional resources of care. Please utilize your independent clinical judgment when addressing the question(s) above.      This query and your response, once completed, will be entered into the legal medical record.     Sincerely,  Aicha Hale RN CCDS   Clinical Documentation Integrity Program

## 2023-05-11 NOTE — PROGRESS NOTES
Enter Query Response Below      Query Response: UTI only             If applicable, please update the problem list.     Thank you for your response to the CDI query.   When responding to queries sent by CDI, the preferred method is to respond with a New Note. By responding with a new note, the note will include your query response and signature.  How to Respond to this query:  a. Click New Note  b. Answer query within the yellow box.  c. Update the Problem List, if applicable.    ----- Message -----  From: Elena Hall APRN  Sent: 2023   4:34 PM CDT  To: Aicha Hale  Subject: RE: CDI Query                                         UTI      ----- Message -----  From: Aicha Hale  Sent: 2023  11:52 AM CDT  To: TIFFANIE Dominguez  Subject: CDI Query                                             Patient: Favio Casillas        : 1955  Account: [id number]           Admit Date: 23                                                    How to Respond to this query:                   a. Click New Note                b. Answer query within the yellow box.                c. Update the Problem List, if applicable.        If you have any questions about this query contact me at: [email or web address]      TIFFANIE Hall,      67 y/o noted with Hematuria, Urinary Tract Infection, Chronic Kidney Disease stage III, Hyponatremia, Hypokalemia, and Diabetes. Urology Consult noted patient with a history of bladder cancer with cystectomy and neobladder. UA with: rbc tntc, wbc 13-20, bacteria 1+, squamous epith. cells 6-12, mucus moderate, leuk esterase small and nitrite positive. Urine culture with no growth. Patient treated with IV Rocephin  ordered to .      After study, can the patient's condition be specified as:      >>UTI due to/related to cystectomy and neobladder  >>UTI only  >>Other (please specify):___________  >>Unable to determine      By submitting this query, we  are merely seeking further clarification of documentation to accurately reflect all conditions that you are monitoring, evaluating, treating or that extend the hospitalization or utilize additional resources of care. Please utilize your independent clinical judgment when addressing the question(s) above.      This query and your response, once completed, will be entered into the legal medical record.     Sincerely,  Aicha Hale RN CCDS   Clinical Documentation Integrity Program

## 2023-05-23 NOTE — NURSING NOTE
MD called and notified of positive sepsis screen and FSBS trends on insulin gtt. Orders given to address FSBS. Continuing to monitor patient closely.  
No changes noted since 2030 assessment other than those charted. Continuing to monitor patient closely.     
Patient to room 322 via wheelchair and transport with a bicarb drip  
Unable to utilize K+ replacement protocol due to decreased creatinine clearance. K+ orders followed per Dr. Brody.  
None known

## 2023-05-30 ENCOUNTER — DOCUMENTATION (OUTPATIENT)
Dept: ENDOCRINOLOGY | Facility: CLINIC | Age: 68
End: 2023-05-30

## 2023-06-16 ENCOUNTER — HOSPITAL ENCOUNTER (EMERGENCY)
Facility: HOSPITAL | Age: 68
Discharge: HOME | End: 2023-06-17
Attending: FAMILY MEDICINE
Payer: MEDICARE

## 2023-06-16 DIAGNOSIS — E11.65 TYPE 2 DIABETES MELLITUS WITH HYPERGLYCEMIA, WITH LONG-TERM CURRENT USE OF INSULIN: ICD-10-CM

## 2023-06-16 DIAGNOSIS — R73.9 HYPERGLYCEMIA: Primary | ICD-10-CM

## 2023-06-16 DIAGNOSIS — K21.9 GASTROESOPHAGEAL REFLUX DISEASE, UNSPECIFIED WHETHER ESOPHAGITIS PRESENT: ICD-10-CM

## 2023-06-16 DIAGNOSIS — Z79.4 TYPE 2 DIABETES MELLITUS WITH HYPERGLYCEMIA, WITH LONG-TERM CURRENT USE OF INSULIN: ICD-10-CM

## 2023-06-16 LAB
ALBUMIN SERPL-MCNC: 3.8 G/DL (ref 3.5–5.2)
ALBUMIN/GLOB SERPL: 1.5 G/DL
ALP SERPL-CCNC: 106 U/L (ref 39–117)
ALT SERPL W P-5'-P-CCNC: 9 U/L (ref 1–41)
ANION GAP SERPL CALCULATED.3IONS-SCNC: 11 MMOL/L (ref 5–15)
AST SERPL-CCNC: 8 U/L (ref 1–40)
BACTERIA UR QL AUTO: ABNORMAL /HPF
BASOPHILS # BLD AUTO: 0.04 10*3/MM3 (ref 0–0.2)
BASOPHILS NFR BLD AUTO: 0.7 % (ref 0–1.5)
BILIRUB SERPL-MCNC: 0.2 MG/DL (ref 0–1.2)
BILIRUB UR QL STRIP: NEGATIVE
BUN SERPL-MCNC: 32 MG/DL (ref 8–23)
BUN/CREAT SERPL: 26.9 (ref 7–25)
CALCIUM SPEC-SCNC: 9.2 MG/DL (ref 8.6–10.5)
CHLORIDE SERPL-SCNC: 98 MMOL/L (ref 98–107)
CLARITY UR: ABNORMAL
CO2 SERPL-SCNC: 24 MMOL/L (ref 22–29)
COLOR UR: YELLOW
CREAT SERPL-MCNC: 1.19 MG/DL (ref 0.76–1.27)
DEPRECATED RDW RBC AUTO: 39.1 FL (ref 37–54)
EGFRCR SERPLBLD CKD-EPI 2021: 67.4 ML/MIN/1.73
EOSINOPHIL # BLD AUTO: 0.05 10*3/MM3 (ref 0–0.4)
EOSINOPHIL NFR BLD AUTO: 0.8 % (ref 0.3–6.2)
ERYTHROCYTE [DISTWIDTH] IN BLOOD BY AUTOMATED COUNT: 12.4 % (ref 12.3–15.4)
GLOBULIN UR ELPH-MCNC: 2.6 GM/DL
GLUCOSE SERPL-MCNC: 515 MG/DL (ref 65–99)
GLUCOSE UR STRIP-MCNC: ABNORMAL MG/DL
HCT VFR BLD AUTO: 38.6 % (ref 37.5–51)
HGB BLD-MCNC: 12.5 G/DL (ref 13–17.7)
HGB UR QL STRIP.AUTO: ABNORMAL
HOLD SPECIMEN: NORMAL
HOLD SPECIMEN: NORMAL
HYALINE CASTS UR QL AUTO: ABNORMAL /LPF
IMM GRANULOCYTES # BLD AUTO: 0.07 10*3/MM3 (ref 0–0.05)
IMM GRANULOCYTES NFR BLD AUTO: 1.2 % (ref 0–0.5)
KETONES UR QL STRIP: NEGATIVE
LEUKOCYTE ESTERASE UR QL STRIP.AUTO: NEGATIVE
LIPASE SERPL-CCNC: 40 U/L (ref 13–60)
LYMPHOCYTES # BLD AUTO: 1.56 10*3/MM3 (ref 0.7–3.1)
LYMPHOCYTES NFR BLD AUTO: 26.1 % (ref 19.6–45.3)
MCH RBC QN AUTO: 27.7 PG (ref 26.6–33)
MCHC RBC AUTO-ENTMCNC: 32.4 G/DL (ref 31.5–35.7)
MCV RBC AUTO: 85.6 FL (ref 79–97)
MONOCYTES # BLD AUTO: 0.55 10*3/MM3 (ref 0.1–0.9)
MONOCYTES NFR BLD AUTO: 9.2 % (ref 5–12)
NEUTROPHILS NFR BLD AUTO: 3.7 10*3/MM3 (ref 1.7–7)
NEUTROPHILS NFR BLD AUTO: 62 % (ref 42.7–76)
NITRITE UR QL STRIP: NEGATIVE
NRBC BLD AUTO-RTO: 0 /100 WBC (ref 0–0.2)
PH UR STRIP.AUTO: 7.5 [PH] (ref 5–9)
PLATELET # BLD AUTO: 210 10*3/MM3 (ref 140–450)
PMV BLD AUTO: 11 FL (ref 6–12)
POTASSIUM SERPL-SCNC: 3.6 MMOL/L (ref 3.5–5.2)
PROT SERPL-MCNC: 6.4 G/DL (ref 6–8.5)
PROT UR QL STRIP: ABNORMAL
RBC # BLD AUTO: 4.51 10*6/MM3 (ref 4.14–5.8)
RBC # UR STRIP: ABNORMAL /HPF
REF LAB TEST METHOD: ABNORMAL
SODIUM SERPL-SCNC: 133 MMOL/L (ref 136–145)
SP GR UR STRIP: 1.01 (ref 1–1.03)
SQUAMOUS #/AREA URNS HPF: ABNORMAL /HPF
UROBILINOGEN UR QL STRIP: ABNORMAL
WBC # UR STRIP: ABNORMAL /HPF
WBC NRBC COR # BLD: 5.97 10*3/MM3 (ref 3.4–10.8)
WHOLE BLOOD HOLD COAG: NORMAL
WHOLE BLOOD HOLD SPECIMEN: NORMAL

## 2023-06-16 PROCEDURE — 83690 ASSAY OF LIPASE: CPT

## 2023-06-16 PROCEDURE — 81001 URINALYSIS AUTO W/SCOPE: CPT

## 2023-06-16 PROCEDURE — 80053 COMPREHEN METABOLIC PANEL: CPT

## 2023-06-16 PROCEDURE — 85025 COMPLETE CBC W/AUTO DIFF WBC: CPT

## 2023-06-16 PROCEDURE — 63710000001 INSULIN REGULAR HUMAN PER 5 UNITS: Performed by: FAMILY MEDICINE

## 2023-06-16 RX ORDER — ALUMINA, MAGNESIA, AND SIMETHICONE 2400; 2400; 240 MG/30ML; MG/30ML; MG/30ML
15 SUSPENSION ORAL ONCE
Status: COMPLETED | OUTPATIENT
Start: 2023-06-16 | End: 2023-06-16

## 2023-06-16 RX ORDER — LIDOCAINE HYDROCHLORIDE 20 MG/ML
15 SOLUTION OROPHARYNGEAL ONCE
Status: COMPLETED | OUTPATIENT
Start: 2023-06-16 | End: 2023-06-16

## 2023-06-16 RX ADMIN — HUMAN INSULIN 6 UNITS: 100 INJECTION, SOLUTION SUBCUTANEOUS at 23:54

## 2023-06-16 RX ADMIN — LIDOCAINE HYDROCHLORIDE 15 ML: 20 SOLUTION ORAL; TOPICAL at 23:46

## 2023-06-16 RX ADMIN — ALUMINUM HYDROXIDE, MAGNESIUM HYDROXIDE, AND DIMETHICONE 15 ML: 400; 400; 40 SUSPENSION ORAL at 23:46

## 2023-06-17 VITALS
TEMPERATURE: 98.9 F | SYSTOLIC BLOOD PRESSURE: 167 MMHG | WEIGHT: 144 LBS | BODY MASS INDEX: 22.6 KG/M2 | OXYGEN SATURATION: 97 % | DIASTOLIC BLOOD PRESSURE: 94 MMHG | HEIGHT: 67 IN | HEART RATE: 95 BPM | RESPIRATION RATE: 18 BRPM

## 2023-06-17 LAB — GLUCOSE BLDC GLUCOMTR-MCNC: 303 MG/DL (ref 70–130)

## 2023-06-17 PROCEDURE — 82948 REAGENT STRIP/BLOOD GLUCOSE: CPT

## 2023-06-17 RX ORDER — ONDANSETRON 4 MG/1
4 TABLET, ORALLY DISINTEGRATING ORAL EVERY 6 HOURS PRN
Qty: 10 TABLET | Refills: 0 | Status: SHIPPED | OUTPATIENT
Start: 2023-06-17

## 2023-06-17 RX ORDER — SUCRALFATE 1 G/1
1 TABLET ORAL 4 TIMES DAILY
Qty: 30 TABLET | Refills: 0 | Status: SHIPPED | OUTPATIENT
Start: 2023-06-17

## 2023-06-17 RX ORDER — PANTOPRAZOLE SODIUM 40 MG/1
40 TABLET, DELAYED RELEASE ORAL DAILY
Qty: 30 TABLET | Refills: 0 | Status: SHIPPED | OUTPATIENT
Start: 2023-06-17

## 2023-06-17 NOTE — ED PROVIDER NOTES
Subjective   History of Present Illness    Patient presents to the emergency department with hyperglycemia and lower abdominal pain.  He states that he is an insulin-dependent diabetic and has been having issues with his home glucose monitor and is out of testing strips.  He also has a history of gastroesophageal reflux disease and states that his abdominal pain begins in the epigastric region and radiates to his lower abdomen.      Hyperglycemia  Associated symptoms: abdominal pain    Associated symptoms: no confusion, no diaphoresis, no dizziness, no dysuria, no polyuria and no weakness      Review of Systems   Constitutional:  Negative for appetite change, chills and diaphoresis.   HENT:  Negative for ear discharge, nosebleeds, sinus pressure and trouble swallowing.    Eyes:  Negative for discharge and redness.   Respiratory:  Negative for apnea and chest tightness.    Gastrointestinal:  Positive for abdominal pain.   Endocrine: Negative for polyuria.   Genitourinary:  Negative for dysuria, frequency and urgency.   Musculoskeletal:  Negative for neck pain.   Skin:  Negative for color change.   Allergic/Immunologic: Negative for immunocompromised state.   Neurological:  Negative for dizziness, seizures, syncope, weakness and light-headedness.   Hematological:  Negative for adenopathy. Does not bruise/bleed easily.   Psychiatric/Behavioral:  Negative for behavioral problems and confusion.    All other systems reviewed and are negative.    Past Medical History:   Diagnosis Date    Adenomatous polyp of colon     Anemia     Bladder cancer     Bladder outlet obstruction     CAD (coronary artery disease)     1 stent, followed by Dr. Garcia    Chronic back pain     Chronic gastritis     Chronic hepatitis C     Chronic obstructive lung disease     CKD (chronic kidney disease)     Coronary arteriosclerosis     Diabetes mellitus     Diverticular disease of colon     Drug abuse     Elevated cholesterol     GERD  (gastroesophageal reflux disease)     Hypertension     Ingrown toenail     Prostate cancer     Rheumatoid arthritis     Seizure     Stroke     heat stroke    Transient cerebral ischemia        No Known Allergies    Past Surgical History:   Procedure Laterality Date    BLADDER SURGERY      artificial bladder placed    CARDIAC CATHETERIZATION N/A 12/15/2017    CHOLECYSTECTOMY      COLONOSCOPY N/A 2019    COLONOSCOPY N/A 10/19/2021    Procedure: COLONOSCOPY;  Surgeon: Alfonso Torres MD;  Location: [facility] ENDOSCOPY;  Service: Gastroenterology;  Laterality: N/A;    CORONARY ANGIOPLASTY WITH STENT PLACEMENT      ENDOSCOPY N/A 2017    ENDOSCOPY N/A 2019    ENDOSCOPY N/A 10/19/2021    Procedure: ESOPHAGOGASTRODUODENOSCOPY;  Surgeon: Alfonso Torres MD;  Location: [facility] ENDOSCOPY;  Service: Gastroenterology;  Laterality: N/A;    TRANSURETHRAL RESECTION OF BLADDER TUMOR N/A 2021    Procedure: CYSTOSCOPY TRANSURETHRAL RESECTION OF BLADDER TUMOR;  Surgeon: Christofer Calderon MD;  Location: [facility] OR;  Service: Urology;  Laterality: N/A;    TRANSURETHRAL RESECTION OF BLADDER TUMOR N/A 2023    Procedure: CYSTOSCOPY TRANSURETHRAL RESECTION OF BLADDER TUMOR;  Surgeon: Christofer Calderon MD;  Location: [facility] OR;  Service: Urology;  Laterality: N/A;    UPPER GASTROINTESTINAL ENDOSCOPY  2019    UPPER GASTROINTESTINAL ENDOSCOPY  10/19/2021    WRIST SURGERY Left        Family History   Problem Relation Age of Onset    Diabetes Mother     Liver cancer Father        Social History     Socioeconomic History    Marital status:    Tobacco Use    Smoking status: Former     Packs/day: 1.00     Years: 53.00     Pack years: 53.00     Types: Cigarettes     Quit date: 2023     Years since quittin.4    Smokeless tobacco: Former     Types: Chew     Quit date:    Vaping Use    Vaping Use: Former    Substances: Nicotine, Flavoring    Devices: Disposable   Substance and Sexual Activity     Alcohol use: Never    Drug use: Yes     Types: Marijuana     Comment: occcasional    Sexual activity: Defer           Objective   Physical Exam  Vitals and nursing note reviewed.   Constitutional:       Appearance: He is well-developed.   HENT:      Head: Normocephalic and atraumatic.      Nose: Nose normal.   Eyes:      General: No scleral icterus.        Right eye: No discharge.         Left eye: No discharge.      Conjunctiva/sclera: Conjunctivae normal.      Pupils: Pupils are equal, round, and reactive to light.   Neck:      Trachea: No tracheal deviation.   Cardiovascular:      Rate and Rhythm: Normal rate and regular rhythm.      Heart sounds: Normal heart sounds. No murmur heard.  Pulmonary:      Effort: Pulmonary effort is normal. No respiratory distress.      Breath sounds: Normal breath sounds. No stridor. No wheezing or rales.   Abdominal:      General: Bowel sounds are normal. There is no distension.      Palpations: Abdomen is soft. There is no mass.      Tenderness: There is no abdominal tenderness. There is no guarding or rebound.   Musculoskeletal:      Cervical back: Normal range of motion and neck supple.   Skin:     General: Skin is warm and dry.      Findings: No erythema or rash.   Neurological:      Mental Status: He is alert and oriented to person, place, and time.      Coordination: Coordination normal.   Psychiatric:         Behavior: Behavior normal.         Thought Content: Thought content normal.       Procedures           ED Course           Labs Reviewed   COMPREHENSIVE METABOLIC PANEL - Abnormal; Notable for the following components:       Result Value    Glucose 515 (*)     BUN 32 (*)     Sodium 133 (*)     BUN/Creatinine Ratio 26.9 (*)     All other components within normal limits    Narrative:     GFR Normal >60  Chronic Kidney Disease <60  Kidney Failure <15     CBC WITH AUTO DIFFERENTIAL - Abnormal; Notable for the following components:    Hemoglobin 12.5 (*)     Immature Grans %  1.2 (*)     Immature Grans, Absolute 0.07 (*)     All other components within normal limits   URINALYSIS W/ MICROSCOPIC IF INDICATED (NO CULTURE) - Abnormal; Notable for the following components:    Appearance, UA Turbid (*)     Glucose, UA >=1000 mg/dL (3+) (*)     Blood, UA Trace (*)     Protein, UA Trace (*)     All other components within normal limits   URINALYSIS, MICROSCOPIC ONLY - Abnormal; Notable for the following components:    RBC, UA 0-2 (*)     All other components within normal limits   POCT GLUCOSE FINGERSTICK - Abnormal; Notable for the following components:    Glucose 303 (*)     All other components within normal limits   LIPASE - Normal   RAINBOW DRAW    Narrative:     The following orders were created for panel order [place] Draw.  Procedure                               Abnormality         Status                     ---------                               -----------         ------                     Green Top (Gel)[[id number]]                                  Final result               Lavender Top[[id number]]                                     Final result               Gold Top - SST[[id number]]                                   Final result               Light Blue Top[[id number]]                                   Final result                 Please view results for these tests on the individual orders.   POCT GLUCOSE FINGERSTICK   CBC AND DIFFERENTIAL    Narrative:     The following orders were created for panel order CBC & Differential.  Procedure                               Abnormality         Status                     ---------                               -----------         ------                     CBC Auto Differential[[id number]]        Abnormal            Final result                 Please view results for these tests on the individual orders.   GREEN TOP   LAVENDER TOP   GOLD TOP - SST   LIGHT BLUE TOP       No orders to display                                         Medical  Decision Making  Problems Addressed:  Gastroesophageal reflux disease, unspecified whether esophagitis present: complicated acute illness or injury  Hyperglycemia: complicated acute illness or injury    Amount and/or Complexity of Data Reviewed  Labs: ordered.    Risk  OTC drugs.  Prescription drug management.        Final diagnoses:   Hyperglycemia   Gastroesophageal reflux disease, unspecified whether esophagitis present       ED Disposition  ED Disposition       ED Disposition   Discharge    Condition   Stable    Comment   --               Shayne Merritt MD  [address] [id number] [phone]    In 3 days           Medication List        New Prescriptions      ondansetron ODT 4 MG disintegrating tablet  Commonly known as: ZOFRAN-ODT  Place 1 tablet on the tongue Every 6 (Six) Hours As Needed for Nausea or Vomiting.     pantoprazole 40 MG EC tablet  Commonly known as: PROTONIX  Take 1 tablet by mouth Daily.     sucralfate 1 g tablet  Commonly known as: CARAFATE  Take 1 tablet by mouth 4 (Four) Times a Day.               Where to Get Your Medications        These medications were sent to [id number] - [place], [place] - [address] AT [address] - [phone]  - [phone]   [address] [address]      Phone: [phone]   glucose blood test strip  ondansetron ODT 4 MG disintegrating tablet  pantoprazole 40 MG EC tablet  sucralfate 1 g tablet            Darian Vaughn MD  06/17/23 0054

## 2023-06-19 LAB — GLUCOSE BLDC GLUCOMTR-MCNC: 413 MG/DL (ref 70–130)

## 2023-08-01 ENCOUNTER — OFFICE VISIT (OUTPATIENT)
Dept: FAMILY MEDICINE CLINIC | Facility: CLINIC | Age: 68
End: 2023-08-01
Payer: MEDICARE

## 2023-08-01 VITALS
RESPIRATION RATE: 16 BRPM | TEMPERATURE: 97.4 F | DIASTOLIC BLOOD PRESSURE: 78 MMHG | HEART RATE: 99 BPM | WEIGHT: 142.8 LBS | OXYGEN SATURATION: 97 % | HEIGHT: 68 IN | BODY MASS INDEX: 21.64 KG/M2 | SYSTOLIC BLOOD PRESSURE: 120 MMHG

## 2023-08-01 DIAGNOSIS — I25.810 CORONARY ARTERY DISEASE INVOLVING CORONARY BYPASS GRAFT OF NATIVE HEART WITHOUT ANGINA PECTORIS: ICD-10-CM

## 2023-08-01 DIAGNOSIS — E78.2 MIXED HYPERLIPIDEMIA: ICD-10-CM

## 2023-08-01 DIAGNOSIS — F17.200 SMOKER: ICD-10-CM

## 2023-08-01 DIAGNOSIS — C67.9 MALIGNANT NEOPLASM OF URINARY BLADDER, UNSPECIFIED SITE: ICD-10-CM

## 2023-08-01 DIAGNOSIS — J44.9 MODERATE COPD (CHRONIC OBSTRUCTIVE PULMONARY DISEASE): ICD-10-CM

## 2023-08-01 DIAGNOSIS — Z76.89 ENCOUNTER TO ESTABLISH CARE: Primary | ICD-10-CM

## 2023-08-01 DIAGNOSIS — I50.22 CHRONIC HFREF (HEART FAILURE WITH REDUCED EJECTION FRACTION): ICD-10-CM

## 2023-08-01 DIAGNOSIS — E11.9 TYPE 2 DIABETES MELLITUS WITHOUT COMPLICATION, WITH LONG-TERM CURRENT USE OF INSULIN: ICD-10-CM

## 2023-08-01 DIAGNOSIS — I10 ESSENTIAL HYPERTENSION: ICD-10-CM

## 2023-08-01 DIAGNOSIS — Z12.5 PROSTATE CANCER SCREENING: ICD-10-CM

## 2023-08-01 DIAGNOSIS — Z79.4 TYPE 2 DIABETES MELLITUS WITHOUT COMPLICATION, WITH LONG-TERM CURRENT USE OF INSULIN: ICD-10-CM

## 2023-08-01 DIAGNOSIS — R51.9 NONINTRACTABLE HEADACHE, UNSPECIFIED CHRONICITY PATTERN, UNSPECIFIED HEADACHE TYPE: ICD-10-CM

## 2023-08-01 DIAGNOSIS — E11.65 TYPE 2 DIABETES MELLITUS WITH HYPERGLYCEMIA, WITH LONG-TERM CURRENT USE OF INSULIN: ICD-10-CM

## 2023-08-01 DIAGNOSIS — M75.41 SHOULDER IMPINGEMENT SYNDROME, RIGHT: ICD-10-CM

## 2023-08-01 DIAGNOSIS — Z79.4 TYPE 2 DIABETES MELLITUS WITH HYPERGLYCEMIA, WITH LONG-TERM CURRENT USE OF INSULIN: ICD-10-CM

## 2023-08-01 DIAGNOSIS — E55.9 VITAMIN D DEFICIENCY: ICD-10-CM

## 2023-08-01 DIAGNOSIS — N18.32 STAGE 3B CHRONIC KIDNEY DISEASE: ICD-10-CM

## 2023-08-01 DIAGNOSIS — Z87.891 PERSONAL HISTORY OF NICOTINE DEPENDENCE: ICD-10-CM

## 2023-08-01 PROBLEM — F33.0 MILD EPISODE OF RECURRENT MAJOR DEPRESSIVE DISORDER: Status: ACTIVE | Noted: 2018-12-04

## 2023-08-01 PROBLEM — F19.10 SUBSTANCE ABUSE: Chronic | Status: ACTIVE | Noted: 2019-11-06

## 2023-08-01 PROBLEM — Z85.51 HISTORY OF BLADDER CANCER: Status: ACTIVE | Noted: 2018-12-04

## 2023-08-01 PROBLEM — K21.9 GASTROESOPHAGEAL REFLUX DISEASE WITHOUT ESOPHAGITIS: Status: ACTIVE | Noted: 2018-12-04

## 2023-08-01 PROBLEM — M15.9 PRIMARY OSTEOARTHRITIS INVOLVING MULTIPLE JOINTS: Status: ACTIVE | Noted: 2018-12-04

## 2023-08-01 PROBLEM — K64.8 OTHER HEMORRHOIDS: Status: ACTIVE | Noted: 2022-07-05

## 2023-08-01 PROBLEM — Z86.010 HISTORY OF COLON POLYPS: Status: ACTIVE | Noted: 2018-12-04

## 2023-08-01 PROBLEM — D64.9 CHRONIC ANEMIA: Status: ACTIVE | Noted: 2019-03-29

## 2023-08-01 PROBLEM — G62.9 NEUROPATHY: Status: ACTIVE | Noted: 2018-12-04

## 2023-08-01 PROBLEM — Z91.09 ENVIRONMENTAL ALLERGIES: Status: ACTIVE | Noted: 2018-12-04

## 2023-08-01 PROCEDURE — 3078F DIAST BP <80 MM HG: CPT | Performed by: NURSE PRACTITIONER

## 2023-08-01 PROCEDURE — 1160F RVW MEDS BY RX/DR IN RCRD: CPT | Performed by: NURSE PRACTITIONER

## 2023-08-01 PROCEDURE — 3074F SYST BP LT 130 MM HG: CPT | Performed by: NURSE PRACTITIONER

## 2023-08-01 PROCEDURE — 1159F MED LIST DOCD IN RCRD: CPT | Performed by: NURSE PRACTITIONER

## 2023-08-01 PROCEDURE — 99214 OFFICE O/P EST MOD 30 MIN: CPT | Performed by: NURSE PRACTITIONER

## 2023-08-01 RX ORDER — CYCLOBENZAPRINE HCL 10 MG
10 TABLET ORAL 3 TIMES DAILY PRN
Qty: 90 TABLET | Refills: 3 | Status: SHIPPED | OUTPATIENT
Start: 2023-08-01

## 2023-08-01 RX ORDER — PROCHLORPERAZINE 25 MG/1
SUPPOSITORY RECTAL
Qty: 9 EACH | Refills: 3 | Status: SHIPPED | OUTPATIENT
Start: 2023-08-01

## 2023-08-09 ENCOUNTER — LAB (OUTPATIENT)
Dept: LAB | Facility: HOSPITAL | Age: 68
End: 2023-08-09
Payer: MEDICARE

## 2023-08-09 DIAGNOSIS — I25.810 CORONARY ARTERY DISEASE INVOLVING CORONARY BYPASS GRAFT OF NATIVE HEART WITHOUT ANGINA PECTORIS: ICD-10-CM

## 2023-08-09 DIAGNOSIS — E78.2 MIXED HYPERLIPIDEMIA: ICD-10-CM

## 2023-08-09 DIAGNOSIS — I10 ESSENTIAL HYPERTENSION: ICD-10-CM

## 2023-08-09 DIAGNOSIS — E55.9 VITAMIN D DEFICIENCY: ICD-10-CM

## 2023-08-09 DIAGNOSIS — Z79.4 TYPE 2 DIABETES MELLITUS WITHOUT COMPLICATION, WITH LONG-TERM CURRENT USE OF INSULIN: ICD-10-CM

## 2023-08-09 DIAGNOSIS — E11.9 TYPE 2 DIABETES MELLITUS WITHOUT COMPLICATION, WITH LONG-TERM CURRENT USE OF INSULIN: ICD-10-CM

## 2023-08-09 DIAGNOSIS — N18.32 STAGE 3B CHRONIC KIDNEY DISEASE: ICD-10-CM

## 2023-08-09 DIAGNOSIS — Z12.5 PROSTATE CANCER SCREENING: ICD-10-CM

## 2023-08-09 LAB
25(OH)D3 SERPL-MCNC: 28.7 NG/ML (ref 30–100)
ALBUMIN SERPL-MCNC: 4.1 G/DL (ref 3.5–5.2)
ALBUMIN/GLOB SERPL: 1.4 G/DL
ALP SERPL-CCNC: 96 U/L (ref 39–117)
ALT SERPL W P-5'-P-CCNC: 9 U/L (ref 1–41)
ANION GAP SERPL CALCULATED.3IONS-SCNC: 9 MMOL/L (ref 5–15)
AST SERPL-CCNC: 16 U/L (ref 1–40)
BASOPHILS # BLD AUTO: 0.02 10*3/MM3 (ref 0–0.2)
BASOPHILS NFR BLD AUTO: 0.3 % (ref 0–1.5)
BILIRUB SERPL-MCNC: 0.2 MG/DL (ref 0–1.2)
BUN SERPL-MCNC: 25 MG/DL (ref 8–23)
BUN/CREAT SERPL: 18.7 (ref 7–25)
CALCIUM SPEC-SCNC: 9.2 MG/DL (ref 8.6–10.5)
CHLORIDE SERPL-SCNC: 101 MMOL/L (ref 98–107)
CHOLEST SERPL-MCNC: 132 MG/DL (ref 0–200)
CO2 SERPL-SCNC: 22 MMOL/L (ref 22–29)
CREAT SERPL-MCNC: 1.34 MG/DL (ref 0.76–1.27)
DEPRECATED RDW RBC AUTO: 41.4 FL (ref 37–54)
EGFRCR SERPLBLD CKD-EPI 2021: 58.4 ML/MIN/1.73
EOSINOPHIL # BLD AUTO: 0.03 10*3/MM3 (ref 0–0.4)
EOSINOPHIL NFR BLD AUTO: 0.5 % (ref 0.3–6.2)
ERYTHROCYTE [DISTWIDTH] IN BLOOD BY AUTOMATED COUNT: 13.6 % (ref 12.3–15.4)
GLOBULIN UR ELPH-MCNC: 3 GM/DL
GLUCOSE SERPL-MCNC: 399 MG/DL (ref 65–99)
HBA1C MFR BLD: 12.3 % (ref 4.8–5.6)
HCT VFR BLD AUTO: 41.9 % (ref 37.5–51)
HDLC SERPL-MCNC: 51 MG/DL (ref 40–60)
HGB BLD-MCNC: 13.2 G/DL (ref 13–17.7)
IMM GRANULOCYTES # BLD AUTO: 0.01 10*3/MM3 (ref 0–0.05)
IMM GRANULOCYTES NFR BLD AUTO: 0.2 % (ref 0–0.5)
LDLC SERPL CALC-MCNC: 62 MG/DL (ref 0–100)
LDLC/HDLC SERPL: 1.18 {RATIO}
LYMPHOCYTES # BLD AUTO: 1.21 10*3/MM3 (ref 0.7–3.1)
LYMPHOCYTES NFR BLD AUTO: 21 % (ref 19.6–45.3)
MCH RBC QN AUTO: 26.5 PG (ref 26.6–33)
MCHC RBC AUTO-ENTMCNC: 31.5 G/DL (ref 31.5–35.7)
MCV RBC AUTO: 84.1 FL (ref 79–97)
MONOCYTES # BLD AUTO: 0.44 10*3/MM3 (ref 0.1–0.9)
MONOCYTES NFR BLD AUTO: 7.7 % (ref 5–12)
NEUTROPHILS NFR BLD AUTO: 4.04 10*3/MM3 (ref 1.7–7)
NEUTROPHILS NFR BLD AUTO: 70.3 % (ref 42.7–76)
NRBC BLD AUTO-RTO: 0.2 /100 WBC (ref 0–0.2)
PLATELET # BLD AUTO: 183 10*3/MM3 (ref 140–450)
PMV BLD AUTO: 10.8 FL (ref 6–12)
POTASSIUM SERPL-SCNC: 4.6 MMOL/L (ref 3.5–5.2)
PROT SERPL-MCNC: 7.1 G/DL (ref 6–8.5)
PSA SERPL-MCNC: <0.014 NG/ML (ref 0–4)
RBC # BLD AUTO: 4.98 10*6/MM3 (ref 4.14–5.8)
SODIUM SERPL-SCNC: 132 MMOL/L (ref 136–145)
TRIGL SERPL-MCNC: 105 MG/DL (ref 0–150)
VLDLC SERPL-MCNC: 19 MG/DL (ref 5–40)
WBC NRBC COR # BLD: 5.75 10*3/MM3 (ref 3.4–10.8)

## 2023-08-09 PROCEDURE — 85025 COMPLETE CBC W/AUTO DIFF WBC: CPT

## 2023-08-09 PROCEDURE — 83036 HEMOGLOBIN GLYCOSYLATED A1C: CPT

## 2023-08-09 PROCEDURE — 82306 VITAMIN D 25 HYDROXY: CPT

## 2023-08-09 PROCEDURE — 82043 UR ALBUMIN QUANTITATIVE: CPT

## 2023-08-09 PROCEDURE — 80053 COMPREHEN METABOLIC PANEL: CPT

## 2023-08-09 PROCEDURE — G0103 PSA SCREENING: HCPCS

## 2023-08-09 PROCEDURE — 80061 LIPID PANEL: CPT

## 2023-08-09 PROCEDURE — 36415 COLL VENOUS BLD VENIPUNCTURE: CPT

## 2023-08-09 PROCEDURE — 82570 ASSAY OF URINE CREATININE: CPT

## 2023-08-10 LAB
ALBUMIN UR-MCNC: 4.1 MG/DL
CREAT UR-MCNC: 35.9 MG/DL
MICROALBUMIN/CREAT UR: 114.2 MG/G

## 2023-08-10 NOTE — PROGRESS NOTES
Hemoglobin A1c continues to rise and is above goal, 12.3.  He needs to follow a strict diabetic diet make sure he is taking all of his medications as prescribed.  They need to call today and schedule prompt follow-up with endocrinology.  I will forward results to his endocrinologist for review.  Creatinine elevated but stable.  Avoid NSAIDs.  Maintain adequate hydration.  Follow-up with nephrology as scheduled if he has a nephrologist.  Follow-up as scheduled.    Shoaib, he established with me and I pulled updated labs. I instructed him on the day of his visit to call and schedule a follow up with you. I will have my MA remind him today when she calls.

## 2023-08-16 ENCOUNTER — HOSPITAL ENCOUNTER (OUTPATIENT)
Dept: CT IMAGING | Facility: HOSPITAL | Age: 68
Discharge: HOME | End: 2023-08-16
Admitting: NURSE PRACTITIONER
Payer: MEDICARE

## 2023-08-16 PROCEDURE — 71271 CT THORAX LUNG CANCER SCR C-: CPT

## 2023-08-23 ENCOUNTER — TELEPHONE (OUTPATIENT)
Dept: FAMILY MEDICINE CLINIC | Facility: CLINIC | Age: 68
End: 2023-08-23
Payer: MEDICARE

## 2023-08-23 NOTE — TELEPHONE ENCOUNTER
Pt received a letter concerning recent CT results. He would like us to call Sofi (she is on the release) at [phone] to give her the results. Thanks!

## 2023-08-23 NOTE — TELEPHONE ENCOUNTER
Patient was called; spoke to Sofi per patient request; verbal release verified. All results & recommendations given.

## 2023-09-06 ENCOUNTER — TELEPHONE (OUTPATIENT)
Dept: ENDOCRINOLOGY | Facility: CLINIC | Age: 68
End: 2023-09-06
Payer: MEDICARE

## 2023-09-06 DIAGNOSIS — Z79.4 TYPE 2 DIABETES MELLITUS WITH HYPERGLYCEMIA, WITH LONG-TERM CURRENT USE OF INSULIN: Primary | ICD-10-CM

## 2023-09-06 DIAGNOSIS — E11.65 TYPE 2 DIABETES MELLITUS WITH HYPERGLYCEMIA, WITH LONG-TERM CURRENT USE OF INSULIN: Primary | ICD-10-CM

## 2023-09-06 NOTE — TELEPHONE ENCOUNTER
Mr. Favio Casillas came in and stated that he needs Freestyle instead of Dexcom.      Needs to be sent into [email or web address]  Fax [phone]  Says they do not know the name of the drug store this is the information that was given to them.    [phone] Ext [phone]     Call back [phone]

## 2023-09-28 ENCOUNTER — DOCUMENTATION (OUTPATIENT)
Dept: ENDOCRINOLOGY | Facility: CLINIC | Age: 68
End: 2023-09-28
Payer: MEDICARE

## 2023-09-28 NOTE — PROGRESS NOTES
PWO FOR CGM FAXED WITH CHART NOTES TO [facility] @ [phone]

## 2023-12-04 NOTE — PROGRESS NOTES
[facility] Medicine Services  INPATIENT PROGRESS NOTE    Length of Stay: 6  Date of Admission: 7/2/2020  Primary Care Physician: Shayne Merritt MD    Subjective   Chief Complaint: Confusion  HPI: Patient states that he is feeling pretty good today.  States his diarrhea is resolved.    Review of Systems   Constitutional: Positive for activity change. Negative for appetite change, chills, fatigue and fever.   Respiratory: Negative for chest tightness and shortness of breath.    Cardiovascular: Negative for chest pain, palpitations and leg swelling.   Gastrointestinal: Negative for abdominal pain, constipation, diarrhea, nausea and vomiting.   Skin: Negative for wound.   Neurological: Negative for dizziness, weakness, light-headedness, numbness and headaches.      All pertinent negatives and positives are as above. All other systems have been reviewed and are negative unless otherwise stated.     Objective    Temp:  [97.9 °F (36.6 °C)-99.6 °F (37.6 °C)] 98.8 °F (37.1 °C)  Heart Rate:  [65-87] 65  Resp:  [16-20] 20  BP: (111-153)/(70-88) 138/79    Physical Exam   Constitutional: He appears well-developed and well-nourished.   HENT:   Head: Normocephalic and atraumatic.   Eyes: Pupils are equal, round, and reactive to light. EOM are normal.   Neck: Normal range of motion. Neck supple.   Cardiovascular: Normal rate, regular rhythm, normal heart sounds and intact distal pulses. Exam reveals no gallop and no friction rub.   No murmur heard.  Pulmonary/Chest: Effort normal and breath sounds normal. No respiratory distress. He has no wheezes. He has no rales. He exhibits no tenderness.   Abdominal: Soft. Bowel sounds are normal. He exhibits no distension. There is no tenderness.   Psychiatric: He has a normal mood and affect. His behavior is normal.   Vitals reviewed.    Results Review:  I have reviewed the labs, radiology results, and diagnostic studies.    Laboratory  Data:   Results from last 7 days   Lab Units 07/08/20  0553 07/07/20  0943 07/06/20  1743 07/06/20  0551 07/02/20  2122   SODIUM mmol/L 141 139  --  142   < > 120*   POTASSIUM mmol/L 3.7 3.4*  3.4* 2.5* 2.2*   < > 4.4   CHLORIDE mmol/L 110* 112*  --  108*   < > 103   CO2 mmol/L 21.0* 18.0*  --  23.0   < > 6.0*   BUN mg/dL 23 36*  --  65*   < > 127*   CREATININE mg/dL 1.57* 1.69*  --  2.06*   < > 3.57*   GLUCOSE mg/dL 164* 337*  --  137*   < > 648*   CALCIUM mg/dL 7.2* 6.5*  --  6.4*   < > 8.7   BILIRUBIN mg/dL 0.3  --   --  0.4  --  0.5   ALK PHOS U/L 53  --   --  60  --  113   ALT (SGPT) U/L 10  --   --  10  --  8   AST (SGOT) U/L 15  --   --  13  --  6   ANION GAP mmol/L 10.0 9.0  --  11.0   < > 11.0    < > = values in this interval not displayed.     Estimated Creatinine Clearance: 44.5 mL/min (A) (by C-G formula based on SCr of 1.57 mg/dL (H)).  Results from last 7 days   Lab Units 07/08/20 0553 07/07/20  0943 07/06/20 1743 07/06/20  0551 07/06/20  0124   MAGNESIUM mg/dL 1.8 1.2*  --  1.3*  --    PHOSPHORUS mg/dL  --  1.9* 2.1*  --  2.5         Results from last 7 days   Lab Units 07/08/20  0553 07/06/20  0551 07/05/20  0755 07/04/20  0805 07/03/20  0844   WBC 10*3/mm3 7.62 6.26 6.88 8.93 14.12*   HEMOGLOBIN g/dL 9.8* 9.6* 10.5* 13.1 13.9   HEMATOCRIT % 29.4* 27.9* 29.2* 38.2 42.4   PLATELETS 10*3/mm3 173 154 146 148 177           Culture Data:   No results found for: BLOODCX  No results found for: URINECX  No results found for: RESPCX  No results found for: WOUNDCX  No results found for: STOOLCX  No components found for: BODYFLD    Radiology Data:   Imaging Results (Last 24 Hours)     Procedure Component Value Units Date/Time    US Renal Bilateral [[id number]] Collected:  07/08/20 1029     Updated:  07/08/20 1544    Narrative:       EXAMINATION:  ultrasound, renal     CLINICAL INDICATION / HISTORY:  follow up hydronephrosis, E11.10  Type 2 diabetes mellitus with ketoacidosis without coma  G93.40  Encephalopathy, unspecified N17.9 Acute kidney failure,  unspecified N18.9 Chronic kidney disease, unspecified R13.10  Dysphagia, unspecified R26.89 Other abnormalities of gait and  mobility Z74.09 Other reduced mobility Z78.9 Other specified  health status    COMPARISON:  7/5/2020    TECHNIQUE:  ultrasound, renal    FULL RESULTS / FINDINGS:    Kidney, right:      size:  normal, measuring 11 x 5 x 6.1 cm    echotexture:  normal    no nephrolithiasis, solid mass, or collecting system dilation    Kidney, left:      size:  normal, measuring 12.2 x 4.8 x 6.5 cm    echotexture:  normal    no nephrolithiasis, solid mass, or collecting system dilation    Urinary bladder:  Patient with history of prior cystectomy and  creation of a neobladder. This region was not imaged.        Impression:       CONCLUSION:    1.  Patient with history of prior cystectomy and creation of a  neobladder. This region was not imaged.  2.  Otherwise normal renal ultrasound.    Electronically signed by:  Tyron Cheek MD  7/8/2020 11:02 AM CDT  Workstation: [id number]          I have reviewed the patient's current medications.     Assessment/Plan     Active Hospital Problems    Diagnosis   • Diabetic ketoacidosis without coma associated with type 2 diabetes mellitus (CMS/[facility])     Plan:    1.  DKA: Resolved.  2.  Acute kidney injury: Renal function has much improved.  It is now at, or better than, baseline.  Nephrology is following.    3.  Metabolic encephalopathy: Likely multifactorial.  Patient with severe hyperglycemia on presentation.  Urine culture also showed mixed birgit.  Empirically on antibiotics.  Mentation is likely near baseline.  4.  Coronary artery disease: Continue current medication.  5.  Nausea/vomiting/diarrhea: No fever or leukocytosis.  Etiology unclear at this point.  Continue to monitor.  6.  Non-anion gap metabolic acidosis: Improving.  Likely secondary to acute kidney injury.  7.  Severe hypokalemia: Resolved.  8.  Urinary  retention:  Most likely secondary to increased mucus and sediment in his neobladder per Urology.  Improving.  Plan to continue intravesicular Mucomyst flushes.  9.  DVT prophylaxis: Heparin.    The patient was evaluated during the global COVID-19 pandemic, and the diagnosis was suspected/considered upon their initial presentation.  Evaluation, treatment, and testing were consistent with current guidelines for patients who present with complaints or symptoms that may be related to COVID-19.    Discharge Planning: I expect patient to be discharged to home in 3-4 days.        This document has been electronically signed by Art Colin MD on July 8, 2020 16:10       Patient was recently in [facility] approximately 2 weeks ago, at that time developed acute urinary retention, went to a clinic and at that time was prescribed a course of ciprofloxacin as well as started on a Rankin catheter and told he would have to follow-up with urology.  He was unable to follow-up with urology while in [facility], had returned to the [place] with the catheter in place, notes that he has finished the ciprofloxacin medication and is now experiencing discomfort with urination/similar to when he had the Rankin placed though he is now voiding still.  Denies any nausea or vomiting, has not noticed fevers or chills, denies other new symptoms of acute concern. Patient was recently in [facility] approximately 2 weeks ago, at that time developed acute urinary retention, went to a clinic and at that time was prescribed a course of ciprofloxacin as well as started on a Rankin catheter and told he would have to follow-up with urology.  He was unable to follow-up with urology while in [facility], had returned to the [place] with the catheter in place, notes that he has finished the ciprofloxacin medication and is now experiencing discomfort with urination/similar to when he had the Rankin placed though he is now voiding still.  Denies any nausea or vomiting, has not noticed fevers or chills, denies other new symptoms of acute concern.

## 2024-07-22 NOTE — ANESTHESIA PREPROCEDURE EVALUATION
Anesthesia Evaluation     Patient summary reviewed and Nursing notes reviewed   no history of anesthetic complications:  NPO Solid Status: > 8 hours  NPO Liquid Status: > 8 hours           Airway   Mallampati: II  TM distance: >3 FB  Neck ROM: full  Small opening and Possible difficult intubation  Dental    (+) edentulous    Pulmonary    (+) a smoker (quit a couple months ago) Former, COPD, asthma,wheezes,   (-) shortness of breath, sleep apnea    PE comment: Duo-Neb ordered pre-op  Cardiovascular   Exercise tolerance: poor (<4 METS)    ECG reviewed  PT is on anticoagulation therapy  Patient on routine beta blocker and Beta blocker given within 24 hours of surgery  Rhythm: regular  Rate: normal    (+) hypertension 2 medications or greater, valvular problems/murmurs MR and TI, past MI (2017)  >12 months, CAD, cardiac stents (2017) Drug eluting stent more than 12 months ago hyperlipidemia,   (-) dysrhythmias, angina, murmur, DVT    ROS comment: [id number]  Normal sinus rhythm  Anteroseptal infarct (cited on or before 2018)  Abnormal ECG  When compared with ECG of 22-DEC-2022 14:45,  Premature ventricular complexes are no longer Present  Serial changes of Anteroseptal infarct Present     Referred By:            Confirmed By: LORA NUNEZ MD    TTE: 22  Interpretation Summary  •  Left ventricular ejection fraction appears to be 46 - 50%.  •  Left ventricular wall thickness is consistent with borderline concentric hypertrophy.  •  Mildly decreased posterior leaflet mobility.  •  Estimated right ventricular systolic pressure from tricuspid regurgitation is moderately elevated (45-55 mmHg).  •  Mild to moderate pulmonary hypertension is present.      Neuro/Psych  (+) seizures, psychiatric history Depression,    (-) TIA, CVA  GI/Hepatic/Renal/Endo    (+)  GERD,  hepatitis C, liver disease (Hep C), renal disease (Creatinine 1.94) CRI, diabetes mellitus type 2 poorly controlled using insulin,    Component      Latest Ref Rng 7/18/2024  7:56 AM   CHOLESTEROL      <=199 mg/dL 128    TRIGLYCERIDE      <=149 mg/dL 173 (H)    HDL      >=40 mg/dL 38 (L)    CALCULATED LDL      <=129 mg/dL 55    CALCULATED NON HDL      mg/dL 90    CHOL/HDL      <=4.4  3.4    LIPOPROTEIN A      <=29 mg/dL <6            Musculoskeletal     (+) back pain,   Abdominal   (-) obese   Substance History   (-) alcohol use, drug use     OB/GYN          Other   arthritis (RA), blood dyscrasia anemia,   history of cancer (Prostate,Bladder) active    ROS/Med Hx Other: HX: COPD: uses albuterol inhaler uses once every couple of days. Hasn't used the albuterol nebulizer in about a week. Uses the Duo-Neb about every other day.     Hx:Seizures a couple of years ago while in the hospital.     Hx: Pt reports of having a heat stroke in 2018.    Hx: 1 stent placed by [facility] currently on Plavix last dose was 4/5/23.    T2DM: :Last HgbA1C: 11.0    Per pt was in the hospital 3-4 weeks ago with a kidney infection.     CT abdomen and pevlis-03/19/2023-no bowel obstruction, herniated bowel loops or focal inflammatory changes.  Cystoscopy-03/02/2023-pending result  Cystoscopy-01/26/2022-No recurrence of bladder cancer.  Cystoscopy-07/06/2021-HISTORY OF CONTINENT DIVERSION, 2 LESIONS NOTED IN BLADDER POUCH.  Cystoscopy-03/31/2021-No abnormalities. Pouch without infection. No tumor or calculi.  Pathology-07/21/2021-mucosa with diffuse intestinal metaplasia, erosive/reactive changes,  chronic inflammation.                Anesthesia Plan    ASA 4     general     (UDS+ for MJ   6 Units of Regular Insulin to be given Pre-op for Glucose of 398.)  intravenous induction     Anesthetic plan, risks, benefits, and alternatives have been provided, discussed and informed consent has been obtained with: patient.        CODE STATUS:

## 2025-05-22 NOTE — DISCHARGE SUMMARY
[facility] Medicine Services  DISCHARGE SUMMARY       Date of Admission: 7/2/2020  Date of Discharge:  7/10/2020  Primary Care Physician: Shayne Merritt MD    Presenting Problem/History of Present Illness:  Encephalopathy acute [G93.40]  Diabetic ketoacidosis without coma associated with type 2 diabetes mellitus (CMS/HCC) [E11.10]  Acute renal failure superimposed on chronic kidney disease, unspecified CKD stage, unspecified acute renal failure type (CMS/HCC) [N17.9, N18.9]       Final Discharge Diagnoses:  Active Hospital Problems    Diagnosis   • Diabetic ketoacidosis without coma associated with type 2 diabetes mellitus (CMS/HCC)   • Acute renal failure superimposed on stage 3 chronic kidney disease (CMS/HCC)   • Bladder outflow obstruction   • CAD (coronary artery disease)       Consults:   Consults     Date and Time Order Name Status Description    7/5/2020 0927 Inpatient Urology Consult Completed     7/3/2020 1611 Inpatient Nephrology Consult Completed                     Pertinent Test Results:   Lab Results (most recent)     Procedure Component Value Units Date/Time    Comprehensive Metabolic Panel [[id number]]  (Abnormal) Collected:  07/10/20 0539    Specimen:  Blood Updated:  07/10/20 0651     Glucose 177 mg/dL      BUN 22 mg/dL      Creatinine 1.59 mg/dL      Sodium 136 mmol/L      Potassium 5.2 mmol/L      Chloride 107 mmol/L      CO2 20.0 mmol/L      Calcium 7.9 mg/dL      Total Protein 6.1 g/dL      Albumin 3.50 g/dL      ALT (SGPT) 14 U/L      AST (SGOT) 15 U/L      Alkaline Phosphatase 52 U/L      Total Bilirubin 0.2 mg/dL      eGFR Non African Amer 44 mL/min/1.73      Globulin 2.6 gm/dL      A/G Ratio 1.3 g/dL      BUN/Creatinine Ratio 13.8     Anion Gap 9.0 mmol/L     Narrative:       GFR Normal >60  Chronic Kidney Disease <60  Kidney Failure <15      CBC & Differential [[id number]] Collected:  07/10/20 0540    Specimen:  Blood Updated:  07/10/20  0640    Narrative:       The following orders were created for panel order CBC & Differential.  Procedure                               Abnormality         Status                     ---------                               -----------         ------                     CBC Auto Differential[[id number]]        Abnormal            Final result                 Please view results for these tests on the individual orders.    CBC Auto Differential [[id number]]  (Abnormal) Collected:  07/10/20 0540    Specimen:  Blood Updated:  07/10/20 0640     WBC 7.74 10*3/mm3      RBC 3.69 10*6/mm3      Hemoglobin 10.9 g/dL      Hematocrit 33.5 %      MCV 90.8 fL      MCH 29.5 pg      MCHC 32.5 g/dL      RDW 14.7 %      RDW-SD 48.6 fl      MPV 10.8 fL      Platelets 232 10*3/mm3      Neutrophil % 69.3 %      Lymphocyte % 18.9 %      Monocyte % 9.6 %      Eosinophil % 1.2 %      Basophil % 0.1 %      Immature Grans % 0.9 %      Neutrophils, Absolute 5.37 10*3/mm3      Lymphocytes, Absolute 1.46 10*3/mm3      Monocytes, Absolute 0.74 10*3/mm3      Eosinophils, Absolute 0.09 10*3/mm3      Basophils, Absolute 0.01 10*3/mm3      Immature Grans, Absolute 0.07 10*3/mm3      nRBC 0.0 /100 WBC     Potassium [[id number]]  (Abnormal) Collected:  07/09/20 2237    Specimen:  Blood Updated:  07/09/20 2331     Potassium 5.4 mmol/L     POC Glucose Once [[id number]]  (Abnormal) Collected:  07/09/20 0716    Specimen:  Blood Updated:  07/09/20 1637     Glucose 169 mg/dL      Comment: RN NotifiedOperator: [id number] [employer] ID: [id number]       POC Glucose Once [[id number]]  (Abnormal) Collected:  07/09/20 1030    Specimen:  Blood Updated:  07/09/20 1124     Glucose 331 mg/dL      Comment: RN NotifiedOperator: [id number] GERARDO [employer] ID: [id number]       Phosphorus [[id number]]  (Normal) Collected:  07/09/20 0540    Specimen:  Blood Updated:  07/09/20 0629     Phosphorus 3.0 mg/dL     Comprehensive Metabolic Panel [[id number]]   (Abnormal) Collected:  07/09/20 0540    Specimen:  Blood Updated:  07/09/20 0628     Glucose 173 mg/dL      BUN 18 mg/dL      Creatinine 1.48 mg/dL      Sodium 140 mmol/L      Potassium 3.5 mmol/L      Chloride 109 mmol/L      CO2 22.0 mmol/L      Calcium 7.5 mg/dL      Total Protein 5.4 g/dL      Albumin 3.00 g/dL      ALT (SGPT) 11 U/L      AST (SGOT) 14 U/L      Alkaline Phosphatase 52 U/L      Total Bilirubin 0.2 mg/dL      eGFR Non African Amer 48 mL/min/1.73      Globulin 2.4 gm/dL      A/G Ratio 1.3 g/dL      BUN/Creatinine Ratio 12.2     Anion Gap 9.0 mmol/L     Narrative:       GFR Normal >60  Chronic Kidney Disease <60  Kidney Failure <15      Magnesium [[id number]]  (Abnormal) Collected:  07/09/20 0540    Specimen:  Blood Updated:  07/09/20 0628     Magnesium 1.4 mg/dL     CBC & Differential [[id number]] Collected:  07/09/20 0540    Specimen:  Blood Updated:  07/09/20 0606    Narrative:       The following orders were created for panel order CBC & Differential.  Procedure                               Abnormality         Status                     ---------                               -----------         ------                     CBC Auto Differential[[id number]]        Abnormal            Final result                 Please view results for these tests on the individual orders.    CBC Auto Differential [[id number]]  (Abnormal) Collected:  07/09/20 0540    Specimen:  Blood Updated:  07/09/20 0606     WBC 8.22 10*3/mm3      RBC 3.53 10*6/mm3      Hemoglobin 10.5 g/dL      Hematocrit 31.5 %      MCV 89.2 fL      MCH 29.7 pg      MCHC 33.3 g/dL      RDW 14.4 %      RDW-SD 46.4 fl      MPV 10.6 fL      Platelets 202 10*3/mm3      Neutrophil % 69.1 %      Lymphocyte % 18.0 %      Monocyte % 10.1 %      Eosinophil % 1.6 %      Basophil % 0.2 %      Immature Grans % 1.0 %      Neutrophils, Absolute 5.68 10*3/mm3      Lymphocytes, Absolute 1.48 10*3/mm3      Monocytes, Absolute 0.83 10*3/mm3      Eosinophils,  Absolute 0.13 10*3/mm3      Basophils, Absolute 0.02 10*3/mm3      Immature Grans, Absolute 0.08 10*3/mm3      nRBC 0.0 /100 WBC     Vitamin D 25 Hydroxy [[id number]]  (Abnormal) Collected:  07/08/20 0553    Specimen:  Blood Updated:  07/08/20 1230     25 Hydroxy, Vitamin D 12.3 ng/ml     Narrative:       Reference Range for Total Vitamin D 25(OH)     Deficiency <20.0 ng/mL   Insufficiency 21-29 ng/mL   Sufficiency  ng/mL  Toxicity >100 ng/ml    Results may be falsely increased if patient taking Biotin.      Magnesium [[id number]]  (Normal) Collected:  07/08/20 0553    Specimen:  Blood Updated:  07/08/20 0646     Magnesium 1.8 mg/dL     Blood Culture - Blood, Hand, Left [[id number]] Collected:  07/03/20 0312    Specimen:  Blood from Hand, Left Updated:  07/08/20 0345     Blood Culture No growth at 5 days    Blood Culture - Blood, Arm, Right [[id number]] Collected:  07/02/20 2158    Specimen:  Blood from Arm, Right Updated:  07/07/20 2215     Blood Culture No growth at 5 days    Potassium [[id number]]  (Abnormal) Collected:  07/07/20 0943    Specimen:  Blood Updated:  07/07/20 1017     Potassium 3.4 mmol/L     Basic Metabolic Panel [[id number]]  (Abnormal) Collected:  07/07/20 0943    Specimen:  Blood Updated:  07/07/20 1017     Glucose 337 mg/dL      BUN 36 mg/dL      Creatinine 1.69 mg/dL      Sodium 139 mmol/L      Potassium 3.4 mmol/L      Chloride 112 mmol/L      CO2 18.0 mmol/L      Calcium 6.5 mg/dL      eGFR Non African Amer 41 mL/min/1.73      BUN/Creatinine Ratio 21.3     Anion Gap 9.0 mmol/L     Narrative:       GFR Normal >60  Chronic Kidney Disease <60  Kidney Failure <15      Phosphorus [[id number]]  (Abnormal) Collected:  07/07/20 0943    Specimen:  Blood Updated:  07/07/20 1017     Phosphorus 1.9 mg/dL     Sodium, Urine, Random - Urine, Catheter [[id number]] Collected:  07/06/20 0334    Specimen:  Urine, Catheter Updated:  07/06/20 0405     Sodium, Urine 60 mmol/L     Narrative:       Reference  intervals for random urine have not been established.  Clinical usage is dependent upon physician's interpretation in combination with other laboratory tests.       Basic Metabolic Panel [[id number]]  (Abnormal) Collected:  07/05/20 0755    Specimen:  Blood Updated:  07/05/20 0901     Glucose 191 mg/dL      BUN 89 mg/dL      Creatinine 2.59 mg/dL      Sodium 143 mmol/L      Potassium 2.1 mmol/L      Chloride 113 mmol/L      CO2 18.0 mmol/L      Calcium 7.3 mg/dL      eGFR Non African Amer 25 mL/min/1.73      BUN/Creatinine Ratio 34.4     Anion Gap 12.0 mmol/L     Narrative:       GFR Normal >60  Chronic Kidney Disease <60  Kidney Failure <15      Urine Culture - Urine, Urine, Catheter [[id number]]  (Abnormal) Collected:  07/03/20 1821    Specimen:  Urine, Catheter Updated:  07/04/20 1236     Urine Culture >100,000 CFU/mL Mixed Gram Positive Birgit    Narrative:       Specimen contains mixed organisms of questionable pathogenicity which indicates contamination with commensal birgit.  Further identification is unlikely to provide clinically useful information.  Suggest recollection.    Urine Drug Screen - Urine, Clean Catch [[id number]]  (Abnormal) Collected:  07/04/20 1207    Specimen:  Urine, Clean Catch Updated:  07/04/20 1221     THC, Screen, Urine Negative     Phencyclidine (PCP), Urine Negative     Cocaine Screen, Urine Negative     Methamphetamine, Ur Negative     Opiate Screen Negative     Amphetamine Screen, Urine Negative     Benzodiazepine Screen, Urine Negative     Tricyclic Antidepressants Screen Positive     Methadone Screen, Urine Negative     Barbiturates Screen, Urine Negative     Oxycodone Screen, Urine Negative     Propoxyphene Screen Negative     Buprenorphine, Screen, Urine Negative    Narrative:       Cutoff For Drugs Screened:    Amphetamines               500 ng/ml  Barbiturates               200 ng/ml  Benzodiazepines            150 ng/ml  Cocaine                    150 ng/ml  Methadone                   200 ng/ml  Opiates                    100 ng/ml  Phencyclidine               25 ng/ml  THC                            50 ng/ml  Methamphetamine            500 ng/ml  Tricyclic Antidepressants  300 ng/ml  Oxycodone                  100 ng/ml  Propoxyphene               300 ng/ml  Buprenorphine               10 ng/ml    The normal value for all drugs tested is negative. This report includes unconfirmed screening results, with the cutoff values listed, to be used for medical treatment purposes only.  Unconfirmed results must not be used for non-medical purposes such as employment or legal testing.  Clinical consideration should be applied to any drug of abuse test, particularly when unconfirmed results are used.      Blood Gas, Arterial [[id number]]  (Abnormal) Collected:  07/04/20 1055    Specimen:  Arterial Blood Updated:  07/04/20 1056     Site Right Radial     Wilian's Test N/A     pH, Arterial 7.111 pH units      Comment: 85 Value below critical limit        pCO2, Arterial 15.7 mm Hg      Comment: 85 Value below critical limit        pO2, Arterial 108.0 mm Hg      HCO3, Arterial 5.0 mmol/L      Comment: 84 Value below reference range        Base Excess, Arterial -22.4 mmol/L      Comment: 84 Value below reference range        O2 Saturation, Arterial 98.5 %      Barometric Pressure for Blood Gas 747 mmHg      Modality Room Air     FIO2 21 %      Ventilator Mode NA     Collected by MR     Comment: Meter: [id number]     :  [id number]       Ethanol [[id number]] Collected:  07/04/20 0959    Specimen:  Blood Updated:  07/04/20 1022     Ethanol <10 mg/dL      Ethanol % <0.010 %     Salicylate Level [[id number]]  (Normal) Collected:  07/04/20 0805    Specimen:  Blood Updated:  07/04/20 1005     Salicylate <0.3 mg/dL     Urine Drug Screen - Urine, Catheter [[id number]]  (Abnormal) Collected:  07/03/20 1821    Specimen:  Urine, Catheter Updated:  07/03/20 1907     THC, Screen, Urine Negative     Phencyclidine  (PCP), Urine Negative     Cocaine Screen, Urine Negative     Methamphetamine, Ur Negative     Opiate Screen Negative     Amphetamine Screen, Urine Negative     Benzodiazepine Screen, Urine Negative     Tricyclic Antidepressants Screen Positive     Methadone Screen, Urine Negative     Barbiturates Screen, Urine Negative     Oxycodone Screen, Urine Negative     Propoxyphene Screen Negative     Buprenorphine, Screen, Urine Negative    Narrative:       Cutoff For Drugs Screened:    Amphetamines               500 ng/ml  Barbiturates               200 ng/ml  Benzodiazepines            150 ng/ml  Cocaine                    150 ng/ml  Methadone                  200 ng/ml  Opiates                    100 ng/ml  Phencyclidine               25 ng/ml  THC                            50 ng/ml  Methamphetamine            500 ng/ml  Tricyclic Antidepressants  300 ng/ml  Oxycodone                  100 ng/ml  Propoxyphene               300 ng/ml  Buprenorphine               10 ng/ml    The normal value for all drugs tested is negative. This report includes unconfirmed screening results, with the cutoff values listed, to be used for medical treatment purposes only.  Unconfirmed results must not be used for non-medical purposes such as employment or legal testing.  Clinical consideration should be applied to any drug of abuse test, particularly when unconfirmed results are used.      Urinalysis, Microscopic Only - Urine, Catheter [[id number]]  (Abnormal) Collected:  07/03/20 1821    Specimen:  Urine, Catheter Updated:  07/03/20 1856     RBC, UA 0-2 /HPF      WBC, UA 6-12 /HPF      Bacteria, UA None Seen /HPF      Squamous Epithelial Cells, UA None Seen /HPF      Hyaline Casts, UA 0-2 /LPF      Methodology Automated Microscopy    Urinalysis With Culture If Indicated - Urine, Catheter [[id number]]  (Abnormal) Collected:  07/03/20 1821    Specimen:  Urine, Catheter Updated:  07/03/20 1856     Color, UA Yellow     Appearance, UA Clear     pH,  UA 7.5     Specific Gravity, UA 1.012     Glucose, UA Negative     Ketones, UA Negative     Bilirubin, UA Negative     Blood, UA Small (1+)     Protein,  mg/dL (2+)     Leuk Esterase, UA Trace     Nitrite, UA Negative     Urobilinogen, UA 0.2 E.U./dL    Extra Tubes [[id number]] Collected:  07/03/20 0339    Specimen:  Blood, Venous Line Updated:  07/03/20 0445    Narrative:       The following orders were created for panel order Extra Tubes.  Procedure                               Abnormality         Status                     ---------                               -----------         ------                     Lavender Top[[id number]]                                     Final result               Green Top (Gel)[[id number]]                                  Final result                 Please view results for these tests on the individual orders.    Lavender Top [[id number]] Collected:  07/03/20 0339    Specimen:  Blood Updated:  07/03/20 0445     Extra Tube hold for add-on     Comment: Auto resulted       Green Top (Gel) [[id number]] Collected:  07/03/20 0339    Specimen:  Blood Updated:  07/03/20 0445     Extra Tube Hold for add-ons.     Comment: Auto resulted.       [place] Draw [[id number]] Collected:  07/02/20 2122    Specimen:  Blood Updated:  07/02/20 2230    Narrative:       The following orders were created for panel order [place] Draw.  Procedure                               Abnormality         Status                     ---------                               -----------         ------                     Light Blue Top[[id number]]                                   Final result               Green Top (Gel)[[id number]]                                  Final result               Lavender Top[[id number]]                                     Final result               Gold Top - SST[[id number]]                                   Final result                 Please view results for these tests on the individual  orders.    Light Blue Top [[id number]] Collected:  07/02/20 2122    Specimen:  Blood Updated:  07/02/20 2230     Extra Tube hold for add-on     Comment: Auto resulted       Green Top (Gel) [[id number]] Collected:  07/02/20 2122    Specimen:  Blood Updated:  07/02/20 2230     Extra Tube Hold for add-ons.     Comment: Auto resulted.       Lavender Top [[id number]] Collected:  07/02/20 2122    Specimen:  Blood Updated:  07/02/20 2230     Extra Tube hold for add-on     Comment: Auto resulted       Gold Top - SST [[id number]] Collected:  07/02/20 2122    Specimen:  Blood Updated:  07/02/20 2230     Extra Tube Hold for add-ons.     Comment: Auto resulted.       Lactic Acid, Plasma [[id number]]  (Normal) Collected:  07/02/20 2158    Specimen:  Blood from Arm, Left Updated:  07/02/20 2228     Lactate 0.8 mmol/L     Blood Gas, Arterial [[id number]]  (Abnormal) Collected:  07/02/20 2221    Specimen:  Arterial Blood Updated:  07/02/20 2227     Site Right Radial     Wilian's Test Positive     pH, Arterial 7.039 pH units      Comment: 85 Value below critical limit        pCO2, Arterial 20.0 mm Hg      Comment: 84 Value below reference range        pO2, Arterial 98.9 mm Hg      HCO3, Arterial 5.4 mmol/L      Comment: 84 Value below reference range        Base Excess, Arterial -23.6 mmol/L      Comment: 84 Value below reference range        O2 Saturation, Arterial 98.0 %      Barometric Pressure for Blood Gas 746 mmHg      Modality Room Air     Ventilator Mode NA     Collected by RT     Comment: Meter: [id number]     :  [id number]       Ketone Bodies, Serum (Not performed at [place]) [[id number]] Collected:  07/02/20 2153    Specimen:  Blood Updated:  07/02/20 2159    Narrative:       The following orders were created for panel order Ketone Bodies, Serum (Not performed at [place]).  Procedure                               Abnormality         Status                     ---------                               -----------          ------                     Acetone[[id number]]                      Normal              Final result                 Please view results for these tests on the individual orders.    Acetone [[id number]]  (Normal) Collected:  07/02/20 2153    Specimen:  Blood Updated:  07/02/20 2159     Acetone Negative    Troponin [[id number]]  (Normal) Collected:  07/02/20 2122    Specimen:  Blood Updated:  07/02/20 2153     Troponin T <0.010 ng/mL     Narrative:       Troponin T Reference Range:  <= 0.03 ng/mL-   Negative for AMI  >0.03 ng/mL-     Abnormal for myocardial necrosis.  Clinicians would have to utilize clinical acumen, EKG, Troponin and serial changes to determine if it is an Acute Myocardial Infarction or myocardial injury due to an underlying chronic condition.       Results may be falsely decreased if patient taking Biotin.      Lipase [[id number]]  (Normal) Collected:  07/02/20 2122    Specimen:  Blood Updated:  07/02/20 2149     Lipase 39 U/L         Imaging Results (Most Recent)     Procedure Component Value Units Date/Time     Renal Bilateral [[id number]] Collected:  07/08/20 1029     Updated:  07/08/20 1544    Narrative:       EXAMINATION:  ultrasound, renal     CLINICAL INDICATION / HISTORY:  follow up hydronephrosis, E11.10  Type 2 diabetes mellitus with ketoacidosis without coma G93.40  Encephalopathy, unspecified N17.9 Acute kidney failure,  unspecified N18.9 Chronic kidney disease, unspecified R13.10  Dysphagia, unspecified R26.89 Other abnormalities of gait and  mobility Z74.09 Other reduced mobility Z78.9 Other specified  health status    COMPARISON:  7/5/2020    TECHNIQUE:  ultrasound, renal    FULL RESULTS / FINDINGS:    Kidney, right:      size:  normal, measuring 11 x 5 x 6.1 cm    echotexture:  normal    no nephrolithiasis, solid mass, or collecting system dilation    Kidney, left:      size:  normal, measuring 12.2 x 4.8 x 6.5 cm    echotexture:  normal    no nephrolithiasis, solid mass, or  collecting system dilation    Urinary bladder:  Patient with history of prior cystectomy and  creation of a neobladder. This region was not imaged.        Impression:       CONCLUSION:    1.  Patient with history of prior cystectomy and creation of a  neobladder. This region was not imaged.  2.  Otherwise normal renal ultrasound.    Electronically signed by:  Tyron Cheek MD  7/8/2020 11:02 AM CDT  Workstation: [id number]    US Guided Vascular Access [[id number]] Resulted:  07/06/20 1348     Updated:  07/06/20 1348    Narrative:       This procedure was auto-finalized with no dictation required.    IR Insert Midline Without Port Pump 5 Plus [[id number]] Resulted:  07/06/20 1344     Updated:  07/06/20 1344    Narrative:       This procedure was auto-finalized with no dictation required.    US Renal Bilateral [[id number]] Collected:  07/05/20 0836     Updated:  07/05/20 0919    Narrative:       Ultrasound renal bilateral.    HISTORY: Renal insufficiency. Evaluate for obstruction.       Prior exam: Ultrasound kidneys November 17, 2019. CT November 16, 2019.    FINDINGS: The right kidney is normal size 10.6 x 5.0 x 5.0 cm.  Normal renal cortical echogenicity and cortical thickness. No  masses calculi or evidence of obstruction.    The left kidney measures 10.8 x 5.9 x 5.4 cm.    Mild hydronephrotic changes. Dilatation of calyces and left renal  pelvis. This demonstrates a change since prior ultrasound  examination.    Urinary bladder surgically absent.          Impression:       Interval development of mild hydronephrotic changes  left kidney.. In this patient with prior cystectomy and creation  of a neobladder, these hydronephrotic changes may be secondary  either to obstruction or reflux.    Electronically signed by:  Matt Packer MD  7/5/2020 9:18 AM CDT  Workstation: [id number]    CT Head Without Contrast [[id number]] Collected:  07/03/20 1112     Updated:  07/03/20 1136    Narrative:       Noncontrast CT examination of the  brain.    INDICATION: Alteration mental status. Confusion, delirium.       Technique: Axial 5 mm contiguous images with brain parenchymal  and bone windows    This exam was performed according to our departmental  dose-optimization program, which includes automated exposure  control, adjustment of the mA and/or kV according to patient size  and/or use of iterative reconstruction technique.    Prior relevant examination: CT brain November 16, 2019.    Involutional, atrophic changes..    Brain parenchyma appears within normal limits. Ventricles are  within normal limits in size. No evidence of abnormal mass or  calcification is seen. No evidence of acute hemorrhage is noted.  Bony structures appear within normal limits and the mastoid air  cells and visualized paranasal sinuses are normally aerated.        Impression:       Involutional, atrophic changes. Otherwise unremarkable CT brain  without contrast. No change since prior exam.    Electronically signed by:  Matt Packer MD  7/3/2020 11:35 AM CDT  Workstation: [id number]    XR Chest 1 View [[id number]] Collected:  07/02/20 2141     Updated:  07/02/20 2209    Narrative:       EXAM DESCRIPTION:  XR CHEST 1 VW      CLINICAL HISTORY:  GEN WEAKNESS    TECHNIQUE:   Single frontal view of the chest is submitted.    COMPARISON:  11/16/2019    FINDINGS:  Heart: The cardiothoracic silhouette is within normal limits.  Lungs: No focal consolidation.  Mediastinum: Thoracic aortic atherosclerosis.  Pleura: No appreciable effusion. No pneumothorax.  Bones: Intact  Upper abdomen: Unremarkable      Impression:       No acute disease.    Electronically signed by:  Jennie Pedraza MD  7/2/2020 10:08 PM CDT  Workstation: [id number]          Chief Complaint on Day of Discharge: None    Hospital Course:  The patient is a 65 y.o. male who presented to [facility] with confusion secondary to DKA.  Patient was started on DKA protocol and his symptoms and metabolic derangements  "resolved nicely.  Nephrology was consulted due to acute kidney injury.  He was found to have left hydronephrosis his renal dysfunction was felt to be secondary to bladder outlet obstruction.  John catheter was placed and patient has urine retention.  Resolution of the hydronephrosis.  Urology consulted, patient and found patient's bladder outlet obstruction due to 2 mucoid sediment which is common for patient.  He had Mucomyst instilled in his neobladder with resolution of the mucoid sediment.  John catheter was discontinued and patient was started on self cathing 4 times daily.  Patient does have difficulty.  He felt much better and was discharged home with the patient.  No follow-up with his primary care provider in 1 to 2 weeks, Dr. Calderon in 1 week, and Dr. Brody in 2 to 3 weeks.    Condition on Discharge:  Stable    Physical Exam on Discharge:  /83 (BP Location: Left arm, Patient Position: Lying)   Pulse 77   Temp 97.5 °F (36.4 °C) (Oral)   Resp 18   Ht 170.2 cm (67.01\")   Wt 62.7 kg (138 lb 4.8 oz)   SpO2 98%   BMI 21.66 kg/m²      Physical Exam   Constitutional: He appears well-developed and well-nourished.   HENT:   Head: Normocephalic and atraumatic.   Eyes: Pupils are equal, round, and reactive to light. EOM are normal.   Neck: Normal range of motion. Neck supple.   Cardiovascular: Normal rate, regular rhythm, normal heart sounds and intact distal pulses. Exam reveals no gallop and no friction rub.   No murmur heard.  Pulmonary/Chest: Effort normal and breath sounds normal. No respiratory distress. He has no wheezes. He has no rales. He exhibits no tenderness.   Abdominal: Soft. Bowel sounds are normal. He exhibits no distension. There is no tenderness.   Psychiatric: He has a normal mood and affect. His behavior is normal.   Vitals reviewed.    Discharge Disposition:  Home or Self Care    Discharge Medications:     Discharge Medications      New Medications      Instructions Start Date "   aMILoride 5 MG tablet  Commonly known as:  MIDAMOR   5 mg, Oral, Daily         Changes to Medications      Instructions Start Date   [employer] Blood Glucose Meter w/Device kit  What changed:  Another medication with the same name was added. Make sure you understand how and when to take each.  Notes to patient:  As directed   1 each, Does not apply, 4 Times Daily PRN      Accu-Chek Toma device  What changed:  You were already taking a medication with the same name, and this prescription was added. Make sure you understand how and when to take each.  Notes to patient:  As directed   1 each, Other, 3 Times Daily Before Meals, Use as instructed      freestyle lancets  What changed:  Another medication with the same name was added. Make sure you understand how and when to take each.  Notes to patient:  As directed   Tid      Accu-Chek Softclix Lancets lancets  What changed:  You were already taking a medication with the same name, and this prescription was added. Make sure you understand how and when to take each.  Notes to patient:  Ads directed   1 each, Other, 3 Times Daily Before Meals, Use as instructed      glucose blood test strip  What changed:  Another medication with the same name was added. Make sure you understand how and when to take each.  Notes to patient:  As directed   Use as instructed      glucose blood test strip  Commonly known as:  Accu-Chek Toma  What changed:  You were already taking a medication with the same name, and this prescription was added. Make sure you understand how and when to take each.  Notes to patient:  As directed   1 each, Other, As Needed, Use as instructed      omeprazole 20 MG capsule  Commonly known as:  PrilOSEC  What changed:  when to take this   20 mg, Oral, Daily      sertraline 50 MG tablet  Commonly known as:  Zoloft  What changed:    · how much to take  · how to take this  · when to take this  · additional instructions  Notes to patient:  As directed   1/2 tablet hs 1  week then 1 tablet . (depression)         Continue These Medications      Instructions Start Date   albuterol (2.5 MG/3ML) 0.083% nebulizer solution  Commonly known as:  PROVENTIL   2.5 mg, Nebulization, Every 4 Hours PRN      albuterol sulfate  (90 Base) MCG/ACT inhaler  Commonly known as:  Ventolin HFA   2 puffs, Inhalation, Every 6 Hours PRN      Alcohol Prep pads  Notes to patient:  As Directed   1 pad, Does not apply, 5 Times Daily      aspirin 81 MG EC tablet   81 mg, Oral, Daily      budesonide-formoterol 160-4.5 MCG/ACT inhaler  Commonly known as:  SYMBICORT   2 puffs, Inhalation, 2 Times Daily - RT      carvedilol 3.125 MG tablet  Commonly known as:  COREG   TK 1 T PO BID WC      clopidogrel 75 MG tablet  Commonly known as:  PLAVIX   75 mg, Oral, Daily      cyclobenzaprine 10 MG tablet  Commonly known as:  FLEXERIL  Notes to patient:  Ad directed   10 mg, Oral      fluticasone 50 MCG/ACT nasal spray  Commonly known as:  Flonase   2 sprays, Nasal, Daily      glucose monitor monitoring kit  Notes to patient:  As directed   1 each, Does not apply, 3 Times Daily      hydrOXYzine 25 MG tablet  Commonly known as:  ATARAX   TK 1 T PO TID PRF ITCHING      insulin aspart 100 UNIT/ML solution pen-injector sc pen  Commonly known as:  novoLOG FLEXPEN   Subcutaneous, 3 Times Daily With Meals      Levemir FlexTouch 100 UNIT/ML injection  Generic drug:  insulin detemir   25 Units, Subcutaneous, 2 Times Daily      nitroglycerin 0.4 MG SL tablet  Commonly known as:  NITROSTAT   0.4 mg, Sublingual, Every 5 Minutes PRN, Take no more than 3 doses in 15 minutes.       POTASSIMIN PO   Oral      pravastatin 40 MG tablet  Commonly known as:  PRAVACHOL   40 mg, Oral, Nightly      traMADol 50 MG tablet  Commonly known as:  ULTRAM   50 mg, Oral, Every 6 Hours PRN         Stop These Medications    cephalexin 500 MG capsule  Commonly known as:  KEFLEX            Discharge Diet:   Diet Instructions     Advance Diet As Tolerated             Activity at Discharge:   Activity Instructions     Activity as Tolerated          Follow-up Appointments:   PCP in 1-2 weeks  Dr Calderon in 1 week  Dr. Brody in 2-3 weeks          This document has been electronically signed by Art Colin MD on July 10, 2020 13:11      Time: 35 min               No

## (undated) DEVICE — ELECTRD LP CUT 24/26F YEL

## (undated) DEVICE — TRAP SXN POLYP QUICKCATCH LF

## (undated) DEVICE — SILICONE ELASTOMER COATED LATEX FOLEY CATHETER, 30 CC, 3-WAY, 22 FR (7.3 MM): Brand: DOVER

## (undated) DEVICE — SYRINGE,TOOMEY,IRRIGATION,70CC,STERILE: Brand: MEDLINE

## (undated) DEVICE — CONTAINER,SPECIMEN,OR STERILE,4OZ: Brand: MEDLINE

## (undated) DEVICE — SAFESECURE,SECUREMENT,FOLEY CATH,STERILE: Brand: MEDLINE

## (undated) DEVICE — EVAC BLDR UROVAC W ADAPT

## (undated) DEVICE — MODEL BT2000 P/N 700287-012KIT CONTENTS: MANIFOLD WITH SALINE AND CONTRAST PORTS, SALINE TUBING WITH SPIKE AND HAND SYRINGE, TRANSDUCER: Brand: BT2000 AUTOMATED MANIFOLD KIT

## (undated) DEVICE — GLV SURG SENSICARE PI ORTHO PF SZ7 LF STRL

## (undated) DEVICE — INTRO SHEATH ART/FEM ENGAGE .038 6F12CM

## (undated) DEVICE — DEV INFL MONARCH 20ML

## (undated) DEVICE — Device: Brand: DISPOSABLE ELECTROSURGICAL SNARE

## (undated) DEVICE — STERILE POLYISOPRENE POWDER-FREE SURGICAL GLOVES WITH EMOLLIENT COATING: Brand: PROTEXIS

## (undated) DEVICE — PATIENT RETURN ELECTRODE, SINGLE-USE, CONTACT QUALITY MONITORING, ADULT, WITH 9FT CORD, FOR PATIENTS WEIGING OVER 33LBS. (15KG): Brand: MEGADYNE

## (undated) DEVICE — GW CHOICE PT J 300CM

## (undated) DEVICE — DRAINBAG,ANTI-REFLUX TOWER,L/F,2000ML,LL: Brand: MEDLINE

## (undated) DEVICE — CATH GUIDE LAUNCHER JL4.0 6F 100CM

## (undated) DEVICE — GLV SURG NEOLON 2G PF LF 6.5 STRL

## (undated) DEVICE — ELECTRD FULGURATING BUGBEE 5F58CM

## (undated) DEVICE — SOL IRR UROLOGIC GYLCINE 1.5% BT 2000ML

## (undated) DEVICE — SINGLE-USE BIOPSY FORCEPS: Brand: RADIAL JAW 4

## (undated) DEVICE — GW PERIPH GUIDERIGHT STD/J/TP PTFE/PCOAT SS 0.038IN 5X150CM

## (undated) DEVICE — KT INTRO MINISTICK MAX W/GW NITNL/TUNG ECHO 4F 21G 7CM

## (undated) DEVICE — CANN SMPL SOFTECH BIFLO ETCO2 A/M 7FT

## (undated) DEVICE — BITEBLOCK ENDO W/STRAP 60F A/ LF DISP

## (undated) DEVICE — GLV SURG NEOLON 2G PF LF 7.5 STRL

## (undated) DEVICE — PK CATH LAB 60

## (undated) DEVICE — ELECTRODE,RT,STRESS,FOAM,50PK: Brand: MEDLINE

## (undated) DEVICE — SOL IRR H2O BTL 1000ML STRL

## (undated) DEVICE — SOL IRR GLYCNE 1.5PCT 3000ML

## (undated) DEVICE — DRSNG TELFA PAD NONADH STR 1S 3X8IN

## (undated) DEVICE — PK CYSTO LF 60

## (undated) DEVICE — COPILOT KIT INCLUDES BLEEDBACK CONTROL VALVE / GUIDE WIRE INTRODUCER / TORQUE DEVICE: Brand: ACCESSORIES

## (undated) DEVICE — DOVER HYDROGEL COATED LATEX FOLEY CATHETER, 30 ML, 3-WAY 24 FR/CH (8.0 MM): Brand: DOVER

## (undated) DEVICE — MODEL AT P65, P/N 701554-001KIT CONTENTS: HAND CONTROLLER, 3-WAY HIGH-PRESSURE STOPCOCK WITH ROTATING END AND PREMIUM HIGH-PRESSURE TUBING: Brand: ANGIOTOUCH® KIT

## (undated) DEVICE — CATH DIAG EXPO M/ PK 6FR FL4/FR4 PIG 3PK

## (undated) DEVICE — STERILE POLYISOPRENE POWDER-FREE SURGICAL GLOVES: Brand: PROTEXIS